# Patient Record
Sex: FEMALE | Race: WHITE | NOT HISPANIC OR LATINO | Employment: OTHER
[De-identification: names, ages, dates, MRNs, and addresses within clinical notes are randomized per-mention and may not be internally consistent; named-entity substitution may affect disease eponyms.]

---

## 2017-07-01 ENCOUNTER — HEALTH MAINTENANCE LETTER (OUTPATIENT)
Age: 47
End: 2017-07-01

## 2018-07-08 ENCOUNTER — HEALTH MAINTENANCE LETTER (OUTPATIENT)
Age: 48
End: 2018-07-08

## 2019-10-04 ENCOUNTER — HEALTH MAINTENANCE LETTER (OUTPATIENT)
Age: 49
End: 2019-10-04

## 2020-11-07 ENCOUNTER — HEALTH MAINTENANCE LETTER (OUTPATIENT)
Age: 50
End: 2020-11-07

## 2021-07-29 ENCOUNTER — TRANSFERRED RECORDS (OUTPATIENT)
Dept: HEALTH INFORMATION MANAGEMENT | Facility: CLINIC | Age: 51
End: 2021-07-29

## 2021-08-02 ENCOUNTER — MEDICAL CORRESPONDENCE (OUTPATIENT)
Dept: HEALTH INFORMATION MANAGEMENT | Facility: CLINIC | Age: 51
End: 2021-08-02

## 2021-08-02 ENCOUNTER — TRANSFERRED RECORDS (OUTPATIENT)
Dept: HEALTH INFORMATION MANAGEMENT | Facility: CLINIC | Age: 51
End: 2021-08-02

## 2021-08-02 ENCOUNTER — TRANSCRIBE ORDERS (OUTPATIENT)
Dept: OTHER | Age: 51
End: 2021-08-02

## 2021-08-02 DIAGNOSIS — M25.552 HIP PAIN, LEFT: Primary | ICD-10-CM

## 2021-08-04 NOTE — TELEPHONE ENCOUNTER
DIAGNOSIS: L hip    APPOINTMENT DATE: 8.26.21   NOTES STATUS DETAILS   OFFICE NOTE from referring provider In process 8.2.21 SHASHI Guevara   OFFICE NOTE from other specialist NA    DISCHARGE SUMMARY from hospital N/A    DISCHARGE REPORT from the ER N/A    OPERATIVE REPORT N/A    MEDICATION LIST Internal    EMG (for Spine) N/A    IMPLANT RECORD/STICKER N/A    LABS     CBC/DIFF Care Everywhere    CULTURES N/A    INJECTIONS DONE IN RADIOLOGY N/A    MRI N/A    CT SCAN N/A    XRAYS (IMAGES & REPORTS) N/A    TUMOR     PATHOLOGY  Slides & report N/A      Action 8.4.21 MJ   Action Taken Sent request to [facility].   08/19/21   10:52 AM   FAXED REQUEST TO [facility] FOR IMAGES  Chante Slois CMA    08/23/21   10:33 AM   [facility] RECORDS SENT TO SCANNING  IMAGES RESOLVED IN PACS  COMPLETE  Chante Solis CMA

## 2021-08-26 ENCOUNTER — OFFICE VISIT (OUTPATIENT)
Dept: ORTHOPEDICS | Facility: CLINIC | Age: 51
End: 2021-08-26
Payer: COMMERCIAL

## 2021-08-26 ENCOUNTER — LAB (OUTPATIENT)
Dept: LAB | Facility: CLINIC | Age: 51
End: 2021-08-26
Payer: COMMERCIAL

## 2021-08-26 ENCOUNTER — ANCILLARY PROCEDURE (OUTPATIENT)
Dept: CT IMAGING | Facility: CLINIC | Age: 51
End: 2021-08-26
Payer: COMMERCIAL

## 2021-08-26 ENCOUNTER — PRE VISIT (OUTPATIENT)
Dept: ORTHOPEDICS | Facility: CLINIC | Age: 51
End: 2021-08-26

## 2021-08-26 VITALS — WEIGHT: 163 LBS | HEIGHT: 68 IN | BODY MASS INDEX: 24.71 KG/M2

## 2021-08-26 DIAGNOSIS — R19.00 PELVIC MASS: ICD-10-CM

## 2021-08-26 DIAGNOSIS — R19.00 PELVIC MASS: Primary | ICD-10-CM

## 2021-08-26 DIAGNOSIS — M25.552 HIP PAIN, LEFT: ICD-10-CM

## 2021-08-26 LAB
ALBUMIN SERPL-MCNC: 4.4 G/DL (ref 3.4–5)
ALP SERPL-CCNC: 48 U/L (ref 40–150)
ALT SERPL W P-5'-P-CCNC: 22 U/L (ref 0–50)
ANION GAP SERPL CALCULATED.3IONS-SCNC: 8 MMOL/L (ref 3–14)
AST SERPL W P-5'-P-CCNC: 14 U/L (ref 0–45)
BASOPHILS # BLD AUTO: 0.1 10E3/UL (ref 0–0.2)
BASOPHILS NFR BLD AUTO: 1 %
BILIRUB SERPL-MCNC: 0.5 MG/DL (ref 0.2–1.3)
BUN SERPL-MCNC: 21 MG/DL (ref 7–30)
CALCIUM SERPL-MCNC: 9.8 MG/DL (ref 8.5–10.1)
CHLORIDE BLD-SCNC: 107 MMOL/L (ref 94–109)
CO2 SERPL-SCNC: 27 MMOL/L (ref 20–32)
CREAT SERPL-MCNC: 0.94 MG/DL (ref 0.52–1.04)
CRP SERPL-MCNC: <2.9 MG/L (ref 0–8)
EOSINOPHIL # BLD AUTO: 0.3 10E3/UL (ref 0–0.7)
EOSINOPHIL NFR BLD AUTO: 5 %
ERYTHROCYTE [DISTWIDTH] IN BLOOD BY AUTOMATED COUNT: 12.3 % (ref 10–15)
ERYTHROCYTE [SEDIMENTATION RATE] IN BLOOD BY WESTERGREN METHOD: 7 MM/HR (ref 0–30)
GFR SERPL CREATININE-BSD FRML MDRD: 70 ML/MIN/1.73M2
GLUCOSE BLD-MCNC: 101 MG/DL (ref 70–99)
HCT VFR BLD AUTO: 42.4 % (ref 35–47)
HGB BLD-MCNC: 14.2 G/DL (ref 11.7–15.7)
IMM GRANULOCYTES # BLD: 0 10E3/UL
IMM GRANULOCYTES NFR BLD: 0 %
LYMPHOCYTES # BLD AUTO: 1.9 10E3/UL (ref 0.8–5.3)
LYMPHOCYTES NFR BLD AUTO: 27 %
MCH RBC QN AUTO: 30.7 PG (ref 26.5–33)
MCHC RBC AUTO-ENTMCNC: 33.5 G/DL (ref 31.5–36.5)
MCV RBC AUTO: 92 FL (ref 78–100)
MONOCYTES # BLD AUTO: 0.7 10E3/UL (ref 0–1.3)
MONOCYTES NFR BLD AUTO: 10 %
NEUTROPHILS # BLD AUTO: 4 10E3/UL (ref 1.6–8.3)
NEUTROPHILS NFR BLD AUTO: 57 %
NRBC # BLD AUTO: 0 10E3/UL
NRBC BLD AUTO-RTO: 0 /100
PLATELET # BLD AUTO: 277 10E3/UL (ref 150–450)
POTASSIUM BLD-SCNC: 4.6 MMOL/L (ref 3.4–5.3)
PROT SERPL-MCNC: 7.6 G/DL (ref 6.8–8.8)
RBC # BLD AUTO: 4.63 10E6/UL (ref 3.8–5.2)
SODIUM SERPL-SCNC: 142 MMOL/L (ref 133–144)
TOTAL PROTEIN SERUM FOR ELP: 7.3 G/DL (ref 6.8–8.8)
WBC # BLD AUTO: 7 10E3/UL (ref 4–11)

## 2021-08-26 PROCEDURE — 86140 C-REACTIVE PROTEIN: CPT | Performed by: PATHOLOGY

## 2021-08-26 PROCEDURE — 74177 CT ABD & PELVIS W/CONTRAST: CPT | Mod: GC | Performed by: RADIOLOGY

## 2021-08-26 PROCEDURE — 85025 COMPLETE CBC W/AUTO DIFF WBC: CPT | Performed by: PATHOLOGY

## 2021-08-26 PROCEDURE — 36415 COLL VENOUS BLD VENIPUNCTURE: CPT | Performed by: PATHOLOGY

## 2021-08-26 PROCEDURE — 71260 CT THORAX DX C+: CPT | Mod: GC | Performed by: RADIOLOGY

## 2021-08-26 PROCEDURE — 84155 ASSAY OF PROTEIN SERUM: CPT | Performed by: STUDENT IN AN ORGANIZED HEALTH CARE EDUCATION/TRAINING PROGRAM

## 2021-08-26 PROCEDURE — 80053 COMPREHEN METABOLIC PANEL: CPT | Performed by: PATHOLOGY

## 2021-08-26 PROCEDURE — 99205 OFFICE O/P NEW HI 60 MIN: CPT | Mod: GC | Performed by: ORTHOPAEDIC SURGERY

## 2021-08-26 PROCEDURE — 85652 RBC SED RATE AUTOMATED: CPT | Performed by: PATHOLOGY

## 2021-08-26 PROCEDURE — 84165 PROTEIN E-PHORESIS SERUM: CPT | Performed by: PATHOLOGY

## 2021-08-26 RX ORDER — IOPAMIDOL 755 MG/ML
100 INJECTION, SOLUTION INTRAVASCULAR ONCE
Status: COMPLETED | OUTPATIENT
Start: 2021-08-26 | End: 2021-08-26

## 2021-08-26 RX ORDER — OMALIZUMAB 150 MG/ML
INJECTION, SOLUTION SUBCUTANEOUS
COMMUNITY
Start: 2021-01-22 | End: 2021-10-11

## 2021-08-26 RX ORDER — FLUTICASONE PROPIONATE 50 MCG
1 SPRAY, SUSPENSION (ML) NASAL
Status: ON HOLD | COMMUNITY
End: 2023-01-10

## 2021-08-26 RX ORDER — IOPAMIDOL 755 MG/ML
100 INJECTION, SOLUTION INTRAVASCULAR ONCE
Status: DISCONTINUED | OUTPATIENT
Start: 2021-08-26 | End: 2021-08-26 | Stop reason: CLARIF

## 2021-08-26 RX ORDER — POLYETHYLENE GLYCOL 3350 17 G/17G
17 POWDER, FOR SOLUTION ORAL
COMMUNITY
End: 2022-03-29

## 2021-08-26 RX ADMIN — IOPAMIDOL 100 ML: 755 INJECTION, SOLUTION INTRAVASCULAR at 13:38

## 2021-08-26 ASSESSMENT — HOOS S4: HOW SEVERE IS YOUR HIP JOINT STIFFNESS AFTER FIRST WAKENING IN THE MORNING?: MODERATE

## 2021-08-26 ASSESSMENT — MIFFLIN-ST. JEOR: SCORE: 1398.37

## 2021-08-26 ASSESSMENT — ACTIVITIES OF DAILY LIVING (ADL): HOOS_ADL: 79.41

## 2021-08-26 NOTE — LETTER
8/26/2021         RE: Kristie Cornejo  [address]  [facility] [address]        Dear Colleague,    Thank you for referring your patient, Kristie Cornejo, to the [facility] [place]. Please see a copy of my visit note below.        [facility], Orthopaedic Oncology Surgery Consultation  by Edu Philip M.D.    Kristie Cornejo MRN# [id number]   Age: 51 year old YOB: 1970     Requesting physician: MD SHASHI Arboleda Sarah C            Assessment and Plan:   Assessment:  50 yo F with Crohn's disease presenting for evaluation of large, expansile, permeative mass with indistinct borders involving the left superior and inferior pubic rami, pubic symphysis and with possible extension to the left acetabulum.     Plan:  We discussed with the patient that her imaging findings are concerning for possible malignancy.  We discussed the need for further work-up to rule out benign versus malignant lesion.  This includes CT chest abdomen pelvis as well as following labs: SPEP, CBC, CMP, ESR/CRP.  Given the patient is having minimal pain at this time and no pain currently with ambulation we counseled her that she should refrain from stressful or strenuous activities but may remain WBAT in her bilateral  lower extremities.  After completion of her labs we will plan to follow-up with her on 8/30/21 for review of these results.  If the imaging and laboratory results lack diagnostic specificity would then discuss possible need for biopsy.  Patient is in agreement this plan, all questions answered.    Patient seen, evaluated and discussed with Dr. Philip who is in agreement with the above assessment and plan.    Too Siegel MD  Orthopaedic Surgery PGY4  Pager: [phone]    MD Chichi Herrera Family Professor  Oncology and Adult Reconstructive Surgery  Dept Orthopaedic Surgery, [facility]  [phone] office,  [phone] pager  [email or web address]             History of Present Illness:   51 year old female with history of Crohn's disease who presents for evaluation of pelvic mass with associated left groin pain.  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  Initially this was thought to be related to her physical activity with skating, however she then reports she began altering gait and had progressive pain over the next 2-3 months.  Her last month she reports her groin pain has now been improving, likely related to modifications in her activity.  She reports her pain is primarily exacerbated by active hip flexion or with increased physical activity at which she describes fatigue-like symptoms and associated groin pain.  Due to this she has been unable to skate or go for long walks with her  (greater than 2 miles).  She describes the pain as aching tightness that she localizes to her left groin and perineal region.  She does endorse pain at night in the groin which makes it difficult to fall asleep but does not wake her from sleep. Rates pain as severe as 8/10 at its worst, currently describes it as 3/10.  She has been taking Tylenol PM and naproxen naproxen, both of which have been helpful.  She has not participated in any formal physical therapy.  She denies any prior interventions including no prior injections, biopsies or surgeries to her left hip or groin.    No weight loss, changes in appetite, fevers/chills, changes in bowel or bladder. No history of frequent UTIs. No cancer history but has had prior breast biopsy.    Background history:  DX:  1. Crohn's disease (on Omalizumab, Xolair)  2. Expansile destructive mass involving the left superior and inferior pubic rami, pubic symphysis and acetabulum    TREATMENTS/INTERVENTIONS:  1. None           Physical Exam:     EXAMINATION pertinent findings:   PSYCH: Pleasant, healthy-appearing, alert, oriented x3, cooperative. Normal  mood and affect.  VITAL SIGNS: There were no vitals taken for this visit..  Reviewed nursing intake notes.   There is no height or weight on file to calculate BMI.  RESP: non labored breathing   ABD: benign, soft, non-tender, no acute peritoneal findings  SKIN: grossly normal   LYMPHATIC: grossly normal, no adenopathy, no extremity edema  NEURO: grossly normal , no motor deficits  VASCULAR: satisfactory perfusion of all extremities   MUSCULOSKELETAL:   Gait: Normal    Left hip:  Skin intact without skin changes.  No visual masses or lumps soft tissues.  No lower extremity wounds or lymphedema.  Mild TTP left pelvic pubic rami. Nontender over the greater trochanter.  Hip ROM is not painful passively, but endorses pain with active hip flexion.   Flexion: 120 degrees   IR: 30 degrees   ER: 45 degrees  Hip strength: 4/5 hip flexion strength, 4+/5 adduction strength which exacerbates groin pain, 5/5 knee flex/ knee ext/TA/GSC.  Pain with logroll: no  Stinchfield test: Yes  Trendelenburg sign: No    PatientSILT sp/dp/tibial/saph/sural nerves.  Motor intact distally TA/GSC/EHL/FHL with 5/5 strength.    DP/PT pulses palpable, 2+, toes warm and well perfused.              Data:   All laboratory data reviewed  All imaging studies reviewed by me    XR pelvis 7/22/21 demonstrates large expansile, irregular permeative lytic mass involving the left parasymphyseal region including the superior and inferior pubic rami with indistinct borders. No lytic lesions involving the femoral neck or proximal femur.    MRI pelvis 7/29/21 demonstrates expansile destructive lesion involving the left pubic symphysis and superior pubic ramus with extension to the inferior pubic ramus and possibly anterior acetabulum. No obvious soft tissue component.      DATA for DOCUMENTATION:         Past Medical History:     Patient Active Problem List   Diagnosis     Allergic rhinitis     Anxiety state     Irritable bowel syndrome     Hemorrhoids     Sciatica      Encounter for insertion or removal of intrauterine contraceptive device     CARDIOVASCULAR SCREENING; LDL GOAL LESS THAN 160     Past Medical History:   Diagnosis Date     Allergic rhinitis, cause unspecified      Anxiety state, unspecified 12/03    Started postpartum . On Paxil x 1+years. Off meds- 10/04     Irritable bowel syndrome 2004     PLANTAR WARTS     resolved     SINUS DYSRHYTHMIA 10/02    wnl     Unspecified hemorrhoids without mention of complication 4/04    colonoscopy 4/04 spastic colon, int roids; bx neg       Also see scanned health assessment forms.       Past Surgical History:     Past Surgical History:   Procedure Laterality Date     HC COLONOSCOPY THRU STOMA, DIAGNOSTIC  2004     [facility] NONSPECIFIC PROCEDURE  1987    [place] teeth removed     Z NONSPECIFIC PROCEDURE  1993    (R) knee surgery     Z NONSPECIFIC PROCEDURE  2003    (R) breast bx-negative 6/03; bx neg 10/02     Z NONSPECIFIC PROCEDURE      10/02 cardiac echo normal     [facility] NONSPECIFIC PROCEDURE  2004 4/04 colonosc int roids, spastic colon     Z NONSPECIFIC PROCEDURE  8/05    D & C for missed AB            Social History:     Social History     Socioeconomic History     Marital status:      Spouse name: mary     Number of children: 1     Years of education: Not on file     Highest education level: Not on file   Occupational History     Employer: UNEMPLOYED   Tobacco Use     Smoking status: Never Smoker   Substance and Sexual Activity     Alcohol use: Yes     Comment: none to a couple. occasional     Drug use: No     Sexual activity: Yes     Partners: Male     Birth control/protection: Condom   Other Topics Concern     Parent/sibling w/ CABG, MI or angioplasty before 65F 55M? No     Comment: Grandparent. But not parent or sibling.   Social History Narrative     Not on file     Social Determinants of Health     Financial Resource Strain:      Difficulty of Paying Living Expenses:    Food Insecurity:      Worried About  Running Out of Food in the Last Year:      Ran Out of Food in the Last Year:    Transportation Needs:      Lack of Transportation (Medical):      Lack of Transportation (Non-Medical):    Physical Activity:      Days of Exercise per Week:      Minutes of Exercise per Session:    Stress:      Feeling of Stress :    Social Connections:      Frequency of Communication with Friends and Family:      Frequency of Social Gatherings with Friends and Family:      Attends Anabaptism Services:      Active Member of Clubs or Organizations:      Attends Club or Organization Meetings:      Marital Status:    Intimate Partner Violence:      Fear of Current or Ex-Partner:      Emotionally Abused:      Physically Abused:      Sexually Abused:    Nonsmoker, occasional EtOH.  Figure skating teacher, music therapist, [employer].  Lives in [place] in house with  and son.         Family History:       Family History   Problem Relation Age of Onset     Neurologic Disorder Mother         toxo     C.A.D. Maternal Grandfather         strokes and mi, chronic respiratory from 2 hand smoke     Hypertension Paternal Grandmother         rheumatoid arthritis     Diabetes Maternal Grandmother         breast cancer,stroke ischemic     Cancer - colorectal No family hx of      Prostate Cancer No family hx of      Respiratory Paternal Grandfather         emphasema     Breast Cancer Maternal Grandmother         81yo/and autoimmune skin condition     Lipids Mother         low cholestrol     Lipids Father         high cholestrol   Father with history of pancreatic cancer (diagnosed age 78).  Mother with cervical dysplasia.  Maternal grandmother breast cancer.         Medications:     Current Outpatient Medications   Medication Sig     BUSPIRONE HCL 10 MG PO TABS 1 TABLET 3 TIMES DAILY as needed     GLYCOLAX OR POWD 17 GM DAILY as needed     MIRENA 20 MCG/24HR IU IUD use for up to 5 years, then remove     No current facility-administered  medications for this visit.              Review of Systems:   A comprehensive 10 point review of systems (constitutional, ENT, cardiac, peripheral vascular, lymphatic, respiratory, GI, , Musculoskeletal, skin, Neurological) was performed and found to be negative except as described in this note.     See intake form completed by patient      Attestation signed by Edu Philip MD at 8/26/2021  5:36 PM:    Attending MD (Dr. Edu Philip) Attestation :  This patient was seen and evaluated by me including a history, exam, and interpretation of all imaging and/or lab data.  Either a training physician (resident/fellow), who also saw the patient, or scribe has documented the visit in the attached note.    Edu Philip MD  [facility] Family Professor  Oncology and Adult Reconstructive Surgery  Dept Orthopaedic Surgery, [facility]  [phone] office, [phone] pager  [email or web address]

## 2021-08-26 NOTE — NURSING NOTE
"Chief Complaint   Patient presents with     Consult     Left Hip pain and discomfort with physical activity       51 year old  1970    Ht 1.72 m (5' 7.72\")   Wt 73.9 kg (163 lb)   BMI 24.99 kg/m                  Pain Assessment  Patient Currently in Pain: Yes  0-10 Pain Scale: 3 (bothers patient at night)              [facility] [place], [place] - [address]  [id number] - [place], [place] - [address] AT [facility] [address]        Allergies   Allergen Reactions     Erythromycin      upsets stomach           Current Outpatient Medications   Medication     levonorgestrel (MIRENA) 20 MCG/24HR IUD     omalizumab (XOLAIR) 150 MG/ML SOSY injection     BUSPIRONE HCL 10 MG PO TABS     cetirizine HCl 10 MG CAPS     fluticasone (FLONASE) 50 MCG/ACT nasal spray     GLYCOLAX OR POWD     MIRENA 20 MCG/24HR IU IUD     polyethylene glycol (MIRALAX) 17 GM/Dose powder     No current facility-administered medications for this visit.             Questionnaires:    HOOS Hip Dysfunction & Osteoarthritis Outcome Questionnaire    No flowsheet data found.           KOOS Knee Survey Assessment    No flowsheet data found.           Promis 10 Assessment    No flowsheet data found.           Ortho [place] Knee Questionnaire    No flowsheet data found.                   "

## 2021-08-27 LAB
ALBUMIN SERPL ELPH-MCNC: 4.8 G/DL (ref 3.7–5.1)
ALPHA1 GLOB SERPL ELPH-MCNC: 0.3 G/DL (ref 0.2–0.4)
ALPHA2 GLOB SERPL ELPH-MCNC: 0.8 G/DL (ref 0.5–0.9)
B-GLOBULIN SERPL ELPH-MCNC: 0.7 G/DL (ref 0.6–1)
GAMMA GLOB SERPL ELPH-MCNC: 0.7 G/DL (ref 0.7–1.6)
M PROTEIN SERPL ELPH-MCNC: 0 G/DL
PROT PATTERN SERPL ELPH-IMP: NORMAL

## 2021-08-30 ENCOUNTER — TELEPHONE (OUTPATIENT)
Dept: ORTHOPEDICS | Facility: CLINIC | Age: 51
End: 2021-08-30

## 2021-08-30 ENCOUNTER — VIRTUAL VISIT (OUTPATIENT)
Dept: ORTHOPEDICS | Facility: CLINIC | Age: 51
End: 2021-08-30
Payer: COMMERCIAL

## 2021-08-30 DIAGNOSIS — M25.552 HIP PAIN, LEFT: Primary | ICD-10-CM

## 2021-08-30 PROCEDURE — 99213 OFFICE O/P EST LOW 20 MIN: CPT | Mod: GT | Performed by: ORTHOPAEDIC SURGERY

## 2021-08-30 NOTE — TELEPHONE ENCOUNTER
Follow-up call to [facility].  This RN discussed that surgery biopsy procedure with Dr. Philip may be Monday Sept 7th of Fri Sept 10th.  This RN discussed the preop, COVID test, care giver following surgery, overall plan of care.  This RN will reach back out to [facility] tomorrow and confirm the surgical plan.    All questions answered.    BANDAR GlassN, RN  RN Care Coordinator, Dr. Cedrick VALENZUELA [facility]

## 2021-08-30 NOTE — LETTER
8/30/2021         RE: Kristie Cornejo  [address]  [facility] [address]        Dear Colleague,    Thank you for referring your patient, Kristie Cornejo, to the [facility] [place]. Please see a copy of my visit note below.         [facility], Orthopaedic Oncology Surgery Consultation  by Edu Philip M.D.     Kristie Cornejo MRN# [id number]   Age: 51 year old YOB: 1970      Requesting physician: MD SHASHI Arboleda Sarah C         Background history:  DX:      1. Crohn's disease (on Omalizumab, Xolair)  2. Expansile destructive mass involving the left superior and inferior pubic rami, pubic symphysis and acetabulum     TREATMENTS/INTERVENTIONS:  1. None     Follow-up visit via virtual video call today to review CT scan as well as laboratory studies. None of them show any evidence of primary tumor elsewhere. For this reason I recommend that we then proceed with a biopsy procedure. We will investigate doing this either with interventional radiology or surgically, which ever can be done quickest.    I described the process of doing the biopsy, the possibility of a nondiagnostic sample when done noninvasively.             Assessment and Plan:   Assessment:  52 yo F with Crohn's disease presenting for evaluation of large, expansile, permeative mass with indistinct borders involving the left superior and inferior pubic rami, pubic symphysis and with possible extension to the left acetabulum.     Plan:  We discussed with the patient that her imaging findings are concerning for possible malignancy.  We discussed the need for further work-up to rule out benign versus malignant lesion.  This includes CT chest abdomen pelvis as well as following labs: SPEP, CBC, CMP, ESR/CRP.  Given the patient is having minimal pain at this time and no pain currently with ambulation we counseled her that she should refrain from stressful or  strenuous activities but may remain WBAT in her bilateral  lower extremities.  After completion of her labs we will plan to follow-up with her on 8/30/21 for review of these results.  If the imaging and laboratory results lack diagnostic specificity would then discuss possible need for biopsy.  Patient is in agreement this plan, all questions answered.     Patient seen, evaluated and discussed with Dr. Philip who is in agreement with the above assessment and plan.     Too Siegel MD  Orthopaedic Surgery PGY4  Pager: [phone]     Edu Philip MD  [facility] Professor  Oncology and Adult Reconstructive Surgery  Dept Orthopaedic Surgery, [facility]  [phone] office, [phone] pager  [email or web address]              History of Present Illness:   51 year old female with history of Crohn's disease who presents for evaluation of pelvic mass with associated left groin pain.  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  Initially this was thought to be related to her physical activity with skating, however she then reports she began altering gait and had progressive pain over the next 2-3 months.  Her last month she reports her groin pain has now been improving, likely related to modifications in her activity.  She reports her pain is primarily exacerbated by active hip flexion or with increased physical activity at which she describes fatigue-like symptoms and associated groin pain.  Due to this she has been unable to skate or go for long walks with her  (greater than 2 miles).  She describes the pain as aching tightness that she localizes to her left groin and perineal region.  She does endorse pain at night in the groin which makes it difficult to fall asleep but does not wake her from sleep. Rates pain as severe as 8/10 at its worst, currently describes it as 3/10.  She has been taking Tylenol PM and naproxen naproxen, both of which have been helpful.   She has not participated in any formal physical therapy.  She denies any prior interventions including no prior injections, biopsies or surgeries to her left hip or groin.     No weight loss, changes in appetite, fevers/chills, changes in bowel or bladder. No history of frequent UTIs. No cancer history but has had prior breast biopsy.               Physical Exam:      EXAMINATION pertinent findings:   PSYCH: Pleasant, healthy-appearing, alert, oriented x3, cooperative. Normal mood and affect.  VITAL SIGNS: There were no vitals taken for this visit..  Reviewed nursing intake notes.   There is no height or weight on file to calculate BMI.  RESP: non labored breathing   ABD: benign, soft, non-tender, no acute peritoneal findings  SKIN: grossly normal   LYMPHATIC: grossly normal, no adenopathy, no extremity edema  NEURO: grossly normal , no motor deficits  VASCULAR: satisfactory perfusion of all extremities   MUSCULOSKELETAL:   Gait: Normal     Left hip:  Skin intact without skin changes.  No visual masses or lumps soft tissues.  No lower extremity wounds or lymphedema.  Mild TTP left pelvic pubic rami. Nontender over the greater trochanter.  Hip ROM is not painful passively, but endorses pain with active hip flexion.            Flexion: 120 degrees            IR: 30 degrees            ER: 45 degrees  Hip strength: 4/5 hip flexion strength, 4+/5 adduction strength which exacerbates groin pain, 5/5 knee flex/ knee ext/TA/GSC.  Pain with logroll: no  Stinchfield test: Yes  Trendelenburg sign: No     PatientSILT sp/dp/tibial/saph/sural nerves.  Motor intact distally TA/GSC/EHL/FHL with 5/5 strength.    DP/PT pulses palpable, 2+, toes warm and well perfused.                 Data:   All laboratory data reviewed  All imaging studies reviewed by me     XR pelvis 7/22/21 demonstrates large expansile, irregular permeative lytic mass involving the left parasymphyseal region including the superior and inferior pubic rami with  indistinct borders. No lytic lesions involving the femoral neck or proximal femur.     MRI pelvis 7/29/21 demonstrates expansile destructive lesion involving the left pubic symphysis and superior pubic ramus with extension to the inferior pubic ramus and possibly anterior acetabulum. No obvious soft tissue component.

## 2021-08-30 NOTE — PROGRESS NOTES
[facility], Orthopaedic Oncology Surgery Consultation  by Edu Philip M.D.     Kristie Cornejo MRN# [id number]   Age: 51 year old YOB: 1970      Requesting physician: MD SHASHI Arboleda Sarah C         Background history:  DX:      1. Crohn's disease (on Omalizumab, Xolair)  2. Expansile destructive mass involving the left superior and inferior pubic rami, pubic symphysis and acetabulum     TREATMENTS/INTERVENTIONS:  1. None     Follow-up visit via virtual video call today to review CT scan as well as laboratory studies. None of them show any evidence of primary tumor elsewhere. For this reason I recommend that we then proceed with a biopsy procedure. We will investigate doing this either with interventional radiology or surgically, which ever can be done quickest.    I described the process of doing the biopsy, the possibility of a nondiagnostic sample when done noninvasively.             Assessment and Plan:   Assessment:  52 yo F with Crohn's disease presenting for evaluation of large, expansile, permeative mass with indistinct borders involving the left superior and inferior pubic rami, pubic symphysis and with possible extension to the left acetabulum.     Plan:  We discussed with the patient that her imaging findings are concerning for possible malignancy.  We discussed the need for further work-up to rule out benign versus malignant lesion.  This includes CT chest abdomen pelvis as well as following labs: SPEP, CBC, CMP, ESR/CRP.  Given the patient is having minimal pain at this time and no pain currently with ambulation we counseled her that she should refrain from stressful or strenuous activities but may remain WBAT in her bilateral  lower extremities.  After completion of her labs we will plan to follow-up with her on 8/30/21 for review of these results.  If the imaging and laboratory results lack diagnostic specificity would then discuss possible  "need for biopsy.  Patient is in agreement this plan, all questions answered.     Patient seen, evaluated and discussed with Dr. Philip who is in agreement with the above assessment and plan.     Too Siegel MD  Orthopaedic Surgery PGY4  Pager: [phone]       Attending MD (Dr. Edu Philip) Attestation :  This patient was seen and evaluated by me including a history, exam, and interpretation of all imaging and/or lab data.  Either a training physician (resident/fellow), who also saw the patient, or scribe has documented the visit in the attached note.    MD Chichi Herrera Family Professor  Oncology and Adult Reconstructive Surgery  Dept Orthopaedic Surgery, [facility]  [phone] office, [phone] pager  [email or web address]    Virtual-Visit Details    Kristie Cornejo is a 51 year old female who is being evaluated via a billable video visit.      The patient has been notified of following:     \"This virtual video/telephone visit will be conducted via a call between you and your physician/provider. We have found that certain health care needs can be provided without the need for an in-person physical exam.  This service lets us provide the care you need with a video/telephone conversation.  If a prescription is necessary we can send it directly to your pharmacy.  If lab work is needed we can place an order for that and you can then stop by our lab to have the test done at a later time.    Video/telephone visits are billed at different rates depending on your insurance coverage.  Please reach out to your insurance provider with any questions.    If during the course of the call the physician/provider feels a virtual visit is not appropriate, you will not be charged for this service.\"    Patient has given verbal consent for Virtual visit? YES    Type of service:  Video/telephone Visit    Video/telephone time total duration including visit time, pre and post visit work time: 20 " min    Originating Location (pt. Location): Home    Distant Location (provider location):  [facility] [place]    Mode of Communication:  Video Conference via either [employer] or [employer]                        History of Present Illness:   51 year old female with history of Crohn's disease who presents for evaluation of pelvic mass with associated left groin pain.  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  Initially this was thought to be related to her physical activity with skating, however she then reports she began altering gait and had progressive pain over the next 2-3 months.  Her last month she reports her groin pain has now been improving, likely related to modifications in her activity.  She reports her pain is primarily exacerbated by active hip flexion or with increased physical activity at which she describes fatigue-like symptoms and associated groin pain.  Due to this she has been unable to skate or go for long walks with her  (greater than 2 miles).  She describes the pain as aching tightness that she localizes to her left groin and perineal region.  She does endorse pain at night in the groin which makes it difficult to fall asleep but does not wake her from sleep. Rates pain as severe as 8/10 at its worst, currently describes it as 3/10.  She has been taking Tylenol PM and naproxen naproxen, both of which have been helpful.  She has not participated in any formal physical therapy.  She denies any prior interventions including no prior injections, biopsies or surgeries to her left hip or groin.     No weight loss, changes in appetite, fevers/chills, changes in bowel or bladder. No history of frequent UTIs. No cancer history but has had prior breast biopsy.               Physical Exam:      EXAMINATION pertinent findings:   PSYCH: Pleasant, healthy-appearing, alert, oriented x3, cooperative. Normal mood and affect.  VITAL  SIGNS: There were no vitals taken for this visit..  Reviewed nursing intake notes.   There is no height or weight on file to calculate BMI.  RESP: non labored breathing   ABD: benign, soft, non-tender, no acute peritoneal findings  SKIN: grossly normal   LYMPHATIC: grossly normal, no adenopathy, no extremity edema  NEURO: grossly normal , no motor deficits  VASCULAR: satisfactory perfusion of all extremities   MUSCULOSKELETAL:   Gait: Normal     Left hip:  Skin intact without skin changes.  No visual masses or lumps soft tissues.  No lower extremity wounds or lymphedema.  Mild TTP left pelvic pubic rami. Nontender over the greater trochanter.  Hip ROM is not painful passively, but endorses pain with active hip flexion.            Flexion: 120 degrees            IR: 30 degrees            ER: 45 degrees  Hip strength: 4/5 hip flexion strength, 4+/5 adduction strength which exacerbates groin pain, 5/5 knee flex/ knee ext/TA/GSC.  Pain with logroll: no  Stinchfield test: Yes  Trendelenburg sign: No     PatientSILT sp/dp/tibial/saph/sural nerves.  Motor intact distally TA/GSC/EHL/FHL with 5/5 strength.    DP/PT pulses palpable, 2+, toes warm and well perfused.                 Data:   All laboratory data reviewed  All imaging studies reviewed by me     XR pelvis 7/22/21 demonstrates large expansile, irregular permeative lytic mass involving the left parasymphyseal region including the superior and inferior pubic rami with indistinct borders. No lytic lesions involving the femoral neck or proximal femur.     MRI pelvis 7/29/21 demonstrates expansile destructive lesion involving the left pubic symphysis and superior pubic ramus with extension to the inferior pubic ramus and possibly anterior acetabulum. No obvious soft tissue component.

## 2021-08-30 NOTE — NURSING NOTE
Chief Complaint   Patient presents with     RECHECK     review CT and lab results // discuss treatment plan      Video Visit     video via [employer]        51 year old  1970            Pain Assessment  Patient Currently in Pain: Yes  0-10 Pain Scale: 2  Primary Pain Location: Hip (left hip and groin)               [place], [place] - [address]  [id number] [place], [place] - [address] AT [facility] [address]        Allergies   Allergen Reactions     Erythromycin      upsets stomach           Current Outpatient Medications   Medication     UNABLE TO FIND     UNABLE TO FIND     UNABLE TO FIND     BUSPIRONE HCL 10 MG PO TABS     calcium carbonate 600 mg-vitamin D 400 units (CALTRATE) 600-400 MG-UNIT per tablet     cetirizine HCl 10 MG CAPS     fluticasone (FLONASE) 50 MCG/ACT nasal spray     GLYCOLAX OR POWD     levonorgestrel (MIRENA) 20 MCG/24HR IUD     MIRENA 20 MCG/24HR IU IUD     omalizumab (XOLAIR) 150 MG/ML SOSY injection     polyethylene glycol (MIRALAX) 17 GM/Dose powder     No current facility-administered medications for this visit.     Facility-Administered Medications Ordered in Other Visits   Medication     CT Flush             Promis 10 Assessment    PROMIS 10 8/26/2021   In general, would you say your health is: Very good   In general, would you say your quality of life is: Very good   In general, how would you rate your physical health? Good   In general, how would you rate your mental health, including your mood and your ability to think? Excellent   In general, how would you rate your satisfaction with your social activities and relationships? Excellent   In general, please rate how well you carry out your usual social activities and roles Very good   To what extent are you able to carry out your everyday physical activities such as walking, climbing stairs, carrying groceries, or moving a chair? Moderately   How often have  you been bothered by emotional problems such as feeling anxious, depressed or irritable? Rarely   How would you rate your fatigue on average? Mild   How would you rate your pain on average?   0 = No Pain  to  10 = Worst Imaginable Pain 3   In general, would you say your health is: 4   In general, would you say your quality of life is: 4   In general, how would you rate your physical health? 3   In general, how would you rate your mental health, including your mood and your ability to think? 5   In general, how would you rate your satisfaction with your social activities and relationships? 5   In general, please rate how well you carry out your usual social activities and roles. (This includes activities at home, at work and in your community, and responsibilities as a parent, child, spouse, employee, friend, etc.) 4   To what extent are you able to carry out your everyday physical activities such as walking, climbing stairs, carrying groceries, or moving a chair? 3   In the past 7 days, how often have you been bothered by emotional problems such as feeling anxious, depressed, or irritable? 2   In the past 7 days, how would you rate your fatigue on average? 2   In the past 7 days, how would you rate your pain on average, where 0 means no pain, and 10 means worst imaginable pain? 3   Global Mental Health Score 18   Global Physical Health Score 14   PROMIS TOTAL - SUBSCORES 32   Some recent data might be hidden

## 2021-08-31 ENCOUNTER — TELEPHONE (OUTPATIENT)
Dept: ORTHOPEDICS | Facility: CLINIC | Age: 51
End: 2021-08-31

## 2021-08-31 ENCOUNTER — PREP FOR PROCEDURE (OUTPATIENT)
Dept: ORTHOPEDICS | Facility: CLINIC | Age: 51
End: 2021-08-31

## 2021-08-31 DIAGNOSIS — R19.00 PELVIC MASS: Primary | ICD-10-CM

## 2021-08-31 DIAGNOSIS — Z11.59 ENCOUNTER FOR SCREENING FOR OTHER VIRAL DISEASES: ICD-10-CM

## 2021-08-31 NOTE — PROGRESS NOTES
PRE-OP PLAN    Brief: supine// fracture table/ open bx/ ancef    Surgical Plan:  Biopsy left pubic symphysis/ ramus     Background history:  52 yo F with Crohn's disease presenting for evaluation of large, expansile, permeative mass with indistinct borders involving the left superior and inferior pubic rami, pubic symphysis and with possible extension to the left acetabulum.    DX:      1. Crohn's disease (on Omalizumab, Xolair)  2. Expansile destructive mass involving the left superior and inferior pubic rami, pubic symphysis and acetabulum     TREATMENTS/INTERVENTIONS:  1. None     Patient Position (indicated by x):   x  Supine     Supine with torso rolled up on a bump      Floppy lateral on torso length bean bag      Lateral decubitus, bean bag, full length      Lateral decubitus, Wixson hip positioner      Safety paddle side supports x 2 clamped to side rail      Lithotomy, both legs in yellow padded leg burgess      Lithotomy, single leg in yellow padded leg burgess      Prone on blanket rolls/round gel pad      Prone on David (arched) frame on Mc table      Single thigh in orange arthroscopy clamp      Beach chair semi recumbent      Arm out on radiolucent arm table   x   Split drape with top bar, prep abdomen      Revision ISABEL drape with plastic side bags for leg     Extremity drape      Shoulder pack drape      Mcdermott catheter          x  Fracture Table     Mc x-ray table     Regular OR table              General Equipment Requests (indicated by x):       C-Arm with C-Armor drape      O-Arm with Stealth imaging      Cedrick Biopsy trephine set w/ K-wire & pituitary rongeurs   x  Small pituitary rongeur    x  Cedrick's angled curettes, narrow shaft   x   core needle biopsy      Midas Bruce Medtronic alycia, electric motor      Phenol 5%      [place] BMAC stem cell    x  2 packs cement    x  Zometa 4 mg vials      Depo Medrol steroid      Blunt Pelvic Retractor (.55, Blunt Hohmann with  slight bend)       (1) Portable hand held radiation detector machine for sentinel node biopsy and (2) Lymphazurin      [place] Osteotomes      Specimens and cultures (indicated by x):   x   Tissue cultures, aerobic and anaerobic without gram stain     Frozen section   x  pathology specimens - fresh      pathology specimens - formalin            Draion Carmona DO  Adult Joint Reconstruction Fellow  Dept Orthopaedic Surgery, [facility]

## 2021-08-31 NOTE — TELEPHONE ENCOUNTER
Patient is scheduled for surgery with Dr. Philip    Spoke with: NILAM Daley - scheduled with patient    Date of Surgery: 9/7/2021    Location: [place]    Informed patient they will need an adult  yes    Pre op with Provider n/a    H&P: Scheduled with pcp    Pre-procedure COVID-19 Test: patient will schedule with covid scheduling team    Additional imaging/appointments: n/a    Surgery packet: n/a     Additional comments: n/a

## 2021-08-31 NOTE — TELEPHONE ENCOUNTER
Confirmed surgical date of 9/7 at [place].  Margo will do her preop this evening at [facility], fax number provided to send preop.  This RN discussed COVID test that COVID team will facilitate for her to be completed Fri, Sat, Sun.  Reviewed showering procedure, instructed to purchase soap.   will be caregiver, present day of surgery and following surgery.  Margo has has previous surgeries, knee, hernia, wisdom teeth- tolerated anesthesia.  2wk post-op appt discussed.  Margo has allergies, discussed medications to hold day of surgery.  Margo will obtain a cane, has crutches at home.  Margo denies cardiac or resp hx, no clotting/bleeding disorders      Teaching Flowsheet   Relevant Diagnosis:Biopsy left pubic ramis       Teaching Topic: Preop Teaching for above     Person(s) involved in teaching:   Patient     Motivation Level:  Asks Questions: Yes  Eager to Learn: Yes  Cooperative: Yes  Receptive (willing/able to accept information): Yes  Any cultural factors/Gnosticist beliefs that may influence understanding or compliance? No       Patient demonstrates understanding of the following:  Reason for the appointment, diagnosis and treatment plan: Yes  Knowledge of proper use of medications and conditions for which they are ordered (with special attention to potential side effects or drug interactions): Yes  Which situations necessitate calling provider and whom to contact: Yes       Teaching Concerns Addressed: see above         Proper use and care of medical equip, care aids,   Nutritional needs and diet plan: Yes  Pain management techniques: Yes        Instructional Materials Used/Given: due to close time of surgery, all information reviewed, will mail Preop Packet, may not receive and instructed to purchase Antiseptic Soap  ,     Time spent with patient: 30 minutes.    BANDAR GlassN, RN  RN Care Coordinator, Dr. Cedrick VALENZUELA [facility]

## 2021-09-03 ENCOUNTER — LAB (OUTPATIENT)
Dept: LAB | Facility: CLINIC | Age: 51
End: 2021-09-03
Attending: ORTHOPAEDIC SURGERY
Payer: COMMERCIAL

## 2021-09-03 DIAGNOSIS — Z11.59 ENCOUNTER FOR SCREENING FOR OTHER VIRAL DISEASES: ICD-10-CM

## 2021-09-03 PROCEDURE — U0005 INFEC AGEN DETEC AMPLI PROBE: HCPCS

## 2021-09-03 PROCEDURE — U0003 INFECTIOUS AGENT DETECTION BY NUCLEIC ACID (DNA OR RNA); SEVERE ACUTE RESPIRATORY SYNDROME CORONAVIRUS 2 (SARS-COV-2) (CORONAVIRUS DISEASE [COVID-19]), AMPLIFIED PROBE TECHNIQUE, MAKING USE OF HIGH THROUGHPUT TECHNOLOGIES AS DESCRIBED BY CMS-2020-01-R: HCPCS

## 2021-09-04 LAB — SARS-COV-2 RNA RESP QL NAA+PROBE: NEGATIVE

## 2021-09-06 ENCOUNTER — ANESTHESIA EVENT (OUTPATIENT)
Dept: SURGERY | Facility: CLINIC | Age: 51
End: 2021-09-06
Payer: COMMERCIAL

## 2021-09-06 ASSESSMENT — ENCOUNTER SYMPTOMS: DYSRHYTHMIAS: 1

## 2021-09-06 NOTE — ANESTHESIA PREPROCEDURE EVALUATION
Anesthesia Pre-Procedure Evaluation    Patient: Kristie Cornejo   MRN: [id number] : 1970        Preoperative Diagnosis: Pelvic mass [R19.00]   Procedure : Procedure(s):  Biopsy left pubic ramis     Past Medical History:   Diagnosis Date     Allergic rhinitis, cause unspecified      Anxiety state, unspecified     Started postpartum . On Paxil x 1+years. Off meds- 10/04     Irritable bowel syndrome      PLANTAR WARTS     resolved     SINUS DYSRHYTHMIA 10/02    wnl     Unspecified hemorrhoids without mention of complication     colonoscopy  spastic colon, int roids; bx neg      Past Surgical History:   Procedure Laterality Date     [facility] NONSPECIFIC PROCEDURE      [place] teeth removed     [facility] NONSPECIFIC PROCEDURE      (R) knee surgery     [facility] NONSPECIFIC PROCEDURE      (R) breast bx-negative ; bx neg 10/02     [facility] NONSPECIFIC PROCEDURE      10/02 cardiac echo normal     [facility] NONSPECIFIC PROCEDURE   colonosc int roids, spastic colon     [facility] NONSPECIFIC PROCEDURE      D & C for missed AB     [facility]        Allergies   Allergen Reactions     Erythromycin      upsets stomach      Social History     Tobacco Use     Smoking status: Never Smoker     Smokeless tobacco: Never Used   Substance Use Topics     Alcohol use: Yes     Comment: none to a couple. occasional      Wt Readings from Last 1 Encounters:   21 73.9 kg (163 lb)        Anesthesia Evaluation   Pt has had prior anesthetic. Type: General.    No history of anesthetic complications       ROS/MED HX  ENT/Pulmonary:     (+) ISAÍAS risk factors, allergic rhinitis,     Neurologic:     (+) - Sciatica.     Cardiovascular:     (+) -----dysrhythmias (SINUS DYSRHYTHMIA), Other,     METS/Exercise Tolerance:     Hematologic:  - neg hematologic  ROS     Musculoskeletal: Comment: Pelvic mass      GI/Hepatic:     (+) Inflammatory bowel disease,     Renal/Genitourinary:  - neg Renal  ROS     Endo:  - neg endo ROS     Psychiatric/Substance Use:     (+) psychiatric history anxiety     Infectious Disease:  - neg infectious disease ROS     Malignancy:  - neg malignancy ROS     Other:  - neg other ROS    (+) , H/O Chronic Pain,        Physical Exam    Airway  airway exam normal           Respiratory Devices and Support         Dental  no notable dental history       B=Bridge, C=Chipped, L=Loose, M=Missing    Cardiovascular          Rhythm and rate: irregular and bradycardia     Pulmonary   pulmonary exam normal                OUTSIDE LABS:  CBC:   Lab Results   Component Value Date    WBC 7.0 08/26/2021    WBC 7.3 02/13/2006    HGB 14.2 08/26/2021    HGB 12.2 08/29/2006    HCT 42.4 08/26/2021    HCT 38.0 02/13/2006     08/26/2021     02/13/2006     BMP:   Lab Results   Component Value Date     08/26/2021    POTASSIUM 4.6 08/26/2021    CHLORIDE 107 08/26/2021    CO2 27 08/26/2021    BUN 21 08/26/2021    CR 0.94 08/26/2021     (H) 08/26/2021    GLC 88 06/16/2010     COAGS: No results found for: PTT, INR, FIBR  POC:   Lab Results   Component Value Date    HCG Negative 06/26/2007     HEPATIC:   Lab Results   Component Value Date    ALBUMIN 4.4 08/26/2021    PROTTOTAL 7.6 08/26/2021    ALT 22 08/26/2021    AST 14 08/26/2021    ALKPHOS 48 08/26/2021    BILITOTAL 0.5 08/26/2021     OTHER:   Lab Results   Component Value Date    MEKA 9.8 08/26/2021    TSH 0.89 06/16/2010    CRP <2.9 08/26/2021    SED 7 08/26/2021       Anesthesia Plan    ASA Status:  2      Anesthesia Type: General.     - Airway: LMA   Induction: Intravenous, Propofol.   Maintenance: Balanced.        Consents    Anesthesia Plan(s) and associated risks, benefits, and realistic alternatives discussed. Questions answered and patient/representative(s) expressed understanding.     - Discussed with:  Patient      - Extended Intubation/Ventilatory Support Discussed: No.      - Patient is DNR/DNI Status: No    Use of blood  products discussed: No .     Postoperative Care    Pain management: IV analgesics, Oral pain medications.   PONV prophylaxis: Ondansetron (or other 5HT-3), Dexamethasone or Solumedrol     Comments:    50 yo for Biopsy left pubic ramis (Left Pelvis) under GA/LMA. Anesthesia risks and benefits discussed. Questions answered. Parents understand and agree to proceed with anesthesia plan.                   Warner Meyer MD

## 2021-09-07 ENCOUNTER — ANESTHESIA (OUTPATIENT)
Dept: SURGERY | Facility: CLINIC | Age: 51
End: 2021-09-07
Payer: COMMERCIAL

## 2021-09-07 ENCOUNTER — HOSPITAL ENCOUNTER (OUTPATIENT)
Facility: CLINIC | Age: 51
Discharge: HOME | End: 2021-09-07
Attending: ORTHOPAEDIC SURGERY | Admitting: ORTHOPAEDIC SURGERY
Payer: COMMERCIAL

## 2021-09-07 ENCOUNTER — APPOINTMENT (OUTPATIENT)
Dept: GENERAL RADIOLOGY | Facility: CLINIC | Age: 51
End: 2021-09-07
Attending: ORTHOPAEDIC SURGERY
Payer: COMMERCIAL

## 2021-09-07 VITALS
SYSTOLIC BLOOD PRESSURE: 131 MMHG | OXYGEN SATURATION: 97 % | WEIGHT: 167.11 LBS | RESPIRATION RATE: 16 BRPM | DIASTOLIC BLOOD PRESSURE: 82 MMHG | HEIGHT: 68 IN | BODY MASS INDEX: 25.33 KG/M2 | TEMPERATURE: 98.3 F | HEART RATE: 68 BPM

## 2021-09-07 DIAGNOSIS — R19.00 PELVIC MASS: ICD-10-CM

## 2021-09-07 LAB — GLUCOSE BLDC GLUCOMTR-MCNC: 90 MG/DL (ref 70–99)

## 2021-09-07 PROCEDURE — 250N000013 HC RX MED GY IP 250 OP 250 PS 637: Performed by: ANESTHESIOLOGY

## 2021-09-07 PROCEDURE — 88271 CYTOGENETICS DNA PROBE: CPT | Performed by: ORTHOPAEDIC SURGERY

## 2021-09-07 PROCEDURE — 88291 CYTO/MOLECULAR REPORT: CPT | Performed by: MEDICAL GENETICS

## 2021-09-07 PROCEDURE — 272N000001 HC OR GENERAL SUPPLY STERILE: Performed by: ORTHOPAEDIC SURGERY

## 2021-09-07 PROCEDURE — 88275 CYTOGENETICS 100-300: CPT | Performed by: ORTHOPAEDIC SURGERY

## 2021-09-07 PROCEDURE — 250N000009 HC RX 250: Performed by: ORTHOPAEDIC SURGERY

## 2021-09-07 PROCEDURE — 88184 FLOWCYTOMETRY/ TC 1 MARKER: CPT | Performed by: ORTHOPAEDIC SURGERY

## 2021-09-07 PROCEDURE — 710N000009 HC RECOVERY PHASE 1, LEVEL 1, PER MIN: Performed by: ORTHOPAEDIC SURGERY

## 2021-09-07 PROCEDURE — 250N000011 HC RX IP 250 OP 636: Performed by: PHYSICIAN ASSISTANT

## 2021-09-07 PROCEDURE — 88342 IMHCHEM/IMCYTCHM 1ST ANTB: CPT | Mod: 26 | Performed by: PATHOLOGY

## 2021-09-07 PROCEDURE — 258N000003 HC RX IP 258 OP 636: Performed by: NURSE ANESTHETIST, CERTIFIED REGISTERED

## 2021-09-07 PROCEDURE — 88368 INSITU HYBRIDIZATION MANUAL: CPT | Mod: 26 | Performed by: MEDICAL GENETICS

## 2021-09-07 PROCEDURE — 88331 PATH CONSLTJ SURG 1 BLK 1SPC: CPT | Mod: TC | Performed by: ORTHOPAEDIC SURGERY

## 2021-09-07 PROCEDURE — 87070 CULTURE OTHR SPECIMN AEROBIC: CPT | Performed by: ORTHOPAEDIC SURGERY

## 2021-09-07 PROCEDURE — 88305 TISSUE EXAM BY PATHOLOGIST: CPT | Mod: 26 | Performed by: PATHOLOGY

## 2021-09-07 PROCEDURE — 88341 IMHCHEM/IMCYTCHM EA ADD ANTB: CPT | Mod: 26 | Performed by: PATHOLOGY

## 2021-09-07 PROCEDURE — 999N000141 HC STATISTIC PRE-PROCEDURE NURSING ASSESSMENT: Performed by: ORTHOPAEDIC SURGERY

## 2021-09-07 PROCEDURE — 88311 DECALCIFY TISSUE: CPT | Mod: 26 | Performed by: PATHOLOGY

## 2021-09-07 PROCEDURE — 88189 FLOWCYTOMETRY/READ 16 & >: CPT | Performed by: PATHOLOGY

## 2021-09-07 PROCEDURE — 250N000011 HC RX IP 250 OP 636: Performed by: NURSE ANESTHETIST, CERTIFIED REGISTERED

## 2021-09-07 PROCEDURE — 370N000017 HC ANESTHESIA TECHNICAL FEE, PER MIN: Performed by: ORTHOPAEDIC SURGERY

## 2021-09-07 PROCEDURE — 20245 BONE BIOPSY OPEN DEEP: CPT | Mod: LT | Performed by: ORTHOPAEDIC SURGERY

## 2021-09-07 PROCEDURE — 88185 FLOWCYTOMETRY/TC ADD-ON: CPT | Performed by: ORTHOPAEDIC SURGERY

## 2021-09-07 PROCEDURE — 88311 DECALCIFY TISSUE: CPT | Mod: TC | Performed by: ORTHOPAEDIC SURGERY

## 2021-09-07 PROCEDURE — 88264 CHROMOSOME ANALYSIS 20-25: CPT | Performed by: ORTHOPAEDIC SURGERY

## 2021-09-07 PROCEDURE — 87075 CULTR BACTERIA EXCEPT BLOOD: CPT | Performed by: ORTHOPAEDIC SURGERY

## 2021-09-07 PROCEDURE — 250N000009 HC RX 250: Performed by: NURSE ANESTHETIST, CERTIFIED REGISTERED

## 2021-09-07 PROCEDURE — 88331 PATH CONSLTJ SURG 1 BLK 1SPC: CPT | Mod: 26 | Performed by: PATHOLOGY

## 2021-09-07 PROCEDURE — 710N000012 HC RECOVERY PHASE 2, PER MINUTE: Performed by: ORTHOPAEDIC SURGERY

## 2021-09-07 PROCEDURE — 72170 X-RAY EXAM OF PELVIS: CPT | Mod: 26 | Performed by: RADIOLOGY

## 2021-09-07 PROCEDURE — 250N000025 HC SEVOFLURANE, PER MIN: Performed by: ORTHOPAEDIC SURGERY

## 2021-09-07 PROCEDURE — 999N000065 XR PELVIS 1/2 VW

## 2021-09-07 PROCEDURE — 88369 M/PHMTRC ALYSISHQUANT/SEMIQ: CPT | Mod: 26 | Performed by: MEDICAL GENETICS

## 2021-09-07 PROCEDURE — 360N000076 HC SURGERY LEVEL 3, PER MIN: Performed by: ORTHOPAEDIC SURGERY

## 2021-09-07 PROCEDURE — 93010 ELECTROCARDIOGRAM REPORT: CPT | Mod: 59 | Performed by: INTERNAL MEDICINE

## 2021-09-07 RX ORDER — KETAMINE HYDROCHLORIDE 10 MG/ML
INJECTION INTRAMUSCULAR; INTRAVENOUS PRN
Status: DISCONTINUED | OUTPATIENT
Start: 2021-09-07 | End: 2021-09-07

## 2021-09-07 RX ORDER — CEFAZOLIN SODIUM 2 G/100ML
2 INJECTION, SOLUTION INTRAVENOUS SEE ADMIN INSTRUCTIONS
Status: CANCELLED | OUTPATIENT
Start: 2021-09-07

## 2021-09-07 RX ORDER — HYDROMORPHONE HYDROCHLORIDE 1 MG/ML
0.2 INJECTION, SOLUTION INTRAMUSCULAR; INTRAVENOUS; SUBCUTANEOUS EVERY 5 MIN PRN
Status: DISCONTINUED | OUTPATIENT
Start: 2021-09-07 | End: 2021-09-07 | Stop reason: HOSPADM

## 2021-09-07 RX ORDER — OXYCODONE HYDROCHLORIDE 5 MG/1
5-10 TABLET ORAL EVERY 4 HOURS PRN
Qty: 12 TABLET | Refills: 0 | Status: SHIPPED | OUTPATIENT
Start: 2021-09-07 | End: 2021-10-11

## 2021-09-07 RX ORDER — DEXAMETHASONE SODIUM PHOSPHATE 4 MG/ML
INJECTION, SOLUTION INTRA-ARTICULAR; INTRALESIONAL; INTRAMUSCULAR; INTRAVENOUS; SOFT TISSUE PRN
Status: DISCONTINUED | OUTPATIENT
Start: 2021-09-07 | End: 2021-09-07

## 2021-09-07 RX ORDER — ONDANSETRON 4 MG/1
4 TABLET, ORALLY DISINTEGRATING ORAL EVERY 30 MIN PRN
Status: DISCONTINUED | OUTPATIENT
Start: 2021-09-07 | End: 2021-09-07 | Stop reason: HOSPADM

## 2021-09-07 RX ORDER — NALOXONE HYDROCHLORIDE 0.4 MG/ML
0.4 INJECTION, SOLUTION INTRAMUSCULAR; INTRAVENOUS; SUBCUTANEOUS
Status: DISCONTINUED | OUTPATIENT
Start: 2021-09-07 | End: 2021-09-07 | Stop reason: HOSPADM

## 2021-09-07 RX ORDER — ACETAMINOPHEN 325 MG/1
650 TABLET ORAL EVERY 4 HOURS PRN
Qty: 50 TABLET | Refills: 0 | Status: SHIPPED | OUTPATIENT
Start: 2021-09-07 | End: 2021-09-27

## 2021-09-07 RX ORDER — ACETAMINOPHEN 325 MG/1
975 TABLET ORAL ONCE
Status: CANCELLED | OUTPATIENT
Start: 2021-09-07 | End: 2021-09-07

## 2021-09-07 RX ORDER — OXYCODONE HYDROCHLORIDE 5 MG/1
5 TABLET ORAL EVERY 4 HOURS PRN
Status: DISCONTINUED | OUTPATIENT
Start: 2021-09-07 | End: 2021-09-07 | Stop reason: HOSPADM

## 2021-09-07 RX ORDER — SODIUM CHLORIDE, SODIUM LACTATE, POTASSIUM CHLORIDE, CALCIUM CHLORIDE 600; 310; 30; 20 MG/100ML; MG/100ML; MG/100ML; MG/100ML
INJECTION, SOLUTION INTRAVENOUS CONTINUOUS
Status: DISCONTINUED | OUTPATIENT
Start: 2021-09-07 | End: 2021-09-07 | Stop reason: HOSPADM

## 2021-09-07 RX ORDER — NALOXONE HYDROCHLORIDE 0.4 MG/ML
0.2 INJECTION, SOLUTION INTRAMUSCULAR; INTRAVENOUS; SUBCUTANEOUS
Status: DISCONTINUED | OUTPATIENT
Start: 2021-09-07 | End: 2021-09-07 | Stop reason: HOSPADM

## 2021-09-07 RX ORDER — FENTANYL CITRATE 50 UG/ML
INJECTION, SOLUTION INTRAMUSCULAR; INTRAVENOUS PRN
Status: DISCONTINUED | OUTPATIENT
Start: 2021-09-07 | End: 2021-09-07

## 2021-09-07 RX ORDER — SODIUM CHLORIDE, SODIUM LACTATE, POTASSIUM CHLORIDE, CALCIUM CHLORIDE 600; 310; 30; 20 MG/100ML; MG/100ML; MG/100ML; MG/100ML
INJECTION, SOLUTION INTRAVENOUS CONTINUOUS PRN
Status: DISCONTINUED | OUTPATIENT
Start: 2021-09-07 | End: 2021-09-07

## 2021-09-07 RX ORDER — CEFAZOLIN SODIUM 2 G/100ML
2 INJECTION, SOLUTION INTRAVENOUS
Status: DISCONTINUED | OUTPATIENT
Start: 2021-09-07 | End: 2021-09-07 | Stop reason: HOSPADM

## 2021-09-07 RX ORDER — CEFAZOLIN SODIUM 2 G/100ML
2 INJECTION, SOLUTION INTRAVENOUS
Status: DISCONTINUED | OUTPATIENT
Start: 2021-09-07 | End: 2021-09-07

## 2021-09-07 RX ORDER — CEFAZOLIN SODIUM 2 G/100ML
2 INJECTION, SOLUTION INTRAVENOUS
Status: CANCELLED | OUTPATIENT
Start: 2021-09-07

## 2021-09-07 RX ORDER — LIDOCAINE HYDROCHLORIDE 20 MG/ML
INJECTION, SOLUTION INFILTRATION; PERINEURAL PRN
Status: DISCONTINUED | OUTPATIENT
Start: 2021-09-07 | End: 2021-09-07

## 2021-09-07 RX ORDER — ONDANSETRON 2 MG/ML
INJECTION INTRAMUSCULAR; INTRAVENOUS PRN
Status: DISCONTINUED | OUTPATIENT
Start: 2021-09-07 | End: 2021-09-07

## 2021-09-07 RX ORDER — CEFAZOLIN SODIUM 2 G/100ML
2 INJECTION, SOLUTION INTRAVENOUS SEE ADMIN INSTRUCTIONS
Status: DISCONTINUED | OUTPATIENT
Start: 2021-09-07 | End: 2021-09-07 | Stop reason: HOSPADM

## 2021-09-07 RX ORDER — PROPOFOL 10 MG/ML
INJECTION, EMULSION INTRAVENOUS PRN
Status: DISCONTINUED | OUTPATIENT
Start: 2021-09-07 | End: 2021-09-07

## 2021-09-07 RX ORDER — MEPERIDINE HYDROCHLORIDE 25 MG/ML
12.5 INJECTION INTRAMUSCULAR; INTRAVENOUS; SUBCUTANEOUS
Status: DISCONTINUED | OUTPATIENT
Start: 2021-09-07 | End: 2021-09-07 | Stop reason: HOSPADM

## 2021-09-07 RX ORDER — ONDANSETRON 2 MG/ML
4 INJECTION INTRAMUSCULAR; INTRAVENOUS EVERY 30 MIN PRN
Status: DISCONTINUED | OUTPATIENT
Start: 2021-09-07 | End: 2021-09-07 | Stop reason: HOSPADM

## 2021-09-07 RX ORDER — FENTANYL CITRATE 50 UG/ML
25 INJECTION, SOLUTION INTRAMUSCULAR; INTRAVENOUS EVERY 5 MIN PRN
Status: DISCONTINUED | OUTPATIENT
Start: 2021-09-07 | End: 2021-09-07 | Stop reason: HOSPADM

## 2021-09-07 RX ORDER — CEFAZOLIN SODIUM 2 G/100ML
2 INJECTION, SOLUTION INTRAVENOUS SEE ADMIN INSTRUCTIONS
Status: DISCONTINUED | OUTPATIENT
Start: 2021-09-07 | End: 2021-09-07

## 2021-09-07 RX ORDER — AMOXICILLIN 250 MG
1-2 CAPSULE ORAL 2 TIMES DAILY
Qty: 30 TABLET | Refills: 0 | Status: SHIPPED | OUTPATIENT
Start: 2021-09-07 | End: 2021-10-11

## 2021-09-07 RX ORDER — BUPIVACAINE HYDROCHLORIDE AND EPINEPHRINE 2.5; 5 MG/ML; UG/ML
INJECTION, SOLUTION INFILTRATION; PERINEURAL PRN
Status: DISCONTINUED | OUTPATIENT
Start: 2021-09-07 | End: 2021-09-07 | Stop reason: HOSPADM

## 2021-09-07 RX ORDER — LIDOCAINE 40 MG/G
CREAM TOPICAL
Status: DISCONTINUED | OUTPATIENT
Start: 2021-09-07 | End: 2021-09-07 | Stop reason: HOSPADM

## 2021-09-07 RX ADMIN — PROPOFOL 100 MG: 10 INJECTION, EMULSION INTRAVENOUS at 09:29

## 2021-09-07 RX ADMIN — PROPOFOL 100 MG: 10 INJECTION, EMULSION INTRAVENOUS at 09:31

## 2021-09-07 RX ADMIN — OXYCODONE HYDROCHLORIDE 5 MG: 5 TABLET ORAL at 11:30

## 2021-09-07 RX ADMIN — SODIUM CHLORIDE, POTASSIUM CHLORIDE, SODIUM LACTATE AND CALCIUM CHLORIDE: 600; 310; 30; 20 INJECTION, SOLUTION INTRAVENOUS at 09:22

## 2021-09-07 RX ADMIN — ONDANSETRON 4 MG: 2 INJECTION INTRAMUSCULAR; INTRAVENOUS at 10:26

## 2021-09-07 RX ADMIN — HYDROMORPHONE HYDROCHLORIDE 0.5 MG: 1 INJECTION, SOLUTION INTRAMUSCULAR; INTRAVENOUS; SUBCUTANEOUS at 09:58

## 2021-09-07 RX ADMIN — Medication 1 EACH: at 09:21

## 2021-09-07 RX ADMIN — FENTANYL CITRATE 50 MCG: 50 INJECTION, SOLUTION INTRAMUSCULAR; INTRAVENOUS at 09:31

## 2021-09-07 RX ADMIN — DEXAMETHASONE SODIUM PHOSPHATE 4 MG: 4 INJECTION, SOLUTION INTRAMUSCULAR; INTRAVENOUS at 09:40

## 2021-09-07 RX ADMIN — Medication 20 MG: at 10:00

## 2021-09-07 RX ADMIN — FENTANYL CITRATE 50 MCG: 50 INJECTION, SOLUTION INTRAMUSCULAR; INTRAVENOUS at 09:29

## 2021-09-07 RX ADMIN — MIDAZOLAM 2 MG: 1 INJECTION INTRAMUSCULAR; INTRAVENOUS at 09:22

## 2021-09-07 RX ADMIN — PROPOFOL 20 MCG/KG/MIN: 10 INJECTION, EMULSION INTRAVENOUS at 09:37

## 2021-09-07 RX ADMIN — CEFAZOLIN 2 G: 10 INJECTION, POWDER, FOR SOLUTION INTRAVENOUS at 10:16

## 2021-09-07 RX ADMIN — LIDOCAINE HYDROCHLORIDE 100 MG: 20 INJECTION, SOLUTION INFILTRATION; PERINEURAL at 09:29

## 2021-09-07 ASSESSMENT — MIFFLIN-ST. JEOR: SCORE: 1417.01

## 2021-09-07 NOTE — ANESTHESIA PROCEDURE NOTES
Airway       Patient location during procedure: OR  Staff -        CRNA: Khadijah Danielson APRN CRNA       Performed By: CRNA  Consent for Airway        Urgency: elective  Indications and Patient Condition       Indications for airway management: delfino-procedural       Induction type:intravenous       Mask difficulty assessment: 0 - not attempted    Final Airway Details       Final airway type: supraglottic airway    Supraglottic Airway Details        Type: LMA       Brand: Air-Q       LMA size: 4    Post intubation assessment        Placement verified by: capnometry, equal breath sounds and chest rise        Number of attempts at approach: 1       Secured with: silk tape       Ease of procedure: easy       Dentition: Intact and Unchanged

## 2021-09-07 NOTE — BRIEF OP NOTE
[facility]    Brief Operative Note    Pre-operative diagnosis: Pelvic mass [R19.00]  Post-operative diagnosis Same as pre-operative diagnosis    Procedure: Procedure(s):  Biopsy left pubic ramis  Surgeon: Surgeon(s) and Role:     * Edu Philip MD - Primary     * Too Siegel MD - Resident - Assisting  Anesthesia: General   Estimated blood loss: 10 ml  Drains: None  Specimens:   ID Type Source Tests Collected by Time Destination   1 : Left Pubic Ramus Tissue Groin, Left SURGICAL PATHOLOGY EXAM Edu Philip MD 9/7/2021  9:42 AM    2 : Left suprapubic ramis Tissue Groin, Left SURGICAL PATHOLOGY EXAM Edu Philip MD 9/7/2021 10:44 AM    A : Left Pubic Ramus Tissue Groin, Left ANAEROBIC BACTERIAL CULTURE ROUTINE, AEROBIC BACTERIAL CULTURE ROUTINE Edu Philip MD 9/7/2021  9:44 AM      Findings:   small round blue cells seen on frozen section.  Complications: None.  Implants: * No implants in log *    Plan  Activity: up ad angelic  ROM/Precautions: none  Weight bearing status: WBAT BLE, walker or crutch assist  Diet: ADAT  Pain: Tylenol, Oxycodone prn  DVT ppx: Aspirin x4 weeks  Dressing: keep clean, dry and intact x5 days  Imaging: XR inlet/outlet pelvis in PACU  Follow-up: video visit with Dr. Philip 9/20/21  Disp: same day surgery, discharge home per PACU criteria    Too Siegel MD  Orthopaedic Surgery PGY4  Pager: [phone]

## 2021-09-07 NOTE — DISCHARGE INSTRUCTIONS
Same-Day Surgery   Adult Discharge Orders & Instructions     For 24 hours after surgery:  1. Get plenty of rest.  A responsible adult must stay with you for at least 24 hours after you leave the hospital.   2. Pain medication can slow your reflexes. Do not drive or use heavy equipment.  If you have weakness or tingling, don't drive or use heavy equipment until this feeling goes away.  3. Mixing alcohol and pain medication can cause dizziness and slow your breathing. It can even be fatal. Do not drink alcohol while taking pain medication.  4. Avoid strenuous or risky activities.  Ask for help when climbing stairs.   5. You may feel lightheaded.  If so, sit for a few minutes before standing.  Have someone help you get up.   6. If you have nausea (feel sick to your stomach), drink only clear liquids such as apple juice, ginger ale, broth or 7-Up.  Rest may also help.  Be sure to drink enough fluids.  Move to a regular diet as you feel able. Take pain medications with a small amount of solid food, such as toast or crackers, to avoid nausea.   7. A slight fever is normal. Call the doctor if your fever is over 100 F (37.7 C) (taken under the tongue) or lasts longer than 24 hours.  8. You may have a dry mouth, muscle aches, trouble sleeping or a sore throat.  These symptoms should go away after 24 hours.  9. Do not make important or legal decisions.   Pain Management:      1. Take pain medication (if prescribed) for pain as directed by your physician.        2. WARNING: If the pain medication you have been prescribed contains Tylenol  (acetaminophen), DO NOT take additional doses of Tylenol (acetaminophen).     Call your doctor for any of the followin.  Signs of infection (fever, growing tenderness at the surgery site, severe pain, a large amount of drainage or bleeding, foul-smelling drainage, redness, swelling).    2.  It has been over 8 to 10 hours since surgery and you are still not able to urinate (pee).    3.   Headache for over 24 hours.    4.  Numbness, tingling or weakness the day after surgery (if you had spinal anesthesia).  To contact a doctor, call Dr. Philip's [facility] or:      [phone] and ask for the Resident On Call for: Orthopedics (answered 24 hours a day)      Emergency Department:  [place] [facility]: [phone]  [place] [facility]: [phone]              Caring for Your Incision    You ll need to care for your incision after surgery and certain medical procedures. To close an incision, your doctor used sutures (stitches), steri-strips, staples, or dermabond. Follow the tips on this sheet to help heal and prevent infection of your incision.   Types of Incision Closure:    Surgical Sutures (stitches) are placed by sewing the edges of an incision together with surgical thread. Sutures are either absorbable or non-absorbable. Absorbable sutures break down in the body over time. Non-absorbable sutures need to be removed.     Steri-strips are made of adhesive (sticky) material to help hold the edges of an incision together. Steri-strips usually fall off by themselves in 7 to 10 days.     Surgical Staples are made or steel or titanium. They are often used to close shallow incisions. They are not used on certain body areas, such as the face and hands. This is because these areas have nerves that are close to the surface. Staples are usually removed within a week.     Dermabond (skin glue) is used to close a cut or small incision. The skin glue is less painful than stitches (sutures). In some cases, a lower layer of skin may be sutured before Dermabond is applied. The skin glue closes the cut/incision within a few minutes. It also provides a water-resistant covering. No bandage is required. Dermabond peels off on its own within 5 to 10 days.  Home Care for Your  Incision:    Keep the incision clean and dry. You should bathe only as directed by the doctor. It is okay to wash around the  incision, but don t spray water directly on it.     Check the incision site daily for pain, redness, drainage, swelling, or separation of the incision edges.     If you have a dressing over the incision, change the dressing as directed by the doctor.    Make sure any clothing that touches the incision is loose fitting. This will prevent rubbing. If the incision is on the head, avoid wearing caps or other head coverings. These may rub against the incision.    Avoid rough play, contact sports, or physical activities. This can put you at risk of opening an incision.     As your incision heals, the skin may appear pink or red. It may also feel slightly bumpy or raised. This is called a healing ridge. Over time, the color should fade and the raised skin will become less noticeable.   Call the doctor right away if you have any of the following:    Increased pain, redness, drainage, swelling or bleeding at the incision site    Numbness, coldness or tingling around the incision site    Fever of 101 F degrees or higher  Rev. 4/2014

## 2021-09-07 NOTE — ANESTHESIA POSTPROCEDURE EVALUATION
Patient: Kristie Cornejo    Procedure(s):  Biopsy left pubic ramis    Diagnosis:Pelvic mass [R19.00]  Diagnosis Additional Information: No value filed.    Anesthesia Type:  General    Note:  Disposition: Outpatient   Postop Pain Control: Uneventful            Sign Out: Well controlled pain   PONV: No   Neuro/Psych: Uneventful            Sign Out: Acceptable/Baseline neuro status   Airway/Respiratory: Uneventful            Sign Out: Acceptable/Baseline resp. status   CV/Hemodynamics: Uneventful            Sign Out: Acceptable CV status; No obvious hypovolemia; No obvious fluid overload   Other NRE:    DID A NON-ROUTINE EVENT OCCUR?     Event details/Postop Comments:  Child doing well. Ready for discharge home with .           Last vitals:  Vitals Value Taken Time   /85 09/07/21 1145   Temp 36.8  C (98.2  F) 09/07/21 1055   Pulse 58 09/07/21 1159   Resp 11 09/07/21 1159   SpO2 93 % 09/07/21 1159   Vitals shown include unvalidated device data.    Electronically Signed By: Warner Meyer MD  September 7, 2021  12:01 PM

## 2021-09-08 LAB
ATRIAL RATE - MUSE: 58 BPM
DIASTOLIC BLOOD PRESSURE - MUSE: NORMAL MMHG
INTERPRETATION ECG - MUSE: NORMAL
P AXIS - MUSE: 10 DEGREES
PATH REPORT.COMMENTS IMP SPEC: ABNORMAL
PATH REPORT.COMMENTS IMP SPEC: YES
PATH REPORT.FINAL DX SPEC: ABNORMAL
PATH REPORT.MICROSCOPIC SPEC OTHER STN: ABNORMAL
PATH REPORT.RELEVANT HX SPEC: ABNORMAL
PR INTERVAL - MUSE: 166 MS
QRS DURATION - MUSE: 86 MS
QT - MUSE: 426 MS
QTC - MUSE: 418 MS
R AXIS - MUSE: 2 DEGREES
SYSTOLIC BLOOD PRESSURE - MUSE: NORMAL MMHG
T AXIS - MUSE: 16 DEGREES
VENTRICULAR RATE- MUSE: 58 BPM

## 2021-09-10 ENCOUNTER — TELEPHONE (OUTPATIENT)
Dept: ORTHOPEDICS | Facility: CLINIC | Age: 51
End: 2021-09-10

## 2021-09-10 NOTE — TELEPHONE ENCOUNTER
[facility] Call Center    Phone Message    May a detailed message be left on voicemail: yes     Reason for Call: Other: Pt is waiting for a referral to oncology and results from bone biopsy done on 9/7 with Dr. Philip     Action Taken: Message routed to:  [facility] ortho    Travel Screening: Not Applicable

## 2021-09-10 NOTE — TELEPHONE ENCOUNTER
Follow-up call to Margo to discuss that I have sent a message to Dr. Philip to reach out to her this afternoon. I cannot guarantee what time, however hopefully he will be able to call her before the end of the day.  This is RN discussed that if he does not call her today, I will have him follow up on Monday.    BANDAR GlassN, RN  RN Care Coordinator, Dr. Cedrick VALENZUELA [facility]

## 2021-09-11 ENCOUNTER — HEALTH MAINTENANCE LETTER (OUTPATIENT)
Age: 51
End: 2021-09-11

## 2021-09-12 LAB — BACTERIA TISS BX CULT: NO GROWTH

## 2021-09-13 ENCOUNTER — TELEPHONE (OUTPATIENT)
Dept: ORTHOPEDICS | Facility: CLINIC | Age: 51
End: 2021-09-13
Payer: COMMERCIAL

## 2021-09-13 ENCOUNTER — DOCUMENTATION ONLY (OUTPATIENT)
Dept: OTHER | Facility: CLINIC | Age: 51
End: 2021-09-13

## 2021-09-13 DIAGNOSIS — C90.30 PLASMACYTOMA OF BONE (H): Primary | ICD-10-CM

## 2021-09-13 DIAGNOSIS — C90.00 MYELOMA (H): ICD-10-CM

## 2021-09-13 LAB
PATH REPORT.ADDENDUM SPEC: ABNORMAL
PATH REPORT.COMMENTS IMP SPEC: ABNORMAL
PATH REPORT.COMMENTS IMP SPEC: YES
PATH REPORT.FINAL DX SPEC: ABNORMAL
PATH REPORT.GROSS SPEC: ABNORMAL
PATH REPORT.INTRAOP OBS SPEC DOC: ABNORMAL
PATH REPORT.MICROSCOPIC SPEC OTHER STN: ABNORMAL
PATH REPORT.RELEVANT HX SPEC: ABNORMAL
PHOTO IMAGE: ABNORMAL

## 2021-09-13 PROCEDURE — 99207 PR NO BILLABLE SERVICE THIS VISIT: CPT | Performed by: ORTHOPAEDIC SURGERY

## 2021-09-13 NOTE — TELEPHONE ENCOUNTER
"     [facility], Orthopaedic Oncology Surgery Consultation  by Edu Philip M.D.     Kristie Cornejo MRN# [id number]   Age: 51 year old YOB: 1970      Requesting physician: MD SHASHI Arboleda Sarah C         Background history:  DX:      1. Crohn's disease (on Omalizumab, Xolair)  2. 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 9/7/2021, open biopsy Left pubic ramus ([facility]) [facility]         I spoke with Margo today regarding the results of her recent biopsy 1 week ago which revealed evidence of a plasmacytoma.  Therefore I would recommend that she see a medical oncologist for consultation and initiation of treatment after staging is completed.  Regarding her left pubic ramus, I do not envision any surgical intervention required or indicated.  I discussed with her the nature of the diagnosis of myeloma, his underlying biology and the need for chemotherapy and/or radiation.  I will ask our nurse coordinator Thuy to make proper arrangements for consultation as soon as possible for newly diagnosed malignancy.    Edu Philip MD [facility] Family Professor  Oncology and Adult Reconstructive Surgery  Dept Orthopaedic Surgery, [facility]  [phone] office, [phone] pager  [email or web address]      Virtual-Visit Details    Kristie Cornejo is a 51 year old female who is being evaluated via a billable video visit.      The patient has been notified of following:     \"This virtual video/telephone visit will be conducted via a call between you and your physician/provider. We have found that certain health care needs can be provided without the need for an in-person physical exam.  This service lets us provide the care you need with a video/telephone conversation.  If a prescription is necessary we can send it directly to your pharmacy.  If lab work is " "needed we can place an order for that and you can then stop by our lab to have the test done at a later time.    Video/telephone visits are billed at different rates depending on your insurance coverage.  Please reach out to your insurance provider with any questions.    If during the course of the call the physician/provider feels a virtual visit is not appropriate, you will not be charged for this service.\"    Patient has given verbal consent for Virtual visit? YES    Type of service:  Video/telephone Visit    Video/telephone time total duration including visit time, pre and post visit work time: 10 min    Originating Location (pt. Location): Home    Distant Location (provider location):  [facility] [place]    Mode of Communication:  Video Conference via either [employer] or [employer]          "

## 2021-09-14 ENCOUNTER — PATIENT OUTREACH (OUTPATIENT)
Dept: ONCOLOGY | Facility: CLINIC | Age: 51
End: 2021-09-14

## 2021-09-14 ENCOUNTER — TRANSFERRED RECORDS (OUTPATIENT)
Dept: HEALTH INFORMATION MANAGEMENT | Facility: CLINIC | Age: 51
End: 2021-09-14
Payer: COMMERCIAL

## 2021-09-14 LAB — BACTERIA TISS BX CULT: NORMAL

## 2021-09-14 NOTE — PROGRESS NOTES
New Patient Oncology Nurse Navigator Note     Referral Date: September 13, 2021     Referring provider:   Edu Philip MD [facility]       Provider Comments 09/13/2021  6:32 PM Thuy Sotomayor, RN Provider Comments -   Referral Note    Per Dr. Philip- Diagnosis of Myeloma  results of her recent biopsy 1 week ago which revealed evidence of a plasmacytoma.  Therefore I would recommend that she see a medical oncologist for consultation and initiation of treatment                  Referral updates and Plan:   Chart reviewed. OUTGOING CALL to pt: Introduced my role as nurse navigator with [facility] Hematology/Oncology dept and that we have recd the referral for dx of plasmacytoma from Dr Philip  Pt states she is using crutches to ambulate, careful not to fall as RMD indicated to her she is at risk for bone fracture and that MM is in the realm of possible diagnoses. Able to bear weight on both legs together without pain but unable to get comfortable at times due to discomfort after bx.  Discussed inperson consult with Dr Reinoso tomorrow at 2pm at [facility]  Pt agrees to this plan, understands Dr Reinoso will discuss bx results and make recommendations for next steps in work up and tx. Pt voiced understanding of above instructions and information and denied further questions    Message to Dr Reinoso to advise re: ? adddition of pre-consult lab draw or not    Floridalma Haas, RN, BSN, OCN  Hematology/Oncology Nurse Navigator   [facility]  [phone]

## 2021-09-15 ENCOUNTER — APPOINTMENT (OUTPATIENT)
Dept: LAB | Facility: CLINIC | Age: 51
End: 2021-09-15
Attending: ORTHOPAEDIC SURGERY
Payer: COMMERCIAL

## 2021-09-15 ENCOUNTER — OFFICE VISIT (OUTPATIENT)
Dept: TRANSPLANT | Facility: CLINIC | Age: 51
End: 2021-09-15
Attending: ORTHOPAEDIC SURGERY
Payer: COMMERCIAL

## 2021-09-15 ENCOUNTER — PRE VISIT (OUTPATIENT)
Dept: TRANSPLANT | Facility: CLINIC | Age: 51
End: 2021-09-15

## 2021-09-15 VITALS
OXYGEN SATURATION: 100 % | DIASTOLIC BLOOD PRESSURE: 84 MMHG | HEIGHT: 68 IN | BODY MASS INDEX: 24.93 KG/M2 | WEIGHT: 164.5 LBS | HEART RATE: 56 BPM | TEMPERATURE: 98.4 F | SYSTOLIC BLOOD PRESSURE: 123 MMHG

## 2021-09-15 DIAGNOSIS — C90.30 PLASMACYTOMA OF BONE (H): ICD-10-CM

## 2021-09-15 LAB
ALBUMIN SERPL-MCNC: 4 G/DL (ref 3.4–5)
LDH SERPL L TO P-CCNC: 162 U/L (ref 81–234)
URATE SERPL-MCNC: 2.4 MG/DL (ref 2.6–6)

## 2021-09-15 PROCEDURE — 82232 ASSAY OF BETA-2 PROTEIN: CPT

## 2021-09-15 PROCEDURE — 86803 HEPATITIS C AB TEST: CPT

## 2021-09-15 PROCEDURE — 87522 HEPATITIS C REVRS TRNSCRPJ: CPT

## 2021-09-15 PROCEDURE — 87340 HEPATITIS B SURFACE AG IA: CPT

## 2021-09-15 PROCEDURE — 83615 LACTATE (LD) (LDH) ENZYME: CPT

## 2021-09-15 PROCEDURE — 84550 ASSAY OF BLOOD/URIC ACID: CPT

## 2021-09-15 PROCEDURE — 36415 COLL VENOUS BLD VENIPUNCTURE: CPT

## 2021-09-15 PROCEDURE — 99205 OFFICE O/P NEW HI 60 MIN: CPT | Mod: GC

## 2021-09-15 PROCEDURE — 82040 ASSAY OF SERUM ALBUMIN: CPT

## 2021-09-15 PROCEDURE — G0463 HOSPITAL OUTPT CLINIC VISIT: HCPCS

## 2021-09-15 PROCEDURE — 87517 HEPATITIS B DNA QUANT: CPT

## 2021-09-15 PROCEDURE — 86689 HTLV/HIV CONFIRMJ ANTIBODY: CPT

## 2021-09-15 ASSESSMENT — MIFFLIN-ST. JEOR: SCORE: 1405.18

## 2021-09-15 ASSESSMENT — PAIN SCALES - GENERAL: PAINLEVEL: MILD PAIN (2)

## 2021-09-15 NOTE — NURSING NOTE
"Oncology Rooming Note    September 15, 2021 2:04 PM   Kristie Cornejo is a 51 year old female who presents for:    Chief Complaint   Patient presents with     Oncology Clinic Visit     PT is here for a New Eval for Playmacytoma     Initial Vitals: Blood Pressure 123/84   Pulse 56   Temperature 98.4  F (36.9  C) (Oral)   Height 1.72 m (5' 7.72\")   Weight 74.6 kg (164 lb 8 oz)   Oxygen Saturation 100%   Body Mass Index 25.22 kg/m   Estimated body mass index is 25.22 kg/m  as calculated from the following:    Height as of this encounter: 1.72 m (5' 7.72\").    Weight as of this encounter: 74.6 kg (164 lb 8 oz). Body surface area is 1.89 meters squared.  Mild Pain (2) Comment: Data Unavailable   No LMP recorded. (Menstrual status: IUD).  Allergies reviewed: Yes  Medications reviewed: Yes    Medications: Medication refills not needed today.  Pharmacy name entered into [facility]:    [facility] [place], [place] - [address]  [id number] - [place], [place] - [address] AT [facility] [address]    Clinical concerns: none      Tammi Freeman MA            "

## 2021-09-15 NOTE — NURSING NOTE
Labs drawn in clinic by LPN via venipuncture in right arm with 21G.    Patient tolerated well and had no issues.    Sd Davies LPN

## 2021-09-15 NOTE — TELEPHONE ENCOUNTER
RECORDS STATUS - ALL OTHER DIAGNOSIS      RECORDS RECEIVED FROM: Plasmacytoma of bone ref Dr. Philip   DATE RECEIVED: 9.15.21   NOTES STATUS DETAILS   OFFICE NOTE from referring provider Internal 8.30.21 Dr Edu Philip, [facility] Ortho  8.26.21   OFFICE NOTE from medical oncologist Scanned 8.2.21 [facility]    DISCHARGE SUMMARY from hospital Internal 9.7.21 [facility]    DISCHARGE REPORT from the ER     OPERATIVE REPORT Internal 9.7.21 Dr Edu Philip   MEDICATION LIST Internal    CLINICAL TRIAL TREATMENTS TO DATE     LABS     PATHOLOGY REPORTS Internal 9.7.21 groin Case: [id number]   ANYTHING RELATED TO DIAGNOSIS Internal    GENONOMIC TESTING     TYPE:     IMAGING (NEED IMAGES & REPORT)     CT SCANS Internal 8.26.21 chest/abdomen/pelvis   MRI PACS 7.29.21 hip, [facility]   XRAY PACS 9.7.21 pelvis  7.22.21 pelvis, [facility]   ULTRASOUND     PET

## 2021-09-15 NOTE — PROGRESS NOTES
Hematology Clinic Note    Kristie Cornejo MRN# [id number]   Age: 51 year old YOB: 1970       Reason for consult: plasmacytoma of bone            History of Present Illness:   Kristie Cornjeo  Is a 51 year old female with past medical history significant for Irritable bowel disease , allergies is here with newly diagnosed plasmacytoma.     Early in April 2021 , she noted to have left sided groin pain. Pain was constant and was affecting her daily activities , hence she was referred to an orthopedic physician. Imaging was done showed  large, expansile, permeative mass with indistinct borders involving the left superior and inferior pubic rami, pubic symphysis and with possible extension to the left acetabulum.    She underwent a Biopsy of the lesion on 9/7/2021. CT chest abdomen pelvis was done it showed no primary malignancy is seen to explain the destructive osteolytic mass in the left pubic bone which was better characterizedon prior plain film and MRI. No evidence of additional metastases. There was a renal cyst of unknown significant.     The Pathology results showed plasmacytoma.  Flow cytometry showed kappa monotypic plasma cells, polytypic B cells . She had a Chromosome analysis and FISH is still pending.     INTERIM HISTORY:  She is doing well, continues to have left sided groin pain, with no radiation. No numbness and tingling. She uses a cane to walk. Moving , sitting up and standing has been difficult. She is able to do daily activities using the cane. The pain doesn't keep her awake at night. She does have oxycodone prescription that she does not take. She uses as needed tylenol.     She denies any other symptoms, no fever or chills . No recent infections. Fairly healthy. She had h/o hernia repairs in the past and earlier this year was admitted with a seroma , but cultures were negative.   Denies any weight loss, no appetite change , no bleeding or bruising. No constipation or  diarrhea. No other joint pains.    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 She lives with her  Jesse, who is a . She has 2 kids of age 20yr and 15 yr old.     She was fairly active prior to this , including walking and swimming. She does not smoke cigarettes, drinks alcohol occasionally 4 drinks per month     Family history , no h/o myeloma or blood cancers in the family. Father diagnosed with Pancreatic cancer at 78 yrs, grandmother had breast cancer, mother had endometrial cancer.          Review of Systems:     10-point review of systems was otherwise negative except as noted in HPI.          Past Medical History:   Past medical history was reviewed.   Past Medical History:   Diagnosis Date     Allergic rhinitis, cause unspecified      Anxiety state, unspecified 12/03    Started postpartum . On Paxil x 1+years. Off meds- 10/04     Irritable bowel syndrome 2004     PLANTAR WARTS     resolved     SINUS DYSRHYTHMIA 10/02    wnl     Unspecified hemorrhoids without mention of complication 4/04    colonoscopy 4/04 spastic colon, int roids; bx neg             Past Surgical History:   Past surgical history was reviewed.   Past Surgical History:   Procedure Laterality Date     BIOPSY BONE PELVIS Left 9/7/2021    Procedure: Biopsy left pubic ramis;  Surgeon: Edu Philip MD;  Location: [facility] OR     [facility] NONSPECIFIC PROCEDURE  1987    [place] teeth removed     [facility] NONSPECIFIC PROCEDURE  1993    (R) knee surgery     [facility] NONSPECIFIC PROCEDURE  2003    (R) breast bx-negative 6/03; bx neg 10/02     [facility] NONSPECIFIC PROCEDURE      10/02 cardiac echo normal     [facility] NONSPECIFIC PROCEDURE  2004 4/04 colonosc int roids, spastic colon     [facility] NONSPECIFIC PROCEDURE  8/05    D & C for missed AB     [facility] COLONOSCOPY THRU STOMA, DIAGNOSTIC  2004             Social History:   Social history was reviewed.   Social History      Tobacco Use     Smoking status: Never Smoker     Smokeless tobacco: Never Used   Substance Use Topics     Alcohol use: Yes     Comment: none to a couple. occasional             Family History:   Family history was reviewed.  Family History   Problem Relation Age of Onset     Neurologic Disorder Mother         toxo     Lipids Mother         low cholestrol     Lipids Father         high cholestrol     C.A.D. Maternal Grandfather         strokes and mi, chronic respiratory from 2 hand smoke     Hypertension Paternal Grandmother         rheumatoid arthritis     Diabetes Maternal Grandmother         breast cancer,stroke ischemic     Breast Cancer Maternal Grandmother         81yo/and autoimmune skin condition     Respiratory Paternal Grandfather         emphasema     Cancer - colorectal No family hx of      Prostate Cancer No family hx of              Allergies:     Allergies   Allergen Reactions     Erythromycin      upsets stomach             Medications:   Medications Reviewed.   Current Outpatient Medications   Medication Sig     acetaminophen (TYLENOL) 325 MG tablet Take 2 tablets (650 mg) by mouth every 4 hours as needed for mild pain     aspirin (ASA) 81 MG EC tablet Take 2 tablets (162 mg) by mouth daily     BUSPIRONE HCL 10 MG PO TABS 1 TABLET 3 TIMES DAILY as needed     calcium carbonate 600 mg-vitamin D 400 units (CALTRATE) 600-400 MG-UNIT per tablet      cetirizine HCl 10 MG CAPS Take 20 mg by mouth     fluticasone (FLONASE) 50 MCG/ACT nasal spray 1 spray     GLYCOLAX OR POWD 17 GM DAILY as needed     levonorgestrel (MIRENA) 20 MCG/24HR IUD 1 each by Intrauterine route     MIRENA 20 MCG/24HR IU IUD use for up to 5 years, then remove     omalizumab (XOLAIR) 150 MG/ML SOSY injection      oxyCODONE (ROXICODONE) 5 MG tablet Take 1-2 tablets (5-10 mg) by mouth every 4 hours as needed for moderate to severe pain     polyethylene glycol (MIRALAX) 17 GM/Dose powder Take 17 g by mouth     senna-docusate  "(SENOKOT-S/PERICOLACE) 8.6-50 MG tablet Take 1-2 tablets by mouth 2 times daily     UNABLE TO FIND MEDICATION NAME: Probiotic per pt     UNABLE TO FIND MEDICATION NAME: Multi-vitamin per pt     UNABLE TO FIND MEDICATION NAME: Glucosamine Chondroit per pt     No current facility-administered medications for this visit.     Facility-Administered Medications Ordered in Other Visits   Medication     CT Flush             Physical Exam:   Vitals were reviewed.  Blood pressure 123/84, pulse 56, temperature 98.4  F (36.9  C), temperature source Oral, height 1.72 m (5' 7.72\"), weight 74.6 kg (164 lb 8 oz), SpO2 100 %.    Constitutional: awake, in a chair, appears comfortable, in NAD  HEENT: MMM, EOM intact, sclerae clear and anicteric  CV: RRR, no murmurs appreciated, JVD  Resp: Clear to auscultation bilaterally, breathing comfortably on RA, no wheezes, rhonchi  Abd: Soft, tenderness + in the lower abdomen incision site.   MSK:  Warm, FROM, no joint swelling  Skin: no rash or lesions on limited exam  Neuro: CN2-12 grossly intact, moves all four extremities  Psych: Mood and affect WNL    LABS :  Hemoglobin was 14.2   Creatinine 0.94  Calcium 9.8   Lab Results   Component Value Date    WBC 7.0 08/26/2021    HGB 14.2 08/26/2021    HCT 42.4 08/26/2021     08/26/2021     08/26/2021    POTASSIUM 4.6 08/26/2021    CHLORIDE 107 08/26/2021    CO2 27 08/26/2021    GLC 90 09/07/2021    BUN 21 08/26/2021    CR 0.94 08/26/2021    AST 14 08/26/2021    ALT 22 08/26/2021     Path:  Plasmacytoma  Sections show sheets of plasma cells.  On B1, IHC is ordered.  Stains for , kappa, and lambda show sheets of kappa monotypic plasma cells.      IMAGING  CT Chest/Abdomen/Pelvis w Contrast    Result Date: 8/26/2021  IMPRESSION: 1. No new primary malignancy is seen to explain the destructive osteolytic mass in the left pubic bone which was better characterized on prior plain film and MRI. No evidence of additional metastases. 2. Mild " dilation of some loops of ileum in the right upper quadrant of uncertain clinical significance, potentially transient. 3. Uterine fibroid. 4. Nonobstructing left renal stone.       XR Pelvis 1/2 Views  Result Date: 9/7/2021  Impression: Redemonstrated lucency of the left pubic body better delineated on prior CT and MRI with new cortical irregularity consistent with recent biopsy.           Assessment and Recommendations   Kristie Cornejo is a 51 year old female who was diagnosed with Plasmacytoma after she presented with left sided groin pain.   There are no CRAB complications at this time.     # Plasmacytoma VS Rule out Multiple Myeloma   Patient had left sided groin pain , imaging showed left pubic mass which was osteolytic destructive in nature. Underwent biopsy and it showed plasmacytoma. Flow cytometry showed Kappa-monotypic plasma cell.    She has a solitary extramedullary plasmacytoma with the limited evaluation, she needs more wok up for differentiate between plasmacytoma and multiple myeloma . We discussed about the further testing needed including:   Bone marrow biopsy to determine the treatment and prognosis.   - PET scan   - Blood work including Uric acid, LDH, beta -2 microglobulin , albumin .  - Will get infectious work up including Hep B , Hep C, and HIV status .  - 24 hour urine for UPEP  - Bone marrow biopsy for further evalaution.  - Radiation Oncology referral in the setting of painful left pubic bone lytic lesion , possible plasmacytoma but awaiting further work up to rule out myeloma. The rad onc is indicated to stabilize the bone and prevent fracture of weight bearing bones.    - for increased pain, we can consider steroids , patient deferred at this time .   - She will start Zomata but before we recommended dental evaluation and start vit D.       Patient was seen and plan discussed with attending physician, Dr.Bachanova Tsering Meyer  Hematology, Oncology & Transplantation  fellow  Pager: [phone]  09/15/2021    Today, I  saw and examined the patient independently in clinic and discussed the recommendations. I reviewed the case with the fellow and agree with the note as above.   WE had a 60 min discussion about need for further work-up and start of rad onc to aleviate pain and address bone stability.   I will see the patient back in 2 weeks.     Patient agreed to bone marrow biopsy and voluntarily signed the consent to collect a sample to [facility].      Payton Reinoso MD  Professor of Medicine

## 2021-09-16 LAB
B2 MICROGLOB TUMOR MARKER SER-MCNC: 1.8 MG/L
HBV DNA SERPL NAA+PROBE-ACNC: NOT DETECTED IU/ML
HCV RNA SERPL NAA+PROBE-ACNC: NOT DETECTED IU/ML

## 2021-09-17 ENCOUNTER — PATIENT OUTREACH (OUTPATIENT)
Dept: ONCOLOGY | Facility: CLINIC | Age: 51
End: 2021-09-17

## 2021-09-17 LAB
HBV SURFACE AG SERPL QL IA: NONREACTIVE
HCV AB SERPL QL IA: NONREACTIVE

## 2021-09-17 PROCEDURE — 81050 URINALYSIS VOLUME MEASURE: CPT | Performed by: STUDENT IN AN ORGANIZED HEALTH CARE EDUCATION/TRAINING PROGRAM

## 2021-09-17 PROCEDURE — 84166 PROTEIN E-PHORESIS/URINE/CSF: CPT | Mod: 26 | Performed by: STUDENT IN AN ORGANIZED HEALTH CARE EDUCATION/TRAINING PROGRAM

## 2021-09-17 PROCEDURE — 84166 PROTEIN E-PHORESIS/URINE/CSF: CPT | Mod: TC | Performed by: STUDENT IN AN ORGANIZED HEALTH CARE EDUCATION/TRAINING PROGRAM

## 2021-09-17 NOTE — PROGRESS NOTES
RN Care Coordination Note:     RNCC called patient flowing office visit/consult with Dr. Reinoso. Introduced self as RN Care Coordinator. Went over contact information for [facility]. Discussed roles of RNCC, MD, SULEIMAN, nurse triage line, and clinic coordinators. Discussed how to get a hold of different team members via [employer] and at [phone].     Further POC:  Pt will have PET and BMB in the next week. Will follow up with Dr. Reinoso afterwards.  RNCC educated on BMB process, rationale, risks, etc. Pt is aware that  is encouraged if she has any sedation. Pt's  will  her that day.     -Per MD, pt will also have Zometa monthly x3 months then every 3 months. Will also get her scheduled with rad/onc for XRT.    Patient verbalizes understanding and has no questions or concerns at this time. Has contact information for [facility] if he/she has any further needs.    If you have questions about your COVID test scheduling you can contact the central office that is doing this scheduling. Their number is [phone].    Griselda Valdez, RN, MSN, OCN   RN Care Coordinator   [facility] [address] [address]   [phone]

## 2021-09-18 LAB — HIV1 AB SERPL QL IB: NEGATIVE

## 2021-09-20 DIAGNOSIS — C90.30 PLASMACYTOMA OF BONE (H): Primary | ICD-10-CM

## 2021-09-20 LAB
ALBUMIN MFR UR ELPH: 0.6 %
ALPHA1 GLOB MFR UR ELPH: 0.3 %
ALPHA2 GLOB MFR UR ELPH: 3.2 %
B-GLOBULIN MFR UR ELPH: 6.1 %
GAMMA GLOB MFR UR ELPH: 89.8 %
M PROTEIN MFR UR ELPH: 77.5 %
PROT PATTERN UR ELPH-IMP: ABNORMAL

## 2021-09-20 RX ORDER — ZOLEDRONIC ACID 0.04 MG/ML
4 INJECTION, SOLUTION INTRAVENOUS ONCE
Status: CANCELLED
Start: 2021-09-23 | End: 2021-09-23

## 2021-09-20 RX ORDER — ALBUTEROL SULFATE 90 UG/1
1-2 AEROSOL, METERED RESPIRATORY (INHALATION)
Status: CANCELLED
Start: 2021-09-23

## 2021-09-20 RX ORDER — EPINEPHRINE 1 MG/ML
0.3 INJECTION, SOLUTION INTRAMUSCULAR; SUBCUTANEOUS EVERY 5 MIN PRN
Status: CANCELLED | OUTPATIENT
Start: 2021-09-23

## 2021-09-20 RX ORDER — HEPARIN SODIUM (PORCINE) LOCK FLUSH IV SOLN 100 UNIT/ML 100 UNIT/ML
5 SOLUTION INTRAVENOUS
Status: CANCELLED | OUTPATIENT
Start: 2021-09-23

## 2021-09-20 RX ORDER — DIPHENHYDRAMINE HYDROCHLORIDE 50 MG/ML
50 INJECTION INTRAMUSCULAR; INTRAVENOUS
Status: CANCELLED
Start: 2021-09-23

## 2021-09-20 RX ORDER — MEPERIDINE HYDROCHLORIDE 25 MG/ML
25 INJECTION INTRAMUSCULAR; INTRAVENOUS; SUBCUTANEOUS EVERY 30 MIN PRN
Status: CANCELLED | OUTPATIENT
Start: 2021-09-23

## 2021-09-20 RX ORDER — NALOXONE HYDROCHLORIDE 0.4 MG/ML
0.2 INJECTION, SOLUTION INTRAMUSCULAR; INTRAVENOUS; SUBCUTANEOUS
Status: CANCELLED | OUTPATIENT
Start: 2021-09-23

## 2021-09-20 RX ORDER — ALBUTEROL SULFATE 0.83 MG/ML
2.5 SOLUTION RESPIRATORY (INHALATION)
Status: CANCELLED | OUTPATIENT
Start: 2021-09-23

## 2021-09-20 RX ORDER — HEPARIN SODIUM,PORCINE 10 UNIT/ML
5 VIAL (ML) INTRAVENOUS
Status: CANCELLED | OUTPATIENT
Start: 2021-09-23

## 2021-09-20 RX ORDER — METHYLPREDNISOLONE SODIUM SUCCINATE 125 MG/2ML
125 INJECTION, POWDER, LYOPHILIZED, FOR SOLUTION INTRAMUSCULAR; INTRAVENOUS
Status: CANCELLED
Start: 2021-09-23

## 2021-09-21 DIAGNOSIS — C90.30 PLASMACYTOMA OF BONE (H): Primary | ICD-10-CM

## 2021-09-22 ENCOUNTER — OFFICE VISIT (OUTPATIENT)
Dept: TRANSPLANT | Facility: CLINIC | Age: 51
End: 2021-09-22
Payer: COMMERCIAL

## 2021-09-22 VITALS
HEART RATE: 56 BPM | TEMPERATURE: 98.1 F | DIASTOLIC BLOOD PRESSURE: 87 MMHG | BODY MASS INDEX: 25.31 KG/M2 | SYSTOLIC BLOOD PRESSURE: 134 MMHG | RESPIRATION RATE: 16 BRPM | WEIGHT: 165.1 LBS | OXYGEN SATURATION: 99 %

## 2021-09-22 DIAGNOSIS — C90.30 PLASMACYTOMA OF BONE (H): Primary | ICD-10-CM

## 2021-09-22 LAB
ALBUMIN SERPL-MCNC: 4.1 G/DL (ref 3.4–5)
ALP SERPL-CCNC: 50 U/L (ref 40–150)
ALT SERPL W P-5'-P-CCNC: 17 U/L (ref 0–50)
ANION GAP SERPL CALCULATED.3IONS-SCNC: 8 MMOL/L (ref 3–14)
AST SERPL W P-5'-P-CCNC: 11 U/L (ref 0–45)
BASOPHILS # BLD AUTO: 0.1 10E3/UL (ref 0–0.2)
BASOPHILS NFR BLD AUTO: 1 %
BILIRUB SERPL-MCNC: 0.5 MG/DL (ref 0.2–1.3)
BUN SERPL-MCNC: 19 MG/DL (ref 7–30)
CALCIUM SERPL-MCNC: 9.4 MG/DL (ref 8.5–10.1)
CHLORIDE BLD-SCNC: 109 MMOL/L (ref 94–109)
CO2 SERPL-SCNC: 24 MMOL/L (ref 20–32)
CREAT SERPL-MCNC: 0.94 MG/DL (ref 0.52–1.04)
EOSINOPHIL # BLD AUTO: 0.2 10E3/UL (ref 0–0.7)
EOSINOPHIL NFR BLD AUTO: 4 %
ERYTHROCYTE [DISTWIDTH] IN BLOOD BY AUTOMATED COUNT: 12.5 % (ref 10–15)
GFR SERPL CREATININE-BSD FRML MDRD: 70 ML/MIN/1.73M2
GLUCOSE BLD-MCNC: 89 MG/DL (ref 70–99)
HCT VFR BLD AUTO: 41.5 % (ref 35–47)
HGB BLD-MCNC: 13.6 G/DL (ref 11.7–15.7)
IMM GRANULOCYTES # BLD: 0 10E3/UL
IMM GRANULOCYTES NFR BLD: 0 %
LYMPHOCYTES # BLD AUTO: 2.1 10E3/UL (ref 0.8–5.3)
LYMPHOCYTES NFR BLD AUTO: 33 %
MCH RBC QN AUTO: 30 PG (ref 26.5–33)
MCHC RBC AUTO-ENTMCNC: 32.8 G/DL (ref 31.5–36.5)
MCV RBC AUTO: 92 FL (ref 78–100)
MONOCYTES # BLD AUTO: 0.4 10E3/UL (ref 0–1.3)
MONOCYTES NFR BLD AUTO: 7 %
NEUTROPHILS # BLD AUTO: 3.5 10E3/UL (ref 1.6–8.3)
NEUTROPHILS NFR BLD AUTO: 55 %
NRBC # BLD AUTO: 0 10E3/UL
NRBC BLD AUTO-RTO: 0 /100
PLATELET # BLD AUTO: 315 10E3/UL (ref 150–450)
POTASSIUM BLD-SCNC: 3.9 MMOL/L (ref 3.4–5.3)
PROT SERPL-MCNC: 7.5 G/DL (ref 6.8–8.8)
RBC # BLD AUTO: 4.53 10E6/UL (ref 3.8–5.2)
SODIUM SERPL-SCNC: 141 MMOL/L (ref 133–144)
WBC # BLD AUTO: 6.4 10E3/UL (ref 4–11)

## 2021-09-22 PROCEDURE — 85025 COMPLETE CBC W/AUTO DIFF WBC: CPT

## 2021-09-22 PROCEDURE — 88342 IMHCHEM/IMCYTCHM 1ST ANTB: CPT | Mod: 26 | Performed by: PATHOLOGY

## 2021-09-22 PROCEDURE — 80053 COMPREHEN METABOLIC PANEL: CPT

## 2021-09-22 PROCEDURE — 88311 DECALCIFY TISSUE: CPT | Mod: TC

## 2021-09-22 PROCEDURE — 88188 FLOWCYTOMETRY/READ 9-15: CPT | Performed by: PATHOLOGY

## 2021-09-22 PROCEDURE — 36415 COLL VENOUS BLD VENIPUNCTURE: CPT

## 2021-09-22 PROCEDURE — 88342 IMHCHEM/IMCYTCHM 1ST ANTB: CPT | Mod: TC

## 2021-09-22 PROCEDURE — 88313 SPECIAL STAINS GROUP 2: CPT | Mod: 26 | Performed by: PATHOLOGY

## 2021-09-22 PROCEDURE — 85060 BLOOD SMEAR INTERPRETATION: CPT | Performed by: PATHOLOGY

## 2021-09-22 PROCEDURE — 88185 FLOWCYTOMETRY/TC ADD-ON: CPT

## 2021-09-22 PROCEDURE — 85097 BONE MARROW INTERPRETATION: CPT | Performed by: PATHOLOGY

## 2021-09-22 PROCEDURE — 88184 FLOWCYTOMETRY/ TC 1 MARKER: CPT

## 2021-09-22 PROCEDURE — 88341 IMHCHEM/IMCYTCHM EA ADD ANTB: CPT | Mod: 26 | Performed by: PATHOLOGY

## 2021-09-22 PROCEDURE — 38222 DX BONE MARROW BX & ASPIR: CPT

## 2021-09-22 PROCEDURE — 88237 TISSUE CULTURE BONE MARROW: CPT

## 2021-09-22 PROCEDURE — 88271 CYTOGENETICS DNA PROBE: CPT

## 2021-09-22 PROCEDURE — 88275 CYTOGENETICS 100-300: CPT

## 2021-09-22 PROCEDURE — 250N000011 HC RX IP 250 OP 636: Performed by: PHYSICIAN ASSISTANT

## 2021-09-22 PROCEDURE — 88311 DECALCIFY TISSUE: CPT | Mod: 26 | Performed by: PATHOLOGY

## 2021-09-22 RX ADMIN — MIDAZOLAM HYDROCHLORIDE 2 MG: 1 INJECTION, SOLUTION INTRAMUSCULAR; INTRAVENOUS at 10:19

## 2021-09-22 ASSESSMENT — PAIN SCALES - GENERAL: PAINLEVEL: MODERATE PAIN (4)

## 2021-09-22 NOTE — NURSING NOTE
"Oncology Rooming Note    September 22, 2021 10:41 AM   Kristie Cornejo is a 51 year old female who presents for:    Chief Complaint   Patient presents with     RECHECK     MM here for bmbx     Initial Vitals: /84   Pulse 64   Temp 98.1  F (36.7  C) (Oral)   Resp 20   Wt 74.9 kg (165 lb 1.6 oz)   SpO2 100%   BMI 25.31 kg/m   Estimated body mass index is 25.31 kg/m  as calculated from the following:    Height as of 9/15/21: 1.72 m (5' 7.72\").    Weight as of this encounter: 74.9 kg (165 lb 1.6 oz). Body surface area is 1.89 meters squared.  Moderate Pain (4) Comment: Data Unavailable   No LMP recorded. (Menstrual status: IUD).  Allergies reviewed: Yes  Medications reviewed: Yes    Medications: Medication refills not needed today.  Pharmacy name entered into [facility]:    [facility] [place], [place] - [address]  [id number] - [place], [place] - [address] AT [facility] [address]    Clinical concerns: None      Osei Simmons RN              "

## 2021-09-22 NOTE — NURSING NOTE
BMT Teaching Flowsheet   Teaching Topic: post biopsy instructions    Person(s) involved in teaching: Patient  Motivation Level  Asks Questions: Yes  Eager to Learn: Yes  Cooperative: Yes  Receptive (willing/able to accept information): Yes    Patient demonstrates understanding of the following:   - Reason for the appointment, diagnosis and treatment plan: Yes  - Knowledge of proper use of medications and conditions for which they are ordered (with special attention to potential side effects or drug interactions): Yes  - Which situations necessitate calling provider and whom to contact: Yes    Teaching concerns addressed: reviewed activity restrictions if received premeds, potential for bleeding and actions to take if develops any of the issues below    Proper use and care of (medical equipment, care aids, etc.) Yes  Pain management techniques: Yes  Patient instructed on hand hygiene: Yes  How and/when to access community resources: Yes    Infection Control:  Patient demonstrates understanding of the following:   Surgical procedure site care taught NA  Signs and symptoms of infection taught Yes  Wound care taught Yes    Teaching concerns addressed: Bone marrow biopsy and infection prevention.      Instructional Materials Used/Given: Pt instructed to keep bmbx site clean and dry for 24hrs. Pt educated to monitor site for signs of infection such as redness, rash, oozing, puss, bleeding, pain, and elevated temp. Pt instructed to go to ER if any signs of infection should occur. Pt educated to not operate machinery due to receiving versed. Pt and  verbalize understanding.     Post procedure: Pt vss, ambulating, site is c,d,i. PIV d/c'd. Pt d/c'd with  as .      Time spent with patient: 0 minutes.

## 2021-09-22 NOTE — LETTER
9/22/2021         RE: Kristie Cornejo  [address]  [facility] [address]        Dear Colleague,    Thank you for referring your patient, Kristie Cornejo, to the [facility] [place]. Please see a copy of my visit note below.    BMT ONC Adult Bone Marrow Biopsy Procedure Note  September 22, 2021  There were no vitals taken for this visit.     Learning needs assessment complete within 12 months? 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 Informed consent was obtained. Following the administration of Midazolam 2mg as pre-medication, patient was placed in the prone position and prepped and draped in a sterile manner. Approximately 20 cc of 1% Lidocaine was used over the left posterior iliac spine. Following this a 3 mm incision was made. Trephine bone marrow core(s) was (were) obtained from the [facility]. Bone marrow aspirates were obtained from the [facility]. Aspirates were sent for morphology, immunophenotyping and cytogenetics and research. A total of approximately 35 ml of marrow was aspirated. Following this procedure a sterile dressing was applied to the bone marrow biopsy site(s). The patient was placed in the supine position to maintain pressure on the biopsy site. Post-procedure wound care instructions were given.     Complications: NO    Pre-procedural pain: 0 out of 10 on the numeric pain rating scale.     Procedural pain: 5 out of 10 on the numeric pain rating scale.     Post-procedural pain assessment: 0 out of 10 on the numeric pain rating scale.     Interventions: YES additional lidocaine    Length of procedure:21 minutes to 45 minutes    Procedure performed by: Laura Levin PA-C        Again, thank you for allowing me to participate  in the care of your patient.        Sincerely,        [facility]

## 2021-09-22 NOTE — PROGRESS NOTES
BMT ONC Adult Bone Marrow Biopsy Procedure Note  September 22, 2021  There were no vitals taken for this visit.     Learning needs assessment complete within 12 months? 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 Informed consent was obtained. Following the administration of Midazolam 2mg as pre-medication, patient was placed in the prone position and prepped and draped in a sterile manner. Approximately 20 cc of 1% Lidocaine was used over the left posterior iliac spine. Following this a 3 mm incision was made. Trephine bone marrow core(s) was (were) obtained from the [facility]. Bone marrow aspirates were obtained from the [facility]. Aspirates were sent for morphology, immunophenotyping and cytogenetics and research. A total of approximately 35 ml of marrow was aspirated. Following this procedure a sterile dressing was applied to the bone marrow biopsy site(s). The patient was placed in the supine position to maintain pressure on the biopsy site. Post-procedure wound care instructions were given.     Complications: NO    Pre-procedural pain: 0 out of 10 on the numeric pain rating scale.     Procedural pain: 5 out of 10 on the numeric pain rating scale.     Post-procedural pain assessment: 0 out of 10 on the numeric pain rating scale.     Interventions: YES additional lidocaine    Length of procedure:21 minutes to 45 minutes    Procedure performed by: Laura Levin PA-C

## 2021-09-23 LAB
PATH REPORT.COMMENTS IMP SPEC: NORMAL
PATH REPORT.FINAL DX SPEC: NORMAL
PATH REPORT.MICROSCOPIC SPEC OTHER STN: NORMAL
PATH REPORT.RELEVANT HX SPEC: NORMAL

## 2021-09-24 ENCOUNTER — ANCILLARY PROCEDURE (OUTPATIENT)
Dept: PET IMAGING | Facility: CLINIC | Age: 51
End: 2021-09-24
Payer: COMMERCIAL

## 2021-09-24 DIAGNOSIS — C90.30 PLASMACYTOMA OF BONE (H): ICD-10-CM

## 2021-09-24 LAB
GLUCOSE SERPL-MCNC: 81 MG/DL (ref 70–99)
PATH REPORT.ADDENDUM SPEC: ABNORMAL
PATH REPORT.COMMENTS IMP SPEC: ABNORMAL
PATH REPORT.COMMENTS IMP SPEC: ABNORMAL
PATH REPORT.COMMENTS IMP SPEC: YES
PATH REPORT.FINAL DX SPEC: ABNORMAL
PATH REPORT.GROSS SPEC: ABNORMAL
PATH REPORT.MICROSCOPIC SPEC OTHER STN: ABNORMAL
PATH REPORT.MICROSCOPIC SPEC OTHER STN: ABNORMAL
PATH REPORT.RELEVANT HX SPEC: ABNORMAL

## 2021-09-24 RX ORDER — EPINEPHRINE 1 MG/ML
0.3 INJECTION, SOLUTION INTRAMUSCULAR; SUBCUTANEOUS EVERY 5 MIN PRN
Status: CANCELLED | OUTPATIENT
Start: 2021-10-24

## 2021-09-24 RX ORDER — METHYLPREDNISOLONE SODIUM SUCCINATE 125 MG/2ML
125 INJECTION, POWDER, LYOPHILIZED, FOR SOLUTION INTRAMUSCULAR; INTRAVENOUS
Status: CANCELLED
Start: 2021-10-24

## 2021-09-24 RX ORDER — ALBUTEROL SULFATE 0.83 MG/ML
2.5 SOLUTION RESPIRATORY (INHALATION)
Status: CANCELLED | OUTPATIENT
Start: 2021-10-24

## 2021-09-24 RX ORDER — ZOLEDRONIC ACID 0.04 MG/ML
4 INJECTION, SOLUTION INTRAVENOUS ONCE
Status: CANCELLED
Start: 2021-10-24 | End: 2021-10-24

## 2021-09-24 RX ORDER — HEPARIN SODIUM,PORCINE 10 UNIT/ML
5 VIAL (ML) INTRAVENOUS
Status: CANCELLED | OUTPATIENT
Start: 2021-10-24

## 2021-09-24 RX ORDER — ALBUTEROL SULFATE 90 UG/1
1-2 AEROSOL, METERED RESPIRATORY (INHALATION)
Status: CANCELLED
Start: 2021-10-24

## 2021-09-24 RX ORDER — DIPHENHYDRAMINE HYDROCHLORIDE 50 MG/ML
50 INJECTION INTRAMUSCULAR; INTRAVENOUS
Status: CANCELLED
Start: 2021-10-24

## 2021-09-24 RX ORDER — NALOXONE HYDROCHLORIDE 0.4 MG/ML
0.2 INJECTION, SOLUTION INTRAMUSCULAR; INTRAVENOUS; SUBCUTANEOUS
Status: CANCELLED | OUTPATIENT
Start: 2021-10-24

## 2021-09-24 RX ORDER — HEPARIN SODIUM (PORCINE) LOCK FLUSH IV SOLN 100 UNIT/ML 100 UNIT/ML
5 SOLUTION INTRAVENOUS
Status: CANCELLED | OUTPATIENT
Start: 2021-10-24

## 2021-09-24 RX ORDER — MEPERIDINE HYDROCHLORIDE 25 MG/ML
25 INJECTION INTRAMUSCULAR; INTRAVENOUS; SUBCUTANEOUS EVERY 30 MIN PRN
Status: CANCELLED | OUTPATIENT
Start: 2021-10-24

## 2021-09-27 ENCOUNTER — INFUSION THERAPY VISIT (OUTPATIENT)
Dept: ONCOLOGY | Facility: CLINIC | Age: 51
End: 2021-09-27
Payer: COMMERCIAL

## 2021-09-27 ENCOUNTER — APPOINTMENT (OUTPATIENT)
Dept: LAB | Facility: CLINIC | Age: 51
End: 2021-09-27
Payer: COMMERCIAL

## 2021-09-27 VITALS
OXYGEN SATURATION: 98 % | TEMPERATURE: 98 F | DIASTOLIC BLOOD PRESSURE: 65 MMHG | BODY MASS INDEX: 25.59 KG/M2 | RESPIRATION RATE: 16 BRPM | SYSTOLIC BLOOD PRESSURE: 119 MMHG | WEIGHT: 166.9 LBS | HEART RATE: 69 BPM

## 2021-09-27 DIAGNOSIS — C90.30 PLASMACYTOMA OF BONE (H): Primary | ICD-10-CM

## 2021-09-27 LAB
BASOPHILS # BLD AUTO: 0.1 10E3/UL (ref 0–0.2)
BASOPHILS NFR BLD AUTO: 1 %
CALCIUM SERPL-MCNC: 9.1 MG/DL (ref 8.5–10.1)
CREAT SERPL-MCNC: 0.85 MG/DL (ref 0.52–1.04)
EOSINOPHIL # BLD AUTO: 0.2 10E3/UL (ref 0–0.7)
EOSINOPHIL NFR BLD AUTO: 4 %
ERYTHROCYTE [DISTWIDTH] IN BLOOD BY AUTOMATED COUNT: 12.7 % (ref 10–15)
GFR SERPL CREATININE-BSD FRML MDRD: 80 ML/MIN/1.73M2
HCT VFR BLD AUTO: 39.1 % (ref 35–47)
HGB BLD-MCNC: 13 G/DL (ref 11.7–15.7)
IMM GRANULOCYTES # BLD: 0 10E3/UL
IMM GRANULOCYTES NFR BLD: 0 %
LYMPHOCYTES # BLD AUTO: 1.9 10E3/UL (ref 0.8–5.3)
LYMPHOCYTES NFR BLD AUTO: 32 %
MCH RBC QN AUTO: 30.4 PG (ref 26.5–33)
MCHC RBC AUTO-ENTMCNC: 33.2 G/DL (ref 31.5–36.5)
MCV RBC AUTO: 92 FL (ref 78–100)
MONOCYTES # BLD AUTO: 0.6 10E3/UL (ref 0–1.3)
MONOCYTES NFR BLD AUTO: 9 %
NEUTROPHILS # BLD AUTO: 3.2 10E3/UL (ref 1.6–8.3)
NEUTROPHILS NFR BLD AUTO: 54 %
NRBC # BLD AUTO: 0 10E3/UL
NRBC BLD AUTO-RTO: 0 /100
PLATELET # BLD AUTO: 320 10E3/UL (ref 150–450)
RBC # BLD AUTO: 4.27 10E6/UL (ref 3.8–5.2)
WBC # BLD AUTO: 5.9 10E3/UL (ref 4–11)

## 2021-09-27 PROCEDURE — 85025 COMPLETE CBC W/AUTO DIFF WBC: CPT

## 2021-09-27 PROCEDURE — 250N000011 HC RX IP 250 OP 636

## 2021-09-27 PROCEDURE — 82310 ASSAY OF CALCIUM: CPT

## 2021-09-27 PROCEDURE — 96365 THER/PROPH/DIAG IV INF INIT: CPT

## 2021-09-27 PROCEDURE — 258N000003 HC RX IP 258 OP 636

## 2021-09-27 PROCEDURE — 36415 COLL VENOUS BLD VENIPUNCTURE: CPT

## 2021-09-27 PROCEDURE — 86335 IMMUNFIX E-PHORSIS/URINE/CSF: CPT | Mod: TC

## 2021-09-27 PROCEDURE — 36592 COLLECT BLOOD FROM PICC: CPT

## 2021-09-27 PROCEDURE — 82565 ASSAY OF CREATININE: CPT

## 2021-09-27 RX ORDER — ZOLEDRONIC ACID 0.04 MG/ML
4 INJECTION, SOLUTION INTRAVENOUS ONCE
Status: COMPLETED | OUTPATIENT
Start: 2021-09-27 | End: 2021-09-27

## 2021-09-27 RX ADMIN — ZOLEDRONIC ACID 4 MG: 0.04 INJECTION, SOLUTION INTRAVENOUS at 11:33

## 2021-09-27 RX ADMIN — SODIUM CHLORIDE 250 ML: 9 INJECTION, SOLUTION INTRAVENOUS at 11:27

## 2021-09-27 ASSESSMENT — PAIN SCALES - GENERAL: PAINLEVEL: NO PAIN (0)

## 2021-09-27 NOTE — NURSING NOTE
Chief Complaint   Patient presents with     Blood Draw     Labs drawn from PIV placed in lab by RN. VS taken.     Labs drawn from PIV placed by RN. Line flushed with saline. Vitals taken. Pt checked in for appointment(s).  Hunter Pérez RN

## 2021-09-27 NOTE — PATIENT INSTRUCTIONS
[facility] Triage and after hours / weekends / holidays:  [phone]    Please call the triage or after hours line if you experience a temperature greater than or equal to 100.5, shaking chills, have uncontrolled nausea, vomiting and/or diarrhea, dizziness, shortness of breath, chest pain, bleeding, unexplained bruising, or if you have any other new/concerning symptoms, questions or concerns.      If you are having any concerning symptoms or wish to speak to a provider before your next infusion visit, please call your care coordinator or triage to notify them so we can adequately serve you.     If you need a refill on a narcotic prescription or other medication, please call before your infusion appointment.             The CDC has recommended that individuals who are moderately to severely immunocompromised may benefit from an additional dose to make sure they have enough protection against COVID-19. We encourage you to receive a booster vaccine when you can. [facility] is offering booster vaccines. Updated information on vaccines at [facility] can be found at: [email or web address] . Most local pharmacies, [facility] and [employer] for example are also offering booster vaccines.       Currently, CDC is recommending that moderately to severely immunocompromised people receive an additional dose. This includes people who have:   -Been receiving active cancer treatment for tumors or cancers of the blood   -Received an organ transplant and are taking medicine to suppress the immune system   -Received a stem cell transplant within the last 2 years or are taking medicine to suppress the immune system   -Moderate or severe primary immunodeficiency (such as DiGeorge syndrome, Wiskott-[place] syndrome)   -Advanced or untreated HIV infection   -Active treatment with high-dose corticosteroids or other drugs that may suppress your immune response     The most current information can be found  on the CDC website: https://www.cdc.gov/coronavirus/2019-ncov/vaccines/recommendations/immuno.html     The [facility] is leading a research study that aims to better understand the immune system s response to COVID-19 vaccines. The primary focus is to understand if immunocompromised people have different responses to the vaccine. If you are interested in learning more about the [employer] COVID-19 Vaccine Response Study, please visit: [email or web address]          September 2021 Sunday Monday Tuesday Wednesday Thursday Friday Saturday                  1     2     3    PRE-PROCEDURE COVID PCR   9:00 AM   (15 min.)   [facility] COVID LAB   [facility] 4       5     6     7    Admission   6:00 AM   Edu Philip MD   [facility] PACU   (Discharge: 9/7/2021)    BIOPSY, BONE, PELVIS   8:30 AM   Edu Philip MD   [facility] OR    XR PELVIS 1/2 VIEWS  12:20 PM   (20 min.)   URXR3   [facility] 8     9     10     11       12     13     14     15    LAB PERIPHERAL   1:15 PM   (15 min.)    [facility] DRAW   [facility]    NEW   1:45 PM   (60 min.)   Payton Reinoso MD   [facility] [place]    LAB   3:45 PM   (15 min.)    LAB   [facility] 16     17     18       19     20     21     22    [facility] BMT BONE MARROW BIOPSY   9:30 AM   (90 min.)    BMT SULEIMAN #1   [facility] Marrow Trans[facility] 23     24    PET ONCOLOGY WHOLE BODY   8:30 AM   (60 min.)   [place] [id number]   [facility] 25       26     27    LAB PERIPHERAL  10:00 AM   (15 min.)    [facility] DRAW   [facility]    ONC INFUSION 0.5 HR (30 MIN)  10:30 AM   (30 min.)    ONC INFUSION NURSE   [facility] 28 29 30 October 2021 Sunday Monday Tuesday Wednesday Thursday  Friday Saturday                            1     2       3     4     5     6     7    RETURN  10:45 AM   (30 min.)   Payton Reinoso MD   [facility] Marrow Transplant [facility] [place] 8     9       10     11     12     13     14     15     16       17     18     19     20     21     22     23       24     25    ONC INFUSION 0.5 HR (30 MIN)   8:00 AM   (30 min.)    ONC INFUSION NURSE   [facility] 26     27     28     29     30       31                                                   Lab Results:  Recent Results (from the past 12 hour(s))   Creatinine    Collection Time: 09/27/21 10:23 AM   Result Value Ref Range    Creatinine 0.85 0.52 - 1.04 mg/dL    GFR Estimate 80 >60 mL/min/1.73m2   Calcium    Collection Time: 09/27/21 10:23 AM   Result Value Ref Range    Calcium 9.1 8.5 - 10.1 mg/dL   CBC with platelets and differential    Collection Time: 09/27/21 10:23 AM   Result Value Ref Range    WBC Count 5.9 4.0 - 11.0 10e3/uL    RBC Count 4.27 3.80 - 5.20 10e6/uL    Hemoglobin 13.0 11.7 - 15.7 g/dL    Hematocrit 39.1 35.0 - 47.0 %    MCV 92 78 - 100 fL    MCH 30.4 26.5 - 33.0 pg    MCHC 33.2 31.5 - 36.5 g/dL    RDW 12.7 10.0 - 15.0 %    Platelet Count 320 150 - 450 10e3/uL    % Neutrophils 54 %    % Lymphocytes 32 %    % Monocytes 9 %    % Eosinophils 4 %    % Basophils 1 %    % Immature Granulocytes 0 %    NRBCs per 100 WBC 0 <1 /100    Absolute Neutrophils 3.2 1.6 - 8.3 10e3/uL    Absolute Lymphocytes 1.9 0.8 - 5.3 10e3/uL    Absolute Monocytes 0.6 0.0 - 1.3 10e3/uL    Absolute Eosinophils 0.2 0.0 - 0.7 10e3/uL    Absolute Basophils 0.1 0.0 - 0.2 10e3/uL    Absolute Immature Granulocytes 0.0 <=0.0 10e3/uL    Absolute NRBCs 0.0 10e3/uL

## 2021-09-27 NOTE — PROGRESS NOTES
Infusion Nursing Note:  Kristie Cornejo presents today for zometa.    Patient seen by provider today: No   present during visit today: Not Applicable.    Note: Patient arrives feeling well. She is receiving zometa for the first time today. She had a dental cleaning in June or July with no issues. She denies any current dental issues. Medication and possible side effects reviewed with patient and printed hand out given. Pt was taking calcium and vitamin D but had put it on hold along with her multi vitamin and aspirin. Pt wondering if she can restart all of her medications.       TORB: Dr. Reinoso/Nan Thomson RN  -ok for patient to restart her vitamin d and calcium, multivitamin, and Asprin.    Intravenous Access:  Peripheral IV placed.    Treatment Conditions:  Lab Results   Component Value Date    HGB 13.0 09/27/2021    WBC 5.9 09/27/2021    ANEUTAUTO 3.2 09/27/2021     09/27/2021      Lab Results   Component Value Date     09/22/2021    POTASSIUM 3.9 09/22/2021    CR 0.85 09/27/2021    MEKA 9.1 09/27/2021    BILITOTAL 0.5 09/22/2021    ALBUMIN 4.1 09/22/2021    ALT 17 09/22/2021    AST 11 09/22/2021     Results reviewed, labs MET treatment parameters, ok to proceed with treatment.      Post Infusion Assessment:  Patient tolerated infusion without incident.  Blood return noted pre and post infusion.  Site patent and intact, free from redness, edema or discomfort.  No evidence of extravasations.  Access discontinued per protocol.       Discharge Plan:   Patient declined prescription refills.  Discharge instructions reviewed with: Patient.  Patient and/or family verbalized understanding of discharge instructions and all questions answered.  Copy of AVS reviewed with patient and/or family.  Patient will return 10/25/21 for next appointment.  Patient discharged in stable condition accompanied by: self.  Departure Mode: Ambulatory.      Nan Thomson RN

## 2021-09-28 LAB — PROT ELPH PNL UR ELPH: NORMAL

## 2021-10-01 LAB — INTERPRETATION: NORMAL

## 2021-10-07 ENCOUNTER — PATIENT OUTREACH (OUTPATIENT)
Dept: ONCOLOGY | Facility: CLINIC | Age: 51
End: 2021-10-07

## 2021-10-07 ENCOUNTER — OFFICE VISIT (OUTPATIENT)
Dept: TRANSPLANT | Facility: CLINIC | Age: 51
End: 2021-10-07
Payer: COMMERCIAL

## 2021-10-07 VITALS
HEART RATE: 51 BPM | DIASTOLIC BLOOD PRESSURE: 76 MMHG | WEIGHT: 168 LBS | BODY MASS INDEX: 25.76 KG/M2 | SYSTOLIC BLOOD PRESSURE: 167 MMHG | OXYGEN SATURATION: 98 % | TEMPERATURE: 98.8 F

## 2021-10-07 DIAGNOSIS — C90.30 PLASMACYTOMA OF BONE (H): Primary | ICD-10-CM

## 2021-10-07 PROCEDURE — 99215 OFFICE O/P EST HI 40 MIN: CPT | Mod: GC

## 2021-10-07 PROCEDURE — G0463 HOSPITAL OUTPT CLINIC VISIT: HCPCS

## 2021-10-07 PROCEDURE — 36415 COLL VENOUS BLD VENIPUNCTURE: CPT

## 2021-10-07 PROCEDURE — 83883 ASSAY NEPHELOMETRY NOT SPEC: CPT

## 2021-10-07 RX ORDER — MULTIPLE VITAMINS W/ MINERALS TAB 9MG-400MCG
1 TAB ORAL DAILY
COMMUNITY
End: 2021-10-11

## 2021-10-07 RX ORDER — LACTOBACILLUS RHAMNOSUS GG 10B CELL
1 CAPSULE ORAL DAILY
Status: ON HOLD | COMMUNITY
End: 2023-01-10

## 2021-10-07 RX ORDER — SODIUM PHOSPHATE,MONO-DIBASIC 19G-7G/118
2 ENEMA (ML) RECTAL DAILY
COMMUNITY
End: 2023-01-23

## 2021-10-07 ASSESSMENT — PAIN SCALES - GENERAL: PAINLEVEL: NO PAIN (0)

## 2021-10-07 NOTE — PROGRESS NOTES
Oncology Clinic Note    Kristie Cornejo MRN# [id number]   Age: 51 year old YOB: 1970       Reason for visit: 51 year old female with past medical history significant for Irritable bowel disease, allergies is here for follow up for large plasmacytoma of the L pelvis. We completed the work-up studies and she is to review the results and plan the next step.     INTERIM HISTORY:  She really felt that the Zometa infusion helped a lot. She's not using her crutches anymore and feels like she can get around better. She felt ill the day after the Zometa but otherwise is doing well. She's not needing oxycodone still. She is somewhat fatigued, but has had no nausea, vomiting, diarrhea or easy bruising. No fevers, chills or night sweats. She denies muscle cramping or muscle weakness. She has not other aches and pains other than her L hip.                    Oncology History     Early in April 2021 , she noted to have left sided groin pain, constant and was affecting her daily activities  MRI showed  large, expansile, permeative mass with indistinct borders involving the left superior and inferior pubic rami, pubic symphysis and with possible extension to the left acetabulum.     We completed the following work-up:   - Biopsy 9/7/2021: plasmacytoma.  Flow cytometry showed kappa monotypic plasma cells, polytypic B cells. FISH results from plasmacytoma showed a gain of 11q, IGH-CCND1 fusion but no losses of 1q or TP53 and no gain of 1q.  - Bone marrow biopsy 09/22/21: Marrow cellularity 50% with 25% interstitial infiltration w/  Kappa-monotypic, moderately atypical plasma cells. Flow with 0.7% kappa-monotypica plasma cells. FISH and cytogenetics pending.   - PET CT 09/24/21 w/ hypermetabolic pathologic fracture of the L pubic ramus w/ aggressive appearing osteolysis but no additional suspicious lytic or blastic osseous lesions.     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 She lives with her  Jesse, who is a . She has 2 kids of age 20yr and 15 yr old.     She was fairly active prior to this , including walking and swimming. She does not smoke cigarettes, drinks alcohol occasionally 4 drinks per month     Family history , no h/o myeloma or blood cancers in the family. Father diagnosed with Pancreatic cancer at 78 yrs, grandmother had breast cancer, mother had endometrial cancer.          Review of Systems:     10-point review of systems was otherwise negative except as noted in HPI.          Past Medical History:   Past medical history was reviewed.   Past Medical History:   Diagnosis Date     Allergic rhinitis, cause unspecified      Anxiety state, unspecified 12/03    Started postpartum . On Paxil x 1+years. Off meds- 10/04     Irritable bowel syndrome 2004     PLANTAR WARTS     resolved     SINUS DYSRHYTHMIA 10/02    wnl     Unspecified hemorrhoids without mention of complication 4/04    colonoscopy 4/04 spastic colon, int roids; bx neg             Past Surgical History:   Past surgical history was reviewed.   Past Surgical History:   Procedure Laterality Date     BIOPSY BONE PELVIS Left 9/7/2021    Procedure: Biopsy left pubic ramis;  Surgeon: Edu Philip MD;  Location: [facility] OR     [facility] NONSPECIFIC PROCEDURE  1987    Tyro teeth removed     ZZC NONSPECIFIC PROCEDURE  1993    (R) knee surgery     ZZC NONSPECIFIC PROCEDURE  2003    (R) breast bx-negative 6/03; bx neg 10/02     ZZC NONSPECIFIC PROCEDURE      10/02 cardiac echo normal     ZZ NONSPECIFIC PROCEDURE  2004 4/04 colonosc int roids, spastic colon     ZZC NONSPECIFIC PROCEDURE  8/05    D & C for missed AB     ZZHC COLONOSCOPY THRU STOMA, DIAGNOSTIC  2004             Social History:   Social history was reviewed.   Social History     Tobacco Use     Smoking  status: Never Smoker     Smokeless tobacco: Never Used   Substance Use Topics     Alcohol use: Yes     Comment: none to a couple. occasional             Family History:   Family history was reviewed.  Family History   Problem Relation Age of Onset     Neurologic Disorder Mother         toxo     Lipids Mother         low cholestrol     Lipids Father         high cholestrol     C.A.D. Maternal Grandfather         strokes and mi, chronic respiratory from 2 hand smoke     Hypertension Paternal Grandmother         rheumatoid arthritis     Diabetes Maternal Grandmother         breast cancer,stroke ischemic     Breast Cancer Maternal Grandmother         81yo/and autoimmune skin condition     Respiratory Paternal Grandfather         emphasema     Cancer - colorectal No family hx of      Prostate Cancer No family hx of              Allergies:     Allergies   Allergen Reactions     Erythromycin      upsets stomach             Medications:   Medications Reviewed.   Current Outpatient Medications   Medication Sig     BUSPIRONE HCL 10 MG PO TABS 1 TABLET 3 TIMES DAILY as needed     Calcium Carbonate (CALCIUM 500 PO)      calcium carbonate 600 mg-vitamin D 400 units (CALTRATE) 600-400 MG-UNIT per tablet      cetirizine HCl 10 MG CAPS Take 20 mg by mouth     fluticasone (FLONASE) 50 MCG/ACT nasal spray 1 spray     glucosamine-chondroitin 500-400 MG CAPS per capsule      lactobacillus rhamnosus, GG, (CULTURELL) capsule Take 1 capsule by mouth 2 times daily     MIRENA 20 MCG/24HR IU IUD use for up to 5 years, then remove     multivitamin w/minerals (MULTI-VITAMIN) tablet Take 1 tablet by mouth daily     omalizumab (XOLAIR) 150 MG/ML SOSY injection      polyethylene glycol (MIRALAX) 17 GM/Dose powder Take 17 g by mouth     aspirin (ASA) 81 MG EC tablet Take 2 tablets (162 mg) by mouth daily     GLYCOLAX OR POWD 17 GM DAILY as needed     levonorgestrel (MIRENA) 20 MCG/24HR IUD 1 each by Intrauterine route     oxyCODONE (ROXICODONE) 5  MG tablet Take 1-2 tablets (5-10 mg) by mouth every 4 hours as needed for moderate to severe pain     senna-docusate (SENOKOT-S/PERICOLACE) 8.6-50 MG tablet Take 1-2 tablets by mouth 2 times daily (Patient not taking: Reported on 9/15/2021)     UNABLE TO FIND MEDICATION NAME: Probiotic per pt     UNABLE TO FIND MEDICATION NAME: Multi-vitamin per pt     UNABLE TO FIND MEDICATION NAME: Glucosamine Chondroit per pt     No current facility-administered medications for this visit.     Facility-Administered Medications Ordered in Other Visits   Medication     CT Flush             Physical Exam:   Vitals were reviewed.  Blood pressure (!) 167/76, pulse 51, temperature 98.8  F (37.1  C), temperature source Oral, weight 76.2 kg (168 lb), SpO2 98 %.    Constitutional: awake, in a chair, appears comfortable, in NAD  HEENT: MMM, EOM intact, sclerae clear and anicteric  CV: RRR, no murmurs appreciated, JVD  Resp: Clear to auscultation bilaterally, breathing comfortably on RA, no wheezes, rhonchi  Abd: Soft, non-tender.   MSK:  Able to transfer and walk without assistance of cane.   Skin: No rash or lesions on limited exam  Neuro: CN2-12 grossly intact, moves all four extremities  Psych: Mood and affect WNL    LABS :  Hemoglobin was 14.2   Creatinine 0.94  Calcium 9.8   Lab Results   Component Value Date    WBC 5.9 09/27/2021    HGB 13.0 09/27/2021    HCT 39.1 09/27/2021     09/27/2021     09/22/2021    POTASSIUM 3.9 09/22/2021    CHLORIDE 109 09/22/2021    CO2 24 09/22/2021    GLC 81 09/24/2021    BUN 19 09/22/2021    CR 0.85 09/27/2021    AST 11 09/22/2021    ALT 17 09/22/2021     BMBX 9/22/2021  Bone marrow, posterior iliac crest, left decalcified trephine biopsy and touch imprint; left direct aspirate smear, and concentrated aspirate smear; and peripheral smear:   Plasma cell myeloma with the following morphologic and immunophenotypic features:  - Marrow cellularity 50% (normocellular for age), with 25%  interstitial infiltration with kappa-monotypic, moderately atypical plasma cells interpreted by immunohistochemical staining of core biopsy sections  - Residual but relatively diminished, orderly and complete trilineage hematopoietic maturation in bone marrow.  - Peripheral blood showing nonspecific red cell morphologic changes including slight increase in rouleaux formation but with otherwise normal automated blood counts and leukocyte differential.        IMAGING  PET CT 09/27/21:   IMPRESSION:   1. Hypermetabolic pathologic fracture of the left pubic ramus with  aggressive appearing osteolysis, compatible with known plasmacytoma.  1a. No additional suspicious lytic or blastic osseous lesions.    1b. Stranding along the medial left inguinal region likely related to  the prior biopsy.  2. Prominent left axillary lymph nodes with mild FDG uptake, likely  related to the recent Covid-19 vaccine booster.            Assessment and Recommendations   Kristie Cornejo is a 51 year old female who was diagnosed with Plasmacytoma after she presented with left sided groin pain now found to have multiple myeloma after further workup with bone marrow biopsy.     # Multiple myeloma with associated large plasmacytoma of the L pelvis. Kappa light chain disease.     BMBx with 25% plasma cell infiltration; standard risk with gain of 11q, IGH-CCND1 fusion but no losses of 1q or TP53 and no gain of 1q.   SPEP w/out a monoclonal peak  UPEP positive. 70% paraprotein. Large monoclonal free immunoglobulin light chain of kappa chain type.     No anemia, normal creat, Ca, no other skeletal lesions. Alb normal , B2M normal.  Stage I Light chain disease with R pelvic bone pathologic fracture she is classified as having symptomatic multiple myeloma.     We discussed our recommendation for her to continue with radiation. Our recommendation now is to start chemotherapy with RVD after XRT is completed.    We reviewed the chemotherapy with  dexamethasone, velcade and revlimid (Category 1 per NCCN guidelines), expectations and side effects. We also reviewed her disease is treatable but incurable.    She is agreeable to therapy. We further discussed that our future recommendations, pending good response to the above therapy may include autologous stem cell transplant.      Her new treatment plan is as follows:  - Continue monthly Zometa   - Consultation with radiation oncology as planned 10/11/21  - Cytogenetics and FISH of bone marrow biopsy pending   - Free light chains pending   - Will pursue initiation of chemotherapy with dexamethasone, velcade and revlimid after radiation of her plasmacytoma     Patient was seen and plan discussed with attending physician, Dr.Bachanova Kelly Chavez MD/MPH  Internal Medicine/Dermatology  PGY-5    Today, I  saw and examined the patient independently in clinic and discussed the recommendations. I reviewed the case with the fellow and agree with the note as above.   New diagnosis of kappa light chain myeloma with standard risk t (11;14) and pathologic fracture. Treatment is required and RVD is recommended.     Payton Reinoso MD  Professor of Medicine

## 2021-10-07 NOTE — PROGRESS NOTES
"RN Care Coordination Note:    RNCC met face to face with patient after clinic visit with Dr. Reinoso.  Plan for initiation of tx Velcade/Rev/Dex  Provided RNCC contact information, [facility] phone number (which has options to talk with a Nurse available 24/7 - triage and RNCC via this option during business hours).   Reviewed appropriate use of MyChart, not to be used for symptom reporting.   Reviewed [facility] care team members including midlevel providers in oncology dept and      Chemo Teach  re: Velcade/Dex/Rev treatment plan   -See Pt Education documentation - Chemo Effects (Adult)  -Reviewed treatment schedule including cycle length, lab monitoring and prn medications.  -Verbal and written instruction provided using:   [facility] Velcade/Rev/Dex Drug Information : reviewed Possible Side Effects, When to Contact Your Doctor of Health Care Provider and Self Care Tips sections.   *Oral chemo team will contact patient to secure Rev and do chemo teach on Rev.     Patient has support from , parents, daughter, and many good friends. 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      POC:   Pt has a visit with rad/onc on Monday 10/11. Will plan on scheduling chemo/SULEIMAN visit for after completion of XRT. Pt is agreeable to coming to [facility] but is hoping to have infusions at [facility] in the future.     Pt voiced understanding of above instructions and information and denied further questions     Reviewed sections of [facility] Guidebook, including \"Good to Know Numbers\" and \"When to Contact Your Provider.\"     RNCC will call patient next week to discuss scheduling chemo once plan for XRT is solidified. Patient voiced understanding and appreciation of above information and denies any further questions, and he/she understands that I will follow and provide coordination as needed.    Griselda Valdez, RN, MSN, OCN   RN Care Coordinator   [facility]   [address] [place] [address]   [phone]

## 2021-10-07 NOTE — NURSING NOTE
Labs drawn in clinic by LPN via venipuncture in right arm with 23G.    Patient tolerated well and had no issues.    Sd Davies LPN

## 2021-10-07 NOTE — LETTER
10/7/2021         RE: Kristie Cornejo  [address]  [facility] [address]        Dear Colleague,    Thank you for referring your patient, Kristie Cornejo, to the [facility] [place]. Please see a copy of my visit note below.    Oncology Clinic Note    Kristie Cornejo MRN# [id number]   Age: 51 year old YOB: 1970       Reason for visit: 51 year old female with past medical history significant for Irritable bowel disease, allergies is here for follow up for large plasmacytoma of the L pelvis. We completed the work-up studies and she is to review the results and plan the next step.     INTERIM HISTORY:  She really felt that the Zometa infusion helped a lot. She's not using her crutches anymore and feels like she can get around better. She felt ill the day after the Zometa but otherwise is doing well. She's not needing oxycodone still. She is somewhat fatigued, but has had no nausea, vomiting, diarrhea or easy bruising. No fevers, chills or night sweats. She denies muscle cramping or muscle weakness. She has not other aches and pains other than her L hip.                    Oncology History     Early in April 2021 , she noted to have left sided groin pain, constant and was affecting her daily activities  MRI showed  large, expansile, permeative mass with indistinct borders involving the left superior and inferior pubic rami, pubic symphysis and with possible extension to the left acetabulum.     We completed the following work-up:   - Biopsy 9/7/2021: plasmacytoma.  Flow cytometry showed kappa monotypic plasma cells, polytypic B cells. FISH results from plasmacytoma showed a gain of 11q, IGH-CCND1 fusion but no losses of 1q or TP53 and no gain of 1q.  - Bone marrow biopsy 09/22/21: Marrow cellularity 50% with 25% interstitial infiltration w/  Kappa-monotypic, moderately atypical plasma cells. Flow with 0.7% kappa-monotypica plasma cells.  FISH and cytogenetics pending.   - PET CT 09/24/21 w/ hypermetabolic pathologic fracture of the L pubic ramus w/ aggressive appearing osteolysis but no additional suspicious lytic or blastic osseous lesions.     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 She lives with her  Jesse, who is a . She has 2 kids of age 20yr and 15 yr old.     She was fairly active prior to this , including walking and swimming. She does not smoke cigarettes, drinks alcohol occasionally 4 drinks per month     Family history , no h/o myeloma or blood cancers in the family. Father diagnosed with Pancreatic cancer at 78 yrs, grandmother had breast cancer, mother had endometrial cancer.          Review of Systems:     10-point review of systems was otherwise negative except as noted in HPI.          Past Medical History:   Past medical history was reviewed.   Past Medical History:   Diagnosis Date     Allergic rhinitis, cause unspecified      Anxiety state, unspecified 12/03    Started postpartum . On Paxil x 1+years. Off meds- 10/04     Irritable bowel syndrome 2004     PLANTAR WARTS     resolved     SINUS DYSRHYTHMIA 10/02    wnl     Unspecified hemorrhoids without mention of complication 4/04    colonoscopy 4/04 spastic colon, int roids; bx neg             Past Surgical History:   Past surgical history was reviewed.   Past Surgical History:   Procedure Laterality Date     BIOPSY BONE PELVIS Left 9/7/2021    Procedure: Biopsy left pubic ramis;  Surgeon: Edu Philip MD;  Location:  OR     [facility] NONSPECIFIC PROCEDURE  1987    [place] teeth removed     [facility] NONSPECIFIC PROCEDURE  1993    (R) knee surgery     [facility] NONSPECIFIC PROCEDURE  2003    (R) breast bx-negative 6/03; bx neg 10/02     [facility] NONSPECIFIC PROCEDURE      10/02 cardiac echo normal      [facility] NONSPECIFIC PROCEDURE  2004 4/04 colonosc int roids, spastic colon     [facility] NONSPECIFIC PROCEDURE  8/05    D & C for missed AB     [facility] COLONOSCOPY THRU STOMA, DIAGNOSTIC  2004             Social History:   Social history was reviewed.   Social History     Tobacco Use     Smoking status: Never Smoker     Smokeless tobacco: Never Used   Substance Use Topics     Alcohol use: Yes     Comment: none to a couple. occasional             Family History:   Family history was reviewed.  Family History   Problem Relation Age of Onset     Neurologic Disorder Mother         toxo     Lipids Mother         low cholestrol     Lipids Father         high cholestrol     C.A.D. Maternal Grandfather         strokes and mi, chronic respiratory from 2 hand smoke     Hypertension Paternal Grandmother         rheumatoid arthritis     Diabetes Maternal Grandmother         breast cancer,stroke ischemic     Breast Cancer Maternal Grandmother         79yo/and autoimmune skin condition     Respiratory Paternal Grandfather         emphasema     Cancer - colorectal No family hx of      Prostate Cancer No family hx of              Allergies:     Allergies   Allergen Reactions     Erythromycin      upsets stomach             Medications:   Medications Reviewed.   Current Outpatient Medications   Medication Sig     BUSPIRONE HCL 10 MG PO TABS 1 TABLET 3 TIMES DAILY as needed     Calcium Carbonate (CALCIUM 500 PO)      calcium carbonate 600 mg-vitamin D 400 units (CALTRATE) 600-400 MG-UNIT per tablet      cetirizine HCl 10 MG CAPS Take 20 mg by mouth     fluticasone (FLONASE) 50 MCG/ACT nasal spray 1 spray     glucosamine-chondroitin 500-400 MG CAPS per capsule      lactobacillus rhamnosus, GG, (CULTURELL) capsule Take 1 capsule by mouth 2 times daily     MIRENA 20 MCG/24HR IU IUD use for up to 5 years, then remove     multivitamin w/minerals (MULTI-VITAMIN) tablet Take 1 tablet by mouth daily     omalizumab (XOLAIR) 150 MG/ML SOSY injection       polyethylene glycol (MIRALAX) 17 GM/Dose powder Take 17 g by mouth     aspirin (ASA) 81 MG EC tablet Take 2 tablets (162 mg) by mouth daily     GLYCOLAX OR POWD 17 GM DAILY as needed     levonorgestrel (MIRENA) 20 MCG/24HR IUD 1 each by Intrauterine route     oxyCODONE (ROXICODONE) 5 MG tablet Take 1-2 tablets (5-10 mg) by mouth every 4 hours as needed for moderate to severe pain     senna-docusate (SENOKOT-S/PERICOLACE) 8.6-50 MG tablet Take 1-2 tablets by mouth 2 times daily (Patient not taking: Reported on 9/15/2021)     UNABLE TO FIND MEDICATION NAME: Probiotic per pt     UNABLE TO FIND MEDICATION NAME: Multi-vitamin per pt     UNABLE TO FIND MEDICATION NAME: Glucosamine Chondroit per pt     No current facility-administered medications for this visit.     Facility-Administered Medications Ordered in Other Visits   Medication     CT Flush             Physical Exam:   Vitals were reviewed.  Blood pressure (!) 167/76, pulse 51, temperature 98.8  F (37.1  C), temperature source Oral, weight 76.2 kg (168 lb), SpO2 98 %.    Constitutional: awake, in a chair, appears comfortable, in NAD  HEENT: MMM, EOM intact, sclerae clear and anicteric  CV: RRR, no murmurs appreciated, JVD  Resp: Clear to auscultation bilaterally, breathing comfortably on RA, no wheezes, rhonchi  Abd: Soft, non-tender.   MSK:  Able to transfer and walk without assistance of cane.   Skin: No rash or lesions on limited exam  Neuro: CN2-12 grossly intact, moves all four extremities  Psych: Mood and affect WNL    LABS :  Hemoglobin was 14.2   Creatinine 0.94  Calcium 9.8   Lab Results   Component Value Date    WBC 5.9 09/27/2021    HGB 13.0 09/27/2021    HCT 39.1 09/27/2021     09/27/2021     09/22/2021    POTASSIUM 3.9 09/22/2021    CHLORIDE 109 09/22/2021    CO2 24 09/22/2021    GLC 81 09/24/2021    BUN 19 09/22/2021    CR 0.85 09/27/2021    AST 11 09/22/2021    ALT 17 09/22/2021     BMBX 9/22/2021  Bone marrow, posterior iliac  crest, left decalcified trephine biopsy and touch imprint; left direct aspirate smear, and concentrated aspirate smear; and peripheral smear:   Plasma cell myeloma with the following morphologic and immunophenotypic features:  - Marrow cellularity 50% (normocellular for age), with 25% interstitial infiltration with kappa-monotypic, moderately atypical plasma cells interpreted by immunohistochemical staining of core biopsy sections  - Residual but relatively diminished, orderly and complete trilineage hematopoietic maturation in bone marrow.  - Peripheral blood showing nonspecific red cell morphologic changes including slight increase in rouleaux formation but with otherwise normal automated blood counts and leukocyte differential.        IMAGING  PET CT 09/27/21:   IMPRESSION:   1. Hypermetabolic pathologic fracture of the left pubic ramus with  aggressive appearing osteolysis, compatible with known plasmacytoma.  1a. No additional suspicious lytic or blastic osseous lesions.    1b. Stranding along the medial left inguinal region likely related to  the prior biopsy.  2. Prominent left axillary lymph nodes with mild FDG uptake, likely  related to the recent Covid-19 vaccine booster.            Assessment and Recommendations   Kristie Cornejo is a 51 year old female who was diagnosed with Plasmacytoma after she presented with left sided groin pain now found to have multiple myeloma after further workup with bone marrow biopsy.     # Multiple myeloma with associated large plasmacytoma of the L pelvis. Kappa light chain disease.     BMBx with 25% plasma cell infiltration; standard risk with gain of 11q, IGH-CCND1 fusion but no losses of 1q or TP53 and no gain of 1q.   SPEP w/out a monoclonal peak  UPEP positive. 70% paraprotein. Large monoclonal free immunoglobulin light chain of kappa chain type.     No anemia, normal creat, Ca, no other skeletal lesions. Alb normal , B2M normal.  Stage I Light chain disease with  R pelvic bone pathologic fracture she is classified as having symptomatic multiple myeloma.     We discussed our recommendation for her to continue with radiation. Our recommendation now is to start chemotherapy with RVD after XRT is completed.    We reviewed the chemotherapy with dexamethasone, velcade and revlimid (Category 1 per NCCN guidelines), expectations and side effects. We also reviewed her disease is treatable but incurable.    She is agreeable to therapy. We further discussed that our future recommendations, pending good response to the above therapy may include autologous stem cell transplant.      Her new treatment plan is as follows:  - Continue monthly Zometa   - Consultation with radiation oncology as planned 10/11/21  - Cytogenetics and FISH of bone marrow biopsy pending   - Free light chains pending   - Will pursue initiation of chemotherapy with dexamethasone, velcade and revlimid after radiation of her plasmacytoma     Patient was seen and plan discussed with attending physician, Dr.Bachanova Kelly Chavez MD/MPH  Internal Medicine/Dermatology  PGY-5    Today, I  saw and examined the patient independently in clinic and discussed the recommendations. I reviewed the case with the fellow and agree with the note as above.   New diagnosis of kappa light chain myeloma with standard risk t (11;14) and pathologic fracture. Treatment is required and RVD is recommended.     Payton Reinoso MD  Professor of Medicine                Again, thank you for allowing me to participate in the care of your patient.        Sincerely,        Payton Reinoso MD

## 2021-10-07 NOTE — NURSING NOTE
"Oncology Rooming Note    October 7, 2021 11:03 AM   Kristie Cornejo is a 51 year old female who presents for:    Chief Complaint   Patient presents with     RECHECK     PLASMACYTOMA OF BONE      Initial Vitals: There were no vitals taken for this visit. Estimated body mass index is 25.59 kg/m  as calculated from the following:    Height as of 9/15/21: 1.72 m (5' 7.72\").    Weight as of 9/27/21: 75.7 kg (166 lb 14.4 oz). There is no height or weight on file to calculate BSA.  Data Unavailable Comment: Data Unavailable   No LMP recorded. (Menstrual status: IUD).  Allergies reviewed: Yes  Medications reviewed: Yes    Medications: Medication refills not needed today.  Pharmacy name entered into [facility]:    [facility] [place], [place] - [address]  [id number] - [place], [place] - [address] AT [facility] [address]    Clinical concerns: NONE       Griselda Salomon CMA              "

## 2021-10-08 ENCOUNTER — TRANSFERRED RECORDS (OUTPATIENT)
Dept: HEALTH INFORMATION MANAGEMENT | Facility: CLINIC | Age: 51
End: 2021-10-08
Payer: COMMERCIAL

## 2021-10-08 LAB
KAPPA LC FREE SER-MCNC: 91.59 MG/DL (ref 0.33–1.94)
KAPPA LC FREE/LAMBDA FREE SER NEPH: 45.12 {RATIO} (ref 0.26–1.65)
LAMBDA LC FREE SERPL-MCNC: 2.03 MG/DL (ref 0.57–2.63)

## 2021-10-11 ENCOUNTER — OFFICE VISIT (OUTPATIENT)
Dept: RADIATION ONCOLOGY | Facility: CLINIC | Age: 51
End: 2021-10-11
Attending: RADIOLOGY
Payer: COMMERCIAL

## 2021-10-11 VITALS
BODY MASS INDEX: 25.74 KG/M2 | WEIGHT: 167.9 LBS | OXYGEN SATURATION: 100 % | RESPIRATION RATE: 16 BRPM | SYSTOLIC BLOOD PRESSURE: 132 MMHG | HEART RATE: 70 BPM | DIASTOLIC BLOOD PRESSURE: 79 MMHG

## 2021-10-11 DIAGNOSIS — C90.30 PLASMACYTOMA OF BONE (H): Primary | ICD-10-CM

## 2021-10-11 ASSESSMENT — ENCOUNTER SYMPTOMS
HEADACHES: 1
PALPITATIONS: 1
CONSTIPATION: 1
NERVOUS/ANXIOUS: 1

## 2021-10-11 ASSESSMENT — PAIN SCALES - GENERAL: PAINLEVEL: NO PAIN (1)

## 2021-10-11 NOTE — PROGRESS NOTES
Department of Radiation Oncology     [place] [address]  [address] [place]  [address]  Office:  [phone]  Fax:  [phone]   Radiation Oncology Clinic  [address] [place] [address]  Phone:  [phone]  Fax:  [phone]     RE: Kristie Cornejo : 1970   MRN: [id number] JOEY: Oct 11, 2021       RADIATION ONCOLOGY CONSULT NOTE       CHIEF COMPLAINT: Multiple myeloma involving left pubic ramus     HISTORY OF PRESENT ILLNESS:   Ms. Cornejo is a 52yo lady with PMH possible Chron's disease with one flare requiring steroid suppository approximately 5 years ago. She presents to radiation oncology clinic today for discussion of treatment of plasmacytoma associated with multiple myeloma.    She first developed left groin pain after muscle injury in 2021. She believes she then reinjured the muscle several months later. In July she developed a different type of pelvic pain, located in the perineum, worse with weight bearing and with toileting, alleviated by laying down and by analgesics. Due to the new groin pain, she saw her NP Ca Coppola who ordered an Xray. XR pelvis (21) showed a large expansile, irregular lytic mass involving the left parasymphyseal region including the superior and inferior pubic rami with indistinct borders.     At that point, she was referred to oncologic orthopedics Dr. Philip who recommended MRI pelvis (21) which again showed expansile destructive lesion involving the left pubic symphysis and superior pubic ramus with possible involvement of acetabulum.    She saw Dr. Philip (2021) in oncologic orthopedics who recommended CT chest, abdomen and pelvis (2021) which showed no metastases. He also ordered CBC, CMP, CRP, ESR and SPEP which were within normal limits (2021). He ordered biopsy (21) which was significant for plasmacytoma. Flow cytometry (2021) was significant for kappa-monotypic plasma cells and  polytypic B-cells, consistent with a clonal plasma cell neoplasm. FISH was significant for gain of 11q and IGH-CCND1 fusion without loss of 1p, TP53 or gain of 1q. Dr. Philip referred to medical oncologist Dr. Reinoso for further evaluation.    Ms. Cornejo saw Dr. Reinoso (9-) who ordered bone marrow biopsy (9-) which showed plasma cell myeloma with 50% marrow cellularity, 25% interstitial infiltration with kappa-monotypic and moderately atypical plasma cells. Immunofixation (9-27-21) showed large monoclonal free immunoglobulin light chain of kappa chain type, trace monoclonal free immunoglobulin light chain of lambda chain type with kappa/lambda ratio 45 (normal range 0.26-1.65). PET scan was significant for hypermetabolic pathologic fracture of the left pubic ramus with aggressive appearing osteolysis without additional lesions. Additionally, HIV was negative, HCV/HBV negative and UPEP was sent. Ms. Cornejo was referred to radiation oncology for discussion of treatment of the bone lesion.     Dr. Reinoso prescribed zometa, which provided some relief from the pelvic pain. He also made plans to start velcade/rev/dex after radiotherapy.    Today Ms. Cornejo reports dull, aching perineal pain, worse with prolonged sitting, which is alleviated by movement or change in position. She also takes tylenol as needed for pain. She denies fatigue, malaise, weight loss, night sweats and change in appetite.    PAST MEDICAL HISTORY:   Past Medical History:   Diagnosis Date     Allergic rhinitis, cause unspecified      Anxiety state, unspecified 12/03    Started postpartum . On Paxil x 1+years. Off meds- 10/04     Irritable bowel syndrome 2004     PLANTAR WARTS     resolved     SINUS DYSRHYTHMIA 10/02    wnl     Unspecified hemorrhoids without mention of complication 4/04    colonoscopy 4/04 spastic colon, int roids; bx neg       PAST SURGICAL HISTORY:  Past Surgical History:   Procedure Laterality Date     BIOPSY  BONE PELVIS Left 9/7/2021    Procedure: Biopsy left pubic ramis;  Surgeon: Edu Philip MD;  Location: [facility] OR     [facility] NONSPECIFIC PROCEDURE  1987    [place] teeth removed     [facility] NONSPECIFIC PROCEDURE  1993    (R) knee surgery     [facility] NONSPECIFIC PROCEDURE  2003    (R) breast bx-negative 6/03; bx neg 10/02     [facility] NONSPECIFIC PROCEDURE      10/02 cardiac echo normal     [facility] NONSPECIFIC PROCEDURE  2004 4/04 colonosc int roids, spastic colon     [facility] NONSPECIFIC PROCEDURE  8/05    D & C for missed AB     [facility] COLONOSCOPY THRU STOMA, DIAGNOSTIC  2004     HISTORY OF RADIATION: none    MEDICATIONS:  Current Outpatient Medications   Medication Sig Dispense Refill     aspirin (ASA) 81 MG EC tablet Take 2 tablets (162 mg) by mouth daily 56 tablet 0     BUSPIRONE HCL 10 MG PO TABS 1 TABLET 3 TIMES DAILY as needed 30 Tab 3     Calcium Carbonate (CALCIUM 500 PO)        calcium carbonate 600 mg-vitamin D 400 units (CALTRATE) 600-400 MG-UNIT per tablet        cetirizine HCl 10 MG CAPS Take 20 mg by mouth       fluticasone (FLONASE) 50 MCG/ACT nasal spray 1 spray       glucosamine-chondroitin 500-400 MG CAPS per capsule        GLYCOLAX OR POWD 17 GM DAILY as needed  0     lactobacillus rhamnosus, GG, (CULTURELL) capsule Take 1 capsule by mouth 2 times daily       levonorgestrel (MIRENA) 20 MCG/24HR IUD 1 each by Intrauterine route       MIRENA 20 MCG/24HR IU IUD use for up to 5 years, then remove       multivitamin w/minerals (MULTI-VITAMIN) tablet Take 1 tablet by mouth daily       omalizumab (XOLAIR) 150 MG/ML SOSY injection        oxyCODONE (ROXICODONE) 5 MG tablet Take 1-2 tablets (5-10 mg) by mouth every 4 hours as needed for moderate to severe pain 12 tablet 0     polyethylene glycol (MIRALAX) 17 GM/Dose powder Take 17 g by mouth       senna-docusate (SENOKOT-S/PERICOLACE) 8.6-50 MG tablet Take 1-2 tablets by mouth 2 times daily (Patient not taking: Reported on 9/15/2021) 30 tablet 0     UNABLE TO FIND MEDICATION NAME:  Probiotic per pt       UNABLE TO FIND MEDICATION NAME: Multi-vitamin per pt       UNABLE TO FIND MEDICATION NAME: Glucosamine Chondroit per pt         ALLERGIES:  Allergies   Allergen Reactions     Erythromycin      upsets stomach       FAMILY HISTORY:  Dad pancreatic cancer  Mom cervical dysplasia  Mat gma breast cancer    SOCIAL HISTORY:  Social History     Tobacco Use     Smoking status: Never Smoker     Smokeless tobacco: Never Used   Substance Use Topics     Alcohol use: Yes     Comment: none to a couple. occasional       ECOG PERFORMANCE STATUS: 0    IMPLANTED CARDIAC DEVICE: none    REVIEW OF SYSTEMS:    General:  No fever/chills, no weight loss, no fatigue, no anorexia  HEENT:  No sore throat, no earache, no rhinitis, no vision changes  Cardiac:  No chest pain, no edema  Pulmonary:  No shortness of breath, no cough, no hemoptysis  Gastrointestinal:  No nausea, no vomiting, no diarrhea, no constipation, no melena, no dysphagia, no odynophagia  Genitourinary:  No hematuria, no dysuria, no vaginal bleeding, + perineal pain  Skin:  No rash, no itch, no jaundice  Hematologic:  No palpable lymph nodes, no bruising or bleeding tendencies  Neurologic:  No headache, no dizziness, no numbness or tingling, no weakness  Musculoskeletal:  + arthralgias, no myalgias  Psychiatric:  No anxiety, no depression    PHYSICAL EXAMINATION:    /79 (BP Location: Left arm, Patient Position: Sitting, Cuff Size: Adult Regular)   Pulse 70   Resp 16   Wt 76.2 kg (167 lb 14.4 oz)   SpO2 100%   BMI 25.74 kg/m    General:  Well-developed, well-appearing, NAD  HEENT:  NCAT, anicteric sclera, moist oral mucosa  Cardiac:  Strong peripheral pulses, no JVD, no peripheral edema  Pulmonary:  Breathing comfortably on room air, no stridor, no audible wheeze  Abdomen:  Nontender, nondistended, no palpable hepatosplenomegaly  Genitourinary:  No CVA tenderness, no plascencia catheter, DAMIAN/speculum exam deferred  Skin:  Pink, warm, no rash  Lymph  Nodes:  No palpable lymphadenopathy  Neurologic:  CN II - XII grossly intact, strength 5/5 in upper and lower extremities, sensation intact, alert and oriented x 3  MSK:  normal muscular bulk and tone, no tender bones or joints  Psychiatric:  normal affect, normal attention      DATA REVIEWED:  IMAGING:  MRI pelvis (7-)        IMPRESSION:  Multiple myeloma    RECOMMENDATIONS:  We recommend radiotherapy to the left pubic ramus followed by chemotherapy. Given the involvement of her bone marrow, we will plan to treat with approximately 3-4 weeks of radiation (lower dose than definitive therapy for solitary plasmacytoma). We also discussed using a lower dose to reduce toxicity to the bone and surrounding structures, while still imparting the benefits of helping alleviate her pain, reducing risk of pathologic fracture and helping gain local control of the disease.    We discussed acute and late toxicities of radiotherapy. We counseled her of the risk of radiation exacerbating inflammatory bowel disease. Ms. Cornejo understands the risks and would like to proceed. She signed consents today. We will set up CT simulation for tomorrow.    Thank you for allowing us to participate in this patient's care.  Please feel free to call with any questions or concerns.    The patient was seen by and discussed with attending physician Dr. Mckenzie who agrees with the above history, physical exam, assessment and plan.       Leatha Cooper MD PGY-2  Radiation Oncology, [facility]  [phone] clinic    I saw the patient with the resident. I agree with the resident note and plan of care. We discussed an approach that includes radiation therapy as well as systemic therapy given her localized lesion and bone marrow involvement. We will plan to initiate radiation to target her lesion in the pubic ramus and subsequently follow RT with chemotherapy given her multiple myeloma diagnosis. We discussed the risks and  benefits of radiation and she has agreed to proceed with this plan. We have scheduled her for simulation tomorrow to initiate treatment soon thereafter. She and her  had an opportunity to ask questions answered to their apparent satisfaction and they have agreed to proceed with this plan.        Nan Mckenzie MD

## 2021-10-11 NOTE — LETTER
10/11/2021         RE: Kristie Cornejo  [address]  [facility] [address]        Dear Colleague,    Thank you for referring your patient, Kristie Cornejo, to the [facility] RADIATION ONCOLOGY. Please see a copy of my visit note below.    Department of Radiation Oncology     [place] [address]  [address] [place]  [address]  Office:  [phone]  Fax:  [phone]   Radiation Oncology Clinic  [address] [place] [address]  Phone:  [phone]  Fax:  [phone]     RE: Kristie Cornejo : 1970   MRN: [id number] JOEY: Oct 11, 2021       RADIATION ONCOLOGY CONSULT NOTE       CHIEF COMPLAINT: Multiple myeloma involving left pubic ramus     HISTORY OF PRESENT ILLNESS:   Ms. Cornejo is a 50yo lady with PMH possible Chron's disease with one flare requiring steroid suppository approximately 5 years ago. She presents to radiation oncology clinic today for discussion of treatment of plasmacytoma associated with multiple myeloma.    She first developed left groin pain after muscle injury in 2021. She believes she then reinjured the muscle several months later. In July she developed a different type of pelvic pain, located in the perineum, worse with weight bearing and with toileting, alleviated by laying down and by analgesics. Due to the new groin pain, she saw her NP Ca Coppola who ordered an Xray. XR pelvis (21) showed a large expansile, irregular lytic mass involving the left parasymphyseal region including the superior and inferior pubic rami with indistinct borders.     At that point, she was referred to oncologic orthopedics Dr. Philip who recommended MRI pelvis (21) which again showed expansile destructive lesion involving the left pubic symphysis and superior pubic ramus with possible involvement of acetabulum.    She saw Dr. Philip (2021) in oncologic orthopedics who recommended CT chest, abdomen and pelvis (2021)  which showed no metastases. He also ordered CBC, CMP, CRP, ESR and SPEP which were within normal limits (8-). He ordered biopsy (9-7-21) which was significant for plasmacytoma. Flow cytometry (9-7-2021) was significant for kappa-monotypic plasma cells and polytypic B-cells, consistent with a clonal plasma cell neoplasm. FISH was significant for gain of 11q and IGH-CCND1 fusion without loss of 1p, TP53 or gain of 1q. Dr. Philip referred to medical oncologist Dr. Reinoso for further evaluation.    Ms. Cornejo saw Dr. Reinoso (9-) who ordered bone marrow biopsy (9-) which showed plasma cell myeloma with 50% marrow cellularity, 25% interstitial infiltration with kappa-monotypic and moderately atypical plasma cells. Immunofixation (9-27-21) showed large monoclonal free immunoglobulin light chain of kappa chain type, trace monoclonal free immunoglobulin light chain of lambda chain type with kappa/lambda ratio 45 (normal range 0.26-1.65). PET scan was significant for hypermetabolic pathologic fracture of the left pubic ramus with aggressive appearing osteolysis without additional lesions. Additionally, HIV was negative, HCV/HBV negative and UPEP was sent. Ms. Cornejo was referred to radiation oncology for discussion of treatment of the bone lesion.     Dr. Reinoso prescribed zometa, which provided some relief from the pelvic pain. He also made plans to start velcade/rev/dex after radiotherapy.    Today Ms. Cornejo reports dull, aching perineal pain, worse with prolonged sitting, which is alleviated by movement or change in position. She also takes tylenol as needed for pain. She denies fatigue, malaise, weight loss, night sweats and change in appetite.    PAST MEDICAL HISTORY:   Past Medical History:   Diagnosis Date     Allergic rhinitis, cause unspecified      Anxiety state, unspecified 12/03    Started postpartum . On Paxil x 1+years. Off meds- 10/04     Irritable bowel syndrome 2004     PLANTAR  WARTS     resolved     SINUS DYSRHYTHMIA 10/02    wnl     Unspecified hemorrhoids without mention of complication 4/04    colonoscopy 4/04 spastic colon, int roids; bx neg       PAST SURGICAL HISTORY:  Past Surgical History:   Procedure Laterality Date     BIOPSY BONE PELVIS Left 9/7/2021    Procedure: Biopsy left pubic ramis;  Surgeon: Edu Philip MD;  Location: [facility] OR     [facility] NONSPECIFIC PROCEDURE  1987    [place] teeth removed     [facility] NONSPECIFIC PROCEDURE  1993    (R) knee surgery     [facility] NONSPECIFIC PROCEDURE  2003    (R) breast bx-negative 6/03; bx neg 10/02     [facility] NONSPECIFIC PROCEDURE      10/02 cardiac echo normal     [facility] NONSPECIFIC PROCEDURE  2004 4/04 colonosc int roids, spastic colon     [facility] NONSPECIFIC PROCEDURE  8/05    D & C for missed AB     [facility] COLONOSCOPY THRU STOMA, DIAGNOSTIC  2004     HISTORY OF RADIATION: none    MEDICATIONS:  Current Outpatient Medications   Medication Sig Dispense Refill     aspirin (ASA) 81 MG EC tablet Take 2 tablets (162 mg) by mouth daily 56 tablet 0     BUSPIRONE HCL 10 MG PO TABS 1 TABLET 3 TIMES DAILY as needed 30 Tab 3     Calcium Carbonate (CALCIUM 500 PO)        calcium carbonate 600 mg-vitamin D 400 units (CALTRATE) 600-400 MG-UNIT per tablet        cetirizine HCl 10 MG CAPS Take 20 mg by mouth       fluticasone (FLONASE) 50 MCG/ACT nasal spray 1 spray       glucosamine-chondroitin 500-400 MG CAPS per capsule        GLYCOLAX OR POWD 17 GM DAILY as needed  0     lactobacillus rhamnosus, GG, (CULTURELL) capsule Take 1 capsule by mouth 2 times daily       levonorgestrel (MIRENA) 20 MCG/24HR IUD 1 each by Intrauterine route       MIRENA 20 MCG/24HR IU IUD use for up to 5 years, then remove       multivitamin w/minerals (MULTI-VITAMIN) tablet Take 1 tablet by mouth daily       omalizumab (XOLAIR) 150 MG/ML SOSY injection        oxyCODONE (ROXICODONE) 5 MG tablet Take 1-2 tablets (5-10 mg) by mouth every 4 hours as needed for moderate to severe pain 12 tablet 0      polyethylene glycol (MIRALAX) 17 GM/Dose powder Take 17 g by mouth       senna-docusate (SENOKOT-S/PERICOLACE) 8.6-50 MG tablet Take 1-2 tablets by mouth 2 times daily (Patient not taking: Reported on 9/15/2021) 30 tablet 0     UNABLE TO FIND MEDICATION NAME: Probiotic per pt       UNABLE TO FIND MEDICATION NAME: Multi-vitamin per pt       UNABLE TO FIND MEDICATION NAME: Glucosamine Chondroit per pt         ALLERGIES:  Allergies   Allergen Reactions     Erythromycin      upsets stomach       FAMILY HISTORY:  Dad pancreatic cancer  Mom cervical dysplasia  Mat gma breast cancer    SOCIAL HISTORY:  Social History     Tobacco Use     Smoking status: Never Smoker     Smokeless tobacco: Never Used   Substance Use Topics     Alcohol use: Yes     Comment: none to a couple. occasional       ECOG PERFORMANCE STATUS: 0    IMPLANTED CARDIAC DEVICE: none    REVIEW OF SYSTEMS:    General:  No fever/chills, no weight loss, no fatigue, no anorexia  HEENT:  No sore throat, no earache, no rhinitis, no vision changes  Cardiac:  No chest pain, no edema  Pulmonary:  No shortness of breath, no cough, no hemoptysis  Gastrointestinal:  No nausea, no vomiting, no diarrhea, no constipation, no melena, no dysphagia, no odynophagia  Genitourinary:  No hematuria, no dysuria, no vaginal bleeding, + perineal pain  Skin:  No rash, no itch, no jaundice  Hematologic:  No palpable lymph nodes, no bruising or bleeding tendencies  Neurologic:  No headache, no dizziness, no numbness or tingling, no weakness  Musculoskeletal:  + arthralgias, no myalgias  Psychiatric:  No anxiety, no depression    PHYSICAL EXAMINATION:    /79 (BP Location: Left arm, Patient Position: Sitting, Cuff Size: Adult Regular)   Pulse 70   Resp 16   Wt 76.2 kg (167 lb 14.4 oz)   SpO2 100%   BMI 25.74 kg/m    General:  Well-developed, well-appearing, NAD  HEENT:  NCAT, anicteric sclera, moist oral mucosa  Cardiac:  Strong peripheral pulses, no JVD, no peripheral  edema  Pulmonary:  Breathing comfortably on room air, no stridor, no audible wheeze  Abdomen:  Nontender, nondistended, no palpable hepatosplenomegaly  Genitourinary:  No CVA tenderness, no plascencia catheter, DAMIAN/speculum exam deferred  Skin:  Pink, warm, no rash  Lymph Nodes:  No palpable lymphadenopathy  Neurologic:  CN II - XII grossly intact, strength 5/5 in upper and lower extremities, sensation intact, alert and oriented x 3  MSK:  normal muscular bulk and tone, no tender bones or joints  Psychiatric:  normal affect, normal attention      DATA REVIEWED:  IMAGING:  MRI pelvis (7-)        IMPRESSION:  Multiple myeloma    RECOMMENDATIONS:  We recommend radiotherapy to the left pubic ramus followed by chemotherapy. Given the involvement of her bone marrow, we will plan to treat with approximately 3-4 weeks of radiation (lower dose than definitive therapy for solitary plasmacytoma). We also discussed using a lower dose to reduce toxicity to the bone and surrounding structures, while still imparting the benefits of helping alleviate her pain, reducing risk of pathologic fracture and helping gain local control of the disease.    We discussed acute and late toxicities of radiotherapy. We counseled her of the risk of radiation exacerbating inflammatory bowel disease. Ms. Cornejo understands the risks and would like to proceed. She signed consents today. We will set up CT simulation for tomorrow.    Thank you for allowing us to participate in this patient's care.  Please feel free to call with any questions or concerns.    The patient was seen by and discussed with attending physician Dr. Mckenzie who agrees with the above history, physical exam, assessment and plan.       Leatha Cooper MD PGY-2  Radiation Oncology, [facility]  [phone] clinic    I saw the patient with the resident. I agree with the resident note and plan of care. We discussed an approach that includes radiation therapy  as well as systemic therapy given her localized lesion and bone marrow involvement. We will plan to initiate radiation to target her lesion in the pubic ramus and subsequently follow RT with chemotherapy given her multiple myeloma diagnosis. We discussed the risks and benefits of radiation and she has agreed to proceed with this plan. We have scheduled her for simulation tomorrow to initiate treatment soon thereafter. She and her  had an opportunity to ask questions answered to their apparent satisfaction and they have agreed to proceed with this plan.        Nan Mckenzie MD             INITIAL PATIENT ASSESSMENT    Diagnosis: plasmacytoma    Prior radiation therapy: None    Prior chemotherapy: None    Prior hormonal therapy:No    Pain Eval:  Denies- aching in hip    Psychosocial  Living arrangements: with   Fall Risk: ambulates with assistive device, cane    referral needs: Not needed    Advanced Directive: Yes - Location: on file  Implantable Cardiac Device? No    Onset of menarche: 17  LMP: No LMP recorded. (Menstrual status: IUD).  Onset of menopause: NA  Abnormal vaginal bleeding/discharge: No  Are you pregnant? No  Reproductive note: 2 children    Nurse face-to-face time: Level 3:  10 min face to face time      Review of Systems   Cardiovascular: Positive for palpitations.        Reports EF of 15%   Gastrointestinal: Positive for constipation.   Musculoskeletal:        Right hip pain/ pelvic floor pressure on right side   Neurological: Positive for headaches (resolves with APAP).   Psychiatric/Behavioral: The patient is nervous/anxious.    All other systems reviewed and are negative.    Allergy shots 10/11 AM      Again, thank you for allowing me to participate in the care of your patient.        Sincerely,        Nan Mckenzie MD

## 2021-10-11 NOTE — PROGRESS NOTES
INITIAL PATIENT ASSESSMENT    Diagnosis: plasmacytoma    Prior radiation therapy: None    Prior chemotherapy: None    Prior hormonal therapy:No    Pain Eval:  Denies- aching in hip    Psychosocial  Living arrangements: with   Fall Risk: ambulates with assistive device, cane    referral needs: Not needed    Advanced Directive: Yes - Location: on file  Implantable Cardiac Device? No    Onset of menarche: 17  LMP: No LMP recorded. (Menstrual status: IUD).  Onset of menopause: NA  Abnormal vaginal bleeding/discharge: No  Are you pregnant? No  Reproductive note: 2 children    Nurse face-to-face time: Level 3:  10 min face to face time      Review of Systems   Cardiovascular: Positive for palpitations.        Reports EF of 15%   Gastrointestinal: Positive for constipation.   Musculoskeletal:        Right hip pain/ pelvic floor pressure on right side   Neurological: Positive for headaches (resolves with APAP).   Psychiatric/Behavioral: The patient is nervous/anxious.    All other systems reviewed and are negative.    Allergy shots 10/11 AM

## 2021-10-11 NOTE — OP NOTE
[facility] Orthopaedic Surgery  Operative Note            Procedure date 9/7/2021   Pre-operative diagnosis: Pelvic mass [R19.00]   Post-operative diagnosis Same as pre-op   Procedure: Procedure(s):  Biopsy left pubic ramis   Surgeon: Edu Philip MD   Assistants(s): DO CHRISTIANA Burger MD   Anesthesia: General    Estimated blood loss: Minimal   Total IV fluids: (See anesthesia record)   Blood transfusion: (See anesthesia record)   Total urine output: (See anesthesia record)   Drains: None   Specimens: ID Type Source Tests Collected by Time Destination   1 : Left Pubic Ramus Tissue Groin, Left SURGICAL PATHOLOGY EXAM Edu Philip MD 9/7/2021  9:42 AM    2 : Left suprapubic ramis Tissue Groin, Left SURGICAL PATHOLOGY EXAM Edu Philip MD 9/7/2021 10:44 AM    A : Left Pubic Ramus Tissue Groin, Left ANAEROBIC BACTERIAL CULTURE ROUTINE, AEROBIC BACTERIAL CULTURE ROUTINE Edu Philip MD 9/7/2021  9:44 AM       Findings: See path report   Implants: None       Procedure details:  Patient was placed on operating table supine position.  After standard prep and drape and induction of general anesthesia, a transverse incision over the left pubic ramus was then made taken down through the subcutaneous tissues.  The bone was palpated and dissection directly on the superior aspect of the bone was undertaken.  A curette was then passed within the lesion and multiple samples of the superior pubic ramus were obtained with a angled curette as well as a Blakesley rongeur.  The samples were sent for both culture examination and histopathologic examination.  Having status myself that a representative in general sample was obtained, the bony defect was packed with a Gelfoam sponge to control hemostasis.  The remainder the wounds are closed in layers with observable sutures.  Sterile dressing applied.    Edu Philip MD  [facility] Professor  Oncology  and Adult Reconstructive Surgery  Dept Orthopaedic Surgery, [facility]  [phone] office, [phone] pager  [email or web address]

## 2021-10-12 ENCOUNTER — OFFICE VISIT (OUTPATIENT)
Dept: RADIATION ONCOLOGY | Facility: CLINIC | Age: 51
End: 2021-10-12
Attending: RADIOLOGY
Payer: COMMERCIAL

## 2021-10-12 DIAGNOSIS — C90.30 PLASMACYTOMA OF BONE (H): Primary | ICD-10-CM

## 2021-10-12 LAB — CULTURE HARVEST COMPLETE DATE: NORMAL

## 2021-10-12 PROCEDURE — 77334 RADIATION TREATMENT AID(S): CPT | Performed by: RADIOLOGY

## 2021-10-12 PROCEDURE — 77290 THER RAD SIMULAJ FIELD CPLX: CPT | Performed by: RADIOLOGY

## 2021-10-12 PROCEDURE — 77290 THER RAD SIMULAJ FIELD CPLX: CPT | Mod: 26 | Performed by: RADIOLOGY

## 2021-10-12 PROCEDURE — 77334 RADIATION TREATMENT AID(S): CPT | Mod: 26 | Performed by: RADIOLOGY

## 2021-10-12 NOTE — LETTER
10/12/2021         RE: Kristie Cornejo  [address]  [facility] [address]        Dear Colleague,    Thank you for referring your patient, Kristie Cornejo, to the LTAC, [facility] RADIATION ONCOLOGY. Please see a copy of my visit note below.    Radiation Therapy Patient Education    Person involved with teaching: Patient    Patient educational needs for self management of treatment-related side effects assessment completed.  EPIC Patient Ed tab contains Patient Learning Assessment    Education Materials Given  Radiation Therapy and You    Educational Topics Discussed  Side effects expected, Pain management, Nutrition and weight loss and When to call MD/RN    Response To Teaching  Verbalizes understanding    GYN Only  Vaginal Dilator-given and educated: N/A    Referrals sent: None    Chemotherapy?  No              Again, thank you for allowing me to participate in the care of your patient.        Sincerely,        Nan Mckenzie MD

## 2021-10-12 NOTE — PROGRESS NOTES
Radiation Therapy Patient Education    Person involved with teaching: Patient    Patient educational needs for self management of treatment-related side effects assessment completed.  [facility] Patient Ed tab contains Patient Learning Assessment    Education Materials Given  Radiation Therapy and You    Educational Topics Discussed  Side effects expected, Pain management, Nutrition and weight loss and When to call MD/RN    Response To Teaching  Verbalizes understanding    GYN Only  Vaginal Dilator-given and educated: N/A    Referrals sent: None    Chemotherapy?  No

## 2021-10-14 LAB
ADDITIONAL COMMENTS: NORMAL
INTERPRETATION: NORMAL
ISCN: NORMAL
METHODS: NORMAL

## 2021-10-18 ENCOUNTER — PATIENT OUTREACH (OUTPATIENT)
Dept: ONCOLOGY | Facility: CLINIC | Age: 51
End: 2021-10-18

## 2021-10-18 NOTE — PROGRESS NOTES
INBOUND CALL: VM received from patient. Pt would like to know when she is starting chemo. She also is inquiring about a letter written by MD to medically excuse her from ski season. She bought a ski pass last year for this season and is trying to get her money back. Callback number left for RNCC    OUTBOUND CALL: RNCC called patient.  Discussed with patient that Dr. Reinoso would like her to start chemo on 11/15, after XRT. She would like this scheduled at [facility].   Pt states that she needs letter by 10/22. She brought it to PCP on Friday when she got it and she said they are going to take care of it.RNCC notified her of Dr. Reinoso's clinic hours and to allow for 5 business days to process paperwork. PT verbalizes understanding. She will have PCP fill it out and let us know if she needs anything else.   Pt also wondering about handicap parking pass due to pain with walking. RNCC directed patient to website to complete form. Pt should fill out top portion and send to MD. She is going to ask Dr. Mckenzie this week and if she cannot complete then Dr. Reinoso's team can complete. Patient verbalizes understanding of POC and has no other questions or concerns at this time.     Griselda Valdez, RN, MSN, OCN   RN Care Coordinator   [facility] [address] [address]   [phone]       Griselda Valdez, RN, MSN, OCN   RN Care Coordinator   [facility] [address] [address]   [phone]         Pt will start chemo on 11/15

## 2021-10-19 ENCOUNTER — APPOINTMENT (OUTPATIENT)
Dept: RADIATION ONCOLOGY | Facility: CLINIC | Age: 51
End: 2021-10-19
Attending: RADIOLOGY
Payer: COMMERCIAL

## 2021-10-19 PROCEDURE — 77280 THER RAD SIMULAJ FIELD SMPL: CPT | Performed by: RADIOLOGY

## 2021-10-19 PROCEDURE — 77280 THER RAD SIMULAJ FIELD SMPL: CPT | Mod: 26 | Performed by: RADIOLOGY

## 2021-10-19 PROCEDURE — 77412 RADIATION TX DELIVERY LVL 3: CPT | Performed by: RADIOLOGY

## 2021-10-20 ENCOUNTER — APPOINTMENT (OUTPATIENT)
Dept: RADIATION ONCOLOGY | Facility: CLINIC | Age: 51
End: 2021-10-20
Attending: RADIOLOGY
Payer: COMMERCIAL

## 2021-10-20 PROCEDURE — 77387 GUIDANCE FOR RADJ TX DLVR: CPT | Performed by: RADIOLOGY

## 2021-10-20 PROCEDURE — 77412 RADIATION TX DELIVERY LVL 3: CPT | Performed by: RADIOLOGY

## 2021-10-20 PROCEDURE — 77014 PR CT GUIDE FOR PLACEMENT RADIATION THERAPY FIELDS: CPT | Mod: 26 | Performed by: RADIOLOGY

## 2021-10-21 ENCOUNTER — APPOINTMENT (OUTPATIENT)
Dept: RADIATION ONCOLOGY | Facility: CLINIC | Age: 51
End: 2021-10-21
Attending: RADIOLOGY
Payer: COMMERCIAL

## 2021-10-21 PROCEDURE — 77412 RADIATION TX DELIVERY LVL 3: CPT | Performed by: RADIOLOGY

## 2021-10-21 PROCEDURE — 77014 PR CT GUIDE FOR PLACEMENT RADIATION THERAPY FIELDS: CPT | Mod: 26 | Performed by: RADIOLOGY

## 2021-10-21 PROCEDURE — 77387 GUIDANCE FOR RADJ TX DLVR: CPT | Performed by: RADIOLOGY

## 2021-10-22 ENCOUNTER — APPOINTMENT (OUTPATIENT)
Dept: RADIATION ONCOLOGY | Facility: CLINIC | Age: 51
End: 2021-10-22
Attending: RADIOLOGY
Payer: COMMERCIAL

## 2021-10-22 PROCEDURE — 77387 GUIDANCE FOR RADJ TX DLVR: CPT | Performed by: RADIOLOGY

## 2021-10-22 PROCEDURE — 77014 PR CT GUIDE FOR PLACEMENT RADIATION THERAPY FIELDS: CPT | Mod: 26 | Performed by: RADIOLOGY

## 2021-10-22 PROCEDURE — 77412 RADIATION TX DELIVERY LVL 3: CPT | Performed by: RADIOLOGY

## 2021-10-22 RX ORDER — HEPARIN SODIUM (PORCINE) LOCK FLUSH IV SOLN 100 UNIT/ML 100 UNIT/ML
5 SOLUTION INTRAVENOUS
Status: CANCELLED | OUTPATIENT
Start: 2021-10-27

## 2021-10-22 RX ORDER — METHYLPREDNISOLONE SODIUM SUCCINATE 125 MG/2ML
125 INJECTION, POWDER, LYOPHILIZED, FOR SOLUTION INTRAMUSCULAR; INTRAVENOUS
Status: CANCELLED
Start: 2021-10-27

## 2021-10-22 RX ORDER — ALBUTEROL SULFATE 0.83 MG/ML
2.5 SOLUTION RESPIRATORY (INHALATION)
Status: CANCELLED | OUTPATIENT
Start: 2021-10-27

## 2021-10-22 RX ORDER — EPINEPHRINE 1 MG/ML
0.3 INJECTION, SOLUTION INTRAMUSCULAR; SUBCUTANEOUS EVERY 5 MIN PRN
Status: CANCELLED | OUTPATIENT
Start: 2021-10-27

## 2021-10-22 RX ORDER — ALBUTEROL SULFATE 90 UG/1
1-2 AEROSOL, METERED RESPIRATORY (INHALATION)
Status: CANCELLED
Start: 2021-10-27

## 2021-10-22 RX ORDER — NALOXONE HYDROCHLORIDE 0.4 MG/ML
0.2 INJECTION, SOLUTION INTRAMUSCULAR; INTRAVENOUS; SUBCUTANEOUS
Status: CANCELLED | OUTPATIENT
Start: 2021-10-27

## 2021-10-22 RX ORDER — ZOLEDRONIC ACID 0.04 MG/ML
4 INJECTION, SOLUTION INTRAVENOUS ONCE
Status: CANCELLED
Start: 2021-10-27 | End: 2021-10-27

## 2021-10-22 RX ORDER — MEPERIDINE HYDROCHLORIDE 25 MG/ML
25 INJECTION INTRAMUSCULAR; INTRAVENOUS; SUBCUTANEOUS EVERY 30 MIN PRN
Status: CANCELLED | OUTPATIENT
Start: 2021-10-27

## 2021-10-22 RX ORDER — HEPARIN SODIUM,PORCINE 10 UNIT/ML
5 VIAL (ML) INTRAVENOUS
Status: CANCELLED | OUTPATIENT
Start: 2021-10-27

## 2021-10-22 RX ORDER — DIPHENHYDRAMINE HYDROCHLORIDE 50 MG/ML
50 INJECTION INTRAMUSCULAR; INTRAVENOUS
Status: CANCELLED
Start: 2021-10-27

## 2021-10-25 ENCOUNTER — INFUSION THERAPY VISIT (OUTPATIENT)
Dept: ONCOLOGY | Facility: CLINIC | Age: 51
End: 2021-10-25
Payer: COMMERCIAL

## 2021-10-25 ENCOUNTER — APPOINTMENT (OUTPATIENT)
Dept: LAB | Facility: CLINIC | Age: 51
End: 2021-10-25
Payer: COMMERCIAL

## 2021-10-25 ENCOUNTER — APPOINTMENT (OUTPATIENT)
Dept: RADIATION ONCOLOGY | Facility: CLINIC | Age: 51
End: 2021-10-25
Attending: RADIOLOGY
Payer: COMMERCIAL

## 2021-10-25 VITALS
DIASTOLIC BLOOD PRESSURE: 76 MMHG | RESPIRATION RATE: 16 BRPM | HEART RATE: 80 BPM | SYSTOLIC BLOOD PRESSURE: 154 MMHG | OXYGEN SATURATION: 97 % | WEIGHT: 173.7 LBS | TEMPERATURE: 98.2 F | BODY MASS INDEX: 26.63 KG/M2

## 2021-10-25 DIAGNOSIS — C90.30 PLASMACYTOMA OF BONE (H): Primary | ICD-10-CM

## 2021-10-25 LAB
CALCIUM SERPL-MCNC: 9.4 MG/DL (ref 8.5–10.1)
CREAT SERPL-MCNC: 0.78 MG/DL (ref 0.52–1.04)
CULTURE HARVEST COMPLETE DATE: NORMAL
CULTURE HARVEST COMPLETE DATE: NORMAL
GFR SERPL CREATININE-BSD FRML MDRD: 88 ML/MIN/1.73M2

## 2021-10-25 PROCEDURE — 250N000011 HC RX IP 250 OP 636

## 2021-10-25 PROCEDURE — 77336 RADIATION PHYSICS CONSULT: CPT | Performed by: RADIOLOGY

## 2021-10-25 PROCEDURE — 258N000003 HC RX IP 258 OP 636

## 2021-10-25 PROCEDURE — 82565 ASSAY OF CREATININE: CPT

## 2021-10-25 PROCEDURE — 77412 RADIATION TX DELIVERY LVL 3: CPT | Performed by: RADIOLOGY

## 2021-10-25 PROCEDURE — 82310 ASSAY OF CALCIUM: CPT

## 2021-10-25 PROCEDURE — 36415 COLL VENOUS BLD VENIPUNCTURE: CPT

## 2021-10-25 PROCEDURE — 77387 GUIDANCE FOR RADJ TX DLVR: CPT | Performed by: RADIOLOGY

## 2021-10-25 PROCEDURE — 77014 PR CT GUIDE FOR PLACEMENT RADIATION THERAPY FIELDS: CPT | Mod: 26 | Performed by: RADIOLOGY

## 2021-10-25 PROCEDURE — 96365 THER/PROPH/DIAG IV INF INIT: CPT

## 2021-10-25 RX ORDER — ZOLEDRONIC ACID 0.04 MG/ML
4 INJECTION, SOLUTION INTRAVENOUS ONCE
Status: COMPLETED | OUTPATIENT
Start: 2021-10-25 | End: 2021-10-25

## 2021-10-25 RX ADMIN — SODIUM CHLORIDE 250 ML: 9 INJECTION, SOLUTION INTRAVENOUS at 08:58

## 2021-10-25 RX ADMIN — ZOLEDRONIC ACID 4 MG: 0.04 INJECTION, SOLUTION INTRAVENOUS at 08:58

## 2021-10-25 ASSESSMENT — PAIN SCALES - GENERAL: PAINLEVEL: NO PAIN (0)

## 2021-10-25 NOTE — PATIENT INSTRUCTIONS
[facility] Triage and after hours / weekends / holidays:  [phone]    Please call the triage or after hours line if you experience a temperature greater than or equal to 100.5, shaking chills, have uncontrolled nausea, vomiting and/or diarrhea, dizziness, shortness of breath, chest pain, bleeding, unexplained bruising, or if you have any other new/concerning symptoms, questions or concerns.      If you are having any concerning symptoms or wish to speak to a provider before your next infusion visit, please call your care coordinator or triage to notify them so we can adequately serve you.     If you need a refill on a narcotic prescription or other medication, please call before your infusion appointment.           October 2021 Sunday Monday Tuesday Wednesday Thursday Friday Saturday                            1     2       3     4     5     6     7    RETURN  10:45 AM   (30 min.)   Payton Reinoso MD   [facility] Blood and Marrow Transplant [facility] [place] 8     9       10     11    NEW  12:30 PM   (90 min.)   Nan Mckenzie MD   [facility] Radiation Oncology 12    CT SIM   1:00 PM   (60 min.)   Nan Mckenzie MD   [facility] Radiation Oncology 13     14     15     16       17     18    [place] PLANNING BILLING ONLY   7:00 AM   (30 min.)   Nan Mckenzie MD   [facility] Radiation Oncology 19    TREATMENT   8:30 AM   (30 min.)   [facility] RAD ONC ELEKTA   [facility] Radiation Oncology 20    TREATMENT   2:15 PM   (30 min.)   P RAD ONC ELEKTA   [facility] Radiation Oncology 21    TREATMENT   1:30 PM   (30 min.)   [facility] RAD ONC ELEKTA   [facility] Radiation Oncology 22    TREATMENT   9:30 AM   (30 min.)   [facility] RAD ONC ELEKTA   [facility] Radiation Oncology 23       24     25    LAB PERIPHERAL   7:45 AM   (15 min.)   [facility] LAB DRAW   [facility]    ONC INFUSION 0.5 HR (30  MIN)   8:00 AM   (30 min.)   [facility] ONC INFUSION NURSE   [facility]    TREATMENT   9:30 AM   (30 min.)   P RAD ONC ELEKTA   [facility] Radiation Oncology 26    TREATMENT   9:00 AM   (30 min.)   P RAD ONC ELEKTA   [facility] Radiation Oncology    OTV   9:30 AM   (15 min.)   Nan Mckenzie MD   [facility] Radiation Oncology 27    TREATMENT   9:00 AM   (30 min.)   P RAD ONC ELEKTA   [facility] Radiation Oncology 28    TREATMENT   9:00 AM   (30 min.)   P RAD ONC ELEKTA   [facility] Radiation Oncology 29    TREATMENT   9:00 AM   (30 min.)   P RAD ONC ELEKTA   [facility] Radiation Oncology 30       31 November 2021 Sunday Monday Tuesday Wednesday Thursday Friday Saturday        1    TREATMENT   9:00 AM   (30 min.)   P RAD ONC ELEKTA   [facility] Radiation Oncology    OTV   9:30 AM   (15 min.)   Nan Mckenzie MD   [facility] Radiation Oncology 2    TREATMENT   9:00 AM   (30 min.)   P RAD ONC ELEKTA   [facility] Radiation Oncology 3    TREATMENT   9:00 AM   (30 min.)   P RAD ONC ELEKTA   [facility] Radiation Oncology 4    TREATMENT   9:00 AM   (30 min.)   P RAD ONC ELEKTA   [facility] Radiation Oncology 5    TREATMENT   9:00 AM   (30 min.)   P RAD ONC ELEKTA   [facility] Radiation Oncology 6       7     8    TREATMENT   9:00 AM   (30 min.)   P RAD ONC ELEKTA   [facility] Radiation Oncology    OTV   9:30 AM   (15 min.)   Nan Mckenzie MD   [facility] Radiation Oncology 9    TREATMENT   9:00 AM   (30 min.)   P RAD ONC ELEKTA   [facility] Radiation Oncology 10    TREATMENT   9:00 AM   (30 min.)   P RAD ONC ELEKTA   [facility] Radiation Oncology 11    TREATMENT   9:00 AM   (30 min.)   P RAD ONC ELEKTA   [facility] Radiation Oncology 12     13       14     15     16     17     18     19     20       21     22    ONC INFUSION 0.5 HR (30 MIN)   8:00 AM   (30 min.)    ONC INFUSION NURSE   [facility] 23     24     25     26     27       28     29     30                                        Recent Results (from the past 24 hour(s))   Creatinine    Collection Time: 10/25/21  8:26 AM   Result Value Ref Range    Creatinine 0.78 0.52 - 1.04 mg/dL    GFR Estimate 88 >60 mL/min/1.73m2   Calcium    Collection Time: 10/25/21  8:26 AM   Result Value Ref Range    Calcium 9.4 8.5 - 10.1 mg/dL

## 2021-10-25 NOTE — NURSING NOTE
Chief Complaint   Patient presents with     Blood Draw     Labs drawn via PIV by RN in lab.  VS taken       Labs drawn from PIV placed by RN. Line flushed with saline. Vitals taken. Pt checked in for appointment(s).    Ca Nguyen RN

## 2021-10-25 NOTE — PROGRESS NOTES
Infusion Nursing Note:  Kristiehoda Cornejo presents today for zometa.    Patient seen by provider today: No   present during visit today: Not Applicable.    Note: Patient denies any dental issues, confirms that she is taking calcium with vitamin d supplement.        Intravenous Access:  Peripheral IV placed in lab    Treatment Conditions:  Calcium 9.1  Creatinine 0.78  Results reviewed, labs met treatment parameters    Post Infusion Assessment:  Patient tolerated infusion without incident.  Blood return noted pre and post infusion.  Site patent and intact, free from redness, edema or discomfort.  No evidence of extravasations.  Access discontinued per protocol.       Discharge Plan:   Patient declined prescription refills.  Discharge instructions reviewed with: Patient.  Patient and/or family verbalized understanding of discharge instructions and all questions answered.  AVS to patient via QitioT.  Patient will return 11/22 for next infusion appointment.   Patient discharged in stable condition accompanied by: self.  Departure Mode: Ambulatory.  Face to Face time: 0.      Carissa Solis RN

## 2021-10-26 ENCOUNTER — OFFICE VISIT (OUTPATIENT)
Dept: RADIATION ONCOLOGY | Facility: CLINIC | Age: 51
End: 2021-10-26
Attending: RADIOLOGY
Payer: COMMERCIAL

## 2021-10-26 VITALS
RESPIRATION RATE: 16 BRPM | SYSTOLIC BLOOD PRESSURE: 103 MMHG | BODY MASS INDEX: 26.39 KG/M2 | WEIGHT: 172.1 LBS | HEART RATE: 77 BPM | OXYGEN SATURATION: 99 % | DIASTOLIC BLOOD PRESSURE: 63 MMHG

## 2021-10-26 DIAGNOSIS — C90.30 PLASMACYTOMA OF BONE (H): Primary | ICD-10-CM

## 2021-10-26 PROCEDURE — 77014 PR CT GUIDE FOR PLACEMENT RADIATION THERAPY FIELDS: CPT | Mod: 26 | Performed by: RADIOLOGY

## 2021-10-26 PROCEDURE — 77387 GUIDANCE FOR RADJ TX DLVR: CPT | Performed by: RADIOLOGY

## 2021-10-26 PROCEDURE — 77412 RADIATION TX DELIVERY LVL 3: CPT | Performed by: RADIOLOGY

## 2021-10-26 ASSESSMENT — PAIN SCALES - GENERAL: PAINLEVEL: NO PAIN (0)

## 2021-10-26 NOTE — LETTER
10/26/2021      RE: Kristie Cornejo  [address]  [facility] [address]       [facility] PHYSICIANS  SPECIALIZING IN BREAKTHROUGHS  Radiation Oncology    On Treatment Visit Note      Kristie Cornejo      Date: 2021   MRN: [id number]   : 1970  Diagnosis: Multiple myeloma involving left pubic ramus     Treatment Summary to Date  Treatment Site: left pelvis Current Dose: 1200/3600 cGy Fractions:       Chemotherapy  Chemo concurrent with radx?: No    Subjective: Ms. Cornejo notes some increased urinary frequency with starting treatment. She also notes discomfort in her pubic area consistent with the location of her treatment field.     Nursing ROS:   Nutrition Alteration  Diet Type: Patient's Preference  Skin  Skin Reaction: 1 - Faint erythema or dry desquamation  Skin Note: using cerva, recommending proshield         Cardiovascular  Respiratory effort: 1 - Normal - without distress  Gastrointestinal  Nausea: 0 - None  Diarrhea: 0 - None     Psychosocial  Mood - Anxiety: 0 - Normal  Mood - Depression: 0 - Normal       Objective:   /63 (BP Location: Right arm, Patient Position: Sitting, Cuff Size: Adult Regular)   Pulse 77   Resp 16   Wt 78.1 kg (172 lb 1.6 oz)   SpO2 99%   BMI 26.39 kg/m    Gen: Appears well, NAD  Skin: No erythema    Assessment:    Tolerating radiation therapy well.  All questions and concerns addressed.    Plan:   1. Continue current therapy    Mosaiq chart and setup information reviewed. Imaging reviewed.     Nan Mckenzie MD  Pager: [phone]

## 2021-10-26 NOTE — LETTER
10/26/2021         RE: Kristie Cornejo  [address]  [facility] [address]        Dear Colleague,    Thank you for referring your patient, Kristie Cornejo, to the [facility] RADIATION ONCOLOGY. Please see a copy of my visit note below.    [facility] PHYSICIANS  SPECIALIZING IN BREAKTHROUGHS  Radiation Oncology    On Treatment Visit Note      Kristie Cornejo      Date: 2021   MRN: [id number]   : 1970  Diagnosis: Multiple myeloma involving left pubic ramus     Treatment Summary to Date  Treatment Site: left pelvis Current Dose: 1200/3600 cGy Fractions:       Chemotherapy  Chemo concurrent with radx?: No    Subjective: Ms. Cornejo notes some increased urinary frequency with starting treatment. She also notes discomfort in her pubic area consistent with the location of her treatment field.     Nursing ROS:   Nutrition Alteration  Diet Type: Patient's Preference  Skin  Skin Reaction: 1 - Faint erythema or dry desquamation  Skin Note: using cerva, recommending proshield         Cardiovascular  Respiratory effort: 1 - Normal - without distress  Gastrointestinal  Nausea: 0 - None  Diarrhea: 0 - None     Psychosocial  Mood - Anxiety: 0 - Normal  Mood - Depression: 0 - Normal       Objective:   /63 (BP Location: Right arm, Patient Position: Sitting, Cuff Size: Adult Regular)   Pulse 77   Resp 16   Wt 78.1 kg (172 lb 1.6 oz)   SpO2 99%   BMI 26.39 kg/m    Gen: Appears well, NAD  Skin: No erythema    Assessment:    Tolerating radiation therapy well.  All questions and concerns addressed.    Plan:   1. Continue current therapy    Mosaiq chart and setup information reviewed. Imaging reviewed.     Nan Mckenzie MD  Pager: [phone]            Again, thank you for allowing me to participate in the care of your patient.        Sincerely,        Nan Mckenzie MD

## 2021-10-27 ENCOUNTER — APPOINTMENT (OUTPATIENT)
Dept: RADIATION ONCOLOGY | Facility: CLINIC | Age: 51
End: 2021-10-27
Attending: RADIOLOGY
Payer: COMMERCIAL

## 2021-10-27 PROCEDURE — 77412 RADIATION TX DELIVERY LVL 3: CPT | Performed by: RADIOLOGY

## 2021-10-28 ENCOUNTER — APPOINTMENT (OUTPATIENT)
Dept: RADIATION ONCOLOGY | Facility: CLINIC | Age: 51
End: 2021-10-28
Attending: RADIOLOGY
Payer: COMMERCIAL

## 2021-10-28 LAB — CULTURE HARVEST COMPLETE DATE: NORMAL

## 2021-10-28 PROCEDURE — 77412 RADIATION TX DELIVERY LVL 3: CPT | Performed by: RADIOLOGY

## 2021-10-29 ENCOUNTER — APPOINTMENT (OUTPATIENT)
Dept: RADIATION ONCOLOGY | Facility: CLINIC | Age: 51
End: 2021-10-29
Attending: RADIOLOGY
Payer: COMMERCIAL

## 2021-10-29 ENCOUNTER — TELEPHONE (OUTPATIENT)
Dept: ONCOLOGY | Facility: CLINIC | Age: 51
End: 2021-10-29
Payer: COMMERCIAL

## 2021-10-29 DIAGNOSIS — C90.00 LIGHT CHAIN MYELOMA (H): Primary | ICD-10-CM

## 2021-10-29 PROCEDURE — 77412 RADIATION TX DELIVERY LVL 3: CPT | Performed by: RADIOLOGY

## 2021-10-29 RX ORDER — EPINEPHRINE 1 MG/ML
0.3 INJECTION, SOLUTION INTRAMUSCULAR; SUBCUTANEOUS EVERY 5 MIN PRN
Status: CANCELLED | OUTPATIENT
Start: 2021-11-25

## 2021-10-29 RX ORDER — ALBUTEROL SULFATE 90 UG/1
1-2 AEROSOL, METERED RESPIRATORY (INHALATION)
Status: CANCELLED
Start: 2021-11-15

## 2021-10-29 RX ORDER — ALBUTEROL SULFATE 0.83 MG/ML
2.5 SOLUTION RESPIRATORY (INHALATION)
Status: CANCELLED | OUTPATIENT
Start: 2021-11-25

## 2021-10-29 RX ORDER — METHYLPREDNISOLONE SODIUM SUCCINATE 125 MG/2ML
125 INJECTION, POWDER, LYOPHILIZED, FOR SOLUTION INTRAMUSCULAR; INTRAVENOUS
Status: CANCELLED
Start: 2021-11-15

## 2021-10-29 RX ORDER — NALOXONE HYDROCHLORIDE 0.4 MG/ML
0.2 INJECTION, SOLUTION INTRAMUSCULAR; INTRAVENOUS; SUBCUTANEOUS
Status: CANCELLED | OUTPATIENT
Start: 2021-11-18

## 2021-10-29 RX ORDER — LORAZEPAM 2 MG/ML
0.5 INJECTION INTRAMUSCULAR EVERY 4 HOURS PRN
Status: CANCELLED | OUTPATIENT
Start: 2021-11-22

## 2021-10-29 RX ORDER — METHYLPREDNISOLONE SODIUM SUCCINATE 125 MG/2ML
125 INJECTION, POWDER, LYOPHILIZED, FOR SOLUTION INTRAMUSCULAR; INTRAVENOUS
Status: CANCELLED
Start: 2021-11-25

## 2021-10-29 RX ORDER — NALOXONE HYDROCHLORIDE 0.4 MG/ML
0.2 INJECTION, SOLUTION INTRAMUSCULAR; INTRAVENOUS; SUBCUTANEOUS
Status: CANCELLED | OUTPATIENT
Start: 2021-11-25

## 2021-10-29 RX ORDER — MEPERIDINE HYDROCHLORIDE 25 MG/ML
25 INJECTION INTRAMUSCULAR; INTRAVENOUS; SUBCUTANEOUS EVERY 30 MIN PRN
Status: CANCELLED | OUTPATIENT
Start: 2021-11-22

## 2021-10-29 RX ORDER — ALBUTEROL SULFATE 90 UG/1
1-2 AEROSOL, METERED RESPIRATORY (INHALATION)
Status: CANCELLED
Start: 2021-11-25

## 2021-10-29 RX ORDER — EPINEPHRINE 1 MG/ML
0.3 INJECTION, SOLUTION INTRAMUSCULAR; SUBCUTANEOUS EVERY 5 MIN PRN
Status: CANCELLED | OUTPATIENT
Start: 2021-11-18

## 2021-10-29 RX ORDER — MEPERIDINE HYDROCHLORIDE 25 MG/ML
25 INJECTION INTRAMUSCULAR; INTRAVENOUS; SUBCUTANEOUS EVERY 30 MIN PRN
Status: CANCELLED | OUTPATIENT
Start: 2021-11-25

## 2021-10-29 RX ORDER — ALBUTEROL SULFATE 0.83 MG/ML
2.5 SOLUTION RESPIRATORY (INHALATION)
Status: CANCELLED | OUTPATIENT
Start: 2021-11-15

## 2021-10-29 RX ORDER — DIPHENHYDRAMINE HYDROCHLORIDE 50 MG/ML
50 INJECTION INTRAMUSCULAR; INTRAVENOUS
Status: CANCELLED
Start: 2021-11-22

## 2021-10-29 RX ORDER — ALBUTEROL SULFATE 90 UG/1
1-2 AEROSOL, METERED RESPIRATORY (INHALATION)
Status: CANCELLED
Start: 2021-11-18

## 2021-10-29 RX ORDER — METHYLPREDNISOLONE SODIUM SUCCINATE 125 MG/2ML
125 INJECTION, POWDER, LYOPHILIZED, FOR SOLUTION INTRAMUSCULAR; INTRAVENOUS
Status: CANCELLED
Start: 2021-11-22

## 2021-10-29 RX ORDER — DIPHENHYDRAMINE HYDROCHLORIDE 50 MG/ML
50 INJECTION INTRAMUSCULAR; INTRAVENOUS
Status: CANCELLED
Start: 2021-11-25

## 2021-10-29 RX ORDER — ALBUTEROL SULFATE 0.83 MG/ML
2.5 SOLUTION RESPIRATORY (INHALATION)
Status: CANCELLED | OUTPATIENT
Start: 2021-11-18

## 2021-10-29 RX ORDER — LORAZEPAM 2 MG/ML
0.5 INJECTION INTRAMUSCULAR EVERY 4 HOURS PRN
Status: CANCELLED | OUTPATIENT
Start: 2021-11-25

## 2021-10-29 RX ORDER — ALBUTEROL SULFATE 90 UG/1
1-2 AEROSOL, METERED RESPIRATORY (INHALATION)
Status: CANCELLED
Start: 2021-11-22

## 2021-10-29 RX ORDER — NALOXONE HYDROCHLORIDE 0.4 MG/ML
0.2 INJECTION, SOLUTION INTRAMUSCULAR; INTRAVENOUS; SUBCUTANEOUS
Status: CANCELLED | OUTPATIENT
Start: 2021-11-15

## 2021-10-29 RX ORDER — EPINEPHRINE 1 MG/ML
0.3 INJECTION, SOLUTION INTRAMUSCULAR; SUBCUTANEOUS EVERY 5 MIN PRN
Status: CANCELLED | OUTPATIENT
Start: 2021-11-22

## 2021-10-29 RX ORDER — LORAZEPAM 2 MG/ML
0.5 INJECTION INTRAMUSCULAR EVERY 4 HOURS PRN
Status: CANCELLED | OUTPATIENT
Start: 2021-11-18

## 2021-10-29 RX ORDER — DIPHENHYDRAMINE HYDROCHLORIDE 50 MG/ML
50 INJECTION INTRAMUSCULAR; INTRAVENOUS
Status: CANCELLED
Start: 2021-11-15

## 2021-10-29 RX ORDER — DIPHENHYDRAMINE HYDROCHLORIDE 50 MG/ML
50 INJECTION INTRAMUSCULAR; INTRAVENOUS
Status: CANCELLED
Start: 2021-11-18

## 2021-10-29 RX ORDER — MEPERIDINE HYDROCHLORIDE 25 MG/ML
25 INJECTION INTRAMUSCULAR; INTRAVENOUS; SUBCUTANEOUS EVERY 30 MIN PRN
Status: CANCELLED | OUTPATIENT
Start: 2021-11-15

## 2021-10-29 RX ORDER — EPINEPHRINE 1 MG/ML
0.3 INJECTION, SOLUTION INTRAMUSCULAR; SUBCUTANEOUS EVERY 5 MIN PRN
Status: CANCELLED | OUTPATIENT
Start: 2021-11-15

## 2021-10-29 RX ORDER — LORAZEPAM 2 MG/ML
0.5 INJECTION INTRAMUSCULAR EVERY 4 HOURS PRN
Status: CANCELLED | OUTPATIENT
Start: 2021-11-15

## 2021-10-29 RX ORDER — NALOXONE HYDROCHLORIDE 0.4 MG/ML
0.2 INJECTION, SOLUTION INTRAMUSCULAR; INTRAVENOUS; SUBCUTANEOUS
Status: CANCELLED | OUTPATIENT
Start: 2021-11-22

## 2021-10-29 RX ORDER — MEPERIDINE HYDROCHLORIDE 25 MG/ML
25 INJECTION INTRAMUSCULAR; INTRAVENOUS; SUBCUTANEOUS EVERY 30 MIN PRN
Status: CANCELLED | OUTPATIENT
Start: 2021-11-18

## 2021-10-29 RX ORDER — METHYLPREDNISOLONE SODIUM SUCCINATE 125 MG/2ML
125 INJECTION, POWDER, LYOPHILIZED, FOR SOLUTION INTRAMUSCULAR; INTRAVENOUS
Status: CANCELLED
Start: 2021-11-18

## 2021-10-29 RX ORDER — ALBUTEROL SULFATE 0.83 MG/ML
2.5 SOLUTION RESPIRATORY (INHALATION)
Status: CANCELLED | OUTPATIENT
Start: 2021-11-22

## 2021-10-29 NOTE — TELEPHONE ENCOUNTER
PA Initiation    Medication: Revlimid PA Initiated  Insurance Company: Medicare Blue - Phone [phone] Fax [phone]  Pharmacy Filling the Rx: [facility] - [place], [place] - [facility] [address] [facility]  Filling Pharmacy Phone:    Filling Pharmacy Fax:    Start Date: 10/29/2021    Test claim=$0, did not need authorization, submitting prior auth just to be safe    Key: [id number]

## 2021-11-01 ENCOUNTER — OFFICE VISIT (OUTPATIENT)
Dept: RADIATION ONCOLOGY | Facility: CLINIC | Age: 51
End: 2021-11-01
Attending: RADIOLOGY
Payer: COMMERCIAL

## 2021-11-01 ENCOUNTER — TELEPHONE (OUTPATIENT)
Dept: ORTHOPEDICS | Facility: CLINIC | Age: 51
End: 2021-11-01

## 2021-11-01 ENCOUNTER — PATIENT OUTREACH (OUTPATIENT)
Dept: ONCOLOGY | Facility: CLINIC | Age: 51
End: 2021-11-01

## 2021-11-01 VITALS
DIASTOLIC BLOOD PRESSURE: 72 MMHG | OXYGEN SATURATION: 96 % | WEIGHT: 172 LBS | RESPIRATION RATE: 16 BRPM | BODY MASS INDEX: 26.37 KG/M2 | SYSTOLIC BLOOD PRESSURE: 133 MMHG | HEART RATE: 69 BPM

## 2021-11-01 DIAGNOSIS — C90.30 PLASMACYTOMA OF BONE (H): Primary | ICD-10-CM

## 2021-11-01 PROCEDURE — 77336 RADIATION PHYSICS CONSULT: CPT | Performed by: RADIOLOGY

## 2021-11-01 PROCEDURE — 77427 RADIATION TX MANAGEMENT X5: CPT | Performed by: RADIOLOGY

## 2021-11-01 PROCEDURE — 77417 THER RADIOLOGY PORT IMAGE(S): CPT | Performed by: RADIOLOGY

## 2021-11-01 PROCEDURE — 77412 RADIATION TX DELIVERY LVL 3: CPT | Performed by: RADIOLOGY

## 2021-11-01 ASSESSMENT — PAIN SCALES - GENERAL: PAINLEVEL: NO PAIN (0)

## 2021-11-01 NOTE — LETTER
Date:November 9, 2021      Provider requested that no letter be sent. Do not send.       [facility]

## 2021-11-01 NOTE — TELEPHONE ENCOUNTER
"Attempted to return call to Margo x2, voicemail full. Margo had called, inquiring about an appeal letter for \"flow cytometry\" that was denied.    This RN will send my chart message.    BANDAR GlassN, RN  RN Care Coordinator, Dr. Cedrick VALENZUELA [facility]      "

## 2021-11-01 NOTE — PROGRESS NOTES
INBOUND CALL: Inbound call from patient. Has a bill from 9/7 for flow cytometry that was not covered by insurance. Done on 9/7 by Dr. Philip. RNCC informed patient that she would need to reach out to PAUL Philip's office for advice on bill. Patient verbalizes understanding of POC and has no other questions or concerns at this time.     ADDENDUM:  INBOUND CALL: Pt called RNCC and LVM. Pt is very upset, as she called Dr. Philip's office. MD states he did not order flow cytometry. Pt is requesting callback from RNCC    OUTBOUND CALL: RNCC called patient. Reviewed order date with patient. Insurance denial is for 9/7 flow cytometry. Epic shows ordering provider is Dr. Philip. Pt was not a patient at [facility] at this time. RNCC ave patient [place] billing office to help with process. Pt is appreciative of care and has no other questions at this time.     Griselda Valdez, RN, MSN, OCN   RN Care Coordinator   [facility] [address] [id number] [phone]

## 2021-11-01 NOTE — LETTER
2021         RE: Kristie Cornejo  [address]  [facility] [address]        Dear Colleague,    Thank you for referring your patient, Kristie Cornejo, to the [facility] RADIATION ONCOLOGY. Please see a copy of my visit note below.    [facility] PHYSICIANS  SPECIALIZING IN BREAKTHROUGHS  Radiation Oncology    On Treatment Visit Note      Kristie Cornejo      Date: 2021   MRN: [id number]   : 1970  Diagnosis: Multiple myeloma involving left pubic ramus       Reason for Visit:  On Radiation Treatment Visit     Treatment Summary to Date  Treatment Site: left pelvis Current Dose: 2000/3600 cGy Fractions: 10/18      Chemotherapy  Chemo concurrent with radx?: No    ED Visit/Hosiptal Admission: No     Unplanned treatment Breaks: No    Subjective: Ms. Cornejo is doing well overall. She has mild skin irritation in her perineal region and slight urinary urgency. She is for the most part free of hip pain and can ambulate short distance without using a walking cane. She is limited in the range of motion of her hip flexion.      Nursing ROS:   Nutrition Alteration  Diet Type: Patient's Preference  Skin  Skin Reaction: 1 - Faint erythema or dry desquamation  Skin Note: proshield, recommend cleansing bottle for perineum     Cardiovascular  Respiratory effort: 1 - Normal - without distress  Gastrointestinal  Nausea: 0 - None  Diarrhea: 0 - None     Psychosocial  Mood - Anxiety: 1 - Mild mood alteration  Mood - Depression: 0 - Normal     Objective:   /72 (BP Location: Right arm, Patient Position: Sitting, Cuff Size: Adult Regular)   Pulse 69   Resp 16   Wt 78 kg (172 lb)   SpO2 96%   BMI 26.37 kg/m    Gen: Appears well, in no acute distress    Labs:  CBC RESULTS: Recent Labs   Lab Test 21  1023   WBC 5.9   RBC 4.27   HGB 13.0   HCT 39.1   MCV 92   MCH 30.4   MCHC 33.2   RDW 12.7        ELECTROLYTES:  Recent Labs   Lab Test 10/25/21  0826  09/27/21  1023 09/24/21  0830 09/22/21  0946   NA  --   --   --  141   POTASSIUM  --   --   --  3.9   CHLORIDE  --   --   --  109   MEKA 9.4   < >  --  9.4   CO2  --   --   --  24   BUN  --   --   --  19   CR 0.78   < >  --  0.94   GLC  --   --  81 89    < > = values in this interval not displayed.       Assessment:    Tolerating radiation therapy well.  All questions and concerns addressed.    Toxicities:  Urinary urgency: Grade 1: Present  Dermatitis: Grade 1: Faint erythema or dry desquamation    Plan:   1. Continue current therapy.    2. Gave patient topical Proshield for skin care.      Mosaiq chart and setup information reviewed  MVCT/IGRT images checked    Medication Review  Med list reviewed with patient?: Yes  Medication changes: none  Med list printed and given: Offered and declined    Educational Topic Discussed  Additional Instructions: continue xrt  Education Instructions: skin care      The patient was seen and discussed with staff, Dr. Mckenzie.    Eliel Ramirez MD  Resident, PGY-4  Department of Radiation Oncology  [facility]    Please do not send letter to referring physician.      I saw the patient with the resident.  I agree with the resident note and plan of care.      Nan Mckenzie MD      Again, thank you for allowing me to participate in the care of your patient.        Sincerely,        Nan Mckenzie MD

## 2021-11-01 NOTE — PROGRESS NOTES
[facility] PHYSICIANS  SPECIALIZING IN BREAKTHROUGHS  Radiation Oncology    On Treatment Visit Note      Kristie Cornejo      Date: 2021   MRN: [id number]   : 1970  Diagnosis: Multiple myeloma involving left pubic ramus       Reason for Visit:  On Radiation Treatment Visit     Treatment Summary to Date  Treatment Site: left pelvis Current Dose: 2000/3600 cGy Fractions: 10/18      Chemotherapy  Chemo concurrent with radx?: No    ED Visit/Hosiptal Admission: No     Unplanned treatment Breaks: No    Subjective: Ms. Cornejo is doing well overall. She has mild skin irritation in her perineal region and slight urinary urgency. She is for the most part free of hip pain and can ambulate short distance without using a walking cane. She is limited in the range of motion of her hip flexion.      Nursing ROS:   Nutrition Alteration  Diet Type: Patient's Preference  Skin  Skin Reaction: 1 - Faint erythema or dry desquamation  Skin Note: proshield, recommend cleansing bottle for perineum     Cardiovascular  Respiratory effort: 1 - Normal - without distress  Gastrointestinal  Nausea: 0 - None  Diarrhea: 0 - None     Psychosocial  Mood - Anxiety: 1 - Mild mood alteration  Mood - Depression: 0 - Normal     Objective:   /72 (BP Location: Right arm, Patient Position: Sitting, Cuff Size: Adult Regular)   Pulse 69   Resp 16   Wt 78 kg (172 lb)   SpO2 96%   BMI 26.37 kg/m    Gen: Appears well, in no acute distress    Labs:  CBC RESULTS: Recent Labs   Lab Test 21  1023   WBC 5.9   RBC 4.27   HGB 13.0   HCT 39.1   MCV 92   MCH 30.4   MCHC 33.2   RDW 12.7        ELECTROLYTES:  Recent Labs   Lab Test 10/25/21  0826 21  1023 21  0830 21  0946   NA  --   --   --  141   POTASSIUM  --   --   --  3.9   CHLORIDE  --   --   --  109   MEKA 9.4   < >  --  9.4   CO2  --   --   --  24   BUN  --   --   --  19   CR 0.78   < >  --  0.94   GLC  --   --  81 89    < > = values in this  interval not displayed.       Assessment:    Tolerating radiation therapy well.  All questions and concerns addressed.    Toxicities:  Urinary urgency: Grade 1: Present  Dermatitis: Grade 1: Faint erythema or dry desquamation    Plan:   1. Continue current therapy.    2. Gave patient topical Proshield for skin care.      Mosaiq chart and setup information reviewed  MVCT/IGRT images checked    Medication Review  Med list reviewed with patient?: Yes  Medication changes: none  Med list printed and given: Offered and declined    Educational Topic Discussed  Additional Instructions: continue xrt  Education Instructions: skin care      The patient was seen and discussed with staff, Dr. Mckenzie.    Eliel Ramirez MD  Resident, PGY-4  Department of Radiation Oncology  [facility]    Please do not send letter to referring physician.      I saw the patient with the resident.  I agree with the resident note and plan of care.      Nan Mckenzie MD

## 2021-11-02 ENCOUNTER — APPOINTMENT (OUTPATIENT)
Dept: RADIATION ONCOLOGY | Facility: CLINIC | Age: 51
End: 2021-11-02
Attending: RADIOLOGY
Payer: COMMERCIAL

## 2021-11-02 PROCEDURE — 77412 RADIATION TX DELIVERY LVL 3: CPT | Performed by: RADIOLOGY

## 2021-11-02 NOTE — PROGRESS NOTES
[facility] PHYSICIANS  SPECIALIZING IN BREAKTHROUGHS  Radiation Oncology    On Treatment Visit Note      Kristie Cornejo      Date: 2021   MRN: [id number]   : 1970  Diagnosis: Multiple myeloma involving left pubic ramus     Treatment Summary to Date  Treatment Site: left pelvis Current Dose: 1200/3600 cGy Fractions:       Chemotherapy  Chemo concurrent with radx?: No    Subjective: Ms. Cornejo notes some increased urinary frequency with starting treatment. She also notes discomfort in her pubic area consistent with the location of her treatment field.     Nursing ROS:   Nutrition Alteration  Diet Type: Patient's Preference  Skin  Skin Reaction: 1 - Faint erythema or dry desquamation  Skin Note: using cerva, recommending proshield         Cardiovascular  Respiratory effort: 1 - Normal - without distress  Gastrointestinal  Nausea: 0 - None  Diarrhea: 0 - None     Psychosocial  Mood - Anxiety: 0 - Normal  Mood - Depression: 0 - Normal       Objective:   /63 (BP Location: Right arm, Patient Position: Sitting, Cuff Size: Adult Regular)   Pulse 77   Resp 16   Wt 78.1 kg (172 lb 1.6 oz)   SpO2 99%   BMI 26.39 kg/m    Gen: Appears well, NAD  Skin: No erythema    Assessment:    Tolerating radiation therapy well.  All questions and concerns addressed.    Plan:   1. Continue current therapy    Mosaiq chart and setup information reviewed. Imaging reviewed.     Nan Mckenzie MD  Pager: [phone]

## 2021-11-03 ENCOUNTER — APPOINTMENT (OUTPATIENT)
Dept: RADIATION ONCOLOGY | Facility: CLINIC | Age: 51
End: 2021-11-03
Attending: RADIOLOGY
Payer: COMMERCIAL

## 2021-11-03 PROCEDURE — 77412 RADIATION TX DELIVERY LVL 3: CPT | Performed by: RADIOLOGY

## 2021-11-04 ENCOUNTER — APPOINTMENT (OUTPATIENT)
Dept: RADIATION ONCOLOGY | Facility: CLINIC | Age: 51
End: 2021-11-04
Attending: RADIOLOGY
Payer: COMMERCIAL

## 2021-11-04 LAB
INTERPRETATION: NORMAL
INTERPRETATION: NORMAL
ISCN: NORMAL
METHODS: NORMAL

## 2021-11-04 PROCEDURE — 88291 CYTO/MOLECULAR REPORT: CPT | Performed by: MEDICAL GENETICS

## 2021-11-04 PROCEDURE — 88368 INSITU HYBRIDIZATION MANUAL: CPT | Mod: 26 | Performed by: MEDICAL GENETICS

## 2021-11-04 PROCEDURE — 77412 RADIATION TX DELIVERY LVL 3: CPT | Performed by: RADIOLOGY

## 2021-11-05 ENCOUNTER — APPOINTMENT (OUTPATIENT)
Dept: RADIATION ONCOLOGY | Facility: CLINIC | Age: 51
End: 2021-11-05
Attending: RADIOLOGY
Payer: COMMERCIAL

## 2021-11-05 PROCEDURE — 77412 RADIATION TX DELIVERY LVL 3: CPT | Performed by: RADIOLOGY

## 2021-11-08 ENCOUNTER — TELEPHONE (OUTPATIENT)
Dept: ONCOLOGY | Facility: CLINIC | Age: 51
End: 2021-11-08
Payer: COMMERCIAL

## 2021-11-08 ENCOUNTER — OFFICE VISIT (OUTPATIENT)
Dept: RADIATION ONCOLOGY | Facility: CLINIC | Age: 51
End: 2021-11-08
Attending: RADIOLOGY
Payer: COMMERCIAL

## 2021-11-08 ENCOUNTER — LAB (OUTPATIENT)
Dept: LAB | Facility: CLINIC | Age: 51
End: 2021-11-08
Payer: COMMERCIAL

## 2021-11-08 VITALS
BODY MASS INDEX: 26.19 KG/M2 | WEIGHT: 170.8 LBS | DIASTOLIC BLOOD PRESSURE: 96 MMHG | SYSTOLIC BLOOD PRESSURE: 143 MMHG | OXYGEN SATURATION: 96 % | HEART RATE: 66 BPM | RESPIRATION RATE: 16 BRPM

## 2021-11-08 VITALS
TEMPERATURE: 98.2 F | DIASTOLIC BLOOD PRESSURE: 81 MMHG | BODY MASS INDEX: 25.96 KG/M2 | WEIGHT: 169.3 LBS | SYSTOLIC BLOOD PRESSURE: 125 MMHG | OXYGEN SATURATION: 97 % | HEART RATE: 71 BPM | RESPIRATION RATE: 16 BRPM

## 2021-11-08 DIAGNOSIS — C90.30 PLASMACYTOMA OF BONE (H): Primary | ICD-10-CM

## 2021-11-08 DIAGNOSIS — C90.00 LIGHT CHAIN MYELOMA (H): Primary | ICD-10-CM

## 2021-11-08 LAB
HOLD SPECIMEN: NORMAL
HOLD SPECIMEN: NORMAL

## 2021-11-08 PROCEDURE — 77417 THER RADIOLOGY PORT IMAGE(S): CPT | Performed by: RADIOLOGY

## 2021-11-08 PROCEDURE — 77412 RADIATION TX DELIVERY LVL 3: CPT | Performed by: RADIOLOGY

## 2021-11-08 PROCEDURE — 77336 RADIATION PHYSICS CONSULT: CPT | Performed by: RADIOLOGY

## 2021-11-08 PROCEDURE — 77427 RADIATION TX MANAGEMENT X5: CPT | Mod: GC | Performed by: RADIOLOGY

## 2021-11-08 RX ORDER — DEXAMETHASONE 4 MG/1
40 TABLET ORAL
Qty: 30 TABLET | Refills: 0 | Status: SHIPPED | OUTPATIENT
Start: 2021-11-09 | End: 2021-12-23

## 2021-11-08 RX ORDER — PROCHLORPERAZINE MALEATE 10 MG
10 TABLET ORAL EVERY 6 HOURS PRN
Qty: 30 TABLET | Refills: 3 | Status: SHIPPED | OUTPATIENT
Start: 2021-11-08 | End: 2021-12-27

## 2021-11-08 RX ORDER — LENALIDOMIDE 25 MG/1
25 CAPSULE ORAL DAILY
Qty: 14 CAPSULE | Refills: 0 | Status: SHIPPED | OUTPATIENT
Start: 2021-11-09 | End: 2021-12-23

## 2021-11-08 RX ORDER — ACYCLOVIR 400 MG/1
400 TABLET ORAL 2 TIMES DAILY
Qty: 60 TABLET | Refills: 5 | Status: SHIPPED | OUTPATIENT
Start: 2021-11-08 | End: 2022-01-31

## 2021-11-08 RX ORDER — LORAZEPAM 0.5 MG/1
0.5 TABLET ORAL EVERY 4 HOURS PRN
Qty: 30 TABLET | Refills: 3 | Status: SHIPPED | OUTPATIENT
Start: 2021-11-08 | End: 2021-12-23

## 2021-11-08 ASSESSMENT — PAIN SCALES - GENERAL
PAINLEVEL: MILD PAIN (3)
PAINLEVEL: MODERATE PAIN (4)

## 2021-11-08 NOTE — TELEPHONE ENCOUNTER
Prior Authorization Approval    Authorization Effective Date: 11/8/2021  Authorization Expiration Date: 11/8/2022  Medication: Revlimid - APPROVED  Approved Dose/Quantity:  Reference #:     Insurance Company: [employer] - Phone [phone] Fax [phone]  Expected CoPay:       CoPay Card Available:      Foundation Assistance Needed:    Which Pharmacy is filling the prescription (Not needed for infusion/clinic administered):    Pharmacy Notified:    Patient Notified:      Approved for 1 year    Renate Crowley CPhT  [facility]  Oncology Pharmacy Liaison  [email or web address]  Phone: [phone]  Fax: [phone]

## 2021-11-08 NOTE — TELEPHONE ENCOUNTER
Oral Chemotherapy Monitoring Program   Medication: Revlimid  Rx: 25mg PO daily on days 1 through 14 of 21 day cycle   Auth #: [id number]  Risk Category: Adult Female ORP  Routine survey questions reviewed.   Rx to be Escribed to [facility]    Renate Crowley  [facility]  Oncology Pharmacy Liaison   [email or web address]  [phone] (phone)  [phone] (fax)

## 2021-11-08 NOTE — PROGRESS NOTES
[facility] PHYSICIANS  SPECIALIZING IN BREAKTHROUGHS  Radiation Oncology    On Treatment Visit Note      Kristie Cornejo      Date: 2021   MRN: [id number]   : 1970  Diagnosis: Multiple myeloma involving left pubic ramus       Reason for Visit:  On Radiation Treatment Visit     Treatment Summary to Date  Treatment Site: left pelvis Current Dose: 3000/3600 cGy Fractions: 15/18      Chemotherapy  Chemo concurrent with radx?: No    ED Visit/Hosiptal Admission: No     Unplanned treatment Breaks: No    Subjective: Ms. Cornejo is doing well. She reports skin irritation, pelvic pressure, urinary urgency and mild vaginal spotting. She has fatigue. Her bowel habit is at baseline.      Nursing ROS:   Nutrition Alteration  Diet Type: Patient's Preference  Skin  Skin Reaction: 1 - Faint erythema or dry desquamation  Skin Note: proshield, recommend cleansing bottle for delfino-area        Cardiovascular  Respiratory effort: 1 - Normal - without distress  Gastrointestinal  Nausea: 0 - None  Diarrhea: 0 - None     Psychosocial  Mood - Anxiety: 1 - Mild mood alteration  Mood - Depression: 0 - Normal     Objective:   BP (!) 143/96 (BP Location: Right arm, Patient Position: Sitting, Cuff Size: Adult Regular)   Pulse 66   Resp 16   Wt 77.5 kg (170 lb 12.8 oz)   SpO2 96%   BMI 26.19 kg/m    Gen: Appears well, in no acute distress  Skin: Mild diffuse erythema over treatment field    Labs:  CBC RESULTS: Recent Labs   Lab Test 21  1023   WBC 5.9   RBC 4.27   HGB 13.0   HCT 39.1   MCV 92   MCH 30.4   MCHC 33.2   RDW 12.7        ELECTROLYTES:  Recent Labs   Lab Test 10/25/21  0826 21  1023 21  0830 21  0946   NA  --   --   --  141   POTASSIUM  --   --   --  3.9   CHLORIDE  --   --   --  109   MEKA 9.4   < >  --  9.4   CO2  --   --   --  24   BUN  --   --   --  19   CR 0.78   < >  --  0.94   GLC  --   --  81 89    < > = values in this interval not displayed.       Assessment:     Tolerating radiation therapy well.  All questions and concerns addressed.    Toxicities:  Fatigue: Grade 1: Fatigue relieved by rest  Urinary urgency: Grade 1: Present  Dermatitis: Grade 1: Faint erythema or dry desquamation    Plan:   1. Continue current therapy. Will finish radiation on 11/11/21  2. Follow up in 1 month with NP for symptom check up.  3. Continue follow up with Hematology for systemic treatment.       Mosaiq chart and setup information reviewed  MVCT/IGRT images checked    Medication Review  Med list reviewed with patient?: Yes  Medication changes: none  Med list printed and given: Offered and declined    Educational Topic Discussed  Additional Instructions: continue xrt; follow up 1 month with NP  Education Instructions: skin care      The patient was seen and discussed with staff, Dr. Mckenzie.    Eliel Ramirez MD  Resident, PGY-4  Department of Radiation Oncology  [facility]    Please do not send letter to referring physician.      I saw the patient with the resident.  I agree with the resident note and plan of care.      Nan Mckenzie MD

## 2021-11-08 NOTE — LETTER
2021         RE: Kristie Cornejo  [address]  [facility] [address]        Dear Colleague,    Thank you for referring your patient, Kristie Cornejo, to the [facility] RADIATION ONCOLOGY. Please see a copy of my visit note below.    [facility] PHYSICIANS  SPECIALIZING IN BREAKTHROUGHS  Radiation Oncology    On Treatment Visit Note      Kristie Cornejo      Date: 2021   MRN: [id number]   : 1970  Diagnosis: Multiple myeloma involving left pubic ramus       Reason for Visit:  On Radiation Treatment Visit     Treatment Summary to Date  Treatment Site: left pelvis Current Dose: 3000/3600 cGy Fractions: 15/18      Chemotherapy  Chemo concurrent with radx?: No    ED Visit/Hosiptal Admission: No     Unplanned treatment Breaks: No    Subjective: Ms. Cornejo is doing well. She reports skin irritation, pelvic pressure, urinary urgency and mild vaginal spotting. She has fatigue. Her bowel habit is at baseline.      Nursing ROS:   Nutrition Alteration  Diet Type: Patient's Preference  Skin  Skin Reaction: 1 - Faint erythema or dry desquamation  Skin Note: proshield, recommend cleansing bottle for delfino-area        Cardiovascular  Respiratory effort: 1 - Normal - without distress  Gastrointestinal  Nausea: 0 - None  Diarrhea: 0 - None     Psychosocial  Mood - Anxiety: 1 - Mild mood alteration  Mood - Depression: 0 - Normal     Objective:   BP (!) 143/96 (BP Location: Right arm, Patient Position: Sitting, Cuff Size: Adult Regular)   Pulse 66   Resp 16   Wt 77.5 kg (170 lb 12.8 oz)   SpO2 96%   BMI 26.19 kg/m    Gen: Appears well, in no acute distress  Skin: Mild diffuse erythema over treatment field    Labs:  CBC RESULTS: Recent Labs   Lab Test 21  1023   WBC 5.9   RBC 4.27   HGB 13.0   HCT 39.1   MCV 92   MCH 30.4   MCHC 33.2   RDW 12.7        ELECTROLYTES:  Recent Labs   Lab Test 10/25/21  0826 21  1023 21  0830 21  0946   NA  --    --   --  141   POTASSIUM  --   --   --  3.9   CHLORIDE  --   --   --  109   MEKA 9.4   < >  --  9.4   CO2  --   --   --  24   BUN  --   --   --  19   CR 0.78   < >  --  0.94   GLC  --   --  81 89    < > = values in this interval not displayed.       Assessment:    Tolerating radiation therapy well.  All questions and concerns addressed.    Toxicities:  Fatigue: Grade 1: Fatigue relieved by rest  Urinary urgency: Grade 1: Present  Dermatitis: Grade 1: Faint erythema or dry desquamation    Plan:   1. Continue current therapy. Will finish radiation on 11/11/21  2. Follow up in 1 month with NP for symptom check up.  3. Continue follow up with Hematology for systemic treatment.       Mosaiq chart and setup information reviewed  MVCT/IGRT images checked    Medication Review  Med list reviewed with patient?: Yes  Medication changes: none  Med list printed and given: Offered and declined    Educational Topic Discussed  Additional Instructions: continue xrt; follow up 1 month with NP  Education Instructions: skin care      The patient was seen and discussed with staff, Dr. Mckenzie.    Eliel Ramirez MD  Resident, PGY-4  Department of Radiation Oncology  [facility]    Please do not send letter to referring physician.      I saw the patient with the resident.  I agree with the resident note and plan of care.      Nan Mckenzie MD        Again, thank you for allowing me to participate in the care of your patient.        Sincerely,        Nan Mckenzie MD

## 2021-11-08 NOTE — NURSING NOTE
Chief Complaint   Patient presents with     Labs Only     Vitals, blood drawn via VPT by LPN     No lab orders were in place, cinda extra tubes and paged provider.     TUAN Gaona LPN

## 2021-11-08 NOTE — LETTER
Date:November 16, 2021      Provider requested that no letter be sent. Do not send.       [facility]

## 2021-11-09 ENCOUNTER — LAB (OUTPATIENT)
Dept: LAB | Facility: CLINIC | Age: 51
End: 2021-11-09
Payer: COMMERCIAL

## 2021-11-09 ENCOUNTER — APPOINTMENT (OUTPATIENT)
Dept: RADIATION ONCOLOGY | Facility: CLINIC | Age: 51
End: 2021-11-09
Attending: RADIOLOGY
Payer: COMMERCIAL

## 2021-11-09 ENCOUNTER — PATIENT OUTREACH (OUTPATIENT)
Dept: ONCOLOGY | Facility: CLINIC | Age: 51
End: 2021-11-09
Payer: COMMERCIAL

## 2021-11-09 DIAGNOSIS — C90.30 PLASMACYTOMA OF BONE (H): Primary | ICD-10-CM

## 2021-11-09 DIAGNOSIS — C90.30 PLASMACYTOMA OF BONE (H): ICD-10-CM

## 2021-11-09 LAB — HCG UR QL: NEGATIVE

## 2021-11-09 PROCEDURE — 81025 URINE PREGNANCY TEST: CPT

## 2021-11-09 PROCEDURE — 77412 RADIATION TX DELIVERY LVL 3: CPT | Performed by: RADIOLOGY

## 2021-11-09 NOTE — PROGRESS NOTES
OUTBOUND CALL: RNCC called patient to discuss upcoming chemo.   Apologized for confusion with scheduling this week, as patient was inappropriatly scheduled to start chemo on 11/9. Plan has always been for pt to start chemo on 11/15  RNCC and patient reviewed upcoming chemo. Plan for velcade to start on 11/15 with Rev Day 1-14. Velcase will be days 1, 4, 8, and 11 of a 21 day cycle. Dex 40mg every 7 days on days 1, 8, and 15. Pt is aware to take dex with food in am. Will talk with oral chemo team about getting this sent with Rev.   Pt is appreciative of call. RNCC will call her to let her know where dex is coming from.     OUTBOUND CALL: RNCC spoke with oral chemo team. They are able to send dex and Rev through [facility] to patient. This should be delivered prior to Day 1. They will also contact patient/REMS regarding menopausal status and if patient needs pregnancy testing throughout her getting Rev.     OUTBOUND CALL: RNCC LVM for patient. Let her know that dex will go through [facility] and come to her with Rev prior to D1. Gave her oral chemo pharm's number. Encouraged her to follow up with oral chemo team with questions on Rev.     Griselda Valdez, RN, MSN, OCN   RN Care Coordinator   [facility] [address] [address] [phone]

## 2021-11-09 NOTE — PROGRESS NOTES
Oral Chemotherapy Monitoring Program.    Patient will be starting on Velcade/prednisone treatment on Monday 11/15/21.    Per discussion with patient, she is NOT postmenopausal.  (She has had some mild/weird menstrual periods in the last 24 months.)  She does have an IUD in place.  She understands that she needs pregnancy tests and use a second contraceptive method for the Revlimid treatment.    She will have the first pregnancy test done today.      She plans on having second pregnancy test done early AM on Friday 11/19 and then the Revlimid can be filled that day, so she ought to be able to start the Revlimid on Saturday 11/20/21.      Gisele Araiza, PharmD, BCPS, BCOP  Oncology Clinical Pharmacy Specialist  [facility]/ [facility]  [phone]

## 2021-11-10 ENCOUNTER — APPOINTMENT (OUTPATIENT)
Dept: RADIATION ONCOLOGY | Facility: CLINIC | Age: 51
End: 2021-11-10
Attending: RADIOLOGY
Payer: COMMERCIAL

## 2021-11-10 PROCEDURE — 77412 RADIATION TX DELIVERY LVL 3: CPT | Performed by: RADIOLOGY

## 2021-11-11 ENCOUNTER — APPOINTMENT (OUTPATIENT)
Dept: RADIATION ONCOLOGY | Facility: CLINIC | Age: 51
End: 2021-11-11
Attending: RADIOLOGY
Payer: COMMERCIAL

## 2021-11-11 ENCOUNTER — PATIENT OUTREACH (OUTPATIENT)
Dept: ONCOLOGY | Facility: CLINIC | Age: 51
End: 2021-11-11
Payer: COMMERCIAL

## 2021-11-11 ENCOUNTER — TELEPHONE (OUTPATIENT)
Dept: ONCOLOGY | Facility: CLINIC | Age: 51
End: 2021-11-11
Payer: COMMERCIAL

## 2021-11-11 PROCEDURE — 77336 RADIATION PHYSICS CONSULT: CPT | Performed by: RADIOLOGY

## 2021-11-11 PROCEDURE — 77412 RADIATION TX DELIVERY LVL 3: CPT | Performed by: RADIOLOGY

## 2021-11-11 NOTE — PROGRESS NOTES
OUTBOUND CALL: RNCC called patient and LVM. Calling to go over POC for Monday and make sure everything is clear for her. Callback number below left for RNCC.     INBOUND CALL:   PT called back RNCC. [facility] called pt and said they will deliver the dex tomorrow. She knows to take it with breakfast on Monday.   She asked if she is ok to get second shingles shot. RNCC advises to get prior to starting chemo.   PT is aware that Rev will start after 11/19 pregnancy test. Pt is appreciative for call and has no other questions at this time     Griselda Valdez, RN, MSN, OCN   RN Care Coordinator   [facility] [address] [address] [phone]

## 2021-11-11 NOTE — PROGRESS NOTES
"[facility]  Date of Visit: Nov 15, 2021   Oncologist: Dr. Payton Reinoso    Reason for visit: myeloma follow-up           Oncology History   51 year old female with past medical history significant for Irritable bowel disease, allergies, large plasmacytoma of the L pelvis, and newly diagnosed multiple myeloma.     Early in April 2021 , she noted to have left sided groin pain, constant and was affecting her daily activities  MRI showed large, expansile, permeative mass with indistinct borders involving the left superior and inferior pubic rami, pubic symphysis and with possible extension to the left acetabulum.     We completed the following work-up:   - Biopsy 9/7/2021: plasmacytoma.  Flow cytometry showed kappa monotypic plasma cells, polytypic B cells. FISH results from plasmacytoma showed a gain of 11q, IGH-CCND1 fusion but no losses of 1q or TP53 and no gain of 1q.  - Bone marrow biopsy 09/22/21: Marrow cellularity 50% with 25% interstitial infiltration w/  Kappa-monotypic, moderately atypical plasma cells. Flow with 0.7% kappa-monotypica plasma cells. FISH and cytogenetics pending.   - PET CT 09/24/21 w/ hypermetabolic pathologic fracture of the L pubic ramus w/ aggressive appearing osteolysis but no additional suspicious lytic or blastic osseous lesions.     Treatment to date:   - Zometa q4 weeks started 9/27/21  - Radiation to left pubic ramus x18 fractions, completed 11/11.         INTERIM HISTORY:   Margo presents for oncology follow-up visit today to start chemotherapy. Accompanied by her .     She reports she is \"sun burned\" in pubic ramus from radiation. Otherwise, feeling pretty good.   She has been a little frustrated with the communication she is getting from our team regarding treatment start dates. Apparently, there has been some confusion about her treatment start date. Also with the Revlimid, she needs to have 2 negative pregnancy tests within 10 days " apart prior to starting per REMS criteria. This has all been sorted out now.  She is eating/drinking OK. Moving bowels regularly.   Denies fevers, chills, CP, SOB, nausea/vomiting, neuropathy.          Physical Exam:     BP (!) 143/75 (BP Location: Right arm, Patient Position: Sitting, Cuff Size: Adult Regular)   Pulse 82   Temp 98  F (36.7  C) (Oral)   Resp 16   Wt 77.4 kg (170 lb 9.6 oz)   SpO2 98%   BMI 26.16 kg/m     General: well appearing, no acute distress  HEENT: normocephalic, atraumatic, PERRLA, sclerae nonicteric, moist mucous membranes, oropharynx is clear   Lymph: no palpable cervical, supraclavicular or axillary lymphadenopathy   CV: slightly irregular (pt has hx PVCs)  Lungs: clear to auscultation bilaterally, no wheezes/rales/rhonchi  Abd: soft, positive bowel sounds, non-distended, non-tender  MSK: no peripheral edema  Neuro: alert and oriented x3, CN grossly intact   Psych: appropriate mood and affect  Skin: no visible rashes or lesions on exposed skin          Laboratory Data:    Reviewed labs today.   Ref. Range 11/15/2021 14:40   Sodium Latest Ref Range: 133 - 144 mmol/L 139   Potassium Latest Ref Range: 3.4 - 5.3 mmol/L 3.7   Chloride Latest Ref Range: 94 - 109 mmol/L 111 (H)   Carbon Dioxide Latest Ref Range: 20 - 32 mmol/L 18 (L)   Urea Nitrogen Latest Ref Range: 7 - 30 mg/dL 18   Creatinine Latest Ref Range: 0.52 - 1.04 mg/dL 0.73   GFR Estimate Latest Ref Range: >60 mL/min/1.73m2 >90   Calcium Latest Ref Range: 8.5 - 10.1 mg/dL 9.2   Anion Gap Latest Ref Range: 3 - 14 mmol/L 10   Albumin Latest Ref Range: 3.4 - 5.0 g/dL 4.2   Protein Total Latest Ref Range: 6.8 - 8.8 g/dL 7.5   Bilirubin Total Latest Ref Range: 0.2 - 1.3 mg/dL 0.3   Alkaline Phosphatase Latest Ref Range: 40 - 150 U/L 59   ALT Latest Ref Range: 0 - 50 U/L 27   AST Latest Ref Range: 0 - 45 U/L 15   Glucose Latest Ref Range: 70 - 99 mg/dL 256 (H)   WBC Latest Ref Range: 4.0 - 11.0 10e3/uL 5.9   Hemoglobin Latest Ref  "Range: 11.7 - 15.7 g/dL 14.4   Hematocrit Latest Ref Range: 35.0 - 47.0 % 43.0   Platelet Count Latest Ref Range: 150 - 450 10e3/uL 225   RBC Count Latest Ref Range: 3.80 - 5.20 10e6/uL 4.69   MCV Latest Ref Range: 78 - 100 fL 92   MCH Latest Ref Range: 26.5 - 33.0 pg 30.7   MCHC Latest Ref Range: 31.5 - 36.5 g/dL 33.5   RDW Latest Ref Range: 10.0 - 15.0 % 12.3   % Neutrophils Latest Units: % 97   % Lymphocytes Latest Units: % 3   % Monocytes Latest Units: % 0   % Eosinophils Latest Units: % 0   % Basophils Latest Units: % 0   Absolute Basophils Latest Ref Range: 0.0 - 0.2 10e3/uL 0.0   Absolute Neutrophil Latest Ref Range: 1.6 - 8.3 10e3/uL 5.7   Absolute Lymphocytes Latest Ref Range: 0.8 - 5.3 10e3/uL 0.2 (L)   Absolute Monocytes Latest Ref Range: 0.0 - 1.3 10e3/uL 0.0   Absolute Eosinophils Latest Ref Range: 0.0 - 0.7 10e3/uL 0.0            Assessment and Recommendations   Kristie Cornejo is a 51 year old female who was diagnosed with Plasmacytoma after she presented with left sided groin pain now found to have multiple myeloma after further workup with bone marrow biopsy.     # Multiple myeloma with associated large plasmacytoma of the L pelvis. Kappa light chain disease.     BMBx with 25% plasma cell infiltration; standard risk with gain of 11q, IGH-CCND1 fusion but no losses of 1q or TP53 and no gain of 1q.   SPEP w/out a monoclonal peak  UPEP positive. 70% paraprotein. Large monoclonal free immunoglobulin light chain of kappa chain type.     No anemia, normal creat, Ca, no other skeletal lesions. Alb normal, B2M normal.  Stage I Light chain disease with R pelvic bone pathologic fracture she is classified as having symptomatic multiple myeloma.     She underwent RT to left pubic ramus x18 fractions, completed on 11/11. She reports \"sun-burn\" sensation at the site of RT. She will call Rad Onc about this and follow-up with them.     She comes in today to start chemotherapy with RVD. She will start C1D1 " Velcade today. She won't be able to start the Revlimid until she gets her second baseline/pre-Revlimid pregnancy test which is scheduled on 11/19 per REMS program requirements. She understands she cannot start Revlimid until after this test.     Addendum: Per oral chemo pharmacy, patient will also need weekly pregnancy tests x4 weeks after the start of Revlimid and then monthly pregnancy tests thereafter.     Pending good response, therapy may include autologous stem cell transplant.     She will continue monthly Zometa (last received on 10/25). Following dose due 11/22. She will continue Calcium/Vitamin D supplements.     Continue to follow-up with local cardiologist for hx frequent PVCs. Pt has ECHO scheduled 12/1 and will follow-up with cards on 12/7.    Patient had questions about whether she can continue to get her monthly allergy shots and when she can get her 2nd shingles vaccine (first dose was on 9/14). I will discuss with Dr. Reinoso.     RTC for SULEIMAN follow-up visit in 3-4 weeks, sooner if needed.     60 minutes spent on the date of the encounter doing chart review, review of test results, interpretation of tests, patient visit, documentation and discussion with other provider(s)     Estefany Pepe PA-C  [facility]  [address] [place] [address] [phone]

## 2021-11-12 ENCOUNTER — ONCOLOGY VISIT (OUTPATIENT)
Dept: RADIATION ONCOLOGY | Facility: CLINIC | Age: 51
End: 2021-11-12
Payer: COMMERCIAL

## 2021-11-15 ENCOUNTER — ONCOLOGY VISIT (OUTPATIENT)
Dept: ONCOLOGY | Facility: CLINIC | Age: 51
End: 2021-11-15
Attending: PHYSICIAN ASSISTANT
Payer: COMMERCIAL

## 2021-11-15 ENCOUNTER — TELEPHONE (OUTPATIENT)
Dept: ONCOLOGY | Facility: CLINIC | Age: 51
End: 2021-11-15

## 2021-11-15 ENCOUNTER — INFUSION THERAPY VISIT (OUTPATIENT)
Dept: ONCOLOGY | Facility: CLINIC | Age: 51
End: 2021-11-15
Payer: COMMERCIAL

## 2021-11-15 ENCOUNTER — APPOINTMENT (OUTPATIENT)
Dept: LAB | Facility: CLINIC | Age: 51
End: 2021-11-15
Attending: PHYSICIAN ASSISTANT
Payer: COMMERCIAL

## 2021-11-15 VITALS
OXYGEN SATURATION: 98 % | SYSTOLIC BLOOD PRESSURE: 143 MMHG | HEART RATE: 82 BPM | RESPIRATION RATE: 16 BRPM | DIASTOLIC BLOOD PRESSURE: 75 MMHG | TEMPERATURE: 98 F | WEIGHT: 170.6 LBS | BODY MASS INDEX: 26.16 KG/M2

## 2021-11-15 DIAGNOSIS — C90.00 LIGHT CHAIN MYELOMA (H): Primary | ICD-10-CM

## 2021-11-15 DIAGNOSIS — C90.30 PLASMACYTOMA OF BONE (H): ICD-10-CM

## 2021-11-15 LAB
ALBUMIN SERPL-MCNC: 4.2 G/DL (ref 3.4–5)
ALP SERPL-CCNC: 59 U/L (ref 40–150)
ALT SERPL W P-5'-P-CCNC: 27 U/L (ref 0–50)
ANION GAP SERPL CALCULATED.3IONS-SCNC: 10 MMOL/L (ref 3–14)
AST SERPL W P-5'-P-CCNC: 15 U/L (ref 0–45)
BASOPHILS # BLD MANUAL: 0 10E3/UL (ref 0–0.2)
BASOPHILS NFR BLD MANUAL: 0 %
BILIRUB SERPL-MCNC: 0.3 MG/DL (ref 0.2–1.3)
BUN SERPL-MCNC: 18 MG/DL (ref 7–30)
BURR CELLS BLD QL SMEAR: ABNORMAL
CALCIUM SERPL-MCNC: 9.2 MG/DL (ref 8.5–10.1)
CHLORIDE BLD-SCNC: 111 MMOL/L (ref 94–109)
CO2 SERPL-SCNC: 18 MMOL/L (ref 20–32)
CREAT SERPL-MCNC: 0.73 MG/DL (ref 0.52–1.04)
EOSINOPHIL # BLD MANUAL: 0 10E3/UL (ref 0–0.7)
EOSINOPHIL NFR BLD MANUAL: 0 %
ERYTHROCYTE [DISTWIDTH] IN BLOOD BY AUTOMATED COUNT: 12.3 % (ref 10–15)
GFR SERPL CREATININE-BSD FRML MDRD: >90 ML/MIN/1.73M2
GLUCOSE BLD-MCNC: 256 MG/DL (ref 70–99)
HCT VFR BLD AUTO: 43 % (ref 35–47)
HGB BLD-MCNC: 14.4 G/DL (ref 11.7–15.7)
LYMPHOCYTES # BLD MANUAL: 0.2 10E3/UL (ref 0.8–5.3)
LYMPHOCYTES NFR BLD MANUAL: 3 %
MCH RBC QN AUTO: 30.7 PG (ref 26.5–33)
MCHC RBC AUTO-ENTMCNC: 33.5 G/DL (ref 31.5–36.5)
MCV RBC AUTO: 92 FL (ref 78–100)
MONOCYTES # BLD MANUAL: 0 10E3/UL (ref 0–1.3)
MONOCYTES NFR BLD MANUAL: 0 %
NEUTROPHILS # BLD MANUAL: 5.7 10E3/UL (ref 1.6–8.3)
NEUTROPHILS NFR BLD MANUAL: 97 %
PLAT MORPH BLD: ABNORMAL
PLATELET # BLD AUTO: 225 10E3/UL (ref 150–450)
POTASSIUM BLD-SCNC: 3.7 MMOL/L (ref 3.4–5.3)
PROT SERPL-MCNC: 7.5 G/DL (ref 6.8–8.8)
RBC # BLD AUTO: 4.69 10E6/UL (ref 3.8–5.2)
RBC MORPH BLD: ABNORMAL
SODIUM SERPL-SCNC: 139 MMOL/L (ref 133–144)
TOTAL PROTEIN SERUM FOR ELP: 7.3 G/DL (ref 6.8–8.8)
WBC # BLD AUTO: 5.9 10E3/UL (ref 4–11)

## 2021-11-15 PROCEDURE — 250N000011 HC RX IP 250 OP 636: Mod: JW

## 2021-11-15 PROCEDURE — 84155 ASSAY OF PROTEIN SERUM: CPT | Mod: 91

## 2021-11-15 PROCEDURE — 96401 CHEMO ANTI-NEOPL SQ/IM: CPT

## 2021-11-15 PROCEDURE — 99205 OFFICE O/P NEW HI 60 MIN: CPT | Performed by: PHYSICIAN ASSISTANT

## 2021-11-15 PROCEDURE — 82040 ASSAY OF SERUM ALBUMIN: CPT

## 2021-11-15 PROCEDURE — 85027 COMPLETE CBC AUTOMATED: CPT

## 2021-11-15 PROCEDURE — G0463 HOSPITAL OUTPT CLINIC VISIT: HCPCS

## 2021-11-15 PROCEDURE — 84165 PROTEIN E-PHORESIS SERUM: CPT | Mod: TC | Performed by: PATHOLOGY

## 2021-11-15 PROCEDURE — 36415 COLL VENOUS BLD VENIPUNCTURE: CPT

## 2021-11-15 RX ORDER — LORAZEPAM 0.5 MG/1
0.5 TABLET ORAL EVERY 4 HOURS PRN
Qty: 30 TABLET | Refills: 3 | Status: SHIPPED | OUTPATIENT
Start: 2021-11-15 | End: 2021-12-27

## 2021-11-15 RX ADMIN — BORTEZOMIB 2.5 MG: 3.5 INJECTION, POWDER, LYOPHILIZED, FOR SOLUTION INTRAVENOUS; SUBCUTANEOUS at 16:39

## 2021-11-15 ASSESSMENT — PAIN SCALES - GENERAL: PAINLEVEL: MODERATE PAIN (4)

## 2021-11-15 NOTE — NURSING NOTE
"Oncology Rooming Note    November 15, 2021 2:57 PM   Kristie Cornejo is a 51 year old female who presents for:    Chief Complaint   Patient presents with     Blood Draw     Labs drawn via  by rn in lab. VS taken.     Oncology Clinic Visit     plasmacytoma of bone      Initial Vitals: BP (!) 143/75 (BP Location: Right arm, Patient Position: Sitting, Cuff Size: Adult Regular)   Pulse 82   Temp 98  F (36.7  C) (Oral)   Resp 16   Wt 77.4 kg (170 lb 9.6 oz)   SpO2 98%   BMI 26.16 kg/m   Estimated body mass index is 26.16 kg/m  as calculated from the following:    Height as of 9/15/21: 1.72 m (5' 7.72\").    Weight as of this encounter: 77.4 kg (170 lb 9.6 oz). Body surface area is 1.92 meters squared.  Moderate Pain (4) Comment: Data Unavailable   No LMP recorded. (Menstrual status: IUD).  Allergies reviewed: Yes  Medications reviewed: Yes    Medications: Medication refills not needed today.  Pharmacy name entered into [facility]:    [facility] - [place], [place] - [address]  [id number] - [place], [place] - [address] AT [facility] [address]  [facility] - [place], [place] - [address]    Clinical concerns: none       Griselda Salomon CMA              "

## 2021-11-15 NOTE — NURSING NOTE
Chief Complaint   Patient presents with     Blood Draw     Labs drawn via  by rn in lab. VS taken.     Labs collected from venipuncture by RN. Vitals taken. Checked in for appointment(s).    Omega Nair RN

## 2021-11-15 NOTE — PROGRESS NOTES
Infusion Nursing Note:  Kristie Cornejo presents today for Cycle 1 Day 1 Velcade.    Patient seen by provider today: Yes: CICI Pepe PA-C   present during visit today: Not Applicable.    Note:     Pt has decadron and is taking as directed. She does NOT have revlimid yet- she may not have this until Saturday - provider and pharmacy aware of this.     Patient new to oncology infusion room and is receiving Velcade for the first time. Pt oriented to infusion room and call light. Chemotherapy teaching done previously by RNCC.  Reinforced chemotherapy teaching/side effects and schedule during infusion visit.     Copy of AVS reviewed with patient. Pt instructed to call care coordinator, triage (or MD on call if after hours/weekends) with chills/temp >=100.4, questions/concerns. Pt stated understanding of plan.     zometa due 11/26    Intravenous Access:  No Intravenous access/labs at this visit.    Treatment Conditions:  Lab Results   Component Value Date    HGB 14.4 11/15/2021    WBC 5.9 11/15/2021    ANEU 5.7 11/15/2021    ANEUTAUTO 3.2 09/27/2021     11/15/2021      Lab Results   Component Value Date     11/15/2021    POTASSIUM 3.7 11/15/2021    CR 0.73 11/15/2021    MEKA 9.2 11/15/2021    BILITOTAL 0.3 11/15/2021    ALBUMIN 4.2 11/15/2021    ALT 27 11/15/2021    AST 15 11/15/2021     Results reviewed, labs MET treatment parameters, ok to proceed with treatment.      Post Infusion Assessment:  Patient tolerated injection without incident to Left abdomen.       Discharge Plan:   Discharge instructions reviewed with: Patient.  Copy of AVS reviewed with patient and/or family.  Patient will return 11/18 for next appointment.  Departure Mode: Ambulatory.      Carmina Spann RN

## 2021-11-15 NOTE — PATIENT INSTRUCTIONS
[facility] Triage and after hours / weekends / holidays:  [phone]    Please call the triage or after hours line if you experience a temperature greater than or equal to 100.5, shaking chills, have uncontrolled nausea, vomiting and/or diarrhea, dizziness, shortness of breath, chest pain, bleeding, unexplained bruising, or if you have any other new/concerning symptoms, questions or concerns.      If you are having any concerning symptoms or wish to speak to a provider before your next infusion visit, please call your care coordinator or triage to notify them so we can adequately serve you.     If you need a refill on a narcotic prescription or other medication, please call before your infusion appointment.

## 2021-11-15 NOTE — ORAL ONC MGMT
Oral Chemotherapy Monitoring Program     Received call from Estefany SUAREZ in follow up of planned Revlimid therapy. Estefany said that Margo is planning to go in for the second baseline/pre-Revlimid pregnancy test on 11/19/2021. Estefany expressed understanding that the REMS program requires these tests to be done before the start of Revlimid in any female who can become pregnant. She thanked me for the call.    Charan RyderD  [facility]  [phone]  November 15, 2021

## 2021-11-15 NOTE — LETTER
"    11/15/2021         RE: Kristie Cornejo  [address]  [facility] [place] [address]      [facility]  Date of Visit: Nov 15, 2021   Oncologist: Dr. Payton Reinoso    Reason for visit: myeloma follow-up           Oncology History   51 year old female with past medical history significant for Irritable bowel disease, allergies, large plasmacytoma of the L pelvis, and newly diagnosed multiple myeloma.     Early in April 2021 , she noted to have left sided groin pain, constant and was affecting her daily activities  MRI showed large, expansile, permeative mass with indistinct borders involving the left superior and inferior pubic rami, pubic symphysis and with possible extension to the left acetabulum.     We completed the following work-up:   - Biopsy 9/7/2021: plasmacytoma.  Flow cytometry showed kappa monotypic plasma cells, polytypic B cells. FISH results from plasmacytoma showed a gain of 11q, IGH-CCND1 fusion but no losses of 1q or TP53 and no gain of 1q.  - Bone marrow biopsy 09/22/21: Marrow cellularity 50% with 25% interstitial infiltration w/  Kappa-monotypic, moderately atypical plasma cells. Flow with 0.7% kappa-monotypica plasma cells. FISH and cytogenetics pending.   - PET CT 09/24/21 w/ hypermetabolic pathologic fracture of the L pubic ramus w/ aggressive appearing osteolysis but no additional suspicious lytic or blastic osseous lesions.     Treatment to date:   - Zometa q4 weeks started 9/27/21  - Radiation to left pubic ramus x18 fractions, completed 11/11.         INTERIM HISTORY:   Margo presents for oncology follow-up visit today to start chemotherapy. Accompanied by her .     She reports she is \"sun burned\" in pubic ramus from radiation. Otherwise, feeling pretty good.   She has been a little frustrated with the communication she is getting from our team regarding treatment start dates. Apparently, there has been some confusion about her treatment " start date. Also with the Revlimid, she needs to have 2 negative pregnancy tests within 10 days apart prior to starting per REMS criteria. This has all been sorted out now.  She is eating/drinking OK. Moving bowels regularly.   Denies fevers, chills, CP, SOB, nausea/vomiting, neuropathy.          Physical Exam:     BP (!) 143/75 (BP Location: Right arm, Patient Position: Sitting, Cuff Size: Adult Regular)   Pulse 82   Temp 98  F (36.7  C) (Oral)   Resp 16   Wt 77.4 kg (170 lb 9.6 oz)   SpO2 98%   BMI 26.16 kg/m     General: well appearing, no acute distress  HEENT: normocephalic, atraumatic, PERRLA, sclerae nonicteric, moist mucous membranes, oropharynx is clear   Lymph: no palpable cervical, supraclavicular or axillary lymphadenopathy   CV: slightly irregular (pt has hx PVCs)  Lungs: clear to auscultation bilaterally, no wheezes/rales/rhonchi  Abd: soft, positive bowel sounds, non-distended, non-tender  MSK: no peripheral edema  Neuro: alert and oriented x3, CN grossly intact   Psych: appropriate mood and affect  Skin: no visible rashes or lesions on exposed skin          Laboratory Data:    Reviewed labs today.   Ref. Range 11/15/2021 14:40   Sodium Latest Ref Range: 133 - 144 mmol/L 139   Potassium Latest Ref Range: 3.4 - 5.3 mmol/L 3.7   Chloride Latest Ref Range: 94 - 109 mmol/L 111 (H)   Carbon Dioxide Latest Ref Range: 20 - 32 mmol/L 18 (L)   Urea Nitrogen Latest Ref Range: 7 - 30 mg/dL 18   Creatinine Latest Ref Range: 0.52 - 1.04 mg/dL 0.73   GFR Estimate Latest Ref Range: >60 mL/min/1.73m2 >90   Calcium Latest Ref Range: 8.5 - 10.1 mg/dL 9.2   Anion Gap Latest Ref Range: 3 - 14 mmol/L 10   Albumin Latest Ref Range: 3.4 - 5.0 g/dL 4.2   Protein Total Latest Ref Range: 6.8 - 8.8 g/dL 7.5   Bilirubin Total Latest Ref Range: 0.2 - 1.3 mg/dL 0.3   Alkaline Phosphatase Latest Ref Range: 40 - 150 U/L 59   ALT Latest Ref Range: 0 - 50 U/L 27   AST Latest Ref Range: 0 - 45 U/L 15   Glucose Latest Ref Range:  "70 - 99 mg/dL 256 (H)   WBC Latest Ref Range: 4.0 - 11.0 10e3/uL 5.9   Hemoglobin Latest Ref Range: 11.7 - 15.7 g/dL 14.4   Hematocrit Latest Ref Range: 35.0 - 47.0 % 43.0   Platelet Count Latest Ref Range: 150 - 450 10e3/uL 225   RBC Count Latest Ref Range: 3.80 - 5.20 10e6/uL 4.69   MCV Latest Ref Range: 78 - 100 fL 92   MCH Latest Ref Range: 26.5 - 33.0 pg 30.7   MCHC Latest Ref Range: 31.5 - 36.5 g/dL 33.5   RDW Latest Ref Range: 10.0 - 15.0 % 12.3   % Neutrophils Latest Units: % 97   % Lymphocytes Latest Units: % 3   % Monocytes Latest Units: % 0   % Eosinophils Latest Units: % 0   % Basophils Latest Units: % 0   Absolute Basophils Latest Ref Range: 0.0 - 0.2 10e3/uL 0.0   Absolute Neutrophil Latest Ref Range: 1.6 - 8.3 10e3/uL 5.7   Absolute Lymphocytes Latest Ref Range: 0.8 - 5.3 10e3/uL 0.2 (L)   Absolute Monocytes Latest Ref Range: 0.0 - 1.3 10e3/uL 0.0   Absolute Eosinophils Latest Ref Range: 0.0 - 0.7 10e3/uL 0.0            Assessment and Recommendations   Kristie Cornejo is a 51 year old female who was diagnosed with Plasmacytoma after she presented with left sided groin pain now found to have multiple myeloma after further workup with bone marrow biopsy.     # Multiple myeloma with associated large plasmacytoma of the L pelvis. Kappa light chain disease.     BMBx with 25% plasma cell infiltration; standard risk with gain of 11q, IGH-CCND1 fusion but no losses of 1q or TP53 and no gain of 1q.   SPEP w/out a monoclonal peak  UPEP positive. 70% paraprotein. Large monoclonal free immunoglobulin light chain of kappa chain type.     No anemia, normal creat, Ca, no other skeletal lesions. Alb normal, B2M normal.  Stage I Light chain disease with R pelvic bone pathologic fracture she is classified as having symptomatic multiple myeloma.     She underwent RT to left pubic ramus x18 fractions, completed on 11/11. She reports \"sun-burn\" sensation at the site of RT. She will call Rad Onc about this and " follow-up with them.     She comes in today to start chemotherapy with RVD. She will start C1D1 Velcade today. She won't be able to start the Revlimid until she gets her second baseline/pre-Revlimid pregnancy test which is scheduled on 11/19 per REMS program requirements. She understands she cannot start Revlimid until after this test.     Addendum: Per oral chemo pharmacy, patient will also need weekly pregnancy tests x4 weeks after the start of Revlimid and then monthly pregnancy tests thereafter.     Pending good response, therapy may include autologous stem cell transplant.     She will continue monthly Zometa (last received on 10/25). Following dose due 11/22. She will continue Calcium/Vitamin D supplements.     Continue to follow-up with local cardiologist for hx frequent PVCs. Pt has ECHO scheduled 12/1 and will follow-up with cards on 12/7.    Patient had questions about whether she can continue to get her monthly allergy shots and when she can get her 2nd shingles vaccine (first dose was on 9/14). I will discuss with Dr. Reinoso.     RTC for SULEIMAN follow-up visit in 3-4 weeks, sooner if needed.     60 minutes spent on the date of the encounter doing chart review, review of test results, interpretation of tests, patient visit, documentation and discussion with other provider(s)     Estefany Pepe PA-C  [facility]  [address] [place] [address] [phone]

## 2021-11-16 LAB
ALBUMIN SERPL ELPH-MCNC: 4.9 G/DL (ref 3.7–5.1)
ALPHA1 GLOB SERPL ELPH-MCNC: 0.3 G/DL (ref 0.2–0.4)
ALPHA2 GLOB SERPL ELPH-MCNC: 0.7 G/DL (ref 0.5–0.9)
B-GLOBULIN SERPL ELPH-MCNC: 0.7 G/DL (ref 0.6–1)
GAMMA GLOB SERPL ELPH-MCNC: 0.7 G/DL (ref 0.7–1.6)
M PROTEIN SERPL ELPH-MCNC: 0 G/DL
PROT PATTERN SERPL ELPH-IMP: NORMAL

## 2021-11-16 PROCEDURE — 84165 PROTEIN E-PHORESIS SERUM: CPT | Mod: 26 | Performed by: STUDENT IN AN ORGANIZED HEALTH CARE EDUCATION/TRAINING PROGRAM

## 2021-11-16 NOTE — PROCEDURES
Radiotherapy Treatment Summary          Date of Report: 2021     PATIENT: SANTO CORNEJO  MEDICAL RECORD NO: [id number]  : 1970     DIAGNOSIS: C90.0 Multiple myeloma  INTENT OF RADIOTHERAPY: Cure  PATHOLOGY: Multiple myeloma   STAGE: N/A   CONCURRENT SYSTEMIC THERAPY: No                   Details of the treatments summarized below are found in records kept in the Department of Radiation Oncology at [facility].     Treatment Summary:  Radiation Oncology - Course: 1 Protocol:   Treatment Site Dose     Modality From                  To           Days Fx.  1 Left Pelvis        3,600 cGy     6X/18X 10/19/2021 2021  23 18          Dose per Fraction: 200 cGy       Total Dose: 3600 cGy             COMMENTS:                         Ms. Cornejo is a 51-year-old female who initially presented with pain in her left groin which led to discovery of   a large destructive lesion involving the left pubic symphysis and superior pubic ramus with possible involvement of   the acetabulum. Biopsy was obtained and found plasmacytoma. This led to pathologic fracture of the left pubic ramus.   Further evaluation detected disease involvement in her bone marrow, which confirmed her diagnosis of multiple   myeloma. PET/CT did not find any additional disease focus. She saw Dr. Reinoso, who prescribed zometa and   provided good relief of her pain. She was recommended for radiation targeting the gross disease before starting systemic therapy.     Overall, she tolerated radiation well, and developed mild acute radiation induced effects as expected.     ED visits/hospitalizations: No     Missed treatments: No     Acute Toxicity Profile by CTC v5.0:   Fatigue: Grade 1: Fatigue relieved by rest  Urinary urgency: Grade 1: Present  Dermatitis: Grade 1: Faint erythema or dry desquamation                            FOLLOW UP PLAN:   1. Follow up in 1 month with NP for symptom checkup.  2. Start systemic therapy under the  care of Dr. Reinoso.                           Resident Physician: Eliel Ramirez M.D.   Staff Physician: Nan Mckenzie M.D.  Physicist: Faiza Riley, PhD     CC:   Payton Reinoso MD                                     Radiation Oncology:  [facility] [address] [place] [address]

## 2021-11-17 ENCOUNTER — PATIENT OUTREACH (OUTPATIENT)
Dept: ONCOLOGY | Facility: CLINIC | Age: 51
End: 2021-11-17
Payer: COMMERCIAL

## 2021-11-17 NOTE — ORAL ONC MGMT
"Oral Chemotherapy Monitoring Program    Lab Monitoring Plan  Standard Plan is \"CBC Q2 weeks for first 3 months, then monthly, CMP monthly\"  Patient will be getting weekly labs in infusion with Velcade.  Pregnancy test weekly for first month, then every 2 weeks as indicated by the REMS program - as the patient doesn't meet [employer]'s criteria for menopause.  Subjective/Objective:  Kristie Cornejo is a 51 year old female contacted by phone for an initial visit for oral chemotherapy education.  Margo's call was forwarded to the Oral Chemotherapy phone line.    ORAL CHEMOTHERAPY 11/11/2021 11/15/2021 11/17/2021   Assessment Type Incoming phone call;Other Incoming phone call;Lab Monitoring;Other Incoming phone call   Providers Dr. Kemar Reinoso   [facility]   Drug Name Revlimid (lenalidomide) Revlimid (lenalidomide) Revlimid (lenalidomide)   Dose - - 25 mg   Current Schedule - - Daily   Cycle Details - - 2 weeks on, 1 week off   Planned next cycle start date - - 11/19/2021       Last PHQ-2 Score on record:   PHQ-2 ( 1999 [employer]) 8/26/2021 8/26/2021   Q1: Little interest or pleasure in doing things 0 0   Q2: Feeling down, depressed or hopeless 0 0   PHQ-2 Score 0 0   Q1: Little interest or pleasure in doing things Not at all -   Q2: Feeling down, depressed or hopeless Not at all -   PHQ-2 Score 0 -       Vitals:  BP:   BP Readings from Last 1 Encounters:   11/15/21 (!) 143/75     Wt Readings from Last 1 Encounters:   11/15/21 77.4 kg (170 lb 9.6 oz)     Estimated body surface area is 1.92 meters squared as calculated from the following:    Height as of 9/15/21: 1.72 m (5' 7.72\").    Weight as of 11/15/21: 77.4 kg (170 lb 9.6 oz).    Labs:  _  Result Component Current Result Ref Range   Sodium 139 (11/15/2021) 133 - 144 mmol/L     _  Result Component Current Result Ref Range   Potassium 3.7 (11/15/2021) 3.4 - 5.3 mmol/L     _  Result Component Current Result Ref " Range   Calcium 9.2 (11/15/2021) 8.5 - 10.1 mg/dL     No results found for Mag within last 30 days.     No results found for Phos within last 30 days.     _  Result Component Current Result Ref Range   Albumin 4.2 (11/15/2021) 3.4 - 5.0 g/dL     _  Result Component Current Result Ref Range   Urea Nitrogen 18 (11/15/2021) 7 - 30 mg/dL     _  Result Component Current Result Ref Range   Creatinine 0.73 (11/15/2021) 0.52 - 1.04 mg/dL     _  Result Component Current Result Ref Range   AST 15 (11/15/2021) 0 - 45 U/L     _  Result Component Current Result Ref Range   ALT 27 (11/15/2021) 0 - 50 U/L     _  Result Component Current Result Ref Range   Bilirubin Total 0.3 (11/15/2021) 0.2 - 1.3 mg/dL     _  Result Component Current Result Ref Range   WBC Count 5.9 (11/15/2021) 4.0 - 11.0 10e3/uL     _  Result Component Current Result Ref Range   Hemoglobin 14.4 (11/15/2021) 11.7 - 15.7 g/dL     _  Result Component Current Result Ref Range   Platelet Count 225 (11/15/2021) 150 - 450 10e3/uL     _  Result Component Current Result Ref Range   Absolute Neutrophils 5.7 (11/15/2021) 1.6 - 8.3 10e3/uL       Assessment:  Patient is appropriate to start therapy pending 2nd negative pregrancy test scheduled for Friday 11/19/21.    Plan:  Basic chemotherapy teaching was reviewed with the patient including indication, start date of therapy, dose, administration, adverse effects, missed doses, food and drug interactions, monitoring, side effect management, office contact information, and safe handling. Written materials were provided and all questions answered.    Margo had called with a couple of questions about the logistics of the necessary pregnancy testing and schedule of the Revlimid. I took the opportunity to review the new teach information formally with Margo, she has received our packet and talked with RNROSA Villalobos and Pharmacist Erich lincoln as well.   Margo knew the dosing was for 1 tablet (25mg) daily for 14 days - she was  "wondering when she is supposed to start the next cycle. I shared that normally it is a 14 days on 7 days off to complete the 21 day cycle, but based on her anticipated start date of 11/19 that wouldn't give her much time before the next cycle of Velcade begins. She stated she is confident she can keep track of the 14 days on and 7 days off of Revlimid, as it is less complicated than the Velcade frequency - which she appreciates the help making sure she has the appropriate infusion appointments scheduled for that.    Margo wanted to know how often she will need to complete a pregnancy test and we discussed the information available on the Revlimid website that she will need to complete a pregnancy test \"weekly for 4 weeks. Then every 4 weeks if your menstrual cycle is regular, or every 2 weeks if your menstrual cycle is irregular.\" We discussed that she probably fell into the irregular category currently so she will arrange for those tests going forward. She noticed that if she continued on Fridays that would land on New Years Ever and we talked about if it were a day or two off depending on the holidays and her schedule that would be ok.    Margo confirmed she had the Oral Chemotherapy Monitoring Program phone number (as well as [facility]'s phone number). I explained that someone would be reaching out to see how things are going with the Revlimid - most likely early the week following Thanksgiving.    Follow-Up:  Margo is going to schedule her future pregnancy tests at the [facility] - I checked and the order for these has been placed.     Day 4 Velcade is tomorrow, followed by Day 8 on 11/22/21     Initial assessment from the Oral Chemotherapy team in about 2 weeks.    Serina Quesada, PharmD, [facility]  Oral Chemotherapy Monitoring Program  [facility]  [phone]          "

## 2021-11-17 NOTE — PROGRESS NOTES
RN received inbound call form patient. She has a couple questions about her overall care. Discussed with Estefany yesterday if she can continue her allergy shots and if/when she should get her shingles shot. She was unable to get it last weekend as advised. RNCC can discuss with Dr. Reinoso and let the patient know.   Has second pregnancy test on 11/19 in am. Wondering if she can start Rev on 11/19 HS after test has resulted instead of 11/20 in the am. RNCC to transfer patient to oral chemo team after call to disucss further, as well as discuss future pregnacy testing while on Rev.   RNCC will see patient tomorrow in infusion. Pt is requesting letter from MD to refund her for her previously purchased ski pass.   Pt is also asking about zometa. She was supposed to have this on 11/22 but it was cancelled. Has appt for velcade that day. RNCC to send to scheduling to have them add this on for that day. Patient verbalizes understanding of POC and has no other questions or concerns at this time.     Griselda Valdez, RN, MSN, OCN   RN Care Coordinator   [facility] [address] [address] [phone]

## 2021-11-18 ENCOUNTER — TELEPHONE (OUTPATIENT)
Dept: RADIATION ONCOLOGY | Facility: CLINIC | Age: 51
End: 2021-11-18
Payer: COMMERCIAL

## 2021-11-18 ENCOUNTER — INFUSION THERAPY VISIT (OUTPATIENT)
Dept: ONCOLOGY | Facility: CLINIC | Age: 51
End: 2021-11-18
Payer: COMMERCIAL

## 2021-11-18 ENCOUNTER — PATIENT OUTREACH (OUTPATIENT)
Dept: ONCOLOGY | Facility: CLINIC | Age: 51
End: 2021-11-18
Payer: COMMERCIAL

## 2021-11-18 VITALS
SYSTOLIC BLOOD PRESSURE: 133 MMHG | TEMPERATURE: 98.1 F | DIASTOLIC BLOOD PRESSURE: 82 MMHG | HEART RATE: 72 BPM | OXYGEN SATURATION: 99 % | RESPIRATION RATE: 16 BRPM

## 2021-11-18 DIAGNOSIS — C90.00 LIGHT CHAIN MYELOMA (H): Primary | ICD-10-CM

## 2021-11-18 PROCEDURE — 250N000011 HC RX IP 250 OP 636

## 2021-11-18 PROCEDURE — 96401 CHEMO ANTI-NEOPL SQ/IM: CPT

## 2021-11-18 RX ADMIN — BORTEZOMIB 2.5 MG: 3.5 INJECTION, POWDER, LYOPHILIZED, FOR SOLUTION INTRAVENOUS; SUBCUTANEOUS at 09:34

## 2021-11-18 NOTE — TELEPHONE ENCOUNTER
Margo called regarding some post radiation skin changes. Review changes and made OTC recommendations, educated on S&S to watch and contact team for. Verbalized understanding and had no further questions at this time.

## 2021-11-18 NOTE — PROGRESS NOTES
"Infusion Nursing Note:  Kristie Cornejo presents today for Cycle 1 Day 4 Velcade.    Patient seen by provider today: No   present during visit today: Not Applicable.    Note: Patient states she had a little nausea about 8 hours after her first Velcade injection.  Patient denies any vomiting.  Encouraged patient to take her PRN anti-emetics.  Patient also reports some pain, discomfort on her inner left underwear line.  She states it is from radiation.  She reports skin discoloration at the site also.  She states she is waiting for a call back from radiation therapy regarding this.  She denies pain when nothing is touching the area, but states if something rubs on the area it feels like \"when you scratch a sunburn.\"  She reports this pain as a 6/10.  Patient states she is using aquaphor and another cream, but is waiting to hear back if she should be doing anything else.    Patient reports no other concerns at this time.      Intravenous Access:  No Intravenous access/labs at this visit.    Treatment Conditions:  No labs needed per Estefany Pepe .    Post Infusion Assessment:  Patient tolerated injection without incident.  Velcade administered subcutaneously into the LEFT side of patient's abdomen.     Discharge Plan:   Patient declined prescription refills.  Discharge instructions reviewed with: Patient.  Patient and/or family verbalized understanding of discharge instructions and all questions answered.  AVS to patient via [employer].  Patient will return 11/22/21 for next appointment.   Patient discharged in stable condition accompanied by: self.  Departure Mode: Ambulatory.  Face to Face time: 0.      OSMIN MIRANDA RN                    "

## 2021-11-19 ENCOUNTER — LAB (OUTPATIENT)
Dept: LAB | Facility: CLINIC | Age: 51
End: 2021-11-19
Payer: COMMERCIAL

## 2021-11-19 DIAGNOSIS — C90.30 PLASMACYTOMA OF BONE (H): ICD-10-CM

## 2021-11-19 LAB — HCG UR QL: NEGATIVE

## 2021-11-19 PROCEDURE — 81025 URINE PREGNANCY TEST: CPT

## 2021-11-19 RX ORDER — METHYLPREDNISOLONE SODIUM SUCCINATE 125 MG/2ML
125 INJECTION, POWDER, LYOPHILIZED, FOR SOLUTION INTRAMUSCULAR; INTRAVENOUS
Status: CANCELLED
Start: 2021-11-26

## 2021-11-19 RX ORDER — DIPHENHYDRAMINE HYDROCHLORIDE 50 MG/ML
50 INJECTION INTRAMUSCULAR; INTRAVENOUS
Status: CANCELLED
Start: 2021-11-26

## 2021-11-19 RX ORDER — HEPARIN SODIUM (PORCINE) LOCK FLUSH IV SOLN 100 UNIT/ML 100 UNIT/ML
5 SOLUTION INTRAVENOUS
Status: CANCELLED | OUTPATIENT
Start: 2021-11-26

## 2021-11-19 RX ORDER — NALOXONE HYDROCHLORIDE 0.4 MG/ML
0.2 INJECTION, SOLUTION INTRAMUSCULAR; INTRAVENOUS; SUBCUTANEOUS
Status: CANCELLED | OUTPATIENT
Start: 2021-11-26

## 2021-11-19 RX ORDER — ZOLEDRONIC ACID 0.04 MG/ML
4 INJECTION, SOLUTION INTRAVENOUS ONCE
Status: CANCELLED
Start: 2021-11-26 | End: 2021-11-26

## 2021-11-19 RX ORDER — EPINEPHRINE 1 MG/ML
0.3 INJECTION, SOLUTION INTRAMUSCULAR; SUBCUTANEOUS EVERY 5 MIN PRN
Status: CANCELLED | OUTPATIENT
Start: 2021-11-26

## 2021-11-19 RX ORDER — ALBUTEROL SULFATE 0.83 MG/ML
2.5 SOLUTION RESPIRATORY (INHALATION)
Status: CANCELLED | OUTPATIENT
Start: 2021-11-26

## 2021-11-19 RX ORDER — MEPERIDINE HYDROCHLORIDE 25 MG/ML
25 INJECTION INTRAMUSCULAR; INTRAVENOUS; SUBCUTANEOUS EVERY 30 MIN PRN
Status: CANCELLED | OUTPATIENT
Start: 2021-11-26

## 2021-11-19 RX ORDER — ALBUTEROL SULFATE 90 UG/1
1-2 AEROSOL, METERED RESPIRATORY (INHALATION)
Status: CANCELLED
Start: 2021-11-26

## 2021-11-19 RX ORDER — HEPARIN SODIUM,PORCINE 10 UNIT/ML
5 VIAL (ML) INTRAVENOUS
Status: CANCELLED | OUTPATIENT
Start: 2021-11-26

## 2021-11-20 DIAGNOSIS — C90.00 LIGHT CHAIN MYELOMA (H): Primary | ICD-10-CM

## 2021-11-20 RX ORDER — ALBUTEROL SULFATE 90 UG/1
1-2 AEROSOL, METERED RESPIRATORY (INHALATION)
Status: CANCELLED
Start: 2021-12-16

## 2021-11-20 RX ORDER — NALOXONE HYDROCHLORIDE 0.4 MG/ML
0.2 INJECTION, SOLUTION INTRAMUSCULAR; INTRAVENOUS; SUBCUTANEOUS
Status: CANCELLED | OUTPATIENT
Start: 2021-12-06

## 2021-11-20 RX ORDER — MEPERIDINE HYDROCHLORIDE 25 MG/ML
25 INJECTION INTRAMUSCULAR; INTRAVENOUS; SUBCUTANEOUS EVERY 30 MIN PRN
Status: CANCELLED | OUTPATIENT
Start: 2021-12-16

## 2021-11-20 RX ORDER — ALBUTEROL SULFATE 0.83 MG/ML
2.5 SOLUTION RESPIRATORY (INHALATION)
Status: CANCELLED | OUTPATIENT
Start: 2021-12-06

## 2021-11-20 RX ORDER — LORAZEPAM 2 MG/ML
0.5 INJECTION INTRAMUSCULAR EVERY 4 HOURS PRN
Status: CANCELLED | OUTPATIENT
Start: 2021-12-06

## 2021-11-20 RX ORDER — DIPHENHYDRAMINE HYDROCHLORIDE 50 MG/ML
50 INJECTION INTRAMUSCULAR; INTRAVENOUS
Status: CANCELLED
Start: 2021-12-09

## 2021-11-20 RX ORDER — DIPHENHYDRAMINE HYDROCHLORIDE 50 MG/ML
50 INJECTION INTRAMUSCULAR; INTRAVENOUS
Status: CANCELLED
Start: 2021-12-06

## 2021-11-20 RX ORDER — METHYLPREDNISOLONE SODIUM SUCCINATE 125 MG/2ML
125 INJECTION, POWDER, LYOPHILIZED, FOR SOLUTION INTRAMUSCULAR; INTRAVENOUS
Status: CANCELLED
Start: 2021-12-13

## 2021-11-20 RX ORDER — ALBUTEROL SULFATE 0.83 MG/ML
2.5 SOLUTION RESPIRATORY (INHALATION)
Status: CANCELLED | OUTPATIENT
Start: 2021-12-16

## 2021-11-20 RX ORDER — LORAZEPAM 2 MG/ML
0.5 INJECTION INTRAMUSCULAR EVERY 4 HOURS PRN
Status: CANCELLED | OUTPATIENT
Start: 2021-12-16

## 2021-11-20 RX ORDER — ALBUTEROL SULFATE 90 UG/1
1-2 AEROSOL, METERED RESPIRATORY (INHALATION)
Status: CANCELLED
Start: 2021-12-06

## 2021-11-20 RX ORDER — LORAZEPAM 2 MG/ML
0.5 INJECTION INTRAMUSCULAR EVERY 4 HOURS PRN
Status: CANCELLED | OUTPATIENT
Start: 2021-12-09

## 2021-11-20 RX ORDER — ALBUTEROL SULFATE 0.83 MG/ML
2.5 SOLUTION RESPIRATORY (INHALATION)
Status: CANCELLED | OUTPATIENT
Start: 2021-12-09

## 2021-11-20 RX ORDER — NALOXONE HYDROCHLORIDE 0.4 MG/ML
0.2 INJECTION, SOLUTION INTRAMUSCULAR; INTRAVENOUS; SUBCUTANEOUS
Status: CANCELLED | OUTPATIENT
Start: 2021-12-13

## 2021-11-20 RX ORDER — ALBUTEROL SULFATE 0.83 MG/ML
2.5 SOLUTION RESPIRATORY (INHALATION)
Status: CANCELLED | OUTPATIENT
Start: 2021-12-13

## 2021-11-20 RX ORDER — NALOXONE HYDROCHLORIDE 0.4 MG/ML
0.2 INJECTION, SOLUTION INTRAMUSCULAR; INTRAVENOUS; SUBCUTANEOUS
Status: CANCELLED | OUTPATIENT
Start: 2021-12-16

## 2021-11-20 RX ORDER — EPINEPHRINE 1 MG/ML
0.3 INJECTION, SOLUTION INTRAMUSCULAR; SUBCUTANEOUS EVERY 5 MIN PRN
Status: CANCELLED | OUTPATIENT
Start: 2021-12-06

## 2021-11-20 RX ORDER — ALBUTEROL SULFATE 90 UG/1
1-2 AEROSOL, METERED RESPIRATORY (INHALATION)
Status: CANCELLED
Start: 2021-12-09

## 2021-11-20 RX ORDER — METHYLPREDNISOLONE SODIUM SUCCINATE 125 MG/2ML
125 INJECTION, POWDER, LYOPHILIZED, FOR SOLUTION INTRAMUSCULAR; INTRAVENOUS
Status: CANCELLED
Start: 2021-12-16

## 2021-11-20 RX ORDER — EPINEPHRINE 1 MG/ML
0.3 INJECTION, SOLUTION INTRAMUSCULAR; SUBCUTANEOUS EVERY 5 MIN PRN
Status: CANCELLED | OUTPATIENT
Start: 2021-12-16

## 2021-11-20 RX ORDER — MEPERIDINE HYDROCHLORIDE 25 MG/ML
25 INJECTION INTRAMUSCULAR; INTRAVENOUS; SUBCUTANEOUS EVERY 30 MIN PRN
Status: CANCELLED | OUTPATIENT
Start: 2021-12-13

## 2021-11-20 RX ORDER — ALBUTEROL SULFATE 90 UG/1
1-2 AEROSOL, METERED RESPIRATORY (INHALATION)
Status: CANCELLED
Start: 2021-12-13

## 2021-11-20 RX ORDER — EPINEPHRINE 1 MG/ML
0.3 INJECTION, SOLUTION INTRAMUSCULAR; SUBCUTANEOUS EVERY 5 MIN PRN
Status: CANCELLED | OUTPATIENT
Start: 2021-12-09

## 2021-11-20 RX ORDER — NALOXONE HYDROCHLORIDE 0.4 MG/ML
0.2 INJECTION, SOLUTION INTRAMUSCULAR; INTRAVENOUS; SUBCUTANEOUS
Status: CANCELLED | OUTPATIENT
Start: 2021-12-09

## 2021-11-20 RX ORDER — MEPERIDINE HYDROCHLORIDE 25 MG/ML
25 INJECTION INTRAMUSCULAR; INTRAVENOUS; SUBCUTANEOUS EVERY 30 MIN PRN
Status: CANCELLED | OUTPATIENT
Start: 2021-12-06

## 2021-11-20 RX ORDER — MEPERIDINE HYDROCHLORIDE 25 MG/ML
25 INJECTION INTRAMUSCULAR; INTRAVENOUS; SUBCUTANEOUS EVERY 30 MIN PRN
Status: CANCELLED | OUTPATIENT
Start: 2021-12-09

## 2021-11-20 RX ORDER — EPINEPHRINE 1 MG/ML
0.3 INJECTION, SOLUTION INTRAMUSCULAR; SUBCUTANEOUS EVERY 5 MIN PRN
Status: CANCELLED | OUTPATIENT
Start: 2021-12-13

## 2021-11-20 RX ORDER — METHYLPREDNISOLONE SODIUM SUCCINATE 125 MG/2ML
125 INJECTION, POWDER, LYOPHILIZED, FOR SOLUTION INTRAMUSCULAR; INTRAVENOUS
Status: CANCELLED
Start: 2021-12-09

## 2021-11-20 RX ORDER — DIPHENHYDRAMINE HYDROCHLORIDE 50 MG/ML
50 INJECTION INTRAMUSCULAR; INTRAVENOUS
Status: CANCELLED
Start: 2021-12-16

## 2021-11-20 RX ORDER — METHYLPREDNISOLONE SODIUM SUCCINATE 125 MG/2ML
125 INJECTION, POWDER, LYOPHILIZED, FOR SOLUTION INTRAMUSCULAR; INTRAVENOUS
Status: CANCELLED
Start: 2021-12-06

## 2021-11-20 RX ORDER — LORAZEPAM 2 MG/ML
0.5 INJECTION INTRAMUSCULAR EVERY 4 HOURS PRN
Status: CANCELLED | OUTPATIENT
Start: 2021-12-13

## 2021-11-20 RX ORDER — DIPHENHYDRAMINE HYDROCHLORIDE 50 MG/ML
50 INJECTION INTRAMUSCULAR; INTRAVENOUS
Status: CANCELLED
Start: 2021-12-13

## 2021-11-22 ENCOUNTER — INFUSION THERAPY VISIT (OUTPATIENT)
Dept: ONCOLOGY | Facility: CLINIC | Age: 51
End: 2021-11-22
Payer: COMMERCIAL

## 2021-11-22 ENCOUNTER — APPOINTMENT (OUTPATIENT)
Dept: LAB | Facility: CLINIC | Age: 51
End: 2021-11-22
Payer: COMMERCIAL

## 2021-11-22 VITALS
HEART RATE: 60 BPM | WEIGHT: 169.6 LBS | DIASTOLIC BLOOD PRESSURE: 74 MMHG | RESPIRATION RATE: 16 BRPM | BODY MASS INDEX: 26 KG/M2 | SYSTOLIC BLOOD PRESSURE: 126 MMHG | OXYGEN SATURATION: 99 % | TEMPERATURE: 98.3 F

## 2021-11-22 DIAGNOSIS — C90.30 PLASMACYTOMA OF BONE (H): ICD-10-CM

## 2021-11-22 DIAGNOSIS — C90.00 LIGHT CHAIN MYELOMA (H): Primary | ICD-10-CM

## 2021-11-22 LAB
ALBUMIN SERPL-MCNC: 4.1 G/DL (ref 3.4–5)
ALP SERPL-CCNC: 58 U/L (ref 40–150)
ALT SERPL W P-5'-P-CCNC: 24 U/L (ref 0–50)
ANION GAP SERPL CALCULATED.3IONS-SCNC: 5 MMOL/L (ref 3–14)
AST SERPL W P-5'-P-CCNC: 12 U/L (ref 0–45)
BASOPHILS # BLD AUTO: 0 10E3/UL (ref 0–0.2)
BASOPHILS NFR BLD AUTO: 0 %
BILIRUB SERPL-MCNC: 0.4 MG/DL (ref 0.2–1.3)
BUN SERPL-MCNC: 17 MG/DL (ref 7–30)
CALCIUM SERPL-MCNC: 9.5 MG/DL (ref 8.5–10.1)
CALCIUM SERPL-MCNC: 9.5 MG/DL (ref 8.5–10.1)
CHLORIDE BLD-SCNC: 106 MMOL/L (ref 94–109)
CO2 SERPL-SCNC: 27 MMOL/L (ref 20–32)
CREAT SERPL-MCNC: 0.91 MG/DL (ref 0.52–1.04)
CREAT SERPL-MCNC: 0.91 MG/DL (ref 0.52–1.04)
EOSINOPHIL # BLD AUTO: 0.1 10E3/UL (ref 0–0.7)
EOSINOPHIL NFR BLD AUTO: 1 %
ERYTHROCYTE [DISTWIDTH] IN BLOOD BY AUTOMATED COUNT: 12.5 % (ref 10–15)
GFR SERPL CREATININE-BSD FRML MDRD: 73 ML/MIN/1.73M2
GFR SERPL CREATININE-BSD FRML MDRD: 73 ML/MIN/1.73M2
GLUCOSE BLD-MCNC: 111 MG/DL (ref 70–99)
HCT VFR BLD AUTO: 42 % (ref 35–47)
HGB BLD-MCNC: 13.9 G/DL (ref 11.7–15.7)
IMM GRANULOCYTES # BLD: 0 10E3/UL
IMM GRANULOCYTES NFR BLD: 1 %
LYMPHOCYTES # BLD AUTO: 0.4 10E3/UL (ref 0.8–5.3)
LYMPHOCYTES NFR BLD AUTO: 8 %
MCH RBC QN AUTO: 31 PG (ref 26.5–33)
MCHC RBC AUTO-ENTMCNC: 33.1 G/DL (ref 31.5–36.5)
MCV RBC AUTO: 94 FL (ref 78–100)
MONOCYTES # BLD AUTO: 0.2 10E3/UL (ref 0–1.3)
MONOCYTES NFR BLD AUTO: 3 %
NEUTROPHILS # BLD AUTO: 4.9 10E3/UL (ref 1.6–8.3)
NEUTROPHILS NFR BLD AUTO: 87 %
NRBC # BLD AUTO: 0 10E3/UL
NRBC BLD AUTO-RTO: 0 /100
PLATELET # BLD AUTO: 191 10E3/UL (ref 150–450)
POTASSIUM BLD-SCNC: 3.7 MMOL/L (ref 3.4–5.3)
PROT SERPL-MCNC: 7.5 G/DL (ref 6.8–8.8)
RBC # BLD AUTO: 4.49 10E6/UL (ref 3.8–5.2)
SODIUM SERPL-SCNC: 138 MMOL/L (ref 133–144)
WBC # BLD AUTO: 5.6 10E3/UL (ref 4–11)

## 2021-11-22 PROCEDURE — 96365 THER/PROPH/DIAG IV INF INIT: CPT

## 2021-11-22 PROCEDURE — 82040 ASSAY OF SERUM ALBUMIN: CPT

## 2021-11-22 PROCEDURE — 82565 ASSAY OF CREATININE: CPT

## 2021-11-22 PROCEDURE — 85004 AUTOMATED DIFF WBC COUNT: CPT

## 2021-11-22 PROCEDURE — 258N000003 HC RX IP 258 OP 636

## 2021-11-22 PROCEDURE — 96401 CHEMO ANTI-NEOPL SQ/IM: CPT

## 2021-11-22 PROCEDURE — 36415 COLL VENOUS BLD VENIPUNCTURE: CPT

## 2021-11-22 PROCEDURE — 99207 PR NO BILLABLE SERVICE THIS VISIT: CPT

## 2021-11-22 PROCEDURE — 250N000011 HC RX IP 250 OP 636

## 2021-11-22 RX ORDER — ZOLEDRONIC ACID 0.04 MG/ML
4 INJECTION, SOLUTION INTRAVENOUS ONCE
Status: COMPLETED | OUTPATIENT
Start: 2021-11-22 | End: 2021-11-22

## 2021-11-22 RX ADMIN — ZOLEDRONIC ACID 4 MG: 0.04 INJECTION, SOLUTION INTRAVENOUS at 11:06

## 2021-11-22 RX ADMIN — BORTEZOMIB 2.5 MG: 3.5 INJECTION, POWDER, LYOPHILIZED, FOR SOLUTION INTRAVENOUS; SUBCUTANEOUS at 11:29

## 2021-11-22 RX ADMIN — SODIUM CHLORIDE 250 ML: 9 INJECTION, SOLUTION INTRAVENOUS at 11:05

## 2021-11-22 ASSESSMENT — PAIN SCALES - GENERAL: PAINLEVEL: NO PAIN (0)

## 2021-11-22 NOTE — PROGRESS NOTES
Chief Complaint   Patient presents with     Blood Draw     Vitals, blood drawn and PIV placed by LPN. Pt checked into appt.     TUAN Gaona LPN

## 2021-11-22 NOTE — PATIENT INSTRUCTIONS
Contact Numbers  [facility]: [phone]    After Hours:  [phone]  Triage: [phone]    Please call the [facility] Triage line if you experience a temperature greater than or equal to 100.5, shaking chills, have uncontrolled nausea, vomiting and/or diarrhea, dizziness, shortness of breath, chest pain, bleeding, unexplained bruising, or if you have any other new/concerning symptoms, questions or concerns.     If it is after hours, weekends, or holidays, please call the main hospital  at  [phone] and ask to speak to the Oncology doctor on call.     If you are having any concerning symptoms or wish to speak to a provider before your next infusion visit, please call your care coordinator or triage to notify them so we can adequately serve you.     If you need a refill on a narcotic prescription or other medication, please call triage before your infusion appointment.         November 2021 Sunday Monday Tuesday Wednesday Thursday Friday Saturday        1    TREATMENT   9:00 AM   (30 min.)   [facility] RAD ONC ELEKTA   [facility]    OTV   9:30 AM   (15 min.)   Nan Mckenzie MD   [facility] 2    TREATMENT   9:00 AM   (30 min.)   [facility] RAD ONC ELEKTA   [facility] 3    TREATMENT   9:00 AM   (30 min.)   P RAD ONC ELEKTA   [facility] 4    TREATMENT   9:00 AM   (30 min.)   [facility] ONC ELEKTA   [facility] 5    TREATMENT   9:00 AM   (30 min.)   P RAD ONC ELEKTA   [facility] 6       7     8    LAB PERIPHERAL   8:15 AM   (15 min.)    [facility] DRAW   [facility]    TREATMENT   9:00 AM   (30 min.)   P RAD ONC ELEKTA   [facility]    OTV   9:30 AM   (15 min.)   Nan Mckenzie MD   [facility] 9    TREATMENT   9:00  AM   (30 min.)   [facility] RAD ONC ELEKTA   [facility] Radiation On[facility]    LAB   4:45 PM   (15 min.)   [facility]   [facility] 10    TREATMENT   9:00 AM   (30 min.)   [facility] RAD ONC ELEKTA   [facility] On[facility] 11    TREATMENT   9:00 AM   (30 min.)   [facility] RAD ONC ELEKTA   [facility] On[facility] 12     13       14     15    LAB PERIPHERAL   2:00 PM   (15 min.)   [facility]   [facility]    RETURN   2:15 PM   (45 min.)   Estefany Pepe PA-C   [facility]    ONC INFUSION 1 HR (60 MIN)   3:30 PM   (60 min.)    ONC INFUSION NURSE   [facility] 16     17     18    ONC INFUSION 1 HR (60 MIN)   9:00 AM   (60 min.)   [facility] ONC INFUSION NURSE   [facility] 19    LAB   8:00 AM   (15 min.)   [facility]   [facility] 20       21     22    LAB PERIPHERAL   9:30 AM   (15 min.)   [facility]   [facility]    ONC INFUSION 1 HR (60 MIN)  10:00 AM   (60 min.)   [facility] ONC INFUSION NURSE   [facility] 23     24     25     26    LAB   7:30 AM   (15 min.)   [facility]   [facility]    LAB PERIPHERAL   8:45 AM   (15 min.)   [facility]   [facility]    ONC INFUSION 1 HR (60 MIN)   9:30 AM   (60 min.)   [facility] ONC INFUSION NURSE   [facility] 27       28     29     30 December 2021 Sunday Monday Tuesday Wednesday Thursday Friday Saturday                  1     2     3    LAB   8:00 AM   (15 min.)   [facility]   [facility] 4       5     6    LAB PERIPHERAL   7:15 AM   (15 min.)   [facility]   [facility]    RETURN   7:45 AM   (45 min.)   Jackie Ferris  J, APRN CNP   [facility]    ONC INFUSION 1 HR (60 MIN)   9:00 AM   (60 min.)   [facility] ONC INFUSION NURSE   [facility] 7     8     9    ONC INFUSION 1 HR (60 MIN)   9:00 AM   (60 min.)   [facility] ONC INFUSION NURSE   [facility] 10    LAB   8:15 AM   (15 min.)   [facility]   [facility] 11       12     13    LAB PERIPHERAL   8:00 AM   (15 min.)    [facility] DRAW   [facility]    ONC INFUSION 1 HR (60 MIN)   8:30 AM   (60 min.)   [facility] ONC INFUSION NURSE   [facility] 14     15     16    ONC INFUSION 1 HR (60 MIN)   9:00 AM   (60 min.)   [facility] ONC INFUSION NURSE   [facility] 17    LAB   8:00 AM   (15 min.)   [facility]   [facility] 18       19     20    ONC INFUSION 0.5 HR (30 MIN)   8:00 AM   (30 min.)    ONC INFUSION NURSE   [facility] 21     22     23     24     25       26     27     28     29     30     31                          Lab Results:  Recent Results (from the past 12 hour(s))   Creatinine    Collection Time: 11/22/21  9:54 AM   Result Value Ref Range    Creatinine 0.91 0.52 - 1.04 mg/dL    GFR Estimate 73 >60 mL/min/1.73m2   Calcium    Collection Time: 11/22/21  9:54 AM   Result Value Ref Range    Calcium 9.5 8.5 - 10.1 mg/dL   Comprehensive metabolic panel    Collection Time: 11/22/21  9:54 AM   Result Value Ref Range    Sodium 138 133 - 144 mmol/L    Potassium 3.7 3.4 - 5.3 mmol/L    Chloride 106 94 - 109 mmol/L    Carbon Dioxide (CO2) 27 20 - 32 mmol/L    Anion Gap 5 3 - 14 mmol/L    Urea Nitrogen 17 7 - 30 mg/dL    Creatinine 0.91 0.52 - 1.04 mg/dL    Calcium 9.5 8.5 - 10.1 mg/dL    Glucose 111 (H) 70 - 99 mg/dL    Alkaline Phosphatase 58 40 - 150 U/L    AST 12 0 - 45 U/L    ALT 24 0 - 50 U/L    Protein Total 7.5 6.8 - 8.8 g/dL    Albumin 4.1 3.4  - 5.0 g/dL    Bilirubin Total 0.4 0.2 - 1.3 mg/dL    GFR Estimate 73 >60 mL/min/1.73m2   CBC with platelets and differential    Collection Time: 11/22/21  9:54 AM   Result Value Ref Range    WBC Count 5.6 4.0 - 11.0 10e3/uL    RBC Count 4.49 3.80 - 5.20 10e6/uL    Hemoglobin 13.9 11.7 - 15.7 g/dL    Hematocrit 42.0 35.0 - 47.0 %    MCV 94 78 - 100 fL    MCH 31.0 26.5 - 33.0 pg    MCHC 33.1 31.5 - 36.5 g/dL    RDW 12.5 10.0 - 15.0 %    Platelet Count 191 150 - 450 10e3/uL    % Neutrophils 87 %    % Lymphocytes 8 %    % Monocytes 3 %    % Eosinophils 1 %    % Basophils 0 %    % Immature Granulocytes 1 %    NRBCs per 100 WBC 0 <1 /100    Absolute Neutrophils 4.9 1.6 - 8.3 10e3/uL    Absolute Lymphocytes 0.4 (L) 0.8 - 5.3 10e3/uL    Absolute Monocytes 0.2 0.0 - 1.3 10e3/uL    Absolute Eosinophils 0.1 0.0 - 0.7 10e3/uL    Absolute Basophils 0.0 0.0 - 0.2 10e3/uL    Absolute Immature Granulocytes 0.0 <=0.0 10e3/uL    Absolute NRBCs 0.0 10e3/uL

## 2021-11-22 NOTE — PROGRESS NOTES
Infusion Nursing Note:  Kristie Cornejo presents today for C1D8 Velcade subcutaneous/Zometa.    Patient seen by provider today: No   present during visit today: Not Applicable.    Note: Patient reports feeling well. Denies fever/chills. Denies nausea/vomiting nor chest and abdominal discomfort. No new concerns made. Otherwise well.    Patient would like to do her U/A on Friday.       Intravenous Access:  Peripheral IV placed.    Treatment Conditions:  Lab Results   Component Value Date    HGB 13.9 11/22/2021    WBC 5.6 11/22/2021    ANEU 5.7 11/15/2021    ANEUTAUTO 4.9 11/22/2021     11/22/2021      Lab Results   Component Value Date     11/22/2021    POTASSIUM 3.7 11/22/2021    CR 0.91 11/22/2021    CR 0.91 11/22/2021    MEKA 9.5 11/22/2021    MEKA 9.5 11/22/2021    BILITOTAL 0.4 11/22/2021    ALBUMIN 4.1 11/22/2021    ALT 24 11/22/2021    AST 12 11/22/2021     Results reviewed, labs MET treatment parameters, ok to proceed with treatment.      Post Infusion Assessment:  Patient tolerated infusion without incident.  Patient tolerated one Velcade injection on left lower abdomen without incident.  Blood return noted pre and post infusion.  Site patent and intact, free from redness, edema or discomfort.  No evidence of extravasations.  Access discontinued per protocol.       Discharge Plan:   Patient declined prescription refills. Has enough supply of Dexamethasone  Discharge instructions reviewed with: Patient.  Patient and/or family verbalized understanding of discharge instructions and all questions answered.  AVS to patient via [employer].  Patient will return 11/26 for next appointment.   Patient discharged in stable condition accompanied by: self.  Departure Mode: Ambulatory.      LAURA DUNAWAY RN

## 2021-11-26 ENCOUNTER — INFUSION THERAPY VISIT (OUTPATIENT)
Dept: ONCOLOGY | Facility: CLINIC | Age: 51
End: 2021-11-26
Payer: COMMERCIAL

## 2021-11-26 ENCOUNTER — LAB (OUTPATIENT)
Dept: LAB | Facility: CLINIC | Age: 51
End: 2021-11-26
Payer: COMMERCIAL

## 2021-11-26 VITALS
HEART RATE: 77 BPM | SYSTOLIC BLOOD PRESSURE: 134 MMHG | OXYGEN SATURATION: 97 % | DIASTOLIC BLOOD PRESSURE: 84 MMHG | RESPIRATION RATE: 16 BRPM | TEMPERATURE: 97 F

## 2021-11-26 DIAGNOSIS — C90.00 LIGHT CHAIN MYELOMA (H): Primary | ICD-10-CM

## 2021-11-26 DIAGNOSIS — C90.30 PLASMACYTOMA OF BONE (H): ICD-10-CM

## 2021-11-26 LAB — HCG UR QL: NEGATIVE

## 2021-11-26 PROCEDURE — 250N000011 HC RX IP 250 OP 636: Mod: JW

## 2021-11-26 PROCEDURE — 81025 URINE PREGNANCY TEST: CPT

## 2021-11-26 PROCEDURE — 96401 CHEMO ANTI-NEOPL SQ/IM: CPT

## 2021-11-26 RX ADMIN — BORTEZOMIB 2.5 MG: 3.5 INJECTION, POWDER, LYOPHILIZED, FOR SOLUTION INTRAVENOUS; SUBCUTANEOUS at 10:07

## 2021-11-26 ASSESSMENT — PAIN SCALES - GENERAL: PAINLEVEL: NO PAIN (0)

## 2021-11-26 NOTE — PROGRESS NOTES
Infusion Nursing Note:  Kristie Cornejo presents today for Day 11 Cycle 1 Velcade.    Patient seen by provider : No        present during visit today: Not Applicable.    Note: Margo arrives to infusion today doing well. She has had some intermittent nausea but resolves with Compazine. She otherwise is doing well and offers no concerns.     Treatment Conditions:  Not Applicable.    Post Infusion Assessment:  Patient tolerated injection without incident.  Velcade given subcutaneously in right side abdomen.     Discharge Plan:   Patient declined prescription refills.  AVS to patient via [employer].  Patient will return 12/06 for next appointment.   Patient discharged in stable condition accompanied by: self.  Departure Mode: Ambulatory.    Emma Andrade RN

## 2021-11-29 ENCOUNTER — PATIENT OUTREACH (OUTPATIENT)
Dept: ONCOLOGY | Facility: CLINIC | Age: 51
End: 2021-11-29
Payer: COMMERCIAL

## 2021-11-29 ENCOUNTER — TELEPHONE (OUTPATIENT)
Dept: ONCOLOGY | Facility: CLINIC | Age: 51
End: 2021-11-29
Payer: COMMERCIAL

## 2021-11-29 DIAGNOSIS — C90.00 LIGHT CHAIN MYELOMA (H): Primary | ICD-10-CM

## 2021-11-29 RX ORDER — DEXAMETHASONE 4 MG/1
40 TABLET ORAL
Qty: 30 TABLET | Refills: 0 | Status: SHIPPED | OUTPATIENT
Start: 2021-12-06 | End: 2021-11-29

## 2021-11-29 RX ORDER — LENALIDOMIDE 25 MG/1
25 CAPSULE ORAL DAILY
Qty: 14 CAPSULE | Refills: 0 | Status: SHIPPED | OUTPATIENT
Start: 2021-12-06 | End: 2021-12-23

## 2021-11-29 RX ORDER — DEXAMETHASONE 4 MG/1
40 TABLET ORAL
Qty: 30 TABLET | Refills: 0 | Status: SHIPPED | OUTPATIENT
Start: 2021-12-06 | End: 2022-01-21

## 2021-11-29 NOTE — TELEPHONE ENCOUNTER
Oral Chemotherapy Monitoring Program   Medication: Revlimid  Rx: 25mg PO daily on days 1 through 14 of 21 day cycle   Auth #: [id number]   Risk Category: Adult Female ORP  Routine survey questions reviewed.   Rx to be Escribed to Alona Crowley  [facility]  Oncology Pharmacy Liaison   [email or web address]  [phone] (phone)  [phone] (fax)

## 2021-11-29 NOTE — PROGRESS NOTES
INBOUND CALL: VM received from patient. Wondering if she is supposed to be getting chemo this week. Callback requested.    OUTBOUND CALL: RNCC called patient. RNCC confirmed that she does not have chemo this week. Will start day 1 cycle 2 on 12/6. Pt is agreeable to get dex while in infusion and get script then to bring home. She has questions on her Rev. RNCC will transfer patient to oral chemo team to discuss further. Plan for C2 to get rev on Days 1-14. She will clarify with team about remainder of C1. Patient verbalizes understanding of POC and has no other questions or concerns at this time. RNCC transferred patient to oral chemo team.     Griselda Valdez, RN, MSN, OCN   RN Care Coordinator   [facility] [address] [address] [phone]

## 2021-11-29 NOTE — ORAL ONC MGMT
Oral Chemotherapy Monitoring Program    Subjective/Objective:  Kristie Cornejo is a 51 year old female contacted by phone for a follow-up visit for oral chemotherapy. She confirmed the correct dose of revlimid and started therapy on 11/19. She denies any missed doses or medication changes. She is having mild nausea which is relieved by compazine and she is having mild fatigue which is typically relieved by rest. Denies any other issues since starting on revlimid. Patient is wondering if she can align her revlimid with her velcade/dex.     ORAL CHEMOTHERAPY 11/11/2021 11/15/2021 11/17/2021 11/29/2021 11/29/2021   Assessment Type Incoming phone call;Other Incoming phone call;Lab Monitoring;Other Incoming phone call;[place] Incoming phone call Initial Follow up   Diagnosis Code - - - Multiple Myeloma Multiple Myeloma   Providers Dr. Kemar Reinoso   [facility]   Drug Name Revlimid (lenalidomide) Revlimid (lenalidomide) Revlimid (lenalidomide) Revlimid (lenalidomide) Revlimid (lenalidomide)   Dose - - 25 mg 25 mg 25 mg   Current Schedule - - Daily Daily Daily   Cycle Details - - 2 weeks on, 1 week off 2 weeks on, 1 week off 2 weeks on, 1 week off   Start Date of Last Cycle - - - 11/19/2021 11/19/2021   Planned next cycle start date - - 11/19/2021 - 12/6/2021   Doses missed in last 2 weeks - - - - 0   Adherence Assessment - - - - Adherent   Adverse Effects - - - - Nausea;Fatigue   Nausea - - - - Grade 1   Pharmacist Intervention(nausea) - - - - No   Fatigue - - - - Grade 1   Pharmacist Intervention(fatigue) - - - - Yes   Intervention(s) - - - - Patient education   Any new drug interactions? - - - - No   Is the dose as ordered appropriate for the patient? - - - - Yes   Has the patient missed any days of school, work, or other routine activity? - - - - No   Since the last time we talked, have you been hospitalized or  "used the emergency room? - - - - No       Last PHQ-2 Score on record:   PHQ-2 ( 1999 [employer]) 8/26/2021 8/26/2021   Q1: Little interest or pleasure in doing things 0 0   Q2: Feeling down, depressed or hopeless 0 0   PHQ-2 Score 0 0   PHQ-2 Total Score (12-17 Years)- Positive if 3 or more points; Administer PHQ-A if positive 0 0   Q1: Little interest or pleasure in doing things Not at all -   Q2: Feeling down, depressed or hopeless Not at all -   PHQ-2 Score 0 -       Vitals:  BP:   BP Readings from Last 1 Encounters:   11/26/21 134/84     Wt Readings from Last 1 Encounters:   11/22/21 76.9 kg (169 lb 9.6 oz)     Estimated body surface area is 1.92 meters squared as calculated from the following:    Height as of 9/15/21: 1.72 m (5' 7.72\").    Weight as of 11/22/21: 76.9 kg (169 lb 9.6 oz).    Labs:  _  Result Component Current Result Ref Range   Sodium 138 (11/22/2021) 133 - 144 mmol/L     _  Result Component Current Result Ref Range   Potassium 3.7 (11/22/2021) 3.4 - 5.3 mmol/L     _  Result Component Current Result Ref Range   Calcium 9.5 (11/22/2021) 8.5 - 10.1 mg/dL    9.5 (11/22/2021) 8.5 - 10.1 mg/dL     No results found for Mag within last 30 days.     No results found for Phos within last 30 days.     _  Result Component Current Result Ref Range   Albumin 4.1 (11/22/2021) 3.4 - 5.0 g/dL     _  Result Component Current Result Ref Range   Urea Nitrogen 17 (11/22/2021) 7 - 30 mg/dL     _  Result Component Current Result Ref Range   Creatinine 0.91 (11/22/2021) 0.52 - 1.04 mg/dL    0.91 (11/22/2021) 0.52 - 1.04 mg/dL     _  Result Component Current Result Ref Range   AST 12 (11/22/2021) 0 - 45 U/L     _  Result Component Current Result Ref Range   ALT 24 (11/22/2021) 0 - 50 U/L     _  Result Component Current Result Ref Range   Bilirubin Total 0.4 (11/22/2021) 0.2 - 1.3 mg/dL     _  Result Component Current Result Ref Range   WBC Count 5.6 (11/22/2021) 4.0 - 11.0 10e3/uL     _  Result Component Current Result Ref " Range   Hemoglobin 13.9 (11/22/2021) 11.7 - 15.7 g/dL     _  Result Component Current Result Ref Range   Platelet Count 191 (11/22/2021) 150 - 450 10e3/uL     _  Result Component Current Result Ref Range   Absolute Neutrophils 5.7 (11/15/2021) 1.6 - 8.3 10e3/uL         Assessment/Plan:  Tolerating therapy relatively well. Nausea controlled by compazine. We discussed fatigue management strategies. Fatigue isn't currently interfering with daily activities.     Follow-Up:  12/3: labs    Refill Due:  Released new refill today to [facility]. Will let them know to only fill 10 capsules as she'll be stopping revlimid today to align with other meds in plan. She'll have 4 remaining capsules and will restart on 12/6.     Ever Rogers, PharmD, MS  Hematology/Oncology Clinical Pharmacist  [facility]  [phone] (oral chemotherapy)  [phone] (infusion)

## 2021-11-29 NOTE — ORAL ONC MGMT
Oral Chemotherapy Monitoring Program     Margo called into the clinic today with questions regarding her oral chemotherapy. Margo started velcade on 11/15 and then revlimid on 11/19 due to need for pregnancy tests. The patient would like to have the regimen align on day 1 moving forward. Next cycle starts on 12/6. Sent message to Dr. Reinoso to determine how she would like to proceed, would you like to hold revlimid now and have a full week off (11/29-12/5) or would you prefer her to finish her revlimid cycle on 12/3 and then restart on 12/6? Will update when response received.     Ever Rogers, PharmD, MS  Hematology/Oncology Clinical Pharmacist  [facility]  [facility]

## 2021-12-03 ENCOUNTER — LAB (OUTPATIENT)
Dept: LAB | Facility: CLINIC | Age: 51
End: 2021-12-03
Payer: COMMERCIAL

## 2021-12-03 DIAGNOSIS — C90.00 LIGHT CHAIN MYELOMA (H): Primary | ICD-10-CM

## 2021-12-03 DIAGNOSIS — C90.30 PLASMACYTOMA OF BONE (H): ICD-10-CM

## 2021-12-03 LAB — HCG UR QL: NEGATIVE

## 2021-12-03 PROCEDURE — 81025 URINE PREGNANCY TEST: CPT

## 2021-12-03 NOTE — PROGRESS NOTES
"[facility]  Date of Visit: Dec 6, 2021   Oncologist: Dr. Payton Reinoso    Reason for visit: myeloma follow-up           Oncology History   51 year old female with past medical history significant for Irritable bowel disease, allergies, large plasmacytoma of the L pelvis, and newly diagnosed multiple myeloma.     Early in April 2021 , she noted to have left sided groin pain, constant and was affecting her daily activities  MRI showed large, expansile, permeative mass with indistinct borders involving the left superior and inferior pubic rami, pubic symphysis and with possible extension to the left acetabulum.     We completed the following work-up:   - Biopsy 9/7/2021: plasmacytoma.  Flow cytometry showed kappa monotypic plasma cells, polytypic B cells. FISH results from plasmacytoma showed a gain of 11q, IGH-CCND1 fusion but no losses of 1q or TP53 and no gain of 1q.  - Bone marrow biopsy 09/22/21: Marrow cellularity 50% with 25% interstitial infiltration w/  Kappa-monotypic, moderately atypical plasma cells. Flow with 0.7% kappa-monotypica plasma cells. FISH and cytogenetics pending.   - PET CT 09/24/21 w/ hypermetabolic pathologic fracture of the L pubic ramus w/ aggressive appearing osteolysis but no additional suspicious lytic or blastic osseous lesions.     Treatment to date:   - Zometa q4 weeks started 9/27/21  - Radiation to left pubic ramus x18 fractions, completed 11/11/21.   - RVD C1D1 11/15/21        INTERIM HISTORY:   Margo presents for oncology follow-up visit today.    She states she has felt the worst on her week off.  Nausea has been a little worse and she has had a low-grade headache. She also notes a \"stuffy nose\" and a cough. Her son has had a significant head cold; the family was all  tested for COVID and they were negative last Wednesday.     She takes lorazepam at night for nausea/sleep about twice weekly. She is taking Compazine prior to eating, up to " "two times a day, but she doesn't always need a second dose.  She took this more consistently on her week off due to increased nausea. Denies vomiting.  She is drinking at least 8 glasses of fluid daily.    She reports her bowels as being \"normal for me.\" She has days of constipation and then large BMs with some diarrhea, and then the process starts over again.    She states her hip is feeling much better.  She was able to go on a 1 mile walk with her  without any walking aids. She is interested in having her hip re-evaluated to see if she can get cleared to do more activity (gentle cross country skiing, longer walks, etc.).    She states the skin at her radiation site has finished peeling and is currently looking pink and healthy.    Energy has been manageable.  She is napping once during the day.    Denies fevers, chills, CP, SOB, vomiting, neuropathy.            Physical Exam:     /80 (BP Location: Left arm, Patient Position: Sitting, Cuff Size: Adult Regular)   Pulse 70   Temp 98.1  F (36.7  C) (Oral)   Resp 16   Wt 78.5 kg (173 lb)   SpO2 97%   BMI 26.52 kg/m     Wt Readings from Last 4 Encounters:   12/06/21 78.5 kg (173 lb)   11/22/21 76.9 kg (169 lb 9.6 oz)   11/15/21 77.4 kg (170 lb 9.6 oz)   11/08/21 77.5 kg (170 lb 12.8 oz)     General: well appearing, no acute distress  HEENT: normocephalic, atraumatic, PERRLA, sclerae nonicteric, moist mucous membranes, oropharynx is clear   Lymph: no palpable cervical, supraclavicular or axillary lymphadenopathy   CV: slightly irregular (pt has hx PVCs)  Lungs: clear to auscultation bilaterally, no wheezes/rales/rhonchi  Abd: soft, positive bowel sounds, non-distended, non-tender  MSK: no peripheral edema  Neuro: alert and oriented x3, CN grossly intact   Psych: appropriate mood and affect  Skin: no visible rashes or lesions on exposed skin          Laboratory Data:      I personally reviewed the following labs:    Most Recent 3 CBC's:  Recent Labs "   Lab Test 12/06/21  0740 11/22/21  0954 11/15/21  1440   WBC 3.8* 5.6 5.9   HGB 13.7 13.9 14.4   MCV 93 94 92    191 225     Most Recent 3 BMP's:  Recent Labs   Lab Test 12/06/21  0740 11/22/21  0954 11/15/21  1440    138 139   POTASSIUM 3.7 3.7 3.7   CHLORIDE 110* 106 111*   CO2 26 27 18*   BUN 19 17 18   CR 0.93 0.91  0.91 0.73   ANIONGAP 6 5 10   MEKA 9.5 9.5  9.5 9.2   GLC 85 111* 256*     Most Recent 2 LFT's:  Recent Labs   Lab Test 12/06/21  0740 11/22/21  0954   AST 9 12   ALT 21 24   ALKPHOS 60 58   BILITOTAL 0.4 0.4              Assessment and Recommendations   Kristie Cornejo is a 51 year old female who was diagnosed with Plasmacytoma after she presented with left sided groin pain now found to have multiple myeloma after further workup with bone marrow biopsy.     # Multiple myeloma with associated large plasmacytoma of the L pelvis. Kappa light chain disease.     BMBx with 25% plasma cell infiltration; standard risk with gain of 11q, IGH-CCND1 fusion but no losses of 1q or TP53 and no gain of 1q.   SPEP w/out a monoclonal peak  UPEP positive. 70% paraprotein. Large monoclonal free immunoglobulin light chain of kappa chain type.     No anemia, normal creat, Ca, no other skeletal lesions. Alb normal, B2M normal.  Stage I Light chain disease with left pelvic bone pathologic fracture she is classified as having symptomatic multiple myeloma.     She started RVD C1D1 11/15/21 (had a slight delay in the start of her Revlimid on 11/19/21 due to the timing of her pregnancy tests).  She tolerated cycle 1 well with some mild nausea and a headache on her week off.    Pending good response, therapy may include autologous stem cell transplant. Message sent to Dr. Reinoso to clarify how many cycles of RVD to plan before considering stem cell collection.    -Okay to proceed with C2 today. Revlimid has been adjusted so that it starts today with her first Velcade injection.  - Return to clinic to see  me with labs prior to C3.  - She has one more weekly pregnancy test (scheduled 12/17) and then will transition to monthly pregnancy tests while on Revlimid which can be done on the first day of each cycle  - Planning for 6 cycles followed by consideration of autoHSCT. BMT consult has been requested, will likely schedule in the next 2-3 weeks.    #Bone Health  She will continue monthly Zometa (last received on 11/22). Next dose due 12/20. She will continue Calcium/Vitamin D supplements.     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 Currently having no pain and able to walk 1 mile without any assistive devices.  She is interested in pursuing other activities like gentle cross-country skiing.  Message sent to Dr. Philip to clarify the duration of activity restriction.    # PVCs  Continue to follow-up with local cardiologist for hx frequent PVCs. She had an ECHO 12/1 and will follow-up with cards tomorrow (12/7).    # Nausea  Mild, managed with Compazine up to twice daily and lorazepam occasionally at night.     # Headache  Discussed that this could be secondary to treatment, dehydration, or a mild viral URI. Encouraged good oral intake and also approved use of Tylenol.      60 minutes spent on the date of the encounter doing chart review, review of test results, patient visit and documentation     DORON Hall CNP  [facility]  [address], [place] [address] [phone]

## 2021-12-06 ENCOUNTER — APPOINTMENT (OUTPATIENT)
Dept: LAB | Facility: CLINIC | Age: 51
End: 2021-12-06
Payer: COMMERCIAL

## 2021-12-06 ENCOUNTER — INFUSION THERAPY VISIT (OUTPATIENT)
Dept: ONCOLOGY | Facility: CLINIC | Age: 51
End: 2021-12-06
Attending: REGISTERED NURSE
Payer: COMMERCIAL

## 2021-12-06 VITALS
TEMPERATURE: 98.1 F | RESPIRATION RATE: 16 BRPM | DIASTOLIC BLOOD PRESSURE: 80 MMHG | SYSTOLIC BLOOD PRESSURE: 124 MMHG | HEART RATE: 70 BPM | BODY MASS INDEX: 26.52 KG/M2 | WEIGHT: 173 LBS | OXYGEN SATURATION: 97 %

## 2021-12-06 DIAGNOSIS — C90.00 LIGHT CHAIN MYELOMA (H): Primary | ICD-10-CM

## 2021-12-06 DIAGNOSIS — S32.592D CLOSED FRACTURE OF RAMUS OF LEFT PUBIS WITH ROUTINE HEALING, SUBSEQUENT ENCOUNTER: ICD-10-CM

## 2021-12-06 DIAGNOSIS — C90.00 LIGHT CHAIN MYELOMA (H): ICD-10-CM

## 2021-12-06 DIAGNOSIS — R11.0 NAUSEA: ICD-10-CM

## 2021-12-06 DIAGNOSIS — C90.30 PLASMACYTOMA OF BONE (H): Primary | ICD-10-CM

## 2021-12-06 DIAGNOSIS — R51.9 NONINTRACTABLE HEADACHE, UNSPECIFIED CHRONICITY PATTERN, UNSPECIFIED HEADACHE TYPE: ICD-10-CM

## 2021-12-06 LAB
ALBUMIN SERPL-MCNC: 3.6 G/DL (ref 3.4–5)
ALP SERPL-CCNC: 60 U/L (ref 40–150)
ALT SERPL W P-5'-P-CCNC: 21 U/L (ref 0–50)
ANION GAP SERPL CALCULATED.3IONS-SCNC: 6 MMOL/L (ref 3–14)
AST SERPL W P-5'-P-CCNC: 9 U/L (ref 0–45)
BASOPHILS # BLD AUTO: 0 10E3/UL (ref 0–0.2)
BASOPHILS NFR BLD AUTO: 1 %
BILIRUB SERPL-MCNC: 0.4 MG/DL (ref 0.2–1.3)
BUN SERPL-MCNC: 19 MG/DL (ref 7–30)
CALCIUM SERPL-MCNC: 9.5 MG/DL (ref 8.5–10.1)
CHLORIDE BLD-SCNC: 110 MMOL/L (ref 94–109)
CO2 SERPL-SCNC: 26 MMOL/L (ref 20–32)
CREAT SERPL-MCNC: 0.93 MG/DL (ref 0.52–1.04)
EOSINOPHIL # BLD AUTO: 0.3 10E3/UL (ref 0–0.7)
EOSINOPHIL NFR BLD AUTO: 7 %
ERYTHROCYTE [DISTWIDTH] IN BLOOD BY AUTOMATED COUNT: 12.3 % (ref 10–15)
GFR SERPL CREATININE-BSD FRML MDRD: 71 ML/MIN/1.73M2
GLUCOSE BLD-MCNC: 85 MG/DL (ref 70–99)
HCG UR QL: NEGATIVE
HCT VFR BLD AUTO: 42.1 % (ref 35–47)
HGB BLD-MCNC: 13.7 G/DL (ref 11.7–15.7)
IMM GRANULOCYTES # BLD: 0 10E3/UL
IMM GRANULOCYTES NFR BLD: 0 %
KAPPA LC FREE SER-MCNC: 116.35 MG/DL (ref 0.33–1.94)
KAPPA LC FREE/LAMBDA FREE SER NEPH: 141.89 {RATIO} (ref 0.26–1.65)
LAMBDA LC FREE SERPL-MCNC: 0.82 MG/DL (ref 0.57–2.63)
LYMPHOCYTES # BLD AUTO: 0.6 10E3/UL (ref 0.8–5.3)
LYMPHOCYTES NFR BLD AUTO: 16 %
MCH RBC QN AUTO: 30.4 PG (ref 26.5–33)
MCHC RBC AUTO-ENTMCNC: 32.5 G/DL (ref 31.5–36.5)
MCV RBC AUTO: 93 FL (ref 78–100)
MONOCYTES # BLD AUTO: 0.7 10E3/UL (ref 0–1.3)
MONOCYTES NFR BLD AUTO: 18 %
NEUTROPHILS # BLD AUTO: 2.2 10E3/UL (ref 1.6–8.3)
NEUTROPHILS NFR BLD AUTO: 58 %
NRBC # BLD AUTO: 0 10E3/UL
NRBC BLD AUTO-RTO: 0 /100
PLATELET # BLD AUTO: 252 10E3/UL (ref 150–450)
POTASSIUM BLD-SCNC: 3.7 MMOL/L (ref 3.4–5.3)
PROT SERPL-MCNC: 6.8 G/DL (ref 6.8–8.8)
RBC # BLD AUTO: 4.51 10E6/UL (ref 3.8–5.2)
SODIUM SERPL-SCNC: 142 MMOL/L (ref 133–144)
WBC # BLD AUTO: 3.8 10E3/UL (ref 4–11)

## 2021-12-06 PROCEDURE — 82040 ASSAY OF SERUM ALBUMIN: CPT

## 2021-12-06 PROCEDURE — G0463 HOSPITAL OUTPT CLINIC VISIT: HCPCS

## 2021-12-06 PROCEDURE — 36415 COLL VENOUS BLD VENIPUNCTURE: CPT | Performed by: REGISTERED NURSE

## 2021-12-06 PROCEDURE — 250N000011 HC RX IP 250 OP 636

## 2021-12-06 PROCEDURE — 85025 COMPLETE CBC W/AUTO DIFF WBC: CPT

## 2021-12-06 PROCEDURE — 96401 CHEMO ANTI-NEOPL SQ/IM: CPT

## 2021-12-06 PROCEDURE — 81025 URINE PREGNANCY TEST: CPT | Performed by: REGISTERED NURSE

## 2021-12-06 PROCEDURE — 99215 OFFICE O/P EST HI 40 MIN: CPT | Performed by: REGISTERED NURSE

## 2021-12-06 PROCEDURE — 83883 ASSAY NEPHELOMETRY NOT SPEC: CPT | Performed by: REGISTERED NURSE

## 2021-12-06 RX ADMIN — BORTEZOMIB 2.5 MG: 3.5 INJECTION, POWDER, LYOPHILIZED, FOR SOLUTION INTRAVENOUS; SUBCUTANEOUS at 09:26

## 2021-12-06 ASSESSMENT — PAIN SCALES - GENERAL: PAINLEVEL: MILD PAIN (2)

## 2021-12-06 NOTE — LETTER
12/6/2021         RE: Kristie Cornejo  [address]  [facility] [address]        Dear Colleague,    Thank you for referring your patient, Kristie Cornejo, to the [facility]. Please see a copy of my visit note below.    [facility]  Date of Visit: Dec 6, 2021   Oncologist: Dr. Payton Reinoso    Reason for visit: myeloma follow-up           Oncology History   51 year old female with past medical history significant for Irritable bowel disease, allergies, large plasmacytoma of the L pelvis, and newly diagnosed multiple myeloma.     Early in April 2021 , she noted to have left sided groin pain, constant and was affecting her daily activities  MRI showed large, expansile, permeative mass with indistinct borders involving the left superior and inferior pubic rami, pubic symphysis and with possible extension to the left acetabulum.     We completed the following work-up:   - Biopsy 9/7/2021: plasmacytoma.  Flow cytometry showed kappa monotypic plasma cells, polytypic B cells. FISH results from plasmacytoma showed a gain of 11q, IGH-CCND1 fusion but no losses of 1q or TP53 and no gain of 1q.  - Bone marrow biopsy 09/22/21: Marrow cellularity 50% with 25% interstitial infiltration w/  Kappa-monotypic, moderately atypical plasma cells. Flow with 0.7% kappa-monotypica plasma cells. FISH and cytogenetics pending.   - PET CT 09/24/21 w/ hypermetabolic pathologic fracture of the L pubic ramus w/ aggressive appearing osteolysis but no additional suspicious lytic or blastic osseous lesions.     Treatment to date:   - Zometa q4 weeks started 9/27/21  - Radiation to left pubic ramus x18 fractions, completed 11/11/21.   - RVD C1D1 11/15/21        INTERIM HISTORY:   Margo presents for oncology follow-up visit today.    She states she has felt the worst on her week off.  Nausea has been a little worse and she has had a low-grade headache. She also notes  "a \"stuffy nose\" and a cough. Her son has had a significant head cold; the family was all  tested for COVID and they were negative last Wednesday.     She takes lorazepam at night for nausea/sleep about twice weekly. She is taking Compazine prior to eating, up to two times a day, but she doesn't always need a second dose.  She took this more consistently on her week off due to increased nausea. Denies vomiting.  She is drinking at least 8 glasses of fluid daily.    She reports her bowels as being \"normal for me.\" She has days of constipation and then large BMs with some diarrhea, and then the process starts over again.    She states her hip is feeling much better.  She was able to go on a 1 mile walk with her  without any walking aids. She is interested in having her hip re-evaluated to see if she can get cleared to do more activity (gentle cross country skiing, longer walks, etc.).    She states the skin at her radiation site has finished peeling and is currently looking pink and healthy.    Energy has been manageable.  She is napping once during the day.    Denies fevers, chills, CP, SOB, vomiting, neuropathy.            Physical Exam:     /80 (BP Location: Left arm, Patient Position: Sitting, Cuff Size: Adult Regular)   Pulse 70   Temp 98.1  F (36.7  C) (Oral)   Resp 16   Wt 78.5 kg (173 lb)   SpO2 97%   BMI 26.52 kg/m     Wt Readings from Last 4 Encounters:   12/06/21 78.5 kg (173 lb)   11/22/21 76.9 kg (169 lb 9.6 oz)   11/15/21 77.4 kg (170 lb 9.6 oz)   11/08/21 77.5 kg (170 lb 12.8 oz)     General: well appearing, no acute distress  HEENT: normocephalic, atraumatic, PERRLA, sclerae nonicteric, moist mucous membranes, oropharynx is clear   Lymph: no palpable cervical, supraclavicular or axillary lymphadenopathy   CV: slightly irregular (pt has hx PVCs)  Lungs: clear to auscultation bilaterally, no wheezes/rales/rhonchi  Abd: soft, positive bowel sounds, non-distended, non-tender  MSK: no " peripheral edema  Neuro: alert and oriented x3, CN grossly intact   Psych: appropriate mood and affect  Skin: no visible rashes or lesions on exposed skin          Laboratory Data:      I personally reviewed the following labs:    Most Recent 3 CBC's:  Recent Labs   Lab Test 12/06/21  0740 11/22/21  0954 11/15/21  1440   WBC 3.8* 5.6 5.9   HGB 13.7 13.9 14.4   MCV 93 94 92    191 225     Most Recent 3 BMP's:  Recent Labs   Lab Test 12/06/21  0740 11/22/21  0954 11/15/21  1440    138 139   POTASSIUM 3.7 3.7 3.7   CHLORIDE 110* 106 111*   CO2 26 27 18*   BUN 19 17 18   CR 0.93 0.91  0.91 0.73   ANIONGAP 6 5 10   MEKA 9.5 9.5  9.5 9.2   GLC 85 111* 256*     Most Recent 2 LFT's:  Recent Labs   Lab Test 12/06/21  0740 11/22/21  0954   AST 9 12   ALT 21 24   ALKPHOS 60 58   BILITOTAL 0.4 0.4              Assessment and Recommendations   Kristie Cornejo is a 51 year old female who was diagnosed with Plasmacytoma after she presented with left sided groin pain now found to have multiple myeloma after further workup with bone marrow biopsy.     # Multiple myeloma with associated large plasmacytoma of the L pelvis. Kappa light chain disease.     BMBx with 25% plasma cell infiltration; standard risk with gain of 11q, IGH-CCND1 fusion but no losses of 1q or TP53 and no gain of 1q.   SPEP w/out a monoclonal peak  UPEP positive. 70% paraprotein. Large monoclonal free immunoglobulin light chain of kappa chain type.     No anemia, normal creat, Ca, no other skeletal lesions. Alb normal, B2M normal.  Stage I Light chain disease with left pelvic bone pathologic fracture she is classified as having symptomatic multiple myeloma.     She started RVD C1D1 11/15/21 (had a slight delay in the start of her Revlimid on 11/19/21 due to the timing of her pregnancy tests).  She tolerated cycle 1 well with some mild nausea and a headache on her week off.    Pending good response, therapy may include autologous stem cell  transplant. Message sent to Dr. Reinoso to clarify how many cycles of RVD to plan before considering stem cell collection.    -Okay to proceed with C2 today. Revlimid has been adjusted so that it starts today with her first Velcade injection.  - Return to clinic to see me with labs prior to C3.  - She has one more weekly pregnancy test (scheduled 12/17) and then will transition to monthly pregnancy tests while on Revlimid which can be done on the first day of each cycle  - Planning for 6 cycles followed by consideration of autoHSCT. BMT consult has been requested, will likely schedule in the next 2-3 weeks.    #Bone Health  She will continue monthly Zometa (last received on 11/22). Next dose due 12/20. She will continue Calcium/Vitamin D supplements.     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 Currently having no pain and able to walk 1 mile without any assistive devices.  She is interested in pursuing other activities like gentle cross-country skiing.  Message sent to Dr. Philip to clarify the duration of activity restriction.    # PVCs  Continue to follow-up with local cardiologist for hx frequent PVCs. She had an ECHO 12/1 and will follow-up with cards tomorrow (12/7).    # Nausea  Mild, managed with Compazine up to twice daily and lorazepam occasionally at night.     # Headache  Discussed that this could be secondary to treatment, dehydration, or a mild viral URI. Encouraged good oral intake and also approved use of Tylenol.      60 minutes spent on the date of the encounter doing chart review, review of test results, patient visit and documentation     DORON Hall CNP  [facility]  [address] [place] [address] [phone]

## 2021-12-06 NOTE — NURSING NOTE
"Oncology Rooming Note    December 6, 2021 7:47 AM   Kristie Cornejo is a 51 year old female who presents for:    Chief Complaint   Patient presents with     Oncology Clinic Visit     plasmacytoma of bone     Blood Draw     Labs drawn from  in lab by RN. VS taken.      Initial Vitals: /80 (BP Location: Left arm, Patient Position: Sitting, Cuff Size: Adult Regular)   Pulse 70   Temp 98.1  F (36.7  C) (Oral)   Resp 16   Wt 78.5 kg (173 lb)   SpO2 97%   BMI 26.52 kg/m   Estimated body mass index is 26.52 kg/m  as calculated from the following:    Height as of 9/15/21: 1.72 m (5' 7.72\").    Weight as of this encounter: 78.5 kg (173 lb). Body surface area is 1.94 meters squared.  Mild Pain (2) Comment: Data Unavailable   No LMP recorded. (Menstrual status: IUD).  Allergies reviewed: Yes  Medications reviewed: Yes    Medications: MEDICATION REFILLS NEEDED TODAY. Provider was notified. Compazine needs refill    Pharmacy name entered into [facility]:    [facility] - [place], [place] - [address]  [id number] - [place], [place] - [address] [facility] [address]  [facility] - [place], [place] - [address]  [facility] [place], [place] - [address] [place] [address]    Clinical concerns: none       Yudelka Roque CMA            "

## 2021-12-06 NOTE — NURSING NOTE
Chief Complaint   Patient presents with     Oncology Clinic Visit     plasmacytoma of bone     Blood Draw     Labs drawn from  in lab by RN. VS taken.      Labs collected from venipuncture by RN. Vitals taken. Checked in for appointment(s).  Hunter Pérez RN

## 2021-12-06 NOTE — PATIENT INSTRUCTIONS
[facility] Main Line: [phone]    Call triage nurse with chills and/or temperature greater than or equal to 100.4, uncontrolled nausea/vomiting, diarrhea, constipation, dizziness, shortness of breath, chest pain, bleeding, unexplained bruising, or any new/concerning symptoms, questions/concerns.   If you are having any concerning symptoms or wish to speak to a provider before your next infusion visit, please call your care coordinator or triage to notify them so we can adequately serve you.   Nurse Triage line:  [phone]    If after hours, weekends, or holidays, call main hospital  and ask for Oncology doctor on call @ [phone]      December 2021 Sunday Monday Tuesday Wednesday Thursday Friday Saturday                  1     2     3    LAB   8:00 AM   (15 min.)   [facility] LAB   [facility] 4       5     6    LAB PERIPHERAL   7:15 AM   (15 min.)   [facility] LAB DRAW   [facility]    RETURN   7:45 AM   (45 min.)   Jackie Ferris, DORON CNP   [facility]    ONC INFUSION 1 HR (60 MIN)   9:00 AM   (60 min.)    ONC INFUSION NURSE   [facility] 7     8     9    ONC INFUSION 1 HR (60 MIN)   9:00 AM   (60 min.)    ONC INFUSION NURSE   [facility] 10    LAB   8:15 AM   (15 min.)   [facility] LAB   [facility] 11       12     13    LAB PERIPHERAL   8:00 AM   (15 min.)    [facility] DRAW   [facility]    ONC INFUSION 1 HR (60 MIN)   8:30 AM   (60 min.)   [facility] INFUSION NURSE   [facility] 14     15     16    ONC INFUSION 1 HR (60 MIN)   9:00 AM   (60 min.)    ONC INFUSION NURSE   [facility] 17    LAB   8:00 AM   (15 min.)   [facility] LAB   [facility] 18       19     20    ONC INFUSION 0.5 HR (30  MIN)   8:00 AM   (30 min.)    ONC INFUSION NURSE   [facility] 21 22     23     24     25       26     27     28     29     30     31                      January 2022 Sunday Monday Tuesday Wednesday Thursday Friday Saturday                                 1       2     3     4     5     6     7     8       9     10     11     12     13     14     15       16     17     18     19     20     21     22       23     24     25     26     27     28     29       30     31                                              Lab Results:  Recent Results (from the past 12 hour(s))   Comprehensive metabolic panel    Collection Time: 12/06/21  7:40 AM   Result Value Ref Range    Sodium 142 133 - 144 mmol/L    Potassium 3.7 3.4 - 5.3 mmol/L    Chloride 110 (H) 94 - 109 mmol/L    Carbon Dioxide (CO2) 26 20 - 32 mmol/L    Anion Gap 6 3 - 14 mmol/L    Urea Nitrogen 19 7 - 30 mg/dL    Creatinine 0.93 0.52 - 1.04 mg/dL    Calcium 9.5 8.5 - 10.1 mg/dL    Glucose 85 70 - 99 mg/dL    Alkaline Phosphatase 60 40 - 150 U/L    AST 9 0 - 45 U/L    ALT 21 0 - 50 U/L    Protein Total 6.8 6.8 - 8.8 g/dL    Albumin 3.6 3.4 - 5.0 g/dL    Bilirubin Total 0.4 0.2 - 1.3 mg/dL    GFR Estimate 71 >60 mL/min/1.73m2   HCG Qual, Urine ([id number])    Collection Time: 12/06/21  7:40 AM   Result Value Ref Range    hCG Urine Qualitative Negative Negative   CBC with platelets and differential    Collection Time: 12/06/21  7:40 AM   Result Value Ref Range    WBC Count 3.8 (L) 4.0 - 11.0 10e3/uL    RBC Count 4.51 3.80 - 5.20 10e6/uL    Hemoglobin 13.7 11.7 - 15.7 g/dL    Hematocrit 42.1 35.0 - 47.0 %    MCV 93 78 - 100 fL    MCH 30.4 26.5 - 33.0 pg    MCHC 32.5 31.5 - 36.5 g/dL    RDW 12.3 10.0 - 15.0 %    Platelet Count 252 150 - 450 10e3/uL    % Neutrophils 58 %    % Lymphocytes 16 %    % Monocytes 18 %    % Eosinophils 7 %    % Basophils 1 %    % Immature Granulocytes 0 %    NRBCs per 100 WBC 0 <1 /100    Absolute Neutrophils  2.2 1.6 - 8.3 10e3/uL    Absolute Lymphocytes 0.6 (L) 0.8 - 5.3 10e3/uL    Absolute Monocytes 0.7 0.0 - 1.3 10e3/uL    Absolute Eosinophils 0.3 0.0 - 0.7 10e3/uL    Absolute Basophils 0.0 0.0 - 0.2 10e3/uL    Absolute Immature Granulocytes 0.0 <=0.4 10e3/uL    Absolute NRBCs 0.0 10e3/uL

## 2021-12-06 NOTE — PROGRESS NOTES
Infusion Nursing Note:  Kristie Cornejo presents today for C2D1 Velcade.    Patient seen by provider today: Yes: Jackie Ferris NP    present during visit today: Not Applicable.    Note: Patient reported to clinic today with no new complaint or concerns after seeing provider.  Patient declined any interventions for pain during infusion visit.    TORB: 0847 12/6/21 Jackie Ferris NP/Raza Mccarty RN  -Ok to proceed with treatment today.    Intravenous Access:  Labs drawn without difficulty  By lab staff.    Treatment Conditions:  Lab Results   Component Value Date    HGB 13.7 12/06/2021    WBC 3.8 (L) 12/06/2021    ANEU 5.7 11/15/2021    ANEUTAUTO 2.2 12/06/2021     12/06/2021      Lab Results   Component Value Date     12/06/2021    POTASSIUM 3.7 12/06/2021    CR 0.93 12/06/2021    MEKA 9.5 12/06/2021    BILITOTAL 0.4 12/06/2021    ALBUMIN 3.6 12/06/2021    ALT 21 12/06/2021    AST 9 12/06/2021     Results reviewed, labs MET treatment parameters, ok to proceed with treatment.    Post Infusion Assessment:  Patient tolerated injection without incident. One injection of Velcade given in left side abdomen subQ  Site patent and intact, free from redness, edema or discomfort.  No evidence of extravasations.     Discharge Plan:   Prescription refills given for Acyclovir, Compazine and Dexamethasone.  Discharge instructions reviewed with: Patient.  Patient and/or family verbalized understanding of discharge instructions and all questions answered.  AVS to patient via [employer].  Patient will return 12/9/21 for next appointment.   Patient discharged in stable condition accompanied by: self.  Departure Mode: Ambulatory.  Face to Face time: 5 minutes.    Raza Mccarty RN

## 2021-12-07 ENCOUNTER — TELEPHONE (OUTPATIENT)
Dept: TRANSPLANT | Facility: CLINIC | Age: 51
End: 2021-12-07
Payer: COMMERCIAL

## 2021-12-07 ENCOUNTER — MYC MEDICAL ADVICE (OUTPATIENT)
Dept: ONCOLOGY | Facility: CLINIC | Age: 51
End: 2021-12-07
Payer: COMMERCIAL

## 2021-12-07 DIAGNOSIS — C90.00 LIGHT CHAIN MYELOMA (H): Primary | ICD-10-CM

## 2021-12-08 NOTE — PROGRESS NOTES
LEI. 12/8/2021. 1352. Jackie Ferris CNP. Jorge Alberto Pearson RN. Draw cbc and CMP prior to Velcade tomorrow 12/9/2021. PA will add lab orders.     IB sent to have scheduling to schedule lab appt.

## 2021-12-09 ENCOUNTER — INFUSION THERAPY VISIT (OUTPATIENT)
Dept: ONCOLOGY | Facility: CLINIC | Age: 51
End: 2021-12-09
Payer: COMMERCIAL

## 2021-12-09 ENCOUNTER — APPOINTMENT (OUTPATIENT)
Dept: LAB | Facility: CLINIC | Age: 51
End: 2021-12-09
Payer: COMMERCIAL

## 2021-12-09 VITALS
WEIGHT: 175.9 LBS | RESPIRATION RATE: 16 BRPM | DIASTOLIC BLOOD PRESSURE: 73 MMHG | SYSTOLIC BLOOD PRESSURE: 123 MMHG | TEMPERATURE: 98.6 F | BODY MASS INDEX: 26.97 KG/M2 | OXYGEN SATURATION: 100 %

## 2021-12-09 DIAGNOSIS — C90.00 LIGHT CHAIN MYELOMA (H): Primary | ICD-10-CM

## 2021-12-09 LAB
ALBUMIN SERPL-MCNC: 3.6 G/DL (ref 3.4–5)
ALP SERPL-CCNC: 55 U/L (ref 40–150)
ALT SERPL W P-5'-P-CCNC: 21 U/L (ref 0–50)
ANION GAP SERPL CALCULATED.3IONS-SCNC: <1 MMOL/L (ref 3–14)
AST SERPL W P-5'-P-CCNC: 11 U/L (ref 0–45)
BASOPHILS # BLD MANUAL: 0.1 10E3/UL (ref 0–0.2)
BASOPHILS NFR BLD MANUAL: 2 %
BILIRUB SERPL-MCNC: 0.3 MG/DL (ref 0.2–1.3)
BUN SERPL-MCNC: 18 MG/DL (ref 7–30)
CALCIUM SERPL-MCNC: 8.6 MG/DL (ref 8.5–10.1)
CHLORIDE BLD-SCNC: 110 MMOL/L (ref 94–109)
CO2 SERPL-SCNC: 26 MMOL/L (ref 20–32)
CREAT SERPL-MCNC: 0.81 MG/DL (ref 0.52–1.04)
EOSINOPHIL # BLD MANUAL: 0.5 10E3/UL (ref 0–0.7)
EOSINOPHIL NFR BLD MANUAL: 11 %
ERYTHROCYTE [DISTWIDTH] IN BLOOD BY AUTOMATED COUNT: 12.4 % (ref 10–15)
GFR SERPL CREATININE-BSD FRML MDRD: 84 ML/MIN/1.73M2
GLUCOSE BLD-MCNC: 90 MG/DL (ref 70–99)
HCT VFR BLD AUTO: 38.7 % (ref 35–47)
HGB BLD-MCNC: 12.8 G/DL (ref 11.7–15.7)
LYMPHOCYTES # BLD MANUAL: 0.9 10E3/UL (ref 0.8–5.3)
LYMPHOCYTES NFR BLD MANUAL: 23 %
MCH RBC QN AUTO: 30.8 PG (ref 26.5–33)
MCHC RBC AUTO-ENTMCNC: 33.1 G/DL (ref 31.5–36.5)
MCV RBC AUTO: 93 FL (ref 78–100)
MONOCYTES # BLD MANUAL: 0.8 10E3/UL (ref 0–1.3)
MONOCYTES NFR BLD MANUAL: 19 %
NEUTROPHILS # BLD MANUAL: 1.8 10E3/UL (ref 1.6–8.3)
NEUTROPHILS NFR BLD MANUAL: 45 %
PLAT MORPH BLD: NORMAL
PLATELET # BLD AUTO: 272 10E3/UL (ref 150–450)
POTASSIUM BLD-SCNC: 3.6 MMOL/L (ref 3.4–5.3)
PROT SERPL-MCNC: 6.7 G/DL (ref 6.8–8.8)
RBC # BLD AUTO: 4.16 10E6/UL (ref 3.8–5.2)
RBC MORPH BLD: NORMAL
SODIUM SERPL-SCNC: 136 MMOL/L (ref 133–144)
WBC # BLD AUTO: 4.1 10E3/UL (ref 4–11)

## 2021-12-09 PROCEDURE — 96401 CHEMO ANTI-NEOPL SQ/IM: CPT

## 2021-12-09 PROCEDURE — 85027 COMPLETE CBC AUTOMATED: CPT

## 2021-12-09 PROCEDURE — 36415 COLL VENOUS BLD VENIPUNCTURE: CPT | Performed by: REGISTERED NURSE

## 2021-12-09 PROCEDURE — 250N000011 HC RX IP 250 OP 636

## 2021-12-09 PROCEDURE — 82040 ASSAY OF SERUM ALBUMIN: CPT | Performed by: REGISTERED NURSE

## 2021-12-09 RX ADMIN — BORTEZOMIB 2.5 MG: 3.5 INJECTION, POWDER, LYOPHILIZED, FOR SOLUTION INTRAVENOUS; SUBCUTANEOUS at 09:48

## 2021-12-09 ASSESSMENT — PAIN SCALES - GENERAL: PAINLEVEL: NO PAIN (0)

## 2021-12-09 NOTE — PROGRESS NOTES
Infusion Nursing Note:  Kristie Cornejo presents today for cycle 2, day 4 velcade.    Patient seen by provider today: No   present during visit today: Not Applicable.    Note: Patient states she is feeling good today, her energy level is up.  Headache is gone. Drinking more fluids. Denies any fevers or chills.  Has some mild intermittent nausea relieved with compazine. Has also had some heartburn recently and started taking OTC prilosec which is helping.  Confirms she is taking dexamethasone and revlimid.        Intravenous Access:  No Intravenous access at this visit.    Treatment Conditions:  Lab Results   Component Value Date    HGB 12.8 12/09/2021    WBC 4.1 12/09/2021    ANEU 1.8 12/09/2021    ANEUTAUTO 2.2 12/06/2021     12/09/2021      Lab Results   Component Value Date     12/09/2021    POTASSIUM 3.6 12/09/2021    CR 0.81 12/09/2021    MEKA 8.6 12/09/2021    BILITOTAL 0.3 12/09/2021    ALBUMIN 3.6 12/09/2021    ALT 21 12/09/2021    AST 11 12/09/2021     Results reviewed, NO treatment parameters, ok to proceed with treatment.      Post Infusion Assessment:  Patient tolerated injection without incident. Velcade given subcutaneously into RIGHT abdomen      Discharge Plan:   Patient declined prescription refills.  Discharge instructions reviewed with: Patient.  Patient and/or family verbalized understanding of discharge instructions and all questions answered.  AVS to patient via [employer].  Patient will return 12/13 for next appointment.   Patient discharged in stable condition accompanied by: self.  Departure Mode: Ambulatory.  Face to Face time: 0.      Carissa Solis RN

## 2021-12-09 NOTE — NURSING NOTE
Chief Complaint   Patient presents with     Blood Draw     Labs drawn via vpt by RN in lab. VS taken      Labs collected from venipuncture by RN. Vitals taken. Checked in for next appointment.      Kristie Sharp RN

## 2021-12-09 NOTE — PATIENT INSTRUCTIONS
[facility] Triage and after hours / weekends / holidays:  [phone]    Please call the triage or after hours line if you experience a temperature greater than or equal to 100.5, shaking chills, have uncontrolled nausea, vomiting and/or diarrhea, dizziness, shortness of breath, chest pain, bleeding, unexplained bruising, or if you have any other new/concerning symptoms, questions or concerns.      If you are having any concerning symptoms or wish to speak to a provider before your next infusion visit, please call your care coordinator or triage to notify them so we can adequately serve you.     If you need a refill on a narcotic prescription or other medication, please call before your infusion appointment.             December 2021 Sunday Monday Tuesday Wednesday Thursday Friday Saturday                  1     2     3    LAB   8:00 AM   (15 min.)   [facility] LAB   [facility] 4       5     6    LAB PERIPHERAL   7:15 AM   (15 min.)   [facility] LAB DRAW   [facility]    RETURN   7:45 AM   (45 min.)   Jackie Ferris APRN CNP   [facility]    ONC INFUSION 1 HR (60 MIN)   9:00 AM   (60 min.)    ONC INFUSION NURSE   [facility] 7     8     9    LAB PERIPHERAL   8:30 AM   (15 min.)   [facility] D[facility]   [facility]    ONC INFUSION 1 HR (60 MIN)   9:00 AM   (60 min.)   [facility] ON[facility] INFUSION NURSE   [facility] 10    LAB   8:15 AM   (15 min.)   [facility] LAB   [facility] 11       12     13    LAB PERIPHERAL   8:00 AM   (15 min.)   [facility] DRAW   [facility]    ONC INFUSION 1 HR (60 MIN)   8:30 AM   (60 min.)    ONC INFUSION NURSE   [facility] 14     15     16    ONC INFUSION 1 HR (60 MIN)   9:00 AM   (60 min.)    ONC INFUSION NURSE   [facility] 17    LAB   8:00 AM   (15 min.)   [facility] LAB   [facility] 18       19     20    ONC INFUSION 0.5 HR (30 MIN)   8:00 AM   (30 min.)    ONC INFUSION NURSE   [facility] 21     22     23     24     25       26     27    LAB PERIPHERAL  11:45 AM   (15 min.)    [facility] DRAW   [facility]    RETURN  12:15 PM   (45 min.)   Jackie Ferris, DORON CNP   [facility]    ONC INFUSION 1 HR (60 MIN)   1:00 PM   (60 min.)   [facility] ON[facility] INFUSION NURSE   [facility] 28     29     30    ONC INFUSION 1 HR (60 MIN)   3:00 PM   (60 min.)    ONC INFUSION NURSE   [facility] 31 January 2022 Sunday Monday Tuesday Wednesday Thursday Friday Saturday                                 1       2     3    LAB PERIPHERAL   2:30 PM   (15 min.)   [facility] D[facility]   [facility]    ONC INFUSION 1 HR (60 MIN)   3:00 PM   (60 min.)    ONC INFUSION NURSE   [facility] 4     5     6    ONC INFUSION 1 HR (60 MIN)   3:30 PM   (60 min.)    ONC INFUSION NURSE   [facility] 7     8       9     10     11     12     13     14     15       16     17     18     19     20     21     22       23     24     25     26     27     28     29       30     31                                             Recent Results (from the past 24 hour(s))   Comprehensive metabolic panel    Collection Time: 12/09/21  9:10 AM   Result Value Ref Range    Sodium 136 133 - 144 mmol/L    Potassium 3.6 3.4 - 5.3 mmol/L    Chloride 110 (H) 94 - 109 mmol/L    Carbon Dioxide (CO2) 26 20 - 32 mmol/L    Anion Gap <1 (L) 3 - 14 mmol/L    Urea Nitrogen 18 7 - 30 mg/dL    Creatinine 0.81 0.52 - 1.04 mg/dL    Calcium 8.6 8.5 - 10.1 mg/dL    Glucose 90 70 - 99 mg/dL    Alkaline Phosphatase 55  40 - 150 U/L    AST 11 0 - 45 U/L    ALT 21 0 - 50 U/L    Protein Total 6.7 (L) 6.8 - 8.8 g/dL    Albumin 3.6 3.4 - 5.0 g/dL    Bilirubin Total 0.3 0.2 - 1.3 mg/dL    GFR Estimate 84 >60 mL/min/1.73m2   CBC with platelets and differential    Collection Time: 12/09/21  9:10 AM   Result Value Ref Range    WBC Count 4.1 4.0 - 11.0 10e3/uL    RBC Count 4.16 3.80 - 5.20 10e6/uL    Hemoglobin 12.8 11.7 - 15.7 g/dL    Hematocrit 38.7 35.0 - 47.0 %    MCV 93 78 - 100 fL    MCH 30.8 26.5 - 33.0 pg    MCHC 33.1 31.5 - 36.5 g/dL    RDW 12.4 10.0 - 15.0 %    Platelet Count 272 150 - 450 10e3/uL   Manual Differential    Collection Time: 12/09/21  9:10 AM   Result Value Ref Range    % Neutrophils 45 %    % Lymphocytes 23 %    % Monocytes 19 %    % Eosinophils 11 %    % Basophils 2 %    Absolute Neutrophils 1.8 1.6 - 8.3 10e3/uL    Absolute Lymphocytes 0.9 0.8 - 5.3 10e3/uL    Absolute Monocytes 0.8 0.0 - 1.3 10e3/uL    Absolute Eosinophils 0.5 0.0 - 0.7 10e3/uL    Absolute Basophils 0.1 0.0 - 0.2 10e3/uL    RBC Morphology Confirmed RBC Indices     Platelet Assessment  Automated Count Confirmed. Platelet morphology is normal.     Automated Count Confirmed. Platelet morphology is normal.

## 2021-12-10 ENCOUNTER — LAB (OUTPATIENT)
Dept: LAB | Facility: CLINIC | Age: 51
End: 2021-12-10
Payer: COMMERCIAL

## 2021-12-10 DIAGNOSIS — C90.30 PLASMACYTOMA OF BONE (H): ICD-10-CM

## 2021-12-10 LAB — HCG UR QL: NEGATIVE

## 2021-12-10 PROCEDURE — 81025 URINE PREGNANCY TEST: CPT

## 2021-12-12 DIAGNOSIS — C90.00 LIGHT CHAIN MYELOMA (H): Primary | ICD-10-CM

## 2021-12-12 RX ORDER — ALBUTEROL SULFATE 90 UG/1
1-2 AEROSOL, METERED RESPIRATORY (INHALATION)
Status: CANCELLED
Start: 2021-12-27

## 2021-12-12 RX ORDER — MEPERIDINE HYDROCHLORIDE 25 MG/ML
25 INJECTION INTRAMUSCULAR; INTRAVENOUS; SUBCUTANEOUS EVERY 30 MIN PRN
Status: CANCELLED | OUTPATIENT
Start: 2021-12-27

## 2021-12-12 RX ORDER — ALBUTEROL SULFATE 0.83 MG/ML
2.5 SOLUTION RESPIRATORY (INHALATION)
Status: CANCELLED | OUTPATIENT
Start: 2022-01-06

## 2021-12-12 RX ORDER — EPINEPHRINE 1 MG/ML
0.3 INJECTION, SOLUTION INTRAMUSCULAR; SUBCUTANEOUS EVERY 5 MIN PRN
Status: CANCELLED | OUTPATIENT
Start: 2021-12-27

## 2021-12-12 RX ORDER — EPINEPHRINE 1 MG/ML
0.3 INJECTION, SOLUTION INTRAMUSCULAR; SUBCUTANEOUS EVERY 5 MIN PRN
Status: CANCELLED | OUTPATIENT
Start: 2022-01-06

## 2021-12-12 RX ORDER — ALBUTEROL SULFATE 0.83 MG/ML
2.5 SOLUTION RESPIRATORY (INHALATION)
Status: CANCELLED | OUTPATIENT
Start: 2021-12-27

## 2021-12-12 RX ORDER — LORAZEPAM 2 MG/ML
0.5 INJECTION INTRAMUSCULAR EVERY 4 HOURS PRN
Status: CANCELLED | OUTPATIENT
Start: 2022-01-03

## 2021-12-12 RX ORDER — NALOXONE HYDROCHLORIDE 0.4 MG/ML
0.2 INJECTION, SOLUTION INTRAMUSCULAR; INTRAVENOUS; SUBCUTANEOUS
Status: CANCELLED | OUTPATIENT
Start: 2022-01-03

## 2021-12-12 RX ORDER — DIPHENHYDRAMINE HYDROCHLORIDE 50 MG/ML
50 INJECTION INTRAMUSCULAR; INTRAVENOUS
Status: CANCELLED
Start: 2021-12-27

## 2021-12-12 RX ORDER — METHYLPREDNISOLONE SODIUM SUCCINATE 125 MG/2ML
125 INJECTION, POWDER, LYOPHILIZED, FOR SOLUTION INTRAMUSCULAR; INTRAVENOUS
Status: CANCELLED
Start: 2022-01-03

## 2021-12-12 RX ORDER — MEPERIDINE HYDROCHLORIDE 25 MG/ML
25 INJECTION INTRAMUSCULAR; INTRAVENOUS; SUBCUTANEOUS EVERY 30 MIN PRN
Status: CANCELLED | OUTPATIENT
Start: 2021-12-30

## 2021-12-12 RX ORDER — NALOXONE HYDROCHLORIDE 0.4 MG/ML
0.2 INJECTION, SOLUTION INTRAMUSCULAR; INTRAVENOUS; SUBCUTANEOUS
Status: CANCELLED | OUTPATIENT
Start: 2022-01-06

## 2021-12-12 RX ORDER — LORAZEPAM 2 MG/ML
0.5 INJECTION INTRAMUSCULAR EVERY 4 HOURS PRN
Status: CANCELLED | OUTPATIENT
Start: 2021-12-30

## 2021-12-12 RX ORDER — EPINEPHRINE 1 MG/ML
0.3 INJECTION, SOLUTION INTRAMUSCULAR; SUBCUTANEOUS EVERY 5 MIN PRN
Status: CANCELLED | OUTPATIENT
Start: 2022-01-03

## 2021-12-12 RX ORDER — ALBUTEROL SULFATE 0.83 MG/ML
2.5 SOLUTION RESPIRATORY (INHALATION)
Status: CANCELLED | OUTPATIENT
Start: 2022-01-03

## 2021-12-12 RX ORDER — ALBUTEROL SULFATE 90 UG/1
1-2 AEROSOL, METERED RESPIRATORY (INHALATION)
Status: CANCELLED
Start: 2022-01-03

## 2021-12-12 RX ORDER — METHYLPREDNISOLONE SODIUM SUCCINATE 125 MG/2ML
125 INJECTION, POWDER, LYOPHILIZED, FOR SOLUTION INTRAMUSCULAR; INTRAVENOUS
Status: CANCELLED
Start: 2021-12-30

## 2021-12-12 RX ORDER — METHYLPREDNISOLONE SODIUM SUCCINATE 125 MG/2ML
125 INJECTION, POWDER, LYOPHILIZED, FOR SOLUTION INTRAMUSCULAR; INTRAVENOUS
Status: CANCELLED
Start: 2022-01-06

## 2021-12-12 RX ORDER — MEPERIDINE HYDROCHLORIDE 25 MG/ML
25 INJECTION INTRAMUSCULAR; INTRAVENOUS; SUBCUTANEOUS EVERY 30 MIN PRN
Status: CANCELLED | OUTPATIENT
Start: 2022-01-06

## 2021-12-12 RX ORDER — NALOXONE HYDROCHLORIDE 0.4 MG/ML
0.2 INJECTION, SOLUTION INTRAMUSCULAR; INTRAVENOUS; SUBCUTANEOUS
Status: CANCELLED | OUTPATIENT
Start: 2021-12-27

## 2021-12-12 RX ORDER — NALOXONE HYDROCHLORIDE 0.4 MG/ML
0.2 INJECTION, SOLUTION INTRAMUSCULAR; INTRAVENOUS; SUBCUTANEOUS
Status: CANCELLED | OUTPATIENT
Start: 2021-12-30

## 2021-12-12 RX ORDER — EPINEPHRINE 1 MG/ML
0.3 INJECTION, SOLUTION INTRAMUSCULAR; SUBCUTANEOUS EVERY 5 MIN PRN
Status: CANCELLED | OUTPATIENT
Start: 2021-12-30

## 2021-12-12 RX ORDER — METHYLPREDNISOLONE SODIUM SUCCINATE 125 MG/2ML
125 INJECTION, POWDER, LYOPHILIZED, FOR SOLUTION INTRAMUSCULAR; INTRAVENOUS
Status: CANCELLED
Start: 2021-12-27

## 2021-12-12 RX ORDER — ALBUTEROL SULFATE 0.83 MG/ML
2.5 SOLUTION RESPIRATORY (INHALATION)
Status: CANCELLED | OUTPATIENT
Start: 2021-12-30

## 2021-12-12 RX ORDER — DIPHENHYDRAMINE HYDROCHLORIDE 50 MG/ML
50 INJECTION INTRAMUSCULAR; INTRAVENOUS
Status: CANCELLED
Start: 2021-12-30

## 2021-12-12 RX ORDER — ALBUTEROL SULFATE 90 UG/1
1-2 AEROSOL, METERED RESPIRATORY (INHALATION)
Status: CANCELLED
Start: 2021-12-30

## 2021-12-12 RX ORDER — ALBUTEROL SULFATE 90 UG/1
1-2 AEROSOL, METERED RESPIRATORY (INHALATION)
Status: CANCELLED
Start: 2022-01-06

## 2021-12-12 RX ORDER — LORAZEPAM 2 MG/ML
0.5 INJECTION INTRAMUSCULAR EVERY 4 HOURS PRN
Status: CANCELLED | OUTPATIENT
Start: 2022-01-06

## 2021-12-12 RX ORDER — DIPHENHYDRAMINE HYDROCHLORIDE 50 MG/ML
50 INJECTION INTRAMUSCULAR; INTRAVENOUS
Status: CANCELLED
Start: 2022-01-03

## 2021-12-12 RX ORDER — DIPHENHYDRAMINE HYDROCHLORIDE 50 MG/ML
50 INJECTION INTRAMUSCULAR; INTRAVENOUS
Status: CANCELLED
Start: 2022-01-06

## 2021-12-12 RX ORDER — MEPERIDINE HYDROCHLORIDE 25 MG/ML
25 INJECTION INTRAMUSCULAR; INTRAVENOUS; SUBCUTANEOUS EVERY 30 MIN PRN
Status: CANCELLED | OUTPATIENT
Start: 2022-01-03

## 2021-12-12 RX ORDER — LORAZEPAM 2 MG/ML
0.5 INJECTION INTRAMUSCULAR EVERY 4 HOURS PRN
Status: CANCELLED | OUTPATIENT
Start: 2021-12-27

## 2021-12-13 ENCOUNTER — INFUSION THERAPY VISIT (OUTPATIENT)
Dept: ONCOLOGY | Facility: CLINIC | Age: 51
End: 2021-12-13
Payer: COMMERCIAL

## 2021-12-13 ENCOUNTER — APPOINTMENT (OUTPATIENT)
Dept: LAB | Facility: CLINIC | Age: 51
End: 2021-12-13
Payer: COMMERCIAL

## 2021-12-13 VITALS
HEART RATE: 74 BPM | WEIGHT: 176.3 LBS | DIASTOLIC BLOOD PRESSURE: 86 MMHG | SYSTOLIC BLOOD PRESSURE: 143 MMHG | RESPIRATION RATE: 16 BRPM | OXYGEN SATURATION: 99 % | TEMPERATURE: 98.5 F | BODY MASS INDEX: 27.03 KG/M2

## 2021-12-13 DIAGNOSIS — C90.00 LIGHT CHAIN MYELOMA (H): Primary | ICD-10-CM

## 2021-12-13 LAB
BASOPHILS # BLD AUTO: 0 10E3/UL (ref 0–0.2)
BASOPHILS NFR BLD AUTO: 1 %
EOSINOPHIL # BLD AUTO: 0.4 10E3/UL (ref 0–0.7)
EOSINOPHIL NFR BLD AUTO: 8 %
ERYTHROCYTE [DISTWIDTH] IN BLOOD BY AUTOMATED COUNT: 12.6 % (ref 10–15)
HCT VFR BLD AUTO: 41.1 % (ref 35–47)
HGB BLD-MCNC: 13.4 G/DL (ref 11.7–15.7)
IMM GRANULOCYTES # BLD: 0.1 10E3/UL
IMM GRANULOCYTES NFR BLD: 1 %
LYMPHOCYTES # BLD AUTO: 0.6 10E3/UL (ref 0.8–5.3)
LYMPHOCYTES NFR BLD AUTO: 14 %
MCH RBC QN AUTO: 30.8 PG (ref 26.5–33)
MCHC RBC AUTO-ENTMCNC: 32.6 G/DL (ref 31.5–36.5)
MCV RBC AUTO: 95 FL (ref 78–100)
MONOCYTES # BLD AUTO: 0.4 10E3/UL (ref 0–1.3)
MONOCYTES NFR BLD AUTO: 9 %
NEUTROPHILS # BLD AUTO: 3 10E3/UL (ref 1.6–8.3)
NEUTROPHILS NFR BLD AUTO: 67 %
NRBC # BLD AUTO: 0 10E3/UL
NRBC BLD AUTO-RTO: 0 /100
PLATELET # BLD AUTO: 194 10E3/UL (ref 150–450)
RBC # BLD AUTO: 4.35 10E6/UL (ref 3.8–5.2)
WBC # BLD AUTO: 4.4 10E3/UL (ref 4–11)

## 2021-12-13 PROCEDURE — 250N000011 HC RX IP 250 OP 636: Mod: JW

## 2021-12-13 PROCEDURE — 96401 CHEMO ANTI-NEOPL SQ/IM: CPT

## 2021-12-13 PROCEDURE — 36415 COLL VENOUS BLD VENIPUNCTURE: CPT

## 2021-12-13 PROCEDURE — 85004 AUTOMATED DIFF WBC COUNT: CPT

## 2021-12-13 RX ORDER — OMEPRAZOLE 20 MG/1
20 TABLET, DELAYED RELEASE ORAL DAILY
COMMUNITY
End: 2022-02-07

## 2021-12-13 RX ADMIN — BORTEZOMIB 2.5 MG: 3.5 INJECTION, POWDER, LYOPHILIZED, FOR SOLUTION INTRAVENOUS; SUBCUTANEOUS at 08:58

## 2021-12-13 ASSESSMENT — PAIN SCALES - GENERAL: PAINLEVEL: MILD PAIN (3)

## 2021-12-13 NOTE — NURSING NOTE
Chief Complaint   Patient presents with     Blood Draw     Vitals, blood drawn via VPT by LPN. Pt checked into appt.      TUAN Gaona LPN

## 2021-12-13 NOTE — PROGRESS NOTES
"Infusion Nursing Note:  Kristie Cornejo presents today for cycle 2, day 8 velcade.    Patient seen by provider today: No   present during visit today: Not Applicable.    Note: Patient denies any fevers or chills at home. States her nausea is at baseline. Her energy level is up and down.  Has been feeling more tired the past couple of days, reports not sleeping well.  Is taking ativan before bed and falls asleep easily, but wakes up every couple of hours overnight.  Patient is going to try taking melatonin. Patient expresses frustration about ongoing weight gain.    Patient has pain 2-3/10 in her left groin/hip today. States she tried shoveling over the weekend and \"over did it.\" Did not require any intervention for pain today. Will continue to monitor and call if worsening.        Intravenous Access:  No Intravenous access at this visit.    Treatment Conditions:  Lab Results   Component Value Date    HGB 13.4 12/13/2021    WBC 4.4 12/13/2021    ANEU 1.8 12/09/2021    ANEUTAUTO 3.0 12/13/2021     12/13/2021      Results reviewed, labs MET treatment parameters, ok to proceed with treatment.  Patient confirms that she is taking revlimid and dexamethasone as prescribed      Post Infusion Assessment:  Patient tolerated injection without incident. Velcade given subcutaneously into LEFT abdomen      Discharge Plan:   Patient declined prescription refills.  Discharge instructions reviewed with: Patient.  Patient and/or family verbalized understanding of discharge instructions and all questions answered.  AVS to patient via [employer].  Patient will return 12/16 for next appointment.   Patient discharged in stable condition accompanied by: self.  Departure Mode: Ambulatory.  Face to Face time: 0.      Carissa Solis RN                      "

## 2021-12-16 ENCOUNTER — INFUSION THERAPY VISIT (OUTPATIENT)
Dept: ONCOLOGY | Facility: CLINIC | Age: 51
End: 2021-12-16
Payer: COMMERCIAL

## 2021-12-16 VITALS
HEART RATE: 70 BPM | WEIGHT: 177 LBS | OXYGEN SATURATION: 98 % | RESPIRATION RATE: 16 BRPM | BODY MASS INDEX: 27.14 KG/M2 | TEMPERATURE: 98.2 F | SYSTOLIC BLOOD PRESSURE: 130 MMHG | DIASTOLIC BLOOD PRESSURE: 84 MMHG

## 2021-12-16 DIAGNOSIS — C90.00 LIGHT CHAIN MYELOMA (H): Primary | ICD-10-CM

## 2021-12-16 PROCEDURE — 250N000011 HC RX IP 250 OP 636: Mod: JW

## 2021-12-16 PROCEDURE — 96401 CHEMO ANTI-NEOPL SQ/IM: CPT

## 2021-12-16 RX ADMIN — BORTEZOMIB 2.5 MG: 3.5 INJECTION, POWDER, LYOPHILIZED, FOR SOLUTION INTRAVENOUS; SUBCUTANEOUS at 09:40

## 2021-12-16 ASSESSMENT — PAIN SCALES - GENERAL: PAINLEVEL: MILD PAIN (3)

## 2021-12-16 NOTE — PROGRESS NOTES
Infusion Nursing Note:  Kristie Cornejo presents today for Cycle 2 Day 11 Velcade.    Patient seen by provider today: No   present during visit today: Not Applicable.    Note: Still has pain in left groin/hip, but she thinks it's getting a little better.  But she's not going on walks w/her  because of it.  She had shoveled last weekend and thinks maybe she over did it.    She also reports intermittent hot feeling in bilat toes.  She's discussed this w/Jackie Ferris NP via My Chart.  I will sent a message w/update to Jackie today.    Is taking her Revlimid and Dexamethasone.      Treatment Conditions:  Not Applicable.  No labs today.      Post Infusion Assessment:  Patient tolerated subcutaneous Velcade injection without incident to RIGHT abdomen.       Discharge Plan:   Patient declined prescription refills.  AVS to patient via [employer].  Patient will return 12/17/21 labs, 12/23/21 [facility] ([facility]), 12/27/21 labs/NP/Velcade/Zometa for next appointment.   Patient discharged in stable condition accompanied by: self.  Departure Mode: Ambulatory.  Face to Face time: 0.      Karen Spicer RN

## 2021-12-16 NOTE — PATIENT INSTRUCTIONS
[facility] Main Line: [phone]    Call triage nurse with chills and/or temperature greater than or equal to 100.4, uncontrolled nausea/vomiting, diarrhea, constipation, dizziness, shortness of breath, chest pain, bleeding, unexplained bruising, or any new/concerning symptoms, questions/concerns.   If you are having any concerning symptoms or wish to speak to a provider before your next infusion visit, please call your care coordinator or triage to notify them so we can adequately serve you.   Triage Nurse Line: [phone]    If after hours, weekends, or holidays [phone]

## 2021-12-17 ENCOUNTER — LAB (OUTPATIENT)
Dept: LAB | Facility: CLINIC | Age: 51
End: 2021-12-17
Payer: COMMERCIAL

## 2021-12-17 ENCOUNTER — TELEPHONE (OUTPATIENT)
Dept: ONCOLOGY | Facility: CLINIC | Age: 51
End: 2021-12-17

## 2021-12-17 DIAGNOSIS — C90.30 PLASMACYTOMA OF BONE (H): ICD-10-CM

## 2021-12-17 DIAGNOSIS — C90.00 LIGHT CHAIN MYELOMA (H): Primary | ICD-10-CM

## 2021-12-17 LAB — HCG UR QL: NEGATIVE

## 2021-12-17 PROCEDURE — 81025 URINE PREGNANCY TEST: CPT

## 2021-12-17 RX ORDER — LENALIDOMIDE 25 MG/1
25 CAPSULE ORAL DAILY
Qty: 14 CAPSULE | Refills: 0 | Status: CANCELLED | OUTPATIENT
Start: 2021-12-27 | End: 2022-01-10

## 2021-12-17 RX ORDER — LENALIDOMIDE 25 MG/1
25 CAPSULE ORAL DAILY
Qty: 14 CAPSULE | Refills: 0 | Status: SHIPPED | OUTPATIENT
Start: 2022-01-03 | End: 2022-04-11

## 2021-12-17 NOTE — ORAL ONC MGMT
Oral Chemotherapy Monitoring Program    Subjective/Objective:  Kristie Cornejo is a 51 year old female contacted by phone for a follow-up visit for oral chemotherapy.      ORAL CHEMOTHERAPY 11/11/2021 11/15/2021 11/17/2021 11/29/2021 11/29/2021 12/13/2021 12/17/2021   Assessment Type Incoming phone call;Other Incoming phone call;Lab Monitoring;Other Incoming phone call;[place] Incoming phone call Initial Follow up Chart Review;Lab Monitoring;Other Lab Monitoring;Monthly Follow up;Refill   Diagnosis Code - - - Multiple Myeloma Multiple Myeloma Multiple Myeloma Multiple Myeloma   Providers Dr. Kemar Reinoso   [facility]   Drug Name Revlimid (lenalidomide) Revlimid (lenalidomide) Revlimid (lenalidomide) Revlimid (lenalidomide) Revlimid (lenalidomide) Revlimid (lenalidomide) Revlimid (lenalidomide)   Dose - - 25 mg 25 mg 25 mg - 25 mg   Current Schedule - - Daily Daily Daily - Daily   Cycle Details - - 2 weeks on, 1 week off 2 weeks on, 1 week off 2 weeks on, 1 week off - 2 weeks on, 1 week off   Start Date of Last Cycle - - - 11/19/2021 11/19/2021 - 12/6/2021   Planned next cycle start date - - 11/19/2021 - 12/6/2021 - 12/27/2021   Doses missed in last 2 weeks - - - - 0 - 0   Adherence Assessment - - - - Adherent - Adherent   Adverse Effects - - - - Nausea;Fatigue - Nausea;Fatigue;Other (See Note for Details)   Nausea - - - - Grade 1 - Grade 1   Pharmacist Intervention(nausea) - - - - No - No   Fatigue - - - - Grade 1 - Grade 1   Pharmacist Intervention(fatigue) - - - - Yes - No   Intervention(s) - - - - Patient education - -   Pharmacist intervention(other) - - - - - - Yes   Intervention(s) - - - - - - Patient education   Any new drug interactions? - - - - No - No   Is the dose as ordered appropriate for the patient? - - - - Yes - Yes   Has the patient missed any days  "of school, work, or other routine activity? - - - - No - No   Since the last time we talked, have you been hospitalized or used the emergency room? - - - - No - No       Last PHQ-2 Score on record:   PHQ-2 ( 1999 Pf[employer]) 8/26/2021 8/26/2021   Q1: Little interest or pleasure in doing things 0 0   Q2: Feeling down, depressed or hopeless 0 0   PHQ-2 Score 0 0   PHQ-2 Total Score (12-17 Years)- Positive if 3 or more points; Administer PHQ-A if positive 0 0   Q1: Little interest or pleasure in doing things Not at all -   Q2: Feeling down, depressed or hopeless Not at all -   PHQ-2 Score 0 -       Vitals:  BP:   BP Readings from Last 1 Encounters:   12/16/21 130/84     Wt Readings from Last 1 Encounters:   12/16/21 80.3 kg (177 lb)     Estimated body surface area is 1.96 meters squared as calculated from the following:    Height as of 9/15/21: 1.72 m (5' 7.72\").    Weight as of 12/16/21: 80.3 kg (177 lb).    Labs:  _  Result Component Current Result Ref Range   Sodium 136 (12/9/2021) 133 - 144 mmol/L     _  Result Component Current Result Ref Range   Potassium 3.6 (12/9/2021) 3.4 - 5.3 mmol/L     _  Result Component Current Result Ref Range   Calcium 8.6 (12/9/2021) 8.5 - 10.1 mg/dL     No results found for Mag within last 30 days.     No results found for Phos within last 30 days.     _  Result Component Current Result Ref Range   Albumin 3.6 (12/9/2021) 3.4 - 5.0 g/dL     _  Result Component Current Result Ref Range   Urea Nitrogen 18 (12/9/2021) 7 - 30 mg/dL     _  Result Component Current Result Ref Range   Creatinine 0.81 (12/9/2021) 0.52 - 1.04 mg/dL     _  Result Component Current Result Ref Range   AST 11 (12/9/2021) 0 - 45 U/L     _  Result Component Current Result Ref Range   ALT 21 (12/9/2021) 0 - 50 U/L     _  Result Component Current Result Ref Range   Bilirubin Total 0.3 (12/9/2021) 0.2 - 1.3 mg/dL     _  Result Component Current Result Ref Range   WBC Count 4.4 (12/13/2021) 4.0 - 11.0 10e3/uL     _  Result " Component Current Result Ref Range   Hemoglobin 13.4 (12/13/2021) 11.7 - 15.7 g/dL     _  Result Component Current Result Ref Range   Platelet Count 194 (12/13/2021) 150 - 450 10e3/uL     _  Result Component Current Result Ref Range   Absolute Neutrophils 1.8 (12/9/2021) 1.6 - 8.3 10e3/uL         Assessment/Plan:  Called Margo to follow-up on how things have been going during the current cycle. Margo reports starting her recent cycle of Revlimid on 12/6/21. She reports still having some Nausea that is controlled with prochlorperazine, and some fatigue. She sometimes has trouble sleeping at night, but attributes to sometimes taking too long of naps in the day, she is trying to use the lorazepam sparingly when this occurs. Her biggest concern is of her hands and feet sometimes getting very hot, today her hands feel HOT. She doesn't report any tingly sensations, swelling or cracked or dry skin on her hands and feet, it just feels like she's been working out or something and the sensation is limited to her hands and sometimes her feet - current episode with her hands has lasted about 24 hours. She had sent a [employer] message the first time it happened, and has noticed it a couple of times since then. We discussed trying to document when these symptoms are occurring and for how long and bringing it to her next appointment with Jackie Ferris on the 27th. Margo reports this is bothersome but not necessarily interfering with her daily activities. Today's pregnancy test was negative.     Follow-Up:  Next cycle, labs and provider appointment 12/27/21    Refill Due:  12/27/21 (released today)    Serina Quesada, PharmD, [facility]  Oral Chemotherapy Monitoring Program  [facility]  [phone]

## 2021-12-20 ENCOUNTER — TELEPHONE (OUTPATIENT)
Dept: ONCOLOGY | Facility: CLINIC | Age: 51
End: 2021-12-20
Payer: COMMERCIAL

## 2021-12-20 NOTE — TELEPHONE ENCOUNTER
Oral Chemotherapy Monitoring Program   Medication: Revlimid   Rx: 25mg PO daily on days 1 through 14 of 21 day cycle   Auth #: [id number]   Risk Category: Adult Female of reproductive potential  Routine survey questions reviewed.   Rx to be Escribed to Alona Crowley  [facility]  Oncology Pharmacy Liaison   [email or web address]  [phone] (phone)  [phone] (fax)

## 2021-12-21 ENCOUNTER — TELEPHONE (OUTPATIENT)
Dept: TRANSPLANT | Facility: CLINIC | Age: 51
End: 2021-12-21
Payer: COMMERCIAL

## 2021-12-21 ENCOUNTER — CARE COORDINATION (OUTPATIENT)
Dept: TRANSPLANT | Facility: CLINIC | Age: 51
End: 2021-12-21
Payer: COMMERCIAL

## 2021-12-21 NOTE — PROGRESS NOTES
[facility] BMT and Cell Therapy Program  RN Coordinator Pre-Visit Documentation      Kristie Cornejo is a 51 year old female who has been referred to the [facility] BMT and Cell Therapy Program for hematopoietic cell transplant or immune effector cell therapy.      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 NA      Previous formal donor search? NA    PRA needed? NA    CMV IGG and ABO needed? NA    Potential family donors to type? NA    Potential BMT CTN 1702 candidate? NA    Need URD consents? NA      All relevant clinical notes, labs, imaging, and pathology are available in [facility], Care Everywhere, or scanned into Media.    The appropriate disease evaluation form has been emailed to the physician and their continuity clinic fellow to complete.      Patient Care Team       Relationship Specialty Notifications Start End    Rena Sheffield MD PCP - General Family Medicine  10/13/21     Phone: [phone] Fax: [phone]         [facility] [address] [facility] [address]    Edu Philip MD Assigned Musculoskeletal Provider   9/5/21     Phone: [phone] Fax: [phone]         [facility] [place] [facility] [address] [facility] [address]    Griselda Valdez, RN Specialty Care Coordinator Hematology & Oncology Admissions 9/16/21     Phone: [phone]         Payton Reinoso MD MD Hematology All results, Admissions 9/16/21     Phone: [phone] Fax: [phone]         [address] [facility] [address] [facility] [address]    Nan Mckenzie MD Assigned Cancer Care Provider   10/24/21     Phone: [phone] Fax: [phone]         [address] [facility] [address]            Nara Dominguez, RN

## 2021-12-21 NOTE — TELEPHONE ENCOUNTER
"Situation: [facility] Telephone Triage Note  Margo (pt) reporting the following symptoms:    hot  hands and feet have been getting progressively  hotter  and have been that way for 3 days    Background:   Started on 12/6 with brief occurrence.   Last dose of the of 14day REVLIMID was 12/19    Assessment:     Described as: Sensitivity to touch.   Denies burning pain, sharp stabbing or electric type of pain, muscle weakness, loss of dexterity.  Duration: Constant  Location: both hands and feet  Rates: Denies pain associated with symptoms, \"Just uncomfortable associated with feeling hot\".   \"feels like has wool socks on after walking around in [place] and extremely hot feeling feet\"  Improved in last 24hrs.   Elevation of legs helps reduce redness/puffiness, and ice packs have also helped with sensation.     Still able to wiggle toes, move around, did lose balance last couple of days on occasion.     Continues to have bowel issues as \"has a screwed up bowel system\", may have a little more constipation than normal. Has been taking Miralax for last 15yrs. Hx has had slow GI system.     Continues to have intermittent nausea but takes compazine which helps with nausea.  Pt reports, \"abdomen feeling bloated, as if in third trimester of pregnancy\", passes gas in middle of night which provides relief then worsens as the day goes on.     Has intermittent cough just in morning and occasionally in evening and feels voice is a little hoarse. Wonders if from not taken allergy shots or something else? Is able to manage symptoms at home and not bothersome.     Denies fevers/chills, cough, sore throat, SOB, incontinence, urinary retention, headaches.     Pt reports since sent MyChart and last dose of REVLIMID on 12/19, pt symptoms improved this morning 12/21 and is going to try to do some mild swimming today.     Recommendations:   This writer educated on home care including self-inspect skin for cuts, abrasions, burns " daily. Inspect bottom of feet with mirror, keep rooms well lighted, use handrails on stairs, check water temperature before bathing, use caution with driving, wear socks & shoes to protect feet, nonskid surfaces, walking or other mild exercise, potholders/oven mitts while cooking, wear gloves when washing dishes or gardening, use of assistive devices  Encouraged to continue with clear liquids/hydration.       Follow-Up Plan:  0858 Per Quyen Gardiner PA-C, sounds like an SE of Velcade, not Revlimid. Pt is seeing Jackie prior to next velcade, they can discuss next steps at that time (dose reduce..etc)     Instructed pt to call triage back with worsening of numbness, tingling, pain or loss of function, interfering with ADL's.  Educated patient to seek care immediately for worsening symptoms, including: fever, chest pain, shortness of breath, sudden loss of function/sensation, uncontrolled pain, unilateral parasthesia, bedridden, injuries resulting in uncontrolled bleeding.    MyChart reply sent with advisement.

## 2021-12-21 NOTE — TELEPHONE ENCOUNTER
Called pt. to confirm new appointment. The pt. Stated that she is not hospitalized or plans to be hospitalized during the time of the appointment.     Gave our office number in case the pt. has any further questions or concerns.

## 2021-12-23 ENCOUNTER — OFFICE VISIT (OUTPATIENT)
Dept: TRANSPLANT | Facility: CLINIC | Age: 51
End: 2021-12-23
Attending: INTERNAL MEDICINE
Payer: COMMERCIAL

## 2021-12-23 VITALS
HEART RATE: 71 BPM | TEMPERATURE: 98.2 F | SYSTOLIC BLOOD PRESSURE: 129 MMHG | OXYGEN SATURATION: 98 % | WEIGHT: 177.8 LBS | DIASTOLIC BLOOD PRESSURE: 79 MMHG | BODY MASS INDEX: 27.26 KG/M2

## 2021-12-23 DIAGNOSIS — C90.30 PLASMACYTOMA OF BONE (H): ICD-10-CM

## 2021-12-23 DIAGNOSIS — C90.00 LIGHT CHAIN MYELOMA (H): Primary | ICD-10-CM

## 2021-12-23 PROCEDURE — G0463 HOSPITAL OUTPT CLINIC VISIT: HCPCS

## 2021-12-23 PROCEDURE — 99215 OFFICE O/P EST HI 40 MIN: CPT

## 2021-12-23 ASSESSMENT — PAIN SCALES - GENERAL: PAINLEVEL: NO PAIN (0)

## 2021-12-23 NOTE — LETTER
12/23/2021         RE: Kristie Cornejo  [address]  [facility] [address]        Dear Colleague,    Thank you for referring your patient, Kristie Cornejo, to the [facility] BLOOD AND MARROW TRANSPLANT PROGRAM [place]. Please see a copy of my visit note below.    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  She was well but developed left-sided groin pain in April 2021.  Physical therapy failed to resolve it and eventually , in July 2021 she had imaging that demonstrated a large expansile mass involving the left superior and inferior pubic rami the pubic symphysis with probably extension into the acetabulum.  Fracture was recognized at that site. A biopsy was done September 7 demonstrating a plasmacytoma with kappa monotypic plasma cells.   FISH showing gain11 q. and IGH-CCN D1 fusion without high risk cytogenetic features.      Additional work-up in  September demonstrated multiple myeloma with a bone marrow having 50% cellularity, 25% of which was kappa monotypic atypical plasma cells. Immunofixation in late September demonstrated large free plasma kappa light chains in urine; and a PET scan showed a hypermetabolic pathologic fracture of the left pubic ramus.  Consultation with radiation oncology in early October led to a plan to start localized radiation which included 3600 cGy in multiple fractions from October 19 to November 11.  There was a substantial skin reaction but no systemic  complications.    Systemic therapy was initiated with RVD (Revlimid Velcade dexamethasone) beginning November 15.  He has tolerated the initiation of the treatment well though she has burning hands and feet that come and go.  They are not associated with discoordination and perhaps are a bit better in her week off lenalidomide but that is uncertain since she is just started.  She has completed two cycles of treatment and is due to start cycle three next week.    Her past history is relatively negative.  She has had irritable bowel disease with alternating constipation and diarrhea for a long time but not limiting.  She has had some sinus arrhythmia recognized for a long time but no other substantial illnesses.    She was taking no notable medications before her diagnosis except allergy medicines and sometimes antidepressants.    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   She is  and has two children ages 15 and 20 who are healthy.  There is no family history of myeloma guerline other blood diseases.  Her father is about 80 being treated for pancreatic cancer following  surgical removal.  One maternal grandmother had breast cancer and perhaps her mother had endometrial cancer .  Her mother also has diabetes, had toxoplasma retinitis in the past and lichen sclerosis of her skin.    She does not smoke cigarettes and rarely drinks alcohol.   She has no specific drug allergies but erythromycin and amoxicillin have led to some stomach upset.  Her chart says she got itching from fluconazole but was uncertain that it was directly from that medication and it certainly may not be an allergy.    Her recent review of systems identifies the burning hands and feet since starting the chemotherapy, healing of the left groin skin irritation from the radiation and considerably less bone aching in her left pelvis.  There is no other sites of bone discomfort.  She is active and walking more but  has not of course, returned to [facility].    Her exam shows her weight roughly stable in the last month but she has gained some weight during treatment.  Her vital signs were acceptable on arrival.  She was alert and conversant.    She has no focal bone tenderness at any site and no inflammatory skin rash, bruises or petechiae.  She has no lower extremity edema.    Her lungs were clear without rales or wheezing.    Her heart tones were regular possibly with an S4 gallop and occasional extrasystoles but there was no murmur and she had no carotid or abdominal bruits.    Her abdomen was soft and nontender without palpable masses or hepatosplenomegaly.    She had no oral lesions or mucosal erythema.    A limited neurologic exam showed intact cranial nerves, brisk upper and lower deep tendon reflexes and she could walk on heels toes, Romberg was negative, finger-nose and rapid altering movement testing was negative as well.    Though she is completed her radiation and one cycle of RVD, she has no evidence of biochemical response to date, though is too early to  since it was drawn after only the first cycle of therapy.  Her kappa light chains were 91 before therapy and 116 on December 6th-after one cycle.  She has no serum monoclonal protein, but a large kappa monoclonal in her urine.  I do not see a 24-hour urine to formally quantitate the urinary protein excretion.    We had an extended (60-minute) visit including review of her records together to discuss further options of therapy.  I told her that while she presented with a large pelvic pubic plasmacytoma she clearly had myeloma with infiltration of her marrow and urinary excretion of kappa light chains.  It is fortunate that she has had no compromise in renal function or hypercalcemia and does not have diffuse bone disease on imaging.    I told her that initial therapy choice with RVD was satisfactory and she has had several (2-3) infusions of Zometa to  augment her bone mineralization as well.  However if after 3-4 cycles of chemotherapy she is not at a minimal disease state or at least showing a very good response, then alternate measures should be considered, perhaps adding daratumumab to her regimen.    The goal is to get her to a minimal disease state and then follow with autologous hematopoietic cell transplantation.  We reviewed the rationale of an autograft allowing high intensity chemotherapy with melphalan to be administered while preserving and protecting her hematologic function by the previously collected and frozen autologous stem cells.    We reviewed the toxicity of the treatment including GI upset, mouth sores, several weeks of pancytopenia needing transfusions and risk of infection, but I emphasized that with the good graft, 98% of people survive the treatment and hopefully of significant tumor depletion aiming for a complete or near complete remission post transplant.    Following autografting, all patients would be best treated with extended long-term maintenance therapy, in her circumstance probably oral lenalidomide as a single agent which should be continued indefinitely.  Several large trials have demonstrated for people with standard risk myeloma such as she (with no high risk cytogenetic features) if they are in a minimal disease state prior to autografting can have a median progression free survival  as long as 9 to 10 years post autotransplant.  Post transplant progression can potentially be managed with numerous other second line options available should her disease either plateau or progress.    We would collect her autologous stem cells after hopefully no more than five or six cycles of RVD using G-CSF cell mobilization, possibly with Plerixafor.  I described the apheresis process to her.  If residual disease had been present, alternatively, chemotherapy mobilization of stem cells might be appropriate.    Overall we discussed the good  likelihood of achieving a minimal disease state in controlling her disease long-term.  I emphasized that a small fraction people can develop secondary cancers after autotransplantation and that has been noted, though still infrequent after lenalidomide maintenance therapy.    She appears to be an excellent candidate for the proposed treatment if she has good response to her ongoing management at this point.  She knows she will have a phone call consultation with a bone marrow transplant program nurse coordinator and  early next week.    We did discuss that her residence in [place] is at the outer limit of where she could live to be able to safely go through autotransplantation staying at home.  The 30 to 45-minute distance is not necessarily suitable for the 2 to 3 weeks when she will need daily visits.  She was open to consideration of a hotel or [facility] stay during that time.    Her questions were answered in full and she knows she can call if additional questions arise.  She can also address them with her ongoing hematologist/ oncologist managing her myeloma care.    Charan Ramires MD    Professor of medicine

## 2021-12-23 NOTE — NURSING NOTE
"Oncology Rooming Note    December 23, 2021 3:21 PM   Kristie Cornejo is a 51 year old female who presents for:    Chief Complaint   Patient presents with     Oncology Clinic Visit     light chain myeloma     Initial Vitals: /79 (BP Location: Right arm, Patient Position: Sitting, Cuff Size: Adult Regular)   Pulse 71   Temp 98.2  F (36.8  C) (Oral)   Wt 80.6 kg (177 lb 12.8 oz)   SpO2 98%   BMI 27.26 kg/m   Estimated body mass index is 27.26 kg/m  as calculated from the following:    Height as of 9/15/21: 1.72 m (5' 7.72\").    Weight as of this encounter: 80.6 kg (177 lb 12.8 oz). Body surface area is 1.96 meters squared.  No Pain (0) Comment: Data Unavailable   No LMP recorded. (Menstrual status: IUD).  Allergies reviewed: Yes  Medications reviewed: Yes    Medications: Medication refills not needed today.  Pharmacy name entered into [facility]:    [facility] - [place], [place] - [address]  [id number] - [place], [place] - [address]  [facility] - [place], [place] - [address]  [place], [place] - [address] [place] [address]    Clinical concerns: New patient. Patient reports having hot feet and ands bilaterally over the last week. Patient needs refills of Ativan and Compazine.       Martine Ramos LPN December 23, 2021 3:22 PM                "

## 2021-12-23 NOTE — PROGRESS NOTES
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  She was well but developed left-sided groin pain in April 2021.  Physical therapy failed to resolve it and eventually , in July 2021 she had imaging that demonstrated a large expansile mass involving the left superior and inferior pubic rami the pubic symphysis with probably extension into the acetabulum.  Fracture was recognized at that site. A biopsy was done September 7 demonstrating a plasmacytoma with kappa monotypic plasma cells.   FISH showing gain11 q. and IGH-CCN D1 fusion without high risk cytogenetic features.      Additional work-up in  September demonstrated multiple myeloma with a bone marrow having 50% cellularity, 25% of which was kappa monotypic atypical plasma cells. Immunofixation in late September demonstrated large free plasma kappa light chains in urine; and a PET scan showed a hypermetabolic pathologic fracture of the left pubic ramus.  Consultation with radiation oncology in early October led to a plan to start localized radiation which included 3600 cGy in multiple fractions from October 19 to November 11.  There was a substantial skin reaction but no systemic complications.    Systemic therapy was initiated with RVD (Revlimid Velcade dexamethasone) beginning November 15.  He has tolerated the initiation of the treatment well though she has burning hands and feet that come and go.  They are not associated with discoordination and perhaps are a bit better  in her week off lenalidomide but that is uncertain since she is just started.  She has completed two cycles of treatment and is due to start cycle three next week.    Her past history is relatively negative.  She has had irritable bowel disease with alternating constipation and diarrhea for a long time but not limiting.  She has had some sinus arrhythmia recognized for a long time but no other substantial illnesses.    She was taking no notable medications before her diagnosis except allergy medicines and sometimes antidepressants.    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   She is  and has two children ages 15 and 20 who are healthy.  There is no family history of myeloma guerline other blood diseases.  Her father is about 80 being treated for pancreatic cancer following  surgical removal.  One maternal grandmother had breast cancer and perhaps her mother had endometrial cancer .  Her mother also has diabetes, had toxoplasma retinitis in the past and lichen sclerosis of her skin.    She does not smoke cigarettes and rarely drinks alcohol.   She has no specific drug allergies but erythromycin and amoxicillin have led to some stomach upset.  Her chart says she got itching from fluconazole but was uncertain that it was directly from that medication and it certainly may not be an allergy.    Her recent review of systems identifies the burning hands and feet since starting the chemotherapy, healing of the left groin skin irritation from the radiation and considerably less bone aching in her left pelvis.  There is no other sites of bone discomfort.  She is active and walking more but has not of course, returned to [facility].    Her exam shows her weight roughly stable in the last month but she has gained some weight during treatment.  Her vital signs were acceptable on arrival.  She was alert and conversant.    She has no focal bone tenderness at any site and no  inflammatory skin rash, bruises or petechiae.  She has no lower extremity edema.    Her lungs were clear without rales or wheezing.    Her heart tones were regular possibly with an S4 gallop and occasional extrasystoles but there was no murmur and she had no carotid or abdominal bruits.    Her abdomen was soft and nontender without palpable masses or hepatosplenomegaly.    She had no oral lesions or mucosal erythema.    A limited neurologic exam showed intact cranial nerves, brisk upper and lower deep tendon reflexes and she could walk on heels toes, Romberg was negative, finger-nose and rapid altering movement testing was negative as well.    Though she is completed her radiation and one cycle of RVD, she has no evidence of biochemical response to date, though is too early to  since it was drawn after only the first cycle of therapy.  Her kappa light chains were 91 before therapy and 116 on December 6th-after one cycle.  She has no serum monoclonal protein, but a large kappa monoclonal in her urine.  I do not see a 24-hour urine to formally quantitate the urinary protein excretion.    We had an extended (60-minute) visit including review of her records together to discuss further options of therapy.  I told her that while she presented with a large pelvic pubic plasmacytoma she clearly had myeloma with infiltration of her marrow and urinary excretion of kappa light chains.  It is fortunate that she has had no compromise in renal function or hypercalcemia and does not have diffuse bone disease on imaging.    I told her that initial therapy choice with RVD was satisfactory and she has had several (2-3) infusions of Zometa to augment her bone mineralization as well.  However if after 3-4 cycles of chemotherapy she is not at a minimal disease state or at least showing a very good response, then alternate measures should be considered, perhaps adding daratumumab to her regimen.    The goal is to get her to a  minimal disease state and then follow with autologous hematopoietic cell transplantation.  We reviewed the rationale of an autograft allowing high intensity chemotherapy with melphalan to be administered while preserving and protecting her hematologic function by the previously collected and frozen autologous stem cells.    We reviewed the toxicity of the treatment including GI upset, mouth sores, several weeks of pancytopenia needing transfusions and risk of infection, but I emphasized that with the good graft, 98% of people survive the treatment and hopefully of significant tumor depletion aiming for a complete or near complete remission post transplant.    Following autografting, all patients would be best treated with extended long-term maintenance therapy, in her circumstance probably oral lenalidomide as a single agent which should be continued indefinitely.  Several large trials have demonstrated for people with standard risk myeloma such as she (with no high risk cytogenetic features) if they are in a minimal disease state prior to autografting can have a median progression free survival  as long as 9 to 10 years post autotransplant.  Post transplant progression can potentially be managed with numerous other second line options available should her disease either plateau or progress.    We would collect her autologous stem cells after hopefully no more than five or six cycles of RVD using G-CSF cell mobilization, possibly with Plerixafor.  I described the apheresis process to her.  If residual disease had been present, alternatively, chemotherapy mobilization of stem cells might be appropriate.    Overall we discussed the good likelihood of achieving a minimal disease state in controlling her disease long-term.  I emphasized that a small fraction people can develop secondary cancers after autotransplantation and that has been noted, though still infrequent after lenalidomide maintenance therapy.    She  appears to be an excellent candidate for the proposed treatment if she has good response to her ongoing management at this point.  She knows she will have a phone call consultation with a bone marrow transplant program nurse coordinator and  early next week.    We did discuss that her residence in [place] is at the outer limit of where she could live to be able to safely go through autotransplantation staying at home.  The 30 to 45-minute distance is not necessarily suitable for the 2 to 3 weeks when she will need daily visits.  She was open to consideration of a hotel or [place] [facility] stay during that time.    Her questions were answered in full and she knows she can call if additional questions arise.  She can also address them with her ongoing hematologist/ oncologist managing her myeloma care.    Charan Ramires MD    Professor of medicine

## 2021-12-26 NOTE — PROGRESS NOTES
"[facility]  Date of Visit: Dec 27, 2021   Oncologist: Dr. Payton Reinoso    Reason for visit: myeloma follow-up           Oncology History   51 year old female with past medical history significant for Irritable bowel disease, allergies, large plasmacytoma of the L pelvis, and newly diagnosed multiple myeloma.     Early in April 2021 , she noted to have left sided groin pain, constant and was affecting her daily activities  MRI showed large, expansile, permeative mass with indistinct borders involving the left superior and inferior pubic rami, pubic symphysis and with possible extension to the left acetabulum.     We completed the following work-up:   - Biopsy 9/7/2021: plasmacytoma.  Flow cytometry showed kappa monotypic plasma cells, polytypic B cells. FISH results from plasmacytoma showed a gain of 11q, IGH-CCND1 fusion but no losses of 1q or TP53 and no gain of 1q.  - Bone marrow biopsy 09/22/21: Marrow cellularity 50% with 25% interstitial infiltration w/  Kappa-monotypic, moderately atypical plasma cells. Flow with 0.7% kappa-monotypica plasma cells. FISH and cytogenetics pending.   - PET CT 09/24/21 w/ hypermetabolic pathologic fracture of the L pubic ramus w/ aggressive appearing osteolysis but no additional suspicious lytic or blastic osseous lesions.     Treatment to date:   - Zometa q4 weeks started 9/27/21  - Radiation to left pubic ramus x18 fractions, completed 11/11/21.   - RVD C1D1 11/15/21  - RVD C2D1 12/6/21        INTERIM HISTORY:   Margo presents for oncology follow-up visit today prior to C3 RVD    She notes a sensation of her hands and feet feeling \"hot.\" The skin is also warm to the touch. When she first noted the sensation, she also had some swelling in her feet and a red \"blotchy\" pattern to the skin.  She has not noted any more redness or swelling, but the hot sensation continues.  Denies pain, tingling, loss of sensation, or weakness.    She continues " to have intermittent nausea, managed with Compazine.  Seems somewhat associated with whether or not she is having consistent bowel movements.    Reports minimal to no pain in her left hip.  She would still like clarification from Dr. Philip regarding whether she has any specific activity restrictions.    She continues to wake every couple hours overnight. She takes lorazepam intermittently as well as melatonin, which are both somewhat helpful. She reports no daytime fatigue over the past week, although she did have some fatigue the week prior.    Denies fevers, chills, CP, SOB, vomiting.           Physical Exam:     /80   Pulse 63   Temp 98.2  F (36.8  C)   Resp 18   Wt 79.4 kg (175 lb)   SpO2 99%   BMI 26.83 kg/m     Wt Readings from Last 4 Encounters:   12/27/21 79.4 kg (175 lb)   12/23/21 80.6 kg (177 lb 12.8 oz)   12/16/21 80.3 kg (177 lb)   12/13/21 80 kg (176 lb 4.8 oz)     General: well appearing, no acute distress  HEENT: normocephalic, atraumatic, PERRLA, sclerae nonicteric, moist mucous membranes, oropharynx is clear, one white mucocele on the inside of her right upper lip  Lymph: no palpable cervical, supraclavicular or axillary lymphadenopathy   CV: slightly irregular (pt has hx PVCs)  Lungs: clear to auscultation bilaterally, no wheezes/rales/rhonchi  Abd: soft, positive bowel sounds, non-distended, non-tender  MSK: no peripheral edema  Neuro: alert and oriented x3, CN grossly intact   Psych: appropriate mood and affect  Skin: bilateral hands and feet are warm to the touch; no visible rashes or lesions on exposed skin          Laboratory Data:      I personally reviewed the following labs:    Most Recent 3 CBC's:  Recent Labs   Lab Test 12/27/21  1153 12/13/21  0828 12/09/21  0910   WBC 5.4 4.4 4.1   HGB 13.5 13.4 12.8   MCV 94 95 93    194 272     Most Recent 3 BMP's:  Recent Labs   Lab Test 12/27/21  1153 12/09/21  0910 12/06/21  0740    136 142   POTASSIUM 3.8 3.6 3.7    CHLORIDE 111* 110* 110*   CO2 24 26 26   BUN 19 18 19   CR 0.81 0.81 0.93   ANIONGAP 8 <1* 6   MEKA 9.2 8.6 9.5   GLC 97 90 85     Most Recent 2 LFT's:  Recent Labs   Lab Test 12/27/21  1153 12/09/21  0910   AST 16 11   ALT 24 21   ALKPHOS 59 55   BILITOTAL 0.4 0.3              Assessment and Recommendations   Kristie Cornejo is a 51 year old female who was diagnosed with Plasmacytoma after she presented with left sided groin pain now found to have multiple myeloma after further workup with bone marrow biopsy.     # Multiple myeloma with associated large plasmacytoma of the L pelvis. Kappa light chain disease.     BMBx with 25% plasma cell infiltration; standard risk with gain of 11q, IGH-CCND1 fusion but no losses of 1q or TP53 and no gain of 1q.   SPEP w/out a monoclonal peak  UPEP positive. 70% paraprotein. Large monoclonal free immunoglobulin light chain of kappa chain type.     No anemia, normal creat, Ca, no other skeletal lesions. Alb normal, B2M normal.  Stage I Light chain disease with left pelvic bone pathologic fracture she is classified as having symptomatic multiple myeloma.     She started RVD C1D1 11/15/21 (had a slight delay in the start of her Revlimid on 11/19/21 due to the timing of her pregnancy tests).  She tolerated cycle 1 well with some mild nausea and a headache on her week off.  RVD C2D1 12/6/21, tolerated well overall, but has had more consistent sensation of her hands and feet being hot. No tingling, pain, loss of sensation, or changes to her strength.    Pending good response, therapy may include autologous stem cell transplant.     -Okay to proceed with C3 today.  - Return to clinic to see me with labs prior to C4.  - Continue monthly pregnancy tests while on Revlimid which can be done on the first day of each cycle  - Will obtain 24 hour urine protein and repeat UPEP today  - Planning for 6 cycles RVD followed by consideration of autoHSCT. BMT consult was completed 12/23/21  - If  concerns for peripheral neuropathy increase, could consider switching from Velcade to Daratumumab.     #Bone Health  She will continue monthly Zometa (last received on 11/22). Next dose due today. She will continue Calcium/Vitamin D supplements.     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 Currently having no pain and able to walk 1 mile without any assistive devices.  She is interested in pursuing other activities like gentle cross-country skiing.  Second message sent to Dr. Philip to clarify the duration of activity restriction.    # PVCs  Continue to follow-up with local cardiologist for hx frequent PVCs. She had an ECHO 12/1 and had follow-up with cardiology 12/7 (notes in CareEverywhere).  Plan to follow-up with them in 8 months (around August 2022).    # Nausea  Mild, managed with Compazine up to twice daily and lorazepam occasionally at night.       60 minutes spent on the date of the encounter doing chart review, review of test results, patient visit and documentation     DORON Hall CNP  [facility]  [address] [place] [id number] [phone]

## 2021-12-27 ENCOUNTER — VIRTUAL VISIT (OUTPATIENT)
Dept: TRANSPLANT | Facility: CLINIC | Age: 51
End: 2021-12-27
Attending: INTERNAL MEDICINE
Payer: COMMERCIAL

## 2021-12-27 ENCOUNTER — APPOINTMENT (OUTPATIENT)
Dept: LAB | Facility: CLINIC | Age: 51
End: 2021-12-27
Payer: COMMERCIAL

## 2021-12-27 ENCOUNTER — LAB (OUTPATIENT)
Dept: LAB | Facility: CLINIC | Age: 51
End: 2021-12-27

## 2021-12-27 ENCOUNTER — ONCOLOGY VISIT (OUTPATIENT)
Dept: ONCOLOGY | Facility: CLINIC | Age: 51
End: 2021-12-27
Attending: REGISTERED NURSE
Payer: COMMERCIAL

## 2021-12-27 VITALS
DIASTOLIC BLOOD PRESSURE: 80 MMHG | WEIGHT: 175 LBS | BODY MASS INDEX: 26.83 KG/M2 | RESPIRATION RATE: 18 BRPM | OXYGEN SATURATION: 99 % | SYSTOLIC BLOOD PRESSURE: 137 MMHG | HEART RATE: 63 BPM | TEMPERATURE: 98.2 F

## 2021-12-27 DIAGNOSIS — S32.592D CLOSED FRACTURE OF RAMUS OF LEFT PUBIS WITH ROUTINE HEALING, SUBSEQUENT ENCOUNTER: ICD-10-CM

## 2021-12-27 DIAGNOSIS — C90.00 LIGHT CHAIN MYELOMA (H): ICD-10-CM

## 2021-12-27 DIAGNOSIS — C90.30 PLASMACYTOMA OF BONE (H): Primary | ICD-10-CM

## 2021-12-27 DIAGNOSIS — C90.30 PLASMACYTOMA OF BONE (H): ICD-10-CM

## 2021-12-27 DIAGNOSIS — C90.00 LIGHT CHAIN MYELOMA (H): Primary | ICD-10-CM

## 2021-12-27 DIAGNOSIS — R11.0 NAUSEA: ICD-10-CM

## 2021-12-27 DIAGNOSIS — Z71.9 VISIT FOR COUNSELING: Primary | ICD-10-CM

## 2021-12-27 LAB
ALBUMIN SERPL-MCNC: 3.8 G/DL (ref 3.4–5)
ALP SERPL-CCNC: 59 U/L (ref 40–150)
ALT SERPL W P-5'-P-CCNC: 24 U/L (ref 0–50)
ANION GAP SERPL CALCULATED.3IONS-SCNC: 8 MMOL/L (ref 3–14)
AST SERPL W P-5'-P-CCNC: 16 U/L (ref 0–45)
BASOPHILS # BLD AUTO: 0.1 10E3/UL (ref 0–0.2)
BASOPHILS NFR BLD AUTO: 1 %
BILIRUB SERPL-MCNC: 0.4 MG/DL (ref 0.2–1.3)
BUN SERPL-MCNC: 19 MG/DL (ref 7–30)
CALCIUM SERPL-MCNC: 9.2 MG/DL (ref 8.5–10.1)
CHLORIDE BLD-SCNC: 111 MMOL/L (ref 94–109)
CO2 SERPL-SCNC: 24 MMOL/L (ref 20–32)
CREAT SERPL-MCNC: 0.81 MG/DL (ref 0.52–1.04)
EOSINOPHIL # BLD AUTO: 0.4 10E3/UL (ref 0–0.7)
EOSINOPHIL NFR BLD AUTO: 7 %
ERYTHROCYTE [DISTWIDTH] IN BLOOD BY AUTOMATED COUNT: 13.2 % (ref 10–15)
GFR SERPL CREATININE-BSD FRML MDRD: 87 ML/MIN/1.73M2
GLUCOSE BLD-MCNC: 97 MG/DL (ref 70–99)
HCG UR QL: NEGATIVE
HCT VFR BLD AUTO: 41.1 % (ref 35–47)
HGB BLD-MCNC: 13.5 G/DL (ref 11.7–15.7)
IMM GRANULOCYTES # BLD: 0 10E3/UL
IMM GRANULOCYTES NFR BLD: 0 %
LYMPHOCYTES # BLD AUTO: 0.8 10E3/UL (ref 0.8–5.3)
LYMPHOCYTES NFR BLD AUTO: 15 %
MCH RBC QN AUTO: 31 PG (ref 26.5–33)
MCHC RBC AUTO-ENTMCNC: 32.8 G/DL (ref 31.5–36.5)
MCV RBC AUTO: 94 FL (ref 78–100)
MONOCYTES # BLD AUTO: 1 10E3/UL (ref 0–1.3)
MONOCYTES NFR BLD AUTO: 19 %
NEUTROPHILS # BLD AUTO: 3.1 10E3/UL (ref 1.6–8.3)
NEUTROPHILS NFR BLD AUTO: 58 %
NRBC # BLD AUTO: 0 10E3/UL
NRBC BLD AUTO-RTO: 0 /100
PLATELET # BLD AUTO: 276 10E3/UL (ref 150–450)
POTASSIUM BLD-SCNC: 3.8 MMOL/L (ref 3.4–5.3)
PROT SERPL-MCNC: 6.9 G/DL (ref 6.8–8.8)
RBC # BLD AUTO: 4.36 10E6/UL (ref 3.8–5.2)
SODIUM SERPL-SCNC: 143 MMOL/L (ref 133–144)
WBC # BLD AUTO: 5.4 10E3/UL (ref 4–11)

## 2021-12-27 PROCEDURE — 96401 CHEMO ANTI-NEOPL SQ/IM: CPT

## 2021-12-27 PROCEDURE — 36415 COLL VENOUS BLD VENIPUNCTURE: CPT

## 2021-12-27 PROCEDURE — 85048 AUTOMATED LEUKOCYTE COUNT: CPT

## 2021-12-27 PROCEDURE — 96365 THER/PROPH/DIAG IV INF INIT: CPT

## 2021-12-27 PROCEDURE — 81025 URINE PREGNANCY TEST: CPT | Performed by: REGISTERED NURSE

## 2021-12-27 PROCEDURE — 258N000003 HC RX IP 258 OP 636

## 2021-12-27 PROCEDURE — G0463 HOSPITAL OUTPT CLINIC VISIT: HCPCS | Mod: 25

## 2021-12-27 PROCEDURE — 80053 COMPREHEN METABOLIC PANEL: CPT

## 2021-12-27 PROCEDURE — 250N000011 HC RX IP 250 OP 636: Mod: JW

## 2021-12-27 PROCEDURE — 99215 OFFICE O/P EST HI 40 MIN: CPT | Performed by: REGISTERED NURSE

## 2021-12-27 RX ORDER — ALBUTEROL SULFATE 0.83 MG/ML
2.5 SOLUTION RESPIRATORY (INHALATION)
Status: CANCELLED | OUTPATIENT
Start: 2022-01-25

## 2021-12-27 RX ORDER — HEPARIN SODIUM (PORCINE) LOCK FLUSH IV SOLN 100 UNIT/ML 100 UNIT/ML
5 SOLUTION INTRAVENOUS
Status: CANCELLED | OUTPATIENT
Start: 2022-01-25

## 2021-12-27 RX ORDER — ZOLEDRONIC ACID 0.04 MG/ML
4 INJECTION, SOLUTION INTRAVENOUS ONCE
Status: COMPLETED | OUTPATIENT
Start: 2021-12-27 | End: 2021-12-27

## 2021-12-27 RX ORDER — ZOLEDRONIC ACID 0.04 MG/ML
4 INJECTION, SOLUTION INTRAVENOUS ONCE
Status: CANCELLED
Start: 2022-01-25 | End: 2022-01-25

## 2021-12-27 RX ORDER — METHYLPREDNISOLONE SODIUM SUCCINATE 125 MG/2ML
125 INJECTION, POWDER, LYOPHILIZED, FOR SOLUTION INTRAMUSCULAR; INTRAVENOUS
Status: CANCELLED
Start: 2022-01-25

## 2021-12-27 RX ORDER — NALOXONE HYDROCHLORIDE 0.4 MG/ML
0.2 INJECTION, SOLUTION INTRAMUSCULAR; INTRAVENOUS; SUBCUTANEOUS
Status: CANCELLED | OUTPATIENT
Start: 2022-01-25

## 2021-12-27 RX ORDER — ALBUTEROL SULFATE 90 UG/1
1-2 AEROSOL, METERED RESPIRATORY (INHALATION)
Status: CANCELLED
Start: 2022-01-25

## 2021-12-27 RX ORDER — LORAZEPAM 0.5 MG/1
0.5 TABLET ORAL EVERY 4 HOURS PRN
Qty: 30 TABLET | Refills: 3 | Status: SHIPPED | OUTPATIENT
Start: 2021-12-27 | End: 2022-03-29

## 2021-12-27 RX ORDER — DEXAMETHASONE 4 MG/1
40 TABLET ORAL
Qty: 30 TABLET | Refills: 0 | Status: SHIPPED | OUTPATIENT
Start: 2022-01-03 | End: 2022-05-24

## 2021-12-27 RX ORDER — MEPERIDINE HYDROCHLORIDE 25 MG/ML
25 INJECTION INTRAMUSCULAR; INTRAVENOUS; SUBCUTANEOUS EVERY 30 MIN PRN
Status: CANCELLED | OUTPATIENT
Start: 2022-01-25

## 2021-12-27 RX ORDER — DIPHENHYDRAMINE HYDROCHLORIDE 50 MG/ML
50 INJECTION INTRAMUSCULAR; INTRAVENOUS
Status: CANCELLED
Start: 2022-01-25

## 2021-12-27 RX ORDER — HEPARIN SODIUM,PORCINE 10 UNIT/ML
5 VIAL (ML) INTRAVENOUS
Status: CANCELLED | OUTPATIENT
Start: 2022-01-25

## 2021-12-27 RX ORDER — EPINEPHRINE 1 MG/ML
0.3 INJECTION, SOLUTION INTRAMUSCULAR; SUBCUTANEOUS EVERY 5 MIN PRN
Status: CANCELLED | OUTPATIENT
Start: 2022-01-25

## 2021-12-27 RX ORDER — DEXAMETHASONE 4 MG/1
40 TABLET ORAL
Qty: 30 TABLET | Refills: 0 | Status: CANCELLED | OUTPATIENT
Start: 2021-12-27 | End: 2022-01-11

## 2021-12-27 RX ORDER — PROCHLORPERAZINE MALEATE 10 MG
10 TABLET ORAL EVERY 6 HOURS PRN
Qty: 30 TABLET | Refills: 3 | Status: SHIPPED | OUTPATIENT
Start: 2021-12-27 | End: 2022-01-21

## 2021-12-27 RX ADMIN — SODIUM CHLORIDE 250 ML: 9 INJECTION, SOLUTION INTRAVENOUS at 13:48

## 2021-12-27 RX ADMIN — BORTEZOMIB 2.5 MG: 3.5 INJECTION, POWDER, LYOPHILIZED, FOR SOLUTION INTRAVENOUS; SUBCUTANEOUS at 13:57

## 2021-12-27 RX ADMIN — ZOLEDRONIC ACID 4 MG: 0.04 INJECTION, SOLUTION INTRAVENOUS at 13:52

## 2021-12-27 ASSESSMENT — PAIN SCALES - GENERAL: PAINLEVEL: NO PAIN (0)

## 2021-12-27 NOTE — PROGRESS NOTES
"BMT Clinical Social Work New Transplant Evaluation    Information for this evaluation on December 27, 2021  was collected via Pt report, consultation with medical team, and medical chart review.     Present:  Patient: Kristie \"Margo\" Clemente   Spouse: Erwin Cornejo  Clinical  (CSW): SARAH Blanc, [employer]    Medical Team:   Nurse Coordinator: Nara Dominguez RN  BMT Physician: Kevin Ramires MD    Diagnosis: Multiple Myeloma  (MM)  Diagnosis Date: 10/2021      Presenting Information:   Pt is a 51 year old female diagnosed with MM who presents to [facility] for consultation regarding an autologous stem cell transplant. Pt was accompanied by her  Erwin for our telephone visit today/.      Contact Information:  Pt Phone: [phone]  Pt Email: [email or web address]  Pt's  Jesse Phone: [phone]    Special Needs:  No needs identified at this time.   Margo lives in [place], [place] which is 29 min away. She is aware that she does not need to relocate, but did express a desire to relocate to be close to the clinic post BMT. Discussed options and she notes she will likely stay in a hotel during this time.     Family Constellation:   Spouse: Erwin Cornejo  Children: 1 daughter (20) student at [employer] ; 1 son (15) [employer]  Parents: Ernesto and Janet - live in [place], father is going through treatment for CA as well.  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 Erwin works for [employer] and is a  Software professional    Finances/Insurance:  No financial or insurance concerns identified at this time. Margo is supported by her limited income and [place]. They address no current financial concerns at this time.    CSW provided information regarding the insurance authorization " process and the role of the BMT financial case-mangers. CSW provided contact info for the BMT financial case-managers and referred pt to them for future insurance questions.     Caregiver:  CSW discussed with Pt the caregiver role and expectation at length. Pt is agreeable to having a full time caregiver for a minimum of 30 days until cleared by the BMT physician. Pt's identified caregivers are her  and sister. Caregiver education and information provided. No caregiver concerns identified.     Healthcare Directive:  Yes. Copy is currently scanned into the EMR.     Resources Provided:  BMT Information Book   BMT Resources Packet:   Healthcare Directive:   Tour of Unit NO   Transplant unit description and information provided.     Identified Concerns:   No concerns identified at this time.     Summary:   Pt presents to [facility] for consultation regarding an autologous stem cell transplant. Pt/family asked good questions regarding psychosocial factors related to BMT; all of which were answered. Pt presented as friendly and open. Pt's affect was engaged. Family's affect was engaged and supported.       Plan:   CSW provided contact information and encouraged Pt to contact CSW with questions, concerns, resources and for support. CSW will continue to follow Pt to provide supportive counseling and assistance with resources as needed.      SARAH Blanc, [facility]  Phone [phone]  Pager [phone]

## 2021-12-27 NOTE — NURSING NOTE
"Oncology Rooming Note    December 27, 2021 12:22 PM   Kristie Cornejo is a 51 year old female who presents for:    Chief Complaint   Patient presents with     Labs Only     venipuncture, vitals checked     Oncology Clinic Visit     PLASMACYTOMA OF BONE     Initial Vitals: /80   Pulse 63   Temp 98.2  F (36.8  C)   Resp 18   Wt 79.4 kg (175 lb)   SpO2 99%   BMI 26.83 kg/m   Estimated body mass index is 26.83 kg/m  as calculated from the following:    Height as of 9/15/21: 1.72 m (5' 7.72\").    Weight as of this encounter: 79.4 kg (175 lb). Body surface area is 1.95 meters squared.  No Pain (0) Comment: Data Unavailable   No LMP recorded. (Menstrual status: IUD).  Allergies reviewed: Yes  Medications reviewed: Yes    Medications: MEDICATION REFILLS NEEDED TODAY. Provider was notified.  Pharmacy name entered into [facility]:    [facility] [place], [place] - [address]  [id number] - [place], [place] - [address] AT [facility] [address]  [facility] - [place], [place] - [address]  [facility] - [place], [place] - [address] [place] [address]    Clinical concerns: Hot hands and feet. Would also like to discuss urine test mentioned by Dr. Ramires. Needs refill of Dex (wants this ASAP so she can take it and not be up all night), lorazepam, and compazine.   Jackie Ferris was notified.      Narcisa Maier, [facility]            "

## 2021-12-27 NOTE — NURSING NOTE
Chief Complaint   Patient presents with     Labs Only     venipuncture, vitals checked     PIV placed  Ruby Michele RN on 12/27/2021 at 11:57 AM

## 2021-12-27 NOTE — LETTER
12/27/2021         RE: Kristie Cornejo  [address]  [facility] [address]        Dear Colleague,    Thank you for referring your patient, Kristie Cornejo, to the [facility] BLOOD AND MARROW TRANSPLANT PROGRAM [place]. Please see a copy of my visit note below.    Spoke with Margo following new transplant visit with Dr. Ramires. Reviewed plan of care per NT conversation for Stem Cell Transplant. Explained role of the Nurse Coordinator throughout the BMT process as well as general time line and expectations for transplant. Discussed necessity of caregiver and program's proximity requirements. All questions were answered.     Plan: Autologous Transplant, pending Response to treatment. Currenlty in cycle 2 of RVD. No response to C1. Will get total of 4 cycles and hopefully able to come approx late FEB    Contact information provided for :  yes    HLA typing drawn: no    PRA typing drawn:  no    CMV-IgG and ABO-Rh drawn or in record:  no    Contact information provided for :  no    Financial Release for URD search obtained:  no    [id number] Consent Signed: no    EOC Reason updated: yes      BMT Nurse Coordinator

## 2021-12-27 NOTE — LETTER
Date:January 4, 2022      Provider requested that no letter be sent. Do not send.       [facility]

## 2021-12-27 NOTE — PROGRESS NOTES
Infusion Nursing Note:  Kristie Cornejo presents today for Cycle 3 Day 1 Velcade and Zometa.    Patient seen by provider today: Yes: Jackie Ferris NP.   present during visit today: Not Applicable.    Note: Pt confirmed she is taking Dex and Revlimid as prescribed.    Intravenous Access:  Peripheral IV placed.    Treatment Conditions:  Lab Results   Component Value Date    HGB 13.5 12/27/2021    WBC 5.4 12/27/2021    ANEU 1.8 12/09/2021    ANEUTAUTO 3.1 12/27/2021     12/27/2021      Lab Results   Component Value Date     12/27/2021    POTASSIUM 3.8 12/27/2021    CR 0.81 12/27/2021    MEKA 9.2 12/27/2021    BILITOTAL 0.4 12/27/2021    ALBUMIN 3.8 12/27/2021    ALT 24 12/27/2021    AST 16 12/27/2021     Results reviewed, labs MET treatment parameters, ok to proceed with treatment.    Post Infusion Assessment:  Patient tolerated infusion without incident.  Patient tolerated injection without incident to the left abdomen.  Blood return noted pre and post infusion.  Site patent and intact, free from redness, edema or discomfort.  No evidence of extravasations.  Access discontinued per protocol.     Discharge Plan:   Patient declined prescription refills.  AVS to patient via [employer].  Patient will return 12/30 for next appointment.   Patient discharged in stable condition accompanied by: self.  Departure Mode: Ambulatory.  Face to Face time: 0.      Danni Ge RN

## 2021-12-27 NOTE — LETTER
"    12/27/2021         RE: Kristie Cornejo  [address]  [facility] [address]        Dear Colleague,    Thank you for referring your patient, Kristie Cornejo, to the [facility] BLOOD AND MARROW TRANSPLANT PROGRAM [place]. Please see a copy of my visit note below.    BMT Clinical Social Work New Transplant Evaluation    Information for this evaluation on December 27, 2021  was collected via Pt report, consultation with medical team, and medical chart review.     Present:  Patient: Kristie \"Margo\" Clemente   Spouse: Erwin Clemente  Clinical  (CSW): SARAH Blanc, [facility]    Medical Team:   Nurse Coordinator: Nara Dominguez RN  BMT Physician: Kevin Ramires MD    Diagnosis: Multiple Myeloma  (MM)  Diagnosis Date: 10/2021      Presenting Information:   Pt is a 51 year old female diagnosed with MM who presents to [facility] for consultation regarding an autologous stem cell transplant. Pt was accompanied by her  Erwin for our telephone visit today/.      Contact Information:  Pt Phone: [phone]  Pt Email: [email or web address]  Pt's  Jesse Phone: [phone]    Special Needs:  No needs identified at this time.   Margo lives in [place], [place] which is 29 min away. She is aware that she does not need to relocate, but did express a desire to relocate to be close to the clinic post BMT. Discussed options and she notes she will likely stay in a hotel during this time.     Family Constellation:   Spouse: Erwin Cornejo  Children: 1 daughter (20) student at [employer] ; 1 son (15) [facility]  Parents: Ernesto and Janet - live in [place], father is going through treatment for CA as well.  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 Erwin works for [employer] and is a  Software professional    Finances/Insurance:  No financial or insurance concerns identified at this time. Margo is supported by her limited income and [place]. They address no current financial concerns at this time.    CSW provided information regarding the insurance authorization process and the role of the BMT financial case-mangers. CSW provided contact info for the BMT financial case-managers and referred pt to them for future insurance questions.     Caregiver:  CSW discussed with Pt the caregiver role and expectation at length. Pt is agreeable to having a full time caregiver for a minimum of 30 days until cleared by the BMT physician. Pt's identified caregivers are her  and sister. Caregiver education and information provided. No caregiver concerns identified.     Healthcare Directive:  Yes. Copy is currently scanned into the EMR.     Resources Provided:  BMT Information Book   BMT Resources Packet:   Healthcare Directive:   Tour of Unit NO   Transplant unit description and information provided.     Identified Concerns:   No concerns identified at this time.     Summary:   Pt presents to [facility] for consultation regarding an autologous stem cell transplant. Pt/family asked good questions regarding psychosocial factors related to BMT; all of which were answered. Pt presented as friendly and open. Pt's affect was engaged. Family's affect was engaged and supported.       Plan:   CSW provided contact information and encouraged Pt to contact CSW with questions, concerns, resources and for support. CSW will continue to follow Pt to provide supportive counseling and assistance with resources as needed.      SARAH Blanc, MARCOS  [facility]  Phone [phone]  Pager [phone]            Again, thank you for allowing me to participate in the care of your patient.        Sincerely,        MARCOS Jacobo

## 2021-12-27 NOTE — LETTER
"    12/27/2021         RE: Kristie Cornejo  [address]  [facility] [address]        Dear Colleague,    Thank you for referring your patient, Kristie Cornejo, to the [facility]. Please see a copy of my visit note below.    [facility]  Date of Visit: Dec 27, 2021   Oncologist: Dr. Payton Reinoso    Reason for visit: myeloma follow-up           Oncology History   51 year old female with past medical history significant for Irritable bowel disease, allergies, large plasmacytoma of the L pelvis, and newly diagnosed multiple myeloma.     Early in April 2021 , she noted to have left sided groin pain, constant and was affecting her daily activities  MRI showed large, expansile, permeative mass with indistinct borders involving the left superior and inferior pubic rami, pubic symphysis and with possible extension to the left acetabulum.     We completed the following work-up:   - Biopsy 9/7/2021: plasmacytoma.  Flow cytometry showed kappa monotypic plasma cells, polytypic B cells. FISH results from plasmacytoma showed a gain of 11q, IGH-CCND1 fusion but no losses of 1q or TP53 and no gain of 1q.  - Bone marrow biopsy 09/22/21: Marrow cellularity 50% with 25% interstitial infiltration w/  Kappa-monotypic, moderately atypical plasma cells. Flow with 0.7% kappa-monotypica plasma cells. FISH and cytogenetics pending.   - PET CT 09/24/21 w/ hypermetabolic pathologic fracture of the L pubic ramus w/ aggressive appearing osteolysis but no additional suspicious lytic or blastic osseous lesions.     Treatment to date:   - Zometa q4 weeks started 9/27/21  - Radiation to left pubic ramus x18 fractions, completed 11/11/21.   - RVD C1D1 11/15/21  - RVD C2D1 12/6/21        INTERIM HISTORY:   Margo presents for oncology follow-up visit today prior to C3 RVD    She notes a sensation of her hands and feet feeling \"hot.\" The skin is also warm to the touch. When " "she first noted the sensation, she also had some swelling in her feet and a red \"blotchy\" pattern to the skin.  She has not noted any more redness or swelling, but the hot sensation continues.  Denies pain, tingling, loss of sensation, or weakness.    She continues to have intermittent nausea, managed with Compazine.  Seems somewhat associated with whether or not she is having consistent bowel movements.    Reports minimal to no pain in her left hip.  She would still like clarification from Dr. Philip regarding whether she has any specific activity restrictions.    She continues to wake every couple hours overnight. She takes lorazepam intermittently as well as melatonin, which are both somewhat helpful. She reports no daytime fatigue over the past week, although she did have some fatigue the week prior.    Denies fevers, chills, CP, SOB, vomiting.           Physical Exam:     /80   Pulse 63   Temp 98.2  F (36.8  C)   Resp 18   Wt 79.4 kg (175 lb)   SpO2 99%   BMI 26.83 kg/m     Wt Readings from Last 4 Encounters:   12/27/21 79.4 kg (175 lb)   12/23/21 80.6 kg (177 lb 12.8 oz)   12/16/21 80.3 kg (177 lb)   12/13/21 80 kg (176 lb 4.8 oz)     General: well appearing, no acute distress  HEENT: normocephalic, atraumatic, PERRLA, sclerae nonicteric, moist mucous membranes, oropharynx is clear, one white mucocele on the inside of her right upper lip  Lymph: no palpable cervical, supraclavicular or axillary lymphadenopathy   CV: slightly irregular (pt has hx PVCs)  Lungs: clear to auscultation bilaterally, no wheezes/rales/rhonchi  Abd: soft, positive bowel sounds, non-distended, non-tender  MSK: no peripheral edema  Neuro: alert and oriented x3, CN grossly intact   Psych: appropriate mood and affect  Skin: bilateral hands and feet are warm to the touch; no visible rashes or lesions on exposed skin          Laboratory Data:      I personally reviewed the following labs:    Most Recent 3 CBC's:  Recent Labs   Lab " Test 12/27/21  1153 12/13/21  0828 12/09/21  0910   WBC 5.4 4.4 4.1   HGB 13.5 13.4 12.8   MCV 94 95 93    194 272     Most Recent 3 BMP's:  Recent Labs   Lab Test 12/27/21  1153 12/09/21  0910 12/06/21  0740    136 142   POTASSIUM 3.8 3.6 3.7   CHLORIDE 111* 110* 110*   CO2 24 26 26   BUN 19 18 19   CR 0.81 0.81 0.93   ANIONGAP 8 <1* 6   MEKA 9.2 8.6 9.5   GLC 97 90 85     Most Recent 2 LFT's:  Recent Labs   Lab Test 12/27/21  1153 12/09/21  0910   AST 16 11   ALT 24 21   ALKPHOS 59 55   BILITOTAL 0.4 0.3              Assessment and Recommendations   Kristie Cornejo is a 51 year old female who was diagnosed with Plasmacytoma after she presented with left sided groin pain now found to have multiple myeloma after further workup with bone marrow biopsy.     # Multiple myeloma with associated large plasmacytoma of the L pelvis. Kappa light chain disease.     BMBx with 25% plasma cell infiltration; standard risk with gain of 11q, IGH-CCND1 fusion but no losses of 1q or TP53 and no gain of 1q.   SPEP w/out a monoclonal peak  UPEP positive. 70% paraprotein. Large monoclonal free immunoglobulin light chain of kappa chain type.     No anemia, normal creat, Ca, no other skeletal lesions. Alb normal, B2M normal.  Stage I Light chain disease with left pelvic bone pathologic fracture she is classified as having symptomatic multiple myeloma.     She started RVD C1D1 11/15/21 (had a slight delay in the start of her Revlimid on 11/19/21 due to the timing of her pregnancy tests).  She tolerated cycle 1 well with some mild nausea and a headache on her week off.  RVD C2D1 12/6/21, tolerated well overall, but has had more consistent sensation of her hands and feet being hot. No tingling, pain, loss of sensation, or changes to her strength.    Pending good response, therapy may include autologous stem cell transplant.     -Okay to proceed with C3 today.  - Return to clinic to see me with labs prior to C4.  - Continue  monthly pregnancy tests while on Revlimid which can be done on the first day of each cycle  - Will obtain 24 hour urine protein and repeat UPEP today  - Planning for 6 cycles RVD followed by consideration of autoHSCT. BMT consult was completed 12/23/21  - If concerns for peripheral neuropathy increase, could consider switching from Velcade to Daratumumab.     #Bone Health  She will continue monthly Zometa (last received on 11/22). Next dose due today. She will continue Calcium/Vitamin D supplements.     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 Currently having no pain and able to walk 1 mile without any assistive devices.  She is interested in pursuing other activities like gentle cross-country skiing.  Second message sent to Dr. Philip to clarify the duration of activity restriction.    # PVCs  Continue to follow-up with local cardiologist for hx frequent PVCs. She had an ECHO 12/1 and had follow-up with cardiology 12/7 (notes in CareEverywhere).  Plan to follow-up with them in 8 months (around August 2022).    # Nausea  Mild, managed with Compazine up to twice daily and lorazepam occasionally at night.       60 minutes spent on the date of the encounter doing chart review, review of test results, patient visit and documentation     DORON Hall CNP  [facility]  [address] [place] [id number]  [phone]

## 2021-12-29 DIAGNOSIS — R19.00 PELVIC MASS: Primary | ICD-10-CM

## 2021-12-29 NOTE — PROGRESS NOTES
Spoke with Margo following new transplant visit with Dr. Ramires. Reviewed plan of care per NT conversation for Stem Cell Transplant. Explained role of the Nurse Coordinator throughout the BMT process as well as general time line and expectations for transplant. Discussed necessity of caregiver and program's proximity requirements. All questions were answered.     Plan: Autologous Transplant, pending Response to treatment. Currenlty in cycle 2 of RVD. No response to C1. Will get total of 4 cycles and hopefully able to come approx late FEB    Contact information provided for :  yes    HLA typing drawn: no    PRA typing drawn:  no    CMV-IgG and ABO-Rh drawn or in record:  no    Contact information provided for :  no    Financial Release for URD search obtained:  no    [id number] Consent Signed: no    EOC Reason updated: yes

## 2021-12-30 ENCOUNTER — INFUSION THERAPY VISIT (OUTPATIENT)
Dept: ONCOLOGY | Facility: CLINIC | Age: 51
End: 2021-12-30
Attending: REGISTERED NURSE
Payer: COMMERCIAL

## 2021-12-30 VITALS
RESPIRATION RATE: 16 BRPM | DIASTOLIC BLOOD PRESSURE: 77 MMHG | SYSTOLIC BLOOD PRESSURE: 141 MMHG | TEMPERATURE: 98.3 F | HEART RATE: 74 BPM | OXYGEN SATURATION: 97 %

## 2021-12-30 DIAGNOSIS — C90.00 LIGHT CHAIN MYELOMA (H): Primary | ICD-10-CM

## 2021-12-30 LAB
COLLECT DURATION TIME UR: 24 H
CREAT 24H UR-MRATE: 1.45 G/SPEC (ref 0.8–1.8)
CREAT UR-MCNC: 82 MG/DL
PROT 24H UR-MRATE: 1.31 G/SPEC (ref 0.04–0.23)
PROT UR-MCNC: 0.74 G/L
PROT/CREAT 24H UR: 0.9 G/G CR (ref 0–0.2)
SPECIMEN VOL UR: 1765 ML

## 2021-12-30 PROCEDURE — 84166 PROTEIN E-PHORESIS/URINE/CSF: CPT | Performed by: PATHOLOGY

## 2021-12-30 PROCEDURE — 96401 CHEMO ANTI-NEOPL SQ/IM: CPT

## 2021-12-30 PROCEDURE — 81050 URINALYSIS VOLUME MEASURE: CPT | Performed by: PATHOLOGY

## 2021-12-30 PROCEDURE — 84156 ASSAY OF PROTEIN URINE: CPT | Performed by: PATHOLOGY

## 2021-12-30 PROCEDURE — 250N000011 HC RX IP 250 OP 636: Mod: JW

## 2021-12-30 RX ADMIN — BORTEZOMIB 2.5 MG: 3.5 INJECTION, POWDER, LYOPHILIZED, FOR SOLUTION INTRAVENOUS; SUBCUTANEOUS at 15:34

## 2021-12-30 NOTE — PATIENT INSTRUCTIONS
Contact Numbers  [facility]: [phone] (for symptom and scheduling needs)    Please call the [facility] Triage line if you experience a temperature greater than or equal to 100.4, shaking chills, have uncontrolled nausea, vomiting and/or diarrhea, dizziness, shortness of breath, chest pain, bleeding, unexplained bruising, or if you have any other new/concerning symptoms, questions or concerns.     If you are having any concerning symptoms or wish to speak to a provider before your next infusion visit, please call your care coordinator or triage to notify them so we can adequately serve you.     If you need a refill on a narcotic prescription or other medication, please call triage before your infusion appointment.          December 2021 Sunday Monday Tuesday Wednesday Thursday Friday Saturday                  1     2     3    LAB   8:00 AM   (15 min.)   [facility] LAB   [facility] Laboratory 4       5     6    LAB PERIPHERAL   7:15 AM   (15 min.)   [facility] LAB DRAW   [facility]    RETURN   7:45 AM   (45 min.)   Jackie Ferris APRN CNP   [facility]    ONC INFUSION 1 HR (60 MIN)   9:00 AM   (60 min.)   [facility] ON[facility] INFUSION NURSE   [facility] 7     8     9    LAB PERIPHERAL   8:30 AM   (15 min.)   [facility]   [facility]    ONC INFUSION 1 HR (60 MIN)   9:00 AM   (60 min.)   [facility] ONC INFUSION NURSE   [facility] 10    LAB   8:15 AM   (15 min.)   [facility] LAB   [facility] Laboratory 11       12     13    LAB PERIPHERAL   8:00 AM   (15 min.)   [facility] DRAW   [facility]    ONC INFUSION 1 HR (60 MIN)   8:30 AM   (60 min.)   [facility] ONC INFUSION NURSE   [facility] 14     15     16    ONC INFUSION 1 HR (60 MIN)   9:00 AM   (60 min.)   [facility] ONC INFUSION NURSE   [facility] 17    LAB   8:00 AM   (15 min.)   [facility] LAB   [facility] Laboratory 18       19     20     21     22     23    BMT NEW   1:30 PM   (90 min.)   [facility] BMT DOM   [facility] Blood and Marrow Transplant Program [place] 24     25       26     27    LAB PERIPHERAL  11:45 AM   (15 min.)   [facility] DRAW   [facility]    RETURN  12:15 PM   (45 min.)   Jackie Ferris, DORON CNP   [facility]    ONC INFUSION 1 HR (60 MIN)   1:00 PM   (60 min.)    ONC INFUSION NURSE   [facility]    NURSE COORDINATOR TELE VISIT   1:45 PM   (30 min.)   Coordinator,  Bmt Nurse   [facility] Blood and Marrow Transplant Program [place]    SOCIAL WORK TELEPHONE VISIT   2:15 PM   (90 min.)   Rosangela Rosales LICSW   [facility] Blood and Marrow Transplant [facility]    LAB   2:30 PM   (15 min.)   [facility] LAB   [facility] Lab [place] 28     29     30    ONC INFUSION 1 HR (60 MIN)   3:00 PM   (60 min.)    ONC INFUSION NURSE   [facility] 31 January 2022 Sunday Monday Tuesday Wednesday Thursday Friday Saturday                                 1       2     3    LAB PERIPHERAL   2:30 PM   (15 min.)   [facility] LAB DRAW   [facility]    ONC INFUSION 1 HR (60 MIN)   3:00 PM   (60 min.)    ONC INFUSION NURSE   [facility] 4     5     6    [facility] RETURN   2:30 PM   (15 min.)   Edu Philip MD   [facility] [place]    XR PELVIS 1/2 VIEWS   2:35 PM   (20 min.)   [id number]   [facility] Orthopedic Xray [place]    ONC INFUSION 1 HR (60 MIN)   3:30 PM   (60 min.)    ONC INFUSION NURSE   [facility] 7     8       9     10     11     12     13     14     15       16     17     18      19     20     21     22       23     24     25     26     27     28     29       30     31                                              Lab Results:  No results found for this or any previous visit (from the past 12 hour(s)).    Dear Patients and Caregivers,    Each day we work diligently to eliminate any barriers to your care including working with your insurance coverage to preauthorize your facility administered medication treatment. Our goal is to be transparent with any anticipated concerns with coverage for your treatment. At the beginning of every year this goal is particularly challenging because of changes to insurance coverage.    How can you help?    Provide any new insurance card to the infusion nurse at your next appointment or enter the information into your [employer] account prior to January 1st 2022.     It is vital that if a  reaches out that you return their phone call in a timely manner. Due to regulations, the team is unable to leave detailed voicemails. When you return the finance teams call they will be able to inform you of the details so a decision about your appointment can be made.    Also please understand:    We go through this process each year and each year offers new challenges.    Insurance companies will not allow the reauthorization process to begin until 2022, not allowing us to work in advance on this process.    Even if you are not changing insurance plans, insurance companies often update their policies requiring all treatments to be reauthorized.    As institutions across the country work to reauthorize treatments, insurance companies sometimes have longer than usual wait times in their call centers and leads to delayed response to prior authorization requests.     Thank you for your patience as we work this process. We do strive to keep you updated throughout the process if there are any delays or if the process is taking longer than anticipated. Lastly please  feel free to speak with one of our infusion finance specialists at your next appointment or via phone ([phone]) if you have any questions. Thank you for choosing [facility] for your care.

## 2021-12-30 NOTE — PROGRESS NOTES
"Infusion Nursing Note:  Kristie Cornejo presents today for Cycle 3 Day 4 Velcade.    Patient seen by provider today: No   present during visit today: Not Applicable.    Note: Patient presents to infusion today doing well. Denies any fevers, chills, shortness of breath, chest pains or cough. Hands and feet continue \"to feel hot\", but she thinks it has gotten slightly better since she was here last. Also reports intermittent nausea today, but is taking antiemetics as needed which helps.     Patient confirms she is taking Dex and Revlimid as prescribed.    Intravenous Access:  No Intravenous access/labs at this visit.    Treatment Conditions:  Not Applicable.    Post Infusion Assessment:  Patient tolerated Velcade injection to Right Side of Abdomen without incident.  Site patent and intact, free from redness, edema or discomfort.  No evidence of extravasations.     Discharge Plan:   Patient declined prescription refills.  Discharge instructions reviewed with: Patient.  Patient and/or family verbalized understanding of discharge instructions and all questions answered.  AVS to patient via [employer].  Patient will return 1/3/2022 for next appointment.   Patient discharged in stable condition accompanied by: self.  Departure Mode: Ambulatory.      Karlene Maldonado RN                    "

## 2021-12-31 LAB
ALBUMIN MFR UR ELPH: 4.3 %
ALPHA1 GLOB MFR UR ELPH: 2.3 %
ALPHA2 GLOB MFR UR ELPH: 4.1 %
B-GLOBULIN MFR UR ELPH: 3.9 %
GAMMA GLOB MFR UR ELPH: 85.4 %
M PROTEIN MFR UR ELPH: 75 %
PROT PATTERN UR ELPH-IMP: ABNORMAL

## 2021-12-31 PROCEDURE — 84166 PROTEIN E-PHORESIS/URINE/CSF: CPT | Mod: 26 | Performed by: PATHOLOGY

## 2022-01-01 ENCOUNTER — HEALTH MAINTENANCE LETTER (OUTPATIENT)
Age: 52
End: 2022-01-01

## 2022-01-03 ENCOUNTER — APPOINTMENT (OUTPATIENT)
Dept: LAB | Facility: CLINIC | Age: 52
End: 2022-01-03
Payer: COMMERCIAL

## 2022-01-03 ENCOUNTER — INFUSION THERAPY VISIT (OUTPATIENT)
Dept: ONCOLOGY | Facility: CLINIC | Age: 52
End: 2022-01-03
Attending: REGISTERED NURSE
Payer: COMMERCIAL

## 2022-01-03 VITALS
OXYGEN SATURATION: 98 % | RESPIRATION RATE: 16 BRPM | HEART RATE: 75 BPM | BODY MASS INDEX: 27.11 KG/M2 | WEIGHT: 176.8 LBS | SYSTOLIC BLOOD PRESSURE: 128 MMHG | TEMPERATURE: 97.5 F | DIASTOLIC BLOOD PRESSURE: 77 MMHG

## 2022-01-03 DIAGNOSIS — C90.00 LIGHT CHAIN MYELOMA (H): Primary | ICD-10-CM

## 2022-01-03 LAB
BASOPHILS # BLD AUTO: 0 10E3/UL (ref 0–0.2)
BASOPHILS NFR BLD AUTO: 0 %
EOSINOPHIL # BLD AUTO: 0 10E3/UL (ref 0–0.7)
EOSINOPHIL NFR BLD AUTO: 0 %
ERYTHROCYTE [DISTWIDTH] IN BLOOD BY AUTOMATED COUNT: 13.3 % (ref 10–15)
HCT VFR BLD AUTO: 39.4 % (ref 35–47)
HGB BLD-MCNC: 13.4 G/DL (ref 11.7–15.7)
IMM GRANULOCYTES # BLD: 0.1 10E3/UL
IMM GRANULOCYTES NFR BLD: 2 %
LYMPHOCYTES # BLD AUTO: 0.5 10E3/UL (ref 0.8–5.3)
LYMPHOCYTES NFR BLD AUTO: 7 %
MCH RBC QN AUTO: 31.5 PG (ref 26.5–33)
MCHC RBC AUTO-ENTMCNC: 34 G/DL (ref 31.5–36.5)
MCV RBC AUTO: 93 FL (ref 78–100)
MONOCYTES # BLD AUTO: 0.1 10E3/UL (ref 0–1.3)
MONOCYTES NFR BLD AUTO: 1 %
NEUTROPHILS # BLD AUTO: 5.9 10E3/UL (ref 1.6–8.3)
NEUTROPHILS NFR BLD AUTO: 90 %
NRBC # BLD AUTO: 0 10E3/UL
NRBC BLD AUTO-RTO: 0 /100
PLATELET # BLD AUTO: 218 10E3/UL (ref 150–450)
RBC # BLD AUTO: 4.25 10E6/UL (ref 3.8–5.2)
WBC # BLD AUTO: 6.6 10E3/UL (ref 4–11)

## 2022-01-03 PROCEDURE — 250N000011 HC RX IP 250 OP 636: Mod: JW

## 2022-01-03 PROCEDURE — 96401 CHEMO ANTI-NEOPL SQ/IM: CPT

## 2022-01-03 PROCEDURE — 36415 COLL VENOUS BLD VENIPUNCTURE: CPT

## 2022-01-03 PROCEDURE — 85025 COMPLETE CBC W/AUTO DIFF WBC: CPT

## 2022-01-03 RX ADMIN — BORTEZOMIB 2.5 MG: 3.5 INJECTION, POWDER, LYOPHILIZED, FOR SOLUTION INTRAVENOUS; SUBCUTANEOUS at 15:28

## 2022-01-03 ASSESSMENT — PAIN SCALES - GENERAL: PAINLEVEL: NO PAIN (0)

## 2022-01-03 NOTE — PROGRESS NOTES
Infusion Nursing Note:  Kristie Cornejo presents today for Cycle 3 Day 8 Velcade.      Note: James comes to infusion today with no questions or concerns.  She has a mild headache and denies any need for intervention at this appointment.  She has been afebrile and denies signs and symptoms of infection including: cough, SOB, sore throat, diarrhea, vomiting, rash, or pain with urination.  She wishes to proceed with today's planned treatment    James confirms she is taking her revlimid and decadron as ordered.    Velcade injected to L lower abdomen without incident.      Treatment Conditions:    Results for JAMES CORNEJO (MRN [id number]) as of 1/3/2022 15:22   Ref. Range 1/3/2022 14:40   WBC Latest Ref Range: 4.0 - 11.0 10e3/uL 6.6   Hemoglobin Latest Ref Range: 11.7 - 15.7 g/dL 13.4   Hematocrit Latest Ref Range: 35.0 - 47.0 % 39.4   Platelet Count Latest Ref Range: 150 - 450 10e3/uL 218   RBC Count Latest Ref Range: 3.80 - 5.20 10e6/uL 4.25   MCV Latest Ref Range: 78 - 100 fL 93   MCH Latest Ref Range: 26.5 - 33.0 pg 31.5   MCHC Latest Ref Range: 31.5 - 36.5 g/dL 34.0   RDW Latest Ref Range: 10.0 - 15.0 % 13.3   % Neutrophils Latest Units: % 90   % Lymphocytes Latest Units: % 7   % Monocytes Latest Units: % 1   % Eosinophils Latest Units: % 0   % Basophils Latest Units: % 0   Absolute Basophils Latest Ref Range: 0.0 - 0.2 10e3/uL 0.0   Absolute Eosinophils Latest Ref Range: 0.0 - 0.7 10e3/uL 0.0   Absolute Immature Granulocytes Latest Ref Range: <=0.4 10e3/uL 0.1   Absolute Lymphocytes Latest Ref Range: 0.8 - 5.3 10e3/uL 0.5 (L)   Absolute Monocytes Latest Ref Range: 0.0 - 1.3 10e3/uL 0.1   % Immature Granulocytes Latest Units: % 2   Absolute Neutrophils Latest Ref Range: 1.6 - 8.3 10e3/uL 5.9   Absolute NRBCs Latest Units: 10e3/uL 0.0   NRBCs per 100 WBC Latest Ref Range: <1 /100 0           Discharge Plan:   Patient declined prescription refills.  .  AVS to patient via [employer].  Patient will return 1/6/22 for  cycle 3 day 11.    Diana Robles RN

## 2022-01-03 NOTE — PATIENT INSTRUCTIONS
[facility] Triage and after hours / weekends / holidays:  [phone]    Please call the triage or after hours line if you experience a temperature greater than or equal to 100.5, shaking chills, have uncontrolled nausea, vomiting and/or diarrhea, dizziness, shortness of breath, chest pain, bleeding, unexplained bruising, or if you have any other new/concerning symptoms, questions or concerns.      If you are having any concerning symptoms or wish to speak to a provider before your next infusion visit, please call your care coordinator or triage to notify them so we can adequately serve you.     If you need a refill on a narcotic prescription or other medication, please call before your infusion appointment.

## 2022-01-03 NOTE — NURSING NOTE
Chief Complaint   Patient presents with     Blood Draw     Labs drawn via  by RN in lab.  VS taken       Labs collected from venipuncture by RN. Vitals taken. Checked in for appointment(s).    Ca Nguyen RN

## 2022-01-06 ENCOUNTER — OFFICE VISIT (OUTPATIENT)
Dept: ORTHOPEDICS | Facility: CLINIC | Age: 52
End: 2022-01-06
Payer: COMMERCIAL

## 2022-01-06 ENCOUNTER — ANCILLARY PROCEDURE (OUTPATIENT)
Dept: GENERAL RADIOLOGY | Facility: CLINIC | Age: 52
End: 2022-01-06
Attending: ORTHOPAEDIC SURGERY
Payer: COMMERCIAL

## 2022-01-06 ENCOUNTER — INFUSION THERAPY VISIT (OUTPATIENT)
Dept: ONCOLOGY | Facility: CLINIC | Age: 52
End: 2022-01-06
Attending: REGISTERED NURSE
Payer: COMMERCIAL

## 2022-01-06 VITALS
OXYGEN SATURATION: 98 % | TEMPERATURE: 97.9 F | WEIGHT: 177.2 LBS | RESPIRATION RATE: 16 BRPM | DIASTOLIC BLOOD PRESSURE: 87 MMHG | SYSTOLIC BLOOD PRESSURE: 131 MMHG | HEART RATE: 58 BPM | BODY MASS INDEX: 27.49 KG/M2

## 2022-01-06 VITALS — BODY MASS INDEX: 27.15 KG/M2 | HEIGHT: 67 IN | WEIGHT: 173 LBS

## 2022-01-06 DIAGNOSIS — C90.30 PLASMACYTOMA OF BONE (H): Primary | ICD-10-CM

## 2022-01-06 DIAGNOSIS — C90.00 LIGHT CHAIN MYELOMA (H): Primary | ICD-10-CM

## 2022-01-06 DIAGNOSIS — R19.00 PELVIC MASS: ICD-10-CM

## 2022-01-06 PROCEDURE — 72170 X-RAY EXAM OF PELVIS: CPT | Performed by: RADIOLOGY

## 2022-01-06 PROCEDURE — 250N000011 HC RX IP 250 OP 636

## 2022-01-06 PROCEDURE — 99213 OFFICE O/P EST LOW 20 MIN: CPT | Performed by: ORTHOPAEDIC SURGERY

## 2022-01-06 PROCEDURE — 96401 CHEMO ANTI-NEOPL SQ/IM: CPT

## 2022-01-06 RX ORDER — ALBUTEROL SULFATE 0.83 MG/ML
2.5 SOLUTION RESPIRATORY (INHALATION)
Status: CANCELLED | OUTPATIENT
Start: 2022-01-27

## 2022-01-06 RX ORDER — LORAZEPAM 2 MG/ML
0.5 INJECTION INTRAMUSCULAR EVERY 4 HOURS PRN
Status: CANCELLED | OUTPATIENT
Start: 2022-01-24

## 2022-01-06 RX ORDER — MEPERIDINE HYDROCHLORIDE 25 MG/ML
25 INJECTION INTRAMUSCULAR; INTRAVENOUS; SUBCUTANEOUS EVERY 30 MIN PRN
Status: CANCELLED | OUTPATIENT
Start: 2022-01-24

## 2022-01-06 RX ORDER — ALBUTEROL SULFATE 90 UG/1
1-2 AEROSOL, METERED RESPIRATORY (INHALATION)
Status: CANCELLED
Start: 2022-01-17

## 2022-01-06 RX ORDER — NALOXONE HYDROCHLORIDE 0.4 MG/ML
0.2 INJECTION, SOLUTION INTRAMUSCULAR; INTRAVENOUS; SUBCUTANEOUS
Status: CANCELLED | OUTPATIENT
Start: 2022-01-27

## 2022-01-06 RX ORDER — EPINEPHRINE 1 MG/ML
0.3 INJECTION, SOLUTION INTRAMUSCULAR; SUBCUTANEOUS EVERY 5 MIN PRN
Status: CANCELLED | OUTPATIENT
Start: 2022-01-27

## 2022-01-06 RX ORDER — ALBUTEROL SULFATE 0.83 MG/ML
2.5 SOLUTION RESPIRATORY (INHALATION)
Status: CANCELLED | OUTPATIENT
Start: 2022-01-17

## 2022-01-06 RX ORDER — MEPERIDINE HYDROCHLORIDE 25 MG/ML
25 INJECTION INTRAMUSCULAR; INTRAVENOUS; SUBCUTANEOUS EVERY 30 MIN PRN
Status: CANCELLED | OUTPATIENT
Start: 2022-01-17

## 2022-01-06 RX ORDER — MEPERIDINE HYDROCHLORIDE 25 MG/ML
25 INJECTION INTRAMUSCULAR; INTRAVENOUS; SUBCUTANEOUS EVERY 30 MIN PRN
Status: CANCELLED | OUTPATIENT
Start: 2022-01-27

## 2022-01-06 RX ORDER — DIPHENHYDRAMINE HYDROCHLORIDE 50 MG/ML
50 INJECTION INTRAMUSCULAR; INTRAVENOUS
Status: CANCELLED
Start: 2022-01-27

## 2022-01-06 RX ORDER — MEPERIDINE HYDROCHLORIDE 25 MG/ML
25 INJECTION INTRAMUSCULAR; INTRAVENOUS; SUBCUTANEOUS EVERY 30 MIN PRN
Status: CANCELLED | OUTPATIENT
Start: 2022-01-20

## 2022-01-06 RX ORDER — ALBUTEROL SULFATE 90 UG/1
1-2 AEROSOL, METERED RESPIRATORY (INHALATION)
Status: CANCELLED
Start: 2022-01-20

## 2022-01-06 RX ORDER — METHYLPREDNISOLONE SODIUM SUCCINATE 125 MG/2ML
125 INJECTION, POWDER, LYOPHILIZED, FOR SOLUTION INTRAMUSCULAR; INTRAVENOUS
Status: CANCELLED
Start: 2022-01-20

## 2022-01-06 RX ORDER — NALOXONE HYDROCHLORIDE 0.4 MG/ML
0.2 INJECTION, SOLUTION INTRAMUSCULAR; INTRAVENOUS; SUBCUTANEOUS
Status: CANCELLED | OUTPATIENT
Start: 2022-01-17

## 2022-01-06 RX ORDER — EPINEPHRINE 1 MG/ML
0.3 INJECTION, SOLUTION INTRAMUSCULAR; SUBCUTANEOUS EVERY 5 MIN PRN
Status: CANCELLED | OUTPATIENT
Start: 2022-01-17

## 2022-01-06 RX ORDER — DIPHENHYDRAMINE HYDROCHLORIDE 50 MG/ML
50 INJECTION INTRAMUSCULAR; INTRAVENOUS
Status: CANCELLED
Start: 2022-01-20

## 2022-01-06 RX ORDER — ALBUTEROL SULFATE 0.83 MG/ML
2.5 SOLUTION RESPIRATORY (INHALATION)
Status: CANCELLED | OUTPATIENT
Start: 2022-01-20

## 2022-01-06 RX ORDER — DIPHENHYDRAMINE HYDROCHLORIDE 50 MG/ML
50 INJECTION INTRAMUSCULAR; INTRAVENOUS
Status: CANCELLED
Start: 2022-01-17

## 2022-01-06 RX ORDER — NALOXONE HYDROCHLORIDE 0.4 MG/ML
0.2 INJECTION, SOLUTION INTRAMUSCULAR; INTRAVENOUS; SUBCUTANEOUS
Status: CANCELLED | OUTPATIENT
Start: 2022-01-24

## 2022-01-06 RX ORDER — METHYLPREDNISOLONE SODIUM SUCCINATE 125 MG/2ML
125 INJECTION, POWDER, LYOPHILIZED, FOR SOLUTION INTRAMUSCULAR; INTRAVENOUS
Status: CANCELLED
Start: 2022-01-27

## 2022-01-06 RX ORDER — LORAZEPAM 2 MG/ML
0.5 INJECTION INTRAMUSCULAR EVERY 4 HOURS PRN
Status: CANCELLED | OUTPATIENT
Start: 2022-01-17

## 2022-01-06 RX ORDER — METHYLPREDNISOLONE SODIUM SUCCINATE 125 MG/2ML
125 INJECTION, POWDER, LYOPHILIZED, FOR SOLUTION INTRAMUSCULAR; INTRAVENOUS
Status: CANCELLED
Start: 2022-01-24

## 2022-01-06 RX ORDER — EPINEPHRINE 1 MG/ML
0.3 INJECTION, SOLUTION INTRAMUSCULAR; SUBCUTANEOUS EVERY 5 MIN PRN
Status: CANCELLED | OUTPATIENT
Start: 2022-01-24

## 2022-01-06 RX ORDER — LORAZEPAM 2 MG/ML
0.5 INJECTION INTRAMUSCULAR EVERY 4 HOURS PRN
Status: CANCELLED | OUTPATIENT
Start: 2022-01-27

## 2022-01-06 RX ORDER — NALOXONE HYDROCHLORIDE 0.4 MG/ML
0.2 INJECTION, SOLUTION INTRAMUSCULAR; INTRAVENOUS; SUBCUTANEOUS
Status: CANCELLED | OUTPATIENT
Start: 2022-01-20

## 2022-01-06 RX ORDER — ALBUTEROL SULFATE 90 UG/1
1-2 AEROSOL, METERED RESPIRATORY (INHALATION)
Status: CANCELLED
Start: 2022-01-24

## 2022-01-06 RX ORDER — EPINEPHRINE 1 MG/ML
0.3 INJECTION, SOLUTION INTRAMUSCULAR; SUBCUTANEOUS EVERY 5 MIN PRN
Status: CANCELLED | OUTPATIENT
Start: 2022-01-20

## 2022-01-06 RX ORDER — METHYLPREDNISOLONE SODIUM SUCCINATE 125 MG/2ML
125 INJECTION, POWDER, LYOPHILIZED, FOR SOLUTION INTRAMUSCULAR; INTRAVENOUS
Status: CANCELLED
Start: 2022-01-17

## 2022-01-06 RX ORDER — LORAZEPAM 2 MG/ML
0.5 INJECTION INTRAMUSCULAR EVERY 4 HOURS PRN
Status: CANCELLED | OUTPATIENT
Start: 2022-01-20

## 2022-01-06 RX ORDER — DIPHENHYDRAMINE HYDROCHLORIDE 50 MG/ML
50 INJECTION INTRAMUSCULAR; INTRAVENOUS
Status: CANCELLED
Start: 2022-01-24

## 2022-01-06 RX ORDER — ALBUTEROL SULFATE 90 UG/1
1-2 AEROSOL, METERED RESPIRATORY (INHALATION)
Status: CANCELLED
Start: 2022-01-27

## 2022-01-06 RX ORDER — ALBUTEROL SULFATE 0.83 MG/ML
2.5 SOLUTION RESPIRATORY (INHALATION)
Status: CANCELLED | OUTPATIENT
Start: 2022-01-24

## 2022-01-06 RX ADMIN — BORTEZOMIB 2.5 MG: 3.5 INJECTION, POWDER, LYOPHILIZED, FOR SOLUTION INTRAVENOUS; SUBCUTANEOUS at 15:43

## 2022-01-06 ASSESSMENT — PAIN SCALES - GENERAL: PAINLEVEL: NO PAIN (0)

## 2022-01-06 ASSESSMENT — MIFFLIN-ST. JEOR: SCORE: 1437.47

## 2022-01-06 NOTE — PATIENT INSTRUCTIONS
Contact Numbers  [facility]: [phone] (for symptom and scheduling needs)    Please call the [facility] Triage line if you experience a temperature greater than or equal to 100.4, shaking chills, have uncontrolled nausea, vomiting and/or diarrhea, dizziness, shortness of breath, chest pain, bleeding, unexplained bruising, or if you have any other new/concerning symptoms, questions or concerns.     If you are having any concerning symptoms or wish to speak to a provider before your next infusion visit, please call your care coordinator or triage to notify them so we can adequately serve you.     If you need a refill on a narcotic prescription or other medication, please call triage before your infusion appointment.

## 2022-01-06 NOTE — PROGRESS NOTES
[facility] Physicians, Orthopaedic Oncology Surgery Consultation  by Edu Philip M.D.     Kristie Cornejo MRN# [id number]   Age: 51 year old YOB: 1970      Requesting physician: MD SHASHI Arboleda Sarah C         Background history:  DX:      1. Crohn's disease (on Omalizumab, Xolair)  2. 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 9/7/2021, open biopsy Left pubic ramus ([place]) [facility]  2. zolendronate  3. 11/2021, Radiation L pubic ramus  4. RVD treatments    Margo Cornejo returns for follow-up after undergoing treatment for her plasmacytoma involving left superior inferior pubic rami.  She has been under the care of  and Dr. Jasmin Reina.  She reports that she is not having any major discomfort although has not been fully active.  She has no limp.  On examination, she walks normally.  No antalgic gait.  Full range of motion of both hips.  No pain with pelvic distraction or compression.  Some discomfort to direct palpation over the pubic ramus.  Otherwise pain-free.  Radiographs demonstrate some osteoblastic response within the pubic ramus no further progression of osteolysis.    No further progression and in fact some ossification response within the left superior pubic ramus and symphysis pubis.    Impression:  No radiographic evidence of progression of osteolysis of pubic ramus.  Risk of pathologic fracture is low    Plan:  She may resume all regular activities as tolerated.  I asked her only refrain from high-impact activities.  She may skate or cross-country ski without restriction.  If she continues to respond to therapy, I suspect her bone will continue to get stronger and ossify over time.    Follow-up as needed     Edu Philip MD  [facility] Family Professor  Oncology and Adult Reconstructive Surgery  Dept Orthopaedic Surgery, [facility]  Physicians  [phone] office, [phone] pager  [email or web address]    Total combined visit time and work time before and after clinic visit = 20 min

## 2022-01-06 NOTE — LETTER
1/6/2022         RE: Kristie Cornejo  [address]  [place] [address]        Dear Colleague,    Thank you for referring your patient, Kristie Cornejo, to the [facility] [place]. Please see a copy of my visit note below.         [facility] Physicians, Orthopaedic Oncology Surgery Consultation  by Edu Philip M.D.     Kristie Cornejo MRN# [id number]   Age: 51 year old YOB: 1970      Requesting physician: MD SHASHI Arboleda Sarah C         Background history:  DX:      1. Crohn's disease (on Omalizumab, Xolair)  2. 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 9/7/2021, open biopsy Left pubic ramus ([place]) [facility]  2. zolendronate  3. 11/2021, Radiation L pubic ramus  4. RVD treatments    Margo Cornejo returns for follow-up after undergoing treatment for her plasmacytoma involving left superior inferior pubic rami.  She has been under the care of  and Dr. Jasmin Reina.  She reports that she is not having any major discomfort although has not been fully active.  She has no limp.  On examination, she walks normally.  No antalgic gait.  Full range of motion of both hips.  No pain with pelvic distraction or compression.  Some discomfort to direct palpation over the pubic ramus.  Otherwise pain-free.  Radiographs demonstrate some osteoblastic response within the pubic ramus no further progression of osteolysis.    No further progression and in fact some ossification response within the left superior pubic ramus and symphysis pubis.    Impression:  No radiographic evidence of progression of osteolysis of pubic ramus.  Risk of pathologic fracture is low    Plan:  She may resume all regular activities as tolerated.  I asked her only refrain from high-impact activities.  She may skate or cross-country ski without  restriction.  If she continues to respond to therapy, I suspect her bone will continue to get stronger and ossify over time.    Follow-up as needed     MD Chichi Herrera Family Professor  Oncology and Adult Reconstructive Surgery  Dept Orthopaedic Surgery, [facility] Physicians  [phone] office, [phone] pager  [email or web address]    Total combined visit time and work time before and after clinic visit = 20 min

## 2022-01-06 NOTE — PROGRESS NOTES
Infusion Nursing Note:  Kristiehomer Cornejo presents today for Cycle 3 Day 11 Velcade.    Patient seen by provider today: No   present during visit today: Not Applicable.    Note: Patient feels well today.  Patient states that yesterday afternoon and this morning she had increased fatigue.  Her energy level is back to baseline at this time.  Patient's hands continue to feel hot, which is not new or changed in severity.    She denies signs and symptoms of infection including:fever, cough, shortness of breath, sore throat, diarrhea, vomiting, rash, or pain with urination.     Patient confirms she is taking dexamethasone and Revlimid as prescribed.  Treatment Conditions:  No labs ordered for today.      Post Infusion Assessment:  Patient tolerated injection without incident into right abdominal tissue       Discharge Plan:   Prescription refills given for Acyclovir. Medication to be picked up on first floor.  Discharge instructions reviewed with: Patient.  Patient and/or family verbalized understanding of discharge instructions and all questions answered.  AVS to patient via [employer].  Patient will return 1/17/22 for next appointment.   Patient discharged in stable condition accompanied by: self.  Departure Mode: Ambulatory.      Jayleen Rodriguez RN

## 2022-01-07 ENCOUNTER — LAB (OUTPATIENT)
Dept: LAB | Facility: CLINIC | Age: 52
End: 2022-01-07
Payer: COMMERCIAL

## 2022-01-07 ENCOUNTER — TELEPHONE (OUTPATIENT)
Dept: ONCOLOGY | Facility: CLINIC | Age: 52
End: 2022-01-07

## 2022-01-07 DIAGNOSIS — C90.00 LIGHT CHAIN MYELOMA (H): Primary | ICD-10-CM

## 2022-01-07 DIAGNOSIS — C90.30 PLASMACYTOMA OF BONE (H): ICD-10-CM

## 2022-01-07 LAB — HCG UR QL: NEGATIVE

## 2022-01-07 PROCEDURE — 81025 URINE PREGNANCY TEST: CPT

## 2022-01-07 RX ORDER — LENALIDOMIDE 25 MG/1
25 CAPSULE ORAL DAILY
Qty: 14 CAPSULE | Refills: 0 | Status: SHIPPED | OUTPATIENT
Start: 2022-01-17 | End: 2022-01-21

## 2022-01-07 NOTE — TELEPHONE ENCOUNTER
Oral Chemotherapy Monitoring Program   Medication: Revlimid  Rx: 25mg PO daily on days 1 through 14 of 28 day cycle   Auth #: [id number]   Risk Category: Adult Female of reproductive potential  Routine survey questions reviewed.   Rx to be Escribed to Alona Crowley  [facility]  Oncology Pharmacy Liaison   [email or web address]  [phone] (phone)  [phone] (fax)

## 2022-01-10 DIAGNOSIS — C90.00 LIGHT CHAIN MYELOMA (H): Primary | ICD-10-CM

## 2022-01-10 RX ORDER — NALOXONE HYDROCHLORIDE 0.4 MG/ML
0.2 INJECTION, SOLUTION INTRAMUSCULAR; INTRAVENOUS; SUBCUTANEOUS
Status: CANCELLED | OUTPATIENT
Start: 2022-02-17

## 2022-01-10 RX ORDER — ALBUTEROL SULFATE 90 UG/1
1-2 AEROSOL, METERED RESPIRATORY (INHALATION)
Status: CANCELLED
Start: 2022-02-10

## 2022-01-10 RX ORDER — ALBUTEROL SULFATE 90 UG/1
1-2 AEROSOL, METERED RESPIRATORY (INHALATION)
Status: CANCELLED
Start: 2022-02-17

## 2022-01-10 RX ORDER — METHYLPREDNISOLONE SODIUM SUCCINATE 125 MG/2ML
125 INJECTION, POWDER, LYOPHILIZED, FOR SOLUTION INTRAMUSCULAR; INTRAVENOUS
Status: CANCELLED
Start: 2022-02-07

## 2022-01-10 RX ORDER — LORAZEPAM 2 MG/ML
0.5 INJECTION INTRAMUSCULAR EVERY 4 HOURS PRN
Status: CANCELLED | OUTPATIENT
Start: 2022-02-10

## 2022-01-10 RX ORDER — EPINEPHRINE 1 MG/ML
0.3 INJECTION, SOLUTION INTRAMUSCULAR; SUBCUTANEOUS EVERY 5 MIN PRN
Status: CANCELLED | OUTPATIENT
Start: 2022-02-17

## 2022-01-10 RX ORDER — DIPHENHYDRAMINE HYDROCHLORIDE 50 MG/ML
50 INJECTION INTRAMUSCULAR; INTRAVENOUS
Status: CANCELLED
Start: 2022-02-10

## 2022-01-10 RX ORDER — EPINEPHRINE 1 MG/ML
0.3 INJECTION, SOLUTION INTRAMUSCULAR; SUBCUTANEOUS EVERY 5 MIN PRN
Status: CANCELLED | OUTPATIENT
Start: 2022-02-10

## 2022-01-10 RX ORDER — NALOXONE HYDROCHLORIDE 0.4 MG/ML
0.2 INJECTION, SOLUTION INTRAMUSCULAR; INTRAVENOUS; SUBCUTANEOUS
Status: CANCELLED | OUTPATIENT
Start: 2022-02-07

## 2022-01-10 RX ORDER — LORAZEPAM 2 MG/ML
0.5 INJECTION INTRAMUSCULAR EVERY 4 HOURS PRN
Status: CANCELLED | OUTPATIENT
Start: 2022-02-17

## 2022-01-10 RX ORDER — MEPERIDINE HYDROCHLORIDE 25 MG/ML
25 INJECTION INTRAMUSCULAR; INTRAVENOUS; SUBCUTANEOUS EVERY 30 MIN PRN
Status: CANCELLED | OUTPATIENT
Start: 2022-02-10

## 2022-01-10 RX ORDER — METHYLPREDNISOLONE SODIUM SUCCINATE 125 MG/2ML
125 INJECTION, POWDER, LYOPHILIZED, FOR SOLUTION INTRAMUSCULAR; INTRAVENOUS
Status: CANCELLED
Start: 2022-02-10

## 2022-01-10 RX ORDER — ALBUTEROL SULFATE 90 UG/1
1-2 AEROSOL, METERED RESPIRATORY (INHALATION)
Status: CANCELLED
Start: 2022-02-07

## 2022-01-10 RX ORDER — MEPERIDINE HYDROCHLORIDE 25 MG/ML
25 INJECTION INTRAMUSCULAR; INTRAVENOUS; SUBCUTANEOUS EVERY 30 MIN PRN
Status: CANCELLED | OUTPATIENT
Start: 2022-02-17

## 2022-01-10 RX ORDER — LORAZEPAM 2 MG/ML
0.5 INJECTION INTRAMUSCULAR EVERY 4 HOURS PRN
Status: CANCELLED | OUTPATIENT
Start: 2022-02-07

## 2022-01-10 RX ORDER — ALBUTEROL SULFATE 0.83 MG/ML
2.5 SOLUTION RESPIRATORY (INHALATION)
Status: CANCELLED | OUTPATIENT
Start: 2022-02-14

## 2022-01-10 RX ORDER — ALBUTEROL SULFATE 0.83 MG/ML
2.5 SOLUTION RESPIRATORY (INHALATION)
Status: CANCELLED | OUTPATIENT
Start: 2022-02-07

## 2022-01-10 RX ORDER — DIPHENHYDRAMINE HYDROCHLORIDE 50 MG/ML
50 INJECTION INTRAMUSCULAR; INTRAVENOUS
Status: CANCELLED
Start: 2022-02-14

## 2022-01-10 RX ORDER — LORAZEPAM 2 MG/ML
0.5 INJECTION INTRAMUSCULAR EVERY 4 HOURS PRN
Status: CANCELLED | OUTPATIENT
Start: 2022-02-14

## 2022-01-10 RX ORDER — ALBUTEROL SULFATE 0.83 MG/ML
2.5 SOLUTION RESPIRATORY (INHALATION)
Status: CANCELLED | OUTPATIENT
Start: 2022-02-17

## 2022-01-10 RX ORDER — DIPHENHYDRAMINE HYDROCHLORIDE 50 MG/ML
50 INJECTION INTRAMUSCULAR; INTRAVENOUS
Status: CANCELLED
Start: 2022-02-07

## 2022-01-10 RX ORDER — NALOXONE HYDROCHLORIDE 0.4 MG/ML
0.2 INJECTION, SOLUTION INTRAMUSCULAR; INTRAVENOUS; SUBCUTANEOUS
Status: CANCELLED | OUTPATIENT
Start: 2022-02-14

## 2022-01-10 RX ORDER — EPINEPHRINE 1 MG/ML
0.3 INJECTION, SOLUTION INTRAMUSCULAR; SUBCUTANEOUS EVERY 5 MIN PRN
Status: CANCELLED | OUTPATIENT
Start: 2022-02-07

## 2022-01-10 RX ORDER — EPINEPHRINE 1 MG/ML
0.3 INJECTION, SOLUTION INTRAMUSCULAR; SUBCUTANEOUS EVERY 5 MIN PRN
Status: CANCELLED | OUTPATIENT
Start: 2022-02-14

## 2022-01-10 RX ORDER — ALBUTEROL SULFATE 90 UG/1
1-2 AEROSOL, METERED RESPIRATORY (INHALATION)
Status: CANCELLED
Start: 2022-02-14

## 2022-01-10 RX ORDER — MEPERIDINE HYDROCHLORIDE 25 MG/ML
25 INJECTION INTRAMUSCULAR; INTRAVENOUS; SUBCUTANEOUS EVERY 30 MIN PRN
Status: CANCELLED | OUTPATIENT
Start: 2022-02-07

## 2022-01-10 RX ORDER — ALBUTEROL SULFATE 0.83 MG/ML
2.5 SOLUTION RESPIRATORY (INHALATION)
Status: CANCELLED | OUTPATIENT
Start: 2022-02-10

## 2022-01-10 RX ORDER — METHYLPREDNISOLONE SODIUM SUCCINATE 125 MG/2ML
125 INJECTION, POWDER, LYOPHILIZED, FOR SOLUTION INTRAMUSCULAR; INTRAVENOUS
Status: CANCELLED
Start: 2022-02-14

## 2022-01-10 RX ORDER — MEPERIDINE HYDROCHLORIDE 25 MG/ML
25 INJECTION INTRAMUSCULAR; INTRAVENOUS; SUBCUTANEOUS EVERY 30 MIN PRN
Status: CANCELLED | OUTPATIENT
Start: 2022-02-14

## 2022-01-10 RX ORDER — DIPHENHYDRAMINE HYDROCHLORIDE 50 MG/ML
50 INJECTION INTRAMUSCULAR; INTRAVENOUS
Status: CANCELLED
Start: 2022-02-17

## 2022-01-10 RX ORDER — NALOXONE HYDROCHLORIDE 0.4 MG/ML
0.2 INJECTION, SOLUTION INTRAMUSCULAR; INTRAVENOUS; SUBCUTANEOUS
Status: CANCELLED | OUTPATIENT
Start: 2022-02-10

## 2022-01-10 RX ORDER — METHYLPREDNISOLONE SODIUM SUCCINATE 125 MG/2ML
125 INJECTION, POWDER, LYOPHILIZED, FOR SOLUTION INTRAMUSCULAR; INTRAVENOUS
Status: CANCELLED
Start: 2022-02-17

## 2022-01-14 NOTE — PROGRESS NOTES
"[facility]  Date of Visit: Jan 17, 2022   Oncologist: Dr. Payton Reinoso    Reason for visit: myeloma follow-up           Oncology History   51 year old female with past medical history significant for Irritable bowel disease, allergies, large plasmacytoma of the L pelvis, and newly diagnosed multiple myeloma.     Early in April 2021 , she noted to have left sided groin pain, constant and was affecting her daily activities  MRI showed large, expansile, permeative mass with indistinct borders involving the left superior and inferior pubic rami, pubic symphysis and with possible extension to the left acetabulum.     We completed the following work-up:   - Biopsy 9/7/2021: plasmacytoma.  Flow cytometry showed kappa monotypic plasma cells, polytypic B cells. FISH results from plasmacytoma showed a gain of 11q, IGH-CCND1 fusion but no losses of 1q or TP53 and no gain of 1q.  - Bone marrow biopsy 09/22/21: Marrow cellularity 50% with 25% interstitial infiltration w/  Kappa-monotypic, moderately atypical plasma cells. Flow with 0.7% kappa-monotypica plasma cells. FISH and cytogenetics pending.   - PET CT 09/24/21 w/ hypermetabolic pathologic fracture of the L pubic ramus w/ aggressive appearing osteolysis but no additional suspicious lytic or blastic osseous lesions.     Treatment to date:   - Zometa q4 weeks started 9/27/21  - Radiation to left pubic ramus x18 fractions, completed 11/11/21.   - RVD C1D1 11/15/21  - RVD C2D1 12/6/21  - RVD C3D1  12/27/21        INTERIM HISTORY:   Margo presents for oncology follow-up visit today prior to C4 RVD    Things have been stable overall.  She continues to have a \"hot\" sensation in her hands and feet that improves on the week she is off therapy. She has not noted any new redness or swelling, pain, tingling, loss of sensation, or weakness.    She notes two incidents over the past month that caused her concern.  About 10 days ago, she was " "swimming and experienced a sensation of her heart beating very fast.  She stopped and tried measuring her heart rate; she lost track of her heart rate, but ended up counting skipped beats and noted she skipped about 6 times in 30 seconds.    The second incident occurred last week when she \"charged up the stairs,\" and when she got to the top she felt faint, sat down, and felt like she sounded like she was slurring her words.  She was on the phone with a friend at the time who did not note any slurred speech over the phone.    Denies fevers, chills, CP, SOB, vomiting.           Physical Exam:     BP (!) 142/78   Pulse 64   Temp 98  F (36.7  C) (Oral)   Resp 16   Wt 79.4 kg (175 lb)   SpO2 99%   BMI 27.15 kg/m     Wt Readings from Last 4 Encounters:   01/17/22 79.4 kg (175 lb)   01/06/22 80.4 kg (177 lb 3.2 oz)   01/06/22 78.5 kg (173 lb)   01/03/22 80.2 kg (176 lb 12.8 oz)     General: well appearing, no acute distress  HEENT: normocephalic, atraumatic, PERRLA, sclerae nonicteric, moist mucous membranes, oropharynx is clear  Lymph: no palpable cervical, supraclavicular or axillary lymphadenopathy   CV: S1 S2, RRR, no murmur, click or rub  Lungs: clear to auscultation bilaterally, no wheezes/rales/rhonchi  Abd: soft, positive bowel sounds, non-distended, non-tender  MSK: no peripheral edema  Neuro: alert and oriented x3, CN grossly intact   Psych: appropriate mood and affect  Skin:no visible rashes or lesions on exposed skin          Laboratory Data:      I personally reviewed the following labs:    Most Recent 3 CBC's:  Recent Labs   Lab Test 01/17/22  1504 01/03/22  1440 12/27/21  1153   WBC 5.2 6.6 5.4   HGB 13.4 13.4 13.5   MCV 94 93 94    218 276     Most Recent 3 BMP's:  Recent Labs   Lab Test 01/17/22  1504 12/27/21  1153 12/09/21  0910    143 136   POTASSIUM 3.4 3.8 3.6   CHLORIDE 108 111* 110*   CO2 25 24 26   BUN 20 19 18   CR 0.89 0.81 0.81   ANIONGAP 11 8 <1*   MEKA 9.2 9.2 8.6   * " "97 90     Most Recent 2 LFT's:  Recent Labs   Lab Test 01/17/22  1504 12/27/21  1153   AST 14 16   ALT 22 24   ALKPHOS 56 59   BILITOTAL 0.4 0.4              Assessment and Recommendations   Kristie Cornejo is a 51 year old female who was diagnosed with Plasmacytoma after she presented with left sided groin pain now found to have multiple myeloma after further workup with bone marrow biopsy.     # Multiple myeloma with associated large plasmacytoma of the L pelvis. Kappa light chain disease.     BMBx with 25% plasma cell infiltration; standard risk with gain of 11q, IGH-CCND1 fusion but no losses of 1q or TP53 and no gain of 1q.   SPEP w/out a monoclonal peak  UPEP positive. 70% paraprotein. Large monoclonal free immunoglobulin light chain of kappa chain type.     No anemia, normal creat, Ca, no other skeletal lesions. Alb normal, B2M normal.  Stage I Light chain disease with left pelvic bone pathologic fracture she is classified as having symptomatic multiple myeloma.     RVD C1D1 11/15/21 (had a slight delay in the start of her Revlimid on 11/19/21 due to the timing of her pregnancy tests).  She tolerated cycle 1 well with some mild nausea and a headache on her week off.  RVD C2D1 12/6/21, tolerated well overall, but has had more consistent sensation of her hands and feet being hot. No tingling, pain, loss of sensation, or changes to her strength.  RVD C3D1 12/27/21, tolerated well overall with ongoing sensation of \"hot\" hands and feet    Pending good response, therapy may include autologous stem cell transplant.     -Okay to proceed with C4 today.  - Return to clinic to see me with labs prior to C5.  - Continue monthly pregnancy tests while on Revlimid   - Planning for 6 cycles RVD followed by consideration of autoHSCT. BMT consult was completed 12/23/21  - If concerns for peripheral neuropathy increase, could consider switching from Velcade to Daratumumab.     #Bone Health  She will continue monthly Zometa " (last received on 12/27). Next dose due 1/24/22. She will continue Calcium/Vitamin D supplements.     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 Currently having no pain and able to walk 1 mile without any assistive devices.  She is interested in pursuing other activities like gentle cross-country skiing.  Had follow-up with Dr. Philip on 1/6/22 and was cleared to resume regular activity as tolerated. She should still refrain from high-impact activities.    # PVCs  She had an ECHO 12/1 and had follow-up with cardiology 12/7 (notes in CareEverywhere).  Based on today's conversation, it sounds like she is becoming more symptomatic and may be flipping in and out of an aberrant rhythm. She had a holter monitor performed in Sept 2021 (Care Everywhere) which showed a 15% PVC burden. She states she was told they wouldn't manage her rhythm with medication unless she reached 20%; she also notes she was diagnosed with multiple myeloma the day prior, so the heart rhythm was less of a priority at that moment. We discussed today that with her being more symptomatic, and with plans for potential transplant as part of her treatment, I recommend she touch base with cardiology again.  Due to some staff changes at her current cardiologist, she prefers to pursue a new evaluation at the [facility] to keep all of her care here.  - EKG today, although heart sounds regular on exam  - Cardiology consult     # Nausea  Mild, managed with Compazine up to twice daily and lorazepam occasionally at night.     #COVID prophylaxis  She enquired today about a 4th COVID vaccine.  I am not aware of offering 4th vaccines, but we did discuss Evusheld prophylaxis. She would like to talk to her  about this, but may be interested in receiving it on Thursday if she decides to proceed.     46  minutes spent on the date of the encounter doing chart review, review of test results, patient visit and documentation     DORON Hall [facility]  [address] [place] [address] [phone]

## 2022-01-17 ENCOUNTER — ONCOLOGY VISIT (OUTPATIENT)
Dept: ONCOLOGY | Facility: CLINIC | Age: 52
End: 2022-01-17
Attending: REGISTERED NURSE
Payer: COMMERCIAL

## 2022-01-17 ENCOUNTER — APPOINTMENT (OUTPATIENT)
Dept: LAB | Facility: CLINIC | Age: 52
End: 2022-01-17
Attending: REGISTERED NURSE
Payer: COMMERCIAL

## 2022-01-17 VITALS
SYSTOLIC BLOOD PRESSURE: 142 MMHG | OXYGEN SATURATION: 99 % | RESPIRATION RATE: 16 BRPM | WEIGHT: 175 LBS | TEMPERATURE: 98 F | BODY MASS INDEX: 27.15 KG/M2 | DIASTOLIC BLOOD PRESSURE: 78 MMHG | HEART RATE: 64 BPM

## 2022-01-17 DIAGNOSIS — C90.00 LIGHT CHAIN MYELOMA (H): Primary | ICD-10-CM

## 2022-01-17 DIAGNOSIS — R11.0 NAUSEA: ICD-10-CM

## 2022-01-17 DIAGNOSIS — R00.2 PALPITATIONS: ICD-10-CM

## 2022-01-17 DIAGNOSIS — I49.3 PVC'S (PREMATURE VENTRICULAR CONTRACTIONS): ICD-10-CM

## 2022-01-17 LAB
ALBUMIN SERPL-MCNC: 4 G/DL (ref 3.4–5)
ALP SERPL-CCNC: 56 U/L (ref 40–150)
ALT SERPL W P-5'-P-CCNC: 22 U/L (ref 0–50)
ANION GAP SERPL CALCULATED.3IONS-SCNC: 11 MMOL/L (ref 3–14)
AST SERPL W P-5'-P-CCNC: 14 U/L (ref 0–45)
BASOPHILS # BLD MANUAL: 0.1 10E3/UL (ref 0–0.2)
BASOPHILS NFR BLD MANUAL: 2 %
BILIRUB SERPL-MCNC: 0.4 MG/DL (ref 0.2–1.3)
BUN SERPL-MCNC: 20 MG/DL (ref 7–30)
CALCIUM SERPL-MCNC: 9.2 MG/DL (ref 8.5–10.1)
CHLORIDE BLD-SCNC: 108 MMOL/L (ref 94–109)
CO2 SERPL-SCNC: 25 MMOL/L (ref 20–32)
CREAT SERPL-MCNC: 0.89 MG/DL (ref 0.52–1.04)
DACRYOCYTES BLD QL SMEAR: SLIGHT
ELLIPTOCYTES BLD QL SMEAR: SLIGHT
EOSINOPHIL # BLD MANUAL: 0.4 10E3/UL (ref 0–0.7)
EOSINOPHIL NFR BLD MANUAL: 7 %
ERYTHROCYTE [DISTWIDTH] IN BLOOD BY AUTOMATED COUNT: 14 % (ref 10–15)
GFR SERPL CREATININE-BSD FRML MDRD: 78 ML/MIN/1.73M2
GLUCOSE BLD-MCNC: 118 MG/DL (ref 70–99)
HCT VFR BLD AUTO: 39.7 % (ref 35–47)
HGB BLD-MCNC: 13.4 G/DL (ref 11.7–15.7)
LYMPHOCYTES # BLD MANUAL: 0.6 10E3/UL (ref 0.8–5.3)
LYMPHOCYTES NFR BLD MANUAL: 12 %
MCH RBC QN AUTO: 31.6 PG (ref 26.5–33)
MCHC RBC AUTO-ENTMCNC: 33.8 G/DL (ref 31.5–36.5)
MCV RBC AUTO: 94 FL (ref 78–100)
MONOCYTES # BLD MANUAL: 0.8 10E3/UL (ref 0–1.3)
MONOCYTES NFR BLD MANUAL: 16 %
MYELOCYTES # BLD MANUAL: 0.1 10E3/UL
MYELOCYTES NFR BLD MANUAL: 1 %
NEUTROPHILS # BLD MANUAL: 3.2 10E3/UL (ref 1.6–8.3)
NEUTROPHILS NFR BLD MANUAL: 62 %
PLAT MORPH BLD: ABNORMAL
PLATELET # BLD AUTO: 293 10E3/UL (ref 150–450)
POTASSIUM BLD-SCNC: 3.4 MMOL/L (ref 3.4–5.3)
PROT SERPL-MCNC: 6.7 G/DL (ref 6.8–8.8)
RBC # BLD AUTO: 4.24 10E6/UL (ref 3.8–5.2)
RBC MORPH BLD: ABNORMAL
SODIUM SERPL-SCNC: 144 MMOL/L (ref 133–144)
WBC # BLD AUTO: 5.2 10E3/UL (ref 4–11)

## 2022-01-17 PROCEDURE — 93010 ELECTROCARDIOGRAM REPORT: CPT | Performed by: INTERNAL MEDICINE

## 2022-01-17 PROCEDURE — 250N000011 HC RX IP 250 OP 636

## 2022-01-17 PROCEDURE — 96401 CHEMO ANTI-NEOPL SQ/IM: CPT

## 2022-01-17 PROCEDURE — 99215 OFFICE O/P EST HI 40 MIN: CPT | Performed by: REGISTERED NURSE

## 2022-01-17 PROCEDURE — 36415 COLL VENOUS BLD VENIPUNCTURE: CPT

## 2022-01-17 PROCEDURE — 82374 ASSAY BLOOD CARBON DIOXIDE: CPT

## 2022-01-17 PROCEDURE — 85027 COMPLETE CBC AUTOMATED: CPT

## 2022-01-17 RX ORDER — DEXAMETHASONE 4 MG/1
40 TABLET ORAL
Qty: 30 TABLET | Refills: 0 | Status: SHIPPED | OUTPATIENT
Start: 2022-01-17 | End: 2022-01-21

## 2022-01-17 RX ADMIN — BORTEZOMIB 2.5 MG: 3.5 INJECTION, POWDER, LYOPHILIZED, FOR SOLUTION INTRAVENOUS; SUBCUTANEOUS at 16:33

## 2022-01-17 ASSESSMENT — PAIN SCALES - GENERAL: PAINLEVEL: NO PAIN (0)

## 2022-01-17 NOTE — PATIENT INSTRUCTIONS
[facility] Triage and after hours / weekends / holidays:  [phone]    Please call the triage or after hours line if you experience a temperature greater than or equal to 100.5, shaking chills, have uncontrolled nausea, vomiting and/or diarrhea, dizziness, shortness of breath, chest pain, bleeding, unexplained bruising, or if you have any other new/concerning symptoms, questions or concerns.      If you are having any concerning symptoms or wish to speak to a provider before your next infusion visit, please call your care coordinator or triage to notify them so we can adequately serve you.     If you need a refill on a narcotic prescription or other medication, please call before your infusion appointment.                 January 2022 Sunday Monday Tuesday Wednesday Thursday Friday Saturday                                 1       2     3    LAB PERIPHERAL   2:30 PM   (15 min.)   [facility]   [facility]    ONC INFUSION 1 HR (60 MIN)   3:00 PM   (60 min.)   [facility] ONC INFUSION NURSE   [facility] 4     5     6    [facility] RETURN   2:15 PM   (15 min.)   Edu Philip MD   [facility] Orthopedic [facility]    XR PELVIS 1/2 VIEWS   2:25 PM   (20 min.)   [id number]   [facility] Orthopedic Xray [place]    ONC INFUSION 1 HR (60 MIN)   3:30 PM   (60 min.)   [facility] ONC INFUSION NURSE   [facility] 7    LAB  10:30 AM   (15 min.)   [facility] LAB   [facility] 8       9     10     11     12     13     14     15       16     17    LAB PERIPHERAL   2:45 PM   (15 min.)   [facility] DRAW   [facility]    RETURN   3:15 PM   (45 min.)   Jackie Ferris APRN CNP   [facility]    ONC INFUSION 1 HR (60 MIN)   3:30 PM   (60 min.)    ONC INFUSION NURSE   [facility] 18     19     20    LAB PERIPHERAL    2:30 PM   (15 min.)   [facility] DRAW   [facility]    ONC INFUSION 1 HR (60 MIN)   3:00 PM   (60 min.)   [facility] ONC INFUSION NURSE   [facility] 21     22       23     24    LAB PERIPHERAL   9:00 AM   (15 min.)   [facility] DRAW   [facility]    ONC INFUSION 1 HR (60 MIN)   9:30 AM   (60 min.)   [facility] ONC INFUSION NURSE   [facility] 25     26     27    LAB PERIPHERAL   2:30 PM   (15 min.)   [facility] DRAW   [facility]    ONC INFUSION 1 HR (60 MIN)   3:00 PM   (60 min.)   [facility] ONC INFUSION NURSE   [facility] 28     29       30     31 February 2022 Sunday Monday Tuesday Wednesday Thursday Friday Saturday             1     2    LAB PERIPHERAL   2:30 PM   (15 min.)   [facility] DRAW   [facility]    RETURN   2:45 PM   (30 min.)   Payton Reinoso MD   [facility] Blood and Marrow Transplant Program [place] 3     4     5       6     7    LAB PERIPHERAL   2:00 PM   (15 min.)   [facility] DRAW   [facility]    ONC INFUSION 1 HR (60 MIN)   2:30 PM   (60 min.)   [facility] ONC INFUSION NURSE   [facility] 8     9     10     11     12       13     14     15     16     17     18     19       20     21     22     23     24     25     26       27     28                                              Lab Results:  Recent Results (from the past 12 hour(s))   Comprehensive metabolic panel    Collection Time: 01/17/22  3:04 PM   Result Value Ref Range    Sodium 144 133 - 144 mmol/L    Potassium 3.4 3.4 - 5.3 mmol/L    Chloride 108 94 - 109 mmol/L    Carbon Dioxide (CO2) 25 20 - 32 mmol/L    Anion Gap 11 3 - 14 mmol/L    Urea Nitrogen 20 7 - 30 mg/dL    Creatinine 0.89 0.52 - 1.04 mg/dL    Calcium 9.2 8.5 - 10.1 mg/dL     Glucose 118 (H) 70 - 99 mg/dL    Alkaline Phosphatase 56 40 - 150 U/L    AST 14 0 - 45 U/L    ALT 22 0 - 50 U/L    Protein Total 6.7 (L) 6.8 - 8.8 g/dL    Albumin 4.0 3.4 - 5.0 g/dL    Bilirubin Total 0.4 0.2 - 1.3 mg/dL    GFR Estimate 78 >60 mL/min/1.73m2   CBC with platelets and differential    Collection Time: 01/17/22  3:04 PM   Result Value Ref Range    WBC Count 5.2 4.0 - 11.0 10e3/uL    RBC Count 4.24 3.80 - 5.20 10e6/uL    Hemoglobin 13.4 11.7 - 15.7 g/dL    Hematocrit 39.7 35.0 - 47.0 %    MCV 94 78 - 100 fL    MCH 31.6 26.5 - 33.0 pg    MCHC 33.8 31.5 - 36.5 g/dL    RDW 14.0 10.0 - 15.0 %    Platelet Count 293 150 - 450 10e3/uL

## 2022-01-17 NOTE — PROGRESS NOTES
Infusion Nursing Note:  Kristie Cornejo presents today for cycle 4 day 1 Velcade.    Patient seen by provider today: Yes: Jackie Ferris NP   present during visit today: Not Applicable.    Note: Ok to proceed with velcade today. Per Jackie Ferris NP- get EKG today, ok to discharge patient home and send to scanning.      Treatment Conditions:  Lab Results   Component Value Date    HGB 13.4 01/17/2022    WBC 5.2 01/17/2022    ANEU 1.8 12/09/2021    ANEUTAUTO 5.9 01/03/2022     01/17/2022      Lab Results   Component Value Date     01/17/2022    POTASSIUM 3.4 01/17/2022    CR 0.89 01/17/2022    MEKA 9.2 01/17/2022    BILITOTAL 0.4 01/17/2022    ALBUMIN 4.0 01/17/2022    ALT 22 01/17/2022    AST 14 01/17/2022     Results reviewed, labs MET treatment parameters, ok to proceed with treatment.      Post Infusion Assessment:  Patient tolerated one injection lower left abdomen without incident.    Discharge Plan:   Patient given refill for dex  Discharge instructions reviewed with: Patient.  Patient and/or family verbalized understanding of discharge instructions and all questions answered.  Copy of AVS reviewed with patient and/or family.  Patient will return 1/20/22 for next appointment.  Patient discharged in stable condition accompanied by: self.  Departure Mode: Ambulatory.      Nan Thomson RN

## 2022-01-17 NOTE — NURSING NOTE
Chief Complaint   Patient presents with     Blood Draw     Labs drawn via  by RN. VS taken.     Labs drawn with vpt by rn.  Pt tolerated well.  VS taken.  Pt checked in for next appt.    Quincy Gibbons RN

## 2022-01-17 NOTE — LETTER
"    1/17/2022         RE: Kristie Cornejo  [address]  [facility] [address]        Dear Colleague,    Thank you for referring your patient, Kristie Cornejo, to the [facility]. Please see a copy of my visit note below.    [facility]  Date of Visit: Jan 17, 2022   Oncologist: Dr. Payton Reinoso    Reason for visit: myeloma follow-up           Oncology History   51 year old female with past medical history significant for Irritable bowel disease, allergies, large plasmacytoma of the L pelvis, and newly diagnosed multiple myeloma.     Early in April 2021 , she noted to have left sided groin pain, constant and was affecting her daily activities  MRI showed large, expansile, permeative mass with indistinct borders involving the left superior and inferior pubic rami, pubic symphysis and with possible extension to the left acetabulum.     We completed the following work-up:   - Biopsy 9/7/2021: plasmacytoma.  Flow cytometry showed kappa monotypic plasma cells, polytypic B cells. FISH results from plasmacytoma showed a gain of 11q, IGH-CCND1 fusion but no losses of 1q or TP53 and no gain of 1q.  - Bone marrow biopsy 09/22/21: Marrow cellularity 50% with 25% interstitial infiltration w/  Kappa-monotypic, moderately atypical plasma cells. Flow with 0.7% kappa-monotypica plasma cells. FISH and cytogenetics pending.   - PET CT 09/24/21 w/ hypermetabolic pathologic fracture of the L pubic ramus w/ aggressive appearing osteolysis but no additional suspicious lytic or blastic osseous lesions.     Treatment to date:   - Zometa q4 weeks started 9/27/21  - Radiation to left pubic ramus x18 fractions, completed 11/11/21.   - RVD C1D1 11/15/21  - RVD C2D1 12/6/21  - RVD C3D1  12/27/21        INTERIM HISTORY:   Margo presents for oncology follow-up visit today prior to C4 RVD    Things have been stable overall.  She continues to have a \"hot\" sensation in " "her hands and feet that improves on the week she is off therapy. She has not noted any new redness or swelling, pain, tingling, loss of sensation, or weakness.    She notes two incidents over the past month that caused her concern.  About 10 days ago, she was swimming and experienced a sensation of her heart beating very fast.  She stopped and tried measuring her heart rate; she lost track of her heart rate, but ended up counting skipped beats and noted she skipped about 6 times in 30 seconds.    The second incident occurred last week when she \"charged up the stairs,\" and when she got to the top she felt faint, sat down, and felt like she sounded like she was slurring her words.  She was on the phone with a friend at the time who did not note any slurred speech over the phone.    Denies fevers, chills, CP, SOB, vomiting.           Physical Exam:     BP (!) 142/78   Pulse 64   Temp 98  F (36.7  C) (Oral)   Resp 16   Wt 79.4 kg (175 lb)   SpO2 99%   BMI 27.15 kg/m     Wt Readings from Last 4 Encounters:   01/17/22 79.4 kg (175 lb)   01/06/22 80.4 kg (177 lb 3.2 oz)   01/06/22 78.5 kg (173 lb)   01/03/22 80.2 kg (176 lb 12.8 oz)     General: well appearing, no acute distress  HEENT: normocephalic, atraumatic, PERRLA, sclerae nonicteric, moist mucous membranes, oropharynx is clear  Lymph: no palpable cervical, supraclavicular or axillary lymphadenopathy   CV: S1 S2, RRR, no murmur, click or rub  Lungs: clear to auscultation bilaterally, no wheezes/rales/rhonchi  Abd: soft, positive bowel sounds, non-distended, non-tender  MSK: no peripheral edema  Neuro: alert and oriented x3, CN grossly intact   Psych: appropriate mood and affect  Skin:no visible rashes or lesions on exposed skin          Laboratory Data:      I personally reviewed the following labs:    Most Recent 3 CBC's:  Recent Labs   Lab Test 01/17/22  1504 01/03/22  1440 12/27/21  1153   WBC 5.2 6.6 5.4   HGB 13.4 13.4 13.5   MCV 94 93 94    218 276 " "    Most Recent 3 BMP's:  Recent Labs   Lab Test 01/17/22  1504 12/27/21  1153 12/09/21  0910    143 136   POTASSIUM 3.4 3.8 3.6   CHLORIDE 108 111* 110*   CO2 25 24 26   BUN 20 19 18   CR 0.89 0.81 0.81   ANIONGAP 11 8 <1*   MEKA 9.2 9.2 8.6   * 97 90     Most Recent 2 LFT's:  Recent Labs   Lab Test 01/17/22  1504 12/27/21  1153   AST 14 16   ALT 22 24   ALKPHOS 56 59   BILITOTAL 0.4 0.4              Assessment and Recommendations   Kristie Cornejo is a 51 year old female who was diagnosed with Plasmacytoma after she presented with left sided groin pain now found to have multiple myeloma after further workup with bone marrow biopsy.     # Multiple myeloma with associated large plasmacytoma of the L pelvis. Kappa light chain disease.     BMBx with 25% plasma cell infiltration; standard risk with gain of 11q, IGH-CCND1 fusion but no losses of 1q or TP53 and no gain of 1q.   SPEP w/out a monoclonal peak  UPEP positive. 70% paraprotein. Large monoclonal free immunoglobulin light chain of kappa chain type.     No anemia, normal creat, Ca, no other skeletal lesions. Alb normal, B2M normal.  Stage I Light chain disease with left pelvic bone pathologic fracture she is classified as having symptomatic multiple myeloma.     RVD C1D1 11/15/21 (had a slight delay in the start of her Revlimid on 11/19/21 due to the timing of her pregnancy tests).  She tolerated cycle 1 well with some mild nausea and a headache on her week off.  RVD C2D1 12/6/21, tolerated well overall, but has had more consistent sensation of her hands and feet being hot. No tingling, pain, loss of sensation, or changes to her strength.  RVD C3D1 12/27/21, tolerated well overall with ongoing sensation of \"hot\" hands and feet    Pending good response, therapy may include autologous stem cell transplant.     -Okay to proceed with C4 today.  - Return to clinic to see me with labs prior to C5.  - Continue monthly pregnancy tests while on Revlimid "   - Planning for 6 cycles RVD followed by consideration of autoHSCT. BMT consult was completed 12/23/21  - If concerns for peripheral neuropathy increase, could consider switching from Velcade to Daratumumab.     #Bone Health  She will continue monthly Zometa (last received on 12/27). Next dose due 1/24/22. She will continue Calcium/Vitamin D supplements.     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 Currently having no pain and able to walk 1 mile without any assistive devices.  She is interested in pursuing other activities like gentle cross-country skiing.  Had follow-up with Dr. Philip on 1/6/22 and was cleared to resume regular activity as tolerated. She should still refrain from high-impact activities.    # PVCs  She had an ECHO 12/1 and had follow-up with cardiology 12/7 (notes in [facility]  Based on today's conversation, it sounds like she is becoming more symptomatic and may be flipping in and out of an aberrant rhythm. She had a holter monitor performed in Sept 2021 ([facility]) which showed a 15% PVC burden. She states she was told they wouldn't manage her rhythm with medication unless she reached 20%; she also notes she was diagnosed with multiple myeloma the day prior, so the heart rhythm was less of a priority at that moment. We discussed today that with her being more symptomatic, and with plans for potential transplant as part of her treatment, I recommend she touch base with cardiology again.  Due to some staff changes at her current cardiologist, she prefers to pursue a new evaluation at the [facility] to keep all of her care here.  - EKG today, although heart sounds regular on exam  - Cardiology consult     # Nausea  Mild, managed with Compazine up to twice daily and lorazepam occasionally at night.     #COVID prophylaxis  She  enquired today about a 4th COVID vaccine.  I am not aware of offering 4th vaccines, but we did discuss Evusheld prophylaxis. She would like to talk to her  about this, but may be interested in receiving it on Thursday if she decides to proceed.     46 minutes spent on the date of the encounter doing chart review, review of test results, patient visit and documentation           Again, thank you for allowing me to participate in the care of your patient.      Sincerely,    DORON Hall CNP

## 2022-01-18 LAB
ATRIAL RATE - MUSE: 69 BPM
DIASTOLIC BLOOD PRESSURE - MUSE: NORMAL MMHG
INTERPRETATION ECG - MUSE: NORMAL
P AXIS - MUSE: 15 DEGREES
PR INTERVAL - MUSE: 160 MS
QRS DURATION - MUSE: 80 MS
QT - MUSE: 428 MS
QTC - MUSE: 458 MS
R AXIS - MUSE: 9 DEGREES
SYSTOLIC BLOOD PRESSURE - MUSE: NORMAL MMHG
T AXIS - MUSE: 4 DEGREES
VENTRICULAR RATE- MUSE: 69 BPM

## 2022-01-19 DIAGNOSIS — C90.00 LIGHT CHAIN MYELOMA (H): Primary | ICD-10-CM

## 2022-01-20 ENCOUNTER — OFFICE VISIT (OUTPATIENT)
Dept: TRANSPLANT | Facility: CLINIC | Age: 52
End: 2022-01-20
Payer: COMMERCIAL

## 2022-01-20 ENCOUNTER — APPOINTMENT (OUTPATIENT)
Dept: LAB | Facility: CLINIC | Age: 52
End: 2022-01-20
Attending: REGISTERED NURSE
Payer: COMMERCIAL

## 2022-01-20 ENCOUNTER — INFUSION THERAPY VISIT (OUTPATIENT)
Dept: ONCOLOGY | Facility: CLINIC | Age: 52
End: 2022-01-20
Attending: REGISTERED NURSE
Payer: COMMERCIAL

## 2022-01-20 VITALS
BODY MASS INDEX: 27.13 KG/M2 | OXYGEN SATURATION: 98 % | DIASTOLIC BLOOD PRESSURE: 77 MMHG | WEIGHT: 174.9 LBS | RESPIRATION RATE: 17 BRPM | SYSTOLIC BLOOD PRESSURE: 133 MMHG | HEART RATE: 67 BPM | TEMPERATURE: 98 F

## 2022-01-20 DIAGNOSIS — C90.00 LIGHT CHAIN MYELOMA (H): Primary | ICD-10-CM

## 2022-01-20 DIAGNOSIS — C90.30 PLASMACYTOMA OF BONE (H): ICD-10-CM

## 2022-01-20 LAB
ALBUMIN SERPL-MCNC: 3.8 G/DL (ref 3.4–5)
ALP SERPL-CCNC: 58 U/L (ref 40–150)
ALT SERPL W P-5'-P-CCNC: 22 U/L (ref 0–50)
ANION GAP SERPL CALCULATED.3IONS-SCNC: 14 MMOL/L (ref 3–14)
AST SERPL W P-5'-P-CCNC: 10 U/L (ref 0–45)
BASOPHILS # BLD AUTO: 0.1 10E3/UL (ref 0–0.2)
BASOPHILS NFR BLD AUTO: 1 %
BILIRUB SERPL-MCNC: 0.4 MG/DL (ref 0.2–1.3)
BUN SERPL-MCNC: 25 MG/DL (ref 7–30)
BURR CELLS BLD QL SMEAR: SLIGHT
CALCIUM SERPL-MCNC: 9 MG/DL (ref 8.5–10.1)
CHLORIDE BLD-SCNC: 107 MMOL/L (ref 94–109)
CO2 SERPL-SCNC: 22 MMOL/L (ref 20–32)
CREAT SERPL-MCNC: 0.85 MG/DL (ref 0.52–1.04)
EOSINOPHIL # BLD AUTO: 0.4 10E3/UL (ref 0–0.7)
EOSINOPHIL NFR BLD AUTO: 7 %
ERYTHROCYTE [DISTWIDTH] IN BLOOD BY AUTOMATED COUNT: 14.2 % (ref 10–15)
GFR SERPL CREATININE-BSD FRML MDRD: 82 ML/MIN/1.73M2
GLUCOSE BLD-MCNC: 163 MG/DL (ref 70–99)
HCG UR QL: NEGATIVE
HCT VFR BLD AUTO: 39.2 % (ref 35–47)
HGB BLD-MCNC: 13.2 G/DL (ref 11.7–15.7)
IMM GRANULOCYTES # BLD: 0 10E3/UL
IMM GRANULOCYTES NFR BLD: 1 %
LYMPHOCYTES # BLD AUTO: 0.9 10E3/UL (ref 0.8–5.3)
LYMPHOCYTES NFR BLD AUTO: 17 %
MCH RBC QN AUTO: 31.1 PG (ref 26.5–33)
MCHC RBC AUTO-ENTMCNC: 33.7 G/DL (ref 31.5–36.5)
MCV RBC AUTO: 93 FL (ref 78–100)
MONOCYTES # BLD AUTO: 1.5 10E3/UL (ref 0–1.3)
MONOCYTES NFR BLD AUTO: 28 %
NEUTROPHILS # BLD AUTO: 2.4 10E3/UL (ref 1.6–8.3)
NEUTROPHILS NFR BLD AUTO: 46 %
NRBC # BLD AUTO: 0 10E3/UL
NRBC BLD AUTO-RTO: 0 /100
PLAT MORPH BLD: ABNORMAL
PLATELET # BLD AUTO: 283 10E3/UL (ref 150–450)
POTASSIUM BLD-SCNC: 3.5 MMOL/L (ref 3.4–5.3)
PROT SERPL-MCNC: 6.4 G/DL (ref 6.8–8.8)
RBC # BLD AUTO: 4.24 10E6/UL (ref 3.8–5.2)
RBC MORPH BLD: ABNORMAL
SODIUM SERPL-SCNC: 143 MMOL/L (ref 133–144)
WBC # BLD AUTO: 5.3 10E3/UL (ref 4–11)

## 2022-01-20 PROCEDURE — 83521 IG LIGHT CHAINS FREE EACH: CPT | Mod: 59

## 2022-01-20 PROCEDURE — 36415 COLL VENOUS BLD VENIPUNCTURE: CPT

## 2022-01-20 PROCEDURE — 80053 COMPREHEN METABOLIC PANEL: CPT

## 2022-01-20 PROCEDURE — 99215 OFFICE O/P EST HI 40 MIN: CPT

## 2022-01-20 PROCEDURE — 81025 URINE PREGNANCY TEST: CPT

## 2022-01-20 PROCEDURE — 96401 CHEMO ANTI-NEOPL SQ/IM: CPT

## 2022-01-20 PROCEDURE — 82040 ASSAY OF SERUM ALBUMIN: CPT

## 2022-01-20 PROCEDURE — 250N000011 HC RX IP 250 OP 636

## 2022-01-20 PROCEDURE — 96372 THER/PROPH/DIAG INJ SC/IM: CPT

## 2022-01-20 PROCEDURE — 85025 COMPLETE CBC W/AUTO DIFF WBC: CPT

## 2022-01-20 PROCEDURE — M0220 HC INJECTION TIXAGEVIMAB & CILGAVIMAB (EVUSHELD): HCPCS

## 2022-01-20 RX ORDER — MEPERIDINE HYDROCHLORIDE 25 MG/ML
25 INJECTION INTRAMUSCULAR; INTRAVENOUS; SUBCUTANEOUS EVERY 30 MIN PRN
Status: DISCONTINUED | OUTPATIENT
Start: 2022-01-20 | End: 2022-01-21

## 2022-01-20 RX ORDER — LORAZEPAM 2 MG/ML
0.5 INJECTION INTRAMUSCULAR EVERY 4 HOURS PRN
Status: DISCONTINUED | OUTPATIENT
Start: 2022-01-20 | End: 2022-01-21

## 2022-01-20 RX ORDER — NALOXONE HYDROCHLORIDE 0.4 MG/ML
0.2 INJECTION, SOLUTION INTRAMUSCULAR; INTRAVENOUS; SUBCUTANEOUS
Status: DISCONTINUED | OUTPATIENT
Start: 2022-01-20 | End: 2022-01-21

## 2022-01-20 RX ORDER — NALOXONE HYDROCHLORIDE 0.4 MG/ML
0.2 INJECTION, SOLUTION INTRAMUSCULAR; INTRAVENOUS; SUBCUTANEOUS
Status: CANCELLED | OUTPATIENT
Start: 2022-03-26

## 2022-01-20 RX ORDER — ALBUTEROL SULFATE 0.83 MG/ML
2.5 SOLUTION RESPIRATORY (INHALATION)
Status: CANCELLED | OUTPATIENT
Start: 2022-03-26

## 2022-01-20 RX ORDER — ALBUTEROL SULFATE 0.83 MG/ML
2.5 SOLUTION RESPIRATORY (INHALATION)
Status: DISCONTINUED | OUTPATIENT
Start: 2022-01-20 | End: 2022-01-21

## 2022-01-20 RX ORDER — METHYLPREDNISOLONE SODIUM SUCCINATE 125 MG/2ML
125 INJECTION, POWDER, LYOPHILIZED, FOR SOLUTION INTRAMUSCULAR; INTRAVENOUS
Status: DISCONTINUED | OUTPATIENT
Start: 2022-01-20 | End: 2022-01-21

## 2022-01-20 RX ORDER — DIPHENHYDRAMINE HYDROCHLORIDE 50 MG/ML
50 INJECTION INTRAMUSCULAR; INTRAVENOUS
Status: DISCONTINUED | OUTPATIENT
Start: 2022-01-20 | End: 2022-01-21

## 2022-01-20 RX ORDER — EPINEPHRINE 1 MG/ML
0.3 INJECTION, SOLUTION INTRAMUSCULAR; SUBCUTANEOUS EVERY 5 MIN PRN
Status: DISCONTINUED | OUTPATIENT
Start: 2022-01-20 | End: 2022-01-21

## 2022-01-20 RX ORDER — DIPHENHYDRAMINE HYDROCHLORIDE 50 MG/ML
50 INJECTION INTRAMUSCULAR; INTRAVENOUS
Status: CANCELLED
Start: 2022-03-26

## 2022-01-20 RX ORDER — ALBUTEROL SULFATE 90 UG/1
1-2 AEROSOL, METERED RESPIRATORY (INHALATION)
Status: DISCONTINUED | OUTPATIENT
Start: 2022-01-20 | End: 2022-01-21

## 2022-01-20 RX ORDER — HEPARIN SODIUM,PORCINE 10 UNIT/ML
5 VIAL (ML) INTRAVENOUS
Status: CANCELLED | OUTPATIENT
Start: 2022-03-26

## 2022-01-20 RX ORDER — METHYLPREDNISOLONE SODIUM SUCCINATE 125 MG/2ML
125 INJECTION, POWDER, LYOPHILIZED, FOR SOLUTION INTRAMUSCULAR; INTRAVENOUS
Status: CANCELLED
Start: 2022-03-26

## 2022-01-20 RX ORDER — HEPARIN SODIUM (PORCINE) LOCK FLUSH IV SOLN 100 UNIT/ML 100 UNIT/ML
5 SOLUTION INTRAVENOUS
Status: CANCELLED | OUTPATIENT
Start: 2022-03-26

## 2022-01-20 RX ORDER — EPINEPHRINE 1 MG/ML
0.3 INJECTION, SOLUTION INTRAMUSCULAR; SUBCUTANEOUS EVERY 5 MIN PRN
Status: CANCELLED | OUTPATIENT
Start: 2022-03-26

## 2022-01-20 RX ORDER — ALBUTEROL SULFATE 90 UG/1
1-2 AEROSOL, METERED RESPIRATORY (INHALATION)
Status: CANCELLED
Start: 2022-03-26

## 2022-01-20 RX ORDER — MEPERIDINE HYDROCHLORIDE 25 MG/ML
25 INJECTION INTRAMUSCULAR; INTRAVENOUS; SUBCUTANEOUS EVERY 30 MIN PRN
Status: CANCELLED | OUTPATIENT
Start: 2022-03-26

## 2022-01-20 RX ADMIN — Medication: at 12:15

## 2022-01-20 RX ADMIN — BORTEZOMIB 2.5 MG: 3.5 INJECTION, POWDER, LYOPHILIZED, FOR SOLUTION INTRAVENOUS; SUBCUTANEOUS at 13:06

## 2022-01-20 ASSESSMENT — PAIN SCALES - GENERAL: PAINLEVEL: NO PAIN (0)

## 2022-01-20 NOTE — NURSING NOTE
"Oncology Rooming Note    January 20, 2022 11:22 AM   Kristie Cornejo is a 51 year old female who presents for:    Chief Complaint   Patient presents with     Blood Draw     vpt blood draw, vitals taken, checked into next appointment     Oncology Clinic Visit     Provider visit r/t Myeloma     Initial Vitals: /77   Pulse 67   Temp 98  F (36.7  C) (Oral)   Resp 17   Wt 79.3 kg (174 lb 14.4 oz)   SpO2 98%   BMI 27.13 kg/m   Estimated body mass index is 27.13 kg/m  as calculated from the following:    Height as of 1/6/22: 1.71 m (5' 7.32\").    Weight as of this encounter: 79.3 kg (174 lb 14.4 oz). Body surface area is 1.94 meters squared.  No Pain (0) Comment: Data Unavailable   No LMP recorded. (Menstrual status: IUD).  Allergies reviewed: Yes  Medications reviewed: Yes    Medications: Medication refills not needed today.  Pharmacy name entered into [facility]:    [facility] - [place], [place] - [address]  [id number] - [place], [place] - [address]  [facility] - [place], [place] - [address]  [place], [place] - [address] [place] [address]    Clinical concerns: VSS. Questions about removing Mirena, and would like Evusheld. Dr. Payton Reinoso was notified.      Serina Jones RN              "

## 2022-01-20 NOTE — NURSING NOTE
Chief Complaint   Patient presents with     Blood Draw     vpt blood draw, vitals taken, checked into next appointment     Venipuncture labs drawn from right arm.    Jayleen Keys MA

## 2022-01-20 NOTE — PROGRESS NOTES
EVUSHELD Administration Note:  Kristie Cornejo presents today for EVUSHELD.  Patient seen by provider today: Yes: Dr. Payton Reinoso   present during visit today: Not Applicable.    Note: Evusheld injections given intramuscularly, in bilateral VG muscle. See MAR for details.    Confirmed patient received the Emergency Use Authorization Fact Sheet for Patients, Parents and Caregivers prior to receiving medication. Confirmed patient had conversation with provider about medication and no further questions at this time.     Post Injection Assessment:  Patient tolerated injection without incident.   Patient was observed in the room for a minimum of 10 minutes after injection per standard, then remained in the buidling for a total 60 minute observation period.      Discharge Plan:   Patient discharged in stable condition accompanied by: .    Serina Jones RN

## 2022-01-20 NOTE — PROGRESS NOTES
Infusion Nursing Note:  Kristie Cornejo presents today for Day 4 Cycle 4 Velcade.    Patient seen by provider : Yes: Dr. Reinoso  Date of visit: 01/20/22      present during visit today: Not Applicable.    Note: Margo arrives to infusion today doing well. She states that Dr. Reinoso will be adding Daratumumab to her plan starting next week. Writer provided an overview of what to expect with her first infusion appointment of Darzalex faspro. Patient appreciative. Writer touched base with ELEANOR Davalos and asked her to follow up with patient regarding the final plan of care.     ITreatment Conditions:  Lab Results   Component Value Date    HGB 13.2 01/20/2022    WBC 5.3 01/20/2022    ANEU 3.2 01/17/2022    ANEUTAUTO 2.4 01/20/2022     01/20/2022      Lab Results   Component Value Date     01/20/2022    POTASSIUM 3.5 01/20/2022    CR 0.85 01/20/2022    MEKA 9.0 01/20/2022    BILITOTAL 0.4 01/20/2022    ALBUMIN 3.8 01/20/2022    ALT 22 01/20/2022    AST 10 01/20/2022       Post Infusion Assessment:  Patient tolerated injection without incident.  Velcade given subcutaneously in right side abdomen.      Discharge Plan:   Patient declined prescription refills.  AVS to patient via [employer].  Patient will return 01/24 for next appointment.   Patient discharged in stable condition accompanied by: self.  Departure Mode: Ambulatory.    Emma Andrade RN

## 2022-01-20 NOTE — LETTER
1/20/2022         RE: Kristie Cornejo  [address]  [facility] [address]        Dear Colleague,    Thank you for referring your patient, Kristie Cornejo, to the [facility] [place]. Please see a copy of my visit note below.    EVUSHELD Administration Note:  Kristie Cornejo presents today for EVUSHELD.  Patient seen by provider today: Yes: Dr. Payton Reinoso   present during visit today: Not Applicable.    Note: Evusheld injections given intramuscularly, in bilateral VG muscle. See MAR for details.    Confirmed patient received the Emergency Use Authorization Fact Sheet for Patients, Parents and Caregivers prior to receiving medication. Confirmed patient had conversation with provider about medication and no further questions at this time.     Post Injection Assessment:  Patient tolerated injection without incident.   Patient was observed in the room for a minimum of 10 minutes after injection per standard, then remained in the buidling for a total 60 minute observation period.      Discharge Plan:   Patient discharged in stable condition accompanied by: .    Serina Jones RN        [facility]  Date of Visit: Jan 20, 2022   Oncologist: Dr. Payton Reinoso    Reason for visit: myeloma follow-up    INTERIM HISTORY:  Margo is currently on RVD for MM. SH is tolerating is very well, has no significant issues.  No neuropathy , did not have neutropenia or infections.   She is seeing cardiology for new onset palpitations and findigs of frequent PAC on kg.    Otherwise, today, denies new problems,   Her Urinary M-spike has not reduced significantly suggesting slugish respons to RVD. Free light chains are still above 100mg/dl after 3 cycles.          Oncology History   51 year old female with past medical history significant for Irritable bowel disease, allergies, large plasmacytoma of the L  pelvis, and newly diagnosed multiple myeloma.     Early in April 2021 ,MRI showed large, expansile, permeative mass with indistinct borders involving the left superior and inferior pubic rami, pubic symphysis and with possible extension to the left acetabulum.     We completed the following work-up:   - Biopsy 9/7/2021: plasmacytoma.  Flow cytometry showed kappa monotypic plasma cells, polytypic B cells. FISH results from plasmacytoma showed a gain of 11q, IGH-CCND1 fusion but no losses of 1q or TP53 and no gain of 1q.  - Bone marrow biopsy 09/22/21: Marrow cellularity 50% with 25% interstitial infiltration w/  Kappa-monotypic, moderately atypical plasma cells. Flow with 0.7% kappa-monotypica plasma cells. FISH and cytogenetics pending.   - PET CT 09/24/21 w/ hypermetabolic pathologic fracture of the L pubic ramus w/ aggressive appearing osteolysis but no additional suspicious lytic or blastic osseous lesions.     Treatment to date:   - Zometa q4 weeks started 9/27/21  - Radiation to left pubic ramus x18 fractions, completed 11/11/21.   - RVD C1D1 11/15/21  - RVD C2D1 12/6/21  - RVD C3D1  12/27/21           Physical Exam:     /77   Pulse 67   Temp 98  F (36.7  C) (Oral)   Resp 17   Wt 79.3 kg (174 lb 14.4 oz)   SpO2 98%   BMI 27.13 kg/m     Wt Readings from Last 4 Encounters:   01/20/22 79.3 kg (174 lb 14.4 oz)   01/17/22 79.4 kg (175 lb)   01/06/22 80.4 kg (177 lb 3.2 oz)   01/06/22 78.5 kg (173 lb)     General: well appearing, no acute distress  HEENT: normocephalic, atraumatic, PERRLA, sclerae nonicteric, moist mucous membranes, oropharynx is clear  Lymph: no palpable cervical, supraclavicular or axillary lymphadenopathy   CV: S1 S2, RRR, no murmur, click or rub  Lungs: clear to auscultation bilaterally, no wheezes/rales/rhonchi  Abd: soft, positive bowel sounds, non-distended, non-tender  MSK: no peripheral edema  Neuro: alert and oriented x3, CN grossly intact   Psych: appropriate mood and  affect  Skin:no visible rashes or lesions on exposed skin          Laboratory Data:      I personally reviewed the following labs:    Most Recent 3 CBC's:  Recent Labs   Lab Test 01/20/22  1055 01/17/22  1504 01/03/22  1440   WBC 5.3 5.2 6.6   HGB 13.2 13.4 13.4   MCV 93 94 93    293 218     Most Recent 3 BMP's:  Recent Labs   Lab Test 01/20/22  1055 01/17/22  1504 12/27/21  1153    144 143   POTASSIUM 3.5 3.4 3.8   CHLORIDE 107 108 111*   CO2 22 25 24   BUN 25 20 19   CR 0.85 0.89 0.81   ANIONGAP 14 11 8   MEKA 9.0 9.2 9.2   * 118* 97     Most Recent 2 LFT's:  Recent Labs   Lab Test 01/20/22  1055 01/17/22  1504   AST 10 14   ALT 22 22   ALKPHOS 58 56   BILITOTAL 0.4 0.4     Kappa light chain 116mg/dl (peak 141mg/dl)         Assessment and Recommendations   Kristie Cornejo is a 51 year old female who was diagnosed with Plasmacytoma after she presented with left sided groin pain now found to have multiple myeloma after further workup with bone marrow biopsy.     # Multiple myeloma with associated large plasmacytoma of the L pelvis. Kappa light chain disease.     BMBx with 25% plasma cell infiltration; standard risk with gain of 11q, IGH-CCND1 fusion but no losses of 1q or TP53 and no gain of 1q.   SPEP w/out a monoclonal peak  UPEP positive. 70% paraprotein. Large monoclonal free immunoglobulin light chain of kappa chain type.     No anemia, normal creat, Ca, no other skeletal lesions. Alb normal, B2M normal.  Stage I Light chain disease with left pelvic bone pathologic fracture she is classified as having symptomatic multiple myeloma.     RVD C1D1 11/15/21 (had a slight delay in the start of her Revlimid on 11/19/21 due to the timing of her pregnancy tests).  She tolerated cycle 1 well with some mild nausea and a headache on her week off.  RVD C2D1 12/6/21, tolerated well overall, but has had more consistent sensation of her hands and feet being hot. No tingling, pain, loss of sensation, or  "changes to her strength.  RVD C3D1 12/27/21, tolerated well overall with ongoing sensation of \"hot\" hands and feet  RVD C4D1 today     Reponse is only partial - still elevated FLC above 100mg/dl ad 70% paraprotein in urine.   We had discussed this today and I recommended to add Daratumumab and continu 4-drug induction regimen.     Margo is agreeable. We will start Elizabeth subcutaneous at day 8 and complete C4 with weekly Elizabeth and Rev day 1-14 and Velcade days 1,4,11 and use RVD+Elizabeth full schedule from C5.    - Planning for 6 cycles Elizabeth+RVD followed by consideration of autoHSCT. BMT consult was completed 12/23/21  -check free light chain before every cycle and urinary UPEP q 2 cycle    #Bone Health  She will continue monthly Zometa (last received on 12/27). Next dose due 1/24/22 and monthly. She will continue Calcium/Vitamin D supplements.     # Left pubic ramus plasmacytoma with expansile destruction of left superior and inferior rami, pubic symphysis, and acetabulum  S/p RT to left pubic ramus x18 fractions, completed on 11/11/21. follow-up with Dr. Philip on 1/6/22 and was cleared to resume regular activity as tolerated. She should still refrain from high-impact activities.    # PVCs  She had an ECHO 12/1 and had follow-up with cardiology 12/7 (notes in [facility]).  A holter monitor performed in Sept 2021 ([facility]) which showed a 15% PVC burden.   She states she was told they wouldn't manage her rhythm with medication unless she reached 20%;   - Cardiology consult at [facility] scheduled.     # Nausea  Mild, managed with Compazine up to twice daily and lorazepam occasionally at night.     #COVID prophylaxis - s/p 3 vaccines.  EVUSHELD today   Talked to patient and evaluated by me. We discussed the risks and benefits of receiving EVUSHELD, as well as alternative treatments. The Emergency Use Administration (EUA) was provided to the patient for review and an opportunity for questions was provided. Patient wishes " to proceed with ABIGAIL.  46 minutes spent on the date of the encounter doing chart review, review of test results, patient visit and documentation       Payton Reinoso MD  Professor of Medicine  [phone]    [facility] [address] [address] [phone]

## 2022-01-21 ENCOUNTER — PATIENT OUTREACH (OUTPATIENT)
Dept: ONCOLOGY | Facility: CLINIC | Age: 52
End: 2022-01-21
Payer: COMMERCIAL

## 2022-01-21 LAB
KAPPA LC FREE SER-MCNC: 68.97 MG/DL (ref 0.33–1.94)
KAPPA LC FREE/LAMBDA FREE SER NEPH: 109.48 {RATIO} (ref 0.26–1.65)
LAMBDA LC FREE SERPL-MCNC: 0.63 MG/DL (ref 0.57–2.63)

## 2022-01-21 RX ORDER — MEPERIDINE HYDROCHLORIDE 25 MG/ML
25 INJECTION INTRAMUSCULAR; INTRAVENOUS; SUBCUTANEOUS EVERY 30 MIN PRN
Status: CANCELLED | OUTPATIENT
Start: 2022-02-07

## 2022-01-21 RX ORDER — MEPERIDINE HYDROCHLORIDE 25 MG/ML
25 INJECTION INTRAMUSCULAR; INTRAVENOUS; SUBCUTANEOUS EVERY 30 MIN PRN
Status: CANCELLED | OUTPATIENT
Start: 2022-01-24

## 2022-01-21 RX ORDER — HEPARIN SODIUM,PORCINE 10 UNIT/ML
5 VIAL (ML) INTRAVENOUS
Status: CANCELLED | OUTPATIENT
Start: 2022-01-24

## 2022-01-21 RX ORDER — ZOLEDRONIC ACID 0.04 MG/ML
4 INJECTION, SOLUTION INTRAVENOUS ONCE
Status: CANCELLED
Start: 2022-01-21 | End: 2022-01-21

## 2022-01-21 RX ORDER — ALBUTEROL SULFATE 0.83 MG/ML
2.5 SOLUTION RESPIRATORY (INHALATION)
Status: CANCELLED | OUTPATIENT
Start: 2022-02-07

## 2022-01-21 RX ORDER — DIPHENHYDRAMINE HYDROCHLORIDE 50 MG/ML
50 INJECTION INTRAMUSCULAR; INTRAVENOUS
Status: CANCELLED
Start: 2022-02-07

## 2022-01-21 RX ORDER — ACETAMINOPHEN 325 MG/1
650 TABLET ORAL ONCE
Status: CANCELLED | OUTPATIENT
Start: 2022-01-31

## 2022-01-21 RX ORDER — DEXAMETHASONE 4 MG/1
20 TABLET ORAL ONCE
Status: CANCELLED | OUTPATIENT
Start: 2022-01-31

## 2022-01-21 RX ORDER — LORAZEPAM 2 MG/ML
0.5 INJECTION INTRAMUSCULAR EVERY 4 HOURS PRN
Status: CANCELLED | OUTPATIENT
Start: 2022-01-24

## 2022-01-21 RX ORDER — NALOXONE HYDROCHLORIDE 0.4 MG/ML
0.2 INJECTION, SOLUTION INTRAMUSCULAR; INTRAVENOUS; SUBCUTANEOUS
Status: CANCELLED | OUTPATIENT
Start: 2022-02-07

## 2022-01-21 RX ORDER — NALOXONE HYDROCHLORIDE 0.4 MG/ML
0.2 INJECTION, SOLUTION INTRAMUSCULAR; INTRAVENOUS; SUBCUTANEOUS
Status: CANCELLED | OUTPATIENT
Start: 2022-01-31

## 2022-01-21 RX ORDER — METHYLPREDNISOLONE SODIUM SUCCINATE 125 MG/2ML
125 INJECTION, POWDER, LYOPHILIZED, FOR SOLUTION INTRAMUSCULAR; INTRAVENOUS
Status: CANCELLED
Start: 2022-02-07

## 2022-01-21 RX ORDER — DEXAMETHASONE 4 MG/1
20 TABLET ORAL ONCE
Status: CANCELLED | OUTPATIENT
Start: 2022-02-07

## 2022-01-21 RX ORDER — ALBUTEROL SULFATE 90 UG/1
1-2 AEROSOL, METERED RESPIRATORY (INHALATION)
Status: CANCELLED
Start: 2022-02-07

## 2022-01-21 RX ORDER — LORAZEPAM 2 MG/ML
0.5 INJECTION INTRAMUSCULAR EVERY 4 HOURS PRN
Status: CANCELLED | OUTPATIENT
Start: 2022-02-07

## 2022-01-21 RX ORDER — DEXAMETHASONE 4 MG/1
20 TABLET ORAL ONCE
Status: CANCELLED | OUTPATIENT
Start: 2022-01-24

## 2022-01-21 RX ORDER — ALBUTEROL SULFATE 90 UG/1
1-2 AEROSOL, METERED RESPIRATORY (INHALATION)
Status: CANCELLED
Start: 2022-01-24

## 2022-01-21 RX ORDER — ALBUTEROL SULFATE 90 UG/1
1-2 AEROSOL, METERED RESPIRATORY (INHALATION)
Status: CANCELLED
Start: 2022-01-31

## 2022-01-21 RX ORDER — ACETAMINOPHEN 325 MG/1
650 TABLET ORAL ONCE
Status: CANCELLED | OUTPATIENT
Start: 2022-01-24

## 2022-01-21 RX ORDER — DIPHENHYDRAMINE HCL 25 MG
50 CAPSULE ORAL ONCE
Status: CANCELLED | OUTPATIENT
Start: 2022-02-07

## 2022-01-21 RX ORDER — METHYLPREDNISOLONE SODIUM SUCCINATE 125 MG/2ML
125 INJECTION, POWDER, LYOPHILIZED, FOR SOLUTION INTRAMUSCULAR; INTRAVENOUS
Status: CANCELLED
Start: 2022-01-31

## 2022-01-21 RX ORDER — MEPERIDINE HYDROCHLORIDE 25 MG/ML
25 INJECTION INTRAMUSCULAR; INTRAVENOUS; SUBCUTANEOUS EVERY 30 MIN PRN
Status: CANCELLED | OUTPATIENT
Start: 2022-01-31

## 2022-01-21 RX ORDER — MONTELUKAST SODIUM 10 MG/1
10 TABLET ORAL ONCE
Status: CANCELLED | OUTPATIENT
Start: 2022-01-31

## 2022-01-21 RX ORDER — EPINEPHRINE 1 MG/ML
0.3 INJECTION, SOLUTION INTRAMUSCULAR; SUBCUTANEOUS EVERY 5 MIN PRN
Status: CANCELLED | OUTPATIENT
Start: 2022-01-24

## 2022-01-21 RX ORDER — EPINEPHRINE 1 MG/ML
0.3 INJECTION, SOLUTION INTRAMUSCULAR; SUBCUTANEOUS EVERY 5 MIN PRN
Status: CANCELLED | OUTPATIENT
Start: 2022-02-07

## 2022-01-21 RX ORDER — ALBUTEROL SULFATE 0.83 MG/ML
2.5 SOLUTION RESPIRATORY (INHALATION)
Status: CANCELLED | OUTPATIENT
Start: 2022-01-31

## 2022-01-21 RX ORDER — HEPARIN SODIUM (PORCINE) LOCK FLUSH IV SOLN 100 UNIT/ML 100 UNIT/ML
5 SOLUTION INTRAVENOUS
Status: CANCELLED | OUTPATIENT
Start: 2022-02-07

## 2022-01-21 RX ORDER — DIPHENHYDRAMINE HYDROCHLORIDE 50 MG/ML
50 INJECTION INTRAMUSCULAR; INTRAVENOUS
Status: CANCELLED
Start: 2022-01-24

## 2022-01-21 RX ORDER — ALBUTEROL SULFATE 0.83 MG/ML
2.5 SOLUTION RESPIRATORY (INHALATION)
Status: CANCELLED | OUTPATIENT
Start: 2022-01-24

## 2022-01-21 RX ORDER — ZOLEDRONIC ACID 0.04 MG/ML
4 INJECTION, SOLUTION INTRAVENOUS ONCE
Status: CANCELLED
Start: 2022-02-01 | End: 2022-02-01

## 2022-01-21 RX ORDER — DIPHENHYDRAMINE HCL 25 MG
50 CAPSULE ORAL ONCE
Status: CANCELLED | OUTPATIENT
Start: 2022-01-31

## 2022-01-21 RX ORDER — DIPHENHYDRAMINE HCL 25 MG
50 CAPSULE ORAL ONCE
Status: CANCELLED | OUTPATIENT
Start: 2022-01-24

## 2022-01-21 RX ORDER — HEPARIN SODIUM (PORCINE) LOCK FLUSH IV SOLN 100 UNIT/ML 100 UNIT/ML
5 SOLUTION INTRAVENOUS
Status: CANCELLED | OUTPATIENT
Start: 2022-01-31

## 2022-01-21 RX ORDER — EPINEPHRINE 1 MG/ML
0.3 INJECTION, SOLUTION INTRAMUSCULAR; SUBCUTANEOUS EVERY 5 MIN PRN
Status: CANCELLED | OUTPATIENT
Start: 2022-01-31

## 2022-01-21 RX ORDER — HEPARIN SODIUM (PORCINE) LOCK FLUSH IV SOLN 100 UNIT/ML 100 UNIT/ML
5 SOLUTION INTRAVENOUS
Status: CANCELLED | OUTPATIENT
Start: 2022-01-24

## 2022-01-21 RX ORDER — NALOXONE HYDROCHLORIDE 0.4 MG/ML
0.2 INJECTION, SOLUTION INTRAMUSCULAR; INTRAVENOUS; SUBCUTANEOUS
Status: CANCELLED | OUTPATIENT
Start: 2022-01-24

## 2022-01-21 RX ORDER — HEPARIN SODIUM,PORCINE 10 UNIT/ML
5 VIAL (ML) INTRAVENOUS
Status: CANCELLED | OUTPATIENT
Start: 2022-02-07

## 2022-01-21 RX ORDER — HEPARIN SODIUM,PORCINE 10 UNIT/ML
5 VIAL (ML) INTRAVENOUS
Status: CANCELLED | OUTPATIENT
Start: 2022-01-31

## 2022-01-21 RX ORDER — MONTELUKAST SODIUM 10 MG/1
10 TABLET ORAL ONCE
Status: CANCELLED | OUTPATIENT
Start: 2022-01-24

## 2022-01-21 RX ORDER — METHYLPREDNISOLONE SODIUM SUCCINATE 125 MG/2ML
125 INJECTION, POWDER, LYOPHILIZED, FOR SOLUTION INTRAMUSCULAR; INTRAVENOUS
Status: CANCELLED
Start: 2022-01-24

## 2022-01-21 RX ORDER — LORAZEPAM 2 MG/ML
0.5 INJECTION INTRAMUSCULAR EVERY 4 HOURS PRN
Status: CANCELLED | OUTPATIENT
Start: 2022-01-31

## 2022-01-21 RX ORDER — DIPHENHYDRAMINE HYDROCHLORIDE 50 MG/ML
50 INJECTION INTRAMUSCULAR; INTRAVENOUS
Status: CANCELLED
Start: 2022-01-31

## 2022-01-21 RX ORDER — ACETAMINOPHEN 325 MG/1
650 TABLET ORAL ONCE
Status: CANCELLED | OUTPATIENT
Start: 2022-02-07

## 2022-01-21 NOTE — PROGRESS NOTES
[facility]  Date of Visit: Jan 20, 2022   Oncologist: Dr. Payton Reinoso    Reason for visit: myeloma follow-up    INTERIM HISTORY:  Margo is currently on RVD for MM. SH is tolerating is very well, has no significant issues.  No neuropathy , did not have neutropenia or infections.   She is seeing cardiology for new onset palpitations and findigs of frequent PAC on kg.    Otherwise, today, denies new problems,   Her Urinary M-spike has not reduced significantly suggesting slugish respons to RVD. Free light chains are still above 100mg/dl after 3 cycles.          Oncology History   51 year old female with past medical history significant for Irritable bowel disease, allergies, large plasmacytoma of the L pelvis, and newly diagnosed multiple myeloma.     Early in April 2021 ,MRI showed large, expansile, permeative mass with indistinct borders involving the left superior and inferior pubic rami, pubic symphysis and with possible extension to the left acetabulum.     We completed the following work-up:   - Biopsy 9/7/2021: plasmacytoma.  Flow cytometry showed kappa monotypic plasma cells, polytypic B cells. FISH results from plasmacytoma showed a gain of 11q, IGH-CCND1 fusion but no losses of 1q or TP53 and no gain of 1q.  - Bone marrow biopsy 09/22/21: Marrow cellularity 50% with 25% interstitial infiltration w/  Kappa-monotypic, moderately atypical plasma cells. Flow with 0.7% kappa-monotypica plasma cells. FISH and cytogenetics pending.   - PET CT 09/24/21 w/ hypermetabolic pathologic fracture of the L pubic ramus w/ aggressive appearing osteolysis but no additional suspicious lytic or blastic osseous lesions.     Treatment to date:   - Zometa q4 weeks started 9/27/21  - Radiation to left pubic ramus x18 fractions, completed 11/11/21.   - RVD C1D1 11/15/21  - RVD C2D1 12/6/21  - RVD C3D1  12/27/21           Physical Exam:     /77   Pulse 67   Temp 98  F (36.7  C) (Oral)    Resp 17   Wt 79.3 kg (174 lb 14.4 oz)   SpO2 98%   BMI 27.13 kg/m     Wt Readings from Last 4 Encounters:   01/20/22 79.3 kg (174 lb 14.4 oz)   01/17/22 79.4 kg (175 lb)   01/06/22 80.4 kg (177 lb 3.2 oz)   01/06/22 78.5 kg (173 lb)     General: well appearing, no acute distress  HEENT: normocephalic, atraumatic, PERRLA, sclerae nonicteric, moist mucous membranes, oropharynx is clear  Lymph: no palpable cervical, supraclavicular or axillary lymphadenopathy   CV: S1 S2, RRR, no murmur, click or rub  Lungs: clear to auscultation bilaterally, no wheezes/rales/rhonchi  Abd: soft, positive bowel sounds, non-distended, non-tender  MSK: no peripheral edema  Neuro: alert and oriented x3, CN grossly intact   Psych: appropriate mood and affect  Skin:no visible rashes or lesions on exposed skin          Laboratory Data:      I personally reviewed the following labs:    Most Recent 3 CBC's:  Recent Labs   Lab Test 01/20/22  1055 01/17/22  1504 01/03/22  1440   WBC 5.3 5.2 6.6   HGB 13.2 13.4 13.4   MCV 93 94 93    293 218     Most Recent 3 BMP's:  Recent Labs   Lab Test 01/20/22  1055 01/17/22  1504 12/27/21  1153    144 143   POTASSIUM 3.5 3.4 3.8   CHLORIDE 107 108 111*   CO2 22 25 24   BUN 25 20 19   CR 0.85 0.89 0.81   ANIONGAP 14 11 8   MEKA 9.0 9.2 9.2   * 118* 97     Most Recent 2 LFT's:  Recent Labs   Lab Test 01/20/22  1055 01/17/22  1504   AST 10 14   ALT 22 22   ALKPHOS 58 56   BILITOTAL 0.4 0.4     Kappa light chain 116mg/dl (peak 141mg/dl)         Assessment and Recommendations   Kristie Cornejo is a 51 year old female who was diagnosed with Plasmacytoma after she presented with left sided groin pain now found to have multiple myeloma after further workup with bone marrow biopsy.     # Multiple myeloma with associated large plasmacytoma of the L pelvis. Kappa light chain disease.     BMBx with 25% plasma cell infiltration; standard risk with gain of 11q, IGH-CCND1 fusion but no losses  "of 1q or TP53 and no gain of 1q.   SPEP w/out a monoclonal peak  UPEP positive. 70% paraprotein. Large monoclonal free immunoglobulin light chain of kappa chain type.     No anemia, normal creat, Ca, no other skeletal lesions. Alb normal, B2M normal.  Stage I Light chain disease with left pelvic bone pathologic fracture she is classified as having symptomatic multiple myeloma.     RVD C1D1 11/15/21 (had a slight delay in the start of her Revlimid on 11/19/21 due to the timing of her pregnancy tests).  She tolerated cycle 1 well with some mild nausea and a headache on her week off.  RVD C2D1 12/6/21, tolerated well overall, but has had more consistent sensation of her hands and feet being hot. No tingling, pain, loss of sensation, or changes to her strength.  RVD C3D1 12/27/21, tolerated well overall with ongoing sensation of \"hot\" hands and feet  RVD C4D1 today     Reponse is only partial - still elevated FLC above 100mg/dl ad 70% paraprotein in urine.   We had discussed this today and I recommended to add Daratumumab and continu 4-drug induction regimen.     [employer] is agreeable. We will start Elizabeth subcutaneous at day 8 and complete C4 with weekly Elizabeth and Rev day 1-14 and Velcade days 1,4,11 and use RVD+Elizabeth full schedule from C5.    - Planning for 6 cycles Elizabeth+RVD followed by consideration of autoHSCT. BMT consult was completed 12/23/21  -check free light chain before every cycle and urinary UPEP q 2 cycle    #Bone Health  She will continue monthly Zometa (last received on 12/27). Next dose due 1/24/22 and monthly. She will continue Calcium/Vitamin D supplements.     # Left pubic ramus plasmacytoma with expansile destruction of left superior and inferior rami, pubic symphysis, and acetabulum  S/p RT to left pubic ramus x18 fractions, completed on 11/11/21. follow-up with Dr. Philip on 1/6/22 and was cleared to resume regular activity as tolerated. She should still refrain from high-impact activities.    # PVCs  She " had an ECHO 12/1 and had follow-up with cardiology 12/7 (notes in CareEverywhere).  A holter monitor performed in Sept 2021 (Care Everywhere) which showed a 15% PVC burden.   She states she was told they wouldn't manage her rhythm with medication unless she reached 20%;   - Cardiology consult at [facility] scheduled.     # Nausea  Mild, managed with Compazine up to twice daily and lorazepam occasionally at night.     #COVID prophylaxis - s/p 3 vaccines.  EVUSHELD today   Talked to patient and evaluated by me. We discussed the risks and benefits of receiving EVUSHELD, as well as alternative treatments. The Emergency Use Administration (EUA) was provided to the patient for review and an opportunity for questions was provided. Patient wishes to proceed with EVUSHELD.  46 minutes spent on the date of the encounter doing chart review, review of test results, patient visit and documentation     Payton Reinoso MD  Professor of Medicine  [phone]    [facility] [address] [id number] [phone]

## 2022-01-21 NOTE — PROGRESS NOTES
RN Care Coordination Note     OUTGOING CALL: spoke with Margo via telephone call    Chemo Teach  re: Daratumumab treatment plan   -See Pt Education documentation - Chemo Effects (Adult)  -Reviewed treatment schedule including cycle length, lab monitoring and prn medications.  -Verbal and written instruction provided using:   [facility] Drug Information : reviewed Possible Side Effects, When to Contact Your Doctor of Health Care Provider and Self Care Tips sections.   We discussed what to expect on day of infusion / [facility]  Pt voiced understanding of above instructions and information and denied further questions    Answered questions regarding schedule:    Pt will start Day 8 of new 28 day cycle to include elizabeth.  Day 8 (1/24)- Elizabeth (first infusion)  Day 15 (1/31)- Elizabeth and velcade  Day 22- (2/7)- elizabeth and velcade  C2 of new cycle will begin 2/14.  In regards to Rev--- Pt will continue Rev days 1/14 of this cycle. Will have 14 days off. Starting 2/14- Rev 21 days on, 7 days off. This will be the frequency going forward. OCT aware of change.   In regards to Dex- take 5 pills (20mg_ PO every 7 days on day after elizabeth (days 9, 16, and 23). Reviewed dates with patient.    Pt demonstrated understanding with teachback.      Patient voiced understanding and appreciation of above information and denies any further questions, and he/she understands that I will follow and provide coordination as needed.    Griselda Valdez, RN, MSN, OCN   RN Care Coordinator   [facility] [address] [address]   [phone]

## 2022-01-22 ENCOUNTER — DOCUMENTATION ONLY (OUTPATIENT)
Dept: ONCOLOGY | Facility: CLINIC | Age: 52
End: 2022-01-22
Payer: COMMERCIAL

## 2022-01-24 ENCOUNTER — APPOINTMENT (OUTPATIENT)
Dept: LAB | Facility: CLINIC | Age: 52
End: 2022-01-24
Attending: REGISTERED NURSE
Payer: COMMERCIAL

## 2022-01-24 ENCOUNTER — INFUSION THERAPY VISIT (OUTPATIENT)
Dept: ONCOLOGY | Facility: CLINIC | Age: 52
End: 2022-01-24
Attending: REGISTERED NURSE
Payer: COMMERCIAL

## 2022-01-24 VITALS
HEART RATE: 69 BPM | OXYGEN SATURATION: 99 % | SYSTOLIC BLOOD PRESSURE: 127 MMHG | BODY MASS INDEX: 27.13 KG/M2 | WEIGHT: 174.9 LBS | RESPIRATION RATE: 14 BRPM | DIASTOLIC BLOOD PRESSURE: 82 MMHG

## 2022-01-24 DIAGNOSIS — C90.30 PLASMACYTOMA OF BONE (H): ICD-10-CM

## 2022-01-24 DIAGNOSIS — C90.00 LIGHT CHAIN MYELOMA (H): Primary | ICD-10-CM

## 2022-01-24 LAB
ABO/RH(D): NORMAL
ALBUMIN SERPL-MCNC: 3.6 G/DL (ref 3.4–5)
ALP SERPL-CCNC: 61 U/L (ref 40–150)
ALT SERPL W P-5'-P-CCNC: 23 U/L (ref 0–50)
ANION GAP SERPL CALCULATED.3IONS-SCNC: 11 MMOL/L (ref 3–14)
ANTIBODY SCREEN: NEGATIVE
AST SERPL W P-5'-P-CCNC: 12 U/L (ref 0–45)
BASOPHILS # BLD AUTO: 0 10E3/UL (ref 0–0.2)
BASOPHILS NFR BLD AUTO: 1 %
BILIRUB SERPL-MCNC: 0.3 MG/DL (ref 0.2–1.3)
BUN SERPL-MCNC: 19 MG/DL (ref 7–30)
CALCIUM SERPL-MCNC: 8.8 MG/DL (ref 8.5–10.1)
CHLORIDE BLD-SCNC: 108 MMOL/L (ref 94–109)
CO2 SERPL-SCNC: 24 MMOL/L (ref 20–32)
CREAT SERPL-MCNC: 0.83 MG/DL (ref 0.52–1.04)
EOSINOPHIL # BLD AUTO: 0.4 10E3/UL (ref 0–0.7)
EOSINOPHIL NFR BLD AUTO: 10 %
ERYTHROCYTE [DISTWIDTH] IN BLOOD BY AUTOMATED COUNT: 14.1 % (ref 10–15)
GFR SERPL CREATININE-BSD FRML MDRD: 85 ML/MIN/1.73M2
GLUCOSE BLD-MCNC: 115 MG/DL (ref 70–99)
HCT VFR BLD AUTO: 40.2 % (ref 35–47)
HGB BLD-MCNC: 13.3 G/DL (ref 11.7–15.7)
IMM GRANULOCYTES # BLD: 0.1 10E3/UL
IMM GRANULOCYTES NFR BLD: 2 %
LYMPHOCYTES # BLD AUTO: 0.7 10E3/UL (ref 0.8–5.3)
LYMPHOCYTES NFR BLD AUTO: 15 %
MCH RBC QN AUTO: 31.1 PG (ref 26.5–33)
MCHC RBC AUTO-ENTMCNC: 33.1 G/DL (ref 31.5–36.5)
MCV RBC AUTO: 94 FL (ref 78–100)
MONOCYTES # BLD AUTO: 0.7 10E3/UL (ref 0–1.3)
MONOCYTES NFR BLD AUTO: 17 %
NEUTROPHILS # BLD AUTO: 2.4 10E3/UL (ref 1.6–8.3)
NEUTROPHILS NFR BLD AUTO: 55 %
NRBC # BLD AUTO: 0 10E3/UL
NRBC BLD AUTO-RTO: 0 /100
PLATELET # BLD AUTO: 212 10E3/UL (ref 150–450)
POTASSIUM BLD-SCNC: 3.8 MMOL/L (ref 3.4–5.3)
PROT SERPL-MCNC: 6.3 G/DL (ref 6.8–8.8)
RBC # BLD AUTO: 4.27 10E6/UL (ref 3.8–5.2)
SODIUM SERPL-SCNC: 143 MMOL/L (ref 133–144)
SPECIMEN EXPIRATION DATE: NORMAL
WBC # BLD AUTO: 4.3 10E3/UL (ref 4–11)

## 2022-01-24 PROCEDURE — 0001U RBC DNA HEA 35 AG 11 BLD GRP: CPT

## 2022-01-24 PROCEDURE — 85025 COMPLETE CBC W/AUTO DIFF WBC: CPT

## 2022-01-24 PROCEDURE — 80053 COMPREHEN METABOLIC PANEL: CPT

## 2022-01-24 PROCEDURE — 82040 ASSAY OF SERUM ALBUMIN: CPT

## 2022-01-24 PROCEDURE — 96401 CHEMO ANTI-NEOPL SQ/IM: CPT

## 2022-01-24 PROCEDURE — 250N000011 HC RX IP 250 OP 636

## 2022-01-24 PROCEDURE — 86901 BLOOD TYPING SEROLOGIC RH(D): CPT | Mod: XU

## 2022-01-24 PROCEDURE — 36415 COLL VENOUS BLD VENIPUNCTURE: CPT

## 2022-01-24 PROCEDURE — 96365 THER/PROPH/DIAG IV INF INIT: CPT

## 2022-01-24 PROCEDURE — 250N000013 HC RX MED GY IP 250 OP 250 PS 637

## 2022-01-24 PROCEDURE — 258N000003 HC RX IP 258 OP 636

## 2022-01-24 RX ORDER — DEXAMETHASONE 4 MG/1
20 TABLET ORAL ONCE
Status: COMPLETED | OUTPATIENT
Start: 2022-01-24 | End: 2022-01-24

## 2022-01-24 RX ORDER — HEPARIN SODIUM (PORCINE) LOCK FLUSH IV SOLN 100 UNIT/ML 100 UNIT/ML
5 SOLUTION INTRAVENOUS
Status: CANCELLED | OUTPATIENT
Start: 2022-02-01

## 2022-01-24 RX ORDER — MEPERIDINE HYDROCHLORIDE 25 MG/ML
25 INJECTION INTRAMUSCULAR; INTRAVENOUS; SUBCUTANEOUS EVERY 30 MIN PRN
Status: CANCELLED | OUTPATIENT
Start: 2022-02-01

## 2022-01-24 RX ORDER — ZOLEDRONIC ACID 0.04 MG/ML
4 INJECTION, SOLUTION INTRAVENOUS ONCE
Status: CANCELLED
Start: 2022-02-01 | End: 2022-02-01

## 2022-01-24 RX ORDER — NALOXONE HYDROCHLORIDE 0.4 MG/ML
0.2 INJECTION, SOLUTION INTRAMUSCULAR; INTRAVENOUS; SUBCUTANEOUS
Status: CANCELLED | OUTPATIENT
Start: 2022-02-01

## 2022-01-24 RX ORDER — HEPARIN SODIUM (PORCINE) LOCK FLUSH IV SOLN 100 UNIT/ML 100 UNIT/ML
5 SOLUTION INTRAVENOUS
Status: DISCONTINUED | OUTPATIENT
Start: 2022-01-24 | End: 2022-01-24 | Stop reason: HOSPADM

## 2022-01-24 RX ORDER — DIPHENHYDRAMINE HCL 25 MG
50 CAPSULE ORAL ONCE
Status: COMPLETED | OUTPATIENT
Start: 2022-01-24 | End: 2022-01-24

## 2022-01-24 RX ORDER — ACETAMINOPHEN 325 MG/1
650 TABLET ORAL ONCE
Status: COMPLETED | OUTPATIENT
Start: 2022-01-24 | End: 2022-01-24

## 2022-01-24 RX ORDER — HEPARIN SODIUM,PORCINE 10 UNIT/ML
5 VIAL (ML) INTRAVENOUS
Status: DISCONTINUED | OUTPATIENT
Start: 2022-01-24 | End: 2022-01-24 | Stop reason: HOSPADM

## 2022-01-24 RX ORDER — DIPHENHYDRAMINE HYDROCHLORIDE 50 MG/ML
50 INJECTION INTRAMUSCULAR; INTRAVENOUS
Status: CANCELLED
Start: 2022-02-01

## 2022-01-24 RX ORDER — ALBUTEROL SULFATE 90 UG/1
1-2 AEROSOL, METERED RESPIRATORY (INHALATION)
Status: CANCELLED
Start: 2022-02-01

## 2022-01-24 RX ORDER — ALBUTEROL SULFATE 0.83 MG/ML
2.5 SOLUTION RESPIRATORY (INHALATION)
Status: CANCELLED | OUTPATIENT
Start: 2022-02-01

## 2022-01-24 RX ORDER — METHYLPREDNISOLONE SODIUM SUCCINATE 125 MG/2ML
125 INJECTION, POWDER, LYOPHILIZED, FOR SOLUTION INTRAMUSCULAR; INTRAVENOUS
Status: CANCELLED
Start: 2022-02-01

## 2022-01-24 RX ORDER — HEPARIN SODIUM,PORCINE 10 UNIT/ML
5 VIAL (ML) INTRAVENOUS
Status: CANCELLED | OUTPATIENT
Start: 2022-02-01

## 2022-01-24 RX ORDER — ALBUTEROL SULFATE 0.83 MG/ML
2.5 SOLUTION RESPIRATORY (INHALATION)
Status: DISCONTINUED | OUTPATIENT
Start: 2022-01-24 | End: 2022-01-24 | Stop reason: HOSPADM

## 2022-01-24 RX ORDER — EPINEPHRINE 1 MG/ML
0.3 INJECTION, SOLUTION INTRAMUSCULAR; SUBCUTANEOUS EVERY 5 MIN PRN
Status: CANCELLED | OUTPATIENT
Start: 2022-02-01

## 2022-01-24 RX ORDER — ZOLEDRONIC ACID 0.04 MG/ML
4 INJECTION, SOLUTION INTRAVENOUS ONCE
Status: COMPLETED | OUTPATIENT
Start: 2022-01-24 | End: 2022-01-24

## 2022-01-24 RX ORDER — MONTELUKAST SODIUM 10 MG/1
10 TABLET ORAL ONCE
Status: COMPLETED | OUTPATIENT
Start: 2022-01-24 | End: 2022-01-24

## 2022-01-24 RX ADMIN — ACETAMINOPHEN 650 MG: 325 TABLET ORAL at 09:57

## 2022-01-24 RX ADMIN — DARATUMUMAB AND HYALURONIDASE-FIHJ (HUMAN RECOMBINANT) 1800 MG: 1800; 30000 INJECTION SUBCUTANEOUS at 10:55

## 2022-01-24 RX ADMIN — DEXAMETHASONE 20 MG: 4 TABLET ORAL at 09:57

## 2022-01-24 RX ADMIN — ZOLEDRONIC ACID 4 MG: 4 SOLUTION INTRAVENOUS at 11:31

## 2022-01-24 RX ADMIN — SODIUM CHLORIDE 250 ML: 9 INJECTION, SOLUTION INTRAVENOUS at 11:11

## 2022-01-24 RX ADMIN — DIPHENHYDRAMINE HYDROCHLORIDE 50 MG: 25 CAPSULE ORAL at 09:57

## 2022-01-24 RX ADMIN — MONTELUKAST 10 MG: 10 TABLET, FILM COATED ORAL at 09:57

## 2022-01-24 ASSESSMENT — PAIN SCALES - GENERAL: PAINLEVEL: NO PAIN (0)

## 2022-01-24 NOTE — NURSING NOTE
Chief Complaint   Patient presents with     Blood Draw     Labs drawn via PIV By RN in lab. VS taken.      Labs drawn via peripheral IV. Vital signs taken. Checked into next appointment.   Nora Avina RN

## 2022-01-24 NOTE — PROGRESS NOTES
Infusion Nursing Note:  Kristie Cornejo presents today for C1D8 Darzalex Faspro-Zometa.    Patient seen by provider 1/20: Yes: Dr Reinoso   present during visit today: Not Applicable.    Note: Pt saw provider 1/20, no changes.  First dose of Darzalex today.      Intravenous Access:  Peripheral IV placed.    Treatment Conditions:  Lab Results   Component Value Date    HGB 13.3 01/24/2022    WBC 4.3 01/24/2022    ANEU 3.2 01/17/2022    ANEUTAUTO 2.4 01/24/2022     01/24/2022      Lab Results   Component Value Date     01/24/2022    POTASSIUM 3.8 01/24/2022    CR 0.83 01/24/2022    MEKA 8.8 01/24/2022    BILITOTAL 0.3 01/24/2022    ALBUMIN 3.6 01/24/2022    ALT 23 01/24/2022    AST 12 01/24/2022     Results reviewed, labs MET treatment parameters, ok to proceed with treatment.      Post Infusion Assessment:  Pt tolerated infusion without incident.  Patient tolerated Darzalex injection into R side of abdomen without incident.  Patient observed for 3.5 hours post Darzalex per protocol.  No evidence of extravasations.  Access discontinued per protocol.       Discharge Plan:   Patient declined prescription refills.  Discharge instructions reviewed with: Patient.  Patient and/or family verbalized understanding of discharge instructions and all questions answered.  AVS to patient via ChaCha[employer].  Patient will return 2/1 for next appointment.   Patient discharged in stable condition accompanied by: self.  Departure Mode: Ambulatory.    Sailaja Ravi RN

## 2022-01-24 NOTE — TELEPHONE ENCOUNTER
RECORDS RECEIVED FROM:   DATE RECEIVED:   NOTES STATUS DETAILS   OFFICE NOTE from referring provider    Internal 1-17-22 [place] 1-17-22 onc   OFFICE NOTE from other cardiologist    Care Everywhere [place] 12-7-21 [place]   DISCHARGE SUMMARY from hospital    N/A    DISCHARGE REPORT from the ER   N/A    OPERATIVE REPORT    N/A    MEDICATION LIST   Internal    LABS     BMP   Care Everywhere 2-9-21   CBC   Internal 1-24-22   CMP   Internal 1-24-22   Lipids   N/A    TSH   Care Everywhere 5-19-21   DIAGNOSTIC PROCEDURES     EKG   Internal 1-17-22   Monitor Reports   In process 9-17-21   IMAGING (DISC & REPORT)      Echo   In process 12-1-21   Stress Tests   N/A    Cath   N/A    MRI/MRA   N/A    CT/CTA   Internal 8-26-21 CT Chest     Action 1/24/22 CJ   Action Taken Requested Echos 12-1-21, 10-8-21 from     Requested from [place]:    EKG Strips    24 hour holter   10-10-21    9-17-21     Sent EKGs and holter to scanning, resolved echos in PACS 1/25

## 2022-01-27 ENCOUNTER — PATIENT OUTREACH (OUTPATIENT)
Dept: ONCOLOGY | Facility: CLINIC | Age: 52
End: 2022-01-27
Payer: COMMERCIAL

## 2022-01-27 NOTE — PROGRESS NOTES
INBOUND CALL: VM received from patient. She has labs on 2/1 with infusions/SULEIMAN visit, as well as visit with Dr. Reinoso on 2/3 with labs. Wondering if she needs both labs appts. Callback requested.     OUTBOUND CALL: RNCC called patient back. She states michelle went well. No reaction to infusion. She had 1 episode of diarrhea this am. She has not had any more since. RNCC reeducated to increase PO hydration and try imodium if diarrhea becomes persistent.   Reviewed appts. RNCC agrees she does not need both appts. Will cancel one of them and discuss with Dr. Reinoso.     Griselda Valdez, RN, MSN, OCN   RN Care Coordinator   [facility] [address] [address] [phone]

## 2022-01-30 DIAGNOSIS — C90.00 LIGHT CHAIN MYELOMA (H): Primary | ICD-10-CM

## 2022-01-30 RX ORDER — ALBUTEROL SULFATE 0.83 MG/ML
2.5 SOLUTION RESPIRATORY (INHALATION)
Status: CANCELLED | OUTPATIENT
Start: 2022-03-07

## 2022-01-30 RX ORDER — METHYLPREDNISOLONE SODIUM SUCCINATE 125 MG/2ML
125 INJECTION, POWDER, LYOPHILIZED, FOR SOLUTION INTRAMUSCULAR; INTRAVENOUS
Status: CANCELLED
Start: 2022-02-07

## 2022-01-30 RX ORDER — HEPARIN SODIUM (PORCINE) LOCK FLUSH IV SOLN 100 UNIT/ML 100 UNIT/ML
5 SOLUTION INTRAVENOUS
Status: CANCELLED | OUTPATIENT
Start: 2022-02-07

## 2022-01-30 RX ORDER — LORAZEPAM 2 MG/ML
0.5 INJECTION INTRAMUSCULAR EVERY 4 HOURS PRN
Status: CANCELLED | OUTPATIENT
Start: 2022-02-07

## 2022-01-30 RX ORDER — MEPERIDINE HYDROCHLORIDE 25 MG/ML
25 INJECTION INTRAMUSCULAR; INTRAVENOUS; SUBCUTANEOUS EVERY 30 MIN PRN
Status: CANCELLED | OUTPATIENT
Start: 2022-02-14

## 2022-01-30 RX ORDER — ACETAMINOPHEN 325 MG/1
650 TABLET ORAL ONCE
Status: CANCELLED | OUTPATIENT
Start: 2022-03-07

## 2022-01-30 RX ORDER — EPINEPHRINE 1 MG/ML
0.3 INJECTION, SOLUTION INTRAMUSCULAR; SUBCUTANEOUS EVERY 5 MIN PRN
Status: CANCELLED | OUTPATIENT
Start: 2022-02-14

## 2022-01-30 RX ORDER — DEXAMETHASONE 4 MG/1
20 TABLET ORAL ONCE
Status: CANCELLED | OUTPATIENT
Start: 2022-02-21

## 2022-01-30 RX ORDER — MEPERIDINE HYDROCHLORIDE 25 MG/ML
25 INJECTION INTRAMUSCULAR; INTRAVENOUS; SUBCUTANEOUS EVERY 30 MIN PRN
Status: CANCELLED | OUTPATIENT
Start: 2022-03-07

## 2022-01-30 RX ORDER — HEPARIN SODIUM (PORCINE) LOCK FLUSH IV SOLN 100 UNIT/ML 100 UNIT/ML
5 SOLUTION INTRAVENOUS
Status: CANCELLED | OUTPATIENT
Start: 2022-02-22

## 2022-01-30 RX ORDER — MEPERIDINE HYDROCHLORIDE 25 MG/ML
25 INJECTION INTRAMUSCULAR; INTRAVENOUS; SUBCUTANEOUS EVERY 30 MIN PRN
Status: CANCELLED | OUTPATIENT
Start: 2022-02-07

## 2022-01-30 RX ORDER — DIPHENHYDRAMINE HCL 25 MG
50 CAPSULE ORAL ONCE
Status: CANCELLED | OUTPATIENT
Start: 2022-02-21

## 2022-01-30 RX ORDER — ALBUTEROL SULFATE 90 UG/1
1-2 AEROSOL, METERED RESPIRATORY (INHALATION)
Status: CANCELLED
Start: 2022-02-22

## 2022-01-30 RX ORDER — ALBUTEROL SULFATE 90 UG/1
1-2 AEROSOL, METERED RESPIRATORY (INHALATION)
Status: CANCELLED
Start: 2022-02-07

## 2022-01-30 RX ORDER — DIPHENHYDRAMINE HYDROCHLORIDE 50 MG/ML
50 INJECTION INTRAMUSCULAR; INTRAVENOUS
Status: CANCELLED
Start: 2022-02-14

## 2022-01-30 RX ORDER — ALBUTEROL SULFATE 0.83 MG/ML
2.5 SOLUTION RESPIRATORY (INHALATION)
Status: CANCELLED | OUTPATIENT
Start: 2022-02-22

## 2022-01-30 RX ORDER — EPINEPHRINE 1 MG/ML
0.3 INJECTION, SOLUTION INTRAMUSCULAR; SUBCUTANEOUS EVERY 5 MIN PRN
Status: CANCELLED | OUTPATIENT
Start: 2022-03-07

## 2022-01-30 RX ORDER — DEXAMETHASONE 4 MG/1
20 TABLET ORAL ONCE
Status: CANCELLED | OUTPATIENT
Start: 2022-02-07

## 2022-01-30 RX ORDER — METHYLPREDNISOLONE SODIUM SUCCINATE 125 MG/2ML
125 INJECTION, POWDER, LYOPHILIZED, FOR SOLUTION INTRAMUSCULAR; INTRAVENOUS
Status: CANCELLED
Start: 2022-03-07

## 2022-01-30 RX ORDER — DIPHENHYDRAMINE HYDROCHLORIDE 50 MG/ML
50 INJECTION INTRAMUSCULAR; INTRAVENOUS
Status: CANCELLED
Start: 2022-03-07

## 2022-01-30 RX ORDER — DEXAMETHASONE 4 MG/1
20 TABLET ORAL ONCE
Status: CANCELLED | OUTPATIENT
Start: 2022-02-22

## 2022-01-30 RX ORDER — EPINEPHRINE 1 MG/ML
0.3 INJECTION, SOLUTION INTRAMUSCULAR; SUBCUTANEOUS EVERY 5 MIN PRN
Status: CANCELLED | OUTPATIENT
Start: 2022-02-07

## 2022-01-30 RX ORDER — NALOXONE HYDROCHLORIDE 0.4 MG/ML
0.2 INJECTION, SOLUTION INTRAMUSCULAR; INTRAVENOUS; SUBCUTANEOUS
Status: CANCELLED | OUTPATIENT
Start: 2022-03-07

## 2022-01-30 RX ORDER — DIPHENHYDRAMINE HYDROCHLORIDE 50 MG/ML
50 INJECTION INTRAMUSCULAR; INTRAVENOUS
Status: CANCELLED
Start: 2022-02-22

## 2022-01-30 RX ORDER — HEPARIN SODIUM (PORCINE) LOCK FLUSH IV SOLN 100 UNIT/ML 100 UNIT/ML
5 SOLUTION INTRAVENOUS
Status: CANCELLED | OUTPATIENT
Start: 2022-02-14

## 2022-01-30 RX ORDER — ACETAMINOPHEN 325 MG/1
650 TABLET ORAL ONCE
Status: CANCELLED | OUTPATIENT
Start: 2022-02-07

## 2022-01-30 RX ORDER — NALOXONE HYDROCHLORIDE 0.4 MG/ML
0.2 INJECTION, SOLUTION INTRAMUSCULAR; INTRAVENOUS; SUBCUTANEOUS
Status: CANCELLED | OUTPATIENT
Start: 2022-02-07

## 2022-01-30 RX ORDER — NALOXONE HYDROCHLORIDE 0.4 MG/ML
0.2 INJECTION, SOLUTION INTRAMUSCULAR; INTRAVENOUS; SUBCUTANEOUS
Status: CANCELLED | OUTPATIENT
Start: 2022-02-14

## 2022-01-30 RX ORDER — ALBUTEROL SULFATE 0.83 MG/ML
2.5 SOLUTION RESPIRATORY (INHALATION)
Status: CANCELLED | OUTPATIENT
Start: 2022-02-07

## 2022-01-30 RX ORDER — HEPARIN SODIUM (PORCINE) LOCK FLUSH IV SOLN 100 UNIT/ML 100 UNIT/ML
5 SOLUTION INTRAVENOUS
Status: CANCELLED | OUTPATIENT
Start: 2022-03-07

## 2022-01-30 RX ORDER — HEPARIN SODIUM,PORCINE 10 UNIT/ML
5 VIAL (ML) INTRAVENOUS
Status: CANCELLED | OUTPATIENT
Start: 2022-02-22

## 2022-01-30 RX ORDER — DIPHENHYDRAMINE HCL 25 MG
50 CAPSULE ORAL ONCE
Status: CANCELLED | OUTPATIENT
Start: 2022-02-07

## 2022-01-30 RX ORDER — LORAZEPAM 2 MG/ML
0.5 INJECTION INTRAMUSCULAR EVERY 4 HOURS PRN
Status: CANCELLED | OUTPATIENT
Start: 2022-03-07

## 2022-01-30 RX ORDER — LORAZEPAM 2 MG/ML
0.5 INJECTION INTRAMUSCULAR EVERY 4 HOURS PRN
Status: CANCELLED | OUTPATIENT
Start: 2022-02-22

## 2022-01-30 RX ORDER — EPINEPHRINE 1 MG/ML
0.3 INJECTION, SOLUTION INTRAMUSCULAR; SUBCUTANEOUS EVERY 5 MIN PRN
Status: CANCELLED | OUTPATIENT
Start: 2022-02-22

## 2022-01-30 RX ORDER — ACETAMINOPHEN 325 MG/1
650 TABLET ORAL ONCE
Status: CANCELLED | OUTPATIENT
Start: 2022-02-14

## 2022-01-30 RX ORDER — HEPARIN SODIUM,PORCINE 10 UNIT/ML
5 VIAL (ML) INTRAVENOUS
Status: CANCELLED | OUTPATIENT
Start: 2022-02-14

## 2022-01-30 RX ORDER — ACETAMINOPHEN 325 MG/1
650 TABLET ORAL ONCE
Status: CANCELLED | OUTPATIENT
Start: 2022-02-21

## 2022-01-30 RX ORDER — METHYLPREDNISOLONE SODIUM SUCCINATE 125 MG/2ML
125 INJECTION, POWDER, LYOPHILIZED, FOR SOLUTION INTRAMUSCULAR; INTRAVENOUS
Status: CANCELLED
Start: 2022-02-22

## 2022-01-30 RX ORDER — ALBUTEROL SULFATE 90 UG/1
1-2 AEROSOL, METERED RESPIRATORY (INHALATION)
Status: CANCELLED
Start: 2022-03-07

## 2022-01-30 RX ORDER — ALBUTEROL SULFATE 90 UG/1
1-2 AEROSOL, METERED RESPIRATORY (INHALATION)
Status: CANCELLED
Start: 2022-02-14

## 2022-01-30 RX ORDER — DIPHENHYDRAMINE HCL 25 MG
50 CAPSULE ORAL ONCE
Status: CANCELLED | OUTPATIENT
Start: 2022-02-14

## 2022-01-30 RX ORDER — HEPARIN SODIUM,PORCINE 10 UNIT/ML
5 VIAL (ML) INTRAVENOUS
Status: CANCELLED | OUTPATIENT
Start: 2022-03-07

## 2022-01-30 RX ORDER — DIPHENHYDRAMINE HCL 25 MG
50 CAPSULE ORAL ONCE
Status: CANCELLED | OUTPATIENT
Start: 2022-03-07

## 2022-01-30 RX ORDER — ALBUTEROL SULFATE 0.83 MG/ML
2.5 SOLUTION RESPIRATORY (INHALATION)
Status: CANCELLED | OUTPATIENT
Start: 2022-02-14

## 2022-01-30 RX ORDER — LORAZEPAM 2 MG/ML
0.5 INJECTION INTRAMUSCULAR EVERY 4 HOURS PRN
Status: CANCELLED | OUTPATIENT
Start: 2022-02-14

## 2022-01-30 RX ORDER — DIPHENHYDRAMINE HYDROCHLORIDE 50 MG/ML
50 INJECTION INTRAMUSCULAR; INTRAVENOUS
Status: CANCELLED
Start: 2022-02-07

## 2022-01-30 RX ORDER — MEPERIDINE HYDROCHLORIDE 25 MG/ML
25 INJECTION INTRAMUSCULAR; INTRAVENOUS; SUBCUTANEOUS EVERY 30 MIN PRN
Status: CANCELLED | OUTPATIENT
Start: 2022-02-22

## 2022-01-30 RX ORDER — NALOXONE HYDROCHLORIDE 0.4 MG/ML
0.2 INJECTION, SOLUTION INTRAMUSCULAR; INTRAVENOUS; SUBCUTANEOUS
Status: CANCELLED | OUTPATIENT
Start: 2022-02-22

## 2022-01-30 RX ORDER — METHYLPREDNISOLONE SODIUM SUCCINATE 125 MG/2ML
125 INJECTION, POWDER, LYOPHILIZED, FOR SOLUTION INTRAMUSCULAR; INTRAVENOUS
Status: CANCELLED
Start: 2022-02-14

## 2022-01-30 RX ORDER — DEXAMETHASONE 4 MG/1
20 TABLET ORAL ONCE
Status: CANCELLED | OUTPATIENT
Start: 2022-03-07

## 2022-01-30 RX ORDER — HEPARIN SODIUM,PORCINE 10 UNIT/ML
5 VIAL (ML) INTRAVENOUS
Status: CANCELLED | OUTPATIENT
Start: 2022-02-07

## 2022-01-31 ENCOUNTER — OFFICE VISIT (OUTPATIENT)
Dept: CARDIOLOGY | Facility: CLINIC | Age: 52
End: 2022-01-31
Attending: REGISTERED NURSE
Payer: COMMERCIAL

## 2022-01-31 ENCOUNTER — PRE VISIT (OUTPATIENT)
Dept: CARDIOLOGY | Facility: CLINIC | Age: 52
End: 2022-01-31
Payer: COMMERCIAL

## 2022-01-31 VITALS
HEIGHT: 68 IN | OXYGEN SATURATION: 98 % | HEART RATE: 69 BPM | DIASTOLIC BLOOD PRESSURE: 79 MMHG | SYSTOLIC BLOOD PRESSURE: 128 MMHG | BODY MASS INDEX: 25.84 KG/M2 | WEIGHT: 170.5 LBS

## 2022-01-31 DIAGNOSIS — I49.3 PVC'S (PREMATURE VENTRICULAR CONTRACTIONS): Primary | ICD-10-CM

## 2022-01-31 DIAGNOSIS — C90.00 LIGHT CHAIN MYELOMA (H): ICD-10-CM

## 2022-01-31 DIAGNOSIS — R00.2 PALPITATIONS: ICD-10-CM

## 2022-01-31 PROCEDURE — 93005 ELECTROCARDIOGRAM TRACING: CPT

## 2022-01-31 PROCEDURE — 99215 OFFICE O/P EST HI 40 MIN: CPT | Performed by: INTERNAL MEDICINE

## 2022-01-31 PROCEDURE — G0463 HOSPITAL OUTPT CLINIC VISIT: HCPCS | Mod: 25

## 2022-01-31 RX ORDER — SODIUM CHLORIDE 9 MG/ML
INJECTION, SOLUTION INTRAVENOUS CONTINUOUS
Status: CANCELLED | OUTPATIENT
Start: 2022-01-31

## 2022-01-31 RX ORDER — PROCHLORPERAZINE MALEATE 10 MG
10 TABLET ORAL EVERY 6 HOURS PRN
Status: ON HOLD | COMMUNITY
End: 2023-01-10

## 2022-01-31 RX ORDER — LIDOCAINE 40 MG/G
CREAM TOPICAL
Status: CANCELLED | OUTPATIENT
Start: 2022-01-31

## 2022-01-31 ASSESSMENT — ENCOUNTER SYMPTOMS
STIFFNESS: 0
HOT FLASHES: 0
ABDOMINAL PAIN: 1
SLEEP DISTURBANCES DUE TO BREATHING: 0
BOWEL INCONTINENCE: 0
BLOATING: 1
POLYPHAGIA: 0
NAIL CHANGES: 0
ALTERED TEMPERATURE REGULATION: 1
FEVER: 0
SORE THROAT: 1
POOR WOUND HEALING: 0
CHILLS: 1
HOARSE VOICE: 1
LEG PAIN: 0
ORTHOPNEA: 0
DIARRHEA: 1
FATIGUE: 1
NECK MASS: 0
CONSTIPATION: 1
SMELL DISTURBANCE: 0
HALLUCINATIONS: 0
HYPOTENSION: 0
DECREASED APPETITE: 0
BLOOD IN STOOL: 0
HYPERTENSION: 0
SINUS PAIN: 0
DECREASED LIBIDO: 1
EXERCISE INTOLERANCE: 1
JOINT SWELLING: 0
MYALGIAS: 1
NAUSEA: 1
RECTAL PAIN: 0
TROUBLE SWALLOWING: 0
MUSCLE CRAMPS: 1
TASTE DISTURBANCE: 1
VOMITING: 0
HEARTBURN: 1
PALPITATIONS: 1
JAUNDICE: 0
SYNCOPE: 0
MUSCLE WEAKNESS: 1
POLYDIPSIA: 0
WEIGHT GAIN: 0
ARTHRALGIAS: 0
BACK PAIN: 1
INCREASED ENERGY: 1
WEIGHT LOSS: 0
NIGHT SWEATS: 1
SKIN CHANGES: 0
NECK PAIN: 0
LIGHT-HEADEDNESS: 1
SINUS CONGESTION: 1

## 2022-01-31 ASSESSMENT — PAIN SCALES - GENERAL: PAINLEVEL: NO PAIN (0)

## 2022-01-31 ASSESSMENT — MIFFLIN-ST. JEOR: SCORE: 1436.13

## 2022-01-31 NOTE — PROGRESS NOTES
[facility]  Date of Visit: Feb 1, 2022   Oncologist: Dr. Payton Reinoso    Reason for visit: myeloma follow-up           Oncology History   51 year old female with past medical history significant for Irritable bowel disease, allergies, large plasmacytoma of the L pelvis, and newly diagnosed multiple myeloma.     Early in April 2021 , she noted to have left sided groin pain, constant and was affecting her daily activities  MRI showed large, expansile, permeative mass with indistinct borders involving the left superior and inferior pubic rami, pubic symphysis and with possible extension to the left acetabulum.     We completed the following work-up:   - Biopsy 9/7/2021: plasmacytoma.  Flow cytometry showed kappa monotypic plasma cells, polytypic B cells. FISH results from plasmacytoma showed a gain of 11q, IGH-CCND1 fusion but no losses of 1q or TP53 and no gain of 1q.  - Bone marrow biopsy 09/22/21: Marrow cellularity 50% with 25% interstitial infiltration w/  Kappa-monotypic, moderately atypical plasma cells. Flow with 0.7% kappa-monotypica plasma cells. FISH and cytogenetics pending.   - PET CT 09/24/21 w/ hypermetabolic pathologic fracture of the L pubic ramus w/ aggressive appearing osteolysis but no additional suspicious lytic or blastic osseous lesions.     Treatment to date:   - Zometa q4 weeks started 9/27/21  - Radiation to left pubic ramus x18 fractions, completed 11/11/21.   - RVD C1D1 11/15/21  - RVD C2D1 12/6/21  - RVD C3D1  12/27/21  - RVD C4 D1 1/17/22, elizabeth added on D8 due to inadequate response of urinary M-spike and FLCs remaining over 100 mg/dL after 3 cycles of RVD  - Elizabeth-RVD C1D8 1/24/22        INTERIM HISTORY:   Margo presents for oncology follow-up visit today after having elizabeth added to her regimen last week.    She has had 4 episodes of diarrhea over the past week, two last night.  These were accompanied by abdominal cramps and significant nausea, but  "never did have an emesis. She has a long-standing history of GI irregularity, but this is significantly different than the issues she has had in the past.  She has remained on MiraLAX once daily despite the diarrhea.    She had a bloody nose this morning and notes that she has also started to note some bleeding while she brushes her teeth.    The \"hot\" sensation in her feet had resolved some, but seems to have returned over the past week.    Energy has been poor.  Takes a nap after any physical activity (walked 1 mile yesterday, then napped for 2 hours, will go swimming and then nap for 30 min).     She has not had any further episodes of her heart pounding.     Denies fevers, chills, CP, SOB, vomiting.           Physical Exam:     /77   Pulse 64   Resp 16   Ht 1.72 m (5' 7.72\")   Wt 77.4 kg (170 lb 9.6 oz)   SpO2 98%   BMI 26.16 kg/m     Wt Readings from Last 4 Encounters:   02/01/22 77.4 kg (170 lb 9.6 oz)   01/31/22 77.3 kg (170 lb 8 oz)   01/24/22 79.3 kg (174 lb 14.4 oz)   01/20/22 79.3 kg (174 lb 14.4 oz)     General: well appearing, no acute distress  HEENT: normocephalic, atraumatic, PERRLA, sclerae nonicteric, moist mucous membranes, oropharynx is clear  Lymph: no palpable cervical, supraclavicular or axillary lymphadenopathy   CV: S1 S2, RRR, no murmur, click or rub  Lungs: clear to auscultation bilaterally, no wheezes/rales/rhonchi  Abd: soft, positive bowel sounds, non-distended, non-tender  MSK: no peripheral edema  Neuro: alert and oriented x3, CN grossly intact   Psych: appropriate mood and affect  Skin:no visible rashes or lesions on exposed skin          Laboratory Data:      I personally reviewed the following labs:    Most Recent 3 CBC's:  Recent Labs   Lab Test 02/01/22  1254 01/24/22  0931 01/20/22  1055   WBC 4.5 4.3 5.3   HGB 13.2 13.3 13.2   MCV 93 94 93    212 283     Most Recent 3 BMP's:  Recent Labs   Lab Test 02/01/22  1254 01/24/22  0931 01/20/22  1055    143 143 " "  POTASSIUM 3.6 3.8 3.5   CHLORIDE 108 108 107   CO2 25 24 22   BUN 17 19 25   CR 0.86 0.83 0.85   ANIONGAP 9 11 14   MEKA 8.8 8.8 9.0   * 115* 163*     Most Recent 2 LFT's:  Recent Labs   Lab Test 02/01/22  1254 01/24/22  0931   AST 14 12   ALT 25 23   ALKPHOS 69 61   BILITOTAL 0.5 0.3              Assessment and Recommendations   Kristie Cornejo is a 51 year old female who was diagnosed with Plasmacytoma after she presented with left sided groin pain now found to have multiple myeloma after further workup with bone marrow biopsy.     # Multiple myeloma with associated large plasmacytoma of the L pelvis. Kappa light chain disease.     BMBx with 25% plasma cell infiltration; standard risk with gain of 11q, IGH-CCND1 fusion but no losses of 1q or TP53 and no gain of 1q.   SPEP w/out a monoclonal peak  UPEP positive. 70% paraprotein. Large monoclonal free immunoglobulin light chain of kappa chain type.     No anemia, normal creat, Ca, no other skeletal lesions. Alb normal, B2M normal.  Stage I Light chain disease with left pelvic bone pathologic fracture she is classified as having symptomatic multiple myeloma.     RVD C1D1 11/15/21 (had a slight delay in the start of her Revlimid on 11/19/21 due to the timing of her pregnancy tests).  She tolerated cycle 1 well with some mild nausea and a headache on her week off.  RVD C2D1 12/6/21, tolerated well overall, but has had more consistent sensation of her hands and feet being hot. No tingling, pain, loss of sensation, or changes to her strength.  RVD C3D1 12/27/21, tolerated well overall with ongoing sensation of \"hot\" hands and feet  RVD C4 D1 1/17/22, michelle added on D8 due to inadequate response of urinary M-spike and FLCs remaining over 100 mg/dL after 3 cycles of RVD    Due to insurance, we will be switching to 21 day cycles.  She will receive Rev Days 1-14, Velcade days 1,4,8,11, and michelle days 1,8,15.  C5D1 is scheduled to start 2/7/22.    Pending good " response, therapy may include autologous stem cell transplant.     - Return to clinic to see me with labs prior to C5.  - Continue monthly pregnancy tests while on Revlimid   - Planning for 6 cycles Elizabeth-RVD followed by consideration of autoHSCT. BMT consult was completed 12/23/21  -check free light chain before every cycle and urinary UPEP q 2 cycle    #Bone Health  She will continue monthly Zometa (last received on 12/27). Next dose due 1/24/22. She will continue Calcium/Vitamin D supplements.     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 Currently having no pain and able to walk 1 mile without any assistive devices.  She is interested in pursuing other activities like gentle cross-country skiing.  Had follow-up with Dr. Philip on 1/6/22 and was cleared to resume regular activity as tolerated. She should still refrain from high-impact activities.    # PVCs  She had an ECHO 12/1 and had follow-up with cardiology 12/7 (notes in CareEverywhere).  Based on today's conversation, it sounds like she is becoming more symptomatic and may be flipping in and out of an aberrant rhythm. She had a holter monitor performed in Sept 2021 (Care Everywhere) which showed a 15% PVC burden. She states she was told they wouldn't manage her rhythm with medication unless she reached 20%; she also notes she was diagnosed with multiple myeloma the day prior, so the heart rhythm was less of a priority at that moment. We discussed today that with her being more symptomatic, and with plans for potential transplant as part of her treatment, I recommend she touch base with cardiology again.  Due to some staff changes at her current cardiologist, she prefers to pursue a new evaluation at the [facility] to keep all of her care here.  - Cardiology consult completed yesterday.  They recommend an  ablation.  I recommended timing the ablation for a week when she is off her Revlimid in an effort to have her platelets and neutrophils as high as possible.  She will try to set this up a week off Revlimid in the next month or two.    # Nausea  Mild, managed with Compazine up to twice daily and lorazepam occasionally at night.     #COVID prophylaxis  - She received EVUSHELD 1/20/22.  - Received 3 COVID vaccines to date    #Diarrhea  She has had four episodes of diarrhea in the past week with associated abdominal cramping and nausea.  She has a long-standing history of GI irregularity, but states this is significantly different.  She has continued to take MiraLAX daily.  - Discussed cutting back MiraLAX to every other day or 1/2 a capful daily to see if that helps decrease her diarrhea.  Discussed that management of her bowels may change a bit throughout treatment as her chemotherapy will also have an effect on the consistency of her stools.    37 minutes spent on the date of the encounter doing chart review, review of test results, patient visit and documentation     DORON [facility] [address] [address]  [phone]

## 2022-01-31 NOTE — PATIENT INSTRUCTIONS
You were seen in the Electrophysiology Clinic today by: Dr Thomas    Plan:     You are scheduled for a Premature Ventricular Contraction (PVC) ablation, at [facility] with Dr. Thomas The hospital is located at [address] [place] [address] on [place] the [place].  If you need to cancel this procedure, please call [phone]. Please note the following schedule below:    You will need to undergo a COVID-19 PCR swab test within 4 days of procedure. You will receive a phone call with more information. If you do not hear from the COVID scheduling team, please call: [phone] to schedule.    Visitor Policy: One visitor while you are in your pre-op room. Once you are taken to procedure, the visitor must leave the facility.      PVC Ablation.    Date:   Time: _________________Arrive to the [facility] Waiting Room at the [facility]    1. Your history and physical will be completed by our nurse practitioner when you arrive.  2. Please do not eat anything for 8 hours prior to your procedure. You may have sips of water up until 2 hours prior to your arrival.  3. If you are diabetic contact your diabetes team if you have questions  4. The morning of your procedure, you may take your scheduled medications with a sip of water.    5. You will discharge the same day and need a .      Post-Procedure Instructions      Care of groin site:    Remove the Band-Aid after 24 hours. If there is minor oozing, apply another Band-aid and remove it after 12 hours.     Do NOT take a bath, use a hot tub, pool, or submerse in water for at least 3 days. You may shower.     It is normal to have a small bruise or lump at the site.    Do not scrub the site.    Do not use lotion or powder near the puncture site for 3 days.    If you start bleeding from the site in your groin: Lie down flat and press firmly on the site. Call your physician immediately, or, come to the emergency room.  Call 911 right  away if you have bleeding that is heavy or does not stop.    Call your doctor/provider if:     You have a large or growing hard lump around the site.     The site is red, swollen, hot or tender.     Blood or fluid is draining from the site.     You have chills or a fever greater than 101 F (38 C).     Your leg or arm turns bluish, feels numb or cool.     You have hives, a rash or unusual itching.    Activity Restrictions    For the first 2 days: Do not stoop or squat. When you cough, sneeze or move your bowels, hold your hand over the puncture site and press gently.    Do not lift more than 10 pounds or exertional activity for 10 days.  - No driving for 24 hours after (with or without general anesthesia).       Date: _______ Follow up with Dr. Thomas with ziopatch done 1 month prior (this will be mailed to your home).    Please do not hesitate to utilize MyChart or call us if you have any questions or concerns.        Your Care Team:  EP Cardiology   Telephone Number     Nurse Line  Nika Laboy RN  [phone]     For scheduling appts or procedures:    Harini Couch   [phone]   For the Device Clinic (Pacemakers, ICDs, Loop Recorders)    During business hours: [phone]  After business hours:   [phone]- select option 4 and ask for job code [id number].     On-call cardiologist for after hours or on weekends: [phone], option #4, and ask to speak to the on-call cardiologist.     Cardiovascular Clinic:   [address] [place] [address]. [place] [address]      As always, Thank you for trusting us with your health care needs!

## 2022-01-31 NOTE — LETTER
1/31/2022      RE: Kristie Cornejo  [address]  [facility] [address]       Dear Colleague,    Thank you for the opportunity to participate in the care of your patient, Kristie Cornejo, at the [facility] [place] [facility]. Please see a copy of my visit note below.    HPI:   Kristie Cornejo is a 51 year old female with a past medical history significant for Irritable bowel disease, allergies, large plasmacytoma of the L pelvis, newly diagnosed multiple myeloma, and PVCs.  She was referred for a cardiology evaluation.    She started having PVCs in 2001 and they are getting worse.  She stated that physical Ex might be a trigger of her PVCs.  Her PVCs are syymptomatic but she is having more PVCs than she feels.  She denied any chest pain or SOB, but complained of postural dizziness.    PAST MEDICAL HISTORY:  Past Medical History:   Diagnosis Date     Allergic rhinitis, cause unspecified      Anxiety state, unspecified 12/01/2003    Started postpartum . On Paxil x 1+years. Off meds- 10/04     Irritable bowel syndrome 01/01/2004     Multiple myeloma not having achieved remission (H)      PLANTAR WARTS     resolved     SINUS DYSRHYTHMIA 10/01/2002    wnl     Unspecified hemorrhoids without mention of complication 04/01/2004    colonoscopy 4/04 spastic colon, int roids; bx neg       CURRENT MEDICATIONS:  Current Outpatient Medications   Medication Sig Dispense Refill     Bortezomib (VELCADE IJ)        BUSPIRONE HCL 10 MG PO TABS 1 TABLET 3 TIMES DAILY as needed 30 Tab 3     calcium carbonate 600 mg-vitamin D 400 units (CALTRATE) 600-400 MG-UNIT per tablet        cetirizine HCl 10 MG CAPS Take 20 mg by mouth       daratumumab 16 mg/kg Inject 16 mg/kg into the vein once       dexamethasone (DECADRON) 4 MG tablet Take 10 tablets (40 mg) by mouth every 7 days for 3 doses Days 1, 8, and 15. 30 tablet 0     fluticasone (FLONASE) 50 MCG/ACT  nasal spray 1 spray       glucosamine-chondroitin 500-400 MG CAPS per capsule        lactobacillus rhamnosus, GG, (CULTURELL) capsule Take 1 capsule by mouth 2 times daily       LENalidomide (REVLIMID) 25 MG CAPS capsule Take 1 capsule (25 mg) by mouth daily for 14 days Days 1 through 14. 14 capsule 0     LORazepam (ATIVAN) 0.5 MG tablet Take 1 tablet (0.5 mg) by mouth every 4 hours as needed for anxiety, nausea or sleep 30 tablet 3     melatonin 5 MG tablet Take 5 mg by mouth nightly as needed for sleep       MIRENA 20 MCG/24HR IU IUD use for up to 5 years, then remove       Multiple Vitamin (MULTI-VITAMIN DAILY PO) Take 1 tablet by mouth       omeprazole (PRILOSEC OTC) 20 MG EC tablet Take 20 mg by mouth daily       polyethylene glycol (MIRALAX) 17 GM/Dose powder Take 17 g by mouth       prochlorperazine (COMPAZINE) 10 MG tablet Take 10 mg by mouth every 6 hours as needed for nausea or vomiting       Zoledronic Acid (ZOMETA IV)          PAST SURGICAL HISTORY:  Past Surgical History:   Procedure Laterality Date     BIOPSY BONE PELVIS Left 9/7/2021    Procedure: Biopsy left pubic ramis;  Surgeon: Edu Philip MD;  Location:  OR     [facility] NONSPECIFIC PROCEDURE  1987    [place] teeth removed     [facility] NONSPECIFIC PROCEDURE  1993    (R) knee surgery     [facility] NONSPECIFIC PROCEDURE  2003    (R) breast bx-negative 6/03; bx neg 10/02     [facility] NONSPECIFIC PROCEDURE      10/02 cardiac echo normal     [facility] NONSPECIFIC PROCEDURE  2004 4/04 colonosc int roids, spastic colon     [facility] NONSPECIFIC PROCEDURE  8/05    D & C for missed AB     [facility] COLONOSCOPY THRU STOMA, DIAGNOSTIC  2004       ALLERGIES:     Allergies   Allergen Reactions     Amoxicillin      Other reaction(s): upset stomach     Erythromycin      upsets stomach     Fluconazole Rash     Itching,   Unsure if really allergy  Itching,   Unsure if really allergy         FAMILY HISTORY:  + Premature coronary artery disease  - Atrial fibrillation  - Sudden cardiac death  "    SOCIAL HISTORY:  Social History     Tobacco Use     Smoking status: Never Smoker     Smokeless tobacco: Never Used   Vaping Use     Vaping Use: Never used   Substance Use Topics     Alcohol use: Yes     Comment: none to a couple. occasional     Drug use: No       ROS:   Answers for HPI/ROS submitted by the patient on 1/31/2022 were reviewed.  Constitutional: No fever, chills, or sweats. Weight stable.   ENT: No visual disturbance, ear ache, epistaxis, sore throat.   Cardiovascular: As per HPI.   Respiratory: No cough, hemoptysis.    GI: No nausea, vomiting, hematemesis, melena, or hematochezia.   : No hematuria.   Integument: Negative.   Psychiatric: Negative.   Hematologic:  Easy bruising, no easy bleeding.  Neuro: Negative.   Endocrinology: No significant heat or cold intolerance   Musculoskeletal: No myalgia.    Exam:  /79 (BP Location: Right arm, Patient Position: Chair, Cuff Size: Adult Regular)   Pulse 69   Ht 1.726 m (5' 7.95\")   Wt 77.3 kg (170 lb 8 oz)   SpO2 98%   BMI 25.96 kg/m    GENERAL APPEARANCE: healthy, alert and no distress  HEENT: no icterus, no xanthelasmas, normal pupil size and reaction, normal palate, mucosa moist, no central cyanosis  NECK: no adenopathy, no asymmetry, masses, or scars, thyroid normal to palpation and no bruits, JVP not elevated  RESPIRATORY: lungs clear to auscultation - no rales, rhonchi or wheezes, no use of accessory muscles, no retractions, respirations are unlabored, normal respiratory rate  CARDIOVASCULAR: regular rhythm, normal S1 with physiologic split S2, no S3 or S4 and no murmur, click or rub, precordium quiet with normal PMI.  ABDOMEN: soft, non tender, without hepatosplenomegaly, no masses palpable, bowel sounds normal, aorta not enlarged by palpation, no abdominal bruits  EXTREMITIES: peripheral pulses normal, no edema, no bruits  NEURO: alert and oriented to person/place/time, normal speech, gait and affect  VASC: Radial, femoral, dorsalis " pedis and posterior tibialis pulses are normal in volumes and symmetric bilaterally. No bruits are heard.  SKIN: no ecchymoses, no rashes    Labs:  CBC RESULTS:   Lab Results   Component Value Date    WBC 4.3 01/24/2022    WBC 7.3 02/13/2006    RBC 4.27 01/24/2022    RBC 4.25 02/13/2006    HGB 13.3 01/24/2022    HGB 12.2 08/29/2006    HCT 40.2 01/24/2022    HCT 38.0 02/13/2006    MCV 94 01/24/2022    MCV 89 02/13/2006    MCH 31.1 01/24/2022    MCH 30.6 02/13/2006    MCHC 33.1 01/24/2022    MCHC 34.2 02/13/2006    RDW 14.1 01/24/2022    RDW 12.3 02/13/2006     01/24/2022     02/13/2006       BMP RESULTS:  Lab Results   Component Value Date     01/24/2022    POTASSIUM 3.8 01/24/2022    CHLORIDE 108 01/24/2022    CO2 24 01/24/2022    ANIONGAP 11 01/24/2022     (H) 01/24/2022    GLC 81 09/24/2021    BUN 19 01/24/2022    CR 0.83 01/24/2022    GFRESTIMATED 85 01/24/2022    MEKA 8.8 01/24/2022        INR RESULTS:  No results found for: INR    Procedures:  Holter ECG in 9/2021: Reviewed.      ECG on 10/8/2021: Reviewed.      ECG on 1/17/2022: Reviewed.      ECG on 1/31/2022: Reviewed.      Assessment and Plan:  # Irritable bowel disease  # Allergies  # Large plasmacytoma of the L pelvis  # Multiple myeloma, newly diagnosed.  # Frequent PVCs Symptomatic. [place] = 15% per Holter ECG in 9/2021. Likely originating from the LCC and posteromedial papillary muscle.   We discussed treatment options including medication and catheter ablation.  The nature, risks, benefits, alternatives and anticipated results of the procedure were explained to the patient. These risks include but are not limited to local vascular damage, bleeding, pulmonary vein stenosis, AV block requiring a pacemaker implantation, tamponade and stroke. After careful consideration the patient wished to proceed with the procedure. The patient was verbally consented. We will schedule the patient to have this done at her earliest  convenience.    I spent a total of 50 min today to review the records, see the patient, and complete the documents.        Please do not hesitate to contact me if you have any questions/concerns.     Sincerely,     Alem Thomas MD    CC  Patient Care Team:  Rena Sheffield MD as PCP - General (Family Medicine)  Edu Philip MD as Assigned Musculoskeletal Provider  Griselda Valdez, RN as Specialty Care Coordinator (Hematology & Oncology)  Payton Reinoso MD as MD (Hematology)  Nan Mckenzie MD as Assigned Cancer Care Provider  GODWIN LEAHY

## 2022-01-31 NOTE — NURSING NOTE
Chief Complaint   Patient presents with     New Patient     NEW- palpitations/pvcs     Vitals were taken and medications reconciled.    Hosea Adames, EMT  3:02 PM

## 2022-01-31 NOTE — PROGRESS NOTES
HPI:   Kristie Cornejo is a 51 year old female with a past medical history significant for Irritable bowel disease, allergies, large plasmacytoma of the L pelvis, newly diagnosed multiple myeloma, and PVCs.  She was referred for a cardiology evaluation.    She started having PVCs in 2001 and they are getting worse.  She stated that physical Ex might be a trigger of her PVCs.  Her PVCs are syymptomatic but she is having more PVCs than she feels.  She denied any chest pain or SOB, but complained of postural dizziness.    PAST MEDICAL HISTORY:  Past Medical History:   Diagnosis Date     Allergic rhinitis, cause unspecified      Anxiety state, unspecified 12/01/2003    Started postpartum . On Paxil x 1+years. Off meds- 10/04     Irritable bowel syndrome 01/01/2004     Multiple myeloma not having achieved remission (H)      PLANTAR WARTS     resolved     SINUS DYSRHYTHMIA 10/01/2002    wnl     Unspecified hemorrhoids without mention of complication 04/01/2004    colonoscopy 4/04 spastic colon, int roids; bx neg       CURRENT MEDICATIONS:  Current Outpatient Medications   Medication Sig Dispense Refill     Bortezomib (VELCADE IJ)        BUSPIRONE HCL 10 MG PO TABS 1 TABLET 3 TIMES DAILY as needed 30 Tab 3     calcium carbonate 600 mg-vitamin D 400 units (CALTRATE) 600-400 MG-UNIT per tablet        cetirizine HCl 10 MG CAPS Take 20 mg by mouth       daratumumab 16 mg/kg Inject 16 mg/kg into the vein once       dexamethasone (DECADRON) 4 MG tablet Take 10 tablets (40 mg) by mouth every 7 days for 3 doses Days 1, 8, and 15. 30 tablet 0     fluticasone (FLONASE) 50 MCG/ACT nasal spray 1 spray       glucosamine-chondroitin 500-400 MG CAPS per capsule        lactobacillus rhamnosus, GG, (CULTURELL) capsule Take 1 capsule by mouth 2 times daily       LENalidomide (REVLIMID) 25 MG CAPS capsule Take 1 capsule (25 mg) by mouth daily for 14 days Days 1 through 14. 14 capsule 0     LORazepam (ATIVAN) 0.5 MG tablet Take 1 tablet  (0.5 mg) by mouth every 4 hours as needed for anxiety, nausea or sleep 30 tablet 3     melatonin 5 MG tablet Take 5 mg by mouth nightly as needed for sleep       MIRENA 20 MCG/24HR IU IUD use for up to 5 years, then remove       Multiple Vitamin (MULTI-VITAMIN DAILY PO) Take 1 tablet by mouth       omeprazole (PRILOSEC OTC) 20 MG EC tablet Take 20 mg by mouth daily       polyethylene glycol (MIRALAX) 17 GM/Dose powder Take 17 g by mouth       prochlorperazine (COMPAZINE) 10 MG tablet Take 10 mg by mouth every 6 hours as needed for nausea or vomiting       Zoledronic Acid (ZOMETA IV)          PAST SURGICAL HISTORY:  Past Surgical History:   Procedure Laterality Date     BIOPSY BONE PELVIS Left 9/7/2021    Procedure: Biopsy left pubic ramis;  Surgeon: Edu Philip MD;  Location: [facility] OR     [facility] NONSPECIFIC PROCEDURE  1987    [place] teeth removed     [facility] NONSPECIFIC PROCEDURE  1993    (R) knee surgery     [facility] NONSPECIFIC PROCEDURE  2003    (R) breast bx-negative 6/03; bx neg 10/02     [facility] NONSPECIFIC PROCEDURE      10/02 cardiac echo normal     [facility] NONSPECIFIC PROCEDURE  2004 4/04 colonosc int roids, spastic colon     [facility] NONSPECIFIC PROCEDURE  8/05    D & C for missed AB     [facility] COLONOSCOPY THRU STOMA, DIAGNOSTIC  2004       ALLERGIES:     Allergies   Allergen Reactions     Amoxicillin      Other reaction(s): upset stomach     Erythromycin      upsets stomach     Fluconazole Rash     Itching,   Unsure if really allergy  Itching,   Unsure if really allergy         FAMILY HISTORY:  + Premature coronary artery disease  - Atrial fibrillation  - Sudden cardiac death     SOCIAL HISTORY:  Social History     Tobacco Use     Smoking status: Never Smoker     Smokeless tobacco: Never Used   Vaping Use     Vaping Use: Never used   Substance Use Topics     Alcohol use: Yes     Comment: none to a couple. occasional     Drug use: No       ROS:   Answers for HPI/ROS submitted by the patient on 1/31/2022 were  "reviewed.  Constitutional: No fever, chills, or sweats. Weight stable.   ENT: No visual disturbance, ear ache, epistaxis, sore throat.   Cardiovascular: As per HPI.   Respiratory: No cough, hemoptysis.    GI: No nausea, vomiting, hematemesis, melena, or hematochezia.   : No hematuria.   Integument: Negative.   Psychiatric: Negative.   Hematologic:  Easy bruising, no easy bleeding.  Neuro: Negative.   Endocrinology: No significant heat or cold intolerance   Musculoskeletal: No myalgia.    Exam:  /79 (BP Location: Right arm, Patient Position: Chair, Cuff Size: Adult Regular)   Pulse 69   Ht 1.726 m (5' 7.95\")   Wt 77.3 kg (170 lb 8 oz)   SpO2 98%   BMI 25.96 kg/m    GENERAL APPEARANCE: healthy, alert and no distress  HEENT: no icterus, no xanthelasmas, normal pupil size and reaction, normal palate, mucosa moist, no central cyanosis  NECK: no adenopathy, no asymmetry, masses, or scars, thyroid normal to palpation and no bruits, JVP not elevated  RESPIRATORY: lungs clear to auscultation - no rales, rhonchi or wheezes, no use of accessory muscles, no retractions, respirations are unlabored, normal respiratory rate  CARDIOVASCULAR: regular rhythm, normal S1 with physiologic split S2, no S3 or S4 and no murmur, click or rub, precordium quiet with normal PMI.  ABDOMEN: soft, non tender, without hepatosplenomegaly, no masses palpable, bowel sounds normal, aorta not enlarged by palpation, no abdominal bruits  EXTREMITIES: peripheral pulses normal, no edema, no bruits  NEURO: alert and oriented to person/place/time, normal speech, gait and affect  VASC: Radial, femoral, dorsalis pedis and posterior tibialis pulses are normal in volumes and symmetric bilaterally. No bruits are heard.  SKIN: no ecchymoses, no rashes    Labs:  CBC RESULTS:   Lab Results   Component Value Date    WBC 4.3 01/24/2022    WBC 7.3 02/13/2006    RBC 4.27 01/24/2022    RBC 4.25 02/13/2006    HGB 13.3 01/24/2022    HGB 12.2 08/29/2006    HCT " 40.2 01/24/2022    HCT 38.0 02/13/2006    MCV 94 01/24/2022    MCV 89 02/13/2006    MCH 31.1 01/24/2022    MCH 30.6 02/13/2006    MCHC 33.1 01/24/2022    MCHC 34.2 02/13/2006    RDW 14.1 01/24/2022    RDW 12.3 02/13/2006     01/24/2022     02/13/2006       BMP RESULTS:  Lab Results   Component Value Date     01/24/2022    POTASSIUM 3.8 01/24/2022    CHLORIDE 108 01/24/2022    CO2 24 01/24/2022    ANIONGAP 11 01/24/2022     (H) 01/24/2022    GLC 81 09/24/2021    BUN 19 01/24/2022    CR 0.83 01/24/2022    GFRESTIMATED 85 01/24/2022    MEKA 8.8 01/24/2022        INR RESULTS:  No results found for: INR    Procedures:  Holter ECG in 9/2021: Reviewed.      ECG on 10/8/2021: Reviewed.      ECG on 1/17/2022: Reviewed.      ECG on 1/31/2022: Reviewed.      Assessment and Plan:  # Irritable bowel disease  # Allergies  # Large plasmacytoma of the L pelvis  # Multiple myeloma, newly diagnosed.  # Frequent PVCs Symptomatic. [place] = 15% per Holter ECG in 9/2021. Likely originating from the LCC and posteromedial papillary muscle.   We discussed treatment options including medication and catheter ablation.  The nature, risks, benefits, alternatives and anticipated results of the procedure were explained to the patient. These risks include but are not limited to local vascular damage, bleeding, pulmonary vein stenosis, AV block requiring a pacemaker implantation, tamponade and stroke. After careful consideration the patient wished to proceed with the procedure. The patient was verbally consented. We will schedule the patient to have this done at her earliest convenience.    I spent a total of 50 min today to review the records, see the patient, and complete the documents.    Addendum on 2/24/2022:  The patient took stress TTE, which was WNL and ischemia was ruled out.  She tried Metoprolol, but she could not tolerate it due to worsening dizziness and orthostatic hypotension.  Therefore, she would be a  candidate for catheter ablation of PVCs.  Alem Thomas MD    CC  Patient Care Team:  Rena Sheffield MD as PCP - General (Family Medicine)  Edu Philip MD as Assigned Musculoskeletal Provider  Griselda Valdez RN as Specialty Care Coordinator (Hematology & Oncology)  Payton Reinoso MD as MD (Hematology)  Nan Mckenzie MD as Assigned Cancer Care Provider  Alem Thomas MD (Cardiovascular Disease)  GODWIN LEAHY    Answers for HPI/ROS submitted by the patient on 1/31/2022  General Symptoms: Yes  Skin Symptoms: Yes  HENT Symptoms: Yes  EYE SYMPTOMS: No  HEART SYMPTOMS: Yes  LUNG SYMPTOMS: No  INTESTINAL SYMPTOMS: Yes  URINARY SYMPTOMS: No  GYNECOLOGIC SYMPTOMS: Yes  BREAST SYMPTOMS: No  SKELETAL SYMPTOMS: Yes  BLOOD SYMPTOMS: No  NERVOUS SYSTEM SYMPTOMS: No  MENTAL HEALTH SYMPTOMS: No  Ear discharge: No  Hearing loss: No  Tinnitus: Yes  Nosebleeds: Yes  Congestion: Yes  Sinus pain: No  Trouble swallowing: No   Voice hoarseness: Yes  Mouth sores: No  Sore throat: Yes  Tooth pain: No  Gum tenderness: No  Bleeding gums: Yes  Change in taste: Yes  Change in sense of smell: No  Dry mouth: Yes  Hearing aid used: No  Neck lump: No  Fever: No  Loss of appetite: No  Weight loss: No  Weight gain: No  Fatigue: Yes  Night sweats: Yes  Chills: Yes  Increased stress: No  Excessive hunger: No  Excessive thirst: No  Feeling hot or cold when others believe the temperature is normal: Yes  Loss of height: No  Post-operative complications: No  Surgical site pain: No  Hallucinations: No  Change in or Loss of Energy: Yes  Hyperactivity: No  Confusion: No  Changes in hair: No  Changes in moles/birth marks: No  Itching: Yes  Rashes: No  Changes in nails: No  Acne: No  Hair in places you don't want it: No  Change in facial hair: Yes  Warts: No  Non-healing sores: No  Scarring: No  Flaking of skin: Yes  Color changes of hands/feet in cold : No  Sun sensitivity: No  Skin thickening: No  Chest pain or pressure: Yes  Fast  or irregular heartbeat: Yes  Pain in legs with walking: No  Trouble breathing while lying down: No  Fingers or toes appear blue: No  High blood pressure: No  Low blood pressure: No  Fainting: No  Murmurs: No  Pacemaker: No  Varicose veins: No  Edema or swelling: No  Wake up at night with shortness of breath: No  Light-headedness: Yes  Exercise intolerance: Yes  Heart burn or indigestion: Yes  Nausea: Yes  Vomiting: No  Abdominal pain: Yes  Bloating: Yes  Constipation: Yes  Diarrhea: Yes  Blood in stool: No  Black stools: No  Rectal or Anal pain: No  Fecal incontinence: No  Yellowing of skin or eyes: No  Vomit with blood: No  Change in stools: No  Bleeding or spotting between periods: No  Heavy or painful periods: No  Irregular periods: No  Vaginal discharge: No  Hot flashes: No  Vaginal dryness: Yes  Genital ulcers: No  Reduced libido: Yes  Painful intercourse: No  Difficulty with sexual arousal: Yes  Post-menopausal bleeding: No  Back pain: Yes  Muscle aches: Yes  Neck pain: No  Swollen joints: No  Joint pain: No  Bone pain: No  Muscle cramps: Yes  Muscle weakness: Yes  Joint stiffness: No  Bone fracture: Yes

## 2022-02-01 ENCOUNTER — TELEPHONE (OUTPATIENT)
Dept: ONCOLOGY | Facility: CLINIC | Age: 52
End: 2022-02-01

## 2022-02-01 ENCOUNTER — ONCOLOGY VISIT (OUTPATIENT)
Dept: ONCOLOGY | Facility: CLINIC | Age: 52
End: 2022-02-01
Payer: COMMERCIAL

## 2022-02-01 ENCOUNTER — INFUSION THERAPY VISIT (OUTPATIENT)
Dept: ONCOLOGY | Facility: CLINIC | Age: 52
End: 2022-02-01
Payer: COMMERCIAL

## 2022-02-01 ENCOUNTER — PATIENT OUTREACH (OUTPATIENT)
Dept: ONCOLOGY | Facility: CLINIC | Age: 52
End: 2022-02-01

## 2022-02-01 ENCOUNTER — LAB (OUTPATIENT)
Dept: LAB | Facility: CLINIC | Age: 52
End: 2022-02-01
Payer: COMMERCIAL

## 2022-02-01 VITALS
WEIGHT: 170.6 LBS | HEIGHT: 68 IN | OXYGEN SATURATION: 98 % | RESPIRATION RATE: 16 BRPM | BODY MASS INDEX: 25.85 KG/M2 | DIASTOLIC BLOOD PRESSURE: 77 MMHG | HEART RATE: 64 BPM | SYSTOLIC BLOOD PRESSURE: 115 MMHG

## 2022-02-01 DIAGNOSIS — C90.00 LIGHT CHAIN MYELOMA (H): Primary | ICD-10-CM

## 2022-02-01 DIAGNOSIS — R11.0 NAUSEA: ICD-10-CM

## 2022-02-01 DIAGNOSIS — R00.2 PALPITATIONS: ICD-10-CM

## 2022-02-01 DIAGNOSIS — R19.7 DIARRHEA, UNSPECIFIED TYPE: ICD-10-CM

## 2022-02-01 DIAGNOSIS — C90.00 LIGHT CHAIN MYELOMA (H): ICD-10-CM

## 2022-02-01 LAB
ALBUMIN SERPL-MCNC: 3.9 G/DL (ref 3.4–5)
ALP SERPL-CCNC: 69 U/L (ref 40–150)
ALT SERPL W P-5'-P-CCNC: 25 U/L (ref 0–50)
ANION GAP SERPL CALCULATED.3IONS-SCNC: 9 MMOL/L (ref 3–14)
AST SERPL W P-5'-P-CCNC: 14 U/L (ref 0–45)
BASOPHILS # BLD AUTO: 0.1 10E3/UL (ref 0–0.2)
BASOPHILS NFR BLD AUTO: 1 %
BILIRUB SERPL-MCNC: 0.5 MG/DL (ref 0.2–1.3)
BUN SERPL-MCNC: 17 MG/DL (ref 7–30)
CALCIUM SERPL-MCNC: 8.8 MG/DL (ref 8.5–10.1)
CHLORIDE BLD-SCNC: 108 MMOL/L (ref 94–109)
CO2 SERPL-SCNC: 25 MMOL/L (ref 20–32)
CREAT SERPL-MCNC: 0.86 MG/DL (ref 0.52–1.04)
EOSINOPHIL # BLD AUTO: 0.4 10E3/UL (ref 0–0.7)
EOSINOPHIL NFR BLD AUTO: 8 %
ERYTHROCYTE [DISTWIDTH] IN BLOOD BY AUTOMATED COUNT: 13.9 % (ref 10–15)
GFR SERPL CREATININE-BSD FRML MDRD: 81 ML/MIN/1.73M2
GLUCOSE BLD-MCNC: 142 MG/DL (ref 70–99)
HCT VFR BLD AUTO: 39.4 % (ref 35–47)
HGB BLD-MCNC: 13.2 G/DL (ref 11.7–15.7)
HOLD SPECIMEN: NORMAL
IMM GRANULOCYTES # BLD: 0 10E3/UL
IMM GRANULOCYTES NFR BLD: 0 %
LYMPHOCYTES # BLD AUTO: 0.5 10E3/UL (ref 0.8–5.3)
LYMPHOCYTES NFR BLD AUTO: 11 %
MCH RBC QN AUTO: 31.1 PG (ref 26.5–33)
MCHC RBC AUTO-ENTMCNC: 33.5 G/DL (ref 31.5–36.5)
MCV RBC AUTO: 93 FL (ref 78–100)
MONOCYTES # BLD AUTO: 0.8 10E3/UL (ref 0–1.3)
MONOCYTES NFR BLD AUTO: 17 %
NEUTROPHILS # BLD AUTO: 2.8 10E3/UL (ref 1.6–8.3)
NEUTROPHILS NFR BLD AUTO: 63 %
NRBC # BLD AUTO: 0 10E3/UL
NRBC BLD AUTO-RTO: 0 /100
PLATELET # BLD AUTO: 174 10E3/UL (ref 150–450)
POTASSIUM BLD-SCNC: 3.6 MMOL/L (ref 3.4–5.3)
PROT SERPL-MCNC: 6.7 G/DL (ref 6.8–8.8)
RBC # BLD AUTO: 4.24 10E6/UL (ref 3.8–5.2)
SODIUM SERPL-SCNC: 142 MMOL/L (ref 133–144)
TOTAL PROTEIN SERUM FOR ELP: 6.5 G/DL (ref 6.8–8.8)
WBC # BLD AUTO: 4.5 10E3/UL (ref 4–11)

## 2022-02-01 PROCEDURE — 80053 COMPREHEN METABOLIC PANEL: CPT | Performed by: REGISTERED NURSE

## 2022-02-01 PROCEDURE — G0463 HOSPITAL OUTPT CLINIC VISIT: HCPCS | Mod: 25

## 2022-02-01 PROCEDURE — 99214 OFFICE O/P EST MOD 30 MIN: CPT | Performed by: REGISTERED NURSE

## 2022-02-01 PROCEDURE — 250N000013 HC RX MED GY IP 250 OP 250 PS 637

## 2022-02-01 PROCEDURE — 36415 COLL VENOUS BLD VENIPUNCTURE: CPT | Performed by: REGISTERED NURSE

## 2022-02-01 PROCEDURE — 96401 CHEMO ANTI-NEOPL SQ/IM: CPT

## 2022-02-01 PROCEDURE — 250N000011 HC RX IP 250 OP 636

## 2022-02-01 PROCEDURE — 82040 ASSAY OF SERUM ALBUMIN: CPT | Performed by: REGISTERED NURSE

## 2022-02-01 PROCEDURE — 36415 COLL VENOUS BLD VENIPUNCTURE: CPT

## 2022-02-01 PROCEDURE — 86334 IMMUNOFIX E-PHORESIS SERUM: CPT

## 2022-02-01 PROCEDURE — 84165 PROTEIN E-PHORESIS SERUM: CPT | Mod: TC

## 2022-02-01 PROCEDURE — 85025 COMPLETE CBC W/AUTO DIFF WBC: CPT

## 2022-02-01 PROCEDURE — 83521 IG LIGHT CHAINS FREE EACH: CPT | Mod: 59

## 2022-02-01 PROCEDURE — 82784 ASSAY IGA/IGD/IGG/IGM EACH: CPT

## 2022-02-01 PROCEDURE — 84155 ASSAY OF PROTEIN SERUM: CPT | Mod: 91

## 2022-02-01 RX ORDER — EPINEPHRINE 1 MG/ML
0.3 INJECTION, SOLUTION INTRAMUSCULAR; SUBCUTANEOUS EVERY 5 MIN PRN
Status: CANCELLED | OUTPATIENT
Start: 2022-03-10

## 2022-02-01 RX ORDER — LORAZEPAM 2 MG/ML
0.5 INJECTION INTRAMUSCULAR EVERY 4 HOURS PRN
Status: CANCELLED | OUTPATIENT
Start: 2022-02-28

## 2022-02-01 RX ORDER — NALOXONE HYDROCHLORIDE 0.4 MG/ML
0.2 INJECTION, SOLUTION INTRAMUSCULAR; INTRAVENOUS; SUBCUTANEOUS
Status: CANCELLED | OUTPATIENT
Start: 2022-02-10

## 2022-02-01 RX ORDER — MEPERIDINE HYDROCHLORIDE 25 MG/ML
25 INJECTION INTRAMUSCULAR; INTRAVENOUS; SUBCUTANEOUS EVERY 30 MIN PRN
Status: CANCELLED | OUTPATIENT
Start: 2022-02-17

## 2022-02-01 RX ORDER — DEXAMETHASONE 4 MG/1
20 TABLET ORAL ONCE
Status: COMPLETED | OUTPATIENT
Start: 2022-02-01 | End: 2022-02-01

## 2022-02-01 RX ORDER — ALBUTEROL SULFATE 90 UG/1
1-2 AEROSOL, METERED RESPIRATORY (INHALATION)
Status: CANCELLED
Start: 2022-03-03

## 2022-02-01 RX ORDER — ALBUTEROL SULFATE 90 UG/1
1-2 AEROSOL, METERED RESPIRATORY (INHALATION)
Status: CANCELLED
Start: 2022-02-28

## 2022-02-01 RX ORDER — ALBUTEROL SULFATE 90 UG/1
1-2 AEROSOL, METERED RESPIRATORY (INHALATION)
Status: CANCELLED
Start: 2022-02-17

## 2022-02-01 RX ORDER — DIPHENHYDRAMINE HYDROCHLORIDE 50 MG/ML
50 INJECTION INTRAMUSCULAR; INTRAVENOUS
Status: CANCELLED
Start: 2022-03-03

## 2022-02-01 RX ORDER — ALBUTEROL SULFATE 0.83 MG/ML
2.5 SOLUTION RESPIRATORY (INHALATION)
Status: CANCELLED | OUTPATIENT
Start: 2022-03-03

## 2022-02-01 RX ORDER — DIPHENHYDRAMINE HYDROCHLORIDE 50 MG/ML
50 INJECTION INTRAMUSCULAR; INTRAVENOUS
Status: CANCELLED
Start: 2022-03-14

## 2022-02-01 RX ORDER — HEPARIN SODIUM,PORCINE 10 UNIT/ML
5 VIAL (ML) INTRAVENOUS
Status: CANCELLED | OUTPATIENT
Start: 2022-02-10

## 2022-02-01 RX ORDER — HEPARIN SODIUM (PORCINE) LOCK FLUSH IV SOLN 100 UNIT/ML 100 UNIT/ML
5 SOLUTION INTRAVENOUS
Status: CANCELLED | OUTPATIENT
Start: 2022-02-10

## 2022-02-01 RX ORDER — DIPHENHYDRAMINE HCL 25 MG
50 CAPSULE ORAL ONCE
Status: CANCELLED | OUTPATIENT
Start: 2022-03-07

## 2022-02-01 RX ORDER — NALOXONE HYDROCHLORIDE 0.4 MG/ML
0.2 INJECTION, SOLUTION INTRAMUSCULAR; INTRAVENOUS; SUBCUTANEOUS
Status: CANCELLED | OUTPATIENT
Start: 2022-03-03

## 2022-02-01 RX ORDER — DIPHENHYDRAMINE HYDROCHLORIDE 50 MG/ML
50 INJECTION INTRAMUSCULAR; INTRAVENOUS
Status: CANCELLED
Start: 2022-03-10

## 2022-02-01 RX ORDER — NALOXONE HYDROCHLORIDE 0.4 MG/ML
0.2 INJECTION, SOLUTION INTRAMUSCULAR; INTRAVENOUS; SUBCUTANEOUS
Status: CANCELLED | OUTPATIENT
Start: 2022-02-28

## 2022-02-01 RX ORDER — EPINEPHRINE 1 MG/ML
0.3 INJECTION, SOLUTION INTRAMUSCULAR; SUBCUTANEOUS EVERY 5 MIN PRN
Status: CANCELLED | OUTPATIENT
Start: 2022-03-07

## 2022-02-01 RX ORDER — EPINEPHRINE 1 MG/ML
0.3 INJECTION, SOLUTION INTRAMUSCULAR; SUBCUTANEOUS EVERY 5 MIN PRN
Status: CANCELLED | OUTPATIENT
Start: 2022-02-28

## 2022-02-01 RX ORDER — HEPARIN SODIUM (PORCINE) LOCK FLUSH IV SOLN 100 UNIT/ML 100 UNIT/ML
5 SOLUTION INTRAVENOUS
Status: CANCELLED | OUTPATIENT
Start: 2022-03-03

## 2022-02-01 RX ORDER — HEPARIN SODIUM,PORCINE 10 UNIT/ML
5 VIAL (ML) INTRAVENOUS
Status: CANCELLED | OUTPATIENT
Start: 2022-03-03

## 2022-02-01 RX ORDER — DIPHENHYDRAMINE HYDROCHLORIDE 50 MG/ML
50 INJECTION INTRAMUSCULAR; INTRAVENOUS
Status: CANCELLED
Start: 2022-02-17

## 2022-02-01 RX ORDER — METHYLPREDNISOLONE SODIUM SUCCINATE 125 MG/2ML
125 INJECTION, POWDER, LYOPHILIZED, FOR SOLUTION INTRAMUSCULAR; INTRAVENOUS
Status: CANCELLED
Start: 2022-03-03

## 2022-02-01 RX ORDER — ACETAMINOPHEN 325 MG/1
650 TABLET ORAL ONCE
Status: CANCELLED | OUTPATIENT
Start: 2022-03-14

## 2022-02-01 RX ORDER — MEPERIDINE HYDROCHLORIDE 25 MG/ML
25 INJECTION INTRAMUSCULAR; INTRAVENOUS; SUBCUTANEOUS EVERY 30 MIN PRN
Status: CANCELLED | OUTPATIENT
Start: 2022-03-10

## 2022-02-01 RX ORDER — ALBUTEROL SULFATE 90 UG/1
1-2 AEROSOL, METERED RESPIRATORY (INHALATION)
Status: CANCELLED
Start: 2022-02-10

## 2022-02-01 RX ORDER — DIPHENHYDRAMINE HYDROCHLORIDE 50 MG/ML
50 INJECTION INTRAMUSCULAR; INTRAVENOUS
Status: CANCELLED
Start: 2022-02-10

## 2022-02-01 RX ORDER — MEPERIDINE HYDROCHLORIDE 25 MG/ML
25 INJECTION INTRAMUSCULAR; INTRAVENOUS; SUBCUTANEOUS EVERY 30 MIN PRN
Status: CANCELLED | OUTPATIENT
Start: 2022-03-07

## 2022-02-01 RX ORDER — METHYLPREDNISOLONE SODIUM SUCCINATE 125 MG/2ML
125 INJECTION, POWDER, LYOPHILIZED, FOR SOLUTION INTRAMUSCULAR; INTRAVENOUS
Status: CANCELLED
Start: 2022-03-10

## 2022-02-01 RX ORDER — ALBUTEROL SULFATE 0.83 MG/ML
2.5 SOLUTION RESPIRATORY (INHALATION)
Status: CANCELLED | OUTPATIENT
Start: 2022-02-10

## 2022-02-01 RX ORDER — ACETAMINOPHEN 325 MG/1
650 TABLET ORAL ONCE
Status: COMPLETED | OUTPATIENT
Start: 2022-02-01 | End: 2022-02-01

## 2022-02-01 RX ORDER — ALBUTEROL SULFATE 0.83 MG/ML
2.5 SOLUTION RESPIRATORY (INHALATION)
Status: CANCELLED | OUTPATIENT
Start: 2022-03-07

## 2022-02-01 RX ORDER — ACETAMINOPHEN 325 MG/1
650 TABLET ORAL ONCE
Status: CANCELLED | OUTPATIENT
Start: 2022-03-07

## 2022-02-01 RX ORDER — MEPERIDINE HYDROCHLORIDE 25 MG/ML
25 INJECTION INTRAMUSCULAR; INTRAVENOUS; SUBCUTANEOUS EVERY 30 MIN PRN
Status: CANCELLED | OUTPATIENT
Start: 2022-02-10

## 2022-02-01 RX ORDER — NALOXONE HYDROCHLORIDE 0.4 MG/ML
0.2 INJECTION, SOLUTION INTRAMUSCULAR; INTRAVENOUS; SUBCUTANEOUS
Status: CANCELLED | OUTPATIENT
Start: 2022-03-10

## 2022-02-01 RX ORDER — HEPARIN SODIUM,PORCINE 10 UNIT/ML
5 VIAL (ML) INTRAVENOUS
Status: CANCELLED | OUTPATIENT
Start: 2022-03-14

## 2022-02-01 RX ORDER — ALBUTEROL SULFATE 0.83 MG/ML
2.5 SOLUTION RESPIRATORY (INHALATION)
Status: CANCELLED | OUTPATIENT
Start: 2022-03-14

## 2022-02-01 RX ORDER — ALBUTEROL SULFATE 0.83 MG/ML
2.5 SOLUTION RESPIRATORY (INHALATION)
Status: CANCELLED | OUTPATIENT
Start: 2022-03-10

## 2022-02-01 RX ORDER — DIPHENHYDRAMINE HCL 25 MG
50 CAPSULE ORAL ONCE
Status: CANCELLED | OUTPATIENT
Start: 2022-02-28

## 2022-02-01 RX ORDER — DIPHENHYDRAMINE HCL 25 MG
50 CAPSULE ORAL ONCE
Status: CANCELLED | OUTPATIENT
Start: 2022-03-14

## 2022-02-01 RX ORDER — EPINEPHRINE 1 MG/ML
0.3 INJECTION, SOLUTION INTRAMUSCULAR; SUBCUTANEOUS EVERY 5 MIN PRN
Status: CANCELLED | OUTPATIENT
Start: 2022-03-03

## 2022-02-01 RX ORDER — DIPHENHYDRAMINE HYDROCHLORIDE 50 MG/ML
50 INJECTION INTRAMUSCULAR; INTRAVENOUS
Status: CANCELLED
Start: 2022-02-28

## 2022-02-01 RX ORDER — MEPERIDINE HYDROCHLORIDE 25 MG/ML
25 INJECTION INTRAMUSCULAR; INTRAVENOUS; SUBCUTANEOUS EVERY 30 MIN PRN
Status: CANCELLED | OUTPATIENT
Start: 2022-03-03

## 2022-02-01 RX ORDER — METHYLPREDNISOLONE SODIUM SUCCINATE 125 MG/2ML
125 INJECTION, POWDER, LYOPHILIZED, FOR SOLUTION INTRAMUSCULAR; INTRAVENOUS
Status: CANCELLED
Start: 2022-02-17

## 2022-02-01 RX ORDER — ACETAMINOPHEN 325 MG/1
650 TABLET ORAL ONCE
Status: CANCELLED | OUTPATIENT
Start: 2022-02-28

## 2022-02-01 RX ORDER — DIPHENHYDRAMINE HYDROCHLORIDE 50 MG/ML
50 INJECTION INTRAMUSCULAR; INTRAVENOUS
Status: CANCELLED
Start: 2022-03-07

## 2022-02-01 RX ORDER — DIPHENHYDRAMINE HCL 25 MG
50 CAPSULE ORAL ONCE
Status: COMPLETED | OUTPATIENT
Start: 2022-02-01 | End: 2022-02-01

## 2022-02-01 RX ORDER — METHYLPREDNISOLONE SODIUM SUCCINATE 125 MG/2ML
125 INJECTION, POWDER, LYOPHILIZED, FOR SOLUTION INTRAMUSCULAR; INTRAVENOUS
Status: CANCELLED
Start: 2022-03-07

## 2022-02-01 RX ORDER — NALOXONE HYDROCHLORIDE 0.4 MG/ML
0.2 INJECTION, SOLUTION INTRAMUSCULAR; INTRAVENOUS; SUBCUTANEOUS
Status: CANCELLED | OUTPATIENT
Start: 2022-02-17

## 2022-02-01 RX ORDER — METHYLPREDNISOLONE SODIUM SUCCINATE 125 MG/2ML
125 INJECTION, POWDER, LYOPHILIZED, FOR SOLUTION INTRAMUSCULAR; INTRAVENOUS
Status: CANCELLED
Start: 2022-03-14

## 2022-02-01 RX ORDER — ALBUTEROL SULFATE 90 UG/1
1-2 AEROSOL, METERED RESPIRATORY (INHALATION)
Status: CANCELLED
Start: 2022-03-07

## 2022-02-01 RX ORDER — HEPARIN SODIUM (PORCINE) LOCK FLUSH IV SOLN 100 UNIT/ML 100 UNIT/ML
5 SOLUTION INTRAVENOUS
Status: CANCELLED | OUTPATIENT
Start: 2022-02-17

## 2022-02-01 RX ORDER — MONTELUKAST SODIUM 10 MG/1
10 TABLET ORAL ONCE
Status: COMPLETED | OUTPATIENT
Start: 2022-02-01 | End: 2022-02-01

## 2022-02-01 RX ORDER — ALBUTEROL SULFATE 90 UG/1
1-2 AEROSOL, METERED RESPIRATORY (INHALATION)
Status: CANCELLED
Start: 2022-03-10

## 2022-02-01 RX ORDER — LORAZEPAM 2 MG/ML
0.5 INJECTION INTRAMUSCULAR EVERY 4 HOURS PRN
Status: CANCELLED | OUTPATIENT
Start: 2022-03-07

## 2022-02-01 RX ORDER — HEPARIN SODIUM,PORCINE 10 UNIT/ML
5 VIAL (ML) INTRAVENOUS
Status: CANCELLED | OUTPATIENT
Start: 2022-03-10

## 2022-02-01 RX ORDER — ALBUTEROL SULFATE 0.83 MG/ML
2.5 SOLUTION RESPIRATORY (INHALATION)
Status: CANCELLED | OUTPATIENT
Start: 2022-02-17

## 2022-02-01 RX ORDER — EPINEPHRINE 1 MG/ML
0.3 INJECTION, SOLUTION INTRAMUSCULAR; SUBCUTANEOUS EVERY 5 MIN PRN
Status: CANCELLED | OUTPATIENT
Start: 2022-02-10

## 2022-02-01 RX ORDER — HEPARIN SODIUM (PORCINE) LOCK FLUSH IV SOLN 100 UNIT/ML 100 UNIT/ML
5 SOLUTION INTRAVENOUS
Status: CANCELLED | OUTPATIENT
Start: 2022-02-28

## 2022-02-01 RX ORDER — HEPARIN SODIUM,PORCINE 10 UNIT/ML
5 VIAL (ML) INTRAVENOUS
Status: CANCELLED | OUTPATIENT
Start: 2022-02-17

## 2022-02-01 RX ORDER — HEPARIN SODIUM,PORCINE 10 UNIT/ML
5 VIAL (ML) INTRAVENOUS
Status: CANCELLED | OUTPATIENT
Start: 2022-02-28

## 2022-02-01 RX ORDER — DEXAMETHASONE 4 MG/1
20 TABLET ORAL ONCE
Status: CANCELLED | OUTPATIENT
Start: 2022-02-28

## 2022-02-01 RX ORDER — DEXAMETHASONE 4 MG/1
20 TABLET ORAL ONCE
Status: CANCELLED | OUTPATIENT
Start: 2022-03-14

## 2022-02-01 RX ORDER — MEPERIDINE HYDROCHLORIDE 25 MG/ML
25 INJECTION INTRAMUSCULAR; INTRAVENOUS; SUBCUTANEOUS EVERY 30 MIN PRN
Status: CANCELLED | OUTPATIENT
Start: 2022-03-14

## 2022-02-01 RX ORDER — ALBUTEROL SULFATE 90 UG/1
1-2 AEROSOL, METERED RESPIRATORY (INHALATION)
Status: CANCELLED
Start: 2022-03-14

## 2022-02-01 RX ORDER — HEPARIN SODIUM (PORCINE) LOCK FLUSH IV SOLN 100 UNIT/ML 100 UNIT/ML
5 SOLUTION INTRAVENOUS
Status: CANCELLED | OUTPATIENT
Start: 2022-03-14

## 2022-02-01 RX ORDER — METHYLPREDNISOLONE SODIUM SUCCINATE 125 MG/2ML
125 INJECTION, POWDER, LYOPHILIZED, FOR SOLUTION INTRAMUSCULAR; INTRAVENOUS
Status: CANCELLED
Start: 2022-02-28

## 2022-02-01 RX ORDER — HEPARIN SODIUM,PORCINE 10 UNIT/ML
5 VIAL (ML) INTRAVENOUS
Status: CANCELLED | OUTPATIENT
Start: 2022-03-07

## 2022-02-01 RX ORDER — ALBUTEROL SULFATE 0.83 MG/ML
2.5 SOLUTION RESPIRATORY (INHALATION)
Status: CANCELLED | OUTPATIENT
Start: 2022-02-28

## 2022-02-01 RX ORDER — LORAZEPAM 2 MG/ML
0.5 INJECTION INTRAMUSCULAR EVERY 4 HOURS PRN
Status: CANCELLED | OUTPATIENT
Start: 2022-03-14

## 2022-02-01 RX ORDER — DEXAMETHASONE 4 MG/1
20 TABLET ORAL ONCE
Status: CANCELLED | OUTPATIENT
Start: 2022-03-07

## 2022-02-01 RX ORDER — EPINEPHRINE 1 MG/ML
0.3 INJECTION, SOLUTION INTRAMUSCULAR; SUBCUTANEOUS EVERY 5 MIN PRN
Status: CANCELLED | OUTPATIENT
Start: 2022-03-14

## 2022-02-01 RX ORDER — NALOXONE HYDROCHLORIDE 0.4 MG/ML
0.2 INJECTION, SOLUTION INTRAMUSCULAR; INTRAVENOUS; SUBCUTANEOUS
Status: CANCELLED | OUTPATIENT
Start: 2022-03-07

## 2022-02-01 RX ORDER — NALOXONE HYDROCHLORIDE 0.4 MG/ML
0.2 INJECTION, SOLUTION INTRAMUSCULAR; INTRAVENOUS; SUBCUTANEOUS
Status: CANCELLED | OUTPATIENT
Start: 2022-03-14

## 2022-02-01 RX ORDER — HEPARIN SODIUM (PORCINE) LOCK FLUSH IV SOLN 100 UNIT/ML 100 UNIT/ML
5 SOLUTION INTRAVENOUS
Status: CANCELLED | OUTPATIENT
Start: 2022-03-07

## 2022-02-01 RX ORDER — MEPERIDINE HYDROCHLORIDE 25 MG/ML
25 INJECTION INTRAMUSCULAR; INTRAVENOUS; SUBCUTANEOUS EVERY 30 MIN PRN
Status: CANCELLED | OUTPATIENT
Start: 2022-02-28

## 2022-02-01 RX ORDER — HEPARIN SODIUM (PORCINE) LOCK FLUSH IV SOLN 100 UNIT/ML 100 UNIT/ML
5 SOLUTION INTRAVENOUS
Status: CANCELLED | OUTPATIENT
Start: 2022-03-10

## 2022-02-01 RX ORDER — EPINEPHRINE 1 MG/ML
0.3 INJECTION, SOLUTION INTRAMUSCULAR; SUBCUTANEOUS EVERY 5 MIN PRN
Status: CANCELLED | OUTPATIENT
Start: 2022-02-17

## 2022-02-01 RX ORDER — METHYLPREDNISOLONE SODIUM SUCCINATE 125 MG/2ML
125 INJECTION, POWDER, LYOPHILIZED, FOR SOLUTION INTRAMUSCULAR; INTRAVENOUS
Status: CANCELLED
Start: 2022-02-10

## 2022-02-01 RX ADMIN — DARATUMUMAB AND HYALURONIDASE-FIHJ (HUMAN RECOMBINANT) 1800 MG: 1800; 30000 INJECTION SUBCUTANEOUS at 15:23

## 2022-02-01 RX ADMIN — ACETAMINOPHEN 650 MG: 325 TABLET ORAL at 14:18

## 2022-02-01 RX ADMIN — BORTEZOMIB 2.9 MG: 3.5 INJECTION, POWDER, LYOPHILIZED, FOR SOLUTION INTRAVENOUS; SUBCUTANEOUS at 16:11

## 2022-02-01 RX ADMIN — MONTELUKAST 10 MG: 10 TABLET, FILM COATED ORAL at 14:18

## 2022-02-01 RX ADMIN — DEXAMETHASONE 20 MG: 4 TABLET ORAL at 14:18

## 2022-02-01 RX ADMIN — DIPHENHYDRAMINE HYDROCHLORIDE 50 MG: 25 CAPSULE ORAL at 14:18

## 2022-02-01 ASSESSMENT — MIFFLIN-ST. JEOR: SCORE: 1432.85

## 2022-02-01 ASSESSMENT — PAIN SCALES - GENERAL: PAINLEVEL: NO PAIN (0)

## 2022-02-01 NOTE — NURSING NOTE
"Oncology Rooming Note    February 1, 2022 1:26 PM   Kristie Cornejo is a 51 year old female who presents for:    Chief Complaint   Patient presents with     Blood Draw     Labs drawn via VPT by RN in lab.     Oncology Clinic Visit     [facility] RETURN - LIGHT CHAIN MYELOMA     Initial Vitals: /77   Pulse 64   Resp 16   Ht 1.72 m (5' 7.72\")   Wt 77.4 kg (170 lb 9.6 oz)   SpO2 98%   BMI 26.16 kg/m   Estimated body mass index is 26.16 kg/m  as calculated from the following:    Height as of this encounter: 1.72 m (5' 7.72\").    Weight as of this encounter: 77.4 kg (170 lb 9.6 oz). Body surface area is 1.92 meters squared.  No Pain (0) Comment: Data Unavailable   No LMP recorded. (Menstrual status: IUD).  Allergies reviewed: Yes  Medications reviewed: Yes    Medications: Medication refills not needed today.  Pharmacy name entered into [facility]:    [facility] - [place], [place] - [address]  [id number] - [place], [place] - [address] AT [facility] [address]  [facility] - [place], [place] - [address]  [place], [place] - [address] [place] [address]    Clinical concerns: No new concerns. [place] was notified.      Sd Davies LPN            "

## 2022-02-01 NOTE — LETTER
2/1/2022         RE: Kristie Cornejo  [address]  [facility] [address]        Dear Colleague,    Thank you for referring your patient, Kristie Cornejo, to the [facility] CANCER [facility]. Please see a copy of my visit note below.    [facility]  Date of Visit: Feb 1, 2022   Oncologist: Dr. Payton Reinoso    Reason for visit: myeloma follow-up           Oncology History   51 year old female with past medical history significant for Irritable bowel disease, allergies, large plasmacytoma of the L pelvis, and newly diagnosed multiple myeloma.     Early in April 2021 , she noted to have left sided groin pain, constant and was affecting her daily activities  MRI showed large, expansile, permeative mass with indistinct borders involving the left superior and inferior pubic rami, pubic symphysis and with possible extension to the left acetabulum.     We completed the following work-up:   - Biopsy 9/7/2021: plasmacytoma.  Flow cytometry showed kappa monotypic plasma cells, polytypic B cells. FISH results from plasmacytoma showed a gain of 11q, IGH-CCND1 fusion but no losses of 1q or TP53 and no gain of 1q.  - Bone marrow biopsy 09/22/21: Marrow cellularity 50% with 25% interstitial infiltration w/  Kappa-monotypic, moderately atypical plasma cells. Flow with 0.7% kappa-monotypica plasma cells. FISH and cytogenetics pending.   - PET CT 09/24/21 w/ hypermetabolic pathologic fracture of the L pubic ramus w/ aggressive appearing osteolysis but no additional suspicious lytic or blastic osseous lesions.     Treatment to date:   - Zometa q4 weeks started 9/27/21  - Radiation to left pubic ramus x18 fractions, completed 11/11/21.   - RVD C1D1 11/15/21  - RVD C2D1 12/6/21  - RVD C3D1  12/27/21  - RVD C4 D1 1/17/22, elizabeth added on D8 due to inadequate response of urinary M-spike and FLCs remaining over 100 mg/dL after 3 cycles of RVD  - Elizabeth-RVD C1D8 1/24/22         "INTERIM HISTORY:   Margo presents for oncology follow-up visit today after having michelle added to her regimen last week.    She has had 4 episodes of diarrhea over the past week, two last night.  These were accompanied by abdominal cramps and significant nausea, but never did have an emesis. She has a long-standing history of GI irregularity, but this is significantly different than the issues she has had in the past.  She has remained on MiraLAX once daily despite the diarrhea.    She had a bloody nose this morning and notes that she has also started to note some bleeding while she brushes her teeth.    The \"hot\" sensation in her feet had resolved some, but seems to have returned over the past week.    Energy has been poor.  Takes a nap after any physical activity (walked 1 mile yesterday, then napped for 2 hours, will go swimming and then nap for 30 min).     She has not had any further episodes of her heart pounding.     Denies fevers, chills, CP, SOB, vomiting.           Physical Exam:     /77   Pulse 64   Resp 16   Ht 1.72 m (5' 7.72\")   Wt 77.4 kg (170 lb 9.6 oz)   SpO2 98%   BMI 26.16 kg/m     Wt Readings from Last 4 Encounters:   02/01/22 77.4 kg (170 lb 9.6 oz)   01/31/22 77.3 kg (170 lb 8 oz)   01/24/22 79.3 kg (174 lb 14.4 oz)   01/20/22 79.3 kg (174 lb 14.4 oz)     General: well appearing, no acute distress  HEENT: normocephalic, atraumatic, PERRLA, sclerae nonicteric, moist mucous membranes, oropharynx is clear  Lymph: no palpable cervical, supraclavicular or axillary lymphadenopathy   CV: S1 S2, RRR, no murmur, click or rub  Lungs: clear to auscultation bilaterally, no wheezes/rales/rhonchi  Abd: soft, positive bowel sounds, non-distended, non-tender  MSK: no peripheral edema  Neuro: alert and oriented x3, CN grossly intact   Psych: appropriate mood and affect  Skin:no visible rashes or lesions on exposed skin          Laboratory Data:      I personally reviewed the following labs:    Most " "Recent 3 CBC's:  Recent Labs   Lab Test 02/01/22  1254 01/24/22  0931 01/20/22  1055   WBC 4.5 4.3 5.3   HGB 13.2 13.3 13.2   MCV 93 94 93    212 283     Most Recent 3 BMP's:  Recent Labs   Lab Test 02/01/22  1254 01/24/22  0931 01/20/22  1055    143 143   POTASSIUM 3.6 3.8 3.5   CHLORIDE 108 108 107   CO2 25 24 22   BUN 17 19 25   CR 0.86 0.83 0.85   ANIONGAP 9 11 14   MEKA 8.8 8.8 9.0   * 115* 163*     Most Recent 2 LFT's:  Recent Labs   Lab Test 02/01/22  1254 01/24/22  0931   AST 14 12   ALT 25 23   ALKPHOS 69 61   BILITOTAL 0.5 0.3              Assessment and Recommendations   Kristie Cornejo is a 51 year old female who was diagnosed with Plasmacytoma after she presented with left sided groin pain now found to have multiple myeloma after further workup with bone marrow biopsy.     # Multiple myeloma with associated large plasmacytoma of the L pelvis. Kappa light chain disease.     BMBx with 25% plasma cell infiltration; standard risk with gain of 11q, IGH-CCND1 fusion but no losses of 1q or TP53 and no gain of 1q.   SPEP w/out a monoclonal peak  UPEP positive. 70% paraprotein. Large monoclonal free immunoglobulin light chain of kappa chain type.     No anemia, normal creat, Ca, no other skeletal lesions. Alb normal, B2M normal.  Stage I Light chain disease with left pelvic bone pathologic fracture she is classified as having symptomatic multiple myeloma.     RVD C1D1 11/15/21 (had a slight delay in the start of her Revlimid on 11/19/21 due to the timing of her pregnancy tests).  She tolerated cycle 1 well with some mild nausea and a headache on her week off.  RVD C2D1 12/6/21, tolerated well overall, but has had more consistent sensation of her hands and feet being hot. No tingling, pain, loss of sensation, or changes to her strength.  RVD C3D1 12/27/21, tolerated well overall with ongoing sensation of \"hot\" hands and feet  RVD C4 D1 1/17/22, michelle added on D8 due to inadequate response " of urinary M-spike and FLCs remaining over 100 mg/dL after 3 cycles of RVD    Due to insurance, we will be switching to 21 day cycles.  She will receive Rev Days 1-14, Velcade days 1,4,8,11, and elizabeth days 1,8,15.  C5D1 is scheduled to start 2/7/22.    Pending good response, therapy may include autologous stem cell transplant.     - Return to clinic to see me with labs prior to C5.  - Continue monthly pregnancy tests while on Revlimid   - Planning for 6 cycles Elizabeth-RVD followed by consideration of autoHSCT. BMT consult was completed 12/23/21  -check free light chain before every cycle and urinary UPEP q 2 cycle    #Bone Health  She will continue monthly Zometa (last received on 12/27). Next dose due 1/24/22. She will continue Calcium/Vitamin D supplements.     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 Currently having no pain and able to walk 1 mile without any assistive devices.  She is interested in pursuing other activities like gentle cross-country skiing.  Had follow-up with Dr. Philip on 1/6/22 and was cleared to resume regular activity as tolerated. She should still refrain from high-impact activities.    # PVCs  She had an ECHO 12/1 and had follow-up with cardiology 12/7 (notes in [facility]).  Based on today's conversation, it sounds like she is becoming more symptomatic and may be flipping in and out of an aberrant rhythm. She had a holter monitor performed in Sept 2021 ([facility]) which showed a 15% PVC burden. She states she was told they wouldn't manage her rhythm with medication unless she reached 20%; she also notes she was diagnosed with multiple myeloma the day prior, so the heart rhythm was less of a priority at that moment. We discussed today that with her being more symptomatic, and with plans for potential transplant as part of  her treatment, I recommend she touch base with cardiology again.  Due to some staff changes at her current cardiologist, she prefers to pursue a new evaluation at the [facility] to keep all of her care here.  - Cardiology consult completed yesterday.  They recommend an ablation.  I recommended timing the ablation for a week when she is off her Revlimid in an effort to have her platelets and neutrophils as high as possible.  She will try to set this up a week off Revlimid in the next month or two.    # Nausea  Mild, managed with Compazine up to twice daily and lorazepam occasionally at night.     #COVID prophylaxis  - She received EVUSHELD 1/20/22.  - Received 3 COVID vaccines to date    #Diarrhea  She has had four episodes of diarrhea in the past week with associated abdominal cramping and nausea.  She has a long-standing history of GI irregularity, but states this is significantly different.  She has continued to take MiraLAX daily.  - Discussed cutting back MiraLAX to every other day or 1/2 a capful daily to see if that helps decrease her diarrhea.  Discussed that management of her bowels may change a bit throughout treatment as her chemotherapy will also have an effect on the consistency of her stools.    37 minutes spent on the date of the encounter doing chart review, review of test results, patient visit and documentation     DORON Hall [facility]  [address] [place], [place] [address] [phone]\

## 2022-02-01 NOTE — PROGRESS NOTES
CHeotherapy has been updated to 21 days cycle  Next cycle will start 2/14    Elizabeth is given weekly 1,8,15  Bortezomib 2x week on days 1,4,8,11  Revlamid on 21 days schedule    Payton Reinoso MD

## 2022-02-01 NOTE — PROGRESS NOTES
RN Care Coordination Note:     Met with Patient following visi with Jackie. Discussed change to treatment plan, as insruance requested peer to peer with Dr. Reinoso yesterday. Per insurance they would like to adjust her treatment plan to the following:    -Velcade/elizabeth/Dex/Rev on a 21 days cycle  -Velcade days 1, 4, 8, 11  -Elizabeth days 1, 8, 15  -Rev days 1-14 (off for 7 days after)  -Dex prior to elizabeth as premed, as well as 20mg on days 2, 9, 16 (aka day after elizabeth)    RNCC sat with patient in infusion and discussed with her. Written schedule provided for patient. Teach back demonstrated for understanding of POC. Patient verbalizes understanding of POC and has no other questions or concerns at this time.     Griselda Valdez, RN, MSN, OCN   RN Care Coordinator   [facility] [address] [id number] [phone]

## 2022-02-01 NOTE — PROGRESS NOTES
Infusion Nursing Note:  Kristie Cornejo presents today for Cycle 1 Day 15 bortezomib, darzalex faspro.    Patient seen by provider today: Yes: Jackie Ferris, ASHISH   present during visit today: Not Applicable.    Note: James presents today feeling well. Denies pain or nausea/vomiting. Offers no new concerns since visit with Jackie Ferris prior to infusion.      Intravenous Access:  No Intravenous access at this visit.    Treatment Conditions:    Results for JAMES CORNEJO (MRN [id number]) as of 2/1/2022 15:41   Ref. Range 2/1/2022 12:54   Sodium Latest Ref Range: 133 - 144 mmol/L 142   Potassium Latest Ref Range: 3.4 - 5.3 mmol/L 3.6   Chloride Latest Ref Range: 94 - 109 mmol/L 108   Carbon Dioxide Latest Ref Range: 20 - 32 mmol/L 25   Urea Nitrogen Latest Ref Range: 7 - 30 mg/dL 17   Creatinine Latest Ref Range: 0.52 - 1.04 mg/dL 0.86   GFR Estimate Latest Ref Range: >60 mL/min/1.73m2 81   Calcium Latest Ref Range: 8.5 - 10.1 mg/dL 8.8   Anion Gap Latest Ref Range: 3 - 14 mmol/L 9   Albumin Latest Ref Range: 3.4 - 5.0 g/dL 3.9   Protein Total Latest Ref Range: 6.8 - 8.8 g/dL 6.7 (L)   Bilirubin Total Latest Ref Range: 0.2 - 1.3 mg/dL 0.5   Alkaline Phosphatase Latest Ref Range: 40 - 150 U/L 69   ALT Latest Ref Range: 0 - 50 U/L 25   AST Latest Ref Range: 0 - 45 U/L 14   Glucose Latest Ref Range: 70 - 99 mg/dL 142 (H)   WBC Latest Ref Range: 4.0 - 11.0 10e3/uL 4.5   Hemoglobin Latest Ref Range: 11.7 - 15.7 g/dL 13.2   Hematocrit Latest Ref Range: 35.0 - 47.0 % 39.4   Platelet Count Latest Ref Range: 150 - 450 10e3/uL 174   RBC Count Latest Ref Range: 3.80 - 5.20 10e6/uL 4.24   MCV Latest Ref Range: 78 - 100 fL 93   MCH Latest Ref Range: 26.5 - 33.0 pg 31.1   MCHC Latest Ref Range: 31.5 - 36.5 g/dL 33.5   RDW Latest Ref Range: 10.0 - 15.0 % 13.9   % Neutrophils Latest Units: % 63   % Lymphocytes Latest Units: % 11   % Monocytes Latest Units: % 17   % Eosinophils Latest Units: % 8   % Basophils Latest  Units: % 1   Absolute Basophils Latest Ref Range: 0.0 - 0.2 10e3/uL 0.1   Absolute Eosinophils Latest Ref Range: 0.0 - 0.7 10e3/uL 0.4   Absolute Immature Granulocytes Latest Ref Range: <=0.4 10e3/uL 0.0   Absolute Lymphocytes Latest Ref Range: 0.8 - 5.3 10e3/uL 0.5 (L)   Absolute Monocytes Latest Ref Range: 0.0 - 1.3 10e3/uL 0.8   % Immature Granulocytes Latest Units: % 0   Absolute Neutrophils Latest Ref Range: 1.6 - 8.3 10e3/uL 2.8   Absolute NRBCs Latest Units: 10e3/uL 0.0   NRBCs per 100 WBC Latest Ref Range: <1 /100 0     Results reviewed, labs MET treatment parameters, ok to proceed with treatment.      Post Infusion Assessment:  Patient tolerated bortezomib injection to RLQ abdomen without incident.  Patient tolerated darzalex faspro injection to L abdomen without incident.  Site patent and intact, free from redness, edema or discomfort.  No evidence of extravasations.       Discharge Plan:   Patient declined prescription refills.  Discharge instructions reviewed with: Patient.  Patient and/or family verbalized understanding of discharge instructions and all questions answered.  AVS to patient via [employer].  Patient will return 02/07 for next infusion appointment.   Patient discharged in stable condition accompanied by: self.  Departure Mode: Ambulatory.      Dominique Wyatt RN

## 2022-02-01 NOTE — NURSING NOTE
Chief Complaint   Patient presents with     Blood Draw     Labs drawn via VPT by RN in lab.     Labs collected from venipuncture by RN. Checked in for appointment(s).    Marely PATTON RN PHN BSN  BMT/Oncology Lab

## 2022-02-01 NOTE — PATIENT INSTRUCTIONS
[facility] Triage Nurse Line: [phone]    Call triage nurse with chills and/or temperature greater than or equal to 100.4, uncontrolled nausea/vomiting, diarrhea, constipation, dizziness, shortness of breath, chest pain, bleeding, unexplained bruising, or any new/concerning symptoms, questions/concerns.     If you are having any concerning symptoms or wish to speak to a provider before your next infusion visit, please call your care coordinator or triage to notify them so we can adequately serve you.       Lab Results:  Recent Results (from the past 12 hour(s))   CBC with platelets and differential    Collection Time: 02/01/22 12:54 PM   Result Value Ref Range    WBC Count 4.5 4.0 - 11.0 10e3/uL    RBC Count 4.24 3.80 - 5.20 10e6/uL    Hemoglobin 13.2 11.7 - 15.7 g/dL    Hematocrit 39.4 35.0 - 47.0 %    MCV 93 78 - 100 fL    MCH 31.1 26.5 - 33.0 pg    MCHC 33.5 31.5 - 36.5 g/dL    RDW 13.9 10.0 - 15.0 %    Platelet Count 174 150 - 450 10e3/uL    % Neutrophils 63 %    % Lymphocytes 11 %    % Monocytes 17 %    % Eosinophils 8 %    % Basophils 1 %    % Immature Granulocytes 0 %    NRBCs per 100 WBC 0 <1 /100    Absolute Neutrophils 2.8 1.6 - 8.3 10e3/uL    Absolute Lymphocytes 0.5 (L) 0.8 - 5.3 10e3/uL    Absolute Monocytes 0.8 0.0 - 1.3 10e3/uL    Absolute Eosinophils 0.4 0.0 - 0.7 10e3/uL    Absolute Basophils 0.1 0.0 - 0.2 10e3/uL    Absolute Immature Granulocytes 0.0 <=0.4 10e3/uL    Absolute NRBCs 0.0 10e3/uL   Extra Serum Separator Tube (SST)    Collection Time: 02/01/22 12:54 PM   Result Value Ref Range    Hold Specimen JIC    Extra Serum Separator Tube (SST)    Collection Time: 02/01/22 12:54 PM   Result Value Ref Range    Hold Specimen JIC    Extra Green Top (Lithium Heparin) Tube    Collection Time: 02/01/22 12:54 PM   Result Value Ref Range    Hold Specimen JIC    Comprehensive metabolic panel    Collection Time: 02/01/22 12:54 PM   Result Value Ref Range    Sodium 142 133 - 144 mmol/L     Potassium 3.6 3.4 - 5.3 mmol/L    Chloride 108 94 - 109 mmol/L    Carbon Dioxide (CO2) 25 20 - 32 mmol/L    Anion Gap 9 3 - 14 mmol/L    Urea Nitrogen 17 7 - 30 mg/dL    Creatinine 0.86 0.52 - 1.04 mg/dL    Calcium 8.8 8.5 - 10.1 mg/dL    Glucose 142 (H) 70 - 99 mg/dL    Alkaline Phosphatase 69 40 - 150 U/L    AST 14 0 - 45 U/L    ALT 25 0 - 50 U/L    Protein Total 6.7 (L) 6.8 - 8.8 g/dL    Albumin 3.9 3.4 - 5.0 g/dL    Bilirubin Total 0.5 0.2 - 1.3 mg/dL    GFR Estimate 81 >60 mL/min/1.73m2

## 2022-02-01 NOTE — TELEPHONE ENCOUNTER
Oral Chemotherapy Monitoring Program     Placed call to Kristie Cornejo in follow up of required pregnancy tests for Revlimid oral chemotherapy.     Detailed to Margo that we require a negative pregnancy test prior to release of her next cycle prescription. She will get her labs completed on day 14 of each cycle to allow a week for the dispensing pharmacy, [facility], to get the order out to her before her cycle start.     Plans to complete labs on 2/7 prior to next cycle start on 2/14.       Marleni Cardona, PharmD, BCACP  Oral Chemotherapy Monitoring Program  [facility]  [phone]  February 1, 2022      Addendum:   Date of infusion start has been changed to 2/7/22. Notified Margo of this change and detailed that we need to see her labs prior to prescription release. She will try and get labs in the morning tomorrow (2/2) at [facility].

## 2022-02-02 ENCOUNTER — PATIENT OUTREACH (OUTPATIENT)
Dept: ONCOLOGY | Facility: CLINIC | Age: 52
End: 2022-02-02

## 2022-02-02 ENCOUNTER — LAB (OUTPATIENT)
Dept: LAB | Facility: CLINIC | Age: 52
End: 2022-02-02
Payer: COMMERCIAL

## 2022-02-02 ENCOUNTER — TELEPHONE (OUTPATIENT)
Dept: ONCOLOGY | Facility: CLINIC | Age: 52
End: 2022-02-02

## 2022-02-02 ENCOUNTER — MYC MEDICAL ADVICE (OUTPATIENT)
Dept: TRANSPLANT | Facility: CLINIC | Age: 52
End: 2022-02-02

## 2022-02-02 DIAGNOSIS — C90.30 PLASMACYTOMA OF BONE (H): ICD-10-CM

## 2022-02-02 DIAGNOSIS — C90.00 LIGHT CHAIN MYELOMA (H): Primary | ICD-10-CM

## 2022-02-02 LAB
ALBUMIN SERPL ELPH-MCNC: 4.4 G/DL (ref 3.7–5.1)
ALPHA1 GLOB SERPL ELPH-MCNC: 0.3 G/DL (ref 0.2–0.4)
ALPHA2 GLOB SERPL ELPH-MCNC: 0.8 G/DL (ref 0.5–0.9)
ATRIAL RATE - MUSE: 70 BPM
B-GLOBULIN SERPL ELPH-MCNC: 0.7 G/DL (ref 0.6–1)
DIASTOLIC BLOOD PRESSURE - MUSE: NORMAL MMHG
GAMMA GLOB SERPL ELPH-MCNC: 0.4 G/DL (ref 0.7–1.6)
HCG UR QL: NEGATIVE
IGA SERPL-MCNC: 10 MG/DL (ref 84–499)
IGG SERPL-MCNC: 328 MG/DL (ref 610–1616)
IGM SERPL-MCNC: 17 MG/DL (ref 35–242)
INTERPRETATION ECG - MUSE: NORMAL
KAPPA LC FREE SER-MCNC: 75.51 MG/DL (ref 0.33–1.94)
KAPPA LC FREE/LAMBDA FREE SER NEPH: 184.17 {RATIO} (ref 0.26–1.65)
LAMBDA LC FREE SERPL-MCNC: 0.41 MG/DL (ref 0.57–2.63)
M PROTEIN SERPL ELPH-MCNC: 0.1 G/DL
P AXIS - MUSE: -9 DEGREES
PR INTERVAL - MUSE: 150 MS
PROT PATTERN SERPL ELPH-IMP: ABNORMAL
PROT PATTERN SERPL IFE-IMP: NORMAL
QRS DURATION - MUSE: 84 MS
QT - MUSE: 408 MS
QTC - MUSE: 440 MS
R AXIS - MUSE: 23 DEGREES
SYSTOLIC BLOOD PRESSURE - MUSE: NORMAL MMHG
T AXIS - MUSE: 30 DEGREES
VENTRICULAR RATE- MUSE: 70 BPM

## 2022-02-02 PROCEDURE — 86334 IMMUNOFIX E-PHORESIS SERUM: CPT | Mod: 26 | Performed by: PATHOLOGY

## 2022-02-02 PROCEDURE — 81025 URINE PREGNANCY TEST: CPT

## 2022-02-02 PROCEDURE — 84165 PROTEIN E-PHORESIS SERUM: CPT | Mod: 26 | Performed by: PATHOLOGY

## 2022-02-02 RX ORDER — LENALIDOMIDE 25 MG/1
25 CAPSULE ORAL DAILY
Qty: 14 CAPSULE | Refills: 0 | Status: SHIPPED | OUTPATIENT
Start: 2022-02-07 | End: 2022-04-11

## 2022-02-02 NOTE — TELEPHONE ENCOUNTER
Oral Chemotherapy Monitoring Program   Medication: Revlimid  Rx: 25mg PO daily on days 1 through 14 of 21 day cycle   Auth #: [id number]   Risk Category: Adult Female ORP  Routine survey questions reviewed.   Rx to be Escribed to Alona Crowley  [facility]  Oncology Pharmacy Liaison   [email or web address]  [phone] (phone)  [phone] (fax)

## 2022-02-02 NOTE — PROGRESS NOTES
IB call from patient. Calling to discuss when is appropriate time per Dr. Reinoso to get cardiac ablation. For frequent PVCs. Dates available are 2/8, 3/1, 3/3, 3/17.     Per Dr. Reinoso- ok to get earliest available. No need to hold chemo. Earlier is better for less accumulative chemo.     OUTBOUND CALL: RNCC called patient and let her know that Dr. Reinoso is ok with ablation at soonest available. Patient verbalizes understanding of POC and has no other questions or concerns at this time.     Griselda Valdez, RN, MSN, OCN   RN Care Coordinator   [facility] [address] [address] [phone]

## 2022-02-03 ENCOUNTER — TELEPHONE (OUTPATIENT)
Dept: ONCOLOGY | Facility: CLINIC | Age: 52
End: 2022-02-03
Payer: COMMERCIAL

## 2022-02-03 DIAGNOSIS — Z11.59 ENCOUNTER FOR SCREENING FOR OTHER VIRAL DISEASES: Primary | ICD-10-CM

## 2022-02-03 NOTE — ORAL ONC MGMT
Oral Chemotherapy Monitoring Program     Margo called the oral chemotherapy phone number today as she had been in touch with [facility] about scheduling her next refill, however they stated they needed more information from Dr. Reinoso, and provided their general number ([phone]). Upon chart review, there has been extensive information back and forth about having to switch form a 28-day cycle to 21-day cycle, per insurance requirement. However, despite all of this information done upfront [facility] wanted confirmation that the prescription they have was for a 21-day cycle. I called the number listed above and provided this information to the [facility] pharmacist, Fina. They informed me that everything was good and would be filling this ASAP for the patient.    Osvaldo Peoples, PharmD, BCPS, BCOP  Hematology/Oncology Clinical Pharmacist  [facility]  [phone]

## 2022-02-04 ENCOUNTER — LAB (OUTPATIENT)
Dept: LAB | Facility: CLINIC | Age: 52
End: 2022-02-04
Payer: COMMERCIAL

## 2022-02-04 DIAGNOSIS — Z11.59 ENCOUNTER FOR SCREENING FOR OTHER VIRAL DISEASES: ICD-10-CM

## 2022-02-04 PROCEDURE — U0005 INFEC AGEN DETEC AMPLI PROBE: HCPCS

## 2022-02-04 PROCEDURE — U0003 INFECTIOUS AGENT DETECTION BY NUCLEIC ACID (DNA OR RNA); SEVERE ACUTE RESPIRATORY SYNDROME CORONAVIRUS 2 (SARS-COV-2) (CORONAVIRUS DISEASE [COVID-19]), AMPLIFIED PROBE TECHNIQUE, MAKING USE OF HIGH THROUGHPUT TECHNOLOGIES AS DESCRIBED BY CMS-2020-01-R: HCPCS

## 2022-02-05 LAB — SARS-COV-2 RNA RESP QL NAA+PROBE: NEGATIVE

## 2022-02-07 ENCOUNTER — INFUSION THERAPY VISIT (OUTPATIENT)
Dept: ONCOLOGY | Facility: CLINIC | Age: 52
End: 2022-02-07
Attending: REGISTERED NURSE
Payer: COMMERCIAL

## 2022-02-07 ENCOUNTER — TELEPHONE (OUTPATIENT)
Dept: CARDIOLOGY | Facility: CLINIC | Age: 52
End: 2022-02-07

## 2022-02-07 ENCOUNTER — LAB (OUTPATIENT)
Dept: LAB | Facility: CLINIC | Age: 52
End: 2022-02-07

## 2022-02-07 ENCOUNTER — APPOINTMENT (OUTPATIENT)
Dept: LAB | Facility: CLINIC | Age: 52
End: 2022-02-07
Attending: REGISTERED NURSE
Payer: COMMERCIAL

## 2022-02-07 VITALS
OXYGEN SATURATION: 97 % | WEIGHT: 168.7 LBS | BODY MASS INDEX: 25.87 KG/M2 | RESPIRATION RATE: 16 BRPM | HEART RATE: 73 BPM | TEMPERATURE: 98.5 F

## 2022-02-07 DIAGNOSIS — C90.00 LIGHT CHAIN MYELOMA (H): Primary | ICD-10-CM

## 2022-02-07 DIAGNOSIS — K21.9 GASTROESOPHAGEAL REFLUX DISEASE WITHOUT ESOPHAGITIS: ICD-10-CM

## 2022-02-07 DIAGNOSIS — R19.7 DIARRHEA, UNSPECIFIED TYPE: ICD-10-CM

## 2022-02-07 DIAGNOSIS — C90.30 PLASMACYTOMA OF BONE (H): Primary | ICD-10-CM

## 2022-02-07 DIAGNOSIS — I49.3 PVC'S (PREMATURE VENTRICULAR CONTRACTIONS): ICD-10-CM

## 2022-02-07 LAB
ALBUMIN SERPL-MCNC: 3.7 G/DL (ref 3.4–5)
ALP SERPL-CCNC: 59 U/L (ref 40–150)
ALT SERPL W P-5'-P-CCNC: 24 U/L (ref 0–50)
ANION GAP SERPL CALCULATED.3IONS-SCNC: 10 MMOL/L (ref 3–14)
AST SERPL W P-5'-P-CCNC: 14 U/L (ref 0–45)
B-HCG SERPL-ACNC: 2 IU/L (ref 0–5)
BASOPHILS # BLD AUTO: 0 10E3/UL (ref 0–0.2)
BASOPHILS NFR BLD AUTO: 0 %
BILIRUB SERPL-MCNC: 0.3 MG/DL (ref 0.2–1.3)
BUN SERPL-MCNC: 25 MG/DL (ref 7–30)
CALCIUM SERPL-MCNC: 9 MG/DL (ref 8.5–10.1)
CHLORIDE BLD-SCNC: 111 MMOL/L (ref 94–109)
CO2 SERPL-SCNC: 22 MMOL/L (ref 20–32)
CREAT SERPL-MCNC: 0.84 MG/DL (ref 0.52–1.04)
EOSINOPHIL # BLD AUTO: 0.2 10E3/UL (ref 0–0.7)
EOSINOPHIL NFR BLD AUTO: 6 %
ERYTHROCYTE [DISTWIDTH] IN BLOOD BY AUTOMATED COUNT: 13.8 % (ref 10–15)
GFR SERPL CREATININE-BSD FRML MDRD: 84 ML/MIN/1.73M2
GLUCOSE BLD-MCNC: 117 MG/DL (ref 70–99)
HCG UR QL: NEGATIVE
HCT VFR BLD AUTO: 39 % (ref 35–47)
HGB BLD-MCNC: 13.2 G/DL (ref 11.7–15.7)
IMM GRANULOCYTES # BLD: 0 10E3/UL
IMM GRANULOCYTES NFR BLD: 0 %
LYMPHOCYTES # BLD AUTO: 0.7 10E3/UL (ref 0.8–5.3)
LYMPHOCYTES NFR BLD AUTO: 16 %
MCH RBC QN AUTO: 31.1 PG (ref 26.5–33)
MCHC RBC AUTO-ENTMCNC: 33.8 G/DL (ref 31.5–36.5)
MCV RBC AUTO: 92 FL (ref 78–100)
MONOCYTES # BLD AUTO: 0.8 10E3/UL (ref 0–1.3)
MONOCYTES NFR BLD AUTO: 18 %
NEUTROPHILS # BLD AUTO: 2.5 10E3/UL (ref 1.6–8.3)
NEUTROPHILS NFR BLD AUTO: 60 %
NRBC # BLD AUTO: 0 10E3/UL
NRBC BLD AUTO-RTO: 0 /100
PLATELET # BLD AUTO: 197 10E3/UL (ref 150–450)
POTASSIUM BLD-SCNC: 3.3 MMOL/L (ref 3.4–5.3)
PROT SERPL-MCNC: 6.2 G/DL (ref 6.8–8.8)
RBC # BLD AUTO: 4.24 10E6/UL (ref 3.8–5.2)
SCANNED LAB RESULT: NORMAL
SODIUM SERPL-SCNC: 143 MMOL/L (ref 133–144)
WBC # BLD AUTO: 4.2 10E3/UL (ref 4–11)

## 2022-02-07 PROCEDURE — 80053 COMPREHEN METABOLIC PANEL: CPT

## 2022-02-07 PROCEDURE — 81025 URINE PREGNANCY TEST: CPT | Performed by: REGISTERED NURSE

## 2022-02-07 PROCEDURE — 96401 CHEMO ANTI-NEOPL SQ/IM: CPT

## 2022-02-07 PROCEDURE — G0463 HOSPITAL OUTPT CLINIC VISIT: HCPCS

## 2022-02-07 PROCEDURE — 250N000011 HC RX IP 250 OP 636

## 2022-02-07 PROCEDURE — 36415 COLL VENOUS BLD VENIPUNCTURE: CPT

## 2022-02-07 PROCEDURE — 250N000013 HC RX MED GY IP 250 OP 250 PS 637

## 2022-02-07 PROCEDURE — 99214 OFFICE O/P EST MOD 30 MIN: CPT | Performed by: REGISTERED NURSE

## 2022-02-07 PROCEDURE — 85025 COMPLETE CBC W/AUTO DIFF WBC: CPT

## 2022-02-07 PROCEDURE — 84702 CHORIONIC GONADOTROPIN TEST: CPT

## 2022-02-07 RX ORDER — DIPHENHYDRAMINE HCL 25 MG
50 CAPSULE ORAL ONCE
Status: COMPLETED | OUTPATIENT
Start: 2022-02-07 | End: 2022-02-07

## 2022-02-07 RX ORDER — ACYCLOVIR 400 MG/1
400 TABLET ORAL 2 TIMES DAILY
Qty: 180 TABLET | Refills: 3 | Status: SHIPPED | OUTPATIENT
Start: 2022-02-07 | End: 2022-07-13

## 2022-02-07 RX ORDER — DEXAMETHASONE 4 MG/1
20 TABLET ORAL ONCE
Status: COMPLETED | OUTPATIENT
Start: 2022-02-07 | End: 2022-02-07

## 2022-02-07 RX ORDER — OMEPRAZOLE 20 MG/1
20 TABLET, DELAYED RELEASE ORAL 2 TIMES DAILY
Qty: 60 TABLET | Refills: 3 | COMMUNITY
Start: 2022-02-07 | End: 2022-12-21

## 2022-02-07 RX ORDER — ACETAMINOPHEN 325 MG/1
650 TABLET ORAL ONCE
Status: COMPLETED | OUTPATIENT
Start: 2022-02-07 | End: 2022-02-07

## 2022-02-07 RX ORDER — DEXAMETHASONE 4 MG/1
20 TABLET ORAL
Qty: 15 TABLET | Refills: 0 | Status: SHIPPED | OUTPATIENT
Start: 2022-02-07 | End: 2022-04-11

## 2022-02-07 RX ORDER — ACYCLOVIR 400 MG/1
400 TABLET ORAL
COMMUNITY
Start: 2022-01-06 | End: 2022-02-07

## 2022-02-07 RX ADMIN — DARATUMUMAB AND HYALURONIDASE-FIHJ (HUMAN RECOMBINANT) 1800 MG: 1800; 30000 INJECTION SUBCUTANEOUS at 15:36

## 2022-02-07 RX ADMIN — BORTEZOMIB 2.5 MG: 3.5 INJECTION, POWDER, LYOPHILIZED, FOR SOLUTION INTRAVENOUS; SUBCUTANEOUS at 15:35

## 2022-02-07 RX ADMIN — ACETAMINOPHEN 650 MG: 325 TABLET ORAL at 15:11

## 2022-02-07 RX ADMIN — DIPHENHYDRAMINE HYDROCHLORIDE 50 MG: 25 CAPSULE ORAL at 15:11

## 2022-02-07 RX ADMIN — DEXAMETHASONE 20 MG: 4 TABLET ORAL at 15:11

## 2022-02-07 ASSESSMENT — PAIN SCALES - GENERAL: PAINLEVEL: NO PAIN (0)

## 2022-02-07 NOTE — TELEPHONE ENCOUNTER
ELIJAHLUCIA called back and  attempted to reach RNCC for final outcome but was unable to reach Olamide.     The surgery has been denied by the Medical Director due to lack of use of beta blockers /anti-rhythmia meds and Ischemia was not ruled out.     Insurance company to call patient to tell her that she's cancelled.      will cancel case off the schedule.     Olamide Barroso, RNCC to call the patient and discuss new plan.     Harini Couch  Periop Electrophysiology   [phone]

## 2022-02-07 NOTE — PROGRESS NOTES
[facility]  Date of Visit: Feb 7, 2022   Oncologist: Dr. Payton Reinoso    Reason for visit: myeloma follow-up           Oncology History   51 year old female with past medical history significant for Irritable bowel disease, allergies, large plasmacytoma of the L pelvis, and newly diagnosed multiple myeloma.     Early in April 2021 , she noted to have left sided groin pain, constant and was affecting her daily activities  MRI showed large, expansile, permeative mass with indistinct borders involving the left superior and inferior pubic rami, pubic symphysis and with possible extension to the left acetabulum.     We completed the following work-up:   - Biopsy 9/7/2021: plasmacytoma.  Flow cytometry showed kappa monotypic plasma cells, polytypic B cells. FISH results from plasmacytoma showed a gain of 11q, IGH-CCND1 fusion but no losses of 1q or TP53 and no gain of 1q.  - Bone marrow biopsy 09/22/21: Marrow cellularity 50% with 25% interstitial infiltration w/  Kappa-monotypic, moderately atypical plasma cells. Flow with 0.7% kappa-monotypica plasma cells. FISH and cytogenetics pending.   - PET CT 09/24/21 w/ hypermetabolic pathologic fracture of the L pubic ramus w/ aggressive appearing osteolysis but no additional suspicious lytic or blastic osseous lesions.     Treatment to date:   - Zometa q4 weeks started 9/27/21  - Radiation to left pubic ramus x18 fractions, completed 11/11/21.   - RVD C1D1 11/15/21  - RVD C2D1 12/6/21  - RVD C3D1  12/27/21  - RVD C4 D1 1/17/22, elizabeth added on D8 due to inadequate response of urinary M-spike and FLCs remaining over 100 mg/dL after 3 cycles of RVD  - Elizabeth-RVD C1D8 1/24/22        INTERIM HISTORY:   Margo presents for oncology follow-up visit today.    Her diarrhea improved over the course of the week, but returned over the weekend with 6-7 episodes on Saturday and another episode yesterday even after taking Imodium.  These are accompanied by  "abdominal cramps and a \"rumbly tummy.\" She has some nausea and also has some reflux overnight. She has been using pillows to elevate her head.    She started having erythema on her right eye lid about 2 days ago.  She has also noted some additional \"crustiness\" in both eyes over the past few days.  She has an antibiotic ointment (not sure what kind) that she has been using and warm compresses three times daily.    No further nose bleeds since last week.    Hands and feet have been dry. Still notes some hot sensations but feels like the feeling is either improving or she is getting used to it.  Her heels have felt \"prickly\" when pushed against the bed.    The \"hot\" sensation in her feet had resolved some, but seems to have returned over the past week.    Denies fevers, chills, CP, SOB, vomiting.           Physical Exam:     Pulse 73   Temp 98.5  F (36.9  C) (Oral)   Resp 16   Wt 76.5 kg (168 lb 11.2 oz)   SpO2 97%   BMI 25.87 kg/m     Wt Readings from Last 4 Encounters:   02/07/22 76.5 kg (168 lb 11.2 oz)   02/01/22 77.4 kg (170 lb 9.6 oz)   01/31/22 77.3 kg (170 lb 8 oz)   01/24/22 79.3 kg (174 lb 14.4 oz)     General: well appearing, no acute distress  HEENT: normocephalic, atraumatic, PERRLA, sclerae nonicteric, moist mucous membranes, oropharynx is clear  Lymph: no palpable cervical, supraclavicular or axillary lymphadenopathy   CV: S1 S2, RRR, no murmur, click or rub  Lungs: clear to auscultation bilaterally, no wheezes/rales/rhonchi  Abd: soft, positive bowel sounds, non-distended, non-tender  MSK: no peripheral edema  Neuro: alert and oriented x3, CN grossly intact   Psych: appropriate mood and affect  Skin:no visible rashes or lesions on exposed skin          Laboratory Data:      I personally reviewed the following labs:    Most Recent 3 CBC's:  Recent Labs   Lab Test 02/07/22  1319 02/01/22  1254 01/24/22  0931   WBC 4.2 4.5 4.3   HGB 13.2 13.2 13.3   MCV 92 93 94    174 212     Most Recent 3 " "BMP's:  Recent Labs   Lab Test 02/07/22  1319 02/01/22  1254 01/24/22  0931    142 143   POTASSIUM 3.3* 3.6 3.8   CHLORIDE 111* 108 108   CO2 22 25 24   BUN 25 17 19   CR 0.84 0.86 0.83   ANIONGAP 10 9 11   MEKA 9.0 8.8 8.8   * 142* 115*     Most Recent 2 LFT's:  Recent Labs   Lab Test 02/07/22  1319 02/01/22  1254   AST 14 14   ALT 24 25   ALKPHOS 59 69   BILITOTAL 0.3 0.5              Assessment and Recommendations   Kristie Cornejo is a 51 year old female who was diagnosed with Plasmacytoma after she presented with left sided groin pain now found to have multiple myeloma after further workup with bone marrow biopsy.     # Multiple myeloma with associated large plasmacytoma of the L pelvis. Kappa light chain disease.     BMBx with 25% plasma cell infiltration; standard risk with gain of 11q, IGH-CCND1 fusion but no losses of 1q or TP53 and no gain of 1q.   SPEP w/out a monoclonal peak  UPEP positive. 70% paraprotein. Large monoclonal free immunoglobulin light chain of kappa chain type.     No anemia, normal creat, Ca, no other skeletal lesions. Alb normal, B2M normal.  Stage I Light chain disease with left pelvic bone pathologic fracture she is classified as having symptomatic multiple myeloma.     RVD C1D1 11/15/21 (had a slight delay in the start of her Revlimid on 11/19/21 due to the timing of her pregnancy tests).  She tolerated cycle 1 well with some mild nausea and a headache on her week off.  RVD C2D1 12/6/21, tolerated well overall, but has had more consistent sensation of her hands and feet being hot. No tingling, pain, loss of sensation, or changes to her strength.  RVD C3D1 12/27/21, tolerated well overall with ongoing sensation of \"hot\" hands and feet  RVD C4 D1 1/17/22, elizabeth added on D8 due to inadequate response of urinary M-spike and FLCs remaining over 100 mg/dL after 3 cycles of RVD  Elizabeth-RVD C5 2/7/22 Changing to 21 day cycles due to insurance.  Will receive Rev Days 1-14, " Velcade days 1,4,8,11, and elizabeth days 1,8,15.      Pending good response, therapy may include autologous stem cell transplant.     - Return to clinic to see me with labs prior to C6.  - Continue monthly pregnancy tests while on Revlimid   - Planning for 6 cycles Elizabeth-RVD followed by consideration of autoHSCT. BMT consult was completed 12/23/21  -check free light chain before every cycle and urinary UPEP q 2 cycle. Discussed today that the rise on her K/L ratio on her labs last week is secondary to a slight drop in her lambda level as well as a slight increase in her kappa levels.  These labs were checked after one dose of elizabeth; we discussed this morning that we want to do at least one full cycle with elizabeth added before we draw any conclusions or make any changes based on her labs.    #Bone Health  She will continue monthly Zometa (last received on 1/24/22). Next dose due 2/21/22. She will continue Calcium/Vitamin D supplements.     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 Currently having no pain and able to walk 1 mile without any assistive devices.  She is interested in pursuing other activities like gentle cross-country skiing.  Had follow-up with Dr. Philip on 1/6/22 and was cleared to resume regular activity as tolerated. She should still refrain from high-impact activities.    # PVCs  She had an ECHO 12/1 and had follow-up with cardiology 12/7 (notes in CareEverywhere).  She had follow-up with cardiology here at the  and they recommend an ablation. This was scheduled for tomorrow, but she found out this morning it was cancelled because her insurance requires a stress test prior. She is waiting to hear back from cardiology regarding next steps.    # Nausea  Mild, managed with Compazine up to twice daily and lorazepam occasionally at night.     #COVID  prophylaxis  - She received EVUSHELD 1/20/22.  - Received 3 COVID vaccines to date    #Diarrhea  She has continued to have diarrhea with associated abdominal cramping and nausea.    - Will check stool sample for c.diff  - If negative, okay to manage with Imodium, 2 tabs with the first loose stool and 1 tab with each subsequent stool, up to a total of 8 tabs daily.    # Reflux  She is reporting episodes of reflux with bile, especially when she lays down at night.  She has started to elevate her head on pillows and is also trying to avoid eating within 1-2 hours of going to bed.    - Recommend increasing omeprazole to 20 mg twice daily.    36 minutes spent on the date of the encounter doing chart review, review of test results, patient visit and documentation     DORON Hall [facility]  [address] [place] [id number]  [phone]

## 2022-02-07 NOTE — LETTER
2/7/2022     RE: Kristie Cornejo  [address]  [facility] [address]    Dear Colleague,    Thank you for referring your patient, Kristie Cornejo, to the [facility]. Please see a copy of my visit note below.    [facility]  Date of Visit: Feb 7, 2022   Oncologist: Dr. Payton Reinoso    Reason for visit: myeloma follow-up           Oncology History   51 year old female with past medical history significant for Irritable bowel disease, allergies, large plasmacytoma of the L pelvis, and newly diagnosed multiple myeloma.     Early in April 2021 , she noted to have left sided groin pain, constant and was affecting her daily activities  MRI showed large, expansile, permeative mass with indistinct borders involving the left superior and inferior pubic rami, pubic symphysis and with possible extension to the left acetabulum.     We completed the following work-up:   - Biopsy 9/7/2021: plasmacytoma.  Flow cytometry showed kappa monotypic plasma cells, polytypic B cells. FISH results from plasmacytoma showed a gain of 11q, IGH-CCND1 fusion but no losses of 1q or TP53 and no gain of 1q.  - Bone marrow biopsy 09/22/21: Marrow cellularity 50% with 25% interstitial infiltration w/  Kappa-monotypic, moderately atypical plasma cells. Flow with 0.7% kappa-monotypica plasma cells. FISH and cytogenetics pending.   - PET CT 09/24/21 w/ hypermetabolic pathologic fracture of the L pubic ramus w/ aggressive appearing osteolysis but no additional suspicious lytic or blastic osseous lesions.     Treatment to date:   - Zometa q4 weeks started 9/27/21  - Radiation to left pubic ramus x18 fractions, completed 11/11/21.   - RVD C1D1 11/15/21  - RVD C2D1 12/6/21  - RVD C3D1  12/27/21  - RVD C4 D1 1/17/22, elizabeth added on D8 due to inadequate response of urinary M-spike and FLCs remaining over 100 mg/dL after 3 cycles of RVD  - Elizabeth-RVD C1D8 1/24/22        INTERIM  "HISTORY:   Margo presents for oncology follow-up visit today.    Her diarrhea improved over the course of the week, but returned over the weekend with 6-7 episodes on Saturday and another episode yesterday even after taking Imodium.  These are accompanied by abdominal cramps and a \"rumbly tummy.\" She has some nausea and also has some reflux overnight. She has been using pillows to elevate her head.    She started having erythema on her right eye lid about 2 days ago.  She has also noted some additional \"crustiness\" in both eyes over the past few days.  She has an antibiotic ointment (not sure what kind) that she has been using and warm compresses three times daily.    No further nose bleeds since last week.    Hands and feet have been dry. Still notes some hot sensations but feels like the feeling is either improving or she is getting used to it.  Her heels have felt \"prickly\" when pushed against the bed.    The \"hot\" sensation in her feet had resolved some, but seems to have returned over the past week.    Denies fevers, chills, CP, SOB, vomiting.           Physical Exam:     Pulse 73   Temp 98.5  F (36.9  C) (Oral)   Resp 16   Wt 76.5 kg (168 lb 11.2 oz)   SpO2 97%   BMI 25.87 kg/m     Wt Readings from Last 4 Encounters:   02/07/22 76.5 kg (168 lb 11.2 oz)   02/01/22 77.4 kg (170 lb 9.6 oz)   01/31/22 77.3 kg (170 lb 8 oz)   01/24/22 79.3 kg (174 lb 14.4 oz)     General: well appearing, no acute distress  HEENT: normocephalic, atraumatic, PERRLA, sclerae nonicteric, moist mucous membranes, oropharynx is clear  Lymph: no palpable cervical, supraclavicular or axillary lymphadenopathy   CV: S1 S2, RRR, no murmur, click or rub  Lungs: clear to auscultation bilaterally, no wheezes/rales/rhonchi  Abd: soft, positive bowel sounds, non-distended, non-tender  MSK: no peripheral edema  Neuro: alert and oriented x3, CN grossly intact   Psych: appropriate mood and affect  Skin:no visible rashes or lesions on exposed " skin          Laboratory Data:      I personally reviewed the following labs:    Most Recent 3 CBC's:  Recent Labs   Lab Test 02/07/22  1319 02/01/22  1254 01/24/22  0931   WBC 4.2 4.5 4.3   HGB 13.2 13.2 13.3   MCV 92 93 94    174 212     Most Recent 3 BMP's:  Recent Labs   Lab Test 02/07/22  1319 02/01/22  1254 01/24/22  0931    142 143   POTASSIUM 3.3* 3.6 3.8   CHLORIDE 111* 108 108   CO2 22 25 24   BUN 25 17 19   CR 0.84 0.86 0.83   ANIONGAP 10 9 11   MEKA 9.0 8.8 8.8   * 142* 115*     Most Recent 2 LFT's:  Recent Labs   Lab Test 02/07/22  1319 02/01/22  1254   AST 14 14   ALT 24 25   ALKPHOS 59 69   BILITOTAL 0.3 0.5              Assessment and Recommendations   Kristie Cornejo is a 51 year old female who was diagnosed with Plasmacytoma after she presented with left sided groin pain now found to have multiple myeloma after further workup with bone marrow biopsy.     # Multiple myeloma with associated large plasmacytoma of the L pelvis. Kappa light chain disease.     BMBx with 25% plasma cell infiltration; standard risk with gain of 11q, IGH-CCND1 fusion but no losses of 1q or TP53 and no gain of 1q.   SPEP w/out a monoclonal peak  UPEP positive. 70% paraprotein. Large monoclonal free immunoglobulin light chain of kappa chain type.     No anemia, normal creat, Ca, no other skeletal lesions. Alb normal, B2M normal.  Stage I Light chain disease with left pelvic bone pathologic fracture she is classified as having symptomatic multiple myeloma.     RVD C1D1 11/15/21 (had a slight delay in the start of her Revlimid on 11/19/21 due to the timing of her pregnancy tests).  She tolerated cycle 1 well with some mild nausea and a headache on her week off.  RVD C2D1 12/6/21, tolerated well overall, but has had more consistent sensation of her hands and feet being hot. No tingling, pain, loss of sensation, or changes to her strength.  RVD C3D1 12/27/21, tolerated well overall with ongoing sensation  "of \"hot\" hands and feet  RVD C4 D1 1/17/22, elizabeth added on D8 due to inadequate response of urinary M-spike and FLCs remaining over 100 mg/dL after 3 cycles of RVD  Elizabeth-RVD C5 2/7/22 Changing to 21 day cycles due to insurance.  Will receive Rev Days 1-14, Velcade days 1,4,8,11, and elizabeth days 1,8,15.      Pending good response, therapy may include autologous stem cell transplant.     - Return to clinic to see me with labs prior to C6.  - Continue monthly pregnancy tests while on Revlimid   - Planning for 6 cycles Elizabeth-RVD followed by consideration of autoHSCT. BMT consult was completed 12/23/21  -check free light chain before every cycle and urinary UPEP q 2 cycle. Discussed today that the rise on her K/L ratio on her labs last week is secondary to a slight drop in her lambda level as well as a slight increase in her kappa levels.  These labs were checked after one dose of elizabeth; we discussed this morning that we want to do at least one full cycle with elizabeth added before we draw any conclusions or make any changes based on her labs.    #Bone Health  She will continue monthly Zometa (last received on 1/24/22). Next dose due 2/21/22. She will continue Calcium/Vitamin D supplements.     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 Currently having no pain and able to walk 1 mile without any assistive devices.  She is interested in pursuing other activities like gentle cross-country skiing.  Had follow-up with Dr. Philip on 1/6/22 and was cleared to resume regular activity as tolerated. She should still refrain from high-impact activities.    # PVCs  She had an ECHO 12/1 and had follow-up with cardiology 12/7 (notes in CareEverywhere).  She had follow-up with cardiology here at the  and they recommend an ablation. This was scheduled for tomorrow, but she found out " this morning it was cancelled because her insurance requires a stress test prior. She is waiting to hear back from cardiology regarding next steps.    # Nausea  Mild, managed with Compazine up to twice daily and lorazepam occasionally at night.     #COVID prophylaxis  - She received EVUSHELD 1/20/22.  - Received 3 COVID vaccines to date    #Diarrhea  She has continued to have diarrhea with associated abdominal cramping and nausea.    - Will check stool sample for c.diff  - If negative, okay to manage with Imodium, 2 tabs with the first loose stool and 1 tab with each subsequent stool, up to a total of 8 tabs daily.    # Reflux  She is reporting episodes of reflux with bile, especially when she lays down at night.  She has started to elevate her head on pillows and is also trying to avoid eating within 1-2 hours of going to bed.    - Recommend increasing omeprazole to 20 mg twice daily.    36 minutes spent on the date of the encounter doing chart review, review of test results, patient visit and documentation     DORON Hall [facility]  [address] [place], [place] [id number] [phone]

## 2022-02-07 NOTE — TELEPHONE ENCOUNTER
Insurance company calling about a case for tomorrow stating it has NOT been approved yet.     The  is Larissa and she is looking for more documentation prior to authorizing this case.  The documents she's looking for is not something this writer can find in the chart and due to the urgent nature will forward it on to the RN[facility] and the provider to respond to [employer] right away so that the case is not cancelled.     Call back is [phone]  Extension [phone].     Harini Couch  Periop Electrophysiology   [phone]

## 2022-02-07 NOTE — TELEPHONE ENCOUNTER
Called [facility], spoke with Michelle in regards to PVC ablation. I advised her that patient has not tried or failed any medication therapies for PVC's. Michelle states she will call back once they have a determination.    Olamide Barroso RN on 2/7/2022 at 9:04 AM

## 2022-02-07 NOTE — PROGRESS NOTES
Infusion Nursing Note:  Kristie Cornejo presents today for C2D1 Velcade-Daratumumab.    Patient seen by provider today: Yes: Jackie Ferris NP   present during visit today: Not Applicable.    Note: Pt saw provider prior to infusion, ok for treatment.      Intravenous Access:  No Intravenous access at this visit.    Treatment Conditions:  Lab Results   Component Value Date    HGB 13.2 02/07/2022    WBC 4.2 02/07/2022    ANEU 3.2 01/17/2022    ANEUTAUTO 2.5 02/07/2022     02/07/2022      Lab Results   Component Value Date     02/07/2022    POTASSIUM 3.3 (L) 02/07/2022    CR 0.84 02/07/2022    MEKA 9.0 02/07/2022    BILITOTAL 0.3 02/07/2022    ALBUMIN 3.7 02/07/2022    ALT 24 02/07/2022    AST 14 02/07/2022     Results reviewed, labs MET treatment parameters, ok to proceed with treatment.      Post Infusion Assessment:  Patient tolerated Velcade injection into LUQ of abdomen without incident.   Patient tolerated Darzalex injection into RUQ of abdomen without incident.       Discharge Plan:   Prescription refills given for DEX and Acyclovir.  Discharge instructions reviewed with: Patient.  Patient and/or family verbalized understanding of discharge instructions and all questions answered.  AVS to patient via [employer].  Patient will return 2/10 for next appointment.   Patient discharged in stable condition accompanied by: self.  Departure Mode: Ambulatory.    Sailaja Ravi RN

## 2022-02-07 NOTE — NURSING NOTE
"Oncology Rooming Note    February 7, 2022 1:37 PM   Kristie Cornejo is a 51 year old female who presents for:    Chief Complaint   Patient presents with     Blood Draw     Labs drawn via  by RN in lab.  VS taken     Oncology Clinic Visit     plasmacytoma      Initial Vitals: Pulse 73   Temp 98.5  F (36.9  C) (Oral)   Resp 16   Wt 76.5 kg (168 lb 11.2 oz)   SpO2 97%   BMI 25.87 kg/m   Estimated body mass index is 25.87 kg/m  as calculated from the following:    Height as of 2/1/22: 1.72 m (5' 7.72\").    Weight as of this encounter: 76.5 kg (168 lb 11.2 oz). Body surface area is 1.91 meters squared.  No Pain (0) Comment: Data Unavailable   No LMP recorded. (Menstrual status: IUD).  Allergies reviewed: Yes  Medications reviewed: Yes    Medications: MEDICATION REFILLS NEEDED TODAY. Provider was notified.   Acyclovir, Dexamethasone       Pharmacy name entered into [facility]:    [facility] - [place], [place] - [address]  [id number] - [place], [place] - [address] [facility] [address]  [facility] - [place], [place] - [address]  [facility] - [place], [place] - [address] [place] [address]    Clinical concerns: acyclovir discontinued in med list, pt is still taking was not told to stop.     Hank Scales            "

## 2022-02-07 NOTE — TELEPHONE ENCOUNTER
Health Call Center    Phone Message    May a detailed message be left on voicemail: no     Reason for Call: Other: Margo called to speak with Olamide regarding the denial of her surgery scheduled for 2/8/2022. Margo stated that the insurance is requiring a stress test prior to approval of surgery as well as trying other forms of medication (Margo is not interested in taking that route). Please reach out to Margo to discuss next steps, she can be reached at [phone].      Action Taken: Message routed to:  [facility] ([facility] Cardiology    Travel Screening: Not Applicable

## 2022-02-07 NOTE — TELEPHONE ENCOUNTER
Called [facility], Pt will need a stress test to r/o ischemia, as well as documentation of trialed/failed medications or documentation of why she cannot take the medications. Will defer to Dr Thomas for his recommendations.  Olamide Barroso RN on 2/7/2022 at 11:51 AM

## 2022-02-08 LAB — C DIFF TOX B STL QL: NEGATIVE

## 2022-02-08 PROCEDURE — 87493 C DIFF AMPLIFIED PROBE: CPT | Performed by: REGISTERED NURSE

## 2022-02-08 NOTE — TELEPHONE ENCOUNTER
Attempted to call patient, no answer. Left voicemail to call back to discuss recommendations or reply via HYGIEIA.     Alem Thomas MD  You; Olamide Barroso RN 16 hours ago (5:32 PM)     TY    Please schedule her to take stress echo.   Please start her on Toprol 25 mg daily after the stress test.   Please ask her how it works 2 weeks after that.   Thank you.   Alem Thomas

## 2022-02-09 ENCOUNTER — HOSPITAL ENCOUNTER (OUTPATIENT)
Dept: CARDIOLOGY | Facility: CLINIC | Age: 52
Discharge: HOME | End: 2022-02-09
Attending: INTERNAL MEDICINE | Admitting: INTERNAL MEDICINE
Payer: COMMERCIAL

## 2022-02-09 DIAGNOSIS — I49.3 PVC'S (PREMATURE VENTRICULAR CONTRACTIONS): ICD-10-CM

## 2022-02-09 PROCEDURE — C8928 TTE W OR W/O FOL W/CON,STRES: HCPCS

## 2022-02-09 PROCEDURE — 93325 DOPPLER ECHO COLOR FLOW MAPG: CPT | Mod: 26 | Performed by: INTERNAL MEDICINE

## 2022-02-09 PROCEDURE — 93321 DOPPLER ECHO F-UP/LMTD STD: CPT | Mod: TC

## 2022-02-09 PROCEDURE — 93016 CV STRESS TEST SUPVJ ONLY: CPT | Performed by: INTERNAL MEDICINE

## 2022-02-09 PROCEDURE — 255N000002 HC RX 255 OP 636: Performed by: INTERNAL MEDICINE

## 2022-02-09 PROCEDURE — 93350 STRESS TTE ONLY: CPT | Mod: 26 | Performed by: INTERNAL MEDICINE

## 2022-02-09 PROCEDURE — 93321 DOPPLER ECHO F-UP/LMTD STD: CPT | Mod: 26 | Performed by: INTERNAL MEDICINE

## 2022-02-09 PROCEDURE — 93018 CV STRESS TEST I&R ONLY: CPT | Performed by: INTERNAL MEDICINE

## 2022-02-09 RX ADMIN — PERFLUTREN 6 ML: 6.52 INJECTION, SUSPENSION INTRAVENOUS at 08:21

## 2022-02-10 ENCOUNTER — INFUSION THERAPY VISIT (OUTPATIENT)
Dept: TRANSPLANT | Facility: CLINIC | Age: 52
End: 2022-02-10
Payer: COMMERCIAL

## 2022-02-10 DIAGNOSIS — C90.00 LIGHT CHAIN MYELOMA (H): Primary | ICD-10-CM

## 2022-02-10 PROCEDURE — 250N000011 HC RX IP 250 OP 636

## 2022-02-10 PROCEDURE — 96401 CHEMO ANTI-NEOPL SQ/IM: CPT

## 2022-02-10 RX ADMIN — BORTEZOMIB 2.5 MG: 3.5 INJECTION, POWDER, LYOPHILIZED, FOR SOLUTION INTRAVENOUS; SUBCUTANEOUS at 15:52

## 2022-02-10 NOTE — LETTER
2/10/2022         RE: Kristie Cornejo  [address]  [facility] [address]        Dear Colleague,    Thank you for referring your patient, Kristie Cornejo, to the [facility]. Please see a copy of my visit note below.    Infusion Nursing Note:  Kristie Cornejo presents today for scheduled injection.    Patient seen by provider today: No   present during visit today: Not Applicable.    Note: Patient received velcade injection per treatment plan.      Intravenous Access:  No Intravenous access/labs at this visit.    Post Infusion Assessment:  Patient tolerated injection without incident.       Discharge Plan:   Patient and/or family verbalized understanding of discharge instructions and all questions answered.      Misti Winters, NILAM                          Again, thank you for allowing me to participate in the care of your patient.        Sincerely,        [facility]

## 2022-02-14 ENCOUNTER — APPOINTMENT (OUTPATIENT)
Dept: LAB | Facility: CLINIC | Age: 52
End: 2022-02-14
Payer: COMMERCIAL

## 2022-02-14 ENCOUNTER — INFUSION THERAPY VISIT (OUTPATIENT)
Dept: ONCOLOGY | Facility: CLINIC | Age: 52
End: 2022-02-14
Attending: REGISTERED NURSE
Payer: COMMERCIAL

## 2022-02-14 VITALS
DIASTOLIC BLOOD PRESSURE: 76 MMHG | OXYGEN SATURATION: 99 % | SYSTOLIC BLOOD PRESSURE: 117 MMHG | TEMPERATURE: 97.5 F | WEIGHT: 168.2 LBS | RESPIRATION RATE: 16 BRPM | BODY MASS INDEX: 25.79 KG/M2 | HEART RATE: 68 BPM

## 2022-02-14 DIAGNOSIS — C90.00 LIGHT CHAIN MYELOMA (H): Primary | ICD-10-CM

## 2022-02-14 LAB
BASOPHILS # BLD AUTO: 0 10E3/UL (ref 0–0.2)
BASOPHILS NFR BLD AUTO: 1 %
EOSINOPHIL # BLD AUTO: 0.3 10E3/UL (ref 0–0.7)
EOSINOPHIL NFR BLD AUTO: 7 %
ERYTHROCYTE [DISTWIDTH] IN BLOOD BY AUTOMATED COUNT: 14 % (ref 10–15)
HCG UR QL: NEGATIVE
HCT VFR BLD AUTO: 38.2 % (ref 35–47)
HGB BLD-MCNC: 13 G/DL (ref 11.7–15.7)
IMM GRANULOCYTES # BLD: 0 10E3/UL
IMM GRANULOCYTES NFR BLD: 1 %
LYMPHOCYTES # BLD AUTO: 0.6 10E3/UL (ref 0.8–5.3)
LYMPHOCYTES NFR BLD AUTO: 13 %
MCH RBC QN AUTO: 31.2 PG (ref 26.5–33)
MCHC RBC AUTO-ENTMCNC: 34 G/DL (ref 31.5–36.5)
MCV RBC AUTO: 92 FL (ref 78–100)
MONOCYTES # BLD AUTO: 0.7 10E3/UL (ref 0–1.3)
MONOCYTES NFR BLD AUTO: 16 %
NEUTROPHILS # BLD AUTO: 2.8 10E3/UL (ref 1.6–8.3)
NEUTROPHILS NFR BLD AUTO: 62 %
NRBC # BLD AUTO: 0 10E3/UL
NRBC BLD AUTO-RTO: 0 /100
PLATELET # BLD AUTO: 168 10E3/UL (ref 150–450)
RBC # BLD AUTO: 4.17 10E6/UL (ref 3.8–5.2)
WBC # BLD AUTO: 4.4 10E3/UL (ref 4–11)

## 2022-02-14 PROCEDURE — 85025 COMPLETE CBC W/AUTO DIFF WBC: CPT

## 2022-02-14 PROCEDURE — 36415 COLL VENOUS BLD VENIPUNCTURE: CPT

## 2022-02-14 PROCEDURE — 96401 CHEMO ANTI-NEOPL SQ/IM: CPT

## 2022-02-14 PROCEDURE — 81025 URINE PREGNANCY TEST: CPT | Performed by: REGISTERED NURSE

## 2022-02-14 PROCEDURE — 250N000011 HC RX IP 250 OP 636

## 2022-02-14 RX ORDER — DIPHENHYDRAMINE HCL 25 MG
50 CAPSULE ORAL
Status: CANCELLED | OUTPATIENT
Start: 2022-02-14

## 2022-02-14 RX ORDER — DEXAMETHASONE 4 MG/1
20 TABLET ORAL ONCE
Status: COMPLETED | OUTPATIENT
Start: 2022-02-14 | End: 2022-02-14

## 2022-02-14 RX ORDER — ACETAMINOPHEN 325 MG/1
650 TABLET ORAL
Status: CANCELLED | OUTPATIENT
Start: 2022-02-14

## 2022-02-14 RX ADMIN — BORTEZOMIB 2.5 MG: 3.5 INJECTION, POWDER, LYOPHILIZED, FOR SOLUTION INTRAVENOUS; SUBCUTANEOUS at 15:35

## 2022-02-14 RX ADMIN — DEXAMETHASONE 20 MG: 4 TABLET ORAL at 15:08

## 2022-02-14 RX ADMIN — DARATUMUMAB AND HYALURONIDASE-FIHJ (HUMAN RECOMBINANT) 1800 MG: 1800; 30000 INJECTION SUBCUTANEOUS at 15:36

## 2022-02-14 NOTE — PROGRESS NOTES
Infusion Nursing Note:  Kristie Cornejo presents today for Cycle 2 Day 8 Velcade, Darzalex.    Patient seen by provider today: No   present during visit today: Not Applicable.    Note: Patient reports some intermittent diarrhea and constipation. She has been managing ok with immodium at home. Her appetite varies, but feeling like she is eating and drinking ok. Omitting her tylenol/benadryl starting this cycle.      Intravenous Access:  No Intravenous access/labs at this visit.    Treatment Conditions:  Lab Results   Component Value Date    HGB 13.0 02/14/2022    WBC 4.4 02/14/2022    ANEU 3.2 01/17/2022    ANEUTAUTO 2.8 02/14/2022     02/14/2022      Results reviewed, labs MET treatment parameters, ok to proceed with treatment.      Post Infusion Assessment:  Patient tolerated velcade injection to RIGHT abdomen without incident.   Patient tolerated Darzalex injection to LEFT abdomen without incident.    Discharge Plan:   Patient declined prescription refills. Confirms taking her decadron and revlimid as prescribed.   AVS to patient via [employer].  Patient will return 2/17 for next appointment.   Patient discharged in stable condition accompanied by: self.  Departure Mode: Ambulatory.  Face to Face time: 0.      Letitia Gardiner RN

## 2022-02-17 ENCOUNTER — INFUSION THERAPY VISIT (OUTPATIENT)
Dept: ONCOLOGY | Facility: CLINIC | Age: 52
End: 2022-02-17
Payer: COMMERCIAL

## 2022-02-17 VITALS
BODY MASS INDEX: 25.65 KG/M2 | TEMPERATURE: 97.5 F | HEART RATE: 60 BPM | DIASTOLIC BLOOD PRESSURE: 73 MMHG | RESPIRATION RATE: 16 BRPM | OXYGEN SATURATION: 100 % | WEIGHT: 167.3 LBS | SYSTOLIC BLOOD PRESSURE: 114 MMHG

## 2022-02-17 DIAGNOSIS — C90.30 PLASMACYTOMA OF BONE (H): ICD-10-CM

## 2022-02-17 DIAGNOSIS — C90.00 LIGHT CHAIN MYELOMA (H): Primary | ICD-10-CM

## 2022-02-17 DIAGNOSIS — E86.0 DEHYDRATION: Primary | ICD-10-CM

## 2022-02-17 DIAGNOSIS — I49.3 PVC'S (PREMATURE VENTRICULAR CONTRACTIONS): Primary | ICD-10-CM

## 2022-02-17 PROCEDURE — 96401 CHEMO ANTI-NEOPL SQ/IM: CPT

## 2022-02-17 PROCEDURE — 96360 HYDRATION IV INFUSION INIT: CPT

## 2022-02-17 PROCEDURE — 258N000003 HC RX IP 258 OP 636: Performed by: REGISTERED NURSE

## 2022-02-17 PROCEDURE — 93010 ELECTROCARDIOGRAM REPORT: CPT | Performed by: INTERNAL MEDICINE

## 2022-02-17 PROCEDURE — 250N000011 HC RX IP 250 OP 636: Mod: JW

## 2022-02-17 RX ORDER — EPINEPHRINE 1 MG/ML
0.3 INJECTION, SOLUTION INTRAMUSCULAR; SUBCUTANEOUS EVERY 5 MIN PRN
Status: CANCELLED | OUTPATIENT
Start: 2022-02-17

## 2022-02-17 RX ORDER — NALOXONE HYDROCHLORIDE 0.4 MG/ML
0.2 INJECTION, SOLUTION INTRAMUSCULAR; INTRAVENOUS; SUBCUTANEOUS
Status: CANCELLED | OUTPATIENT
Start: 2022-02-17

## 2022-02-17 RX ORDER — ALBUTEROL SULFATE 90 UG/1
1-2 AEROSOL, METERED RESPIRATORY (INHALATION)
Status: CANCELLED
Start: 2022-02-17

## 2022-02-17 RX ORDER — HEPARIN SODIUM,PORCINE 10 UNIT/ML
5 VIAL (ML) INTRAVENOUS
Status: CANCELLED | OUTPATIENT
Start: 2022-02-17

## 2022-02-17 RX ORDER — ALBUTEROL SULFATE 0.83 MG/ML
2.5 SOLUTION RESPIRATORY (INHALATION)
Status: CANCELLED | OUTPATIENT
Start: 2022-02-17

## 2022-02-17 RX ORDER — LOPERAMIDE HCL 2 MG
2 CAPSULE ORAL 4 TIMES DAILY PRN
COMMUNITY
End: 2022-12-21

## 2022-02-17 RX ORDER — MEPERIDINE HYDROCHLORIDE 25 MG/ML
25 INJECTION INTRAMUSCULAR; INTRAVENOUS; SUBCUTANEOUS EVERY 30 MIN PRN
Status: CANCELLED | OUTPATIENT
Start: 2022-02-17

## 2022-02-17 RX ORDER — DIPHENHYDRAMINE HYDROCHLORIDE 50 MG/ML
50 INJECTION INTRAMUSCULAR; INTRAVENOUS
Status: CANCELLED
Start: 2022-02-17

## 2022-02-17 RX ORDER — HEPARIN SODIUM (PORCINE) LOCK FLUSH IV SOLN 100 UNIT/ML 100 UNIT/ML
5 SOLUTION INTRAVENOUS
Status: CANCELLED | OUTPATIENT
Start: 2022-02-17

## 2022-02-17 RX ORDER — METHYLPREDNISOLONE SODIUM SUCCINATE 125 MG/2ML
125 INJECTION, POWDER, LYOPHILIZED, FOR SOLUTION INTRAMUSCULAR; INTRAVENOUS
Status: CANCELLED
Start: 2022-02-17

## 2022-02-17 RX ADMIN — SODIUM CHLORIDE 1000 ML: 9 INJECTION, SOLUTION INTRAVENOUS at 16:27

## 2022-02-17 RX ADMIN — BORTEZOMIB 2.5 MG: 3.5 INJECTION, POWDER, LYOPHILIZED, FOR SOLUTION INTRAVENOUS; SUBCUTANEOUS at 15:59

## 2022-02-17 ASSESSMENT — PAIN SCALES - GENERAL: PAINLEVEL: NO PAIN (0)

## 2022-02-17 NOTE — PROGRESS NOTES
"Infusion Nursing Note:  Kristie Cornejo presents today for Cycle 2 Day 11 Velcade.    Patient seen by provider today: No   present during visit today: Not Applicable.    Note:   Margo reports feeling so so and is \"shaky\". She said Jackie knows of the following but I don't see see some of this in her last note.  Bad taste in mouth, worsening positional dizziness. Palpitations w/stairs - she needs to stop and rest as she feels she will pass out, this is maybe a bit worse over last week. She's been drinking 32-64 ounces fluid / daily. I told her to increase to all days 64 ounces. She drove here today.  She said she feels well enough for velcade today.    Discussed with Jackie Ferris.  OK for velcade.  EKG     Jackie did come and review EKG and spoke briefly to Margo.  LEI Spicer RN/Jackie Ferris  1 liter IV NS    Intravenous Access:  Peripheral IV placed.    Treatment Conditions:  Not Applicable.      Post Infusion Assessment:  Patient tolerated infusion without incident.  Patient tolerated subcutaneous Velcade injection without incident to Left abdomen.  Site patent and intact, free from redness, edema or discomfort.  No evidence of extravasations.  Access discontinued per protocol.       Discharge Plan:   Patient declined prescription refills.  AVS to patient via [employer].  Patient will return 2/21/22 labs/infusion for next appointment.   Patient discharged in stable condition accompanied by: self.  Departure Mode: Ambulatory.  Face to Face time:  15 minutes      Karen Spicer RN                    "

## 2022-02-17 NOTE — PATIENT INSTRUCTIONS
[facility] Main Line: [phone]    Call triage nurse with chills and/or temperature greater than or equal to 100.4, uncontrolled nausea/vomiting, diarrhea, constipation, dizziness, shortness of breath, chest pain, bleeding, unexplained bruising, or any new/concerning symptoms, questions/concerns.   If you are having any concerning symptoms or wish to speak to a provider before your next infusion visit, please call your care coordinator or triage to notify them so we can adequately serve you.   Triage Nurse Line: [phone]    If after hours, weekends, or holidays [phone]

## 2022-02-18 RX ORDER — ALBUTEROL SULFATE 0.83 MG/ML
2.5 SOLUTION RESPIRATORY (INHALATION)
Status: CANCELLED | OUTPATIENT
Start: 2022-03-01

## 2022-02-18 RX ORDER — METHYLPREDNISOLONE SODIUM SUCCINATE 125 MG/2ML
125 INJECTION, POWDER, LYOPHILIZED, FOR SOLUTION INTRAMUSCULAR; INTRAVENOUS
Status: CANCELLED
Start: 2022-03-01

## 2022-02-18 RX ORDER — EPINEPHRINE 1 MG/ML
0.3 INJECTION, SOLUTION INTRAMUSCULAR; SUBCUTANEOUS EVERY 5 MIN PRN
Status: CANCELLED | OUTPATIENT
Start: 2022-03-01

## 2022-02-18 RX ORDER — ZOLEDRONIC ACID 0.04 MG/ML
4 INJECTION, SOLUTION INTRAVENOUS ONCE
Status: CANCELLED
Start: 2022-03-01 | End: 2022-03-01

## 2022-02-18 RX ORDER — MEPERIDINE HYDROCHLORIDE 25 MG/ML
25 INJECTION INTRAMUSCULAR; INTRAVENOUS; SUBCUTANEOUS EVERY 30 MIN PRN
Status: CANCELLED | OUTPATIENT
Start: 2022-03-01

## 2022-02-18 RX ORDER — HEPARIN SODIUM,PORCINE 10 UNIT/ML
5 VIAL (ML) INTRAVENOUS
Status: CANCELLED | OUTPATIENT
Start: 2022-03-01

## 2022-02-18 RX ORDER — NALOXONE HYDROCHLORIDE 0.4 MG/ML
0.2 INJECTION, SOLUTION INTRAMUSCULAR; INTRAVENOUS; SUBCUTANEOUS
Status: CANCELLED | OUTPATIENT
Start: 2022-03-01

## 2022-02-18 RX ORDER — ALBUTEROL SULFATE 90 UG/1
1-2 AEROSOL, METERED RESPIRATORY (INHALATION)
Status: CANCELLED
Start: 2022-03-01

## 2022-02-18 RX ORDER — DIPHENHYDRAMINE HYDROCHLORIDE 50 MG/ML
50 INJECTION INTRAMUSCULAR; INTRAVENOUS
Status: CANCELLED
Start: 2022-03-01

## 2022-02-18 RX ORDER — HEPARIN SODIUM (PORCINE) LOCK FLUSH IV SOLN 100 UNIT/ML 100 UNIT/ML
5 SOLUTION INTRAVENOUS
Status: CANCELLED | OUTPATIENT
Start: 2022-03-01

## 2022-02-21 ENCOUNTER — INFUSION THERAPY VISIT (OUTPATIENT)
Dept: ONCOLOGY | Facility: CLINIC | Age: 52
End: 2022-02-21
Payer: COMMERCIAL

## 2022-02-21 ENCOUNTER — APPOINTMENT (OUTPATIENT)
Dept: LAB | Facility: CLINIC | Age: 52
End: 2022-02-21
Payer: COMMERCIAL

## 2022-02-21 VITALS
HEART RATE: 74 BPM | WEIGHT: 168.2 LBS | DIASTOLIC BLOOD PRESSURE: 75 MMHG | RESPIRATION RATE: 18 BRPM | OXYGEN SATURATION: 97 % | SYSTOLIC BLOOD PRESSURE: 131 MMHG | TEMPERATURE: 98.1 F | BODY MASS INDEX: 25.79 KG/M2

## 2022-02-21 DIAGNOSIS — C90.30 PLASMACYTOMA OF BONE (H): ICD-10-CM

## 2022-02-21 DIAGNOSIS — C90.00 LIGHT CHAIN MYELOMA (H): Primary | ICD-10-CM

## 2022-02-21 LAB
ALBUMIN SERPL-MCNC: 3.6 G/DL (ref 3.4–5)
ATRIAL RATE - MUSE: 71 BPM
BASOPHILS # BLD AUTO: 0 10E3/UL (ref 0–0.2)
BASOPHILS NFR BLD AUTO: 0 %
CALCIUM SERPL-MCNC: 8.6 MG/DL (ref 8.5–10.1)
CREAT SERPL-MCNC: 0.88 MG/DL (ref 0.52–1.04)
DIASTOLIC BLOOD PRESSURE - MUSE: NORMAL MMHG
EOSINOPHIL # BLD AUTO: 0.2 10E3/UL (ref 0–0.7)
EOSINOPHIL NFR BLD AUTO: 4 %
ERYTHROCYTE [DISTWIDTH] IN BLOOD BY AUTOMATED COUNT: 14.4 % (ref 10–15)
GFR SERPL CREATININE-BSD FRML MDRD: 79 ML/MIN/1.73M2
HCT VFR BLD AUTO: 36.4 % (ref 35–47)
HGB BLD-MCNC: 12.5 G/DL (ref 11.7–15.7)
IMM GRANULOCYTES # BLD: 0 10E3/UL
IMM GRANULOCYTES NFR BLD: 1 %
INTERPRETATION ECG - MUSE: NORMAL
LYMPHOCYTES # BLD AUTO: 0.5 10E3/UL (ref 0.8–5.3)
LYMPHOCYTES NFR BLD AUTO: 10 %
MCH RBC QN AUTO: 31.1 PG (ref 26.5–33)
MCHC RBC AUTO-ENTMCNC: 34.3 G/DL (ref 31.5–36.5)
MCV RBC AUTO: 91 FL (ref 78–100)
MONOCYTES # BLD AUTO: 0.9 10E3/UL (ref 0–1.3)
MONOCYTES NFR BLD AUTO: 18 %
NEUTROPHILS # BLD AUTO: 3.3 10E3/UL (ref 1.6–8.3)
NEUTROPHILS NFR BLD AUTO: 67 %
NRBC # BLD AUTO: 0 10E3/UL
NRBC BLD AUTO-RTO: 0 /100
P AXIS - MUSE: 13 DEGREES
PLATELET # BLD AUTO: 136 10E3/UL (ref 150–450)
PR INTERVAL - MUSE: 158 MS
QRS DURATION - MUSE: 84 MS
QT - MUSE: 392 MS
QTC - MUSE: 425 MS
R AXIS - MUSE: 8 DEGREES
RBC # BLD AUTO: 4.02 10E6/UL (ref 3.8–5.2)
SYSTOLIC BLOOD PRESSURE - MUSE: NORMAL MMHG
T AXIS - MUSE: -69 DEGREES
VENTRICULAR RATE- MUSE: 71 BPM
WBC # BLD AUTO: 4.9 10E3/UL (ref 4–11)

## 2022-02-21 PROCEDURE — 36415 COLL VENOUS BLD VENIPUNCTURE: CPT

## 2022-02-21 PROCEDURE — 82310 ASSAY OF CALCIUM: CPT

## 2022-02-21 PROCEDURE — 258N000003 HC RX IP 258 OP 636

## 2022-02-21 PROCEDURE — 96360 HYDRATION IV INFUSION INIT: CPT

## 2022-02-21 PROCEDURE — 82565 ASSAY OF CREATININE: CPT

## 2022-02-21 PROCEDURE — 85025 COMPLETE CBC W/AUTO DIFF WBC: CPT

## 2022-02-21 PROCEDURE — 82040 ASSAY OF SERUM ALBUMIN: CPT

## 2022-02-21 RX ORDER — DIPHENHYDRAMINE HCL 25 MG
50 CAPSULE ORAL
Status: CANCELLED | OUTPATIENT
Start: 2022-02-28

## 2022-02-21 RX ORDER — DIPHENHYDRAMINE HCL 25 MG
50 CAPSULE ORAL
Status: CANCELLED | OUTPATIENT
Start: 2022-03-14

## 2022-02-21 RX ORDER — ACETAMINOPHEN 325 MG/1
650 TABLET ORAL
Status: CANCELLED | OUTPATIENT
Start: 2022-03-07

## 2022-02-21 RX ORDER — ACETAMINOPHEN 325 MG/1
650 TABLET ORAL
Status: CANCELLED | OUTPATIENT
Start: 2022-02-22

## 2022-02-21 RX ORDER — DIPHENHYDRAMINE HCL 25 MG
50 CAPSULE ORAL
Status: CANCELLED | OUTPATIENT
Start: 2022-02-22

## 2022-02-21 RX ORDER — ACETAMINOPHEN 325 MG/1
650 TABLET ORAL
Status: CANCELLED | OUTPATIENT
Start: 2022-03-14

## 2022-02-21 RX ORDER — DIPHENHYDRAMINE HCL 25 MG
50 CAPSULE ORAL
Status: CANCELLED | OUTPATIENT
Start: 2022-03-07

## 2022-02-21 RX ORDER — ACETAMINOPHEN 325 MG/1
650 TABLET ORAL
Status: CANCELLED | OUTPATIENT
Start: 2022-02-28

## 2022-02-21 RX ADMIN — SODIUM CHLORIDE 1000 ML: 9 INJECTION, SOLUTION INTRAVENOUS at 16:02

## 2022-02-21 ASSESSMENT — PAIN SCALES - GENERAL: PAINLEVEL: NO PAIN (0)

## 2022-02-21 NOTE — PROGRESS NOTES
Patient c/o worsening dizziness and shakiness accompanied with exhaustion and shortness of breath. Denies nausea/vomiting nor chest and abdominal discomfort. Patient states that symptoms started when she started her PO Metoprolol.

## 2022-02-21 NOTE — NURSING NOTE
Chief Complaint   Patient presents with     Blood Draw     Labs drawn via piv placed by RN in lab. VS Taken.      Labs drawn from PIV placed by RN. Line flushed with saline. Vitals taken. Pt checked in for appointment(s).    Carissa Hernandez RN

## 2022-02-21 NOTE — PATIENT INSTRUCTIONS
[facility] Main Line: [phone]    Call triage nurse with chills and/or temperature greater than or equal to 100.4, uncontrolled nausea/vomiting, diarrhea, constipation, dizziness, shortness of breath, chest pain, bleeding, unexplained bruising, or any new/concerning symptoms, questions/concerns.   If you are having any concerning symptoms or wish to speak to a provider before your next infusion visit, please call your care coordinator or triage to notify them so we can adequately serve you.   Nurse Triage line:  [phone]    If after hours, weekends, or holidays, call main hospital  and ask for Oncology doctor on call @ [phone]      February 2022 Sunday Monday Tuesday Wednesday Thursday Friday Saturday             1    LAB PERIPHERAL  12:45 PM   (15 min.)   [facility] DRAW   [facility]    RETURN   1:00 PM   (45 min.)   Jackie Ferris APRN CNP   [facility]    ONC INFUSION 1 HR (60 MIN)   2:30 PM   (60 min.)    ONC INFUSION NURSE   [facility] 2    LAB   1:00 PM   (15 min.)   [facility] LAB   [facility] 3     4    PRE-PROCEDURE COVID PCR   9:30 AM   (15 min.)   [facility] COVID LAB   [facility] 5       6     7    LAB PERIPHERAL   1:00 PM   (15 min.)   [facility]   [facility]    RETURN   1:15 PM   (45 min.)   Jackie Ferris APRN CNP   [facility]    ONC INFUSION 1 HR (60 MIN)   2:30 PM   (60 min.)    ONC INFUSION NURSE   [facility]    LAB   4:15 PM   (15 min.)    LAB   [facility] [place] 8     9    ECHO STRESS TEST   7:45 AM   (60 min.)   [id number]   [facility] 10    BMT INFUSION 1 HR (60 MIN)   3:00 PM   (60 min.)    BMT INFUSION NURSE   [facility]  [facility] [place] 11     12       13     14    LAB PERIPHERAL   2:00 PM   (15 min.)   [facility]   [facility]    ONC INFUSION 1 HR (60 MIN)   2:30 PM   (60 min.)   [facility] ONC INFUSION NURSE   [facility] 15     16     17    ONC INFUSION 1 HR (60 MIN)   3:00 PM   (60 min.)   [facility] ONC INFUSION NURSE   [facility] 18     19       20     21    LAB PERIPHERAL   2:30 PM   (15 min.)   [facility]   [facility]    ONC INFUSION 1 HR (60 MIN)   3:00 PM   (60 min.)   [facility] ONC INFUSION NURSE   [facility] 22    ONC INFUSION 1 HR (60 MIN)  10:30 AM   (60 min.)   [facility] ONC INFUSION NURSE   [facility] 23     24     25     26       27     28    LAB PERIPHERAL   2:00 PM   (15 min.)   [facility]   [facility]    RETURN   2:15 PM   (45 min.)   Jackie Ferris, APRN CNP   [facility]    ONC INFUSION 1 HR (60 MIN)   3:30 PM   (60 min.)   UC ONC INFUSION NURSE   [facility]                                      March 2022 Sunday Monday Tuesday Wednesday Thursday Friday Saturday             1     2     3     4    LAB PERIPHERAL   1:30 PM   (15 min.)    [facility]   [facility]    ONC INFUSION 1 HR (60 MIN)   2:00 PM   (60 min.)   UC ONC INFUSION NURSE   [facility] 5       6     7     8    LAB PERIPHERAL   8:30 AM   (15 min.)   [facility]   [facility]    ONC INFUSION 1 HR (60 MIN)   9:00 AM   (60 min.)   UC ONC INFUSION NURSE   [facility] 9     10     11    LAB PERIPHERAL   2:30 PM   (15 min.)   [facility]   [facility]    ONC INFUSION 1 HR (60 MIN)   3:00 PM   (60 min.)   UC  ONC INFUSION NURSE   [facility] 12       13     14    LAB PERIPHERAL   1:30 PM   (15 min.)   [facility] DRAW   [facility]    ONC INFUSION 1 HR (60 MIN)   2:00 PM   (60 min.)    ONC INFUSION NURSE   [facility] 15     16     17     18     19       20     21     22     23     24     25     26       27     28     29     30     31                               Lab Results:  Recent Results (from the past 12 hour(s))   Creatinine    Collection Time: 02/21/22  2:34 PM   Result Value Ref Range    Creatinine 0.88 0.52 - 1.04 mg/dL    GFR Estimate 79 >60 mL/min/1.73m2   Calcium    Collection Time: 02/21/22  2:34 PM   Result Value Ref Range    Calcium 8.6 8.5 - 10.1 mg/dL   Albumin level    Collection Time: 02/21/22  2:34 PM   Result Value Ref Range    Albumin 3.6 3.4 - 5.0 g/dL   CBC with platelets and differential    Collection Time: 02/21/22  2:34 PM   Result Value Ref Range    WBC Count 4.9 4.0 - 11.0 10e3/uL    RBC Count 4.02 3.80 - 5.20 10e6/uL    Hemoglobin 12.5 11.7 - 15.7 g/dL    Hematocrit 36.4 35.0 - 47.0 %    MCV 91 78 - 100 fL    MCH 31.1 26.5 - 33.0 pg    MCHC 34.3 31.5 - 36.5 g/dL    RDW 14.4 10.0 - 15.0 %    Platelet Count 136 (L) 150 - 450 10e3/uL    % Neutrophils 67 %    % Lymphocytes 10 %    % Monocytes 18 %    % Eosinophils 4 %    % Basophils 0 %    % Immature Granulocytes 1 %    NRBCs per 100 WBC 0 <1 /100    Absolute Neutrophils 3.3 1.6 - 8.3 10e3/uL    Absolute Lymphocytes 0.5 (L) 0.8 - 5.3 10e3/uL    Absolute Monocytes 0.9 0.0 - 1.3 10e3/uL    Absolute Eosinophils 0.2 0.0 - 0.7 10e3/uL    Absolute Basophils 0.0 0.0 - 0.2 10e3/uL    Absolute Immature Granulocytes 0.0 <=0.4 10e3/uL    Absolute NRBCs 0.0 10e3/uL

## 2022-02-21 NOTE — PROGRESS NOTES
Infusion Nursing Note:  Kristie Cornejo presents today for C2D15 Darzalex Faspro/Zometa (held)/IVF 1 Liter NS (given).    Patient seen by provider today: No   present during visit today: Not Applicable.    Note: See note from Carmina Coronado RN.    TORB: 1555 2/21/22 Dr Reinoso/Raza Mccarty RN  -hold treatment today, reschedule for Tuesday or Wednesday this week.  -Give 1 Liter NS and take BP after.     Intravenous Access:  Labs drawn without difficulty.  Peripheral IV placed.  By lab staff.    Treatment Conditions:  Lab Results   Component Value Date    HGB 12.5 02/21/2022    WBC 4.9 02/21/2022    ANEU 3.2 01/17/2022    ANEUTAUTO 3.3 02/21/2022     (L) 02/21/2022      Lab Results   Component Value Date     02/07/2022    POTASSIUM 3.3 (L) 02/07/2022    CR 0.88 02/21/2022    MEKA 8.6 02/21/2022    BILITOTAL 0.3 02/07/2022    ALBUMIN 3.6 02/21/2022    ALT 24 02/07/2022    AST 14 02/07/2022         Post Infusion Assessment:  Patient tolerated infusion without incident.  Blood return noted pre and post infusion.  Site patent and intact, free from redness, edema or discomfort.  No evidence of extravasations.  Access discontinued per protocol.       Discharge Plan:   Prescription refills given for compazine.  Discharge instructions reviewed with: Patient.  Patient and/or family verbalized understanding of discharge instructions and all questions answered.  AVS to patient via [employer].  Patient will return 2/22/2022 for next appointment.   Patient discharged in stable condition accompanied by: self.  Departure Mode: Ambulatory.  Face to Face time: 20+ minutes.      Raza Mccarty RN

## 2022-02-22 ENCOUNTER — INFUSION THERAPY VISIT (OUTPATIENT)
Dept: ONCOLOGY | Facility: CLINIC | Age: 52
End: 2022-02-22
Attending: REGISTERED NURSE
Payer: COMMERCIAL

## 2022-02-22 VITALS
OXYGEN SATURATION: 97 % | DIASTOLIC BLOOD PRESSURE: 77 MMHG | SYSTOLIC BLOOD PRESSURE: 129 MMHG | RESPIRATION RATE: 18 BRPM | HEART RATE: 71 BPM

## 2022-02-22 DIAGNOSIS — C90.00 LIGHT CHAIN MYELOMA (H): Primary | ICD-10-CM

## 2022-02-22 DIAGNOSIS — C90.30 PLASMACYTOMA OF BONE (H): ICD-10-CM

## 2022-02-22 PROCEDURE — 96374 THER/PROPH/DIAG INJ IV PUSH: CPT

## 2022-02-22 PROCEDURE — 258N000003 HC RX IP 258 OP 636

## 2022-02-22 PROCEDURE — 250N000011 HC RX IP 250 OP 636

## 2022-02-22 PROCEDURE — 96401 CHEMO ANTI-NEOPL SQ/IM: CPT

## 2022-02-22 RX ORDER — DIPHENHYDRAMINE HYDROCHLORIDE 50 MG/ML
50 INJECTION INTRAMUSCULAR; INTRAVENOUS
Status: CANCELLED
Start: 2022-03-01

## 2022-02-22 RX ORDER — MEPERIDINE HYDROCHLORIDE 25 MG/ML
25 INJECTION INTRAMUSCULAR; INTRAVENOUS; SUBCUTANEOUS EVERY 30 MIN PRN
Status: CANCELLED | OUTPATIENT
Start: 2022-03-01

## 2022-02-22 RX ORDER — ALBUTEROL SULFATE 0.83 MG/ML
2.5 SOLUTION RESPIRATORY (INHALATION)
Status: CANCELLED | OUTPATIENT
Start: 2022-03-01

## 2022-02-22 RX ORDER — HEPARIN SODIUM (PORCINE) LOCK FLUSH IV SOLN 100 UNIT/ML 100 UNIT/ML
5 SOLUTION INTRAVENOUS
Status: CANCELLED | OUTPATIENT
Start: 2022-03-01

## 2022-02-22 RX ORDER — ZOLEDRONIC ACID 0.04 MG/ML
4 INJECTION, SOLUTION INTRAVENOUS ONCE
Status: CANCELLED
Start: 2022-03-01 | End: 2022-03-01

## 2022-02-22 RX ORDER — HEPARIN SODIUM,PORCINE 10 UNIT/ML
5 VIAL (ML) INTRAVENOUS
Status: CANCELLED | OUTPATIENT
Start: 2022-03-01

## 2022-02-22 RX ORDER — METHYLPREDNISOLONE SODIUM SUCCINATE 125 MG/2ML
125 INJECTION, POWDER, LYOPHILIZED, FOR SOLUTION INTRAMUSCULAR; INTRAVENOUS
Status: CANCELLED
Start: 2022-03-01

## 2022-02-22 RX ORDER — DEXAMETHASONE 4 MG/1
20 TABLET ORAL ONCE
Status: COMPLETED | OUTPATIENT
Start: 2022-02-22 | End: 2022-02-22

## 2022-02-22 RX ORDER — EPINEPHRINE 1 MG/ML
0.3 INJECTION, SOLUTION INTRAMUSCULAR; SUBCUTANEOUS EVERY 5 MIN PRN
Status: CANCELLED | OUTPATIENT
Start: 2022-03-01

## 2022-02-22 RX ORDER — NALOXONE HYDROCHLORIDE 0.4 MG/ML
0.2 INJECTION, SOLUTION INTRAMUSCULAR; INTRAVENOUS; SUBCUTANEOUS
Status: CANCELLED | OUTPATIENT
Start: 2022-03-01

## 2022-02-22 RX ORDER — LENALIDOMIDE 25 MG/1
25 CAPSULE ORAL DAILY
Qty: 14 CAPSULE | Refills: 0 | Status: SHIPPED | OUTPATIENT
Start: 2022-02-28 | End: 2022-02-24

## 2022-02-22 RX ORDER — ZOLEDRONIC ACID 0.04 MG/ML
4 INJECTION, SOLUTION INTRAVENOUS ONCE
Status: COMPLETED | OUTPATIENT
Start: 2022-02-22 | End: 2022-02-22

## 2022-02-22 RX ORDER — ALBUTEROL SULFATE 90 UG/1
1-2 AEROSOL, METERED RESPIRATORY (INHALATION)
Status: CANCELLED
Start: 2022-03-01

## 2022-02-22 RX ADMIN — SODIUM CHLORIDE 1000 ML: 9 INJECTION, SOLUTION INTRAVENOUS at 11:31

## 2022-02-22 RX ADMIN — DEXAMETHASONE 20 MG: 4 TABLET ORAL at 11:24

## 2022-02-22 RX ADMIN — DARATUMUMAB AND HYALURONIDASE-FIHJ (HUMAN RECOMBINANT) 1800 MG: 1800; 30000 INJECTION SUBCUTANEOUS at 12:05

## 2022-02-22 RX ADMIN — ZOLEDRONIC ACID 4 MG: 4 SOLUTION INTRAVENOUS at 11:50

## 2022-02-22 NOTE — PROGRESS NOTES
Infusion Nursing Note:  Kristie Cornejo presents today for C2D15 Darzalex Faspro/Zometa.    Patient seen by provider today: No   present during visit today: Not Applicable.    Note: Patient reports feeling better last night after fluids, but no changes this morning. Still have dizziness on position changes accompanied with shakiness and exhaustion. Still have feeling of about to faint when standing but no actual episode of fainting. No loss of consciousness. No falls. No new concerns made. Otherwise well.      Instruction given to patient as per provider. Patient agreed with plan. Verbalized understanding.     Intravenous Access:  Peripheral IV placed.    Treatment Conditions:  Lab Results   Component Value Date    HGB 12.5 02/21/2022    WBC 4.9 02/21/2022    ANEU 3.2 01/17/2022    ANEUTAUTO 3.3 02/21/2022     (L) 02/21/2022      Lab Results   Component Value Date     02/07/2022    POTASSIUM 3.3 (L) 02/07/2022    CR 0.88 02/21/2022    MEKA 8.6 02/21/2022    BILITOTAL 0.3 02/07/2022    ALBUMIN 3.6 02/21/2022    ALT 24 02/07/2022    AST 14 02/07/2022     Results reviewed, labs MET treatment parameters, ok to proceed with treatment.    TORB: 2/22/22/1110H/Dr. Reinoso/Shameka Coronado RN/ VS and symptoms noted. Give IV N/ ml to run over one hour. To proceed with Darzalex Faspro and Zometa today. Keep holding Metoprolol. Schedule patient for fluids for next 3 days. No need for labs. Keep appointment to see Jackie on Monday.       Post Infusion Assessment:  Patient tolerated infusion without incident.  Patient tolerated one Darzalex Faspro injection on right lower abdomen without incident.  Blood return noted pre and post infusion.  Site patent and intact, free from redness, edema or discomfort.  No evidence of extravasations.  Access discontinued per protocol.       Discharge Plan:   Patient declined prescription refills.  Discharge instructions reviewed with: Patient.  Patient and/or  family verbalized understanding of discharge instructions and all questions answered.  AVS to patient via [employer].  Patient will return 2/28/22 for next appointment.   Patient discharged in stable condition accompanied by: self.  Departure Mode: Ambulatory.      LAURA DUNAWAY RN

## 2022-02-23 ENCOUNTER — TELEPHONE (OUTPATIENT)
Dept: ONCOLOGY | Facility: CLINIC | Age: 52
End: 2022-02-23
Payer: COMMERCIAL

## 2022-02-23 ENCOUNTER — INFUSION THERAPY VISIT (OUTPATIENT)
Dept: ONCOLOGY | Facility: CLINIC | Age: 52
End: 2022-02-23
Payer: COMMERCIAL

## 2022-02-23 VITALS
RESPIRATION RATE: 18 BRPM | SYSTOLIC BLOOD PRESSURE: 87 MMHG | TEMPERATURE: 97.8 F | DIASTOLIC BLOOD PRESSURE: 55 MMHG | OXYGEN SATURATION: 98 % | HEART RATE: 64 BPM

## 2022-02-23 DIAGNOSIS — E86.0 DEHYDRATION: Primary | ICD-10-CM

## 2022-02-23 PROCEDURE — 96360 HYDRATION IV INFUSION INIT: CPT

## 2022-02-23 PROCEDURE — 258N000003 HC RX IP 258 OP 636: Performed by: REGISTERED NURSE

## 2022-02-23 RX ORDER — EPINEPHRINE 1 MG/ML
0.3 INJECTION, SOLUTION INTRAMUSCULAR; SUBCUTANEOUS EVERY 5 MIN PRN
Status: CANCELLED | OUTPATIENT
Start: 2022-02-26

## 2022-02-23 RX ORDER — MEPERIDINE HYDROCHLORIDE 25 MG/ML
25 INJECTION INTRAMUSCULAR; INTRAVENOUS; SUBCUTANEOUS EVERY 30 MIN PRN
Status: CANCELLED | OUTPATIENT
Start: 2022-02-26

## 2022-02-23 RX ORDER — ALBUTEROL SULFATE 90 UG/1
1-2 AEROSOL, METERED RESPIRATORY (INHALATION)
Status: CANCELLED
Start: 2022-02-26

## 2022-02-23 RX ORDER — DIPHENHYDRAMINE HYDROCHLORIDE 50 MG/ML
50 INJECTION INTRAMUSCULAR; INTRAVENOUS
Status: CANCELLED
Start: 2022-02-26

## 2022-02-23 RX ORDER — METHYLPREDNISOLONE SODIUM SUCCINATE 125 MG/2ML
125 INJECTION, POWDER, LYOPHILIZED, FOR SOLUTION INTRAMUSCULAR; INTRAVENOUS
Status: CANCELLED
Start: 2022-02-26

## 2022-02-23 RX ORDER — NALOXONE HYDROCHLORIDE 0.4 MG/ML
0.2 INJECTION, SOLUTION INTRAMUSCULAR; INTRAVENOUS; SUBCUTANEOUS
Status: CANCELLED | OUTPATIENT
Start: 2022-02-26

## 2022-02-23 RX ORDER — ALBUTEROL SULFATE 0.83 MG/ML
2.5 SOLUTION RESPIRATORY (INHALATION)
Status: CANCELLED | OUTPATIENT
Start: 2022-02-26

## 2022-02-23 RX ORDER — HEPARIN SODIUM,PORCINE 10 UNIT/ML
5 VIAL (ML) INTRAVENOUS
Status: CANCELLED | OUTPATIENT
Start: 2022-02-26

## 2022-02-23 RX ORDER — HEPARIN SODIUM (PORCINE) LOCK FLUSH IV SOLN 100 UNIT/ML 100 UNIT/ML
5 SOLUTION INTRAVENOUS
Status: CANCELLED | OUTPATIENT
Start: 2022-02-26

## 2022-02-23 RX ADMIN — SODIUM CHLORIDE 1000 ML: 9 INJECTION, SOLUTION INTRAVENOUS at 16:05

## 2022-02-23 ASSESSMENT — PAIN SCALES - GENERAL: PAINLEVEL: NO PAIN (0)

## 2022-02-23 NOTE — PROGRESS NOTES
Infusion Nursing Note:  Kristielara Cornejo presents today for IV fluids.        Note: Lara comes in today saying she feels much better than she has the last few days.  She still endorses some dizziness when she first stands up but it is significantly improved.  She no longer c/o tunnel vision when standing. Blood pressure was 115/71 and dropped to 87/55 standing which is much improved       Intravenous Access:  Peripheral IV placed.  PIV removed intact post infusion        Discharge Plan:   Pt will return tomorrow for IV fluids.      Diana Robles RN

## 2022-02-23 NOTE — TELEPHONE ENCOUNTER
Oral Chemotherapy Monitoring Program    Medication: Revlimid  Rx:  25 mg PO daily days 1-14 for a 21 day cycle  Auth #: [id number]  Risk Category: Adult female WITH reproductive capacity.    Routine survey questions reviewed.    *can fill with us before restricted. Pt saving for an emergency.    Anette Mcdermott  Oncology Pharmacy Liaison II  [email or web address]  Phone: [phone]  Fax: [phone]

## 2022-02-24 ENCOUNTER — INFUSION THERAPY VISIT (OUTPATIENT)
Dept: INFUSION THERAPY | Facility: CLINIC | Age: 52
End: 2022-02-24
Payer: COMMERCIAL

## 2022-02-24 VITALS — DIASTOLIC BLOOD PRESSURE: 80 MMHG | SYSTOLIC BLOOD PRESSURE: 138 MMHG | RESPIRATION RATE: 18 BRPM | HEART RATE: 77 BPM

## 2022-02-24 DIAGNOSIS — C90.00 LIGHT CHAIN MYELOMA (H): ICD-10-CM

## 2022-02-24 DIAGNOSIS — E86.0 DEHYDRATION: Primary | ICD-10-CM

## 2022-02-24 LAB — HCG UR QL: NEGATIVE

## 2022-02-24 PROCEDURE — 96361 HYDRATE IV INFUSION ADD-ON: CPT

## 2022-02-24 PROCEDURE — 81025 URINE PREGNANCY TEST: CPT

## 2022-02-24 PROCEDURE — 96360 HYDRATION IV INFUSION INIT: CPT

## 2022-02-24 PROCEDURE — 258N000003 HC RX IP 258 OP 636: Performed by: REGISTERED NURSE

## 2022-02-24 RX ORDER — HEPARIN SODIUM,PORCINE 10 UNIT/ML
5 VIAL (ML) INTRAVENOUS
Status: CANCELLED | OUTPATIENT
Start: 2022-02-26

## 2022-02-24 RX ORDER — HEPARIN SODIUM (PORCINE) LOCK FLUSH IV SOLN 100 UNIT/ML 100 UNIT/ML
5 SOLUTION INTRAVENOUS
Status: CANCELLED | OUTPATIENT
Start: 2022-02-26

## 2022-02-24 RX ORDER — LENALIDOMIDE 25 MG/1
25 CAPSULE ORAL DAILY
Qty: 14 CAPSULE | Refills: 0 | Status: SHIPPED | OUTPATIENT
Start: 2022-02-28 | End: 2022-04-11

## 2022-02-24 RX ORDER — EPINEPHRINE 1 MG/ML
0.3 INJECTION, SOLUTION INTRAMUSCULAR; SUBCUTANEOUS EVERY 5 MIN PRN
Status: CANCELLED | OUTPATIENT
Start: 2022-02-26

## 2022-02-24 RX ORDER — ALBUTEROL SULFATE 0.83 MG/ML
2.5 SOLUTION RESPIRATORY (INHALATION)
Status: CANCELLED | OUTPATIENT
Start: 2022-02-26

## 2022-02-24 RX ORDER — DIPHENHYDRAMINE HYDROCHLORIDE 50 MG/ML
50 INJECTION INTRAMUSCULAR; INTRAVENOUS
Status: CANCELLED
Start: 2022-02-26

## 2022-02-24 RX ORDER — ALBUTEROL SULFATE 90 UG/1
1-2 AEROSOL, METERED RESPIRATORY (INHALATION)
Status: CANCELLED
Start: 2022-02-26

## 2022-02-24 RX ORDER — NALOXONE HYDROCHLORIDE 0.4 MG/ML
0.2 INJECTION, SOLUTION INTRAMUSCULAR; INTRAVENOUS; SUBCUTANEOUS
Status: CANCELLED | OUTPATIENT
Start: 2022-02-26

## 2022-02-24 RX ORDER — MEPERIDINE HYDROCHLORIDE 25 MG/ML
25 INJECTION INTRAMUSCULAR; INTRAVENOUS; SUBCUTANEOUS EVERY 30 MIN PRN
Status: CANCELLED | OUTPATIENT
Start: 2022-02-26

## 2022-02-24 RX ORDER — METHYLPREDNISOLONE SODIUM SUCCINATE 125 MG/2ML
125 INJECTION, POWDER, LYOPHILIZED, FOR SOLUTION INTRAMUSCULAR; INTRAVENOUS
Status: CANCELLED
Start: 2022-02-26

## 2022-02-24 RX ADMIN — SODIUM CHLORIDE 1000 ML: 9 INJECTION, SOLUTION INTRAVENOUS at 12:17

## 2022-02-24 NOTE — LETTER
2/24/2022         RE: Kristie Cornejo  [address]  [place] [address]        Dear Colleague,    Thank you for referring your patient, Kristie Cornejo, to the [facility] T[facility]. Please see a copy of my visit note below.    Nursing Note  Kristie Cornejo [facility] for:   Chief Complaint   Patient presents with     Infusion     Fluids     During today's [facility] appointment, orders from DORON Alberts CNP  were completed.  Frequency: as needed    Progress note:  Patient identification verified by name and date of birth.  Assessment completed.  Vitals recorded in Doc Flowsheets.  Patient was provided with education regarding medication/procedure and possible side effects.  Patient verbalized understanding.     present during visit today: Not Applicable.    Treatment Conditions: Non-applicable.    Premedications: were not ordered.    Drug Waste Record: No    Infusion length and rate:  750 ml/hr.    Labs: Urine sent    Vascular access: peripheral IV placed today.      Post Infusion Assessment:  Patient tolerated infusion without incident.     Discharge Plan:   Follow up plan of care with: primary care provider,  Discharge instructions were reviewed with patient.  Patient/representative verbalized understanding of discharge instructions and all questions answered.  Patient discharged from [facility] in stable condition.    Fanny Kevin RN    Administrations This Visit     0.9% sodium chloride BOLUS     Admin Date  02/24/2022 Action  New Bag Dose  1,000 mL Rate  1,000 mL/hr Route  Intravenous Administered By  Fanny Kevin RN                /80 (Patient Position: Standing)   Pulse 77   Resp 18           Again, thank you for allowing me to participate in the care of your patient.      Sincerely,    [facility]

## 2022-02-24 NOTE — PROGRESS NOTES
Nursing Note  Kristie Cornejo presents today to [facility] for:   Chief Complaint   Patient presents with     Infusion     Fluids     During [facility]'s [facility] appointment, orders from DORON Alberts CNP  were completed.  Frequency: as needed    Progress note:  Patient identification verified by name and date of birth.  Assessment completed.  Vitals recorded in Doc Flowsheets.  Patient was provided with education regarding medication/procedure and possible side effects.  Patient verbalized understanding.     present during visit today: Not Applicable.    Treatment Conditions: Non-applicable.    Premedications: were not ordered.    Drug Waste Record: No    Infusion length and rate:  750 ml/hr.    Labs: Urine sent    Vascular access: peripheral IV placed today.      Post Infusion Assessment:  Patient tolerated infusion without incident.     Discharge Plan:   Follow up plan of care with: primary care provider,  Discharge instructions were reviewed with patient.  Patient/representative verbalized understanding of discharge instructions and all questions answered.  Patient discharged [facility] in stable condition.    Fanny Kevin RN    Administrations This Visit     0.9% sodium chloride BOLUS     Admin Date  02/24/2022 Action  New Bag Dose  1,000 mL Rate  1,000 mL/hr Route  Intravenous Administered By  Fanny Kevin RN                /80 (Patient Position: Standing)   Pulse 77   Resp 18

## 2022-02-25 ENCOUNTER — PATIENT OUTREACH (OUTPATIENT)
Dept: ONCOLOGY | Facility: CLINIC | Age: 52
End: 2022-02-25
Payer: COMMERCIAL

## 2022-02-25 ENCOUNTER — TELEPHONE (OUTPATIENT)
Dept: CARDIOLOGY | Facility: CLINIC | Age: 52
End: 2022-02-25
Payer: COMMERCIAL

## 2022-02-25 NOTE — PROGRESS NOTES
[facility]  Date of Visit: Feb 28, 2022   Oncologist: Dr. Payton Reinoso    Reason for visit: myeloma follow-up           Oncology History   51 year old female with past medical history significant for Irritable bowel disease, allergies, large plasmacytoma of the L pelvis, and newly diagnosed multiple myeloma.     Early in April 2021 , she noted to have left sided groin pain, constant and was affecting her daily activities  MRI showed large, expansile, permeative mass with indistinct borders involving the left superior and inferior pubic rami, pubic symphysis and with possible extension to the left acetabulum.     We completed the following work-up:   - Biopsy 9/7/2021: plasmacytoma.  Flow cytometry showed kappa monotypic plasma cells, polytypic B cells. FISH results from plasmacytoma showed a gain of 11q, IGH-CCND1 fusion but no losses of 1q or TP53 and no gain of 1q.  - Bone marrow biopsy 09/22/21: Marrow cellularity 50% with 25% interstitial infiltration w/  Kappa-monotypic, moderately atypical plasma cells. Flow with 0.7% kappa-monotypica plasma cells. FISH and cytogenetics pending.   - PET CT 09/24/21 w/ hypermetabolic pathologic fracture of the L pubic ramus w/ aggressive appearing osteolysis but no additional suspicious lytic or blastic osseous lesions.     Treatment to date:   - Zometa q4 weeks started 9/27/21  - Radiation to left pubic ramus x18 fractions, completed 11/11/21.   - RVD C1D1 11/15/21  - RVD C2D1 12/6/21  - RVD C3D1  12/27/21  - RVD C4 D1 1/17/22, elizabeth added on D8 due to inadequate response of urinary M-spike and FLCs remaining over 100 mg/dL after 3 cycles of RVD  - Elizabeth-RVD C1D8 1/24/22  - Elizabeth-RVD C2D1 2/7/22          INTERIM HISTORY:   Margo presents for oncology follow-up visit today.    Continues to have some shortness of breath and lightheadedness with exertion, but the symptoms she was having while on Toprol XL (pre-syncopal episodes, orthostatic  hypotension extreme weakness and shakiness) have resolved.     She seems to develop diarrhea on Thursdays following her michelle injections, but it self-resolves by Friday.    She has styes on upper and lower lids on both eyes. She has been using warm rice packs and an antibiotic ointment.  She has some tenderness and itchiness when she closes her eyes.    She has started to have a tingling sensation on the side of her 5th fingers on both hands and in the backs of her heels. Denies pain, denies changes in strength or motor function.    She is having her Mirena replaced tomorrow. Cardiac ablation is scheduled 3/23, insurance approval will be pursued now that the procedure is scheduled (was previously denied due to not trying medical intervention; hoping she will be approved now that she has failed trial of Toprol XL).    Denies fevers, chills, CP, SOB, vomiting.           Physical Exam:     /78   Pulse 76   Temp 98  F (36.7  C) (Oral)   Resp 16   Wt 76.6 kg (168 lb 12.8 oz)   SpO2 97%   BMI 25.88 kg/m     Wt Readings from Last 4 Encounters:   02/28/22 76.6 kg (168 lb 12.8 oz)   02/21/22 76.3 kg (168 lb 3.2 oz)   02/17/22 75.9 kg (167 lb 4.8 oz)   02/14/22 76.3 kg (168 lb 3.2 oz)     General: well appearing, no acute distress  HEENT: normocephalic, atraumatic, PERRLA, sclerae nonicteric, she has styes present on the upper and lower lids of both eyes, no drainage; dry mucous membranes, oropharynx is clear  Lymph: no palpable cervical, supraclavicular or axillary lymphadenopathy   CV: S1 S2, RRR, no murmur, click or rub  Lungs: clear to auscultation bilaterally, no wheezes/rales/rhonchi  Abd: soft, positive bowel sounds, non-distended, non-tender  MSK: no peripheral edema  Neuro: alert and oriented x3, CN grossly intact; bilateral  strength 5/5  Psych: appropriate mood and affect  Skin:no visible rashes or lesions on exposed skin          Laboratory Data:      I personally reviewed the following  "labs:    Most Recent 3 CBC's:  Recent Labs   Lab Test 02/28/22  1423 02/21/22  1434 02/14/22  1410   WBC 4.4 4.9 4.4   HGB 12.9 12.5 13.0   MCV 92 91 92    136* 168     Most Recent 3 BMP's:  Recent Labs   Lab Test 02/28/22  1423 02/21/22  1434 02/07/22  1319 02/01/22  1254     --  143 142   POTASSIUM 3.8  --  3.3* 3.6   CHLORIDE 108  --  111* 108   CO2 25  --  22 25   BUN 20  --  25 17   CR 0.88 0.88 0.84 0.86   ANIONGAP 10  --  10 9   MEKA 8.8 8.6 9.0 8.8   *  --  117* 142*     Most Recent 2 LFT's:  Recent Labs   Lab Test 02/28/22  1423 02/07/22  1319   AST 11 14   ALT 31 24   ALKPHOS 64 59   BILITOTAL 0.6 0.3              Assessment and Recommendations   Kristie Cornejo is a 51 year old female who was diagnosed with Plasmacytoma after she presented with left sided groin pain now found to have multiple myeloma after further workup with bone marrow biopsy.     # Multiple myeloma with associated large plasmacytoma of the L pelvis. Kappa light chain disease.     BMBx with 25% plasma cell infiltration; standard risk with gain of 11q, IGH-CCND1 fusion but no losses of 1q or TP53 and no gain of 1q.   SPEP w/out a monoclonal peak  UPEP positive. 70% paraprotein. Large monoclonal free immunoglobulin light chain of kappa chain type.     No anemia, normal creat, Ca, no other skeletal lesions. Alb normal, B2M normal.  Stage I Light chain disease with left pelvic bone pathologic fracture she is classified as having symptomatic multiple myeloma.     RVD C1D1 11/15/21 (had a slight delay in the start of her Revlimid on 11/19/21 due to the timing of her pregnancy tests).  She tolerated cycle 1 well with some mild nausea and a headache on her week off.  RVD C2D1 12/6/21, tolerated well overall, but has had more consistent sensation of her hands and feet being hot. No tingling, pain, loss of sensation, or changes to her strength.  RVD C3D1 12/27/21, tolerated well overall with ongoing sensation of \"hot\" " hands and feet  RVD C4 D1 1/17/22, elizabeth added on D8 due to inadequate response of urinary M-spike and FLCs remaining over 100 mg/dL after 3 cycles of RVD  Elizabeth-RVD C5 2/7/22 Changing to 21 day cycles due to insurance.  Will receive Rev Days 1-14, Velcade days 1,4,8,11, and elizabeth days 1,8,15.    Elizabeth-RVD C6 today (2/28/22)    Pending good response, therapy may include autologous stem cell transplant.     - Return to clinic to see Dr. Reinoso on 3/23 to determine next steps based on her response after this cycle.  Will draw labs on Monday 3/21 so that results area available at the time of the appointment.  - Continue monthly pregnancy tests while on Revlimid   - Planning for 6 cycles Elizabeth-RVD followed by consideration of autoHSCT. BMT consult was completed 12/23/21    #Bone Health  She will continue monthly Zometa (last received on 2/2222). Next dose due 3/22/22. She will continue Calcium/Vitamin D supplements.     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 Currently having no pain and able to walk 1 mile without any assistive devices.  She is interested in pursuing other activities like gentle cross-country skiing.  Had follow-up with Dr. Philip on 1/6/22 and was cleared to resume regular activity as tolerated. She should still refrain from high-impact activities.    # PVCs  She had an ECHO 12/1 and had follow-up with cardiology 12/7 (notes in CareEverywhere).  She had follow-up with cardiology here at the  and they recommend an ablation. This was denied by her insurance because she needed a stress test and evidence of trying medical intervention.  Stress test was completed and she was started on Toprol XL; however, Toprol XL was discontinued due to significant side effects of orthostatic hypotension, pre-syncopal episodes, shakiness, and weakness.  She now  has the ablation scheduled 3/23/22, and cardiology will pursue insurance approval again.    # Nausea  Mild, managed with Compazine up to twice daily and lorazepam occasionally at night.     #COVID prophylaxis  - She received EVUSHELD 1/20/22.  - Received 3 COVID vaccines to date    #Diarrhea  She has continued to have intermittent diarrhea with associated abdominal cramping and nausea.    -C.diff PCR negative 2/8/22  - Okay to manage with Imodium, 2 tabs with the first loose stool and 1 tab with each subsequent stool, up to a total of 8 tabs daily.    # Reflux  She reported episodes of reflux with bile, especially when she lays down at night.  She has started to elevate her head on pillows and is also trying to avoid eating within 1-2 hours of going to bed.  Omeprazole was increased to 20 mg twice daily with symptom improvement.    47 minutes spent on the date of the encounter doing chart review, review of test results, patient visit and documentation     DORON Hall [facility]  [address] [place] [address]  [phone]

## 2022-02-25 NOTE — PROGRESS NOTES
"Received message from clinic coordinator that pt stated she did not need IV fluids today so was removed from infusion wait list.  Called to speak with pt but had to leave message.  Stated heard that she declined IV fluids today so hoping that she is feeling better (without dizziness) and able to drink more fluids.  Reviewed her return appts on Monday afternoon, 2/28.  Encouraged her to call back if any questions or concerns.  Spoke with pt when she called back.  She stated she is \"doing better\" today.  When asked, she stated that she has no dizziness today and is drinking more fluids.  Also reported yesterday, after IV fluids her orthostatic BP were same sitting and standing.  Discussed drinking variety of fluids and using water flavorings to help.  Pt reports taste buds are \"off\" but she has found she can drink Cherry Timothy-aide even though other flavors don't taste good.  She stated she thinks she will be ok over the weekend.  She also noted they have a reverse osmosis unit being installed today and  could not change his plans so late in AM.  She appreciated call.  "

## 2022-02-25 NOTE — TELEPHONE ENCOUNTER
Larissa from [facility] calling regarding resubmitting information on this patient to see if her ablation will be approved.     She is looking for information from testing done per patient I believe the stress test and tried and failed beta blocker therapy, updated office notes    [phone]    Fax: [phone]

## 2022-02-28 ENCOUNTER — APPOINTMENT (OUTPATIENT)
Dept: LAB | Facility: CLINIC | Age: 52
End: 2022-02-28
Payer: COMMERCIAL

## 2022-02-28 ENCOUNTER — ONCOLOGY VISIT (OUTPATIENT)
Dept: ONCOLOGY | Facility: CLINIC | Age: 52
End: 2022-02-28
Attending: REGISTERED NURSE
Payer: COMMERCIAL

## 2022-02-28 ENCOUNTER — INFUSION THERAPY VISIT (OUTPATIENT)
Dept: ONCOLOGY | Facility: CLINIC | Age: 52
End: 2022-02-28
Payer: COMMERCIAL

## 2022-02-28 VITALS
WEIGHT: 168.8 LBS | RESPIRATION RATE: 16 BRPM | SYSTOLIC BLOOD PRESSURE: 113 MMHG | TEMPERATURE: 98 F | HEART RATE: 76 BPM | BODY MASS INDEX: 25.88 KG/M2 | DIASTOLIC BLOOD PRESSURE: 78 MMHG | OXYGEN SATURATION: 97 %

## 2022-02-28 DIAGNOSIS — R19.7 DIARRHEA, UNSPECIFIED TYPE: ICD-10-CM

## 2022-02-28 DIAGNOSIS — I49.3 PVC'S (PREMATURE VENTRICULAR CONTRACTIONS): ICD-10-CM

## 2022-02-28 DIAGNOSIS — C90.30 PLASMACYTOMA OF BONE (H): Primary | ICD-10-CM

## 2022-02-28 DIAGNOSIS — Z11.59 ENCOUNTER FOR SCREENING FOR OTHER VIRAL DISEASES: Primary | ICD-10-CM

## 2022-02-28 DIAGNOSIS — C90.00 LIGHT CHAIN MYELOMA (H): Primary | ICD-10-CM

## 2022-02-28 DIAGNOSIS — K21.9 GASTROESOPHAGEAL REFLUX DISEASE WITHOUT ESOPHAGITIS: ICD-10-CM

## 2022-02-28 LAB
ALBUMIN SERPL-MCNC: 3.8 G/DL (ref 3.4–5)
ALP SERPL-CCNC: 64 U/L (ref 40–150)
ALT SERPL W P-5'-P-CCNC: 31 U/L (ref 0–50)
ANION GAP SERPL CALCULATED.3IONS-SCNC: 10 MMOL/L (ref 3–14)
AST SERPL W P-5'-P-CCNC: 11 U/L (ref 0–45)
B-HCG SERPL-ACNC: <1 IU/L (ref 0–5)
BASOPHILS # BLD AUTO: 0 10E3/UL (ref 0–0.2)
BASOPHILS NFR BLD AUTO: 0 %
BILIRUB SERPL-MCNC: 0.6 MG/DL (ref 0.2–1.3)
BUN SERPL-MCNC: 20 MG/DL (ref 7–30)
BURR CELLS BLD QL SMEAR: SLIGHT
CALCIUM SERPL-MCNC: 8.8 MG/DL (ref 8.5–10.1)
CHLORIDE BLD-SCNC: 108 MMOL/L (ref 94–109)
CO2 SERPL-SCNC: 25 MMOL/L (ref 20–32)
CREAT SERPL-MCNC: 0.88 MG/DL (ref 0.52–1.04)
ELLIPTOCYTES BLD QL SMEAR: ABNORMAL
EOSINOPHIL # BLD AUTO: 0.2 10E3/UL (ref 0–0.7)
EOSINOPHIL NFR BLD AUTO: 4 %
ERYTHROCYTE [DISTWIDTH] IN BLOOD BY AUTOMATED COUNT: 14.6 % (ref 10–15)
GFR SERPL CREATININE-BSD FRML MDRD: 79 ML/MIN/1.73M2
GLUCOSE BLD-MCNC: 106 MG/DL (ref 70–99)
HCT VFR BLD AUTO: 37.4 % (ref 35–47)
HGB BLD-MCNC: 12.9 G/DL (ref 11.7–15.7)
IMM GRANULOCYTES # BLD: 0 10E3/UL
IMM GRANULOCYTES NFR BLD: 0 %
LYMPHOCYTES # BLD AUTO: 0.8 10E3/UL (ref 0.8–5.3)
LYMPHOCYTES NFR BLD AUTO: 18 %
MCH RBC QN AUTO: 31.8 PG (ref 26.5–33)
MCHC RBC AUTO-ENTMCNC: 34.5 G/DL (ref 31.5–36.5)
MCV RBC AUTO: 92 FL (ref 78–100)
MONOCYTES # BLD AUTO: 1 10E3/UL (ref 0–1.3)
MONOCYTES NFR BLD AUTO: 22 %
NEUTROPHILS # BLD AUTO: 2.5 10E3/UL (ref 1.6–8.3)
NEUTROPHILS NFR BLD AUTO: 56 %
NRBC # BLD AUTO: 0 10E3/UL
NRBC BLD AUTO-RTO: 0 /100
PLAT MORPH BLD: ABNORMAL
PLATELET # BLD AUTO: 243 10E3/UL (ref 150–450)
POTASSIUM BLD-SCNC: 3.8 MMOL/L (ref 3.4–5.3)
PROT SERPL-MCNC: 6.6 G/DL (ref 6.8–8.8)
RBC # BLD AUTO: 4.06 10E6/UL (ref 3.8–5.2)
RBC MORPH BLD: ABNORMAL
SODIUM SERPL-SCNC: 143 MMOL/L (ref 133–144)
WBC # BLD AUTO: 4.4 10E3/UL (ref 4–11)

## 2022-02-28 PROCEDURE — 250N000011 HC RX IP 250 OP 636

## 2022-02-28 PROCEDURE — 82040 ASSAY OF SERUM ALBUMIN: CPT

## 2022-02-28 PROCEDURE — 96401 CHEMO ANTI-NEOPL SQ/IM: CPT

## 2022-02-28 PROCEDURE — 99214 OFFICE O/P EST MOD 30 MIN: CPT | Performed by: REGISTERED NURSE

## 2022-02-28 PROCEDURE — 36415 COLL VENOUS BLD VENIPUNCTURE: CPT

## 2022-02-28 PROCEDURE — 85025 COMPLETE CBC W/AUTO DIFF WBC: CPT

## 2022-02-28 PROCEDURE — G0463 HOSPITAL OUTPT CLINIC VISIT: HCPCS

## 2022-02-28 PROCEDURE — 80053 COMPREHEN METABOLIC PANEL: CPT

## 2022-02-28 PROCEDURE — 83521 IG LIGHT CHAINS FREE EACH: CPT | Mod: 59 | Performed by: REGISTERED NURSE

## 2022-02-28 PROCEDURE — 84702 CHORIONIC GONADOTROPIN TEST: CPT

## 2022-02-28 RX ORDER — DEXAMETHASONE 4 MG/1
20 TABLET ORAL ONCE
Status: COMPLETED | OUTPATIENT
Start: 2022-02-28 | End: 2022-02-28

## 2022-02-28 RX ORDER — DEXAMETHASONE 4 MG/1
20 TABLET ORAL
Qty: 15 TABLET | Refills: 0 | Status: SHIPPED | OUTPATIENT
Start: 2022-02-28 | End: 2022-04-11

## 2022-02-28 RX ADMIN — DEXAMETHASONE 20 MG: 4 TABLET ORAL at 15:30

## 2022-02-28 RX ADMIN — BORTEZOMIB 2.5 MG: 3.5 INJECTION, POWDER, LYOPHILIZED, FOR SOLUTION INTRAVENOUS; SUBCUTANEOUS at 16:01

## 2022-02-28 RX ADMIN — DARATUMUMAB AND HYALURONIDASE-FIHJ (HUMAN RECOMBINANT) 1800 MG: 1800; 30000 INJECTION SUBCUTANEOUS at 16:04

## 2022-02-28 ASSESSMENT — PAIN SCALES - GENERAL: PAINLEVEL: NO PAIN (0)

## 2022-02-28 NOTE — NURSING NOTE
"Oncology Rooming Note    February 28, 2022 2:37 PM   Kristie Cornejo is a 51 year old female who presents for:    Chief Complaint   Patient presents with     Blood Draw     Labs drawn via  by RN. VS taken.     Oncology Clinic Visit     placmacytoma     Initial Vitals: /78   Pulse 76   Temp 98  F (36.7  C) (Oral)   Resp 16   Wt 76.6 kg (168 lb 12.8 oz)   SpO2 97%   BMI 25.88 kg/m   Estimated body mass index is 25.88 kg/m  as calculated from the following:    Height as of 2/1/22: 1.72 m (5' 7.72\").    Weight as of this encounter: 76.6 kg (168 lb 12.8 oz). Body surface area is 1.91 meters squared.  No Pain (0) Comment: Data Unavailable   No LMP recorded. (Menstrual status: IUD).  Allergies reviewed: Yes  Medications reviewed: Yes    Medications: Medication refills not needed today.  Pharmacy name entered into Yammer:    [facility] - [place], [place] - [address]  [id number] - [place], [place] - [address] AT [facility] [address]  [facility] - [place], [place] - [address]  [facility] - [place], [place] - [address] [place] [address]    Clinical concerns: sty's in both eyes not going away. Will discuss rest with provider      Hank Josue            "

## 2022-02-28 NOTE — PROGRESS NOTES
Infusion Nursing Note:  Kristie Cornejo presents today for Cycle 3 Day 1 Velcade and Darzalex Faspro.    Patient seen by provider today: Yes: Jackie Ferris NP   present during visit today: Not Applicable.    Note: Patient presents to infusion today doing well. Denies any new questions or concerns following visit with Jackie Ferris NP.    Intravenous Access:  No Intravenous access at this visit.    Treatment Conditions:  Lab Results   Component Value Date    HGB 12.9 02/28/2022    WBC 4.4 02/28/2022    ANEU 3.2 01/17/2022    ANEUTAUTO 3.3 02/21/2022     02/28/2022      Lab Results   Component Value Date     02/28/2022    POTASSIUM 3.8 02/28/2022    CR 0.88 02/28/2022    MEKA 8.8 02/28/2022    BILITOTAL 0.6 02/28/2022    ALBUMIN 3.8 02/28/2022    ALT 31 02/28/2022    AST 11 02/28/2022     Results reviewed, labs MET treatment parameters, ok to proceed with treatment.    Post Infusion Assessment:  Patient tolerated Velcade injection to Right Lower Abdomen without incident.  Patient tolerated Darzalex Faspro injection to Left Lower Abdomen without incident.  Site patent and intact, free from redness, edema or discomfort.  No evidence of extravasations.     Discharge Plan:   Prescription refills given for Dexamethasone.  Discharge instructions reviewed with: Patient.  Patient and/or family verbalized understanding of discharge instructions and all questions answered.  AVS to patient via [employer].  Patient will return 3/3 for next appointment.   Patient discharged in stable condition accompanied by: self.  Departure Mode: Ambulatory.      Karlene Maldonado RN

## 2022-02-28 NOTE — LETTER
2/28/2022     RE: Kristie Cornejo  [address]  [facility] [address]    Dear Colleague,    Thank you for referring your patient, Kristie Cornejo, to the [facility]. Please see a copy of my visit note below.    [facility]  Date of Visit: Feb 28, 2022   Oncologist: Dr. Payton Reinoso    Reason for visit: myeloma follow-up           Oncology History   51 year old female with past medical history significant for Irritable bowel disease, allergies, large plasmacytoma of the L pelvis, and newly diagnosed multiple myeloma.     Early in April 2021 , she noted to have left sided groin pain, constant and was affecting her daily activities  MRI showed large, expansile, permeative mass with indistinct borders involving the left superior and inferior pubic rami, pubic symphysis and with possible extension to the left acetabulum.     We completed the following work-up:   - Biopsy 9/7/2021: plasmacytoma.  Flow cytometry showed kappa monotypic plasma cells, polytypic B cells. FISH results from plasmacytoma showed a gain of 11q, IGH-CCND1 fusion but no losses of 1q or TP53 and no gain of 1q.  - Bone marrow biopsy 09/22/21: Marrow cellularity 50% with 25% interstitial infiltration w/  Kappa-monotypic, moderately atypical plasma cells. Flow with 0.7% kappa-monotypica plasma cells. FISH and cytogenetics pending.   - PET CT 09/24/21 w/ hypermetabolic pathologic fracture of the L pubic ramus w/ aggressive appearing osteolysis but no additional suspicious lytic or blastic osseous lesions.     Treatment to date:   - Zometa q4 weeks started 9/27/21  - Radiation to left pubic ramus x18 fractions, completed 11/11/21.   - RVD C1D1 11/15/21  - RVD C2D1 12/6/21  - RVD C3D1  12/27/21  - RVD C4 D1 1/17/22, elizabeth added on D8 due to inadequate response of urinary M-spike and FLCs remaining over 100 mg/dL after 3 cycles of RVD  - Elizabeth-RVD C1D8 1/24/22  - Elizabeth-RVD C2D1  2/7/22          INTERIM HISTORY:   Margo presents for oncology follow-up visit today.    Continues to have some shortness of breath and lightheadedness with exertion, but the symptoms she was having while on Toprol XL (pre-syncopal episodes, orthostatic hypotension extreme weakness and shakiness) have resolved.     She seems to develop diarrhea on Thursdays following her michelle injections, but it self-resolves by Friday.    She has styes on upper and lower lids on both eyes. She has been using warm rice packs and an antibiotic ointment.  She has some tenderness and itchiness when she closes her eyes.    She has started to have a tingling sensation on the side of her 5th fingers on both hands and in the backs of her heels. Denies pain, denies changes in strength or motor function.    She is having her Mirena replaced tomorrow. Cardiac ablation is scheduled 3/23, insurance approval will be pursued now that the procedure is scheduled (was previously denied due to not trying medical intervention; hoping she will be approved now that she has failed trial of Toprol XL).    Denies fevers, chills, CP, SOB, vomiting.           Physical Exam:     /78   Pulse 76   Temp 98  F (36.7  C) (Oral)   Resp 16   Wt 76.6 kg (168 lb 12.8 oz)   SpO2 97%   BMI 25.88 kg/m     Wt Readings from Last 4 Encounters:   02/28/22 76.6 kg (168 lb 12.8 oz)   02/21/22 76.3 kg (168 lb 3.2 oz)   02/17/22 75.9 kg (167 lb 4.8 oz)   02/14/22 76.3 kg (168 lb 3.2 oz)     General: well appearing, no acute distress  HEENT: normocephalic, atraumatic, PERRLA, sclerae nonicteric, she has styes present on the upper and lower lids of both eyes, no drainage; dry mucous membranes, oropharynx is clear  Lymph: no palpable cervical, supraclavicular or axillary lymphadenopathy   CV: S1 S2, RRR, no murmur, click or rub  Lungs: clear to auscultation bilaterally, no wheezes/rales/rhonchi  Abd: soft, positive bowel sounds, non-distended, non-tender  MSK: no  peripheral edema  Neuro: alert and oriented x3, CN grossly intact; bilateral  strength 5/5  Psych: appropriate mood and affect  Skin:no visible rashes or lesions on exposed skin          Laboratory Data:      I personally reviewed the following labs:    Most Recent 3 CBC's:  Recent Labs   Lab Test 02/28/22  1423 02/21/22  1434 02/14/22  1410   WBC 4.4 4.9 4.4   HGB 12.9 12.5 13.0   MCV 92 91 92    136* 168     Most Recent 3 BMP's:  Recent Labs   Lab Test 02/28/22  1423 02/21/22  1434 02/07/22  1319 02/01/22  1254     --  143 142   POTASSIUM 3.8  --  3.3* 3.6   CHLORIDE 108  --  111* 108   CO2 25  --  22 25   BUN 20  --  25 17   CR 0.88 0.88 0.84 0.86   ANIONGAP 10  --  10 9   MEKA 8.8 8.6 9.0 8.8   *  --  117* 142*     Most Recent 2 LFT's:  Recent Labs   Lab Test 02/28/22  1423 02/07/22  1319   AST 11 14   ALT 31 24   ALKPHOS 64 59   BILITOTAL 0.6 0.3              Assessment and Recommendations   Kristie Cornejo is a 51 year old female who was diagnosed with Plasmacytoma after she presented with left sided groin pain now found to have multiple myeloma after further workup with bone marrow biopsy.     # Multiple myeloma with associated large plasmacytoma of the L pelvis. Kappa light chain disease.     BMBx with 25% plasma cell infiltration; standard risk with gain of 11q, IGH-CCND1 fusion but no losses of 1q or TP53 and no gain of 1q.   SPEP w/out a monoclonal peak  UPEP positive. 70% paraprotein. Large monoclonal free immunoglobulin light chain of kappa chain type.     No anemia, normal creat, Ca, no other skeletal lesions. Alb normal, B2M normal.  Stage I Light chain disease with left pelvic bone pathologic fracture she is classified as having symptomatic multiple myeloma.     RVD C1D1 11/15/21 (had a slight delay in the start of her Revlimid on 11/19/21 due to the timing of her pregnancy tests).  She tolerated cycle 1 well with some mild nausea and a headache on her week off.  RVD C2D1  "12/6/21, tolerated well overall, but has had more consistent sensation of her hands and feet being hot. No tingling, pain, loss of sensation, or changes to her strength.  RVD C3D1 12/27/21, tolerated well overall with ongoing sensation of \"hot\" hands and feet  RVD C4 D1 1/17/22, elizabeth added on D8 due to inadequate response of urinary M-spike and FLCs remaining over 100 mg/dL after 3 cycles of RVD  Elizabeth-RVD C5 2/7/22 Changing to 21 day cycles due to insurance.  Will receive Rev Days 1-14, Velcade days 1,4,8,11, and elizabeth days 1,8,15.    Elizabeth-RVD C6 today (2/28/22)    Pending good response, therapy may include autologous stem cell transplant.     - Return to clinic to see Dr. Reinoso on 3/23 to determine next steps based on her response after this cycle.  Will draw labs on Monday 3/21 so that results area available at the time of the appointment.  - Continue monthly pregnancy tests while on Revlimid   - Planning for 6 cycles Elizabeth-RVD followed by consideration of autoHSCT. BMT consult was completed 12/23/21    #Bone Health  She will continue monthly Zometa (last received on 2/2222). Next dose due 3/22/22. She will continue Calcium/Vitamin D supplements.     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 Currently having no pain and able to walk 1 mile without any assistive devices.  She is interested in pursuing other activities like gentle cross-country skiing.  Had follow-up with Dr. Philip on 1/6/22 and was cleared to resume regular activity as tolerated. She should still refrain from high-impact activities.    # PVCs  She had an ECHO 12/1 and had follow-up with cardiology 12/7 (notes in CareEverywhere).  She had follow-up with cardiology here at the  and they recommend an ablation. This was denied by her insurance because she needed a stress test and evidence of " trying medical intervention.  Stress test was completed and she was started on Toprol XL; however, Toprol XL was discontinued due to significant side effects of orthostatic hypotension, pre-syncopal episodes, shakiness, and weakness.  She now has the ablation scheduled 3/23/22, and cardiology will pursue insurance approval again.    # Nausea  Mild, managed with Compazine up to twice daily and lorazepam occasionally at night.     #COVID prophylaxis  - She received EVUSHELD 1/20/22.  - Received 3 COVID vaccines to date    #Diarrhea  She has continued to have intermittent diarrhea with associated abdominal cramping and nausea.    -C.diff PCR negative 2/8/22  - Okay to manage with Imodium, 2 tabs with the first loose stool and 1 tab with each subsequent stool, up to a total of 8 tabs daily.    # Reflux  She reported episodes of reflux with bile, especially when she lays down at night.  She has started to elevate her head on pillows and is also trying to avoid eating within 1-2 hours of going to bed.  Omeprazole was increased to 20 mg twice daily with symptom improvement.    47 minutes spent on the date of the encounter doing chart review, review of test results, patient visit and documentation     DORON Hall [facility]  [address] [place] [address] [phone]

## 2022-02-28 NOTE — PATIENT INSTRUCTIONS
Contact Numbers  [facility]: [phone] (for symptom and scheduling needs)    Please call the [facility] Triage line if you experience a temperature greater than or equal to 100.4, shaking chills, have uncontrolled nausea, vomiting and/or diarrhea, dizziness, shortness of breath, chest pain, bleeding, unexplained bruising, or if you have any other new/concerning symptoms, questions or concerns.     If you are having any concerning symptoms or wish to speak to a provider before your next infusion visit, please call your care coordinator or triage to notify them so we can adequately serve you.     If you need a refill on a narcotic prescription or other medication, please call triage before your infusion appointment.          February 2022 Sunday Monday Tuesday Wednesday Thursday Friday Saturday             1    LAB PERIPHERAL  12:45 PM   (15 min.)   [facility] LAB DRAW   [facility]    RETURN   1:00 PM   (45 min.)   Jackie Ferris APRN CNP   [facility]    ONC INFUSION 1 HR (60 MIN)   2:30 PM   (60 min.)    ONC INFUSION NURSE   [facility] 2    LAB   1:00 PM   (15 min.)   [facility] LAB   [facility] 3     4    PRE-PROCEDURE COVID PCR   9:30 AM   (15 min.)   [facility] COVID LAB   [facility] 5       6     7    LAB PERIPHERAL   1:00 PM   (15 min.)   [facility] LAB DRAW   [facility]    RETURN   1:15 PM   (45 min.)   Jackie Ferris APRN CNP   [facility]    ONC INFUSION 1 HR (60 MIN)   2:30 PM   (60 min.)    ONC INFUSION NURSE   [facility]    LAB   4:15 PM   (15 min.)    LAB   [facility] [place] 8     9    ECHO STRESS TEST   7:45 AM   (60 min.)   [id number]   [facility] 10    BMT INFUSION 1 HR (60 MIN)   3:00  PM   (60 min.)   [facility] BMT INFUSION NURSE   [facility] Trans[facility] [place] 11     12       13     14    LAB PERIPHERAL   2:00 PM   (15 min.)   [facility] D[facility]   [facility]    ONC INFUSION 1 HR (60 MIN)   2:30 PM   (60 min.)   UC ONC INFUSION NURSE   [facility] 15     16     17    ONC INFUSION 1 HR (60 MIN)   3:00 PM   (60 min.)   UC ONC INFUSION NURSE   [facility] 18     19       20     21    LAB PERIPHERAL   2:30 PM   (15 min.)   [facility] D[facility]   [facility]    ONC INFUSION 1 HR (60 MIN)   3:00 PM   (60 min.)    ONC INFUSION NURSE   [facility] 22    ONC INFUSION 1 HR (60 MIN)  10:30 AM   (60 min.)    ONC INFUSION NURSE   [facility] 23    ONC INFUSION 2 HR (120 MIN)   3:30 PM   (120 min.)    ONC INFUSION NURSE   [facility] 24    SPEC INFUSION 2 HR (120 MIN)  12:00 PM   (120 min.)   UC SIPC INFUSION NURSE   [facility] 25     26       27     28    LAB PERIPHERAL   2:00 PM   (15 min.)    [facility] DRAW   [facility]    RETURN   2:15 PM   (45 min.)   Jackie Ferris APRN CNP   [facility]    ONC INFUSION 1 HR (60 MIN)   3:30 PM   (60 min.)    ONC INFUSION NURSE   [facility]                                      March 2022 Sunday Monday Tuesday Wednesday Thursday Friday Saturday             1     2     3     4    LAB PERIPHERAL   1:30 PM   (15 min.)   [facility]   [facility]    ONC INFUSION 1 HR (60 MIN)   2:00 PM   (60 min.)    ONC INFUSION NURSE   [facility] 5       6     7     8    LAB PERIPHERAL   8:30 AM   (15 min.)   [facility]   [facility]    ONC INFUSION 1 HR (60 MIN)   9:00 AM   (60 min.)   UC ONC INFUSION NURSE   [facility] 9     10    LAB PERIPHERAL  10:00 AM   (15 min.)   [facility] D[facility]   [facility]    ONC INFUSION 1 HR (60 MIN)  10:30 AM   (60 min.)   UC ONC INFUSION NURSE   [facility] 11     12       13     14    LAB PERIPHERAL   1:30 PM   (15 min.)   [facility]   [facility]    ONC INFUSION 1 HR (60 MIN)   2:00 PM   (60 min.)   UC ONC INFUSION NURSE   [facility] 15     16     17     18     19       20     21     22     23    LAB  10:30 AM   (15 min.)   [facility] LAB GOLD WAITING   [facility]    PROCEDURE - 1.5 HR  11:00 AM   (90 min.)   U2A ROOM 2   [facility] [address] [place]    EP ABLATION PVC  12:30 PM   Alem Thomas MD   [facility] HEART CARDIAC CATH LAB 24     25     26       27     28     29     30     31                               Lab Results:  Recent Results (from the past 12 hour(s))   Comprehensive metabolic panel    Collection Time: 02/28/22  2:23 PM   Result Value Ref Range    Sodium 143 133 - 144 mmol/L    Potassium 3.8 3.4 - 5.3 mmol/L    Chloride 108 94 - 109 mmol/L    Carbon Dioxide (CO2) 25 20 - 32 mmol/L    Anion Gap 10 3 - 14 mmol/L    Urea Nitrogen 20 7 - 30 mg/dL    Creatinine 0.88 0.52 - 1.04 mg/dL    Calcium 8.8 8.5 - 10.1 mg/dL    Glucose 106 (H) 70 - 99 mg/dL    Alkaline Phosphatase 64 40 - 150 U/L    AST 11 0 - 45 U/L    ALT 31 0 - 50 U/L    Protein Total 6.6 (L) 6.8 - 8.8 g/dL    Albumin 3.8 3.4 - 5.0 g/dL    Bilirubin Total 0.6 0.2 - 1.3 mg/dL    GFR Estimate 79 >60 mL/min/1.73m2   HCG quantitative pregnancy    Collection Time: 02/28/22  2:23 PM   Result Value Ref Range    hCG Quantitative <1 0 - 5 IU/L   CBC with platelets and differential    Collection Time: 02/28/22  2:23 PM   Result Value Ref Range    WBC  Count 4.4 4.0 - 11.0 10e3/uL    RBC Count 4.06 3.80 - 5.20 10e6/uL    Hemoglobin 12.9 11.7 - 15.7 g/dL    Hematocrit 37.4 35.0 - 47.0 %    MCV 92 78 - 100 fL    MCH 31.8 26.5 - 33.0 pg    MCHC 34.5 31.5 - 36.5 g/dL    RDW 14.6 10.0 - 15.0 %    Platelet Count 243 150 - 450 10e3/uL    % Neutrophils 56 %    % Lymphocytes 18 %    % Monocytes 22 %    % Eosinophils 4 %    % Basophils 0 %    % Immature Granulocytes 0 %    NRBCs per 100 WBC 0 <1 /100    Absolute Neutrophils 2.5 1.6 - 8.3 10e3/uL    Absolute Lymphocytes 0.8 0.8 - 5.3 10e3/uL    Absolute Monocytes 1.0 0.0 - 1.3 10e3/uL    Absolute Eosinophils 0.2 0.0 - 0.7 10e3/uL    Absolute Basophils 0.0 0.0 - 0.2 10e3/uL    Absolute Immature Granulocytes 0.0 <=0.4 10e3/uL    Absolute NRBCs 0.0 10e3/uL   RBC and Platelet Morphology    Collection Time: 02/28/22  2:23 PM   Result Value Ref Range    Platelet Assessment  Automated Count Confirmed. Platelet morphology is normal.     Automated Count Confirmed. Platelet morphology is normal.    [place] Cells Slight (A) None Seen    Elliptocytes Moderate (A) None Seen    RBC Morphology Confirmed RBC Indices

## 2022-03-01 LAB
KAPPA LC FREE SER-MCNC: 50.31 MG/DL (ref 0.33–1.94)
KAPPA LC FREE/LAMBDA FREE SER NEPH: 173.48 {RATIO} (ref 0.26–1.65)
LAMBDA LC FREE SERPL-MCNC: 0.29 MG/DL (ref 0.57–2.63)

## 2022-03-03 ENCOUNTER — INFUSION THERAPY VISIT (OUTPATIENT)
Dept: ONCOLOGY | Facility: CLINIC | Age: 52
End: 2022-03-03
Payer: COMMERCIAL

## 2022-03-03 VITALS
DIASTOLIC BLOOD PRESSURE: 81 MMHG | RESPIRATION RATE: 16 BRPM | HEART RATE: 85 BPM | OXYGEN SATURATION: 96 % | SYSTOLIC BLOOD PRESSURE: 127 MMHG | TEMPERATURE: 98.6 F

## 2022-03-03 DIAGNOSIS — C90.00 LIGHT CHAIN MYELOMA (H): Primary | ICD-10-CM

## 2022-03-03 PROCEDURE — 96401 CHEMO ANTI-NEOPL SQ/IM: CPT

## 2022-03-03 PROCEDURE — 250N000011 HC RX IP 250 OP 636: Mod: JW

## 2022-03-03 RX ADMIN — BORTEZOMIB 2.5 MG: 3.5 INJECTION, POWDER, LYOPHILIZED, FOR SOLUTION INTRAVENOUS; SUBCUTANEOUS at 13:47

## 2022-03-03 NOTE — PROGRESS NOTES
Infusion Nursing Note:  Kristie Cornejo presents today for C3D4 Velcade injection.    Patient seen by provider today: No   present during visit today: Not Applicable.    Note: Pt denied any new changes or concerns since she received her last treatment on Monday. She reports diarrhea and nausea today that is her baseline after receiving her daratumumab injection.       Intravenous Access:  No Intravenous access/labs at this visit.    Treatment Conditions:  Not Applicable.      Post Infusion Assessment:  Patient tolerated velcade injection to LLQ without incident.  No evidence of extravasations.       Discharge Plan:   Patient declined prescription refills.  Discharge instructions reviewed with: Patient.  Patient and/or family verbalized understanding of discharge instructions and all questions answered.  AVS to patient via Saber SevenT.  Patient will return 3/8 for next appointment.   Patient discharged in stable condition accompanied by: self.  Departure Mode: Ambulatory.  Face to Face time: 0.      Kristie Sharp RN

## 2022-03-03 NOTE — PATIENT INSTRUCTIONS
[facility] Triage and after hours / weekends / holidays:  [phone]    Please call the triage or after hours line if you experience a temperature greater than or equal to 100.4, shaking chills, have uncontrolled nausea, vomiting and/or diarrhea, dizziness, shortness of breath, chest pain, bleeding, unexplained bruising, or if you have any other new/concerning symptoms, questions or concerns.      If you are having any concerning symptoms or wish to speak to a provider before your next infusion visit, please call your care coordinator or triage to notify them so we can adequately serve you.     If you need a refill on a narcotic prescription or other medication, please call before your infusion appointment.

## 2022-03-08 ENCOUNTER — APPOINTMENT (OUTPATIENT)
Dept: LAB | Facility: CLINIC | Age: 52
End: 2022-03-08
Payer: COMMERCIAL

## 2022-03-08 ENCOUNTER — INFUSION THERAPY VISIT (OUTPATIENT)
Dept: ONCOLOGY | Facility: CLINIC | Age: 52
End: 2022-03-08
Attending: ADVANCED PRACTICE MIDWIFE
Payer: COMMERCIAL

## 2022-03-08 VITALS
TEMPERATURE: 97.7 F | BODY MASS INDEX: 25.1 KG/M2 | WEIGHT: 163.7 LBS | DIASTOLIC BLOOD PRESSURE: 67 MMHG | SYSTOLIC BLOOD PRESSURE: 135 MMHG | RESPIRATION RATE: 16 BRPM | HEART RATE: 78 BPM | OXYGEN SATURATION: 99 %

## 2022-03-08 DIAGNOSIS — E86.0 DEHYDRATION: ICD-10-CM

## 2022-03-08 DIAGNOSIS — C90.00 LIGHT CHAIN MYELOMA (H): Primary | ICD-10-CM

## 2022-03-08 LAB
BASOPHILS # BLD AUTO: 0 10E3/UL (ref 0–0.2)
BASOPHILS NFR BLD AUTO: 0 %
EOSINOPHIL # BLD AUTO: 0.2 10E3/UL (ref 0–0.7)
EOSINOPHIL NFR BLD AUTO: 5 %
ERYTHROCYTE [DISTWIDTH] IN BLOOD BY AUTOMATED COUNT: 14.6 % (ref 10–15)
HCT VFR BLD AUTO: 36.6 % (ref 35–47)
HGB BLD-MCNC: 12.3 G/DL (ref 11.7–15.7)
HOLD SPECIMEN: NORMAL
IMM GRANULOCYTES # BLD: 0 10E3/UL
IMM GRANULOCYTES NFR BLD: 1 %
LYMPHOCYTES # BLD AUTO: 0.3 10E3/UL (ref 0.8–5.3)
LYMPHOCYTES NFR BLD AUTO: 5 %
MCH RBC QN AUTO: 31.4 PG (ref 26.5–33)
MCHC RBC AUTO-ENTMCNC: 33.6 G/DL (ref 31.5–36.5)
MCV RBC AUTO: 93 FL (ref 78–100)
MONOCYTES # BLD AUTO: 0.4 10E3/UL (ref 0–1.3)
MONOCYTES NFR BLD AUTO: 8 %
NEUTROPHILS # BLD AUTO: 4.3 10E3/UL (ref 1.6–8.3)
NEUTROPHILS NFR BLD AUTO: 81 %
NRBC # BLD AUTO: 0 10E3/UL
NRBC BLD AUTO-RTO: 0 /100
PLATELET # BLD AUTO: 158 10E3/UL (ref 150–450)
RBC # BLD AUTO: 3.92 10E6/UL (ref 3.8–5.2)
WBC # BLD AUTO: 5.3 10E3/UL (ref 4–11)

## 2022-03-08 PROCEDURE — 250N000011 HC RX IP 250 OP 636

## 2022-03-08 PROCEDURE — 85025 COMPLETE CBC W/AUTO DIFF WBC: CPT

## 2022-03-08 PROCEDURE — 250N000011 HC RX IP 250 OP 636: Performed by: REGISTERED NURSE

## 2022-03-08 PROCEDURE — M0220 HC INJECTION TIXAGEVIMAB & CILGAVIMAB (EVUSHELD): HCPCS

## 2022-03-08 PROCEDURE — 96401 CHEMO ANTI-NEOPL SQ/IM: CPT

## 2022-03-08 PROCEDURE — 258N000003 HC RX IP 258 OP 636: Performed by: REGISTERED NURSE

## 2022-03-08 PROCEDURE — 96372 THER/PROPH/DIAG INJ SC/IM: CPT | Performed by: REGISTERED NURSE

## 2022-03-08 PROCEDURE — 96360 HYDRATION IV INFUSION INIT: CPT

## 2022-03-08 PROCEDURE — 36415 COLL VENOUS BLD VENIPUNCTURE: CPT

## 2022-03-08 RX ORDER — ALBUTEROL SULFATE 90 UG/1
1-2 AEROSOL, METERED RESPIRATORY (INHALATION)
Status: CANCELLED
Start: 2022-03-08

## 2022-03-08 RX ORDER — HEPARIN SODIUM (PORCINE) LOCK FLUSH IV SOLN 100 UNIT/ML 100 UNIT/ML
5 SOLUTION INTRAVENOUS
Status: CANCELLED | OUTPATIENT
Start: 2022-03-08

## 2022-03-08 RX ORDER — DIPHENHYDRAMINE HYDROCHLORIDE 50 MG/ML
50 INJECTION INTRAMUSCULAR; INTRAVENOUS
Status: CANCELLED
Start: 2022-03-08

## 2022-03-08 RX ORDER — EPINEPHRINE 1 MG/ML
0.3 INJECTION, SOLUTION INTRAMUSCULAR; SUBCUTANEOUS EVERY 5 MIN PRN
Status: CANCELLED | OUTPATIENT
Start: 2022-03-08

## 2022-03-08 RX ORDER — HEPARIN SODIUM,PORCINE 10 UNIT/ML
5 VIAL (ML) INTRAVENOUS
Status: CANCELLED | OUTPATIENT
Start: 2022-03-08

## 2022-03-08 RX ORDER — ALBUTEROL SULFATE 0.83 MG/ML
2.5 SOLUTION RESPIRATORY (INHALATION)
Status: CANCELLED | OUTPATIENT
Start: 2022-03-08

## 2022-03-08 RX ORDER — METHYLPREDNISOLONE SODIUM SUCCINATE 125 MG/2ML
125 INJECTION, POWDER, LYOPHILIZED, FOR SOLUTION INTRAMUSCULAR; INTRAVENOUS
Status: CANCELLED
Start: 2022-03-08

## 2022-03-08 RX ORDER — DEXAMETHASONE 4 MG/1
20 TABLET ORAL ONCE
Status: COMPLETED | OUTPATIENT
Start: 2022-03-08 | End: 2022-03-08

## 2022-03-08 RX ORDER — NALOXONE HYDROCHLORIDE 0.4 MG/ML
0.2 INJECTION, SOLUTION INTRAMUSCULAR; INTRAVENOUS; SUBCUTANEOUS
Status: CANCELLED | OUTPATIENT
Start: 2022-03-08

## 2022-03-08 RX ORDER — MEPERIDINE HYDROCHLORIDE 25 MG/ML
25 INJECTION INTRAMUSCULAR; INTRAVENOUS; SUBCUTANEOUS EVERY 30 MIN PRN
Status: CANCELLED | OUTPATIENT
Start: 2022-03-08

## 2022-03-08 RX ADMIN — DEXAMETHASONE 20 MG: 4 TABLET ORAL at 09:37

## 2022-03-08 RX ADMIN — DARATUMUMAB AND HYALURONIDASE-FIHJ (HUMAN RECOMBINANT) 1800 MG: 1800; 30000 INJECTION SUBCUTANEOUS at 10:29

## 2022-03-08 RX ADMIN — SODIUM CHLORIDE 1000 ML: 9 INJECTION, SOLUTION INTRAVENOUS at 09:49

## 2022-03-08 RX ADMIN — BORTEZOMIB 2.5 MG: 3.5 INJECTION, POWDER, LYOPHILIZED, FOR SOLUTION INTRAVENOUS; SUBCUTANEOUS at 10:29

## 2022-03-08 RX ADMIN — Medication: at 10:21

## 2022-03-08 ASSESSMENT — PAIN SCALES - GENERAL: PAINLEVEL: MILD PAIN (3)

## 2022-03-08 NOTE — NURSING NOTE
Chief Complaint   Patient presents with     Blood Draw     Labs drawn from  in lab by RN. VS taken.      Labs collected from venipuncture by RN. JIC green tube sent to 1st floor lab. Vitals taken. Checked in for appointment(s).  Hunter Pérez RN

## 2022-03-08 NOTE — PROGRESS NOTES
Infusion Nursing Note:  Kristie Cornejo presents today for cycle 3 day 8 darzalex faspro, velcade, IV fluids, evusheld re-dose.    Patient seen by provider today: No   present during visit today: Not Applicable.    Note:   Patient reports feeling dizzy when up. She has not consumed as many fluids the last couple days due to everything tasting bad.     Patient complaining of mid abdominal pain, typically after eating that resolves within 5-10 minutes. She reports it is a cramping/gas-like pain. This is not new today, she has had it for about 2 weeks.    She denies changes in SOB, energy level, fevers, chills, chest pain, nausea, and changes in bowel movements.    TORB: Jackie Ferris CNP/Azucena Tafoya RN at 0929 on 3/8/22: ok to give IV fluids per therapy plan.    EVUSHELD Administration Note:  Consent:   Informed Consent confirmed in chart, obtained on this date: 1/20/22    Post Injection Assessment:   Patient tolerated injection into bilateral ventrogluteal without incident.      Patient was observed in the room for 60 minute observation period.       Intravenous Access:  Peripheral IV placed.    Treatment Conditions:  Lab Results   Component Value Date    HGB 12.3 03/08/2022    WBC 5.3 03/08/2022    ANEU 3.2 01/17/2022    ANEUTAUTO 4.3 03/08/2022     03/08/2022      Results reviewed, labs MET treatment parameters, ok to proceed with treatment.      Post Infusion Assessment:  Patient tolerated infusion without incident.  Patient tolerated darzalex faspro injection into right lower abdomen without incident.  Patient tolerated velcade injection into left upper abdomen without incident.  Blood return noted pre and post infusion.  Site patent and intact, free from redness, edema or discomfort.  No evidence of extravasations.  Access discontinued per protocol.       Discharge Plan:   Prescription refills given for acyclovir.  Discharge instructions reviewed with: Patient.  Patient and/or family  verbalized understanding of discharge instructions and all questions answered.  AVS to patient via [employer] NetworxT.  Patient will return 3/11 for next appointment.   Patient discharged in stable condition accompanied by: self.  Departure Mode: Ambulatory.      Azucean Tafoya RN

## 2022-03-08 NOTE — PATIENT INSTRUCTIONS
[facility] Triage and after hours / weekends / holidays:  [phone]    Please call the triage or after hours line if you experience a temperature greater than or equal to 100.5, shaking chills, have uncontrolled nausea, vomiting and/or diarrhea, dizziness, shortness of breath, chest pain, bleeding, unexplained bruising, or if you have any other new/concerning symptoms, questions or concerns.      If you are having any concerning symptoms or wish to speak to a provider before your next infusion visit, please call your care coordinator or triage to notify them so we can adequately serve you.     If you need a refill on a narcotic prescription or other medication, please call before your infusion appointment.       March 2022 Sunday Monday Tuesday Wednesday Thursday Friday Saturday             1     2     3    ONC INFUSION 1 HR (60 MIN)   1:00 PM   (60 min.)    ONC INFUSION NURSE   [facility] 4     5       6     7     8    LAB PERIPHERAL   8:30 AM   (15 min.)   [facility] LAB DRAW   [facility]    ONC INFUSION 1 HR (60 MIN)   9:00 AM   (60 min.)    ONC INFUSION NURSE   [facility] 9     10     11    ONC INFUSION 1 HR (60 MIN)   7:00 AM   (60 min.)    ONC INFUSION NURSE   [facility] 12       13     14    LAB PERIPHERAL   1:30 PM   (15 min.)   [facility] D[facility]   [facility]    ONC INFUSION 1 HR (60 MIN)   2:00 PM   (60 min.)    ONC INFUSION NURSE   [facility] 15     16     17     18     19       20     21    LAB PERIPHERAL   9:00 AM   (15 min.)   [facility] D[facility]   [facility] 22     23    LAB PERIPHERAL   9:30 AM   (15 min.)   [facility] D[facility]   [facility]    RETURN   9:45 AM   (30 min.)   Payton Reinoso MD   [facility] Blood and Marrow Transplant [facility]  [place]    PROCEDURE - 1.5 HR  11:00 AM   (90 min.)   U2A ROOM 2   [facility] [address] [place]    EP ABLATION PVC  12:30 PM   Alem Thomas MD    HEART CARDIAC CATH LAB 24     25 26       27     28     29     30     31 April 2022 Sunday Monday Tuesday Wednesday Thursday Friday Saturday                            1     2       3     4     5     6     7     8     9       10     11     12     13     14     15     16       17     18     19     20     21     22     23       24     25     26     27     28  Happy Birthday!     29     30                    Recent Results (from the past 24 hour(s))   CBC with platelets and differential    Collection Time: 03/08/22  8:46 AM   Result Value Ref Range    WBC Count 5.3 4.0 - 11.0 10e3/uL    RBC Count 3.92 3.80 - 5.20 10e6/uL    Hemoglobin 12.3 11.7 - 15.7 g/dL    Hematocrit 36.6 35.0 - 47.0 %    MCV 93 78 - 100 fL    MCH 31.4 26.5 - 33.0 pg    MCHC 33.6 31.5 - 36.5 g/dL    RDW 14.6 10.0 - 15.0 %    Platelet Count 158 150 - 450 10e3/uL    % Neutrophils 81 %    % Lymphocytes 5 %    % Monocytes 8 %    % Eosinophils 5 %    % Basophils 0 %    % Immature Granulocytes 1 %    NRBCs per 100 WBC 0 <1 /100    Absolute Neutrophils 4.3 1.6 - 8.3 10e3/uL    Absolute Lymphocytes 0.3 (L) 0.8 - 5.3 10e3/uL    Absolute Monocytes 0.4 0.0 - 1.3 10e3/uL    Absolute Eosinophils 0.2 0.0 - 0.7 10e3/uL    Absolute Basophils 0.0 0.0 - 0.2 10e3/uL    Absolute Immature Granulocytes 0.0 <=0.4 10e3/uL    Absolute NRBCs 0.0 10e3/uL   Extra Green Top (Lithium Heparin) Tube    Collection Time: 03/08/22  8:52 AM   Result Value Ref Range    Hold Specimen [facility]

## 2022-03-10 DIAGNOSIS — C90.00 LIGHT CHAIN MYELOMA (H): Primary | ICD-10-CM

## 2022-03-10 RX ORDER — DIPHENHYDRAMINE HYDROCHLORIDE 50 MG/ML
50 INJECTION INTRAMUSCULAR; INTRAVENOUS
Status: CANCELLED
Start: 2022-03-31

## 2022-03-10 RX ORDER — MEPERIDINE HYDROCHLORIDE 25 MG/ML
25 INJECTION INTRAMUSCULAR; INTRAVENOUS; SUBCUTANEOUS EVERY 30 MIN PRN
Status: CANCELLED | OUTPATIENT
Start: 2022-04-11

## 2022-03-10 RX ORDER — EPINEPHRINE 1 MG/ML
0.3 INJECTION, SOLUTION INTRAMUSCULAR; SUBCUTANEOUS EVERY 5 MIN PRN
Status: CANCELLED | OUTPATIENT
Start: 2022-04-07

## 2022-03-10 RX ORDER — METHYLPREDNISOLONE SODIUM SUCCINATE 125 MG/2ML
125 INJECTION, POWDER, LYOPHILIZED, FOR SOLUTION INTRAMUSCULAR; INTRAVENOUS
Status: CANCELLED
Start: 2022-04-11

## 2022-03-10 RX ORDER — NALOXONE HYDROCHLORIDE 0.4 MG/ML
0.2 INJECTION, SOLUTION INTRAMUSCULAR; INTRAVENOUS; SUBCUTANEOUS
Status: CANCELLED | OUTPATIENT
Start: 2022-04-11

## 2022-03-10 RX ORDER — DIPHENHYDRAMINE HCL 25 MG
50 CAPSULE ORAL
Status: CANCELLED | OUTPATIENT
Start: 2022-03-28

## 2022-03-10 RX ORDER — NALOXONE HYDROCHLORIDE 0.4 MG/ML
0.2 INJECTION, SOLUTION INTRAMUSCULAR; INTRAVENOUS; SUBCUTANEOUS
Status: CANCELLED | OUTPATIENT
Start: 2022-03-31

## 2022-03-10 RX ORDER — NALOXONE HYDROCHLORIDE 0.4 MG/ML
0.2 INJECTION, SOLUTION INTRAMUSCULAR; INTRAVENOUS; SUBCUTANEOUS
Status: CANCELLED | OUTPATIENT
Start: 2022-03-28

## 2022-03-10 RX ORDER — HEPARIN SODIUM (PORCINE) LOCK FLUSH IV SOLN 100 UNIT/ML 100 UNIT/ML
5 SOLUTION INTRAVENOUS
Status: CANCELLED | OUTPATIENT
Start: 2022-03-31

## 2022-03-10 RX ORDER — NALOXONE HYDROCHLORIDE 0.4 MG/ML
0.2 INJECTION, SOLUTION INTRAMUSCULAR; INTRAVENOUS; SUBCUTANEOUS
Status: CANCELLED | OUTPATIENT
Start: 2022-04-07

## 2022-03-10 RX ORDER — MEPERIDINE HYDROCHLORIDE 25 MG/ML
25 INJECTION INTRAMUSCULAR; INTRAVENOUS; SUBCUTANEOUS EVERY 30 MIN PRN
Status: CANCELLED | OUTPATIENT
Start: 2022-04-07

## 2022-03-10 RX ORDER — ALBUTEROL SULFATE 90 UG/1
1-2 AEROSOL, METERED RESPIRATORY (INHALATION)
Status: CANCELLED
Start: 2022-04-07

## 2022-03-10 RX ORDER — EPINEPHRINE 1 MG/ML
0.3 INJECTION, SOLUTION INTRAMUSCULAR; SUBCUTANEOUS EVERY 5 MIN PRN
Status: CANCELLED | OUTPATIENT
Start: 2022-04-11

## 2022-03-10 RX ORDER — DIPHENHYDRAMINE HCL 25 MG
50 CAPSULE ORAL
Status: CANCELLED | OUTPATIENT
Start: 2022-04-11

## 2022-03-10 RX ORDER — ALBUTEROL SULFATE 0.83 MG/ML
2.5 SOLUTION RESPIRATORY (INHALATION)
Status: CANCELLED | OUTPATIENT
Start: 2022-04-07

## 2022-03-10 RX ORDER — ALBUTEROL SULFATE 0.83 MG/ML
2.5 SOLUTION RESPIRATORY (INHALATION)
Status: CANCELLED | OUTPATIENT
Start: 2022-03-31

## 2022-03-10 RX ORDER — DEXAMETHASONE 4 MG/1
20 TABLET ORAL ONCE
Status: CANCELLED | OUTPATIENT
Start: 2022-04-04

## 2022-03-10 RX ORDER — HEPARIN SODIUM,PORCINE 10 UNIT/ML
5 VIAL (ML) INTRAVENOUS
Status: CANCELLED | OUTPATIENT
Start: 2022-03-31

## 2022-03-10 RX ORDER — METHYLPREDNISOLONE SODIUM SUCCINATE 125 MG/2ML
125 INJECTION, POWDER, LYOPHILIZED, FOR SOLUTION INTRAMUSCULAR; INTRAVENOUS
Status: CANCELLED
Start: 2022-04-07

## 2022-03-10 RX ORDER — ALBUTEROL SULFATE 0.83 MG/ML
2.5 SOLUTION RESPIRATORY (INHALATION)
Status: CANCELLED | OUTPATIENT
Start: 2022-03-28

## 2022-03-10 RX ORDER — EPINEPHRINE 1 MG/ML
0.3 INJECTION, SOLUTION INTRAMUSCULAR; SUBCUTANEOUS EVERY 5 MIN PRN
Status: CANCELLED | OUTPATIENT
Start: 2022-03-28

## 2022-03-10 RX ORDER — DIPHENHYDRAMINE HYDROCHLORIDE 50 MG/ML
50 INJECTION INTRAMUSCULAR; INTRAVENOUS
Status: CANCELLED
Start: 2022-04-07

## 2022-03-10 RX ORDER — MEPERIDINE HYDROCHLORIDE 25 MG/ML
25 INJECTION INTRAMUSCULAR; INTRAVENOUS; SUBCUTANEOUS EVERY 30 MIN PRN
Status: CANCELLED | OUTPATIENT
Start: 2022-03-28

## 2022-03-10 RX ORDER — METHYLPREDNISOLONE SODIUM SUCCINATE 125 MG/2ML
125 INJECTION, POWDER, LYOPHILIZED, FOR SOLUTION INTRAMUSCULAR; INTRAVENOUS
Status: CANCELLED
Start: 2022-03-31

## 2022-03-10 RX ORDER — ALBUTEROL SULFATE 90 UG/1
1-2 AEROSOL, METERED RESPIRATORY (INHALATION)
Status: CANCELLED
Start: 2022-03-28

## 2022-03-10 RX ORDER — MEPERIDINE HYDROCHLORIDE 25 MG/ML
25 INJECTION INTRAMUSCULAR; INTRAVENOUS; SUBCUTANEOUS EVERY 30 MIN PRN
Status: CANCELLED | OUTPATIENT
Start: 2022-03-31

## 2022-03-10 RX ORDER — HEPARIN SODIUM,PORCINE 10 UNIT/ML
5 VIAL (ML) INTRAVENOUS
Status: CANCELLED | OUTPATIENT
Start: 2022-03-28

## 2022-03-10 RX ORDER — EPINEPHRINE 1 MG/ML
0.3 INJECTION, SOLUTION INTRAMUSCULAR; SUBCUTANEOUS EVERY 5 MIN PRN
Status: CANCELLED | OUTPATIENT
Start: 2022-03-31

## 2022-03-10 RX ORDER — HEPARIN SODIUM (PORCINE) LOCK FLUSH IV SOLN 100 UNIT/ML 100 UNIT/ML
5 SOLUTION INTRAVENOUS
Status: CANCELLED | OUTPATIENT
Start: 2022-03-28

## 2022-03-10 RX ORDER — LORAZEPAM 2 MG/ML
0.5 INJECTION INTRAMUSCULAR EVERY 4 HOURS PRN
Status: CANCELLED | OUTPATIENT
Start: 2022-03-28

## 2022-03-10 RX ORDER — HEPARIN SODIUM,PORCINE 10 UNIT/ML
5 VIAL (ML) INTRAVENOUS
Status: CANCELLED | OUTPATIENT
Start: 2022-04-07

## 2022-03-10 RX ORDER — DIPHENHYDRAMINE HYDROCHLORIDE 50 MG/ML
50 INJECTION INTRAMUSCULAR; INTRAVENOUS
Status: CANCELLED
Start: 2022-03-28

## 2022-03-10 RX ORDER — LORAZEPAM 2 MG/ML
0.5 INJECTION INTRAMUSCULAR EVERY 4 HOURS PRN
Status: CANCELLED | OUTPATIENT
Start: 2022-04-11

## 2022-03-10 RX ORDER — HEPARIN SODIUM (PORCINE) LOCK FLUSH IV SOLN 100 UNIT/ML 100 UNIT/ML
5 SOLUTION INTRAVENOUS
Status: CANCELLED | OUTPATIENT
Start: 2022-04-07

## 2022-03-10 RX ORDER — ACETAMINOPHEN 325 MG/1
650 TABLET ORAL
Status: CANCELLED | OUTPATIENT
Start: 2022-03-28

## 2022-03-10 RX ORDER — ALBUTEROL SULFATE 0.83 MG/ML
2.5 SOLUTION RESPIRATORY (INHALATION)
Status: CANCELLED | OUTPATIENT
Start: 2022-04-11

## 2022-03-10 RX ORDER — HEPARIN SODIUM (PORCINE) LOCK FLUSH IV SOLN 100 UNIT/ML 100 UNIT/ML
5 SOLUTION INTRAVENOUS
Status: CANCELLED | OUTPATIENT
Start: 2022-04-11

## 2022-03-10 RX ORDER — DIPHENHYDRAMINE HYDROCHLORIDE 50 MG/ML
50 INJECTION INTRAMUSCULAR; INTRAVENOUS
Status: CANCELLED
Start: 2022-04-11

## 2022-03-10 RX ORDER — DEXAMETHASONE 4 MG/1
20 TABLET ORAL ONCE
Status: CANCELLED | OUTPATIENT
Start: 2022-03-28

## 2022-03-10 RX ORDER — ACETAMINOPHEN 325 MG/1
650 TABLET ORAL
Status: CANCELLED | OUTPATIENT
Start: 2022-04-11

## 2022-03-10 RX ORDER — METHYLPREDNISOLONE SODIUM SUCCINATE 125 MG/2ML
125 INJECTION, POWDER, LYOPHILIZED, FOR SOLUTION INTRAMUSCULAR; INTRAVENOUS
Status: CANCELLED
Start: 2022-03-28

## 2022-03-10 RX ORDER — HEPARIN SODIUM,PORCINE 10 UNIT/ML
5 VIAL (ML) INTRAVENOUS
Status: CANCELLED | OUTPATIENT
Start: 2022-04-11

## 2022-03-10 RX ORDER — ALBUTEROL SULFATE 90 UG/1
1-2 AEROSOL, METERED RESPIRATORY (INHALATION)
Status: CANCELLED
Start: 2022-03-31

## 2022-03-10 RX ORDER — ALBUTEROL SULFATE 90 UG/1
1-2 AEROSOL, METERED RESPIRATORY (INHALATION)
Status: CANCELLED
Start: 2022-04-11

## 2022-03-10 RX ORDER — DEXAMETHASONE 4 MG/1
20 TABLET ORAL ONCE
Status: CANCELLED | OUTPATIENT
Start: 2022-04-11

## 2022-03-11 ENCOUNTER — INFUSION THERAPY VISIT (OUTPATIENT)
Dept: ONCOLOGY | Facility: CLINIC | Age: 52
End: 2022-03-11
Payer: COMMERCIAL

## 2022-03-11 VITALS
OXYGEN SATURATION: 99 % | HEART RATE: 80 BPM | SYSTOLIC BLOOD PRESSURE: 117 MMHG | RESPIRATION RATE: 16 BRPM | TEMPERATURE: 97.5 F | DIASTOLIC BLOOD PRESSURE: 69 MMHG

## 2022-03-11 DIAGNOSIS — E86.0 DEHYDRATION: ICD-10-CM

## 2022-03-11 DIAGNOSIS — C90.00 LIGHT CHAIN MYELOMA (H): Primary | ICD-10-CM

## 2022-03-11 PROCEDURE — 96360 HYDRATION IV INFUSION INIT: CPT

## 2022-03-11 PROCEDURE — 96401 CHEMO ANTI-NEOPL SQ/IM: CPT

## 2022-03-11 PROCEDURE — 258N000003 HC RX IP 258 OP 636: Performed by: REGISTERED NURSE

## 2022-03-11 PROCEDURE — 250N000011 HC RX IP 250 OP 636: Mod: JW

## 2022-03-11 RX ORDER — DIPHENHYDRAMINE HYDROCHLORIDE 50 MG/ML
50 INJECTION INTRAMUSCULAR; INTRAVENOUS
Status: CANCELLED
Start: 2022-03-11

## 2022-03-11 RX ORDER — ALBUTEROL SULFATE 90 UG/1
1-2 AEROSOL, METERED RESPIRATORY (INHALATION)
Status: CANCELLED
Start: 2022-03-11

## 2022-03-11 RX ORDER — METHYLPREDNISOLONE SODIUM SUCCINATE 125 MG/2ML
125 INJECTION, POWDER, LYOPHILIZED, FOR SOLUTION INTRAMUSCULAR; INTRAVENOUS
Status: CANCELLED
Start: 2022-03-11

## 2022-03-11 RX ORDER — NALOXONE HYDROCHLORIDE 0.4 MG/ML
0.2 INJECTION, SOLUTION INTRAMUSCULAR; INTRAVENOUS; SUBCUTANEOUS
Status: CANCELLED | OUTPATIENT
Start: 2022-03-11

## 2022-03-11 RX ORDER — HEPARIN SODIUM (PORCINE) LOCK FLUSH IV SOLN 100 UNIT/ML 100 UNIT/ML
5 SOLUTION INTRAVENOUS
Status: CANCELLED | OUTPATIENT
Start: 2022-03-11

## 2022-03-11 RX ORDER — MEPERIDINE HYDROCHLORIDE 25 MG/ML
25 INJECTION INTRAMUSCULAR; INTRAVENOUS; SUBCUTANEOUS EVERY 30 MIN PRN
Status: CANCELLED | OUTPATIENT
Start: 2022-03-11

## 2022-03-11 RX ORDER — ALBUTEROL SULFATE 0.83 MG/ML
2.5 SOLUTION RESPIRATORY (INHALATION)
Status: CANCELLED | OUTPATIENT
Start: 2022-03-11

## 2022-03-11 RX ORDER — HEPARIN SODIUM,PORCINE 10 UNIT/ML
5 VIAL (ML) INTRAVENOUS
Status: CANCELLED | OUTPATIENT
Start: 2022-03-11

## 2022-03-11 RX ORDER — EPINEPHRINE 1 MG/ML
0.3 INJECTION, SOLUTION INTRAMUSCULAR; SUBCUTANEOUS EVERY 5 MIN PRN
Status: CANCELLED | OUTPATIENT
Start: 2022-03-11

## 2022-03-11 RX ADMIN — BORTEZOMIB 2.5 MG: 3.5 INJECTION, POWDER, LYOPHILIZED, FOR SOLUTION INTRAVENOUS; SUBCUTANEOUS at 08:06

## 2022-03-11 RX ADMIN — SODIUM CHLORIDE 1000 ML: 9 INJECTION, SOLUTION INTRAVENOUS at 07:21

## 2022-03-11 NOTE — PATIENT INSTRUCTIONS
[facility] Triage and after hours / weekends / holidays:  [phone]    Please call the triage or after hours line if you experience a temperature greater than or equal to 100.4, shaking chills, have uncontrolled nausea, vomiting and/or diarrhea, dizziness, shortness of breath, chest pain, bleeding, unexplained bruising, or if you have any other new/concerning symptoms, questions or concerns.      If you are having any concerning symptoms or wish to speak to a provider before your next infusion visit, please call your care coordinator or triage to notify them so we can adequately serve you.     If you need a refill on a narcotic prescription or other medication, please call before your infusion appointment.                 March 2022 Sunday Monday Tuesday Wednesday Thursday Friday Saturday             1     2     3    ONC INFUSION 1 HR (60 MIN)   1:00 PM   (60 min.)    ONC INFUSION NURSE   [facility] 4     5       6     7     8    LAB PERIPHERAL   8:30 AM   (15 min.)   [facility] LAB DRAW   [facility]    ONC INFUSION 1 HR (60 MIN)   9:00 AM   (60 min.)    ONC INFUSION NURSE   [facility] 9     10     11    ONC INFUSION 1 HR (60 MIN)   7:00 AM   (60 min.)    ONC INFUSION NURSE   [facility] 12       13     14    LAB PERIPHERAL   1:30 PM   (15 min.)   [facility]   [facility]    ONC INFUSION 1 HR (60 MIN)   2:00 PM   (60 min.)    ONC INFUSION NURSE   [facility] 15     16     17     18     19       20     21    LAB PERIPHERAL   9:00 AM   (15 min.)   [facility] D[facility]   [facility] 22     23    LAB PERIPHERAL   9:30 AM   (15 min.)   [facility] D[facility]   [facility]    RETURN   9:45 AM   (30 min.)   Payton Reinoso MD   [facility] Blood and Marrow Transplant  Program [place]    PROCEDURE - 1.5 HR  11:00 AM   (90 min.)   U2A ROOM 2   [facility] [address] [place]    EP ABLATION PVC  12:30 PM   Alem Thomas MD    HEART CARDIAC CATH LAB 24     25     26       27     28     29     30     31                           April 2022 Sunday Monday Tuesday Wednesday Thursday Friday Saturday                            1     2       3     4     5     6     7     8     9       10     11     12     13     14     15     16       17     18     19     20     21     22     23       24     25     26     27     28  Happy Birthday!     29     30                  No results found for this or any previous visit (from the past 24 hour(s)).

## 2022-03-11 NOTE — PROGRESS NOTES
"Infusion Nursing Note:  Kristie Cornejo presents today for Cycle 3 Day 11 Velcade and IVFs.    Patient seen by provider today: No    Note: Patient presents to the infusion center today c/o dizziness, nausea and poor appetite. States she has not vomited but things just don't taste good. Denies falling or any syncopal episodes. Requesting her IVFs to \"get her through the wknd\" as she is going out of town. Denies any pain, fevers, chills or chest pain. Baseline SOB with exertion.     Writer confirmed patient has and is taking her Dexamethasone and Revlimid as RX'd.     Intravenous Access:  Peripheral IV placed.    Treatment Conditions:  Not Applicable.    Post Infusion Assessment:  Patient tolerated infusion without incident.  Patient tolerated Velcade injection in Right lower ABD without incident.  Blood return noted pre and post infusion.  Site patent and intact, free from redness, edema or discomfort.  No evidence of extravasations.  Access discontinued per protocol.     Discharge Plan:   Patient declined prescription refills.  Discharge instructions reviewed with: Patient.  Patient and/or family verbalized understanding of discharge instructions and all questions answered.  AVS to patient via YoopayT.  Patient will return 3/14 for next appointment.   Patient discharged in stable condition accompanied by: self.  Departure Mode: Ambulatory.      Cheryl Harmon RN                    "

## 2022-03-14 ENCOUNTER — INFUSION THERAPY VISIT (OUTPATIENT)
Dept: ONCOLOGY | Facility: CLINIC | Age: 52
End: 2022-03-14
Payer: COMMERCIAL

## 2022-03-14 ENCOUNTER — APPOINTMENT (OUTPATIENT)
Dept: LAB | Facility: CLINIC | Age: 52
End: 2022-03-14
Payer: COMMERCIAL

## 2022-03-14 VITALS
TEMPERATURE: 98.8 F | OXYGEN SATURATION: 97 % | WEIGHT: 161 LBS | RESPIRATION RATE: 20 BRPM | BODY MASS INDEX: 24.69 KG/M2 | HEART RATE: 77 BPM | DIASTOLIC BLOOD PRESSURE: 62 MMHG | SYSTOLIC BLOOD PRESSURE: 102 MMHG

## 2022-03-14 DIAGNOSIS — C90.00 LIGHT CHAIN MYELOMA (H): Primary | ICD-10-CM

## 2022-03-14 DIAGNOSIS — E86.0 DEHYDRATION: ICD-10-CM

## 2022-03-14 LAB
ALBUMIN SERPL-MCNC: 3.3 G/DL (ref 3.4–5)
ALP SERPL-CCNC: 48 U/L (ref 40–150)
ALT SERPL W P-5'-P-CCNC: 20 U/L (ref 0–50)
ANION GAP SERPL CALCULATED.3IONS-SCNC: 9 MMOL/L (ref 3–14)
AST SERPL W P-5'-P-CCNC: 5 U/L (ref 0–45)
BASOPHILS # BLD AUTO: 0 10E3/UL (ref 0–0.2)
BASOPHILS NFR BLD AUTO: 0 %
BILIRUB SERPL-MCNC: 0.5 MG/DL (ref 0.2–1.3)
BUN SERPL-MCNC: 16 MG/DL (ref 7–30)
CALCIUM SERPL-MCNC: 8.8 MG/DL (ref 8.5–10.1)
CHLORIDE BLD-SCNC: 111 MMOL/L (ref 94–109)
CO2 SERPL-SCNC: 22 MMOL/L (ref 20–32)
CREAT SERPL-MCNC: 0.9 MG/DL (ref 0.52–1.04)
EOSINOPHIL # BLD AUTO: 0.2 10E3/UL (ref 0–0.7)
EOSINOPHIL NFR BLD AUTO: 7 %
ERYTHROCYTE [DISTWIDTH] IN BLOOD BY AUTOMATED COUNT: 14.7 % (ref 10–15)
GFR SERPL CREATININE-BSD FRML MDRD: 77 ML/MIN/1.73M2
GLUCOSE BLD-MCNC: 156 MG/DL (ref 70–99)
HCT VFR BLD AUTO: 36.4 % (ref 35–47)
HGB BLD-MCNC: 12.3 G/DL (ref 11.7–15.7)
IMM GRANULOCYTES # BLD: 0 10E3/UL
IMM GRANULOCYTES NFR BLD: 1 %
LYMPHOCYTES # BLD AUTO: 0.3 10E3/UL (ref 0.8–5.3)
LYMPHOCYTES NFR BLD AUTO: 12 %
MCH RBC QN AUTO: 31.5 PG (ref 26.5–33)
MCHC RBC AUTO-ENTMCNC: 33.8 G/DL (ref 31.5–36.5)
MCV RBC AUTO: 93 FL (ref 78–100)
MONOCYTES # BLD AUTO: 0.7 10E3/UL (ref 0–1.3)
MONOCYTES NFR BLD AUTO: 25 %
NEUTROPHILS # BLD AUTO: 1.5 10E3/UL (ref 1.6–8.3)
NEUTROPHILS NFR BLD AUTO: 55 %
NRBC # BLD AUTO: 0 10E3/UL
NRBC BLD AUTO-RTO: 0 /100
PLAT MORPH BLD: NORMAL
PLATELET # BLD AUTO: 139 10E3/UL (ref 150–450)
POTASSIUM BLD-SCNC: 3 MMOL/L (ref 3.4–5.3)
PROT SERPL-MCNC: 6.1 G/DL (ref 6.8–8.8)
RBC # BLD AUTO: 3.9 10E6/UL (ref 3.8–5.2)
RBC MORPH BLD: NORMAL
SODIUM SERPL-SCNC: 142 MMOL/L (ref 133–144)
WBC # BLD AUTO: 2.7 10E3/UL (ref 4–11)

## 2022-03-14 PROCEDURE — 36415 COLL VENOUS BLD VENIPUNCTURE: CPT

## 2022-03-14 PROCEDURE — 85014 HEMATOCRIT: CPT

## 2022-03-14 PROCEDURE — 258N000003 HC RX IP 258 OP 636: Performed by: REGISTERED NURSE

## 2022-03-14 PROCEDURE — 250N000013 HC RX MED GY IP 250 OP 250 PS 637: Performed by: REGISTERED NURSE

## 2022-03-14 PROCEDURE — 96401 CHEMO ANTI-NEOPL SQ/IM: CPT

## 2022-03-14 PROCEDURE — 80053 COMPREHEN METABOLIC PANEL: CPT | Performed by: REGISTERED NURSE

## 2022-03-14 PROCEDURE — 36415 COLL VENOUS BLD VENIPUNCTURE: CPT | Performed by: REGISTERED NURSE

## 2022-03-14 PROCEDURE — 250N000011 HC RX IP 250 OP 636

## 2022-03-14 PROCEDURE — 96360 HYDRATION IV INFUSION INIT: CPT

## 2022-03-14 PROCEDURE — 82040 ASSAY OF SERUM ALBUMIN: CPT | Performed by: REGISTERED NURSE

## 2022-03-14 RX ORDER — EPINEPHRINE 1 MG/ML
0.3 INJECTION, SOLUTION INTRAMUSCULAR; SUBCUTANEOUS EVERY 5 MIN PRN
Status: CANCELLED | OUTPATIENT
Start: 2022-04-19

## 2022-03-14 RX ORDER — HEPARIN SODIUM,PORCINE 10 UNIT/ML
5 VIAL (ML) INTRAVENOUS
Status: CANCELLED | OUTPATIENT
Start: 2022-05-03

## 2022-03-14 RX ORDER — METHYLPREDNISOLONE SODIUM SUCCINATE 125 MG/2ML
125 INJECTION, POWDER, LYOPHILIZED, FOR SOLUTION INTRAMUSCULAR; INTRAVENOUS
Status: CANCELLED
Start: 2022-04-21

## 2022-03-14 RX ORDER — ALBUTEROL SULFATE 0.83 MG/ML
2.5 SOLUTION RESPIRATORY (INHALATION)
Status: CANCELLED | OUTPATIENT
Start: 2022-03-14

## 2022-03-14 RX ORDER — NALOXONE HYDROCHLORIDE 0.4 MG/ML
0.2 INJECTION, SOLUTION INTRAMUSCULAR; INTRAVENOUS; SUBCUTANEOUS
Status: CANCELLED | OUTPATIENT
Start: 2022-05-03

## 2022-03-14 RX ORDER — DIPHENHYDRAMINE HYDROCHLORIDE 50 MG/ML
50 INJECTION INTRAMUSCULAR; INTRAVENOUS
Status: CANCELLED
Start: 2022-04-21

## 2022-03-14 RX ORDER — ACETAMINOPHEN 325 MG/1
650 TABLET ORAL
Status: CANCELLED | OUTPATIENT
Start: 2022-04-19

## 2022-03-14 RX ORDER — POTASSIUM CHLORIDE 1500 MG/1
40 TABLET, EXTENDED RELEASE ORAL ONCE
Status: COMPLETED | OUTPATIENT
Start: 2022-03-14 | End: 2022-03-14

## 2022-03-14 RX ORDER — DIPHENHYDRAMINE HYDROCHLORIDE 50 MG/ML
50 INJECTION INTRAMUSCULAR; INTRAVENOUS
Status: CANCELLED
Start: 2022-04-28

## 2022-03-14 RX ORDER — HEPARIN SODIUM,PORCINE 10 UNIT/ML
5 VIAL (ML) INTRAVENOUS
Status: CANCELLED | OUTPATIENT
Start: 2022-04-19

## 2022-03-14 RX ORDER — ALBUTEROL SULFATE 90 UG/1
1-2 AEROSOL, METERED RESPIRATORY (INHALATION)
Status: CANCELLED
Start: 2022-03-14

## 2022-03-14 RX ORDER — ALBUTEROL SULFATE 0.83 MG/ML
2.5 SOLUTION RESPIRATORY (INHALATION)
Status: CANCELLED | OUTPATIENT
Start: 2022-04-21

## 2022-03-14 RX ORDER — METHYLPREDNISOLONE SODIUM SUCCINATE 125 MG/2ML
125 INJECTION, POWDER, LYOPHILIZED, FOR SOLUTION INTRAMUSCULAR; INTRAVENOUS
Status: CANCELLED
Start: 2022-03-14

## 2022-03-14 RX ORDER — DEXAMETHASONE 4 MG/1
20 TABLET ORAL ONCE
Status: CANCELLED | OUTPATIENT
Start: 2022-04-19

## 2022-03-14 RX ORDER — DIPHENHYDRAMINE HYDROCHLORIDE 50 MG/ML
50 INJECTION INTRAMUSCULAR; INTRAVENOUS
Status: CANCELLED
Start: 2022-05-03

## 2022-03-14 RX ORDER — ACETAMINOPHEN 325 MG/1
650 TABLET ORAL
Status: CANCELLED | OUTPATIENT
Start: 2022-05-03

## 2022-03-14 RX ORDER — HEPARIN SODIUM (PORCINE) LOCK FLUSH IV SOLN 100 UNIT/ML 100 UNIT/ML
5 SOLUTION INTRAVENOUS
Status: CANCELLED | OUTPATIENT
Start: 2022-03-14

## 2022-03-14 RX ORDER — ALBUTEROL SULFATE 90 UG/1
1-2 AEROSOL, METERED RESPIRATORY (INHALATION)
Status: CANCELLED
Start: 2022-04-28

## 2022-03-14 RX ORDER — LORAZEPAM 2 MG/ML
0.5 INJECTION INTRAMUSCULAR EVERY 4 HOURS PRN
Status: CANCELLED | OUTPATIENT
Start: 2022-05-03

## 2022-03-14 RX ORDER — HEPARIN SODIUM (PORCINE) LOCK FLUSH IV SOLN 100 UNIT/ML 100 UNIT/ML
5 SOLUTION INTRAVENOUS
Status: CANCELLED | OUTPATIENT
Start: 2022-04-21

## 2022-03-14 RX ORDER — EPINEPHRINE 1 MG/ML
0.3 INJECTION, SOLUTION INTRAMUSCULAR; SUBCUTANEOUS EVERY 5 MIN PRN
Status: CANCELLED | OUTPATIENT
Start: 2022-04-28

## 2022-03-14 RX ORDER — DEXAMETHASONE 4 MG/1
20 TABLET ORAL ONCE
Status: COMPLETED | OUTPATIENT
Start: 2022-03-14 | End: 2022-03-14

## 2022-03-14 RX ORDER — METHYLPREDNISOLONE SODIUM SUCCINATE 125 MG/2ML
125 INJECTION, POWDER, LYOPHILIZED, FOR SOLUTION INTRAMUSCULAR; INTRAVENOUS
Status: CANCELLED
Start: 2022-05-03

## 2022-03-14 RX ORDER — NALOXONE HYDROCHLORIDE 0.4 MG/ML
0.2 INJECTION, SOLUTION INTRAMUSCULAR; INTRAVENOUS; SUBCUTANEOUS
Status: CANCELLED | OUTPATIENT
Start: 2022-04-21

## 2022-03-14 RX ORDER — MEPERIDINE HYDROCHLORIDE 25 MG/ML
25 INJECTION INTRAMUSCULAR; INTRAVENOUS; SUBCUTANEOUS EVERY 30 MIN PRN
Status: CANCELLED | OUTPATIENT
Start: 2022-04-28

## 2022-03-14 RX ORDER — ALBUTEROL SULFATE 90 UG/1
1-2 AEROSOL, METERED RESPIRATORY (INHALATION)
Status: CANCELLED
Start: 2022-05-03

## 2022-03-14 RX ORDER — HEPARIN SODIUM,PORCINE 10 UNIT/ML
5 VIAL (ML) INTRAVENOUS
Status: CANCELLED | OUTPATIENT
Start: 2022-04-28

## 2022-03-14 RX ORDER — HEPARIN SODIUM (PORCINE) LOCK FLUSH IV SOLN 100 UNIT/ML 100 UNIT/ML
5 SOLUTION INTRAVENOUS
Status: CANCELLED | OUTPATIENT
Start: 2022-05-03

## 2022-03-14 RX ORDER — DEXAMETHASONE 4 MG/1
20 TABLET ORAL ONCE
Status: CANCELLED | OUTPATIENT
Start: 2022-05-03

## 2022-03-14 RX ORDER — ALBUTEROL SULFATE 0.83 MG/ML
2.5 SOLUTION RESPIRATORY (INHALATION)
Status: CANCELLED | OUTPATIENT
Start: 2022-04-19

## 2022-03-14 RX ORDER — METHYLPREDNISOLONE SODIUM SUCCINATE 125 MG/2ML
125 INJECTION, POWDER, LYOPHILIZED, FOR SOLUTION INTRAMUSCULAR; INTRAVENOUS
Status: CANCELLED
Start: 2022-04-19

## 2022-03-14 RX ORDER — HEPARIN SODIUM,PORCINE 10 UNIT/ML
5 VIAL (ML) INTRAVENOUS
Status: CANCELLED | OUTPATIENT
Start: 2022-03-14

## 2022-03-14 RX ORDER — HEPARIN SODIUM (PORCINE) LOCK FLUSH IV SOLN 100 UNIT/ML 100 UNIT/ML
5 SOLUTION INTRAVENOUS
Status: CANCELLED | OUTPATIENT
Start: 2022-04-19

## 2022-03-14 RX ORDER — NALOXONE HYDROCHLORIDE 0.4 MG/ML
0.2 INJECTION, SOLUTION INTRAMUSCULAR; INTRAVENOUS; SUBCUTANEOUS
Status: CANCELLED | OUTPATIENT
Start: 2022-03-14

## 2022-03-14 RX ORDER — MEPERIDINE HYDROCHLORIDE 25 MG/ML
25 INJECTION INTRAMUSCULAR; INTRAVENOUS; SUBCUTANEOUS EVERY 30 MIN PRN
Status: CANCELLED | OUTPATIENT
Start: 2022-04-21

## 2022-03-14 RX ORDER — EPINEPHRINE 1 MG/ML
0.3 INJECTION, SOLUTION INTRAMUSCULAR; SUBCUTANEOUS EVERY 5 MIN PRN
Status: CANCELLED | OUTPATIENT
Start: 2022-03-14

## 2022-03-14 RX ORDER — ALBUTEROL SULFATE 90 UG/1
1-2 AEROSOL, METERED RESPIRATORY (INHALATION)
Status: CANCELLED
Start: 2022-04-21

## 2022-03-14 RX ORDER — DIPHENHYDRAMINE HYDROCHLORIDE 50 MG/ML
50 INJECTION INTRAMUSCULAR; INTRAVENOUS
Status: CANCELLED
Start: 2022-04-19

## 2022-03-14 RX ORDER — LORAZEPAM 2 MG/ML
0.5 INJECTION INTRAMUSCULAR EVERY 4 HOURS PRN
Status: CANCELLED | OUTPATIENT
Start: 2022-04-19

## 2022-03-14 RX ORDER — DIPHENHYDRAMINE HCL 25 MG
50 CAPSULE ORAL
Status: CANCELLED | OUTPATIENT
Start: 2022-05-03

## 2022-03-14 RX ORDER — MEPERIDINE HYDROCHLORIDE 25 MG/ML
25 INJECTION INTRAMUSCULAR; INTRAVENOUS; SUBCUTANEOUS EVERY 30 MIN PRN
Status: CANCELLED | OUTPATIENT
Start: 2022-04-19

## 2022-03-14 RX ORDER — METHYLPREDNISOLONE SODIUM SUCCINATE 125 MG/2ML
125 INJECTION, POWDER, LYOPHILIZED, FOR SOLUTION INTRAMUSCULAR; INTRAVENOUS
Status: CANCELLED
Start: 2022-04-28

## 2022-03-14 RX ORDER — EPINEPHRINE 1 MG/ML
0.3 INJECTION, SOLUTION INTRAMUSCULAR; SUBCUTANEOUS EVERY 5 MIN PRN
Status: CANCELLED | OUTPATIENT
Start: 2022-05-03

## 2022-03-14 RX ORDER — HEPARIN SODIUM (PORCINE) LOCK FLUSH IV SOLN 100 UNIT/ML 100 UNIT/ML
5 SOLUTION INTRAVENOUS
Status: CANCELLED | OUTPATIENT
Start: 2022-04-28

## 2022-03-14 RX ORDER — DIPHENHYDRAMINE HCL 25 MG
50 CAPSULE ORAL
Status: CANCELLED | OUTPATIENT
Start: 2022-04-19

## 2022-03-14 RX ORDER — HEPARIN SODIUM,PORCINE 10 UNIT/ML
5 VIAL (ML) INTRAVENOUS
Status: CANCELLED | OUTPATIENT
Start: 2022-04-21

## 2022-03-14 RX ORDER — NALOXONE HYDROCHLORIDE 0.4 MG/ML
0.2 INJECTION, SOLUTION INTRAMUSCULAR; INTRAVENOUS; SUBCUTANEOUS
Status: CANCELLED | OUTPATIENT
Start: 2022-04-19

## 2022-03-14 RX ORDER — MEPERIDINE HYDROCHLORIDE 25 MG/ML
25 INJECTION INTRAMUSCULAR; INTRAVENOUS; SUBCUTANEOUS EVERY 30 MIN PRN
Status: CANCELLED | OUTPATIENT
Start: 2022-03-14

## 2022-03-14 RX ORDER — MEPERIDINE HYDROCHLORIDE 25 MG/ML
25 INJECTION INTRAMUSCULAR; INTRAVENOUS; SUBCUTANEOUS EVERY 30 MIN PRN
Status: CANCELLED | OUTPATIENT
Start: 2022-05-03

## 2022-03-14 RX ORDER — DEXAMETHASONE 4 MG/1
20 TABLET ORAL ONCE
Status: CANCELLED | OUTPATIENT
Start: 2022-04-25

## 2022-03-14 RX ORDER — NALOXONE HYDROCHLORIDE 0.4 MG/ML
0.2 INJECTION, SOLUTION INTRAMUSCULAR; INTRAVENOUS; SUBCUTANEOUS
Status: CANCELLED | OUTPATIENT
Start: 2022-04-28

## 2022-03-14 RX ORDER — ALBUTEROL SULFATE 90 UG/1
1-2 AEROSOL, METERED RESPIRATORY (INHALATION)
Status: CANCELLED
Start: 2022-04-19

## 2022-03-14 RX ORDER — EPINEPHRINE 1 MG/ML
0.3 INJECTION, SOLUTION INTRAMUSCULAR; SUBCUTANEOUS EVERY 5 MIN PRN
Status: CANCELLED | OUTPATIENT
Start: 2022-04-21

## 2022-03-14 RX ORDER — ALBUTEROL SULFATE 0.83 MG/ML
2.5 SOLUTION RESPIRATORY (INHALATION)
Status: CANCELLED | OUTPATIENT
Start: 2022-04-28

## 2022-03-14 RX ORDER — DIPHENHYDRAMINE HYDROCHLORIDE 50 MG/ML
50 INJECTION INTRAMUSCULAR; INTRAVENOUS
Status: CANCELLED
Start: 2022-03-14

## 2022-03-14 RX ORDER — ALBUTEROL SULFATE 0.83 MG/ML
2.5 SOLUTION RESPIRATORY (INHALATION)
Status: CANCELLED | OUTPATIENT
Start: 2022-05-03

## 2022-03-14 RX ADMIN — SODIUM CHLORIDE 1000 ML: 9 INJECTION, SOLUTION INTRAVENOUS at 14:26

## 2022-03-14 RX ADMIN — DARATUMUMAB AND HYALURONIDASE-FIHJ (HUMAN RECOMBINANT) 1800 MG: 1800; 30000 INJECTION SUBCUTANEOUS at 15:33

## 2022-03-14 RX ADMIN — POTASSIUM CHLORIDE 40 MEQ: 1500 TABLET, EXTENDED RELEASE ORAL at 15:39

## 2022-03-14 RX ADMIN — DEXAMETHASONE 20 MG: 4 TABLET ORAL at 14:53

## 2022-03-14 ASSESSMENT — PAIN SCALES - GENERAL: PAINLEVEL: NO PAIN (1)

## 2022-03-14 NOTE — NURSING NOTE
Chief Complaint   Patient presents with     Blood Draw     Labs drawn via piv by rn in lab. VS taken.     Labs drawn from PIV placed by RN. Line flushed with saline. Vitals taken. Pt checked in for appointment(s).    Omega Nair RN

## 2022-03-14 NOTE — PROGRESS NOTES
Infusion Nursing Note:  Kristie Cornejo presents today for Cycle 3 Day 15 Darzalex Faspro.    Patient seen by provider today: No   present during visit today: Not Applicable.    Note: Patient presents to infusion today doing okay. Reports that she had diarrhea over the weekend, about 6-7 episodes per day. Took imodium as ordered, has only had one episode of diarrhea today. Feels she is not eating and drinking as much as she should be. Knows to push fluids at home. Slight dizziness upon standing. No fevers, chills, chest pains or cough. Ongoing SOB with exertion. Mild abdominal pain rating 1/10, this is ongoing and not new per patient. Wondering if she can receive IV fluids today. No CMP drawn today. Jackie Ferris NP notified.    TORB @ 3467 Jackie Ferris NP/ Karlene Maldonado, RN:  - OK to give IV fluids  - Draw CMP  - Visit with Dr. Reinoso next week to look at labs before starting next cycle    1L of IV fluids given per therapy plan. Potassium low at 3.0 today. Jackie Ferris NP notified.    TORB @ 4806 Jackie Ferris NP/ Karlene Maldonado, RN:  - Replace potassium per protocol    40mEq of oral potassium given per electrolyte replacement protocol. Patient aware to call triage if she continues to have diarrhea or troubles eating and drinking.     Intravenous Access:  Peripheral IV placed.    Treatment Conditions:  Lab Results   Component Value Date    HGB 12.3 03/14/2022    WBC 2.7 (L) 03/14/2022    ANEU 3.2 01/17/2022    ANEUTAUTO 1.5 (L) 03/14/2022     (L) 03/14/2022      Lab Results   Component Value Date     03/14/2022    POTASSIUM 3.0 (L) 03/14/2022    CR 0.90 03/14/2022    MEKA 8.8 03/14/2022    BILITOTAL 0.5 03/14/2022    ALBUMIN 3.3 (L) 03/14/2022    ALT 20 03/14/2022    AST 5 03/14/2022     Results reviewed, labs MET treatment parameters, ok to proceed with treatment.    Post Infusion Assessment:  Patient tolerated infusion without incident.  Patient tolerated Darzalex Faspro injection to  Left Abdomen without incident.  Blood return noted pre and post infusion.  Site patent and intact, free from redness, edema or discomfort.  No evidence of extravasations.  Access discontinued per protocol.     Discharge Plan:   Patient declined prescription refills. Patient confirms she has supply of dexamethasone at home.   Discharge instructions reviewed with: Patient.  Patient and/or family verbalized understanding of discharge instructions and all questions answered.  AVS to patient via gridCommHART.  Patient will return 3/23 for next appointment with Dr. Reinoso.   Patient discharged in stable condition accompanied by: self.  Departure Mode: Ambulatory.      Karlene Maldonado RN

## 2022-03-14 NOTE — PATIENT INSTRUCTIONS
Contact Numbers  [facility]: [phone] (for symptom and scheduling needs)    Please call the [facility] Triage line if you experience a temperature greater than or equal to 100.4, shaking chills, have uncontrolled nausea, vomiting and/or diarrhea, dizziness, shortness of breath, chest pain, bleeding, unexplained bruising, or if you have any other new/concerning symptoms, questions or concerns.     If you are having any concerning symptoms or wish to speak to a provider before your next infusion visit, please call your care coordinator or triage to notify them so we can adequately serve you.     If you need a refill on a narcotic prescription or other medication, please call triage before your infusion appointment.          March 2022 Sunday Monday Tuesday Wednesday Thursday Friday Saturday             1     2     3    ONC INFUSION 1 HR (60 MIN)   1:00 PM   (60 min.)    ONC INFUSION NURSE   [facility] 4     5       6     7     8    LAB PERIPHERAL   8:30 AM   (15 min.)   [facility] LAB DRAW   [facility]    ONC INFUSION 1 HR (60 MIN)   9:00 AM   (60 min.)    ONC INFUSION NURSE   [facility] 9     10     11    ONC INFUSION 1 HR (60 MIN)   7:00 AM   (60 min.)    ONC INFUSION NURSE   [facility] 12       13     14    LAB PERIPHERAL   1:30 PM   (15 min.)   [facility] LAB DRAW   [facility]    ONC INFUSION 1 HR (60 MIN)   2:00 PM   (60 min.)    ONC INFUSION NURSE   [facility] 15     16     17     18     19       20     21    LAB PERIPHERAL   9:00 AM   (15 min.)   [facility] LAB DRAW   [facility] 22     23    LAB PERIPHERAL   9:30 AM   (15 min.)   [facility] LAB DRAW   [facility]    RETURN   9:45 AM   (30 min.)   Payton Reinoso MD   [facility] Blood and Marrow Transplant  Program [place]    PROCEDURE - 1.5 HR  11:00 AM   (90 min.)   U2A ROOM 2   [facility] [address] [place]    EP ABLATION PVC  12:30 PM   Alem Thomas MD    HEART CARDIAC CATH LAB 24 25 26 27 28 29     30     31 April 2022 Sunday Monday Tuesday Wednesday Thursday Friday Saturday                            1     2       3     4     5     6     7     8     9       10     11     12     13     14     15     16       17     18     19     20     21     22     23       24     25     26     27     28  Happy Birthday!     29 30                     Lab Results:  Recent Results (from the past 12 hour(s))   CBC with platelets and differential    Collection Time: 03/14/22  1:54 PM   Result Value Ref Range    WBC Count 2.7 (L) 4.0 - 11.0 10e3/uL    RBC Count 3.90 3.80 - 5.20 10e6/uL    Hemoglobin 12.3 11.7 - 15.7 g/dL    Hematocrit 36.4 35.0 - 47.0 %    MCV 93 78 - 100 fL    MCH 31.5 26.5 - 33.0 pg    MCHC 33.8 31.5 - 36.5 g/dL    RDW 14.7 10.0 - 15.0 %    Platelet Count 139 (L) 150 - 450 10e3/uL    % Neutrophils 55 %    % Lymphocytes 12 %    % Monocytes 25 %    % Eosinophils 7 %    % Basophils 0 %    % Immature Granulocytes 1 %    NRBCs per 100 WBC 0 <1 /100    Absolute Neutrophils 1.5 (L) 1.6 - 8.3 10e3/uL    Absolute Lymphocytes 0.3 (L) 0.8 - 5.3 10e3/uL    Absolute Monocytes 0.7 0.0 - 1.3 10e3/uL    Absolute Eosinophils 0.2 0.0 - 0.7 10e3/uL    Absolute Basophils 0.0 0.0 - 0.2 10e3/uL    Absolute Immature Granulocytes 0.0 <=0.4 10e3/uL    Absolute NRBCs 0.0 10e3/uL   RBC and Platelet Morphology    Collection Time: 03/14/22  1:54 PM   Result Value Ref Range    Platelet Assessment  Automated Count Confirmed. Platelet morphology is normal.     Automated Count Confirmed. Platelet morphology is normal.    RBC Morphology Confirmed RBC Indices    Comprehensive metabolic panel    Collection Time: 03/14/22  2:27 PM   Result Value Ref Range    Sodium 142 133 -  144 mmol/L    Potassium 3.0 (L) 3.4 - 5.3 mmol/L    Chloride 111 (H) 94 - 109 mmol/L    Carbon Dioxide (CO2) 22 20 - 32 mmol/L    Anion Gap 9 3 - 14 mmol/L    Urea Nitrogen 16 7 - 30 mg/dL    Creatinine 0.90 0.52 - 1.04 mg/dL    Calcium 8.8 8.5 - 10.1 mg/dL    Glucose 156 (H) 70 - 99 mg/dL    Alkaline Phosphatase 48 40 - 150 U/L    AST 5 0 - 45 U/L    ALT 20 0 - 50 U/L    Protein Total 6.1 (L) 6.8 - 8.8 g/dL    Albumin 3.3 (L) 3.4 - 5.0 g/dL    Bilirubin Total 0.5 0.2 - 1.3 mg/dL    GFR Estimate 77 >60 mL/min/1.73m2

## 2022-03-15 ENCOUNTER — PATIENT OUTREACH (OUTPATIENT)
Dept: ONCOLOGY | Facility: CLINIC | Age: 52
End: 2022-03-15
Payer: COMMERCIAL

## 2022-03-15 DIAGNOSIS — C90.00 LIGHT CHAIN MYELOMA (H): Primary | ICD-10-CM

## 2022-03-15 RX ORDER — LENALIDOMIDE 25 MG/1
25 CAPSULE ORAL DAILY
Qty: 14 CAPSULE | Refills: 0 | Status: SHIPPED | OUTPATIENT
Start: 2022-03-21 | End: 2022-04-11

## 2022-03-18 ENCOUNTER — PATIENT OUTREACH (OUTPATIENT)
Dept: ONCOLOGY | Facility: CLINIC | Age: 52
End: 2022-03-18
Payer: COMMERCIAL

## 2022-03-18 NOTE — PROGRESS NOTES
[facility]: Cancer Care Plan of Care Education Note                                    Discussion with Patient:                                                    INBOUND CALL: VM received from patient. Pt received a call vivi Mccormick about scheduling her for chemo next week. PT was under the impression that she had next week off and would not have chemo until the following week. Callback requested    OUTBOUND CALL: RNCC called patient. Confirmed she does not have chemo next week. Will plan to resume after labs on 3/21, visit with Dr. Reinoso on 3/23, and ablation on 3/23. Pt was on the waitlist for next week by mistake. RNCC will message scheduling regarding this.         Intervention/Education provided during outreach:                                                       RNCC will be in contact with patient next week after visit with Dr. Reinoso about restarting treatment. Plan for labs, visit with Dr. Reinoso, and Ablation next week. Patient verbalizes understanding of POC and has no other questions or concerns at this time.     Griselda Valdez, RN, MSN, OCN   RN Care Coordinator   [facility]   [address] [place] [id number] [phone]

## 2022-03-19 ENCOUNTER — TELEPHONE (OUTPATIENT)
Dept: CARDIOLOGY | Facility: CLINIC | Age: 52
End: 2022-03-19

## 2022-03-19 NOTE — TELEPHONE ENCOUNTER
He pt cancelled the 5/9/22 appointment with William. The cancellation note states that the pt will call Harini to reschedule.  I deferred the Zio patch order out a week.  When the pt is reschedule I will schedule the appointment for Zio patch mail out.  Rufino Marquez on 3/19/2022 at 2:30 PM

## 2022-03-21 ENCOUNTER — TELEPHONE (OUTPATIENT)
Dept: ONCOLOGY | Facility: CLINIC | Age: 52
End: 2022-03-21

## 2022-03-21 ENCOUNTER — LAB (OUTPATIENT)
Dept: LAB | Facility: CLINIC | Age: 52
End: 2022-03-21
Payer: COMMERCIAL

## 2022-03-21 DIAGNOSIS — Z11.59 ENCOUNTER FOR SCREENING FOR OTHER VIRAL DISEASES: ICD-10-CM

## 2022-03-21 DIAGNOSIS — C90.30 PLASMACYTOMA OF BONE (H): ICD-10-CM

## 2022-03-21 LAB
ALBUMIN SERPL-MCNC: 3.7 G/DL (ref 3.4–5)
ALP SERPL-CCNC: 52 U/L (ref 40–150)
ALT SERPL W P-5'-P-CCNC: 19 U/L (ref 0–50)
ANION GAP SERPL CALCULATED.3IONS-SCNC: 8 MMOL/L (ref 3–14)
AST SERPL W P-5'-P-CCNC: 10 U/L (ref 0–45)
BASOPHILS # BLD AUTO: 0 10E3/UL (ref 0–0.2)
BASOPHILS NFR BLD AUTO: 1 %
BILIRUB SERPL-MCNC: 0.5 MG/DL (ref 0.2–1.3)
BUN SERPL-MCNC: 28 MG/DL (ref 7–30)
CALCIUM SERPL-MCNC: 9.1 MG/DL (ref 8.5–10.1)
CHLORIDE BLD-SCNC: 110 MMOL/L (ref 94–109)
CO2 SERPL-SCNC: 25 MMOL/L (ref 20–32)
CREAT SERPL-MCNC: 0.84 MG/DL (ref 0.52–1.04)
EOSINOPHIL # BLD AUTO: 0.1 10E3/UL (ref 0–0.7)
EOSINOPHIL NFR BLD AUTO: 3 %
ERYTHROCYTE [DISTWIDTH] IN BLOOD BY AUTOMATED COUNT: 14.7 % (ref 10–15)
GFR SERPL CREATININE-BSD FRML MDRD: 84 ML/MIN/1.73M2
GLUCOSE BLD-MCNC: 122 MG/DL (ref 70–99)
HCG UR QL: NEGATIVE
HCT VFR BLD AUTO: 38 % (ref 35–47)
HGB BLD-MCNC: 12.6 G/DL (ref 11.7–15.7)
IGA SERPL-MCNC: 6 MG/DL (ref 84–499)
IGG SERPL-MCNC: 306 MG/DL (ref 610–1616)
IGM SERPL-MCNC: <10 MG/DL (ref 35–242)
IMM GRANULOCYTES # BLD: 0 10E3/UL
IMM GRANULOCYTES NFR BLD: 1 %
KAPPA LC FREE SER-MCNC: 45.89 MG/DL (ref 0.33–1.94)
KAPPA LC FREE/LAMBDA FREE SER NEPH: 183.56 {RATIO} (ref 0.26–1.65)
LAMBDA LC FREE SERPL-MCNC: 0.25 MG/DL (ref 0.57–2.63)
LYMPHOCYTES # BLD AUTO: 0.6 10E3/UL (ref 0.8–5.3)
LYMPHOCYTES NFR BLD AUTO: 16 %
MCH RBC QN AUTO: 31.7 PG (ref 26.5–33)
MCHC RBC AUTO-ENTMCNC: 33.2 G/DL (ref 31.5–36.5)
MCV RBC AUTO: 96 FL (ref 78–100)
MONOCYTES # BLD AUTO: 0.8 10E3/UL (ref 0–1.3)
MONOCYTES NFR BLD AUTO: 20 %
NEUTROPHILS # BLD AUTO: 2.3 10E3/UL (ref 1.6–8.3)
NEUTROPHILS NFR BLD AUTO: 59 %
NRBC # BLD AUTO: 0 10E3/UL
NRBC BLD AUTO-RTO: 0 /100
PLATELET # BLD AUTO: 250 10E3/UL (ref 150–450)
POTASSIUM BLD-SCNC: 3.8 MMOL/L (ref 3.4–5.3)
PROT SERPL-MCNC: 6.5 G/DL (ref 6.8–8.8)
RBC # BLD AUTO: 3.98 10E6/UL (ref 3.8–5.2)
SARS-COV-2 RNA RESP QL NAA+PROBE: NEGATIVE
SODIUM SERPL-SCNC: 143 MMOL/L (ref 133–144)
TOTAL PROTEIN SERUM FOR ELP: 6.1 G/DL (ref 6.8–8.8)
WBC # BLD AUTO: 3.9 10E3/UL (ref 4–11)

## 2022-03-21 PROCEDURE — 36415 COLL VENOUS BLD VENIPUNCTURE: CPT

## 2022-03-21 PROCEDURE — 81025 URINE PREGNANCY TEST: CPT

## 2022-03-21 PROCEDURE — 86334 IMMUNOFIX E-PHORESIS SERUM: CPT | Performed by: PATHOLOGY

## 2022-03-21 PROCEDURE — 83521 IG LIGHT CHAINS FREE EACH: CPT

## 2022-03-21 PROCEDURE — 84155 ASSAY OF PROTEIN SERUM: CPT

## 2022-03-21 PROCEDURE — 82784 ASSAY IGA/IGD/IGG/IGM EACH: CPT

## 2022-03-21 PROCEDURE — U0005 INFEC AGEN DETEC AMPLI PROBE: HCPCS

## 2022-03-21 PROCEDURE — 84165 PROTEIN E-PHORESIS SERUM: CPT | Mod: TC | Performed by: PATHOLOGY

## 2022-03-21 PROCEDURE — 80053 COMPREHEN METABOLIC PANEL: CPT

## 2022-03-21 PROCEDURE — 85004 AUTOMATED DIFF WBC COUNT: CPT

## 2022-03-21 NOTE — TELEPHONE ENCOUNTER
Oral Chemotherapy Monitoring Program    Medication: Revlimid  Rx:  25 mg PO daily for 14 days Days 1 through 14 for a 21 day cycle  Auth #: [id number]  Risk Category:Adult female WITH reproductive capacity.    Routine survey questions reviewed.    Anette Mcdermott  Oncology Pharmacy Liaison II  [email or web address]  Phone: [phone]  Fax: [phone]

## 2022-03-22 ENCOUNTER — LAB (OUTPATIENT)
Dept: LAB | Facility: CLINIC | Age: 52
End: 2022-03-22
Payer: COMMERCIAL

## 2022-03-22 ENCOUNTER — TELEPHONE (OUTPATIENT)
Dept: CARDIOLOGY | Facility: CLINIC | Age: 52
End: 2022-03-22

## 2022-03-22 DIAGNOSIS — C90.30 PLASMACYTOMA OF BONE (H): ICD-10-CM

## 2022-03-22 LAB
ALBUMIN SERPL ELPH-MCNC: 4.1 G/DL (ref 3.7–5.1)
ALPHA1 GLOB SERPL ELPH-MCNC: 0.3 G/DL (ref 0.2–0.4)
ALPHA2 GLOB SERPL ELPH-MCNC: 0.8 G/DL (ref 0.5–0.9)
B-GLOBULIN SERPL ELPH-MCNC: 0.6 G/DL (ref 0.6–1)
COLLECT DURATION TIME UR: 24 H
CREAT 24H UR-MRATE: 1.08 G/SPEC (ref 0.8–1.8)
CREAT UR-MCNC: 161 MG/DL
GAMMA GLOB SERPL ELPH-MCNC: 0.3 G/DL (ref 0.7–1.6)
M PROTEIN SERPL ELPH-MCNC: 0.1 G/DL
PROT 24H UR-MRATE: 0.65 G/SPEC (ref 0.04–0.23)
PROT PATTERN SERPL ELPH-IMP: ABNORMAL
PROT PATTERN SERPL IFE-IMP: NORMAL
PROT UR-MCNC: 0.98 G/L
PROT/CREAT 24H UR: 0.61 G/G CR (ref 0–0.2)
SPECIMEN VOL UR: 668 ML

## 2022-03-22 PROCEDURE — 86334 IMMUNOFIX E-PHORESIS SERUM: CPT | Mod: 26 | Performed by: PATHOLOGY

## 2022-03-22 PROCEDURE — 84166 PROTEIN E-PHORESIS/URINE/CSF: CPT | Performed by: PATHOLOGY

## 2022-03-22 PROCEDURE — 84165 PROTEIN E-PHORESIS SERUM: CPT | Mod: 26 | Performed by: PATHOLOGY

## 2022-03-22 PROCEDURE — 81050 URINALYSIS VOLUME MEASURE: CPT | Performed by: PATHOLOGY

## 2022-03-22 PROCEDURE — 84166 PROTEIN E-PHORESIS/URINE/CSF: CPT | Mod: 26 | Performed by: PATHOLOGY

## 2022-03-22 PROCEDURE — 84156 ASSAY OF PROTEIN URINE: CPT | Performed by: PATHOLOGY

## 2022-03-23 ENCOUNTER — APPOINTMENT (OUTPATIENT)
Dept: MEDSURG UNIT | Facility: CLINIC | Age: 52
End: 2022-03-23
Payer: COMMERCIAL

## 2022-03-23 ENCOUNTER — HOSPITAL ENCOUNTER (OUTPATIENT)
Facility: CLINIC | Age: 52
Discharge: HOME | End: 2022-03-23
Attending: INTERNAL MEDICINE | Admitting: INTERNAL MEDICINE
Payer: COMMERCIAL

## 2022-03-23 ENCOUNTER — OFFICE VISIT (OUTPATIENT)
Dept: TRANSPLANT | Facility: CLINIC | Age: 52
End: 2022-03-23
Payer: COMMERCIAL

## 2022-03-23 ENCOUNTER — APPOINTMENT (OUTPATIENT)
Dept: LAB | Facility: CLINIC | Age: 52
End: 2022-03-23
Payer: COMMERCIAL

## 2022-03-23 VITALS
RESPIRATION RATE: 18 BRPM | SYSTOLIC BLOOD PRESSURE: 131 MMHG | HEART RATE: 70 BPM | HEIGHT: 68 IN | DIASTOLIC BLOOD PRESSURE: 83 MMHG | OXYGEN SATURATION: 98 % | TEMPERATURE: 98.9 F | BODY MASS INDEX: 24.66 KG/M2

## 2022-03-23 VITALS
DIASTOLIC BLOOD PRESSURE: 75 MMHG | WEIGHT: 159.8 LBS | RESPIRATION RATE: 16 BRPM | BODY MASS INDEX: 24.5 KG/M2 | OXYGEN SATURATION: 97 % | TEMPERATURE: 98.1 F | SYSTOLIC BLOOD PRESSURE: 115 MMHG | HEART RATE: 66 BPM

## 2022-03-23 DIAGNOSIS — H00.14 CHALAZION LEFT UPPER EYELID: Primary | ICD-10-CM

## 2022-03-23 DIAGNOSIS — C90.30 PLASMACYTOMA OF BONE (H): ICD-10-CM

## 2022-03-23 DIAGNOSIS — I49.3 PVC'S (PREMATURE VENTRICULAR CONTRACTIONS): ICD-10-CM

## 2022-03-23 LAB
ACT BLD: 148 SECONDS (ref 74–150)
ACT BLD: 220 SECONDS (ref 74–150)
ACT BLD: 267 SECONDS (ref 74–150)
ACT BLD: 297 SECONDS (ref 74–150)
ACT BLD: 301 SECONDS (ref 74–150)
ACT BLD: 322 SECONDS (ref 74–150)
ACT BLD: 322 SECONDS (ref 74–150)
ALBUMIN MFR UR ELPH: 4.6 %
ALPHA1 GLOB MFR UR ELPH: 1.5 %
ALPHA2 GLOB MFR UR ELPH: 2.1 %
B-GLOBULIN MFR UR ELPH: 2.4 %
GAMMA GLOB MFR UR ELPH: 89.4 %
HCG UR QL: NEGATIVE
M PROTEIN MFR UR ELPH: 84.5 %
PROT PATTERN UR ELPH-IMP: ABNORMAL

## 2022-03-23 PROCEDURE — 999N000134 HC STATISTIC PP CARE STAGE 3

## 2022-03-23 PROCEDURE — 93005 ELECTROCARDIOGRAM TRACING: CPT

## 2022-03-23 PROCEDURE — 272N000001 HC OR GENERAL SUPPLY STERILE: Performed by: INTERNAL MEDICINE

## 2022-03-23 PROCEDURE — 999N000142 HC STATISTIC PROCEDURE PREP ONLY

## 2022-03-23 PROCEDURE — 93010 ELECTROCARDIOGRAM REPORT: CPT | Mod: 76 | Performed by: INTERNAL MEDICINE

## 2022-03-23 PROCEDURE — 250N000011 HC RX IP 250 OP 636: Performed by: INTERNAL MEDICINE

## 2022-03-23 PROCEDURE — C1732 CATH, EP, DIAG/ABL, 3D/VECT: HCPCS | Performed by: INTERNAL MEDICINE

## 2022-03-23 PROCEDURE — 99214 OFFICE O/P EST MOD 30 MIN: CPT | Mod: 25 | Performed by: INTERNAL MEDICINE

## 2022-03-23 PROCEDURE — C1733 CATH, EP, OTHR THAN COOL-TIP: HCPCS | Performed by: INTERNAL MEDICINE

## 2022-03-23 PROCEDURE — C1894 INTRO/SHEATH, NON-LASER: HCPCS | Performed by: INTERNAL MEDICINE

## 2022-03-23 PROCEDURE — G0463 HOSPITAL OUTPT CLINIC VISIT: HCPCS

## 2022-03-23 PROCEDURE — 93654 COMPRE EP EVAL TX VT: CPT | Performed by: INTERNAL MEDICINE

## 2022-03-23 PROCEDURE — 81025 URINE PREGNANCY TEST: CPT

## 2022-03-23 PROCEDURE — 99214 OFFICE O/P EST MOD 30 MIN: CPT | Mod: 24

## 2022-03-23 PROCEDURE — 258N000003 HC RX IP 258 OP 636: Performed by: INTERNAL MEDICINE

## 2022-03-23 PROCEDURE — 99153 MOD SED SAME PHYS/QHP EA: CPT | Performed by: INTERNAL MEDICINE

## 2022-03-23 PROCEDURE — 85347 COAGULATION TIME ACTIVATED: CPT

## 2022-03-23 PROCEDURE — 99152 MOD SED SAME PHYS/QHP 5/>YRS: CPT | Performed by: INTERNAL MEDICINE

## 2022-03-23 PROCEDURE — 999N000054 HC STATISTIC EKG NON-CHARGEABLE

## 2022-03-23 PROCEDURE — C1730 CATH, EP, 19 OR FEW ELECT: HCPCS | Performed by: INTERNAL MEDICINE

## 2022-03-23 PROCEDURE — 250N000009 HC RX 250: Performed by: INTERNAL MEDICINE

## 2022-03-23 RX ORDER — SODIUM CHLORIDE 9 MG/ML
INJECTION, SOLUTION INTRAVENOUS CONTINUOUS
Status: DISCONTINUED | OUTPATIENT
Start: 2022-03-23 | End: 2022-03-23 | Stop reason: HOSPADM

## 2022-03-23 RX ORDER — FENTANYL CITRATE 50 UG/ML
INJECTION, SOLUTION INTRAMUSCULAR; INTRAVENOUS
Status: DISCONTINUED | OUTPATIENT
Start: 2022-03-23 | End: 2022-03-23 | Stop reason: HOSPADM

## 2022-03-23 RX ORDER — PROTAMINE SULFATE 10 MG/ML
INJECTION, SOLUTION INTRAVENOUS
Status: DISCONTINUED | OUTPATIENT
Start: 2022-03-23 | End: 2022-03-23 | Stop reason: HOSPADM

## 2022-03-23 RX ORDER — LIDOCAINE 40 MG/G
CREAM TOPICAL
Status: DISCONTINUED | OUTPATIENT
Start: 2022-03-23 | End: 2022-03-23 | Stop reason: HOSPADM

## 2022-03-23 RX ORDER — ACETAMINOPHEN 325 MG/1
650 TABLET ORAL EVERY 4 HOURS PRN
Status: DISCONTINUED | OUTPATIENT
Start: 2022-03-23 | End: 2022-03-24 | Stop reason: HOSPADM

## 2022-03-23 RX ORDER — HEPARIN SODIUM 1000 [USP'U]/ML
INJECTION, SOLUTION INTRAVENOUS; SUBCUTANEOUS
Status: DISCONTINUED | OUTPATIENT
Start: 2022-03-23 | End: 2022-03-23 | Stop reason: HOSPADM

## 2022-03-23 RX ADMIN — SODIUM CHLORIDE: 9 INJECTION, SOLUTION INTRAVENOUS at 11:32

## 2022-03-23 ASSESSMENT — PAIN SCALES - GENERAL: PAINLEVEL: NO PAIN (0)

## 2022-03-23 NOTE — NURSING NOTE
Chief Complaint   Patient presents with     Oncology Clinic Visit     Plasmacytoma of bone      Blood Draw     Vitals and urine collected. Pt checked into appt.      TUAN Gaona LPN

## 2022-03-23 NOTE — DISCHARGE INSTRUCTIONS
Post-Ablation Discharge Instructions    Care of groin site:         Remove the Band-Aid after 24 hours. If there is minor oozing, apply another Band-aid and remove it after 12 hours.          Do NOT take a bath, use a hot tub, pool, or submerse in water for at least 3 days You may shower.          It is normal to have a small bruise or lump at the site.         Do not scrub the site.         Do not use lotion or powder near the puncture site for 3 days.         For the first 2 days: Do not stoop or squat. When you cough, sneeze or move your bowels, hold your hand over the puncture site and press gently.         Do not lift more than 10 pounds or exertional activity for 10 days.      If you start bleeding from the site in your groin:  Lie down flat and press firmly on the site.  Call your physician immediately, or, come to the emergency room.    Call 911 right away if you have bleeding that is heavy or does not stop.     Call your doctor/provider if:         You have a large or growing hard lump around the site.         The site is red, swollen, hot or tender.         Blood or fluid is draining from the site.         You have chills or a fever greater than 101 F (38 C).         Your leg or arm turns bluish, feels numb or cool.         You have hives, a rash or unusual itching.     Cardiovascular Clinic:   [address] [place] [address]. [place] [address]  Your Care Team:  EP Cardiology   Telephone Number     Gisele Jerome Cutler NP, Dr. *** [phone]   Nika Lugo RN  [phone]     For scheduling appts or procedures:    Harini Couch   [phone]   For the Device Clinic (Pacemakers and ICD's)   RN's :   Eloise uMrry During business hours: [phone]     After business hours:   [phone]- select option 4 and ask for job code [id number].       As always, Thank you for trusting us with your health care needs

## 2022-03-23 NOTE — PROGRESS NOTES
Patient roomed in bay 33. Arrived via stretcher accompanied by CCL RN off monitor. VSS. Rhythm upon arrival is SR on the room monitor. Denies pain and SOB. Reviewed activity and restrictions with patient and to notify RN of any symptom change. CCL access: right groin site C/D/I, no hematoma. Will continue to monitor status.

## 2022-03-23 NOTE — H&P
Cardiac Electrophysiology H&P      PVC ablation     HPI:    50 y/o lady here for a PVC ablation.    Hx significant for:  PVCs - symptomatic, large burden  IBS  Plasmacytoma of left pelvis  Multiple myeloma  IBS    Was seen in clinic on 1/31/22. No significant changes. Informed consent obtained and procedure discussed in detail.    Past Medical History:      Past Medical History:   Diagnosis Date    Allergic rhinitis, cause unspecified     Anxiety state, unspecified 12/01/2003    Started postpartum . On Paxil x 1+years. Off meds- 10/04    Irritable bowel syndrome 01/01/2004    Multiple myeloma not having achieved remission (H)     PLANTAR WARTS     resolved    SINUS DYSRHYTHMIA 10/01/2002    wnl    Unspecified hemorrhoids without mention of complication 04/01/2004    colonoscopy 4/04 spastic colon, int roids; bx neg       Past Surgical  History:      Past Surgical History:   Procedure Laterality Date    BIOPSY BONE PELVIS Left 9/7/2021    Procedure: Biopsy left pubic ramis;  Surgeon: Edu Philip MD;  Location: [facility] OR    ZZC NONSPECIFIC PROCEDURE  1987    [place] teeth removed    ZZC NONSPECIFIC PROCEDURE  1993    (R) knee surgery    ZZC NONSPECIFIC PROCEDURE  2003    (R) breast bx-negative 6/03; bx neg 10/02    ZZC NONSPECIFIC PROCEDURE      10/02 cardiac echo normal    ZZC NONSPECIFIC PROCEDURE  2004 4/04 colonosc int roids, spastic colon    ZZC NONSPECIFIC PROCEDURE  8/05    D & C for missed AB    ZZHC COLONOSCOPY THRU STOMA, DIAGNOSTIC  2004       Family History:      Family History   Problem Relation Age of Onset    Neurologic Disorder Mother         toxo    Lipids Mother         low cholestrol    Lipids Father         high cholestrol    C.A.D. Maternal Grandfather         strokes and mi, chronic respiratory from 2 hand smoke    Hypertension Paternal Grandmother         rheumatoid arthritis    Diabetes Maternal Grandmother         breast cancer,stroke ischemic    Breast Cancer Maternal Grandmother        "  79yo/and autoimmune skin condition    Respiratory Paternal Grandfather         emphasema    Cancer - colorectal No family hx of     Prostate Cancer No family hx of        Social History:      Social History     Socioeconomic History    Marital status:      Spouse name: mary    Number of children: 1    Years of education: Not on file    Highest education level: Not on file   Occupational History     Employer: UNEMPLOYED   Tobacco Use    Smoking status: Never Smoker    Smokeless tobacco: Never Used   Vaping Use    Vaping Use: Never used   Substance and Sexual Activity    Alcohol use: Yes     Comment: none to a couple. occasional    Drug use: No    Sexual activity: Yes     Partners: Male     Birth control/protection: Condom, I.U.D.   Other Topics Concern    Parent/sibling w/ CABG, MI or angioplasty before 65F 55M? No     Comment: Grandparent. But not parent or sibling.   Social History Narrative    Not on file     Social Determinants of Health     Financial Resource Strain: Not on file   Food Insecurity: Not on file   Transportation Needs: Not on file   Physical Activity: Not on file   Stress: Not on file   Social Connections: Not on file   Intimate Partner Violence: Not At Risk    Fear of Current or Ex-Partner: No    Emotionally Abused: No    Physically Abused: No    Sexually Abused: No   Housing Stability: Not on file       ROS:    A complete review of systems is negative except as noted above in the HPI.    Physical Exam:   Vitals: /81 (BP Location: Right arm)   Pulse 76   Temp 97.6  F (36.4  C) (Oral)   Resp 18   Ht 1.715 m (5' 7.5\")   SpO2 100%   BMI 24.66 kg/m    Gen: NAD  Neck: No JVD  Chest: CTAB  Cardiovascular: RRR, no M/R/G   Abd: soft, NT/ND  Neuro: grossly intact  Extremities: no LE edema         Relevant labs, imaging, medications and procedures were reviewed.    PVC 1: LB, inferior axis, R in III > R in II, R in V5 - likely LCC  PVC 2: RB, superior axis, small RS in V5, aVL + - " posteromedial pap    MANDA recently was normal    Labs reviewed    Holter revealed 15% PVC burden    Assessment and Recommendations:   52 y/o lady here for PVC ablation. Details above. Informed consent obtained. Will go ahead and perform procedure.    I discussed the patient with Dr. Thomas.    Chris Stoddard MD   Cardiac electrophysiology fellow    Addendum:  I saw and evaluated the patient and agree with the fellow s finding and plans as written.  Alem Thomas MD

## 2022-03-23 NOTE — PROGRESS NOTES
[facility]  Date of Visit: Mar 23, 2022   Oncologist: Dr. Payton Reinoso    Reason for visit: myeloma follow-up           Oncology History   51 year old female with past medical history significant for Irritable bowel disease, allergies, large plasmacytoma of the L pelvis, and newly diagnosed multiple myeloma.     Early in April 2021 , she noted to have left sided groin pain, constant and was affecting her daily activities  MRI showed large, expansile, permeative mass with indistinct borders involving the left superior and inferior pubic rami, pubic symphysis and with possible extension to the left acetabulum.     We completed the following work-up:   - Biopsy 9/7/2021: plasmacytoma.  Flow cytometry showed kappa monotypic plasma cells, polytypic B cells. FISH results from plasmacytoma showed a gain of 11q, IGH-CCND1 fusion but no losses of 1q or TP53 and no gain of 1q.  - Bone marrow biopsy 09/22/21: Marrow cellularity 50% with 25% interstitial infiltration w/  Kappa-monotypic, moderately atypical plasma cells. Flow with 0.7% kappa-monotypica plasma cells. FISH and cytogenetics pending.   - PET CT 09/24/21 w/ hypermetabolic pathologic fracture of the L pubic ramus w/ aggressive appearing osteolysis but no additional suspicious lytic or blastic osseous lesions.     Treatment to date:   - Zometa q4 weeks started 9/27/21  - Radiation to left pubic ramus x18 fractions, completed 11/11/21.   - RVD C1D1 11/15/21  - RVD C2D1 12/6/21  - RVD C3D1  12/27/21  - RVD C4 D1 1/17/22, elizabeth added on D8 due to inadequate response of urinary M-spike and FLCs remaining over 100 mg/dL after 4 cycles of RVD    - Elizabeth-RVD C1D8 1/24/22  - Elizabeth-RVD C2D1 2/7/22  S/p 3 cycles of Elizabeth+RVD now -   Disease response: MO           INTERIM HISTORY:   Margo presents for oncology follow-up visit today.    Margo complains of having ongoing diarrhea and abdominal cramping for a few days during the chemotherapy.   She takes Imodium which helps.  Her appetite is poor and she has distorted taste.    She has styes on upper and lower lids on both eyes. She has been using warm rice packs and an antibiotic ointment.  She has some tenderness and itchiness when she closes her eyes.  Her neuropathy has not been bothering her, she denies tingling or burning.  No recent infections.  No other side effects she is tolerating the treatment to the point she is willing to continue.      Denies pain, denies changes in strength or motor function.    She is seen in the cardia cardiology today to proceed with ablation of the SVT.  She can proceed without issues her labs are normal today.  Denies fevers, chills, CP, SOB, vomiting.           Physical Exam:     /75   Pulse 66   Temp 98.1  F (36.7  C) (Oral)   Resp 16   Wt 72.5 kg (159 lb 12.8 oz)   LMP  (LMP Unknown)   SpO2 97%   Breastfeeding No   BMI 24.50 kg/m     Wt Readings from Last 4 Encounters:   03/23/22 72.5 kg (159 lb 12.8 oz)   03/14/22 73 kg (161 lb)   03/08/22 74.3 kg (163 lb 11.2 oz)   02/28/22 76.6 kg (168 lb 12.8 oz)     General: well appearing, no acute distress  HEENT: normocephalic, atraumatic, PERRLA, sclerae nonicteric, she has styes present on the upper and lower lids of both eyes, no drainage; dry mucous membranes, oropharynx is clear  Lymph: no palpable cervical, supraclavicular or axillary lymphadenopathy   CV: S1 S2, RRR, no murmur, click or rub  Lungs: clear to auscultation bilaterally, no wheezes/rales/rhonchi  Abd: soft, positive bowel sounds, non-distended, non-tender  MSK: no peripheral edema  Neuro: alert and oriented x3, CN grossly intact; bilateral  strength 5/5  Psych: appropriate mood and affect  Skin:no visible rashes or lesions on exposed skin          Laboratory Data:      Lab Results   Component Value Date    WBC 3.9 (L) 03/21/2022    ANEU 3.2 01/17/2022    HGB 12.6 03/21/2022    HCT 38.0 03/21/2022     03/21/2022     03/21/2022  "   POTASSIUM 3.8 03/21/2022    CHLORIDE 110 (H) 03/21/2022    CO2 25 03/21/2022     (H) 03/21/2022    BUN 28 03/21/2022    CR 0.84 03/21/2022    AST 10 03/21/2022    ALT 19 03/21/2022     Kappa FLC 45  Ratio 183  Lamda 0.25  UPEP pending              Assessment and Recommendations   Kristie Cornejo is a 51 year old female who was diagnosed with Plasmacytoma after she presented with left sided groin pain now found to have multiple myeloma after further workup with bone marrow biopsy.     # Multiple myeloma with associated large plasmacytoma of the L pelvis. Kappa light chain disease.     BMBx with 25% plasma cell infiltration; standard risk with gain of 11q, IGH-CCND1 fusion but no losses of 1q or TP53 and no gain of 1q.   SPEP w/out a monoclonal peak  UPEP positive. 70% paraprotein. Large monoclonal free immunoglobulin light chain of kappa chain type.     No anemia, normal creat, Ca, no other skeletal lesions. Alb normal, B2M normal.  Stage I Light chain disease with left pelvic bone pathologic fracture she is classified as having symptomatic multiple myeloma.     RVD C1D1 11/15/21 (had a slight delay in the start of her Revlimid on 11/19/21 due to the timing of her pregnancy tests).  She tolerated cycle 1 well with some mild nausea and a headache on her week off.  RVD C2D1 12/6/21, tolerated well overall, but has had more consistent sensation of her hands and feet being hot. No tingling, pain, loss of sensation, or changes to her strength.  RVD C3D1 12/27/21, tolerated well overall with ongoing sensation of \"hot\" hands and feet  RVD C4 D1 1/17/22, elizabeth added on D8 due to inadequate response of urinary M-spike and FLCs remaining over 100 mg/dL after 3 cycles of RVD    Elizabeth-RVD C5 2/7/22 Changing to 21 day cycles due to insurance.  Will receive Rev Days 1-14, Velcade days 1,4,8,11, and elizabeth days 1,8,15.    Elizabeth-RVD C3     Patient completed 3 cycles of Elizabeth plus RVD and she is in a partial response.  Her " free kappa light chains are at least 50% reduced but the response is sluggish.  She tolerated treatment well and we will continue the same regimen without modifications.  Plan to give at least 2-3 more cycles followed by the restaging including 24-hour urine protein and possible bone marrow biopsy.  We had a long discussion with the [facility] about the timing of autologous transplant and potential need to switch to ablative therapy if her response is not at least VG [place].        #Bone Health  She will continue monthly Zometa (last received on 2/2222). Next dose due 3/22/22. She will continue Calcium/Vitamin D supplements.     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 Currently having no pain and able to walk 1 mile without any assistive devices.  She is interested in pursuing other activities like gentle cross-country skiing.  Had follow-up with Dr. Philip on 1/6/22 and was cleared to resume regular activity as tolerated. She should still refrain from high-impact activities.    # PVCs  Today she has ablation procedure  OK to proceed with onc perpsective  Next chemo cycle start on 3/28    She had an ECHO 12/1 and had follow-up with cardiology 12/7 (notes in CareEverywhere).  She had follow-up with cardiology here at the  and they recommend an ablation. Stress test was completed and she was started on Toprol XL; however, Toprol XL was discontinued due to significant side effects of orthostatic hypotension, pre-syncopal episodes, shakiness, and weakness.    She now has the ablation scheduled 3/23/22.    # Nausea  Mild, managed with Compazine up to twice daily and lorazepam occasionally at night.     #COVID prophylaxis  - She received EVUSHELD 1/20/22.  - Received 3 COVID vaccines to date    #Diarrhea  She has continued to have intermittent diarrhea with associated  abdominal cramping and nausea.    -C.diff PCR negative 2/8/22  - Okay to manage with Imodium, 2 tabs with the first loose stool and 1 tab with each subsequent stool, up to a total of 8 tabs daily.    # Reflux  She reported episodes of reflux with bile, especially when she lays down at night.  She has started to elevate her head on pillows and is also trying to avoid eating within 1-2 hours of going to bed.  Omeprazole was increased to 20 mg twice daily with symptom improvement.    Plan is to proceed with cycle 5 6 and 7 see SULEIMAN before each cycle  , Zometa on 3/28  Follow-up with me in May or earlier if the patient shows signs of resistance.    Payton Reinoso MD  Professor of Medicine  [phone]

## 2022-03-23 NOTE — NURSING NOTE
"Oncology Rooming Note    March 23, 2022 9:48 AM   Kristie Cornejo is a 51 year old female who presents for:    Chief Complaint   Patient presents with     Oncology Clinic Visit     Plasmacytoma of bone      Blood Draw     Vitals and urine collected. Pt checked into appt.      Initial Vitals: /75   Pulse 66   Temp 98.1  F (36.7  C) (Oral)   Resp 16   Wt 72.5 kg (159 lb 12.8 oz)   LMP  (LMP Unknown)   SpO2 97%   Breastfeeding No   BMI 24.50 kg/m   Estimated body mass index is 24.5 kg/m  as calculated from the following:    Height as of 2/1/22: 1.72 m (5' 7.72\").    Weight as of this encounter: 72.5 kg (159 lb 12.8 oz). Body surface area is 1.86 meters squared.  No Pain (0) Comment: Data Unavailable   No LMP recorded (lmp unknown). (Menstrual status: IUD).  Allergies reviewed: Yes  Medications reviewed: Yes    Medications: MEDICATION REFILLS NEEDED TODAY. Provider was notified.     PT NEEDS A REFILL ON PROCHLORPERAZINE.     Pharmacy name entered into [employer]:    [facility] - [place], [place] - [address]  [id number] - [place] - [address] [facility] [address]  [facility] - [place], [place] - [address]  [facility] [place], [place] - [address] [place] [address]    Clinical concerns:     Pt wondering about options for the future and treatment plans.     Armando Dupree            "

## 2022-03-23 NOTE — Clinical Note
883 Unable to reach (2nd attempt)    Attempt made to contact the patient by telephone. Left message with information to return call. Episode will be closed. No further follow up will be required at this time.

## 2022-03-23 NOTE — LETTER
3/23/2022         RE: Kristie Cornejo  [address]  [facility] [address]        Dear Colleague,    Thank you for referring your patient, Kristie Cornejo, to the [facility] BLOOD AND MARROW TRANSPLANT PROGRAM [facility]. Please see a copy of my visit note below.    [facility]  Date of Visit: Mar 23, 2022   Oncologist: Dr. Payton Reinoso    Reason for visit: myeloma follow-up           Oncology History   51 year old female with past medical history significant for Irritable bowel disease, allergies, large plasmacytoma of the L pelvis, and newly diagnosed multiple myeloma.     Early in April 2021 , she noted to have left sided groin pain, constant and was affecting her daily activities  MRI showed large, expansile, permeative mass with indistinct borders involving the left superior and inferior pubic rami, pubic symphysis and with possible extension to the left acetabulum.     We completed the following work-up:   - Biopsy 9/7/2021: plasmacytoma.  Flow cytometry showed kappa monotypic plasma cells, polytypic B cells. FISH results from plasmacytoma showed a gain of 11q, IGH-CCND1 fusion but no losses of 1q or TP53 and no gain of 1q.  - Bone marrow biopsy 09/22/21: Marrow cellularity 50% with 25% interstitial infiltration w/  Kappa-monotypic, moderately atypical plasma cells. Flow with 0.7% kappa-monotypica plasma cells. FISH and cytogenetics pending.   - PET CT 09/24/21 w/ hypermetabolic pathologic fracture of the L pubic ramus w/ aggressive appearing osteolysis but no additional suspicious lytic or blastic osseous lesions.     Treatment to date:   - Zometa q4 weeks started 9/27/21  - Radiation to left pubic ramus x18 fractions, completed 11/11/21.   - RVD C1D1 11/15/21  - RVD C2D1 12/6/21  - RVD C3D1  12/27/21  - RVD C4 D1 1/17/22, michelle added on D8 due to inadequate response of urinary M-spike and FLCs remaining over 100 mg/dL after 4 cycles of RVD    -  Elizabeth-RVD C1D8 1/24/22  - Elizabeth-RVD C2D1 2/7/22  S/p 3 cycles of Elizabeth+RVD now -   Disease response: CA           INTERIM HISTORY:   Margo presents for oncology follow-up visit today.    Margo complains of having ongoing diarrhea and abdominal cramping for a few days during the chemotherapy.  She takes Imodium which helps.  Her appetite is poor and she has distorted taste.    She has styes on upper and lower lids on both eyes. She has been using warm rice packs and an antibiotic ointment.  She has some tenderness and itchiness when she closes her eyes.  Her neuropathy has not been bothering her, she denies tingling or burning.  No recent infections.  No other side effects she is tolerating the treatment to the point she is willing to continue.      Denies pain, denies changes in strength or motor function.    She is seen in the [facility] today to proceed with ablation of the SVT.  She can proceed without issues her labs are normal today.  Denies fevers, chills, CP, SOB, vomiting.           Physical Exam:     /75   Pulse 66   Temp 98.1  F (36.7  C) (Oral)   Resp 16   Wt 72.5 kg (159 lb 12.8 oz)   LMP  (LMP Unknown)   SpO2 97%   Breastfeeding No   BMI 24.50 kg/m     Wt Readings from Last 4 Encounters:   03/23/22 72.5 kg (159 lb 12.8 oz)   03/14/22 73 kg (161 lb)   03/08/22 74.3 kg (163 lb 11.2 oz)   02/28/22 76.6 kg (168 lb 12.8 oz)     General: well appearing, no acute distress  HEENT: normocephalic, atraumatic, PERRLA, sclerae nonicteric, she has styes present on the upper and lower lids of both eyes, no drainage; dry mucous membranes, oropharynx is clear  Lymph: no palpable cervical, supraclavicular or axillary lymphadenopathy   CV: S1 S2, RRR, no murmur, click or rub  Lungs: clear to auscultation bilaterally, no wheezes/rales/rhonchi  Abd: soft, positive bowel sounds, non-distended, non-tender  MSK: no peripheral edema  Neuro: alert and oriented x3, CN grossly intact; bilateral  strength  "5/5  Psych: appropriate mood and affect  Skin:no visible rashes or lesions on exposed skin          Laboratory Data:      Lab Results   Component Value Date    WBC 3.9 (L) 03/21/2022    ANEU 3.2 01/17/2022    HGB 12.6 03/21/2022    HCT 38.0 03/21/2022     03/21/2022     03/21/2022    POTASSIUM 3.8 03/21/2022    CHLORIDE 110 (H) 03/21/2022    CO2 25 03/21/2022     (H) 03/21/2022    BUN 28 03/21/2022    CR 0.84 03/21/2022    AST 10 03/21/2022    ALT 19 03/21/2022     Kappa FLC 45  Ratio 183  Lamda 0.25  UPEP pending              Assessment and Recommendations   Kristie Cornejo is a 51 year old female who was diagnosed with Plasmacytoma after she presented with left sided groin pain now found to have multiple myeloma after further workup with bone marrow biopsy.     # Multiple myeloma with associated large plasmacytoma of the L pelvis. Kappa light chain disease.     BMBx with 25% plasma cell infiltration; standard risk with gain of 11q, IGH-CCND1 fusion but no losses of 1q or TP53 and no gain of 1q.   SPEP w/out a monoclonal peak  UPEP positive. 70% paraprotein. Large monoclonal free immunoglobulin light chain of kappa chain type.     No anemia, normal creat, Ca, no other skeletal lesions. Alb normal, B2M normal.  Stage I Light chain disease with left pelvic bone pathologic fracture she is classified as having symptomatic multiple myeloma.     RVD C1D1 11/15/21 (had a slight delay in the start of her Revlimid on 11/19/21 due to the timing of her pregnancy tests).  She tolerated cycle 1 well with some mild nausea and a headache on her week off.  RVD C2D1 12/6/21, tolerated well overall, but has had more consistent sensation of her hands and feet being hot. No tingling, pain, loss of sensation, or changes to her strength.  RVD C3D1 12/27/21, tolerated well overall with ongoing sensation of \"hot\" hands and feet  RVD C4 D1 1/17/22, michelle added on D8 due to inadequate response of urinary M-spike and " FLCs remaining over 100 mg/dL after 3 cycles of RVD    Elizabeth-RVD C5 2/7/22 Changing to 21 day cycles due to insurance.  Will receive Rev Days 1-14, Velcade days 1,4,8,11, and elizabeth days 1,8,15.    Elizabeth-RVD C3     Patient completed 3 cycles of Elizabeth plus RVD and she is in a partial response.  Her free kappa light chains are at least 50% reduced but the response is sluggish.  She tolerated treatment well and we will continue the same regimen without modifications.  Plan to give at least 2-3 more cycles followed by the restaging including 24-hour urine protein and possible bone marrow biopsy.  We had a long discussion with the [facility] about the timing of autologous transplant and potential need to switch to ablative therapy if her response is not at least VG [place].        #Bone Health  She will continue monthly Zometa (last received on 2/2222). Next dose due 3/22/22. She will continue Calcium/Vitamin D supplements.     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 Currently having no pain and able to walk 1 mile without any assistive devices.  She is interested in pursuing other activities like gentle cross-country skiing.  Had follow-up with Dr. Philip on 1/6/22 and was cleared to resume regular activity as tolerated. She should still refrain from high-impact activities.    # PVCs  Today she has ablation procedure  OK to proceed with onc perpsective  Next chemo cycle start on 3/28    She had an ECHO 12/1 and had follow-up with cardiology 12/7 (notes in CareEverywhere).  She had follow-up with cardiology here at the  and they recommend an ablation. Stress test was completed and she was started on Toprol XL; however, Toprol XL was discontinued due to significant side effects of orthostatic hypotension, pre-syncopal episodes, shakiness, and weakness.    She now has the  ablation scheduled 3/23/22.    # Nausea  Mild, managed with Compazine up to twice daily and lorazepam occasionally at night.     #COVID prophylaxis  - She received EVUSHELD 1/20/22.  - Received 3 COVID vaccines to date    #Diarrhea  She has continued to have intermittent diarrhea with associated abdominal cramping and nausea.    -C.diff PCR negative 2/8/22  - Okay to manage with Imodium, 2 tabs with the first loose stool and 1 tab with each subsequent stool, up to a total of 8 tabs daily.    # Reflux  She reported episodes of reflux with bile, especially when she lays down at night.  She has started to elevate her head on pillows and is also trying to avoid eating within 1-2 hours of going to bed.  Omeprazole was increased to 20 mg twice daily with symptom improvement.    Plan is to proceed with cycle 5 6 and 7 see SULEIMAN before each cycle  , Zometa on 3/28  Follow-up with me in May or earlier if the patient shows signs of resistance.        Again, thank you for allowing me to participate in the care of your patient.      Sincerely,    Payton Reinoso MD

## 2022-03-23 NOTE — PROGRESS NOTES
Pt arrives to 2a, with spouse, for PVC ablation. H&P is up to date. Consent is signed. Pt had labs done 2 days ago, repeat labs not needed per Dr Thomas. Pt prepped and ready.

## 2022-03-24 LAB
ATRIAL RATE - MUSE: 67 BPM
ATRIAL RATE - MUSE: 71 BPM
DIASTOLIC BLOOD PRESSURE - MUSE: NORMAL MMHG
DIASTOLIC BLOOD PRESSURE - MUSE: NORMAL MMHG
INTERPRETATION ECG - MUSE: NORMAL
INTERPRETATION ECG - MUSE: NORMAL
P AXIS - MUSE: 15 DEGREES
P AXIS - MUSE: 6 DEGREES
PR INTERVAL - MUSE: 168 MS
PR INTERVAL - MUSE: 178 MS
QRS DURATION - MUSE: 82 MS
QRS DURATION - MUSE: 84 MS
QT - MUSE: 424 MS
QT - MUSE: 438 MS
QTC - MUSE: 460 MS
QTC - MUSE: 462 MS
R AXIS - MUSE: -2 DEGREES
R AXIS - MUSE: 31 DEGREES
SYSTOLIC BLOOD PRESSURE - MUSE: NORMAL MMHG
SYSTOLIC BLOOD PRESSURE - MUSE: NORMAL MMHG
T AXIS - MUSE: 19 DEGREES
T AXIS - MUSE: 41 DEGREES
VENTRICULAR RATE- MUSE: 67 BPM
VENTRICULAR RATE- MUSE: 71 BPM

## 2022-03-24 NOTE — PROGRESS NOTES
Right groin site C/D/I, no hematoma. VSS and patient denies pain. Patient denied need to void and requested to wait to void after bedrest is done. Discharge instructions reviewed with patient and . Patient transferred to  to complete six hour bedrest.

## 2022-03-24 NOTE — PROGRESS NOTES
Patient arrived on floor from cath lab and was kept on bed rest until 2245. Noted no bleeding from sheath site or hematoma after ambulating in room. discharge paper work already signed and  is at the from door and he will be driving him home.

## 2022-03-25 ENCOUNTER — PATIENT OUTREACH (OUTPATIENT)
Dept: ONCOLOGY | Facility: CLINIC | Age: 52
End: 2022-03-25
Payer: COMMERCIAL

## 2022-03-25 NOTE — PROGRESS NOTES
[facility]: Cancer Care Short Note                                    Discussion with Patient:                                                      RNCC called patient to discuss treatment plan starting next week      Assessment:                                                      RNCC informed patient that at C4 there are small changes to patient's chemo schedule.   Previous treatment plan is as followed:  -Velcade/elizabeth/Dex/Rev on a 21 days cycle  -Velcade days 1, 4, 8, 11  -Elizabeth days 1, 8, 15  -Rev days 1-14 (off for 7 days after)  -Dex prior to elizabeth as premed, as well as 20mg on days 2, 9, 16 (day after elizabeth)    Going forward she will no longer have elizabeth on Day 8, and thus will not have dex 20mg PO at home on day 9. Patient verbalizes understanding of POC and has no other questions or concerns at this time.     RNCC also informed patient that letter for ski pass has been complete. RNCC will have Dr. Reinoso sign in clinic on Wednesday. Margo will send RNCC fax number for  at [employer].          Intervention/Education provided during outreach:                                                       Patient to follow up as scheduled at next apt    Future Appointments   Date Time Provider Department Center   3/29/2022  2:45 PM [facility] LAB DRAW [facility]   3/29/2022  3:15 PM Jackie Ferris APRN CNP [facility]   3/29/2022  3:30 PM UC ONC INFUSION NURSE [facility]   4/1/2022  2:30 PM UC ONC INFUSION NURSE [facility]   4/4/2022  9:30 AM UC ONC INFUSION NURSE [facility]   4/7/2022  9:00 AM UC ONC INFUSION NURSE [facility]   4/11/2022  9:30 AM UC ONC INFUSION NURSE [facility]   4/19/2022  7:30 AM [facility] LAB DRAW [facility]   4/19/2022  8:00 AM Jackie Ferris APRN CNP [facility]   4/19/2022  9:30 AM UC ONC INFUSION NURSE [facility]   4/21/2022  9:30 AM UC ONC INFUSION NURSE [facility]   4/25/2022  9:00 AM UC ONC INFUSION NURSE [facility]   4/28/2022 11:00 AM UC ONC  INFUSION NURSE [facility]   5/2/2022  9:00 AM Jackie Ferris APRN CNP [facility]   5/2/2022 10:30 AM  ONC INFUSION NURSE [facility]   5/23/2022  7:15 AM  CVC MONITOR TECH [facility]   6/27/2022  9:30 AM Alem Thomas MD [facility]     Griselda Valdez, RN, MSN, OCN   RN Care Coordinator   [facility] Cancer [address] [address]   [phone]

## 2022-03-28 ENCOUNTER — PATIENT OUTREACH (OUTPATIENT)
Dept: ONCOLOGY | Facility: CLINIC | Age: 52
End: 2022-03-28
Payer: COMMERCIAL

## 2022-03-28 NOTE — PROGRESS NOTES
[facility]: Cancer Care Short Note                                    Discussion with Patient:                                                      RNCC received inbound call from patient. Calling to discuss letter for ski reimbursement, as well as ophthalmology referral.      Assessment:                                                      Pt is requesting the following:  - Plan to email signed letter to patient on Wednesday after Dr. Reinoso signs it for ski reimbursement.   -Pt was unable to get in to [facility] eye [facility] until the end of May. She has been seen in the past at [facility]. They also do not have availability until end of May, however, they said if urgent referral is faxed to them they can get patient in sooner. RNCC to fax over referral to [facility] this am. Pt will let RNCC know when appt has been made.     Intervention/Education provided during outreach:                                                       RNCC faxed ophthalmology referral to [facility] at [phone]. Fax number is [phone]. Confirmation received that fax was successfully sent.     RNCC will follow up with patient after letter has been emailed to her on 3/30.     Patient verbalizes understanding of POC and has no other questions or concerns at this time.     Griselda Valdez, RN, MSN, OCN   RN Care Coordinator   [facility]   [address] [place] [address] [phone]

## 2022-03-28 NOTE — PROGRESS NOTES
[facility]  Date of Visit: Mar 29, 2022   Oncologist: Dr. Payton Reinoso    Reason for visit: myeloma follow-up           Oncology History   51 year old female with past medical history significant for Irritable bowel disease, allergies, large plasmacytoma of the L pelvis, and newly diagnosed multiple myeloma.     Early in April 2021 , she noted to have left sided groin pain, constant and was affecting her daily activities  MRI showed large, expansile, permeative mass with indistinct borders involving the left superior and inferior pubic rami, pubic symphysis and with possible extension to the left acetabulum.     We completed the following work-up:   - Biopsy 9/7/2021: plasmacytoma.  Flow cytometry showed kappa monotypic plasma cells, polytypic B cells. FISH results from plasmacytoma showed a gain of 11q, IGH-CCND1 fusion but no losses of 1q or TP53 and no gain of 1q.  - Bone marrow biopsy 09/22/21: Marrow cellularity 50% with 25% interstitial infiltration w/  Kappa-monotypic, moderately atypical plasma cells. Flow with 0.7% kappa-monotypica plasma cells. FISH and cytogenetics pending.   - PET CT 09/24/21 w/ hypermetabolic pathologic fracture of the L pubic ramus w/ aggressive appearing osteolysis but no additional suspicious lytic or blastic osseous lesions.     Treatment to date:   - Zometa q4 weeks started 9/27/21  - Radiation to left pubic ramus x18 fractions, completed 11/11/21.   - RVD C1D1 11/15/21  - RVD C2D1 12/6/21  - RVD C3D1  12/27/21  - RVD C4 D1 1/17/22, elizabeth added on D8 due to inadequate response of urinary M-spike and FLCs remaining over 100 mg/dL after 4 cycles of RVD    - Elizabeth-RVD C1D8 1/24/22  - Elizabeth-RVD C2D1 2/7/22  - Elizabeth-RVD C3D1 2/28/22    Disease response: [place]           INTERIM HISTORY:   Margo presents for oncology follow-up visit today.    Margo states she has felt somewhat better the past two weeks with a little extra time off of therapy due to her  ablation and need to follow-up with Dr. Reinoso.  She had diarrhea again overnight last night, seems to have resolved now after atking Imodium this morning.  She thinks her taste distortion has improved this past week.     She has styes on upper and lower lids of her left eye. She has been using warm rice packs and an antibiotic ointment.  She has some tenderness and itchiness when she closes her eyes. States she has an ophthalmology appointment tomorrow.    Her neuropathy has not been bothering her, she denies tingling or burning.  No recent infections.  No other side effects she is tolerating treatment well.  Denies pain, denies changes in strength or motor function.    She underwent cardiac ablation on 3/23/22.  She has not noticed any improvement in the sense of nausea and dizziness after exerting herself (like climbing the stairs).   Denies fevers, chills, CP, SOB, vomiting.           Physical Exam:     /77 (BP Location: Right arm, Patient Position: Sitting, Cuff Size: Adult Regular)   Pulse 77   Temp 98.2  F (36.8  C) (Oral)   Resp 18   Wt 72.5 kg (159 lb 12.8 oz)   LMP  (LMP Unknown)   SpO2 99%   BMI 24.66 kg/m     Wt Readings from Last 4 Encounters:   03/29/22 72.5 kg (159 lb 12.8 oz)   03/23/22 72.5 kg (159 lb 12.8 oz)   03/14/22 73 kg (161 lb)   03/08/22 74.3 kg (163 lb 11.2 oz)     General: well appearing, no acute distress  HEENT: normocephalic, atraumatic, PERRLA, sclerae nonicteric, she has styes present on the upper and lower lids of her left eye, no drainage; dry mucous membranes, oropharynx is clear  Lymph: no palpable cervical, supraclavicular or axillary lymphadenopathy   CV: S1 S2, RRR, no murmur, click or rub  Lungs: clear to auscultation bilaterally, no wheezes/rales/rhonchi  Abd: soft, positive bowel sounds, non-distended, non-tender  MSK: no peripheral edema  Neuro: alert and oriented x3, CN grossly intact; bilateral  strength 5/5  Psych: appropriate mood and  affect  Skin:no visible rashes or lesions on exposed skin          Laboratory Data:      I personally reviewed the following labs:    Most Recent 3 CBC's:Recent Labs   Lab Test 03/29/22  1505 03/21/22  0923 03/14/22  1354   WBC 6.2 3.9* 2.7*   HGB 11.6* 12.6 12.3   MCV 94 96 93    250 139*     Most Recent 3 BMP's:  Recent Labs   Lab Test 03/29/22  1505 03/21/22  0923 03/14/22  1427    143 142   POTASSIUM 3.4 3.8 3.0*   CHLORIDE 108 110* 111*   CO2 22 25 22   BUN 20 28 16   CR 0.75 0.84 0.90   ANIONGAP 11 8 9   MEKA 9.3 9.1 8.8   * 122* 156*     Most Recent 2 LFT's:  Recent Labs   Lab Test 03/29/22  1505 03/21/22  0923   AST 16 10   ALT 19 19   ALKPHOS 54 52   BILITOTAL 0.4 0.5              Assessment and Recommendations   Kristie Cornejo is a 51 year old female who was diagnosed with Plasmacytoma after she presented with left sided groin pain now found to have multiple myeloma after further workup with bone marrow biopsy.     # Multiple myeloma with associated large plasmacytoma of the L pelvis. Kappa light chain disease.     BMBx with 25% plasma cell infiltration; standard risk with gain of 11q, IGH-CCND1 fusion but no losses of 1q or TP53 and no gain of 1q.   SPEP w/out a monoclonal peak  UPEP positive. 70% paraprotein. Large monoclonal free immunoglobulin light chain of kappa chain type.     No anemia, normal creat, Ca, no other skeletal lesions. Alb normal, B2M normal.  Stage I Light chain disease with left pelvic bone pathologic fracture she is classified as having symptomatic multiple myeloma.     RVD C1D1 11/15/21 (had a slight delay in the start of her Revlimid on 11/19/21 due to the timing of her pregnancy tests).  She tolerated cycle 1 well with some mild nausea and a headache on her week off.  RVD C2D1 12/6/21, tolerated well overall, but has had more consistent sensation of her hands and feet being hot. No tingling, pain, loss of sensation, or changes to her strength.  RVD C3D1  "12/27/21, tolerated well overall with ongoing sensation of \"hot\" hands and feet  RVD C4 D1 1/17/22, elizabeth added on D8 due to inadequate response of urinary M-spike and FLCs remaining over 100 mg/dL after 3 cycles of RVD    Elizabeth-RVD C5 2/7/22 Changing to 21 day cycles due to insurance.  Will receive Rev Days 1-14, Velcade days 1,4,8,11, and elizabeth days 1,8,15.    Elizabeth-RVD C3     She has completed 3 cycles of Elizabeth plus RVD and she is in a partial response.  Her free kappa light chains are at least 50% reduced but the response is sluggish.  She tolerated treatment well and we will continue the same regimen without modifications.  Plan to give at least 2-3 more cycles (C4-6 in the treatment plan) followed by restaging including 24-hour urine protein and possible bone marrow biopsy.  Dr. Reinoso previously had a long discussion with [facility] about the timing of autologous transplant and potential need to switch to ablative therapy if her response is not at least VG [place].    - Proceed with C4 Elizabeth-RVD today    #Bone Health  She will continue monthly Zometa (last received on 2/2222). Next dose due today (3/29/22). She will continue Calcium/Vitamin D supplements.     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 Currently having no pain and able to walk 1 mile without any assistive devices.  She is interested in pursuing other activities like gentle cross-country skiing.  Had follow-up with Dr. Philip on 1/6/22 and was cleared to resume regular activity as tolerated. She should still refrain from high-impact activities.    # PVCs  She had an ECHO 12/1 and had follow-up with cardiology 12/7 (notes in CareEverywhere).  She had follow-up with cardiology here at the  and they recommend an ablation. Stress test was completed and she was started on Toprol XL; however, Toprol XL was " discontinued due to significant side effects of orthostatic hypotension, pre-syncopal episodes, shakiness, and weakness.    She had an ablation 3/23/22 but has not experienced relief from the feeling of being nauseated and short of breath after climbing the stairs.    # Nausea  Mild, managed with Compazine up to twice daily and lorazepam occasionally at night.     #COVID prophylaxis  - She received EVUSHELD 1/20/22 and 3/8/22  - Received 3 COVID vaccines to date    #Diarrhea  She has continued to have intermittent diarrhea with associated abdominal cramping and nausea.    -C.diff PCR negative 2/8/22  - Okay to manage with Imodium, 2 tabs with the first loose stool and 1 tab with each subsequent stool, up to a total of 8 tabs daily.    # Reflux  She reported episodes of reflux with bile, especially when she lays down at night.  She has started to elevate her head on pillows and is also trying to avoid eating within 1-2 hours of going to bed.  Omeprazole was increased to 20 mg twice daily with symptom improvement.    Plan :  -Proceed with cycle 4, 5, and 6, with SULEIMAN visits prior to each cycle.  -Zometa today  -Follow-up with Dr. Reinoso in May or earlier if the patient shows signs of resistance.    55 minutes spent on the date of the encounter doing chart review, review of test results, patient visit and documentation     DORON Hall [facility]  [address] [place] [id number]  [phone]

## 2022-03-29 ENCOUNTER — APPOINTMENT (OUTPATIENT)
Dept: LAB | Facility: CLINIC | Age: 52
End: 2022-03-29
Payer: COMMERCIAL

## 2022-03-29 ENCOUNTER — ONCOLOGY VISIT (OUTPATIENT)
Dept: ONCOLOGY | Facility: CLINIC | Age: 52
End: 2022-03-29
Attending: REGISTERED NURSE
Payer: COMMERCIAL

## 2022-03-29 ENCOUNTER — INFUSION THERAPY VISIT (OUTPATIENT)
Dept: ONCOLOGY | Facility: CLINIC | Age: 52
End: 2022-03-29
Payer: COMMERCIAL

## 2022-03-29 VITALS
HEART RATE: 77 BPM | RESPIRATION RATE: 18 BRPM | WEIGHT: 159.8 LBS | BODY MASS INDEX: 24.66 KG/M2 | OXYGEN SATURATION: 99 % | TEMPERATURE: 98.2 F | DIASTOLIC BLOOD PRESSURE: 77 MMHG | SYSTOLIC BLOOD PRESSURE: 132 MMHG

## 2022-03-29 DIAGNOSIS — I49.3 PVC'S (PREMATURE VENTRICULAR CONTRACTIONS): ICD-10-CM

## 2022-03-29 DIAGNOSIS — C90.00 LIGHT CHAIN MYELOMA (H): Primary | ICD-10-CM

## 2022-03-29 DIAGNOSIS — C90.30 PLASMACYTOMA OF BONE (H): ICD-10-CM

## 2022-03-29 DIAGNOSIS — R19.7 DIARRHEA, UNSPECIFIED TYPE: ICD-10-CM

## 2022-03-29 LAB
ALBUMIN SERPL-MCNC: 3.6 G/DL (ref 3.4–5)
ALP SERPL-CCNC: 54 U/L (ref 40–150)
ALT SERPL W P-5'-P-CCNC: 19 U/L (ref 0–50)
ANION GAP SERPL CALCULATED.3IONS-SCNC: 11 MMOL/L (ref 3–14)
AST SERPL W P-5'-P-CCNC: 16 U/L (ref 0–45)
BASOPHILS # BLD AUTO: 0.1 10E3/UL (ref 0–0.2)
BASOPHILS NFR BLD AUTO: 1 %
BILIRUB SERPL-MCNC: 0.4 MG/DL (ref 0.2–1.3)
BUN SERPL-MCNC: 20 MG/DL (ref 7–30)
CALCIUM SERPL-MCNC: 9.3 MG/DL (ref 8.5–10.1)
CHLORIDE BLD-SCNC: 108 MMOL/L (ref 94–109)
CO2 SERPL-SCNC: 22 MMOL/L (ref 20–32)
CREAT SERPL-MCNC: 0.75 MG/DL (ref 0.52–1.04)
EOSINOPHIL # BLD AUTO: 0.2 10E3/UL (ref 0–0.7)
EOSINOPHIL NFR BLD AUTO: 3 %
ERYTHROCYTE [DISTWIDTH] IN BLOOD BY AUTOMATED COUNT: 14.1 % (ref 10–15)
GFR SERPL CREATININE-BSD FRML MDRD: >90 ML/MIN/1.73M2
GLUCOSE BLD-MCNC: 106 MG/DL (ref 70–99)
HCT VFR BLD AUTO: 34.3 % (ref 35–47)
HGB BLD-MCNC: 11.6 G/DL (ref 11.7–15.7)
IMM GRANULOCYTES # BLD: 0 10E3/UL
IMM GRANULOCYTES NFR BLD: 0 %
LYMPHOCYTES # BLD AUTO: 0.7 10E3/UL (ref 0.8–5.3)
LYMPHOCYTES NFR BLD AUTO: 11 %
MCH RBC QN AUTO: 31.9 PG (ref 26.5–33)
MCHC RBC AUTO-ENTMCNC: 33.8 G/DL (ref 31.5–36.5)
MCV RBC AUTO: 94 FL (ref 78–100)
MONOCYTES # BLD AUTO: 0.5 10E3/UL (ref 0–1.3)
MONOCYTES NFR BLD AUTO: 9 %
NEUTROPHILS # BLD AUTO: 4.7 10E3/UL (ref 1.6–8.3)
NEUTROPHILS NFR BLD AUTO: 76 %
NRBC # BLD AUTO: 0 10E3/UL
NRBC BLD AUTO-RTO: 0 /100
PLATELET # BLD AUTO: 278 10E3/UL (ref 150–450)
POTASSIUM BLD-SCNC: 3.4 MMOL/L (ref 3.4–5.3)
PROT SERPL-MCNC: 7 G/DL (ref 6.8–8.8)
RBC # BLD AUTO: 3.64 10E6/UL (ref 3.8–5.2)
SODIUM SERPL-SCNC: 141 MMOL/L (ref 133–144)
WBC # BLD AUTO: 6.2 10E3/UL (ref 4–11)

## 2022-03-29 PROCEDURE — 80053 COMPREHEN METABOLIC PANEL: CPT

## 2022-03-29 PROCEDURE — 96401 CHEMO ANTI-NEOPL SQ/IM: CPT

## 2022-03-29 PROCEDURE — 96365 THER/PROPH/DIAG IV INF INIT: CPT

## 2022-03-29 PROCEDURE — 85025 COMPLETE CBC W/AUTO DIFF WBC: CPT

## 2022-03-29 PROCEDURE — 36415 COLL VENOUS BLD VENIPUNCTURE: CPT

## 2022-03-29 PROCEDURE — 250N000013 HC RX MED GY IP 250 OP 250 PS 637

## 2022-03-29 PROCEDURE — G0463 HOSPITAL OUTPT CLINIC VISIT: HCPCS

## 2022-03-29 PROCEDURE — 99215 OFFICE O/P EST HI 40 MIN: CPT | Performed by: REGISTERED NURSE

## 2022-03-29 PROCEDURE — 250N000011 HC RX IP 250 OP 636

## 2022-03-29 PROCEDURE — 36415 COLL VENOUS BLD VENIPUNCTURE: CPT | Performed by: REGISTERED NURSE

## 2022-03-29 PROCEDURE — 258N000003 HC RX IP 258 OP 636

## 2022-03-29 PROCEDURE — 83521 IG LIGHT CHAINS FREE EACH: CPT | Performed by: REGISTERED NURSE

## 2022-03-29 PROCEDURE — 250N000011 HC RX IP 250 OP 636: Performed by: REGISTERED NURSE

## 2022-03-29 PROCEDURE — 96374 THER/PROPH/DIAG INJ IV PUSH: CPT

## 2022-03-29 RX ORDER — METHYLPREDNISOLONE SODIUM SUCCINATE 125 MG/2ML
125 INJECTION, POWDER, LYOPHILIZED, FOR SOLUTION INTRAMUSCULAR; INTRAVENOUS
Status: CANCELLED
Start: 2022-04-25

## 2022-03-29 RX ORDER — ALBUTEROL SULFATE 0.83 MG/ML
2.5 SOLUTION RESPIRATORY (INHALATION)
Status: CANCELLED | OUTPATIENT
Start: 2022-04-25

## 2022-03-29 RX ORDER — DEXAMETHASONE 4 MG/1
20 TABLET ORAL ONCE
Status: COMPLETED | OUTPATIENT
Start: 2022-03-29 | End: 2022-03-29

## 2022-03-29 RX ORDER — DIPHENHYDRAMINE HYDROCHLORIDE 50 MG/ML
50 INJECTION INTRAMUSCULAR; INTRAVENOUS
Status: CANCELLED
Start: 2022-04-25

## 2022-03-29 RX ORDER — MEPERIDINE HYDROCHLORIDE 25 MG/ML
25 INJECTION INTRAMUSCULAR; INTRAVENOUS; SUBCUTANEOUS EVERY 30 MIN PRN
Status: CANCELLED | OUTPATIENT
Start: 2022-04-25

## 2022-03-29 RX ORDER — NALOXONE HYDROCHLORIDE 0.4 MG/ML
0.2 INJECTION, SOLUTION INTRAMUSCULAR; INTRAVENOUS; SUBCUTANEOUS
Status: CANCELLED | OUTPATIENT
Start: 2022-04-25

## 2022-03-29 RX ORDER — LORAZEPAM 0.5 MG/1
0.5 TABLET ORAL EVERY 4 HOURS PRN
Qty: 30 TABLET | Refills: 3 | Status: SHIPPED | OUTPATIENT
Start: 2022-03-29 | End: 2022-07-13

## 2022-03-29 RX ORDER — EPINEPHRINE 1 MG/ML
0.3 INJECTION, SOLUTION INTRAMUSCULAR; SUBCUTANEOUS EVERY 5 MIN PRN
Status: CANCELLED | OUTPATIENT
Start: 2022-04-25

## 2022-03-29 RX ORDER — ZOLEDRONIC ACID 0.04 MG/ML
4 INJECTION, SOLUTION INTRAVENOUS ONCE
Status: COMPLETED | OUTPATIENT
Start: 2022-03-29 | End: 2022-03-29

## 2022-03-29 RX ORDER — ACETAMINOPHEN 325 MG/1
650 TABLET ORAL
Status: COMPLETED | OUTPATIENT
Start: 2022-03-29 | End: 2022-03-29

## 2022-03-29 RX ORDER — HEPARIN SODIUM,PORCINE 10 UNIT/ML
5 VIAL (ML) INTRAVENOUS
Status: CANCELLED | OUTPATIENT
Start: 2022-04-25

## 2022-03-29 RX ORDER — ALBUTEROL SULFATE 90 UG/1
1-2 AEROSOL, METERED RESPIRATORY (INHALATION)
Status: CANCELLED
Start: 2022-04-25

## 2022-03-29 RX ORDER — ZOLEDRONIC ACID 0.04 MG/ML
4 INJECTION, SOLUTION INTRAVENOUS ONCE
Status: CANCELLED
Start: 2022-04-25 | End: 2022-04-25

## 2022-03-29 RX ORDER — DEXAMETHASONE 4 MG/1
TABLET ORAL
Qty: 30 TABLET | Refills: 0 | Status: SHIPPED | OUTPATIENT
Start: 2022-03-29 | End: 2022-04-11

## 2022-03-29 RX ORDER — DIPHENHYDRAMINE HCL 25 MG
50 CAPSULE ORAL
Status: COMPLETED | OUTPATIENT
Start: 2022-03-29 | End: 2022-03-29

## 2022-03-29 RX ORDER — HEPARIN SODIUM (PORCINE) LOCK FLUSH IV SOLN 100 UNIT/ML 100 UNIT/ML
5 SOLUTION INTRAVENOUS
Status: CANCELLED | OUTPATIENT
Start: 2022-04-25

## 2022-03-29 RX ORDER — ZOLEDRONIC ACID 0.04 MG/ML
4 INJECTION, SOLUTION INTRAVENOUS ONCE
Status: CANCELLED
Start: 2022-03-29 | End: 2022-03-29

## 2022-03-29 RX ADMIN — ZOLEDRONIC ACID 4 MG: 0.04 INJECTION, SOLUTION INTRAVENOUS at 16:25

## 2022-03-29 RX ADMIN — DARATUMUMAB AND HYALURONIDASE-FIHJ (HUMAN RECOMBINANT) 1800 MG: 1800; 30000 INJECTION SUBCUTANEOUS at 16:28

## 2022-03-29 RX ADMIN — SODIUM CHLORIDE 250 ML: 9 INJECTION, SOLUTION INTRAVENOUS at 16:22

## 2022-03-29 RX ADMIN — DEXAMETHASONE 20 MG: 4 TABLET ORAL at 15:56

## 2022-03-29 RX ADMIN — BORTEZOMIB 2.5 MG: 3.5 INJECTION, POWDER, LYOPHILIZED, FOR SOLUTION INTRAVENOUS; SUBCUTANEOUS at 16:28

## 2022-03-29 RX ADMIN — DIPHENHYDRAMINE HYDROCHLORIDE 50 MG: 25 CAPSULE ORAL at 15:56

## 2022-03-29 RX ADMIN — ACETAMINOPHEN 650 MG: 325 TABLET ORAL at 15:56

## 2022-03-29 ASSESSMENT — PAIN SCALES - GENERAL: PAINLEVEL: NO PAIN (0)

## 2022-03-29 NOTE — LETTER
3/29/2022     RE: Kristie Cornejo  [address]  [facility] [address]    Dear Colleague,    Thank you for referring your patient, Kristie Cornejo, to the [facility]. Please see a copy of my visit note below.    [facility]  Date of Visit: Mar 29, 2022   Oncologist: Dr. Payton Reinoso    Reason for visit: myeloma follow-up           Oncology History   51 year old female with past medical history significant for Irritable bowel disease, allergies, large plasmacytoma of the L pelvis, and newly diagnosed multiple myeloma.     Early in April 2021 , she noted to have left sided groin pain, constant and was affecting her daily activities  MRI showed large, expansile, permeative mass with indistinct borders involving the left superior and inferior pubic rami, pubic symphysis and with possible extension to the left acetabulum.     We completed the following work-up:   - Biopsy 9/7/2021: plasmacytoma.  Flow cytometry showed kappa monotypic plasma cells, polytypic B cells. FISH results from plasmacytoma showed a gain of 11q, IGH-CCND1 fusion but no losses of 1q or TP53 and no gain of 1q.  - Bone marrow biopsy 09/22/21: Marrow cellularity 50% with 25% interstitial infiltration w/  Kappa-monotypic, moderately atypical plasma cells. Flow with 0.7% kappa-monotypica plasma cells. FISH and cytogenetics pending.   - PET CT 09/24/21 w/ hypermetabolic pathologic fracture of the L pubic ramus w/ aggressive appearing osteolysis but no additional suspicious lytic or blastic osseous lesions.     Treatment to date:   - Zometa q4 weeks started 9/27/21  - Radiation to left pubic ramus x18 fractions, completed 11/11/21.   - RVD C1D1 11/15/21  - RVD C2D1 12/6/21  - RVD C3D1  12/27/21  - RVD C4 D1 1/17/22, elizabeth added on D8 due to inadequate response of urinary M-spike and FLCs remaining over 100 mg/dL after 4 cycles of RVD    - Elizabeth-RVD C1D8 1/24/22  - Elizabeth-RVD C2D1  2/7/22  - Elizabeth-RVD C3D1 2/28/22    Disease response: NC           INTERIM HISTORY:   Margo presents for oncology follow-up visit today.    Margo states she has felt somewhat better the past two weeks with a little extra time off of therapy due to her ablation and need to follow-up with Dr. Reinoso.  She had diarrhea again overnight last night, seems to have resolved now after atking Imodium this morning.  She thinks her taste distortion has improved this past week.     She has styes on upper and lower lids of her left eye. She has been using warm rice packs and an antibiotic ointment.  She has some tenderness and itchiness when she closes her eyes. States she has an ophthalmology appointment tomorrow.    Her neuropathy has not been bothering her, she denies tingling or burning.  No recent infections.  No other side effects she is tolerating treatment well.  Denies pain, denies changes in strength or motor function.    She underwent cardiac ablation on 3/23/22.  She has not noticed any improvement in the sense of nausea and dizziness after exerting herself (like climbing the stairs).   Denies fevers, chills, CP, SOB, vomiting.           Physical Exam:     /77 (BP Location: Right arm, Patient Position: Sitting, Cuff Size: Adult Regular)   Pulse 77   Temp 98.2  F (36.8  C) (Oral)   Resp 18   Wt 72.5 kg (159 lb 12.8 oz)   LMP  (LMP Unknown)   SpO2 99%   BMI 24.66 kg/m     Wt Readings from Last 4 Encounters:   03/29/22 72.5 kg (159 lb 12.8 oz)   03/23/22 72.5 kg (159 lb 12.8 oz)   03/14/22 73 kg (161 lb)   03/08/22 74.3 kg (163 lb 11.2 oz)     General: well appearing, no acute distress  HEENT: normocephalic, atraumatic, PERRLA, sclerae nonicteric, she has styes present on the upper and lower lids of her left eye, no drainage; dry mucous membranes, oropharynx is clear  Lymph: no palpable cervical, supraclavicular or axillary lymphadenopathy   CV: S1 S2, RRR, no murmur, click or rub  Lungs: clear to  auscultation bilaterally, no wheezes/rales/rhonchi  Abd: soft, positive bowel sounds, non-distended, non-tender  MSK: no peripheral edema  Neuro: alert and oriented x3, CN grossly intact; bilateral  strength 5/5  Psych: appropriate mood and affect  Skin:no visible rashes or lesions on exposed skin          Laboratory Data:      I personally reviewed the following labs:    Most Recent 3 CBC's:Recent Labs   Lab Test 03/29/22  1505 03/21/22  0923 03/14/22  1354   WBC 6.2 3.9* 2.7*   HGB 11.6* 12.6 12.3   MCV 94 96 93    250 139*     Most Recent 3 BMP's:  Recent Labs   Lab Test 03/29/22  1505 03/21/22  0923 03/14/22  1427    143 142   POTASSIUM 3.4 3.8 3.0*   CHLORIDE 108 110* 111*   CO2 22 25 22   BUN 20 28 16   CR 0.75 0.84 0.90   ANIONGAP 11 8 9   MEKA 9.3 9.1 8.8   * 122* 156*     Most Recent 2 LFT's:  Recent Labs   Lab Test 03/29/22  1505 03/21/22  0923   AST 16 10   ALT 19 19   ALKPHOS 54 52   BILITOTAL 0.4 0.5              Assessment and Recommendations   Kristie Cornejo is a 51 year old female who was diagnosed with Plasmacytoma after she presented with left sided groin pain now found to have multiple myeloma after further workup with bone marrow biopsy.     # Multiple myeloma with associated large plasmacytoma of the L pelvis. Kappa light chain disease.     BMBx with 25% plasma cell infiltration; standard risk with gain of 11q, IGH-CCND1 fusion but no losses of 1q or TP53 and no gain of 1q.   SPEP w/out a monoclonal peak  UPEP positive. 70% paraprotein. Large monoclonal free immunoglobulin light chain of kappa chain type.     No anemia, normal creat, Ca, no other skeletal lesions. Alb normal, B2M normal.  Stage I Light chain disease with left pelvic bone pathologic fracture she is classified as having symptomatic multiple myeloma.     RVD C1D1 11/15/21 (had a slight delay in the start of her Revlimid on 11/19/21 due to the timing of her pregnancy tests).  She tolerated cycle 1 well  "with some mild nausea and a headache on her week off.  RVD C2D1 12/6/21, tolerated well overall, but has had more consistent sensation of her hands and feet being hot. No tingling, pain, loss of sensation, or changes to her strength.  RVD C3D1 12/27/21, tolerated well overall with ongoing sensation of \"hot\" hands and feet  RVD C4 D1 1/17/22, elizabeth added on D8 due to inadequate response of urinary M-spike and FLCs remaining over 100 mg/dL after 3 cycles of RVD    Elizabeth-RVD C5 2/7/22 Changing to 21 day cycles due to insurance.  Will receive Rev Days 1-14, Velcade days 1,4,8,11, and elizabeth days 1,8,15.    Elizabeth-RVD C3     She has completed 3 cycles of Elizabeth plus RVD and she is in a partial response.  Her free kappa light chains are at least 50% reduced but the response is sluggish.  She tolerated treatment well and we will continue the same regimen without modifications.  Plan to give at least 2-3 more cycles (C4-6 in the treatment plan) followed by restaging including 24-hour urine protein and possible bone marrow biopsy.  Dr. Reinoso previously had a long discussion with [facility] about the timing of autologous transplant and potential need to switch to ablative therapy if her response is not at least VG PA.    - Proceed with C4 Elizabeth-RVD today    #Bone Health  She will continue monthly Zometa (last received on 2/2222). Next dose due today (3/29/22). She will continue Calcium/Vitamin D supplements.     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 Currently having no pain and able to walk 1 mile without any assistive devices.  She is interested in pursuing other activities like gentle cross-country skiing.  Had follow-up with Dr. Philip on 1/6/22 and was cleared to resume regular activity as tolerated. She should still refrain from high-impact activities.    # PVCs  She had " an ECHO 12/1 and had follow-up with cardiology 12/7 (notes in CareEverywhere).  She had follow-up with cardiology here at the  and they recommend an ablation. Stress test was completed and she was started on Toprol XL; however, Toprol XL was discontinued due to significant side effects of orthostatic hypotension, pre-syncopal episodes, shakiness, and weakness.    She had an ablation 3/23/22 but has not experienced relief from the feeling of being nauseated and short of breath after climbing the stairs.    # Nausea  Mild, managed with Compazine up to twice daily and lorazepam occasionally at night.     #COVID prophylaxis  - She received EVUSHELD 1/20/22 and 3/8/22  - Received 3 COVID vaccines to date    #Diarrhea  She has continued to have intermittent diarrhea with associated abdominal cramping and nausea.    -C.diff PCR negative 2/8/22  - Okay to manage with Imodium, 2 tabs with the first loose stool and 1 tab with each subsequent stool, up to a total of 8 tabs daily.    # Reflux  She reported episodes of reflux with bile, especially when she lays down at night.  She has started to elevate her head on pillows and is also trying to avoid eating within 1-2 hours of going to bed.  Omeprazole was increased to 20 mg twice daily with symptom improvement.    Plan :  -Proceed with cycle 4, 5, and 6, with SULEIMAN visits prior to each cycle.  -Zometa today  -Follow-up with Dr. Reinoso in May or earlier if the patient shows signs of resistance.    55 minutes spent on the date of the encounter doing chart review, review of test results, patient visit and documentation     DORON Hall [facility]  [address] [place] [id number]  [phone]

## 2022-03-29 NOTE — PROGRESS NOTES
Infusion Nursing Note:  Kristie Cornejo presents today for C4D1 Velcade/Darzalex Faspro/Zometa.    Patient seen by provider today: Yes: Jackie Ferris Np   present during visit today: Not Applicable.    Note:     TORB: Jackie Ferris NP/Raza Mccarty RN  -ok to proceed with treatment  -Give Zometa today    Intravenous Access:  Labs drawn without difficulty.  Peripheral IV placed.  By lab staff.    Treatment Conditions:  Lab Results   Component Value Date    HGB 11.6 (L) 03/29/2022    WBC 6.2 03/29/2022    ANEU 3.2 01/17/2022    ANEUTAUTO 4.7 03/29/2022     03/29/2022      Lab Results   Component Value Date     03/29/2022    POTASSIUM 3.4 03/29/2022    CR 0.75 03/29/2022    MEKA 9.3 03/29/2022    BILITOTAL 0.4 03/29/2022    ALBUMIN 3.6 03/29/2022    ALT 19 03/29/2022    AST 16 03/29/2022     Results reviewed, labs MET treatment parameters, ok to proceed with treatment.    Post Infusion Assessment:  Patient tolerated infusion without incident.  Patient tolerated injection without incident. One injection of Darzalex Faspro given subQ in right side abdomen.  One injection of Velcade given SubQ in left side abdomen.  Blood return noted pre and post infusion.  Site patent and intact, free from redness, edema or discomfort.  No evidence of extravasations.  Access discontinued per protocol.     Discharge Plan:   Prescription refills given for Dexamethasone/Ativan.  Discharge instructions reviewed with: Patient.  Patient and/or family verbalized understanding of discharge instructions and all questions answered.  AVS to patient via ViratechT.  Patient will return 4/1/22 for next appointment.   Patient discharged in stable condition accompanied by: self.  Departure Mode: Ambulatory.  Face to Face time: 10 minutes.    Raza Mccarty RN

## 2022-03-29 NOTE — NURSING NOTE
Chief Complaint   Patient presents with     Oncology Clinic Visit     Blood Draw     Labs drawn via piv placed by RN in lab. VS taken.      Labs drawn from PIV placed by ultrasound RN. Line flushed with saline. Vitals taken. Pt checked in for appointment(s).    Carissa Hernandez RN

## 2022-03-29 NOTE — PATIENT INSTRUCTIONS
[facility] Main Line: [phone]    Call triage nurse with chills and/or temperature greater than or equal to 100.4, uncontrolled nausea/vomiting, diarrhea, constipation, dizziness, shortness of breath, chest pain, bleeding, unexplained bruising, or any new/concerning symptoms, questions/concerns.   If you are having any concerning symptoms or wish to speak to a provider before your next infusion visit, please call your care coordinator or triage to notify them so we can adequately serve you.   Nurse Triage line:  [phone]    If after hours, weekends, or holidays, call main hospital  and ask for Oncology doctor on call @ [phone]      March 2022 Sunday Monday Tuesday Wednesday Thursday Friday Saturday             1     2     3    ONC INFUSION 1 HR (60 MIN)   1:00 PM   (60 min.)    ONC INFUSION NURSE   [facility] 4     5       6     7     8    LAB PERIPHERAL   8:30 AM   (15 min.)   [facility] LAB DRAW   [facility]    ONC INFUSION 1 HR (60 MIN)   9:00 AM   (60 min.)    ONC INFUSION NURSE   [facility] 9     10     11    ONC INFUSION 1 HR (60 MIN)   7:00 AM   (60 min.)    ONC INFUSION NURSE   [facility] 12       13     14    LAB PERIPHERAL   1:30 PM   (15 min.)   [facility] LAB DRAW   [facility]    ONC INFUSION 1 HR (60 MIN)   2:00 PM   (60 min.)    ONC INFUSION NURSE   [facility] 15     16     17     18     19       20     21    LAB PERIPHERAL   9:00 AM   (15 min.)   [facility] LAB DRAW   [facility] 22    LAB  11:15 AM   (15 min.)    LAB   [facility] [place] 23    LAB PERIPHERAL   9:30 AM   (15 min.)   [facility] LAB DRAW   [facility]    RETURN   9:45 AM   (30 min.)   Payton Reinoso MD   [facility] Blood and Marrow  Transplant Program [place]    Admission  10:35 AM   Alem Thomas MD   [facility] [address] [place]   (Discharge: 3/23/2022)    PROCEDURE - 1.5 HR  11:00 AM   (90 min.)   U2A ROOM 2   [facility] [address] [place]    Ablation Premature Ventricular Contractions  12:30 PM   Alem Thomas MD    HEART CARDIAC CATH LAB 24     25     26       27     28     29    LAB CENTRAL   2:45 PM   (15 min.)   [facility] LAB DRAW   [facility]    RETURN   3:00 PM   (45 min.)   Jackie Ferris APRN CNP   [facility]    ONC INFUSION 1 HR (60 MIN)   3:30 PM   (60 min.)   [facility] ONC INFUSION NURSE   [facility] 30 31 April 2022 Sunday Monday Tuesday Wednesday Thursday Friday Saturday                            1    ONC INFUSION 1 HR (60 MIN)   2:30 PM   (60 min.)   [facility] ONC INFUSION NURSE   [facility] 2       3     4    ONC INFUSION 1 HR (60 MIN)   9:30 AM   (60 min.)   [facility] ONC INFUSION NURSE   [facility] 5     6     7    ONC INFUSION 1 HR (60 MIN)   9:00 AM   (60 min.)   [facility] ONC INFUSION NURSE   [facility] 8     9       10     11    ONC INFUSION 1 HR (60 MIN)   9:30 AM   (60 min.)   [facility] ONC INFUSION NURSE   [facility] 12     13     14     15     16       17     18     19    LAB PERIPHERAL   7:30 AM   (15 min.)   [facility] LAB DRAW   [facility]    RETURN   7:45 AM   (45 min.)   Estefany Pepe PA-C   [facility]    ONC INFUSION 1 HR (60 MIN)   9:30 AM   (60 min.)   [facility] ONC INFUSION NURSE   [facility] 20     21    ONC INFUSION 1 HR (60 MIN)   9:30 AM   (60 min.)   [facility] ONC INFUSION NURSE   [facility] 22     23       24     25    ONC INFUSION 1 HR (60 MIN)   9:00 AM   (60  min.)    ONC INFUSION NURSE   [facility] 26     27     28  Happy Birthday!    ONC INFUSION 1 HR (60 MIN)  11:00 AM   (60 min.)    ONC INFUSION NURSE   [facility] 29     30                     Lab Results:  Recent Results (from the past 12 hour(s))   Comprehensive metabolic panel    Collection Time: 03/29/22  3:05 PM   Result Value Ref Range    Sodium 141 133 - 144 mmol/L    Potassium 3.4 3.4 - 5.3 mmol/L    Chloride 108 94 - 109 mmol/L    Carbon Dioxide (CO2) 22 20 - 32 mmol/L    Anion Gap 11 3 - 14 mmol/L    Urea Nitrogen 20 7 - 30 mg/dL    Creatinine 0.75 0.52 - 1.04 mg/dL    Calcium 9.3 8.5 - 10.1 mg/dL    Glucose 106 (H) 70 - 99 mg/dL    Alkaline Phosphatase 54 40 - 150 U/L    AST 16 0 - 45 U/L    ALT 19 0 - 50 U/L    Protein Total 7.0 6.8 - 8.8 g/dL    Albumin 3.6 3.4 - 5.0 g/dL    Bilirubin Total 0.4 0.2 - 1.3 mg/dL    GFR Estimate >90 >60 mL/min/1.73m2   CBC with platelets and differential    Collection Time: 03/29/22  3:05 PM   Result Value Ref Range    WBC Count 6.2 4.0 - 11.0 10e3/uL    RBC Count 3.64 (L) 3.80 - 5.20 10e6/uL    Hemoglobin 11.6 (L) 11.7 - 15.7 g/dL    Hematocrit 34.3 (L) 35.0 - 47.0 %    MCV 94 78 - 100 fL    MCH 31.9 26.5 - 33.0 pg    MCHC 33.8 31.5 - 36.5 g/dL    RDW 14.1 10.0 - 15.0 %    Platelet Count 278 150 - 450 10e3/uL    % Neutrophils 76 %    % Lymphocytes 11 %    % Monocytes 9 %    % Eosinophils 3 %    % Basophils 1 %    % Immature Granulocytes 0 %    NRBCs per 100 WBC 0 <1 /100    Absolute Neutrophils 4.7 1.6 - 8.3 10e3/uL    Absolute Lymphocytes 0.7 (L) 0.8 - 5.3 10e3/uL    Absolute Monocytes 0.5 0.0 - 1.3 10e3/uL    Absolute Eosinophils 0.2 0.0 - 0.7 10e3/uL    Absolute Basophils 0.1 0.0 - 0.2 10e3/uL    Absolute Immature Granulocytes 0.0 <=0.4 10e3/uL    Absolute NRBCs 0.0 10e3/uL

## 2022-03-30 ENCOUNTER — TRANSFERRED RECORDS (OUTPATIENT)
Dept: HEALTH INFORMATION MANAGEMENT | Facility: CLINIC | Age: 52
End: 2022-03-30
Payer: COMMERCIAL

## 2022-03-30 LAB
KAPPA LC FREE SER-MCNC: 56.32 MG/DL (ref 0.33–1.94)
KAPPA LC FREE/LAMBDA FREE SER NEPH: 208.59 {RATIO} (ref 0.26–1.65)
LAMBDA LC FREE SERPL-MCNC: 0.27 MG/DL (ref 0.57–2.63)

## 2022-04-01 ENCOUNTER — INFUSION THERAPY VISIT (OUTPATIENT)
Dept: ONCOLOGY | Facility: CLINIC | Age: 52
End: 2022-04-01
Payer: COMMERCIAL

## 2022-04-01 DIAGNOSIS — C90.30 PLASMACYTOMA OF BONE (H): Primary | ICD-10-CM

## 2022-04-01 DIAGNOSIS — C90.00 LIGHT CHAIN MYELOMA (H): ICD-10-CM

## 2022-04-01 PROCEDURE — 96401 CHEMO ANTI-NEOPL SQ/IM: CPT

## 2022-04-01 PROCEDURE — 250N000011 HC RX IP 250 OP 636

## 2022-04-01 RX ORDER — METHYLPREDNISOLONE SODIUM SUCCINATE 125 MG/2ML
125 INJECTION, POWDER, LYOPHILIZED, FOR SOLUTION INTRAMUSCULAR; INTRAVENOUS
Status: CANCELLED
Start: 2022-04-04

## 2022-04-01 RX ORDER — EPINEPHRINE 1 MG/ML
0.3 INJECTION, SOLUTION INTRAMUSCULAR; SUBCUTANEOUS EVERY 5 MIN PRN
Status: CANCELLED | OUTPATIENT
Start: 2022-04-04

## 2022-04-01 RX ORDER — NALOXONE HYDROCHLORIDE 0.4 MG/ML
0.2 INJECTION, SOLUTION INTRAMUSCULAR; INTRAVENOUS; SUBCUTANEOUS
Status: CANCELLED | OUTPATIENT
Start: 2022-04-04

## 2022-04-01 RX ORDER — LORAZEPAM 2 MG/ML
0.5 INJECTION INTRAMUSCULAR EVERY 4 HOURS PRN
Status: CANCELLED | OUTPATIENT
Start: 2022-04-04

## 2022-04-01 RX ORDER — ALBUTEROL SULFATE 0.83 MG/ML
2.5 SOLUTION RESPIRATORY (INHALATION)
Status: CANCELLED | OUTPATIENT
Start: 2022-04-04

## 2022-04-01 RX ORDER — MEPERIDINE HYDROCHLORIDE 25 MG/ML
25 INJECTION INTRAMUSCULAR; INTRAVENOUS; SUBCUTANEOUS EVERY 30 MIN PRN
Status: CANCELLED | OUTPATIENT
Start: 2022-04-04

## 2022-04-01 RX ORDER — HEPARIN SODIUM (PORCINE) LOCK FLUSH IV SOLN 100 UNIT/ML 100 UNIT/ML
5 SOLUTION INTRAVENOUS
Status: CANCELLED | OUTPATIENT
Start: 2022-04-04

## 2022-04-01 RX ORDER — DIPHENHYDRAMINE HYDROCHLORIDE 50 MG/ML
50 INJECTION INTRAMUSCULAR; INTRAVENOUS
Status: CANCELLED
Start: 2022-04-04

## 2022-04-01 RX ORDER — HEPARIN SODIUM,PORCINE 10 UNIT/ML
5 VIAL (ML) INTRAVENOUS
Status: CANCELLED | OUTPATIENT
Start: 2022-04-04

## 2022-04-01 RX ORDER — ALBUTEROL SULFATE 90 UG/1
1-2 AEROSOL, METERED RESPIRATORY (INHALATION)
Status: CANCELLED
Start: 2022-04-04

## 2022-04-01 RX ADMIN — BORTEZOMIB 2.5 MG: 3.5 INJECTION, POWDER, LYOPHILIZED, FOR SOLUTION INTRAVENOUS; SUBCUTANEOUS at 15:42

## 2022-04-01 NOTE — PROGRESS NOTES
Infusion Nursing Note:  Kristie Cornejo presents today for Day 4 Cycle 4 Velcade.    Patient seen by provider : No       present during visit today: Not Applicable.    Note: Margo arrives to infusion today doing well. She feels fatigued today due to having a busy day, otherwise she is feeling good overall. No concerns today.     Treatment Conditions:  Not Applicable.    Post Infusion Assessment:  Patient tolerated injection without incident.  Velcade given subcutaneously in RIGHT side abdomen.     Discharge Plan:   Patient declined prescription refills.  AVS to patient via TDX.  Patient will return 4/4 for next appointment.   Patient discharged in stable condition accompanied by: self.  Departure Mode: Ambulatory.    Emma Andrade RN

## 2022-04-02 DIAGNOSIS — C90.00 LIGHT CHAIN MYELOMA (H): Primary | ICD-10-CM

## 2022-04-02 RX ORDER — DIPHENHYDRAMINE HYDROCHLORIDE 50 MG/ML
50 INJECTION INTRAMUSCULAR; INTRAVENOUS
Status: CANCELLED
Start: 2022-05-10

## 2022-04-02 RX ORDER — NALOXONE HYDROCHLORIDE 0.4 MG/ML
0.2 INJECTION, SOLUTION INTRAMUSCULAR; INTRAVENOUS; SUBCUTANEOUS
Status: CANCELLED | OUTPATIENT
Start: 2022-05-10

## 2022-04-02 RX ORDER — ALBUTEROL SULFATE 0.83 MG/ML
2.5 SOLUTION RESPIRATORY (INHALATION)
Status: CANCELLED | OUTPATIENT
Start: 2022-05-10

## 2022-04-02 RX ORDER — ALBUTEROL SULFATE 90 UG/1
1-2 AEROSOL, METERED RESPIRATORY (INHALATION)
Status: CANCELLED
Start: 2022-05-10

## 2022-04-02 RX ORDER — DIPHENHYDRAMINE HYDROCHLORIDE 50 MG/ML
50 INJECTION INTRAMUSCULAR; INTRAVENOUS
Status: CANCELLED
Start: 2022-04-26

## 2022-04-02 RX ORDER — MEPERIDINE HYDROCHLORIDE 25 MG/ML
25 INJECTION INTRAMUSCULAR; INTRAVENOUS; SUBCUTANEOUS EVERY 30 MIN PRN
Status: CANCELLED | OUTPATIENT
Start: 2022-05-10

## 2022-04-02 RX ORDER — ALBUTEROL SULFATE 0.83 MG/ML
2.5 SOLUTION RESPIRATORY (INHALATION)
Status: CANCELLED | OUTPATIENT
Start: 2022-04-26

## 2022-04-02 RX ORDER — EPINEPHRINE 1 MG/ML
0.3 INJECTION, SOLUTION INTRAMUSCULAR; SUBCUTANEOUS EVERY 5 MIN PRN
Status: CANCELLED | OUTPATIENT
Start: 2022-04-26

## 2022-04-02 RX ORDER — METHYLPREDNISOLONE SODIUM SUCCINATE 125 MG/2ML
125 INJECTION, POWDER, LYOPHILIZED, FOR SOLUTION INTRAMUSCULAR; INTRAVENOUS
Status: CANCELLED
Start: 2022-04-26

## 2022-04-02 RX ORDER — NALOXONE HYDROCHLORIDE 0.4 MG/ML
0.2 INJECTION, SOLUTION INTRAMUSCULAR; INTRAVENOUS; SUBCUTANEOUS
Status: CANCELLED | OUTPATIENT
Start: 2022-04-26

## 2022-04-02 RX ORDER — EPINEPHRINE 1 MG/ML
0.3 INJECTION, SOLUTION INTRAMUSCULAR; SUBCUTANEOUS EVERY 5 MIN PRN
Status: CANCELLED | OUTPATIENT
Start: 2022-05-10

## 2022-04-02 RX ORDER — MEPERIDINE HYDROCHLORIDE 25 MG/ML
25 INJECTION INTRAMUSCULAR; INTRAVENOUS; SUBCUTANEOUS EVERY 30 MIN PRN
Status: CANCELLED | OUTPATIENT
Start: 2022-04-26

## 2022-04-02 RX ORDER — ALBUTEROL SULFATE 90 UG/1
1-2 AEROSOL, METERED RESPIRATORY (INHALATION)
Status: CANCELLED
Start: 2022-04-26

## 2022-04-02 RX ORDER — METHYLPREDNISOLONE SODIUM SUCCINATE 125 MG/2ML
125 INJECTION, POWDER, LYOPHILIZED, FOR SOLUTION INTRAMUSCULAR; INTRAVENOUS
Status: CANCELLED
Start: 2022-05-10

## 2022-04-04 ENCOUNTER — INFUSION THERAPY VISIT (OUTPATIENT)
Dept: ONCOLOGY | Facility: CLINIC | Age: 52
End: 2022-04-04
Payer: COMMERCIAL

## 2022-04-04 ENCOUNTER — LAB (OUTPATIENT)
Dept: LAB | Facility: CLINIC | Age: 52
End: 2022-04-04
Attending: REGISTERED NURSE
Payer: COMMERCIAL

## 2022-04-04 ENCOUNTER — ONCOLOGY VISIT (OUTPATIENT)
Dept: ONCOLOGY | Facility: CLINIC | Age: 52
End: 2022-04-04
Payer: COMMERCIAL

## 2022-04-04 ENCOUNTER — TELEPHONE (OUTPATIENT)
Dept: OPHTHALMOLOGY | Facility: CLINIC | Age: 52
End: 2022-04-04

## 2022-04-04 ENCOUNTER — NURSE TRIAGE (OUTPATIENT)
Dept: CARDIOLOGY | Facility: CLINIC | Age: 52
End: 2022-04-04

## 2022-04-04 VITALS
DIASTOLIC BLOOD PRESSURE: 87 MMHG | WEIGHT: 158.8 LBS | HEART RATE: 85 BPM | OXYGEN SATURATION: 99 % | SYSTOLIC BLOOD PRESSURE: 122 MMHG | RESPIRATION RATE: 16 BRPM | BODY MASS INDEX: 24.5 KG/M2 | TEMPERATURE: 98.2 F

## 2022-04-04 DIAGNOSIS — R19.7 DIARRHEA, UNSPECIFIED TYPE: Primary | ICD-10-CM

## 2022-04-04 DIAGNOSIS — R00.2 PALPITATIONS: ICD-10-CM

## 2022-04-04 DIAGNOSIS — I49.3 PVC'S (PREMATURE VENTRICULAR CONTRACTIONS): ICD-10-CM

## 2022-04-04 DIAGNOSIS — C90.00 LIGHT CHAIN MYELOMA (H): Primary | ICD-10-CM

## 2022-04-04 DIAGNOSIS — E86.0 DEHYDRATION: ICD-10-CM

## 2022-04-04 DIAGNOSIS — R19.7 DIARRHEA, UNSPECIFIED TYPE: ICD-10-CM

## 2022-04-04 DIAGNOSIS — R07.9 CHEST PAIN, UNSPECIFIED TYPE: ICD-10-CM

## 2022-04-04 DIAGNOSIS — H53.9 VISION CHANGES: ICD-10-CM

## 2022-04-04 LAB
ALBUMIN SERPL-MCNC: 3.6 G/DL (ref 3.4–5)
ALP SERPL-CCNC: 57 U/L (ref 40–150)
ALT SERPL W P-5'-P-CCNC: 18 U/L (ref 0–50)
ANION GAP SERPL CALCULATED.3IONS-SCNC: 9 MMOL/L (ref 3–14)
AST SERPL W P-5'-P-CCNC: 10 U/L (ref 0–45)
BASOPHILS # BLD AUTO: 0 10E3/UL (ref 0–0.2)
BASOPHILS NFR BLD AUTO: 1 %
BILIRUB SERPL-MCNC: 0.4 MG/DL (ref 0.2–1.3)
BUN SERPL-MCNC: 24 MG/DL (ref 7–30)
CALCIUM SERPL-MCNC: 9.1 MG/DL (ref 8.5–10.1)
CHLORIDE BLD-SCNC: 110 MMOL/L (ref 94–109)
CO2 SERPL-SCNC: 24 MMOL/L (ref 20–32)
CREAT SERPL-MCNC: 0.66 MG/DL (ref 0.52–1.04)
EOSINOPHIL # BLD AUTO: 0.2 10E3/UL (ref 0–0.7)
EOSINOPHIL NFR BLD AUTO: 4 %
ERYTHROCYTE [DISTWIDTH] IN BLOOD BY AUTOMATED COUNT: 14.2 % (ref 10–15)
GFR SERPL CREATININE-BSD FRML MDRD: >90 ML/MIN/1.73M2
GLUCOSE BLD-MCNC: 87 MG/DL (ref 70–99)
HCT VFR BLD AUTO: 35.2 % (ref 35–47)
HGB BLD-MCNC: 11.9 G/DL (ref 11.7–15.7)
IMM GRANULOCYTES # BLD: 0 10E3/UL
IMM GRANULOCYTES NFR BLD: 1 %
KAPPA LC FREE SER-MCNC: 47.41 MG/DL (ref 0.33–1.94)
KAPPA LC FREE/LAMBDA FREE SER NEPH: 197.54 {RATIO} (ref 0.26–1.65)
LAMBDA LC FREE SERPL-MCNC: 0.24 MG/DL (ref 0.57–2.63)
LYMPHOCYTES # BLD AUTO: 0.5 10E3/UL (ref 0.8–5.3)
LYMPHOCYTES NFR BLD AUTO: 13 %
MCH RBC QN AUTO: 31.5 PG (ref 26.5–33)
MCHC RBC AUTO-ENTMCNC: 33.8 G/DL (ref 31.5–36.5)
MCV RBC AUTO: 93 FL (ref 78–100)
MONOCYTES # BLD AUTO: 0.5 10E3/UL (ref 0–1.3)
MONOCYTES NFR BLD AUTO: 13 %
NEUTROPHILS # BLD AUTO: 2.6 10E3/UL (ref 1.6–8.3)
NEUTROPHILS NFR BLD AUTO: 68 %
NRBC # BLD AUTO: 0 10E3/UL
NRBC BLD AUTO-RTO: 0 /100
PLATELET # BLD AUTO: 254 10E3/UL (ref 150–450)
POTASSIUM BLD-SCNC: 3.6 MMOL/L (ref 3.4–5.3)
PROT SERPL-MCNC: 6.8 G/DL (ref 6.8–8.8)
RBC # BLD AUTO: 3.78 10E6/UL (ref 3.8–5.2)
SODIUM SERPL-SCNC: 143 MMOL/L (ref 133–144)
TROPONIN I SERPL HS-MCNC: 20 NG/L
WBC # BLD AUTO: 3.8 10E3/UL (ref 4–11)

## 2022-04-04 PROCEDURE — 96401 CHEMO ANTI-NEOPL SQ/IM: CPT

## 2022-04-04 PROCEDURE — 36415 COLL VENOUS BLD VENIPUNCTURE: CPT | Performed by: PATHOLOGY

## 2022-04-04 PROCEDURE — 84484 ASSAY OF TROPONIN QUANT: CPT | Performed by: REGISTERED NURSE

## 2022-04-04 PROCEDURE — G0463 HOSPITAL OUTPT CLINIC VISIT: HCPCS | Mod: 25

## 2022-04-04 PROCEDURE — 258N000003 HC RX IP 258 OP 636: Performed by: REGISTERED NURSE

## 2022-04-04 PROCEDURE — 96361 HYDRATE IV INFUSION ADD-ON: CPT

## 2022-04-04 PROCEDURE — 36415 COLL VENOUS BLD VENIPUNCTURE: CPT | Performed by: REGISTERED NURSE

## 2022-04-04 PROCEDURE — 250N000011 HC RX IP 250 OP 636

## 2022-04-04 PROCEDURE — 83521 IG LIGHT CHAINS FREE EACH: CPT | Mod: 59

## 2022-04-04 PROCEDURE — 85025 COMPLETE CBC W/AUTO DIFF WBC: CPT | Performed by: REGISTERED NURSE

## 2022-04-04 PROCEDURE — 93010 ELECTROCARDIOGRAM REPORT: CPT | Mod: 59 | Performed by: INTERNAL MEDICINE

## 2022-04-04 PROCEDURE — 99215 OFFICE O/P EST HI 40 MIN: CPT | Performed by: REGISTERED NURSE

## 2022-04-04 PROCEDURE — 93005 ELECTROCARDIOGRAM TRACING: CPT

## 2022-04-04 PROCEDURE — 80053 COMPREHEN METABOLIC PANEL: CPT

## 2022-04-04 PROCEDURE — 96360 HYDRATION IV INFUSION INIT: CPT

## 2022-04-04 RX ORDER — METHYLPREDNISOLONE SODIUM SUCCINATE 125 MG/2ML
125 INJECTION, POWDER, LYOPHILIZED, FOR SOLUTION INTRAMUSCULAR; INTRAVENOUS
Status: CANCELLED
Start: 2022-04-04

## 2022-04-04 RX ORDER — EPINEPHRINE 1 MG/ML
0.3 INJECTION, SOLUTION INTRAMUSCULAR; SUBCUTANEOUS EVERY 5 MIN PRN
Status: CANCELLED | OUTPATIENT
Start: 2022-04-04

## 2022-04-04 RX ORDER — ALBUTEROL SULFATE 90 UG/1
1-2 AEROSOL, METERED RESPIRATORY (INHALATION)
Status: CANCELLED
Start: 2022-04-04

## 2022-04-04 RX ORDER — HEPARIN SODIUM,PORCINE 10 UNIT/ML
5 VIAL (ML) INTRAVENOUS
Status: CANCELLED | OUTPATIENT
Start: 2022-04-04

## 2022-04-04 RX ORDER — MEPERIDINE HYDROCHLORIDE 25 MG/ML
25 INJECTION INTRAMUSCULAR; INTRAVENOUS; SUBCUTANEOUS EVERY 30 MIN PRN
Status: CANCELLED | OUTPATIENT
Start: 2022-04-04

## 2022-04-04 RX ORDER — NALOXONE HYDROCHLORIDE 0.4 MG/ML
0.2 INJECTION, SOLUTION INTRAMUSCULAR; INTRAVENOUS; SUBCUTANEOUS
Status: CANCELLED | OUTPATIENT
Start: 2022-04-04

## 2022-04-04 RX ORDER — ALBUTEROL SULFATE 0.83 MG/ML
2.5 SOLUTION RESPIRATORY (INHALATION)
Status: CANCELLED | OUTPATIENT
Start: 2022-04-04

## 2022-04-04 RX ORDER — HEPARIN SODIUM (PORCINE) LOCK FLUSH IV SOLN 100 UNIT/ML 100 UNIT/ML
5 SOLUTION INTRAVENOUS
Status: CANCELLED | OUTPATIENT
Start: 2022-04-04

## 2022-04-04 RX ORDER — DIPHENHYDRAMINE HYDROCHLORIDE 50 MG/ML
50 INJECTION INTRAMUSCULAR; INTRAVENOUS
Status: CANCELLED
Start: 2022-04-04

## 2022-04-04 RX ORDER — DEXAMETHASONE 4 MG/1
20 TABLET ORAL ONCE
Status: COMPLETED | OUTPATIENT
Start: 2022-04-04 | End: 2022-04-04

## 2022-04-04 RX ADMIN — DARATUMUMAB AND HYALURONIDASE-FIHJ (HUMAN RECOMBINANT) 1800 MG: 1800; 30000 INJECTION SUBCUTANEOUS at 11:52

## 2022-04-04 RX ADMIN — DEXAMETHASONE 20 MG: 4 TABLET ORAL at 11:20

## 2022-04-04 RX ADMIN — BORTEZOMIB 2.5 MG: 3.5 INJECTION, POWDER, LYOPHILIZED, FOR SOLUTION INTRAVENOUS; SUBCUTANEOUS at 12:23

## 2022-04-04 RX ADMIN — SODIUM CHLORIDE 1000 ML: 9 INJECTION, SOLUTION INTRAVENOUS at 09:40

## 2022-04-04 NOTE — PATIENT INSTRUCTIONS
[facility] Triage and after hours / weekends / holidays:  [phone]    Please call the triage or after hours line if you experience a temperature greater than or equal to 100.4, shaking chills, have uncontrolled nausea, vomiting and/or diarrhea, dizziness, shortness of breath, chest pain, bleeding, unexplained bruising, or if you have any other new/concerning symptoms, questions or concerns.      If you are having any concerning symptoms or wish to speak to a provider before your next infusion visit, please call your care coordinator or triage to notify them so we can adequately serve you.     If you need a refill on a narcotic prescription or other medication, please call before your infusion appointment.                 April 2022 Sunday Monday Tuesday Wednesday Thursday Friday Saturday                            1    ONC INFUSION 1 HR (60 MIN)   2:30 PM   (60 min.)    ONC INFUSION NURSE   [facility] 2       3     4    LAB   8:45 AM   (15 min.)    LAB   [facility] [place]    ONC INFUSION 1 HR (60 MIN)   9:30 AM   (60 min.)    ONC INFUSION NURSE   [facility] 5     6     7    ONC INFUSION 1 HR (60 MIN)   9:00 AM   (60 min.)    ONC INFUSION NURSE   [facility] 8     9       10     11    ONC INFUSION 1 HR (60 MIN)   9:30 AM   (60 min.)    ONC INFUSION NURSE   [facility] 12     13     14     15     16       17     18     19    LAB PERIPHERAL   7:30 AM   (15 min.)   [facility] LAB DRAW   [facility]    RETURN   7:45 AM   (45 min.)   Estefany Pepe PA-C   [facility]    ONC INFUSION 1 HR (60 MIN)   9:30 AM   (60 min.)    ONC INFUSION NURSE   [facility] 20     21     22    ONC INFUSION 1 HR (60 MIN)   9:30 AM   (60 min.)    ONC INFUSION NURSE   [facility]  23       24     25    ONC INFUSION 1 HR (60 MIN)   9:00 AM   (60 min.)   [facility] ONC INFUSION NURSE   [facility] 26     27     28  Happy Birthday!    ONC INFUSION 1 HR (60 MIN)  11:00 AM   (60 min.)    ONC INFUSION NURSE   [facility] 29     30                 May 2022      [place] Monday Tuesday Wednesday Thursday Friday Saturday   1     2    LAB PERIPHERAL   8:15 AM   (15 min.)   [facility] LAB DRAW   [facility]    RETURN   8:45 AM   (45 min.)   Jackie Ferris APRN CNP   [facility]    ONC INFUSION 1 HR (60 MIN)  10:30 AM   (60 min.)    ONC INFUSION NURSE   [facility] 3     4     5     6     7       8     9    ONC INFUSION 1 HR (60 MIN)   8:30 AM   (60 min.)    ONC INFUSION NURSE   [facility] 10     11     12     13     14       15     16     17     18     19     20     21       22     23    ZIOPATCH SEND OUT   7:00 AM   (15 min.)    [facility] MONITOR [facility] 24     25     26     27     28       29     30     31                                         Lab Results:  Recent Results (from the past 12 hour(s))   CBC with platelets and differential    Collection Time: 04/04/22  9:36 AM   Result Value Ref Range    WBC Count 3.8 (L) 4.0 - 11.0 10e3/uL    RBC Count 3.78 (L) 3.80 - 5.20 10e6/uL    Hemoglobin 11.9 11.7 - 15.7 g/dL    Hematocrit 35.2 35.0 - 47.0 %    MCV 93 78 - 100 fL    MCH 31.5 26.5 - 33.0 pg    MCHC 33.8 31.5 - 36.5 g/dL    RDW 14.2 10.0 - 15.0 %    Platelet Count 254 150 - 450 10e3/uL    % Neutrophils 68 %    % Lymphocytes 13 %    % Monocytes 13 %    % Eosinophils 4 %    % Basophils 1 %    % Immature Granulocytes 1 %    NRBCs per 100 WBC 0 <1 /100    Absolute Neutrophils 2.6 1.6 - 8.3 10e3/uL    Absolute Lymphocytes 0.5 (L) 0.8 - 5.3 10e3/uL    Absolute Monocytes 0.5 0.0 - 1.3 10e3/uL    Absolute  Eosinophils 0.2 0.0 - 0.7 10e3/uL    Absolute Basophils 0.0 0.0 - 0.2 10e3/uL    Absolute Immature Granulocytes 0.0 <=0.4 10e3/uL    Absolute NRBCs 0.0 10e3/uL   Comprehensive metabolic panel    Collection Time: 04/04/22  9:36 AM   Result Value Ref Range    Sodium 143 133 - 144 mmol/L    Potassium 3.6 3.4 - 5.3 mmol/L    Chloride 110 (H) 94 - 109 mmol/L    Carbon Dioxide (CO2) 24 20 - 32 mmol/L    Anion Gap 9 3 - 14 mmol/L    Urea Nitrogen 24 7 - 30 mg/dL    Creatinine 0.66 0.52 - 1.04 mg/dL    Calcium 9.1 8.5 - 10.1 mg/dL    Glucose 87 70 - 99 mg/dL    Alkaline Phosphatase 57 40 - 150 U/L    AST 10 0 - 45 U/L    ALT 18 0 - 50 U/L    Protein Total 6.8 6.8 - 8.8 g/dL    Albumin 3.6 3.4 - 5.0 g/dL    Bilirubin Total 0.4 0.2 - 1.3 mg/dL    GFR Estimate >90 >60 mL/min/1.73m2

## 2022-04-04 NOTE — TELEPHONE ENCOUNTER
"[facility] Call Center    Phone Message    May a detailed message be left on voicemail: no     Reason for Call: Appointment Intake    Referring Provider Name: GODWIN LEAHY  Diagnosis and/or Symptoms: Vision changes    visual changes \"squiggly lines in field of vision\", significant increase in frequency, previously told she has ocular migraines        Sending to clinic for review    Action Taken: Other: Eye    Travel Screening: Not Applicable                                                                        "

## 2022-04-04 NOTE — TELEPHONE ENCOUNTER
Margo  called because she was seen by Oncology today and her Oncology NP told her she should call Cardiology. Margo said she had an ablation 3/23 and since then she has had what she calls heart pain that sometimes radiated up to her throat/collar bone. She said it happens when she goes up stairs, when she turns over from her back to her side in bed, and when she sat in the recliner today for chemo and pulled her legs up to her chest because she was cold. She said she feels her heart beat very hard,she feels every beat, it's painful. It last 10-15 beats, maybe a little longer sometimes. She Said if it's more painful, she does get SOB with the pain. She does still have some nausea and dizziness but she said she hasn't tipped over or hit the wall lately so it's better. I told her I would send a message to Dr. Alem Thomas's nurse and ask them to call her back. I also told her since it is about 4:00 PM that they may not call her back until tomorrow. I told her if the pain gets worse or if she is worried about it she needs to go to the ER to be evaluated. She said she doesn't feel like it's necessary to go to the ER since it's been this way for a while.   Reason for Disposition    Dizziness or lightheadedness    Cancer treatment in the past two months (or has cancer now)    Chest pain(s) lasting a few seconds persists > 3 days    Additional Information    Negative: Severe difficulty breathing (e.g., struggling for each breath, speaks in single words)    Negative: Passed out (i.e., fainted, collapsed and was not responding)    Negative: Difficult to awaken or acting confused (e.g., disoriented, slurred speech)    Negative: Shock suspected (e.g., cold/pale/clammy skin, too weak to stand, low BP, rapid pulse)    Negative: Chest pain lasting longer than 5 minutes and ANY of the following:* Over 45 years old* Over 30 years old and at least one cardiac risk factor (e.g., diabetes, high blood pressure, high cholesterol, smoker,  or strong family history of heart disease)* History of heart disease (i.e., angina, heart attack, heart failure, bypass surgery, takes nitroglycerin)* Pain is crushing, pressure-like, or heavy    Negative: Heart beating < 50 beats per minute OR > 140 beats per minute    Negative: Visible sweat on face or sweat dripping down face    Negative: Sounds like a life-threatening emergency to the triager    Negative: Followed an injury to chest    Negative: SEVERE chest pain    Negative: Pain also in shoulder(s) or arm(s) or jaw    Negative: Difficulty breathing    Negative: Cocaine use within last 3 days    Negative: Major surgery in the past month    Negative: Hip or leg fracture (broken bone) in past month (or had cast on leg or ankle in past month)    Negative: Illness requiring prolonged bedrest in past month (e.g., immobilization, long hospital stay)    Negative: Long-distance travel in past month (e.g., car, bus, train, plane; with trip lasting 6 or more hours)    Negative: History of prior 'blood clot' in leg or lungs (i.e., deep vein thrombosis, pulmonary embolism)    Negative: History of inherited increased risk of blood clots (e.g., Factor 5 Leiden, Anti-thrombin 3, Protein C or Protein S deficiency, Prothrombin mutation)    Negative: Heart beating irregularly or very rapidly    Negative: Chest pain lasting longer than 5 minutes and occurred in last 3 days (72 hours) (Exception: feels exactly the same as previously diagnosed heartburn and has accompanying sour taste in mouth)    Negative: Chest pain or 'angina' comes and goes and is happening more often (increasing in frequency) or getting worse (increasing in severity) (Exception: chest pains that last only a few seconds)    Negative: Coughing up blood    Negative: Patient sounds very sick or weak to the triager    Negative: Patient says chest pain feels exactly the same as previously diagnosed 'heartburn'  and  describes burning in chest and accompanying sour  "taste in mouth    Negative: Fever > 100.4 F (38.0 C)    Negative: Rash in same area as pain (may be described as 'small blisters')    Negative: All other patients with chest pain (Exception: fleeting chest pain lasting a few seconds)    Negative: Patient wants to be seen    Negative: Chest pain(s) lasting a few seconds from coughing    Negative: Chest pain(s) lasting a few seconds    Answer Assessment - Initial Assessment Questions  1. LOCATION: \"Where does it hurt?\"        Right in my heart   2. RADIATION: \"Does the pain go anywhere else?\" (e.g., into neck, jaw, arms, back)     Sometimes it radiates to y throat/collar bone  3. ONSET: \"When did the chest pain begin?\" (Minutes, hours or days)      Since the ablation  4. PATTERN \"Does the pain come and go, or has it been constant since it started?\"  \"Does it get worse with exertion?\"      Comes and go, worse with exertion and with position changes- like turning from back to side in bed  5. DURATION: \"How long does it last\" (e.g., seconds, minutes, hours)     Seconds  6. SEVERITY: \"How bad is the pain?\"  (e.g., Scale 1-10; mild, moderate, or severe)     - MILD (1-3): doesn't interfere with normal activities      - MODERATE (4-7): interferes with normal activities or awakens from sleep     - SEVERE (8-10): excruciating pain, unable to do any normal activities        Mild to moderate  7. CARDIAC RISK FACTORS: \"Do you have any history of heart problems or risk factors for heart disease?\" (e.g., angina, prior heart attack; diabetes, high blood pressure, high cholesterol, smoker, or strong family history of heart disease)     Yes  8. PULMONARY RISK FACTORS: \"Do you have any history of lung disease?\"  (e.g., blood clots in lung, asthma, emphysema, birth control pills)    No  9. CAUSE: \"What do you think is causing the chest pain?\"   thinks it may be healing from the ablation  10. OTHER SYMPTOMS: \"Do you have any other symptoms?\" (e.g., dizziness, nausea, vomiting, sweating, " fever, difficulty breathing, cough)       Says her nausea and dizziness are slightly better. If the pain is more severe then she does get SOB while she has the pain.,    Protocols used: CHEST PAIN-A-OH

## 2022-04-04 NOTE — PROGRESS NOTES
[facility]  Date of Visit: Apr 4, 2022   Oncologist: Dr. Payton Reinoso    Reason for visit: myeloma follow-up           Oncology History   51 year old female with past medical history significant for Irritable bowel disease, allergies, large plasmacytoma of the L pelvis, and newly diagnosed multiple myeloma.     Early in April 2021 , she noted to have left sided groin pain, constant and was affecting her daily activities  MRI showed large, expansile, permeative mass with indistinct borders involving the left superior and inferior pubic rami, pubic symphysis and with possible extension to the left acetabulum.     We completed the following work-up:   - Biopsy 9/7/2021: plasmacytoma.  Flow cytometry showed kappa monotypic plasma cells, polytypic B cells. FISH results from plasmacytoma showed a gain of 11q, IGH-CCND1 fusion but no losses of 1q or TP53 and no gain of 1q.  - Bone marrow biopsy 09/22/21: Marrow cellularity 50% with 25% interstitial infiltration w/  Kappa-monotypic, moderately atypical plasma cells. Flow with 0.7% kappa-monotypica plasma cells. FISH and cytogenetics pending.   - PET CT 09/24/21 w/ hypermetabolic pathologic fracture of the L pubic ramus w/ aggressive appearing osteolysis but no additional suspicious lytic or blastic osseous lesions.     Treatment to date:   - Zometa q4 weeks started 9/27/21  - Radiation to left pubic ramus x18 fractions, completed 11/11/21.   - RVD C1D1 11/15/21  - RVD C2D1 12/6/21  - RVD C3D1  12/27/21  - RVD C4 D1 1/17/22, elizabeth added on D8 due to inadequate response of urinary M-spike and FLCs remaining over 100 mg/dL after 4 cycles of RVD    - Elizabeth-RVD C1D8 1/24/22  - Elizabeth-RVD C2D1 2/7/22  - Elizabeth-RVD C3D1 2/28/22    Disease response: ME           INTERIM HISTORY:   Margo was see in infusion as an add-on visit after the infusion RN contacted me with some new concerns:  1) Margo reports increased diarrhea over the weekend, with 10-12  "liquid bowel movements on Saturday.  She took a total of three Imodium and had improvement to 3-4 episodes on Sunday, but she continues to have abdominal cramping, especially after meals, and nausea.  2) She has been having episodes of palpitations that \"radiate into my neck\" every day since her ablation on 3/23/22. She continues to feel nauseated and lightheaded with exertion, like walking up the stairs. Denies chest pain other than the feeling of the palpitations. Denies shortness of breath.  3) She has episodes of \"squiggly lines in her field of vision.\"  She has had these intermittently for a long time (since 2002) and was told they were ocular migraines.  She has episodes maybe once or twice a year, but now states she has been having episodes daily since her ablation.           Physical Exam:   /87   HR 85   T 98.2   RR 16  SpO2 99%    Wt Readings from Last 4 Encounters:   04/04/22 72 kg (158 lb 12.8 oz)   03/29/22 72.5 kg (159 lb 12.8 oz)   03/23/22 72.5 kg (159 lb 12.8 oz)   03/14/22 73 kg (161 lb)     General: well appearing, no acute distress  HEENT: normocephalic, atraumatic, PERRLA, sclerae nonicteric, she has styes present on the upper and lower lids of her left eye, no drainage; dry mucous membranes, oropharynx is clear  Lymph: no palpable cervical, supraclavicular or axillary lymphadenopathy   CV: S1 S2, RRR, no murmur, click or rub  Lungs: clear to auscultation bilaterally, no wheezes/rales/rhonchi  Abd: soft, positive bowel sounds, non-distended, non-tender  MSK: no peripheral edema  Neuro: alert and oriented x3, CN grossly intact; bilateral  strength 5/5  Psych: appropriate mood and affect  Skin:no visible rashes or lesions on exposed skin          Laboratory Data:      I personally reviewed the following labs:    Most Recent 3 CBC's:  Recent Labs   Lab Test 04/04/22  0936 03/29/22  1505 03/21/22  0923   WBC 3.8* 6.2 3.9*   HGB 11.9 11.6* 12.6   MCV 93 94 96    278 250     Most " Recent 3 BMP's:  Recent Labs   Lab Test 04/04/22  0936 03/29/22  1505 03/21/22  0923    141 143   POTASSIUM 3.6 3.4 3.8   CHLORIDE 110* 108 110*   CO2 24 22 25   BUN 24 20 28   CR 0.66 0.75 0.84   ANIONGAP 9 11 8   MEKA 9.1 9.3 9.1   GLC 87 106* 122*     Most Recent 2 LFT's:  Recent Labs   Lab Test 04/04/22  0936 03/29/22  1505   AST 10 16   ALT 18 19   ALKPHOS 57 54   BILITOTAL 0.4 0.4     Results for JAMES CORNEJO (MRN [id number]) as of 4/4/2022 16:22   Ref. Range 4/4/2022 09:36   Troponin I High Sensitivity Latest Ref Range: <54 ng/L 20     EKG:  Sinus rhythm   Nonspecific ST and T wave abnormality   Abnormal ECG   When compared with ECG of 23-MAR-2022 16:53,   Nonspecific T wave abnormality, worse in Inferior leads   Nonspecific T wave abnormality now evident in Anterior leads            Assessment and Recommendations   Kristie Cornejo is a 51 year old female who was diagnosed with Plasmacytoma after she presented with left sided groin pain now found to have multiple myeloma after further workup with bone marrow biopsy.     # Multiple myeloma with associated large plasmacytoma of the L pelvis. Kappa light chain disease.     BMBx with 25% plasma cell infiltration; standard risk with gain of 11q, IGH-CCND1 fusion but no losses of 1q or TP53 and no gain of 1q.   SPEP w/out a monoclonal peak  UPEP positive. 70% paraprotein. Large monoclonal free immunoglobulin light chain of kappa chain type.     No anemia, normal creat, Ca, no other skeletal lesions. Alb normal, B2M normal.  Stage I Light chain disease with left pelvic bone pathologic fracture she is classified as having symptomatic multiple myeloma.     RVD C1D1 11/15/21 (had a slight delay in the start of her Revlimid on 11/19/21 due to the timing of her pregnancy tests).  She tolerated cycle 1 well with some mild nausea and a headache on her week off.  RVD C2D1 12/6/21, tolerated well overall, but has had more consistent sensation of her hands and  "feet being hot. No tingling, pain, loss of sensation, or changes to her strength.  RVD C3D1 12/27/21, tolerated well overall with ongoing sensation of \"hot\" hands and feet  RVD C4 D1 1/17/22, elizabeth added on D8 due to inadequate response of urinary M-spike and FLCs remaining over 100 mg/dL after 3 cycles of RVD    Elizabeth-RVD C5 2/7/22 Changing to 21 day cycles due to insurance.  Will receive Rev Days 1-14, Velcade days 1,4,8,11, and elizabeth days 1,8,15.    Elizabeth-RVD C6 2/28/22  Elizabeth-RVD C7 3/28/22    She has completed 3 cycles of Elizabeth plus RVD and she is in a partial response.  Her free kappa light chains are at least 50% reduced but the response is sluggish.  She tolerated treatment well and we will continue the same regimen without modifications.  Plan to give at least 2-3 more cycles (C4-6 in the treatment plan) followed by restaging including 24-hour urine protein and possible bone marrow biopsy.  Dr. Reinoso previously had a long discussion with [facility] about the timing of autologous transplant and potential need to switch to ablative therapy if her response is not at least VG UT.    - Will switch back to 28 day cycles with next cycle so that we can appropriately decrease elizabeth to every other week    #Bone Health  She will continue monthly Zometa (last received on 2/2222). Next dose due today (3/29/22). She will continue Calcium/Vitamin D supplements.     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 Currently having no pain and able to walk 1 mile without any assistive devices.  She is interested in pursuing other activities like gentle cross-country skiing.  Had follow-up with Dr. Philip on 1/6/22 and was cleared to resume regular activity as tolerated. She should still refrain from high-impact activities.    # PVCs  She had an ECHO 12/1 and had follow-up with " "cardiology 12/7 (notes in CareEverywhere).  She had follow-up with cardiology here at the  and they recommend an ablation. Stress test was completed and she was started on Toprol XL; however, Toprol XL was discontinued due to significant side effects of orthostatic hypotension, pre-syncopal episodes, shakiness, and weakness.    She had an ablation 3/23/22 but has not experienced relief from the feeling of being nauseated and short of breath after climbing the stairs.  She also reports episodes of palpitations that \"radiate into her neck.\" Denies chest pain or shortness of breath.   - Check EKG today  - Check Troponin today  - I instructed her to call the triage line she received from cardiology to discuss her symptoms and any post-ablation concerns  - Discussed with Dr. Reinoso, will pursue cardiac MRI to rule out amyloid (discussed below)      #Diarrhea  She was having intermittent diarrhea with associated abdominal cramping and nausea, but developed much more significant diarrhea over the weekend with 10-12 liquid episodes on Saturday.  She had some improvement after a total of 3 Imodium, but still continued to have 3-4 episodes on Sunday. She also reports crampy abdominal pain, especially after eating.    -C.diff PCR negative 2/8/22, will recheck today.  - Check enteric stool panel  - Will request colonoscopy to pursue biopsy for amyloid (discussed below)  - Check CMV PCR    #Ocular Migraines  She has a history of ocular migraines that present as \"squiggly lines in her field of vision.\" These have occurred about annually since 2002, but have now occurred daily since her ablation on 3/23/22.   - Refer to ophthalmology for further evaluation  - Will check CMV to rule out CMV viremia as an etiology.    #Concern for amyloid  She has continued to have significant GI symptoms despite negative infectious testing and diarrhea not being a major side effect of michelle or Velcade. Discussed with Dr. Reinoso.  In light of " the constellation of symptoms, as well as her sluggish response to therapy, she would like to do further testing to rule out amyolid. Of note, congo red stain was negative on her bone marrow biopsy from 9/22/21  - Will request cardiac MRI to explore possibility of cardiac amyloid  - Will request colonoscopy to biopsy for GI amyloid    # Nausea  Mild, managed with Compazine up to twice daily and lorazepam occasionally at night.     #COVID prophylaxis  - She received EVUSHELD 1/20/22 and 3/8/22  - Received 3 COVID vaccines to date    # Reflux  She reported episodes of reflux with bile, especially when she lays down at night.  She has started to elevate her head on pillows and is also trying to avoid eating within 1-2 hours of going to bed.  Omeprazole was increased to 20 mg twice daily with symptom improvement.    Plan :  - EKG, Troponin, CMV, C.diff, and enteric stool panel today  - Will request cardiac MRI, colonoscopy, and ophthalmology consult      ADDENDUM: Troponin is negative. EKG shows nonspecific T wave abnormality.  I asked her to follow-up with the ablation team for further assessment.     60 minutes spent on the date of the encounter doing chart review, review of test results, patient visit and documentation     DORON Hall CNP  [facility]  [address] [place] [id number] [phone]

## 2022-04-04 NOTE — LETTER
4/4/2022    RE: Kristie Cornejo  [address]  [facility] [address]    Dear Colleague,    Thank you for referring your patient, Kristie Cornejo, to the [facility]. Please see a copy of my visit note below.    [facility]  Date of Visit: Apr 4, 2022   Oncologist: Dr. Payton Reinoso    Reason for visit: myeloma follow-up           Oncology History   51 year old female with past medical history significant for Irritable bowel disease, allergies, large plasmacytoma of the L pelvis, and newly diagnosed multiple myeloma.     Early in April 2021 , she noted to have left sided groin pain, constant and was affecting her daily activities  MRI showed large, expansile, permeative mass with indistinct borders involving the left superior and inferior pubic rami, pubic symphysis and with possible extension to the left acetabulum.     We completed the following work-up:   - Biopsy 9/7/2021: plasmacytoma.  Flow cytometry showed kappa monotypic plasma cells, polytypic B cells. FISH results from plasmacytoma showed a gain of 11q, IGH-CCND1 fusion but no losses of 1q or TP53 and no gain of 1q.  - Bone marrow biopsy 09/22/21: Marrow cellularity 50% with 25% interstitial infiltration w/  Kappa-monotypic, moderately atypical plasma cells. Flow with 0.7% kappa-monotypica plasma cells. FISH and cytogenetics pending.   - PET CT 09/24/21 w/ hypermetabolic pathologic fracture of the L pubic ramus w/ aggressive appearing osteolysis but no additional suspicious lytic or blastic osseous lesions.     Treatment to date:   - Zometa q4 weeks started 9/27/21  - Radiation to left pubic ramus x18 fractions, completed 11/11/21.   - RVD C1D1 11/15/21  - RVD C2D1 12/6/21  - RVD C3D1  12/27/21  - RVD C4 D1 1/17/22, elizabeth added on D8 due to inadequate response of urinary M-spike and FLCs remaining over 100 mg/dL after 4 cycles of RVD    - Elizabeth-RVD C1D8 1/24/22  - Elizabeth-RVD C2D1  "2/7/22  - Elizabeth-RVD C3D1 2/28/22    Disease response: MN           INTERIM HISTORY:   Margo was see in infusion as an add-on visit after the infusion RN contacted me with some new concerns:  1) Margo reports increased diarrhea over the weekend, with 10-12 liquid bowel movements on Saturday.  She took a total of three Imodium and had improvement to 3-4 episodes on Sunday, but she continues to have abdominal cramping, especially after meals, and nausea.  2) She has been having episodes of palpitations that \"radiate into my neck\" every day since her ablation on 3/23/22. She continues to feel nauseated and lightheaded with exertion, like walking up the stairs. Denies chest pain other than the feeling of the palpitations. Denies shortness of breath.  3) She has episodes of \"squiggly lines in her field of vision.\"  She has had these intermittently for a long time (since 2002) and was told they were ocular migraines.  She has episodes maybe once or twice a year, but now states she has been having episodes daily since her ablation.           Physical Exam:   /87   HR 85   T 98.2   RR 16  SpO2 99%    Wt Readings from Last 4 Encounters:   04/04/22 72 kg (158 lb 12.8 oz)   03/29/22 72.5 kg (159 lb 12.8 oz)   03/23/22 72.5 kg (159 lb 12.8 oz)   03/14/22 73 kg (161 lb)     General: well appearing, no acute distress  HEENT: normocephalic, atraumatic, PERRLA, sclerae nonicteric, she has styes present on the upper and lower lids of her left eye, no drainage; dry mucous membranes, oropharynx is clear  Lymph: no palpable cervical, supraclavicular or axillary lymphadenopathy   CV: S1 S2, RRR, no murmur, click or rub  Lungs: clear to auscultation bilaterally, no wheezes/rales/rhonchi  Abd: soft, positive bowel sounds, non-distended, non-tender  MSK: no peripheral edema  Neuro: alert and oriented x3, CN grossly intact; bilateral  strength 5/5  Psych: appropriate mood and affect  Skin:no visible rashes or lesions on exposed skin   "        Laboratory Data:      I personally reviewed the following labs:    Most Recent 3 CBC's:  Recent Labs   Lab Test 04/04/22  0936 03/29/22  1505 03/21/22  0923   WBC 3.8* 6.2 3.9*   HGB 11.9 11.6* 12.6   MCV 93 94 96    278 250     Most Recent 3 BMP's:  Recent Labs   Lab Test 04/04/22  0936 03/29/22  1505 03/21/22  0923    141 143   POTASSIUM 3.6 3.4 3.8   CHLORIDE 110* 108 110*   CO2 24 22 25   BUN 24 20 28   CR 0.66 0.75 0.84   ANIONGAP 9 11 8   MEKA 9.1 9.3 9.1   GLC 87 106* 122*     Most Recent 2 LFT's:  Recent Labs   Lab Test 04/04/22  0936 03/29/22  1505   AST 10 16   ALT 18 19   ALKPHOS 57 54   BILITOTAL 0.4 0.4     Results for JAMES CORNEJO (MRN [id number]) as of 4/4/2022 16:22   Ref. Range 4/4/2022 09:36   Troponin I High Sensitivity Latest Ref Range: <54 ng/L 20     EKG:  Sinus rhythm   Nonspecific ST and T wave abnormality   Abnormal ECG   When compared with ECG of 23-MAR-2022 16:53,   Nonspecific T wave abnormality, worse in Inferior leads   Nonspecific T wave abnormality now evident in Anterior leads            Assessment and Recommendations   Kristie Cornejo is a 51 year old female who was diagnosed with Plasmacytoma after she presented with left sided groin pain now found to have multiple myeloma after further workup with bone marrow biopsy.     # Multiple myeloma with associated large plasmacytoma of the L pelvis. Kappa light chain disease.     BMBx with 25% plasma cell infiltration; standard risk with gain of 11q, IGH-CCND1 fusion but no losses of 1q or TP53 and no gain of 1q.   SPEP w/out a monoclonal peak  UPEP positive. 70% paraprotein. Large monoclonal free immunoglobulin light chain of kappa chain type.     No anemia, normal creat, Ca, no other skeletal lesions. Alb normal, B2M normal.  Stage I Light chain disease with left pelvic bone pathologic fracture she is classified as having symptomatic multiple myeloma.     RVD C1D1 11/15/21 (had a slight delay in the start of her  "Revlimid on 11/19/21 due to the timing of her pregnancy tests).  She tolerated cycle 1 well with some mild nausea and a headache on her week off.  RVD C2D1 12/6/21, tolerated well overall, but has had more consistent sensation of her hands and feet being hot. No tingling, pain, loss of sensation, or changes to her strength.  RVD C3D1 12/27/21, tolerated well overall with ongoing sensation of \"hot\" hands and feet  RVD C4 D1 1/17/22, elizabeth added on D8 due to inadequate response of urinary M-spike and FLCs remaining over 100 mg/dL after 3 cycles of RVD    Elizabeth-RVD C5 2/7/22 Changing to 21 day cycles due to insurance.  Will receive Rev Days 1-14, Velcade days 1,4,8,11, and elizabeth days 1,8,15.    Elizabeth-RVD C6 2/28/22  Elizabeth-RVD C7 3/28/22    She has completed 3 cycles of Elizabeth plus RVD and she is in a partial response.  Her free kappa light chains are at least 50% reduced but the response is sluggish.  She tolerated treatment well and we will continue the same regimen without modifications.  Plan to give at least 2-3 more cycles (C4-6 in the treatment plan) followed by restaging including 24-hour urine protein and possible bone marrow biopsy.  Dr. Reinoso previously had a long discussion with [facility] about the timing of autologous transplant and potential need to switch to ablative therapy if her response is not at least VG NE.    - Will switch back to 28 day cycles with next cycle so that we can appropriately decrease elizabeth to every other week    #Bone Health  She will continue monthly Zometa (last received on 2/2222). Next dose due today (3/29/22). She will continue Calcium/Vitamin D supplements.     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 Currently having no pain and able to walk 1 mile without any assistive devices.  She is interested in pursuing other activities " "like gentle cross-country skiing.  Had follow-up with Dr. Philip on 1/6/22 and was cleared to resume regular activity as tolerated. She should still refrain from high-impact activities.    # PVCs  She had an ECHO 12/1 and had follow-up with cardiology 12/7 (notes in CareEverywhere).  She had follow-up with cardiology here at the  and they recommend an ablation. Stress test was completed and she was started on Toprol XL; however, Toprol XL was discontinued due to significant side effects of orthostatic hypotension, pre-syncopal episodes, shakiness, and weakness.    She had an ablation 3/23/22 but has not experienced relief from the feeling of being nauseated and short of breath after climbing the stairs.  She also reports episodes of palpitations that \"radiate into her neck.\" Denies chest pain or shortness of breath.   - Check EKG today  - Check Troponin today  - I instructed her to call the triage line she received from cardiology to discuss her symptoms and any post-ablation concerns  - Discussed with Dr. Reinoso, will pursue cardiac MRI to rule out amyloid (discussed below)      #Diarrhea  She was having intermittent diarrhea with associated abdominal cramping and nausea, but developed much more significant diarrhea over the weekend with 10-12 liquid episodes on Saturday.  She had some improvement after a total of 3 Imodium, but still continued to have 3-4 episodes on Sunday. She also reports crampy abdominal pain, especially after eating.    -C.diff PCR negative 2/8/22, will recheck today.  - Check enteric stool panel  - Will request colonoscopy to pursue biopsy for amyloid (discussed below)  - Check CMV PCR    #Ocular Migraines  She has a history of ocular migraines that present as \"squiggly lines in her field of vision.\" These have occurred about annually since 2002, but have now occurred daily since her ablation on 3/23/22.   - Refer to ophthalmology for further evaluation  - Will check CMV to rule out CMV " viremia as an etiology.    #Concern for amyloid  She has continued to have significant GI symptoms despite negative infectious testing and diarrhea not being a major side effect of michelle or Velcade. Discussed with Dr. Reinoso.  In light of the constellation of symptoms, as well as her sluggish response to therapy, she would like to do further testing to rule out amyolid. Of note, congo red stain was negative on her bone marrow biopsy from 9/22/21  - Will request cardiac MRI to explore possibility of cardiac amyloid  - Will request colonoscopy to biopsy for GI amyloid    # Nausea  Mild, managed with Compazine up to twice daily and lorazepam occasionally at night.     #COVID prophylaxis  - She received EVUSHELD 1/20/22 and 3/8/22  - Received 3 COVID vaccines to date    # Reflux  She reported episodes of reflux with bile, especially when she lays down at night.  She has started to elevate her head on pillows and is also trying to avoid eating within 1-2 hours of going to bed.  Omeprazole was increased to 20 mg twice daily with symptom improvement.    Plan :  - EKG, Troponin, CMV, C.diff, and enteric stool panel today  - Will request cardiac MRI, colonoscopy, and ophthalmology consult    ADDENDUM: Troponin is negative. EKG shows nonspecific T wave abnormality.  I asked her to follow-up with the ablation team for further assessment.     60 minutes spent on the date of the encounter doing chart review, review of test results, patient visit and documentation     DORON Hall [facility]  [address] [place] [id number]  [phone]

## 2022-04-04 NOTE — PROGRESS NOTES
"Infusion Nursing Note:  Kristie Cornejo presents today for IVFs. Day 8 Cycle 4 Velcade, Subcutaneous Daratumumab.     Patient seen by provider today: No   present during visit today: Not Applicable.    Note: Patient presents to infusion with the following symptoms:  1. Over the weekend, patient developed worsening diarrhea, abdominal cramping, and nausea. Patient states she has a baseline of abdominal cramping post meals, intermittent nausea, and diarrhea, however, on Saturday she had 10-15 loose stools and Sunday she had 3 loose stools after taking many doses of Imodium. Patient states her stomach continues to cramp after meals and that over the weekend, it felt like her stomach was in a big knot and that her stomach \"gurgled\" frequently. Pt states she was curled up in a ball most of Saturday. Pt states she did not vomit and that stools are always very malodorous. Patient also states she had frequent hot flashes with GI symptoms but no fever. Today, pt states she feels much better. Pt states she's had only 1 diarrhea episode today so far.  Patient is requesting IVFs today d/t frequent diarrhea over the weekend. Pt states she wasn't able to eat much over the wkd but tried to stay hydrated and drank boost.   2. Heart palpitations and chest discomfort post Ablation. Patient states when position changes from standing to laying down, \"it feels like my heart is really working and I can feel it really beat in my chest that causes pain\".  Patient states pain does radiate into throat where palpitations are also felt. Patient states no dizziness or lightheadedness.   3. Ocular Migraines: Patient states previous to EP Ablation, she would have \"swiggly light\" symptoms maybe once a year. Pt states she told a Lasik physician of her symptoms whom states they were related to Ocular Migraines. Ever since Ablation, pt has been having daily \"swiggly light symptoms\" one time per day for 30 minutes that does cause vision " to appear blurry. Once the light symptoms resolve, pt states sometimes has a light headache that wouldn't require interventions.    Other then the above symptoms, patient denies other concerns or symptoms needing to be addressed today. Pt states she is taking PO Revlimid as prescribed and states understanding to take PO Dex tomorrow as prescribed.    Jackie Ferris CNP made aware of the assessment above.  TORB. 0945. 4/4/2022. Jackie Ferris CNP. Jorge Alberto Pearson RN. Obtain EKG. Cdiff/stool culture ordered. Have patient message Cardiologist of symptoms post ablation. IB will be sent to Cardiologist to notify as well via CNP. Hold off on Day 8 for right now. Ok to give IVFs. Ok to run CMP d/t diarrhea.     TORB. 1113. 4/4/42022. Jackie Ferris CNP. Jorge Alberto Pearson RN. Dr. Reinoso made aware of symptoms above. Proceed with Day 8 Velcade and Daratumumab subcutaneous. CNP be to infusion to see patient prior to leaving.    VORB. 4/4/2022. 1230. Jackie Ferris CNP. Jorge Alberto Pearson RN. Draw CMV PCR. Pt ok to go home with labs in process.    Pt unable to leave stool sample in infusion. Supplies and printed orders given to patient. Patient verbalizes understanding to obtain sample as soon as possible.    Per pharmacist opal Mitchell to give Daratumumab prior to Velcade since Velcade hasn't been made yet/     Intravenous Access:  Peripheral IV placed.    Treatment Conditions:  Lab Results   Component Value Date    HGB 11.9 04/04/2022    WBC 3.8 (L) 04/04/2022    ANEU 3.2 01/17/2022    ANEUTAUTO 2.6 04/04/2022     04/04/2022      Lab Results   Component Value Date     04/04/2022    POTASSIUM 3.6 04/04/2022    CR 0.66 04/04/2022    MEKA 9.1 04/04/2022    BILITOTAL 0.4 04/04/2022    ALBUMIN 3.6 04/04/2022    ALT 18 04/04/2022    AST 10 04/04/2022     Results reviewed, labs MET treatment parameters, ok to proceed with treatment.      Post Infusion Assessment:  Patient tolerated infusion without incident.  Patient tolerated 1  subcutaneous Velcade injection via left abdomen and 1 subcutaneous Daratumumab injection via right abdomen over 3 minutes via 25 gauge butterfly needle without incident.  Blood return noted pre and post infusion.  Site patent and intact, free from redness, edema or discomfort.  No evidence of extravasations.  Access discontinued per protocol.       Discharge Plan:   Patient declined prescription refills.  Discharge instructions reviewed with: Patient.  Patient and/or family verbalized understanding of discharge instructions and all questions answered.  AVS to patient via AnapsisHART.  Patient will return 4/7 for next appointment.   Patient discharged in stable condition accompanied by: self.  Departure Mode: Ambulatory.  Face to Face time: 15 minutes.      Jorge Alberto Pearson RN

## 2022-04-05 ENCOUNTER — PATIENT OUTREACH (OUTPATIENT)
Dept: CARDIOLOGY | Facility: CLINIC | Age: 52
End: 2022-04-05
Payer: COMMERCIAL

## 2022-04-05 ENCOUNTER — LAB (OUTPATIENT)
Dept: LAB | Facility: CLINIC | Age: 52
End: 2022-04-05
Payer: COMMERCIAL

## 2022-04-05 ENCOUNTER — TELEPHONE (OUTPATIENT)
Dept: GASTROENTEROLOGY | Facility: CLINIC | Age: 52
End: 2022-04-05
Payer: COMMERCIAL

## 2022-04-05 DIAGNOSIS — I49.3 PVC'S (PREMATURE VENTRICULAR CONTRACTIONS): Primary | ICD-10-CM

## 2022-04-05 DIAGNOSIS — Z11.59 ENCOUNTER FOR SCREENING FOR OTHER VIRAL DISEASES: Primary | ICD-10-CM

## 2022-04-05 DIAGNOSIS — R19.7 DIARRHEA, UNSPECIFIED TYPE: ICD-10-CM

## 2022-04-05 LAB — CMV DNA SPEC NAA+PROBE-ACNC: NOT DETECTED IU/ML

## 2022-04-05 PROCEDURE — 87506 IADNA-DNA/RNA PROBE TQ 6-11: CPT

## 2022-04-05 PROCEDURE — 87493 C DIFF AMPLIFIED PROBE: CPT | Mod: 59

## 2022-04-05 NOTE — TELEPHONE ENCOUNTER
Called and LVM for Margo Aragon has a referral for Vision Changes -     Will call try to call again later today     Laura Valdovinos Communication Facilitator on 4/5/2022 at 8:40 AM

## 2022-04-05 NOTE — TELEPHONE ENCOUNTER
Attempted to call patient. Left voicemail with below recommendations, suggested taking ibuprofen 400-600mg TID for 1-2 weeks. Gave number to call to schedule echocardiogram.       Posture change induced chest pain, suggesting a musculoskeletal pain. But, chest pain seems to be ablation-related, and might be caused by pericarditis.   Please advise her to take Ibuprofen and see how it works.   I also would like her to take TTE.   Thank you.   Alem Thomas

## 2022-04-05 NOTE — TELEPHONE ENCOUNTER
Screening Questions  BlueKIND OF PREP RedLOCATION [review exclusion criteria] GreenSEDATION TYPE  1. Have you had a positive covid test in the last 90 days? N     2. Are you active on mychart? Y    3. What insurance is in the chart? [employer]     3.   Ordering/Referring Provider: Jackie Ferris APRN CNP in [facility]    4. BMI 24.5 [BMI OVER 40-EXTENDED PREP]  If greater than 40 review exclusion criteria [PAC APPT IF @ [facility]]        5.  Respiratory Screening :  [If yes to any of the following HOSPITAL setting only]     Do you use daily home oxygen? N    Do you have mod to severe Obstructive Sleep Apnea? N  [OKAY @ [facility] SH PH RI]   Do you have Pulmonary Hypertension? N     Do you have UNCONTROLLED asthma? N        6.   Have you had a heart or lung transplant? N      7.   Are you currently on dialysis? N [ If yes, G-PREP & HOSPITAL setting only]     8.   Do you have chronic kidney disease? N [ If yes, G-PREP ]    9.   Have you had a stroke or Transient ischemic attack (TIA - aka  mini stroke ) within 6 months?  N (If yes, please review exclusion criteria)    10.   In the past 6 months, have you had any heart related issues including cardiomyopathy or heart attack? N BUT PVC IN HEART HAD ABOLATION          If yes, did it require cardiac stenting or other implantable device? N      11.   Do you have any implantable devices in your body (pacemaker, defib, LVAD)? N (If yes, please review exclusion criteria)    12.   Do you take nitroglycerin? N           If yes, how often? N  (if yes, HOSPITAL setting ONLY)    13.   Are you currently taking any blood thinners? N           [IF YES, INFORM PATIENT TO FOLLOW UP W/ ORDERING PROVIDER FOR BRIDGING INSTRUCTIONS]     14.   Do you have a diagnosis of diabetes? N  [ If yes, G-PREP ]    15.   [FEMALES] Are you currently pregnant?     If yes, how many weeks?     16.   Are you taking any prescription pain medications on a routine schedule?  N  [ If yes, EXTENDED  PREP.] [If yes, MAC]    17.   Do you have any chemical dependencies such as alcohol, street drugs, or methadone?  N [If yes, MAC]    18.   Do you have any history of post-traumatic stress syndrome, severe anxiety or history of psychosis? N, mild anxiety, non medicated [If yes, MAC]    19.   Do you have a significant intellectual disability?  N [If yes, MAC]    20.   Do you transfer independently?  Y    21.  On a regular basis do you go 3-5 days between bowel movements? N *SEE NOTES [ If yes, EXTENDED PREP.]    22.   Preferred LOCAL Pharmacy for Pre Prescription       [address] [facility] [address]  OR  [place] [address] [place] [address] Where pt is twice weekly for chemo so prefers this pharmacy.       Scheduling Details      Caller : Margo  (Please ask for phone number if not scheduled by patient)    Type of Procedure Scheduled: colonoscopy   Which Colonoscopy Prep was Sent?: miralax  KHORUTS CF PATIENTS & GROEN'S PATIENTS NEEDS EXTENDED PREP  Surgeon: Valentina  Date of Procedure: 4/13  Location: [facility]      Sedation Type: CS  Conscious Sedation- Needs  for 6 hours after the procedure  MAC/General-Needs  for 24 hours after procedure    Pre-op Required at [facility] [place], [facility] and OR for MAC sedation: n  (advise patient they will need a pre-op prior to procedure -)      Informed patient they will need an adult  y  Cannot take any type of public or medical transportation alone    [facility]-Pro[facility] or Externally: [facility] 4/9    Confirmed Nurse will call to complete assessment Y    Additional comments: Previous colonoscopies were extended prep but since on chemo, pt has diarrhea most days and feels that the extended prep would be too strong. Will want to discuss prep options w/ preassessment nurse. Sent Miralax prep instructions, noting to pt this may change.

## 2022-04-06 ENCOUNTER — MYC MEDICAL ADVICE (OUTPATIENT)
Dept: ONCOLOGY | Facility: CLINIC | Age: 52
End: 2022-04-06
Payer: COMMERCIAL

## 2022-04-06 LAB
ATRIAL RATE - MUSE: 66 BPM
C COLI+JEJUNI+LARI FUSA STL QL NAA+PROBE: NOT DETECTED
C DIFF TOX B STL QL: NEGATIVE
DIASTOLIC BLOOD PRESSURE - MUSE: NORMAL MMHG
EC STX1 GENE STL QL NAA+PROBE: NOT DETECTED
EC STX2 GENE STL QL NAA+PROBE: NOT DETECTED
INTERPRETATION ECG - MUSE: NORMAL
NOROV GI+II ORF1-ORF2 JNC STL QL NAA+PR: NOT DETECTED
P AXIS - MUSE: -8 DEGREES
PR INTERVAL - MUSE: 166 MS
QRS DURATION - MUSE: 66 MS
QT - MUSE: 448 MS
QTC - MUSE: 469 MS
R AXIS - MUSE: 18 DEGREES
RVA NSP5 STL QL NAA+PROBE: NOT DETECTED
SALMONELLA SP RPOD STL QL NAA+PROBE: NOT DETECTED
SHIGELLA SP+EIEC IPAH STL QL NAA+PROBE: NOT DETECTED
SYSTOLIC BLOOD PRESSURE - MUSE: NORMAL MMHG
T AXIS - MUSE: 23 DEGREES
V CHOL+PARA RFBL+TRKH+TNAA STL QL NAA+PR: NOT DETECTED
VENTRICULAR RATE- MUSE: 66 BPM
Y ENTERO RECN STL QL NAA+PROBE: NOT DETECTED

## 2022-04-06 NOTE — TELEPHONE ENCOUNTER
Just an update for Jackie Ferris and Griselda Valdez.     Denies any need for assistance with ongoing symptoms.

## 2022-04-07 ENCOUNTER — TELEPHONE (OUTPATIENT)
Dept: GASTROENTEROLOGY | Facility: CLINIC | Age: 52
End: 2022-04-07

## 2022-04-07 ENCOUNTER — INFUSION THERAPY VISIT (OUTPATIENT)
Dept: ONCOLOGY | Facility: CLINIC | Age: 52
End: 2022-04-07
Payer: COMMERCIAL

## 2022-04-07 VITALS
TEMPERATURE: 98.2 F | OXYGEN SATURATION: 98 % | SYSTOLIC BLOOD PRESSURE: 130 MMHG | RESPIRATION RATE: 16 BRPM | HEART RATE: 72 BPM | DIASTOLIC BLOOD PRESSURE: 79 MMHG

## 2022-04-07 DIAGNOSIS — C90.00 LIGHT CHAIN MYELOMA (H): Primary | ICD-10-CM

## 2022-04-07 PROCEDURE — 96401 CHEMO ANTI-NEOPL SQ/IM: CPT

## 2022-04-07 PROCEDURE — 250N000011 HC RX IP 250 OP 636: Mod: JW

## 2022-04-07 RX ADMIN — BORTEZOMIB 2.5 MG: 3.5 INJECTION, POWDER, LYOPHILIZED, FOR SOLUTION INTRAVENOUS; SUBCUTANEOUS at 09:48

## 2022-04-07 ASSESSMENT — PAIN SCALES - GENERAL: PAINLEVEL: NO PAIN (0)

## 2022-04-07 NOTE — PROGRESS NOTES
Infusion Nursing Note:  Kristie Cornejo presents today for Cycle 4 Day 11 Bortezomib (VELCADE).    Patient seen by provider today: No   present during visit today: Not Applicable.    Note: Patient states she is feeling much better than earlier in the week.  She continues to have diarrhea, but states she is managing.  She states she was referred to have a colonoscopy soon to evaluate her diarrhea.  She offers no new concerns at this time.      Intravenous Access:  No Intravenous access/labs at this visit.    Treatment Conditions:  Results reviewed from 4/4, labs MET treatment parameters, ok to proceed with treatment.    Post Infusion Assessment:  Patient tolerated injection without incident.  Velcade administered subcutaneously into the RIGHT side of patient's abdomen.     Discharge Plan:   Patient declined prescription refills.  Discharge instructions reviewed with: Patient.  Patient and/or family verbalized understanding of discharge instructions and all questions answered.  AVS to patient via HashCubeT.  Patient will return 4/11/22 for next appointment.   Patient discharged in stable condition accompanied by: self.  Departure Mode: Ambulatory.  Face to Face time: 0.      OSMIN MIRANDA RN                     Pfizer dose 1, 2, and 3

## 2022-04-07 NOTE — TELEPHONE ENCOUNTER
Patient scheduled for Colonoscopy on 4/13/22.     Covid test scheduled: 4/9/22    Arrival time: 1:30pm    Facility location: [facility]    Sedation type: CS    Indication for procedure: Rule out Amyloid, vision changes     Anticoagulations? None     Bowel prep recommendation: Miralax    Prep instructions sent via WHOOP    Pre visit planning completed.    Genet Houser RN

## 2022-04-07 NOTE — TELEPHONE ENCOUNTER
FUTURE VISIT INFORMATION      FUTURE VISIT INFORMATION:    Date: 5/31/22    Time: 9:00am    [place] [facility] [place]FORMATION:    Referring provider:  Jackie Ferris APRN CNP    Referring providers clinic:  [facility]    Reason for visit/diagnosis Chalazion left upper eye lid.    RECORDS REQUESTED FROM:       Clinic name Comments Records Status Imaging Status   [facility] OV/referral 4/4/22 EPIC

## 2022-04-08 NOTE — TELEPHONE ENCOUNTER
Attempted to contact patient for pre assessment questions. No answer.     Left message to return call to [phone] #2    Rosangela Patiño RN

## 2022-04-09 ENCOUNTER — LAB (OUTPATIENT)
Dept: LAB | Facility: CLINIC | Age: 52
End: 2022-04-09

## 2022-04-09 DIAGNOSIS — Z11.59 ENCOUNTER FOR SCREENING FOR OTHER VIRAL DISEASES: ICD-10-CM

## 2022-04-09 LAB — SARS-COV-2 RNA RESP QL NAA+PROBE: NEGATIVE

## 2022-04-09 PROCEDURE — U0003 INFECTIOUS AGENT DETECTION BY NUCLEIC ACID (DNA OR RNA); SEVERE ACUTE RESPIRATORY SYNDROME CORONAVIRUS 2 (SARS-COV-2) (CORONAVIRUS DISEASE [COVID-19]), AMPLIFIED PROBE TECHNIQUE, MAKING USE OF HIGH THROUGHPUT TECHNOLOGIES AS DESCRIBED BY CMS-2020-01-R: HCPCS | Performed by: INTERNAL MEDICINE

## 2022-04-11 ENCOUNTER — INFUSION THERAPY VISIT (OUTPATIENT)
Dept: ONCOLOGY | Facility: CLINIC | Age: 52
End: 2022-04-11
Payer: COMMERCIAL

## 2022-04-11 ENCOUNTER — APPOINTMENT (OUTPATIENT)
Dept: LAB | Facility: CLINIC | Age: 52
End: 2022-04-11
Payer: COMMERCIAL

## 2022-04-11 ENCOUNTER — TELEPHONE (OUTPATIENT)
Dept: ONCOLOGY | Facility: CLINIC | Age: 52
End: 2022-04-11

## 2022-04-11 VITALS
RESPIRATION RATE: 16 BRPM | WEIGHT: 156 LBS | SYSTOLIC BLOOD PRESSURE: 112 MMHG | DIASTOLIC BLOOD PRESSURE: 80 MMHG | HEART RATE: 79 BPM | OXYGEN SATURATION: 98 % | BODY MASS INDEX: 24.07 KG/M2 | TEMPERATURE: 98 F

## 2022-04-11 DIAGNOSIS — C90.00 LIGHT CHAIN MYELOMA (H): Primary | ICD-10-CM

## 2022-04-11 DIAGNOSIS — E86.0 DEHYDRATION: ICD-10-CM

## 2022-04-11 DIAGNOSIS — C90.30 PLASMACYTOMA OF BONE (H): ICD-10-CM

## 2022-04-11 LAB
BASOPHILS # BLD AUTO: 0 10E3/UL (ref 0–0.2)
BASOPHILS NFR BLD AUTO: 0 %
EOSINOPHIL # BLD AUTO: 0.2 10E3/UL (ref 0–0.7)
EOSINOPHIL NFR BLD AUTO: 5 %
ERYTHROCYTE [DISTWIDTH] IN BLOOD BY AUTOMATED COUNT: 14.5 % (ref 10–15)
HCG UR QL: NEGATIVE
HCT VFR BLD AUTO: 35.9 % (ref 35–47)
HGB BLD-MCNC: 12.3 G/DL (ref 11.7–15.7)
IMM GRANULOCYTES # BLD: 0 10E3/UL
IMM GRANULOCYTES NFR BLD: 1 %
LYMPHOCYTES # BLD AUTO: 0.3 10E3/UL (ref 0.8–5.3)
LYMPHOCYTES NFR BLD AUTO: 8 %
MCH RBC QN AUTO: 32.4 PG (ref 26.5–33)
MCHC RBC AUTO-ENTMCNC: 34.3 G/DL (ref 31.5–36.5)
MCV RBC AUTO: 95 FL (ref 78–100)
MONOCYTES # BLD AUTO: 1 10E3/UL (ref 0–1.3)
MONOCYTES NFR BLD AUTO: 24 %
NEUTROPHILS # BLD AUTO: 2.5 10E3/UL (ref 1.6–8.3)
NEUTROPHILS NFR BLD AUTO: 62 %
NRBC # BLD AUTO: 0 10E3/UL
NRBC BLD AUTO-RTO: 0 /100
PLAT MORPH BLD: NORMAL
PLATELET # BLD AUTO: 159 10E3/UL (ref 150–450)
RBC # BLD AUTO: 3.8 10E6/UL (ref 3.8–5.2)
RBC MORPH BLD: NORMAL
WBC # BLD AUTO: 4.1 10E3/UL (ref 4–11)

## 2022-04-11 PROCEDURE — 85025 COMPLETE CBC W/AUTO DIFF WBC: CPT

## 2022-04-11 PROCEDURE — 258N000003 HC RX IP 258 OP 636: Performed by: REGISTERED NURSE

## 2022-04-11 PROCEDURE — 250N000011 HC RX IP 250 OP 636

## 2022-04-11 PROCEDURE — 96360 HYDRATION IV INFUSION INIT: CPT

## 2022-04-11 PROCEDURE — 96401 CHEMO ANTI-NEOPL SQ/IM: CPT

## 2022-04-11 PROCEDURE — 36415 COLL VENOUS BLD VENIPUNCTURE: CPT

## 2022-04-11 PROCEDURE — 81025 URINE PREGNANCY TEST: CPT

## 2022-04-11 RX ORDER — EPINEPHRINE 1 MG/ML
0.3 INJECTION, SOLUTION INTRAMUSCULAR; SUBCUTANEOUS EVERY 5 MIN PRN
Status: CANCELLED | OUTPATIENT
Start: 2022-04-11

## 2022-04-11 RX ORDER — DIPHENHYDRAMINE HYDROCHLORIDE 50 MG/ML
50 INJECTION INTRAMUSCULAR; INTRAVENOUS
Status: CANCELLED
Start: 2022-04-11

## 2022-04-11 RX ORDER — DEXAMETHASONE 4 MG/1
20 TABLET ORAL ONCE
Status: COMPLETED | OUTPATIENT
Start: 2022-04-11 | End: 2022-04-11

## 2022-04-11 RX ORDER — HEPARIN SODIUM (PORCINE) LOCK FLUSH IV SOLN 100 UNIT/ML 100 UNIT/ML
5 SOLUTION INTRAVENOUS
Status: CANCELLED | OUTPATIENT
Start: 2022-04-11

## 2022-04-11 RX ORDER — NALOXONE HYDROCHLORIDE 0.4 MG/ML
0.2 INJECTION, SOLUTION INTRAMUSCULAR; INTRAVENOUS; SUBCUTANEOUS
Status: CANCELLED | OUTPATIENT
Start: 2022-04-11

## 2022-04-11 RX ORDER — METHYLPREDNISOLONE SODIUM SUCCINATE 125 MG/2ML
125 INJECTION, POWDER, LYOPHILIZED, FOR SOLUTION INTRAMUSCULAR; INTRAVENOUS
Status: CANCELLED
Start: 2022-04-11

## 2022-04-11 RX ORDER — ALBUTEROL SULFATE 0.83 MG/ML
2.5 SOLUTION RESPIRATORY (INHALATION)
Status: CANCELLED | OUTPATIENT
Start: 2022-04-11

## 2022-04-11 RX ORDER — HEPARIN SODIUM,PORCINE 10 UNIT/ML
5 VIAL (ML) INTRAVENOUS
Status: CANCELLED | OUTPATIENT
Start: 2022-04-11

## 2022-04-11 RX ORDER — MEPERIDINE HYDROCHLORIDE 25 MG/ML
25 INJECTION INTRAMUSCULAR; INTRAVENOUS; SUBCUTANEOUS EVERY 30 MIN PRN
Status: CANCELLED | OUTPATIENT
Start: 2022-04-11

## 2022-04-11 RX ORDER — ALBUTEROL SULFATE 90 UG/1
1-2 AEROSOL, METERED RESPIRATORY (INHALATION)
Status: CANCELLED
Start: 2022-04-11

## 2022-04-11 RX ORDER — LENALIDOMIDE 25 MG/1
25 CAPSULE ORAL DAILY
Qty: 14 CAPSULE | Refills: 0 | Status: SHIPPED | OUTPATIENT
Start: 2022-04-19 | End: 2022-05-24

## 2022-04-11 RX ORDER — DEXAMETHASONE 4 MG/1
20 TABLET ORAL ONCE
Status: DISCONTINUED | OUTPATIENT
Start: 2022-04-11 | End: 2022-04-11

## 2022-04-11 RX ADMIN — SODIUM CHLORIDE 1000 ML: 9 INJECTION, SOLUTION INTRAVENOUS at 10:10

## 2022-04-11 RX ADMIN — DEXAMETHASONE 20 MG: 4 TABLET ORAL at 10:58

## 2022-04-11 RX ADMIN — DARATUMUMAB AND HYALURONIDASE-FIHJ (HUMAN RECOMBINANT) 1800 MG: 1800; 30000 INJECTION SUBCUTANEOUS at 11:26

## 2022-04-11 ASSESSMENT — PAIN SCALES - GENERAL: PAINLEVEL: NO PAIN (0)

## 2022-04-11 NOTE — TELEPHONE ENCOUNTER
Oral Chemotherapy Monitoring Program    Medication: Revlimid  Rx:  25 mg PO daily for 14 days. Then off for 14 days of each 28 day cycle  Auth #: [id number]  Risk Category: Adult female WITH reproductive capacity.    Routine survey questions reviewed.      Anette Mcdermott  Oncology Pharmacy Liaison II  [email or web address]  Phone: [phone]  Fax: [phone]

## 2022-04-11 NOTE — TELEPHONE ENCOUNTER
Returning pt's call.    Pre assessment questions completed for upcoming colonoscopy procedure scheduled on 4.13.2022    COVID test 4.9.2022-negative    Reviewed procedural arrival time 1345 and facility location [facility].    Designated  policy reviewed. Instructed to have someone stay 6 hours post procedure.     Anticoagulation/blood thinners? no    Electronic implanted devices? no    Reviewed Miralax/Magnesium citrate/Dulcolax prep instructions with patient.     Patient verbalized understanding and had no questions or concerns at this time.    Andreia Golden RN

## 2022-04-11 NOTE — PROGRESS NOTES
Infusion Nursing Note:  Kristielara Cornejo presents today for Cycle 4 Day 15 darzalex faspro; IV fluids.    Patient seen by provider today: No   present during visit today: Not Applicable.    Note: Lara presents today feeling okay today. Denies pain or nausea/vomiting. Denies fevers/chills. Denies SOB, cough, chest pain. Reports intermittent dizziness with position changes, but this has been an issue for a few weeks, slowly improving. Continues to have diarrhea, but this is also improving and Lara is using PRN imodium. Denies urinary issues. Denies neuropathy. Offers no new concerns at this appointment. Lara reports that she is scheduled for a colonoscopy on Wednesday and is a little concerned about doing the prep while she is already having diarrhea. Recommended using electrolyte beverages to replenish stores. She spoke with the procedure nurse this morning and got some recommendations for the prep, and also requested IV fluids at today's visit (orders in therapy plan).      Intravenous Access:  Peripheral IV placed.    Treatment Conditions:     Latest Reference Range & Units 04/11/22 09:29   WBC 4.0 - 11.0 10e3/uL 4.1   Hemoglobin 11.7 - 15.7 g/dL 12.3   Hematocrit 35.0 - 47.0 % 35.9   Platelet Count 150 - 450 10e3/uL 159   RBC Count 3.80 - 5.20 10e6/uL 3.80   MCV 78 - 100 fL 95   MCH 26.5 - 33.0 pg 32.4   MCHC 31.5 - 36.5 g/dL 34.3   RDW 10.0 - 15.0 % 14.5   % Neutrophils % 62   % Lymphocytes % 8   % Monocytes % 24   % Eosinophils % 5   % Basophils % 0   Absolute Basophils 0.0 - 0.2 10e3/uL 0.0   Absolute Eosinophils 0.0 - 0.7 10e3/uL 0.2   Absolute Immature Granulocytes <=0.4 10e3/uL 0.0   Absolute Lymphocytes 0.8 - 5.3 10e3/uL 0.3 (L)   Absolute Monocytes 0.0 - 1.3 10e3/uL 1.0   % Immature Granulocytes % 1   Absolute Neutrophils 1.6 - 8.3 10e3/uL 2.5   Absolute NRBCs 10e3/uL 0.0   NRBCs per 100 WBC <1 /100 0   RBC Morphology  Confirmed RBC Indices   Platelet Morphology Automated Count Confirmed.  Platelet morphology is normal.  Automated Count Confirmed. Platelet morphology is normal.     Results reviewed, labs MET treatment parameters, ok to proceed with treatment.      Post Infusion Assessment:  Patient tolerated infusion without incident.  Patient tolerated darzalex faspro injection to L abdomen without incident.  Blood return noted pre and post infusion.  Site patent and intact, free from redness, edema or discomfort.  No evidence of extravasations.  Access discontinued per protocol.       Discharge Plan:   Prescription refills given for acyclovir.  Discharge instructions reviewed with: Patient.  Patient and/or family verbalized understanding of discharge instructions and all questions answered.  Copy of AVS reviewed with patient and/or family.  Patient will return 04/19 for next infusion appointment.  Patient discharged in stable condition accompanied by: self.  Departure Mode: Ambulatory.      Dominique Wyatt RN

## 2022-04-11 NOTE — NURSING NOTE
Chief Complaint   Patient presents with     Blood Draw     Labs drawn via PIV start by RN. VS taken.     Labs drawn with PIV start by rn.  Pt tolerated well.  VS taken and pt checked in for next appt.    Quincy Gibbons RN

## 2022-04-11 NOTE — PATIENT INSTRUCTIONS
[facility] Triage Nurse Line: [phone]    Call triage nurse with chills and/or temperature greater than or equal to 100.4, uncontrolled nausea/vomiting, diarrhea, constipation, dizziness, shortness of breath, chest pain, bleeding, unexplained bruising, or any new/concerning symptoms, questions/concerns.     If you are having any concerning symptoms or wish to speak to a provider before your next infusion visit, please call your care coordinator or triage to notify them so we can adequately serve you.          Lab Results:  Recent Results (from the past 12 hour(s))   HCG Qual, Urine ([id number])    Collection Time: 04/11/22  9:29 AM   Result Value Ref Range    hCG Urine Qualitative Negative Negative   CBC with platelets and differential    Collection Time: 04/11/22  9:29 AM   Result Value Ref Range    WBC Count 4.1 4.0 - 11.0 10e3/uL    RBC Count 3.80 3.80 - 5.20 10e6/uL    Hemoglobin 12.3 11.7 - 15.7 g/dL    Hematocrit 35.9 35.0 - 47.0 %    MCV 95 78 - 100 fL    MCH 32.4 26.5 - 33.0 pg    MCHC 34.3 31.5 - 36.5 g/dL    RDW 14.5 10.0 - 15.0 %    Platelet Count 159 150 - 450 10e3/uL    % Neutrophils 62 %    % Lymphocytes 8 %    % Monocytes 24 %    % Eosinophils 5 %    % Basophils 0 %    % Immature Granulocytes 1 %    NRBCs per 100 WBC 0 <1 /100    Absolute Neutrophils 2.5 1.6 - 8.3 10e3/uL    Absolute Lymphocytes 0.3 (L) 0.8 - 5.3 10e3/uL    Absolute Monocytes 1.0 0.0 - 1.3 10e3/uL    Absolute Eosinophils 0.2 0.0 - 0.7 10e3/uL    Absolute Basophils 0.0 0.0 - 0.2 10e3/uL    Absolute Immature Granulocytes 0.0 <=0.4 10e3/uL    Absolute NRBCs 0.0 10e3/uL   RBC and Platelet Morphology    Collection Time: 04/11/22  9:29 AM   Result Value Ref Range    Platelet Assessment  Automated Count Confirmed. Platelet morphology is normal.     Automated Count Confirmed. Platelet morphology is normal.    RBC Morphology Confirmed RBC Indices

## 2022-04-13 ENCOUNTER — HOSPITAL ENCOUNTER (OUTPATIENT)
Facility: CLINIC | Age: 52
Discharge: HOME | End: 2022-04-13
Attending: INTERNAL MEDICINE | Admitting: INTERNAL MEDICINE
Payer: COMMERCIAL

## 2022-04-13 VITALS
HEIGHT: 62 IN | SYSTOLIC BLOOD PRESSURE: 113 MMHG | OXYGEN SATURATION: 98 % | HEART RATE: 61 BPM | BODY MASS INDEX: 28.16 KG/M2 | TEMPERATURE: 98.2 F | WEIGHT: 153 LBS | DIASTOLIC BLOOD PRESSURE: 75 MMHG | RESPIRATION RATE: 15 BRPM

## 2022-04-13 LAB — COLONOSCOPY: NORMAL

## 2022-04-13 PROCEDURE — G0500 MOD SEDAT ENDO SERVICE >5YRS: HCPCS | Performed by: INTERNAL MEDICINE

## 2022-04-13 PROCEDURE — 45380 COLONOSCOPY AND BIOPSY: CPT | Performed by: INTERNAL MEDICINE

## 2022-04-13 PROCEDURE — 99153 MOD SED SAME PHYS/QHP EA: CPT | Performed by: INTERNAL MEDICINE

## 2022-04-13 PROCEDURE — 250N000011 HC RX IP 250 OP 636: Performed by: INTERNAL MEDICINE

## 2022-04-13 PROCEDURE — 88313 SPECIAL STAINS GROUP 2: CPT | Mod: TC | Performed by: INTERNAL MEDICINE

## 2022-04-13 RX ORDER — ONDANSETRON 4 MG/1
4 TABLET, ORALLY DISINTEGRATING ORAL EVERY 6 HOURS PRN
Status: DISCONTINUED | OUTPATIENT
Start: 2022-04-13 | End: 2022-04-13 | Stop reason: HOSPADM

## 2022-04-13 RX ORDER — PROCHLORPERAZINE MALEATE 10 MG
10 TABLET ORAL EVERY 6 HOURS PRN
Status: DISCONTINUED | OUTPATIENT
Start: 2022-04-13 | End: 2022-04-13 | Stop reason: HOSPADM

## 2022-04-13 RX ORDER — FLUMAZENIL 0.1 MG/ML
0.2 INJECTION, SOLUTION INTRAVENOUS
Status: DISCONTINUED | OUTPATIENT
Start: 2022-04-13 | End: 2022-04-13 | Stop reason: HOSPADM

## 2022-04-13 RX ORDER — ONDANSETRON 2 MG/ML
4 INJECTION INTRAMUSCULAR; INTRAVENOUS EVERY 6 HOURS PRN
Status: DISCONTINUED | OUTPATIENT
Start: 2022-04-13 | End: 2022-04-13 | Stop reason: HOSPADM

## 2022-04-13 RX ORDER — NALOXONE HYDROCHLORIDE 0.4 MG/ML
0.2 INJECTION, SOLUTION INTRAMUSCULAR; INTRAVENOUS; SUBCUTANEOUS
Status: DISCONTINUED | OUTPATIENT
Start: 2022-04-13 | End: 2022-04-13 | Stop reason: HOSPADM

## 2022-04-13 RX ORDER — ONDANSETRON 2 MG/ML
4 INJECTION INTRAMUSCULAR; INTRAVENOUS
Status: DISCONTINUED | OUTPATIENT
Start: 2022-04-13 | End: 2022-04-13 | Stop reason: HOSPADM

## 2022-04-13 RX ORDER — LIDOCAINE 40 MG/G
CREAM TOPICAL
Status: DISCONTINUED | OUTPATIENT
Start: 2022-04-13 | End: 2022-04-13 | Stop reason: HOSPADM

## 2022-04-13 RX ORDER — NALOXONE HYDROCHLORIDE 0.4 MG/ML
0.4 INJECTION, SOLUTION INTRAMUSCULAR; INTRAVENOUS; SUBCUTANEOUS
Status: DISCONTINUED | OUTPATIENT
Start: 2022-04-13 | End: 2022-04-13 | Stop reason: HOSPADM

## 2022-04-13 RX ORDER — FENTANYL CITRATE 50 UG/ML
INJECTION, SOLUTION INTRAMUSCULAR; INTRAVENOUS PRN
Status: COMPLETED | OUTPATIENT
Start: 2022-04-13 | End: 2022-04-13

## 2022-04-13 RX ADMIN — FENTANYL CITRATE 50 MCG: 50 INJECTION, SOLUTION INTRAMUSCULAR; INTRAVENOUS at 13:07

## 2022-04-13 RX ADMIN — MIDAZOLAM 1 MG: 1 INJECTION INTRAMUSCULAR; INTRAVENOUS at 13:00

## 2022-04-13 RX ADMIN — FENTANYL CITRATE 100 MCG: 50 INJECTION, SOLUTION INTRAMUSCULAR; INTRAVENOUS at 13:00

## 2022-04-13 RX ADMIN — MIDAZOLAM 1 MG: 1 INJECTION INTRAMUSCULAR; INTRAVENOUS at 13:03

## 2022-04-13 NOTE — H&P
Gastroenterology Pre-op History and Physical    Kristie Cornejo MRN# [id number]   Age: 51 year old YOB: 1970      Date of Surgery: 04/13/22  [facility]      Date of Exam 4/13/2022 Facility (Same day)       Primary care provider: Rena Sheffield         Chief Complaint and/or Reason for Procedure:   Diarrhea. History of myeloma and plasmacytoma. Concern for possible amyloid.     Recent ablation for SVT without recurrent. Mild chest discomfort attributed to pericarditis. Improving. No anticoagulation. COVID negative.         Past Medical and Surgical History:     Past Medical History:   Diagnosis Date     Allergic rhinitis, cause unspecified      Anxiety state, unspecified 12/01/2003    Started postpartum . On Paxil x 1+years. Off meds- 10/04     Irritable bowel syndrome 01/01/2004     Multiple myeloma not having achieved remission (H)      PLANTAR WARTS     resolved     SINUS DYSRHYTHMIA 10/01/2002    [facility]     Unspecified hemorrhoids without mention of complication 04/01/2004    colonoscopy 4/04 spastic colon, int roids; bx neg     Past Surgical History:   Procedure Laterality Date     BIOPSY BONE PELVIS Left 9/7/2021    Procedure: Biopsy left pubic ramis;  Surgeon: Edu Philip MD;  Location: [facility] OR     EP ABLATION PVC N/A 3/23/2022    Procedure: Ablation Premature Ventricular Contractions;  Surgeon: Alem Thomas MD;  Location:  [facility] CATH LAB     [facility] NONSPECIFIC PROCEDURE  1987    [place] teeth removed     [facility] NONSPECIFIC PROCEDURE  1993    (R) knee surgery     [facility] NONSPECIFIC PROCEDURE  2003    (R) breast bx-negative 6/03; bx neg 10/02     [facility] NONSPECIFIC PROCEDURE      10/02 cardiac echo normal     [facility] NONSPECIFIC PROCEDURE  2004 4/04 colonosc int roids, spastic colon     [facility] NONSPECIFIC PROCEDURE  8/05    D & C for missed AB     [facility] COLONOSCOPY THRU STOMA, DIAGNOSTIC  2004            Medications (include herbals and vitamins):         Medications Prior to Admission   Medication Sig Dispense Refill Last Dose     acyclovir (ZOVIRAX) 400 MG tablet Take 1 tablet (400 mg) by mouth 2 times daily 180 tablet 3 4/13/2022 at Unknown time     calcium carbonate 600 mg-vitamin D 400 units (CALTRATE) 600-400 MG-UNIT per tablet    Past Week at Unknown time     cetirizine HCl 10 MG CAPS Take 20 mg by mouth   Past Week at Unknown time     dexamethasone (DECADRON) 4 MG tablet Take 10 tablets (40 mg) by mouth every 7 days for 3 doses Days 1, 8, and 15. 30 tablet 0 Past Month at Unknown time     fluticasone (FLONASE) 50 MCG/ACT nasal spray 1 spray   Past Week at Unknown time     glucosamine-chondroitin 500-400 MG CAPS per capsule    Past Week at Unknown time     lactobacillus rhamnosus, GG, (CULTURELL) capsule Take 1 capsule by mouth 2 times daily   Past Week at Unknown time     loperamide (IMODIUM) 2 MG capsule Take 2 mg by mouth 4 times daily as needed for diarrhea   Past Week at Unknown time     LORazepam (ATIVAN) 0.5 MG tablet Take 1 tablet (0.5 mg) by mouth every 4 hours as needed for anxiety, nausea or sleep 30 tablet 3 Past Week at Unknown time     Multiple Vitamin (MULTI-VITAMIN DAILY PO) Take 1 tablet by mouth   Past Week at Unknown time     omeprazole (PRILOSEC OTC) 20 MG EC tablet Take 1 tablet (20 mg) by mouth 2 times daily 60 tablet 3 Past Week at Unknown time     prochlorperazine (COMPAZINE) 10 MG tablet Take 10 mg by mouth every 6 hours as needed for nausea or vomiting   4/12/2022 at Unknown time     Zoledronic Acid (ZOMETA IV)    More than a month at Unknown time     BUSPIRONE HCL 10 MG PO TABS 1 TABLET 3 TIMES DAILY as needed (Patient not taking: No sig reported) 30 Tab 3      daratumumab-hyaluronidase-fihj (DARZALEX FASPRO) 1800-85908 MG-UT/15ML SUBCUTANEOUS injection Inject 15 mLs Subcutaneous   4/11/2022     [START ON 4/19/2022] LENalidomide (REVLIMID) 25 MG CAPS capsule Take 1 capsule (25 mg) by mouth in the morning for 14 days. Then off for  "14 days of each 28 day cycle. 14 capsule 0 4/10/2022     melatonin 5 MG tablet Take 5 mg by mouth nightly as needed for sleep (Patient not taking: Reported on 4/11/2022)        MIRENA 20 MCG/24HR IU IUD use for up to 5 years, then remove                Allergies:      Allergies   Allergen Reactions     Amoxicillin      Other reaction(s): upset stomach     Erythromycin      upsets stomach     Fluconazole Rash     Itching,   Unsure if really allergy  Itching,   Unsure if really allergy                 Physical Exam:   All vitals have been reviewed  Patient Vitals for the past 8 hrs:   BP Temp Temp src Pulse Resp Height Weight   04/13/22 1213 (!) 131/90 98.2  F (36.8  C) Oral 67 16 1.575 m (5' 2\") 69.4 kg (153 lb)     No intake/output data recorded.  Airway assessment:   Patient is able to open mouth wide  Patient is able to stick out tongue  Mallampatti classification: Class I (visualization of the soft palate, fauces, uvula, anterior and posterior pillars)}      Lungs:   No increased work of breathing, good air exchange, clear to auscultation bilaterally, no crackles or wheezing     Cardiovascular:   regular rate and rhythm, normal S1 and S2, no S3 or S4, and no murmur noted         Anesthetic risk and/or ASA classification:   ASA 3    Berto Montgomery MD        "

## 2022-04-13 NOTE — OR NURSING
Colonoscopy with biopsies performed under moderate sedation, patient tolerated well. Transferred to  and report given to recovery RN.

## 2022-04-14 LAB
PATH REPORT.ADDENDUM SPEC: NORMAL
PATH REPORT.COMMENTS IMP SPEC: NORMAL
PATH REPORT.FINAL DX SPEC: NORMAL
PATH REPORT.GROSS SPEC: NORMAL
PATH REPORT.MICROSCOPIC SPEC OTHER STN: NORMAL
PATH REPORT.RELEVANT HX SPEC: NORMAL
PHOTO IMAGE: NORMAL

## 2022-04-14 PROCEDURE — 88313 SPECIAL STAINS GROUP 2: CPT | Mod: 26 | Performed by: PATHOLOGY

## 2022-04-14 PROCEDURE — 88305 TISSUE EXAM BY PATHOLOGIST: CPT | Mod: 26 | Performed by: PATHOLOGY

## 2022-04-14 PROCEDURE — 88342 IMHCHEM/IMCYTCHM 1ST ANTB: CPT | Mod: 26 | Performed by: PATHOLOGY

## 2022-04-18 NOTE — PROGRESS NOTES
[facility]  Date of Visit: Apr 19, 2022   Oncologist: Dr. Payton Reinoso    Reason for visit: myeloma follow-up           Oncology History   51 year old female with past medical history significant for Irritable bowel disease, allergies, large plasmacytoma of the L pelvis, and newly diagnosed multiple myeloma.     Early in April 2021 , she noted to have left sided groin pain, constant and was affecting her daily activities  MRI showed large, expansile, permeative mass with indistinct borders involving the left superior and inferior pubic rami, pubic symphysis and with possible extension to the left acetabulum.     We completed the following work-up:   - Biopsy 9/7/2021: plasmacytoma.  Flow cytometry showed kappa monotypic plasma cells, polytypic B cells. FISH results from plasmacytoma showed a gain of 11q, IGH-CCND1 fusion but no losses of 1q or TP53 and no gain of 1q.  - Bone marrow biopsy 09/22/21: Marrow cellularity 50% with 25% interstitial infiltration w/  Kappa-monotypic, moderately atypical plasma cells. Flow with 0.7% kappa-monotypica plasma cells. FISH and cytogenetics pending.   - PET CT 09/24/21 w/ hypermetabolic pathologic fracture of the L pubic ramus w/ aggressive appearing osteolysis but no additional suspicious lytic or blastic osseous lesions.     Treatment to date:   - Zometa q4 weeks started 9/27/21  - Radiation to left pubic ramus x18 fractions, completed 11/11/21.   - RVD C1D1 11/15/21  - RVD C2D1 12/6/21  - RVD C3D1  12/27/21  - RVD C4 D1 1/17/22, elizabeth added on D8 due to inadequate response of urinary M-spike and FLCs remaining over 100 mg/dL after 4 cycles of RVD    - Elizabeth-RVD C1D8 1/24/22  - Elizabeth-RVD C2D1 2/7/22  - Elizabeth-RVD C3D1 2/28/22  - Elizabeth-RVD C4D1 3/29/22    Disease response: WA           INTERIM HISTORY:   Margo comes in for oncology follow-up visit today and consideration of Cycle 5 Day 1 chemotherapy.     She was feeling a little lightheaded  when she went in for her lab appointment, not lightheaded now. Though she feels she could benefit from some IVF today.   Her appetite is not that great, but she is trying to eat as much as she can. Food does not taste good to her.   She started doing chocolate boosts.   She has been having stomach aches, relieved with bowel movements.   She is having diarrhea about 4x daily which is about her baseline. She takes imodium only when she goes out. No blood in stools.   She feels up for chemotherapy today.   No fevers or chills, or concerns infection.   Palpitations resolved. No CP or SOB. She has dyspnea on exertion with going up stairs which is stable. She has been going swimming 2-3 days a week and walks a mile on other days. She takes breaks as needed.  She has not needed to take ibuprofen which was recommended by her cardiologist for pain.   She is getting ECHO on 4/28 and MR cardiac 5/19.   No bleeding or rash.   She has nausea occasionally and takes compazine PRN. No vomiting.   She has had some changes in sensation to her fingers and toes. She can still feel things, but they just feel different. For example, her hair feels coarse to her and she feels there is sand in her socks. Otherwise no neuropathy.          Physical Exam:     Vital Signs 4/19/2022   Systolic 113   Diastolic 69   Pulse 78   Temperature 98.2   Respirations 16   Weight (LB) 152 lb 12.8 oz   Height    BMI (Calculated)    Pain Score 3   O2 99     General: well appearing, no acute distress, pleasant female  HEENT: normocephalic, atraumatic, PERRLA, sclerae nonicteric, she has 2 styes present on the upper lids of her left eye, no drainage  Lymph: no palpable cervical, supraclavicular or axillary lymphadenopathy   CV: S1 S2, RRR, no murmur, click or rub  Lungs: clear to auscultation bilaterally, no wheezes/rales/rhonchi  Abd: soft, positive bowel sounds, non-distended, non-tender  MSK: no peripheral edema  Neuro: alert and oriented x3, CN grossly  intact  Psych: appropriate mood and affect  Skin:no visible rashes or lesions on exposed skin          Laboratory Data:    Reviewed labs today.    Latest Reference Range & Units 04/19/22 08:29   Sodium 133 - 144 mmol/L 141   Potassium 3.4 - 5.3 mmol/L 4.1   Chloride 94 - 109 mmol/L 110 (H)   Carbon Dioxide 20 - 32 mmol/L 22   Urea Nitrogen 7 - 30 mg/dL 18   Creatinine 0.52 - 1.04 mg/dL 0.66   GFR Estimate >60 mL/min/1.73m2 >90 [1]   Calcium 8.5 - 10.1 mg/dL 9.3   Anion Gap 3 - 14 mmol/L 9   Albumin 3.4 - 5.0 g/dL 3.6   Protein Total 6.8 - 8.8 g/dL 6.4 (L)   Bilirubin Total 0.2 - 1.3 mg/dL 0.6   Alkaline Phosphatase 40 - 150 U/L 41   ALT 0 - 50 U/L 22   AST 0 - 45 U/L 17   HCG Quantitative Serum 0 - 5 IU/L <1 [2]   Glucose 70 - 99 mg/dL 107 (H)   WBC 4.0 - 11.0 10e3/uL 3.8 (L)   Hemoglobin 11.7 - 15.7 g/dL 12.5   Hematocrit 35.0 - 47.0 % 37.9   Platelet Count 150 - 450 10e3/uL 233   RBC Count 3.80 - 5.20 10e6/uL 3.91   MCV 78 - 100 fL 97   MCH 26.5 - 33.0 pg 32.0   MCHC 31.5 - 36.5 g/dL 33.0   RDW 10.0 - 15.0 % 14.4   % Neutrophils % 61   % Lymphocytes % 15   % Monocytes % 20   % Eosinophils % 3   % Basophils % 1   Absolute Basophils 0.0 - 0.2 10e3/uL 0.0   Absolute Eosinophils 0.0 - 0.7 10e3/uL 0.1   Absolute Immature Granulocytes <=0.4 10e3/uL 0.0   Absolute Lymphocytes 0.8 - 5.3 10e3/uL 0.6 (L)   Absolute Monocytes 0.0 - 1.3 10e3/uL 0.8   % Immature Granulocytes % 0   Absolute Neutrophils 1.6 - 8.3 10e3/uL 2.3   Absolute NRBCs 10e3/uL 0.0   NRBCs per 100 WBC <1 /100 0          Assessment and Recommendations   Kristie Cornejo is a 51 year old female who was diagnosed with Plasmacytoma after she presented with left sided groin pain now found to have multiple myeloma after further workup with bone marrow biopsy.     # Multiple myeloma with associated large plasmacytoma of the L pelvis. Kappa light chain disease.     BMBx with 25% plasma cell infiltration; standard risk with gain of 11q, IGH-CCND1 fusion but no  "losses of 1q or TP53 and no gain of 1q.   SPEP w/out a monoclonal peak  UPEP positive. 70% paraprotein. Large monoclonal free immunoglobulin light chain of kappa chain type.     No anemia, normal creat, Ca, no other skeletal lesions. Alb normal, B2M normal.  Stage I Light chain disease with left pelvic bone pathologic fracture she is classified as having symptomatic multiple myeloma.     RVD C1D1 11/15/21 (had a slight delay in the start of her Revlimid on 11/19/21 due to the timing of her pregnancy tests).  She tolerated cycle 1 well with some mild nausea and a headache on her week off.  RVD C2D1 12/6/21, tolerated well overall, but has had more consistent sensation of her hands and feet being hot. No tingling, pain, loss of sensation, or changes to her strength.  RVD C3D1 12/27/21, tolerated well overall with ongoing sensation of \"hot\" hands and feet  RVD C4 D1 1/17/22, elizabeth added on D8 due to inadequate response of urinary M-spike and FLCs remaining over 100 mg/dL after 3 cycles of RVD    Elizabeth-RVD C5 2/7/22 Changing to 21 day cycles due to insurance.  Will receive Rev Days 1-14, Velcade days 1,4,8,11, and elizabeth days 1,8,15.    Elizabeth-RVD C6 2/28/22  Elizabeth-RVD C7 3/28/22    She has completed 3 cycles of Elizabeth plus RVD and she is in a partial response.  Her free kappa light chains are at least 50% reduced but the response is sluggish.  She tolerated treatment well and we will continue the same regimen without modifications.  Plan to give at least 2-3 more cycles (C4-6 in the treatment plan) followed by restaging including 24-hour urine protein and possible bone marrow biopsy.  Dr. Reinoso previously had a long discussion with [facility] about the timing of autologous transplant and potential need to switch to ablative therapy if her response is not at least VG NC.    - We switched back to 28 day cycles starting with Cycle 5 Day 1 today to accommodate the elizabeth changing to every other week.  She should be getting Elizabeth on Days " "1, 15 and she should continue to get Velcade every week - Days 1, 8, 15, and 22.     - Will proceed with Cycle 5 Day 1 Elizabeth/Velcade today 4/19/22. Will give some IVF today per patient request as she felt a little lightheaded earlier this morning. Also encouraged PO hydration at home. Next provider appt with Jackie Ferris CNP scheduled on 5/2/22.     #Bone Health  She will continue monthly Zometa (last received on 3/29/22). Next dose due on 4/26/22, but she will get it when she comes in for next provider visit on 5/2. She will continue Calcium/Vitamin D supplements.     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 Currently having no pain and able to walk 1 mile without any assistive devices.  She is interested in pursuing other activities like gentle cross-country skiing.  Had follow-up with Dr. Philip on 1/6/22 and was cleared to resume regular activity as tolerated. She should still refrain from high-impact activities.    # PVCs  She had an ECHO 12/1 and had follow-up with cardiology 12/7 (notes in [facility]).  She had follow-up with cardiology here at the  and they recommend an ablation. Stress test was completed and she was started on Toprol XL; however, Toprol XL was discontinued due to significant side effects of orthostatic hypotension, pre-syncopal episodes, shakiness, and weakness.   - She had an ablation 3/23/22 but has not experienced relief from the feeling of being nauseated and short of breath after climbing the stairs.  She also reports episodes of palpitations that \"radiate into her neck.\" Denies chest pain or shortness of breath.   - EKG done 4/4 showed sinus rhythm and nonspecific ST and T wave abnormality. Troponin negative.  - Cardiac MRI to rule out amyloid pending. This is scheduled on 5/19/22.   - She followed up with cardiology and " "they felt that the cause may be musculoskeletal or pericarditis. They suggested taking ibuprofen for pain. Her pain has subsided and she actually has not needed to take ibuprofen. They also recommended getting an ECHO which is currently scheduled on 4/28.     #Diarrhea  She was having intermittent diarrhea with associated abdominal cramping and nausea, but developed much more significant diarrhea with 10-12 liquid episodes and was seen as an add-on on 4/4/22 for this.   - C.diff PCR negative 2/8/22, repeat on 4/5 negative  - Enteric stool panel 4/5/22 negative  - Underwent colonoscopy on 4/13/22 to pursue biopsy for amyloid (discussed below). [place] red stain is negative for amyloid deposits. Pathology showed colonic mucosa with focal active colitis, negative for signs of chronicity and lymphocytic colitis. Per patient report, GI reviewed results with her and did not feel she needed treatment for these findings at this time. They advised her to monitor her symptoms and to call them if they do not improve. I will also reach out to Dr. Berto Montgomery of GI to discuss these results.   - CMV PCR (4/4/22) not detected  - Diarrhea improved to now 3-4 per day. Patient is only taking imodium PRN when she goes out. Still has some abdominal cramping that is manageable now and relieved with BM. She will monitor and call us or GI if it gets worse.     Addendum: Discussed colonoscopy results with Dr. Montgomery who previously already discussed with Dr. Reinoso. They agreed to follow conservatively for now given that patient reports improvement in her symptoms. If diarrhea becomes bothersome again, then they will arrange for expeditious evaluation in the IBD clinic.     #Ocular Migraines  She has a history of ocular migraines that present as \"squiggly lines in her field of vision.\" These have occurred about annually since 2002, but have now occurred daily since her ablation on 3/23/22.   - Referred to ophthalmology for further " evaluation. Appt is scheduled on 5/31/22.   - Checked CMV to rule out CMV viremia as an etiology. CMV PCR not detected.   - Off/on, improved since last visit.     #Concern for amyloid  She has continued to have significant GI symptoms despite negative infectious testing and diarrhea not being a major side effect of michelle or Velcade. Previously discussed with Dr. Reinoso.  In light of the constellation of symptoms, as well as her sluggish response to therapy, she would like to do further testing to rule out amyolid. Of note, congo red stain was negative on her bone marrow biopsy from 9/22/21  - Requested cardiac MRI to explore possibility of cardiac amyloid. This is scheduled on 5/19/22.   - Colonoscopy to biopsy for GI amyloid done 4/13. Congo red stain negative for amyloid deposits.     #Nausea  Mild, managed with Compazine up to twice daily and lorazepam occasionally at night.     #COVID prophylaxis  - She received EVUSHELD 1/20/22 and 3/8/22  - Received 3 COVID vaccines to date    #Reflux  She reported episodes of reflux with bile, especially when she lays down at night.  She has started to elevate her head on pillows and is also trying to avoid eating within 1-2 hours of going to bed.  Omeprazole was increased to 20 mg twice daily with symptom improvement.    I reminded patient to call us if any questions/issues arise in between visits.     50 minutes spent on the date of the encounter doing chart review, review of test results, interpretation of tests, patient visit, documentation and discussion with other provider(s)     Estefany Pepe PA-C  [facility] [address] [id number] [phone]

## 2022-04-19 ENCOUNTER — INFUSION THERAPY VISIT (OUTPATIENT)
Dept: ONCOLOGY | Facility: CLINIC | Age: 52
End: 2022-04-19
Payer: COMMERCIAL

## 2022-04-19 ENCOUNTER — ONCOLOGY VISIT (OUTPATIENT)
Dept: ONCOLOGY | Facility: CLINIC | Age: 52
End: 2022-04-19
Attending: PHYSICIAN ASSISTANT
Payer: COMMERCIAL

## 2022-04-19 ENCOUNTER — APPOINTMENT (OUTPATIENT)
Dept: LAB | Facility: CLINIC | Age: 52
End: 2022-04-19
Payer: COMMERCIAL

## 2022-04-19 VITALS
WEIGHT: 152.8 LBS | TEMPERATURE: 98.2 F | OXYGEN SATURATION: 99 % | RESPIRATION RATE: 16 BRPM | BODY MASS INDEX: 27.95 KG/M2 | DIASTOLIC BLOOD PRESSURE: 69 MMHG | HEART RATE: 78 BPM | SYSTOLIC BLOOD PRESSURE: 113 MMHG

## 2022-04-19 DIAGNOSIS — E86.0 DEHYDRATION: ICD-10-CM

## 2022-04-19 DIAGNOSIS — C90.00 LIGHT CHAIN MYELOMA (H): Primary | ICD-10-CM

## 2022-04-19 LAB
ALBUMIN SERPL-MCNC: 3.6 G/DL (ref 3.4–5)
ALP SERPL-CCNC: 41 U/L (ref 40–150)
ALT SERPL W P-5'-P-CCNC: 22 U/L (ref 0–50)
ANION GAP SERPL CALCULATED.3IONS-SCNC: 9 MMOL/L (ref 3–14)
AST SERPL W P-5'-P-CCNC: 17 U/L (ref 0–45)
B-HCG SERPL-ACNC: <1 IU/L (ref 0–5)
BASOPHILS # BLD AUTO: 0 10E3/UL (ref 0–0.2)
BASOPHILS NFR BLD AUTO: 1 %
BILIRUB SERPL-MCNC: 0.6 MG/DL (ref 0.2–1.3)
BUN SERPL-MCNC: 18 MG/DL (ref 7–30)
CALCIUM SERPL-MCNC: 9.3 MG/DL (ref 8.5–10.1)
CHLORIDE BLD-SCNC: 110 MMOL/L (ref 94–109)
CO2 SERPL-SCNC: 22 MMOL/L (ref 20–32)
CREAT SERPL-MCNC: 0.66 MG/DL (ref 0.52–1.04)
EOSINOPHIL # BLD AUTO: 0.1 10E3/UL (ref 0–0.7)
EOSINOPHIL NFR BLD AUTO: 3 %
ERYTHROCYTE [DISTWIDTH] IN BLOOD BY AUTOMATED COUNT: 14.4 % (ref 10–15)
GFR SERPL CREATININE-BSD FRML MDRD: >90 ML/MIN/1.73M2
GLUCOSE BLD-MCNC: 107 MG/DL (ref 70–99)
HCT VFR BLD AUTO: 37.9 % (ref 35–47)
HGB BLD-MCNC: 12.5 G/DL (ref 11.7–15.7)
IMM GRANULOCYTES # BLD: 0 10E3/UL
IMM GRANULOCYTES NFR BLD: 0 %
LYMPHOCYTES # BLD AUTO: 0.6 10E3/UL (ref 0.8–5.3)
LYMPHOCYTES NFR BLD AUTO: 15 %
MCH RBC QN AUTO: 32 PG (ref 26.5–33)
MCHC RBC AUTO-ENTMCNC: 33 G/DL (ref 31.5–36.5)
MCV RBC AUTO: 97 FL (ref 78–100)
MONOCYTES # BLD AUTO: 0.8 10E3/UL (ref 0–1.3)
MONOCYTES NFR BLD AUTO: 20 %
NEUTROPHILS # BLD AUTO: 2.3 10E3/UL (ref 1.6–8.3)
NEUTROPHILS NFR BLD AUTO: 61 %
NRBC # BLD AUTO: 0 10E3/UL
NRBC BLD AUTO-RTO: 0 /100
PLATELET # BLD AUTO: 233 10E3/UL (ref 150–450)
POTASSIUM BLD-SCNC: 4.1 MMOL/L (ref 3.4–5.3)
PROT SERPL-MCNC: 6.4 G/DL (ref 6.8–8.8)
RBC # BLD AUTO: 3.91 10E6/UL (ref 3.8–5.2)
SODIUM SERPL-SCNC: 141 MMOL/L (ref 133–144)
WBC # BLD AUTO: 3.8 10E3/UL (ref 4–11)

## 2022-04-19 PROCEDURE — 83521 IG LIGHT CHAINS FREE EACH: CPT | Performed by: PHYSICIAN ASSISTANT

## 2022-04-19 PROCEDURE — 36415 COLL VENOUS BLD VENIPUNCTURE: CPT

## 2022-04-19 PROCEDURE — 250N000011 HC RX IP 250 OP 636

## 2022-04-19 PROCEDURE — 84702 CHORIONIC GONADOTROPIN TEST: CPT

## 2022-04-19 PROCEDURE — 96360 HYDRATION IV INFUSION INIT: CPT

## 2022-04-19 PROCEDURE — 99215 OFFICE O/P EST HI 40 MIN: CPT | Performed by: PHYSICIAN ASSISTANT

## 2022-04-19 PROCEDURE — 258N000003 HC RX IP 258 OP 636: Performed by: REGISTERED NURSE

## 2022-04-19 PROCEDURE — 96401 CHEMO ANTI-NEOPL SQ/IM: CPT

## 2022-04-19 PROCEDURE — 84155 ASSAY OF PROTEIN SERUM: CPT

## 2022-04-19 PROCEDURE — G0463 HOSPITAL OUTPT CLINIC VISIT: HCPCS

## 2022-04-19 PROCEDURE — 999N000248 HC STATISTIC IV INSERT WITH US BY RN

## 2022-04-19 PROCEDURE — 85025 COMPLETE CBC W/AUTO DIFF WBC: CPT

## 2022-04-19 RX ORDER — MEPERIDINE HYDROCHLORIDE 25 MG/ML
25 INJECTION INTRAMUSCULAR; INTRAVENOUS; SUBCUTANEOUS EVERY 30 MIN PRN
Status: CANCELLED | OUTPATIENT
Start: 2022-04-19

## 2022-04-19 RX ORDER — HEPARIN SODIUM,PORCINE 10 UNIT/ML
5 VIAL (ML) INTRAVENOUS
Status: CANCELLED | OUTPATIENT
Start: 2022-04-19

## 2022-04-19 RX ORDER — ALBUTEROL SULFATE 90 UG/1
1-2 AEROSOL, METERED RESPIRATORY (INHALATION)
Status: CANCELLED
Start: 2022-04-19

## 2022-04-19 RX ORDER — METHYLPREDNISOLONE SODIUM SUCCINATE 125 MG/2ML
125 INJECTION, POWDER, LYOPHILIZED, FOR SOLUTION INTRAMUSCULAR; INTRAVENOUS
Status: CANCELLED
Start: 2022-04-19

## 2022-04-19 RX ORDER — EPINEPHRINE 1 MG/ML
0.3 INJECTION, SOLUTION INTRAMUSCULAR; SUBCUTANEOUS EVERY 5 MIN PRN
Status: CANCELLED | OUTPATIENT
Start: 2022-04-19

## 2022-04-19 RX ORDER — HEPARIN SODIUM (PORCINE) LOCK FLUSH IV SOLN 100 UNIT/ML 100 UNIT/ML
5 SOLUTION INTRAVENOUS
Status: CANCELLED | OUTPATIENT
Start: 2022-04-19

## 2022-04-19 RX ORDER — ALBUTEROL SULFATE 0.83 MG/ML
2.5 SOLUTION RESPIRATORY (INHALATION)
Status: CANCELLED | OUTPATIENT
Start: 2022-04-19

## 2022-04-19 RX ORDER — DEXAMETHASONE 4 MG/1
20 TABLET ORAL ONCE
Status: COMPLETED | OUTPATIENT
Start: 2022-04-19 | End: 2022-04-19

## 2022-04-19 RX ORDER — DIPHENHYDRAMINE HYDROCHLORIDE 50 MG/ML
50 INJECTION INTRAMUSCULAR; INTRAVENOUS
Status: CANCELLED
Start: 2022-04-19

## 2022-04-19 RX ORDER — NALOXONE HYDROCHLORIDE 0.4 MG/ML
0.2 INJECTION, SOLUTION INTRAMUSCULAR; INTRAVENOUS; SUBCUTANEOUS
Status: CANCELLED | OUTPATIENT
Start: 2022-04-19

## 2022-04-19 RX ADMIN — SODIUM CHLORIDE 1000 ML: 9 INJECTION, SOLUTION INTRAVENOUS at 09:58

## 2022-04-19 RX ADMIN — BORTEZOMIB 2.9 MG: 3.5 INJECTION, POWDER, LYOPHILIZED, FOR SOLUTION INTRAVENOUS; SUBCUTANEOUS at 11:16

## 2022-04-19 RX ADMIN — DEXAMETHASONE 20 MG: 4 TABLET ORAL at 10:08

## 2022-04-19 RX ADMIN — DARATUMUMAB AND HYALURONIDASE-FIHJ (HUMAN RECOMBINANT) 1800 MG: 1800; 30000 INJECTION SUBCUTANEOUS at 11:15

## 2022-04-19 ASSESSMENT — PAIN SCALES - GENERAL: PAINLEVEL: MILD PAIN (3)

## 2022-04-19 NOTE — LETTER
4/19/2022         RE: Kristie Cornejo  [address]  [facility] [address]        Dear Colleague,    Thank you for referring your patient, Kristie Cornejo, to the [facility]. Please see a copy of my visit note below.    [facility]  Date of Visit: Apr 19, 2022   Oncologist: Dr. Payton Reinoso    Reason for visit: myeloma follow-up           Oncology History   51 year old female with past medical history significant for Irritable bowel disease, allergies, large plasmacytoma of the L pelvis, and newly diagnosed multiple myeloma.     Early in April 2021 , she noted to have left sided groin pain, constant and was affecting her daily activities  MRI showed large, expansile, permeative mass with indistinct borders involving the left superior and inferior pubic rami, pubic symphysis and with possible extension to the left acetabulum.     We completed the following work-up:   - Biopsy 9/7/2021: plasmacytoma.  Flow cytometry showed kappa monotypic plasma cells, polytypic B cells. FISH results from plasmacytoma showed a gain of 11q, IGH-CCND1 fusion but no losses of 1q or TP53 and no gain of 1q.  - Bone marrow biopsy 09/22/21: Marrow cellularity 50% with 25% interstitial infiltration w/  Kappa-monotypic, moderately atypical plasma cells. Flow with 0.7% kappa-monotypica plasma cells. FISH and cytogenetics pending.   - PET CT 09/24/21 w/ hypermetabolic pathologic fracture of the L pubic ramus w/ aggressive appearing osteolysis but no additional suspicious lytic or blastic osseous lesions.     Treatment to date:   - Zometa q4 weeks started 9/27/21  - Radiation to left pubic ramus x18 fractions, completed 11/11/21.   - RVD C1D1 11/15/21  - RVD C2D1 12/6/21  - RVD C3D1  12/27/21  - RVD C4 D1 1/17/22, elizabeth added on D8 due to inadequate response of urinary M-spike and FLCs remaining over 100 mg/dL after 4 cycles of RVD    - Elizabeth-RVD C1D8 1/24/22  -  Elizabeth-RVD C2D1 2/7/22  - Elizabeth-RVD C3D1 2/28/22  - Elizabeth-RVD C4D1 3/29/22    Disease response: OH           INTERIM HISTORY:   Margo comes in for oncology follow-up visit today and consideration of Cycle 5 Day 1 chemotherapy.     She was feeling a little lightheaded when she went in for her lab appointment, not lightheaded now. Though she feels she could benefit from some IVF today.   Her appetite is not that great, but she is trying to eat as much as she can. Food does not taste good to her.   She started doing chocolate boosts.   She has been having stomach aches, relieved with bowel movements.   She is having diarrhea about 4x daily which is about her baseline. She takes imodium only when she goes out. No blood in stools.   She feels up for chemotherapy today.   No fevers or chills, or concerns infection.   Palpitations resolved. No CP or SOB. She has dyspnea on exertion with going up stairs which is stable. She has been going swimming 2-3 days a week and walks a mile on other days. She takes breaks as needed.  She has not needed to take ibuprofen which was recommended by her cardiologist for pain.   She is getting ECHO on 4/28 and MR cardiac 5/19.   No bleeding or rash.   She has nausea occasionally and takes compazine PRN. No vomiting.   She has had some changes in sensation to her fingers and toes. She can still feel things, but they just feel different. For example, her hair feels coarse to her and she feels there is sand in her socks. Otherwise no neuropathy.          Physical Exam:     Vital Signs 4/19/2022   Systolic 113   Diastolic 69   Pulse 78   Temperature 98.2   Respirations 16   Weight (LB) 152 lb 12.8 oz   Height    BMI (Calculated)    Pain Score 3   O2 99     General: well appearing, no acute distress, pleasant female  HEENT: normocephalic, atraumatic, PERRLA, sclerae nonicteric, she has 2 styes present on the upper lids of her left eye, no drainage  Lymph: no palpable cervical, supraclavicular or  axillary lymphadenopathy   CV: S1 S2, RRR, no murmur, click or rub  Lungs: clear to auscultation bilaterally, no wheezes/rales/rhonchi  Abd: soft, positive bowel sounds, non-distended, non-tender  MSK: no peripheral edema  Neuro: alert and oriented x3, CN grossly intact  Psych: appropriate mood and affect  Skin:no visible rashes or lesions on exposed skin          Laboratory Data:    Reviewed labs today.    Latest Reference Range & Units 04/19/22 08:29   Sodium 133 - 144 mmol/L 141   Potassium 3.4 - 5.3 mmol/L 4.1   Chloride 94 - 109 mmol/L 110 (H)   Carbon Dioxide 20 - 32 mmol/L 22   Urea Nitrogen 7 - 30 mg/dL 18   Creatinine 0.52 - 1.04 mg/dL 0.66   GFR Estimate >60 mL/min/1.73m2 >90 [1]   Calcium 8.5 - 10.1 mg/dL 9.3   Anion Gap 3 - 14 mmol/L 9   Albumin 3.4 - 5.0 g/dL 3.6   Protein Total 6.8 - 8.8 g/dL 6.4 (L)   Bilirubin Total 0.2 - 1.3 mg/dL 0.6   Alkaline Phosphatase 40 - 150 U/L 41   ALT 0 - 50 U/L 22   AST 0 - 45 U/L 17   HCG Quantitative Serum 0 - 5 IU/L <1 [2]   Glucose 70 - 99 mg/dL 107 (H)   WBC 4.0 - 11.0 10e3/uL 3.8 (L)   Hemoglobin 11.7 - 15.7 g/dL 12.5   Hematocrit 35.0 - 47.0 % 37.9   Platelet Count 150 - 450 10e3/uL 233   RBC Count 3.80 - 5.20 10e6/uL 3.91   MCV 78 - 100 fL 97   MCH 26.5 - 33.0 pg 32.0   MCHC 31.5 - 36.5 g/dL 33.0   RDW 10.0 - 15.0 % 14.4   % Neutrophils % 61   % Lymphocytes % 15   % Monocytes % 20   % Eosinophils % 3   % Basophils % 1   Absolute Basophils 0.0 - 0.2 10e3/uL 0.0   Absolute Eosinophils 0.0 - 0.7 10e3/uL 0.1   Absolute Immature Granulocytes <=0.4 10e3/uL 0.0   Absolute Lymphocytes 0.8 - 5.3 10e3/uL 0.6 (L)   Absolute Monocytes 0.0 - 1.3 10e3/uL 0.8   % Immature Granulocytes % 0   Absolute Neutrophils 1.6 - 8.3 10e3/uL 2.3   Absolute NRBCs 10e3/uL 0.0   NRBCs per 100 WBC <1 /100 0          Assessment and Recommendations   Kristielara Cornejo is a 51 year old female who was diagnosed with Plasmacytoma after she presented with left sided groin pain now found to have  "multiple myeloma after further workup with bone marrow biopsy.     # Multiple myeloma with associated large plasmacytoma of the L pelvis. Kappa light chain disease.     BMBx with 25% plasma cell infiltration; standard risk with gain of 11q, IGH-CCND1 fusion but no losses of 1q or TP53 and no gain of 1q.   SPEP w/out a monoclonal peak  UPEP positive. 70% paraprotein. Large monoclonal free immunoglobulin light chain of kappa chain type.     No anemia, normal creat, Ca, no other skeletal lesions. Alb normal, B2M normal.  Stage I Light chain disease with left pelvic bone pathologic fracture she is classified as having symptomatic multiple myeloma.     RVD C1D1 11/15/21 (had a slight delay in the start of her Revlimid on 11/19/21 due to the timing of her pregnancy tests).  She tolerated cycle 1 well with some mild nausea and a headache on her week off.  RVD C2D1 12/6/21, tolerated well overall, but has had more consistent sensation of her hands and feet being hot. No tingling, pain, loss of sensation, or changes to her strength.  RVD C3D1 12/27/21, tolerated well overall with ongoing sensation of \"hot\" hands and feet  RVD C4 D1 1/17/22, elizabeth added on D8 due to inadequate response of urinary M-spike and FLCs remaining over 100 mg/dL after 3 cycles of RVD    Elizabeth-RVD C5 2/7/22 Changing to 21 day cycles due to insurance.  Will receive Rev Days 1-14, Velcade days 1,4,8,11, and elizabeth days 1,8,15.    Elizabeth-RVD C6 2/28/22  Elizabeth-RVD C7 3/28/22    She has completed 3 cycles of Elizabeth plus RVD and she is in a partial response.  Her free kappa light chains are at least 50% reduced but the response is sluggish.  She tolerated treatment well and we will continue the same regimen without modifications.  Plan to give at least 2-3 more cycles (C4-6 in the treatment plan) followed by restaging including 24-hour urine protein and possible bone marrow biopsy.  Dr. Reinoso previously had a long discussion with [facility] about the timing of " "autologous transplant and potential need to switch to ablative therapy if her response is not at least VG WI.    - We switched back to 28 day cycles starting with Cycle 5 Day 1 today to accommodate the elizabeth changing to every other week.  She should be getting Elizabeth on Days 1, 15 and she should continue to get Velcade every week - Days 1, 8, 15, and 22.     - Will proceed with Cycle 5 Day 1 Elizabeth/Velcade today 4/19/22. Will give some IVF today per patient request as she felt a little lightheaded earlier this morning. Also encouraged PO hydration at home. Next provider appt with Jackie Ferris CNP scheduled on 5/2/22.     #Bone Health  She will continue monthly Zometa (last received on 3/29/22). Next dose due on 4/26/22, but she will get it when she comes in for next provider visit on 5/2. She will continue Calcium/Vitamin D supplements.     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 Currently having no pain and able to walk 1 mile without any assistive devices.  She is interested in pursuing other activities like gentle cross-country skiing.  Had follow-up with Dr. Philip on 1/6/22 and was cleared to resume regular activity as tolerated. She should still refrain from high-impact activities.    # PVCs  She had an ECHO 12/1 and had follow-up with cardiology 12/7 (notes in [facility]where).  She had follow-up with cardiology here at the  and they recommend an ablation. Stress test was completed and she was started on Toprol XL; however, Toprol XL was discontinued due to significant side effects of orthostatic hypotension, pre-syncopal episodes, shakiness, and weakness.   - She had an ablation 3/23/22 but has not experienced relief from the feeling of being nauseated and short of breath after climbing the stairs.  She also reports episodes of palpitations that \"radiate " "into her neck.\" Denies chest pain or shortness of breath.   - EKG done 4/4 showed sinus rhythm and nonspecific ST and T wave abnormality. Troponin negative.  - Cardiac MRI to rule out amyloid pending. This is scheduled on 5/19/22.   - She followed up with cardiology and they felt that the cause may be musculoskeletal or pericarditis. They suggested taking ibuprofen for pain. Her pain has subsided and she actually has not needed to take ibuprofen. They also recommended getting an ECHO which is currently scheduled on 4/28.     #Diarrhea  She was having intermittent diarrhea with associated abdominal cramping and nausea, but developed much more significant diarrhea with 10-12 liquid episodes and was seen as an add-on on 4/4/22 for this.   - C.diff PCR negative 2/8/22, repeat on 4/5 negative  - Enteric stool panel 4/5/22 negative  - Underwent colonoscopy on 4/13/22 to pursue biopsy for amyloid (discussed below). Congo red stain is negative for amyloid deposits. Pathology showed colonic mucosa with focal active colitis, negative for signs of chronicity and lymphocytic colitis. Per patient report, GI reviewed results with her and did not feel she needed treatment for these findings at this time. They advised her to monitor her symptoms and to call them if they do not improve. I will also reach out to Dr. Berto Montgomery of GI to discuss these results.   - CMV PCR (4/4/22) not detected  - Diarrhea improved to now 3-4 per day. Patient is only taking imodium PRN when she goes out. Still has some abdominal cramping that is manageable now and relieved with BM. She will monitor and call us or GI if it gets worse.     Addendum: Discussed colonoscopy results with Dr. Montgomery who previously already discussed with Dr. Reinoso. They agreed to follow conservatively for now given that patient reports improvement in her symptoms. If diarrhea becomes bothersome again, then they will arrange for expeditious evaluation in the IBD clinic. " "    #Ocular Migraines  She has a history of ocular migraines that present as \"squiggly lines in her field of vision.\" These have occurred about annually since 2002, but have now occurred daily since her ablation on 3/23/22.   - Referred to ophthalmology for further evaluation. Appt is scheduled on 5/31/22.   - Checked CMV to rule out CMV viremia as an etiology. CMV PCR not detected.   - Off/on, improved since last visit.     #Concern for amyloid  She has continued to have significant GI symptoms despite negative infectious testing and diarrhea not being a major side effect of michelle or Velcade. Previously discussed with Dr. Reinoso.  In light of the constellation of symptoms, as well as her sluggish response to therapy, she would like to do further testing to rule out amyolid. Of note, congo red stain was negative on her bone marrow biopsy from 9/22/21  - Requested cardiac MRI to explore possibility of cardiac amyloid. This is scheduled on 5/19/22.   - Colonoscopy to biopsy for GI amyloid done 4/13. Congo red stain negative for amyloid deposits.     #Nausea  Mild, managed with Compazine up to twice daily and lorazepam occasionally at night.     #COVID prophylaxis  - She received EVUSHELD 1/20/22 and 3/8/22  - Received 3 COVID vaccines to date    #Reflux  She reported episodes of reflux with bile, especially when she lays down at night.  She has started to elevate her head on pillows and is also trying to avoid eating within 1-2 hours of going to bed.  Omeprazole was increased to 20 mg twice daily with symptom improvement.    I reminded patient to call us if any questions/issues arise in between visits.     50 minutes spent on the date of the encounter doing chart review, review of test results, interpretation of tests, patient visit, documentation and discussion with other provider(s)       Again, thank you for allowing me to participate in the care of your patient.      Sincerely,    Estefany Pepe PA-C    "

## 2022-04-19 NOTE — PROGRESS NOTES
Infusion Nursing Note:  Kristie Cornejo presents today for  C5D1 Velcade, Darzalex Faspro, IVF.    Patient seen by provider today: Yes: dolores SUAREZ   present during visit today: Not Applicable.    Note:   TORB: Sivan SUAREZ/ DIDI Doran RN on 4/19/22 @0919:  - Give 1 L NS today for dehydration  - New schedule today starting today of Darzalex days 1 and 15 and Velcade days 1,8,15,22.    Patient reports to having some nausea this morning prior to her lab appointment.  Reports to feeling better now but does request IVF due to difficulty staying hydrated. Patient tolerated infusion well without issue.    Intravenous Access:  Peripheral IV placed by lab    Treatment Conditions:  Lab Results   Component Value Date    HGB 12.5 04/19/2022    WBC 3.8 (L) 04/19/2022    ANEU 3.2 01/17/2022    ANEUTAUTO 2.3 04/19/2022     04/19/2022      Lab Results   Component Value Date     04/19/2022    POTASSIUM 4.1 04/19/2022    CR 0.66 04/19/2022    MEKA 9.3 04/19/2022    BILITOTAL 0.6 04/19/2022    ALBUMIN 3.6 04/19/2022    ALT 22 04/19/2022    AST 17 04/19/2022     Results reviewed, labs MET treatment parameters, ok to proceed with treatment.      Post Infusion Assessment:  Patient tolerated infusion without incident.  Patient tolerated one velcade injection to right lower abdomen and one Darzalex Faspro injection to right lower abdomen without incident.  Blood return noted pre and post infusion.  Site patent and intact, free from redness, edema or discomfort.  No evidence of extravasations.  Access discontinued per protocol.       Discharge Plan:   Patient declined prescription refills.  Discharge instructions reviewed with: Patient.  Patient and/or family verbalized understanding of discharge instructions and all questions answered.  AVS to patient via [employer].  Patient will return 4/25/22 for labs/Velcade for next appointment.   Patient discharged in stable condition accompanied by: self.  Departure  Mode: Ambulatory.      Yusra Doran RN

## 2022-04-19 NOTE — NURSING NOTE
"Oncology Rooming Note    April 19, 2022 8:46 AM   Kristie Cornejo is a 51 year old female who presents for:    Chief Complaint   Patient presents with     Blood Draw     Labs drawn via piv by RN in lab.  VS taken     Oncology Clinic Visit     Placmacytoma of bone     Initial Vitals: /69   Pulse 78   Temp 98.2  F (36.8  C) (Oral)   Resp 16   Wt 69.3 kg (152 lb 12.8 oz)   LMP  (LMP Unknown)   SpO2 99%   BMI 27.95 kg/m   Estimated body mass index is 27.95 kg/m  as calculated from the following:    Height as of 4/13/22: 1.575 m (5' 2\").    Weight as of this encounter: 69.3 kg (152 lb 12.8 oz). Body surface area is 1.74 meters squared.  Mild Pain (3) Comment: Data Unavailable   No LMP recorded (lmp unknown). (Menstrual status: IUD).  Allergies reviewed: Yes  Medications reviewed: Yes    Medications: MEDICATION REFILLS NEEDED TODAY. Provider was notified.       Compazine    Pharmacy name entered into [facility]:    [facility] - [place], [place] - [address]  [id number] - [place], [place] - [address] [facility] [address]  [facility] - [place], [place] - [address]  [facility] - [place], [place] - [address] [place] [address]    Clinical concerns: lab results, diarrhea      Hank Scales            "

## 2022-04-20 LAB
KAPPA LC FREE SER-MCNC: 45.12 MG/DL (ref 0.33–1.94)
KAPPA LC FREE/LAMBDA FREE SER NEPH: 196.17 {RATIO} (ref 0.26–1.65)
LAMBDA LC FREE SERPL-MCNC: 0.23 MG/DL (ref 0.57–2.63)

## 2022-04-25 ENCOUNTER — APPOINTMENT (OUTPATIENT)
Dept: LAB | Facility: CLINIC | Age: 52
End: 2022-04-25
Payer: COMMERCIAL

## 2022-04-25 ENCOUNTER — DOCUMENTATION ONLY (OUTPATIENT)
Dept: ONCOLOGY | Facility: CLINIC | Age: 52
End: 2022-04-25

## 2022-04-25 ENCOUNTER — INFUSION THERAPY VISIT (OUTPATIENT)
Dept: ONCOLOGY | Facility: CLINIC | Age: 52
End: 2022-04-25
Payer: COMMERCIAL

## 2022-04-25 VITALS
TEMPERATURE: 97.7 F | OXYGEN SATURATION: 99 % | BODY MASS INDEX: 27.95 KG/M2 | HEART RATE: 60 BPM | RESPIRATION RATE: 16 BRPM | SYSTOLIC BLOOD PRESSURE: 116 MMHG | DIASTOLIC BLOOD PRESSURE: 67 MMHG | WEIGHT: 152.8 LBS

## 2022-04-25 DIAGNOSIS — C90.00 LIGHT CHAIN MYELOMA (H): Primary | ICD-10-CM

## 2022-04-25 LAB
ALBUMIN SERPL-MCNC: 3.6 G/DL (ref 3.4–5)
ALP SERPL-CCNC: 50 U/L (ref 40–150)
ALT SERPL W P-5'-P-CCNC: 23 U/L (ref 0–50)
ANION GAP SERPL CALCULATED.3IONS-SCNC: 11 MMOL/L (ref 3–14)
AST SERPL W P-5'-P-CCNC: 13 U/L (ref 0–45)
BASOPHILS # BLD AUTO: 0 10E3/UL (ref 0–0.2)
BASOPHILS NFR BLD AUTO: 1 %
BILIRUB SERPL-MCNC: 0.5 MG/DL (ref 0.2–1.3)
BUN SERPL-MCNC: 22 MG/DL (ref 7–30)
CALCIUM SERPL-MCNC: 9.4 MG/DL (ref 8.5–10.1)
CHLORIDE BLD-SCNC: 106 MMOL/L (ref 94–109)
CO2 SERPL-SCNC: 24 MMOL/L (ref 20–32)
CREAT SERPL-MCNC: 0.73 MG/DL (ref 0.52–1.04)
EOSINOPHIL # BLD AUTO: 0.2 10E3/UL (ref 0–0.7)
EOSINOPHIL NFR BLD AUTO: 5 %
ERYTHROCYTE [DISTWIDTH] IN BLOOD BY AUTOMATED COUNT: 14.2 % (ref 10–15)
GFR SERPL CREATININE-BSD FRML MDRD: >90 ML/MIN/1.73M2
GLUCOSE BLD-MCNC: 102 MG/DL (ref 70–99)
HCT VFR BLD AUTO: 37.4 % (ref 35–47)
HGB BLD-MCNC: 12.7 G/DL (ref 11.7–15.7)
IMM GRANULOCYTES # BLD: 0 10E3/UL
IMM GRANULOCYTES NFR BLD: 1 %
LYMPHOCYTES # BLD AUTO: 0.3 10E3/UL (ref 0.8–5.3)
LYMPHOCYTES NFR BLD AUTO: 10 %
MCH RBC QN AUTO: 31.6 PG (ref 26.5–33)
MCHC RBC AUTO-ENTMCNC: 34 G/DL (ref 31.5–36.5)
MCV RBC AUTO: 93 FL (ref 78–100)
MONOCYTES # BLD AUTO: 0.2 10E3/UL (ref 0–1.3)
MONOCYTES NFR BLD AUTO: 8 %
NEUTROPHILS # BLD AUTO: 2.4 10E3/UL (ref 1.6–8.3)
NEUTROPHILS NFR BLD AUTO: 75 %
NRBC # BLD AUTO: 0 10E3/UL
NRBC BLD AUTO-RTO: 0 /100
PLATELET # BLD AUTO: 172 10E3/UL (ref 150–450)
POTASSIUM BLD-SCNC: 3 MMOL/L (ref 3.4–5.3)
PROT SERPL-MCNC: 6.3 G/DL (ref 6.8–8.8)
RBC # BLD AUTO: 4.02 10E6/UL (ref 3.8–5.2)
SODIUM SERPL-SCNC: 141 MMOL/L (ref 133–144)
WBC # BLD AUTO: 3.2 10E3/UL (ref 4–11)

## 2022-04-25 PROCEDURE — 250N000011 HC RX IP 250 OP 636

## 2022-04-25 PROCEDURE — 85004 AUTOMATED DIFF WBC COUNT: CPT

## 2022-04-25 PROCEDURE — 96401 CHEMO ANTI-NEOPL SQ/IM: CPT

## 2022-04-25 PROCEDURE — 250N000013 HC RX MED GY IP 250 OP 250 PS 637

## 2022-04-25 PROCEDURE — 36415 COLL VENOUS BLD VENIPUNCTURE: CPT

## 2022-04-25 PROCEDURE — 80053 COMPREHEN METABOLIC PANEL: CPT

## 2022-04-25 RX ORDER — POTASSIUM CHLORIDE 1500 MG/1
40 TABLET, EXTENDED RELEASE ORAL ONCE
Status: COMPLETED | OUTPATIENT
Start: 2022-04-25 | End: 2022-04-25

## 2022-04-25 RX ADMIN — BORTEZOMIB 2.9 MG: 3.5 INJECTION, POWDER, LYOPHILIZED, FOR SOLUTION INTRAVENOUS; SUBCUTANEOUS at 10:13

## 2022-04-25 RX ADMIN — POTASSIUM CHLORIDE 40 MEQ: 20 TABLET, EXTENDED RELEASE ORAL at 10:20

## 2022-04-25 ASSESSMENT — PAIN SCALES - GENERAL: PAINLEVEL: NO PAIN (0)

## 2022-04-25 NOTE — PROGRESS NOTES
Oral Chemotherapy Monitoring Program  Lab Follow Up    Reviewed lab results from 4/25/22.    ORAL CHEMOTHERAPY 2/1/2022 2/2/2022 2/3/2022 2/22/2022 3/15/2022 4/11/2022 4/25/2022   Assessment Type Other Chart Review;Lab Monitoring;Refill Incoming phone call Refill Refill Refill Lab Monitoring   Diagnosis Code Multiple Myeloma Multiple Myeloma Multiple Myeloma Multiple Myeloma Multiple Myeloma Multiple Myeloma Multiple Myeloma   Providers Dr. Kemar Reinoso   [facility]   Drug Name Revlimid (lenalidomide) Revlimid (lenalidomide) Revlimid (lenalidomide) Revlimid (lenalidomide) Revlimid (lenalidomide) Revlimid (lenalidomide) Revlimid (lenalidomide)   Dose 25 mg 25 mg 25 mg 25 mg - - 25 mg   Current Schedule Daily Daily Daily Daily - - Daily   Cycle Details 2 weeks on, 1 week off 2 weeks on, 1 week off 2 weeks on, 1 week off 2 weeks on, 1 week off - - 2 weeks on, 2 weeks off   Start Date of Last Cycle - - - - 2/28/2022 - -   Planned next cycle start date 2/14/2022 2/7/2022 - 2/28/2022 3/21/2022 - -   Doses missed in last 2 weeks - - - - - - -   Adherence Assessment - - - - - - -   Adverse Effects - - - - - - Other (See Note for Details)   Nausea - - - - - - -   Pharmacist Intervention(nausea) - - - - - - -   Fatigue - - - - - - -   Pharmacist Intervention(fatigue) - - - - - - -   Intervention(s) - - - - - - -   Other (See Note for Details) - - - - - - Hypokalemia, leukopenia   Pharmacist intervention(other) - - - - - - No   Intervention(s) - - - - - - -   Any new drug interactions? - - - - - - -   Is the dose as ordered appropriate for the patient? - - - - - - Yes   Has the patient missed any days of school, work, or other routine activity? - - - - - - -   Since the last time we talked, have you been hospitalized or used the emergency room? - - - - - - -       Labs:  _  Result  Component Current Result Ref Range   Sodium 141 (4/25/2022) 133 - 144 mmol/L     _  Result Component Current Result Ref Range   Potassium 3.0 (L) (4/25/2022) 3.4 - 5.3 mmol/L     _  Result Component Current Result Ref Range   Calcium 9.4 (4/25/2022) 8.5 - 10.1 mg/dL     No results found for Mag within last 30 days.     No results found for Phos within last 30 days.     _  Result Component Current Result Ref Range   Albumin 3.6 (4/25/2022) 3.4 - 5.0 g/dL     _  Result Component Current Result Ref Range   Urea Nitrogen 22 (4/25/2022) 7 - 30 mg/dL     _  Result Component Current Result Ref Range   Creatinine 0.73 (4/25/2022) 0.52 - 1.04 mg/dL     _  Result Component Current Result Ref Range   AST 13 (4/25/2022) 0 - 45 U/L     _  Result Component Current Result Ref Range   ALT 23 (4/25/2022) 0 - 50 U/L     _  Result Component Current Result Ref Range   Bilirubin Total 0.5 (4/25/2022) 0.2 - 1.3 mg/dL     _  Result Component Current Result Ref Range   WBC Count 3.2 (L) (4/25/2022) 4.0 - 11.0 10e3/uL     _  Result Component Current Result Ref Range   Hemoglobin 12.7 (4/25/2022) 11.7 - 15.7 g/dL     _  Result Component Current Result Ref Range   Platelet Count 172 (4/25/2022) 150 - 450 10e3/uL     No results found for ANC within last 30 days.     _  Result Component Current Result Ref Range   Absolute Neutrophils 2.4 (4/25/2022) 1.6 - 8.3 10e3/uL        Assessment & Plan:  Results are concerning for mild leukopenia and hypokalemia; however there are no concerning abnormalities that would require a dose adjustment.  Continue current regimen.    Follow-Up:  Zometa on 4/28, and labs on 5/2    Lamar Gomez PharmD  Oral Chemotherapy Monitoring Program  [facility]  [phone]

## 2022-04-25 NOTE — PROGRESS NOTES
Infusion Nursing Note:  Kristie Cornejo presents today for Cycle 5 Day 8 Velcade.    Patient seen by provider today: No   present during visit today: Not Applicable.    Note: Patient reports frequent diarrhea on Saturday, was able to keep up with eating and drinking, no diarrhea to report today, does not feel the need for IV fluids. Using imodium off and on. Denies fever, chills, or shortness of breath. Pt confirms she is taking her revlimid and decadron as prescribed.     4/25/2022 1015 TORB: Ok to replace potassium per protocol. Have patient follow up with GI clinic if diarrhea were to resume again. Estefany SUAREZ/Letitia Gardiner RN      Intravenous Access:  No Intravenous access/labs at this visit.    Treatment Conditions:  Lab Results   Component Value Date    HGB 12.7 04/25/2022    WBC 3.2 (L) 04/25/2022    ANEU 3.2 01/17/2022    ANEUTAUTO 2.4 04/25/2022     04/25/2022      Lab Results   Component Value Date     04/25/2022    POTASSIUM 3.0 (L) 04/25/2022    CR 0.73 04/25/2022    MEKA 9.4 04/25/2022    BILITOTAL 0.5 04/25/2022    ALBUMIN 3.6 04/25/2022    ALT 23 04/25/2022    AST 13 04/25/2022     Results reviewed, labs MET treatment parameters, ok to proceed with treatment.      Post Infusion Assessment:  Patient tolerated injection without incident to LEFT abdomen in a single syringe. Potassium replaced po per protocol.       Discharge Plan:   Prescription refills request for Decadron sent to Pharmacy, patient will  next week.  AVS to patient via [employer].  Patient will return 4/28 for zometa and on 5/2 for next velcade appointment.   Patient discharged in stable condition accompanied by: self.  Departure Mode: Ambulatory.  Face to Face time: 0.      Letitia Gardiner RN

## 2022-04-28 ENCOUNTER — TELEPHONE (OUTPATIENT)
Dept: ONCOLOGY | Facility: CLINIC | Age: 52
End: 2022-04-28

## 2022-04-28 ENCOUNTER — ANCILLARY PROCEDURE (OUTPATIENT)
Dept: CARDIOLOGY | Facility: CLINIC | Age: 52
End: 2022-04-28
Attending: INTERNAL MEDICINE
Payer: COMMERCIAL

## 2022-04-28 ENCOUNTER — INFUSION THERAPY VISIT (OUTPATIENT)
Dept: ONCOLOGY | Facility: CLINIC | Age: 52
End: 2022-04-28
Payer: COMMERCIAL

## 2022-04-28 VITALS
SYSTOLIC BLOOD PRESSURE: 122 MMHG | TEMPERATURE: 98.1 F | OXYGEN SATURATION: 98 % | HEART RATE: 73 BPM | DIASTOLIC BLOOD PRESSURE: 87 MMHG | RESPIRATION RATE: 16 BRPM

## 2022-04-28 DIAGNOSIS — C90.30 PLASMACYTOMA OF BONE (H): Primary | ICD-10-CM

## 2022-04-28 DIAGNOSIS — I49.3 PVC'S (PREMATURE VENTRICULAR CONTRACTIONS): ICD-10-CM

## 2022-04-28 LAB
3D LVEF ECHO: NORMAL
BI-PLANE LVEF ECHO: NORMAL
LVEF ECHO: NORMAL

## 2022-04-28 PROCEDURE — 250N000011 HC RX IP 250 OP 636: Performed by: REGISTERED NURSE

## 2022-04-28 PROCEDURE — 999N000128 HC STATISTIC PERIPHERAL IV START W/O US GUIDANCE

## 2022-04-28 PROCEDURE — 93306 TTE W/DOPPLER COMPLETE: CPT | Performed by: INTERNAL MEDICINE

## 2022-04-28 PROCEDURE — 96365 THER/PROPH/DIAG IV INF INIT: CPT

## 2022-04-28 PROCEDURE — 258N000003 HC RX IP 258 OP 636

## 2022-04-28 RX ORDER — MEPERIDINE HYDROCHLORIDE 25 MG/ML
25 INJECTION INTRAMUSCULAR; INTRAVENOUS; SUBCUTANEOUS EVERY 30 MIN PRN
Status: CANCELLED | OUTPATIENT
Start: 2022-05-24

## 2022-04-28 RX ORDER — HEPARIN SODIUM (PORCINE) LOCK FLUSH IV SOLN 100 UNIT/ML 100 UNIT/ML
5 SOLUTION INTRAVENOUS
Status: CANCELLED | OUTPATIENT
Start: 2022-05-24

## 2022-04-28 RX ORDER — ALBUTEROL SULFATE 90 UG/1
1-2 AEROSOL, METERED RESPIRATORY (INHALATION)
Status: CANCELLED
Start: 2022-05-24

## 2022-04-28 RX ORDER — DIPHENHYDRAMINE HYDROCHLORIDE 50 MG/ML
50 INJECTION INTRAMUSCULAR; INTRAVENOUS
Status: CANCELLED
Start: 2022-05-24

## 2022-04-28 RX ORDER — METHYLPREDNISOLONE SODIUM SUCCINATE 125 MG/2ML
125 INJECTION, POWDER, LYOPHILIZED, FOR SOLUTION INTRAMUSCULAR; INTRAVENOUS
Status: CANCELLED
Start: 2022-05-24

## 2022-04-28 RX ORDER — HEPARIN SODIUM,PORCINE 10 UNIT/ML
5 VIAL (ML) INTRAVENOUS
Status: CANCELLED | OUTPATIENT
Start: 2022-05-24

## 2022-04-28 RX ORDER — ZOLEDRONIC ACID 0.04 MG/ML
4 INJECTION, SOLUTION INTRAVENOUS ONCE
Status: CANCELLED
Start: 2022-05-24 | End: 2022-05-24

## 2022-04-28 RX ORDER — NALOXONE HYDROCHLORIDE 0.4 MG/ML
0.2 INJECTION, SOLUTION INTRAMUSCULAR; INTRAVENOUS; SUBCUTANEOUS
Status: CANCELLED | OUTPATIENT
Start: 2022-05-24

## 2022-04-28 RX ORDER — ALBUTEROL SULFATE 0.83 MG/ML
2.5 SOLUTION RESPIRATORY (INHALATION)
Status: CANCELLED | OUTPATIENT
Start: 2022-05-24

## 2022-04-28 RX ORDER — EPINEPHRINE 1 MG/ML
0.3 INJECTION, SOLUTION INTRAMUSCULAR; SUBCUTANEOUS EVERY 5 MIN PRN
Status: CANCELLED | OUTPATIENT
Start: 2022-05-24

## 2022-04-28 RX ORDER — ZOLEDRONIC ACID 0.04 MG/ML
4 INJECTION, SOLUTION INTRAVENOUS ONCE
Status: COMPLETED | OUTPATIENT
Start: 2022-04-28 | End: 2022-04-28

## 2022-04-28 RX ADMIN — SODIUM CHLORIDE 250 ML: 9 INJECTION, SOLUTION INTRAVENOUS at 11:00

## 2022-04-28 RX ADMIN — ZOLEDRONIC ACID 4 MG: 0.04 INJECTION, SOLUTION INTRAVENOUS at 11:01

## 2022-04-28 ASSESSMENT — PAIN SCALES - GENERAL: PAINLEVEL: NO PAIN (0)

## 2022-04-28 NOTE — PROGRESS NOTES
[facility]  Date of Visit: May 2, 2022   Oncologist: Dr. Payton Reinoso    Reason for visit: myeloma follow-up           Oncology History   52 year old female with past medical history significant for Irritable bowel disease, allergies, large plasmacytoma of the L pelvis, and newly diagnosed multiple myeloma.     Early in April 2021 , she noted to have left sided groin pain, constant and was affecting her daily activities  MRI showed large, expansile, permeative mass with indistinct borders involving the left superior and inferior pubic rami, pubic symphysis and with possible extension to the left acetabulum.     We completed the following work-up:   - Biopsy 9/7/2021: plasmacytoma.  Flow cytometry showed kappa monotypic plasma cells, polytypic B cells. FISH results from plasmacytoma showed a gain of 11q, IGH-CCND1 fusion but no losses of 1q or TP53 and no gain of 1q.  - Bone marrow biopsy 09/22/21: Marrow cellularity 50% with 25% interstitial infiltration w/  Kappa-monotypic, moderately atypical plasma cells. Flow with 0.7% kappa-monotypica plasma cells. FISH and cytogenetics pending.   - PET CT 09/24/21 w/ hypermetabolic pathologic fracture of the L pubic ramus w/ aggressive appearing osteolysis but no additional suspicious lytic or blastic osseous lesions.     Treatment to date:   - Zometa q4 weeks started 9/27/21  - Radiation to left pubic ramus x18 fractions, completed 11/11/21.   - RVD C1D1 11/15/21  - RVD C2D1 12/6/21  - RVD C3D1  12/27/21  - RVD C4 D1 1/17/22, elizabeth added on D8 due to inadequate response of urinary M-spike and FLCs remaining over 100 mg/dL after 4 cycles of RVD    - Elizabeth-RVD C1D8 1/24/22  - Elizabeth-RVD C2D1 2/7/22  - Elizabeth-RVD C3D1 2/28/22  - Elizabeth-RVD C4D1 3/29/22  - Elizabeth-RVD C5D1 4/19/22    Disease response: AR           INTERIM HISTORY:   Margo comes in for oncology follow-up visit today     She continues to have persistent symptoms as mentioned in  previous visits, including dizziness (especially with activity, like climbing the stairs), a sense of weakness/fatigue (primarly in her legs), and mild neuropathic sensation changes without pain in her feet.  She is currently crawling up her stairs most days to avoid getting dizzy.  She is having diarrhea about 4x daily which is about her baseline. She takes imodium only when she goes out. No blood in stools.   No fevers or chills, or concerns infection.   She has been going swimming 2-3 days a week and walks a mile on other days. She takes breaks as needed.  She is getting MR cardiac 5/19.   Still gets occasional ocular migraines, but the frequency has decreased (no longer occurring daily).    Denies fevers, chills, headaches, dizziness, vision or hearing changes, new lumps or bumps, chest pain, cough, abdominal pain, nausea, vomiting, changes to bowel or bladder, swelling of extremities, bleeding issues, or rash.           Physical Exam:     /74   Pulse 65   Temp 98.2  F (36.8  C)   Resp 18   Wt 67.6 kg (149 lb)   LMP  (LMP Unknown)   SpO2 98%   BMI 27.25 kg/m      Wt Readings from Last 4 Encounters:   05/02/22 67.6 kg (149 lb)   04/25/22 69.3 kg (152 lb 12.8 oz)   04/19/22 69.3 kg (152 lb 12.8 oz)   04/13/22 69.4 kg (153 lb)       General: well appearing, no acute distress, pleasant female  HEENT: normocephalic, atraumatic, PERRLA, sclerae nonicteric, she has 2 styes present on the upper lids of her left eye, no drainage  Lymph: no palpable cervical, supraclavicular or axillary lymphadenopathy   CV: S1 S2, RRR, no murmur, click or rub  Lungs: clear to auscultation bilaterally, no wheezes/rales/rhonchi  Abd: soft, positive bowel sounds, non-distended, non-tender  MSK: no peripheral edema  Neuro: alert and oriented x3, CN grossly intact  Psych: appropriate mood and affect  Skin:no visible rashes or lesions on exposed skin          Laboratory Data:      I personally reviewed the following labs:    Most  Recent 3 CBC's:  Recent Labs   Lab Test 22  0826 22  0836 22  0829   WBC 5.4 3.2* 3.8*   HGB 12.4 12.7 12.5   MCV 92 93 97    172 233     Most Recent 3 BMP's:  Recent Labs   Lab Test 22  0836 22  0829 22  0936    141 143   POTASSIUM 3.0* 4.1 3.6   CHLORIDE 106 110* 110*   CO2 24 22 24   BUN 22 18 24   CR 0.73 0.66 0.66   ANIONGAP 11 9 9   MEKA 9.4 9.3 9.1   * 107* 87     Most Recent 2 LFT's:  Recent Labs   Lab Test 22  0836 22  0829   AST 13 17   ALT 23 22   ALKPHOS 50 41   BILITOTAL 0.5 0.6     Imaging:  Name: SANTO LUIS  MRN: [id number]  : 1970  Study Date: 2022 09:52 AM  Age: 52 yrs  Gender: Female  Patient Location: [facility]  Reason For Study: PVC's (premature ventricular contractions)  Ordering Physician: ANNY NETTLES  Referring Physician: ANNY NETTLES  Performed By: Whit Muro RDCS     BSA: 1.7 m2  Height: 62 in  Weight: 152 lb  BP: 116/67 mmHg  ______________________________________________________________________________  Procedure  Echocardiogram with two-dimensional, color and spectral Doppler performed.  ______________________________________________________________________________  Interpretation Summary  Frequent PVCs.  Left ventricular function is decreased. The ejection fraction is 40-45%  (mildly reduced).  Global right ventricular function is normal.  No significant valvular abnormalities present.  IVC diameter <2.1 cm collapsing >50% with sniff suggests a normal RA pressure  of 3 mmHg.  Trivial pericardial effusion is present.  Compared to recent study, LV fxn appears reduced.  ______________________________________________________________________________  Left Ventricle  Left ventricular wall thickness is normal. Left ventricular size is normal. 3D  LVEF volumetric analysis is 42%. Biplane LVEF is 41%. Left ventricular  function is decreased. The ejection fraction is 40-45% (mildly reduced). Grade  I  or early diastolic dysfunction. Global peak LV longitudinal strain is  averaged at -14.7%. This suggests abnormal strain (normal <-18%). No regional  wall motion abnormalities are seen. Mild diffuse hypokinesis is present.     Right Ventricle  The right ventricle is normal size. Global right ventricular function is  normal.     Atria  The right atria appears normal. Mild left atrial enlargement is present.     Mitral Valve  The mitral valve is normal. Trace mitral insufficiency is present.     Aortic Valve  Aortic valve is normal in structure and function.     Tricuspid Valve  The tricuspid valve is normal. The peak velocity of the tricuspid regurgitant  jet is not obtainable.     Pulmonic Valve  On Doppler interrogation, there is no significant stenosis or regurgitation.     Vessels  The aorta root is normal. IVC diameter <2.1 cm collapsing >50% with sniff  suggests a normal RA pressure of 3 mmHg.     Pericardium  Trivial pericardial effusion is present.     Miscellaneous  No significant valvular abnormalities present.  ______________________________________________________________________________  MMode/2D Measurements & Calculations     IVSd: 0.85 cm  LVIDd: 5.1 cm  LVIDs: 3.6 cm  LVPWd: 1.1 cm  FS: 29.6 %  LV mass(C)d: 182.9 grams  LV mass(C)dI: 107.5 grams/m2  Ao root diam: 2.8 cm  asc Aorta Diam: 2.7 cm  LVOT diam: 2.0 cm  LVOT area: 3.0 cm2  LA Volume (BP): 56.9 ml  LA Volume Index (BP): 33.5 ml/m2  RWT: 0.44     Doppler Measurements & Calculations  MV E max antonia: 56.3 cm/sec  MV A max antonia: 107.1 cm/sec  MV E/A: 0.53  MV dec slope: 210.4 cm/sec2  MV dec time: 0.27 sec  E/E' av.9  Lateral E/e': 9.8  Medial E/e': 14.0     QLAB 3DQ Advanced  Stroke Vol: 35.0 ml  10_EDV(3DQA): 84.5 ml  10_ESV(3DQA): 49.4 ml  10_EF(3DQA): 41.5 %  10_Tmsv 3-6: 224.0 msec  10_Tmsv 3-5: 204.0 msec  10_Tmsv 3-6 (%): 28.4 %     10_Tmsv 3-5 (%): 25.8 %         Assessment and Recommendations   Kristie Cornejo is a 52 year old  "female who was diagnosed with Plasmacytoma after she presented with left sided groin pain now found to have multiple myeloma after further workup with bone marrow biopsy.     # Multiple myeloma with associated large plasmacytoma of the L pelvis. Kappa light chain disease.     BMBx with 25% plasma cell infiltration; standard risk with gain of 11q, IGH-CCND1 fusion but no losses of 1q or TP53 and no gain of 1q.   SPEP w/out a monoclonal peak  UPEP positive. 70% paraprotein. Large monoclonal free immunoglobulin light chain of kappa chain type.     No anemia, normal creat, Ca, no other skeletal lesions. Alb normal, B2M normal.  Stage I Light chain disease with left pelvic bone pathologic fracture she is classified as having symptomatic multiple myeloma.     RVD C1D1 11/15/21 (had a slight delay in the start of her Revlimid on 11/19/21 due to the timing of her pregnancy tests).  She tolerated cycle 1 well with some mild nausea and a headache on her week off.  RVD C2D1 12/6/21, tolerated well overall, but has had more consistent sensation of her hands and feet being hot. No tingling, pain, loss of sensation, or changes to her strength.  RVD C3D1 12/27/21, tolerated well overall with ongoing sensation of \"hot\" hands and feet  RVD C4 D1 1/17/22, elizabeth added on D8 due to inadequate response of urinary M-spike and FLCs remaining over 100 mg/dL after 3 cycles of RVD    Elizabeth-RVD C5 2/7/22 Changing to 21 day cycles due to insurance.  Will receive Rev Days 1-14, Velcade days 1,4,8,11, and elizabeth days 1,8,15.    Elizabeth-RVD C6 2/28/22  Elizabeth-RVD C7 3/28/22  Elizabeth-RVD C8 4/19/22    She completed 3 cycles of Elizabeth plus RVD and acheived a partial response.  Her free kappa light chains are at least 50% reduced but the response is sluggish.  She tolerated treatment well and we have continued the same regimen without modifications.  Plan to give at least up to one more cycle (C6 in the current treatment plan) followed by restaging including " 24-hour urine protein and possible bone marrow biopsy.  Dr. Reinoso previously had a long discussion with Margo about the timing of autologous transplant and potential need to switch to ablative therapy if her response is not at least VG [place].  I will discuss the plan with Dr. Reinoso to see if we restage now, or complete one more cycle on the current plan.    - We switched back to 28 day cycles starting with Cycle 5 Day 1 to accommodate the elizabeth changing to every other week.  She should be getting Elizabeth on Days 1, 15 and she should continue to get Velcade every week - Days 1, 8, 15, and 22.  Ok to proceed with C5D15 today.    ADDENDUM 5/5/22: Discussed with Dr. Reinoso.  Will finish this cycle and then reassess.  Follow-up scheduled with Dr. Reinoso on 5/19/22. Anticipate switching to a chemotherapy-based regimen.  She also has a second opinion scheduled at [place] on 5/13/22.    #Bone Health  She will continue monthly Zometa (last received on 4/28/22). She will continue Calcium/Vitamin D supplements.     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 Currently having no pain and able to walk 1 mile without any assistive devices.  She is interested in pursuing other activities like gentle cross-country skiing.  Had follow-up with Dr. Philip on 1/6/22 and was cleared to resume regular activity as tolerated. She should still refrain from high-impact activities.    # PVCs  She had an ECHO 12/1 and had follow-up with cardiology 12/7 (notes in [facility]).  She had follow-up with cardiology here at the  and they recommend an ablation. Stress test was completed and she was started on Toprol XL; however, Toprol XL was discontinued due to significant side effects of orthostatic hypotension, pre-syncopal episodes, shakiness, and weakness.   - She had an ablation  "3/23/22 but has not experienced relief from the feeling of being nauseated and short of breath after climbing the stairs.  She also reports episodes of palpitations that \"radiate into her neck.\" Denies chest pain or shortness of breath.   - EKG done 4/4 showed sinus rhythm and nonspecific ST and T wave abnormality. Troponin negative.  - Cardiac MRI to rule out amyloid pending. This is scheduled on 5/19/22.   - She followed up with cardiology and they felt that the cause may be musculoskeletal or pericarditis. They suggested taking ibuprofen for pain. Her pain has subsided and she actually has not needed to take ibuprofen.   - They also recommended getting an ECHO which was completed 4/28 and shows mildly reduced LVEF (40-45%).     ADDENDUM 5/5/22: Discussed with Dr. Thomas.  He would like to wait to get Margo's post-ablation ziopatch results before making any changes. According to Dr. 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  If LVEF does not improve, she has non-ischemic cardiomyopathy and will need to follow-up with general cardiology.    #Diarrhea  She was having intermittent diarrhea with associated abdominal cramping and nausea, but developed much more significant diarrhea with 10-12 liquid episodes and was seen as an add-on on 4/4/22 for this.   - C.diff PCR negative 2/8/22, repeat on 4/5 negative  - Enteric stool panel 4/5/22 negative  - Underwent colonoscopy on 4/13/22 to pursue biopsy for amyloid (discussed below). Congo red stain is negative for amyloid deposits. Pathology showed colonic mucosa with focal active colitis, negative for signs of chronicity and lymphocytic colitis. Per patient report, GI reviewed results with her and did not feel she needed treatment for these findings at this time. They advised her to monitor her symptoms and to call them if they do not improve.Dr. Montgomery agreed with following conservatively for now since she reports some " "improvement in her symptoms.  If diarrhea becomes bothersome again, then they will arrange for expeditious evaluation in the IBD clinic.   - CMV PCR (4/4/22) not detected  - Diarrhea improved to now 3-4 per day. Patient is only taking imodium PRN when she goes out. Still has some abdominal cramping that is manageable now and relieved with BM. She will monitor and call us or GI if it gets worse.       #Ocular Migraines  She has a history of ocular migraines that present as \"squiggly lines in her field of vision.\" These have occurred about annually since 2002, but were occurring daily following her ablation on 3/23/22. Currently decreasing in frequency.  - Referred to ophthalmology for further evaluation. Appt is scheduled on 5/31/22.   - Checked CMV to rule out CMV viremia as an etiology. CMV PCR not detected.   - Off/on, improved since last visit.     #Concern for amyloid  She has continued to have significant GI symptoms despite negative infectious testing and diarrhea not being a major side effect of michelle or Velcade. Previously discussed with Dr. Reinoso.  In light of the constellation of symptoms, as well as her sluggish response to therapy, she would like to do further testing to rule out amyolid. Of note, congo red stain was negative on her bone marrow biopsy from 9/22/21  - Requested cardiac MRI to explore possibility of cardiac amyloid. This is scheduled on 5/19/22.   - Colonoscopy to biopsy for GI amyloid done 4/13. Congo red stain negative for amyloid deposits.     #Nausea  Mild, managed with Compazine up to twice daily and lorazepam occasionally at night.     #COVID prophylaxis  - She received EVUSHELD 1/20/22 and 3/8/22  - Received 3 COVID vaccines to date    #Reflux  She reported episodes of reflux with bile, especially when she lays down at night.  She has started to elevate her head on pillows and is also trying to avoid eating within 1-2 hours of going to bed.  Omeprazole was increased to 20 mg " twice daily with symptom improvement.    I reminded patient to call us if any questions/issues arise in between visits.     60 minutes spent on the date of the encounter doing chart review, review of test results, interpretation of tests, patient visit, documentation and discussion with other provider(s)     DORON Hall [facility]  [address] [place] [address] [id number] [phone]

## 2022-04-28 NOTE — PROGRESS NOTES
CLINICAL NUTRITION SERVICES- ONCOLOGY DISTRESS SCREENING     Identified Concern and Score From Distress Screening: This patient has scored >= 6 on Nutrition question 1 and/or 2 on the Oncology Distress Screening questionaire. Nutritionist Referral recommended. See Onc Distress Screening Flowsheet     Date of Distress Screenin/19     Findings:   - Margo reports that her appetite has been decreased due to taste changes and diarrhea. Usually she is able to eat 3 small meals a day and chocolate boost. Yesterday, she ate 1/2 cup of soup, crackers, and a boost.   - Reports that foods have a bad after taste.     Intervention: Discussed current PO intake, taste changes, and diarrhea. Encouraged pt to continue drinking boost and small frequent meals. Discussed trying a range of foods, mouth rinses, cold/room temp foods, eating mints after meals, and different flavors to add to foods.      Education Provided: as above     Follow-up Required: ANTONETTE Easley RD, LD  [phone]

## 2022-05-02 ENCOUNTER — APPOINTMENT (OUTPATIENT)
Dept: LAB | Facility: CLINIC | Age: 52
End: 2022-05-02
Payer: COMMERCIAL

## 2022-05-02 ENCOUNTER — ONCOLOGY VISIT (OUTPATIENT)
Dept: ONCOLOGY | Facility: CLINIC | Age: 52
End: 2022-05-02
Attending: REGISTERED NURSE
Payer: COMMERCIAL

## 2022-05-02 ENCOUNTER — INFUSION THERAPY VISIT (OUTPATIENT)
Dept: ONCOLOGY | Facility: CLINIC | Age: 52
End: 2022-05-02
Payer: COMMERCIAL

## 2022-05-02 VITALS
OXYGEN SATURATION: 98 % | WEIGHT: 149 LBS | BODY MASS INDEX: 27.25 KG/M2 | HEART RATE: 65 BPM | TEMPERATURE: 98.2 F | DIASTOLIC BLOOD PRESSURE: 74 MMHG | RESPIRATION RATE: 18 BRPM | SYSTOLIC BLOOD PRESSURE: 119 MMHG

## 2022-05-02 DIAGNOSIS — C90.00 LIGHT CHAIN MYELOMA (H): Primary | ICD-10-CM

## 2022-05-02 DIAGNOSIS — R94.30 EJECTION FRACTION < 50%: ICD-10-CM

## 2022-05-02 DIAGNOSIS — R19.7 DIARRHEA, UNSPECIFIED TYPE: ICD-10-CM

## 2022-05-02 DIAGNOSIS — I49.3 PVC'S (PREMATURE VENTRICULAR CONTRACTIONS): ICD-10-CM

## 2022-05-02 LAB
BASOPHILS # BLD AUTO: 0 10E3/UL (ref 0–0.2)
BASOPHILS NFR BLD AUTO: 1 %
EOSINOPHIL # BLD AUTO: 0.3 10E3/UL (ref 0–0.7)
EOSINOPHIL NFR BLD AUTO: 5 %
ERYTHROCYTE [DISTWIDTH] IN BLOOD BY AUTOMATED COUNT: 14.2 % (ref 10–15)
HCT VFR BLD AUTO: 35.5 % (ref 35–47)
HGB BLD-MCNC: 12.4 G/DL (ref 11.7–15.7)
IMM GRANULOCYTES # BLD: 0 10E3/UL
IMM GRANULOCYTES NFR BLD: 0 %
LYMPHOCYTES # BLD AUTO: 0.4 10E3/UL (ref 0.8–5.3)
LYMPHOCYTES NFR BLD AUTO: 7 %
MCH RBC QN AUTO: 32.3 PG (ref 26.5–33)
MCHC RBC AUTO-ENTMCNC: 34.9 G/DL (ref 31.5–36.5)
MCV RBC AUTO: 92 FL (ref 78–100)
MONOCYTES # BLD AUTO: 0.9 10E3/UL (ref 0–1.3)
MONOCYTES NFR BLD AUTO: 16 %
NEUTROPHILS # BLD AUTO: 3.9 10E3/UL (ref 1.6–8.3)
NEUTROPHILS NFR BLD AUTO: 71 %
NRBC # BLD AUTO: 0 10E3/UL
NRBC BLD AUTO-RTO: 0 /100
PLATELET # BLD AUTO: 200 10E3/UL (ref 150–450)
RBC # BLD AUTO: 3.84 10E6/UL (ref 3.8–5.2)
WBC # BLD AUTO: 5.4 10E3/UL (ref 4–11)

## 2022-05-02 PROCEDURE — 99215 OFFICE O/P EST HI 40 MIN: CPT | Performed by: REGISTERED NURSE

## 2022-05-02 PROCEDURE — 36415 COLL VENOUS BLD VENIPUNCTURE: CPT

## 2022-05-02 PROCEDURE — 96401 CHEMO ANTI-NEOPL SQ/IM: CPT

## 2022-05-02 PROCEDURE — 85025 COMPLETE CBC W/AUTO DIFF WBC: CPT

## 2022-05-02 PROCEDURE — 250N000011 HC RX IP 250 OP 636

## 2022-05-02 PROCEDURE — G0463 HOSPITAL OUTPT CLINIC VISIT: HCPCS

## 2022-05-02 RX ORDER — ALBUTEROL SULFATE 0.83 MG/ML
2.5 SOLUTION RESPIRATORY (INHALATION)
Status: CANCELLED | OUTPATIENT
Start: 2022-05-17

## 2022-05-02 RX ORDER — DEXAMETHASONE 4 MG/1
20 TABLET ORAL ONCE
Status: COMPLETED | OUTPATIENT
Start: 2022-05-02 | End: 2022-05-02

## 2022-05-02 RX ORDER — DIPHENHYDRAMINE HYDROCHLORIDE 50 MG/ML
50 INJECTION INTRAMUSCULAR; INTRAVENOUS
Status: CANCELLED
Start: 2022-06-14

## 2022-05-02 RX ORDER — NALOXONE HYDROCHLORIDE 0.4 MG/ML
0.2 INJECTION, SOLUTION INTRAMUSCULAR; INTRAVENOUS; SUBCUTANEOUS
Status: CANCELLED | OUTPATIENT
Start: 2022-06-06

## 2022-05-02 RX ORDER — METHYLPREDNISOLONE SODIUM SUCCINATE 125 MG/2ML
125 INJECTION, POWDER, LYOPHILIZED, FOR SOLUTION INTRAMUSCULAR; INTRAVENOUS
Status: CANCELLED
Start: 2022-05-17

## 2022-05-02 RX ORDER — NALOXONE HYDROCHLORIDE 0.4 MG/ML
0.2 INJECTION, SOLUTION INTRAMUSCULAR; INTRAVENOUS; SUBCUTANEOUS
Status: CANCELLED | OUTPATIENT
Start: 2022-05-25

## 2022-05-02 RX ORDER — LORAZEPAM 2 MG/ML
0.5 INJECTION INTRAMUSCULAR EVERY 4 HOURS PRN
Status: CANCELLED | OUTPATIENT
Start: 2022-06-06

## 2022-05-02 RX ORDER — HEPARIN SODIUM (PORCINE) LOCK FLUSH IV SOLN 100 UNIT/ML 100 UNIT/ML
5 SOLUTION INTRAVENOUS
Status: CANCELLED | OUTPATIENT
Start: 2022-05-17

## 2022-05-02 RX ORDER — METHYLPREDNISOLONE SODIUM SUCCINATE 125 MG/2ML
125 INJECTION, POWDER, LYOPHILIZED, FOR SOLUTION INTRAMUSCULAR; INTRAVENOUS
Status: CANCELLED
Start: 2022-06-14

## 2022-05-02 RX ORDER — HEPARIN SODIUM,PORCINE 10 UNIT/ML
5 VIAL (ML) INTRAVENOUS
Status: CANCELLED | OUTPATIENT
Start: 2022-06-06

## 2022-05-02 RX ORDER — ALBUTEROL SULFATE 90 UG/1
1-2 AEROSOL, METERED RESPIRATORY (INHALATION)
Status: CANCELLED
Start: 2022-05-17

## 2022-05-02 RX ORDER — ALBUTEROL SULFATE 0.83 MG/ML
2.5 SOLUTION RESPIRATORY (INHALATION)
Status: CANCELLED | OUTPATIENT
Start: 2022-06-06

## 2022-05-02 RX ORDER — DIPHENHYDRAMINE HYDROCHLORIDE 50 MG/ML
50 INJECTION INTRAMUSCULAR; INTRAVENOUS
Status: CANCELLED
Start: 2022-05-25

## 2022-05-02 RX ORDER — DIPHENHYDRAMINE HYDROCHLORIDE 50 MG/ML
50 INJECTION INTRAMUSCULAR; INTRAVENOUS
Status: CANCELLED
Start: 2022-06-06

## 2022-05-02 RX ORDER — DIPHENHYDRAMINE HCL 25 MG
50 CAPSULE ORAL
Status: CANCELLED | OUTPATIENT
Start: 2022-05-17

## 2022-05-02 RX ORDER — LORAZEPAM 2 MG/ML
0.5 INJECTION INTRAMUSCULAR EVERY 4 HOURS PRN
Status: CANCELLED | OUTPATIENT
Start: 2022-05-17

## 2022-05-02 RX ORDER — ALBUTEROL SULFATE 0.83 MG/ML
2.5 SOLUTION RESPIRATORY (INHALATION)
Status: CANCELLED | OUTPATIENT
Start: 2022-05-25

## 2022-05-02 RX ORDER — MEPERIDINE HYDROCHLORIDE 25 MG/ML
25 INJECTION INTRAMUSCULAR; INTRAVENOUS; SUBCUTANEOUS EVERY 30 MIN PRN
Status: CANCELLED | OUTPATIENT
Start: 2022-05-17

## 2022-05-02 RX ORDER — EPINEPHRINE 1 MG/ML
0.3 INJECTION, SOLUTION INTRAMUSCULAR; SUBCUTANEOUS EVERY 5 MIN PRN
Status: CANCELLED | OUTPATIENT
Start: 2022-05-17

## 2022-05-02 RX ORDER — DIPHENHYDRAMINE HCL 25 MG
50 CAPSULE ORAL
Status: CANCELLED | OUTPATIENT
Start: 2022-06-06

## 2022-05-02 RX ORDER — NALOXONE HYDROCHLORIDE 0.4 MG/ML
0.2 INJECTION, SOLUTION INTRAMUSCULAR; INTRAVENOUS; SUBCUTANEOUS
Status: CANCELLED | OUTPATIENT
Start: 2022-06-14

## 2022-05-02 RX ORDER — HEPARIN SODIUM (PORCINE) LOCK FLUSH IV SOLN 100 UNIT/ML 100 UNIT/ML
5 SOLUTION INTRAVENOUS
Status: CANCELLED | OUTPATIENT
Start: 2022-06-06

## 2022-05-02 RX ORDER — MEPERIDINE HYDROCHLORIDE 25 MG/ML
25 INJECTION INTRAMUSCULAR; INTRAVENOUS; SUBCUTANEOUS EVERY 30 MIN PRN
Status: CANCELLED | OUTPATIENT
Start: 2022-06-06

## 2022-05-02 RX ORDER — DEXAMETHASONE 4 MG/1
20 TABLET ORAL ONCE
Status: CANCELLED | OUTPATIENT
Start: 2022-06-06

## 2022-05-02 RX ORDER — NALOXONE HYDROCHLORIDE 0.4 MG/ML
0.2 INJECTION, SOLUTION INTRAMUSCULAR; INTRAVENOUS; SUBCUTANEOUS
Status: CANCELLED | OUTPATIENT
Start: 2022-05-17

## 2022-05-02 RX ORDER — ALBUTEROL SULFATE 90 UG/1
1-2 AEROSOL, METERED RESPIRATORY (INHALATION)
Status: CANCELLED
Start: 2022-06-14

## 2022-05-02 RX ORDER — EPINEPHRINE 1 MG/ML
0.3 INJECTION, SOLUTION INTRAMUSCULAR; SUBCUTANEOUS EVERY 5 MIN PRN
Status: CANCELLED | OUTPATIENT
Start: 2022-06-06

## 2022-05-02 RX ORDER — DEXAMETHASONE 4 MG/1
20 TABLET ORAL ONCE
Status: CANCELLED | OUTPATIENT
Start: 2022-05-17

## 2022-05-02 RX ORDER — EPINEPHRINE 1 MG/ML
0.3 INJECTION, SOLUTION INTRAMUSCULAR; SUBCUTANEOUS EVERY 5 MIN PRN
Status: CANCELLED | OUTPATIENT
Start: 2022-06-14

## 2022-05-02 RX ORDER — ACETAMINOPHEN 325 MG/1
650 TABLET ORAL
Status: CANCELLED | OUTPATIENT
Start: 2022-05-17

## 2022-05-02 RX ORDER — DIPHENHYDRAMINE HYDROCHLORIDE 50 MG/ML
50 INJECTION INTRAMUSCULAR; INTRAVENOUS
Status: CANCELLED
Start: 2022-05-17

## 2022-05-02 RX ORDER — METHYLPREDNISOLONE SODIUM SUCCINATE 125 MG/2ML
125 INJECTION, POWDER, LYOPHILIZED, FOR SOLUTION INTRAMUSCULAR; INTRAVENOUS
Status: CANCELLED
Start: 2022-05-25

## 2022-05-02 RX ORDER — EPINEPHRINE 1 MG/ML
0.3 INJECTION, SOLUTION INTRAMUSCULAR; SUBCUTANEOUS EVERY 5 MIN PRN
Status: CANCELLED | OUTPATIENT
Start: 2022-05-25

## 2022-05-02 RX ORDER — MEPERIDINE HYDROCHLORIDE 25 MG/ML
25 INJECTION INTRAMUSCULAR; INTRAVENOUS; SUBCUTANEOUS EVERY 30 MIN PRN
Status: CANCELLED | OUTPATIENT
Start: 2022-06-14

## 2022-05-02 RX ORDER — MEPERIDINE HYDROCHLORIDE 25 MG/ML
25 INJECTION INTRAMUSCULAR; INTRAVENOUS; SUBCUTANEOUS EVERY 30 MIN PRN
Status: CANCELLED | OUTPATIENT
Start: 2022-05-25

## 2022-05-02 RX ORDER — ALBUTEROL SULFATE 90 UG/1
1-2 AEROSOL, METERED RESPIRATORY (INHALATION)
Status: CANCELLED
Start: 2022-06-06

## 2022-05-02 RX ORDER — ACETAMINOPHEN 325 MG/1
650 TABLET ORAL
Status: CANCELLED | OUTPATIENT
Start: 2022-06-06

## 2022-05-02 RX ORDER — METHYLPREDNISOLONE SODIUM SUCCINATE 125 MG/2ML
125 INJECTION, POWDER, LYOPHILIZED, FOR SOLUTION INTRAMUSCULAR; INTRAVENOUS
Status: CANCELLED
Start: 2022-06-06

## 2022-05-02 RX ORDER — HEPARIN SODIUM,PORCINE 10 UNIT/ML
5 VIAL (ML) INTRAVENOUS
Status: CANCELLED | OUTPATIENT
Start: 2022-05-17

## 2022-05-02 RX ORDER — ALBUTEROL SULFATE 90 UG/1
1-2 AEROSOL, METERED RESPIRATORY (INHALATION)
Status: CANCELLED
Start: 2022-05-25

## 2022-05-02 RX ORDER — ALBUTEROL SULFATE 0.83 MG/ML
2.5 SOLUTION RESPIRATORY (INHALATION)
Status: CANCELLED | OUTPATIENT
Start: 2022-06-14

## 2022-05-02 RX ADMIN — DEXAMETHASONE 20 MG: 4 TABLET ORAL at 09:57

## 2022-05-02 RX ADMIN — BORTEZOMIB 2.9 MG: 3.5 INJECTION, POWDER, LYOPHILIZED, FOR SOLUTION INTRAVENOUS; SUBCUTANEOUS at 10:23

## 2022-05-02 RX ADMIN — DARATUMUMAB AND HYALURONIDASE-FIHJ (HUMAN RECOMBINANT) 1800 MG: 1800; 30000 INJECTION SUBCUTANEOUS at 10:23

## 2022-05-02 ASSESSMENT — PAIN SCALES - GENERAL: PAINLEVEL: NO PAIN (0)

## 2022-05-02 NOTE — NURSING NOTE
"Oncology Rooming Note    May 2, 2022 8:38 AM   Kristie Cornejo is a 52 year old female who presents for:    Chief Complaint   Patient presents with     Labs Only     Venipuncture,. Vitals checked     Oncology Clinic Visit     Light chain myeloma; plasmacytoma of bone     Initial Vitals: /74   Pulse 65   Temp 98.2  F (36.8  C)   Resp 18   Wt 67.6 kg (149 lb)   LMP  (LMP Unknown)   SpO2 98%   BMI 27.25 kg/m   Estimated body mass index is 27.25 kg/m  as calculated from the following:    Height as of 4/13/22: 1.575 m (5' 2\").    Weight as of this encounter: 67.6 kg (149 lb). Body surface area is 1.72 meters squared.  No Pain (0) Comment: Data Unavailable   No LMP recorded (lmp unknown). (Menstrual status: IUD).  Allergies reviewed: Yes  Medications reviewed: Yes    Medications: MEDICATION REFILLS NEEDED TODAY. Provider was notified.     Dexamethasone - if needed for next week per provider    acyclovir      Pharmacy name entered into [facility]:    [facility] - [place], [place] - [address]  [id number] - [place], [place] - [address]  [facility] - [place], [place] - [address]  [facility] - [place], [place] - [address] [place] [address] PHARMACY [facility] - [place], [place] - [phone]    Clinical concerns: numbers are not going down, wondering about a different plan of action      Hank Scales            "

## 2022-05-02 NOTE — PROGRESS NOTES
Infusion Nursing Note:  Kristie Cornejo presents today for cycle 5 day 15 velcade, darzalex faspro.    Patient seen by provider today: Yes: Jackie Ferris CNP   present during visit today: Not Applicable.    Note:   Patient has no questions or concerns after her appointment with Jackie Ferris.    Intravenous Access:  No Intravenous access at this visit.    Treatment Conditions:  Lab Results   Component Value Date    HGB 12.4 05/02/2022    WBC 5.4 05/02/2022    ANEU 3.2 01/17/2022    ANEUTAUTO 3.9 05/02/2022     05/02/2022      Results reviewed, labs MET treatment parameters, ok to proceed with treatment.      Post Infusion Assessment:  Patient tolerated velcade injection into right lower abdomen without incident.   Patient tolerated darzalex faspro injection into left lower abdomen without incident.       Discharge Plan:   Prescription refills given for acyclovir.  Discharge instructions reviewed with: Patient.  Patient and/or family verbalized understanding of discharge instructions and all questions answered.  AVS to patient via [facility].  Patient will return 5/9 for next appointment. Lab appointment to be added prior to infusion appointment on 5/9 per Jackie Ferris, scheduling working on, patient aware.  Patient discharged in stable condition accompanied by: self.  Departure Mode: Ambulatory.      Azucena Tafoya RN

## 2022-05-02 NOTE — PATIENT INSTRUCTIONS
[facility] Triage and after hours / weekends / holidays:  [phone]    Please call the triage or after hours line if you experience a temperature greater than or equal to 100.5, shaking chills, have uncontrolled nausea, vomiting and/or diarrhea, dizziness, shortness of breath, chest pain, bleeding, unexplained bruising, or if you have any other new/concerning symptoms, questions or concerns.      If you are having any concerning symptoms or wish to speak to a provider before your next infusion visit, please call your care coordinator or triage to notify them so we can adequately serve you.     If you need a refill on a narcotic prescription or other medication, please call before your infusion appointment.

## 2022-05-02 NOTE — NURSING NOTE
Chief Complaint   Patient presents with     Labs Only     Venipuncture,. Vitals checked       Ruby Michele RN on 5/2/2022 at 8:33 AM

## 2022-05-02 NOTE — LETTER
5/2/2022         RE: Kristie Cornejo  [address]  [facility] [address]        Dear Colleague,    Thank you for referring your patient, Kristie Cornejo, to the [facility]. Please see a copy of my visit note below.    [facility]  Date of Visit: May 2, 2022   Oncologist: Dr. Payton Reinoso    Reason for visit: myeloma follow-up           Oncology History   52 year old female with past medical history significant for Irritable bowel disease, allergies, large plasmacytoma of the L pelvis, and newly diagnosed multiple myeloma.     Early in April 2021 , she noted to have left sided groin pain, constant and was affecting her daily activities  MRI showed large, expansile, permeative mass with indistinct borders involving the left superior and inferior pubic rami, pubic symphysis and with possible extension to the left acetabulum.     We completed the following work-up:   - Biopsy 9/7/2021: plasmacytoma.  Flow cytometry showed kappa monotypic plasma cells, polytypic B cells. FISH results from plasmacytoma showed a gain of 11q, IGH-CCND1 fusion but no losses of 1q or TP53 and no gain of 1q.  - Bone marrow biopsy 09/22/21: Marrow cellularity 50% with 25% interstitial infiltration w/  Kappa-monotypic, moderately atypical plasma cells. Flow with 0.7% kappa-monotypica plasma cells. FISH and cytogenetics pending.   - PET CT 09/24/21 w/ hypermetabolic pathologic fracture of the L pubic ramus w/ aggressive appearing osteolysis but no additional suspicious lytic or blastic osseous lesions.     Treatment to date:   - Zometa q4 weeks started 9/27/21  - Radiation to left pubic ramus x18 fractions, completed 11/11/21.   - RVD C1D1 11/15/21  - RVD C2D1 12/6/21  - RVD C3D1  12/27/21  - RVD C4 D1 1/17/22, elizabeth added on D8 due to inadequate response of urinary M-spike and FLCs remaining over 100 mg/dL after 4 cycles of RVD    - Elizabeth-RVD C1D8 1/24/22  -  Elizabeth-RVD C2D1 2/7/22  - Elizabeth-RVD C3D1 2/28/22  - Elizabeth-RVD C4D1 3/29/22  - Elizabeth-RVD C5D1 4/19/22    Disease response: CT           INTERIM HISTORY:   Margo comes in for oncology follow-up visit today     She continues to have persistent symptoms as mentioned in previous visits, including dizziness (especially with activity, like climbing the stairs), a sense of weakness/fatigue (primarly in her legs), and mild neuropathic sensation changes without pain in her feet.  She is currently crawling up her stairs most days to avoid getting dizzy.  She is having diarrhea about 4x daily which is about her baseline. She takes imodium only when she goes out. No blood in stools.   No fevers or chills, or concerns infection.   She has been going swimming 2-3 days a week and walks a mile on other days. She takes breaks as needed.  She is getting MR cardiac 5/19.   Still gets occasional ocular migraines, but the frequency has decreased (no longer occurring daily).    Denies fevers, chills, headaches, dizziness, vision or hearing changes, new lumps or bumps, chest pain, cough, abdominal pain, nausea, vomiting, changes to bowel or bladder, swelling of extremities, bleeding issues, or rash.           Physical Exam:     /74   Pulse 65   Temp 98.2  F (36.8  C)   Resp 18   Wt 67.6 kg (149 lb)   LMP  (LMP Unknown)   SpO2 98%   BMI 27.25 kg/m      Wt Readings from Last 4 Encounters:   05/02/22 67.6 kg (149 lb)   04/25/22 69.3 kg (152 lb 12.8 oz)   04/19/22 69.3 kg (152 lb 12.8 oz)   04/13/22 69.4 kg (153 lb)       General: well appearing, no acute distress, pleasant female  HEENT: normocephalic, atraumatic, PERRLA, sclerae nonicteric, she has 2 styes present on the upper lids of her left eye, no drainage  Lymph: no palpable cervical, supraclavicular or axillary lymphadenopathy   CV: S1 S2, RRR, no murmur, click or rub  Lungs: clear to auscultation bilaterally, no wheezes/rales/rhonchi  Abd: soft, positive bowel sounds,  non-distended, non-tender  MSK: no peripheral edema  Neuro: alert and oriented x3, CN grossly intact  Psych: appropriate mood and affect  Skin:no visible rashes or lesions on exposed skin          Laboratory Data:      I personally reviewed the following labs:    Most Recent 3 CBC's:  Recent Labs   Lab Test 22  0826 22  0836 22  0829   WBC 5.4 3.2* 3.8*   HGB 12.4 12.7 12.5   MCV 92 93 97    172 233     Most Recent 3 BMP's:  Recent Labs   Lab Test 22  0836 22  0829 22  0936    141 143   POTASSIUM 3.0* 4.1 3.6   CHLORIDE 106 110* 110*   CO2 24 22 24   BUN 22 18 24   CR 0.73 0.66 0.66   ANIONGAP 11 9 9   MEKA 9.4 9.3 9.1   * 107* 87     Most Recent 2 LFT's:  Recent Labs   Lab Test 22  0836 22  0829   AST 13 17   ALT 23 22   ALKPHOS 50 41   BILITOTAL 0.5 0.6     Imaging:  Name: SANTO LUIS  MRN: [id number]  : 1970  Study Date: 2022 09:52 AM  Age: 52 yrs  Gender: Female  Patient Location: [facility]  Reason For Study: PVC's (premature ventricular contractions)  Ordering Physician: ANNY NETTLES  Referring Physician: ANNY NETTLES  Performed By: Whit Muro RDCS     BSA: 1.7 m2  Height: 62 in  Weight: 152 lb  BP: 116/67 mmHg  ______________________________________________________________________________  Procedure  Echocardiogram with two-dimensional, color and spectral Doppler performed.  ______________________________________________________________________________  Interpretation Summary  Frequent PVCs.  Left ventricular function is decreased. The ejection fraction is 40-45%  (mildly reduced).  Global right ventricular function is normal.  No significant valvular abnormalities present.  IVC diameter <2.1 cm collapsing >50% with sniff suggests a normal RA pressure  of 3 mmHg.  Trivial pericardial effusion is present.  Compared to recent study, LV fxn appears  reduced.  ______________________________________________________________________________  Left Ventricle  Left ventricular wall thickness is normal. Left ventricular size is normal. 3D  LVEF volumetric analysis is 42%. Biplane LVEF is 41%. Left ventricular  function is decreased. The ejection fraction is 40-45% (mildly reduced). Grade  I or early diastolic dysfunction. Global peak LV longitudinal strain is  averaged at -14.7%. This suggests abnormal strain (normal <-18%). No regional  wall motion abnormalities are seen. Mild diffuse hypokinesis is present.     Right Ventricle  The right ventricle is normal size. Global right ventricular function is  normal.     Atria  The right atria appears normal. Mild left atrial enlargement is present.     Mitral Valve  The mitral valve is normal. Trace mitral insufficiency is present.     Aortic Valve  Aortic valve is normal in structure and function.     Tricuspid Valve  The tricuspid valve is normal. The peak velocity of the tricuspid regurgitant  jet is not obtainable.     Pulmonic Valve  On Doppler interrogation, there is no significant stenosis or regurgitation.     Vessels  The aorta root is normal. IVC diameter <2.1 cm collapsing >50% with sniff  suggests a normal RA pressure of 3 mmHg.     Pericardium  Trivial pericardial effusion is present.     Miscellaneous  No significant valvular abnormalities present.  ______________________________________________________________________________  MMode/2D Measurements & Calculations     IVSd: 0.85 cm  LVIDd: 5.1 cm  LVIDs: 3.6 cm  LVPWd: 1.1 cm  FS: 29.6 %  LV mass(C)d: 182.9 grams  LV mass(C)dI: 107.5 grams/m2  Ao root diam: 2.8 cm  asc Aorta Diam: 2.7 cm  LVOT diam: 2.0 cm  LVOT area: 3.0 cm2  LA Volume (BP): 56.9 ml  LA Volume Index (BP): 33.5 ml/m2  RWT: 0.44     Doppler Measurements & Calculations  MV E max antonia: 56.3 cm/sec  MV A max antonia: 107.1 cm/sec  MV E/A: 0.53  MV dec slope: 210.4 cm/sec2  MV dec time: 0.27 sec  E/E'  "av.9  Lateral E/e': 9.8  Medial E/e': 14.0     QLAB 3DQ Advanced  Stroke Vol: 35.0 ml  10_EDV(3DQA): 84.5 ml  10_ESV(3DQA): 49.4 ml  10_EF(3DQA): 41.5 %  10_Tmsv 3-6: 224.0 msec  10_Tmsv 3-5: 204.0 msec  10_Tmsv 3-6 (%): 28.4 %     10_Tmsv 3-5 (%): 25.8 %         Assessment and Recommendations   Kristie Cornejo is a 52 year old female who was diagnosed with Plasmacytoma after she presented with left sided groin pain now found to have multiple myeloma after further workup with bone marrow biopsy.     # Multiple myeloma with associated large plasmacytoma of the L pelvis. Kappa light chain disease.     BMBx with 25% plasma cell infiltration; standard risk with gain of 11q, IGH-CCND1 fusion but no losses of 1q or TP53 and no gain of 1q.   SPEP w/out a monoclonal peak  UPEP positive. 70% paraprotein. Large monoclonal free immunoglobulin light chain of kappa chain type.     No anemia, normal creat, Ca, no other skeletal lesions. Alb normal, B2M normal.  Stage I Light chain disease with left pelvic bone pathologic fracture she is classified as having symptomatic multiple myeloma.     RVD C1D1 11/15/21 (had a slight delay in the start of her Revlimid on 21 due to the timing of her pregnancy tests).  She tolerated cycle 1 well with some mild nausea and a headache on her week off.  RVD C2D1 21, tolerated well overall, but has had more consistent sensation of her hands and feet being hot. No tingling, pain, loss of sensation, or changes to her strength.  RVD C3D1 21, tolerated well overall with ongoing sensation of \"hot\" hands and feet  RVD C4 D1 22, elizabeth added on D8 due to inadequate response of urinary M-spike and FLCs remaining over 100 mg/dL after 3 cycles of RVD    Elizabeth-RVD C5 22 Changing to 21 day cycles due to insurance.  Will receive Rev Days 1-14, Velcade days 1,4,8,11, and elziabeth days 1,8,15.    Elizabeth-RVD C6 22  Elizabeth-RVD C7 3/28/22  Elizabeth-RVD C8 22    She completed 3 cycles " of Elizabeth plus RVD and acheived a partial response.  Her free kappa light chains are at least 50% reduced but the response is sluggish.  She tolerated treatment well and we have continued the same regimen without modifications.  Plan to give at least up to one more cycle (C6 in the current treatment plan) followed by restaging including 24-hour urine protein and possible bone marrow biopsy.  Dr. Reinoso previously had a long discussion with Margo about the timing of autologous transplant and potential need to switch to ablative therapy if her response is not at least VG HI.  I will discuss the plan with Dr. Reinoso to see if we restage now, or complete one more cycle on the current plan.    - We switched back to 28 day cycles starting with Cycle 5 Day 1 to accommodate the elizabeth changing to every other week.  She should be getting Elizabeth on Days 1, 15 and she should continue to get Velcade every week - Days 1, 8, 15, and 22.  Ok to proceed with C5D15 today.    ADDENDUM 5/5/22: Discussed with Dr. Reinoso.  Will finish this cycle and then reassess.  Follow-up scheduled with Dr. Reinoso on 5/19/22. Anticipate switching to a chemotherapy-based regimen.  She also has a second opinion scheduled at [place] on 5/13/22.    #Bone Health  She will continue monthly Zometa (last received on 4/28/22). She will continue Calcium/Vitamin D supplements.     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 Currently having no pain and able to walk 1 mile without any assistive devices.  She is interested in pursuing other activities like gentle cross-country skiing.  Had follow-up with Dr. Philip on 1/6/22 and was cleared to resume regular activity as tolerated. She should still refrain from high-impact activities.    # PVCs  She had an ECHO 12/1 and had follow-up with cardiology 12/7  "(notes in CareEverywhere).  She had follow-up with cardiology here at the  and they recommend an ablation. Stress test was completed and she was started on Toprol XL; however, Toprol XL was discontinued due to significant side effects of orthostatic hypotension, pre-syncopal episodes, shakiness, and weakness.   - She had an ablation 3/23/22 but has not experienced relief from the feeling of being nauseated and short of breath after climbing the stairs.  She also reports episodes of palpitations that \"radiate into her neck.\" Denies chest pain or shortness of breath.   - EKG done 4/4 showed sinus rhythm and nonspecific ST and T wave abnormality. Troponin negative.  - Cardiac MRI to rule out amyloid pending. This is scheduled on 5/19/22.   - She followed up with cardiology and they felt that the cause may be musculoskeletal or pericarditis. They suggested taking ibuprofen for pain. Her pain has subsided and she actually has not needed to take ibuprofen.   - They also recommended getting an ECHO which was completed 4/28 and shows mildly reduced LVEF (40-45%).     ADDENDUM 5/5/22: Discussed with Dr. Thomas.  He would like to wait to get Margo's post-ablation ziopatch results before making any changes. According to Dr. 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  If LVEF does not improve, she has non-ischemic cardiomyopathy and will need to follow-up with general cardiology.    #Diarrhea  She was having intermittent diarrhea with associated abdominal cramping and nausea, but developed much more significant diarrhea with 10-12 liquid episodes and was seen as an add-on on 4/4/22 for this.   - C.diff PCR negative 2/8/22, repeat on 4/5 negative  - Enteric stool panel 4/5/22 negative  - Underwent colonoscopy on 4/13/22 to pursue biopsy for amyloid (discussed below). Congo red stain is negative for amyloid deposits. Pathology showed colonic mucosa with focal active colitis, " "negative for signs of chronicity and lymphocytic colitis. Per patient report, GI reviewed results with her and did not feel she needed treatment for these findings at this time. They advised her to monitor her symptoms and to call them if they do not improve.Dr. Montgomery agreed with following conservatively for now since she reports some improvement in her symptoms.  If diarrhea becomes bothersome again, then they will arrange for expeditious evaluation in the IBD clinic.   - CMV PCR (4/4/22) not detected  - Diarrhea improved to now 3-4 per day. Patient is only taking imodium PRN when she goes out. Still has some abdominal cramping that is manageable now and relieved with BM. She will monitor and call us or GI if it gets worse.       #Ocular Migraines  She has a history of ocular migraines that present as \"squiggly lines in her field of vision.\" These have occurred about annually since 2002, but were occurring daily following her ablation on 3/23/22. Currently decreasing in frequency.  - Referred to ophthalmology for further evaluation. Appt is scheduled on 5/31/22.   - Checked CMV to rule out CMV viremia as an etiology. CMV PCR not detected.   - Off/on, improved since last visit.     #Concern for amyloid  She has continued to have significant GI symptoms despite negative infectious testing and diarrhea not being a major side effect of michelle or Velcade. Previously discussed with Dr. Reinoso.  In light of the constellation of symptoms, as well as her sluggish response to therapy, she would like to do further testing to rule out amyolid. Of note, congo red stain was negative on her bone marrow biopsy from 9/22/21  - Requested cardiac MRI to explore possibility of cardiac amyloid. This is scheduled on 5/19/22.   - Colonoscopy to biopsy for GI amyloid done 4/13. Congo red stain negative for amyloid deposits.     #Nausea  Mild, managed with Compazine up to twice daily and lorazepam occasionally at night.     #COVID " prophylaxis  - She received EVUSHELD 1/20/22 and 3/8/22  - Received 3 COVID vaccines to date    #Reflux  She reported episodes of reflux with bile, especially when she lays down at night.  She has started to elevate her head on pillows and is also trying to avoid eating within 1-2 hours of going to bed.  Omeprazole was increased to 20 mg twice daily with symptom improvement.    I reminded patient to call us if any questions/issues arise in between visits.     60 minutes spent on the date of the encounter doing chart review, review of test results, interpretation of tests, patient visit, documentation and discussion with other provider(s)     DORON Hall [facility]  [address] [place] [address] [phone]

## 2022-05-09 ENCOUNTER — DOCUMENTATION ONLY (OUTPATIENT)
Dept: ONCOLOGY | Facility: CLINIC | Age: 52
End: 2022-05-09

## 2022-05-09 ENCOUNTER — APPOINTMENT (OUTPATIENT)
Dept: LAB | Facility: CLINIC | Age: 52
End: 2022-05-09
Payer: COMMERCIAL

## 2022-05-09 ENCOUNTER — TELEPHONE (OUTPATIENT)
Dept: ONCOLOGY | Facility: CLINIC | Age: 52
End: 2022-05-09

## 2022-05-09 ENCOUNTER — INFUSION THERAPY VISIT (OUTPATIENT)
Dept: ONCOLOGY | Facility: CLINIC | Age: 52
End: 2022-05-09
Payer: COMMERCIAL

## 2022-05-09 VITALS
BODY MASS INDEX: 27.51 KG/M2 | OXYGEN SATURATION: 98 % | TEMPERATURE: 98.3 F | SYSTOLIC BLOOD PRESSURE: 123 MMHG | RESPIRATION RATE: 16 BRPM | HEART RATE: 78 BPM | DIASTOLIC BLOOD PRESSURE: 70 MMHG | WEIGHT: 150.4 LBS

## 2022-05-09 DIAGNOSIS — C90.00 LIGHT CHAIN MYELOMA (H): Primary | ICD-10-CM

## 2022-05-09 DIAGNOSIS — C90.30 PLASMACYTOMA OF BONE (H): ICD-10-CM

## 2022-05-09 DIAGNOSIS — E86.0 DEHYDRATION: ICD-10-CM

## 2022-05-09 LAB
ACANTHOCYTES BLD QL SMEAR: SLIGHT
ALBUMIN SERPL-MCNC: 3.7 G/DL (ref 3.4–5)
ALP SERPL-CCNC: 52 U/L (ref 40–150)
ALT SERPL W P-5'-P-CCNC: 25 U/L (ref 0–50)
ANION GAP SERPL CALCULATED.3IONS-SCNC: 9 MMOL/L (ref 3–14)
AST SERPL W P-5'-P-CCNC: 14 U/L (ref 0–45)
BASOPHILS # BLD AUTO: 0 10E3/UL (ref 0–0.2)
BASOPHILS NFR BLD AUTO: 0 %
BILIRUB SERPL-MCNC: 0.7 MG/DL (ref 0.2–1.3)
BUN SERPL-MCNC: 23 MG/DL (ref 7–30)
BURR CELLS BLD QL SMEAR: SLIGHT
CALCIUM SERPL-MCNC: 8.9 MG/DL (ref 8.5–10.1)
CHLORIDE BLD-SCNC: 112 MMOL/L (ref 94–109)
CO2 SERPL-SCNC: 23 MMOL/L (ref 20–32)
CREAT SERPL-MCNC: 0.72 MG/DL (ref 0.52–1.04)
EOSINOPHIL # BLD AUTO: 0.3 10E3/UL (ref 0–0.7)
EOSINOPHIL NFR BLD AUTO: 5 %
ERYTHROCYTE [DISTWIDTH] IN BLOOD BY AUTOMATED COUNT: 14.5 % (ref 10–15)
GFR SERPL CREATININE-BSD FRML MDRD: >90 ML/MIN/1.73M2
GLUCOSE BLD-MCNC: 91 MG/DL (ref 70–99)
HCG UR QL: NEGATIVE
HCT VFR BLD AUTO: 36.3 % (ref 35–47)
HGB BLD-MCNC: 12.4 G/DL (ref 11.7–15.7)
IMM GRANULOCYTES # BLD: 0 10E3/UL
IMM GRANULOCYTES NFR BLD: 0 %
LYMPHOCYTES # BLD AUTO: 0.6 10E3/UL (ref 0.8–5.3)
LYMPHOCYTES NFR BLD AUTO: 11 %
MCH RBC QN AUTO: 32 PG (ref 26.5–33)
MCHC RBC AUTO-ENTMCNC: 34.2 G/DL (ref 31.5–36.5)
MCV RBC AUTO: 94 FL (ref 78–100)
MONOCYTES # BLD AUTO: 1.2 10E3/UL (ref 0–1.3)
MONOCYTES NFR BLD AUTO: 21 %
NEUTROPHILS # BLD AUTO: 3.5 10E3/UL (ref 1.6–8.3)
NEUTROPHILS NFR BLD AUTO: 63 %
NRBC # BLD AUTO: 0 10E3/UL
NRBC BLD AUTO-RTO: 0 /100
PLAT MORPH BLD: ABNORMAL
PLATELET # BLD AUTO: 178 10E3/UL (ref 150–450)
POTASSIUM BLD-SCNC: 3 MMOL/L (ref 3.4–5.3)
PROT SERPL-MCNC: 6.6 G/DL (ref 6.8–8.8)
RBC # BLD AUTO: 3.88 10E6/UL (ref 3.8–5.2)
RBC MORPH BLD: ABNORMAL
SODIUM SERPL-SCNC: 144 MMOL/L (ref 133–144)
WBC # BLD AUTO: 5.5 10E3/UL (ref 4–11)

## 2022-05-09 PROCEDURE — 250N000013 HC RX MED GY IP 250 OP 250 PS 637

## 2022-05-09 PROCEDURE — 258N000003 HC RX IP 258 OP 636: Performed by: REGISTERED NURSE

## 2022-05-09 PROCEDURE — 250N000011 HC RX IP 250 OP 636

## 2022-05-09 PROCEDURE — 96401 CHEMO ANTI-NEOPL SQ/IM: CPT

## 2022-05-09 PROCEDURE — 96360 HYDRATION IV INFUSION INIT: CPT

## 2022-05-09 PROCEDURE — 82040 ASSAY OF SERUM ALBUMIN: CPT

## 2022-05-09 PROCEDURE — 81025 URINE PREGNANCY TEST: CPT

## 2022-05-09 PROCEDURE — 80053 COMPREHEN METABOLIC PANEL: CPT

## 2022-05-09 PROCEDURE — 36415 COLL VENOUS BLD VENIPUNCTURE: CPT

## 2022-05-09 PROCEDURE — 85025 COMPLETE CBC W/AUTO DIFF WBC: CPT

## 2022-05-09 RX ORDER — MEPERIDINE HYDROCHLORIDE 25 MG/ML
25 INJECTION INTRAMUSCULAR; INTRAVENOUS; SUBCUTANEOUS EVERY 30 MIN PRN
Status: CANCELLED | OUTPATIENT
Start: 2022-05-09

## 2022-05-09 RX ORDER — DIMENHYDRINATE 50 MG
1 TABLET ORAL DAILY
COMMUNITY

## 2022-05-09 RX ORDER — ALBUTEROL SULFATE 90 UG/1
1-2 AEROSOL, METERED RESPIRATORY (INHALATION)
Status: CANCELLED
Start: 2022-05-09

## 2022-05-09 RX ORDER — ALBUTEROL SULFATE 0.83 MG/ML
2.5 SOLUTION RESPIRATORY (INHALATION)
Status: CANCELLED | OUTPATIENT
Start: 2022-05-09

## 2022-05-09 RX ORDER — NALOXONE HYDROCHLORIDE 0.4 MG/ML
0.2 INJECTION, SOLUTION INTRAMUSCULAR; INTRAVENOUS; SUBCUTANEOUS
Status: CANCELLED | OUTPATIENT
Start: 2022-05-09

## 2022-05-09 RX ORDER — METHYLPREDNISOLONE SODIUM SUCCINATE 125 MG/2ML
125 INJECTION, POWDER, LYOPHILIZED, FOR SOLUTION INTRAMUSCULAR; INTRAVENOUS
Status: CANCELLED
Start: 2022-05-09

## 2022-05-09 RX ORDER — HEPARIN SODIUM (PORCINE) LOCK FLUSH IV SOLN 100 UNIT/ML 100 UNIT/ML
5 SOLUTION INTRAVENOUS
Status: CANCELLED | OUTPATIENT
Start: 2022-05-09

## 2022-05-09 RX ORDER — DIPHENHYDRAMINE HYDROCHLORIDE 50 MG/ML
50 INJECTION INTRAMUSCULAR; INTRAVENOUS
Status: CANCELLED
Start: 2022-05-09

## 2022-05-09 RX ORDER — EPINEPHRINE 1 MG/ML
0.3 INJECTION, SOLUTION INTRAMUSCULAR; SUBCUTANEOUS EVERY 5 MIN PRN
Status: CANCELLED | OUTPATIENT
Start: 2022-05-09

## 2022-05-09 RX ORDER — LENALIDOMIDE 25 MG/1
25 CAPSULE ORAL DAILY
Qty: 14 CAPSULE | Refills: 0 | Status: SHIPPED | OUTPATIENT
Start: 2022-05-16 | End: 2022-05-24

## 2022-05-09 RX ORDER — DEXAMETHASONE 4 MG/1
TABLET ORAL
Qty: 30 TABLET | Refills: 0 | Status: SHIPPED | OUTPATIENT
Start: 2022-05-16 | End: 2022-06-13

## 2022-05-09 RX ORDER — HEPARIN SODIUM,PORCINE 10 UNIT/ML
5 VIAL (ML) INTRAVENOUS
Status: CANCELLED | OUTPATIENT
Start: 2022-05-09

## 2022-05-09 RX ORDER — POTASSIUM CHLORIDE 1500 MG/1
40 TABLET, EXTENDED RELEASE ORAL ONCE
Status: COMPLETED | OUTPATIENT
Start: 2022-05-09 | End: 2022-05-09

## 2022-05-09 RX ADMIN — BORTEZOMIB 2.9 MG: 3.5 INJECTION, POWDER, LYOPHILIZED, FOR SOLUTION INTRAVENOUS; SUBCUTANEOUS at 10:14

## 2022-05-09 RX ADMIN — SODIUM CHLORIDE 1000 ML: 9 INJECTION, SOLUTION INTRAVENOUS at 10:09

## 2022-05-09 RX ADMIN — POTASSIUM CHLORIDE 40 MEQ: 20 TABLET, EXTENDED RELEASE ORAL at 10:09

## 2022-05-09 ASSESSMENT — PAIN SCALES - GENERAL: PAINLEVEL: NO PAIN (0)

## 2022-05-09 NOTE — PATIENT INSTRUCTIONS
[facility] Triage and after hours / weekends / holidays:  [phone]    Please call the triage or after hours line if you experience a temperature greater than or equal to 100.4, shaking chills, have uncontrolled nausea, vomiting and/or diarrhea, dizziness, shortness of breath, chest pain, bleeding, unexplained bruising, or if you have any other new/concerning symptoms, questions or concerns.      If you are having any concerning symptoms or wish to speak to a provider before your next infusion visit, please call your care coordinator or triage to notify them so we can adequately serve you.     If you need a refill on a narcotic prescription or other medication, please call before your infusion appointment.                May 2022      Luis Monday Tuesday Wednesday Thursday Friday Saturday   1     2    LAB PERIPHERAL   8:15 AM   (15 min.)   [facility] LAB DRAW   [facility]    RETURN   8:45 AM   (45 min.)   Jackie Ferris APRN CNP   [facility]    ONC INFUSION 1 HR (60 MIN)  10:30 AM   (60 min.)    ONC INFUSION NURSE   [facility] 3     4     5     6     7       8     9    LAB PERIPHERAL   8:00 AM   (15 min.)   [facility] LAB DRAW   [facility]    ONC INFUSION 1 HR (60 MIN)   8:30 AM   (60 min.)    ONC INFUSION NURSE   [facility] 10     11     12     13     14       15     16    ONC INFUSION 1 HR (60 MIN)  11:00 AM   (60 min.)    ONC INFUSION NURSE   [facility] 17     18     19    LAB PERIPHERAL   9:45 AM   (15 min.)   [facility] LAB DRAW   [facility]    RETURN  10:15 AM   (30 min.)   Payton Reinoso MD   [facility] Marrow [facility] [place]    MR MYOCARDIUM W  12:30 PM   (120 min.)   GRICEL MR 2   [facility] 20     21       22     23    NAEEM SEND  OUT   7:00 AM   (15 min.)    CVC MONITOR TECH   [facility] 24    ONC INFUSION 1 HR (60 MIN)   3:30 PM   (60 min.)   [facility] ONC INFUSION NURSE   [facility] 25     26     27     28       29     30     31    NEW PLASTICS EYE   8:45 AM   (15 min.)   Shabana Bauman MD   [facility]    ONC INFUSION 1 HR (60 MIN)   3:00 PM   (60 min.)   UC ONC INFUSION NURSE   [facility]                                   June 2022 Sunday Monday Tuesday Wednesday Thursday Friday Saturday                  1     2     3     4       5     6    ONC INFUSION 1 HR (60 MIN)  12:00 PM   (60 min.)   UC ONC INFUSION NURSE   [facility] 7     8     9     10     11       12     13    ONC INFUSION 1 HR (60 MIN)   1:00 PM   (60 min.)   UC ONC INFUSION NURSE   [facility] 14     15     16     17     18       19     20     21     22     23     24     25       26     27    RETURN EP   9:15 AM   (30 min.)   Alem Thomas MD   [facility] 28     29     30                                 Lab Results:  Recent Results (from the past 12 hour(s))   Comprehensive metabolic panel    Collection Time: 05/09/22  8:47 AM   Result Value Ref Range    Sodium 144 133 - 144 mmol/L    Potassium 3.0 (L) 3.4 - 5.3 mmol/L    Chloride 112 (H) 94 - 109 mmol/L    Carbon Dioxide (CO2) 23 20 - 32 mmol/L    Anion Gap 9 3 - 14 mmol/L    Urea Nitrogen 23 7 - 30 mg/dL    Creatinine 0.72 0.52 - 1.04 mg/dL    Calcium 8.9 8.5 - 10.1 mg/dL    Glucose 91 70 - 99 mg/dL    Alkaline Phosphatase 52 40 - 150 U/L    AST 14 0 - 45 U/L    ALT 25 0 - 50 U/L    Protein Total 6.6 (L) 6.8 - 8.8 g/dL    Albumin 3.7 3.4 - 5.0 g/dL    Bilirubin Total 0.7 0.2 - 1.3 mg/dL    GFR Estimate >90 >60 mL/min/1.73m2   CBC with platelets and differential    Collection Time: 05/09/22  8:47 AM   Result Value Ref Range     WBC Count 5.5 4.0 - 11.0 10e3/uL    RBC Count 3.88 3.80 - 5.20 10e6/uL    Hemoglobin 12.4 11.7 - 15.7 g/dL    Hematocrit 36.3 35.0 - 47.0 %    MCV 94 78 - 100 fL    MCH 32.0 26.5 - 33.0 pg    MCHC 34.2 31.5 - 36.5 g/dL    RDW 14.5 10.0 - 15.0 %    Platelet Count 178 150 - 450 10e3/uL    % Neutrophils 63 %    % Lymphocytes 11 %    % Monocytes 21 %    % Eosinophils 5 %    % Basophils 0 %    % Immature Granulocytes 0 %    NRBCs per 100 WBC 0 <1 /100    Absolute Neutrophils 3.5 1.6 - 8.3 10e3/uL    Absolute Lymphocytes 0.6 (L) 0.8 - 5.3 10e3/uL    Absolute Monocytes 1.2 0.0 - 1.3 10e3/uL    Absolute Eosinophils 0.3 0.0 - 0.7 10e3/uL    Absolute Basophils 0.0 0.0 - 0.2 10e3/uL    Absolute Immature Granulocytes 0.0 <=0.4 10e3/uL    Absolute NRBCs 0.0 10e3/uL   HCG Qual, Urine ([id number])    Collection Time: 05/09/22  8:47 AM   Result Value Ref Range    hCG Urine Qualitative Negative Negative   RBC and Platelet Morphology    Collection Time: 05/09/22  8:47 AM   Result Value Ref Range    Platelet Assessment  Automated Count Confirmed. Platelet morphology is normal.     Automated Count Confirmed. Platelet morphology is normal.    Acanthocytes Slight (A) None Seen    Purchase Cells Slight (A) None Seen    RBC Morphology Confirmed RBC Indices

## 2022-05-09 NOTE — TELEPHONE ENCOUNTER
Oral Chemotherapy Monitoring Program    Medication: Revlimid  Rx:  25 mg PO daily for 14 days Then off for 14 days of each 28 day cycle.  Auth #: [id number]  Risk Category: Adult female WITH reproductive capacity.    Routine survey questions reviewed.    Thank you,    Anette Mcdermott  Oncology Pharmacy Liaison II  [email or web address]  Phone: [phone]  Fax: [phone]

## 2022-05-09 NOTE — PROGRESS NOTES
Infusion Nursing Note:  Kristie Cornejo presents today for Cycle 5 Day 22 Velcade + IVF.    Patient seen by provider today: No   present during visit today: Not Applicable.    Note: Pt presents to infusion feeling well. She has chronic diarrhea at her baseline, managed by Imodium and her diet. She is SOB on exertion but this resolves with rest. She is following up with an ophthalmologist this month for continued blurred vision. She is trying her best to maintain small, frequent meals but feels dehydrated today.     K+ low at 3.0 today  TORB Dr. Reinoso/Roxanne Muro, RN on 5/9/22 at 0945:  - ok to replace K+ per protocol  - ok to give IVF from therapy plan today    40mEq K+ replaced orally per protocol. IVF given over 1.5 hours.    Intravenous Access:  Peripheral IV placed.    Treatment Conditions:  Lab Results   Component Value Date    HGB 12.4 05/09/2022    WBC 5.5 05/09/2022    ANEU 3.2 01/17/2022    ANEUTAUTO 3.5 05/09/2022     05/09/2022      Lab Results   Component Value Date     05/09/2022    POTASSIUM 3.0 (L) 05/09/2022    CR 0.72 05/09/2022    MEKA 8.9 05/09/2022    BILITOTAL 0.7 05/09/2022    ALBUMIN 3.7 05/09/2022    ALT 25 05/09/2022    AST 14 05/09/2022     Results reviewed, labs MET treatment parameters, ok to proceed with treatment.      Post Infusion Assessment:  Patient tolerated infusion without incident.  Patient tolerated Velcade injection to LEFT lower abdomen without incident.  Blood return noted pre and post infusion.  Site patent and intact, free from redness, edema or discomfort.  Access discontinued per protocol.       Discharge Plan:   Patient declined prescription refills.  Discharge instructions reviewed with: Patient.  Patient and/or family verbalized understanding of discharge instructions and all questions answered.  AVS to patient via [employer].  Patient will return 5/16 for next appointment.   Patient discharged in stable condition accompanied by:  self.  Departure Mode: Ambulatory.      Roxanne Muro RN

## 2022-05-09 NOTE — PROGRESS NOTES
Oral Chemotherapy Monitoring Program  Lab Follow Up    Reviewed lab results from 5/9/22 in infusion.    ORAL CHEMOTHERAPY 2/2/2022 2/3/2022 2/22/2022 3/15/2022 4/11/2022 4/25/2022 5/9/2022   Assessment Type Chart Review;Lab Monitoring;Refill Incoming phone call Refill Refill Refill Lab Monitoring Lab Monitoring;Refill   Diagnosis Code Multiple Myeloma Multiple Myeloma Multiple Myeloma Multiple Myeloma Multiple Myeloma Multiple Myeloma Multiple Myeloma   Providers Dr. Kemar Reinoso   [facility]   Drug Name Revlimid (lenalidomide) Revlimid (lenalidomide) Revlimid (lenalidomide) Revlimid (lenalidomide) Revlimid (lenalidomide) Revlimid (lenalidomide) Revlimid (lenalidomide)   Dose 25 mg 25 mg 25 mg - - 25 mg 25 mg   Current Schedule Daily Daily Daily - - Daily Daily   Cycle Details 2 weeks on, 1 week off 2 weeks on, 1 week off 2 weeks on, 1 week off - - 2 weeks on, 2 weeks off 2 weeks on, 2 weeks off   Start Date of Last Cycle - - - 2/28/2022 - - -   Planned next cycle start date 2/7/2022 - 2/28/2022 3/21/2022 - - -   Doses missed in last 2 weeks - - - - - - -   Adherence Assessment - - - - - - -   Adverse Effects - - - - - Other (See Note for Details) -   Nausea - - - - - - -   Pharmacist Intervention(nausea) - - - - - - -   Fatigue - - - - - - -   Pharmacist Intervention(fatigue) - - - - - - -   Intervention(s) - - - - - - -   Other (See Note for Details) - - - - - Hypokalemia, leukopenia -   Pharmacist intervention(other) - - - - - No -   Intervention(s) - - - - - - -   Any new drug interactions? - - - - - - -   Is the dose as ordered appropriate for the patient? - - - - - Yes -   Has the patient missed any days of school, work, or other routine activity? - - - - - - -   Since the last time we talked, have you been hospitalized or used the emergency room? - - - - - - -        Labs:  _  Result Component Current Result Ref Range   Sodium 144 (5/9/2022) 133 - 144 mmol/L     _  Result Component Current Result Ref Range   Potassium 3.0 (L) (5/9/2022) 3.4 - 5.3 mmol/L     _  Result Component Current Result Ref Range   Calcium 8.9 (5/9/2022) 8.5 - 10.1 mg/dL     No results found for Mag within last 30 days.     No results found for Phos within last 30 days.     _  Result Component Current Result Ref Range   Albumin 3.7 (5/9/2022) 3.4 - 5.0 g/dL     _  Result Component Current Result Ref Range   Urea Nitrogen 23 (5/9/2022) 7 - 30 mg/dL     _  Result Component Current Result Ref Range   Creatinine 0.72 (5/9/2022) 0.52 - 1.04 mg/dL     _  Result Component Current Result Ref Range   AST 14 (5/9/2022) 0 - 45 U/L     _  Result Component Current Result Ref Range   ALT 25 (5/9/2022) 0 - 50 U/L     _  Result Component Current Result Ref Range   Bilirubin Total 0.7 (5/9/2022) 0.2 - 1.3 mg/dL     _  Result Component Current Result Ref Range   WBC Count 5.5 (5/9/2022) 4.0 - 11.0 10e3/uL     _  Result Component Current Result Ref Range   Hemoglobin 12.4 (5/9/2022) 11.7 - 15.7 g/dL     _  Result Component Current Result Ref Range   Platelet Count 178 (5/9/2022) 150 - 450 10e3/uL     No results found for ANC within last 30 days.     _  Result Component Current Result Ref Range   Absolute Neutrophils 3.5 (5/9/2022) 1.6 - 8.3 10e3/uL        Assessment & Plan:  No concerning abnormalities - Low K which was replaced per protocol in infusion.    Next revlimid cycle released to [facility] pharmacy    Serina Quesada, PharmD, BCPS  Oral Chemotherapy Monitoring Program  [facility]  [phone]

## 2022-05-10 ENCOUNTER — PATIENT OUTREACH (OUTPATIENT)
Dept: ONCOLOGY | Facility: CLINIC | Age: 52
End: 2022-05-10
Payer: COMMERCIAL

## 2022-05-10 DIAGNOSIS — C90.00 LIGHT CHAIN MYELOMA (H): Primary | ICD-10-CM

## 2022-05-10 DIAGNOSIS — E87.6 HYPOKALEMIA: ICD-10-CM

## 2022-05-10 RX ORDER — POTASSIUM CHLORIDE 750 MG/1
20 TABLET, EXTENDED RELEASE ORAL 2 TIMES DAILY
Qty: 90 TABLET | Refills: 2 | Status: SHIPPED | OUTPATIENT
Start: 2022-05-10 | End: 2022-07-19

## 2022-05-10 NOTE — PROGRESS NOTES
[facility]: Cancer Care Short Note                                    Discussion with Patient:                                                      RNCC called patient to discuss recent labs and POC for upcoming appts.   Per Dr. Reinoso. She would like to cancel 5/16 infusion. Pt is being seen by MD on 5/19 and potential new POC will be discussed then.   Pt's K was low yesterday. She received 40meq K in infusion. Dr. Lewis would like her to start 20meq BID. Will recheck labs early next week to assess for hypokalemia.   Script will be sent to [facility] in [place] per patient request       Intervention/Education provided during outreach:                                                       Patient verbalizes understanding of POC and has no other questions or concerns at this time.     Griselda Valdez, RN, MSN, OCN   RN Care Coordinator   [facility]   [address] [place] [id number] [phone]

## 2022-05-16 ENCOUNTER — LAB (OUTPATIENT)
Dept: LAB | Facility: CLINIC | Age: 52
End: 2022-05-16
Payer: COMMERCIAL

## 2022-05-16 DIAGNOSIS — C90.00 LIGHT CHAIN MYELOMA (H): ICD-10-CM

## 2022-05-16 DIAGNOSIS — E87.6 HYPOKALEMIA: ICD-10-CM

## 2022-05-16 LAB
ALBUMIN SERPL-MCNC: 3.9 G/DL (ref 3.5–5)
ALP SERPL-CCNC: 47 U/L (ref 45–120)
ALT SERPL W P-5'-P-CCNC: 13 U/L (ref 0–45)
ANION GAP SERPL CALCULATED.3IONS-SCNC: 8 MMOL/L (ref 5–18)
AST SERPL W P-5'-P-CCNC: 12 U/L (ref 0–40)
BASOPHILS # BLD AUTO: 0 10E3/UL (ref 0–0.2)
BASOPHILS NFR BLD AUTO: 1 %
BILIRUB SERPL-MCNC: 0.6 MG/DL (ref 0–1)
BUN SERPL-MCNC: 21 MG/DL (ref 8–22)
CALCIUM SERPL-MCNC: 9.2 MG/DL (ref 8.5–10.5)
CHLORIDE BLD-SCNC: 110 MMOL/L (ref 98–107)
CO2 SERPL-SCNC: 23 MMOL/L (ref 22–31)
CREAT SERPL-MCNC: 0.78 MG/DL (ref 0.6–1.1)
EOSINOPHIL # BLD AUTO: 0.3 10E3/UL (ref 0–0.7)
EOSINOPHIL NFR BLD AUTO: 5 %
ERYTHROCYTE [DISTWIDTH] IN BLOOD BY AUTOMATED COUNT: 14.6 % (ref 10–15)
GFR SERPL CREATININE-BSD FRML MDRD: >90 ML/MIN/1.73M2
GLUCOSE BLD-MCNC: 113 MG/DL (ref 70–125)
HCT VFR BLD AUTO: 35.9 % (ref 35–47)
HGB BLD-MCNC: 12.1 G/DL (ref 11.7–15.7)
IMM GRANULOCYTES # BLD: 0 10E3/UL
IMM GRANULOCYTES NFR BLD: 0 %
LYMPHOCYTES # BLD AUTO: 0.6 10E3/UL (ref 0.8–5.3)
LYMPHOCYTES NFR BLD AUTO: 12 %
MCH RBC QN AUTO: 33.2 PG (ref 26.5–33)
MCHC RBC AUTO-ENTMCNC: 33.7 G/DL (ref 31.5–36.5)
MCV RBC AUTO: 98 FL (ref 78–100)
MONOCYTES # BLD AUTO: 0.6 10E3/UL (ref 0–1.3)
MONOCYTES NFR BLD AUTO: 12 %
NEUTROPHILS # BLD AUTO: 3.2 10E3/UL (ref 1.6–8.3)
NEUTROPHILS NFR BLD AUTO: 70 %
PLATELET # BLD AUTO: 277 10E3/UL (ref 150–450)
POTASSIUM BLD-SCNC: 4.2 MMOL/L (ref 3.5–5)
PROT SERPL-MCNC: 6 G/DL (ref 6–8)
RBC # BLD AUTO: 3.65 10E6/UL (ref 3.8–5.2)
SODIUM SERPL-SCNC: 141 MMOL/L (ref 136–145)
WBC # BLD AUTO: 4.6 10E3/UL (ref 4–11)

## 2022-05-16 PROCEDURE — 36415 COLL VENOUS BLD VENIPUNCTURE: CPT

## 2022-05-16 PROCEDURE — 85025 COMPLETE CBC W/AUTO DIFF WBC: CPT

## 2022-05-16 PROCEDURE — 80053 COMPREHEN METABOLIC PANEL: CPT

## 2022-05-18 DIAGNOSIS — C90.00 LIGHT CHAIN MYELOMA (H): Primary | ICD-10-CM

## 2022-05-19 ENCOUNTER — HOSPITAL ENCOUNTER (OUTPATIENT)
Dept: MRI IMAGING | Facility: HOSPITAL | Age: 52
Discharge: HOME | End: 2022-05-19
Attending: REGISTERED NURSE
Payer: COMMERCIAL

## 2022-05-19 ENCOUNTER — OFFICE VISIT (OUTPATIENT)
Dept: TRANSPLANT | Facility: CLINIC | Age: 52
End: 2022-05-19
Payer: COMMERCIAL

## 2022-05-19 ENCOUNTER — APPOINTMENT (OUTPATIENT)
Dept: LAB | Facility: CLINIC | Age: 52
End: 2022-05-19
Payer: COMMERCIAL

## 2022-05-19 VITALS
HEART RATE: 59 BPM | SYSTOLIC BLOOD PRESSURE: 130 MMHG | WEIGHT: 150 LBS | DIASTOLIC BLOOD PRESSURE: 80 MMHG | RESPIRATION RATE: 18 BRPM | TEMPERATURE: 97.9 F | OXYGEN SATURATION: 98 % | BODY MASS INDEX: 27.44 KG/M2

## 2022-05-19 DIAGNOSIS — R07.9 CHEST PAIN, UNSPECIFIED TYPE: ICD-10-CM

## 2022-05-19 DIAGNOSIS — C90.00 LIGHT CHAIN MYELOMA (H): ICD-10-CM

## 2022-05-19 LAB
ALBUMIN SERPL-MCNC: 3.8 G/DL (ref 3.4–5)
ALP SERPL-CCNC: 49 U/L (ref 40–150)
ALT SERPL W P-5'-P-CCNC: 19 U/L (ref 0–50)
ANION GAP SERPL CALCULATED.3IONS-SCNC: 8 MMOL/L (ref 3–14)
AST SERPL W P-5'-P-CCNC: 9 U/L (ref 0–45)
BASOPHILS # BLD AUTO: 0.1 10E3/UL (ref 0–0.2)
BASOPHILS NFR BLD AUTO: 2 %
BILIRUB SERPL-MCNC: 0.4 MG/DL (ref 0.2–1.3)
BUN SERPL-MCNC: 24 MG/DL (ref 7–30)
CALCIUM SERPL-MCNC: 9.2 MG/DL (ref 8.5–10.1)
CHLORIDE BLD-SCNC: 113 MMOL/L (ref 94–109)
CO2 SERPL-SCNC: 20 MMOL/L (ref 20–32)
CREAT SERPL-MCNC: 0.78 MG/DL (ref 0.52–1.04)
EOSINOPHIL # BLD AUTO: 0.2 10E3/UL (ref 0–0.7)
EOSINOPHIL NFR BLD AUTO: 6 %
ERYTHROCYTE [DISTWIDTH] IN BLOOD BY AUTOMATED COUNT: 14.3 % (ref 10–15)
GFR SERPL CREATININE-BSD FRML MDRD: >90 ML/MIN/1.73M2
GLUCOSE BLD-MCNC: 92 MG/DL (ref 70–99)
HCT VFR BLD AUTO: 39.3 % (ref 35–47)
HGB BLD-MCNC: 12.7 G/DL (ref 11.7–15.7)
IMM GRANULOCYTES # BLD: 0 10E3/UL
IMM GRANULOCYTES NFR BLD: 0 %
LYMPHOCYTES # BLD AUTO: 0.6 10E3/UL (ref 0.8–5.3)
LYMPHOCYTES NFR BLD AUTO: 16 %
MCH RBC QN AUTO: 32.2 PG (ref 26.5–33)
MCHC RBC AUTO-ENTMCNC: 32.3 G/DL (ref 31.5–36.5)
MCV RBC AUTO: 100 FL (ref 78–100)
MONOCYTES # BLD AUTO: 0.5 10E3/UL (ref 0–1.3)
MONOCYTES NFR BLD AUTO: 13 %
NEUTROPHILS # BLD AUTO: 2.4 10E3/UL (ref 1.6–8.3)
NEUTROPHILS NFR BLD AUTO: 63 %
NRBC # BLD AUTO: 0 10E3/UL
NRBC BLD AUTO-RTO: 0 /100
PLATELET # BLD AUTO: 299 10E3/UL (ref 150–450)
POTASSIUM BLD-SCNC: 4.1 MMOL/L (ref 3.4–5.3)
PROT SERPL-MCNC: 6.8 G/DL (ref 6.8–8.8)
RBC # BLD AUTO: 3.95 10E6/UL (ref 3.8–5.2)
SODIUM SERPL-SCNC: 141 MMOL/L (ref 133–144)
TOTAL PROTEIN SERUM FOR ELP: 6.4 G/DL (ref 6.8–8.8)
WBC # BLD AUTO: 3.9 10E3/UL (ref 4–11)

## 2022-05-19 PROCEDURE — G0463 HOSPITAL OUTPT CLINIC VISIT: HCPCS

## 2022-05-19 PROCEDURE — 80053 COMPREHEN METABOLIC PANEL: CPT

## 2022-05-19 PROCEDURE — 99214 OFFICE O/P EST MOD 30 MIN: CPT

## 2022-05-19 PROCEDURE — 36415 COLL VENOUS BLD VENIPUNCTURE: CPT

## 2022-05-19 PROCEDURE — 82784 ASSAY IGA/IGD/IGG/IGM EACH: CPT

## 2022-05-19 PROCEDURE — 84165 PROTEIN E-PHORESIS SERUM: CPT | Mod: TC | Performed by: PATHOLOGY

## 2022-05-19 PROCEDURE — 85025 COMPLETE CBC W/AUTO DIFF WBC: CPT

## 2022-05-19 PROCEDURE — 84155 ASSAY OF PROTEIN SERUM: CPT

## 2022-05-19 PROCEDURE — 75561 CARDIAC MRI FOR MORPH W/DYE: CPT | Mod: 26 | Performed by: INTERNAL MEDICINE

## 2022-05-19 PROCEDURE — 75561 CARDIAC MRI FOR MORPH W/DYE: CPT

## 2022-05-19 PROCEDURE — 255N000002 HC RX 255 OP 636: Performed by: REGISTERED NURSE

## 2022-05-19 PROCEDURE — 86334 IMMUNOFIX E-PHORESIS SERUM: CPT | Performed by: PATHOLOGY

## 2022-05-19 PROCEDURE — A9585 GADOBUTROL INJECTION: HCPCS | Performed by: REGISTERED NURSE

## 2022-05-19 PROCEDURE — 83521 IG LIGHT CHAINS FREE EACH: CPT | Mod: 59

## 2022-05-19 PROCEDURE — 999N000122 MR MYOCARDIUM  OVERREAD

## 2022-05-19 RX ORDER — GADOBUTROL 604.72 MG/ML
12 INJECTION INTRAVENOUS ONCE
Status: COMPLETED | OUTPATIENT
Start: 2022-05-19 | End: 2022-05-19

## 2022-05-19 RX ORDER — LYSINE HCL 500 MG
TABLET ORAL
COMMUNITY
End: 2022-12-21

## 2022-05-19 RX ADMIN — GADOBUTROL 10 ML: 604.72 INJECTION INTRAVENOUS at 13:58

## 2022-05-19 ASSESSMENT — PAIN SCALES - GENERAL: PAINLEVEL: NO PAIN (0)

## 2022-05-19 NOTE — NURSING NOTE
"Oncology Rooming Note    May 19, 2022 10:34 AM   Kristie Cornejo is a 52 year old female who presents for:    Chief Complaint   Patient presents with     Labs Only     Venipuncture, vitals checked     Oncology Clinic Visit     Light chain myeloma (H)     Initial Vitals: /80   Pulse 59   Temp 97.9  F (36.6  C)   Resp 18   Wt 68 kg (150 lb)   LMP  (LMP Unknown)   SpO2 98%   BMI 27.44 kg/m   Estimated body mass index is 27.44 kg/m  as calculated from the following:    Height as of 4/13/22: 1.575 m (5' 2\").    Weight as of this encounter: 68 kg (150 lb). Body surface area is 1.72 meters squared.  No Pain (0) Comment: Data Unavailable   No LMP recorded (lmp unknown). (Menstrual status: IUD).  Allergies reviewed: Yes  Medications reviewed: Yes    Medications: Medication refills not needed today.  Pharmacy name entered into [facility]:    [facility] - [place], [place] - [address]  [id number] - [place], [place] - [address] [facility] [address]  [facility] - [place], [place] - [address]  [facility] - [place], [place] - [address] [place] [address] PHARMACY [facility] - [place], [place] - [phone] [facility] [phone]    Clinical concerns: Patient had her second opinion at [place] and would like to discuss plan of care going forward. Patient would like to address issues with her heart and ongoing diarrhea as well. Spouse present for today's visit.        Martine Ramos LPN May 19, 2022 10:35 AM                "

## 2022-05-19 NOTE — NURSING NOTE
Chief Complaint   Patient presents with     Labs Only     Venipuncture, vitals checked     Ruby Michele RN on 5/19/2022 at 10:10 AM

## 2022-05-19 NOTE — PROGRESS NOTES
[facility]  Date of Visit: May 19, 2022   Oncologist: Dr. Payton Reinoso    Reason for visit: myeloma follow-up           Oncology History   52 year old female with past medical history significant for Irritable bowel disease, allergies, large plasmacytoma of the L pelvis, and newly diagnosed multiple myeloma.     Early in April 2021 , she noted to have left sided groin pain, constant and was affecting her daily activities  MRI showed large, expansile, permeative mass with indistinct borders involving the left superior and inferior pubic rami, pubic symphysis and with possible extension to the left acetabulum.     We completed the following work-up:   - Biopsy 9/7/2021: plasmacytoma.  Flow cytometry showed kappa monotypic plasma cells, polytypic B cells. FISH results from plasmacytoma showed a gain of 11q, IGH-CCND1 fusion but no losses of 1q or TP53 and no gain of 1q.  - Bone marrow biopsy 09/22/21: Marrow cellularity 50% with 25% interstitial infiltration w/  Kappa-monotypic, moderately atypical plasma cells. Flow with 0.7% kappa-monotypica plasma cells. FISH and cytogenetics pending.   - PET CT 09/24/21 w/ hypermetabolic pathologic fracture of the L pubic ramus w/ aggressive appearing osteolysis but no additional suspicious lytic or blastic osseous lesions.     Treatment to date:   - Zometa q4 weeks started 9/27/21  - Radiation to left pubic ramus x18 fractions, completed 11/11/21.   - RVD C1D1 11/15/21  - RVD C2D1 12/6/21  - RVD C3D1  12/27/21  - RVD C4 D1 1/17/22, elizabeth added on D8 due to inadequate response of urinary M-spike and FLCs remaining over 100 mg/dL after 4 cycles of RVD    - Elizabeth-RVD C1D8 1/24/22  - Elizabeth-RVD C2D1 2/7/22  S/p 3 cycles of Elizabeth+RVD now -   Disease response: PA           INTERIM HISTORY:   Margo presents for oncology follow-up visit today.    Margo is doing ok, no new issues, she is bothered by ongoing diarrhea, no new bony pains or aches, energy  level is ok.   She had a second opinion at [facility].   Her neuropathy has not been bothering her, she denies tingling or burning.  No recent infections.  No other side effects she is tolerating the treatment to the point she is willing to continue.      Denies pain, denies changes in strength or motor function.    She is  Had ablation of the SVT in March but continues to have palpitation, chest tightness and exertional dyspnea.    Denies fevers, chills, CP, SOB, vomiting.           Physical Exam:     LMP  (LMP Unknown)    Wt Readings from Last 4 Encounters:   05/09/22 68.2 kg (150 lb 6.4 oz)   05/02/22 67.6 kg (149 lb)   04/25/22 69.3 kg (152 lb 12.8 oz)   04/19/22 69.3 kg (152 lb 12.8 oz)     General: well appearing, no acute distress  HEENT: normocephalic, atraumatic, PERRLA, sclerae nonicteric, she has styes present on the upper and lower lids of both eyes, no drainage; dry mucous membranes, oropharynx is clear  Lymph: no palpable cervical, supraclavicular or axillary lymphadenopathy   CV: S1 S2, RRR, no murmur, click or rub  Lungs: clear to auscultation bilaterally, no wheezes/rales/rhonchi  Abd: soft, positive bowel sounds, non-distended, non-tender  MSK: no peripheral edema  Neuro: alert and oriented x3, CN grossly intact; bilateral  strength 5/5  Psych: appropriate mood and affect  Skin:no visible rashes or lesions on exposed skin          Laboratory Data:      Lab Results   Component Value Date    WBC 4.6 05/16/2022    ANEU 3.2 01/17/2022    HGB 12.1 05/16/2022    HCT 35.9 05/16/2022     05/16/2022     05/16/2022    POTASSIUM 4.2 05/16/2022    CHLORIDE 110 (H) 05/16/2022    CO2 23 05/16/2022     05/16/2022    BUN 21 05/16/2022    CR 0.78 05/16/2022    AST 12 05/16/2022    ALT 13 05/16/2022     Kappa FLC 45  Ratio 183  Lamda 0.25  UPEP pending              Assessment and Recommendations   Kristie Cornejo is a 52 year old female who was diagnosed with Plasmacytoma after she  "presented with left sided groin pain now found to have multiple myeloma after further workup with bone marrow biopsy.     # Multiple myeloma with associated large plasmacytoma of the L pelvis. Kappa light chain disease.     BMBx with 25% plasma cell infiltration; standard risk with gain of 11q, IGH-CCND1 fusion but no losses of 1q or TP53 and no gain of 1q.   SPEP w/out a monoclonal peak  UPEP positive. 70% paraprotein. Large monoclonal free immunoglobulin light chain of kappa chain type.     No anemia, normal creat, Ca, no other skeletal lesions. Alb normal, B2M normal.  Stage I Light chain disease with left pelvic bone pathologic fracture she is classified as having symptomatic multiple myeloma.     RVD C1D1 11/15/21 (had a slight delay in the start of her Revlimid on 11/19/21 due to the timing of her pregnancy tests).  She tolerated cycle 1 well with some mild nausea and a headache on her week off.  RVD C2D1 12/6/21, tolerated well overall, but has had more consistent sensation of her hands and feet being hot. No tingling, pain, loss of sensation, or changes to her strength.  RVD C3D1 12/27/21, tolerated well overall with ongoing sensation of \"hot\" hands and feet  RVD C4 D1 1/17/22, elizabeth added on D8 due to inadequate response of urinary M-spike and FLCs remaining over 100 mg/dL after 3 cycles of RVD    Elizabeth-RVD C5 2/7/22 Changing to 21 day cycles due to insurance.  Will receive Rev Days 1-14, Velcade days 1,4,8,11, and elizabeth days 1,8,15.    Elizabeth-RVD 4      Her free kappa light chains are at least 50% reduced but the response is sluggish.   Also, she is not tolerating Velcade.   We had a discussion bout the next steps. We plan to coninute monotherapy with Daratumumab now and will hold Revlamid and Velcade. After cardiac evaluation is completed , my recommendation is to change to a new regimen cmobining Carfilzomib, Dex and Cytoxan 300mg/m2 weekly. THe regimen has been ordered.           #Bone Health  She will " continue monthly Zometa (last received on 2/2222). Next dose due 3/22/22. She will continue Calcium/Vitamin D supplements.     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 Currently having no pain and able to walk 1 mile without any assistive devices.  She is interested in pursuing other activities like gentle cross-country skiing.  Had follow-up with Dr. Philip on 1/6/22 and was cleared to resume regular activity as tolerated. She should still refrain from high-impact activities.    # PVCs  S/p  ablation procedure  Cardiac MRI scheduled today  Also ziopatch and f/u with cardiology      Stress test was completed and she was started on Toprol XL; however, Toprol XL was discontinued due to significant side effects of orthostatic hypotension, pre-syncopal episodes, shakiness, and weakness.   S/p ablation  3/23/22 but her symptoms continue.   Carfilzomib can be cardiotoxic therefore careful evaluation of heart is now needed.    # Nausea  Mild, managed with Compazine up to twice daily and lorazepam occasionally at night.     #COVID prophylaxis  - She received EVUSHELD 1/20/22.  - Received 3 COVID vaccines to date    #Diarrhea  Likely due to Velcade - we will stop it now given sluggish response.        Today, I  saw and examined the patient independently in clinic and discussed the recommendations. I reviewed the case with the fellow and agree with the note as above.     Margo has a sluggish response to current therapy.   FCL kappa is now 40mg/dl - down from 45.  She had a second opinion at [facility] with recommendations to consider VT PACE vs Carfilzomib.  We discussed the options of changing the therapy. The carfilzomib has potentials for cardiac toxicity there I prefer to wait for cardiac MRI< Zio pac and final cardiology evaluation to assess her symptoms of  severe GRANT and chest tightness.     In meantime, we will cont current regimen of Elizabeth monotherapy - we will stop Revlamid given effects of stem cells and also Velcade given it is causing severe diarrhea.   The plan to start alternative regimen is discussed. I recommended the combination of Carfilzomib, Cytoxan IV and DXM. This may spare stem cells with the plan to move to autologous transplant soon.    Margo and her  are in agreement.       Payton Reinoso MD  Professor of Medicine

## 2022-05-19 NOTE — LETTER
5/19/2022         RE: Kristie Cornejo  [address]  [facility] [address]        Dear Colleague,    Thank you for referring your patient, Kristie Cornejo, to the [facility] BLOOD AND MARROW TRANSPLANT PROGRAM [place]. Please see a copy of my visit note below.    [facility]  Date of Visit: May 19, 2022   Oncologist: Dr. Payton Reinoso    Reason for visit: myeloma follow-up           Oncology History   52 year old female with past medical history significant for Irritable bowel disease, allergies, large plasmacytoma of the L pelvis, and newly diagnosed multiple myeloma.     Early in April 2021 , she noted to have left sided groin pain, constant and was affecting her daily activities  MRI showed large, expansile, permeative mass with indistinct borders involving the left superior and inferior pubic rami, pubic symphysis and with possible extension to the left acetabulum.     We completed the following work-up:   - Biopsy 9/7/2021: plasmacytoma.  Flow cytometry showed kappa monotypic plasma cells, polytypic B cells. FISH results from plasmacytoma showed a gain of 11q, IGH-CCND1 fusion but no losses of 1q or TP53 and no gain of 1q.  - Bone marrow biopsy 09/22/21: Marrow cellularity 50% with 25% interstitial infiltration w/  Kappa-monotypic, moderately atypical plasma cells. Flow with 0.7% kappa-monotypica plasma cells. FISH and cytogenetics pending.   - PET CT 09/24/21 w/ hypermetabolic pathologic fracture of the L pubic ramus w/ aggressive appearing osteolysis but no additional suspicious lytic or blastic osseous lesions.     Treatment to date:   - Zometa q4 weeks started 9/27/21  - Radiation to left pubic ramus x18 fractions, completed 11/11/21.   - RVD C1D1 11/15/21  - RVD C2D1 12/6/21  - RVD C3D1  12/27/21  - RVD C4 D1 1/17/22, michelle added on D8 due to inadequate response of urinary M-spike and FLCs remaining over 100 mg/dL after 4 cycles of RVD    -  Elizabeth-RVD C1D8 1/24/22  - Elizabeth-RVD C2D1 2/7/22  S/p 3 cycles of Elizabeth+RVD now -   Disease response: SC           INTERIM HISTORY:   Margo presents for oncology follow-up visit today.    Margo is doing ok, no new issues, she is bothered by ongoing diarrhea, no new bony pains or aches, energy level is ok.   She had a second opinion at [facility].   Her neuropathy has not been bothering her, she denies tingling or burning.  No recent infections.  No other side effects she is tolerating the treatment to the point she is willing to continue.      Denies pain, denies changes in strength or motor function.    She is  Had ablation of the SVT in March but continues to have palpitation, chest tightness and exertional dyspnea.    Denies fevers, chills, CP, SOB, vomiting.           Physical Exam:     LMP  (LMP Unknown)    Wt Readings from Last 4 Encounters:   05/09/22 68.2 kg (150 lb 6.4 oz)   05/02/22 67.6 kg (149 lb)   04/25/22 69.3 kg (152 lb 12.8 oz)   04/19/22 69.3 kg (152 lb 12.8 oz)     General: well appearing, no acute distress  HEENT: normocephalic, atraumatic, PERRLA, sclerae nonicteric, she has styes present on the upper and lower lids of both eyes, no drainage; dry mucous membranes, oropharynx is clear  Lymph: no palpable cervical, supraclavicular or axillary lymphadenopathy   CV: S1 S2, RRR, no murmur, click or rub  Lungs: clear to auscultation bilaterally, no wheezes/rales/rhonchi  Abd: soft, positive bowel sounds, non-distended, non-tender  MSK: no peripheral edema  Neuro: alert and oriented x3, CN grossly intact; bilateral  strength 5/5  Psych: appropriate mood and affect  Skin:no visible rashes or lesions on exposed skin          Laboratory Data:      Lab Results   Component Value Date    WBC 4.6 05/16/2022    ANEU 3.2 01/17/2022    HGB 12.1 05/16/2022    HCT 35.9 05/16/2022     05/16/2022     05/16/2022    POTASSIUM 4.2 05/16/2022    CHLORIDE 110 (H) 05/16/2022    CO2 23 05/16/2022      "05/16/2022    BUN 21 05/16/2022    CR 0.78 05/16/2022    AST 12 05/16/2022    ALT 13 05/16/2022     Kappa FLC 45  Ratio 183  Lamda 0.25  UPEP pending              Assessment and Recommendations   Kristie Cornejo is a 52 year old female who was diagnosed with Plasmacytoma after she presented with left sided groin pain now found to have multiple myeloma after further workup with bone marrow biopsy.     # Multiple myeloma with associated large plasmacytoma of the L pelvis. Kappa light chain disease.     BMBx with 25% plasma cell infiltration; standard risk with gain of 11q, IGH-CCND1 fusion but no losses of 1q or TP53 and no gain of 1q.   SPEP w/out a monoclonal peak  UPEP positive. 70% paraprotein. Large monoclonal free immunoglobulin light chain of kappa chain type.     No anemia, normal creat, Ca, no other skeletal lesions. Alb normal, B2M normal.  Stage I Light chain disease with left pelvic bone pathologic fracture she is classified as having symptomatic multiple myeloma.     RVD C1D1 11/15/21 (had a slight delay in the start of her Revlimid on 11/19/21 due to the timing of her pregnancy tests).  She tolerated cycle 1 well with some mild nausea and a headache on her week off.  RVD C2D1 12/6/21, tolerated well overall, but has had more consistent sensation of her hands and feet being hot. No tingling, pain, loss of sensation, or changes to her strength.  RVD C3D1 12/27/21, tolerated well overall with ongoing sensation of \"hot\" hands and feet  RVD C4 D1 1/17/22, elizabeth added on D8 due to inadequate response of urinary M-spike and FLCs remaining over 100 mg/dL after 3 cycles of RVD    Elizabeth-RVD C5 2/7/22 Changing to 21 day cycles due to insurance.  Will receive Rev Days 1-14, Velcade days 1,4,8,11, and elizabeth days 1,8,15.    Elizabeth-RVD 4      Her free kappa light chains are at least 50% reduced but the response is sluggish.   Also, she is not tolerating Velcade.   We had a discussion bout the next steps. We plan to " coninute monotherapy with Daratumumab now and will hold Revlamid and Velcade. After cardiac evaluation is completed , my recommendation is to change to a new regimen cmobining Carfilzomib, Dex and Cytoxan 300mg/m2 weekly. THe regimen has been ordered.           #Bone Health  She will continue monthly Zometa (last received on 2/2222). Next dose due 3/22/22. She will continue Calcium/Vitamin D supplements.     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 Currently having no pain and able to walk 1 mile without any assistive devices.  She is interested in pursuing other activities like gentle cross-country skiing.  Had follow-up with Dr. Philip on 1/6/22 and was cleared to resume regular activity as tolerated. She should still refrain from high-impact activities.    # PVCs  S/p  ablation procedure  Cardiac MRI scheduled today  Also nikos and f/u with cardiology      Stress test was completed and she was started on Toprol XL; however, Toprol XL was discontinued due to significant side effects of orthostatic hypotension, pre-syncopal episodes, shakiness, and weakness.   S/p ablation  3/23/22 but her symptoms continue.   Carfilzomib can be cardiotoxic therefore careful evaluation of heart is now needed.    # Nausea  Mild, managed with Compazine up to twice daily and lorazepam occasionally at night.     #COVID prophylaxis  - She received EVUSHELD 1/20/22.  - Received 3 COVID vaccines to date    #Diarrhea  Likely due to Velcade - we will stop it now given sluggish response.        Today, I  saw and examined the patient independently in clinic and discussed the recommendations. I reviewed the case with the fellow and agree with the note as above.     Margo has a sluggish response to current therapy.   FCL kappa is now 40mg/dl - down from 45.  She had a second opinion  at [facility] with recommendations to consider VT PACE vs Carfilzomib.  We discussed the options of changing the therapy. The carfilzomib has potentials for cardiac toxicity there I prefer to wait for cardiac MRI< Zio pac and final cardiology evaluation to assess her symptoms of severe GRANT and chest tightness.     In meantime, we will cont current regimen of Elizabeth monotherapy - we will stop Revlamid given effects of stem cells and also Velcade given it is causing severe diarrhea.   The plan to start alternative regimen is discussed. I recommended the combination of Carfilzomib, Cytoxan IV and DXM. This may spare stem cells with the plan to move to autologous transplant soon.    Margo and her  are in agreement.           Again, thank you for allowing me to participate in the care of your patient.      Sincerely,    Payton Reinoso MD

## 2022-05-20 LAB
ALBUMIN SERPL ELPH-MCNC: 4.3 G/DL (ref 3.7–5.1)
ALPHA1 GLOB SERPL ELPH-MCNC: 0.3 G/DL (ref 0.2–0.4)
ALPHA2 GLOB SERPL ELPH-MCNC: 0.8 G/DL (ref 0.5–0.9)
B-GLOBULIN SERPL ELPH-MCNC: 0.6 G/DL (ref 0.6–1)
GAMMA GLOB SERPL ELPH-MCNC: 0.3 G/DL (ref 0.7–1.6)
IGA SERPL-MCNC: 5 MG/DL (ref 84–499)
IGG SERPL-MCNC: 294 MG/DL (ref 610–1616)
IGM SERPL-MCNC: <10 MG/DL (ref 35–242)
KAPPA LC FREE SER-MCNC: 40.84 MG/DL (ref 0.33–1.94)
KAPPA LC FREE/LAMBDA FREE SER NEPH: 255.25 {RATIO} (ref 0.26–1.65)
LAMBDA LC FREE SERPL-MCNC: 0.16 MG/DL (ref 0.57–2.63)
M PROTEIN SERPL ELPH-MCNC: 0.1 G/DL
PROT PATTERN SERPL ELPH-IMP: ABNORMAL
PROT PATTERN SERPL IFE-IMP: NORMAL

## 2022-05-20 PROCEDURE — 84165 PROTEIN E-PHORESIS SERUM: CPT | Mod: 26

## 2022-05-20 PROCEDURE — 86334 IMMUNOFIX E-PHORESIS SERUM: CPT | Mod: 26

## 2022-05-21 ENCOUNTER — DOCUMENTATION ONLY (OUTPATIENT)
Dept: ONCOLOGY | Facility: CLINIC | Age: 52
End: 2022-05-21
Payer: COMMERCIAL

## 2022-05-23 ENCOUNTER — ALLIED HEALTH/NURSE VISIT (OUTPATIENT)
Dept: CARDIOLOGY | Facility: CLINIC | Age: 52
End: 2022-05-23
Attending: INTERNAL MEDICINE
Payer: COMMERCIAL

## 2022-05-23 DIAGNOSIS — I49.3 PVC'S (PREMATURE VENTRICULAR CONTRACTIONS): ICD-10-CM

## 2022-05-23 PROCEDURE — 93248 EXT ECG>7D<15D REV&INTERPJ: CPT | Performed by: INTERNAL MEDICINE

## 2022-05-24 ENCOUNTER — DOCUMENTATION ONLY (OUTPATIENT)
Dept: ONCOLOGY | Facility: CLINIC | Age: 52
End: 2022-05-24
Payer: COMMERCIAL

## 2022-05-24 ENCOUNTER — APPOINTMENT (OUTPATIENT)
Dept: LAB | Facility: CLINIC | Age: 52
End: 2022-05-24
Payer: COMMERCIAL

## 2022-05-24 ENCOUNTER — INFUSION THERAPY VISIT (OUTPATIENT)
Dept: ONCOLOGY | Facility: CLINIC | Age: 52
End: 2022-05-24
Payer: COMMERCIAL

## 2022-05-24 VITALS
WEIGHT: 151.4 LBS | SYSTOLIC BLOOD PRESSURE: 132 MMHG | OXYGEN SATURATION: 100 % | BODY MASS INDEX: 27.69 KG/M2 | HEART RATE: 79 BPM | TEMPERATURE: 98.3 F | DIASTOLIC BLOOD PRESSURE: 79 MMHG | RESPIRATION RATE: 18 BRPM

## 2022-05-24 DIAGNOSIS — C90.00 LIGHT CHAIN MYELOMA (H): Primary | ICD-10-CM

## 2022-05-24 LAB
ALBUMIN SERPL-MCNC: 4 G/DL (ref 3.4–5)
ALP SERPL-CCNC: 49 U/L (ref 40–150)
ALT SERPL W P-5'-P-CCNC: 15 U/L (ref 0–50)
ANION GAP SERPL CALCULATED.3IONS-SCNC: 11 MMOL/L (ref 3–14)
AST SERPL W P-5'-P-CCNC: 13 U/L (ref 0–45)
B-HCG SERPL-ACNC: <1 IU/L (ref 0–5)
BASOPHILS # BLD AUTO: 0.1 10E3/UL (ref 0–0.2)
BASOPHILS NFR BLD AUTO: 1 %
BILIRUB SERPL-MCNC: 0.5 MG/DL (ref 0.2–1.3)
BUN SERPL-MCNC: 22 MG/DL (ref 7–30)
CALCIUM SERPL-MCNC: 8.9 MG/DL (ref 8.5–10.1)
CHLORIDE BLD-SCNC: 113 MMOL/L (ref 94–109)
CO2 SERPL-SCNC: 18 MMOL/L (ref 20–32)
CREAT SERPL-MCNC: 0.72 MG/DL (ref 0.52–1.04)
EOSINOPHIL # BLD AUTO: 0.2 10E3/UL (ref 0–0.7)
EOSINOPHIL NFR BLD AUTO: 5 %
ERYTHROCYTE [DISTWIDTH] IN BLOOD BY AUTOMATED COUNT: 13.7 % (ref 10–15)
GFR SERPL CREATININE-BSD FRML MDRD: >90 ML/MIN/1.73M2
GLUCOSE BLD-MCNC: 146 MG/DL (ref 70–99)
HCT VFR BLD AUTO: 39.5 % (ref 35–47)
HGB BLD-MCNC: 13.1 G/DL (ref 11.7–15.7)
IMM GRANULOCYTES # BLD: 0 10E3/UL
IMM GRANULOCYTES NFR BLD: 0 %
LYMPHOCYTES # BLD AUTO: 0.8 10E3/UL (ref 0.8–5.3)
LYMPHOCYTES NFR BLD AUTO: 17 %
MCH RBC QN AUTO: 32.2 PG (ref 26.5–33)
MCHC RBC AUTO-ENTMCNC: 33.2 G/DL (ref 31.5–36.5)
MCV RBC AUTO: 97 FL (ref 78–100)
MONOCYTES # BLD AUTO: 0.5 10E3/UL (ref 0–1.3)
MONOCYTES NFR BLD AUTO: 10 %
NEUTROPHILS # BLD AUTO: 3.2 10E3/UL (ref 1.6–8.3)
NEUTROPHILS NFR BLD AUTO: 67 %
NRBC # BLD AUTO: 0 10E3/UL
NRBC BLD AUTO-RTO: 0 /100
PLATELET # BLD AUTO: 317 10E3/UL (ref 150–450)
POTASSIUM BLD-SCNC: 3.4 MMOL/L (ref 3.4–5.3)
PROT SERPL-MCNC: 6.9 G/DL (ref 6.8–8.8)
RBC # BLD AUTO: 4.07 10E6/UL (ref 3.8–5.2)
SODIUM SERPL-SCNC: 142 MMOL/L (ref 133–144)
WBC # BLD AUTO: 4.8 10E3/UL (ref 4–11)

## 2022-05-24 PROCEDURE — 96401 CHEMO ANTI-NEOPL SQ/IM: CPT

## 2022-05-24 PROCEDURE — 84702 CHORIONIC GONADOTROPIN TEST: CPT

## 2022-05-24 PROCEDURE — 85025 COMPLETE CBC W/AUTO DIFF WBC: CPT

## 2022-05-24 PROCEDURE — 36415 COLL VENOUS BLD VENIPUNCTURE: CPT

## 2022-05-24 PROCEDURE — 83521 IG LIGHT CHAINS FREE EACH: CPT

## 2022-05-24 PROCEDURE — 250N000011 HC RX IP 250 OP 636

## 2022-05-24 PROCEDURE — 80053 COMPREHEN METABOLIC PANEL: CPT

## 2022-05-24 RX ORDER — DEXAMETHASONE 4 MG/1
20 TABLET ORAL ONCE
Status: COMPLETED | OUTPATIENT
Start: 2022-05-24 | End: 2022-05-24

## 2022-05-24 RX ADMIN — DARATUMUMAB AND HYALURONIDASE-FIHJ (HUMAN RECOMBINANT) 1800 MG: 1800; 30000 INJECTION SUBCUTANEOUS at 16:26

## 2022-05-24 RX ADMIN — DEXAMETHASONE 20 MG: 4 TABLET ORAL at 16:06

## 2022-05-24 NOTE — NURSING NOTE
Chief Complaint   Patient presents with     Blood Draw     VPT blood draw from left hand. Vitals taken and appointment arrived     Laura Jenkins RN

## 2022-05-24 NOTE — PROGRESS NOTES
Infusion Nursing Note:  Kristie Cornejo presents today for Day 1 Cycle 6 Daratumumab injection only.    Patient seen by provider today: No   present during visit today: Not Applicable.    Note: Patient presents to infusion feeling well overall. Patient states chronic diarrhea has improved to being 2 episodes per day instead of 10-15/day. Patient states nausea persists, however appetite has improved slightly. Patient is wearing ziopatch as ordered and states dizziness, shortness of breath, and chest palpitations/pain has not gotten worse or better. Patient overall denies acute complaints or concerns not addressed during visit with Dr. Reinoso and Dr. Vickers on 5/19/2022. Specifically, patient denies s/s of infection such as fever, sore throat, cough, body aches, chills, headache, or changes in taste/smell. Patient confirmed the overall plan of Daratumumab injection only and the discontinuation of PO Revlimid and discontinuation subcutaneous velcade-possible transition to new treatment plan post cardiac evaluation.    Paged Jackie Ferris CNP with Dr. Reinoso to clarify the following: frequency of Daraumumab injection and whether pt is to take PO Dex post injection.   TORB. 5/24/2022. 1610. Jackie Ferris CNP. Jorge Alberto Pearson RN. Contact Dr. Vickers for clarification.       TORB. 5/24/2022. 8450. Dr. Vickers. Jorge Alberto Pearson RN. Daratumumab injection to be given every 2 weeks. Patient is to take PO Dex 20mg the day of Daratumumab injection and the day after.    Patient voices understanding of the above information. Pt is also due for her monthly IV Zometa. Per patient request, Iv zometa will be given on 6/6 when she is back for her every 2 week Daratumumab injection.       Intravenous Access:  No Intravenous access at this visit.    Treatment Conditions:  Lab Results   Component Value Date    HGB 13.1 05/24/2022    WBC 4.8 05/24/2022    ANEU 3.2 01/17/2022    ANEUTAUTO 3.2 05/24/2022      05/24/2022      Lab Results   Component Value Date     05/24/2022    POTASSIUM 3.4 05/24/2022    CR 0.72 05/24/2022    MEKA 8.9 05/24/2022    BILITOTAL 0.5 05/24/2022    ALBUMIN 4.0 05/24/2022    ALT 15 05/24/2022    AST 13 05/24/2022         Post Infusion Assessment:  Patient tolerated 1 Daratumumab subcutaneous  Injection via RLQ of abdmen without incident. 25 gauge butterfly needle used for administration. Patient tolerated well.       Discharge Plan:   Patient declined prescription refills. Pt confirms she has enough PO Dex to take tomorrow but will need a refill for Day 16. IB sent to Jackie Ferris CNP  Discharge instructions reviewed with: Patient.  Patient and/or family verbalized understanding of discharge instructions and all questions answered.  AVS to patient via [email or web address].  Patient will return 6/6 for next appointment.   Patient discharged in stable condition accompanied by: self.  Departure Mode: Ambulatory  Face time: 0 minutes.      Jorge Alberto Pearson RN

## 2022-05-24 NOTE — PROGRESS NOTES
[facility] Oral Chemotherapy Monitoring Program    Thank you for the opportunity to be a part in the care of this patient's oral chemotherapy. The oncology pharmacy will no longer be following this patient for oral chemotherapy. If there are any questions or the plan changes, feel free to contact us.    ORAL CHEMOTHERAPY 2/3/2022 2/22/2022 3/15/2022 4/11/2022 4/25/2022 5/9/2022 5/21/2022   Assessment Type Incoming phone call Refill Refill Refill Lab Monitoring Lab Monitoring;Refill Chart Review   Diagnosis Code Multiple Myeloma Multiple Myeloma Multiple Myeloma Multiple Myeloma Multiple Myeloma Multiple Myeloma Multiple Myeloma   Providers Dr. Kemar Reinoso   [facility]   Drug Name Revlimid (lenalidomide) Revlimid (lenalidomide) Revlimid (lenalidomide) Revlimid (lenalidomide) Revlimid (lenalidomide) Revlimid (lenalidomide) -   Dose 25 mg 25 mg - - 25 mg 25 mg -   Current Schedule Daily Daily - - Daily Daily -   Cycle Details 2 weeks on, 1 week off 2 weeks on, 1 week off - - 2 weeks on, 2 weeks off 2 weeks on, 2 weeks off -   Start Date of Last Cycle - - 2/28/2022 - - - -   Planned next cycle start date - 2/28/2022 3/21/2022 - - - -   Doses missed in last 2 weeks - - - - - - -   Adherence Assessment - - - - - - -   Adverse Effects - - - - Other (See Note for Details) - -   Nausea - - - - - - -   Pharmacist Intervention(nausea) - - - - - - -   Fatigue - - - - - - -   Pharmacist Intervention(fatigue) - - - - - - -   Intervention(s) - - - - - - -   Other (See Note for Details) - - - - Hypokalemia, leukopenia - -   Pharmacist intervention(other) - - - - No - -   Intervention(s) - - - - - - -   Any new drug interactions? - - - - - - -   Is the dose as ordered appropriate for the patient? - - - - Yes - -   Has the patient missed any days  of school, work, or other routine activity? - - - - - - -   Since the last time we talked, have you been hospitalized or used the emergency room? - - - - - - -       Laz Barkley  Pharmacy Intern  Oral Chemotherapy Monitoring Program  [facility]   [phone]

## 2022-05-24 NOTE — PATIENT INSTRUCTIONS
[facility] Triage and after hours / weekends / holidays:  [phone]    Please call the triage or after hours line if you experience a temperature greater than or equal to 100.4, shaking chills, have uncontrolled nausea, vomiting and/or diarrhea, dizziness, shortness of breath, chest pain, bleeding, unexplained bruising, or if you have any other new/concerning symptoms, questions or concerns.      If you are having any concerning symptoms or wish to speak to a provider before your next infusion visit, please call your care coordinator or triage to notify them so we can adequately serve you.     If you need a refill on a narcotic prescription or other medication, please call before your infusion appointment.                 May 2022      Luis Monday Tuesday Wednesday Thursday Friday Saturday   1     2    LAB PERIPHERAL   8:15 AM   (15 min.)   [facility] DRAW   [facility]    RETURN   8:45 AM   (45 min.)   Jackie Ferris APRN CNP   [facility]    ONC INFUSION 1 HR (60 MIN)  10:30 AM   (60 min.)    ONC INFUSION NURSE   [facility] 3     4     5     6     7       8     9    LAB PERIPHERAL   8:00 AM   (15 min.)   [facility] LAB DRAW   [facility]    ONC INFUSION 1 HR (60 MIN)   8:30 AM   (60 min.)    ONC INFUSION NURSE   [facility] 10     11     12     13     14       15     16    LAB   7:30 AM   (15 min.)   [facility]   [facility] 17     18     19    LAB PERIPHERAL   9:45 AM   (15 min.)   [facility]   [facility]    RETURN   9:45 AM   (30 min.)   Payton Reinoso MD   [facility] [place]    MR MYOCARDIUM OVERREAD  12:05 PM   (5 min.)   GRICEL MR 1   [facility]    MR MYOCARDIUM W  12:30 PM   (120 min.)   LATISHAN MR 2   [facility] 20     21       22     23    ZIOPATCH SEND OUT   7:00 AM   (15 min.)    [facility] MONITOR TECH   [facility] 24    LAB PERIPHERAL   3:00 PM   (15 min.)    [facility]   [facility]    ONC INFUSION 1 HR (60 MIN)   3:30 PM   (60 min.)    ONC INFUSION NURSE   [facility] 25     26     27     28       29     30     31    NEW PLASTICS EYE   8:45 AM   (15 min.)   Shabana Bauman MD   [facility]    ONC INFUSION 1 HR (60 MIN)   3:00 PM   (60 min.)    ONC INFUSION NURSE   [facility]                                   June 2022 Sunday Monday Tuesday Wednesday Thursday Friday Saturday                  1     2     3     4       5     6    ONC INFUSION 1 HR (60 MIN)  12:00 PM   (60 min.)    ONC INFUSION NURSE   [facility] 7     8     9     10     11       12     13    ONC INFUSION 1 HR (60 MIN)   1:00 PM   (60 min.)    ONC INFUSION NURSE   [facility] 14     15     16     17     18       19     20     21     22     23     24     25       26     27    RETURN EP   9:15 AM   (30 min.)   Alem Thomas MD   [facility] 28     29     30                                 Lab Results:  Recent Results (from the past 12 hour(s))   Comprehensive metabolic panel    Collection Time: 05/24/22  3:27 PM   Result Value Ref Range    Sodium 142 133 - 144 mmol/L    Potassium 3.4 3.4 - 5.3 mmol/L    Chloride 113 (H) 94 - 109 mmol/L    Carbon Dioxide (CO2) 18 (L) 20 - 32 mmol/L    Anion Gap 11 3 - 14 mmol/L    Urea Nitrogen 22 7 - 30 mg/dL    Creatinine 0.72 0.52 - 1.04 mg/dL    Calcium 8.9 8.5 - 10.1 mg/dL    Glucose 146 (H) 70 - 99 mg/dL    Alkaline Phosphatase 49 40 - 150 U/L    AST 13 0 - 45 U/L    ALT 15 0 - 50 U/L    Protein Total 6.9 6.8 - 8.8 g/dL    Albumin 4.0 3.4 - 5.0 g/dL     Bilirubin Total 0.5 0.2 - 1.3 mg/dL    GFR Estimate >90 >60 mL/min/1.73m2   HCG quantitative pregnancy    Collection Time: 05/24/22  3:27 PM   Result Value Ref Range    hCG Quantitative <1 0 - 5 IU/L   CBC with platelets and differential    Collection Time: 05/24/22  3:27 PM   Result Value Ref Range    WBC Count 4.8 4.0 - 11.0 10e3/uL    RBC Count 4.07 3.80 - 5.20 10e6/uL    Hemoglobin 13.1 11.7 - 15.7 g/dL    Hematocrit 39.5 35.0 - 47.0 %    MCV 97 78 - 100 fL    MCH 32.2 26.5 - 33.0 pg    MCHC 33.2 31.5 - 36.5 g/dL    RDW 13.7 10.0 - 15.0 %    Platelet Count 317 150 - 450 10e3/uL    % Neutrophils 67 %    % Lymphocytes 17 %    % Monocytes 10 %    % Eosinophils 5 %    % Basophils 1 %    % Immature Granulocytes 0 %    NRBCs per 100 WBC 0 <1 /100    Absolute Neutrophils 3.2 1.6 - 8.3 10e3/uL    Absolute Lymphocytes 0.8 0.8 - 5.3 10e3/uL    Absolute Monocytes 0.5 0.0 - 1.3 10e3/uL    Absolute Eosinophils 0.2 0.0 - 0.7 10e3/uL    Absolute Basophils 0.1 0.0 - 0.2 10e3/uL    Absolute Immature Granulocytes 0.0 <=0.4 10e3/uL    Absolute NRBCs 0.0 10e3/uL

## 2022-05-25 LAB
KAPPA LC FREE SER-MCNC: 43.6 MG/DL (ref 0.33–1.94)
KAPPA LC FREE/LAMBDA FREE SER NEPH: 256.47 {RATIO} (ref 0.26–1.65)
LAMBDA LC FREE SERPL-MCNC: 0.17 MG/DL (ref 0.57–2.63)

## 2022-05-31 ENCOUNTER — OFFICE VISIT (OUTPATIENT)
Dept: OPHTHALMOLOGY | Facility: CLINIC | Age: 52
End: 2022-05-31
Payer: COMMERCIAL

## 2022-05-31 ENCOUNTER — PRE VISIT (OUTPATIENT)
Dept: OPHTHALMOLOGY | Facility: CLINIC | Age: 52
End: 2022-05-31

## 2022-05-31 VITALS — WEIGHT: 146 LBS | HEIGHT: 67 IN | BODY MASS INDEX: 22.91 KG/M2

## 2022-05-31 DIAGNOSIS — H01.00B BLEPHARITIS OF UPPER AND LOWER EYELIDS OF BOTH EYES, UNSPECIFIED TYPE: ICD-10-CM

## 2022-05-31 DIAGNOSIS — H52.4 PRESBYOPIA OF BOTH EYES: ICD-10-CM

## 2022-05-31 DIAGNOSIS — H01.00A BLEPHARITIS OF UPPER AND LOWER EYELIDS OF BOTH EYES, UNSPECIFIED TYPE: ICD-10-CM

## 2022-05-31 DIAGNOSIS — H25.813 MIXED TYPE AGE-RELATED CATARACT, BOTH EYES: ICD-10-CM

## 2022-05-31 DIAGNOSIS — H04.123 DRY EYES, BILATERAL: ICD-10-CM

## 2022-05-31 DIAGNOSIS — H52.00 HYPERMETROPIA, UNSPECIFIED LATERALITY: Primary | ICD-10-CM

## 2022-05-31 DIAGNOSIS — C90.30 PLASMACYTOMA OF BONE (H): ICD-10-CM

## 2022-05-31 DIAGNOSIS — Z98.890 STATUS POST LASIK SURGERY OF BOTH EYES: ICD-10-CM

## 2022-05-31 PROCEDURE — 92004 COMPRE OPH EXAM NEW PT 1/>: CPT | Performed by: OPHTHALMOLOGY

## 2022-05-31 PROCEDURE — 92015 DETERMINE REFRACTIVE STATE: CPT | Performed by: OPHTHALMOLOGY

## 2022-05-31 ASSESSMENT — REFRACTION_WEARINGRX
OD_AXIS: 128
OD_SPHERE: +0.50
OD_ADD: +2.25
OS_ADD: +2.25
SPECS_TYPE: PAL
OD_CYLINDER: +0.25
OS_CYLINDER: SPHERE
OS_SPHERE: +0.25

## 2022-05-31 ASSESSMENT — VISUAL ACUITY
METHOD_MR: ANCE.
CORRECTION_TYPE: GLASSES
OD_CC: 20/30
OS_CC: J2
OD_CC: J2
OS_PH_CC: 20/25
METHOD: SNELLEN - LINEAR
OS_CC: 20/30
OD_PH_CC: 20/25

## 2022-05-31 ASSESSMENT — EXTERNAL EXAM - LEFT EYE: OS_EXAM: NORMAL

## 2022-05-31 ASSESSMENT — REFRACTION_MANIFEST
OD_SPHERE: +0.50
OD_ADD: +2.50
OD_AXIS: 091
OS_ADD: +2.50
OS_CYLINDER: SPHERE
OD_CYLINDER: +1.00
OS_SPHERE: +1.00

## 2022-05-31 ASSESSMENT — CONF VISUAL FIELD
OD_NORMAL: 1
OS_NORMAL: 1
METHOD: COUNTING FINGERS

## 2022-05-31 ASSESSMENT — TONOMETRY
OS_IOP_MMHG: 14
OD_IOP_MMHG: 14
IOP_METHOD: ICARE

## 2022-05-31 ASSESSMENT — CUP TO DISC RATIO
OS_RATIO: 0.2
OD_RATIO: 0.2

## 2022-05-31 ASSESSMENT — EXTERNAL EXAM - RIGHT EYE: OD_EXAM: NORMAL

## 2022-05-31 NOTE — NURSING NOTE
Chief Complaints and History of Present Illnesses   Patient presents with     COMPREHENSIVE EYE EXAM     Blurry vision each eye. Treating for Multiple Myeloma.      Chief Complaint(s) and History of Present Illness(es)     COMPREHENSIVE EYE EXAM     Laterality: both eyes    Comments: Blurry vision each eye. Treating for Multiple Myeloma.               Comments     Doing Chemo presently since last Nov. 2021. Vision is blurry and fluctuates with each eye since doing some time after Chemotherapy. Vision seem to be getting worse gradually. Some days vision is so poor does not feel safe to drive. Vision is blurry near and seem even worse than distance. No pattern to vision fluctuation each eye.   A little bit of crust in the morning in each eye. Has had in the past during allergy season but noticing a bit more. Not enough to stick each eye shut.   History of Ophthalmic migraines with sparkly light and squiggles in vision each eye more then 20 year ago. Those were worsening for a while about 6 months ago almost daily however not noticing them the past month.   History with LASIK each eye 20 years ago.   Itchy each eye the past few months off and on with allergy season.   Kamilla Lassiter, COT COT 9:29 AM May 31, 2022

## 2022-05-31 NOTE — PROGRESS NOTES
"HPI  Kristie Cornejo is a 52 year old year old here for comprehensive eye exam.  She is currently undergoing chemotherapy for multiple myeloma since November 2021.  She notes that her vision is blurry and fluctuates since starting chemotherapy.  She had several styes which have cleared up and her eyes do get crusty most mornings.  She has been doing warm compresses which seem to help.      Vision overall seems to be getting worse gradually. Some days vision is so poor does not feel safe to drive. 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 Those were worsening for a while about 6 months ago almost daily however not noticing them the past month.      PMH:    Past Medical History:   Diagnosis Date     Allergic rhinitis, cause unspecified      Anxiety state, unspecified 12/01/2003    Started postpartum . On Paxil x 1+years. Off meds- 10/04     Irritable bowel syndrome 01/01/2004     Multiple myeloma not having achieved remission (H)      PLANTAR WARTS     resolved     SINUS DYSRHYTHMIA 10/01/2002    wnl     Unspecified hemorrhoids without mention of complication 04/01/2004    colonoscopy 4/04 spastic colon, int roids; bx neg      POH:  Glasses for hypermetropia after laser in situ keratomileusis (LASIK) in the early 2000s both eyes , dry eye, no surgery, no trauma, ocular migraines  Oc Meds:  artificial tear drops as needed   FH:  Denies any glaucoma, age related macular degeneration, or other known eye diseases       Assessment & Plan       (H52.00) Hypermetropia, unspecified laterality - Both Eyes  (primary encounter diagnosis)  (H52.4) Presbyopia of both eyes - Both Eyes  (Z98.890) Status post LASIK surgery of both eyes - Both Eyes  Comment: hyperopic shift and increase in presbyopia  Plan: manifest refraction done and prescription for glasses given     (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) - " Both Eyes  Comment: normal fundus exam today, active chemotheraphy for multiple myeloma, taking dexamethasone   Plan: management per primary team    (H01.00A,  H01.00B) Blepharitis of upper and lower eyelids of both eyes, unspecified type - Both Eyes  (H04.123) Dry eyes, bilateral - Both Eyes  Comment: new, no cornea signs but likely evaporative dry eye/mixed dry eye causing fluctuating vision    Plan: continue warm compresses and add ocusoft foam lid scrubs  Continue artificial tear drops four times a day as needed and preservative-free artificial tears if more than 6 x per day  May add topical prescription as needed worsening    (H25.813) Mixed type age-related cataract, both eyes - Both Eyes  Comment: trace posterior subcapsular cataract (PSC) may be developing to to steroid use   Plan: discussed with patient natural history of cataracts/steroid induced, and will try to update glasses as needed now and future as needed.  Surgical options later if worse vision and not improved with glasses.  -----------------------------------------------------------------------------------     Patient disposition:    Return in about 2 months (around 7/31/2022) for Follow Up-blepharitis.     Complete documentation of historical and exam elements from today's encounter can be found in the full encounter summary report (not reduplicated in this progress note). I personally obtained the chief complaint(s) and history of present illness.  I have confirmed and edited as necessary the CC, HPI, PMH/PSH, social history, FMH, ROS, and exam/neuro findings as obtained by the technician or others. I have examined this patient myself and I personally viewed the image(s) and studies listed above and the documentation reflects my findings and interpretation.  I formulated and edited as necessary the assessment and plan and discussed the findings and management plan with the patient and family.     Fanny Ponce MD

## 2022-06-03 RX ORDER — HEPARIN SODIUM,PORCINE 10 UNIT/ML
5 VIAL (ML) INTRAVENOUS
Status: CANCELLED | OUTPATIENT
Start: 2022-06-23

## 2022-06-03 RX ORDER — NALOXONE HYDROCHLORIDE 0.4 MG/ML
0.2 INJECTION, SOLUTION INTRAMUSCULAR; INTRAVENOUS; SUBCUTANEOUS
Status: CANCELLED | OUTPATIENT
Start: 2022-06-23

## 2022-06-03 RX ORDER — DIPHENHYDRAMINE HYDROCHLORIDE 50 MG/ML
50 INJECTION INTRAMUSCULAR; INTRAVENOUS
Status: CANCELLED
Start: 2022-06-23

## 2022-06-03 RX ORDER — ZOLEDRONIC ACID 0.04 MG/ML
4 INJECTION, SOLUTION INTRAVENOUS ONCE
Status: CANCELLED
Start: 2022-06-23 | End: 2022-06-23

## 2022-06-03 RX ORDER — EPINEPHRINE 1 MG/ML
0.3 INJECTION, SOLUTION INTRAMUSCULAR; SUBCUTANEOUS EVERY 5 MIN PRN
Status: CANCELLED | OUTPATIENT
Start: 2022-06-23

## 2022-06-03 RX ORDER — MEPERIDINE HYDROCHLORIDE 25 MG/ML
25 INJECTION INTRAMUSCULAR; INTRAVENOUS; SUBCUTANEOUS EVERY 30 MIN PRN
Status: CANCELLED | OUTPATIENT
Start: 2022-06-23

## 2022-06-03 RX ORDER — HEPARIN SODIUM (PORCINE) LOCK FLUSH IV SOLN 100 UNIT/ML 100 UNIT/ML
5 SOLUTION INTRAVENOUS
Status: CANCELLED | OUTPATIENT
Start: 2022-06-23

## 2022-06-03 RX ORDER — ALBUTEROL SULFATE 0.83 MG/ML
2.5 SOLUTION RESPIRATORY (INHALATION)
Status: CANCELLED | OUTPATIENT
Start: 2022-06-23

## 2022-06-03 RX ORDER — ALBUTEROL SULFATE 90 UG/1
1-2 AEROSOL, METERED RESPIRATORY (INHALATION)
Status: CANCELLED
Start: 2022-06-23

## 2022-06-03 RX ORDER — METHYLPREDNISOLONE SODIUM SUCCINATE 125 MG/2ML
125 INJECTION, POWDER, LYOPHILIZED, FOR SOLUTION INTRAMUSCULAR; INTRAVENOUS
Status: CANCELLED
Start: 2022-06-23

## 2022-06-06 ENCOUNTER — APPOINTMENT (OUTPATIENT)
Dept: LAB | Facility: CLINIC | Age: 52
End: 2022-06-06
Payer: COMMERCIAL

## 2022-06-06 ENCOUNTER — INFUSION THERAPY VISIT (OUTPATIENT)
Dept: ONCOLOGY | Facility: CLINIC | Age: 52
End: 2022-06-06
Payer: COMMERCIAL

## 2022-06-06 VITALS
SYSTOLIC BLOOD PRESSURE: 147 MMHG | WEIGHT: 150.7 LBS | HEART RATE: 67 BPM | DIASTOLIC BLOOD PRESSURE: 77 MMHG | OXYGEN SATURATION: 98 % | RESPIRATION RATE: 18 BRPM | TEMPERATURE: 97.7 F | BODY MASS INDEX: 23.6 KG/M2

## 2022-06-06 DIAGNOSIS — C90.30 PLASMACYTOMA OF BONE (H): ICD-10-CM

## 2022-06-06 DIAGNOSIS — C90.00 LIGHT CHAIN MYELOMA (H): Primary | ICD-10-CM

## 2022-06-06 LAB
BASOPHILS # BLD AUTO: 0 10E3/UL (ref 0–0.2)
BASOPHILS NFR BLD AUTO: 0 %
CALCIUM SERPL-MCNC: 9.3 MG/DL (ref 8.5–10.1)
CREAT SERPL-MCNC: 0.67 MG/DL (ref 0.52–1.04)
EOSINOPHIL # BLD AUTO: 0.2 10E3/UL (ref 0–0.7)
EOSINOPHIL NFR BLD AUTO: 3 %
ERYTHROCYTE [DISTWIDTH] IN BLOOD BY AUTOMATED COUNT: 13.2 % (ref 10–15)
GFR SERPL CREATININE-BSD FRML MDRD: >90 ML/MIN/1.73M2
HCT VFR BLD AUTO: 38.3 % (ref 35–47)
HGB BLD-MCNC: 12.6 G/DL (ref 11.7–15.7)
IMM GRANULOCYTES # BLD: 0 10E3/UL
IMM GRANULOCYTES NFR BLD: 0 %
LYMPHOCYTES # BLD AUTO: 0.6 10E3/UL (ref 0.8–5.3)
LYMPHOCYTES NFR BLD AUTO: 9 %
MCH RBC QN AUTO: 32.1 PG (ref 26.5–33)
MCHC RBC AUTO-ENTMCNC: 32.9 G/DL (ref 31.5–36.5)
MCV RBC AUTO: 98 FL (ref 78–100)
MONOCYTES # BLD AUTO: 0.5 10E3/UL (ref 0–1.3)
MONOCYTES NFR BLD AUTO: 7 %
NEUTROPHILS # BLD AUTO: 5.4 10E3/UL (ref 1.6–8.3)
NEUTROPHILS NFR BLD AUTO: 81 %
NRBC # BLD AUTO: 0 10E3/UL
NRBC BLD AUTO-RTO: 0 /100
PLATELET # BLD AUTO: 198 10E3/UL (ref 150–450)
RBC # BLD AUTO: 3.93 10E6/UL (ref 3.8–5.2)
WBC # BLD AUTO: 6.7 10E3/UL (ref 4–11)

## 2022-06-06 PROCEDURE — 250N000011 HC RX IP 250 OP 636: Performed by: REGISTERED NURSE

## 2022-06-06 PROCEDURE — 250N000011 HC RX IP 250 OP 636

## 2022-06-06 PROCEDURE — 82565 ASSAY OF CREATININE: CPT

## 2022-06-06 PROCEDURE — 96375 TX/PRO/DX INJ NEW DRUG ADDON: CPT

## 2022-06-06 PROCEDURE — 36415 COLL VENOUS BLD VENIPUNCTURE: CPT

## 2022-06-06 PROCEDURE — 85014 HEMATOCRIT: CPT

## 2022-06-06 PROCEDURE — 96374 THER/PROPH/DIAG INJ IV PUSH: CPT

## 2022-06-06 PROCEDURE — 258N000003 HC RX IP 258 OP 636

## 2022-06-06 PROCEDURE — 96401 CHEMO ANTI-NEOPL SQ/IM: CPT

## 2022-06-06 PROCEDURE — 82310 ASSAY OF CALCIUM: CPT

## 2022-06-06 RX ORDER — ZOLEDRONIC ACID 0.04 MG/ML
4 INJECTION, SOLUTION INTRAVENOUS ONCE
Status: COMPLETED | OUTPATIENT
Start: 2022-06-06 | End: 2022-06-06

## 2022-06-06 RX ORDER — DEXAMETHASONE 4 MG/1
20 TABLET ORAL ONCE
Status: COMPLETED | OUTPATIENT
Start: 2022-06-06 | End: 2022-06-06

## 2022-06-06 RX ADMIN — DARATUMUMAB AND HYALURONIDASE-FIHJ (HUMAN RECOMBINANT) 1800 MG: 1800; 30000 INJECTION SUBCUTANEOUS at 13:02

## 2022-06-06 RX ADMIN — SODIUM CHLORIDE 250 ML: 9 INJECTION, SOLUTION INTRAVENOUS at 12:40

## 2022-06-06 RX ADMIN — ZOLEDRONIC ACID 4 MG: 0.04 INJECTION, SOLUTION INTRAVENOUS at 12:42

## 2022-06-06 RX ADMIN — DEXAMETHASONE 20 MG: 4 TABLET ORAL at 12:36

## 2022-06-06 ASSESSMENT — PAIN SCALES - GENERAL: PAINLEVEL: NO PAIN (0)

## 2022-06-06 NOTE — PROGRESS NOTES
Infusion Nursing Note:  Kristie Cornejo presents today for C6D15 darzalex Faspro/Zometa.    Patient seen by provider today: No   present during visit today: Not Applicable.    Note: Patient reported to clinic today with no new complaints or concerns. Patient states she is feeling much better since changing medications.    Intravenous Access:  Labs drawn without difficulty.  Peripheral IV placed.  By lab staff.    Treatment Conditions:  Lab Results   Component Value Date    HGB 12.6 06/06/2022    WBC 6.7 06/06/2022    ANEU 3.2 01/17/2022    ANEUTAUTO 5.4 06/06/2022     06/06/2022      Lab Results   Component Value Date     05/24/2022    POTASSIUM 3.4 05/24/2022    CR 0.67 06/06/2022    MEKA 9.3 06/06/2022    BILITOTAL 0.5 05/24/2022    ALBUMIN 4.0 05/24/2022    ALT 15 05/24/2022    AST 13 05/24/2022     Results reviewed, labs MET treatment parameters, ok to proceed with treatment.      Post Infusion Assessment:  Patient tolerated infusion without incident.  Patient tolerated injection without incident. One injection of Darzalex Faspro given subQ in LLQ of abdomen  Blood return noted pre and post infusion.  Site patent and intact, free from redness, edema or discomfort.  No evidence of extravasations.  Access discontinued per protocol.     Discharge Plan:   Prescription refills given for Dexamethasone and .  Discharge instructions reviewed with: Patient.  Patient and/or family verbalized understanding of discharge instructions and all questions answered.  AVS to patient via [email or web address].  Patient will return, IB sent to provider and scheduling to get set up for next appointment.   Patient discharged in stable condition accompanied by: self.  Departure Mode: Ambulatory.  Face to Face time: 5 minutes.    Raza Mccarty RN

## 2022-06-06 NOTE — PATIENT INSTRUCTIONS
[facility] Main Line: [phone]    Call triage nurse with chills and/or temperature greater than or equal to 100.4, uncontrolled nausea/vomiting, diarrhea, constipation, dizziness, shortness of breath, chest pain, bleeding, unexplained bruising, or any new/concerning symptoms, questions/concerns.   If you are having any concerning symptoms or wish to speak to a provider before your next infusion visit, please call your care coordinator or triage to notify them so we can adequately serve you.   Nurse Triage line:  [phone]    If after hours, weekends, or holidays, call main hospital  and ask for Oncology doctor on call @ [phone]     June 2022 Sunday Monday Tuesday Wednesday Thursday Friday Saturday                  1     2     3     4       5     6    LAB PERIPHERAL  11:30 AM   (15 min.)    [facility]   [facility]    ONC INFUSION 1 HR (60 MIN)  12:00 PM   (60 min.)    ONC INFUSION NURSE   [facility] 7     8     9     10     11       12     13     14     15     16     17     18       19     20     21     22     23     24     25       26     27    RETURN EP   9:15 AM   (30 min.)   Alem Thomas MD   [facility] 28     29     30                             July 2022 Sunday Monday Tuesday Wednesday Thursday Friday Saturday                            1     2       3     4     5     6     7     8     9       10     11     12     13     14     15     16       17     18     19     20     21     22     23       24     25     26     27     28     29     30       31                                                     Lab Results:  Recent Results (from the past 12 hour(s))   Creatinine    Collection Time: 06/06/22 11:43 AM   Result Value Ref Range    Creatinine 0.67 0.52 - 1.04 mg/dL    GFR Estimate >90 >60 mL/min/1.73m2   Calcium    Collection Time: 06/06/22 11:43 AM   Result Value Ref  Range    Calcium 9.3 8.5 - 10.1 mg/dL   CBC with platelets and differential    Collection Time: 06/06/22 11:43 AM   Result Value Ref Range    WBC Count 6.7 4.0 - 11.0 10e3/uL    RBC Count 3.93 3.80 - 5.20 10e6/uL    Hemoglobin 12.6 11.7 - 15.7 g/dL    Hematocrit 38.3 35.0 - 47.0 %    MCV 98 78 - 100 fL    MCH 32.1 26.5 - 33.0 pg    MCHC 32.9 31.5 - 36.5 g/dL    RDW 13.2 10.0 - 15.0 %    Platelet Count 198 150 - 450 10e3/uL    % Neutrophils 81 %    % Lymphocytes 9 %    % Monocytes 7 %    % Eosinophils 3 %    % Basophils 0 %    % Immature Granulocytes 0 %    NRBCs per 100 WBC 0 <1 /100    Absolute Neutrophils 5.4 1.6 - 8.3 10e3/uL    Absolute Lymphocytes 0.6 (L) 0.8 - 5.3 10e3/uL    Absolute Monocytes 0.5 0.0 - 1.3 10e3/uL    Absolute Eosinophils 0.2 0.0 - 0.7 10e3/uL    Absolute Basophils 0.0 0.0 - 0.2 10e3/uL    Absolute Immature Granulocytes 0.0 <=0.4 10e3/uL    Absolute NRBCs 0.0 10e3/uL

## 2022-06-06 NOTE — NURSING NOTE
Chief Complaint   Patient presents with     Blood Draw     IV placement with blood draw by lab RN. Vitals taken and appointment arrived     Laura eJnkins RN

## 2022-06-13 ENCOUNTER — PATIENT OUTREACH (OUTPATIENT)
Dept: ONCOLOGY | Facility: CLINIC | Age: 52
End: 2022-06-13

## 2022-06-13 DIAGNOSIS — C90.00 LIGHT CHAIN MYELOMA (H): Primary | ICD-10-CM

## 2022-06-13 RX ORDER — NALOXONE HYDROCHLORIDE 0.4 MG/ML
0.2 INJECTION, SOLUTION INTRAMUSCULAR; INTRAVENOUS; SUBCUTANEOUS
Status: CANCELLED | OUTPATIENT
Start: 2022-07-06

## 2022-06-13 RX ORDER — NALOXONE HYDROCHLORIDE 0.4 MG/ML
0.2 INJECTION, SOLUTION INTRAMUSCULAR; INTRAVENOUS; SUBCUTANEOUS
Status: CANCELLED | OUTPATIENT
Start: 2022-07-07

## 2022-06-13 RX ORDER — MEPERIDINE HYDROCHLORIDE 25 MG/ML
25 INJECTION INTRAMUSCULAR; INTRAVENOUS; SUBCUTANEOUS EVERY 30 MIN PRN
Status: CANCELLED | OUTPATIENT
Start: 2022-06-22

## 2022-06-13 RX ORDER — HEPARIN SODIUM (PORCINE) LOCK FLUSH IV SOLN 100 UNIT/ML 100 UNIT/ML
5 SOLUTION INTRAVENOUS
Status: CANCELLED | OUTPATIENT
Start: 2022-07-06

## 2022-06-13 RX ORDER — HEPARIN SODIUM (PORCINE) LOCK FLUSH IV SOLN 100 UNIT/ML 100 UNIT/ML
5 SOLUTION INTRAVENOUS
Status: CANCELLED | OUTPATIENT
Start: 2022-06-29

## 2022-06-13 RX ORDER — LORAZEPAM 2 MG/ML
0.5 INJECTION INTRAMUSCULAR EVERY 4 HOURS PRN
Status: CANCELLED | OUTPATIENT
Start: 2022-06-30

## 2022-06-13 RX ORDER — HEPARIN SODIUM (PORCINE) LOCK FLUSH IV SOLN 100 UNIT/ML 100 UNIT/ML
5 SOLUTION INTRAVENOUS
Status: CANCELLED | OUTPATIENT
Start: 2022-06-23

## 2022-06-13 RX ORDER — MEPERIDINE HYDROCHLORIDE 25 MG/ML
25 INJECTION INTRAMUSCULAR; INTRAVENOUS; SUBCUTANEOUS EVERY 30 MIN PRN
Status: CANCELLED | OUTPATIENT
Start: 2022-07-07

## 2022-06-13 RX ORDER — ALBUTEROL SULFATE 90 UG/1
1-2 AEROSOL, METERED RESPIRATORY (INHALATION)
Status: CANCELLED
Start: 2022-07-06

## 2022-06-13 RX ORDER — HEPARIN SODIUM,PORCINE 10 UNIT/ML
5 VIAL (ML) INTRAVENOUS
Status: CANCELLED | OUTPATIENT
Start: 2022-06-23

## 2022-06-13 RX ORDER — EPINEPHRINE 1 MG/ML
0.3 INJECTION, SOLUTION INTRAMUSCULAR; SUBCUTANEOUS EVERY 5 MIN PRN
Status: CANCELLED | OUTPATIENT
Start: 2022-06-30

## 2022-06-13 RX ORDER — NALOXONE HYDROCHLORIDE 0.4 MG/ML
0.2 INJECTION, SOLUTION INTRAMUSCULAR; INTRAVENOUS; SUBCUTANEOUS
Status: CANCELLED | OUTPATIENT
Start: 2022-06-23

## 2022-06-13 RX ORDER — NALOXONE HYDROCHLORIDE 0.4 MG/ML
0.2 INJECTION, SOLUTION INTRAMUSCULAR; INTRAVENOUS; SUBCUTANEOUS
Status: CANCELLED | OUTPATIENT
Start: 2022-06-22

## 2022-06-13 RX ORDER — DIPHENHYDRAMINE HYDROCHLORIDE 50 MG/ML
50 INJECTION INTRAMUSCULAR; INTRAVENOUS
Status: CANCELLED
Start: 2022-07-06

## 2022-06-13 RX ORDER — ALBUTEROL SULFATE 0.83 MG/ML
2.5 SOLUTION RESPIRATORY (INHALATION)
Status: CANCELLED | OUTPATIENT
Start: 2022-06-23

## 2022-06-13 RX ORDER — ALBUTEROL SULFATE 90 UG/1
1-2 AEROSOL, METERED RESPIRATORY (INHALATION)
Status: CANCELLED
Start: 2022-07-07

## 2022-06-13 RX ORDER — METHYLPREDNISOLONE SODIUM SUCCINATE 125 MG/2ML
125 INJECTION, POWDER, LYOPHILIZED, FOR SOLUTION INTRAMUSCULAR; INTRAVENOUS
Status: CANCELLED
Start: 2022-06-23

## 2022-06-13 RX ORDER — LORAZEPAM 2 MG/ML
0.5 INJECTION INTRAMUSCULAR EVERY 4 HOURS PRN
Status: CANCELLED | OUTPATIENT
Start: 2022-06-23

## 2022-06-13 RX ORDER — ALBUTEROL SULFATE 90 UG/1
1-2 AEROSOL, METERED RESPIRATORY (INHALATION)
Status: CANCELLED
Start: 2022-06-22

## 2022-06-13 RX ORDER — DIPHENHYDRAMINE HYDROCHLORIDE 50 MG/ML
50 INJECTION INTRAMUSCULAR; INTRAVENOUS
Status: CANCELLED
Start: 2022-06-29

## 2022-06-13 RX ORDER — HEPARIN SODIUM (PORCINE) LOCK FLUSH IV SOLN 100 UNIT/ML 100 UNIT/ML
5 SOLUTION INTRAVENOUS
Status: CANCELLED | OUTPATIENT
Start: 2022-07-07

## 2022-06-13 RX ORDER — METHYLPREDNISOLONE SODIUM SUCCINATE 125 MG/2ML
125 INJECTION, POWDER, LYOPHILIZED, FOR SOLUTION INTRAMUSCULAR; INTRAVENOUS
Status: CANCELLED
Start: 2022-06-22

## 2022-06-13 RX ORDER — ALBUTEROL SULFATE 90 UG/1
1-2 AEROSOL, METERED RESPIRATORY (INHALATION)
Status: CANCELLED
Start: 2022-06-30

## 2022-06-13 RX ORDER — ONDANSETRON 2 MG/ML
8 INJECTION INTRAMUSCULAR; INTRAVENOUS ONCE
Status: CANCELLED | OUTPATIENT
Start: 2022-07-06

## 2022-06-13 RX ORDER — METHYLPREDNISOLONE SODIUM SUCCINATE 125 MG/2ML
125 INJECTION, POWDER, LYOPHILIZED, FOR SOLUTION INTRAMUSCULAR; INTRAVENOUS
Status: CANCELLED
Start: 2022-06-30

## 2022-06-13 RX ORDER — ONDANSETRON 2 MG/ML
8 INJECTION INTRAMUSCULAR; INTRAVENOUS ONCE
Status: CANCELLED | OUTPATIENT
Start: 2022-06-29

## 2022-06-13 RX ORDER — ALBUTEROL SULFATE 0.83 MG/ML
2.5 SOLUTION RESPIRATORY (INHALATION)
Status: CANCELLED | OUTPATIENT
Start: 2022-06-29

## 2022-06-13 RX ORDER — HEPARIN SODIUM (PORCINE) LOCK FLUSH IV SOLN 100 UNIT/ML 100 UNIT/ML
5 SOLUTION INTRAVENOUS
Status: CANCELLED | OUTPATIENT
Start: 2022-06-22

## 2022-06-13 RX ORDER — HEPARIN SODIUM (PORCINE) LOCK FLUSH IV SOLN 100 UNIT/ML 100 UNIT/ML
5 SOLUTION INTRAVENOUS
Status: CANCELLED | OUTPATIENT
Start: 2022-06-30

## 2022-06-13 RX ORDER — ALBUTEROL SULFATE 0.83 MG/ML
2.5 SOLUTION RESPIRATORY (INHALATION)
Status: CANCELLED | OUTPATIENT
Start: 2022-07-06

## 2022-06-13 RX ORDER — LORAZEPAM 2 MG/ML
0.5 INJECTION INTRAMUSCULAR EVERY 4 HOURS PRN
Status: CANCELLED | OUTPATIENT
Start: 2022-07-07

## 2022-06-13 RX ORDER — HEPARIN SODIUM,PORCINE 10 UNIT/ML
5 VIAL (ML) INTRAVENOUS
Status: CANCELLED | OUTPATIENT
Start: 2022-06-29

## 2022-06-13 RX ORDER — ALBUTEROL SULFATE 90 UG/1
1-2 AEROSOL, METERED RESPIRATORY (INHALATION)
Status: CANCELLED
Start: 2022-06-23

## 2022-06-13 RX ORDER — ALBUTEROL SULFATE 0.83 MG/ML
2.5 SOLUTION RESPIRATORY (INHALATION)
Status: CANCELLED | OUTPATIENT
Start: 2022-06-30

## 2022-06-13 RX ORDER — HEPARIN SODIUM,PORCINE 10 UNIT/ML
5 VIAL (ML) INTRAVENOUS
Status: CANCELLED | OUTPATIENT
Start: 2022-06-30

## 2022-06-13 RX ORDER — ALBUTEROL SULFATE 0.83 MG/ML
2.5 SOLUTION RESPIRATORY (INHALATION)
Status: CANCELLED | OUTPATIENT
Start: 2022-07-07

## 2022-06-13 RX ORDER — NALOXONE HYDROCHLORIDE 0.4 MG/ML
0.2 INJECTION, SOLUTION INTRAMUSCULAR; INTRAVENOUS; SUBCUTANEOUS
Status: CANCELLED | OUTPATIENT
Start: 2022-06-30

## 2022-06-13 RX ORDER — HEPARIN SODIUM,PORCINE 10 UNIT/ML
5 VIAL (ML) INTRAVENOUS
Status: CANCELLED | OUTPATIENT
Start: 2022-06-22

## 2022-06-13 RX ORDER — NALOXONE HYDROCHLORIDE 0.4 MG/ML
0.2 INJECTION, SOLUTION INTRAMUSCULAR; INTRAVENOUS; SUBCUTANEOUS
Status: CANCELLED | OUTPATIENT
Start: 2022-06-29

## 2022-06-13 RX ORDER — EPINEPHRINE 1 MG/ML
0.3 INJECTION, SOLUTION INTRAMUSCULAR; SUBCUTANEOUS EVERY 5 MIN PRN
Status: CANCELLED | OUTPATIENT
Start: 2022-06-29

## 2022-06-13 RX ORDER — DIPHENHYDRAMINE HYDROCHLORIDE 50 MG/ML
50 INJECTION INTRAMUSCULAR; INTRAVENOUS
Status: CANCELLED
Start: 2022-06-30

## 2022-06-13 RX ORDER — DIPHENHYDRAMINE HYDROCHLORIDE 50 MG/ML
50 INJECTION INTRAMUSCULAR; INTRAVENOUS
Status: CANCELLED
Start: 2022-06-23

## 2022-06-13 RX ORDER — EPINEPHRINE 1 MG/ML
0.3 INJECTION, SOLUTION INTRAMUSCULAR; SUBCUTANEOUS EVERY 5 MIN PRN
Status: CANCELLED | OUTPATIENT
Start: 2022-06-23

## 2022-06-13 RX ORDER — HEPARIN SODIUM,PORCINE 10 UNIT/ML
5 VIAL (ML) INTRAVENOUS
Status: CANCELLED | OUTPATIENT
Start: 2022-07-06

## 2022-06-13 RX ORDER — LORAZEPAM 2 MG/ML
0.5 INJECTION INTRAMUSCULAR EVERY 4 HOURS PRN
Status: CANCELLED | OUTPATIENT
Start: 2022-07-06

## 2022-06-13 RX ORDER — EPINEPHRINE 1 MG/ML
0.3 INJECTION, SOLUTION INTRAMUSCULAR; SUBCUTANEOUS EVERY 5 MIN PRN
Status: CANCELLED | OUTPATIENT
Start: 2022-06-22

## 2022-06-13 RX ORDER — LORAZEPAM 2 MG/ML
0.5 INJECTION INTRAMUSCULAR EVERY 4 HOURS PRN
Status: CANCELLED | OUTPATIENT
Start: 2022-06-29

## 2022-06-13 RX ORDER — MEPERIDINE HYDROCHLORIDE 25 MG/ML
25 INJECTION INTRAMUSCULAR; INTRAVENOUS; SUBCUTANEOUS EVERY 30 MIN PRN
Status: CANCELLED | OUTPATIENT
Start: 2022-07-06

## 2022-06-13 RX ORDER — EPINEPHRINE 1 MG/ML
0.3 INJECTION, SOLUTION INTRAMUSCULAR; SUBCUTANEOUS EVERY 5 MIN PRN
Status: CANCELLED | OUTPATIENT
Start: 2022-07-07

## 2022-06-13 RX ORDER — ONDANSETRON 2 MG/ML
8 INJECTION INTRAMUSCULAR; INTRAVENOUS ONCE
Status: CANCELLED | OUTPATIENT
Start: 2022-06-22

## 2022-06-13 RX ORDER — MEPERIDINE HYDROCHLORIDE 25 MG/ML
25 INJECTION INTRAMUSCULAR; INTRAVENOUS; SUBCUTANEOUS EVERY 30 MIN PRN
Status: CANCELLED | OUTPATIENT
Start: 2022-06-29

## 2022-06-13 RX ORDER — LORAZEPAM 2 MG/ML
0.5 INJECTION INTRAMUSCULAR EVERY 4 HOURS PRN
Status: CANCELLED | OUTPATIENT
Start: 2022-06-22

## 2022-06-13 RX ORDER — ALBUTEROL SULFATE 0.83 MG/ML
2.5 SOLUTION RESPIRATORY (INHALATION)
Status: CANCELLED | OUTPATIENT
Start: 2022-06-22

## 2022-06-13 RX ORDER — METHYLPREDNISOLONE SODIUM SUCCINATE 125 MG/2ML
125 INJECTION, POWDER, LYOPHILIZED, FOR SOLUTION INTRAMUSCULAR; INTRAVENOUS
Status: CANCELLED
Start: 2022-07-06

## 2022-06-13 RX ORDER — DIPHENHYDRAMINE HYDROCHLORIDE 50 MG/ML
50 INJECTION INTRAMUSCULAR; INTRAVENOUS
Status: CANCELLED
Start: 2022-06-22

## 2022-06-13 RX ORDER — HEPARIN SODIUM,PORCINE 10 UNIT/ML
5 VIAL (ML) INTRAVENOUS
Status: CANCELLED | OUTPATIENT
Start: 2022-07-07

## 2022-06-13 RX ORDER — MEPERIDINE HYDROCHLORIDE 25 MG/ML
25 INJECTION INTRAMUSCULAR; INTRAVENOUS; SUBCUTANEOUS EVERY 30 MIN PRN
Status: CANCELLED | OUTPATIENT
Start: 2022-06-30

## 2022-06-13 RX ORDER — ALBUTEROL SULFATE 90 UG/1
1-2 AEROSOL, METERED RESPIRATORY (INHALATION)
Status: CANCELLED
Start: 2022-06-29

## 2022-06-13 RX ORDER — METHYLPREDNISOLONE SODIUM SUCCINATE 125 MG/2ML
125 INJECTION, POWDER, LYOPHILIZED, FOR SOLUTION INTRAMUSCULAR; INTRAVENOUS
Status: CANCELLED
Start: 2022-07-07

## 2022-06-13 RX ORDER — EPINEPHRINE 1 MG/ML
0.3 INJECTION, SOLUTION INTRAMUSCULAR; SUBCUTANEOUS EVERY 5 MIN PRN
Status: CANCELLED | OUTPATIENT
Start: 2022-07-06

## 2022-06-13 RX ORDER — MEPERIDINE HYDROCHLORIDE 25 MG/ML
25 INJECTION INTRAMUSCULAR; INTRAVENOUS; SUBCUTANEOUS EVERY 30 MIN PRN
Status: CANCELLED | OUTPATIENT
Start: 2022-06-23

## 2022-06-13 RX ORDER — METHYLPREDNISOLONE SODIUM SUCCINATE 125 MG/2ML
125 INJECTION, POWDER, LYOPHILIZED, FOR SOLUTION INTRAMUSCULAR; INTRAVENOUS
Status: CANCELLED
Start: 2022-06-29

## 2022-06-13 RX ORDER — DIPHENHYDRAMINE HYDROCHLORIDE 50 MG/ML
50 INJECTION INTRAMUSCULAR; INTRAVENOUS
Status: CANCELLED
Start: 2022-07-07

## 2022-06-13 NOTE — PROGRESS NOTES
[facility]: Cancer Care Short Note                                    Discussion with Patient:                                                      Margo calls asking about an appointment for Daratumumab.  She will be due week of July 4, 2022.  She will be returning from [place] on 7/5/2022 and will not be back to early afternoon.   Her cardiac MRI was completed on 5/19/2022.  She had the Ziopatch placed around that time and it was removed on 6/2/2022.  She has a follow up visit with cardiology on 6/27/2022    She was informed that this information would be reviewed with Dr. Reinoso to confirm plan for treatment.  Dr. Reinoso did discuss changing treatment after cardiology workup.    Margo was informed I would call her back after Dr. Reinoso has responded    Signature:  Mattie Rodriguez RN

## 2022-06-18 RX ORDER — PROCHLORPERAZINE MALEATE 10 MG
10 TABLET ORAL EVERY 6 HOURS PRN
Qty: 30 TABLET | Refills: 2 | Status: SHIPPED | OUTPATIENT
Start: 2022-06-21 | End: 2022-06-29

## 2022-06-18 RX ORDER — ACYCLOVIR 400 MG/1
400 TABLET ORAL 2 TIMES DAILY
Qty: 60 TABLET | Refills: 2 | Status: SHIPPED | OUTPATIENT
Start: 2022-06-21 | End: 2022-06-29

## 2022-06-18 RX ORDER — ONDANSETRON 8 MG/1
8 TABLET, FILM COATED ORAL EVERY 8 HOURS PRN
Qty: 30 TABLET | Refills: 2 | Status: SHIPPED | OUTPATIENT
Start: 2022-06-21 | End: 2022-06-29

## 2022-06-18 RX ORDER — DEXAMETHASONE 4 MG/1
TABLET ORAL
Qty: 20 TABLET | Refills: 0 | Status: SHIPPED | OUTPATIENT
Start: 2022-06-21 | End: 2022-06-21

## 2022-06-21 ENCOUNTER — PATIENT OUTREACH (OUTPATIENT)
Dept: ONCOLOGY | Facility: CLINIC | Age: 52
End: 2022-06-21

## 2022-06-21 ENCOUNTER — INFUSION THERAPY VISIT (OUTPATIENT)
Dept: ONCOLOGY | Facility: CLINIC | Age: 52
End: 2022-06-21
Payer: COMMERCIAL

## 2022-06-21 ENCOUNTER — ONCOLOGY VISIT (OUTPATIENT)
Dept: ONCOLOGY | Facility: CLINIC | Age: 52
End: 2022-06-21
Payer: COMMERCIAL

## 2022-06-21 ENCOUNTER — LAB (OUTPATIENT)
Dept: LAB | Facility: CLINIC | Age: 52
End: 2022-06-21
Payer: COMMERCIAL

## 2022-06-21 VITALS
RESPIRATION RATE: 18 BRPM | HEART RATE: 83 BPM | OXYGEN SATURATION: 98 % | TEMPERATURE: 98.2 F | BODY MASS INDEX: 22.99 KG/M2 | WEIGHT: 146.8 LBS | DIASTOLIC BLOOD PRESSURE: 78 MMHG | SYSTOLIC BLOOD PRESSURE: 123 MMHG

## 2022-06-21 DIAGNOSIS — C90.00 LIGHT CHAIN MYELOMA (H): Primary | ICD-10-CM

## 2022-06-21 DIAGNOSIS — R19.7 DIARRHEA, UNSPECIFIED TYPE: ICD-10-CM

## 2022-06-21 DIAGNOSIS — R07.9 CHEST PAIN, UNSPECIFIED TYPE: ICD-10-CM

## 2022-06-21 DIAGNOSIS — I49.3 PVC'S (PREMATURE VENTRICULAR CONTRACTIONS): ICD-10-CM

## 2022-06-21 LAB
ALBUMIN SERPL-MCNC: 4 G/DL (ref 3.4–5)
ALP SERPL-CCNC: 51 U/L (ref 40–150)
ALT SERPL W P-5'-P-CCNC: 27 U/L (ref 0–50)
ANION GAP SERPL CALCULATED.3IONS-SCNC: 9 MMOL/L (ref 3–14)
AST SERPL W P-5'-P-CCNC: 14 U/L (ref 0–45)
BASOPHILS # BLD AUTO: 0 10E3/UL (ref 0–0.2)
BASOPHILS NFR BLD AUTO: 1 %
BILIRUB SERPL-MCNC: 0.5 MG/DL (ref 0.2–1.3)
BUN SERPL-MCNC: 26 MG/DL (ref 7–30)
CALCIUM SERPL-MCNC: 9.2 MG/DL (ref 8.5–10.1)
CHLORIDE BLD-SCNC: 115 MMOL/L (ref 94–109)
CO2 SERPL-SCNC: 20 MMOL/L (ref 20–32)
CREAT SERPL-MCNC: 0.67 MG/DL (ref 0.52–1.04)
EOSINOPHIL # BLD AUTO: 0.1 10E3/UL (ref 0–0.7)
EOSINOPHIL NFR BLD AUTO: 2 %
ERYTHROCYTE [DISTWIDTH] IN BLOOD BY AUTOMATED COUNT: 12.9 % (ref 10–15)
GFR SERPL CREATININE-BSD FRML MDRD: >90 ML/MIN/1.73M2
GLUCOSE BLD-MCNC: 94 MG/DL (ref 70–99)
HCT VFR BLD AUTO: 39.4 % (ref 35–47)
HGB BLD-MCNC: 13.3 G/DL (ref 11.7–15.7)
IMM GRANULOCYTES # BLD: 0 10E3/UL
IMM GRANULOCYTES NFR BLD: 0 %
LYMPHOCYTES # BLD AUTO: 0.7 10E3/UL (ref 0.8–5.3)
LYMPHOCYTES NFR BLD AUTO: 17 %
MCH RBC QN AUTO: 31.8 PG (ref 26.5–33)
MCHC RBC AUTO-ENTMCNC: 33.8 G/DL (ref 31.5–36.5)
MCV RBC AUTO: 94 FL (ref 78–100)
MONOCYTES # BLD AUTO: 0.5 10E3/UL (ref 0–1.3)
MONOCYTES NFR BLD AUTO: 10 %
NEUTROPHILS # BLD AUTO: 3.1 10E3/UL (ref 1.6–8.3)
NEUTROPHILS NFR BLD AUTO: 70 %
NRBC # BLD AUTO: 0 10E3/UL
NRBC BLD AUTO-RTO: 0 /100
PLATELET # BLD AUTO: 276 10E3/UL (ref 150–450)
POTASSIUM BLD-SCNC: 3.6 MMOL/L (ref 3.4–5.3)
PROT SERPL-MCNC: 6.6 G/DL (ref 6.8–8.8)
RBC # BLD AUTO: 4.18 10E6/UL (ref 3.8–5.2)
SODIUM SERPL-SCNC: 144 MMOL/L (ref 133–144)
TOTAL PROTEIN SERUM FOR ELP: 6.4 G/DL (ref 6.4–8.3)
WBC # BLD AUTO: 4.4 10E3/UL (ref 4–11)

## 2022-06-21 PROCEDURE — 86334 IMMUNOFIX E-PHORESIS SERUM: CPT | Performed by: PATHOLOGY

## 2022-06-21 PROCEDURE — 84165 PROTEIN E-PHORESIS SERUM: CPT | Mod: TC | Performed by: PATHOLOGY

## 2022-06-21 PROCEDURE — 258N000003 HC RX IP 258 OP 636

## 2022-06-21 PROCEDURE — 85025 COMPLETE CBC W/AUTO DIFF WBC: CPT

## 2022-06-21 PROCEDURE — 96417 CHEMO IV INFUS EACH ADDL SEQ: CPT

## 2022-06-21 PROCEDURE — 36415 COLL VENOUS BLD VENIPUNCTURE: CPT

## 2022-06-21 PROCEDURE — 96413 CHEMO IV INFUSION 1 HR: CPT

## 2022-06-21 PROCEDURE — 84155 ASSAY OF PROTEIN SERUM: CPT | Mod: 91 | Performed by: REGISTERED NURSE

## 2022-06-21 PROCEDURE — 36415 COLL VENOUS BLD VENIPUNCTURE: CPT | Performed by: REGISTERED NURSE

## 2022-06-21 PROCEDURE — 99215 OFFICE O/P EST HI 40 MIN: CPT | Performed by: REGISTERED NURSE

## 2022-06-21 PROCEDURE — 82784 ASSAY IGA/IGD/IGG/IGM EACH: CPT | Performed by: REGISTERED NURSE

## 2022-06-21 PROCEDURE — 80053 COMPREHEN METABOLIC PANEL: CPT

## 2022-06-21 PROCEDURE — G0463 HOSPITAL OUTPT CLINIC VISIT: HCPCS

## 2022-06-21 PROCEDURE — 250N000011 HC RX IP 250 OP 636

## 2022-06-21 PROCEDURE — 83521 IG LIGHT CHAINS FREE EACH: CPT | Mod: 59 | Performed by: REGISTERED NURSE

## 2022-06-21 PROCEDURE — 96375 TX/PRO/DX INJ NEW DRUG ADDON: CPT

## 2022-06-21 RX ORDER — DEXAMETHASONE 4 MG/1
TABLET ORAL
Qty: 20 TABLET | Refills: 0 | Status: SHIPPED | OUTPATIENT
Start: 2022-06-21 | End: 2022-08-01

## 2022-06-21 RX ORDER — ONDANSETRON 2 MG/ML
8 INJECTION INTRAMUSCULAR; INTRAVENOUS ONCE
Status: COMPLETED | OUTPATIENT
Start: 2022-06-21 | End: 2022-06-21

## 2022-06-21 RX ORDER — DEXAMETHASONE SODIUM PHOSPHATE 10 MG/ML
20 INJECTION, SOLUTION INTRAMUSCULAR; INTRAVENOUS ONCE
Status: DISCONTINUED | OUTPATIENT
Start: 2022-06-21 | End: 2022-06-21 | Stop reason: CLARIF

## 2022-06-21 RX ADMIN — CYCLOPHOSPHAMIDE 500 MG: 500 INJECTION, POWDER, FOR SOLUTION INTRAVENOUS; ORAL at 15:33

## 2022-06-21 RX ADMIN — CARFILZOMIB 36 MG: 10 INJECTION, POWDER, LYOPHILIZED, FOR SOLUTION INTRAVENOUS at 16:22

## 2022-06-21 RX ADMIN — ONDANSETRON 8 MG: 2 INJECTION INTRAMUSCULAR; INTRAVENOUS at 15:30

## 2022-06-21 RX ADMIN — DEXAMETHASONE SODIUM PHOSPHATE 20 MG: 10 INJECTION, SOLUTION INTRAMUSCULAR; INTRAVENOUS at 16:08

## 2022-06-21 RX ADMIN — DEXTROSE MONOHYDRATE 500 ML: 50 INJECTION, SOLUTION INTRAVENOUS at 15:14

## 2022-06-21 ASSESSMENT — PAIN SCALES - GENERAL: PAINLEVEL: NO PAIN (0)

## 2022-06-21 NOTE — LETTER
6/21/2022         RE: Kristie Cornejo  [address]  [facility] [address]        Dear Colleague,    Thank you for referring your patient, Kristie Cornejo, to the [facility]. Please see a copy of my visit note below.    [facility]  Date of Visit: Jun 21, 2022   Oncologist: Dr. Payotn Reinoso    Reason for visit: myeloma follow-up           Oncology History   52 year old female with past medical history significant for Irritable bowel disease, allergies, large plasmacytoma of the L pelvis, and newly diagnosed multiple myeloma.     Early in April 2021 , she noted to have left sided groin pain, constant and was affecting her daily activities  MRI showed large, expansile, permeative mass with indistinct borders involving the left superior and inferior pubic rami, pubic symphysis and with possible extension to the left acetabulum.     We completed the following work-up:   - Biopsy 9/7/2021: plasmacytoma.  Flow cytometry showed kappa monotypic plasma cells, polytypic B cells. FISH results from plasmacytoma showed a gain of 11q, IGH-CCND1 fusion but no losses of 1q or TP53 and no gain of 1q.  - Bone marrow biopsy 09/22/21: Marrow cellularity 50% with 25% interstitial infiltration w/  Kappa-monotypic, moderately atypical plasma cells. Flow with 0.7% kappa-monotypica plasma cells. FISH and cytogenetics pending.   - PET CT 09/24/21 w/ hypermetabolic pathologic fracture of the L pubic ramus w/ aggressive appearing osteolysis but no additional suspicious lytic or blastic osseous lesions.     Treatment to date:   - Zometa q4 weeks started 9/27/21  - Radiation to left pubic ramus x18 fractions, completed 11/11/21.   - RVD C1D1 11/15/21  - RVD C2D1 12/6/21  - RVD C3D1  12/27/21  - RVD C4 D1 1/17/22, elizabeth added on D8 due to inadequate response of urinary M-spike and FLCs remaining over 100 mg/dL after 4 cycles of RVD    - Elizabeth-RVD C1D8 1/24/22  -  "Elizabeth-RVD C2D1 2/7/22  - Elizabeth-RVD C3D1 2/28/22  - Elizabeth-RVD C4D1 3/29/22  - Elizabeth-RVD C5D1 4/19/22    Disease response: PA     - 5/19/22: Continued monotherapy with Daratumumab; holding Revlimid and Velcade.    - Transitioning to KCD, starting today (6/21/22)          INTERIM HISTORY:   Margo comes in for oncology follow-up visit today.    She has been feeling significantly better since discontinuing Velcade and Revlimid.  Her loose stools have decreased from 10+ per day to about 2 loose stools daily.  She is no longer having any dizziness or shortness of breath.  She does still have some chest tightness - notices it the most when she walks up the stairs and then lays down (especially if she lays on her side). Appetite is still somewhat poor, mostly due to altered taste.  The neuropathy in her feet seems a little worse, although she notes she is maybe just noticing it more now that her other symptoms have started to resolve.  Overall, she reports feeling much better and somewhat \"feels like herself\" again.    Denies fevers, chills, headaches, dizziness, vision or hearing changes, new lumps or bumps, chest pain, cough, abdominal pain, nausea, vomiting, changes to bowel or bladder, swelling of extremities, bleeding issues, or rash.           Physical Exam:     /78   Pulse 83   Temp 98.2  F (36.8  C) (Oral)   Resp 18   Wt 66.6 kg (146 lb 12.8 oz)   SpO2 98%   BMI 22.99 kg/m      Wt Readings from Last 4 Encounters:   06/21/22 66.6 kg (146 lb 12.8 oz)   06/06/22 68.4 kg (150 lb 11.2 oz)   05/31/22 66.2 kg (146 lb)   05/24/22 68.7 kg (151 lb 6.4 oz)       General: well appearing, no acute distress, pleasant female  HEENT: normocephalic, atraumatic, PERRLA, sclerae nonicteric  Lymph: no palpable cervical, supraclavicular or axillary lymphadenopathy   CV: S1 S2, RRR, no murmur, click or rub  Lungs: clear to auscultation bilaterally, no wheezes/rales/rhonchi  Abd: soft, positive bowel sounds, non-distended, " non-tender  MSK: no peripheral edema  Neuro: alert and oriented x3, CN grossly intact  Psych: appropriate mood and affect  Skin:no visible rashes or lesions on exposed skin          Laboratory Data:      I personally reviewed the following labs:    Most Recent 3 CBC's:  Recent Labs   Lab Test 06/21/22  1307 06/06/22  1143 05/24/22  1527   WBC 4.4 6.7 4.8   HGB 13.3 12.6 13.1   MCV 94 98 97    198 317     Most Recent 3 BMP's:  Recent Labs   Lab Test 06/21/22  1307 06/06/22  1143 05/24/22  1527 05/19/22  1007     --  142 141   POTASSIUM 3.6  --  3.4 4.1   CHLORIDE 115*  --  113* 113*   CO2 20  --  18* 20   BUN 26  --  22 24   CR 0.67 0.67 0.72 0.78   ANIONGAP 9  --  11 8   MEKA 9.2 9.3 8.9 9.2   GLC 94  --  146* 92     Most Recent 2 LFT's:  Recent Labs   Lab Test 06/21/22  1307 05/24/22  1527   AST 14 13   ALT 27 15   ALKPHOS 51 49   BILITOTAL 0.5 0.5     Imaging:  No new imaging to review.         Assessment and Recommendations   Kristie Cornejo is a 52 year old female who was diagnosed with Plasmacytoma after she presented with left sided groin pain now found to have multiple myeloma after further workup with bone marrow biopsy.     # Multiple myeloma with associated large plasmacytoma of the L pelvis. Kappa light chain disease.     BMBx with 25% plasma cell infiltration; standard risk with gain of 11q, IGH-CCND1 fusion but no losses of 1q or TP53 and no gain of 1q.   SPEP w/out a monoclonal peak  UPEP positive. 70% paraprotein. Large monoclonal free immunoglobulin light chain of kappa chain type.     No anemia, normal creat, Ca, no other skeletal lesions. Alb normal, B2M normal.  Stage I Light chain disease with left pelvic bone pathologic fracture she is classified as having symptomatic multiple myeloma.     RVD C1D1 11/15/21 (had a slight delay in the start of her Revlimid on 11/19/21 due to the timing of her pregnancy tests).  She tolerated cycle 1 well with some mild nausea and a headache on  "her week off.  RVD C2D1 12/6/21, tolerated well overall, but has had more consistent sensation of her hands and feet being hot. No tingling, pain, loss of sensation, or changes to her strength.  RVD C3D1 12/27/21, tolerated well overall with ongoing sensation of \"hot\" hands and feet  RVD C4 D1 1/17/22, elizabeth added on D8 due to inadequate response of urinary M-spike and FLCs remaining over 100 mg/dL after 3 cycles of RVD    Elizabeth-RVD C5 2/7/22 Changing to 21 day cycles due to insurance.  Will receive Rev Days 1-14, Velcade days 1,4,8,11, and elizabeth days 1,8,15.    Elizabeth-RVD C6 2/28/22  Elizabeth-RVD C7 3/28/22  Elizabeth-RVD C8 4/19/22    She completed 5 cycles of Elizabeth plus RVD and acheived a partial response.  Her free kappa light chains were at least 50% reduced but the response plateaued, and now the kappa light chains are actually increasing again. She met with Dr. Reinoso and the plan was established to switch to Kyprolis, Cytoxan, and dexamethasone.  Margo continued on daratumumab monotherapy while awaiting final cardiac clearance due to potential cardiotoxicity of Kyprolis.  She has her follow-up with Dr. Thomas next week. Cardiac MRI shows LVEF 50% with no infiltrative cardiomyopathy. Recent holter shows 1 VT run of 4 beats and 8 SVT runs less than 12 seconds. Reviewed these results with Dr. Reinoso and agreed to start KCD today.  We will still appreciate Dr. Thomas's input next week regarding the safety of continuing Kyprolis.  However, we do not want to delay the start of her new regimen any longer, as we are also concerned about the status of her myeloma. Will start KCD today, and await further recommendations from Dr. Thomas regarding cardiac clearance for continuing therapy with Kyprolis versus considering the next line of treatment options.      #Bone Health  She will continue monthly Zometa (last received on 6/6/22). She will continue Calcium/Vitamin D supplements.     # Left pubic ramus plasmacytoma with " "expansile destruction of left superior and inferior rami, pubic symphysis, and acetabulum  She underwent RT to left pubic ramus x18 fractions, completed on 11/11/21. Currently having no pain and able to walk 1 mile without any assistive devices.  She is interested in pursuing other activities like gentle cross-country skiing.  Had follow-up with Dr. Philip on 1/6/22 and was cleared to resume regular activity as tolerated. She should still refrain from high-impact activities.    # PVCs  She had an ECHO 12/1 and had follow-up with cardiology 12/7 (notes in [facility]).  She had follow-up with cardiology here at the  and they recommend an ablation. Stress test was completed and she was started on Toprol XL; however, Toprol XL was discontinued due to significant side effects of orthostatic hypotension, pre-syncopal episodes, shakiness, and weakness.   - She had an ablation 3/23/22 but has not experienced relief from the feeling of being nauseated and short of breath after climbing the stairs.  She also reports episodes of palpitations that \"radiate into her neck.\" Denies chest pain or shortness of breath.   - EKG done 4/4 showed sinus rhythm and nonspecific ST and T wave abnormality. Troponin negative.  - Cardiac MRI ruled out amyloid.  LFEF 50%, no evidence of infiltrative cardiomyopathy.  - She followed up with cardiology and they felt that the cause may be musculoskeletal or pericarditis. They suggested taking ibuprofen for pain. Her pain has subsided and she actually has not needed to take ibuprofen.   - Post ablation Zio patch was performed.  She meets with Dr. Thomas next week to discuss the results.    #Diarrhea  She was having intermittent diarrhea with associated abdominal cramping and nausea, but developed much more significant diarrhea with 10-12 liquid episodes and was seen as an add-on on 4/4/22 for this.   - C.diff PCR negative 2/8/22, repeat on 4/5 negative  - Enteric stool panel 4/5/22 negative  - " "Underwent colonoscopy on 4/13/22 to pursue biopsy for amyloid (discussed below). Congo red stain is negative for amyloid deposits. Pathology showed colonic mucosa with focal active colitis, negative for signs of chronicity and lymphocytic colitis. Per patient report, GI reviewed results with her and did not feel she needed treatment for these findings at this time. They advised her to monitor her symptoms and to call them if they do not improve.Dr. Montgomery agreed with following conservatively for now since she reports some improvement in her symptoms.  If diarrhea becomes bothersome again, then they will arrange for expeditious evaluation in the IBD clinic.   - CMV PCR (4/4/22) not detected  - Diarrhea improved to now twice daily after discontinuing Velcade and Revlimid.     #Ocular Migraines  She has a history of ocular migraines that present as \"squiggly lines in her field of vision.\" These have occurred about annually since 2002, but were occurring daily following her ablation on 3/23/22. Currently decreasing in frequency.  - Referred to ophthalmology for further evaluation - they provided new prescription and recommended artificial tear drops.  - Symptoms have significantly improved.    #Concern for amyloid  She has continued to have significant GI symptoms despite negative infectious testing and diarrhea not being a major side effect of michelle or Velcade. Previously discussed with Dr. Reinoso.  In light of the constellation of symptoms, as well as her sluggish response to therapy, she would like to do further testing to rule out amyolid. Of note, congo red stain was negative on her bone marrow biopsy from 9/22/21  - Cardiac MRI negative for infiltrative cardiomyopathy  - Colonoscopy to biopsy for GI amyloid done 4/13. Congo red stain negative for amyloid deposits.   - Diarrhea has significantly improved after discontinuing Velcade and Revlimid.    #COVID prophylaxis  - She received EVUSHELD 1/20/22 and " 3/8/22  - Received 3 COVID vaccines to date    #Reflux  She reported episodes of reflux with bile, especially when she lays down at night.  She has started to elevate her head on pillows and is also trying to avoid eating within 1-2 hours of going to bed.  Omeprazole was increased to 20 mg twice daily with symptom improvement.    I reminded patient to call us if any questions/issues arise in between visits.     60 minutes spent on the date of the encounter doing chart review, review of test results, interpretation of tests, patient visit, documentation and discussion with other provider(s)         Again, thank you for allowing me to participate in the care of your patient.      Sincerely,    DORON Hall CNP

## 2022-06-21 NOTE — NURSING NOTE
"Oncology Rooming Note    June 21, 2022 1:26 PM   Kristie Cornejo is a 52 year old female who presents for:    Chief Complaint   Patient presents with     Blood Draw     IV placement with blood draw by lab RN. Vitals taken and appointment arrived     Oncology Clinic Visit     Plasmacytoma      Initial Vitals: /78   Pulse 83   Temp 98.2  F (36.8  C) (Oral)   Resp 18   Wt 66.6 kg (146 lb 12.8 oz)   SpO2 98%   BMI 22.99 kg/m   Estimated body mass index is 22.99 kg/m  as calculated from the following:    Height as of 5/31/22: 1.702 m (5' 7\").    Weight as of this encounter: 66.6 kg (146 lb 12.8 oz). Body surface area is 1.77 meters squared.  No Pain (0) Comment: Data Unavailable   No LMP recorded. (Menstrual status: IUD).  Allergies reviewed: Yes  Medications reviewed: Yes    Medications: MEDICATION REFILLS NEEDED TODAY. Provider was notified.       Potassium     Pharmacy name entered into [facility]:    [facility] - [place], [place] - [address]  [id number] - [place], [place] - [address] AT [facility] [address]  [facility] - [place], [place] - [address]  [facility] - [place], [place] - [address] [place] [address] PHARMACY [facility] - [place], [place] - [phone]    Clinical concerns: none       Hank Scales            "

## 2022-06-21 NOTE — PROGRESS NOTES
[facility]: Cancer Care Plan of Care Education Note                                    Discussion with Patient:                                                      Call placed to Margo to review plan of care with Carfilzomib and Cyclophosphamide.  She was provided with [facility] information about the the two medications.    Side effects were reviewed and focus of education was on when to contact a provider.  We discussed being proactive with nausea prevention and self cares fo infection prevention.  48 hour chemotherapy precautions were reviewed.  She also understands to protect her skin from sun and chemicals.         Margo states that her father has pancreatic cancer and had a Whipple surgery along with chemotherapy.  She has a good understanding of side effects and how to manage them.      Assessment:                                                      Assessment completed with:: Patient      Plan of Care Education   Does patient understand diagnosis?: Yes  Tx plan/regimen:: Cyclophosphamide and Carfilzomib  Does patient understand treatment plan/regimen?: Yes  Preparing for treatment:: Reviewed treatment preparation information with patient (vascular access, day of chemo, visitor policy, what to bring, etc.)  Vascular access:: Peripheral IV  Side effect education:: Diarrhea/Constipation;Fatigue;Hair loss;Infection;Lab value monitoring (anemia, neutropenia, thrombocytopenia);Mylosuppression;Nausea/Vomiting;Neuropathy;Skin changes  Supportive services:: Nutrition (Margo is interested in a nutrition referral due to weight loss)  Transportation means:: Regular car  Safety/self care at home reviewed with patient:: Yes  Plan of Care:: SULEIMAN follow-up appointment  When to call provider:: Bleeding;Increased shortness of breath;New/worsening pain;Shaking chills;Temperature >100.4F;Uncontrolled diarrhea/constipation;Uncontrolled nausea/vomiting  Reasons for deferring treatment reviewed with patient::  Yes    Evaluation of Learning  Patient Education Provided: Yes  Readiness:: Acceptance  Method:: Literature;Teach Back  Response:: Verbalizes understanding      Intervention/Education provided during outreach:                                                       I will meet with patient next day at her infusion appointment to say hello and determine if she has anymore questions or concerns.    Signature:  Mattie Rodriguez RN

## 2022-06-21 NOTE — PROGRESS NOTES
Infusion Nursing Note:  Kristie Cornejo presents today for C1D1 Cytoxan-Kyprolis.    Patient seen by provider today: Yes: Jackie Ferris NP   present during visit today: Not Applicable.    Note: Pt saw provider prior to infusion, ok for treatment.    Insurance issue with PO DEX dose for today so administered IV.      Intravenous Access:  Peripheral IV placed.    Treatment Conditions:  Lab Results   Component Value Date    HGB 13.3 06/21/2022    WBC 4.4 06/21/2022    ANEU 3.2 01/17/2022    ANEUTAUTO 3.1 06/21/2022     06/21/2022      Lab Results   Component Value Date     06/21/2022    POTASSIUM 3.6 06/21/2022    CR 0.67 06/21/2022    MEKA 9.2 06/21/2022    BILITOTAL 0.5 06/21/2022    ALBUMIN 4.0 06/21/2022    ALT 27 06/21/2022    AST 14 06/21/2022     Results reviewed, labs MET treatment parameters, ok to proceed with treatment.      Post Infusion Assessment:  Patient tolerated infusion without incident.  Blood return noted pre and post infusion.  Site patent and intact, free from redness, edema or discomfort.  No evidence of extravasations.  Access discontinued per protocol.       Discharge Plan:   Prescription refills given for DEX and Zofran, pt has Compazine and Acyclovir at home.  Discharge instructions reviewed with: Patient.  Patient and/or family verbalized understanding of discharge instructions and all questions answered.  AVS to patient via [facility].  Patient will return 6/22 for next appointment.   Patient discharged in stable condition accompanied by: self.  Departure Mode: Ambulatory.    Sailaja Ravi RN

## 2022-06-21 NOTE — NURSING NOTE
Chief Complaint   Patient presents with     Blood Draw     IV placement with blood draw by lab RN. Vitals taken and appointment arrived     Laura Jenkins RN

## 2022-06-21 NOTE — PROGRESS NOTES
[facility]  Date of Visit: Jun 21, 2022   Oncologist: Dr. Payton Reinoso    Reason for visit: myeloma follow-up           Oncology History   52 year old female with past medical history significant for Irritable bowel disease, allergies, large plasmacytoma of the L pelvis, and newly diagnosed multiple myeloma.     Early in April 2021 , she noted to have left sided groin pain, constant and was affecting her daily activities  MRI showed large, expansile, permeative mass with indistinct borders involving the left superior and inferior pubic rami, pubic symphysis and with possible extension to the left acetabulum.     We completed the following work-up:   - Biopsy 9/7/2021: plasmacytoma.  Flow cytometry showed kappa monotypic plasma cells, polytypic B cells. FISH results from plasmacytoma showed a gain of 11q, IGH-CCND1 fusion but no losses of 1q or TP53 and no gain of 1q.  - Bone marrow biopsy 09/22/21: Marrow cellularity 50% with 25% interstitial infiltration w/  Kappa-monotypic, moderately atypical plasma cells. Flow with 0.7% kappa-monotypica plasma cells. FISH and cytogenetics pending.   - PET CT 09/24/21 w/ hypermetabolic pathologic fracture of the L pubic ramus w/ aggressive appearing osteolysis but no additional suspicious lytic or blastic osseous lesions.     Treatment to date:   - Zometa q4 weeks started 9/27/21  - Radiation to left pubic ramus x18 fractions, completed 11/11/21.   - RVD C1D1 11/15/21  - RVD C2D1 12/6/21  - RVD C3D1  12/27/21  - RVD C4 D1 1/17/22, elizabeth added on D8 due to inadequate response of urinary M-spike and FLCs remaining over 100 mg/dL after 4 cycles of RVD    - Elizabeth-RVD C1D8 1/24/22  - Elizabeth-RVD C2D1 2/7/22  - Elizabeth-RVD C3D1 2/28/22  - Elizabeth-RVD C4D1 3/29/22  - Elizabeth-RVD C5D1 4/19/22    Disease response: PA     - 5/19/22: Continued monotherapy with Daratumumab; holding Revlimid and Velcade.    - Transitioning to KCD, starting today (6/21/22)           "INTERIM HISTORY:   Margo comes in for oncology follow-up visit today.    She has been feeling significantly better since discontinuing Velcade and Revlimid.  Her loose stools have decreased from 10+ per day to about 2 loose stools daily.  She is no longer having any dizziness or shortness of breath.  She does still have some chest tightness - notices it the most when she walks up the stairs and then lays down (especially if she lays on her side). Appetite is still somewhat poor, mostly due to altered taste.  The neuropathy in her feet seems a little worse, although she notes she is maybe just noticing it more now that her other symptoms have started to resolve.  Overall, she reports feeling much better and somewhat \"feels like herself\" again.    Denies fevers, chills, headaches, dizziness, vision or hearing changes, new lumps or bumps, chest pain, cough, abdominal pain, nausea, vomiting, changes to bowel or bladder, swelling of extremities, bleeding issues, or rash.           Physical Exam:     /78   Pulse 83   Temp 98.2  F (36.8  C) (Oral)   Resp 18   Wt 66.6 kg (146 lb 12.8 oz)   SpO2 98%   BMI 22.99 kg/m      Wt Readings from Last 4 Encounters:   06/21/22 66.6 kg (146 lb 12.8 oz)   06/06/22 68.4 kg (150 lb 11.2 oz)   05/31/22 66.2 kg (146 lb)   05/24/22 68.7 kg (151 lb 6.4 oz)       General: well appearing, no acute distress, pleasant female  HEENT: normocephalic, atraumatic, PERRLA, sclerae nonicteric  Lymph: no palpable cervical, supraclavicular or axillary lymphadenopathy   CV: S1 S2, RRR, no murmur, click or rub  Lungs: clear to auscultation bilaterally, no wheezes/rales/rhonchi  Abd: soft, positive bowel sounds, non-distended, non-tender  MSK: no peripheral edema  Neuro: alert and oriented x3, CN grossly intact  Psych: appropriate mood and affect  Skin:no visible rashes or lesions on exposed skin          Laboratory Data:      I personally reviewed the following labs:    Most Recent 3 " CBC's:  Recent Labs   Lab Test 06/21/22  1307 06/06/22  1143 05/24/22  1527   WBC 4.4 6.7 4.8   HGB 13.3 12.6 13.1   MCV 94 98 97    198 317     Most Recent 3 BMP's:  Recent Labs   Lab Test 06/21/22  1307 06/06/22  1143 05/24/22  1527 05/19/22  1007     --  142 141   POTASSIUM 3.6  --  3.4 4.1   CHLORIDE 115*  --  113* 113*   CO2 20  --  18* 20   BUN 26  --  22 24   CR 0.67 0.67 0.72 0.78   ANIONGAP 9  --  11 8   MEKA 9.2 9.3 8.9 9.2   GLC 94  --  146* 92     Most Recent 2 LFT's:  Recent Labs   Lab Test 06/21/22  1307 05/24/22  1527   AST 14 13   ALT 27 15   ALKPHOS 51 49   BILITOTAL 0.5 0.5     Imaging:  No new imaging to review.         Assessment and Recommendations   Kristie Cornejo is a 52 year old female who was diagnosed with Plasmacytoma after she presented with left sided groin pain now found to have multiple myeloma after further workup with bone marrow biopsy.     # Multiple myeloma with associated large plasmacytoma of the L pelvis. Kappa light chain disease.     BMBx with 25% plasma cell infiltration; standard risk with gain of 11q, IGH-CCND1 fusion but no losses of 1q or TP53 and no gain of 1q.   SPEP w/out a monoclonal peak  UPEP positive. 70% paraprotein. Large monoclonal free immunoglobulin light chain of kappa chain type.     No anemia, normal creat, Ca, no other skeletal lesions. Alb normal, B2M normal.  Stage I Light chain disease with left pelvic bone pathologic fracture she is classified as having symptomatic multiple myeloma.     RVD C1D1 11/15/21 (had a slight delay in the start of her Revlimid on 11/19/21 due to the timing of her pregnancy tests).  She tolerated cycle 1 well with some mild nausea and a headache on her week off.  RVD C2D1 12/6/21, tolerated well overall, but has had more consistent sensation of her hands and feet being hot. No tingling, pain, loss of sensation, or changes to her strength.  RVD C3D1 12/27/21, tolerated well overall with ongoing sensation of  "\"hot\" hands and feet  RVD C4 D1 1/17/22, elizabeth added on D8 due to inadequate response of urinary M-spike and FLCs remaining over 100 mg/dL after 3 cycles of RVD    Elizabeth-RVD C5 2/7/22 Changing to 21 day cycles due to insurance.  Will receive Rev Days 1-14, Velcade days 1,4,8,11, and elizabeth days 1,8,15.    Elizabeth-RVD C6 2/28/22  Elizabeth-RVD C7 3/28/22  Elizabeth-RVD C8 4/19/22    She completed 5 cycles of Elizabeth plus RVD and acheived a partial response.  Her free kappa light chains were at least 50% reduced but the response plateaued, and now the kappa light chains are actually increasing again. She met with Dr. Reinoso and the plan was established to switch to Kyprolis, Cytoxan, and dexamethasone.  Margo continued on daratumumab monotherapy while awaiting final cardiac clearance due to potential cardiotoxicity of Kyprolis.  She has her follow-up with Dr. Thomas next week. Cardiac MRI shows LVEF 50% with no infiltrative cardiomyopathy. Recent holter shows 1 VT run of 4 beats and 8 SVT runs less than 12 seconds. Reviewed these results with Dr. Reinoso and agreed to start KCD today.  We will still appreciate Dr. Thomas's input next week regarding the safety of continuing Kyprolis.  However, we do not want to delay the start of her new regimen any longer, as we are also concerned about the status of her myeloma. Will start KCD today, and await further recommendations from Dr. Thomas regarding cardiac clearance for continuing therapy with Kyprolis versus considering the next line of treatment options.      #Bone Health  She will continue monthly Zometa (last received on 6/6/22). She will continue Calcium/Vitamin D supplements.     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 Currently having no pain and able to walk 1 mile without any assistive devices.  She is interested in " "pursuing other activities like gentle cross-country skiing.  Had follow-up with Dr. Philip on 1/6/22 and was cleared to resume regular activity as tolerated. She should still refrain from high-impact activities.    # PVCs  She had an ECHO 12/1 and had follow-up with cardiology 12/7 (notes in [facility]Everywhere).  She had follow-up with cardiology here at the  and they recommend an ablation. Stress test was completed and she was started on Toprol XL; however, Toprol XL was discontinued due to significant side effects of orthostatic hypotension, pre-syncopal episodes, shakiness, and weakness.   - She had an ablation 3/23/22 but has not experienced relief from the feeling of being nauseated and short of breath after climbing the stairs.  She also reports episodes of palpitations that \"radiate into her neck.\" Denies chest pain or shortness of breath.   - EKG done 4/4 showed sinus rhythm and nonspecific ST and T wave abnormality. Troponin negative.  - Cardiac MRI ruled out amyloid.  LFEF 50%, no evidence of infiltrative cardiomyopathy.  - She followed up with cardiology and they felt that the cause may be musculoskeletal or pericarditis. They suggested taking ibuprofen for pain. Her pain has subsided and she actually has not needed to take ibuprofen.   - Post ablation Zio patch was performed.  She meets with Dr. Thomas next week to discuss the results.    #Diarrhea  She was having intermittent diarrhea with associated abdominal cramping and nausea, but developed much more significant diarrhea with 10-12 liquid episodes and was seen as an add-on on 4/4/22 for this.   - C.diff PCR negative 2/8/22, repeat on 4/5 negative  - Enteric stool panel 4/5/22 negative  - Underwent colonoscopy on 4/13/22 to pursue biopsy for amyloid (discussed below). [place] red stain is negative for amyloid deposits. Pathology showed colonic mucosa with focal active colitis, negative for signs of chronicity and lymphocytic colitis. Per patient report, GI " "reviewed results with her and did not feel she needed treatment for these findings at this time. They advised her to monitor her symptoms and to call them if they do not improve.Dr. Montgomery agreed with following conservatively for now since she reports some improvement in her symptoms.  If diarrhea becomes bothersome again, then they will arrange for expeditious evaluation in the IBD clinic.   - CMV PCR (4/4/22) not detected  - Diarrhea improved to now twice daily after discontinuing Velcade and Revlimid.     #Ocular Migraines  She has a history of ocular migraines that present as \"squiggly lines in her field of vision.\" These have occurred about annually since 2002, but were occurring daily following her ablation on 3/23/22. Currently decreasing in frequency.  - Referred to ophthalmology for further evaluation - they provided new prescription and recommended artificial tear drops.  - Symptoms have significantly improved.    #Concern for amyloid  She has continued to have significant GI symptoms despite negative infectious testing and diarrhea not being a major side effect of michelle or Velcade. Previously discussed with Dr. Reinoso.  In light of the constellation of symptoms, as well as her sluggish response to therapy, she would like to do further testing to rule out amyolid. Of note, congo red stain was negative on her bone marrow biopsy from 9/22/21  - Cardiac MRI negative for infiltrative cardiomyopathy  - Colonoscopy to biopsy for GI amyloid done 4/13. Congo red stain negative for amyloid deposits.   - Diarrhea has significantly improved after discontinuing Velcade and Revlimid.    #COVID prophylaxis  - She received EVUSHELD 1/20/22 and 3/8/22  - Received 3 COVID vaccines to date    #Reflux  She reported episodes of reflux with bile, especially when she lays down at night.  She has started to elevate her head on pillows and is also trying to avoid eating within 1-2 hours of going to bed.  Omeprazole was " increased to 20 mg twice daily with symptom improvement.    I reminded patient to call us if any questions/issues arise in between visits.     60 minutes spent on the date of the encounter doing chart review, review of test results, interpretation of tests, patient visit, documentation and discussion with other provider(s)     DORON Hall [facility] [address] [id number] [phone]

## 2022-06-22 ENCOUNTER — INFUSION THERAPY VISIT (OUTPATIENT)
Dept: ONCOLOGY | Facility: CLINIC | Age: 52
End: 2022-06-22
Payer: COMMERCIAL

## 2022-06-22 VITALS
TEMPERATURE: 98.2 F | DIASTOLIC BLOOD PRESSURE: 82 MMHG | OXYGEN SATURATION: 100 % | HEART RATE: 87 BPM | SYSTOLIC BLOOD PRESSURE: 120 MMHG | RESPIRATION RATE: 12 BRPM

## 2022-06-22 DIAGNOSIS — C90.00 LIGHT CHAIN MYELOMA (H): Primary | ICD-10-CM

## 2022-06-22 LAB
ALBUMIN SERPL ELPH-MCNC: 4.5 G/DL (ref 3.7–5.1)
ALPHA1 GLOB SERPL ELPH-MCNC: 0.3 G/DL (ref 0.2–0.4)
ALPHA2 GLOB SERPL ELPH-MCNC: 0.8 G/DL (ref 0.5–0.9)
B-GLOBULIN SERPL ELPH-MCNC: 0.6 G/DL (ref 0.6–1)
GAMMA GLOB SERPL ELPH-MCNC: 0.3 G/DL (ref 0.7–1.6)
IGA SERPL-MCNC: 5 MG/DL (ref 84–499)
IGG SERPL-MCNC: 304 MG/DL (ref 610–1616)
IGM SERPL-MCNC: 10 MG/DL (ref 35–242)
KAPPA LC FREE SER-MCNC: 45.53 MG/DL (ref 0.33–1.94)
KAPPA LC FREE/LAMBDA FREE SER NEPH: 284.56 {RATIO} (ref 0.26–1.65)
LAMBDA LC FREE SERPL-MCNC: 0.16 MG/DL (ref 0.57–2.63)
M PROTEIN SERPL ELPH-MCNC: 0.1 G/DL
PROT PATTERN SERPL ELPH-IMP: ABNORMAL
PROT PATTERN SERPL IFE-IMP: NORMAL

## 2022-06-22 PROCEDURE — 258N000003 HC RX IP 258 OP 636

## 2022-06-22 PROCEDURE — 84165 PROTEIN E-PHORESIS SERUM: CPT | Mod: 26

## 2022-06-22 PROCEDURE — 86334 IMMUNOFIX E-PHORESIS SERUM: CPT | Mod: 26

## 2022-06-22 PROCEDURE — 96367 TX/PROPH/DG ADDL SEQ IV INF: CPT

## 2022-06-22 PROCEDURE — 96413 CHEMO IV INFUSION 1 HR: CPT

## 2022-06-22 PROCEDURE — 250N000011 HC RX IP 250 OP 636

## 2022-06-22 RX ADMIN — DEXTROSE MONOHYDRATE 500 ML: 50 INJECTION, SOLUTION INTRAVENOUS at 13:57

## 2022-06-22 RX ADMIN — CARFILZOMIB 36 MG: 10 INJECTION, POWDER, LYOPHILIZED, FOR SOLUTION INTRAVENOUS at 14:23

## 2022-06-22 RX ADMIN — DEXAMETHASONE SODIUM PHOSPHATE 8 MG: 10 INJECTION, SOLUTION INTRAMUSCULAR; INTRAVENOUS at 13:58

## 2022-06-22 ASSESSMENT — PAIN SCALES - GENERAL: PAINLEVEL: NO PAIN (0)

## 2022-06-22 NOTE — PROGRESS NOTES
Infusion Nursing Note:  Kristie Cornejo presents today for C1D2 Kyprolis.    Patient seen by provider today: No   present during visit today: Not Applicable.    Note: Patient denies the following: fevers, body aches, chills, headaches, vision changes, dizziness, chest pain, shortness of breath, nausea, vomiting, constipation, abdominal pain, rashes, bruising/bleeding, mouth sores, swelling or pain in the legs. Ok for treatment.       Intravenous Access:  Peripheral IV placed.    Treatment Conditions:  Lab Results   Component Value Date    HGB 13.3 06/21/2022    WBC 4.4 06/21/2022    ANEU 3.2 01/17/2022    ANEUTAUTO 3.1 06/21/2022     06/21/2022      Lab Results   Component Value Date     06/21/2022    POTASSIUM 3.6 06/21/2022    CR 0.67 06/21/2022    MEKA 9.2 06/21/2022    BILITOTAL 0.5 06/21/2022    ALBUMIN 4.0 06/21/2022    ALT 27 06/21/2022    AST 14 06/21/2022     Results reviewed, labs MET treatment parameters, ok to proceed with treatment.      Post Infusion Assessment:  Patient tolerated infusion without incident.  Blood return noted pre and post infusion.  Site patent and intact, free from redness, edema or discomfort.  No evidence of extravasations.  Access discontinued per protocol.       Discharge Plan:   Patient declined prescription refills.  Discharge instructions reviewed with: Patient.  Patient and/or family verbalized understanding of discharge instructions and all questions answered.  AVS to patient via [employer].  Patient will return 6/29 for next appointment.   Patient discharged in stable condition accompanied by: self.  Departure Mode: Ambulatory.    Sailaja Ravi RN

## 2022-06-27 ENCOUNTER — OFFICE VISIT (OUTPATIENT)
Dept: CARDIOLOGY | Facility: CLINIC | Age: 52
End: 2022-06-27
Attending: INTERNAL MEDICINE
Payer: COMMERCIAL

## 2022-06-27 ENCOUNTER — PATIENT OUTREACH (OUTPATIENT)
Dept: ONCOLOGY | Facility: CLINIC | Age: 52
End: 2022-06-27

## 2022-06-27 VITALS
DIASTOLIC BLOOD PRESSURE: 87 MMHG | BODY MASS INDEX: 22.22 KG/M2 | HEART RATE: 75 BPM | WEIGHT: 146.6 LBS | SYSTOLIC BLOOD PRESSURE: 127 MMHG | OXYGEN SATURATION: 100 % | HEIGHT: 68 IN

## 2022-06-27 DIAGNOSIS — I49.3 PVC'S (PREMATURE VENTRICULAR CONTRACTIONS): Primary | ICD-10-CM

## 2022-06-27 LAB
ATRIAL RATE - MUSE: 33 BPM
DIASTOLIC BLOOD PRESSURE - MUSE: NORMAL MMHG
INTERPRETATION ECG - MUSE: NORMAL
P AXIS - MUSE: 17 DEGREES
PR INTERVAL - MUSE: 168 MS
QRS DURATION - MUSE: 88 MS
QT - MUSE: 444 MS
QTC - MUSE: 444 MS
R AXIS - MUSE: 6 DEGREES
SYSTOLIC BLOOD PRESSURE - MUSE: NORMAL MMHG
T AXIS - MUSE: 29 DEGREES
VENTRICULAR RATE- MUSE: 60 BPM

## 2022-06-27 PROCEDURE — 93005 ELECTROCARDIOGRAM TRACING: CPT

## 2022-06-27 PROCEDURE — 99215 OFFICE O/P EST HI 40 MIN: CPT | Performed by: INTERNAL MEDICINE

## 2022-06-27 PROCEDURE — G0463 HOSPITAL OUTPT CLINIC VISIT: HCPCS | Mod: 25

## 2022-06-27 RX ORDER — CARFILZOMIB 10 MG/5ML
INJECTION, POWDER, LYOPHILIZED, FOR SOLUTION INTRAVENOUS ONCE
COMMUNITY
End: 2022-07-13 | Stop reason: ALTCHOICE

## 2022-06-27 ASSESSMENT — PAIN SCALES - GENERAL: PAINLEVEL: NO PAIN (0)

## 2022-06-27 NOTE — PROGRESS NOTES
[facility]: Cancer Care                                                                                          Margo calls and reports that she met with cardiologist Dr. Alem Thomas today.  She wanted to share the information from her visit.    She is about 2 months out from her ablation.  She has been feeling well until the past few days.    He told her the cardiac MRI looked good, ziopatch  looked good and her PVCs went from 15% down to 2.7%     She said the EKG today was a whole page of PVCs and he told her it was unexpected.  She reports she has been experiencing chest pain and SOB.  She feels her heart pounding with same level of exercise she has been tolerating for the past couple of months    He told her he thought these results were caused by the chemotherapy treatment.    She had Cycle 1  Days 1 an 2 of Kyprolis and Cytoxan on 6/21/2022 and 6/22/2022 due for next treatment on 6/29/2022 and 6/30/2022 for days 8 and 9    Margo was informed that this information would be shared with Dr. Reinoso so she may review the information with Dr. Thomas and determine a plan for treatment.    Signature:  Mattie Rodriguez RN

## 2022-06-27 NOTE — PATIENT INSTRUCTIONS
You were seen in the Electrophysiology Clinic today by: Dr Thomas    Plan:     Labs/Tests Needed:  14 day ziopatch & Echocardiogram after current round of chemotherapy. Patient will reach out to clinic once she knows when this might be.       Your Care Team:  EP Cardiology   Telephone Number     Nurse Line  Olamide Barroso RN  [phone]     For scheduling appts or procedures:    Harini Couch   [phone]   For the Device Clinic (Pacemakers, ICDs, Loop Recorders)    During business hours: [phone]  After business hours:   [phone]- select option 4 and ask for job code [id number].     On-call cardiologist for after hours or on weekends: [phone], option #4, and ask to speak to the on-call cardiologist.     Cardiovascular Clinic:   [address] [place] [address]. [place] [address]      As always, Thank you for trusting us with your health care needs!

## 2022-06-27 NOTE — NURSING NOTE
Chief Complaint   Patient presents with     Follow Up     3 mo f/u post PVC ablation, zio in chart  no cardiac meds           Vitals were taken, medications reconciled and EKG performed.     Lamont Gardiner, EMT   9:39 AM

## 2022-06-27 NOTE — PROGRESS NOTES
HPI:   Kristie Cornejo is a 52 year old female with a past medical history significant for Irritable bowel disease, allergies, large plasmacytoma of the L pelvis, newly diagnosed multiple myeloma, and PVCs.  She was referred for a cardiology evaluation.    She started having PVCs in 2001 and they are getting worse.  She stated that physical Ex might be a trigger of her PVCs.  Her PVCs are syymptomatic but she is having more PVCs than she feels.  She underwent catheter ablation of PVCs originating from the RVOT and anterolateral papillary muscle on 3/23/2022.  She is now seen for a follow up.  She complained of chest pain even after the ablation but felt better after she discontinued chemo Thx.   She wore a Zio patch while she held the chemo Thx.  She started a new chemo Thx a week ago and chest pain recurred.    PAST MEDICAL HISTORY:  Past Medical History:   Diagnosis Date     Allergic rhinitis, cause unspecified      Anxiety state, unspecified 12/01/2003    Started postpartum . On Paxil x 1+years. Off meds- 10/04     Irritable bowel syndrome 01/01/2004     Multiple myeloma not having achieved remission (H)      PLANTAR WARTS     resolved     SINUS DYSRHYTHMIA 10/01/2002    wnl     Unspecified hemorrhoids without mention of complication 04/01/2004    colonoscopy 4/04 spastic colon, int roids; bx neg       CURRENT MEDICATIONS:  Current Outpatient Medications   Medication Sig Dispense Refill     acyclovir (ZOVIRAX) 400 MG tablet Take 1 tablet (400 mg) by mouth 2 times daily Viral Prophylaxis. 60 tablet 2     acyclovir (ZOVIRAX) 400 MG tablet Take 1 tablet (400 mg) by mouth 2 times daily 180 tablet 3     BUSPIRONE HCL 10 MG PO TABS 1 TABLET 3 TIMES DAILY as needed (Patient not taking: No sig reported) 30 Tab 3     Calcium Carbonate-Vit D-Min (CALCIUM 600+D PLUS MINERALS) 600-400 MG-UNIT TABS        cetirizine HCl 10 MG CAPS Take 20 mg by mouth       daratumumab-hyaluronidase-fihj (DARZALEX FASPRO) 1800-85603  MG-UT/15ML SUBCUTANEOUS injection Inject 15 mLs Subcutaneous Every 2 weeks       dexamethasone (DECADRON) 4 MG tablet Take 5 tablets (20 mg) by mouth with food. Days 1, 8, 15, and 22. Take prior to carfilzomib on day of infusion. 20 tablet 0     Flaxseed (Linseed) (FLAX SEED OIL) 1000 MG capsule Take 1 capsule by mouth daily       fluticasone (FLONASE) 50 MCG/ACT nasal spray 1 spray       glucosamine-chondroitin 500-400 MG CAPS per capsule        lactobacillus rhamnosus, GG, (CULTURELL) capsule Take 1 capsule by mouth 2 times daily       loperamide (IMODIUM) 2 MG capsule Take 2 mg by mouth 4 times daily as needed for diarrhea       LORazepam (ATIVAN) 0.5 MG tablet Take 1 tablet (0.5 mg) by mouth every 4 hours as needed for anxiety, nausea or sleep 30 tablet 3     melatonin 5 MG tablet Take 5 mg by mouth nightly as needed for sleep (Patient not taking: No sig reported)       MIRENA 20 MCG/24HR IU IUD use for up to 5 years, then remove       Multiple Vitamin (MULTI-VITAMIN DAILY PO) Take 1 tablet by mouth       omeprazole (PRILOSEC OTC) 20 MG EC tablet Take 1 tablet (20 mg) by mouth 2 times daily 60 tablet 3     ondansetron (ZOFRAN) 8 MG tablet Take 1 tablet (8 mg) by mouth every 8 hours as needed for nausea (vomiting) 30 tablet 2     potassium chloride ER (KLOR-CON M) 10 MEQ CR tablet Take 2 tablets (20 mEq) by mouth 2 times daily 90 tablet 2     prochlorperazine (COMPAZINE) 10 MG tablet Take 1 tablet (10 mg) by mouth every 6 hours as needed (Nausea/Vomiting) 30 tablet 2     prochlorperazine (COMPAZINE) 10 MG tablet Take 10 mg by mouth every 6 hours as needed for nausea or vomiting       Zoledronic Acid (ZOMETA IV)          PAST SURGICAL HISTORY:  Past Surgical History:   Procedure Laterality Date     BIOPSY BONE PELVIS Left 9/7/2021    Procedure: Biopsy left pubic ramis;  Surgeon: Edu Philip MD;  [facility]: [facility]     COLONOSCOPY N/A 4/13/2022    Procedure: COLONOSCOPY, WITH BIOPSY;  Surgeon: Berto Montgomery  MD Sravan;  Location:  GI     EP ABLATION PVC N/A 3/23/2022    Procedure: Ablation Premature Ventricular Contractions;  Surgeon: Alem Thomas MD;  Location:  HEART CARDIAC CATH LAB     [facility] NONSPECIFIC PROCEDURE  1987    [place] teeth removed     [facility] NONSPECIFIC PROCEDURE  1993    (R) knee surgery     [facility] NONSPECIFIC PROCEDURE  2003    (R) breast bx-negative 6/03; bx neg 10/02     [facility] NONSPECIFIC PROCEDURE      10/02 cardiac echo normal     [facility] NONSPECIFIC PROCEDURE  2004 4/04 colonosc int roids, spastic colon     [facility] NONSPECIFIC PROCEDURE  8/05    D & C for missed AB     [facility] COLONOSCOPY THRU STOMA, DIAGNOSTIC  2004       ALLERGIES:     Allergies   Allergen Reactions     Clarithromycin      Other reaction(s): GI intolerance     Amoxicillin      Other reaction(s): upset stomach  Other reaction(s): Gastrointestinal, GI intolerance, Other (see comments)  Other reaction(s): upset stomach  Other reaction(s): upset stomach       Erythromycin      upsets stomach     Erythromycin      Other reaction(s): GI intolerance  upsets stomach  nausea     Fluconazole Rash     Itching,   Unsure if really allergy  Itching,   Unsure if really allergy    Other reaction(s): Other (see comments)  Itching,   Unsure if really allergy       FAMILY HISTORY:  + Premature coronary artery disease  - Atrial fibrillation  - Sudden cardiac death     SOCIAL HISTORY:  Social History     Tobacco Use     Smoking status: Never Smoker     Smokeless tobacco: Never Used   Vaping Use     Vaping Use: Never used   Substance Use Topics     Alcohol use: Yes     Comment: none to a couple. occasional     Drug use: No       ROS:   Answers for HPI/ROS submitted by the patient on 1/31/2022 were reviewed.  Constitutional: No fever, chills, or sweats. Weight stable.   ENT: No visual disturbance, ear ache, epistaxis, sore throat.   Cardiovascular: As per HPI.   Respiratory: No cough, hemoptysis.    GI: No nausea, vomiting, hematemesis, melena, or hematochezia.    : No hematuria.   Integument: Negative.   Psychiatric: Negative.   Hematologic:  Easy bruising, no easy bleeding.  Neuro: Negative.   Endocrinology: No significant heat or cold intolerance   Musculoskeletal: No myalgia.    Exam:  There were no vitals taken for this visit.  GENERAL APPEARANCE: healthy, alert and no distress  HEENT: no icterus, no xanthelasmas, normal pupil size and reaction, normal palate, mucosa moist, no central cyanosis  NECK: no adenopathy, no asymmetry, masses, or scars, thyroid normal to palpation and no bruits, JVP not elevated  RESPIRATORY: lungs clear to auscultation - no rales, rhonchi or wheezes, no use of accessory muscles, no retractions, respirations are unlabored, normal respiratory rate  CARDIOVASCULAR: regular rhythm, normal S1 with physiologic split S2, no S3 or S4 and no murmur, click or rub, precordium quiet with normal PMI.  ABDOMEN: soft, non tender, without hepatosplenomegaly, no masses palpable, bowel sounds normal, aorta not enlarged by palpation, no abdominal bruits  EXTREMITIES: peripheral pulses normal, no edema, no bruits  NEURO: alert and oriented to person/place/time, normal speech, gait and affect  VASC: Radial, femoral, dorsalis pedis and posterior tibialis pulses are normal in volumes and symmetric bilaterally. No bruits are heard.  SKIN: no ecchymoses, no rashes    Labs:  CBC RESULTS:   Lab Results   Component Value Date    WBC 4.4 06/21/2022    WBC 7.3 02/13/2006    RBC 4.18 06/21/2022    RBC 4.25 02/13/2006    HGB 13.3 06/21/2022    HGB 12.2 08/29/2006    HCT 39.4 06/21/2022    HCT 38.0 02/13/2006    MCV 94 06/21/2022    MCV 89 02/13/2006    MCH 31.8 06/21/2022    MCH 30.6 02/13/2006    MCHC 33.8 06/21/2022    MCHC 34.2 02/13/2006    RDW 12.9 06/21/2022    RDW 12.3 02/13/2006     06/21/2022     02/13/2006       BMP RESULTS:  Lab Results   Component Value Date     06/21/2022    POTASSIUM 3.6 06/21/2022    CHLORIDE 115 (H) 06/21/2022    CO2  20 06/21/2022    ANIONGAP 9 06/21/2022    GLC 94 06/21/2022    GLC 81 09/24/2021    BUN 26 06/21/2022    CR 0.67 06/21/2022    GFRESTIMATED >90 06/21/2022    MEKA 9.2 06/21/2022        INR RESULTS:  No results found for: INR    Procedures:  Holter ECG in 9/2021: Reviewed.      ECG on 10/8/2021: Reviewed.      ECG on 1/17/2022: Reviewed.      ECG on 1/31/2022: Reviewed.      Zio patch in 5-6/2022: Reviewed.            ECG on 6/27/2022: Reviewed.      Assessment and Plan:  # Irritable bowel disease  # Allergies  # Large plasmacytoma of the L pelvis  # Multiple myeloma, newly diagnosed.  # Frequent PVCs Symptomatic. [place] = 15% per Holter ECG in 9/2021. Likely originating from the LCC and posteromedial papillary muscle.   -> s/p catheter ablation of PVCs originating from the RVOT and anterolateral papillary muscle on 3/23/2022.  -> PVC burden = 2.7% per Zio patch in 5-6/2022.  The PVC burden was reduced by the catheter ablation.  But, the patient is now having frequent PVCs again that are different from what were treated by the catheter ablation. It appears that chemo Thx induced new PVCs.  I advised her to continue the chemo Thx and repeat a Zio patch and TTE if she has a chance at the transition from the chemo Thx to stem cell implant.  She will let us know once she finds her future schedule.     I spent a total of 55 min today to review the records, see the patient, and complete the documents.    CC  Patient Care Team:  Rena Sheffield MD as PCP - General (Family Medicine)  Edu Philip MD as Assigned Musculoskeletal Provider  Griselda Valdez, RN as Specialty Care Coordinator (Hematology & Oncology)  Payton Reinoso MD as MD (Hematology)  Alem Thomas MD (Cardiovascular Disease)  Alem Thomas MD as Assigned Heart and Vascular Provider  Shabana Bauman MD as MD (Ophthalmology)  Payton Reinoso MD as Assigned Cancer Care Provider  Fanny Ponce MD as Assigned Surgical  Provider  GODWIN LEAHY

## 2022-06-27 NOTE — LETTER
6/27/2022      RE: Kristie Cornejo  [address]  [facility] [address]       Dear Colleague,    Thank you for the opportunity to participate in the care of your patient, Kristie Cornejo, at the [facility]ART [facility] [place] at [facility]. Please see a copy of my visit note below.    HPI:   Kristie Cornejo is a 52 year old female with a past medical history significant for Irritable bowel disease, allergies, large plasmacytoma of the L pelvis, newly diagnosed multiple myeloma, and PVCs.  She was referred for a cardiology evaluation.    She started having PVCs in 2001 and they are getting worse.  She stated that physical Ex might be a trigger of her PVCs.  Her PVCs are syymptomatic but she is having more PVCs than she feels.  She underwent catheter ablation of PVCs originating from the RVOT and anterolateral papillary muscle on 3/23/2022.  She is now seen for a follow up.  She complained of chest pain even after the ablation but felt better after she discontinued chemo Thx.   She wore a Zio patch while she held the chemo Thx.  She started a new chemo Thx a week ago and chest pain recurred.    PAST MEDICAL HISTORY:  Past Medical History:   Diagnosis Date     Allergic rhinitis, cause unspecified      Anxiety state, unspecified 12/01/2003    Started postpartum . On Paxil x 1+years. Off meds- 10/04     Irritable bowel syndrome 01/01/2004     Multiple myeloma not having achieved remission (H)      PLANTAR WARTS     resolved     SINUS DYSRHYTHMIA 10/01/2002    wnl     Unspecified hemorrhoids without mention of complication 04/01/2004    colonoscopy 4/04 spastic colon, int roids; bx neg       CURRENT MEDICATIONS:  Current Outpatient Medications   Medication Sig Dispense Refill     acyclovir (ZOVIRAX) 400 MG tablet Take 1 tablet (400 mg) by mouth 2 times daily Viral Prophylaxis. 60 tablet 2     acyclovir (ZOVIRAX) 400 MG tablet Take 1 tablet (400 mg) by mouth  2 times daily 180 tablet 3     BUSPIRONE HCL 10 MG PO TABS 1 TABLET 3 TIMES DAILY as needed (Patient not taking: No sig reported) 30 Tab 3     Calcium Carbonate-Vit D-Min (CALCIUM 600+D PLUS MINERALS) 600-400 MG-UNIT TABS        cetirizine HCl 10 MG CAPS Take 20 mg by mouth       daratumumab-hyaluronidase-fihj (DARZALEX FASPRO) 1800-84156 MG-UT/15ML SUBCUTANEOUS injection Inject 15 mLs Subcutaneous Every 2 weeks       dexamethasone (DECADRON) 4 MG tablet Take 5 tablets (20 mg) by mouth with food. Days 1, 8, 15, and 22. Take prior to carfilzomib on day of infusion. 20 tablet 0     Flaxseed (Linseed) (FLAX SEED OIL) 1000 MG capsule Take 1 capsule by mouth daily       fluticasone (FLONASE) 50 MCG/ACT nasal spray 1 spray       glucosamine-chondroitin 500-400 MG CAPS per capsule        lactobacillus rhamnosus, GG, (CULTURELL) capsule Take 1 capsule by mouth 2 times daily       loperamide (IMODIUM) 2 MG capsule Take 2 mg by mouth 4 times daily as needed for diarrhea       LORazepam (ATIVAN) 0.5 MG tablet Take 1 tablet (0.5 mg) by mouth every 4 hours as needed for anxiety, nausea or sleep 30 tablet 3     melatonin 5 MG tablet Take 5 mg by mouth nightly as needed for sleep (Patient not taking: No sig reported)       MIRENA 20 MCG/24HR IU IUD use for up to 5 years, then remove       Multiple Vitamin (MULTI-VITAMIN DAILY PO) Take 1 tablet by mouth       omeprazole (PRILOSEC OTC) 20 MG EC tablet Take 1 tablet (20 mg) by mouth 2 times daily 60 tablet 3     ondansetron (ZOFRAN) 8 MG tablet Take 1 tablet (8 mg) by mouth every 8 hours as needed for nausea (vomiting) 30 tablet 2     potassium chloride ER (KLOR-CON M) 10 MEQ CR tablet Take 2 tablets (20 mEq) by mouth 2 times daily 90 tablet 2     prochlorperazine (COMPAZINE) 10 MG tablet Take 1 tablet (10 mg) by mouth every 6 hours as needed (Nausea/Vomiting) 30 tablet 2     prochlorperazine (COMPAZINE) 10 MG tablet Take 10 mg by mouth every 6 hours as needed for nausea or vomiting        Zoledronic Acid (ZOMETA IV)          PAST SURGICAL HISTORY:  Past Surgical History:   Procedure Laterality Date     BIOPSY BONE PELVIS Left 9/7/2021    Procedure: Biopsy left pubic ramis;  Surgeon: Edu Philip MD;  [facility]     COLONOSCOPY N/A 4/13/2022    Procedure: COLONOSCOPY, WITH BIOPSY;  Surgeon: Berto Montgomery MD;  Location:  GI     EP ABLATION PVC N/A 3/23/2022    Procedure: Ablation Premature Ventricular Contractions;  Surgeon: Alem Thomas MD;  Location:  HEART CARDIAC CATH LAB     [facility] NONSPECIFIC PROCEDURE  1987    [place] teeth removed     [facility] NONSPECIFIC PROCEDURE  1993    (R) knee surgery     [facility] NONSPECIFIC PROCEDURE  2003    (R) breast bx-negative 6/03; bx neg 10/02     [facility] NONSPECIFIC PROCEDURE      10/02 cardiac echo normal     [facility] NONSPECIFIC PROCEDURE  2004 4/04 colonosc int roids, spastic colon     [facility] NONSPECIFIC PROCEDURE  8/05    D & C for missed AB     [facility] COLONOSCOPY THRU STOMA, DIAGNOSTIC  2004       ALLERGIES:     Allergies   Allergen Reactions     Clarithromycin      Other reaction(s): GI intolerance     Amoxicillin      Other reaction(s): upset stomach  Other reaction(s): Gastrointestinal, GI intolerance, Other (see comments)  Other reaction(s): upset stomach  Other reaction(s): upset stomach       Erythromycin      upsets stomach     Erythromycin      Other reaction(s): GI intolerance  upsets stomach  nausea     Fluconazole Rash     Itching,   Unsure if really allergy  Itching,   Unsure if really allergy    Other reaction(s): Other (see comments)  Itching,   Unsure if really allergy       FAMILY HISTORY:  + Premature coronary artery disease  - Atrial fibrillation  - Sudden cardiac death     SOCIAL HISTORY:  Social History     Tobacco Use     Smoking status: Never Smoker     Smokeless tobacco: Never Used   Vaping Use     Vaping Use: Never used   Substance Use Topics     Alcohol use: Yes     Comment: none to a couple. occasional     Drug use: No        ROS:   Answers for HPI/ROS submitted by the patient on 1/31/2022 were reviewed.  Constitutional: No fever, chills, or sweats. Weight stable.   ENT: No visual disturbance, ear ache, epistaxis, sore throat.   Cardiovascular: As per HPI.   Respiratory: No cough, hemoptysis.    GI: No nausea, vomiting, hematemesis, melena, or hematochezia.   : No hematuria.   Integument: Negative.   Psychiatric: Negative.   Hematologic:  Easy bruising, no easy bleeding.  Neuro: Negative.   Endocrinology: No significant heat or cold intolerance   Musculoskeletal: No myalgia.    Exam:  There were no vitals taken for this visit.  GENERAL APPEARANCE: healthy, alert and no distress  HEENT: no icterus, no xanthelasmas, normal pupil size and reaction, normal palate, mucosa moist, no central cyanosis  NECK: no adenopathy, no asymmetry, masses, or scars, thyroid normal to palpation and no bruits, JVP not elevated  RESPIRATORY: lungs clear to auscultation - no rales, rhonchi or wheezes, no use of accessory muscles, no retractions, respirations are unlabored, normal respiratory rate  CARDIOVASCULAR: regular rhythm, normal S1 with physiologic split S2, no S3 or S4 and no murmur, click or rub, precordium quiet with normal PMI.  ABDOMEN: soft, non tender, without hepatosplenomegaly, no masses palpable, bowel sounds normal, aorta not enlarged by palpation, no abdominal bruits  EXTREMITIES: peripheral pulses normal, no edema, no bruits  NEURO: alert and oriented to person/place/time, normal speech, gait and affect  VASC: Radial, femoral, dorsalis pedis and posterior tibialis pulses are normal in volumes and symmetric bilaterally. No bruits are heard.  SKIN: no ecchymoses, no rashes    Labs:  CBC RESULTS:   Lab Results   Component Value Date    WBC 4.4 06/21/2022    WBC 7.3 02/13/2006    RBC 4.18 06/21/2022    RBC 4.25 02/13/2006    HGB 13.3 06/21/2022    HGB 12.2 08/29/2006    HCT 39.4 06/21/2022    HCT 38.0 02/13/2006    MCV 94 06/21/2022     MCV 89 02/13/2006    MCH 31.8 06/21/2022    MCH 30.6 02/13/2006    MCHC 33.8 06/21/2022    MCHC 34.2 02/13/2006    RDW 12.9 06/21/2022    RDW 12.3 02/13/2006     06/21/2022     02/13/2006       BMP RESULTS:  Lab Results   Component Value Date     06/21/2022    POTASSIUM 3.6 06/21/2022    CHLORIDE 115 (H) 06/21/2022    CO2 20 06/21/2022    ANIONGAP 9 06/21/2022    GLC 94 06/21/2022    GLC 81 09/24/2021    BUN 26 06/21/2022    CR 0.67 06/21/2022    GFRESTIMATED >90 06/21/2022    MEKA 9.2 06/21/2022        INR RESULTS:  No results found for: INR    Procedures:  Holter ECG in 9/2021: Reviewed.      ECG on 10/8/2021: Reviewed.      ECG on 1/17/2022: Reviewed.      ECG on 1/31/2022: Reviewed.      Zio patch in 5-6/2022: Reviewed.            ECG on 6/27/2022: Reviewed.      Assessment and Plan:  # Irritable bowel disease  # Allergies  # Large plasmacytoma of the L pelvis  # Multiple myeloma, newly diagnosed.  # Frequent PVCs Symptomatic. [place] = 15% per Holter ECG in 9/2021. Likely originating from the LCC and posteromedial papillary muscle.   -> s/p catheter ablation of PVCs originating from the RVOT and anterolateral papillary muscle on 3/23/2022.  -> PVC burden = 2.7% per Zio patch in 5-6/2022.  The PVC burden was reduced by the catheter ablation.  But, the patient is now having frequent PVCs again that are different from what were treated by the catheter ablation. It appears that chemo Thx induced new PVCs.  I advised her to continue the chemo Thx and repeat a Zio patch and TTE if she has a chance at the transition from the chemo Thx to stem cell implant.  She will let us know once she finds her future schedule.     I spent a total of 55 min today to review the records, see the patient, and complete the documents.    CC  Patient Care Team:  Rena Sheffield MD as PCP - General (Family Medicine)  Edu Philip MD as Assigned Musculoskeletal Provider  Griselda Valdez RN as Specialty Care  Coordinator (Hematology & Oncology)  Payton Reinoso MD as MD (Hematology)  Alem Thomas MD (Cardiovascular Disease)  Shabana Bauman MD as MD (Ophthalmology)  Payton Reinoso MD as Assigned Cancer Care Provider  Fanny Ponce MD as Assigned Surgical Provider  GOWDIN LEAHY

## 2022-06-28 NOTE — PROGRESS NOTES
[facility]: Cancer Care                                                                                          Call placed to Margo to let her know that we reviewed her case at our conference today and that Dr. Reinoso plans to change treatment and will review this with her tomorrow.    She reports she is still symptomatic with feelings of heart racing with minimal activity    We reviewed when she should seek emergent care    Signature:  Mattie Rodriguez RN

## 2022-06-29 DIAGNOSIS — C90.00 LIGHT CHAIN MYELOMA (H): Primary | ICD-10-CM

## 2022-06-29 RX ORDER — EPINEPHRINE 1 MG/ML
0.3 INJECTION, SOLUTION INTRAMUSCULAR; SUBCUTANEOUS EVERY 5 MIN PRN
Status: CANCELLED | OUTPATIENT
Start: 2022-07-21

## 2022-06-29 RX ORDER — EPINEPHRINE 1 MG/ML
0.3 INJECTION, SOLUTION INTRAMUSCULAR; SUBCUTANEOUS EVERY 5 MIN PRN
Status: CANCELLED | OUTPATIENT
Start: 2022-07-07

## 2022-06-29 RX ORDER — ACETAMINOPHEN 325 MG/1
650 TABLET ORAL ONCE
Status: CANCELLED | OUTPATIENT
Start: 2022-07-21

## 2022-06-29 RX ORDER — DIPHENHYDRAMINE HYDROCHLORIDE 50 MG/ML
50 INJECTION INTRAMUSCULAR; INTRAVENOUS
Status: CANCELLED
Start: 2022-07-21

## 2022-06-29 RX ORDER — METHYLPREDNISOLONE SODIUM SUCCINATE 125 MG/2ML
125 INJECTION, POWDER, LYOPHILIZED, FOR SOLUTION INTRAMUSCULAR; INTRAVENOUS
Status: CANCELLED
Start: 2022-07-28

## 2022-06-29 RX ORDER — NALOXONE HYDROCHLORIDE 0.4 MG/ML
0.2 INJECTION, SOLUTION INTRAMUSCULAR; INTRAVENOUS; SUBCUTANEOUS
Status: CANCELLED | OUTPATIENT
Start: 2022-07-21

## 2022-06-29 RX ORDER — HEPARIN SODIUM (PORCINE) LOCK FLUSH IV SOLN 100 UNIT/ML 100 UNIT/ML
5 SOLUTION INTRAVENOUS
Status: CANCELLED | OUTPATIENT
Start: 2022-07-07

## 2022-06-29 RX ORDER — NALOXONE HYDROCHLORIDE 0.4 MG/ML
0.2 INJECTION, SOLUTION INTRAMUSCULAR; INTRAVENOUS; SUBCUTANEOUS
Status: CANCELLED | OUTPATIENT
Start: 2022-07-28

## 2022-06-29 RX ORDER — LORAZEPAM 2 MG/ML
0.5 INJECTION INTRAMUSCULAR EVERY 4 HOURS PRN
Status: CANCELLED | OUTPATIENT
Start: 2022-07-28

## 2022-06-29 RX ORDER — ALBUTEROL SULFATE 90 UG/1
1-2 AEROSOL, METERED RESPIRATORY (INHALATION)
Status: CANCELLED
Start: 2022-07-28

## 2022-06-29 RX ORDER — MEPERIDINE HYDROCHLORIDE 25 MG/ML
25 INJECTION INTRAMUSCULAR; INTRAVENOUS; SUBCUTANEOUS EVERY 30 MIN PRN
Status: CANCELLED | OUTPATIENT
Start: 2022-07-28

## 2022-06-29 RX ORDER — HEPARIN SODIUM,PORCINE 10 UNIT/ML
5 VIAL (ML) INTRAVENOUS
Status: CANCELLED | OUTPATIENT
Start: 2022-07-14

## 2022-06-29 RX ORDER — HEPARIN SODIUM (PORCINE) LOCK FLUSH IV SOLN 100 UNIT/ML 100 UNIT/ML
5 SOLUTION INTRAVENOUS
Status: CANCELLED | OUTPATIENT
Start: 2022-07-28

## 2022-06-29 RX ORDER — ALBUTEROL SULFATE 90 UG/1
1-2 AEROSOL, METERED RESPIRATORY (INHALATION)
Status: CANCELLED
Start: 2022-07-21

## 2022-06-29 RX ORDER — METHYLPREDNISOLONE SODIUM SUCCINATE 125 MG/2ML
125 INJECTION, POWDER, LYOPHILIZED, FOR SOLUTION INTRAMUSCULAR; INTRAVENOUS
Status: CANCELLED
Start: 2022-07-14

## 2022-06-29 RX ORDER — DIPHENHYDRAMINE HYDROCHLORIDE 50 MG/ML
50 INJECTION INTRAMUSCULAR; INTRAVENOUS
Status: CANCELLED
Start: 2022-07-28

## 2022-06-29 RX ORDER — DIPHENHYDRAMINE HYDROCHLORIDE 50 MG/ML
50 INJECTION INTRAMUSCULAR; INTRAVENOUS
Status: CANCELLED
Start: 2022-07-07

## 2022-06-29 RX ORDER — LORAZEPAM 2 MG/ML
0.5 INJECTION INTRAMUSCULAR EVERY 4 HOURS PRN
Status: CANCELLED | OUTPATIENT
Start: 2022-07-14

## 2022-06-29 RX ORDER — HEPARIN SODIUM (PORCINE) LOCK FLUSH IV SOLN 100 UNIT/ML 100 UNIT/ML
5 SOLUTION INTRAVENOUS
Status: CANCELLED | OUTPATIENT
Start: 2022-07-14

## 2022-06-29 RX ORDER — EPINEPHRINE 1 MG/ML
0.3 INJECTION, SOLUTION INTRAMUSCULAR; SUBCUTANEOUS EVERY 5 MIN PRN
Status: CANCELLED | OUTPATIENT
Start: 2022-07-14

## 2022-06-29 RX ORDER — NALOXONE HYDROCHLORIDE 0.4 MG/ML
0.2 INJECTION, SOLUTION INTRAMUSCULAR; INTRAVENOUS; SUBCUTANEOUS
Status: CANCELLED | OUTPATIENT
Start: 2022-07-07

## 2022-06-29 RX ORDER — HEPARIN SODIUM,PORCINE 10 UNIT/ML
5 VIAL (ML) INTRAVENOUS
Status: CANCELLED | OUTPATIENT
Start: 2022-07-28

## 2022-06-29 RX ORDER — HEPARIN SODIUM,PORCINE 10 UNIT/ML
5 VIAL (ML) INTRAVENOUS
Status: CANCELLED | OUTPATIENT
Start: 2022-07-07

## 2022-06-29 RX ORDER — METHYLPREDNISOLONE SODIUM SUCCINATE 125 MG/2ML
125 INJECTION, POWDER, LYOPHILIZED, FOR SOLUTION INTRAMUSCULAR; INTRAVENOUS
Status: CANCELLED
Start: 2022-07-21

## 2022-06-29 RX ORDER — LORAZEPAM 2 MG/ML
0.5 INJECTION INTRAMUSCULAR EVERY 4 HOURS PRN
Status: CANCELLED | OUTPATIENT
Start: 2022-07-07

## 2022-06-29 RX ORDER — ACETAMINOPHEN 325 MG/1
650 TABLET ORAL ONCE
Status: CANCELLED | OUTPATIENT
Start: 2022-07-14

## 2022-06-29 RX ORDER — ALBUTEROL SULFATE 0.83 MG/ML
2.5 SOLUTION RESPIRATORY (INHALATION)
Status: CANCELLED | OUTPATIENT
Start: 2022-07-07

## 2022-06-29 RX ORDER — DIPHENHYDRAMINE HYDROCHLORIDE 50 MG/ML
50 INJECTION INTRAMUSCULAR; INTRAVENOUS
Status: CANCELLED
Start: 2022-07-14

## 2022-06-29 RX ORDER — EPINEPHRINE 1 MG/ML
0.3 INJECTION, SOLUTION INTRAMUSCULAR; SUBCUTANEOUS EVERY 5 MIN PRN
Status: CANCELLED | OUTPATIENT
Start: 2022-07-28

## 2022-06-29 RX ORDER — HEPARIN SODIUM,PORCINE 10 UNIT/ML
5 VIAL (ML) INTRAVENOUS
Status: CANCELLED | OUTPATIENT
Start: 2022-07-21

## 2022-06-29 RX ORDER — ALBUTEROL SULFATE 0.83 MG/ML
2.5 SOLUTION RESPIRATORY (INHALATION)
Status: CANCELLED | OUTPATIENT
Start: 2022-07-14

## 2022-06-29 RX ORDER — MEPERIDINE HYDROCHLORIDE 25 MG/ML
25 INJECTION INTRAMUSCULAR; INTRAVENOUS; SUBCUTANEOUS EVERY 30 MIN PRN
Status: CANCELLED | OUTPATIENT
Start: 2022-07-07

## 2022-06-29 RX ORDER — MEPERIDINE HYDROCHLORIDE 25 MG/ML
25 INJECTION INTRAMUSCULAR; INTRAVENOUS; SUBCUTANEOUS EVERY 30 MIN PRN
Status: CANCELLED | OUTPATIENT
Start: 2022-07-14

## 2022-06-29 RX ORDER — METHYLPREDNISOLONE SODIUM SUCCINATE 125 MG/2ML
125 INJECTION, POWDER, LYOPHILIZED, FOR SOLUTION INTRAMUSCULAR; INTRAVENOUS
Status: CANCELLED
Start: 2022-07-07

## 2022-06-29 RX ORDER — LORAZEPAM 2 MG/ML
0.5 INJECTION INTRAMUSCULAR EVERY 4 HOURS PRN
Status: CANCELLED | OUTPATIENT
Start: 2022-07-21

## 2022-06-29 RX ORDER — ACETAMINOPHEN 325 MG/1
650 TABLET ORAL ONCE
Status: CANCELLED | OUTPATIENT
Start: 2022-07-07

## 2022-06-29 RX ORDER — ALBUTEROL SULFATE 0.83 MG/ML
2.5 SOLUTION RESPIRATORY (INHALATION)
Status: CANCELLED | OUTPATIENT
Start: 2022-07-21

## 2022-06-29 RX ORDER — MEPERIDINE HYDROCHLORIDE 25 MG/ML
25 INJECTION INTRAMUSCULAR; INTRAVENOUS; SUBCUTANEOUS EVERY 30 MIN PRN
Status: CANCELLED | OUTPATIENT
Start: 2022-07-21

## 2022-06-29 RX ORDER — HEPARIN SODIUM (PORCINE) LOCK FLUSH IV SOLN 100 UNIT/ML 100 UNIT/ML
5 SOLUTION INTRAVENOUS
Status: CANCELLED | OUTPATIENT
Start: 2022-07-21

## 2022-06-29 RX ORDER — ACETAMINOPHEN 325 MG/1
650 TABLET ORAL ONCE
Status: CANCELLED | OUTPATIENT
Start: 2022-07-28

## 2022-06-29 RX ORDER — NALOXONE HYDROCHLORIDE 0.4 MG/ML
0.2 INJECTION, SOLUTION INTRAMUSCULAR; INTRAVENOUS; SUBCUTANEOUS
Status: CANCELLED | OUTPATIENT
Start: 2022-07-14

## 2022-06-29 RX ORDER — ALBUTEROL SULFATE 90 UG/1
1-2 AEROSOL, METERED RESPIRATORY (INHALATION)
Status: CANCELLED
Start: 2022-07-14

## 2022-06-29 RX ORDER — ALBUTEROL SULFATE 90 UG/1
1-2 AEROSOL, METERED RESPIRATORY (INHALATION)
Status: CANCELLED
Start: 2022-07-07

## 2022-06-29 RX ORDER — ALBUTEROL SULFATE 0.83 MG/ML
2.5 SOLUTION RESPIRATORY (INHALATION)
Status: CANCELLED | OUTPATIENT
Start: 2022-07-28

## 2022-06-29 NOTE — PROGRESS NOTES
"[facility]: Cancer Care                                                                                          Margo calls back and agrees to plan to hold chemotherapy for a week.  She was informed that I would send the information for the new treatment information via [employer].  She will have a visit with DORON Sullivan CNP prior to starting the Isatuximab/pomalidomide/dexamethasone.      Margo reports that she continues to have her cardiac symptoms  She states that she can feel a strong heartbeat at times and also some chest tightness.  She took a 45 leisurely walk today and said her symptoms are better than they have been in the past.  She was advised again that if she has any changing or concerning symptoms she should seek emergent care.      She reports some slight nausea and has not taken any anti-emetics.  Her appetite is low during breakfast and lunchtime but she is hungry and eats well at dinner.  She said her taste is \"off\" and affecting her desire to eat.  She will continue to work on increasing her food and fluid intake    She also said she has had a stomach ache which is not new and had diarrhea but her stools are more solid now    Dr. Reinoso was made aware of the above symptoms      Signature:  Mattie Rodriguez RN  "

## 2022-06-29 NOTE — PROGRESS NOTES
Margo developed chest pain and increased PVC after 1st dose of carfilzomib. Her cardiologist attributed these side effects for carf and recommended not to continue this medication. Patient is not tolerating it.  Heart MRI is negative for amyloid.    Plan is to change the therapy to Isatuximab 10mg/kg weekly combined with Pomalyst 4mg  Days 1-21 and DXM 20mg weekly.    Payton Reinoso MD    Below is the reference:     Isatuximab plus pomalidomide and low-dose dexamethasone versus pomalidomide and low-dose dexamethasone in patients with relapsed and refractory multiple myeloma (ICARIA-MM): a randomised, multicentre, open-label, phase 3 study  Author links open overlay imanProfMichelAttalMDa*Chip Clark*Shady SimmonscProfJeEliane-MiguelMDdProfMeralBeksacMDeProfIvanSpickaMDfProfXavierLeleuMDgFredrikSchjesvoldMDhiProfPhilippeMoreauMDjProfMeletios LIAMimopoulosMDkJeffradhames Pichardog-YiHuangMDlJiriMinarikMDmProfMicheleCavoMDnProfH MilesPrinceMDoSandrineMacéPharmDpKathryn PCorzoRPhqFrankCampanaMDqSolennLe-GuennecMScp Ranjana  [email or web address] rights and content  Refers to  Daniella Strange  Incorporating isatuximab in the treatment of multiple myeloma  The Lancet, Volume 394, Issue 29483, 7-13 December13 December 2019, Pages 2804-9769  Download PDF  Referred to by  Daniella Strange  Incorporating isatuximab in the treatment of multiple myeloma  The Lancet, Volume 394, Issue 33438, 7-13 December13 December 2019, Pages 6371-2114  Download PDF  Department of Error  The Lancet, Volume 394, Issue 27359, 7-13 December13 December 2019, Pages 3591  Download PDF  Summary  Background  Isatuximab is a monoclonal antibody that binds a specific epitope on the human CD38 receptor and has antitumour activity via multiple mechanisms of action. In a previous phase 1b study, around 65% of patients with relapsed and refractory multiple myeloma achieved an overall response with a combination of isatuximab with pomalidomide and  low-dose dexamethasone. The aim of this study was to determine the progression-free survival benefit of isatuximab plus pomalidomide and dexamethasone compared with pomalidomide and dexamethasone in patients with relapsed and refractory multiple myeloma.    Methods  We did a randomised, multicentre, open-label, phase 3 study at 102 hospitals in 24 countries in [place], [place], and the [place] regions. Eligible participants were adult patients with relapsed and refractory multiple myeloma who had received at least two previous lines of treatment, including lenalidomide and a proteasome inhibitor. Patients were excluded if they were refractory to previous treatment with an anti-CD38 monoclonal antibody. We randomly assigned patients (1:1) to either isatuximab 10 mg/kg plus pomalidomide 4 mg plus dexamethasone 40 mg (20 mg for patients aged ?75 years), or pomalidomide 4 mg plus dexamethasone 40 mg. Randomisation was done using interactive response technology and stratified according to the number of previous lines of treatment (2-3 vs >3) and age (<75 years vs ?75 years). Treatments were assigned based on a permuted blocked randomisation scheme with a block size of four. The isatuximab-pomalidomide-dexamethasone group received isatuximab intravenously on days 1, 8, 15, and 22 in the first 28-day cycle, then on days 1 and 15 in subsequent cycles. Both groups received oral pomalidomide on days 1 to 21 in each cycle, and oral or intravenous dexamethasone on days 1, 8, 15, and 22 of each cycle. Treatment continued until disease progression, unacceptable toxicity, or consent withdrawal. Dose reductions for adverse reactions were permitted for pomalidomide and dexamethasone, but not for isatuximab. The primary endpoint was progression-free survival, determined by an independent response committee and assessed in the intention-to-treat population. Safety was assessed in all participants who received at least one  dose of study drug. This study is registered at ClinicalTrials.gov, number [id number].    Findings  Between Carrillo 10, 2017, and Feb 2, 2018, we randomly assigned 307 patients to treatment: 154 to isatuximab-pomalidomide-dexamethasone, and 153 to pomalidomide-dexamethasone. At a median follow-up of 11 6 months (IQR 10 1-13 9), median progression-free survival was 11 5 months (95% CI 8 9-13 9) in the isatuximab-pomalidomide-dexamethasone group versus 6 5 months (4 5-8 3) in the pomalidomide-dexamethasone group; hazard ratio 0 596, 95% CI 0 44-0 81; p=0 001 by stratified log-rank test. The most frequent treatment-emergent adverse events (any grade; isatuximab-pomalidomide-dexamethasone vs pomalidomide-dexamethasone) were infusion reactions (56 [38%] vs 0), upper respiratory tract infections (43 [28%] vs 26 [17%]), and diarrhoea (39 [26%] vs 29 [20%]). Adverse events with a fatal outcome were reported in 12 patients (8%) in the isatuximab-pomalidomide-dexamethasone group and 14 (9%) in the pomalidomide-dexamethasone group. Deaths due to treatment-related adverse events were reported for one patient (<1%) in the isatuximab-pomalidomide-dexamethasone group (sepsis) and two (1%) in the pomalidomide-dexamethasone group (pneumonia and urinary tract infection).    Interpretation  The addition of isatuximab to pomalidomide-dexamethasone significantly improves progression-free survival in patients with relapsed and refractory multiple myeloma. Isatuximab is an important new treatment option for the management of relapsed and refractory myeloma, particularly for patients who become refractory to lenalidomide and a proteasome inhibitor.

## 2022-06-29 NOTE — PROGRESS NOTES
[facility]: Cancer Care                                                                                          Message left for Margo to let her know that Dr. Reinoso advises that Margo take a week off of treatment due to her cardiac symptoms.  We have cancelled her infusion appointment today and will get her scheduled next week to start Isatuximab    Signature:  Mattie Rodriguez RN

## 2022-07-04 ENCOUNTER — DOCUMENTATION ONLY (OUTPATIENT)
Dept: TRANSPLANT | Facility: CLINIC | Age: 52
End: 2022-07-04

## 2022-07-05 ENCOUNTER — TELEPHONE (OUTPATIENT)
Dept: ONCOLOGY | Facility: CLINIC | Age: 52
End: 2022-07-05

## 2022-07-05 LAB
ABO/RH(D): ABNORMAL
ANTIBODY SCREEN: POSITIVE
SPECIMEN EXPIRATION DATE: ABNORMAL

## 2022-07-05 NOTE — TELEPHONE ENCOUNTER
PA Initiation    Medication: Pomalyst --PA pending  Insurance Company:  [employer])  Pharmacy Filling the Rx:  n/a  Filling Pharmacy Phone: n/a   Filling Pharmacy Fax: n/a   Start Date: 7/5/2022    Did PA over the phone   Case ID# [id number]

## 2022-07-05 NOTE — PROGRESS NOTES
[facility]  Date of Visit: Jul 6, 2022   Oncologist: Dr. Payton Reinoso    Reason for visit: myeloma follow-up           Oncology History   52 year old female with past medical history significant for Irritable bowel disease, allergies, large plasmacytoma of the L pelvis, and newly diagnosed multiple myeloma.     Early in April 2021 , she noted to have left sided groin pain, constant and was affecting her daily activities  MRI showed large, expansile, permeative mass with indistinct borders involving the left superior and inferior pubic rami, pubic symphysis and with possible extension to the left acetabulum.     We completed the following work-up:   - Biopsy 9/7/2021: plasmacytoma.  Flow cytometry showed kappa monotypic plasma cells, polytypic B cells. FISH results from plasmacytoma showed a gain of 11q, IGH-CCND1 fusion but no losses of 1q or TP53 and no gain of 1q.  - Bone marrow biopsy 09/22/21: Marrow cellularity 50% with 25% interstitial infiltration w/  Kappa-monotypic, moderately atypical plasma cells. Flow with 0.7% kappa-monotypica plasma cells. FISH and cytogenetics pending.   - PET CT 09/24/21 w/ hypermetabolic pathologic fracture of the L pubic ramus w/ aggressive appearing osteolysis but no additional suspicious lytic or blastic osseous lesions.     Treatment to date:   - Zometa q4 weeks started 9/27/21  - Radiation to left pubic ramus x18 fractions, completed 11/11/21.   - RVD C1D1 11/15/21  - RVD C2D1 12/6/21  - RVD C3D1  12/27/21  - RVD C4 D1 1/17/22, elizabeth added on D8 due to inadequate response of urinary M-spike and FLCs remaining over 100 mg/dL after 4 cycles of RVD    - Elizabeth-RVD C1D8 1/24/22  - Elizabeth-RVD C2D1 2/7/22  - Elizabeth-RVD C3D1 2/28/22  - Elizabeth-RVD C4D1 3/29/22  - Elizabeth-RVD C5D1 4/19/22    Disease response: FL     - 5/19/22: Continued monotherapy with Daratumumab; held Revlimid and Velcade.    - 6/21/22: Transitioned to KCD, received C1 days 1 and 2- had  significant PVCs at cardiac follow-uyp after 1st week of Kyprolis, held further doses.    - Transitioning to Sarclisa/Pom/Dex starting today (7/6/22)          INTERIM HISTORY:   Margo comes in for oncology follow-up visit today.    She reports a return of her shortness of breath, palpitations, and occasional dizziness ever since her PVCs returned after Kyprolis. Overall her diarrhea has been better since stopping Velcade and Revlimid, although she did have one night this past weekend when she had 10+ loose stools in a matter of a few hours. Continues to feel generally nauseated, but does not take antiemetics very often. Her weight has stabilized.     Denies fevers, chills, headaches, vision or hearing changes, new lumps or bumps, chest pain, cough, abdominal pain, vomiting, swelling of extremities, bleeding issues, or rash.           Physical Exam:     /73 (BP Location: Right arm, Patient Position: Sitting, Cuff Size: Adult Regular)   Pulse 80   Temp 98  F (36.7  C) (Oral)   Resp 18   Wt 67.1 kg (147 lb 14.4 oz)   SpO2 99%   BMI 22.44 kg/m      Wt Readings from Last 4 Encounters:   07/06/22 67.1 kg (147 lb 14.4 oz)   07/06/22 67.1 kg (147 lb 14.4 oz)   06/27/22 66.5 kg (146 lb 9.6 oz)   06/21/22 66.6 kg (146 lb 12.8 oz)       General: well appearing, no acute distress, pleasant female  HEENT: normocephalic, atraumatic, PERRLA, sclerae nonicteric  Lymph: no palpable cervical, supraclavicular or axillary lymphadenopathy   CV: S1 S2, RRR, no murmur, click or rub  Lungs: clear to auscultation bilaterally, no wheezes/rales/rhonchi  Abd: soft, positive bowel sounds, non-distended, non-tender  MSK: no peripheral edema  Neuro: alert and oriented x3, CN grossly intact  Psych: appropriate mood and affect  Skin:no visible rashes or lesions on exposed skin          Laboratory Data:      I personally reviewed the following labs:    Most Recent 3 CBC's:  Recent Labs   Lab Test 07/06/22  0823 06/21/22  1307  "06/06/22  1143   WBC 4.2 4.4 6.7   HGB 13.5 13.3 12.6   MCV 96 94 98    276 198     Most Recent 3 BMP's:  Recent Labs   Lab Test 07/06/22  0823 06/21/22  1307 06/06/22  1143 05/24/22  1527    144  --  142   POTASSIUM 3.8 3.6  --  3.4   CHLORIDE 113* 115*  --  113*   CO2 24 20  --  18*   BUN 22 26  --  22   CR 0.72 0.67 0.67 0.72   ANIONGAP 2* 9  --  11   MEKA 9.3 9.2 9.3 8.9   GLC 94 94  --  146*     Most Recent 2 LFT's:  Recent Labs   Lab Test 07/06/22  0823 06/21/22  1307   AST 15 14   ALT 22 27   ALKPHOS 53 51   BILITOTAL 0.5 0.5     Imaging:  No new imaging to review.         Assessment and Recommendations   Kristie Cornejo is a 52 year old female who was diagnosed with Plasmacytoma after she presented with left sided groin pain now found to have multiple myeloma after further workup with bone marrow biopsy.     # Multiple myeloma with associated large plasmacytoma of the L pelvis. Kappa light chain disease.     BMBx with 25% plasma cell infiltration; standard risk with gain of 11q, IGH-CCND1 fusion but no losses of 1q or TP53 and no gain of 1q.   SPEP w/out a monoclonal peak  UPEP positive. 70% paraprotein. Large monoclonal free immunoglobulin light chain of kappa chain type.     No anemia, normal creat, Ca, no other skeletal lesions. Alb normal, B2M normal.  Stage I Light chain disease with left pelvic bone pathologic fracture she is classified as having symptomatic multiple myeloma.     RVD C1D1 11/15/21 (had a slight delay in the start of her Revlimid on 11/19/21 due to the timing of her pregnancy tests).  She tolerated cycle 1 well with some mild nausea and a headache on her week off.  RVD C2D1 12/6/21, tolerated well overall, but has had more consistent sensation of her hands and feet being hot. No tingling, pain, loss of sensation, or changes to her strength.  RVD C3D1 12/27/21, tolerated well overall with ongoing sensation of \"hot\" hands and feet  RVD C4 D1 1/17/22, michelle added on D8 due " to inadequate response of urinary M-spike and FLCs remaining over 100 mg/dL after 3 cycles of RVD    Elizabeth-RVD C5 2/7/22 Changing to 21 day cycles due to insurance.  Will receive Rev Days 1-14, Velcade days 1,4,8,11, and elizabeth days 1,8,15.    Elizabeth-RVD C6 2/28/22  Elizabeth-RVD C7 3/28/22  Elizabeth-RVD C8 4/19/22    She completed 5 cycles of Elizabeth plus RVD and acheived a partial response.  Her free kappa light chains were at least 50% reduced but the response plateaued, and now the kappa light chains are actually increasing again. She met with Dr. Reinoso and the plan was established to switch to Kyprolis, Cytoxan, and dexamethasone.  Margo continued on daratumumab monotherapy while awaiting final cardiac clearance due to potential cardiotoxicity of Kyprolis.  She received the first week of KCD (2 doses of Kyprolis) prior to her cardiac follow-up and was found to have significant PVCs at that appointment, which Dr. Thomas suspects were induced by the chemo.    Discussed with the wider hematology team on 6/28.  The general consensus was to transition to Sarclisa/Pom/Dex to avoid the cardiotoxicity of Kyprolis.  Will start C1D1 today.  RTC weekly during the 1st cycle for symptom assessment.  Will repeat PET in the coming weeks to establish new baseline.    Update sent to Dr. Thomas in cardiology. Confirmed that as long as her symptoms are tolerable and there are no major changes on EKG, he is comfortable pushing forward with treatment.  Recommends repeating Zio Patch and TTE during the transition from treatment to stem cell transplant. Will repeat EKG today to get a new baseline, especially considering risk for reaction to Sarclisa with first dose today.    #Bone Health  She will continue monthly Zometa (last received on 6/6/22). She will continue Calcium/Vitamin D supplements.     # Left pubic ramus plasmacytoma with expansile destruction of left superior and inferior rami, pubic symphysis, and acetabulum  She underwent RT to  "left pubic ramus x18 fractions, completed on 11/11/21. Currently having no pain and able to walk 1 mile without any assistive devices.  She is interested in pursuing other activities like gentle cross-country skiing.  Had follow-up with Dr. Philip on 1/6/22 and was cleared to resume regular activity as tolerated. She should still refrain from high-impact activities.    # PVCs  She had an ECHO 12/1 and had follow-up with cardiology 12/7 (notes in CareEverywhere).  She had follow-up with cardiology here at the  and they recommend an ablation. Stress test was completed and she was started on Toprol XL; however, Toprol XL was discontinued due to significant side effects of orthostatic hypotension, pre-syncopal episodes, shakiness, and weakness.   - She had an ablation 3/23/22 but has not experienced relief from the feeling of being nauseated and short of breath after climbing the stairs.  She also reports episodes of palpitations that \"radiate into her neck.\" Denies chest pain or shortness of breath.   - EKG done 4/4 showed sinus rhythm and nonspecific ST and T wave abnormality. Troponin negative.  - Cardiac MRI ruled out amyloid.  LFEF 50%, no evidence of infiltrative cardiomyopathy.  - She followed up with cardiology and they felt that the cause may be musculoskeletal or pericarditis. They suggested taking ibuprofen for pain. Her pain has subsided and she actually has not needed to take ibuprofen.   - Post ablation Zio patch was performed showing a significant reduction in PVCs; however, by the time she had her cardiology follow-up, she had restarted chemotherapy and the PVCs had returned.  Dr. Thomas is comfortable proceeding with treatment as long as symptoms are tolerable and there are no major EKG changes.    #Diarrhea  She was having intermittent diarrhea with associated abdominal cramping and nausea, but developed much more significant diarrhea with 10-12 liquid episodes and was seen as an add-on on 4/4/22 for " "this.   - C.diff PCR negative 2/8/22, repeat on 4/5 negative  - Enteric stool panel 4/5/22 negative  - Underwent colonoscopy on 4/13/22 to pursue biopsy for amyloid (discussed below). Congo red stain is negative for amyloid deposits. Pathology showed colonic mucosa with focal active colitis, negative for signs of chronicity and lymphocytic colitis. Per patient report, GI reviewed results with her and did not feel she needed treatment for these findings at this time. They advised her to monitor her symptoms and to call them if they do not improve.Dr. Montgomery agreed with following conservatively for now since she reports some improvement in her symptoms.  If diarrhea becomes bothersome again, then they will arrange for expeditious evaluation in the IBD clinic.   - CMV PCR (4/4/22) not detected  - Diarrhea improved to now twice daily after discontinuing Velcade and Revlimid.   - Had one day of significant diarrhea this past weekend, otherwise has been stable at twice daily.  - Discussed potential for re-developing diarrhea after starting the new regimen; recommend managing with Imodium.    #Ocular Migraines  She has a history of ocular migraines that present as \"squiggly lines in her field of vision.\" These have occurred about annually since 2002, but were occurring daily following her ablation on 3/23/22. Currently decreasing in frequency.  - Referred to ophthalmology for further evaluation - they provided new prescription and recommended artificial tear drops.  - Symptoms have significantly improved.    #Concern for amyloid  She has continued to have significant GI symptoms despite negative infectious testing and diarrhea not being a major side effect of michelle or Velcade. Previously discussed with Dr. Reinoso.  In light of the constellation of symptoms, as well as her sluggish response to therapy, she would like to do further testing to rule out amyolid. Of note, congo red stain was negative on her bone marrow " biopsy from 9/22/21  - Cardiac MRI negative for infiltrative cardiomyopathy  - Colonoscopy to biopsy for GI amyloid done 4/13. Congo red stain negative for amyloid deposits.   - Diarrhea has significantly improved after discontinuing Velcade and Revlimid.    #COVID prophylaxis  - She received EVUSHELD 1/20/22 and 3/8/22  - Received 3 COVID vaccines to date    #Reflux  She reported episodes of reflux with bile, especially when she lays down at night.  She has started to elevate her head on pillows and is also trying to avoid eating within 1-2 hours of going to bed.  Omeprazole was increased to 20 mg twice daily with symptom improvement.    I reminded patient to call us if any questions/issues arise in between visits.     60 minutes spent on the date of the encounter doing chart review, review of test results, interpretation of tests, patient visit, documentation and discussion with other provider(s)     DORON Hall [facility]  [address] [place] [id number] [phone]

## 2022-07-06 ENCOUNTER — INFUSION THERAPY VISIT (OUTPATIENT)
Dept: ONCOLOGY | Facility: CLINIC | Age: 52
End: 2022-07-06
Attending: REGISTERED NURSE
Payer: COMMERCIAL

## 2022-07-06 ENCOUNTER — ONCOLOGY VISIT (OUTPATIENT)
Dept: ONCOLOGY | Facility: CLINIC | Age: 52
End: 2022-07-06
Payer: COMMERCIAL

## 2022-07-06 ENCOUNTER — PATIENT OUTREACH (OUTPATIENT)
Dept: ONCOLOGY | Facility: CLINIC | Age: 52
End: 2022-07-06

## 2022-07-06 ENCOUNTER — APPOINTMENT (OUTPATIENT)
Dept: LAB | Facility: CLINIC | Age: 52
End: 2022-07-06
Attending: REGISTERED NURSE
Payer: COMMERCIAL

## 2022-07-06 VITALS
HEIGHT: 68 IN | BODY MASS INDEX: 22.42 KG/M2 | OXYGEN SATURATION: 97 % | HEART RATE: 70 BPM | SYSTOLIC BLOOD PRESSURE: 133 MMHG | DIASTOLIC BLOOD PRESSURE: 83 MMHG | WEIGHT: 147.9 LBS

## 2022-07-06 VITALS
WEIGHT: 147.9 LBS | RESPIRATION RATE: 18 BRPM | DIASTOLIC BLOOD PRESSURE: 73 MMHG | SYSTOLIC BLOOD PRESSURE: 115 MMHG | TEMPERATURE: 98 F | BODY MASS INDEX: 22.44 KG/M2 | HEART RATE: 80 BPM | OXYGEN SATURATION: 99 %

## 2022-07-06 DIAGNOSIS — C90.00 LIGHT CHAIN MYELOMA (H): Primary | ICD-10-CM

## 2022-07-06 DIAGNOSIS — E53.8 VITAMIN B12 DEFICIENCY (NON ANEMIC): ICD-10-CM

## 2022-07-06 DIAGNOSIS — I49.3 PVC'S (PREMATURE VENTRICULAR CONTRACTIONS): ICD-10-CM

## 2022-07-06 DIAGNOSIS — R19.7 DIARRHEA, UNSPECIFIED TYPE: ICD-10-CM

## 2022-07-06 LAB
ALBUMIN SERPL-MCNC: 3.9 G/DL (ref 3.4–5)
ALP SERPL-CCNC: 53 U/L (ref 40–150)
ALT SERPL W P-5'-P-CCNC: 22 U/L (ref 0–50)
ANION GAP SERPL CALCULATED.3IONS-SCNC: 2 MMOL/L (ref 3–14)
ANTIBODY ID: NORMAL
ANTIBODY SCREEN, TUBE: NORMAL
AST SERPL W P-5'-P-CCNC: 15 U/L (ref 0–45)
BASOPHILS # BLD AUTO: 0 10E3/UL (ref 0–0.2)
BASOPHILS NFR BLD AUTO: 1 %
BILIRUB SERPL-MCNC: 0.5 MG/DL (ref 0.2–1.3)
BLOOD BANK CHART COMMENT: NORMAL
BUN SERPL-MCNC: 22 MG/DL (ref 7–30)
CALCIUM SERPL-MCNC: 9.3 MG/DL (ref 8.5–10.1)
CHLORIDE BLD-SCNC: 113 MMOL/L (ref 94–109)
CO2 SERPL-SCNC: 24 MMOL/L (ref 20–32)
CREAT SERPL-MCNC: 0.72 MG/DL (ref 0.52–1.04)
EOSINOPHIL # BLD AUTO: 0.1 10E3/UL (ref 0–0.7)
EOSINOPHIL NFR BLD AUTO: 3 %
ERYTHROCYTE [DISTWIDTH] IN BLOOD BY AUTOMATED COUNT: 13 % (ref 10–15)
GFR SERPL CREATININE-BSD FRML MDRD: >90 ML/MIN/1.73M2
GLUCOSE BLD-MCNC: 94 MG/DL (ref 70–99)
HCT VFR BLD AUTO: 40.4 % (ref 35–47)
HGB BLD-MCNC: 13.5 G/DL (ref 11.7–15.7)
HOLD SPECIMEN: NORMAL
IMM GRANULOCYTES # BLD: 0 10E3/UL
IMM GRANULOCYTES NFR BLD: 0 %
LYMPHOCYTES # BLD AUTO: 0.6 10E3/UL (ref 0.8–5.3)
LYMPHOCYTES NFR BLD AUTO: 14 %
MCH RBC QN AUTO: 32.2 PG (ref 26.5–33)
MCHC RBC AUTO-ENTMCNC: 33.4 G/DL (ref 31.5–36.5)
MCV RBC AUTO: 96 FL (ref 78–100)
MONOCYTES # BLD AUTO: 0.4 10E3/UL (ref 0–1.3)
MONOCYTES NFR BLD AUTO: 10 %
NEUTROPHILS # BLD AUTO: 3.1 10E3/UL (ref 1.6–8.3)
NEUTROPHILS NFR BLD AUTO: 72 %
NRBC # BLD AUTO: 0 10E3/UL
NRBC BLD AUTO-RTO: 0 /100
PLATELET # BLD AUTO: 269 10E3/UL (ref 150–450)
POTASSIUM BLD-SCNC: 3.8 MMOL/L (ref 3.4–5.3)
PROT SERPL-MCNC: 6.7 G/DL (ref 6.8–8.8)
RBC # BLD AUTO: 4.19 10E6/UL (ref 3.8–5.2)
SODIUM SERPL-SCNC: 139 MMOL/L (ref 133–144)
SPECIMEN EXPIRATION DATE: NORMAL
VIT B12 SERPL-MCNC: 372 PG/ML (ref 193–986)
WBC # BLD AUTO: 4.2 10E3/UL (ref 4–11)

## 2022-07-06 PROCEDURE — 83921 ORGANIC ACID SINGLE QUANT: CPT | Performed by: REGISTERED NURSE

## 2022-07-06 PROCEDURE — 82607 VITAMIN B-12: CPT | Performed by: REGISTERED NURSE

## 2022-07-06 PROCEDURE — 36415 COLL VENOUS BLD VENIPUNCTURE: CPT

## 2022-07-06 PROCEDURE — 82310 ASSAY OF CALCIUM: CPT

## 2022-07-06 PROCEDURE — 86870 RBC ANTIBODY IDENTIFICATION: CPT

## 2022-07-06 PROCEDURE — 258N000003 HC RX IP 258 OP 636

## 2022-07-06 PROCEDURE — 83521 IG LIGHT CHAINS FREE EACH: CPT | Performed by: REGISTERED NURSE

## 2022-07-06 PROCEDURE — 96367 TX/PROPH/DG ADDL SEQ IV INF: CPT

## 2022-07-06 PROCEDURE — 86850 RBC ANTIBODY SCREEN: CPT

## 2022-07-06 PROCEDURE — 96413 CHEMO IV INFUSION 1 HR: CPT

## 2022-07-06 PROCEDURE — 96415 CHEMO IV INFUSION ADDL HR: CPT

## 2022-07-06 PROCEDURE — 96375 TX/PRO/DX INJ NEW DRUG ADDON: CPT

## 2022-07-06 PROCEDURE — 85025 COMPLETE CBC W/AUTO DIFF WBC: CPT

## 2022-07-06 PROCEDURE — 93010 ELECTROCARDIOGRAM REPORT: CPT | Performed by: INTERNAL MEDICINE

## 2022-07-06 PROCEDURE — 250N000011 HC RX IP 250 OP 636

## 2022-07-06 PROCEDURE — 99215 OFFICE O/P EST HI 40 MIN: CPT | Performed by: REGISTERED NURSE

## 2022-07-06 PROCEDURE — 250N000013 HC RX MED GY IP 250 OP 250 PS 637

## 2022-07-06 RX ORDER — ACETAMINOPHEN 325 MG/1
650 TABLET ORAL ONCE
Status: COMPLETED | OUTPATIENT
Start: 2022-07-06 | End: 2022-07-06

## 2022-07-06 RX ADMIN — ACETAMINOPHEN 650 MG: 325 TABLET ORAL at 10:07

## 2022-07-06 RX ADMIN — DIPHENHYDRAMINE HYDROCHLORIDE 50 MG: 50 INJECTION, SOLUTION INTRAMUSCULAR; INTRAVENOUS at 10:29

## 2022-07-06 RX ADMIN — DEXAMETHASONE SODIUM PHOSPHATE: 10 INJECTION, SOLUTION INTRAMUSCULAR; INTRAVENOUS at 10:08

## 2022-07-06 RX ADMIN — SODIUM CHLORIDE 700 MG: 9 INJECTION, SOLUTION INTRAVENOUS at 11:02

## 2022-07-06 RX ADMIN — FAMOTIDINE 20 MG: 10 INJECTION, SOLUTION INTRAVENOUS at 10:07

## 2022-07-06 RX ADMIN — SODIUM CHLORIDE 250 ML: 9 INJECTION, SOLUTION INTRAVENOUS at 10:06

## 2022-07-06 ASSESSMENT — PAIN SCALES - GENERAL: PAINLEVEL: NO PAIN (0)

## 2022-07-06 NOTE — PROGRESS NOTES
[facility]: Cancer Care Plan of Care Education Note                                    Discussion with Patient:                                                      I met with Margo prior to starting her C1 D1 infusion of Isatuximab.  Her treatment plan changed due to her known cardiac issues  .She was provided with [facility]IA oncology information for Isatuximab, pomalidomide and dexamethasone.  We reviewed the treatment plan and when to contact a provider.  With her known cardiac issues I expressed that she should be in contact with any changes or seek emergent care if needed.       Assessment:                                                      Assessment completed with:: Patient    Plan of Care Education   Diagnosis:: Multiple Myeloma  Does patient understand diagnosis?: Yes  Tx plan/regimen:: Isatuximab, Pomalidomide, Dexamethasone  Does patient understand treatment plan/regimen?: Yes  Preparing for treatment:: Reviewed treatment preparation information with patient (vascular access, day of chemo, visitor policy, what to bring, etc.)  Vascular access:: Peripheral IV  Side effect education:: Diarrhea/Constipation;Fatigue;Infection;Lab value monitoring (anemia, neutropenia, thrombocytopenia);Nausea/Vomiting;Skin changes  Transportation means:: Regular car  Safety/self care at home reviewed with patient:: Yes  Informal Support system:: Family  Plan of Care:: SULEIMAN follow-up appointment;Lab appointment;Treatment schedule  When to call provider:: Bleeding;Increased shortness of breath;New/worsening pain;Shaking chills;Temperature >100.4F;Uncontrolled diarrhea/constipation;Uncontrolled nausea/vomiting    Evaluation of Learning  Patient Education Provided: Yes  Readiness:: Acceptance  Method:: Literature;Explanation  Response:: Verbalizes understanding      Intervention/Education provided during outreach:                                                       Patient to follow up as scheduled at next appointment.  She  understands that she will have weekly treatment and the pomalidomide will be day 1-21.  Oral pharmacy team was notified to contact her for coordination.    Signature:  Mattie Rodriguez RN

## 2022-07-06 NOTE — PROGRESS NOTES
Infusion Nursing Note:  Kristie Cornejo presents today for Cycle 1 Day 1 Isatuximab.    Patient seen by provider today: Yes: Jackie Ferris CNP    Note: Patient is receiving Isatuximab and Pomalyst for the first time. New chemo teaching done previously by Eliana Rodriguez, ELEANOR and Jackie Ferris CNP. Writer reinforced chemotherapy teaching/side effects and schedule. Patient presents to the infusion center and had her provider appt while in infuson.    Oral Chemo Pharmacist are working on her Pomalyst and will call her with a estimated time for delivery. Jackie Ferris CNP and ELEANOR Childers aware of the possible delay in start of Pomalyst.    Intravenous Access:  Peripheral IV placed.    Treatment Conditions:   Latest Reference Range & Units 07/06/22 08:23   Sodium 133 - 144 mmol/L 139   Potassium 3.4 - 5.3 mmol/L 3.8   Chloride 94 - 109 mmol/L 113 (H)   Carbon Dioxide 20 - 32 mmol/L 24   Urea Nitrogen 7 - 30 mg/dL 22   Creatinine 0.52 - 1.04 mg/dL 0.72   GFR Estimate >60 mL/min/1.73m2 >90   Calcium 8.5 - 10.1 mg/dL 9.3   Anion Gap 3 - 14 mmol/L 2 (L)   Albumin 3.4 - 5.0 g/dL 3.9   Protein Total 6.8 - 8.8 g/dL 6.7 (L)   Alkaline Phosphatase 40 - 150 U/L 53   ALT 0 - 50 U/L 22   AST 0 - 45 U/L 15   Bilirubin Total 0.2 - 1.3 mg/dL 0.5   Glucose 70 - 99 mg/dL 94   WBC 4.0 - 11.0 10e3/uL 4.2   Hemoglobin 11.7 - 15.7 g/dL 13.5   Hematocrit 35.0 - 47.0 % 40.4   Platelet Count 150 - 450 10e3/uL 269   RBC Count 3.80 - 5.20 10e6/uL 4.19   MCV 78 - 100 fL 96   MCH 26.5 - 33.0 pg 32.2   MCHC 31.5 - 36.5 g/dL 33.4   RDW 10.0 - 15.0 % 13.0   % Neutrophils % 72   % Lymphocytes % 14   % Monocytes % 10   % Eosinophils % 3   % Basophils % 1   Absolute Basophils 0.0 - 0.2 10e3/uL 0.0   Absolute Eosinophils 0.0 - 0.7 10e3/uL 0.1   Absolute Immature Granulocytes <=0.4 10e3/uL 0.0   Absolute Lymphocytes 0.8 - 5.3 10e3/uL 0.6 (L)   Absolute Monocytes 0.0 - 1.3 10e3/uL 0.4   % Immature Granulocytes % 0   Absolute Neutrophils 1.6 - 8.3  10e3/uL 3.1   Absolute NRBCs 10e3/uL 0.0   NRBCs per 100 WBC <1 /100 0     Results reviewed, labs MET treatment parameters, ok to proceed with treatment.    Post Infusion Assessment:  Patient tolerated infusion without incident.  Blood return noted pre and post infusion.  No evidence of extravasations.  Access discontinued per protocol.     Discharge Plan:   Denied any prescription refills. She has adequate supply of Compazine at home.  Discharge instructions reviewed with: Patient.  Patient and/or family verbalized understanding of discharge instructions and all questions answered.  AVS to patient via [employer].  Patient will return 7/13 for next appointment.   Patient discharged in stable condition accompanied by: self.  Departure Mode: Ambulatory.      Cheryl Huynh RN

## 2022-07-06 NOTE — LETTER
7/6/2022         RE: Kristie Cornejo  [address]  [facility] [address]        Dear Colleague,    Thank you for referring your patient, Kristie Cornejo, to the [facility]. Please see a copy of my visit note below.    [facility]  Date of Visit: Jul 6, 2022   Oncologist: Dr. Payton Reinoso    Reason for visit: myeloma follow-up           Oncology History   52 year old female with past medical history significant for Irritable bowel disease, allergies, large plasmacytoma of the L pelvis, and newly diagnosed multiple myeloma.     Early in April 2021 , she noted to have left sided groin pain, constant and was affecting her daily activities  MRI showed large, expansile, permeative mass with indistinct borders involving the left superior and inferior pubic rami, pubic symphysis and with possible extension to the left acetabulum.     We completed the following work-up:   - Biopsy 9/7/2021: plasmacytoma.  Flow cytometry showed kappa monotypic plasma cells, polytypic B cells. FISH results from plasmacytoma showed a gain of 11q, IGH-CCND1 fusion but no losses of 1q or TP53 and no gain of 1q.  - Bone marrow biopsy 09/22/21: Marrow cellularity 50% with 25% interstitial infiltration w/  Kappa-monotypic, moderately atypical plasma cells. Flow with 0.7% kappa-monotypica plasma cells. FISH and cytogenetics pending.   - PET CT 09/24/21 w/ hypermetabolic pathologic fracture of the L pubic ramus w/ aggressive appearing osteolysis but no additional suspicious lytic or blastic osseous lesions.     Treatment to date:   - Zometa q4 weeks started 9/27/21  - Radiation to left pubic ramus x18 fractions, completed 11/11/21.   - RVD C1D1 11/15/21  - RVD C2D1 12/6/21  - RVD C3D1  12/27/21  - RVD C4 D1 1/17/22, elizabeth added on D8 due to inadequate response of urinary M-spike and FLCs remaining over 100 mg/dL after 4 cycles of RVD    - Elizabeth-RVD C1D8 1/24/22  -  Elizabeth-RVD C2D1 2/7/22  - Elizabeth-RVD C3D1 2/28/22  - Elizabeth-RVD C4D1 3/29/22  - Elizabeth-RVD C5D1 4/19/22    Disease response: AL     - 5/19/22: Continued monotherapy with Daratumumab; held Revlimid and Velcade.    - 6/21/22: Transitioned to KCD, received C1 days 1 and 2- had significant PVCs at cardiac follow-uyp after 1st week of Kyprolis, held further doses.    - Transitioning to Sarclisa/Pom/Dex starting today (7/6/22)          INTERIM HISTORY:   Margo comes in for oncology follow-up visit today.    She reports a return of her shortness of breath, palpitations, and occasional dizziness ever since her PVCs returned after Kyprolis. Overall her diarrhea has been better since stopping Velcade and Revlimid, although she did have one night this past weekend when she had 10+ loose stools in a matter of a few hours. Continues to feel generally nauseated, but does not take antiemetics very often. Her weight has stabilized.     Denies fevers, chills, headaches, vision or hearing changes, new lumps or bumps, chest pain, cough, abdominal pain, vomiting, swelling of extremities, bleeding issues, or rash.           Physical Exam:     /73 (BP Location: Right arm, Patient Position: Sitting, Cuff Size: Adult Regular)   Pulse 80   Temp 98  F (36.7  C) (Oral)   Resp 18   Wt 67.1 kg (147 lb 14.4 oz)   SpO2 99%   BMI 22.44 kg/m      Wt Readings from Last 4 Encounters:   07/06/22 67.1 kg (147 lb 14.4 oz)   07/06/22 67.1 kg (147 lb 14.4 oz)   06/27/22 66.5 kg (146 lb 9.6 oz)   06/21/22 66.6 kg (146 lb 12.8 oz)       General: well appearing, no acute distress, pleasant female  HEENT: normocephalic, atraumatic, PERRLA, sclerae nonicteric  Lymph: no palpable cervical, supraclavicular or axillary lymphadenopathy   CV: S1 S2, RRR, no murmur, click or rub  Lungs: clear to auscultation bilaterally, no wheezes/rales/rhonchi  Abd: soft, positive bowel sounds, non-distended, non-tender  MSK: no peripheral edema  Neuro: alert and oriented x3,  CN grossly intact  Psych: appropriate mood and affect  Skin:no visible rashes or lesions on exposed skin          Laboratory Data:      I personally reviewed the following labs:    Most Recent 3 CBC's:  Recent Labs   Lab Test 07/06/22  0823 06/21/22  1307 06/06/22  1143   WBC 4.2 4.4 6.7   HGB 13.5 13.3 12.6   MCV 96 94 98    276 198     Most Recent 3 BMP's:  Recent Labs   Lab Test 07/06/22  0823 06/21/22  1307 06/06/22  1143 05/24/22  1527    144  --  142   POTASSIUM 3.8 3.6  --  3.4   CHLORIDE 113* 115*  --  113*   CO2 24 20  --  18*   BUN 22 26  --  22   CR 0.72 0.67 0.67 0.72   ANIONGAP 2* 9  --  11   MEKA 9.3 9.2 9.3 8.9   GLC 94 94  --  146*     Most Recent 2 LFT's:  Recent Labs   Lab Test 07/06/22  0823 06/21/22  1307   AST 15 14   ALT 22 27   ALKPHOS 53 51   BILITOTAL 0.5 0.5     Imaging:  No new imaging to review.         Assessment and Recommendations   Kristie Cornejo is a 52 year old female who was diagnosed with Plasmacytoma after she presented with left sided groin pain now found to have multiple myeloma after further workup with bone marrow biopsy.     # Multiple myeloma with associated large plasmacytoma of the L pelvis. Kappa light chain disease.     BMBx with 25% plasma cell infiltration; standard risk with gain of 11q, IGH-CCND1 fusion but no losses of 1q or TP53 and no gain of 1q.   SPEP w/out a monoclonal peak  UPEP positive. 70% paraprotein. Large monoclonal free immunoglobulin light chain of kappa chain type.     No anemia, normal creat, Ca, no other skeletal lesions. Alb normal, B2M normal.  Stage I Light chain disease with left pelvic bone pathologic fracture she is classified as having symptomatic multiple myeloma.     RVD C1D1 11/15/21 (had a slight delay in the start of her Revlimid on 11/19/21 due to the timing of her pregnancy tests).  She tolerated cycle 1 well with some mild nausea and a headache on her week off.  RVD C2D1 12/6/21, tolerated well overall, but has had  "more consistent sensation of her hands and feet being hot. No tingling, pain, loss of sensation, or changes to her strength.  RVD C3D1 12/27/21, tolerated well overall with ongoing sensation of \"hot\" hands and feet  RVD C4 D1 1/17/22, elizabeth added on D8 due to inadequate response of urinary M-spike and FLCs remaining over 100 mg/dL after 3 cycles of RVD    Elizabeth-RVD C5 2/7/22 Changing to 21 day cycles due to insurance.  Will receive Rev Days 1-14, Velcade days 1,4,8,11, and elizabeth days 1,8,15.    Elizabeth-RVD C6 2/28/22  Elizabeth-RVD C7 3/28/22  Elizabeth-RVD C8 4/19/22    She completed 5 cycles of Elizabeth plus RVD and acheived a partial response.  Her free kappa light chains were at least 50% reduced but the response plateaued, and now the kappa light chains are actually increasing again. She met with Dr. Reinoso and the plan was established to switch to Kyprolis, Cytoxan, and dexamethasone.  Margo continued on daratumumab monotherapy while awaiting final cardiac clearance due to potential cardiotoxicity of Kyprolis.  She received the first week of KCD (2 doses of Kyprolis) prior to her cardiac follow-up and was found to have significant PVCs at that appointment, which Dr. Thomas suspects were induced by the chemo.    Discussed with the wider hematology team on 6/28.  The general consensus was to transition to Sarclisa/Pom/Dex to avoid the cardiotoxicity of Kyprolis.  Will start C1D1 today.  RTC weekly during the 1st cycle for symptom assessment.  Will repeat PET in the coming weeks to establish new baseline.    Update sent to Dr. Thomas in cardiology. Confirmed that as long as her symptoms are tolerable and there are no major changes on EKG, he is comfortable pushing forward with treatment.  Recommends repeating Zio Patch and TTE during the transition from treatment to stem cell transplant. Will repeat EKG today to get a new baseline, especially considering risk for reaction to Sarclisa with first dose today.    #Bone Health  She " "will continue monthly Zometa (last received on 6/6/22). She will continue Calcium/Vitamin D supplements.     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 Currently having no pain and able to walk 1 mile without any assistive devices.  She is interested in pursuing other activities like gentle cross-country skiing.  Had follow-up with Dr. Philip on 1/6/22 and was cleared to resume regular activity as tolerated. She should still refrain from high-impact activities.    # PVCs  She had an ECHO 12/1 and had follow-up with cardiology 12/7 (notes in CareEverywhere).  She had follow-up with cardiology here at the  and they recommend an ablation. Stress test was completed and she was started on Toprol XL; however, Toprol XL was discontinued due to significant side effects of orthostatic hypotension, pre-syncopal episodes, shakiness, and weakness.   - She had an ablation 3/23/22 but has not experienced relief from the feeling of being nauseated and short of breath after climbing the stairs.  She also reports episodes of palpitations that \"radiate into her neck.\" Denies chest pain or shortness of breath.   - EKG done 4/4 showed sinus rhythm and nonspecific ST and T wave abnormality. Troponin negative.  - Cardiac MRI ruled out amyloid.  LFEF 50%, no evidence of infiltrative cardiomyopathy.  - She followed up with cardiology and they felt that the cause may be musculoskeletal or pericarditis. They suggested taking ibuprofen for pain. Her pain has subsided and she actually has not needed to take ibuprofen.   - Post ablation Zio patch was performed showing a significant reduction in PVCs; however, by the time she had her cardiology follow-up, she had restarted chemotherapy and the PVCs had returned.  Dr. Thomas is comfortable proceeding with treatment as long as symptoms are " "tolerable and there are no major EKG changes.    #Diarrhea  She was having intermittent diarrhea with associated abdominal cramping and nausea, but developed much more significant diarrhea with 10-12 liquid episodes and was seen as an add-on on 4/4/22 for this.   - C.diff PCR negative 2/8/22, repeat on 4/5 negative  - Enteric stool panel 4/5/22 negative  - Underwent colonoscopy on 4/13/22 to pursue biopsy for amyloid (discussed below). Congo red stain is negative for amyloid deposits. Pathology showed colonic mucosa with focal active colitis, negative for signs of chronicity and lymphocytic colitis. Per patient report, GI reviewed results with her and did not feel she needed treatment for these findings at this time. They advised her to monitor her symptoms and to call them if they do not improve.Dr. Montgomery agreed with following conservatively for now since she reports some improvement in her symptoms.  If diarrhea becomes bothersome again, then they will arrange for expeditious evaluation in the IBD clinic.   - CMV PCR (4/4/22) not detected  - Diarrhea improved to now twice daily after discontinuing Velcade and Revlimid.   - Had one day of significant diarrhea this past weekend, otherwise has been stable at twice daily.  - Discussed potential for re-developing diarrhea after starting the new regimen; recommend managing with Imodium.    #Ocular Migraines  She has a history of ocular migraines that present as \"squiggly lines in her field of vision.\" These have occurred about annually since 2002, but were occurring daily following her ablation on 3/23/22. Currently decreasing in frequency.  - Referred to ophthalmology for further evaluation - they provided new prescription and recommended artificial tear drops.  - Symptoms have significantly improved.    #Concern for amyloid  She has continued to have significant GI symptoms despite negative infectious testing and diarrhea not being a major side effect of michelle or " Velcade. Previously discussed with Dr. Reinoso.  In light of the constellation of symptoms, as well as her sluggish response to therapy, she would like to do further testing to rule out amyolid. Of note, congo red stain was negative on her bone marrow biopsy from 9/22/21  - Cardiac MRI negative for infiltrative cardiomyopathy  - Colonoscopy to biopsy for GI amyloid done 4/13. Congo red stain negative for amyloid deposits.   - Diarrhea has significantly improved after discontinuing Velcade and Revlimid.    #COVID prophylaxis  - She received EVUSHELD 1/20/22 and 3/8/22  - Received 3 COVID vaccines to date    #Reflux  She reported episodes of reflux with bile, especially when she lays down at night.  She has started to elevate her head on pillows and is also trying to avoid eating within 1-2 hours of going to bed.  Omeprazole was increased to 20 mg twice daily with symptom improvement.    I reminded patient to call us if any questions/issues arise in between visits.     60 minutes spent on the date of the encounter doing chart review, review of test results, interpretation of tests, patient visit, documentation and discussion with other provider(s)       Again, thank you for allowing me to participate in the care of your patient.      Sincerely,    DORON Hall CNP

## 2022-07-06 NOTE — NURSING NOTE
Chief Complaint   Patient presents with     Blood Draw     Labs drawn via piv placed by EMT in lab. VS taken.      Labs drawn from PIV placed by vascular access EMT. Line flushed with saline. Vitals taken. Pt checked in for appointment(s).    Carissa Hernandez RN

## 2022-07-06 NOTE — TELEPHONE ENCOUNTER
Claim still not going through-still says PA required. Called [employer] [phone] and the PA needs to be re-exported to the plan. This may take up to 48 hours and they cannot expedite per Joselin MOSS. I will keep trying to run the claim.       Anette Mcdermott  Oncology Pharmacy Liaison II  [email or web address]  Phone: [phone]  Fax: [phone]

## 2022-07-06 NOTE — PATIENT INSTRUCTIONS
[facility] Triage and after hours / weekends / holidays:  [phone]    Please call the triage or after hours line if you experience a temperature greater than or equal to 100.4, shaking chills, have uncontrolled nausea, vomiting and/or diarrhea, dizziness, shortness of breath, chest pain, bleeding, unexplained bruising, or if you have any other new/concerning symptoms, questions or concerns.      If you are having any concerning symptoms or wish to speak to a provider before your next infusion visit, please call your care coordinator or triage to notify them so we can adequately serve you.     If you need a refill on a narcotic prescription or other medication, please call before your infusion appointment.                July 2022 Sunday Monday Tuesday Wednesday Thursday Friday Saturday                            1     2       3     4     5     6    LAB PERIPHERAL   7:45 AM   (15 min.)   [facility] DRAW   [facility]    RETURN   8:00 AM   (45 min.)   Jackie Ferris APRN CNP   [facility]    ONC INFUSION 2 HR (120 MIN)   8:30 AM   (120 min.)    ONC INFUSION NURSE   [facility] 7     8     9       10     11     12    VIDEO VISIT RETURN   9:45 AM   (45 min.)   Jackie Ferris APRN CNP   [facility] 13    LAB PERIPHERAL   7:45 AM   (15 min.)   [facility]RAW   [facility]    ONC INFUSION 2 HR (120 MIN)   8:30 AM   (120 min.)    ONC INFUSION NURSE   [facility] 14     15     16       17     18     19     20    LAB PERIPHERAL  11:45 AM   (15 min.)   [facility]RAW   [facility]    RETURN  12:15 PM   (45 min.)   Estefany Pepe PA-C   [facility]    ONC INFUSION 2 HR (120 MIN)   1:00 PM   (120 min.)    ONC INFUSION NURSE   [facility] 21     22      23       24     25     26     27    LAB PERIPHERAL   1:00 PM   (15 min.)    [facility]   [facility]    RETURN   1:15 PM   (45 min.)   Jackie Ferris APRN CNP   [facility]    ONC INFUSION 2 HR (120 MIN)   2:30 PM   (120 min.)   [facility] INFUSION NURSE   [facility] 28     29 30 31 August 2022 Sunday Monday Tuesday Wednesday Thursday Friday Saturday        1    [facility] RETURN GENERAL   8:45 AM   (20 min.)   Fanny Ponce MD   [facility] 2     3    LAB PERIPHERAL   2:00 PM   (15 min.)    [facility]RAW   [facility]    ONC INFUSION 2 HR (120 MIN)   2:30 PM   (120 min.)    ONC INFUSION NURSE   [facility] 4     5     6       7     8     9     10    LAB PERIPHERAL  12:30 PM   (15 min.)    [facility]RAW   [facility]    RETURN  12:45 PM   (30 min.)   Payton Reinoso MD   [facility] 11     12     13       14     15     16     17    LAB PERIPHERAL   2:00 PM   (15 min.)    [facility] DRAW   [facility]    ONC INFUSION 2 HR (120 MIN)   2:30 PM   (120 min.)    ONC INFUSION NURSE   [facility] 18     19     20       21     22     23     24     25     26     27       28     29     30     31                                       Recent Results (from the past 24 hour(s))   Comprehensive metabolic panel    Collection Time: 07/06/22  8:23 AM   Result Value Ref Range    Sodium 139 133 - 144 mmol/L    Potassium 3.8 3.4 - 5.3 mmol/L    Chloride 113 (H) 94 - 109 mmol/L    Carbon Dioxide (CO2) 24 20 - 32 mmol/L    Anion Gap 2 (L) 3 - 14 mmol/L    Urea Nitrogen 22 7 - 30 mg/dL    Creatinine 0.72 0.52 - 1.04 mg/dL    Calcium 9.3 8.5 -  10.1 mg/dL    Glucose 94 70 - 99 mg/dL    Alkaline Phosphatase 53 40 - 150 U/L    AST 15 0 - 45 U/L    ALT 22 0 - 50 U/L    Protein Total 6.7 (L) 6.8 - 8.8 g/dL    Albumin 3.9 3.4 - 5.0 g/dL    Bilirubin Total 0.5 0.2 - 1.3 mg/dL    GFR Estimate >90 >60 mL/min/1.73m2   CBC with platelets and differential    Collection Time: 07/06/22  8:23 AM   Result Value Ref Range    WBC Count 4.2 4.0 - 11.0 10e3/uL    RBC Count 4.19 3.80 - 5.20 10e6/uL    Hemoglobin 13.5 11.7 - 15.7 g/dL    Hematocrit 40.4 35.0 - 47.0 %    MCV 96 78 - 100 fL    MCH 32.2 26.5 - 33.0 pg    MCHC 33.4 31.5 - 36.5 g/dL    RDW 13.0 10.0 - 15.0 %    Platelet Count 269 150 - 450 10e3/uL    % Neutrophils 72 %    % Lymphocytes 14 %    % Monocytes 10 %    % Eosinophils 3 %    % Basophils 1 %    % Immature Granulocytes 0 %    NRBCs per 100 WBC 0 <1 /100    Absolute Neutrophils 3.1 1.6 - 8.3 10e3/uL    Absolute Lymphocytes 0.6 (L) 0.8 - 5.3 10e3/uL    Absolute Monocytes 0.4 0.0 - 1.3 10e3/uL    Absolute Eosinophils 0.1 0.0 - 0.7 10e3/uL    Absolute Basophils 0.0 0.0 - 0.2 10e3/uL    Absolute Immature Granulocytes 0.0 <=0.4 10e3/uL    Absolute NRBCs 0.0 10e3/uL

## 2022-07-06 NOTE — TELEPHONE ENCOUNTER
Prior Authorization Approval    Authorization Effective Date: 6/29/2022  Authorization Expiration Date: 6/29/2025  Medication: Pomalyst --PA Approved  Approved Dose/Quantity: 21 per 28 days  Reference #: Case ID # [id number]   Insurance Company: [employer] - Phone [phone] Fax [phone]  Expected CoPay:       CoPay Card Available:      Foundation Assistance Needed:    Which Pharmacy is filling the prescription (Not needed for infusion/clinic administered):    Pharmacy Notified:    Patient Notified:      Auth # [id number] aletha GOMEZ at [employer] [phone].      Anette Mcdermott  Oncology Pharmacy Liaison II  [email or web address]  Phone: [phone]  Fax: [phone]

## 2022-07-07 ENCOUNTER — TELEPHONE (OUTPATIENT)
Dept: ONCOLOGY | Facility: CLINIC | Age: 52
End: 2022-07-07

## 2022-07-07 ENCOUNTER — PATIENT OUTREACH (OUTPATIENT)
Dept: ONCOLOGY | Facility: CLINIC | Age: 52
End: 2022-07-07

## 2022-07-07 DIAGNOSIS — C90.00 LIGHT CHAIN MYELOMA (H): ICD-10-CM

## 2022-07-07 LAB
ATRIAL RATE - MUSE: 71 BPM
DIASTOLIC BLOOD PRESSURE - MUSE: NORMAL MMHG
INTERPRETATION ECG - MUSE: NORMAL
KAPPA LC FREE SER-MCNC: 49.34 MG/DL (ref 0.33–1.94)
KAPPA LC FREE/LAMBDA FREE SER NEPH: 352.43 {RATIO} (ref 0.26–1.65)
LAMBDA LC FREE SERPL-MCNC: 0.14 MG/DL (ref 0.57–2.63)
P AXIS - MUSE: -5 DEGREES
PR INTERVAL - MUSE: 148 MS
QRS DURATION - MUSE: 76 MS
QT - MUSE: 396 MS
QTC - MUSE: 430 MS
R AXIS - MUSE: 53 DEGREES
SYSTOLIC BLOOD PRESSURE - MUSE: NORMAL MMHG
T AXIS - MUSE: 29 DEGREES
VENTRICULAR RATE- MUSE: 71 BPM

## 2022-07-07 NOTE — PROGRESS NOTES
[facility]: Cancer Care                                                                                          Margo calls stating that she has not heard from our pharmacy team about her Pomalyst prescription.  Pharmacy was contacted and they state that we are waiting for insurance authorization    Call returned to Margo to let her know that our pharmacy liaison is working on the prior authorization.  We hope to have an answer by tomorrow at the latest.     She understands that our pharmacy team will be in contact as soon as they have an answer.    Signature:  Mattie Rodriguez RN

## 2022-07-07 NOTE — TELEPHONE ENCOUNTER
Pomalyst was not approved. The case was canceled out. It was getting mixed up with infusion drug request through [employer] per Nghia Styles ([phone]) found the original case #[id number]. It is still in process. Leesa marked it as urgent with a due date of 7/10.       Anette Mcdermott  Oncology Pharmacy Liaison II  [email or web address]  Phone: [phone]  Fax: [phone]

## 2022-07-07 NOTE — ORAL ONC MGMT
Oral Chemotherapy Monitoring Program    Lab Monitoring Plan  Labs with infusion  Pregnancy tests prior to starting  Subjective/Objective:  Kristie Cornejo is a 52 year old female contacted by phone for an initial visit for oral chemotherapy education.    ORAL CHEMOTHERAPY 4/11/2022 4/25/2022 5/9/2022 5/21/2022 5/24/2022 7/5/2022 7/7/2022   Assessment Type Refill Lab Monitoring Lab Monitoring;Refill Chart Review Chart Review Initial Work up New Teach   Diagnosis Code Multiple Myeloma Multiple Myeloma Multiple Myeloma Multiple Myeloma Multiple Myeloma Multiple Myeloma Multiple Myeloma   Providers Dr. Kemar Reinoso   [facility]   Drug Name Revlimid (lenalidomide) Revlimid (lenalidomide) Revlimid (lenalidomide) - Revlimid (lenalidomide) Pomalyst (pomalidomide) Pomalyst (pomalidomide)   Dose - 25 mg 25 mg - 25 mg 4 mg 4 mg   Current Schedule - Daily Daily - Daily Daily Daily   Cycle Details - 2 weeks on, 2 weeks off 2 weeks on, 2 weeks off - Other 3 weeks on, 1 week off 3 weeks on, 1 week off   Start Date of Last Cycle - - - - (No Data) - -   Planned next cycle start date - - - - - 7/6/2022 -   Doses missed in last 2 weeks - - - - - - -   Adherence Assessment - - - - - - -   Adverse Effects - Other (See Note for Details) - - - - -   Nausea - - - - - - -   Pharmacist Intervention(nausea) - - - - - - -   Fatigue - - - - - - -   Pharmacist Intervention(fatigue) - - - - - - -   Intervention(s) - - - - - - -   Other (See Note for Details) - Hypokalemia, leukopenia - - - - -   Pharmacist intervention(other) - No - - - - -   Intervention(s) - - - - - - -   Any new drug interactions? - - - - - - -   Is the dose as ordered appropriate for the patient? - Yes - - - - -   Has the patient missed any days of school, work, or other routine activity? - - - - - - -   Since the last time we  "talked, have you been hospitalized or used the emergency room? - - - - - - -       Last PHQ-2 Score on record:   PHQ-2 ( 1999 [employer]) 1/6/2022 8/26/2021   Q1: Little interest or pleasure in doing things 0 0   Q2: Feeling down, depressed or hopeless 0 0   PHQ-2 Score 0 0   PHQ-2 Total Score (12-17 Years)- Positive if 3 or more points; Administer PHQ-A if positive 0 0   Q1: Little interest or pleasure in doing things - Not at all   Q2: Feeling down, depressed or hopeless - Not at all   PHQ-2 Score - 0       Vitals:  BP:   BP Readings from Last 1 Encounters:   07/06/22 133/83     Wt Readings from Last 1 Encounters:   07/06/22 67.1 kg (147 lb 14.4 oz)     Estimated body surface area is 1.8 meters squared as calculated from the following:    Height as of 7/6/22: 1.729 m (5' 8.07\").    Weight as of 7/6/22: 67.1 kg (147 lb 14.4 oz).    Labs:  _  Result Component Current Result Ref Range   Sodium 139 (7/6/2022) 133 - 144 mmol/L     _  Result Component Current Result Ref Range   Potassium 3.8 (7/6/2022) 3.4 - 5.3 mmol/L     _  Result Component Current Result Ref Range   Calcium 9.3 (7/6/2022) 8.5 - 10.1 mg/dL     No results found for Mag within last 30 days.     No results found for Phos within last 30 days.     _  Result Component Current Result Ref Range   Albumin 3.9 (7/6/2022) 3.4 - 5.0 g/dL     _  Result Component Current Result Ref Range   Urea Nitrogen 22 (7/6/2022) 7 - 30 mg/dL     _  Result Component Current Result Ref Range   Creatinine 0.72 (7/6/2022) 0.52 - 1.04 mg/dL     _  Result Component Current Result Ref Range   AST 15 (7/6/2022) 0 - 45 U/L     _  Result Component Current Result Ref Range   ALT 22 (7/6/2022) 0 - 50 U/L     _  Result Component Current Result Ref Range   Bilirubin Total 0.5 (7/6/2022) 0.2 - 1.3 mg/dL     _  Result Component Current Result Ref Range   WBC Count 4.2 (7/6/2022) 4.0 - 11.0 10e3/uL     _  Result Component Current Result Ref Range   Hemoglobin 13.5 (7/6/2022) 11.7 - 15.7 g/dL "     _  Result Component Current Result Ref Range   Platelet Count 269 (7/6/2022) 150 - 450 10e3/uL     No results found for ANC within last 30 days.     _  Result Component Current Result Ref Range   Absolute Neutrophils 3.1 (7/6/2022) 1.6 - 8.3 10e3/uL        Assessment:  Patient is appropriate to start therapy. Still working on access. Making sure pregnancy tests are entered prior to starting. Margo agrees to start a aspirin 81 mg daily when she starts pomalidomide.    Plan:  Basic chemotherapy teaching was reviewed with the patient including indication, start date of therapy, dose, administration, adverse effects, missed doses, food and drug interactions, monitoring, side effect management, office contact information, and safe handling. Written materials were provided and all questions answered.    Follow-Up:  1 week initial follow-up after starting     Osvaldo Peoples, PharmD, BCPS, BCOP  Hematology/Oncology Clinical Pharmacist  Oral Chemotherapy Monitoring Program  [facility]  [phone]

## 2022-07-08 ENCOUNTER — LAB (OUTPATIENT)
Dept: LAB | Facility: CLINIC | Age: 52
End: 2022-07-08
Payer: COMMERCIAL

## 2022-07-08 DIAGNOSIS — C90.30 PLASMACYTOMA OF BONE (H): ICD-10-CM

## 2022-07-08 LAB — HCG UR QL: NEGATIVE

## 2022-07-08 PROCEDURE — 81025 URINE PREGNANCY TEST: CPT

## 2022-07-11 ENCOUNTER — TELEPHONE (OUTPATIENT)
Dept: ONCOLOGY | Facility: CLINIC | Age: 52
End: 2022-07-11

## 2022-07-11 ENCOUNTER — PATIENT OUTREACH (OUTPATIENT)
Dept: ONCOLOGY | Facility: CLINIC | Age: 52
End: 2022-07-11

## 2022-07-11 NOTE — PROGRESS NOTES
[facility]: Cancer Care                                                                                          Margo left a message over the weekend stating that she had not received her Pomalyst prescription yet.  She had contacted her insurance carrier and was informed that it was approved.    Messages sent to pharmacy liaison and oral chemotherapy pharmacy team.  They respond that the prescription was confirmed today and they will send it to [facility] for delivery today.    Call returned to Margo and she confirms that she has the prescription.    She asks about her zometa infusion and was informed it was due.  She will have it at her appointment on 7/13/2022    Signature:  Mattie Rodriguez RN

## 2022-07-11 NOTE — TELEPHONE ENCOUNTER
Oral Chemotherapy Monitoring Program    Medication: Pomalyst  Rx:  4 mg PO daily on days 1-21 of repeated 28 day cycles  Auth #:[id number]   Risk Category: Adult female WITH reproductive capacity.    Routine survey questions reviewed.      Anette Mcdermott  Oncology Pharmacy Liaison II  [email or web address]  Phone: [phone]  Fax: [phone]

## 2022-07-11 NOTE — TELEPHONE ENCOUNTER
Prior Authorization Approval    Authorization Effective Date: 7/5/2022  Authorization Expiration Date: 7/8/2023  Medication: Pomalyst PA Approved  Approved Dose/Quantity: 21 per 28 days   Reference #: Case ID # [id number]   Insurance Company: [employer] Phone [phone] Fax [phone]  Expected CoPay:       CoPay Card Available:      Foundation Assistance Needed:    Which Pharmacy is filling the prescription (Not needed for infusion/clinic administered): [facility] - [place] [address]  Pharmacy Notified:    Patient Notified:            Anette Mcdermott  Oncology Pharmacy Liaison II  [email or web address]  Phone: [phone]  Fax: [phone]

## 2022-07-12 ENCOUNTER — HOSPITAL ENCOUNTER (OUTPATIENT)
Dept: PET IMAGING | Facility: CLINIC | Age: 52
Discharge: HOME | End: 2022-07-12
Attending: REGISTERED NURSE | Admitting: REGISTERED NURSE
Payer: COMMERCIAL

## 2022-07-12 DIAGNOSIS — C90.00 LIGHT CHAIN MYELOMA (H): ICD-10-CM

## 2022-07-12 PROCEDURE — 71260 CT THORAX DX C+: CPT | Mod: 26 | Performed by: RADIOLOGY

## 2022-07-12 PROCEDURE — 78816 PET IMAGE W/CT FULL BODY: CPT | Mod: PS

## 2022-07-12 PROCEDURE — 343N000001 HC RX 343: Performed by: REGISTERED NURSE

## 2022-07-12 PROCEDURE — A9552 F18 FDG: HCPCS | Performed by: REGISTERED NURSE

## 2022-07-12 PROCEDURE — 250N000011 HC RX IP 250 OP 636: Performed by: REGISTERED NURSE

## 2022-07-12 PROCEDURE — 74177 CT ABD & PELVIS W/CONTRAST: CPT

## 2022-07-12 PROCEDURE — 78816 PET IMAGE W/CT FULL BODY: CPT | Mod: 26 | Performed by: RADIOLOGY

## 2022-07-12 PROCEDURE — 74177 CT ABD & PELVIS W/CONTRAST: CPT | Mod: 26 | Performed by: RADIOLOGY

## 2022-07-12 RX ORDER — IOPAMIDOL 755 MG/ML
20-135 INJECTION, SOLUTION INTRAVASCULAR ONCE
Status: COMPLETED | OUTPATIENT
Start: 2022-07-12 | End: 2022-07-12

## 2022-07-12 RX ADMIN — FLUDEOXYGLUCOSE F-18 10.08 MCI.: 500 INJECTION, SOLUTION INTRAVENOUS at 07:33

## 2022-07-12 RX ADMIN — IOPAMIDOL 82 ML: 755 INJECTION, SOLUTION INTRAVENOUS at 08:22

## 2022-07-13 ENCOUNTER — APPOINTMENT (OUTPATIENT)
Dept: LAB | Facility: CLINIC | Age: 52
End: 2022-07-13
Attending: INTERNAL MEDICINE
Payer: COMMERCIAL

## 2022-07-13 ENCOUNTER — INFUSION THERAPY VISIT (OUTPATIENT)
Dept: ONCOLOGY | Facility: CLINIC | Age: 52
End: 2022-07-13
Attending: INTERNAL MEDICINE
Payer: COMMERCIAL

## 2022-07-13 ENCOUNTER — ONCOLOGY VISIT (OUTPATIENT)
Dept: ONCOLOGY | Facility: CLINIC | Age: 52
End: 2022-07-13
Attending: REGISTERED NURSE
Payer: COMMERCIAL

## 2022-07-13 VITALS
WEIGHT: 145.6 LBS | BODY MASS INDEX: 22.09 KG/M2 | OXYGEN SATURATION: 99 % | RESPIRATION RATE: 16 BRPM | HEART RATE: 82 BPM | SYSTOLIC BLOOD PRESSURE: 122 MMHG | TEMPERATURE: 98.1 F | DIASTOLIC BLOOD PRESSURE: 83 MMHG

## 2022-07-13 DIAGNOSIS — C90.00 LIGHT CHAIN MYELOMA (H): ICD-10-CM

## 2022-07-13 DIAGNOSIS — C90.00 LIGHT CHAIN MYELOMA (H): Primary | ICD-10-CM

## 2022-07-13 DIAGNOSIS — C90.30 PLASMACYTOMA OF BONE (H): ICD-10-CM

## 2022-07-13 LAB
BASOPHILS # BLD AUTO: 0 10E3/UL (ref 0–0.2)
BASOPHILS NFR BLD AUTO: 1 %
CALCIUM SERPL-MCNC: 8.8 MG/DL (ref 8.5–10.1)
CREAT SERPL-MCNC: 0.66 MG/DL (ref 0.52–1.04)
EOSINOPHIL # BLD AUTO: 0.1 10E3/UL (ref 0–0.7)
EOSINOPHIL NFR BLD AUTO: 3 %
ERYTHROCYTE [DISTWIDTH] IN BLOOD BY AUTOMATED COUNT: 12.9 % (ref 10–15)
GFR SERPL CREATININE-BSD FRML MDRD: >90 ML/MIN/1.73M2
HCT VFR BLD AUTO: 40.7 % (ref 35–47)
HGB BLD-MCNC: 13.6 G/DL (ref 11.7–15.7)
IMM GRANULOCYTES # BLD: 0 10E3/UL
IMM GRANULOCYTES NFR BLD: 1 %
LYMPHOCYTES # BLD AUTO: 0.4 10E3/UL (ref 0.8–5.3)
LYMPHOCYTES NFR BLD AUTO: 9 %
MCH RBC QN AUTO: 31.8 PG (ref 26.5–33)
MCHC RBC AUTO-ENTMCNC: 33.4 G/DL (ref 31.5–36.5)
MCV RBC AUTO: 95 FL (ref 78–100)
METHYLMALONATE SERPL-SCNC: 0.27 UMOL/L (ref 0–0.4)
MONOCYTES # BLD AUTO: 0.5 10E3/UL (ref 0–1.3)
MONOCYTES NFR BLD AUTO: 12 %
NEUTROPHILS # BLD AUTO: 3.4 10E3/UL (ref 1.6–8.3)
NEUTROPHILS NFR BLD AUTO: 74 %
NRBC # BLD AUTO: 0 10E3/UL
NRBC BLD AUTO-RTO: 0 /100
PLATELET # BLD AUTO: 255 10E3/UL (ref 150–450)
RBC # BLD AUTO: 4.28 10E6/UL (ref 3.8–5.2)
TROPONIN I SERPL HS-MCNC: 6 NG/L
WBC # BLD AUTO: 4.6 10E3/UL (ref 4–11)

## 2022-07-13 PROCEDURE — 36415 COLL VENOUS BLD VENIPUNCTURE: CPT

## 2022-07-13 PROCEDURE — 84484 ASSAY OF TROPONIN QUANT: CPT | Performed by: REGISTERED NURSE

## 2022-07-13 PROCEDURE — 93005 ELECTROCARDIOGRAM TRACING: CPT

## 2022-07-13 PROCEDURE — 82310 ASSAY OF CALCIUM: CPT

## 2022-07-13 PROCEDURE — 99215 OFFICE O/P EST HI 40 MIN: CPT | Performed by: REGISTERED NURSE

## 2022-07-13 PROCEDURE — 258N000003 HC RX IP 258 OP 636

## 2022-07-13 PROCEDURE — 250N000011 HC RX IP 250 OP 636

## 2022-07-13 PROCEDURE — 93010 ELECTROCARDIOGRAM REPORT: CPT | Performed by: INTERNAL MEDICINE

## 2022-07-13 PROCEDURE — 36415 COLL VENOUS BLD VENIPUNCTURE: CPT | Performed by: REGISTERED NURSE

## 2022-07-13 PROCEDURE — 82565 ASSAY OF CREATININE: CPT | Performed by: INTERNAL MEDICINE

## 2022-07-13 PROCEDURE — 96367 TX/PROPH/DG ADDL SEQ IV INF: CPT

## 2022-07-13 PROCEDURE — 96415 CHEMO IV INFUSION ADDL HR: CPT

## 2022-07-13 PROCEDURE — 250N000013 HC RX MED GY IP 250 OP 250 PS 637

## 2022-07-13 PROCEDURE — 96375 TX/PRO/DX INJ NEW DRUG ADDON: CPT

## 2022-07-13 PROCEDURE — 258N000003 HC RX IP 258 OP 636: Performed by: REGISTERED NURSE

## 2022-07-13 PROCEDURE — 96413 CHEMO IV INFUSION 1 HR: CPT

## 2022-07-13 PROCEDURE — 250N000011 HC RX IP 250 OP 636: Performed by: REGISTERED NURSE

## 2022-07-13 PROCEDURE — 85025 COMPLETE CBC W/AUTO DIFF WBC: CPT

## 2022-07-13 RX ORDER — NALOXONE HYDROCHLORIDE 0.4 MG/ML
0.2 INJECTION, SOLUTION INTRAMUSCULAR; INTRAVENOUS; SUBCUTANEOUS
Status: CANCELLED | OUTPATIENT
Start: 2022-08-01

## 2022-07-13 RX ORDER — MEPERIDINE HYDROCHLORIDE 25 MG/ML
25 INJECTION INTRAMUSCULAR; INTRAVENOUS; SUBCUTANEOUS EVERY 30 MIN PRN
Status: CANCELLED | OUTPATIENT
Start: 2022-08-01

## 2022-07-13 RX ORDER — EPINEPHRINE 1 MG/ML
0.3 INJECTION, SOLUTION INTRAMUSCULAR; SUBCUTANEOUS EVERY 5 MIN PRN
Status: CANCELLED | OUTPATIENT
Start: 2022-08-01

## 2022-07-13 RX ORDER — LORAZEPAM 0.5 MG/1
0.5 TABLET ORAL EVERY 4 HOURS PRN
Qty: 30 TABLET | Refills: 3 | Status: SHIPPED | OUTPATIENT
Start: 2022-07-13

## 2022-07-13 RX ORDER — ACETAMINOPHEN 325 MG/1
650 TABLET ORAL ONCE
Status: COMPLETED | OUTPATIENT
Start: 2022-07-13 | End: 2022-07-13

## 2022-07-13 RX ORDER — ALBUTEROL SULFATE 90 UG/1
1-2 AEROSOL, METERED RESPIRATORY (INHALATION)
Status: CANCELLED
Start: 2022-08-01

## 2022-07-13 RX ORDER — METHYLPREDNISOLONE SODIUM SUCCINATE 125 MG/2ML
125 INJECTION, POWDER, LYOPHILIZED, FOR SOLUTION INTRAMUSCULAR; INTRAVENOUS
Status: CANCELLED
Start: 2022-08-01

## 2022-07-13 RX ORDER — ZOLEDRONIC ACID 0.04 MG/ML
4 INJECTION, SOLUTION INTRAVENOUS ONCE
Status: COMPLETED | OUTPATIENT
Start: 2022-07-13 | End: 2022-07-13

## 2022-07-13 RX ORDER — DIPHENHYDRAMINE HYDROCHLORIDE 50 MG/ML
50 INJECTION INTRAMUSCULAR; INTRAVENOUS
Status: CANCELLED
Start: 2022-08-01

## 2022-07-13 RX ORDER — ACYCLOVIR 400 MG/1
400 TABLET ORAL 2 TIMES DAILY
Qty: 180 TABLET | Refills: 3 | Status: SHIPPED | OUTPATIENT
Start: 2022-07-13 | End: 2022-09-12

## 2022-07-13 RX ORDER — ALBUTEROL SULFATE 0.83 MG/ML
2.5 SOLUTION RESPIRATORY (INHALATION)
Status: CANCELLED | OUTPATIENT
Start: 2022-08-01

## 2022-07-13 RX ORDER — HEPARIN SODIUM (PORCINE) LOCK FLUSH IV SOLN 100 UNIT/ML 100 UNIT/ML
5 SOLUTION INTRAVENOUS
Status: CANCELLED | OUTPATIENT
Start: 2022-08-01

## 2022-07-13 RX ORDER — ZOLEDRONIC ACID 0.04 MG/ML
4 INJECTION, SOLUTION INTRAVENOUS ONCE
Status: CANCELLED
Start: 2022-08-01 | End: 2022-08-01

## 2022-07-13 RX ORDER — HEPARIN SODIUM,PORCINE 10 UNIT/ML
5 VIAL (ML) INTRAVENOUS
Status: CANCELLED | OUTPATIENT
Start: 2022-08-01

## 2022-07-13 RX ADMIN — SODIUM CHLORIDE 250 ML: 9 INJECTION, SOLUTION INTRAVENOUS at 09:53

## 2022-07-13 RX ADMIN — SODIUM CHLORIDE 700 MG: 9 INJECTION, SOLUTION INTRAVENOUS at 10:54

## 2022-07-13 RX ADMIN — DEXAMETHASONE SODIUM PHOSPHATE: 10 INJECTION, SOLUTION INTRAMUSCULAR; INTRAVENOUS at 09:53

## 2022-07-13 RX ADMIN — ZOLEDRONIC ACID 4 MG: 0.04 INJECTION, SOLUTION INTRAVENOUS at 13:02

## 2022-07-13 RX ADMIN — DIPHENHYDRAMINE HYDROCHLORIDE 50 MG: 50 INJECTION, SOLUTION INTRAMUSCULAR; INTRAVENOUS at 10:18

## 2022-07-13 RX ADMIN — ACETAMINOPHEN 650 MG: 325 TABLET ORAL at 10:00

## 2022-07-13 RX ADMIN — FAMOTIDINE 20 MG: 10 INJECTION INTRAVENOUS at 10:00

## 2022-07-13 ASSESSMENT — PAIN SCALES - GENERAL: PAINLEVEL: NO PAIN (1)

## 2022-07-13 NOTE — PROGRESS NOTES
[facility]  Date of Visit: Jul 13, 2022   Oncologist: Dr. Payton Reinoso    Reason for visit: myeloma follow-up           Oncology History   52 year old female with past medical history significant for Irritable bowel disease, allergies, large plasmacytoma of the L pelvis, and newly diagnosed multiple myeloma.     Early in April 2021 , she noted to have left sided groin pain, constant and was affecting her daily activities  MRI showed large, expansile, permeative mass with indistinct borders involving the left superior and inferior pubic rami, pubic symphysis and with possible extension to the left acetabulum.     We completed the following work-up:   - Biopsy 9/7/2021: plasmacytoma.  Flow cytometry showed kappa monotypic plasma cells, polytypic B cells. FISH results from plasmacytoma showed a gain of 11q, IGH-CCND1 fusion but no losses of 1q or TP53 and no gain of 1q.  - Bone marrow biopsy 09/22/21: Marrow cellularity 50% with 25% interstitial infiltration w/  Kappa-monotypic, moderately atypical plasma cells. Flow with 0.7% kappa-monotypica plasma cells. FISH and cytogenetics pending.   - PET CT 09/24/21 w/ hypermetabolic pathologic fracture of the L pubic ramus w/ aggressive appearing osteolysis but no additional suspicious lytic or blastic osseous lesions.     Treatment to date:   - Zometa q4 weeks started 9/27/21  - Radiation to left pubic ramus x18 fractions, completed 11/11/21.   - RVD C1D1 11/15/21  - RVD C2D1 12/6/21  - RVD C3D1  12/27/21  - RVD C4 D1 1/17/22, elizabeth added on D8 due to inadequate response of urinary M-spike and FLCs remaining over 100 mg/dL after 4 cycles of RVD    - Elizabeth-RVD C1D8 1/24/22  - Elizabeth-RVD C2D1 2/7/22  - Elizabeth-RVD C3D1 2/28/22  - Elizabeth-RVD C4D1 3/29/22  - Elizabeth-RVD C5D1 4/19/22    Disease response: OH     - 5/19/22: Continued monotherapy with Daratumumab; held Revlimid and Velcade.    - 6/21/22: Transitioned to KCD, received C1 days 1 and 2- had  "significant PVCs at cardiac follow-uyp after 1st week of Kyprolis, held further doses.    - Started Sarclisa/Pom/Dex 7/6/22 (Pomalyst start delayed until 7/11 due to PA)          INTERIM HISTORY:   Margo was seen as an acute add-on today due to concerns that she was having an irregular heart rhythm and increased cardiac symptoms upon arrival to infusion.    Margo reports that she has continued to feel symptoms of shortness of breath, chest tightness, and a sense that \"something worse is about to happen\" over the past week.  Symptoms seemed particularly pronounced after walking into infusion from the parking lot. By the time I saw her, she some of the more acute shortness of breath and chest tightness had resolved.  She remained hemodynamically stable throughout the episode.     Denies fevers, chills, headaches, vision or hearing changes, new lumps or bumps, chest pain, cough, abdominal pain, vomiting, swelling of extremities, bleeding issues, or rash.           Physical Exam:     Vital Signs 7/13/2022   Systolic 116   Diastolic 80   Pulse 93   Temperature 98.1   Respirations 16   Weight (LB) 145 lb 9.6 oz   Height    BMI (Calculated)    Pain Score 1   O2 100       Wt Readings from Last 4 Encounters:   07/13/22 66 kg (145 lb 9.6 oz)   07/06/22 67.1 kg (147 lb 14.4 oz)   07/06/22 67.1 kg (147 lb 14.4 oz)   06/27/22 66.5 kg (146 lb 9.6 oz)       General: well appearing, no acute distress, pleasant female  CV: S1 S2, occasional skipped beat, no murmur, click or rub  Lungs: clear to auscultation bilaterally, no wheezes/rales/rhonchi  Neuro: alert and oriented x3, CN grossly intact          Laboratory Data:      I personally reviewed the following labs:    Most Recent 3 CBC's:  Recent Labs   Lab Test 07/13/22  0813 07/06/22  0823 06/21/22  1307   WBC 4.6 4.2 4.4   HGB 13.6 13.5 13.3   MCV 95 96 94    269 276     Most Recent 3 BMP's:  Recent Labs   Lab Test 07/13/22  0900 07/06/22  0823 06/21/22  1307 06/06/22  1143 " "05/24/22  1527   NA  --  139 144  --  142   POTASSIUM  --  3.8 3.6  --  3.4   CHLORIDE  --  113* 115*  --  113*   CO2  --  24 20  --  18*   BUN  --  22 26  --  22   CR 0.66 0.72 0.67   < > 0.72   ANIONGAP  --  2* 9  --  11   MEKA 8.8 9.3 9.2   < > 8.9   GLC  --  94 94  --  146*    < > = values in this interval not displayed.     Most Recent 2 LFT's:  Recent Labs   Lab Test 07/06/22  0823 06/21/22  1307   AST 15 14   ALT 22 27   ALKPHOS 53 51   BILITOTAL 0.5 0.5      Latest Reference Range & Units 07/13/22 09:00   Troponin I High Sensitivity <54 ng/L 6         Imaging:  No new imaging to review.         Assessment and Recommendations   Kristie Cornejo is a 52 year old female who was diagnosed with Plasmacytoma after she presented with left sided groin pain now found to have multiple myeloma after further workup with bone marrow biopsy.     # Multiple myeloma with associated large plasmacytoma of the L pelvis. Kappa light chain disease  # PVCs    Margo presented to infusion today with increased cardiac symtpoms (shortness of breath, chest tightness, sense that \"something worse is about to happen\") after walking into the infusion center from the parking lot.  The infusion RN also had concerns that she was in an irregular heart rhythm. By the time I assessed Margo, her symptoms were starting to resolve, and her heart sounds were largely regularly with an occasional skipped beat.  EKG was requested.  I had her walk down the bradshaw and do some stairs prior to the EKG to try to trigger any cardiac issues that might be impacted by exertion.  Even so, she did not feel particularly symptomatic at the time of the EKG.  EKG without any major changes. Troponin was drawn and was WNL. Discussed with Margo that I think these symptoms are ongoing symptoms from her known PVC abnormalities. She remains hemodynamically stable during her episodes.  We re-discussed Dr. Thomas's previous recommendation which is that as long as there are no " major EKG changes and her symptoms are tolerable, it is reasonable to proceed with chemotherapy. Margo affirmed that the symptoms are tolerable (uncomfortable, but tolerable) as long as nothing concerning is changing. I think it is reasonable to continue with treatment today. I did ask her to take her blood pressure at home when she is feeling particularly symptomatic, and advised her to report to the ED if her SBP is ever < 90 in the context of increased symptoms.  Margo agrees to this plan.    I will keep my follow-up visit with her on Friday to reassess her symptoms after receiving another dose of chemotherapy today.       40 minutes spent on the date of the encounter doing chart review, review of test results, interpretation of tests, patient visit, documentation and discussion with other provider(s)     DORON Hall [facility] [address] [id number] [phone]

## 2022-07-13 NOTE — PROGRESS NOTES
Infusion Nursing Note:  Kristie Cornejo presents today for ***.    Patient seen by provider today: {YES (EXPLAIN)/NO:326376}   present during visit today: Not Applicable.    Note: Pt arrived with SOB and chest tightness.     Intravenous Access:  {UMHIVACCESS:701150}    Treatment Conditions:  {UMHTXCONDITIONS:483882}    Post Infusion Assessment:  {UMHPOSTINFUSION:310445}     Discharge Plan:   {UMHDISCHARGE:891284}      Carissa Ruiz RN

## 2022-07-13 NOTE — LETTER
7/13/2022         RE: Kristie Cornejo  [address]  [facility] [address]        Dear Colleague,    Thank you for referring your patient, Kristie Cornejo, to the [facility]. Please see a copy of my visit note below.    [facility]  Date of Visit: Jul 13, 2022   Oncologist: Dr. Payton Reinoso    Reason for visit: myeloma follow-up           Oncology History   52 year old female with past medical history significant for Irritable bowel disease, allergies, large plasmacytoma of the L pelvis, and newly diagnosed multiple myeloma.     Early in April 2021 , she noted to have left sided groin pain, constant and was affecting her daily activities  MRI showed large, expansile, permeative mass with indistinct borders involving the left superior and inferior pubic rami, pubic symphysis and with possible extension to the left acetabulum.     We completed the following work-up:   - Biopsy 9/7/2021: plasmacytoma.  Flow cytometry showed kappa monotypic plasma cells, polytypic B cells. FISH results from plasmacytoma showed a gain of 11q, IGH-CCND1 fusion but no losses of 1q or TP53 and no gain of 1q.  - Bone marrow biopsy 09/22/21: Marrow cellularity 50% with 25% interstitial infiltration w/  Kappa-monotypic, moderately atypical plasma cells. Flow with 0.7% kappa-monotypica plasma cells. FISH and cytogenetics pending.   - PET CT 09/24/21 w/ hypermetabolic pathologic fracture of the L pubic ramus w/ aggressive appearing osteolysis but no additional suspicious lytic or blastic osseous lesions.     Treatment to date:   - Zometa q4 weeks started 9/27/21  - Radiation to left pubic ramus x18 fractions, completed 11/11/21.   - RVD C1D1 11/15/21  - RVD C2D1 12/6/21  - RVD C3D1  12/27/21  - RVD C4 D1 1/17/22, elizabeth added on D8 due to inadequate response of urinary M-spike and FLCs remaining over 100 mg/dL after 4 cycles of RVD    - Elizabeth-RVD C1D8 1/24/22  -  "Elizabeth-RVD C2D1 2/7/22  - Elizabeth-RVD C3D1 2/28/22  - Elizabeth-RVD C4D1 3/29/22  - Elizabeth-RVD C5D1 4/19/22    Disease response: PA     - 5/19/22: Continued monotherapy with Daratumumab; held Revlimid and Velcade.    - 6/21/22: Transitioned to KCD, received C1 days 1 and 2- had significant PVCs at cardiac follow-uyp after 1st week of Kyprolis, held further doses.    - Started Sarclisa/Pom/Dex 7/6/22 (Pomalyst start delayed until 7/11 due to PA)          INTERIM HISTORY:   Margo was seen as an acute add-on today due to concerns that she was having an irregular heart rhythm and increased cardiac symptoms upon arrival to infusion.    Margo reports that she has continued to feel symptoms of shortness of breath, chest tightness, and a sense that \"something worse is about to happen\" over the past week.  Symptoms seemed particularly pronounced after walking into infusion from the parking lot. By the time I saw her, she some of the more acute shortness of breath and chest tightness had resolved.  She remained hemodynamically stable throughout the episode.     Denies fevers, chills, headaches, vision or hearing changes, new lumps or bumps, chest pain, cough, abdominal pain, vomiting, swelling of extremities, bleeding issues, or rash.           Physical Exam:     Vital Signs 7/13/2022   Systolic 116   Diastolic 80   Pulse 93   Temperature 98.1   Respirations 16   Weight (LB) 145 lb 9.6 oz   Height    BMI (Calculated)    Pain Score 1   O2 100       Wt Readings from Last 4 Encounters:   07/13/22 66 kg (145 lb 9.6 oz)   07/06/22 67.1 kg (147 lb 14.4 oz)   07/06/22 67.1 kg (147 lb 14.4 oz)   06/27/22 66.5 kg (146 lb 9.6 oz)       General: well appearing, no acute distress, pleasant female  CV: S1 S2, occasional skipped beat, no murmur, click or rub  Lungs: clear to auscultation bilaterally, no wheezes/rales/rhonchi  Neuro: alert and oriented x3, CN grossly intact          Laboratory Data:      I personally reviewed the following labs:    Most " "Recent 3 CBC's:  Recent Labs   Lab Test 07/13/22  0813 07/06/22  0823 06/21/22  1307   WBC 4.6 4.2 4.4   HGB 13.6 13.5 13.3   MCV 95 96 94    269 276     Most Recent 3 BMP's:  Recent Labs   Lab Test 07/13/22  0900 07/06/22  0823 06/21/22  1307 06/06/22  1143 05/24/22  1527   NA  --  139 144  --  142   POTASSIUM  --  3.8 3.6  --  3.4   CHLORIDE  --  113* 115*  --  113*   CO2  --  24 20  --  18*   BUN  --  22 26  --  22   CR 0.66 0.72 0.67   < > 0.72   ANIONGAP  --  2* 9  --  11   MEKA 8.8 9.3 9.2   < > 8.9   GLC  --  94 94  --  146*    < > = values in this interval not displayed.     Most Recent 2 LFT's:  Recent Labs   Lab Test 07/06/22  0823 06/21/22  1307   AST 15 14   ALT 22 27   ALKPHOS 53 51   BILITOTAL 0.5 0.5      Latest Reference Range & Units 07/13/22 09:00   Troponin I High Sensitivity <54 ng/L 6         Imaging:  No new imaging to review.         Assessment and Recommendations   Kristie Cornejo is a 52 year old female who was diagnosed with Plasmacytoma after she presented with left sided groin pain now found to have multiple myeloma after further workup with bone marrow biopsy.     # Multiple myeloma with associated large plasmacytoma of the L pelvis. Kappa light chain disease  # PVCs    Margo presented to infusion today with increased cardiac symtpoms (shortness of breath, chest tightness, sense that \"something worse is about to happen\") after walking into the infusion center from the parking lot.  The infusion RN also had concerns that she was in an irregular heart rhythm. By the time I assessed Margo, her symptoms were starting to resolve, and her heart sounds were largely regularly with an occasional skipped beat.  EKG was requested.  I had her walk down the bradshaw and do some stairs prior to the EKG to try to trigger any cardiac issues that might be impacted by exertion.  Even so, she did not feel particularly symptomatic at the time of the EKG.  EKG without any major changes. Troponin was " drawn and was WNL. Discussed with Margo that I think these symptoms are ongoing symptoms from her known PVC abnormalities. She remains hemodynamically stable during her episodes.  We re-discussed Dr. Thomas's previous recommendation which is that as long as there are no major EKG changes and her symptoms are tolerable, it is reasonable to proceed with chemotherapy. Margo affirmed that the symptoms are tolerable (uncomfortable, but tolerable) as long as nothing concerning is changing. I think it is reasonable to continue with treatment today. I did ask her to take her blood pressure at home when she is feeling particularly symptomatic, and advised her to report to the ED if her SBP is ever < 90 in the context of increased symptoms.  Margo agrees to this plan.    I will keep my follow-up visit with her on Friday to reassess her symptoms after receiving another dose of chemotherapy today.       40 minutes spent on the date of the encounter doing chart review, review of test results, interpretation of tests, patient visit, documentation and discussion with other provider(s)         Again, thank you for allowing me to participate in the care of your patient.      Sincerely,    DORON Hall CNP

## 2022-07-13 NOTE — PROGRESS NOTES
"Infusion Nursing Note:  Kristie Cornejo presents today for Cycle 1 Day 8 Isatuximab-irf (Sarclisa).    Patient seen by provider today: No    Note: Pt arrived ambulatory but with chest pressure/tightness and SOB. Symptoms have been ongoing, but pt reports they seem worse today since walking in to the building. States this happens at home and often feels \"like something bad is going to happen\" but can usually sit and rest and symptoms improve/resolve. Otherwise denies any new concerns since starting Isatuximab last week. Notified Jackie Ferris NP about pt's concerns. Jackie came to infusion to assess pt. EKG obtained, which looked WNL for this pt. Troponin drawn and resulted WNL. Plan to proceed with treatment today. Jackie will reassess symptoms at virtual visit this Friday. Pt educated on signs/symptoms to monitor for and when to seek further medical care. Pt agreed on this plan of care and would like to proceed with treatment today.     Intravenous Access:  Peripheral IV placed.    Treatment Conditions:  Lab Results   Component Value Date    HGB 13.6 07/13/2022    WBC 4.6 07/13/2022    ANEU 3.2 01/17/2022    ANEUTAUTO 3.4 07/13/2022     07/13/2022      Results reviewed, labs MET treatment parameters, ok to proceed with treatment.    Post Infusion Assessment:  Patient tolerated infusion without incident.  Blood return noted pre and post infusion.  Site patent and intact, free from redness, edema or discomfort. Access discontinued per protocol.     Discharge Plan:   Prescription refills given for Acyclovir and Ativan.  AVS to patient via [facility].  Patient will return 7/20 for next appointment.   Patient discharged in stable condition accompanied by: self.  Departure Mode: Ambulatory.    Carissa Ruiz RN  "

## 2022-07-13 NOTE — NURSING NOTE
Chief Complaint   Patient presents with     Blood Draw     Labs drawn via PIV placed by RN in lab. VS taken.     Labs drawn from PIV placed by RN. Line flushed with saline. Vitals taken. Pt checked in for appointment(s).    Marely PATTON RN PHN BSN  BMT/Oncology Lab

## 2022-07-14 LAB
ATRIAL RATE - MUSE: 75 BPM
DIASTOLIC BLOOD PRESSURE - MUSE: NORMAL MMHG
INTERPRETATION ECG - MUSE: NORMAL
P AXIS - MUSE: 39 DEGREES
PR INTERVAL - MUSE: 144 MS
QRS DURATION - MUSE: 76 MS
QT - MUSE: 396 MS
QTC - MUSE: 442 MS
R AXIS - MUSE: 66 DEGREES
SYSTOLIC BLOOD PRESSURE - MUSE: NORMAL MMHG
T AXIS - MUSE: 52 DEGREES
VENTRICULAR RATE- MUSE: 75 BPM

## 2022-07-14 NOTE — PROGRESS NOTES
Margo is a 52 year old who is being evaluated via a billable video visit.    Kristie Cornejo stated she is in the state of [place] for the visit today.    How would you like to obtain your AVS? MyChart  If the video visit is dropped, the invitation should be resent by: Text to cell phone: [phone]  Will anyone else be joining your video visit? No        Video-Visit Details    Video Start Time: 11:12 AM    Type of service:  Video Visit    Video End Time:11:22 AM    Originating Location (pt. Location): Home    Distant Location (provider location):  [facility]     Platform used for Video Visit: Sariah Richards, Virtual Visit Facilitator      [facility]  Date of Visit: Jul 15, 2022   Oncologist: Dr. Payton Reinoso    Reason for visit: myeloma follow-up           Oncology History   52 year old female with past medical history significant for Irritable bowel disease, allergies, large plasmacytoma of the L pelvis, and newly diagnosed multiple myeloma.     Early in April 2021 , she noted to have left sided groin pain, constant and was affecting her daily activities  MRI showed large, expansile, permeative mass with indistinct borders involving the left superior and inferior pubic rami, pubic symphysis and with possible extension to the left acetabulum.     We completed the following work-up:   - Biopsy 9/7/2021: plasmacytoma.  Flow cytometry showed kappa monotypic plasma cells, polytypic B cells. FISH results from plasmacytoma showed a gain of 11q, IGH-CCND1 fusion but no losses of 1q or TP53 and no gain of 1q.  - Bone marrow biopsy 09/22/21: Marrow cellularity 50% with 25% interstitial infiltration w/  Kappa-monotypic, moderately atypical plasma cells. Flow with 0.7% kappa-monotypica plasma cells. FISH and cytogenetics pending.   - PET CT 09/24/21 w/ hypermetabolic pathologic fracture of the L pubic ramus w/ aggressive appearing osteolysis but no  "additional suspicious lytic or blastic osseous lesions.     Treatment to date:   - Zometa q4 weeks started 9/27/21  - Radiation to left pubic ramus x18 fractions, completed 11/11/21.   - RVD C1D1 11/15/21  - RVD C2D1 12/6/21  - RVD C3D1  12/27/21  - RVD C4 D1 1/17/22, elizabeth added on D8 due to inadequate response of urinary M-spike and FLCs remaining over 100 mg/dL after 4 cycles of RVD    - Elizabeth-RVD C1D8 1/24/22  - Elizabeth-RVD C2D1 2/7/22  - Elizabeth-RVD C3D1 2/28/22  - Elizabeth-RVD C4D1 3/29/22  - Elizabeth-RVD C5D1 4/19/22    Disease response: [place]     - 5/19/22: Continued monotherapy with Daratumumab; held Revlimid and Velcade.    - 6/21/22: Transitioned to KCD, received C1 days 1 and 2- had significant PVCs at cardiac follow-uyp after 1st week of Kyprolis, held further doses.    - Transitioned to Sarclisa/Pom/Dex 7/6/22          INTERIM HISTORY:   Margo is being seen by video for oncology follow-up visit today. States she has felt \"really good\" the past couple of days.  States her shortness of breath and chest pounding/tightness are better but not completely resolved.  Went for a 1.5 mile walk at a moderate pace this morning and didn't have significant symptoms.  BP was 135/71 when she got back and her monitor indicated that there were some irregular beats.  10-15 min later BP was 121/72 with no irregular beats.  Continues thave intermittent nausea, appetite is starting to come back, no diarrhea the past couple of days.  Overall is feeling well with no new concerns.    Denies fevers, chills, headaches, vision or hearing changes, new lumps or bumps, chest pain, cough, abdominal pain, vomiting, swelling of extremities, bleeding issues, or rash.           Physical Exam:     There were no vitals taken for this visit.    Wt Readings from Last 4 Encounters:   07/13/22 66 kg (145 lb 9.6 oz)   07/06/22 67.1 kg (147 lb 14.4 oz)   07/06/22 67.1 kg (147 lb 14.4 oz)   06/27/22 66.5 kg (146 lb 9.6 oz)       Video physical exam  General: Patient " appears well in no acute distress.   Skin: No visualized rash or lesions on visualized skin  Eyes: EOMI, no erythema, sclera icterus or discharge noted  Resp: Appears to be breathing comfortably without accessory muscle usage, speaking in full sentences, no cough  MSK: Appears to have normal range of motion based on visualized movements  Neurologic: No apparent tremors, facial movements symmetric  Psych: affect bright, alert and oriented    The rest of a comprehensive physical examination is deferred due to PHE (public health emergency) video restrictions              Laboratory Data:      I personally reviewed the following labs:    Most Recent 3 CBC's:  Recent Labs   Lab Test 07/13/22  0813 07/06/22  0823 06/21/22  1307   WBC 4.6 4.2 4.4   HGB 13.6 13.5 13.3   MCV 95 96 94    269 276     Most Recent 3 BMP's:  Recent Labs   Lab Test 07/13/22  0900 07/06/22  0823 06/21/22  1307 06/06/22  1143 05/24/22  1527   NA  --  139 144  --  142   POTASSIUM  --  3.8 3.6  --  3.4   CHLORIDE  --  113* 115*  --  113*   CO2  --  24 20  --  18*   BUN  --  22 26  --  22   CR 0.66 0.72 0.67   < > 0.72   ANIONGAP  --  2* 9  --  11   MEKA 8.8 9.3 9.2   < > 8.9   GLC  --  94 94  --  146*    < > = values in this interval not displayed.     Most Recent 2 LFT's:  Recent Labs   Lab Test 07/06/22  0823 06/21/22  1307   AST 15 14   ALT 22 27   ALKPHOS 53 51   BILITOTAL 0.5 0.5     Imaging:  No new imaging to review.         Assessment and Recommendations   Kristie Cornejo is a 52 year old female who was diagnosed with Plasmacytoma after she presented with left sided groin pain now found to have multiple myeloma after further workup with bone marrow biopsy.     # Multiple myeloma with associated large plasmacytoma of the L pelvis. Kappa light chain disease.     BMBx with 25% plasma cell infiltration; standard risk with gain of 11q, IGH-CCND1 fusion but no losses of 1q or TP53 and no gain of 1q.   SPEP w/out a monoclonal peak  UPEP  "positive. 70% paraprotein. Large monoclonal free immunoglobulin light chain of kappa chain type.     No anemia, normal creat, Ca, no other skeletal lesions. Alb normal, B2M normal.  Stage I Light chain disease with left pelvic bone pathologic fracture she is classified as having symptomatic multiple myeloma.     RVD C1D1 11/15/21 (had a slight delay in the start of her Revlimid on 11/19/21 due to the timing of her pregnancy tests).  She tolerated cycle 1 well with some mild nausea and a headache on her week off.  RVD C2D1 12/6/21, tolerated well overall, but has had more consistent sensation of her hands and feet being hot. No tingling, pain, loss of sensation, or changes to her strength.  RVD C3D1 12/27/21, tolerated well overall with ongoing sensation of \"hot\" hands and feet  RVD C4 D1 1/17/22, elizabeth added on D8 due to inadequate response of urinary M-spike and FLCs remaining over 100 mg/dL after 3 cycles of RVD    She completed 5 cycles of Elizabeth plus RVD and acheived a partial response.  Her free kappa light chains were at least 50% reduced but the response plateaued, and now the kappa light chains are actually increasing again. She met with Dr. Reinoso and the plan was established to switch to Kyprolis, Cytoxan, and dexamethasone.  Margo continued on daratumumab monotherapy while awaiting final cardiac clearance due to potential cardiotoxicity of Kyprolis.  She received the first week of KCD (2 doses of Kyprolis) prior to her cardiac follow-up and was found to have significant PVCs at that appointment, which Dr. Thomas suspects were induced by the chemo.    Discussed with the wider hematology team on 6/28.  The general consensus was to transition to Sarclisa/Pom/Dex to avoid the cardiotoxicity of Kyprolis.  Started C1D1 7/9/22.  RTC weekly during the 1st cycle for symptom assessment.      Per Dr. Thomas in cardiology, as long as her symptoms are tolerable and there are no major changes on EKG, he is comfortable " "pushing forward with treatment.  Recommends repeating Zio Patch and TTE during the transition from treatment to stem cell transplant.     She was feeling more symptomatic at her most recent treatment on 7/13, with worse shortness of breath after her walk in from the parking lot.  EKG 7/13 with no major changes, troponin WNL.  Today, she reports feeling well the past 2 days with no significant changes from her baseline.  No changes needed today.  Continue to follow weekly during C1 on the days of her treatment.      #Bone Health  She will continue monthly Zometa (last received on 6/6/22). She will continue Calcium/Vitamin D supplements.     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 Currently having no pain and able to walk 1 mile without any assistive devices.  She is interested in pursuing other activities like gentle cross-country skiing.  Had follow-up with Dr. Philip on 1/6/22 and was cleared to resume regular activity as tolerated. She should still refrain from high-impact activities.    # PVCs  She had an ECHO 12/1 and had follow-up with cardiology 12/7 (notes in [facility]).  She had follow-up with cardiology here at the  and they recommend an ablation. Stress test was completed and she was started on Toprol XL; however, Toprol XL was discontinued due to significant side effects of orthostatic hypotension, pre-syncopal episodes, shakiness, and weakness.   - She had an ablation 3/23/22 but has not experienced relief from the feeling of being nauseated and short of breath after climbing the stairs.  She also reports episodes of palpitations that \"radiate into her neck.\" Denies chest pain or shortness of breath.   - EKG done 4/4 showed sinus rhythm and nonspecific ST and T wave abnormality. Troponin negative.  - Cardiac MRI ruled out amyloid.  LFEF " 50%, no evidence of infiltrative cardiomyopathy.  - She followed up with cardiology and they felt that the cause may be musculoskeletal or pericarditis. They suggested taking ibuprofen for pain. Her pain has subsided and she actually has not needed to take ibuprofen.   - Post ablation Zio patch was performed showing a significant reduction in PVCs; however, by the time she had her cardiology follow-up, she had restarted chemotherapy and the PVCs had returned.  Dr. Thomas is comfortable proceeding with treatment as long as symptoms are tolerable and there are no major EKG changes.  - Symptoms have been stable/improved over the past couple of days compared to how she felt when she was at infusion on Wednesday.  No changes or interventions needed today.    #Diarrhea  She was having intermittent diarrhea with associated abdominal cramping and nausea, but developed much more significant diarrhea with 10-12 liquid episodes and was seen as an add-on on 4/4/22 for this.   - C.diff PCR negative 2/8/22, repeat on 4/5 negative  - Enteric stool panel 4/5/22 negative  - Underwent colonoscopy on 4/13/22 to pursue biopsy for amyloid (discussed below). [place] red stain is negative for amyloid deposits. Pathology showed colonic mucosa with focal active colitis, negative for signs of chronicity and lymphocytic colitis. Per patient report, GI reviewed results with her and did not feel she needed treatment for these findings at this time. They advised her to monitor her symptoms and to call them if they do not improve.Dr. Montgomery agreed with following conservatively for now since she reports some improvement in her symptoms.  If diarrhea becomes bothersome again, then they will arrange for expeditious evaluation in the IBD clinic.   - CMV PCR (4/4/22) not detected  - Diarrhea is currently improved/resolved.  Continue to monitor.    #COVID prophylaxis  - She received EVUSHELD 1/20/22 and 3/8/22  - Received 3 COVID vaccines to  date    #Reflux  She reported episodes of reflux with bile, especially when she lays down at night.  She has started to elevate her head on pillows and is also trying to avoid eating within 1-2 hours of going to bed.  Omeprazole was increased to 20 mg twice daily with symptom improvement.      32 minutes spent on the date of the encounter doing chart review, review of test results, interpretation of tests, patient visit, documentation and discussion with other provider(s)     DORON Hall [facility] [address] [address] [phone]

## 2022-07-15 ENCOUNTER — VIRTUAL VISIT (OUTPATIENT)
Dept: ONCOLOGY | Facility: CLINIC | Age: 52
End: 2022-07-15
Attending: REGISTERED NURSE
Payer: COMMERCIAL

## 2022-07-15 DIAGNOSIS — I49.3 PVC'S (PREMATURE VENTRICULAR CONTRACTIONS): ICD-10-CM

## 2022-07-15 DIAGNOSIS — C90.00 LIGHT CHAIN MYELOMA (H): Primary | ICD-10-CM

## 2022-07-15 PROCEDURE — 99214 OFFICE O/P EST MOD 30 MIN: CPT | Mod: 95 | Performed by: REGISTERED NURSE

## 2022-07-15 NOTE — NURSING NOTE
Kristie Cornejo verified meds and allergies are correct via patients echeck in.  No changes at this time.    Jayleen Richards, Virtual Facilitator

## 2022-07-15 NOTE — LETTER
7/15/2022         RE: Kristie Cornejo  [address]  [facility] [address]        Dear Colleague,    Thank you for referring your patient, Kristie Cornejo, to the [facility] [place]. Please see a copy of my visit note below.    Margo is a 52 year old who is being evaluated via a billable video visit.    Kristie Cornejo stated she is in the state of [place] for the visit today.    How would you like to obtain your AVS? MyChart  If the video visit is dropped, the invitation should be resent by: Text to cell phone: [phone]  Will anyone else be joining your video visit? No        Video-Visit Details    Video Start Time: 11:12 AM    Type of service:  Video Visit    Video End Time:11:22 AM    Originating Location (pt. Location): Home    Distant Location (provider location):  [facility]     Platform used for Video Visit: Sariah Richards, Virtual Visit Facilitator      [facility]  Date of Visit: Jul 15, 2022   Oncologist: Dr. Payton Reinoso    Reason for visit: myeloma follow-up           Oncology History   52 year old female with past medical history significant for Irritable bowel disease, allergies, large plasmacytoma of the L pelvis, and newly diagnosed multiple myeloma.     Early in April 2021 , she noted to have left sided groin pain, constant and was affecting her daily activities  MRI showed large, expansile, permeative mass with indistinct borders involving the left superior and inferior pubic rami, pubic symphysis and with possible extension to the left acetabulum.     We completed the following work-up:   - Biopsy 9/7/2021: plasmacytoma.  Flow cytometry showed kappa monotypic plasma cells, polytypic B cells. FISH results from plasmacytoma showed a gain of 11q, IGH-CCND1 fusion but no losses of 1q or TP53 and no gain of 1q.  - Bone marrow biopsy 09/22/21: Marrow cellularity 50% with 25% interstitial  "infiltration w/  Kappa-monotypic, moderately atypical plasma cells. Flow with 0.7% kappa-monotypica plasma cells. FISH and cytogenetics pending.   - PET CT 09/24/21 w/ hypermetabolic pathologic fracture of the L pubic ramus w/ aggressive appearing osteolysis but no additional suspicious lytic or blastic osseous lesions.     Treatment to date:   - Zometa q4 weeks started 9/27/21  - Radiation to left pubic ramus x18 fractions, completed 11/11/21.   - RVD C1D1 11/15/21  - RVD C2D1 12/6/21  - RVD C3D1  12/27/21  - RVD C4 D1 1/17/22, elizabeth added on D8 due to inadequate response of urinary M-spike and FLCs remaining over 100 mg/dL after 4 cycles of RVD    - Elizabeth-RVD C1D8 1/24/22  - Elizabeth-RVD C2D1 2/7/22  - Elizabeth-RVD C3D1 2/28/22  - Elizabeth-RVD C4D1 3/29/22  - Elizabeth-RVD C5D1 4/19/22    Disease response: [place]     - 5/19/22: Continued monotherapy with Daratumumab; held Revlimid and Velcade.    - 6/21/22: Transitioned to KCD, received C1 days 1 and 2- had significant PVCs at cardiac follow-uyp after 1st week of Kyprolis, held further doses.    - Transitioned to Sarclisa/Pom/Dex 7/6/22          INTERIM HISTORY:   Margo is being seen by video for oncology follow-up visit today. States she has felt \"really good\" the past couple of days.  States her shortness of breath and chest pounding/tightness are better but not completely resolved.  Went for a 1.5 mile walk at a moderate pace this morning and didn't have significant symptoms.  BP was 135/71 when she got back and her monitor indicated that there were some irregular beats.  10-15 min later BP was 121/72 with no irregular beats.  Continues thave intermittent nausea, appetite is starting to come back, no diarrhea the past couple of days.  Overall is feeling well with no new concerns.    Denies fevers, chills, headaches, vision or hearing changes, new lumps or bumps, chest pain, cough, abdominal pain, vomiting, swelling of extremities, bleeding issues, or rash.           Physical Exam: "     There were no vitals taken for this visit.    Wt Readings from Last 4 Encounters:   07/13/22 66 kg (145 lb 9.6 oz)   07/06/22 67.1 kg (147 lb 14.4 oz)   07/06/22 67.1 kg (147 lb 14.4 oz)   06/27/22 66.5 kg (146 lb 9.6 oz)       Video physical exam  General: Patient appears well in no acute distress.   Skin: No visualized rash or lesions on visualized skin  Eyes: EOMI, no erythema, sclera icterus or discharge noted  Resp: Appears to be breathing comfortably without accessory muscle usage, speaking in full sentences, no cough  MSK: Appears to have normal range of motion based on visualized movements  Neurologic: No apparent tremors, facial movements symmetric  Psych: affect bright, alert and oriented    The rest of a comprehensive physical examination is deferred due to PHE (public health emergency) video restrictions              Laboratory Data:      I personally reviewed the following labs:    Most Recent 3 CBC's:  Recent Labs   Lab Test 07/13/22  0813 07/06/22  0823 06/21/22  1307   WBC 4.6 4.2 4.4   HGB 13.6 13.5 13.3   MCV 95 96 94    269 276     Most Recent 3 BMP's:  Recent Labs   Lab Test 07/13/22  0900 07/06/22  0823 06/21/22  1307 06/06/22  1143 05/24/22  1527   NA  --  139 144  --  142   POTASSIUM  --  3.8 3.6  --  3.4   CHLORIDE  --  113* 115*  --  113*   CO2  --  24 20  --  18*   BUN  --  22 26  --  22   CR 0.66 0.72 0.67   < > 0.72   ANIONGAP  --  2* 9  --  11   MEAK 8.8 9.3 9.2   < > 8.9   GLC  --  94 94  --  146*    < > = values in this interval not displayed.     Most Recent 2 LFT's:  Recent Labs   Lab Test 07/06/22  0823 06/21/22  1307   AST 15 14   ALT 22 27   ALKPHOS 53 51   BILITOTAL 0.5 0.5     Imaging:  No new imaging to review.         Assessment and Recommendations   Kristie Cornejo is a 52 year old female who was diagnosed with Plasmacytoma after she presented with left sided groin pain now found to have multiple myeloma after further workup with bone marrow biopsy.     #  "Multiple myeloma with associated large plasmacytoma of the L pelvis. Kappa light chain disease.     BMBx with 25% plasma cell infiltration; standard risk with gain of 11q, IGH-CCND1 fusion but no losses of 1q or TP53 and no gain of 1q.   SPEP w/out a monoclonal peak  UPEP positive. 70% paraprotein. Large monoclonal free immunoglobulin light chain of kappa chain type.     No anemia, normal creat, Ca, no other skeletal lesions. Alb normal, B2M normal.  Stage I Light chain disease with left pelvic bone pathologic fracture she is classified as having symptomatic multiple myeloma.     RVD C1D1 11/15/21 (had a slight delay in the start of her Revlimid on 11/19/21 due to the timing of her pregnancy tests).  She tolerated cycle 1 well with some mild nausea and a headache on her week off.  RVD C2D1 12/6/21, tolerated well overall, but has had more consistent sensation of her hands and feet being hot. No tingling, pain, loss of sensation, or changes to her strength.  RVD C3D1 12/27/21, tolerated well overall with ongoing sensation of \"hot\" hands and feet  RVD C4 D1 1/17/22, elizabeth added on D8 due to inadequate response of urinary M-spike and FLCs remaining over 100 mg/dL after 3 cycles of RVD    She completed 5 cycles of Elizabeth plus RVD and acheived a partial response.  Her free kappa light chains were at least 50% reduced but the response plateaued, and now the kappa light chains are actually increasing again. She met with Dr. Reinoso and the plan was established to switch to Kyprolis, Cytoxan, and dexamethasone.  Mrago continued on daratumumab monotherapy while awaiting final cardiac clearance due to potential cardiotoxicity of Kyprolis.  She received the first week of KCD (2 doses of Kyprolis) prior to her cardiac follow-up and was found to have significant PVCs at that appointment, which Dr. Thomas suspects were induced by the chemo.    Discussed with the wider hematology team on 6/28.  The general consensus was to " transition to Sarclisa/Pom/Dex to avoid the cardiotoxicity of Kyprolis.  Started C1D1 7/9/22.  RTC weekly during the 1st cycle for symptom assessment.      Per Dr. Thomas in cardiology, as long as her symptoms are tolerable and there are no major changes on EKG, he is comfortable pushing forward with treatment.  Recommends repeating Zio Patch and TTE during the transition from treatment to stem cell transplant.     She was feeling more symptomatic at her most recent treatment on 7/13, with worse shortness of breath after her walk in from the parking lot.  EKG 7/13 with no major changes, troponin WNL.  Today, she reports feeling well the past 2 days with no significant changes from her baseline.  No changes needed today.  Continue to follow weekly during C1 on the days of her treatment.      #Bone Health  She will continue monthly Zometa (last received on 6/6/22). She will continue Calcium/Vitamin D supplements.     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 Currently having no pain and able to walk 1 mile without any assistive devices.  She is interested in pursuing other activities like gentle cross-country skiing.  Had follow-up with Dr. Philip on 1/6/22 and was cleared to resume regular activity as tolerated. She should still refrain from high-impact activities.    # PVCs  She had an ECHO 12/1 and had follow-up with cardiology 12/7 (notes in [facility]).  She had follow-up with cardiology here at the  and they recommend an ablation. Stress test was completed and she was started on Toprol XL; however, Toprol XL was discontinued due to significant side effects of orthostatic hypotension, pre-syncopal episodes, shakiness, and weakness.   - She had an ablation 3/23/22 but has not experienced relief from the feeling of being nauseated and short of breath  "after climbing the stairs.  She also reports episodes of palpitations that \"radiate into her neck.\" Denies chest pain or shortness of breath.   - EKG done 4/4 showed sinus rhythm and nonspecific ST and T wave abnormality. Troponin negative.  - Cardiac MRI ruled out amyloid.  LFEF 50%, no evidence of infiltrative cardiomyopathy.  - She followed up with cardiology and they felt that the cause may be musculoskeletal or pericarditis. They suggested taking ibuprofen for pain. Her pain has subsided and she actually has not needed to take ibuprofen.   - Post ablation Zio patch was performed showing a significant reduction in PVCs; however, by the time she had her cardiology follow-up, she had restarted chemotherapy and the PVCs had returned.  Dr. Thomas is comfortable proceeding with treatment as long as symptoms are tolerable and there are no major EKG changes.  - Symptoms have been stable/improved over the past couple of days compared to how she felt when she was at infusion on Wednesday.  No changes or interventions needed today.    #Diarrhea  She was having intermittent diarrhea with associated abdominal cramping and nausea, but developed much more significant diarrhea with 10-12 liquid episodes and was seen as an add-on on 4/4/22 for this.   - C.diff PCR negative 2/8/22, repeat on 4/5 negative  - Enteric stool panel 4/5/22 negative  - Underwent colonoscopy on 4/13/22 to pursue biopsy for amyloid (discussed below). [place] red stain is negative for amyloid deposits. Pathology showed colonic mucosa with focal active colitis, negative for signs of chronicity and lymphocytic colitis. Per patient report, GI reviewed results with her and did not feel she needed treatment for these findings at this time. They advised her to monitor her symptoms and to call them if they do not improve.Dr. Montgomery agreed with following conservatively for now since she reports some improvement in her symptoms.  If diarrhea becomes bothersome " again, then they will arrange for expeditious evaluation in the IBD clinic.   - CMV PCR (4/4/22) not detected  - Diarrhea is currently improved/resolved.  Continue to monitor.    #COVID prophylaxis  - She received EVUSHELD 1/20/22 and 3/8/22  - Received 3 COVID vaccines to date    #Reflux  She reported episodes of reflux with bile, especially when she lays down at night.  She has started to elevate her head on pillows and is also trying to avoid eating within 1-2 hours of going to bed.  Omeprazole was increased to 20 mg twice daily with symptom improvement.      32 minutes spent on the date of the encounter doing chart review, review of test results, interpretation of tests, patient visit, documentation and discussion with other provider(s)         Again, thank you for allowing me to participate in the care of your patient.      Sincerely,    DORON Hall CNP

## 2022-07-18 ENCOUNTER — TELEPHONE (OUTPATIENT)
Dept: ONCOLOGY | Facility: CLINIC | Age: 52
End: 2022-07-18

## 2022-07-18 DIAGNOSIS — C90.00 LIGHT CHAIN MYELOMA (H): Primary | ICD-10-CM

## 2022-07-18 RX ORDER — MEPERIDINE HYDROCHLORIDE 25 MG/ML
25 INJECTION INTRAMUSCULAR; INTRAVENOUS; SUBCUTANEOUS EVERY 30 MIN PRN
Status: CANCELLED | OUTPATIENT
Start: 2022-08-04

## 2022-07-18 RX ORDER — DIPHENHYDRAMINE HYDROCHLORIDE 50 MG/ML
50 INJECTION INTRAMUSCULAR; INTRAVENOUS
Status: CANCELLED
Start: 2022-08-04

## 2022-07-18 RX ORDER — HEPARIN SODIUM,PORCINE 10 UNIT/ML
5 VIAL (ML) INTRAVENOUS
Status: CANCELLED | OUTPATIENT
Start: 2022-08-18

## 2022-07-18 RX ORDER — ALBUTEROL SULFATE 90 UG/1
1-2 AEROSOL, METERED RESPIRATORY (INHALATION)
Status: CANCELLED
Start: 2022-08-04

## 2022-07-18 RX ORDER — DIPHENHYDRAMINE HYDROCHLORIDE 50 MG/ML
50 INJECTION INTRAMUSCULAR; INTRAVENOUS
Status: CANCELLED
Start: 2022-08-18

## 2022-07-18 RX ORDER — ALBUTEROL SULFATE 0.83 MG/ML
2.5 SOLUTION RESPIRATORY (INHALATION)
Status: CANCELLED | OUTPATIENT
Start: 2022-08-18

## 2022-07-18 RX ORDER — MEPERIDINE HYDROCHLORIDE 25 MG/ML
25 INJECTION INTRAMUSCULAR; INTRAVENOUS; SUBCUTANEOUS EVERY 30 MIN PRN
Status: CANCELLED | OUTPATIENT
Start: 2022-08-18

## 2022-07-18 RX ORDER — METHYLPREDNISOLONE SODIUM SUCCINATE 125 MG/2ML
125 INJECTION, POWDER, LYOPHILIZED, FOR SOLUTION INTRAMUSCULAR; INTRAVENOUS
Status: CANCELLED
Start: 2022-08-18

## 2022-07-18 RX ORDER — LORAZEPAM 2 MG/ML
0.5 INJECTION INTRAMUSCULAR EVERY 4 HOURS PRN
Status: CANCELLED | OUTPATIENT
Start: 2022-08-18

## 2022-07-18 RX ORDER — HEPARIN SODIUM,PORCINE 10 UNIT/ML
5 VIAL (ML) INTRAVENOUS
Status: CANCELLED | OUTPATIENT
Start: 2022-08-04

## 2022-07-18 RX ORDER — ALBUTEROL SULFATE 0.83 MG/ML
2.5 SOLUTION RESPIRATORY (INHALATION)
Status: CANCELLED | OUTPATIENT
Start: 2022-08-04

## 2022-07-18 RX ORDER — NALOXONE HYDROCHLORIDE 0.4 MG/ML
0.2 INJECTION, SOLUTION INTRAMUSCULAR; INTRAVENOUS; SUBCUTANEOUS
Status: CANCELLED | OUTPATIENT
Start: 2022-08-18

## 2022-07-18 RX ORDER — HEPARIN SODIUM (PORCINE) LOCK FLUSH IV SOLN 100 UNIT/ML 100 UNIT/ML
5 SOLUTION INTRAVENOUS
Status: CANCELLED | OUTPATIENT
Start: 2022-08-18

## 2022-07-18 RX ORDER — EPINEPHRINE 1 MG/ML
0.3 INJECTION, SOLUTION INTRAMUSCULAR; SUBCUTANEOUS EVERY 5 MIN PRN
Status: CANCELLED | OUTPATIENT
Start: 2022-08-04

## 2022-07-18 RX ORDER — ALBUTEROL SULFATE 90 UG/1
1-2 AEROSOL, METERED RESPIRATORY (INHALATION)
Status: CANCELLED
Start: 2022-08-18

## 2022-07-18 RX ORDER — ACETAMINOPHEN 325 MG/1
650 TABLET ORAL ONCE
Status: CANCELLED | OUTPATIENT
Start: 2022-08-18

## 2022-07-18 RX ORDER — ACETAMINOPHEN 325 MG/1
650 TABLET ORAL ONCE
Status: CANCELLED | OUTPATIENT
Start: 2022-08-04

## 2022-07-18 RX ORDER — EPINEPHRINE 1 MG/ML
0.3 INJECTION, SOLUTION INTRAMUSCULAR; SUBCUTANEOUS EVERY 5 MIN PRN
Status: CANCELLED | OUTPATIENT
Start: 2022-08-18

## 2022-07-18 RX ORDER — DIPHENHYDRAMINE HCL 25 MG
50 CAPSULE ORAL ONCE
Status: CANCELLED | OUTPATIENT
Start: 2022-08-04

## 2022-07-18 RX ORDER — METHYLPREDNISOLONE SODIUM SUCCINATE 125 MG/2ML
125 INJECTION, POWDER, LYOPHILIZED, FOR SOLUTION INTRAMUSCULAR; INTRAVENOUS
Status: CANCELLED
Start: 2022-08-04

## 2022-07-18 RX ORDER — DIPHENHYDRAMINE HCL 25 MG
50 CAPSULE ORAL ONCE
Status: CANCELLED | OUTPATIENT
Start: 2022-08-18

## 2022-07-18 RX ORDER — NALOXONE HYDROCHLORIDE 0.4 MG/ML
0.2 INJECTION, SOLUTION INTRAMUSCULAR; INTRAVENOUS; SUBCUTANEOUS
Status: CANCELLED | OUTPATIENT
Start: 2022-08-04

## 2022-07-18 RX ORDER — LORAZEPAM 2 MG/ML
0.5 INJECTION INTRAMUSCULAR EVERY 4 HOURS PRN
Status: CANCELLED | OUTPATIENT
Start: 2022-08-04

## 2022-07-18 RX ORDER — HEPARIN SODIUM (PORCINE) LOCK FLUSH IV SOLN 100 UNIT/ML 100 UNIT/ML
5 SOLUTION INTRAVENOUS
Status: CANCELLED | OUTPATIENT
Start: 2022-08-04

## 2022-07-18 NOTE — TELEPHONE ENCOUNTER
Oral Chemotherapy Monitoring Program    Subjective/Objective:  Kristie Cornejo is a 52 year old female contacted by phone for a follow-up visit for oral chemotherapy pomalidomide. Margo confirmed dose of 4 mg daily for 3 weeks followed by 1 week off. She is adherent and has not missed any doses in the last week. She denied any side effects including nausea, vomiting, diarrhea, and constipation. She has not been hospitalized recently and has not started any new medications.     ORAL CHEMOTHERAPY 4/25/2022 5/9/2022 5/21/2022 5/24/2022 7/5/2022 7/7/2022 7/18/2022   Assessment Type Lab Monitoring Lab Monitoring;Refill Chart Review Chart Review Initial Work up New Teach Initial Follow up   Diagnosis Code Multiple Myeloma Multiple Myeloma Multiple Myeloma Multiple Myeloma Multiple Myeloma Multiple Myeloma Multiple Myeloma   Providers Dr. Kemar Reinoso   [facility]   Drug Name Revlimid (lenalidomide) Revlimid (lenalidomide) - Revlimid (lenalidomide) Pomalyst (pomalidomide) Pomalyst (pomalidomide) Pomalyst (pomalidomide)   Dose 25 mg 25 mg - 25 mg 4 mg 4 mg 4 mg   Current Schedule Daily Daily - Daily Daily Daily Daily   Cycle Details 2 weeks on, 2 weeks off 2 weeks on, 2 weeks off - Other 3 weeks on, 1 week off 3 weeks on, 1 week off 3 weeks on, 1 week off   Start Date of Last Cycle - - - (No Data) - - 7/11/2022   Planned next cycle start date - - - - 7/6/2022 - 8/8/2022   Doses missed in last 2 weeks - - - - - - 0   Adherence Assessment - - - - - - Adherent   Adverse Effects Other (See Note for Details) - - - - - No AE identified during assessment   Nausea - - - - - - -   Pharmacist Intervention(nausea) - - - - - - -   Fatigue - - - - - - -   Pharmacist Intervention(fatigue) - - - - - - -   Intervention(s) - - - - - - -   Other (See Note for Details) Hypokalemia, leukopenia - -  "- - - -   Pharmacist intervention(other) No - - - - - -   Intervention(s) - - - - - - -   Any new drug interactions? - - - - - - -   Is the dose as ordered appropriate for the patient? Yes - - - - - -   Has the patient missed any days of school, work, or other routine activity? - - - - - - -   Since the last time we talked, have you been hospitalized or used the emergency room? - - - - - - -       Last PHQ-2 Score on record:   PHQ-2 ( 1999 Pf[employer]) 1/6/2022 8/26/2021   Q1: Little interest or pleasure in doing things 0 0   Q2: Feeling down, depressed or hopeless 0 0   PHQ-2 Score 0 0   PHQ-2 Total Score (12-17 Years)- Positive if 3 or more points; Administer PHQ-A if positive 0 0   Q1: Little interest or pleasure in doing things - Not at all   Q2: Feeling down, depressed or hopeless - Not at all   PHQ-2 Score - 0       Vitals:  BP:   BP Readings from Last 1 Encounters:   07/13/22 122/83     Wt Readings from Last 1 Encounters:   07/13/22 66 kg (145 lb 9.6 oz)     Estimated body surface area is 1.78 meters squared as calculated from the following:    Height as of 7/6/22: 1.729 m (5' 8.07\").    Weight as of 7/13/22: 66 kg (145 lb 9.6 oz).    Labs:  _  Result Component Current Result Ref Range   Sodium 139 (7/6/2022) 133 - 144 mmol/L     _  Result Component Current Result Ref Range   Potassium 3.8 (7/6/2022) 3.4 - 5.3 mmol/L     _  Result Component Current Result Ref Range   Calcium 8.8 (7/13/2022) 8.5 - 10.1 mg/dL     No results found for Mag within last 30 days.     No results found for Phos within last 30 days.     _  Result Component Current Result Ref Range   Albumin 3.9 (7/6/2022) 3.4 - 5.0 g/dL     _  Result Component Current Result Ref Range   Urea Nitrogen 22 (7/6/2022) 7 - 30 mg/dL     _  Result Component Current Result Ref Range   Creatinine 0.66 (7/13/2022) 0.52 - 1.04 mg/dL     _  Result Component Current Result Ref Range   AST 15 (7/6/2022) 0 - 45 U/L     _  Result Component Current Result Ref Range   ALT 22 " (7/6/2022) 0 - 50 U/L     _  Result Component Current Result Ref Range   Bilirubin Total 0.5 (7/6/2022) 0.2 - 1.3 mg/dL     _  Result Component Current Result Ref Range   WBC Count 4.6 (7/13/2022) 4.0 - 11.0 10e3/uL     _  Result Component Current Result Ref Range   Hemoglobin 13.6 (7/13/2022) 11.7 - 15.7 g/dL     _  Result Component Current Result Ref Range   Platelet Count 255 (7/13/2022) 150 - 450 10e3/uL     No results found for ANC within last 30 days.     _  Result Component Current Result Ref Range   Absolute Neutrophils 3.4 (7/13/2022) 1.6 - 8.3 10e3/uL      Assessment/Plan:  Patient is tolerating pomalidomide therapy and should be continued as planned.     Follow-Up:  Provider apt and labs 7/20/22.    Refill Due:  8/1/22    Yandy Wyatt  Pharmacy Intern  Oral Chemotherapy Monitoring Program   [facility]  [phone]

## 2022-07-19 DIAGNOSIS — E87.6 HYPOKALEMIA: ICD-10-CM

## 2022-07-19 RX ORDER — POTASSIUM CHLORIDE 750 MG/1
20 TABLET, EXTENDED RELEASE ORAL 2 TIMES DAILY
Qty: 90 TABLET | Refills: 2 | Status: SHIPPED | OUTPATIENT
Start: 2022-07-19 | End: 2022-07-27

## 2022-07-19 NOTE — PROGRESS NOTES
[facility]  Date of Visit: Jul 20, 2022   Oncologist: Dr. Payton Reinoso    Reason for visit: myeloma follow-up           Oncology History   52 year old female with past medical history significant for Irritable bowel disease, allergies, large plasmacytoma of the L pelvis, and newly diagnosed multiple myeloma.     Early in April 2021 , she noted to have left sided groin pain, constant and was affecting her daily activities  MRI showed large, expansile, permeative mass with indistinct borders involving the left superior and inferior pubic rami, pubic symphysis and with possible extension to the left acetabulum.     We completed the following work-up:   - Biopsy 9/7/2021: plasmacytoma.  Flow cytometry showed kappa monotypic plasma cells, polytypic B cells. FISH results from plasmacytoma showed a gain of 11q, IGH-CCND1 fusion but no losses of 1q or TP53 and no gain of 1q.  - Bone marrow biopsy 09/22/21: Marrow cellularity 50% with 25% interstitial infiltration w/  Kappa-monotypic, moderately atypical plasma cells. Flow with 0.7% kappa-monotypica plasma cells. FISH and cytogenetics pending.   - PET CT 09/24/21 w/ hypermetabolic pathologic fracture of the L pubic ramus w/ aggressive appearing osteolysis but no additional suspicious lytic or blastic osseous lesions.     Treatment to date:   - Zometa q4 weeks started 9/27/21  - Radiation to left pubic ramus x18 fractions, completed 11/11/21.   - RVD C1D1 11/15/21  - RVD C2D1 12/6/21  - RVD C3D1  12/27/21  - RVD C4 D1 1/17/22, elizabeth added on D8 due to inadequate response of urinary M-spike and FLCs remaining over 100 mg/dL after 4 cycles of RVD    - Elizabeth-RVD C1D8 1/24/22  - Elizabeth-RVD C2D1 2/7/22  - Elizabeth-RVD C3D1 2/28/22  - Elizabeth-RVD C4D1 3/29/22  - Elizabeth-RVD C5D1 4/19/22    Disease response: NH     - 5/19/22: Continued monotherapy with Daratumumab; held Revlimid and Velcade.    - 6/21/22: Transitioned to KCD, received C1 days 1 and 2- had  "significant PVCs at cardiac follow-uyp after 1st week of Kyprolis, held further doses.    - Transitioned to Sarclisa/Pom/Dex 7/6/22. Started Pom on 7/11.           INTERIM HISTORY:   Margo is here for oncology follow-up visit today and consideration of C1D15 of Sarclisa.     She is feeling ok today. She started Pom on 7/11. Today is day 10. She has been tolerating the Sarclisa ok. She is having ongoing diarrhea, which has been stable per patient report. Not worse since starting treatment. Her appetite is ok. She has been eating. She has stable dyspnea with exertion (walking, going up stairs). Her breathing at rest is fine. Sometimes has chest tightness. She is following with her cardiologist closely. She remains active with walking and swimming. No major changes since visit last week. She is feeling a little nauseous today.     Denies fevers, chills, headaches, vision or hearing changes, new lumps or bumps, chest pain, cough, abdominal pain, vomiting, swelling of extremities, bleeding issues, or rash.           Physical Exam:     /79 (BP Location: Right arm, Patient Position: Sitting, Cuff Size: Adult Regular)   Pulse 91   Resp 16   Ht 1.729 m (5' 8.07\")   Wt 65.9 kg (145 lb 3.2 oz)   SpO2 99%   BMI 22.03 kg/m     General: well appearing, no acute distress  HEENT: normocephalic, atraumatic, PERRLA, sclerae nonicteric  Lymph: no palpable cervical, supraclavicular or axillary lymphadenopathy   CV: regular rate and rhythm, no murmurs  Lungs: clear to auscultation bilaterally, no wheezes/rales/rhonchi  Abd: soft, positive bowel sounds, non-distended, non-tender  MSK: full range of motion in all four extremities, no peripheral edema  Neuro: alert and oriented x3, CN grossly intact   Psych: appropriate mood and affect  Skin: no rashes or lesions           Laboratory Data:      I personally reviewed the following labs:   Latest Reference Range & Units 07/20/22 11:56   WBC 4.0 - 11.0 10e3/uL 4.5   Hemoglobin 11.7 " - 15.7 g/dL 13.4   Hematocrit 35.0 - 47.0 % 39.7   Platelet Count 150 - 450 10e3/uL 164   RBC Count 3.80 - 5.20 10e6/uL 4.22   MCV 78 - 100 fL 94   MCH 26.5 - 33.0 pg 31.8   MCHC 31.5 - 36.5 g/dL 33.8   RDW 10.0 - 15.0 % 12.7   % Neutrophils % 79   % Lymphocytes % 8   % Monocytes % 9   % Eosinophils % 4   % Basophils % 0   Absolute Basophils 0.0 - 0.2 10e3/uL 0.0   Absolute Eosinophils 0.0 - 0.7 10e3/uL 0.2   Absolute Immature Granulocytes <=0.4 10e3/uL 0.0   Absolute Lymphocytes 0.8 - 5.3 10e3/uL 0.4 (L)   Absolute Monocytes 0.0 - 1.3 10e3/uL 0.4   % Immature Granulocytes % 0   Absolute Neutrophils 1.6 - 8.3 10e3/uL 3.6   Absolute NRBCs 10e3/uL 0.0   NRBCs per 100 WBC <1 /100 0       Imaging:  No new imaging to review.         Assessment and Recommendations   Kristie Cornejo is a 52 year old female who was diagnosed with Plasmacytoma after she presented with left sided groin pain now found to have multiple myeloma after further workup with bone marrow biopsy.     # Multiple myeloma with associated large plasmacytoma of the L pelvis. Kappa light chain disease.     BMBx with 25% plasma cell infiltration; standard risk with gain of 11q, IGH-CCND1 fusion but no losses of 1q or TP53 and no gain of 1q.   SPEP w/out a monoclonal peak  UPEP positive. 70% paraprotein. Large monoclonal free immunoglobulin light chain of kappa chain type.     No anemia, normal creat, Ca, no other skeletal lesions. Alb normal, B2M normal.  Stage I Light chain disease with left pelvic bone pathologic fracture she is classified as having symptomatic multiple myeloma.     RVD C1D1 11/15/21 (had a slight delay in the start of her Revlimid on 11/19/21 due to the timing of her pregnancy tests).  She tolerated cycle 1 well with some mild nausea and a headache on her week off.  RVD C2D1 12/6/21, tolerated well overall, but has had more consistent sensation of her hands and feet being hot. No tingling, pain, loss of sensation, or changes to her  "strength.  RVD C3D1 12/27/21, tolerated well overall with ongoing sensation of \"hot\" hands and feet  RVD C4 D1 1/17/22, elizabeth added on D8 due to inadequate response of urinary M-spike and FLCs remaining over 100 mg/dL after 3 cycles of RVD    She completed 5 cycles of Elizabeth plus RVD and acheived a partial response.  Her free kappa light chains were at least 50% reduced but the response plateaued, and now the kappa light chains are actually increasing again. She met with Dr. Reinoso and the plan was established to switch to Kyprolis, Cytoxan, and dexamethasone.  Margo continued on daratumumab monotherapy while awaiting final cardiac clearance due to potential cardiotoxicity of Kyprolis.  She received the first week of KCD (2 doses of Kyprolis) prior to her cardiac follow-up and was found to have significant PVCs at that appointment, which Dr. Thomas suspects were induced by the chemo.    Discussed with the wider hematology team on 6/28.  The general consensus was to transition to Sarclisa/Pom/Dex to avoid the cardiotoxicity of Kyprolis.  Started C1D1 7/6/22.  RTC weekly during the 1st cycle for symptom assessment.      Per Dr. Thomas in cardiology, as long as her symptoms are tolerable and there are no major changes on EKG, he is comfortable pushing forward with treatment.  Recommends repeating Zio Patch and TTE during the transition from treatment to stem cell transplant.     She was feeling more symptomatic at her most recent treatment on 7/13, with worse shortness of breath after her walk in from the parking lot.  EKG 7/13 with no major changes, troponin WNL.      Today, she is clinically stable.  No major changes since last visit.  Will proceed with C1D15 Sarclisa today.  Continue to follow weekly during C1 on the days of her treatment. She will RTC in 1 week on 7/27 for C1D22 Sarclisa.   She started Pom on 7/11.  She confirms taking ASA.  She gets weekly dex when she comes in for Sarclisa infusion.        #Bone " "Health  She will continue monthly Zometa (last received on 7/13/22). She will continue Calcium/Vitamin D supplements.       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 Currently having no pain and able to walk 1 mile without any assistive devices.  She is interested in pursuing other activities like gentle cross-country skiing.  Had follow-up with Dr. Philip on 1/6/22 and was cleared to resume regular activity as tolerated. She should still refrain from high-impact activities.      # PVCs  She had an ECHO 12/1 and had follow-up with cardiology 12/7 (notes in [facility]).  She had follow-up with cardiology here at the  and they recommend an ablation. Stress test was completed and she was started on Toprol XL; however, Toprol XL was discontinued due to significant side effects of orthostatic hypotension, pre-syncopal episodes, shakiness, and weakness.   - She had an ablation 3/23/22 but has not experienced relief from the feeling of being nauseated and short of breath after climbing the stairs.  She also reports episodes of palpitations that \"radiate into her neck.\" Denies chest pain or shortness of breath.   - EKG done 4/4 showed sinus rhythm and nonspecific ST and T wave abnormality. Troponin negative.  - Cardiac MRI ruled out amyloid.  LFEF 50%, no evidence of infiltrative cardiomyopathy.  - She followed up with cardiology and they felt that the cause may be musculoskeletal or pericarditis. They suggested taking ibuprofen for pain. Her pain has subsided and she actually has not needed to take ibuprofen.   - Post ablation Zio patch was performed showing a significant reduction in PVCs; however, by the time she had her cardiology follow-up, she had restarted chemotherapy and the PVCs had returned.  Dr. Thomas is comfortable proceeding with treatment as long " as symptoms are tolerable and there are no major EKG changes.  - Symptoms have been stable since last visit.  No changes or interventions needed today.      #Diarrhea  She was having intermittent diarrhea with associated abdominal cramping and nausea, but developed much more significant diarrhea with 10-12 liquid episodes and was seen as an add-on on 4/4/22 for this.   - C.diff PCR negative 2/8/22, repeat on 4/5 negative  - Enteric stool panel 4/5/22 negative  - Underwent colonoscopy on 4/13/22 to pursue biopsy for amyloid (discussed below). [place] red stain is negative for amyloid deposits. Pathology showed colonic mucosa with focal active colitis, negative for signs of chronicity and lymphocytic colitis. Per patient report, GI reviewed results with her and did not feel she needed treatment for these findings at this time. They advised her to monitor her symptoms and to call them if they do not improve.Dr. Montgomery agreed with following conservatively for now since she reports some improvement in her symptoms.  If diarrhea becomes bothersome again, then they will arrange for expeditious evaluation in the IBD clinic.   - CMV PCR (4/4/22) not detected  - Diarrhea is currently stable.  Continue to monitor.      #COVID prophylaxis  - She received EVUSHELD 1/20/22 and 3/8/22  - Received 3 COVID vaccines to date      #Reflux  She reported episodes of reflux with bile, especially when she lays down at night.  She has started to elevate her head on pillows and is also trying to avoid eating within 1-2 hours of going to bed.  Stable currently on Omeprazole 20 mg daily.       40 minutes spent on the date of the encounter doing chart review, review of test results, interpretation of tests, patient visit and documentation     Estefany Pepe PA-C  [facility]  [address] [place] [id number] [phone]

## 2022-07-19 NOTE — TELEPHONE ENCOUNTER
Potassium CL refill   Last prescribing provider: Dr Reinoso     Last clinic visit date: 7/15/22 Jackie Ferris     Any missed appointments or no-shows since last clinic visit?: no     Recommendations for requested medication (if none, N/A): N/A    Next clinic visit date: 7/20/22 dolores Pepe     Any other pertinent information (if none, N/A): N/A

## 2022-07-20 ENCOUNTER — INFUSION THERAPY VISIT (OUTPATIENT)
Dept: ONCOLOGY | Facility: CLINIC | Age: 52
End: 2022-07-20
Payer: COMMERCIAL

## 2022-07-20 ENCOUNTER — APPOINTMENT (OUTPATIENT)
Dept: LAB | Facility: CLINIC | Age: 52
End: 2022-07-20
Payer: COMMERCIAL

## 2022-07-20 ENCOUNTER — ONCOLOGY VISIT (OUTPATIENT)
Dept: ONCOLOGY | Facility: CLINIC | Age: 52
End: 2022-07-20
Payer: COMMERCIAL

## 2022-07-20 VITALS
SYSTOLIC BLOOD PRESSURE: 114 MMHG | HEART RATE: 91 BPM | OXYGEN SATURATION: 99 % | WEIGHT: 145.2 LBS | TEMPERATURE: 97.9 F | RESPIRATION RATE: 16 BRPM | DIASTOLIC BLOOD PRESSURE: 79 MMHG | BODY MASS INDEX: 22.01 KG/M2 | HEIGHT: 68 IN

## 2022-07-20 DIAGNOSIS — C90.00 LIGHT CHAIN MYELOMA (H): Primary | ICD-10-CM

## 2022-07-20 DIAGNOSIS — C90.30 PLASMACYTOMA OF BONE (H): ICD-10-CM

## 2022-07-20 LAB
BASOPHILS # BLD AUTO: 0 10E3/UL (ref 0–0.2)
BASOPHILS NFR BLD AUTO: 0 %
EOSINOPHIL # BLD AUTO: 0.2 10E3/UL (ref 0–0.7)
EOSINOPHIL NFR BLD AUTO: 4 %
ERYTHROCYTE [DISTWIDTH] IN BLOOD BY AUTOMATED COUNT: 12.7 % (ref 10–15)
HCG UR QL: NEGATIVE
HCT VFR BLD AUTO: 39.7 % (ref 35–47)
HGB BLD-MCNC: 13.4 G/DL (ref 11.7–15.7)
IMM GRANULOCYTES # BLD: 0 10E3/UL
IMM GRANULOCYTES NFR BLD: 0 %
LYMPHOCYTES # BLD AUTO: 0.4 10E3/UL (ref 0.8–5.3)
LYMPHOCYTES NFR BLD AUTO: 8 %
MCH RBC QN AUTO: 31.8 PG (ref 26.5–33)
MCHC RBC AUTO-ENTMCNC: 33.8 G/DL (ref 31.5–36.5)
MCV RBC AUTO: 94 FL (ref 78–100)
MONOCYTES # BLD AUTO: 0.4 10E3/UL (ref 0–1.3)
MONOCYTES NFR BLD AUTO: 9 %
NEUTROPHILS # BLD AUTO: 3.6 10E3/UL (ref 1.6–8.3)
NEUTROPHILS NFR BLD AUTO: 79 %
NRBC # BLD AUTO: 0 10E3/UL
NRBC BLD AUTO-RTO: 0 /100
PLATELET # BLD AUTO: 164 10E3/UL (ref 150–450)
RBC # BLD AUTO: 4.22 10E6/UL (ref 3.8–5.2)
WBC # BLD AUTO: 4.5 10E3/UL (ref 4–11)

## 2022-07-20 PROCEDURE — 258N000003 HC RX IP 258 OP 636

## 2022-07-20 PROCEDURE — 96375 TX/PRO/DX INJ NEW DRUG ADDON: CPT

## 2022-07-20 PROCEDURE — G0463 HOSPITAL OUTPT CLINIC VISIT: HCPCS

## 2022-07-20 PROCEDURE — 250N000011 HC RX IP 250 OP 636

## 2022-07-20 PROCEDURE — 99215 OFFICE O/P EST HI 40 MIN: CPT | Performed by: PHYSICIAN ASSISTANT

## 2022-07-20 PROCEDURE — 81025 URINE PREGNANCY TEST: CPT | Performed by: PHYSICIAN ASSISTANT

## 2022-07-20 PROCEDURE — 85025 COMPLETE CBC W/AUTO DIFF WBC: CPT

## 2022-07-20 PROCEDURE — 250N000013 HC RX MED GY IP 250 OP 250 PS 637

## 2022-07-20 PROCEDURE — 96413 CHEMO IV INFUSION 1 HR: CPT

## 2022-07-20 PROCEDURE — 96367 TX/PROPH/DG ADDL SEQ IV INF: CPT

## 2022-07-20 PROCEDURE — 36415 COLL VENOUS BLD VENIPUNCTURE: CPT

## 2022-07-20 RX ORDER — ACETAMINOPHEN 325 MG/1
650 TABLET ORAL ONCE
Status: COMPLETED | OUTPATIENT
Start: 2022-07-20 | End: 2022-07-20

## 2022-07-20 RX ADMIN — SODIUM CHLORIDE 250 ML: 9 INJECTION, SOLUTION INTRAVENOUS at 12:59

## 2022-07-20 RX ADMIN — DEXAMETHASONE SODIUM PHOSPHATE: 10 INJECTION, SOLUTION INTRAMUSCULAR; INTRAVENOUS at 13:09

## 2022-07-20 RX ADMIN — ACETAMINOPHEN 650 MG: 325 TABLET ORAL at 12:57

## 2022-07-20 RX ADMIN — DIPHENHYDRAMINE HYDROCHLORIDE 50 MG: 50 INJECTION, SOLUTION INTRAMUSCULAR; INTRAVENOUS at 13:27

## 2022-07-20 RX ADMIN — FAMOTIDINE 20 MG: 10 INJECTION INTRAVENOUS at 13:02

## 2022-07-20 RX ADMIN — SODIUM CHLORIDE 700 MG: 9 INJECTION, SOLUTION INTRAVENOUS at 13:49

## 2022-07-20 ASSESSMENT — PAIN SCALES - GENERAL: PAINLEVEL: MILD PAIN (3)

## 2022-07-20 NOTE — NURSING NOTE
Chief Complaint   Patient presents with     Oncology Clinic Visit     [facility] RETURN - PLASMACYTOMA OF BONE     Blood Draw     Labs drawn with piv start by rn.  VS taken.     Labs drawn with PIV start by rn.  Pt tolerated well.  VS taken and pt checked in for next appt.    Jess Ferrera RN

## 2022-07-20 NOTE — LETTER
7/20/2022         RE: Kristie Cornejo  [address]  [facility] [address]      [facility]  Date of Visit: Jul 20, 2022   Oncologist: Dr. Payton Reinoso    Reason for visit: myeloma follow-up           Oncology History   52 year old female with past medical history significant for Irritable bowel disease, allergies, large plasmacytoma of the L pelvis, and newly diagnosed multiple myeloma.     Early in April 2021 , she noted to have left sided groin pain, constant and was affecting her daily activities  MRI showed large, expansile, permeative mass with indistinct borders involving the left superior and inferior pubic rami, pubic symphysis and with possible extension to the left acetabulum.     We completed the following work-up:   - Biopsy 9/7/2021: plasmacytoma.  Flow cytometry showed kappa monotypic plasma cells, polytypic B cells. FISH results from plasmacytoma showed a gain of 11q, IGH-CCND1 fusion but no losses of 1q or TP53 and no gain of 1q.  - Bone marrow biopsy 09/22/21: Marrow cellularity 50% with 25% interstitial infiltration w/  Kappa-monotypic, moderately atypical plasma cells. Flow with 0.7% kappa-monotypica plasma cells. FISH and cytogenetics pending.   - PET CT 09/24/21 w/ hypermetabolic pathologic fracture of the L pubic ramus w/ aggressive appearing osteolysis but no additional suspicious lytic or blastic osseous lesions.     Treatment to date:   - Zometa q4 weeks started 9/27/21  - Radiation to left pubic ramus x18 fractions, completed 11/11/21.   - RVD C1D1 11/15/21  - RVD C2D1 12/6/21  - RVD C3D1  12/27/21  - RVD C4 D1 1/17/22, elizabeth added on D8 due to inadequate response of urinary M-spike and FLCs remaining over 100 mg/dL after 4 cycles of RVD    - Elizabeth-RVD C1D8 1/24/22  - Elizabeth-RVD C2D1 2/7/22  - Elizabeth-RVD C3D1 2/28/22  - Elizabeth-RVD C4D1 3/29/22  - Elizabeth-RVD C5D1 4/19/22    Disease response: OK     - 5/19/22: Continued monotherapy with Daratumumab;  "held Revlimid and Velcade.    - 6/21/22: Transitioned to KCD, received C1 days 1 and 2- had significant PVCs at cardiac follow-uyp after 1st week of Kyprolis, held further doses.    - Transitioned to Sarclisa/Pom/Dex 7/6/22. Started Pom on 7/11.           INTERIM HISTORY:   Margo is here for oncology follow-up visit today and consideration of C1D15 of Sarclisa.     She is feeling ok today. She started Pom on 7/11. Today is day 10. She has been tolerating the Sarclisa ok. She is having ongoing diarrhea, which has been stable per patient report. Not worse since starting treatment. Her appetite is ok. She has been eating. She has stable dyspnea with exertion (walking, going up stairs). Her breathing at rest is fine. Sometimes has chest tightness. She is following with her cardiologist closely. She remains active with walking and swimming. No major changes since visit last week. She is feeling a little nauseous today.     Denies fevers, chills, headaches, vision or hearing changes, new lumps or bumps, chest pain, cough, abdominal pain, vomiting, swelling of extremities, bleeding issues, or rash.           Physical Exam:     /79 (BP Location: Right arm, Patient Position: Sitting, Cuff Size: Adult Regular)   Pulse 91   Resp 16   Ht 1.729 m (5' 8.07\")   Wt 65.9 kg (145 lb 3.2 oz)   SpO2 99%   BMI 22.03 kg/m     General: well appearing, no acute distress  HEENT: normocephalic, atraumatic, PERRLA, sclerae nonicteric  Lymph: no palpable cervical, supraclavicular or axillary lymphadenopathy   CV: regular rate and rhythm, no murmurs  Lungs: clear to auscultation bilaterally, no wheezes/rales/rhonchi  Abd: soft, positive bowel sounds, non-distended, non-tender  MSK: full range of motion in all four extremities, no peripheral edema  Neuro: alert and oriented x3, CN grossly intact   Psych: appropriate mood and affect  Skin: no rashes or lesions           Laboratory Data:      I personally reviewed the following labs:   " Latest Reference Range & Units 07/20/22 11:56   WBC 4.0 - 11.0 10e3/uL 4.5   Hemoglobin 11.7 - 15.7 g/dL 13.4   Hematocrit 35.0 - 47.0 % 39.7   Platelet Count 150 - 450 10e3/uL 164   RBC Count 3.80 - 5.20 10e6/uL 4.22   MCV 78 - 100 fL 94   MCH 26.5 - 33.0 pg 31.8   MCHC 31.5 - 36.5 g/dL 33.8   RDW 10.0 - 15.0 % 12.7   % Neutrophils % 79   % Lymphocytes % 8   % Monocytes % 9   % Eosinophils % 4   % Basophils % 0   Absolute Basophils 0.0 - 0.2 10e3/uL 0.0   Absolute Eosinophils 0.0 - 0.7 10e3/uL 0.2   Absolute Immature Granulocytes <=0.4 10e3/uL 0.0   Absolute Lymphocytes 0.8 - 5.3 10e3/uL 0.4 (L)   Absolute Monocytes 0.0 - 1.3 10e3/uL 0.4   % Immature Granulocytes % 0   Absolute Neutrophils 1.6 - 8.3 10e3/uL 3.6   Absolute NRBCs 10e3/uL 0.0   NRBCs per 100 WBC <1 /100 0       Imaging:  No new imaging to review.         Assessment and Recommendations   Kristie Cornejo is a 52 year old female who was diagnosed with Plasmacytoma after she presented with left sided groin pain now found to have multiple myeloma after further workup with bone marrow biopsy.     # Multiple myeloma with associated large plasmacytoma of the L pelvis. Kappa light chain disease.     BMBx with 25% plasma cell infiltration; standard risk with gain of 11q, IGH-CCND1 fusion but no losses of 1q or TP53 and no gain of 1q.   SPEP w/out a monoclonal peak  UPEP positive. 70% paraprotein. Large monoclonal free immunoglobulin light chain of kappa chain type.     No anemia, normal creat, Ca, no other skeletal lesions. Alb normal, B2M normal.  Stage I Light chain disease with left pelvic bone pathologic fracture she is classified as having symptomatic multiple myeloma.     RVD C1D1 11/15/21 (had a slight delay in the start of her Revlimid on 11/19/21 due to the timing of her pregnancy tests).  She tolerated cycle 1 well with some mild nausea and a headache on her week off.  RVD C2D1 12/6/21, tolerated well overall, but has had more consistent  "sensation of her hands and feet being hot. No tingling, pain, loss of sensation, or changes to her strength.  RVD C3D1 12/27/21, tolerated well overall with ongoing sensation of \"hot\" hands and feet  RVD C4 D1 1/17/22, elizabeth added on D8 due to inadequate response of urinary M-spike and FLCs remaining over 100 mg/dL after 3 cycles of RVD    She completed 5 cycles of Elizabeth plus RVD and acheived a partial response.  Her free kappa light chains were at least 50% reduced but the response plateaued, and now the kappa light chains are actually increasing again. She met with Dr. Reinoso and the plan was established to switch to Kyprolis, Cytoxan, and dexamethasone.  Margo continued on daratumumab monotherapy while awaiting final cardiac clearance due to potential cardiotoxicity of Kyprolis.  She received the first week of KCD (2 doses of Kyprolis) prior to her cardiac follow-up and was found to have significant PVCs at that appointment, which Dr. Thomas suspects were induced by the chemo.    Discussed with the wider hematology team on 6/28.  The general consensus was to transition to Sarclisa/Pom/Dex to avoid the cardiotoxicity of Kyprolis.  Started C1D1 7/6/22.  RTC weekly during the 1st cycle for symptom assessment.      Per Dr. Thomas in cardiology, as long as her symptoms are tolerable and there are no major changes on EKG, he is comfortable pushing forward with treatment.  Recommends repeating Zio Patch and TTE during the transition from treatment to stem cell transplant.     She was feeling more symptomatic at her most recent treatment on 7/13, with worse shortness of breath after her walk in from the parking lot.  EKG 7/13 with no major changes, troponin WNL.      Today, she is clinically stable.  No major changes since last visit.  Will proceed with C1D15 Sarclisa today.  Continue to follow weekly during C1 on the days of her treatment. She will RTC in 1 week on 7/27 for C1D22 Sarclisa.   She started Pom on 7/11.  " "She confirms taking ASA.  She gets weekly dex when she comes in for Sarclisa infusion.        #Bone Health  She will continue monthly Zometa (last received on 7/13/22). She will continue Calcium/Vitamin D supplements.       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 Currently having no pain and able to walk 1 mile without any assistive devices.  She is interested in pursuing other activities like gentle cross-country skiing.  Had follow-up with Dr. Philip on 1/6/22 and was cleared to resume regular activity as tolerated. She should still refrain from high-impact activities.      # PVCs  She had an ECHO 12/1 and had follow-up with cardiology 12/7 (notes in [facility]where).  She had follow-up with cardiology here at the  and they recommend an ablation. Stress test was completed and she was started on Toprol XL; however, Toprol XL was discontinued due to significant side effects of orthostatic hypotension, pre-syncopal episodes, shakiness, and weakness.   - She had an ablation 3/23/22 but has not experienced relief from the feeling of being nauseated and short of breath after climbing the stairs.  She also reports episodes of palpitations that \"radiate into her neck.\" Denies chest pain or shortness of breath.   - EKG done 4/4 showed sinus rhythm and nonspecific ST and T wave abnormality. Troponin negative.  - Cardiac MRI ruled out amyloid.  LFEF 50%, no evidence of infiltrative cardiomyopathy.  - She followed up with cardiology and they felt that the cause may be musculoskeletal or pericarditis. They suggested taking ibuprofen for pain. Her pain has subsided and she actually has not needed to take ibuprofen.   - Post ablation Zio patch was performed showing a significant reduction in PVCs; however, by the time she had her cardiology follow-up, she had restarted " chemotherapy and the PVCs had returned.  Dr. Thomas is comfortable proceeding with treatment as long as symptoms are tolerable and there are no major EKG changes.  - Symptoms have been stable since last visit.  No changes or interventions needed today.      #Diarrhea  She was having intermittent diarrhea with associated abdominal cramping and nausea, but developed much more significant diarrhea with 10-12 liquid episodes and was seen as an add-on on 4/4/22 for this.   - C.diff PCR negative 2/8/22, repeat on 4/5 negative  - Enteric stool panel 4/5/22 negative  - Underwent colonoscopy on 4/13/22 to pursue biopsy for amyloid (discussed below). Congo red stain is negative for amyloid deposits. Pathology showed colonic mucosa with focal active colitis, negative for signs of chronicity and lymphocytic colitis. Per patient report, GI reviewed results with her and did not feel she needed treatment for these findings at this time. They advised her to monitor her symptoms and to call them if they do not improve.Dr. Montgomery agreed with following conservatively for now since she reports some improvement in her symptoms.  If diarrhea becomes bothersome again, then they will arrange for expeditious evaluation in the IBD clinic.   - CMV PCR (4/4/22) not detected  - Diarrhea is currently stable.  Continue to monitor.      #COVID prophylaxis  - She received EVUSHELD 1/20/22 and 3/8/22  - Received 3 COVID vaccines to date      #Reflux  She reported episodes of reflux with bile, especially when she lays down at night.  She has started to elevate her head on pillows and is also trying to avoid eating within 1-2 hours of going to bed.  Stable currently on Omeprazole 20 mg daily.       40 minutes spent on the date of the encounter doing chart review, review of test results, interpretation of tests, patient visit and documentation         Estefany Pepe PA-C

## 2022-07-20 NOTE — NURSING NOTE
"Oncology Rooming Note    July 20, 2022 12:10 PM   Kristie Cornejo is a 52 year old female who presents for:    Chief Complaint   Patient presents with     Oncology Clinic Visit     [facility] RETURN - PLASMACYTOMA OF BONE     Blood Draw     Labs drawn with piv start by rn.  VS taken.     Initial Vitals: /79 (BP Location: Right arm, Patient Position: Sitting, Cuff Size: Adult Regular)   Pulse 91   Temp 97.9  F (36.6  C)   Resp 16   Ht 1.729 m (5' 8.07\")   Wt 65.9 kg (145 lb 3.2 oz)   SpO2 99%   BMI 22.03 kg/m   Estimated body mass index is 22.03 kg/m  as calculated from the following:    Height as of this encounter: 1.729 m (5' 8.07\").    Weight as of this encounter: 65.9 kg (145 lb 3.2 oz). Body surface area is 1.78 meters squared.  Mild Pain (3) Comment: Data Unavailable   No LMP recorded. (Menstrual status: IUD).  Allergies reviewed: Yes  Medications reviewed: Yes    Medications: Medication refills not needed today.  Pharmacy name entered into [email or web address]:    [facility] - [place], [place] - [address]  [id number] - [place], [place] - [address] [facility] [address]  [facility] - [place], [place] - [address]  [facility] - [place], [place] - [address] [place] [address] PHARMACY [facility] - [place], [place] - [address] [facility]    Clinical concerns: No new concerns. Afshin was notified.      Sd Davies LPN            "

## 2022-07-20 NOTE — PROGRESS NOTES
Infusion Nursing Note:  Kristie Cornejo presents today for cycle 1 day 15 isatuximab-irfc (sarclisa).    Patient seen by provider today: Yes: Estefany Pepe PA-C   present during visit today: Not Applicable.    Note:   Patient has no questions or concerns after her appointment with Estefany Pepe.    Intravenous Access:  Peripheral IV placed.    Treatment Conditions:  Lab Results   Component Value Date    HGB 13.4 07/20/2022    WBC 4.5 07/20/2022    ANEU 3.2 01/17/2022    ANEUTAUTO 3.6 07/20/2022     07/20/2022      Results reviewed, labs MET treatment parameters, ok to proceed with treatment.    Post Infusion Assessment:  Patient tolerated infusion without incident.  Blood return noted pre and post infusion.  Site patent and intact, free from redness, edema or discomfort.  No evidence of extravasations.  Access discontinued per protocol.     Discharge Plan:   Patient declined prescription refills.  Discharge instructions reviewed with: Patient.  Patient and/or family verbalized understanding of discharge instructions and all questions answered.  AVS to patient via [facility].  Patient will return 7/27 for next appointment.   Patient discharged in stable condition accompanied by: self.  Departure Mode: Ambulatory.      Azucena Tafoya RN

## 2022-07-20 NOTE — PATIENT INSTRUCTIONS
[facility] Triage and after hours / weekends / holidays:  [phone]    Please call the triage or after hours line if you experience a temperature greater than or equal to 100.5, shaking chills, have uncontrolled nausea, vomiting and/or diarrhea, dizziness, shortness of breath, chest pain, bleeding, unexplained bruising, or if you have any other new/concerning symptoms, questions or concerns.      If you are having any concerning symptoms or wish to speak to a provider before your next infusion visit, please call your care coordinator or triage to notify them so we can adequately serve you.     If you need a refill on a narcotic prescription or other medication, please call before your infusion appointment.        July 2022 Sunday Monday Tuesday Wednesday Thursday Friday Saturday                            1     2       3     4     5     6    LAB PERIPHERAL   7:45 AM   (15 min.)   [facility] DRAW   [facility]    RETURN   8:00 AM   (45 min.)   Jackie Ferris APRN CNP   [facility]    ONC INFUSION 2 HR (120 MIN)   8:30 AM   (120 min.)    ONC INFUSION NURSE   [facility] 7     8    LAB   8:30 AM   (15 min.)   [facility]   [facility] 9       10     11     12    PET ONCOLOGY WHOLE BODY   7:00 AM   (30 min.)   [id number]   [facility] 13    RETURN   7:45 AM   (45 min.)   Jackie Ferris APRN CNP   [facility]    LAB PERIPHERAL   7:45 AM   (15 min.)   [facility] DRAW   [facility]    ONC INFUSION 2 HR (120 MIN)   8:30 AM   (120 min.)    ONC INFUSION NURSE   [facility] 14     15    VIDEO VISIT RETURN  10:45 AM   (45 min.)   Jackie Ferris APRN CNP   [facility] 16       17     18     19     20    LAB PERIPHERAL  11:45 AM   (15 min.)   [facility] DRAW   M  [facility]    RETURN  12:15 PM   (45 min.)   Estefany Pepe PA-C   [facility]    ONC INFUSION 2 HR (120 MIN)   1:00 PM   (120 min.)   [facility] ONC INFUSION NURSE   [facility] 21     22     23       24     25     26     27    LAB PERIPHERAL   1:00 PM   (15 min.)   [facility]   [facility]    RETURN   1:15 PM   (45 min.)   Jackie Ferris APRN CNP   [facility]    ONC INFUSION 2 HR (120 MIN)   2:30 PM   (120 min.)   [facility] ONC INFUSION NURSE   [facility] 28 29 30 31 August 2022 Sunday Monday Tuesday Wednesday Thursday Friday Saturday        1    [facility] RETURN GENERAL   8:45 AM   (20 min.)   Fanny Ponce MD   [facility] 2     3    LAB PERIPHERAL   2:00 PM   (15 min.)    [facility] DRAW   [facility]    ONC INFUSION 2 HR (120 MIN)   2:30 PM   (120 min.)    ONC INFUSION NURSE   [facility] 4     5     6       7     8     9     10    LAB PERIPHERAL  12:30 PM   (15 min.)   [facility] DRAW   [facility]    RETURN  12:45 PM   (30 min.)   Payton Reinoso MD   [facility] [place] 11     12     13       14     15     16     17    LAB PERIPHERAL   2:00 PM   (15 min.)    [facility] DRAW   [facility]    ONC INFUSION 2 HR (120 MIN)   2:30 PM   (120 min.)   [facility] ONC INFUSION NURSE   [facility] 18     19     20       21     22     23     24     25     26     27       28     29     30     31                                       Recent Results (from the past 24 hour(s))   HCG Qual, Urine ([id number])    Collection Time: 07/20/22 11:55 AM   Result Value Ref Range     hCG Urine Qualitative Negative Negative   CBC with platelets and differential    Collection Time: 07/20/22 11:56 AM   Result Value Ref Range    WBC Count 4.5 4.0 - 11.0 10e3/uL    RBC Count 4.22 3.80 - 5.20 10e6/uL    Hemoglobin 13.4 11.7 - 15.7 g/dL    Hematocrit 39.7 35.0 - 47.0 %    MCV 94 78 - 100 fL    MCH 31.8 26.5 - 33.0 pg    MCHC 33.8 31.5 - 36.5 g/dL    RDW 12.7 10.0 - 15.0 %    Platelet Count 164 150 - 450 10e3/uL    % Neutrophils 79 %    % Lymphocytes 8 %    % Monocytes 9 %    % Eosinophils 4 %    % Basophils 0 %    % Immature Granulocytes 0 %    NRBCs per 100 WBC 0 <1 /100    Absolute Neutrophils 3.6 1.6 - 8.3 10e3/uL    Absolute Lymphocytes 0.4 (L) 0.8 - 5.3 10e3/uL    Absolute Monocytes 0.4 0.0 - 1.3 10e3/uL    Absolute Eosinophils 0.2 0.0 - 0.7 10e3/uL    Absolute Basophils 0.0 0.0 - 0.2 10e3/uL    Absolute Immature Granulocytes 0.0 <=0.4 10e3/uL    Absolute NRBCs 0.0 10e3/uL

## 2022-07-22 ENCOUNTER — PATIENT OUTREACH (OUTPATIENT)
Dept: ONCOLOGY | Facility: CLINIC | Age: 52
End: 2022-07-22

## 2022-07-22 NOTE — PROGRESS NOTES
[facility]: Cancer Care                                                                                          Margo calls stating that her  has been diagnosed with COVID-19 yesterday.  Once they found out he has quarantined himself in the basement.  She asks if there is anything she needs to do at this time.    I advised that they continue to keep distanced.  She was advised if she develops any symptoms including a runny nose, cough, fatigue, fever or any other concerns she will take a home test.  Margo has received the COVID vaccination and Evusheld.  She understands if she tests positive she should contact our office for a potential referral for antibodies.    She was also encouraged to have her  contact his PCP and inform them of his diagnosis and her immunocompromised status.    Margo is in agreement with the above recommendations.    Signature:  Mattie Rodriguez RN

## 2022-07-26 NOTE — PROGRESS NOTES
[facility]  Date of Visit: Jul 27, 2022   Oncologist: Dr. Payton Reinoso    Reason for visit: myeloma follow-up           Oncology History   52 year old female with past medical history significant for Irritable bowel disease, allergies, large plasmacytoma of the L pelvis, and newly diagnosed multiple myeloma.     Early in April 2021 , she noted to have left sided groin pain, constant and was affecting her daily activities  MRI showed large, expansile, permeative mass with indistinct borders involving the left superior and inferior pubic rami, pubic symphysis and with possible extension to the left acetabulum.     We completed the following work-up:   - Biopsy 9/7/2021: plasmacytoma.  Flow cytometry showed kappa monotypic plasma cells, polytypic B cells. FISH results from plasmacytoma showed a gain of 11q, IGH-CCND1 fusion but no losses of 1q or TP53 and no gain of 1q.  - Bone marrow biopsy 09/22/21: Marrow cellularity 50% with 25% interstitial infiltration w/  Kappa-monotypic, moderately atypical plasma cells. Flow with 0.7% kappa-monotypica plasma cells. FISH and cytogenetics pending.   - PET CT 09/24/21 w/ hypermetabolic pathologic fracture of the L pubic ramus w/ aggressive appearing osteolysis but no additional suspicious lytic or blastic osseous lesions.     Treatment to date:   - Zometa q4 weeks started 9/27/21  - Radiation to left pubic ramus x18 fractions, completed 11/11/21.   - RVD C1D1 11/15/21  - RVD C2D1 12/6/21  - RVD C3D1  12/27/21  - RVD C4 D1 1/17/22, elizabeth added on D8 due to inadequate response of urinary M-spike and FLCs remaining over 100 mg/dL after 4 cycles of RVD    - Elizabeth-RVD C1D8 1/24/22  - Elizabeth-RVD C2D1 2/7/22  - Elizabeth-RVD C3D1 2/28/22  - Elizabeth-RVD C4D1 3/29/22  - Elizabeth-RVD C5D1 4/19/22    Disease response: NM     - 5/19/22: Continued monotherapy with Daratumumab; held Revlimid and Velcade.    - 6/21/22: Transitioned to KCD, received C1 days 1 and 2- had  significant PVCs at cardiac follow-uyp after 1st week of Kyprolis, held further doses.    - Transitioned to Sarclisa/Pom/Dex 7/6/22. Started Pom on 7/11.           INTERIM HISTORY:   Margo is here for oncology follow-up visit today and consideration of C1D22 of Sarclisa.     She is having diarrhea again, anywhere from 1-6 episodes per day.  She took Imodium on the day she had 6 episodes and then was constipated the following day.  She had one episode of incontinence which was related to not having access to a bathroom, but she does note a sense of urgency with most bowel movements. Appetite is poor but she is eating and maintaining her weight.    She is noticing numbness in her toes to the ball of the foot.  Occasional shooting pain in her right great toe, but feels tolerable overall.  No tripping or stumbling.    Chest tightness and shortness of breath is stable - occurs with activity, resolves with rest.  Not increasing in severity or frequency.    Denies fevers, chills, headaches, vision or hearing changes, new lumps or bumps, chest pain, cough, abdominal pain, vomiting, swelling of extremities, bleeding issues, or rash.           Physical Exam:     /69 (BP Location: Right arm, Patient Position: Sitting, Cuff Size: Adult Regular)   Pulse 71   Temp 98.4  F (36.9  C) (Oral)   Resp 16   Wt 67.4 kg (148 lb 8 oz)   SpO2 98%   BMI 22.53 kg/m     General: well appearing, no acute distress  HEENT: normocephalic, atraumatic, PERRLA, sclerae nonicteric  Lymph: no palpable cervical, supraclavicular or axillary lymphadenopathy   CV: regular rate and rhythm, no murmurs  Lungs: clear to auscultation bilaterally, no wheezes/rales/rhonchi  Abd: soft, positive bowel sounds, non-distended, non-tender  MSK: full range of motion in all four extremities, no peripheral edema  Neuro: alert and oriented x3, CN grossly intact   Psych: appropriate mood and affect  Skin: no rashes or lesions           Laboratory Data:      I  personally reviewed the following labs:    Most Recent 3 CBC's:  Recent Labs   Lab Test 07/27/22  1311 07/20/22  1156 07/13/22  0813   WBC 3.3* 4.5 4.6   HGB 12.9 13.4 13.6   MCV 94 94 95    164 255     Most Recent 3 BMP's:  Recent Labs   Lab Test 07/13/22  0900 07/06/22  0823 06/21/22  1307 06/06/22  1143 05/24/22  1527   NA  --  139 144  --  142   POTASSIUM  --  3.8 3.6  --  3.4   CHLORIDE  --  113* 115*  --  113*   CO2  --  24 20  --  18*   BUN  --  22 26  --  22   CR 0.66 0.72 0.67   < > 0.72   ANIONGAP  --  2* 9  --  11   MEKA 8.8 9.3 9.2   < > 8.9   GLC  --  94 94  --  146*    < > = values in this interval not displayed.     Most Recent 2 LFT's:  Recent Labs   Lab Test 07/06/22  0823 06/21/22  1307   AST 15 14   ALT 22 27   ALKPHOS 53 51   BILITOTAL 0.5 0.5       Imaging:  No new imaging to review.         Assessment and Recommendations   Kristie Cornejo is a 52 year old female who was diagnosed with Plasmacytoma after she presented with left sided groin pain now found to have multiple myeloma after further workup with bone marrow biopsy.     # Multiple myeloma with associated large plasmacytoma of the L pelvis. Kappa light chain disease.     BMBx with 25% plasma cell infiltration; standard risk with gain of 11q, IGH-CCND1 fusion but no losses of 1q or TP53 and no gain of 1q.   SPEP w/out a monoclonal peak  UPEP positive. 70% paraprotein. Large monoclonal free immunoglobulin light chain of kappa chain type.     No anemia, normal creat, Ca, no other skeletal lesions. Alb normal, B2M normal.  Stage I Light chain disease with left pelvic bone pathologic fracture she is classified as having symptomatic multiple myeloma.     RVD C1D1 11/15/21 (had a slight delay in the start of her Revlimid on 11/19/21 due to the timing of her pregnancy tests).  She tolerated cycle 1 well with some mild nausea and a headache on her week off.  RVD C2D1 12/6/21, tolerated well overall, but has had more consistent sensation  "of her hands and feet being hot. No tingling, pain, loss of sensation, or changes to her strength.  RVD C3D1 12/27/21, tolerated well overall with ongoing sensation of \"hot\" hands and feet  RVD C4 D1 1/17/22, elizabeth added on D8 due to inadequate response of urinary M-spike and FLCs remaining over 100 mg/dL after 3 cycles of RVD    She completed 5 cycles of Elizabeth plus RVD and acheived a partial response.  Her free kappa light chains were at least 50% reduced but the response plateaued, and now the kappa light chains are actually increasing again. She met with Dr. Reinoso and the plan was established to switch to Kyprolis, Cytoxan, and dexamethasone.  Margo continued on daratumumab monotherapy while awaiting final cardiac clearance due to potential cardiotoxicity of Kyprolis.  She received the first week of KCD (2 doses of Kyprolis) prior to her cardiac follow-up and was found to have significant PVCs at that appointment, which Dr. Thomas suspects were induced by the chemo.    Discussed with the wider hematology team on 6/28.  The general consensus was to transition to Sarclisa/Pom/Dex to avoid the cardiotoxicity of Kyprolis.  Started C1D1 7/6/22.  Her diarrhea has returned with being back on treatment, but she thinks it is manageable.     Per Dr. Thomas in cardiology, as long as her symptoms are tolerable and there are no major changes on EKG, he is comfortable pushing forward with treatment.  Recommends repeating Zio Patch and TTE during the transition from treatment to stem cell transplant.     She was feeling more symptomatic at her infusion appt on 7/13, with worse shortness of breath after her walk in from the parking lot.  EKG 7/13 with no major changes, troponin WNL.      Today, she is clinically stable.  No major changes since last visit.  Will proceed with C1D22 Sarclisa today.  She will return in 1 week for C2D1 Sarclisa. Next visit with Dr. Reinoso on 8/10; I will see her again prior to C3.     She " "confirms taking ASA.  She gets weekly dex when she comes in for Sarclisa infusion.  Sarclisa changes to every other week with C2, so she will take oral dexamethasone at home on days 8 and 15.      #Bone Health  She will continue monthly Zometa (last received on 7/13/22). She will continue Calcium/Vitamin D supplements.       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 Currently having no pain and able to walk 1 mile without any assistive devices.  She is interested in pursuing other activities like gentle cross-country skiing.  Had follow-up with Dr. Philip on 1/6/22 and was cleared to resume regular activity as tolerated. She should still refrain from high-impact activities.    PET on 7/12/22 shows left pubic ramus lesion is smaller in size but brighter FDG. No new lesions.      # PVCs  She had an ECHO 12/1 and had follow-up with cardiology 12/7 (notes in [facility]).  She had follow-up with cardiology here at the  and they recommend an ablation. Stress test was completed and she was started on Toprol XL; however, Toprol XL was discontinued due to significant side effects of orthostatic hypotension, pre-syncopal episodes, shakiness, and weakness.   - She had an ablation 3/23/22 but has not experienced relief from the feeling of being nauseated and short of breath after climbing the stairs.  She also reports episodes of palpitations that \"radiate into her neck.\" Denies chest pain or shortness of breath.   - EKG done 4/4 showed sinus rhythm and nonspecific ST and T wave abnormality. Troponin negative.  - Cardiac MRI ruled out amyloid.  LFEF 50%, no evidence of infiltrative cardiomyopathy.  - She followed up with cardiology and they felt that the cause may be musculoskeletal or pericarditis. They suggested taking ibuprofen for pain. Her pain has subsided and " she actually has not needed to take ibuprofen.   - Post ablation Zio patch was performed showing a significant reduction in PVCs; however, by the time she had her cardiology follow-up, she had restarted chemotherapy and the PVCs had returned.  Dr. Thomas is comfortable proceeding with treatment as long as symptoms are tolerable and there are no major EKG changes.  - Symptoms have been stable since last visit.  No changes or interventions needed today.      #Diarrhea  She was having intermittent diarrhea with associated abdominal cramping and nausea, but developed much more significant diarrhea with 10-12 liquid episodes and was seen as an add-on on 4/4/22 for this.   - C.diff PCR negative 2/8/22, repeat on 4/5 negative  - Enteric stool panel 4/5/22 negative  - Underwent colonoscopy on 4/13/22 to pursue biopsy for amyloid (discussed below). [place] red stain is negative for amyloid deposits. Pathology showed colonic mucosa with focal active colitis, negative for signs of chronicity and lymphocytic colitis. Per patient report, GI reviewed results with her and did not feel she needed treatment for these findings at this time. They advised her to monitor her symptoms and to call them if they do not improve.Dr. Montgomery agreed with following conservatively for now since she reports some improvement in her symptoms.  If diarrhea becomes bothersome again, then they will arrange for expeditious evaluation in the IBD clinic.   - CMV PCR (4/4/22) not detected  - Diarrhea is currently mangeable.  Continue to monitor.      #COVID prophylaxis  - She received EVUSHELD 1/20/22 and 3/8/22  - Received 3 COVID vaccines to date      #Reflux  She reported episodes of reflux with bile, especially when she lays down at night.  She has started to elevate her head on pillows and is also trying to avoid eating within 1-2 hours of going to bed.  Stable currently on Omeprazole 20 mg daily.     #Skin Lesion  She has a new hyperpigmented skin  lesion on her left posterior forearm that she states appeared this summer and has been consistently peeling.  Will place a derm referral for further evaluation.      44 minutes spent on the date of the encounter doing chart review, review of test results, interpretation of tests, patient visit and documentation     DORON Hall [facility]  [address] [place] [id number] [phone]

## 2022-07-27 ENCOUNTER — ONCOLOGY VISIT (OUTPATIENT)
Dept: ONCOLOGY | Facility: CLINIC | Age: 52
End: 2022-07-27
Attending: REGISTERED NURSE
Payer: COMMERCIAL

## 2022-07-27 ENCOUNTER — APPOINTMENT (OUTPATIENT)
Dept: LAB | Facility: CLINIC | Age: 52
End: 2022-07-27
Payer: COMMERCIAL

## 2022-07-27 VITALS
DIASTOLIC BLOOD PRESSURE: 69 MMHG | WEIGHT: 148.5 LBS | RESPIRATION RATE: 16 BRPM | BODY MASS INDEX: 22.53 KG/M2 | TEMPERATURE: 98.4 F | SYSTOLIC BLOOD PRESSURE: 115 MMHG | OXYGEN SATURATION: 98 % | HEART RATE: 71 BPM

## 2022-07-27 DIAGNOSIS — C90.00 LIGHT CHAIN MYELOMA (H): Primary | ICD-10-CM

## 2022-07-27 DIAGNOSIS — L98.9 SKIN LESION: ICD-10-CM

## 2022-07-27 DIAGNOSIS — R19.7 DIARRHEA, UNSPECIFIED TYPE: ICD-10-CM

## 2022-07-27 DIAGNOSIS — E87.6 HYPOKALEMIA: ICD-10-CM

## 2022-07-27 DIAGNOSIS — I49.3 PVC'S (PREMATURE VENTRICULAR CONTRACTIONS): ICD-10-CM

## 2022-07-27 LAB
BASOPHILS # BLD MANUAL: 0 10E3/UL (ref 0–0.2)
BASOPHILS NFR BLD MANUAL: 1 %
EOSINOPHIL # BLD MANUAL: 0.2 10E3/UL (ref 0–0.7)
EOSINOPHIL NFR BLD MANUAL: 7 %
ERYTHROCYTE [DISTWIDTH] IN BLOOD BY AUTOMATED COUNT: 12.7 % (ref 10–15)
HCT VFR BLD AUTO: 37.5 % (ref 35–47)
HGB BLD-MCNC: 12.9 G/DL (ref 11.7–15.7)
LYMPHOCYTES # BLD MANUAL: 0.5 10E3/UL (ref 0.8–5.3)
LYMPHOCYTES NFR BLD MANUAL: 16 %
MCH RBC QN AUTO: 32.3 PG (ref 26.5–33)
MCHC RBC AUTO-ENTMCNC: 34.4 G/DL (ref 31.5–36.5)
MCV RBC AUTO: 94 FL (ref 78–100)
METAMYELOCYTES # BLD MANUAL: 0 10E3/UL
METAMYELOCYTES NFR BLD MANUAL: 1 %
MONOCYTES # BLD MANUAL: 0.3 10E3/UL (ref 0–1.3)
MONOCYTES NFR BLD MANUAL: 8 %
NEUTROPHILS # BLD MANUAL: 2.2 10E3/UL (ref 1.6–8.3)
NEUTROPHILS NFR BLD MANUAL: 67 %
PLAT MORPH BLD: ABNORMAL
PLATELET # BLD AUTO: 173 10E3/UL (ref 150–450)
RBC # BLD AUTO: 3.99 10E6/UL (ref 3.8–5.2)
RBC MORPH BLD: ABNORMAL
TARGETS BLD QL SMEAR: SLIGHT
WBC # BLD AUTO: 3.3 10E3/UL (ref 4–11)

## 2022-07-27 PROCEDURE — 250N000013 HC RX MED GY IP 250 OP 250 PS 637

## 2022-07-27 PROCEDURE — 96375 TX/PRO/DX INJ NEW DRUG ADDON: CPT

## 2022-07-27 PROCEDURE — 36415 COLL VENOUS BLD VENIPUNCTURE: CPT

## 2022-07-27 PROCEDURE — 85014 HEMATOCRIT: CPT

## 2022-07-27 PROCEDURE — G0463 HOSPITAL OUTPT CLINIC VISIT: HCPCS

## 2022-07-27 PROCEDURE — 99215 OFFICE O/P EST HI 40 MIN: CPT | Performed by: REGISTERED NURSE

## 2022-07-27 PROCEDURE — 250N000011 HC RX IP 250 OP 636

## 2022-07-27 PROCEDURE — 258N000003 HC RX IP 258 OP 636

## 2022-07-27 PROCEDURE — 96413 CHEMO IV INFUSION 1 HR: CPT

## 2022-07-27 PROCEDURE — 85007 BL SMEAR W/DIFF WBC COUNT: CPT

## 2022-07-27 RX ORDER — POTASSIUM CHLORIDE 750 MG/1
20 TABLET, EXTENDED RELEASE ORAL 2 TIMES DAILY
Qty: 360 TABLET | Refills: 3 | Status: ON HOLD | OUTPATIENT
Start: 2022-07-27 | End: 2023-01-10

## 2022-07-27 RX ORDER — ACETAMINOPHEN 325 MG/1
650 TABLET ORAL ONCE
Status: COMPLETED | OUTPATIENT
Start: 2022-07-27 | End: 2022-07-27

## 2022-07-27 RX ADMIN — FAMOTIDINE 20 MG: 10 INJECTION INTRAVENOUS at 14:47

## 2022-07-27 RX ADMIN — DEXAMETHASONE SODIUM PHOSPHATE: 10 INJECTION, SOLUTION INTRAMUSCULAR; INTRAVENOUS at 14:47

## 2022-07-27 RX ADMIN — DIPHENHYDRAMINE HYDROCHLORIDE 50 MG: 50 INJECTION, SOLUTION INTRAMUSCULAR; INTRAVENOUS at 15:00

## 2022-07-27 RX ADMIN — SODIUM CHLORIDE 700 MG: 9 INJECTION, SOLUTION INTRAVENOUS at 15:14

## 2022-07-27 RX ADMIN — SODIUM CHLORIDE 250 ML: 9 INJECTION, SOLUTION INTRAVENOUS at 14:47

## 2022-07-27 RX ADMIN — ACETAMINOPHEN 650 MG: 325 TABLET ORAL at 14:49

## 2022-07-27 ASSESSMENT — PAIN SCALES - GENERAL: PAINLEVEL: NO PAIN (0)

## 2022-07-27 NOTE — NURSING NOTE
"Oncology Rooming Note    July 27, 2022 1:22 PM   Kristie Cornejo is a 52 year old female who presents for:    Chief Complaint   Patient presents with     Blood Draw     Labs drawn via [iv by rn in lab. VS taken.     Oncology Clinic Visit     PLASMACYTOMA     Initial Vitals: /69 (BP Location: Right arm, Patient Position: Sitting, Cuff Size: Adult Regular)   Pulse 71   Temp 98.4  F (36.9  C) (Oral)   Resp 16   Wt 67.4 kg (148 lb 8 oz)   SpO2 98%   BMI 22.53 kg/m   Estimated body mass index is 22.53 kg/m  as calculated from the following:    Height as of 7/20/22: 1.729 m (5' 8.07\").    Weight as of this encounter: 67.4 kg (148 lb 8 oz). Body surface area is 1.8 meters squared.  No Pain (0) Comment: Data Unavailable   No LMP recorded. (Menstrual status: IUD).  Allergies reviewed: Yes  Medications reviewed: Yes    Medications: Medication refills not needed today.  Pharmacy name entered into [facility]:    [facility] - [place], [place] - [address]  [id number] - [place], [place] - [address]  [facility] - [place], [place] - [address]  [facility] - [place], [place] - [address] [place] [address] PHARMACY [facility] - [place], [place] - [phone]    Clinical concerns: none.      Floridalma Carey CMA            "

## 2022-07-27 NOTE — LETTER
7/27/2022         RE: Kristie Cornejo  [address]  [facility] [address]        Dear Colleague,    Thank you for referring your patient, Kristie Cornejo, to the [facility]. Please see a copy of my visit note below.    [facility]  Date of Visit: Jul 27, 2022   Oncologist: Dr. Payton Reinoso    Reason for visit: myeloma follow-up           Oncology History   52 year old female with past medical history significant for Irritable bowel disease, allergies, large plasmacytoma of the L pelvis, and newly diagnosed multiple myeloma.     Early in April 2021 , she noted to have left sided groin pain, constant and was affecting her daily activities  MRI showed large, expansile, permeative mass with indistinct borders involving the left superior and inferior pubic rami, pubic symphysis and with possible extension to the left acetabulum.     We completed the following work-up:   - Biopsy 9/7/2021: plasmacytoma.  Flow cytometry showed kappa monotypic plasma cells, polytypic B cells. FISH results from plasmacytoma showed a gain of 11q, IGH-CCND1 fusion but no losses of 1q or TP53 and no gain of 1q.  - Bone marrow biopsy 09/22/21: Marrow cellularity 50% with 25% interstitial infiltration w/  Kappa-monotypic, moderately atypical plasma cells. Flow with 0.7% kappa-monotypica plasma cells. FISH and cytogenetics pending.   - PET CT 09/24/21 w/ hypermetabolic pathologic fracture of the L pubic ramus w/ aggressive appearing osteolysis but no additional suspicious lytic or blastic osseous lesions.     Treatment to date:   - Zometa q4 weeks started 9/27/21  - Radiation to left pubic ramus x18 fractions, completed 11/11/21.   - RVD C1D1 11/15/21  - RVD C2D1 12/6/21  - RVD C3D1  12/27/21  - RVD C4 D1 1/17/22, elizabeth added on D8 due to inadequate response of urinary M-spike and FLCs remaining over 100 mg/dL after 4 cycles of RVD    - Elizabeth-RVD C1D8 1/24/22  -  Elizabeth-RVD C2D1 2/7/22  - Elizabeth-RVD C3D1 2/28/22  - Elizabeth-RVD C4D1 3/29/22  - Elizabeth-RVD C5D1 4/19/22    Disease response: MO     - 5/19/22: Continued monotherapy with Daratumumab; held Revlimid and Velcade.    - 6/21/22: Transitioned to KCD, received C1 days 1 and 2- had significant PVCs at cardiac follow-uyp after 1st week of Kyprolis, held further doses.    - Transitioned to Sarclisa/Pom/Dex 7/6/22. Started Pom on 7/11.           INTERIM HISTORY:   Margo is here for oncology follow-up visit today and consideration of C1D22 of Sarclisa.     She is having diarrhea again, anywhere from 1-6 episodes per day.  She took Imodium on the day she had 6 episodes and then was constipated the following day.  She had one episode of incontinence which was related to not having access to a bathroom, but she does note a sense of urgency with most bowel movements. Appetite is poor but she is eating and maintaining her weight.    She is noticing numbness in her toes to the ball of the foot.  Occasional shooting pain in her right great toe, but feels tolerable overall.  No tripping or stumbling.    Chest tightness and shortness of breath is stable - occurs with activity, resolves with rest.  Not increasing in severity or frequency.    Denies fevers, chills, headaches, vision or hearing changes, new lumps or bumps, chest pain, cough, abdominal pain, vomiting, swelling of extremities, bleeding issues, or rash.           Physical Exam:     /69 (BP Location: Right arm, Patient Position: Sitting, Cuff Size: Adult Regular)   Pulse 71   Temp 98.4  F (36.9  C) (Oral)   Resp 16   Wt 67.4 kg (148 lb 8 oz)   SpO2 98%   BMI 22.53 kg/m     General: well appearing, no acute distress  HEENT: normocephalic, atraumatic, PERRLA, sclerae nonicteric  Lymph: no palpable cervical, supraclavicular or axillary lymphadenopathy   CV: regular rate and rhythm, no murmurs  Lungs: clear to auscultation bilaterally, no wheezes/rales/rhonchi  Abd: soft,  positive bowel sounds, non-distended, non-tender  MSK: full range of motion in all four extremities, no peripheral edema  Neuro: alert and oriented x3, CN grossly intact   Psych: appropriate mood and affect  Skin: no rashes or lesions           Laboratory Data:      I personally reviewed the following labs:    Most Recent 3 CBC's:  Recent Labs   Lab Test 07/27/22  1311 07/20/22  1156 07/13/22  0813   WBC 3.3* 4.5 4.6   HGB 12.9 13.4 13.6   MCV 94 94 95    164 255     Most Recent 3 BMP's:  Recent Labs   Lab Test 07/13/22  0900 07/06/22  0823 06/21/22  1307 06/06/22  1143 05/24/22  1527   NA  --  139 144  --  142   POTASSIUM  --  3.8 3.6  --  3.4   CHLORIDE  --  113* 115*  --  113*   CO2  --  24 20  --  18*   BUN  --  22 26  --  22   CR 0.66 0.72 0.67   < > 0.72   ANIONGAP  --  2* 9  --  11   MEKA 8.8 9.3 9.2   < > 8.9   GLC  --  94 94  --  146*    < > = values in this interval not displayed.     Most Recent 2 LFT's:  Recent Labs   Lab Test 07/06/22  0823 06/21/22  1307   AST 15 14   ALT 22 27   ALKPHOS 53 51   BILITOTAL 0.5 0.5       Imaging:  No new imaging to review.         Assessment and Recommendations   Kristie Cornejo is a 52 year old female who was diagnosed with Plasmacytoma after she presented with left sided groin pain now found to have multiple myeloma after further workup with bone marrow biopsy.     # Multiple myeloma with associated large plasmacytoma of the L pelvis. Kappa light chain disease.     BMBx with 25% plasma cell infiltration; standard risk with gain of 11q, IGH-CCND1 fusion but no losses of 1q or TP53 and no gain of 1q.   SPEP w/out a monoclonal peak  UPEP positive. 70% paraprotein. Large monoclonal free immunoglobulin light chain of kappa chain type.     No anemia, normal creat, Ca, no other skeletal lesions. Alb normal, B2M normal.  Stage I Light chain disease with left pelvic bone pathologic fracture she is classified as having symptomatic multiple myeloma.     RVD C1D1  "11/15/21 (had a slight delay in the start of her Revlimid on 11/19/21 due to the timing of her pregnancy tests).  She tolerated cycle 1 well with some mild nausea and a headache on her week off.  RVD C2D1 12/6/21, tolerated well overall, but has had more consistent sensation of her hands and feet being hot. No tingling, pain, loss of sensation, or changes to her strength.  RVD C3D1 12/27/21, tolerated well overall with ongoing sensation of \"hot\" hands and feet  RVD C4 D1 1/17/22, elizabeth added on D8 due to inadequate response of urinary M-spike and FLCs remaining over 100 mg/dL after 3 cycles of RVD    She completed 5 cycles of Elizabeth plus RVD and acheived a partial response.  Her free kappa light chains were at least 50% reduced but the response plateaued, and now the kappa light chains are actually increasing again. She met with Dr. Reinoso and the plan was established to switch to Kyprolis, Cytoxan, and dexamethasone.  Margo continued on daratumumab monotherapy while awaiting final cardiac clearance due to potential cardiotoxicity of Kyprolis.  She received the first week of KCD (2 doses of Kyprolis) prior to her cardiac follow-up and was found to have significant PVCs at that appointment, which Dr. Thomas suspects were induced by the chemo.    Discussed with the wider hematology team on 6/28.  The general consensus was to transition to Sarclisa/Pom/Dex to avoid the cardiotoxicity of Kyprolis.  Started C1D1 7/6/22.  Her diarrhea has returned with being back on treatment, but she thinks it is manageable.     Per Dr. Thomas in cardiology, as long as her symptoms are tolerable and there are no major changes on EKG, he is comfortable pushing forward with treatment.  Recommends repeating Zio Patch and TTE during the transition from treatment to stem cell transplant.     She was feeling more symptomatic at her infusion appt on 7/13, with worse shortness of breath after her walk in from the parking lot.  EKG 7/13 with no " "major changes, troponin WNL.      Today, she is clinically stable.  No major changes since last visit.  Will proceed with C1D22 Sarclisa today.  She will return in 1 week for C2D1 Sarclisa. Next visit with Dr. Reinoso on 8/10; I will see her again prior to C3.     She confirms taking ASA.  She gets weekly dex when she comes in for Sarclisa infusion.  Sarclisa changes to every other week with C2, so she will take oral dexamethasone at home on days 8 and 15.      #Bone Health  She will continue monthly Zometa (last received on 7/13/22). She will continue Calcium/Vitamin D supplements.       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 Currently having no pain and able to walk 1 mile without any assistive devices.  She is interested in pursuing other activities like gentle cross-country skiing.  Had follow-up with Dr. Philip on 1/6/22 and was cleared to resume regular activity as tolerated. She should still refrain from high-impact activities.    PET on 7/12/22 shows left pubic ramus lesion is smaller in size but brighter FDG. No new lesions.      # PVCs  She had an ECHO 12/1 and had follow-up with cardiology 12/7 (notes in [facility]where).  She had follow-up with cardiology here at the  and they recommend an ablation. Stress test was completed and she was started on Toprol XL; however, Toprol XL was discontinued due to significant side effects of orthostatic hypotension, pre-syncopal episodes, shakiness, and weakness.   - She had an ablation 3/23/22 but has not experienced relief from the feeling of being nauseated and short of breath after climbing the stairs.  She also reports episodes of palpitations that \"radiate into her neck.\" Denies chest pain or shortness of breath.   - EKG done 4/4 showed sinus rhythm and nonspecific ST and T wave abnormality. Troponin " negative.  - Cardiac MRI ruled out amyloid.  LFEF 50%, no evidence of infiltrative cardiomyopathy.  - She followed up with cardiology and they felt that the cause may be musculoskeletal or pericarditis. They suggested taking ibuprofen for pain. Her pain has subsided and she actually has not needed to take ibuprofen.   - Post ablation Zio patch was performed showing a significant reduction in PVCs; however, by the time she had her cardiology follow-up, she had restarted chemotherapy and the PVCs had returned.  Dr. Thomas is comfortable proceeding with treatment as long as symptoms are tolerable and there are no major EKG changes.  - Symptoms have been stable since last visit.  No changes or interventions needed today.      #Diarrhea  She was having intermittent diarrhea with associated abdominal cramping and nausea, but developed much more significant diarrhea with 10-12 liquid episodes and was seen as an add-on on 4/4/22 for this.   - C.diff PCR negative 2/8/22, repeat on 4/5 negative  - Enteric stool panel 4/5/22 negative  - Underwent colonoscopy on 4/13/22 to pursue biopsy for amyloid (discussed below). Congo red stain is negative for amyloid deposits. Pathology showed colonic mucosa with focal active colitis, negative for signs of chronicity and lymphocytic colitis. Per patient report, GI reviewed results with her and did not feel she needed treatment for these findings at this time. They advised her to monitor her symptoms and to call them if they do not improve.Dr. Montgomery agreed with following conservatively for now since she reports some improvement in her symptoms.  If diarrhea becomes bothersome again, then they will arrange for expeditious evaluation in the IBD clinic.   - CMV PCR (4/4/22) not detected  - Diarrhea is currently mangeable.  Continue to monitor.      #COVID prophylaxis  - She received EVUSHELD 1/20/22 and 3/8/22  - Received 3 COVID vaccines to date      #Reflux  She reported episodes of  reflux with bile, especially when she lays down at night.  She has started to elevate her head on pillows and is also trying to avoid eating within 1-2 hours of going to bed.  Stable currently on Omeprazole 20 mg daily.     #Skin Lesion  She has a new hyperpigmented skin lesion on her left posterior forearm that she states appeared this summer and has been consistently peeling.  Will place a derm referral for further evaluation.      44 minutes spent on the date of the encounter doing chart review, review of test results, interpretation of tests, patient visit and documentation           Again, thank you for allowing me to participate in the care of your patient.      Sincerely,    DORON Hall CNP

## 2022-07-27 NOTE — NURSING NOTE
Chief Complaint   Patient presents with     Blood Draw     Labs drawn via [iv by rn in lab. VS taken.     Labs drawn from PIV placed by RN. Line flushed with saline. Vitals taken. Pt checked in for appointment(s).    Omega Nair RN

## 2022-07-27 NOTE — PATIENT INSTRUCTIONS
[facility] Triage and after hours / weekends / holidays:  [phone]    Please call the triage or after hours line if you experience a temperature greater than or equal to 100.4, shaking chills, have uncontrolled nausea, vomiting and/or diarrhea, dizziness, shortness of breath, chest pain, bleeding, unexplained bruising, or if you have any other new/concerning symptoms, questions or concerns.      If you are having any concerning symptoms or wish to speak to a provider before your next infusion visit, please call your care coordinator or triage to notify them so we can adequately serve you.     If you need a refill on a narcotic prescription or other medication, please call before your infusion appointment.

## 2022-07-27 NOTE — PROGRESS NOTES
Infusion Nursing Note:  Kristie Cornejo presents today for C1D22 Isatuximab-irfc.    Patient seen by provider today: Yes: Jackie Ferris CNP prior to infusion visit   present during visit today: Not Applicable.    Note: Margo denied any questions or concerns following her visit with the provider prior to infusion today.    Intravenous Access:  Peripheral IV placed.    Treatment Conditions:   Latest Reference Range & Units 07/27/22 13:11   WBC 4.0 - 11.0 10e3/uL 3.3 (L)   Hemoglobin 11.7 - 15.7 g/dL 12.9   Hematocrit 35.0 - 47.0 % 37.5   Platelet Count 150 - 450 10e3/uL 173   RBC Count 3.80 - 5.20 10e6/uL 3.99   MCV 78 - 100 fL 94   MCH 26.5 - 33.0 pg 32.3   MCHC 31.5 - 36.5 g/dL 34.4   RDW 10.0 - 15.0 % 12.7   % Neutrophils % 67   % Lymphocytes % 16   % Monocytes % 8   % Eosinophils % 7   % Basophils % 1   % Metamyelocytes % 1   Absolute Basophils 0.0 - 0.2 10e3/uL 0.0   Absolute Neutrophil 1.6 - 8.3 10e3/uL 2.2   Absolute Lymphocytes 0.8 - 5.3 10e3/uL 0.5 (L)   Absolute Monocytes 0.0 - 1.3 10e3/uL 0.3   Absolute Eosinophils 0.0 - 0.7 10e3/uL 0.2   Absolute Metamyelocytes <=0.0 10e3/uL 0.0   RBC Morphology  Confirmed RBC Indices   Platelet Morphology Automated Count Confirmed. Platelet morphology is normal.  Automated Count Confirmed. Platelet morphology is normal.   Target Cells None Seen  Slight !     Results reviewed, labs MET treatment parameters, ok to proceed with treatment.    Post Infusion Assessment:  Patient tolerated infusion without incident.  Blood return noted pre and post infusion.  Site patent and intact, free from redness, edema or discomfort.  No evidence of extravasations.  Access discontinued per protocol.     Discharge Plan:   Patient declined prescription refills.  Discharge instructions reviewed with: Patient.  Patient and/or family verbalized understanding of discharge instructions and all questions answered.  AVS to patient via [employer].  Patient will return 8/3 for next  appointment.   Patient discharged in stable condition accompanied by: self.  Departure Mode: Ambulatory.  Face to Face time: 0.      Kristie Dickens RN

## 2022-07-28 ENCOUNTER — LAB (OUTPATIENT)
Dept: LAB | Facility: CLINIC | Age: 52
End: 2022-07-28
Payer: COMMERCIAL

## 2022-07-28 DIAGNOSIS — C90.30 PLASMACYTOMA OF BONE (H): ICD-10-CM

## 2022-07-28 LAB — HCG UR QL: NEGATIVE

## 2022-07-28 PROCEDURE — 81025 URINE PREGNANCY TEST: CPT

## 2022-08-01 ENCOUNTER — OFFICE VISIT (OUTPATIENT)
Dept: OPHTHALMOLOGY | Facility: CLINIC | Age: 52
End: 2022-08-01
Payer: COMMERCIAL

## 2022-08-01 ENCOUNTER — TELEPHONE (OUTPATIENT)
Dept: ONCOLOGY | Facility: CLINIC | Age: 52
End: 2022-08-01

## 2022-08-01 DIAGNOSIS — H04.123 DRY EYES, BILATERAL: ICD-10-CM

## 2022-08-01 DIAGNOSIS — H01.00B BLEPHARITIS OF UPPER AND LOWER EYELIDS OF BOTH EYES, UNSPECIFIED TYPE: Primary | ICD-10-CM

## 2022-08-01 DIAGNOSIS — H10.13 ALLERGIC CONJUNCTIVITIS, BILATERAL: ICD-10-CM

## 2022-08-01 DIAGNOSIS — H01.00A BLEPHARITIS OF UPPER AND LOWER EYELIDS OF BOTH EYES, UNSPECIFIED TYPE: Primary | ICD-10-CM

## 2022-08-01 PROCEDURE — 99213 OFFICE O/P EST LOW 20 MIN: CPT | Performed by: OPHTHALMOLOGY

## 2022-08-01 ASSESSMENT — TONOMETRY
IOP_METHOD: ICARE
OD_IOP_MMHG: 15
OS_IOP_MMHG: 14

## 2022-08-01 ASSESSMENT — VISUAL ACUITY
METHOD: SNELLEN - LINEAR
OS_CC+: -1
CORRECTION_TYPE: GLASSES
OS_CC: 20/20
OD_CC: 20/20

## 2022-08-01 ASSESSMENT — CONF VISUAL FIELD
METHOD: COUNTING FINGERS
OD_NORMAL: 1
OS_NORMAL: 1

## 2022-08-01 ASSESSMENT — CUP TO DISC RATIO
OS_RATIO: 0.2
OD_RATIO: 0.2

## 2022-08-01 ASSESSMENT — EXTERNAL EXAM - LEFT EYE: OS_EXAM: NORMAL

## 2022-08-01 ASSESSMENT — EXTERNAL EXAM - RIGHT EYE: OD_EXAM: NORMAL

## 2022-08-01 NOTE — TELEPHONE ENCOUNTER
Must fill with [facility]- first fill was at [facility]- all additional fills must go to [facility]. Sharepoint updated.    Thank you,    Anette Mcdermott  Oncology Pharmacy Liaison II  [email or web address]  Phone: [phone]  Fax: [phone]

## 2022-08-01 NOTE — TELEPHONE ENCOUNTER
Oral Chemotherapy Monitoring Program    Medication: Pomalyst  Rx:  4 mg PO daily on days 1-21 of repeated 28 day cycles  Auth #: [id number]  Risk Category: Adult female WITH reproductive capacity.    Routine survey questions reviewed.    Anette Mcdermott  Oncology Pharmacy Liaison II  [email or web address]  Phone: [phone]  Fax: [phone]

## 2022-08-01 NOTE — NURSING NOTE
"Chief Complaints and History of Present Illnesses   Patient presents with     Follow Up      Follow Up-blepharitis.     Chief Complaint(s) and History of Present Illness(es)     Follow Up     Laterality: both eyes    Associated symptoms: itching.  Negative for dryness and eye pain    Treatments tried: eye drops    Comments:  Follow Up-blepharitis.              Comments     Patient reports that she is doing well. Each eye a little \"gooey today\" States she is allergic to dog and fell asleep with dog outdoors feeling related to them.   Started a new chemo. Medication could not find on medication profile but others up to date.   No issues with Chalazion issues since last visit.   Washes eye lids in the morning. Uses eye wash at night PRN but overall they have been doing good. Compress PRN with itching does relieve. Last needed was 3 weeks ago.   OTC allergy drops PRN each eye. Vision each eye is good and improved since last visit.     Kamilla Lassiter, HEATH COT 9:15 AM August 1, 2022                           "

## 2022-08-03 ENCOUNTER — DOCUMENTATION ONLY (OUTPATIENT)
Dept: ONCOLOGY | Facility: CLINIC | Age: 52
End: 2022-08-03

## 2022-08-03 ENCOUNTER — INFUSION THERAPY VISIT (OUTPATIENT)
Dept: ONCOLOGY | Facility: CLINIC | Age: 52
End: 2022-08-03
Payer: COMMERCIAL

## 2022-08-03 ENCOUNTER — APPOINTMENT (OUTPATIENT)
Dept: LAB | Facility: CLINIC | Age: 52
End: 2022-08-03
Payer: COMMERCIAL

## 2022-08-03 VITALS
RESPIRATION RATE: 16 BRPM | BODY MASS INDEX: 22.09 KG/M2 | HEART RATE: 76 BPM | WEIGHT: 145.6 LBS | TEMPERATURE: 98.7 F | SYSTOLIC BLOOD PRESSURE: 128 MMHG | DIASTOLIC BLOOD PRESSURE: 75 MMHG | OXYGEN SATURATION: 98 %

## 2022-08-03 DIAGNOSIS — C90.00 LIGHT CHAIN MYELOMA (H): Primary | ICD-10-CM

## 2022-08-03 LAB
ALBUMIN SERPL-MCNC: 4.1 G/DL (ref 3.4–5)
ALP SERPL-CCNC: 53 U/L (ref 40–150)
ALT SERPL W P-5'-P-CCNC: 21 U/L (ref 0–50)
ANION GAP SERPL CALCULATED.3IONS-SCNC: 8 MMOL/L (ref 3–14)
AST SERPL W P-5'-P-CCNC: 12 U/L (ref 0–45)
BASOPHILS # BLD AUTO: 0 10E3/UL (ref 0–0.2)
BASOPHILS NFR BLD AUTO: 1 %
BILIRUB SERPL-MCNC: 0.6 MG/DL (ref 0.2–1.3)
BUN SERPL-MCNC: 21 MG/DL (ref 7–30)
CALCIUM SERPL-MCNC: 9.4 MG/DL (ref 8.5–10.1)
CHLORIDE BLD-SCNC: 110 MMOL/L (ref 94–109)
CO2 SERPL-SCNC: 23 MMOL/L (ref 20–32)
CREAT SERPL-MCNC: 0.81 MG/DL (ref 0.52–1.04)
EOSINOPHIL # BLD AUTO: 0.1 10E3/UL (ref 0–0.7)
EOSINOPHIL NFR BLD AUTO: 4 %
ERYTHROCYTE [DISTWIDTH] IN BLOOD BY AUTOMATED COUNT: 12.8 % (ref 10–15)
GFR SERPL CREATININE-BSD FRML MDRD: 87 ML/MIN/1.73M2
GLUCOSE BLD-MCNC: 97 MG/DL (ref 70–99)
HCT VFR BLD AUTO: 39.5 % (ref 35–47)
HGB BLD-MCNC: 13.3 G/DL (ref 11.7–15.7)
HOLD SPECIMEN: NORMAL
IMM GRANULOCYTES # BLD: 0 10E3/UL
IMM GRANULOCYTES NFR BLD: 1 %
LYMPHOCYTES # BLD AUTO: 1 10E3/UL (ref 0.8–5.3)
LYMPHOCYTES NFR BLD AUTO: 29 %
MCH RBC QN AUTO: 31.8 PG (ref 26.5–33)
MCHC RBC AUTO-ENTMCNC: 33.7 G/DL (ref 31.5–36.5)
MCV RBC AUTO: 95 FL (ref 78–100)
MONOCYTES # BLD AUTO: 1 10E3/UL (ref 0–1.3)
MONOCYTES NFR BLD AUTO: 29 %
NEUTROPHILS # BLD AUTO: 1.3 10E3/UL (ref 1.6–8.3)
NEUTROPHILS NFR BLD AUTO: 36 %
NRBC # BLD AUTO: 0 10E3/UL
NRBC BLD AUTO-RTO: 0 /100
PLAT MORPH BLD: NORMAL
PLATELET # BLD AUTO: 287 10E3/UL (ref 150–450)
POTASSIUM BLD-SCNC: 3.4 MMOL/L (ref 3.4–5.3)
PROT SERPL-MCNC: 7 G/DL (ref 6.8–8.8)
RBC # BLD AUTO: 4.18 10E6/UL (ref 3.8–5.2)
RBC MORPH BLD: NORMAL
SODIUM SERPL-SCNC: 141 MMOL/L (ref 133–144)
WBC # BLD AUTO: 3.4 10E3/UL (ref 4–11)

## 2022-08-03 PROCEDURE — 250N000013 HC RX MED GY IP 250 OP 250 PS 637

## 2022-08-03 PROCEDURE — 80053 COMPREHEN METABOLIC PANEL: CPT

## 2022-08-03 PROCEDURE — 96413 CHEMO IV INFUSION 1 HR: CPT

## 2022-08-03 PROCEDURE — 96367 TX/PROPH/DG ADDL SEQ IV INF: CPT

## 2022-08-03 PROCEDURE — 96375 TX/PRO/DX INJ NEW DRUG ADDON: CPT

## 2022-08-03 PROCEDURE — 250N000011 HC RX IP 250 OP 636

## 2022-08-03 PROCEDURE — 85004 AUTOMATED DIFF WBC COUNT: CPT

## 2022-08-03 PROCEDURE — 258N000003 HC RX IP 258 OP 636

## 2022-08-03 PROCEDURE — 36415 COLL VENOUS BLD VENIPUNCTURE: CPT

## 2022-08-03 RX ORDER — DIPHENHYDRAMINE HCL 25 MG
50 CAPSULE ORAL ONCE
Status: COMPLETED | OUTPATIENT
Start: 2022-08-03 | End: 2022-08-03

## 2022-08-03 RX ORDER — ACETAMINOPHEN 325 MG/1
650 TABLET ORAL ONCE
Status: COMPLETED | OUTPATIENT
Start: 2022-08-03 | End: 2022-08-03

## 2022-08-03 RX ADMIN — ACETAMINOPHEN 650 MG: 325 TABLET ORAL at 15:28

## 2022-08-03 RX ADMIN — FAMOTIDINE 20 MG: 10 INJECTION, SOLUTION INTRAVENOUS at 15:32

## 2022-08-03 RX ADMIN — DIPHENHYDRAMINE HYDROCHLORIDE 50 MG: 25 CAPSULE ORAL at 15:28

## 2022-08-03 RX ADMIN — SODIUM CHLORIDE 250 ML: 9 INJECTION, SOLUTION INTRAVENOUS at 15:29

## 2022-08-03 RX ADMIN — SODIUM CHLORIDE 700 MG: 9 INJECTION, SOLUTION INTRAVENOUS at 15:55

## 2022-08-03 RX ADMIN — DEXAMETHASONE SODIUM PHOSPHATE: 10 INJECTION, SOLUTION INTRAMUSCULAR; INTRAVENOUS at 15:36

## 2022-08-03 ASSESSMENT — PAIN SCALES - GENERAL: PAINLEVEL: NO PAIN (0)

## 2022-08-03 NOTE — PROGRESS NOTES
"Infusion Nursing Note:  Kristie Cornejo presents today for cycle 2 day 1 isatuximab-[facility].    Patient seen by provider today: No    present during visit today: Not Applicable.    Note:   Patient had some diarrhea over the weekend that resolved with prn imodium. She has been more \"gasy\" today. She denies fevers, chills, SOB, chest pain, and pain.    Patient confirms she has enough dexamethasone at home and declined refills today.    Intravenous Access:  Peripheral IV placed.    Treatment Conditions:  Lab Results   Component Value Date    HGB 13.3 08/03/2022    WBC 3.4 (L) 08/03/2022    ANEU 2.2 07/27/2022    ANEUTAUTO 1.3 (L) 08/03/2022     08/03/2022      Lab Results   Component Value Date     08/03/2022    POTASSIUM 3.4 08/03/2022    CR 0.81 08/03/2022    MEKA 9.4 08/03/2022    BILITOTAL 0.6 08/03/2022    ALBUMIN 4.1 08/03/2022    ALT 21 08/03/2022    AST 12 08/03/2022     Results reviewed, labs MET treatment parameters, ok to proceed with treatment.    Post Infusion Assessment:  Patient tolerated infusion without incident.  Blood return noted pre and post infusion.  Site patent and intact, free from redness, edema or discomfort.  No evidence of extravasations.  Access discontinued per protocol.     Discharge Plan:   Patient declined prescription refills.  Discharge instructions reviewed with: Patient.  Patient and/or family verbalized understanding of discharge instructions and all questions answered.  AVS to patient via [employer].  Patient will return 8/10 for next provider appointment and 8/17 for next infusion appointment.   Patient discharged in stable condition accompanied by: self.  Departure Mode: Ambulatory.      Azucena Tafoya RN                    "

## 2022-08-03 NOTE — PROGRESS NOTES
Oral Chemotherapy Monitoring Program  Lab Follow Up    Reviewed lab results from 8/3.    ORAL CHEMOTHERAPY 5/21/2022 5/24/2022 7/5/2022 7/7/2022 7/18/2022 7/27/2022 8/3/2022   Assessment Type Chart Review Chart Review Initial Work up New Teach Initial Follow up Refill Lab Monitoring   Diagnosis Code Multiple Myeloma Multiple Myeloma Multiple Myeloma Multiple Myeloma Multiple Myeloma Multiple Myeloma Multiple Myeloma   Providers Dr. Kemar Reinoso   [facility]   Drug Name - Revlimid (lenalidomide) Pomalyst (pomalidomide) Pomalyst (pomalidomide) Pomalyst (pomalidomide) Pomalyst (pomalidomide) Pomalyst (pomalidomide)   Dose - 25 mg 4 mg 4 mg 4 mg 4 mg 4 mg   Current Schedule - Daily Daily Daily Daily Daily Daily   Cycle Details - Other 3 weeks on, 1 week off 3 weeks on, 1 week off 3 weeks on, 1 week off 3 weeks on, 1 week off 3 weeks on, 1 week off   Start Date of Last Cycle - (No Data) - - 7/11/2022 - -   Planned next cycle start date - - 7/6/2022 - 8/8/2022 8/3/2022 -   Doses missed in last 2 weeks - - - - 0 - -   Adherence Assessment - - - - Adherent - -   Adverse Effects - - - - No AE identified during assessment - -   Nausea - - - - - - -   Pharmacist Intervention(nausea) - - - - - - -   Fatigue - - - - - - -   Pharmacist Intervention(fatigue) - - - - - - -   Intervention(s) - - - - - - -   Other (See Note for Details) - - - - - - -   Pharmacist intervention(other) - - - - - - -   Intervention(s) - - - - - - -   Any new drug interactions? - - - - - - -   Is the dose as ordered appropriate for the patient? - - - - - - -   Has the patient missed any days of school, work, or other routine activity? - - - - - - -   Since the last time we talked, have you been hospitalized or used the emergency room? - - - - - - -       Labs:  _  Result Component Current Result Ref Range    Sodium 141 (8/3/2022) 133 - 144 mmol/L     _  Result Component Current Result Ref Range   Potassium 3.4 (8/3/2022) 3.4 - 5.3 mmol/L     _  Result Component Current Result Ref Range   Calcium 9.4 (8/3/2022) 8.5 - 10.1 mg/dL     No results found for Mag within last 30 days.     No results found for Phos within last 30 days.     _  Result Component Current Result Ref Range   Albumin 4.1 (8/3/2022) 3.4 - 5.0 g/dL     _  Result Component Current Result Ref Range   Urea Nitrogen 21 (8/3/2022) 7 - 30 mg/dL     _  Result Component Current Result Ref Range   Creatinine 0.81 (8/3/2022) 0.52 - 1.04 mg/dL     _  Result Component Current Result Ref Range   AST 12 (8/3/2022) 0 - 45 U/L     _  Result Component Current Result Ref Range   ALT 21 (8/3/2022) 0 - 50 U/L     _  Result Component Current Result Ref Range   Bilirubin Total 0.6 (8/3/2022) 0.2 - 1.3 mg/dL     _  Result Component Current Result Ref Range   WBC Count 3.4 (L) (8/3/2022) 4.0 - 11.0 10e3/uL     _  Result Component Current Result Ref Range   Hemoglobin 13.3 (8/3/2022) 11.7 - 15.7 g/dL     _  Result Component Current Result Ref Range   Platelet Count 287 (8/3/2022) 150 - 450 10e3/uL     _  Result Component Current Result Ref Range   Absolute Neutrophils 2.2 (7/27/2022) 1.6 - 8.3 10e3/uL       Assessment & Plan:  Results are concerning for ANC decrease from 2.2 to 1.3; however, still above parameters of ANC >1. No dose adjsutments needed at this time. Will continue to monitor closely.  Plan to continue pomalidomide (Pomalyst) as prescribed. "[employer] message sent to patient regarding results and plan.       Follow-Up:  8/10 lab + Dr. Reinoso appt.    Verenice Wu, PharmD, BCACP  Oral Chemotherapy Monitoring Program  [facility]  [phone]  August 3, 2022

## 2022-08-03 NOTE — PATIENT INSTRUCTIONS
[facility] Triage and after hours / weekends / holidays:  [phone]    Please call the triage or after hours line if you experience a temperature greater than or equal to 100.5, shaking chills, have uncontrolled nausea, vomiting and/or diarrhea, dizziness, shortness of breath, chest pain, bleeding, unexplained bruising, or if you have any other new/concerning symptoms, questions or concerns.      If you are having any concerning symptoms or wish to speak to a provider before your next infusion visit, please call your care coordinator or triage to notify them so we can adequately serve you.     If you need a refill on a narcotic prescription or other medication, please call before your infusion appointment.      August 2022 Sunday Monday Tuesday Wednesday Thursday Friday Saturday        1    [facility] RETURN GENERAL   8:45 AM   (20 min.)   Fanny Ponce MD   [facility] 2     3    LAB PERIPHERAL   2:00 PM   (15 min.)   [facility] DRAW   [facility]    ONC INFUSION 2 HR (120 MIN)   2:30 PM   (120 min.)    ONC INFUSION NURSE   [facility] 4     5     6       7     8     9     10    LAB PERIPHERAL  12:30 PM   (15 min.)   [facility]   [facility]    RETURN  12:45 PM   (30 min.)   Payton Reinoso MD   [facility] Blood and Marrow Trans[facility] [place] 11     12     13       14     15     16     17    LAB PERIPHERAL   2:00 PM   (15 min.)   [facility]   [facility]    ONC INFUSION 2 HR (120 MIN)   2:30 PM   (120 min.)    ONC INFUSION NURSE   [facility] 18     19     20       21     22     23     24     25     26     27       28     29     30     31    LAB PERIPHERAL   1:45 PM   (15 min.)   [facility]RAW   [facility]    RETURN   2:15 PM   (45 min.)   Jackie Ferris,  APRN CNP   [facility]    ONC INFUSION 2 HR (120 MIN)   3:00 PM   (120 min.)    ONC INFUSION NURSE   [facility]                              September 2022 Sunday Monday Tuesday Wednesday Thursday Friday Saturday                       1     2     3       4     5     6     7     8     9     10       11     12     13     14    LAB PERIPHERAL   1:45 PM   (15 min.)   [facility] LAB DRAW   [facility]    ONC INFUSION 2 HR (120 MIN)   2:30 PM   (120 min.)    ONC INFUSION NURSE   [facility] 15     16     17       18     19     20     21     22     23     24       25     26     27     28     29     30                             Recent Results (from the past 24 hour(s))   Comprehensive metabolic panel    Collection Time: 08/03/22  2:25 PM   Result Value Ref Range    Sodium 141 133 - 144 mmol/L    Potassium 3.4 3.4 - 5.3 mmol/L    Chloride 110 (H) 94 - 109 mmol/L    Carbon Dioxide (CO2) 23 20 - 32 mmol/L    Anion Gap 8 3 - 14 mmol/L    Urea Nitrogen 21 7 - 30 mg/dL    Creatinine 0.81 0.52 - 1.04 mg/dL    Calcium 9.4 8.5 - 10.1 mg/dL    Glucose 97 70 - 99 mg/dL    Alkaline Phosphatase 53 40 - 150 U/L    AST 12 0 - 45 U/L    ALT 21 0 - 50 U/L    Protein Total 7.0 6.8 - 8.8 g/dL    Albumin 4.1 3.4 - 5.0 g/dL    Bilirubin Total 0.6 0.2 - 1.3 mg/dL    GFR Estimate 87 >60 mL/min/1.73m2   CBC with platelets and differential    Collection Time: 08/03/22  2:25 PM   Result Value Ref Range    WBC Count 3.4 (L) 4.0 - 11.0 10e3/uL    RBC Count 4.18 3.80 - 5.20 10e6/uL    Hemoglobin 13.3 11.7 - 15.7 g/dL    Hematocrit 39.5 35.0 - 47.0 %    MCV 95 78 - 100 fL    MCH 31.8 26.5 - 33.0 pg    MCHC 33.7 31.5 - 36.5 g/dL    RDW 12.8 10.0 - 15.0 %    Platelet Count 287 150 - 450 10e3/uL    % Neutrophils 36 %    % Lymphocytes 29 %    % Monocytes 29 %    % Eosinophils 4 %    % Basophils 1 %    % Immature Granulocytes 1 %     NRBCs per 100 WBC 0 <1 /100    Absolute Neutrophils 1.3 (L) 1.6 - 8.3 10e3/uL    Absolute Lymphocytes 1.0 0.8 - 5.3 10e3/uL    Absolute Monocytes 1.0 0.0 - 1.3 10e3/uL    Absolute Eosinophils 0.1 0.0 - 0.7 10e3/uL    Absolute Basophils 0.0 0.0 - 0.2 10e3/uL    Absolute Immature Granulocytes 0.0 <=0.4 10e3/uL    Absolute NRBCs 0.0 10e3/uL   RBC and Platelet Morphology    Collection Time: 08/03/22  2:25 PM   Result Value Ref Range    Platelet Assessment  Automated Count Confirmed. Platelet morphology is normal.     Automated Count Confirmed. Platelet morphology is normal.    RBC Morphology Confirmed RBC Indices    Extra Serum Separator Tube (SST)    Collection Time: 08/03/22  2:27 PM   Result Value Ref Range    Hold Specimen [facility]

## 2022-08-10 ENCOUNTER — OFFICE VISIT (OUTPATIENT)
Dept: TRANSPLANT | Facility: CLINIC | Age: 52
End: 2022-08-10
Payer: COMMERCIAL

## 2022-08-10 ENCOUNTER — APPOINTMENT (OUTPATIENT)
Dept: LAB | Facility: CLINIC | Age: 52
End: 2022-08-10
Payer: COMMERCIAL

## 2022-08-10 VITALS
TEMPERATURE: 98 F | RESPIRATION RATE: 16 BRPM | HEART RATE: 75 BPM | DIASTOLIC BLOOD PRESSURE: 75 MMHG | WEIGHT: 150.4 LBS | SYSTOLIC BLOOD PRESSURE: 130 MMHG | BODY MASS INDEX: 22.82 KG/M2 | OXYGEN SATURATION: 98 %

## 2022-08-10 DIAGNOSIS — C90.00 LIGHT CHAIN MYELOMA (H): Primary | ICD-10-CM

## 2022-08-10 DIAGNOSIS — Z71.9 VISIT FOR COUNSELING: ICD-10-CM

## 2022-08-10 LAB
ALBUMIN SERPL-MCNC: 3.7 G/DL (ref 3.4–5)
ALP SERPL-CCNC: 63 U/L (ref 40–150)
ALT SERPL W P-5'-P-CCNC: 22 U/L (ref 0–50)
ANION GAP SERPL CALCULATED.3IONS-SCNC: 7 MMOL/L (ref 3–14)
AST SERPL W P-5'-P-CCNC: 12 U/L (ref 0–45)
BASOPHILS # BLD AUTO: 0.1 10E3/UL (ref 0–0.2)
BASOPHILS NFR BLD AUTO: 1 %
BILIRUB SERPL-MCNC: 0.4 MG/DL (ref 0.2–1.3)
BUN SERPL-MCNC: 21 MG/DL (ref 7–30)
CALCIUM SERPL-MCNC: 8.9 MG/DL (ref 8.5–10.1)
CHLORIDE BLD-SCNC: 112 MMOL/L (ref 94–109)
CO2 SERPL-SCNC: 24 MMOL/L (ref 20–32)
CREAT SERPL-MCNC: 0.72 MG/DL (ref 0.52–1.04)
EOSINOPHIL # BLD AUTO: 0.2 10E3/UL (ref 0–0.7)
EOSINOPHIL NFR BLD AUTO: 4 %
ERYTHROCYTE [DISTWIDTH] IN BLOOD BY AUTOMATED COUNT: 13.6 % (ref 10–15)
GFR SERPL CREATININE-BSD FRML MDRD: >90 ML/MIN/1.73M2
GLUCOSE BLD-MCNC: 129 MG/DL (ref 70–99)
HCT VFR BLD AUTO: 38.6 % (ref 35–47)
HGB BLD-MCNC: 12.6 G/DL (ref 11.7–15.7)
IMM GRANULOCYTES # BLD: 0 10E3/UL
IMM GRANULOCYTES NFR BLD: 0 %
LDH SERPL L TO P-CCNC: 140 U/L (ref 81–234)
LYMPHOCYTES # BLD AUTO: 0.3 10E3/UL (ref 0.8–5.3)
LYMPHOCYTES NFR BLD AUTO: 6 %
MCH RBC QN AUTO: 31.8 PG (ref 26.5–33)
MCHC RBC AUTO-ENTMCNC: 32.6 G/DL (ref 31.5–36.5)
MCV RBC AUTO: 98 FL (ref 78–100)
MONOCYTES # BLD AUTO: 0.2 10E3/UL (ref 0–1.3)
MONOCYTES NFR BLD AUTO: 4 %
NEUTROPHILS # BLD AUTO: 3.9 10E3/UL (ref 1.6–8.3)
NEUTROPHILS NFR BLD AUTO: 85 %
NRBC # BLD AUTO: 0 10E3/UL
NRBC BLD AUTO-RTO: 0 /100
PLATELET # BLD AUTO: 252 10E3/UL (ref 150–450)
POTASSIUM BLD-SCNC: 4.3 MMOL/L (ref 3.4–5.3)
PROT SERPL-MCNC: 6.4 G/DL (ref 6.8–8.8)
RBC # BLD AUTO: 3.96 10E6/UL (ref 3.8–5.2)
SODIUM SERPL-SCNC: 143 MMOL/L (ref 133–144)
WBC # BLD AUTO: 4.6 10E3/UL (ref 4–11)

## 2022-08-10 PROCEDURE — G0463 HOSPITAL OUTPT CLINIC VISIT: HCPCS

## 2022-08-10 PROCEDURE — 85025 COMPLETE CBC W/AUTO DIFF WBC: CPT | Performed by: PATHOLOGY

## 2022-08-10 PROCEDURE — 83521 IG LIGHT CHAINS FREE EACH: CPT

## 2022-08-10 PROCEDURE — 99214 OFFICE O/P EST MOD 30 MIN: CPT

## 2022-08-10 PROCEDURE — 83615 LACTATE (LD) (LDH) ENZYME: CPT | Performed by: PATHOLOGY

## 2022-08-10 PROCEDURE — 80053 COMPREHEN METABOLIC PANEL: CPT | Performed by: PATHOLOGY

## 2022-08-10 PROCEDURE — 36415 COLL VENOUS BLD VENIPUNCTURE: CPT

## 2022-08-10 RX ORDER — ALBUTEROL SULFATE 0.83 MG/ML
2.5 SOLUTION RESPIRATORY (INHALATION)
Status: CANCELLED | OUTPATIENT
Start: 2022-10-05

## 2022-08-10 RX ORDER — ALBUTEROL SULFATE 0.83 MG/ML
2.5 SOLUTION RESPIRATORY (INHALATION)
Status: CANCELLED | OUTPATIENT
Start: 2022-09-21

## 2022-08-10 RX ORDER — MEPERIDINE HYDROCHLORIDE 25 MG/ML
25 INJECTION INTRAMUSCULAR; INTRAVENOUS; SUBCUTANEOUS EVERY 30 MIN PRN
Status: CANCELLED | OUTPATIENT
Start: 2022-10-05

## 2022-08-10 RX ORDER — MEPERIDINE HYDROCHLORIDE 25 MG/ML
25 INJECTION INTRAMUSCULAR; INTRAVENOUS; SUBCUTANEOUS EVERY 30 MIN PRN
Status: CANCELLED | OUTPATIENT
Start: 2022-09-01

## 2022-08-10 RX ORDER — DIPHENHYDRAMINE HCL 25 MG
50 CAPSULE ORAL ONCE
Status: CANCELLED | OUTPATIENT
Start: 2022-10-19

## 2022-08-10 RX ORDER — ALBUTEROL SULFATE 90 UG/1
1-2 AEROSOL, METERED RESPIRATORY (INHALATION)
Status: CANCELLED
Start: 2022-09-01

## 2022-08-10 RX ORDER — EPINEPHRINE 1 MG/ML
0.3 INJECTION, SOLUTION INTRAMUSCULAR; SUBCUTANEOUS EVERY 5 MIN PRN
Status: CANCELLED | OUTPATIENT
Start: 2022-10-05

## 2022-08-10 RX ORDER — NALOXONE HYDROCHLORIDE 0.4 MG/ML
0.2 INJECTION, SOLUTION INTRAMUSCULAR; INTRAVENOUS; SUBCUTANEOUS
Status: CANCELLED | OUTPATIENT
Start: 2022-09-01

## 2022-08-10 RX ORDER — METHYLPREDNISOLONE SODIUM SUCCINATE 125 MG/2ML
125 INJECTION, POWDER, LYOPHILIZED, FOR SOLUTION INTRAMUSCULAR; INTRAVENOUS
Status: CANCELLED
Start: 2022-10-05

## 2022-08-10 RX ORDER — ACETAMINOPHEN 325 MG/1
650 TABLET ORAL ONCE
Status: CANCELLED | OUTPATIENT
Start: 2022-10-05

## 2022-08-10 RX ORDER — DIPHENHYDRAMINE HYDROCHLORIDE 50 MG/ML
50 INJECTION INTRAMUSCULAR; INTRAVENOUS
Status: CANCELLED
Start: 2022-09-01

## 2022-08-10 RX ORDER — HEPARIN SODIUM (PORCINE) LOCK FLUSH IV SOLN 100 UNIT/ML 100 UNIT/ML
5 SOLUTION INTRAVENOUS
Status: CANCELLED | OUTPATIENT
Start: 2022-09-01

## 2022-08-10 RX ORDER — HEPARIN SODIUM,PORCINE 10 UNIT/ML
5 VIAL (ML) INTRAVENOUS
Status: CANCELLED | OUTPATIENT
Start: 2022-09-01

## 2022-08-10 RX ORDER — DIPHENHYDRAMINE HYDROCHLORIDE 50 MG/ML
50 INJECTION INTRAMUSCULAR; INTRAVENOUS
Status: CANCELLED
Start: 2022-09-21

## 2022-08-10 RX ORDER — NALOXONE HYDROCHLORIDE 0.4 MG/ML
0.2 INJECTION, SOLUTION INTRAMUSCULAR; INTRAVENOUS; SUBCUTANEOUS
Status: CANCELLED | OUTPATIENT
Start: 2022-09-21

## 2022-08-10 RX ORDER — LORAZEPAM 2 MG/ML
0.5 INJECTION INTRAMUSCULAR EVERY 4 HOURS PRN
Status: CANCELLED | OUTPATIENT
Start: 2022-09-01

## 2022-08-10 RX ORDER — MEPERIDINE HYDROCHLORIDE 25 MG/ML
25 INJECTION INTRAMUSCULAR; INTRAVENOUS; SUBCUTANEOUS EVERY 30 MIN PRN
Status: CANCELLED | OUTPATIENT
Start: 2022-09-21

## 2022-08-10 RX ORDER — ALBUTEROL SULFATE 0.83 MG/ML
2.5 SOLUTION RESPIRATORY (INHALATION)
Status: CANCELLED | OUTPATIENT
Start: 2022-09-01

## 2022-08-10 RX ORDER — METHYLPREDNISOLONE SODIUM SUCCINATE 125 MG/2ML
125 INJECTION, POWDER, LYOPHILIZED, FOR SOLUTION INTRAMUSCULAR; INTRAVENOUS
Status: CANCELLED
Start: 2022-10-19

## 2022-08-10 RX ORDER — METHYLPREDNISOLONE SODIUM SUCCINATE 125 MG/2ML
125 INJECTION, POWDER, LYOPHILIZED, FOR SOLUTION INTRAMUSCULAR; INTRAVENOUS
Status: CANCELLED
Start: 2022-09-01

## 2022-08-10 RX ORDER — LORAZEPAM 2 MG/ML
0.5 INJECTION INTRAMUSCULAR EVERY 4 HOURS PRN
Status: CANCELLED | OUTPATIENT
Start: 2022-10-05

## 2022-08-10 RX ORDER — ACETAMINOPHEN 325 MG/1
650 TABLET ORAL ONCE
Status: CANCELLED | OUTPATIENT
Start: 2022-09-01

## 2022-08-10 RX ORDER — LORAZEPAM 2 MG/ML
0.5 INJECTION INTRAMUSCULAR EVERY 4 HOURS PRN
Status: CANCELLED | OUTPATIENT
Start: 2022-09-21

## 2022-08-10 RX ORDER — HEPARIN SODIUM,PORCINE 10 UNIT/ML
5 VIAL (ML) INTRAVENOUS
Status: CANCELLED | OUTPATIENT
Start: 2022-10-19

## 2022-08-10 RX ORDER — NALOXONE HYDROCHLORIDE 0.4 MG/ML
0.2 INJECTION, SOLUTION INTRAMUSCULAR; INTRAVENOUS; SUBCUTANEOUS
Status: CANCELLED | OUTPATIENT
Start: 2022-10-19

## 2022-08-10 RX ORDER — DIPHENHYDRAMINE HYDROCHLORIDE 50 MG/ML
50 INJECTION INTRAMUSCULAR; INTRAVENOUS
Status: CANCELLED
Start: 2022-10-05

## 2022-08-10 RX ORDER — ALBUTEROL SULFATE 90 UG/1
1-2 AEROSOL, METERED RESPIRATORY (INHALATION)
Status: CANCELLED
Start: 2022-10-19

## 2022-08-10 RX ORDER — ACETAMINOPHEN 325 MG/1
650 TABLET ORAL ONCE
Status: CANCELLED | OUTPATIENT
Start: 2022-09-21

## 2022-08-10 RX ORDER — EPINEPHRINE 1 MG/ML
0.3 INJECTION, SOLUTION INTRAMUSCULAR; SUBCUTANEOUS EVERY 5 MIN PRN
Status: CANCELLED | OUTPATIENT
Start: 2022-10-19

## 2022-08-10 RX ORDER — NALOXONE HYDROCHLORIDE 0.4 MG/ML
0.2 INJECTION, SOLUTION INTRAMUSCULAR; INTRAVENOUS; SUBCUTANEOUS
Status: CANCELLED | OUTPATIENT
Start: 2022-10-05

## 2022-08-10 RX ORDER — ACETAMINOPHEN 325 MG/1
650 TABLET ORAL ONCE
Status: CANCELLED | OUTPATIENT
Start: 2022-10-19

## 2022-08-10 RX ORDER — MEPERIDINE HYDROCHLORIDE 25 MG/ML
25 INJECTION INTRAMUSCULAR; INTRAVENOUS; SUBCUTANEOUS EVERY 30 MIN PRN
Status: CANCELLED | OUTPATIENT
Start: 2022-10-19

## 2022-08-10 RX ORDER — METHYLPREDNISOLONE SODIUM SUCCINATE 125 MG/2ML
125 INJECTION, POWDER, LYOPHILIZED, FOR SOLUTION INTRAMUSCULAR; INTRAVENOUS
Status: CANCELLED
Start: 2022-09-21

## 2022-08-10 RX ORDER — HEPARIN SODIUM,PORCINE 10 UNIT/ML
5 VIAL (ML) INTRAVENOUS
Status: CANCELLED | OUTPATIENT
Start: 2022-09-21

## 2022-08-10 RX ORDER — ALBUTEROL SULFATE 90 UG/1
1-2 AEROSOL, METERED RESPIRATORY (INHALATION)
Status: CANCELLED
Start: 2022-10-05

## 2022-08-10 RX ORDER — ALBUTEROL SULFATE 90 UG/1
1-2 AEROSOL, METERED RESPIRATORY (INHALATION)
Status: CANCELLED
Start: 2022-09-21

## 2022-08-10 RX ORDER — HEPARIN SODIUM,PORCINE 10 UNIT/ML
5 VIAL (ML) INTRAVENOUS
Status: CANCELLED | OUTPATIENT
Start: 2022-10-05

## 2022-08-10 RX ORDER — HEPARIN SODIUM (PORCINE) LOCK FLUSH IV SOLN 100 UNIT/ML 100 UNIT/ML
5 SOLUTION INTRAVENOUS
Status: CANCELLED | OUTPATIENT
Start: 2022-09-21

## 2022-08-10 RX ORDER — HEPARIN SODIUM (PORCINE) LOCK FLUSH IV SOLN 100 UNIT/ML 100 UNIT/ML
5 SOLUTION INTRAVENOUS
Status: CANCELLED | OUTPATIENT
Start: 2022-10-19

## 2022-08-10 RX ORDER — ALBUTEROL SULFATE 0.83 MG/ML
2.5 SOLUTION RESPIRATORY (INHALATION)
Status: CANCELLED | OUTPATIENT
Start: 2022-10-19

## 2022-08-10 RX ORDER — EPINEPHRINE 1 MG/ML
0.3 INJECTION, SOLUTION INTRAMUSCULAR; SUBCUTANEOUS EVERY 5 MIN PRN
Status: CANCELLED | OUTPATIENT
Start: 2022-09-21

## 2022-08-10 RX ORDER — DIPHENHYDRAMINE HYDROCHLORIDE 50 MG/ML
50 INJECTION INTRAMUSCULAR; INTRAVENOUS
Status: CANCELLED
Start: 2022-10-19

## 2022-08-10 RX ORDER — HEPARIN SODIUM (PORCINE) LOCK FLUSH IV SOLN 100 UNIT/ML 100 UNIT/ML
5 SOLUTION INTRAVENOUS
Status: CANCELLED | OUTPATIENT
Start: 2022-10-05

## 2022-08-10 RX ORDER — DIPHENHYDRAMINE HCL 25 MG
50 CAPSULE ORAL ONCE
Status: CANCELLED | OUTPATIENT
Start: 2022-10-05

## 2022-08-10 RX ORDER — EPINEPHRINE 1 MG/ML
0.3 INJECTION, SOLUTION INTRAMUSCULAR; SUBCUTANEOUS EVERY 5 MIN PRN
Status: CANCELLED | OUTPATIENT
Start: 2022-09-01

## 2022-08-10 RX ORDER — DIPHENHYDRAMINE HCL 25 MG
50 CAPSULE ORAL ONCE
Status: CANCELLED | OUTPATIENT
Start: 2022-09-21

## 2022-08-10 RX ORDER — LORAZEPAM 2 MG/ML
0.5 INJECTION INTRAMUSCULAR EVERY 4 HOURS PRN
Status: CANCELLED | OUTPATIENT
Start: 2022-10-19

## 2022-08-10 RX ORDER — DIPHENHYDRAMINE HCL 25 MG
50 CAPSULE ORAL ONCE
Status: CANCELLED | OUTPATIENT
Start: 2022-09-01

## 2022-08-10 ASSESSMENT — PAIN SCALES - GENERAL: PAINLEVEL: NO PAIN (0)

## 2022-08-10 NOTE — NURSING NOTE
"Oncology Rooming Note    August 10, 2022 2:29 PM   Kristie Cornejo is a 52 year old female who presents for:    Chief Complaint   Patient presents with     Oncology Clinic Visit     Light chain myeloma (H)     Initial Vitals: /75   Pulse 75   Temp 98  F (36.7  C)   Resp 16   Wt 68.2 kg (150 lb 6.4 oz)   SpO2 98%   BMI 22.82 kg/m   Estimated body mass index is 22.82 kg/m  as calculated from the following:    Height as of 7/20/22: 1.729 m (5' 8.07\").    Weight as of this encounter: 68.2 kg (150 lb 6.4 oz). Body surface area is 1.81 meters squared.  No Pain (0) Comment: Data Unavailable   No LMP recorded. (Menstrual status: IUD).  Allergies reviewed: Yes  Medications reviewed: Yes    Medications: Medication refills not needed today.  Pharmacy name entered into [employer]:    [facility] [place] - [place], [place] - [address]  [id number] - [place], [place] - [address] AT [facility] [address]  [facility] - [place], [place] - [address]  [facility] - [place], [place] - [address] [place] [address] PHARMACY [facility] - [place], [place] - [phone]  [facility] - [place], [place] - [address] [place] [address]    Clinical concerns: Patient would like to discuss her plan of care, please review labs-labs to be drawn in clinic today. Reports left hip pain 1/10-preferred pharmacy is here on site, please refill Acyclovir.       Martine Ramos LPN August 10, 2022 2:30 PM              "

## 2022-08-10 NOTE — PROGRESS NOTES
[facility]  Date of Visit: Aug 10, 2022   Oncologist: Dr. Payton Reinoso    Reason for visit: myeloma follow-up           Oncology History   52 year old female with past medical history significant for Irritable bowel disease, allergies, large plasmacytoma of the L pelvis, and newly diagnosed multiple myeloma.     Early in April 2021 , she noted to have left sided groin pain, constant and was affecting her daily activities  MRI showed large, expansile, permeative mass with indistinct borders involving the left superior and inferior pubic rami, pubic symphysis and with possible extension to the left acetabulum.     We completed the following work-up:   - Biopsy 9/7/2021: plasmacytoma.  Flow cytometry showed kappa monotypic plasma cells, polytypic B cells. FISH results from plasmacytoma showed a gain of 11q, IGH-CCND1 fusion but no losses of 1q or TP53 and no gain of 1q.  - Bone marrow biopsy 09/22/21: Marrow cellularity 50% with 25% interstitial infiltration w/  Kappa-monotypic, moderately atypical plasma cells. Flow with 0.7% kappa-monotypica plasma cells. FISH and cytogenetics pending.   - PET CT 09/24/21 w/ hypermetabolic pathologic fracture of the L pubic ramus w/ aggressive appearing osteolysis but no additional suspicious lytic or blastic osseous lesions.     Treatment to date:   - Zometa q4 weeks started 9/27/21  - Radiation to left pubic ramus x18 fractions, completed 11/11/21.   - RVD C1D1 11/15/21  - RVD C2D1 12/6/21  - RVD C3D1  12/27/21  - RVD C4 D1 1/17/22, elizabeth added on D8 due to inadequate response of urinary M-spike and FLCs remaining over 100 mg/dL after 4 cycles of RVD    - Elizabeth-RVD C1D8 1/24/22  - Elizabeth-RVD C2D1 2/7/22  S/p 3 cycles of Elizabeth+RVD now -   Disease response: [place]     Margo developed chest pain and increased PVC after 1st dose of carfilzomib. Her cardiologist attributed these side effects for carf and recommended not to continue this medication.  Patient is not tolerating it.  Heart MRI is negative for amyloid.    6/2022 Carfilzomib, Dex and Cytoxan 300mg/m2 weekly x 1 dose - not tolerating due to chest pain and arrythmia - evaluated by cardiology and recommended to stop Carfilzomib    7/6/2022  changde the therapy to Isatuximab 10mg/kg weekly combined with Pomalyst 4mg  Days 1-21 and DXM 20mg weekly.              INTERIM HISTORY:   Margo presents for oncology follow-up visit today. No new side effects from Isatuximab/Pom/DXM; she is tolerating the treatment to the point she is willing to continue.     Margo is feeling ok, no new complains, energy is stable, no recent infections.    no more diarrhea, no new bony pains or aches, energy level is ok.     Her neuropathy has been stable but bothersome up to her shins, not painful   she denies tingling or burning.  No recent infections.       Denies pain, denies changes in strength or motor function.    She is  Had ablation of the SVT in March but continues to have palpitation, chest tightness and exertional dyspnea.    Denies fevers, chills, CP, SOB, vomiting.           Physical Exam:     There were no vitals taken for this visit.   Wt Readings from Last 4 Encounters:   08/03/22 66 kg (145 lb 9.6 oz)   07/27/22 67.4 kg (148 lb 8 oz)   07/20/22 65.9 kg (145 lb 3.2 oz)   07/13/22 66 kg (145 lb 9.6 oz)     General: well appearing, no acute distress. /75   Pulse 75   Temp 98  F (36.7  C)   Resp 16   Wt 68.2 kg (150 lb 6.4 oz)   SpO2 98%   BMI 22.82 kg/m      HEENT: normocephalic, atraumatic, PERRLA, sclerae nonicteric, she has styes present on the upper and lower lids of both eyes, no drainage; dry mucous membranes, oropharynx is clear  Lymph: no palpable cervical, supraclavicular or axillary lymphadenopathy   CV: S1 S2, RRR, no murmur, click or rub  Lungs: clear to auscultation bilaterally, no wheezes/rales/rhonchi  Abd: soft, positive bowel sounds, non-distended, non-tender  MSK: no peripheral  edema  Neuro: alert and oriented x3, CN grossly intact; bilateral  strength 5/5  Psych: appropriate mood and affect  Skin:no visible rashes or lesions on exposed skin          Laboratory Data:      Lab Results   Component Value Date    WBC 3.4 (L) 08/03/2022    ANEU 2.2 07/27/2022    HGB 13.3 08/03/2022    HCT 39.5 08/03/2022     08/03/2022     08/03/2022    POTASSIUM 3.4 08/03/2022    CHLORIDE 110 (H) 08/03/2022    CO2 23 08/03/2022    GLC 97 08/03/2022    BUN 21 08/03/2022    CR 0.81 08/03/2022    AST 12 08/03/2022    ALT 21 08/03/2022         Latest Reference Range & Units 08/10/22 14:02   Kappa Free Lt Chain 0.33 - 1.94 mg/dL 33.36 (H)   Kappa Lambda Ratio 0.26 - 1.65  166.80 (H)   Lambda Free Lt Chain 0.57 - 2.63 mg/dL 0.20 (L)   (H): Data is abnormally high  (L): Data is abnormally low           Assessment and Recommendations   Kristie Cornejo is a 52 year old female who was diagnosed with Plasmacytoma after she presented with left sided groin pain now found to have multiple myeloma after further workup with bone marrow biopsy.     # Multiple myeloma with associated large plasmacytoma of the L pelvis. Kappa light chain disease.     BMBx with 25% plasma cell infiltration; standard risk with gain of 11q, IGH-CCND1 fusion but no losses of 1q or TP53 and no gain of 1q.   SPEP w/out a monoclonal peak  UPEP positive. 70% paraprotein. Large monoclonal free immunoglobulin light chain of kappa chain type.     No anemia, normal creat, Ca, no other skeletal lesions. Alb normal, B2M normal.  Stage I Light chain disease with left pelvic bone pathologic fracture she is classified as having symptomatic multiple myeloma.     She did not respond to RVD x 4 cycles, Elizabeth+ RVD (since 2/22) x 4 cycles.   Her free kappa light chains are at least 50% reduced but the response is sluggish.   Also, she is not tolerating Velcade.     Now C1Day 21  Isatuximab 10mg/kg weekly combined with Pomalyst 4mg  Days 1-21 and  DXM 20mg weekly.   Tolerating very well,  Disease seems improving - cont for several cycles.  We talked about other therapies such as CAR-T and alloHSCT. I will make a BMT referral.   She failed 4 lines of therapy now.        #Bone Health  She will continue monthly Zometa (last received on 7/2022). Next dose due sept 22. She will continue Calcium/Vitamin D supplements.     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       # PVCs  S/p  ablation procedure  Cardiac MRI ok   Also ziopatch and f/u with cardiology  started on Toprol XL; however, Toprol XL was discontinued due to significant side effects of orthostatic hypotension, pre-syncopal episodes, shakiness, and weakness.   S/p ablation  3/23/22 but her symptoms continue.   Carfilzomib is cardiotoxic and contraindicated .      #COVID prophylaxis  - She received EVUSHELD 1/20/22.  - Received 3 COVID vaccines to date     .  She had a second opinion at [facility] with recommendations to consider [place] [facility] in the future.     Plan:  Cont current regimen   F.u with SULEIMAN before each cycle  F/u with me in 3 months  BMT referral        Payton Reinoso MD  Professor of Medicine

## 2022-08-10 NOTE — LETTER
8/10/2022         RE: Kristie Cornejo  [address]  [facility] [address]        Dear Colleague,    Thank you for referring your patient, Kristie Cornejo, to the [facility] [place]. Please see a copy of my visit note below.    [facility]  Date of Visit: Aug 10, 2022   Oncologist: Dr. Payton Reinoso    Reason for visit: myeloma follow-up           Oncology History   52 year old female with past medical history significant for Irritable bowel disease, allergies, large plasmacytoma of the L pelvis, and newly diagnosed multiple myeloma.     Early in April 2021 , she noted to have left sided groin pain, constant and was affecting her daily activities  MRI showed large, expansile, permeative mass with indistinct borders involving the left superior and inferior pubic rami, pubic symphysis and with possible extension to the left acetabulum.     We completed the following work-up:   - Biopsy 9/7/2021: plasmacytoma.  Flow cytometry showed kappa monotypic plasma cells, polytypic B cells. FISH results from plasmacytoma showed a gain of 11q, IGH-CCND1 fusion but no losses of 1q or TP53 and no gain of 1q.  - Bone marrow biopsy 09/22/21: Marrow cellularity 50% with 25% interstitial infiltration w/  Kappa-monotypic, moderately atypical plasma cells. Flow with 0.7% kappa-monotypica plasma cells. FISH and cytogenetics pending.   - PET CT 09/24/21 w/ hypermetabolic pathologic fracture of the L pubic ramus w/ aggressive appearing osteolysis but no additional suspicious lytic or blastic osseous lesions.     Treatment to date:   - Zometa q4 weeks started 9/27/21  - Radiation to left pubic ramus x18 fractions, completed 11/11/21.   - RVD C1D1 11/15/21  - RVD C2D1 12/6/21  - RVD C3D1  12/27/21  - RVD C4 D1 1/17/22, michelle added on D8 due to inadequate response of urinary M-spike and FLCs remaining over 100 mg/dL after 4 cycles of RVD    -  Elizabeth-RVD C1D8 1/24/22  - Elizabeth-RVD C2D1 2/7/22  S/p 3 cycles of Elizabeth+RVD now -   Disease response: [place]     Margo developed chest pain and increased PVC after 1st dose of carfilzomib. Her cardiologist attributed these side effects for carf and recommended not to continue this medication. Patient is not tolerating it.  Heart MRI is negative for amyloid.    6/2022 Carfilzomib, Dex and Cytoxan 300mg/m2 weekly x 1 dose - not tolerating due to chest pain and arrythmia - evaluated by cardiology and recommended to stop Carfilzomib    7/6/2022  changde the therapy to Isatuximab 10mg/kg weekly combined with Pomalyst 4mg  Days 1-21 and DXM 20mg weekly.              INTERIM HISTORY:   Margo presents for oncology follow-up visit today. No new side effects from Isatuximab/Pom/DXM; she is tolerating the treatment to the point she is willing to continue.     Margo is feeling ok, no new complains, energy is stable, no recent infections.    no more diarrhea, no new bony pains or aches, energy level is ok.     Her neuropathy has been stable but bothersome up to her shins, not painful   she denies tingling or burning.  No recent infections.       Denies pain, denies changes in strength or motor function.    She is  Had ablation of the SVT in March but continues to have palpitation, chest tightness and exertional dyspnea.    Denies fevers, chills, CP, SOB, vomiting.           Physical Exam:     There were no vitals taken for this visit.   Wt Readings from Last 4 Encounters:   08/03/22 66 kg (145 lb 9.6 oz)   07/27/22 67.4 kg (148 lb 8 oz)   07/20/22 65.9 kg (145 lb 3.2 oz)   07/13/22 66 kg (145 lb 9.6 oz)     General: well appearing, no acute distress. /75   Pulse 75   Temp 98  F (36.7  C)   Resp 16   Wt 68.2 kg (150 lb 6.4 oz)   SpO2 98%   BMI 22.82 kg/m      HEENT: normocephalic, atraumatic, PERRLA, sclerae nonicteric, she has styes present on the upper and lower lids of both eyes, no drainage; dry mucous membranes, oropharynx  is clear  Lymph: no palpable cervical, supraclavicular or axillary lymphadenopathy   CV: S1 S2, RRR, no murmur, click or rub  Lungs: clear to auscultation bilaterally, no wheezes/rales/rhonchi  Abd: soft, positive bowel sounds, non-distended, non-tender  MSK: no peripheral edema  Neuro: alert and oriented x3, CN grossly intact; bilateral  strength 5/5  Psych: appropriate mood and affect  Skin:no visible rashes or lesions on exposed skin          Laboratory Data:      Lab Results   Component Value Date    WBC 3.4 (L) 08/03/2022    ANEU 2.2 07/27/2022    HGB 13.3 08/03/2022    HCT 39.5 08/03/2022     08/03/2022     08/03/2022    POTASSIUM 3.4 08/03/2022    CHLORIDE 110 (H) 08/03/2022    CO2 23 08/03/2022    GLC 97 08/03/2022    BUN 21 08/03/2022    CR 0.81 08/03/2022    AST 12 08/03/2022    ALT 21 08/03/2022         Latest Reference Range & Units 08/10/22 14:02   Kappa Free Lt Chain 0.33 - 1.94 mg/dL 33.36 (H)   Kappa Lambda Ratio 0.26 - 1.65  166.80 (H)   Lambda Free Lt Chain 0.57 - 2.63 mg/dL 0.20 (L)   (H): Data is abnormally high  (L): Data is abnormally low           Assessment and Recommendations   Kristie Cornejo is a 52 year old female who was diagnosed with Plasmacytoma after she presented with left sided groin pain now found to have multiple myeloma after further workup with bone marrow biopsy.     # Multiple myeloma with associated large plasmacytoma of the L pelvis. Kappa light chain disease.     BMBx with 25% plasma cell infiltration; standard risk with gain of 11q, IGH-CCND1 fusion but no losses of 1q or TP53 and no gain of 1q.   SPEP w/out a monoclonal peak  UPEP positive. 70% paraprotein. Large monoclonal free immunoglobulin light chain of kappa chain type.     No anemia, normal creat, Ca, no other skeletal lesions. Alb normal, B2M normal.  Stage I Light chain disease with left pelvic bone pathologic fracture she is classified as having symptomatic multiple myeloma.     She did  not respond to RVD x 4 cycles, Elizabeth+ RVD (since 2/22) x 4 cycles.   Her free kappa light chains are at least 50% reduced but the response is sluggish.   Also, she is not tolerating Velcade.     Now C1Day 21  Isatuximab 10mg/kg weekly combined with Pomalyst 4mg  Days 1-21 and DXM 20mg weekly.   Tolerating very well,  Disease seems improving - cont for several cycles.  We talked about other therapies such as CAR-T and alloHSCT. I will make a BMT referral.   She failed 4 lines of therapy now.        #Bone Health  She will continue monthly Zometa (last received on 7/2022). Next dose due sept 22. She will continue Calcium/Vitamin D supplements.     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       # PVCs  S/p  ablation procedure  Cardiac MRI ok   Also ziopatch and f/u with cardiology  started on Toprol XL; however, Toprol XL was discontinued due to significant side effects of orthostatic hypotension, pre-syncopal episodes, shakiness, and weakness.   S/p ablation  3/23/22 but her symptoms continue.   Carfilzomib is cardiotoxic and contraindicated .      #COVID prophylaxis  - She received EVUSHELD 1/20/22.  - Received 3 COVID vaccines to date     .  She had a second opinion at [facility] with recommendations to consider [place] [facility] in the future.     Plan:  Cont current regimen   F.u with SULEIMAN before each cycle  F/u with me in 3 months  BMT referral        Again, thank you for allowing me to participate in the care of your patient.      Sincerely,    Payton Reinoso MD

## 2022-08-10 NOTE — NURSING NOTE
Blood specimens collected in clinic today per provider order. Patient tolerated without incident and was discharged after. Please see flow sheet for additional details.       Martine Ramos LPN August 10, 2022 2:28 PM

## 2022-08-11 LAB
KAPPA LC FREE SER-MCNC: 33.36 MG/DL (ref 0.33–1.94)
KAPPA LC FREE/LAMBDA FREE SER NEPH: 166.8 {RATIO} (ref 0.26–1.65)
LAMBDA LC FREE SERPL-MCNC: 0.2 MG/DL (ref 0.57–2.63)

## 2022-08-15 ENCOUNTER — TELEPHONE (OUTPATIENT)
Dept: TRANSPLANT | Facility: CLINIC | Age: 52
End: 2022-08-15

## 2022-08-15 DIAGNOSIS — C90.02 MULTIPLE MYELOMA IN RELAPSE (H): Primary | ICD-10-CM

## 2022-08-17 ENCOUNTER — INFUSION THERAPY VISIT (OUTPATIENT)
Dept: ONCOLOGY | Facility: CLINIC | Age: 52
End: 2022-08-17
Payer: COMMERCIAL

## 2022-08-17 ENCOUNTER — APPOINTMENT (OUTPATIENT)
Dept: LAB | Facility: CLINIC | Age: 52
End: 2022-08-17
Payer: COMMERCIAL

## 2022-08-17 ENCOUNTER — ONCOLOGY VISIT (OUTPATIENT)
Dept: ONCOLOGY | Facility: CLINIC | Age: 52
End: 2022-08-17
Payer: COMMERCIAL

## 2022-08-17 VITALS
BODY MASS INDEX: 22.52 KG/M2 | WEIGHT: 148.4 LBS | HEART RATE: 88 BPM | SYSTOLIC BLOOD PRESSURE: 140 MMHG | TEMPERATURE: 98.6 F | OXYGEN SATURATION: 100 % | RESPIRATION RATE: 16 BRPM | DIASTOLIC BLOOD PRESSURE: 83 MMHG

## 2022-08-17 DIAGNOSIS — C90.00 LIGHT CHAIN MYELOMA (H): Primary | ICD-10-CM

## 2022-08-17 DIAGNOSIS — C90.30 PLASMACYTOMA OF BONE (H): ICD-10-CM

## 2022-08-17 LAB
BASOPHILS # BLD AUTO: 0.1 10E3/UL (ref 0–0.2)
BASOPHILS NFR BLD AUTO: 2 %
CALCIUM SERPL-MCNC: 9.6 MG/DL (ref 8.5–10.1)
CREAT SERPL-MCNC: 0.77 MG/DL (ref 0.52–1.04)
EOSINOPHIL # BLD AUTO: 0.3 10E3/UL (ref 0–0.7)
EOSINOPHIL NFR BLD AUTO: 6 %
ERYTHROCYTE [DISTWIDTH] IN BLOOD BY AUTOMATED COUNT: 13.5 % (ref 10–15)
GFR SERPL CREATININE-BSD FRML MDRD: >90 ML/MIN/1.73M2
HCT VFR BLD AUTO: 38.8 % (ref 35–47)
HGB BLD-MCNC: 13.1 G/DL (ref 11.7–15.7)
HOLD SPECIMEN: NORMAL
IMM GRANULOCYTES # BLD: 0.1 10E3/UL
IMM GRANULOCYTES NFR BLD: 1 %
LYMPHOCYTES # BLD AUTO: 0.7 10E3/UL (ref 0.8–5.3)
LYMPHOCYTES NFR BLD AUTO: 13 %
MCH RBC QN AUTO: 31.9 PG (ref 26.5–33)
MCHC RBC AUTO-ENTMCNC: 33.8 G/DL (ref 31.5–36.5)
MCV RBC AUTO: 94 FL (ref 78–100)
MONOCYTES # BLD AUTO: 0.8 10E3/UL (ref 0–1.3)
MONOCYTES NFR BLD AUTO: 15 %
NEUTROPHILS # BLD AUTO: 3.3 10E3/UL (ref 1.6–8.3)
NEUTROPHILS NFR BLD AUTO: 63 %
NRBC # BLD AUTO: 0 10E3/UL
NRBC BLD AUTO-RTO: 0 /100
PLATELET # BLD AUTO: 251 10E3/UL (ref 150–450)
RBC # BLD AUTO: 4.11 10E6/UL (ref 3.8–5.2)
WBC # BLD AUTO: 5.3 10E3/UL (ref 4–11)

## 2022-08-17 PROCEDURE — 250N000011 HC RX IP 250 OP 636: Performed by: REGISTERED NURSE

## 2022-08-17 PROCEDURE — 258N000003 HC RX IP 258 OP 636

## 2022-08-17 PROCEDURE — 96375 TX/PRO/DX INJ NEW DRUG ADDON: CPT

## 2022-08-17 PROCEDURE — 82565 ASSAY OF CREATININE: CPT | Performed by: PHYSICIAN ASSISTANT

## 2022-08-17 PROCEDURE — 99214 OFFICE O/P EST MOD 30 MIN: CPT | Performed by: PHYSICIAN ASSISTANT

## 2022-08-17 PROCEDURE — 96413 CHEMO IV INFUSION 1 HR: CPT

## 2022-08-17 PROCEDURE — 85004 AUTOMATED DIFF WBC COUNT: CPT

## 2022-08-17 PROCEDURE — 250N000011 HC RX IP 250 OP 636

## 2022-08-17 PROCEDURE — 96367 TX/PROPH/DG ADDL SEQ IV INF: CPT

## 2022-08-17 PROCEDURE — 83521 IG LIGHT CHAINS FREE EACH: CPT | Mod: 59 | Performed by: PHYSICIAN ASSISTANT

## 2022-08-17 PROCEDURE — 250N000013 HC RX MED GY IP 250 OP 250 PS 637

## 2022-08-17 PROCEDURE — 82310 ASSAY OF CALCIUM: CPT | Performed by: PHYSICIAN ASSISTANT

## 2022-08-17 PROCEDURE — 36415 COLL VENOUS BLD VENIPUNCTURE: CPT | Performed by: PHYSICIAN ASSISTANT

## 2022-08-17 RX ORDER — ZOLEDRONIC ACID 0.04 MG/ML
4 INJECTION, SOLUTION INTRAVENOUS ONCE
Status: CANCELLED
Start: 2022-09-07 | End: 2022-09-07

## 2022-08-17 RX ORDER — DIPHENHYDRAMINE HYDROCHLORIDE 50 MG/ML
50 INJECTION INTRAMUSCULAR; INTRAVENOUS
Status: CANCELLED
Start: 2022-09-07

## 2022-08-17 RX ORDER — ALBUTEROL SULFATE 0.83 MG/ML
2.5 SOLUTION RESPIRATORY (INHALATION)
Status: CANCELLED | OUTPATIENT
Start: 2022-09-07

## 2022-08-17 RX ORDER — MEPERIDINE HYDROCHLORIDE 25 MG/ML
25 INJECTION INTRAMUSCULAR; INTRAVENOUS; SUBCUTANEOUS EVERY 30 MIN PRN
Status: CANCELLED | OUTPATIENT
Start: 2022-09-07

## 2022-08-17 RX ORDER — HEPARIN SODIUM,PORCINE 10 UNIT/ML
5 VIAL (ML) INTRAVENOUS
Status: CANCELLED | OUTPATIENT
Start: 2022-09-07

## 2022-08-17 RX ORDER — EPINEPHRINE 1 MG/ML
0.3 INJECTION, SOLUTION INTRAMUSCULAR; SUBCUTANEOUS EVERY 5 MIN PRN
Status: CANCELLED | OUTPATIENT
Start: 2022-09-07

## 2022-08-17 RX ORDER — DIPHENHYDRAMINE HCL 25 MG
50 CAPSULE ORAL ONCE
Status: COMPLETED | OUTPATIENT
Start: 2022-08-17 | End: 2022-08-17

## 2022-08-17 RX ORDER — ALBUTEROL SULFATE 90 UG/1
1-2 AEROSOL, METERED RESPIRATORY (INHALATION)
Status: CANCELLED
Start: 2022-09-07

## 2022-08-17 RX ORDER — ZOLEDRONIC ACID 0.04 MG/ML
4 INJECTION, SOLUTION INTRAVENOUS ONCE
Status: COMPLETED | OUTPATIENT
Start: 2022-08-17 | End: 2022-08-17

## 2022-08-17 RX ORDER — NALOXONE HYDROCHLORIDE 0.4 MG/ML
0.2 INJECTION, SOLUTION INTRAMUSCULAR; INTRAVENOUS; SUBCUTANEOUS
Status: CANCELLED | OUTPATIENT
Start: 2022-09-07

## 2022-08-17 RX ORDER — ACETAMINOPHEN 325 MG/1
650 TABLET ORAL ONCE
Status: COMPLETED | OUTPATIENT
Start: 2022-08-17 | End: 2022-08-17

## 2022-08-17 RX ORDER — METHYLPREDNISOLONE SODIUM SUCCINATE 125 MG/2ML
125 INJECTION, POWDER, LYOPHILIZED, FOR SOLUTION INTRAMUSCULAR; INTRAVENOUS
Status: CANCELLED
Start: 2022-09-07

## 2022-08-17 RX ORDER — HEPARIN SODIUM (PORCINE) LOCK FLUSH IV SOLN 100 UNIT/ML 100 UNIT/ML
5 SOLUTION INTRAVENOUS
Status: CANCELLED | OUTPATIENT
Start: 2022-09-07

## 2022-08-17 RX ADMIN — DEXAMETHASONE SODIUM PHOSPHATE: 10 INJECTION, SOLUTION INTRAMUSCULAR; INTRAVENOUS at 16:26

## 2022-08-17 RX ADMIN — FAMOTIDINE 20 MG: 10 INJECTION INTRAVENOUS at 16:18

## 2022-08-17 RX ADMIN — DIPHENHYDRAMINE HYDROCHLORIDE 50 MG: 25 CAPSULE ORAL at 16:05

## 2022-08-17 RX ADMIN — SODIUM CHLORIDE 250 ML: 9 INJECTION, SOLUTION INTRAVENOUS at 16:05

## 2022-08-17 RX ADMIN — SODIUM CHLORIDE 700 MG: 9 INJECTION, SOLUTION INTRAVENOUS at 16:42

## 2022-08-17 RX ADMIN — ACETAMINOPHEN 650 MG: 325 TABLET ORAL at 16:05

## 2022-08-17 RX ADMIN — ZOLEDRONIC ACID 4 MG: 0.04 INJECTION, SOLUTION INTRAVENOUS at 16:05

## 2022-08-17 ASSESSMENT — PAIN SCALES - GENERAL: PAINLEVEL: NO PAIN (0)

## 2022-08-17 NOTE — PROGRESS NOTES
[facility]  Date of Visit: Aug 17, 2022   Oncologist: Dr. Payton Reinoso    Reason for visit: myeloma follow-up           Oncology History   52 year old female with past medical history significant for Irritable bowel disease, allergies, large plasmacytoma of the L pelvis, and newly diagnosed multiple myeloma.     Early in April 2021 , she noted to have left sided groin pain, constant and was affecting her daily activities  MRI showed large, expansile, permeative mass with indistinct borders involving the left superior and inferior pubic rami, pubic symphysis and with possible extension to the left acetabulum.     We completed the following work-up:   - Biopsy 9/7/2021: plasmacytoma.  Flow cytometry showed kappa monotypic plasma cells, polytypic B cells. FISH results from plasmacytoma showed a gain of 11q, IGH-CCND1 fusion but no losses of 1q or TP53 and no gain of 1q.  - Bone marrow biopsy 09/22/21: Marrow cellularity 50% with 25% interstitial infiltration w/  Kappa-monotypic, moderately atypical plasma cells. Flow with 0.7% kappa-monotypica plasma cells. FISH and cytogenetics pending.   - PET CT 09/24/21 w/ hypermetabolic pathologic fracture of the L pubic ramus w/ aggressive appearing osteolysis but no additional suspicious lytic or blastic osseous lesions.     Treatment to date:   - Zometa q4 weeks started 9/27/21  - Radiation to left pubic ramus x18 fractions, completed 11/11/21.   - RVD C1D1 11/15/21  - RVD C2D1 12/6/21  - RVD C3D1  12/27/21  - RVD C4 D1 1/17/22, elizabeth added on D8 due to inadequate response of urinary M-spike and FLCs remaining over 100 mg/dL after 4 cycles of RVD    - Elizabeth-RVD C1D8 1/24/22  - Elizabeth-RVD C2D1 2/7/22  - Elizabeth-RVD C3D1 2/28/22  - Elizabeth-RVD C4D1 3/29/22  - Elizabeth-RVD C5D1 4/19/22    Disease response: MS     - 5/19/22: Continued monotherapy with Daratumumab; held Revlimid and Velcade.    - 6/21/22: Transitioned to KCD, received C1 days 1 and 2- had  significant PVCs at cardiac follow-uyp after 1st week of Kyprolis, held further doses.    - Transitioned to Sarclisa/Pom/Dex 7/6/22. Started Pom on 7/11.           INTERIM HISTORY:   Overall she is tolerating this regiment much better.  She is still having diarrhea though it is about 1 stool a day rather than the significant amount she was having prior.  It is not every day but it is most days of the week.  Will also have some constipation.  Her energy levels tend to wax and wane.  She will have really good days and worse days.  On Sunday she was not able to do much but today she pulled a full plot of her land full of the thistles.  She denies any fevers or chills.  She is overall eating fairly well.  She continues to have PVCs though they are nothing like when she was on the Cytoxan.  Denies any change of respiratory symptoms.  No other new symptoms to report.     ROS: 10 point ROS neg other than the symptoms noted above in the HPI.           Physical Exam:     BP (!) 140/83   Pulse 88   Temp 98.6  F (37  C) (Oral)   Resp 16   Wt 67.3 kg (148 lb 6.4 oz)   SpO2 100%   BMI 22.52 kg/m       General: Patient appears well in no acute distress.   Skin: No visualized rash or lesions on visualized skin  Eyes: EOMI, no erythema, sclera icterus or discharge noted  Resp: Appears to be breathing comfortably without accessory muscle usage, speaking in full sentences, no cough  MSK: Appears to have normal range of motion based on visualized movements  Neurologic: No apparent tremors, facial movements symmetric  Psych: affect normal, alert and oriented           Laboratory Data:      I personally reviewed the following labs:    Most Recent 3 CBC's:  Recent Labs   Lab Test 08/10/22  1402 08/03/22  1425 07/27/22  1311   WBC 4.6 3.4* 3.3*   HGB 12.6 13.3 12.9   MCV 98 95 94    287 173     Most Recent 3 BMP's:  Recent Labs   Lab Test 08/10/22  1402 08/03/22  1425 07/13/22  0900 07/06/22  0823    141  --  139    POTASSIUM 4.3 3.4  --  3.8   CHLORIDE 112* 110*  --  113*   CO2 24 23  --  24   BUN 21 21  --  22   CR 0.72 0.81 0.66 0.72   ANIONGAP 7 8  --  2*   MEKA 8.9 9.4 8.8 9.3   * 97  --  94     Most Recent 2 LFT's:  Recent Labs   Lab Test 08/10/22  1402 08/03/22  1425   AST 12 12   ALT 22 21   ALKPHOS 63 53   BILITOTAL 0.4 0.6      Latest Reference Range & Units 05/24/22 15:27 06/21/22 15:19 07/06/22 08:28 08/10/22 14:02   Kappa Free Lt Chain 0.33 - 1.94 mg/dL 43.60 (H) 45.53 (H) 49.34 (H) 33.36 (H)   Kappa Lambda Ratio 0.26 - 1.65  256.47 (H) 284.56 (H) 352.43 (H) 166.80 (H)   Lambda Free Lt Chain 0.57 - 2.63 mg/dL 0.17 (L) 0.16 (L) 0.14 (L) 0.20 (L)   (H): Data is abnormally high  (L): Data is abnormally low    Imaging:  No new imaging to review.         Assessment and Recommendations   Kristie Cornejo is a 52 year old female who was diagnosed with Plasmacytoma after she presented with left sided groin pain now found to have multiple myeloma after further workup with bone marrow biopsy.     # Multiple myeloma with associated large plasmacytoma of the L pelvis. Kappa light chain disease.     BMBx with 25% plasma cell infiltration; standard risk with gain of 11q, IGH-CCND1 fusion but no losses of 1q or TP53 and no gain of 1q.   SPEP w/out a monoclonal peak  UPEP positive. 70% paraprotein. Large monoclonal free immunoglobulin light chain of kappa chain type.     No anemia, normal creat, Ca, no other skeletal lesions. Alb normal, B2M normal.  Stage I Light chain disease with left pelvic bone pathologic fracture she is classified as having symptomatic multiple myeloma.   -s/p michelle-RVD, michelle alone and KCD with either little benefit or significant symptoms  -transitioned to Sarclisa/Pom/Dex to avoid the cardiotoxicity of Kyprolis.  Started C1D1 7/6/22.  Her diarrhea has returned with being back on treatment but manageable  -there has been some response with her kappa light chains which is exciting! Hopefully she  "will continue to respond. Will continue to monitor prior to each cycle  -will follow-up prior to next infusion     Continue ASA.      #Bone Health  She will continue monthly Zometa (last received on 7/13/22). She will continue Calcium/Vitamin D supplements.       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 Currently having no pain and able to walk 1 mile without any assistive devices.  She is interested in pursuing other activities like gentle cross-country skiing.  Had follow-up with Dr. Philip on 1/6/22 and was cleared to resume regular activity as tolerated. She should still refrain from high-impact activities.    PET on 7/12/22 shows left pubic ramus lesion is smaller in size but brighter FDG. No new lesions.      # PVCs  She had an ECHO 12/1 and had follow-up with cardiology 12/7 (notes in [facility]).  She had follow-up with cardiology here at the  and they recommend an ablation. Stress test was completed   -started on Toprol XL however was discontinued due to significant side effects of orthostatic hypotension, pre-syncopal episodes, shakiness, and weakness.   - She had an ablation 3/23/22 but has not experienced relief from the feeling of being nauseated and short of breath after climbing the stairs.  She also reports episodes of palpitations that \"radiate into her neck.\" Denies chest pain or shortness of breath.   - EKG done 4/4 showed sinus rhythm and nonspecific ST and T wave abnormality. Troponin negative.  - Cardiac MRI ruled out amyloid.  LFEF 50%, no evidence of infiltrative cardiomyopathy.  - She followed up with cardiology and they felt that the cause may be musculoskeletal or pericarditis. They suggested taking ibuprofen for pain. Her pain has subsided and she actually has not needed to take ibuprofen.   - Post ablation Zio patch was " performed showing a significant reduction in PVCs; however, by the time she had her cardiology follow-up, she had restarted chemotherapy and the PVCs had returned.  Dr. Thomas is comfortable proceeding with treatment as long as symptoms are tolerable and there are no major EKG changes. Would need to repeat if she were to do BMT  - EKG 7/13 with no major changes, troponin WNL  -she continues to have PVCs, though much improved from when she was on cytoxan. Wants to avoid ablation again if possible      #Diarrhea  She was having intermittent diarrhea with associated abdominal cramping and nausea, but developed much more significant diarrhea with 10-12 liquid episodes and was seen as an add-on on 4/4/22 for this.   - C.diff PCR negative 2/8/22, repeat on 4/5 negative  - Enteric stool panel 4/5/22 negative  - Underwent colonoscopy on 4/13/22 to pursue biopsy for amyloid (discussed below). Congo red stain is negative for amyloid deposits. Pathology showed colonic mucosa with focal active colitis, negative for signs of chronicity and lymphocytic colitis. Per patient report, GI reviewed results with her and did not feel she needed treatment for these findings at this time. They advised her to monitor her symptoms and to call them if they do not improve.Dr. Montgomery agreed with following conservatively for now since she reports some improvement in her symptoms.  If diarrhea becomes bothersome again, then they will arrange for expeditious evaluation in the IBD clinic.   - CMV PCR (4/4/22) not detected  -having continued diarrhea, though intermittent constipation. Suggested trying metamucil or benefiber to try and regular her      #COVID prophylaxis  - She received EVUSHELD 1/20/22 and 3/8/22, can repeat in Sept  - Received 3 COVID vaccines to date    #Reflux  She reported episodes of reflux with bile, especially when she lays down at night.  She has started to elevate her head on pillows and is also trying to avoid eating  within 1-2 hours of going to bed.  Stable currently on Omeprazole 20 mg daily.   -consider increasing to BID as she is still having gastric pain after eating    #Skin Lesion  She has a new hyperpigmented skin lesion on her left posterior forearm that she states appeared this summer and has been consistently peeling.    -will see hubert Gardiner PA-C  [facility]  [address] [place] [address] [phone]

## 2022-08-17 NOTE — PATIENT INSTRUCTIONS
[facility] Triage and after hours / weekends / holidays:  [phone]    Please call the triage or after hours line if you experience a temperature greater than or equal to 100.4, shaking chills, have uncontrolled nausea, vomiting and/or diarrhea, dizziness, shortness of breath, chest pain, bleeding, unexplained bruising, or if you have any other new/concerning symptoms, questions or concerns.      If you are having any concerning symptoms or wish to speak to a provider before your next infusion visit, please call your care coordinator or triage to notify them so we can adequately serve you.     If you need a refill on a narcotic prescription or other medication, please call before your infusion appointment.

## 2022-08-17 NOTE — LETTER
8/17/2022         RE: Kristie Cornejo  [address]  [facility] [address]        Dear Colleague,    Thank you for referring your patient, Kristie Cornejo, to the [facility]. Please see a copy of my visit note below.    [facility]  Date of Visit: Aug 17, 2022   Oncologist: Dr. Payton Reinoso    Reason for visit: myeloma follow-up           Oncology History   52 year old female with past medical history significant for Irritable bowel disease, allergies, large plasmacytoma of the L pelvis, and newly diagnosed multiple myeloma.     Early in April 2021 , she noted to have left sided groin pain, constant and was affecting her daily activities  MRI showed large, expansile, permeative mass with indistinct borders involving the left superior and inferior pubic rami, pubic symphysis and with possible extension to the left acetabulum.     We completed the following work-up:   - Biopsy 9/7/2021: plasmacytoma.  Flow cytometry showed kappa monotypic plasma cells, polytypic B cells. FISH results from plasmacytoma showed a gain of 11q, IGH-CCND1 fusion but no losses of 1q or TP53 and no gain of 1q.  - Bone marrow biopsy 09/22/21: Marrow cellularity 50% with 25% interstitial infiltration w/  Kappa-monotypic, moderately atypical plasma cells. Flow with 0.7% kappa-monotypica plasma cells. FISH and cytogenetics pending.   - PET CT 09/24/21 w/ hypermetabolic pathologic fracture of the L pubic ramus w/ aggressive appearing osteolysis but no additional suspicious lytic or blastic osseous lesions.     Treatment to date:   - Zometa q4 weeks started 9/27/21  - Radiation to left pubic ramus x18 fractions, completed 11/11/21.   - RVD C1D1 11/15/21  - RVD C2D1 12/6/21  - RVD C3D1  12/27/21  - RVD C4 D1 1/17/22, elizabeth added on D8 due to inadequate response of urinary M-spike and FLCs remaining over 100 mg/dL after 4 cycles of RVD    - Elizabeth-RVD C1D8 1/24/22  -  Elizabeth-RVD C2D1 2/7/22  - Elizabeth-RVD C3D1 2/28/22  - Elizabeth-RVD C4D1 3/29/22  - Elizabeth-RVD C5D1 4/19/22    Disease response: NC     - 5/19/22: Continued monotherapy with Daratumumab; held Revlimid and Velcade.    - 6/21/22: Transitioned to KCD, received C1 days 1 and 2- had significant PVCs at cardiac follow-uyp after 1st week of Kyprolis, held further doses.    - Transitioned to Sarclisa/Pom/Dex 7/6/22. Started Pom on 7/11.           INTERIM HISTORY:   Overall she is tolerating this regiment much better.  She is still having diarrhea though it is about 1 stool a day rather than the significant amount she was having prior.  It is not every day but it is most days of the week.  Will also have some constipation.  Her energy levels tend to wax and wane.  She will have really good days and worse days.  On Sunday she was not able to do much but today she pulled a full plot of her land full of the thistles.  She denies any fevers or chills.  She is overall eating fairly well.  She continues to have PVCs though they are nothing like when she was on the Cytoxan.  Denies any change of respiratory symptoms.  No other new symptoms to report.     ROS: 10 point ROS neg other than the symptoms noted above in the HPI.           Physical Exam:     BP (!) 140/83   Pulse 88   Temp 98.6  F (37  C) (Oral)   Resp 16   Wt 67.3 kg (148 lb 6.4 oz)   SpO2 100%   BMI 22.52 kg/m       General: Patient appears well in no acute distress.   Skin: No visualized rash or lesions on visualized skin  Eyes: EOMI, no erythema, sclera icterus or discharge noted  Resp: Appears to be breathing comfortably without accessory muscle usage, speaking in full sentences, no cough  MSK: Appears to have normal range of motion based on visualized movements  Neurologic: No apparent tremors, facial movements symmetric  Psych: affect normal, alert and oriented           Laboratory Data:      I personally reviewed the following labs:    Most Recent 3 CBC's:  Recent Labs    Lab Test 08/10/22  1402 08/03/22  1425 07/27/22  1311   WBC 4.6 3.4* 3.3*   HGB 12.6 13.3 12.9   MCV 98 95 94    287 173     Most Recent 3 BMP's:  Recent Labs   Lab Test 08/10/22  1402 08/03/22  1425 07/13/22  0900 07/06/22  0823    141  --  139   POTASSIUM 4.3 3.4  --  3.8   CHLORIDE 112* 110*  --  113*   CO2 24 23  --  24   BUN 21 21  --  22   CR 0.72 0.81 0.66 0.72   ANIONGAP 7 8  --  2*   MEKA 8.9 9.4 8.8 9.3   * 97  --  94     Most Recent 2 LFT's:  Recent Labs   Lab Test 08/10/22  1402 08/03/22  1425   AST 12 12   ALT 22 21   ALKPHOS 63 53   BILITOTAL 0.4 0.6      Latest Reference Range & Units 05/24/22 15:27 06/21/22 15:19 07/06/22 08:28 08/10/22 14:02   Kappa Free Lt Chain 0.33 - 1.94 mg/dL 43.60 (H) 45.53 (H) 49.34 (H) 33.36 (H)   Kappa Lambda Ratio 0.26 - 1.65  256.47 (H) 284.56 (H) 352.43 (H) 166.80 (H)   Lambda Free Lt Chain 0.57 - 2.63 mg/dL 0.17 (L) 0.16 (L) 0.14 (L) 0.20 (L)   (H): Data is abnormally high  (L): Data is abnormally low    Imaging:  No new imaging to review.         Assessment and Recommendations   Kristie Cornejo is a 52 year old female who was diagnosed with Plasmacytoma after she presented with left sided groin pain now found to have multiple myeloma after further workup with bone marrow biopsy.     # Multiple myeloma with associated large plasmacytoma of the L pelvis. Kappa light chain disease.     BMBx with 25% plasma cell infiltration; standard risk with gain of 11q, IGH-CCND1 fusion but no losses of 1q or TP53 and no gain of 1q.   SPEP w/out a monoclonal peak  UPEP positive. 70% paraprotein. Large monoclonal free immunoglobulin light chain of kappa chain type.     No anemia, normal creat, Ca, no other skeletal lesions. Alb normal, B2M normal.  Stage I Light chain disease with left pelvic bone pathologic fracture she is classified as having symptomatic multiple myeloma.   -s/p michelle-RVD, michelle alone and KCD with either little benefit or significant  "symptoms  -transitioned to Sarclisa/Pom/Dex to avoid the cardiotoxicity of Kyprolis.  Started C1D1 7/6/22.  Her diarrhea has returned with being back on treatment but manageable  -there has been some response with her kappa light chains which is exciting! Hopefully she will continue to respond. Will continue to monitor prior to each cycle  -will follow-up prior to next infusion     Continue ASA.      #Bone Health  She will continue monthly Zometa (last received on 7/13/22). She will continue Calcium/Vitamin D supplements.       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 Currently having no pain and able to walk 1 mile without any assistive devices.  She is interested in pursuing other activities like gentle cross-country skiing.  Had follow-up with Dr. Philip on 1/6/22 and was cleared to resume regular activity as tolerated. She should still refrain from high-impact activities.    PET on 7/12/22 shows left pubic ramus lesion is smaller in size but brighter FDG. No new lesions.      # PVCs  She had an ECHO 12/1 and had follow-up with cardiology 12/7 (notes in [facility]).  She had follow-up with cardiology here at the  and they recommend an ablation. Stress test was completed   -started on Toprol XL however was discontinued due to significant side effects of orthostatic hypotension, pre-syncopal episodes, shakiness, and weakness.   - She had an ablation 3/23/22 but has not experienced relief from the feeling of being nauseated and short of breath after climbing the stairs.  She also reports episodes of palpitations that \"radiate into her neck.\" Denies chest pain or shortness of breath.   - EKG done 4/4 showed sinus rhythm and nonspecific ST and T wave abnormality. Troponin negative.  - Cardiac MRI ruled out amyloid.  LFEF 50%, no evidence of infiltrative " cardiomyopathy.  - She followed up with cardiology and they felt that the cause may be musculoskeletal or pericarditis. They suggested taking ibuprofen for pain. Her pain has subsided and she actually has not needed to take ibuprofen.   - Post ablation Zio patch was performed showing a significant reduction in PVCs; however, by the time she had her cardiology follow-up, she had restarted chemotherapy and the PVCs had returned.  Dr. Thomas is comfortable proceeding with treatment as long as symptoms are tolerable and there are no major EKG changes. Would need to repeat if she were to do BMT  - EKG 7/13 with no major changes, troponin WNL  -she continues to have PVCs, though much improved from when she was on cytoxan. Wants to avoid ablation again if possible      #Diarrhea  She was having intermittent diarrhea with associated abdominal cramping and nausea, but developed much more significant diarrhea with 10-12 liquid episodes and was seen as an add-on on 4/4/22 for this.   - C.diff PCR negative 2/8/22, repeat on 4/5 negative  - Enteric stool panel 4/5/22 negative  - Underwent colonoscopy on 4/13/22 to pursue biopsy for amyloid (discussed below). Congo red stain is negative for amyloid deposits. Pathology showed colonic mucosa with focal active colitis, negative for signs of chronicity and lymphocytic colitis. Per patient report, GI reviewed results with her and did not feel she needed treatment for these findings at this time. They advised her to monitor her symptoms and to call them if they do not improve.Dr. Montgomery agreed with following conservatively for now since she reports some improvement in her symptoms.  If diarrhea becomes bothersome again, then they will arrange for expeditious evaluation in the IBD clinic.   - CMV PCR (4/4/22) not detected  -having continued diarrhea, though intermittent constipation. Suggested trying metamucil or benefiber to try and regular her      #COVID prophylaxis  - She received  ABIGAIL 1/20/22 and 3/8/22, can repeat in Sept  - Received 3 COVID vaccines to date    #Reflux  She reported episodes of reflux with bile, especially when she lays down at night.  She has started to elevate her head on pillows and is also trying to avoid eating within 1-2 hours of going to bed.  Stable currently on Omeprazole 20 mg daily.   -consider increasing to BID as she is still having gastric pain after eating    #Skin Lesion  She has a new hyperpigmented skin lesion on her left posterior forearm that she states appeared this summer and has been consistently peeling.    -will see hubert Gardiner PA-C  [facility]  [address] [place] [address] [phone]

## 2022-08-17 NOTE — PROGRESS NOTES
Infusion Nursing Note:  Kristie Cornejo presents today for C2D15 Isatuximab-irfc/Zometa.    Patient seen by provider today: Yes: Quyen Gardiner PA-C during infusion visit   present during visit today: Not Applicable.    Note: Margo denied any questions or concerns following her visit with the provider during infusion today.    Margo confirmed that she is taking her calcium and vitamin D supplements as prescribed. She denied any jaw pain. Denied any upcoming dental procedures.    Intravenous Access:  Peripheral IV placed.    Treatment Conditions:   Latest Reference Range & Units 08/17/22 14:18 08/17/22 14:22   Creatinine 0.52 - 1.04 mg/dL  0.77   GFR Estimate >60 mL/min/1.73m2  >90   Calcium 8.5 - 10.1 mg/dL  9.6   WBC 4.0 - 11.0 10e3/uL 5.3    Hemoglobin 11.7 - 15.7 g/dL 13.1    Hematocrit 35.0 - 47.0 % 38.8    Platelet Count 150 - 450 10e3/uL 251    RBC Count 3.80 - 5.20 10e6/uL 4.11    MCV 78 - 100 fL 94    MCH 26.5 - 33.0 pg 31.9    MCHC 31.5 - 36.5 g/dL 33.8    RDW 10.0 - 15.0 % 13.5    % Neutrophils % 63    % Lymphocytes % 13    % Monocytes % 15    % Eosinophils % 6    % Basophils % 2    Absolute Basophils 0.0 - 0.2 10e3/uL 0.1    Absolute Eosinophils 0.0 - 0.7 10e3/uL 0.3    Absolute Immature Granulocytes <=0.4 10e3/uL 0.1    Absolute Lymphocytes 0.8 - 5.3 10e3/uL 0.7 (L)    Absolute Monocytes 0.0 - 1.3 10e3/uL 0.8    % Immature Granulocytes % 1    Absolute Neutrophils 1.6 - 8.3 10e3/uL 3.3    Absolute NRBCs 10e3/uL 0.0    NRBCs per 100 WBC <1 /100 0      Results reviewed, labs MET treatment parameters, ok to proceed with treatment.    Post Infusion Assessment:  Patient tolerated infusion without incident.  Blood return noted pre and post infusion.  Site patent and intact, free from redness, edema or discomfort.  No evidence of extravasations.  Access discontinued per protocol.     Discharge Plan:   Prescription refills given for acyclovir.  Discharge instructions reviewed with: Patient.  Patient  and/or family verbalized understanding of discharge instructions and all questions answered.  AVS to patient via [email or web address].  Patient will return 8/31 for next appointment.   Patient discharged in stable condition accompanied by: self.  Departure Mode: Ambulatory.  Face to Face time: 0.      Kristie Dickens RN

## 2022-08-18 LAB
KAPPA LC FREE SER-MCNC: 31.35 MG/DL (ref 0.33–1.94)
KAPPA LC FREE/LAMBDA FREE SER NEPH: 149.29 {RATIO} (ref 0.26–1.65)
LAMBDA LC FREE SERPL-MCNC: 0.21 MG/DL (ref 0.57–2.63)

## 2022-08-24 ENCOUNTER — TELEPHONE (OUTPATIENT)
Dept: ONCOLOGY | Facility: CLINIC | Age: 52
End: 2022-08-24

## 2022-08-24 ENCOUNTER — LAB (OUTPATIENT)
Dept: LAB | Facility: CLINIC | Age: 52
End: 2022-08-24
Payer: COMMERCIAL

## 2022-08-24 DIAGNOSIS — C90.00 LIGHT CHAIN MYELOMA (H): Primary | ICD-10-CM

## 2022-08-24 DIAGNOSIS — C90.30 PLASMACYTOMA OF BONE (H): ICD-10-CM

## 2022-08-24 LAB — HCG UR QL: NEGATIVE

## 2022-08-24 PROCEDURE — 81025 URINE PREGNANCY TEST: CPT

## 2022-08-24 NOTE — TELEPHONE ENCOUNTER
Oral Chemotherapy Monitoring Program    Subjective/Objective:  Kristie Cornejo is a 52 year old female contacted by phone for a follow-up visit for oral chemotherapy pomalidomide. Margo confirmed dose of 4 mg daily for 3 weeks on with 1 week off. She has not been hospitalized in the last 2 weeks. She is adherent and has not missed any doses in the last 2 weeks. She has recently started metamucil for ongoing diarrhea/constipation however she does not feel like this works. She will have a BM every other day which has diarrhea consistency. She is experiencing abdominal pain about 20 minutes after every time she eats, this is characterized by a sharp pain most likely due to gas. We discussed she should continue a high fiber diet and increase daily fluids. Margo expressed some concern for a rash that has spread from her face to chest and stomach. These spots are not raised, some are more red in color, they are dry, and some are peeling. Margo reports this started 2 weeks ago. She currently utilizes Cetaphil lotion and applies generously. Margo expressed concern to Dr. Reinoso and was told to see a dermatologist. However, this has progressively gotten worse, IB sent to Dr. Reinoso regarding this concern. All adverse effects are manageable and she feels overall fairly well in comparison to previous chemotherapy medications.     ORAL CHEMOTHERAPY 5/24/2022 7/5/2022 7/7/2022 7/18/2022 7/27/2022 8/3/2022 8/24/2022   Assessment Type Chart Review Initial Work up New Teach Initial Follow up Refill Lab Monitoring Monthly Follow up   Diagnosis Code Multiple Myeloma Multiple Myeloma Multiple Myeloma Multiple Myeloma Multiple Myeloma Multiple Myeloma Multiple Myeloma   Providers Dr. Kemar Reinoso   [facility]   Drug Name Revlimid (lenalidomide) Pomalyst (pomalidomide) Pomalyst  (pomalidomide) Pomalyst (pomalidomide) Pomalyst (pomalidomide) Pomalyst (pomalidomide) Pomalyst (pomalidomide)   Dose 25 mg 4 mg 4 mg 4 mg 4 mg 4 mg 4 mg   Current Schedule Daily Daily Daily Daily Daily Daily Daily   Cycle Details Other 3 weeks on, 1 week off 3 weeks on, 1 week off 3 weeks on, 1 week off 3 weeks on, 1 week off 3 weeks on, 1 week off 3 weeks on, 1 week off   Start Date of Last Cycle (No Data) - - 7/11/2022 - 8/3/2022 8/3/2022   Planned next cycle start date - 7/6/2022 - 8/8/2022 8/3/2022 - 8/31/2022   Doses missed in last 2 weeks - - - 0 - - 0   Adherence Assessment - - - Adherent - - Adherent   Adverse Effects - - - No AE identified during assessment - - Rash;Diarrhea   Nausea - - - - - - -   Pharmacist Intervention(nausea) - - - - - - -   Diarrhea - - - - - - Grade 1   Pharmacist Intervention(diarrhea) - - - - - - No   Rash - - - - - - Grade 1   Pharmacist Intervention(Rash) - - - - - - Yes   Intervention(s) - - - - - - Referral to oncology provider   Fatigue - - - - - - -   Pharmacist Intervention(fatigue) - - - - - - -   Intervention(s) - - - - - - -   Other (See Note for Details) - - - - - - -   Pharmacist intervention(other) - - - - - - -   Intervention(s) - - - - - - -   Any new drug interactions? - - - - - - -   Is the dose as ordered appropriate for the patient? - - - - - - -   Has the patient missed any days of school, work, or other routine activity? - - - - - - -   Since the last time we talked, have you been hospitalized or used the emergency room? - - - - - - -       Last PHQ-2 Score on record:   PHQ-2 ( [facility]) 1/6/2022 8/26/2021   Q1: Little interest or pleasure in doing things 0 0   Q2: Feeling down, depressed or hopeless 0 0   PHQ-2 Score 0 0   PHQ-2 Total Score (12-17 Years)- Positive if 3 or more points; Administer PHQ-A if positive 0 0   Q1: Little interest or pleasure in doing things - Not at all   Q2: Feeling down, depressed or hopeless - Not at all   PHQ-2 Score - 0  "      Vitals:  BP:   BP Readings from Last 1 Encounters:   08/17/22 (!) 140/83     Wt Readings from Last 1 Encounters:   08/17/22 67.3 kg (148 lb 6.4 oz)     Estimated body surface area is 1.8 meters squared as calculated from the following:    Height as of 7/20/22: 1.729 m (5' 8.07\").    Weight as of 8/17/22: 67.3 kg (148 lb 6.4 oz).    Labs:  _  Result Component Current Result Ref Range   Sodium 143 (8/10/2022) 133 - 144 mmol/L     _  Result Component Current Result Ref Range   Potassium 4.3 (8/10/2022) 3.4 - 5.3 mmol/L     _  Result Component Current Result Ref Range   Calcium 9.6 (8/17/2022) 8.5 - 10.1 mg/dL     No results found for Mag within last 30 days.     No results found for Phos within last 30 days.     _  Result Component Current Result Ref Range   Albumin 3.7 (8/10/2022) 3.4 - 5.0 g/dL     _  Result Component Current Result Ref Range   Urea Nitrogen 21 (8/10/2022) 7 - 30 mg/dL     _  Result Component Current Result Ref Range   Creatinine 0.77 (8/17/2022) 0.52 - 1.04 mg/dL     _  Result Component Current Result Ref Range   AST 12 (8/10/2022) 0 - 45 U/L     _  Result Component Current Result Ref Range   ALT 22 (8/10/2022) 0 - 50 U/L     _  Result Component Current Result Ref Range   Bilirubin Total 0.4 (8/10/2022) 0.2 - 1.3 mg/dL     _  Result Component Current Result Ref Range   WBC Count 5.3 (8/17/2022) 4.0 - 11.0 10e3/uL     _  Result Component Current Result Ref Range   Hemoglobin 13.1 (8/17/2022) 11.7 - 15.7 g/dL     _  Result Component Current Result Ref Range   Platelet Count 251 (8/17/2022) 150 - 450 10e3/uL     _  Result Component Current Result Ref Range   Absolute Neutrophils 2.2 (7/27/2022) 1.6 - 8.3 10e3/uL     _  Result Component Current Result Ref Range   Absolute Neutrophils 3.3 (8/17/2022) 1.6 - 8.3 10e3/uL      Assessment/Plan:  Patient is tolerating pomalidomide therapy with manageable adverse effects. IB sent to Dr. Reinoso regarding current adverse effects.     Follow-Up:  Provider " appt 8/20/22.    Refill Due:  9/21/22    Yandy Wyatt  Pharmacy Intern  Oral Chemotherapy Monitoring Program   [facility]  [phone]

## 2022-08-25 ENCOUNTER — TELEPHONE (OUTPATIENT)
Dept: ONCOLOGY | Facility: CLINIC | Age: 52
End: 2022-08-25

## 2022-08-25 NOTE — TELEPHONE ENCOUNTER
Oral Chemotherapy Monitoring Program     Spoke to Margo. Per Dr. Reinoso, could hold pomalidomide (Pomalyst) due to rash; however, pt already started her week off yesterday. We discussed to keep the derm appt which is scheduled for November (earliest she could get in) and continue to monitor during this week off. She has an appt scheduled with DORON Sullivan CNP next week so will plan to follow-up with her then. We did discuss any new medication or culprits that could be causing the rash (ie. Detergent, coap, medications, etc) and she denies any changes. Pt verbalized understanding and agrees to plan. No further questions or concerns at this time, but encouraged pt to call if any were to arise.    Verenice Wu, PharmD, BCACP  Oral Chemotherapy Monitoring Program  [facility]  [phone]  August 25, 2022

## 2022-08-25 NOTE — PROGRESS NOTES
Infusion Nursing Note:  Kristie Cornejo presents today for Zometa.    Patient seen by provider today: No   present during visit today: Not Applicable.    Note: Patient reports improvement in her diarrhea from earlier in the week.  She states she had increased abdominal pain yesterday, but feels much better today.  She states she is trying to eat small frequent meals, but hasn't had much of an appetite.  She otherwise reports no changes.    Intravenous Access:  Peripheral IV placed.    Treatment Conditions:  Lab Results   Component Value Date     04/25/2022    POTASSIUM 3.0 (L) 04/25/2022    CR 0.73 04/25/2022    MEKA 9.4 04/25/2022    BILITOTAL 0.5 04/25/2022    ALBUMIN 3.6 04/25/2022    ALT 23 04/25/2022    AST 13 04/25/2022     Results reviewed, labs MET treatment parameters, ok to proceed with treatment.      Post Infusion Assessment:  Patient tolerated infusion without incident.  Blood return noted pre and post infusion.  Site patent and intact, free from redness, edema or discomfort.  No evidence of extravasations.  Access discontinued per protocol.       Discharge Plan:   Patient declined prescription refills.  Discharge instructions reviewed with: Patient.  Patient and/or family verbalized understanding of discharge instructions and all questions answered.  AVS to patient via [employer].  Patient will return 5/2/22 for next appointment.   Patient discharged in stable condition accompanied by: self.  Departure Mode: Ambulatory.  Face to Face time: 0.      OSMIN MIRANDA RN                     No

## 2022-08-26 ENCOUNTER — TELEPHONE (OUTPATIENT)
Dept: ONCOLOGY | Facility: CLINIC | Age: 52
End: 2022-08-26

## 2022-08-26 ENCOUNTER — TELEPHONE (OUTPATIENT)
Dept: TRANSPLANT | Facility: CLINIC | Age: 52
End: 2022-08-26

## 2022-08-26 ENCOUNTER — PATIENT OUTREACH (OUTPATIENT)
Dept: ONCOLOGY | Facility: CLINIC | Age: 52
End: 2022-08-26

## 2022-08-26 NOTE — PROGRESS NOTES
[facility]: Cancer Care Short Note                                    Discussion with Patient:                                                      INBOUND CALL: VM received from patient   Margo heard from oral chemo team who discussed rash with Dr. Reinoso. Dr. Reinoso suggests holding pomalyst at this time. Pt is on her off week of pomalyst. Wondering if she should still have it refilled. Callback requested.     OUTBOUND CALL: RNCC called patient to discuss recs from OTC and Robb Reinoso.   Rash is on the face and chest- circular red spots that are flaky. No itching, pain, open areas, etc. Using sunscreen when outside  She states she has never really needed to wash her face regualrly so she has started doing that now. Applying lotion on it 2-3x times a day which seems to help.   Margo is concerned about stopping the pomalyst, as her labs are starting to improve. She voices concern about stopping the pomalayst, as her labs are finally starting to improve and the rash is not bothersome.   RNCC spoke with OTC. At this time patient should have pomalyst refilled but will hold off on taking it until after eval with Jackie next week. She is agreeable to this plan       Intervention/Education provided during outreach:                                                         Patient to follow up as scheduled at next appt  Patient to call/pocketfungameshart message with updates    Patient verbalizes understanding of POC and has no other questions or concerns at this time.     Griselda Valdez, RN, MSN, OCN   RN Care Coordinator   [facility]   [address] [place] [address] [id number] [phone]

## 2022-08-26 NOTE — TELEPHONE ENCOUNTER
Oral Chemotherapy Monitoring Program    Medication: Pomalyst  Rx:  4 mg PO daily on days 1-21 of repeated 28 day cycles  Auth #: [id number]  Risk Category: Adult female WITH reproductive capacity.    Routine survey questions reviewed.      Anette Mcdermott  Oncology Pharmacy Liaison II  [email or web address]  Phone: [phone]  Fax: [phone]

## 2022-08-29 ENCOUNTER — CARE COORDINATION (OUTPATIENT)
Dept: TRANSPLANT | Facility: CLINIC | Age: 52
End: 2022-08-29

## 2022-08-29 NOTE — PROGRESS NOTES
[facility] BMT and Cell Therapy Program  RN Coordinator Pre-Visit Documentation      Kristie Cornejo is a 52 year old female who has been referred to the [facility] BMT and Cell Therapy Program for hematopoietic cell transplant or immune effector cell therapy.      Referring MD Name: Dr. 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    The appropriate disease evaluation form has been emailed to the physician and their continuity clinic fellow to complete.      Patient Care Team       Relationship Specialty Notifications Start End    Rena Sheffield MD PCP - General Family Medicine  10/13/21     Phone: [phone] Fax: [phone]         [facility] [address] [facility] [address]    Edu Philip MD Assigned Musculoskeletal Provider   9/5/21     Phone: [phone] Fax: [phone]         [facility] [address] [address]    Griselda Valdez, RN Specialty Care Coordinator Hematology & Oncology Admissions 9/16/21     Phone: [phone]         Payton Reinoso MD MD Hematology All results, Admissions 9/16/21     Phone: [phone] Fax: [phone]         [address] [facility] [address] [address]    Alem Thomas MD  Cardiovascular Disease  1/17/22     Phone: [phone] Fax: [phone]         [address] [facility] [address]    Alem Thomas MD Assigned Heart and Vascular Provider   2/6/22     Phone: [phone] Fax: [phone]         [address] [place] [address]    Shabana Bauman MD MD Ophthalmology  4/6/22     REFERRED BY PAYTON REINOSO    Phone: [phone] Fax: [phone]         [address] [facility] [address]    Payton Reinoso MD Assigned Cancer Care Provider   5/28/22     Phone: [phone] Fax:  [phone]         [address] [facility] [address] [facility] [address]    Fanny Ponce MD Assigned Surgical Provider   6/4/22     Phone: [phone] Fax: [phone]         [address] [address]    Nika Laboy, RN Specialty Care Coordinator Cardiology Admissions 6/27/22     Phone: [phone] Fax: [phone]         [address] [facility] [address]    Gee Peraza, RN Specialty Care Coordinator BMT - Adult Admissions 7/1/22     Phone: [phone]         Jeanmarie Marquez MD MD Hematology  7/1/22     Phone: [phone] Fax: [phone]         [address] [place] [address]    Rosangela Rosales, [facility]  BMT - Adult Admissions 7/7/22     Phone: [phone]                 Opal Mckenzie RN

## 2022-08-30 ENCOUNTER — OFFICE VISIT (OUTPATIENT)
Dept: TRANSPLANT | Facility: CLINIC | Age: 52
End: 2022-08-30
Payer: COMMERCIAL

## 2022-08-30 VITALS
WEIGHT: 149 LBS | DIASTOLIC BLOOD PRESSURE: 73 MMHG | SYSTOLIC BLOOD PRESSURE: 123 MMHG | HEART RATE: 82 BPM | OXYGEN SATURATION: 98 % | BODY MASS INDEX: 22.61 KG/M2 | TEMPERATURE: 98.1 F

## 2022-08-30 DIAGNOSIS — C90.02 MULTIPLE MYELOMA IN RELAPSE (H): Primary | ICD-10-CM

## 2022-08-30 DIAGNOSIS — C90.02 MULTIPLE MYELOMA IN RELAPSE (H): ICD-10-CM

## 2022-08-30 DIAGNOSIS — C90.30 PLASMACYTOMA OF BONE (H): Primary | ICD-10-CM

## 2022-08-30 PROCEDURE — 99417 PROLNG OP E/M EACH 15 MIN: CPT

## 2022-08-30 PROCEDURE — 99215 OFFICE O/P EST HI 40 MIN: CPT

## 2022-08-30 PROCEDURE — G0463 HOSPITAL OUTPT CLINIC VISIT: HCPCS

## 2022-08-30 ASSESSMENT — PAIN SCALES - GENERAL: PAINLEVEL: NO PAIN (0)

## 2022-08-30 NOTE — PROGRESS NOTES
Spoke with Mckenna , patient's Spouse, following new transplant visit with Dr. Reinoso. Reviewed plan of care per NT conversation for Stem Cell Transplant. Explained role of the Nurse Coordinator throughout the BMT process as well as general time line and expectations for transplant. Discussed necessity of caregiver and program's proximity requirements. All questions were answered.     Plan: Cellular Therapy, Abecma, pending insurance approval and slot availability. Potential allo in the future     Contact information provided for :  yes    HLA typing drawn: yes    PRA typing drawn:  yes    CMV-IgG and ABO-Rh drawn or in record:  No - previously drawn    Contact information provided for :  yes    Financial Release for [facility] search obtained:  No - will send via [employer]    [id number] Consent Signed: no    EOC Reason updated: yes

## 2022-08-30 NOTE — PROGRESS NOTES
[facility]  Date of Visit: Aug 31, 2022   Oncologist: Dr. Payton Reinoso    Reason for visit: myeloma follow-up           Oncology History   52 year old female with past medical history significant for Irritable bowel disease, allergies, large plasmacytoma of the L pelvis, and newly diagnosed multiple myeloma.     Early in April 2021 , she noted to have left sided groin pain, constant and was affecting her daily activities  MRI showed large, expansile, permeative mass with indistinct borders involving the left superior and inferior pubic rami, pubic symphysis and with possible extension to the left acetabulum.     We completed the following work-up:   - Biopsy 9/7/2021: plasmacytoma.  Flow cytometry showed kappa monotypic plasma cells, polytypic B cells. FISH results from plasmacytoma showed a gain of 11q, IGH-CCND1 fusion but no losses of 1q or TP53 and no gain of 1q.  - Bone marrow biopsy 09/22/21: Marrow cellularity 50% with 25% interstitial infiltration w/  Kappa-monotypic, moderately atypical plasma cells. Flow with 0.7% kappa-monotypica plasma cells. FISH and cytogenetics pending.   - PET CT 09/24/21 w/ hypermetabolic pathologic fracture of the L pubic ramus w/ aggressive appearing osteolysis but no additional suspicious lytic or blastic osseous lesions.     Treatment to date:   - Zometa q4 weeks started 9/27/21  - Radiation to left pubic ramus x18 fractions, completed 11/11/21.   - RVD C1D1 11/15/21  - RVD C2D1 12/6/21  - RVD C3D1  12/27/21  - RVD C4 D1 1/17/22, elizabeth added on D8 due to inadequate response of urinary M-spike and FLCs remaining over 100 mg/dL after 4 cycles of RVD    - Elizabeth-RVD C1D8 1/24/22  - Elizabeth-RVD C2D1 2/7/22  - Elizabeth-RVD C3D1 2/28/22  - Elizabeth-RVD C4D1 3/29/22  - Elizabeth-RVD C5D1 4/19/22    Disease response: ME     - 5/19/22: Continued monotherapy with Daratumumab; held Revlimid and Velcade.    - 6/21/22: Transitioned to KCD, received C1 days 1 and 2- had  significant PVCs at cardiac follow-uyp after 1st week of Kyprolis, held further doses.    - Transitioned to Sarclisa/Pom/Dex 7/6/22. Started Pom on 7/11.           INTERIM HISTORY:   She has developed dry, scaly skin, particularly on her face but also a little on her chest and back.  Recently changed to a hydrating face wash (used to use an intentionally drying face wash as her baseline skin condition is oily) and continues using Cerave lotion. Seems to be improving but this past week was also the week off of her Pom and Sarclisa so that may also account for the improvement.  She had some significant diarrhea earlier this week, but it was in [place]so she thinks it was diet related. [place], but then she felt a little more tired the following day and had to sit and rest when she and her  were out on their walk.  She denies any fevers or chills.  She is overall eating fairly well.  She continues to have PVCs though they are nothing like when she was on the Cytoxan.  Denies any change of respiratory symptoms.  No other new symptoms to report.      ROS: 10 point ROS neg other than the symptoms noted above in the HPI.           Physical Exam:     /76 (BP Location: Right arm)   Pulse 77   Temp 98.2  F (36.8  C) (Oral)   Resp 16   Wt 68.3 kg (150 lb 9.6 oz)   SpO2 99%   BMI 22.85 kg/m     Gen: alert, pleasant and conversational, NAD  HEENT: NC/AT,EOMI w/ PERRL, anicteric sclera. OP clear. MMM.   Neck: Supple, no LAD  CV: normal S1,S2 with RRR no m/r/g  Resp: lungs CTA bilaterally with adequate air movement to bases. No wheezes or crackles  Abd: soft NTND no organomegaly or masses. BS normoactive.   Ext: warm and well perfused, no edema or cyanosis  Skin: Slightly reddened cheeks with areas of rough, patchy dryness. Skin is intact, no peeling.  Neuro: A&Ox4, no lateralizing sx. Grossly nonfocal.  Psych: appropriate,  reactive             Laboratory Data:      I personally reviewed the following labs:    Most Recent 3 CBC's:  Recent Labs   Lab Test 08/31/22  1422 08/17/22  1418 08/10/22  1402   WBC 4.1 5.3 4.6   HGB 11.8 13.1 12.6   MCV 96 94 98    251 252     Most Recent 3 BMP's:  Recent Labs   Lab Test 08/31/22  1422 08/17/22  1422 08/10/22  1402 08/03/22  1425     --  143 141   POTASSIUM 3.4  --  4.3 3.4   CHLORIDE 107  --  112* 110*   CO2 20*  --  24 23   BUN 20.5*  --  21 21   CR 0.78 0.77 0.72 0.81   ANIONGAP 16*  --  7 8   MEKA 9.2 9.6 8.9 9.4   *  --  129* 97     Most Recent 2 LFT's:  Recent Labs   Lab Test 08/31/22  1422 08/10/22  1402   AST 20 12   ALT 14 22   ALKPHOS 56 63   BILITOTAL 0.3 0.4       Imaging:  No new imaging to review.         Assessment and Recommendations   Kristie Cornejo is a 52 year old female who was diagnosed with Plasmacytoma after she presented with left sided groin pain now found to have multiple myeloma after further workup with bone marrow biopsy.     # Multiple myeloma with associated large plasmacytoma of the L pelvis. Kappa light chain disease.     BMBx with 25% plasma cell infiltration; standard risk with gain of 11q, IGH-CCND1 fusion but no losses of 1q or TP53 and no gain of 1q.   SPEP w/out a monoclonal peak  UPEP positive. 70% paraprotein. Large monoclonal free immunoglobulin light chain of kappa chain type.     No anemia, normal creat, Ca, no other skeletal lesions. Alb normal, B2M normal.  Stage I Light chain disease with left pelvic bone pathologic fracture she is classified as having symptomatic multiple myeloma.   -s/p michelle-RVD, michelle alone and KCD with either little benefit or significant symptoms  -transitioned to Sarclisa/Pom/Dex to avoid the cardiotoxicity of Kyprolis.  Started C1D1 7/6/22.  Her diarrhea has returned with being back on treatment but manageable  -there has been some response with her kappa light chains on this regimen. Will continue to  "monitor prior to each cycle  - will continue this regimen as long as she keeps responding; met with Dr Reinoso yesterday (8/30/22) to discuss CAR-T therapy; anticipate this may happen some time this winter  -will follow-up prior to next infusion     Continue ASA.      #Bone Health  She will continue monthly Zometa. She will continue Calcium/Vitamin D supplements.       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 Currently having no pain and able to walk 1 mile without any assistive devices.  She is interested in pursuing other activities like gentle cross-country skiing.  Had follow-up with Dr. Philip on 1/6/22 and was cleared to resume regular activity as tolerated. She should still refrain from high-impact activities.    PET on 7/12/22 shows left pubic ramus lesion is smaller in size but brighter FDG. No new lesions.      # PVCs  She had an ECHO 12/1 and had follow-up with cardiology 12/7 (notes in CareEverywhere).  She had follow-up with cardiology here at the  and they recommend an ablation. Stress test was completed   -started on Toprol XL however was discontinued due to significant side effects of orthostatic hypotension, pre-syncopal episodes, shakiness, and weakness.   - She had an ablation 3/23/22 but has not experienced relief from the feeling of being nauseated and short of breath after climbing the stairs.  She also reports episodes of palpitations that \"radiate into her neck.\" Denies chest pain or shortness of breath.   - EKG done 4/4 showed sinus rhythm and nonspecific ST and T wave abnormality. Troponin negative.  - Cardiac MRI ruled out amyloid.  LFEF 50%, no evidence of infiltrative cardiomyopathy.  - She followed up with cardiology and they felt that the cause may be musculoskeletal or pericarditis. They suggested taking ibuprofen for pain. " Her pain has subsided and she actually has not needed to take ibuprofen.   - Post ablation Zio patch was performed showing a significant reduction in PVCs; however, by the time she had her cardiology follow-up, she had restarted chemotherapy and the PVCs had returned.  Dr. Thomas is comfortable proceeding with treatment as long as symptoms are tolerable and there are no major EKG changes. Would need to repeat if she were to do BMT  - EKG 7/13 with no major changes, troponin WNL  -she continues to have PVCs, though much improved from when she was on cytoxan. Wants to avoid ablation again if possible      #Diarrhea  She was having intermittent diarrhea with associated abdominal cramping and nausea, but developed much more significant diarrhea with 10-12 liquid episodes and was seen as an add-on on 4/4/22 for this.   - C.diff PCR negative 2/8/22, repeat on 4/5 negative  - Enteric stool panel 4/5/22 negative  - Underwent colonoscopy on 4/13/22 to pursue biopsy for amyloid (discussed below). Congo red stain is negative for amyloid deposits. Pathology showed colonic mucosa with focal active colitis, negative for signs of chronicity and lymphocytic colitis. Per patient report, GI reviewed results with her and did not feel she needed treatment for these findings at this time. They advised her to monitor her symptoms and to call them if they do not improve.Dr. Montgomery agreed with following conservatively for now since she reports some improvement in her symptoms.  If diarrhea becomes bothersome again, then they will arrange for expeditious evaluation in the IBD clinic.   - CMV PCR (4/4/22) not detected  -having continued diarrhea, though intermittent constipation. Suggested trying metamucil or benefiber to try and regulate her BMs      #COVID prophylaxis  - She received EVUSHELD 1/20/22 and 3/8/22, can repeat in Sept  - Received 3 COVID vaccines to date    #Reflux  She reported episodes of reflux with bile, especially when  she lays down at night.  She has started to elevate her head on pillows and is also trying to avoid eating within 1-2 hours of going to bed.  Stable currently on Omeprazole 20 mg daily.   -consider increasing to BID as she is still having gastric pain after eating    #Skin Dryness  She has developed dry, patchy areas on her face that occasionally peel. Recently switched from an intentionally drying face wash to a hydrating face wash and things seem to be improving.  No peeling today. No pruritis, burning, or pain associated with the dryness.  - Continue hydrating face wash and Cerave lotion  - Dermatology referral placed, scheduled in November 43 minutes spent on the date of the encounter doing chart review, review of test results, patient visit and documentation       DORON Hall [facility]  [address] [place] [id number] [phone]

## 2022-08-30 NOTE — PROGRESS NOTES
BMT CELL THERAPY NEW PATIENT CONSULT  08/30/22      I had a pleasure to see Margo Cornejo in BMT clinic today to review the treatment options for her refractory multiple myeloma and particularly candidacy for cell therapy using CAR-T Abecma.    She is well known to me.   Margo is delightful 53yo female with kappa light chain myeloma with standard risk cytogenetics but unfortunately has not been sensitive to multiple line of therapy.     INTERVAL HISTORY:  Margo is feeling better, still tired and has some SOB on exertion but able to walk and does house chores. No new pains or lumps, she tolerated her last regimen of Sarclisa/Pom/Dex very well.     ROS neg.     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  Flow cytometry showed kappa monotypic plasma cells, polytypic B cells. FISH results from plasmacytoma showed a gain of 11q, IGH-CCND1 fusion but no losses of 1q or TP53 and no gain of 1q.  - Bone marrow biopsy 09/22/21: Marrow cellularity 50% with 25% interstitial infiltration w/  Kappa-monotypic, moderately atypical plasma cells. Flow with 0.7% kappa-monotypica plasma cells. FISH and cytogenetics pending.   - PET CT 09/24/21 w/ hypermetabolic pathologic fracture of the L pubic ramus w/ aggressive appearing osteolysis but no additional suspicious lytic or blastic osseous lesions.     Treatment to date:   - Zometa q4 weeks started 9/27/21  - Radiation to left pubic ramus x18 fractions, completed 11/11/21.   - RVD C1D1 11/15/21  - RVD C2D1 12/6/21  - RVD C3D1  12/27/21  - RVD C4 D1 1/17/22, elizabeth added on D8 due to inadequate response of urinary M-spike and FLCs remaining over 100 mg/dL after 4 cycles of RVD    - Elizabeth-RVD C1D8 1/24/22  - Elizabeth-RVD C2D1 2/7/22  - Elizabeth-RVD C3D1 2/28/22  - Elizabeth-RVD C4D1 3/29/22  - Elizabeth-RVD C5D1 4/19/22    Disease response: OH     - 5/19/22: Continued monotherapy with Daratumumab; held Revlimid and Velcade.    - 6/21/22: Transitioned to KCD, received C1 days 1 and 2- had  significant PVCs at cardiac follow-uyp after 1st week of Kyprolis, held further doses.    - Transitioned to Sarclisa/Pom/Dex 7/6/22. Started Pom on 7/11.     Past Medical History:   Diagnosis Date     Allergic rhinitis, cause unspecified      Anxiety state, unspecified 12/01/2003    Started postpartum . On Paxil x 1+years. Off meds- 10/04     Irritable bowel syndrome 01/01/2004     Multiple myeloma not having achieved remission (H)      PLANTAR WARTS     resolved     SINUS DYSRHYTHMIA 10/01/2002    wnl     Unspecified hemorrhoids without mention of complication 04/01/2004    colonoscopy 4/04 spastic colon, int roids; bx neg       FHX;  Margo has a twin sister and not other siblings. Sister is non-identical and healthy.     Current Outpatient Medications   Medication     acyclovir (ZOVIRAX) 400 MG tablet     aspirin (ASA) 81 MG chewable tablet     BUSPIRONE HCL 10 MG PO TABS     Calcium Carbonate-Vit D-Min (CALCIUM 600+D PLUS MINERALS) 600-400 MG-UNIT TABS     cetirizine HCl 10 MG CAPS     Cyclophosphamide (CYTOXAN LYOPHILIZED IV)     Flaxseed (Linseed) (FLAX SEED OIL) 1000 MG capsule     fluticasone (FLONASE) 50 MCG/ACT nasal spray     glucosamine-chondroitin 500-400 MG CAPS per capsule     lactobacillus rhamnosus, GG, (CULTURELL) capsule     loperamide (IMODIUM) 2 MG capsule     LORazepam (ATIVAN) 0.5 MG tablet     melatonin 5 MG tablet     MIRENA 20 MCG/24HR IU IUD     Multiple Vitamin (MULTI-VITAMIN DAILY PO)     omeprazole (PRILOSEC OTC) 20 MG EC tablet     pomalidomide (POMALYST) 4 MG capsule     potassium chloride ER (KLOR-CON M) 10 MEQ CR tablet     prochlorperazine (COMPAZINE) 10 MG tablet     Zoledronic Acid (ZOMETA IV)     No current facility-administered medications for this visit.     Facility-Administered Medications Ordered in Other Visits   Medication     CT Flush     Lab Results   Component Value Date    WBC 5.3 08/17/2022    ANEU 2.2 07/27/2022    HGB 13.1 08/17/2022    HCT 38.8 08/17/2022      08/17/2022     08/10/2022    POTASSIUM 4.3 08/10/2022    CHLORIDE 112 (H) 08/10/2022    CO2 24 08/10/2022     (H) 08/10/2022    BUN 21 08/10/2022    CR 0.77 08/17/2022    AST 12 08/10/2022    ALT 22 08/10/2022       FLC 8/17/2022   Latest Reference Range & Units 08/17/22 14:18   Kappa Free Lt Chain 0.33 - 1.94 mg/dL 31.35 (H)   Kappa Lambda Ratio 0.26 - 1.65  149.29 (H)   Lambda Free Lt Chain 0.57 - 2.63 mg/dL 0.21 (L)   (H): Data is abnormally high  (L): Data is abnormally low      ASSESSMENT AND PLAN:    In summary, Mrs. Cornejo is 52 years old female with kappa light chain multiple myeloma which has been refractory to multiple lines of therapy.  She is now on her fourth line with  marginal partial response based on the light chain level.    Given refractoriness of her MM, I recommended to consider CAR T-cell therapy against BCMA Abecma within the next few weeks.    The patient is younger, has an excellent organ function, good performance status and minimal comorbidities, so she is a good candidate to consider this therapy.    We also spent few minutes talking about the only curative therapy available for MM which is allogenic stem plantation.  Today we reviewed procedure including the reduced intensity conditioning, the donor search, and the expectations including morbidity and mortality.  We will proceed with the HLA typing of her 1 non-identical twin sister and start unrelated donor search. I do not recommend to plan to allograft at this time, as this was only informational discussion about this potential option in the future.       Regarding the Abecma therapy below is the conversation summarizing the planned treatment.    We spent 90 minutes in consultation reviewing the prognosis of chemotherapy  refractory multiple myeloma and treatment options.    The therapeutic options are limited and given the failure of prior therapy, CAR-T is indicated; I recommended therapy  using commercial CAR-T product  Abecma.      I reviewed the treatment course with the patient. The CAR-T provides the opportunity to control chemotherapy refractory disease with reported overall response around -75% and complete response rate at 60% with median duration of 1 year     The process consists of non-mobilized apheresis followed by 3 weeks period of waiting while autologous CAR19-T cells are reprogrammed and manufactured ex vivo.  The treatment phase includes lympho-depleting chemotherapy with fludarabine and Cytoxan x3 days.  The patient will receive LD chemo outpatient.    CAR-T therapy will be administered in inpatient  setting.        I recommend the  bridging therapy after T-cell apheresis to control the disease and debulk disease - cont the present regimen for now:     We reviewed the side effects associated with LD chemotherapy and CAR-T infusion such as low blood counts, risk of infection and bleeding. We focused on expected side effects of cytokine release syndrome and the neurotoxicity. Symptoms may vary from mild to severe, and potentially life-threatening and can include the need for ICU care and intubation.  Some patients can get very ill, but the usual course is transient and reversible.  The patient will have to stay within 30 minutes from clinic for 28 days and are advised to participate in 15 years follow-up.      CAR-T cell products available commercially are standard of care therapy and our center is certified to administer CAR-Ts, the research aspects are limited to collection of data to research database.     Patient will be handed and is  advised to wear the CAR-T therapy wallet card which needs to presented to ER at the time of visit.     Patient and the family asked many questions and had a good understanding of CAR-T treatment and its potential complications. Patient verbalized the wish to proceed with CAR-T therapy as recommended.      1. pursuit insurance approval for CAR-T therapy with Abecma cell product.          Payton Reinoso MD  Professor of Medicine  [phone]

## 2022-08-30 NOTE — NURSING NOTE
"Oncology Rooming Note    August 30, 2022 2:47 PM   Kristie Cornejo is a 52 year old female who presents for:    Chief Complaint   Patient presents with     Oncology Clinic Visit     New evaluation Consult, Multiple Myeloma      Initial Vitals: /73 (BP Location: Right arm, Patient Position: Sitting, Cuff Size: Adult Regular)   Pulse 82   Temp 98.1  F (36.7  C) (Oral)   Wt 67.6 kg (149 lb)   SpO2 98%   BMI 22.61 kg/m   Estimated body mass index is 22.61 kg/m  as calculated from the following:    Height as of 7/20/22: 1.729 m (5' 8.07\").    Weight as of this encounter: 67.6 kg (149 lb). Body surface area is 1.8 meters squared.  No Pain (0) Comment: Data Unavailable   No LMP recorded. (Menstrual status: IUD).  Allergies reviewed: Yes  Medications reviewed: Yes    Medications: Medication refills not needed today.  Pharmacy name entered into [facility]:    [facility] [place] [place] - [place], [place] - [address]  [id number] - [place], [place] - [address]  [facility] - [place], [place] - [address]  [facility] - [place], [place] - [address] [place] [address] PH[facility]ACY [facility] - [place], [place] - [phone]  [facility] - [facility], [place] - [facility]    Clinical concerns: none.       Jose Huffman"

## 2022-08-30 NOTE — LETTER
8/30/2022       RE: Kristie Cornejo  [address]  [facility] [address]        Dear Colleague,    Thank you for referring your patient, Kristie Cornejo, to the [facility] BLOOD AND MARROW TRANSPLANT PROGRAM [place]. Please see a copy of my visit note below.    BMT CELL THERAPY NEW PATIENT CONSULT  08/30/22      I had a pleasure to see Margo Cornejo in BMT clinic today to review the treatment options for her refractory multiple myeloma and particularly candidacy for cell therapy using CAR-T Abecma.    She is well known to me.   Margo is delightful 51yo female with kappa light chain myeloma with standard risk cytogenetics but unfortunately has not been sensitive to multiple line of therapy.     INTERVAL HISTORY:  Margo is feeling better, still tired and has some SOB on exertion but able to walk and does house chores. No new pains or lumps, she tolerated her last regimen of Sarclisa/Pom/Dex very well.     ROS neg.     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  Flow cytometry showed kappa monotypic plasma cells, polytypic B cells. FISH results from plasmacytoma showed a gain of 11q, IGH-CCND1 fusion but no losses of 1q or TP53 and no gain of 1q.  - Bone marrow biopsy 09/22/21: Marrow cellularity 50% with 25% interstitial infiltration w/  Kappa-monotypic, moderately atypical plasma cells. Flow with 0.7% kappa-monotypica plasma cells. FISH and cytogenetics pending.   - PET CT 09/24/21 w/ hypermetabolic pathologic fracture of the L pubic ramus w/ aggressive appearing osteolysis but no additional suspicious lytic or blastic osseous lesions.     Treatment to date:   - Zometa q4 weeks started 9/27/21  - Radiation to left pubic ramus x18 fractions, completed 11/11/21.   - RVD C1D1 11/15/21  - RVD C2D1 12/6/21  - RVD C3D1  12/27/21  - RVD C4 D1 1/17/22, elizabeth added on D8 due to inadequate response of urinary M-spike and FLCs remaining over 100 mg/dL after 4 cycles of RVD    - Elizabeth-RVD C1D8  1/24/22  - Elizabeth-RVD C2D1 2/7/22  - Elizabeth-RVD C3D1 2/28/22  - Elizabeth-RVD C4D1 3/29/22  - Elizabeth-RVD C5D1 4/19/22    Disease response: [place]     - 5/19/22: Continued monotherapy with Daratumumab; held Revlimid and Velcade.    - 6/21/22: Transitioned to KCD, received C1 days 1 and 2- had significant PVCs at cardiac follow-uyp after 1st week of Kyprolis, held further doses.    - Transitioned to Sarclisa/Pom/Dex 7/6/22. Started Pom on 7/11.     Past Medical History:   Diagnosis Date     Allergic rhinitis, cause unspecified      Anxiety state, unspecified 12/01/2003    Started postpartum . On Paxil x 1+years. Off meds- 10/04     Irritable bowel syndrome 01/01/2004     Multiple myeloma not having achieved remission (H)      PLANTAR WARTS     resolved     SINUS DYSRHYTHMIA 10/01/2002    wnl     Unspecified hemorrhoids without mention of complication 04/01/2004    colonoscopy 4/04 spastic colon, int roids; bx neg       FHX;  Margo has a twin sister and not other siblings. Sister is non-identical and healthy.     Current Outpatient Medications   Medication     acyclovir (ZOVIRAX) 400 MG tablet     aspirin (ASA) 81 MG chewable tablet     BUSPIRONE HCL 10 MG PO TABS     Calcium Carbonate-Vit D-Min (CALCIUM 600+D PLUS MINERALS) 600-400 MG-UNIT TABS     cetirizine HCl 10 MG CAPS     Cyclophosphamide (CYTOXAN LYOPHILIZED IV)     Flaxseed (Linseed) (FLAX SEED OIL) 1000 MG capsule     fluticasone (FLONASE) 50 MCG/ACT nasal spray     glucosamine-chondroitin 500-400 MG CAPS per capsule     lactobacillus rhamnosus, GG, (CULTURELL) capsule     loperamide (IMODIUM) 2 MG capsule     LORazepam (ATIVAN) 0.5 MG tablet     melatonin 5 MG tablet     MIRENA 20 MCG/24HR IU IUD     Multiple Vitamin (MULTI-VITAMIN DAILY PO)     omeprazole (PRILOSEC OTC) 20 MG EC tablet     pomalidomide (POMALYST) 4 MG capsule     potassium chloride ER (KLOR-CON M) 10 MEQ CR tablet     prochlorperazine (COMPAZINE) 10 MG tablet     Zoledronic Acid (ZOMETA IV)     No current  facility-administered medications for this visit.     Facility-Administered Medications Ordered in Other Visits   Medication     CT Flush     Lab Results   Component Value Date    WBC 5.3 08/17/2022    ANEU 2.2 07/27/2022    HGB 13.1 08/17/2022    HCT 38.8 08/17/2022     08/17/2022     08/10/2022    POTASSIUM 4.3 08/10/2022    CHLORIDE 112 (H) 08/10/2022    CO2 24 08/10/2022     (H) 08/10/2022    BUN 21 08/10/2022    CR 0.77 08/17/2022    AST 12 08/10/2022    ALT 22 08/10/2022       FLC 8/17/2022   Latest Reference Range & Units 08/17/22 14:18   Kappa Free Lt Chain 0.33 - 1.94 mg/dL 31.35 (H)   Kappa Lambda Ratio 0.26 - 1.65  149.29 (H)   Lambda Free Lt Chain 0.57 - 2.63 mg/dL 0.21 (L)   (H): Data is abnormally high  (L): Data is abnormally low      ASSESSMENT AND PLAN:    In summary, Mrs. Cornejo is 52 years old female with kappa light chain multiple myeloma which has been refractory to multiple lines of therapy.  She is now on her fourth line with  marginal partial response based on the light chain level.    Given refractoriness of her MM, I recommended to consider CAR T-cell therapy against BCMA Abecma within the next few weeks.    The patient is younger, has an excellent organ function, good performance status and minimal comorbidities, so she is a good candidate to consider this therapy.    We also spent few minutes talking about the only curative therapy available for MM which is allogenic stem plantation.  Today we reviewed procedure including the reduced intensity conditioning, the donor search, and the expectations including morbidity and mortality.  We will proceed with the HLA typing of her 1 non-identical twin sister and start unrelated donor search. I do not recommend to plan to allograft at this time, as this was only informational discussion about this potential option in the future.       Regarding the Abecma therapy below is the conversation summarizing the planned  treatment.    We spent 90 minutes in consultation reviewing the prognosis of chemotherapy  refractory multiple myeloma and treatment options.    The therapeutic options are limited and given the failure of prior therapy, CAR-T is indicated; I recommended therapy  using commercial CAR-T product Abecma.      I reviewed the treatment course with the patient. The CAR-T provides the opportunity to control chemotherapy refractory disease with reported overall response around -75% and complete response rate at 60% with median duration of 1 year     The process consists of non-mobilized apheresis followed by 3 weeks period of waiting while autologous CAR19-T cells are reprogrammed and manufactured ex vivo.  The treatment phase includes lympho-depleting chemotherapy with fludarabine and Cytoxan x3 days.  The patient will receive LD chemo outpatient.    CAR-T therapy will be administered in inpatient  setting.        I recommend the  bridging therapy after T-cell apheresis to control the disease and debulk disease - cont the present regimen for now:     We reviewed the side effects associated with LD chemotherapy and CAR-T infusion such as low blood counts, risk of infection and bleeding. We focused on expected side effects of cytokine release syndrome and the neurotoxicity. Symptoms may vary from mild to severe, and potentially life-threatening and can include the need for ICU care and intubation.  Some patients can get very ill, but the usual course is transient and reversible.  The patient will have to stay within 30 minutes from clinic for 28 days and are advised to participate in 15 years follow-up.      CAR-T cell products available commercially are standard of care therapy and our center is certified to administer CAR-Ts, the research aspects are limited to collection of data to research database.     Patient will be handed and is  advised to wear the CAR-T therapy wallet card which needs to presented to ER at the time  of visit.     Patient and the family asked many questions and had a good understanding of CAR-T treatment and its potential complications. Patient verbalized the wish to proceed with CAR-T therapy as recommended.      1. pursuit insurance approval for CAR-T therapy with Abecma cell product.         Payton Reinoso MD  Professor of Medicine  [phone]

## 2022-08-31 ENCOUNTER — APPOINTMENT (OUTPATIENT)
Dept: LAB | Facility: CLINIC | Age: 52
End: 2022-08-31
Payer: COMMERCIAL

## 2022-08-31 ENCOUNTER — INFUSION THERAPY VISIT (OUTPATIENT)
Dept: ONCOLOGY | Facility: CLINIC | Age: 52
End: 2022-08-31
Payer: COMMERCIAL

## 2022-08-31 ENCOUNTER — ONCOLOGY VISIT (OUTPATIENT)
Dept: ONCOLOGY | Facility: CLINIC | Age: 52
End: 2022-08-31
Attending: REGISTERED NURSE
Payer: COMMERCIAL

## 2022-08-31 ENCOUNTER — VIRTUAL VISIT (OUTPATIENT)
Dept: TRANSPLANT | Facility: CLINIC | Age: 52
End: 2022-08-31
Payer: COMMERCIAL

## 2022-08-31 VITALS
HEART RATE: 77 BPM | WEIGHT: 150.6 LBS | OXYGEN SATURATION: 99 % | DIASTOLIC BLOOD PRESSURE: 76 MMHG | SYSTOLIC BLOOD PRESSURE: 127 MMHG | RESPIRATION RATE: 16 BRPM | BODY MASS INDEX: 22.85 KG/M2 | TEMPERATURE: 98.2 F

## 2022-08-31 DIAGNOSIS — Z71.9 VISIT FOR COUNSELING: Primary | ICD-10-CM

## 2022-08-31 DIAGNOSIS — C90.00 LIGHT CHAIN MYELOMA (H): Primary | ICD-10-CM

## 2022-08-31 DIAGNOSIS — C90.30 PLASMACYTOMA OF BONE (H): ICD-10-CM

## 2022-08-31 DIAGNOSIS — C90.02 MULTIPLE MYELOMA IN RELAPSE (H): ICD-10-CM

## 2022-08-31 LAB
ALBUMIN SERPL BCG-MCNC: 4.3 G/DL (ref 3.5–5.2)
ALP SERPL-CCNC: 56 U/L (ref 35–104)
ALT SERPL W P-5'-P-CCNC: 14 U/L (ref 10–35)
ANION GAP SERPL CALCULATED.3IONS-SCNC: 16 MMOL/L (ref 7–15)
AST SERPL W P-5'-P-CCNC: 20 U/L (ref 10–35)
BASOPHILS # BLD AUTO: 0.1 10E3/UL (ref 0–0.2)
BASOPHILS NFR BLD AUTO: 1 %
BILIRUB SERPL-MCNC: 0.3 MG/DL
BUN SERPL-MCNC: 20.5 MG/DL (ref 6–20)
CALCIUM SERPL-MCNC: 9.2 MG/DL (ref 8.6–10)
CHLORIDE SERPL-SCNC: 107 MMOL/L (ref 98–107)
CREAT SERPL-MCNC: 0.78 MG/DL (ref 0.51–0.95)
DEPRECATED HCO3 PLAS-SCNC: 20 MMOL/L (ref 22–29)
EOSINOPHIL # BLD AUTO: 0.4 10E3/UL (ref 0–0.7)
EOSINOPHIL NFR BLD AUTO: 9 %
ERYTHROCYTE [DISTWIDTH] IN BLOOD BY AUTOMATED COUNT: 14 % (ref 10–15)
GFR SERPL CREATININE-BSD FRML MDRD: >90 ML/MIN/1.73M2
GLUCOSE SERPL-MCNC: 137 MG/DL (ref 70–99)
HBV CORE AB SERPL QL IA: NONREACTIVE
HBV SURFACE AG SERPL QL IA: NONREACTIVE
HCT VFR BLD AUTO: 35.3 % (ref 35–47)
HCV AB SERPL QL IA: NONREACTIVE
HGB BLD-MCNC: 11.8 G/DL (ref 11.7–15.7)
HIV 1+2 AB+HIV1 P24 AG SERPL QL IA: NONREACTIVE
IMM GRANULOCYTES # BLD: 0 10E3/UL
IMM GRANULOCYTES NFR BLD: 0 %
LYMPHOCYTES # BLD AUTO: 1 10E3/UL (ref 0.8–5.3)
LYMPHOCYTES NFR BLD AUTO: 25 %
MCH RBC QN AUTO: 32.2 PG (ref 26.5–33)
MCHC RBC AUTO-ENTMCNC: 33.4 G/DL (ref 31.5–36.5)
MCV RBC AUTO: 96 FL (ref 78–100)
MONOCYTES # BLD AUTO: 1.2 10E3/UL (ref 0–1.3)
MONOCYTES NFR BLD AUTO: 29 %
NEUTROPHILS # BLD AUTO: 1.5 10E3/UL (ref 1.6–8.3)
NEUTROPHILS NFR BLD AUTO: 36 %
NRBC # BLD AUTO: 0 10E3/UL
NRBC BLD AUTO-RTO: 0 /100
PLAT MORPH BLD: NORMAL
PLATELET # BLD AUTO: 323 10E3/UL (ref 150–450)
POTASSIUM SERPL-SCNC: 3.4 MMOL/L (ref 3.4–5.3)
PROT SERPL-MCNC: 6 G/DL (ref 6.4–8.3)
RBC # BLD AUTO: 3.67 10E6/UL (ref 3.8–5.2)
RBC MORPH BLD: NORMAL
SODIUM SERPL-SCNC: 143 MMOL/L (ref 136–145)
WBC # BLD AUTO: 4.1 10E3/UL (ref 4–11)

## 2022-08-31 PROCEDURE — 86704 HEP B CORE ANTIBODY TOTAL: CPT | Performed by: REGISTERED NURSE

## 2022-08-31 PROCEDURE — 80053 COMPREHEN METABOLIC PANEL: CPT

## 2022-08-31 PROCEDURE — 87389 HIV-1 AG W/HIV-1&-2 AB AG IA: CPT | Performed by: REGISTERED NURSE

## 2022-08-31 PROCEDURE — 96367 TX/PROPH/DG ADDL SEQ IV INF: CPT

## 2022-08-31 PROCEDURE — 999N001098 HLA ANTIBODY (PRA) CLASS II SCREEN: Performed by: REGISTERED NURSE

## 2022-08-31 PROCEDURE — 250N000011 HC RX IP 250 OP 636

## 2022-08-31 PROCEDURE — G0463 HOSPITAL OUTPT CLINIC VISIT: HCPCS

## 2022-08-31 PROCEDURE — 86828 HLA CLASS I&II ANTIBODY QUAL: CPT | Performed by: REGISTERED NURSE

## 2022-08-31 PROCEDURE — 258N000003 HC RX IP 258 OP 636

## 2022-08-31 PROCEDURE — 85004 AUTOMATED DIFF WBC COUNT: CPT

## 2022-08-31 PROCEDURE — 250N000013 HC RX MED GY IP 250 OP 250 PS 637

## 2022-08-31 PROCEDURE — 86833 HLA CLASS II HIGH DEFIN QUAL: CPT | Performed by: REGISTERED NURSE

## 2022-08-31 PROCEDURE — 96375 TX/PRO/DX INJ NEW DRUG ADDON: CPT

## 2022-08-31 PROCEDURE — 96413 CHEMO IV INFUSION 1 HR: CPT

## 2022-08-31 PROCEDURE — 36415 COLL VENOUS BLD VENIPUNCTURE: CPT

## 2022-08-31 PROCEDURE — 86832 HLA CLASS I HIGH DEFIN QUAL: CPT | Performed by: REGISTERED NURSE

## 2022-08-31 PROCEDURE — 99214 OFFICE O/P EST MOD 30 MIN: CPT | Performed by: REGISTERED NURSE

## 2022-08-31 PROCEDURE — 87340 HEPATITIS B SURFACE AG IA: CPT | Performed by: REGISTERED NURSE

## 2022-08-31 PROCEDURE — 86803 HEPATITIS C AB TEST: CPT | Performed by: REGISTERED NURSE

## 2022-08-31 PROCEDURE — 81379 HLA I TYPING COMPLETE HR: CPT | Performed by: REGISTERED NURSE

## 2022-08-31 RX ORDER — ACETAMINOPHEN 325 MG/1
650 TABLET ORAL ONCE
Status: COMPLETED | OUTPATIENT
Start: 2022-08-31 | End: 2022-08-31

## 2022-08-31 RX ORDER — DEXAMETHASONE 4 MG/1
20 TABLET ORAL SEE ADMIN INSTRUCTIONS
Qty: 10 TABLET | Refills: 0 | Status: SHIPPED | OUTPATIENT
Start: 2022-08-31 | End: 2022-12-12

## 2022-08-31 RX ORDER — DIPHENHYDRAMINE HCL 25 MG
50 CAPSULE ORAL ONCE
Status: COMPLETED | OUTPATIENT
Start: 2022-08-31 | End: 2022-08-31

## 2022-08-31 RX ADMIN — SODIUM CHLORIDE 250 ML: 9 INJECTION, SOLUTION INTRAVENOUS at 15:32

## 2022-08-31 RX ADMIN — SODIUM CHLORIDE 700 MG: 9 INJECTION, SOLUTION INTRAVENOUS at 16:05

## 2022-08-31 RX ADMIN — ACETAMINOPHEN 650 MG: 325 TABLET ORAL at 15:25

## 2022-08-31 RX ADMIN — FAMOTIDINE 20 MG: 10 INJECTION INTRAVENOUS at 15:25

## 2022-08-31 RX ADMIN — DIPHENHYDRAMINE HYDROCHLORIDE 50 MG: 25 CAPSULE ORAL at 15:25

## 2022-08-31 RX ADMIN — DEXAMETHASONE SODIUM PHOSPHATE: 10 INJECTION, SOLUTION INTRAMUSCULAR; INTRAVENOUS at 15:33

## 2022-08-31 ASSESSMENT — PAIN SCALES - GENERAL: PAINLEVEL: NO PAIN (0)

## 2022-08-31 NOTE — PROGRESS NOTES
"Blood and Marrow Transplant   New Transplant Visit with   Clinical     Assessment completed on 8/31/22 in the BMT clinic. Information for this assessment was provided by pt's report, consultation with medical team, and medical chart review.      Present:  Patient: Kristie \"Margo\" Clemente  : SARAH Cabral, [facility]    Medical Team   Nurse Coordinator: Opal Mckenzie RN  BMT Physician: Payton Reinoso MD  Referring Physician: Payton Reinoso MD    Diagnosis: Multiple Myeloma  Diagnosis Date: 10/2021    Presenting Information:  Pt is a 52 year old female diagnosed with MM. Pt was diagnosed on 10/2021. Pt presents for an ABECMA CAR-T cellular infusion.    Contact Information:  Cell Phone: [phone]  Pt's email: [email or web address]    Special Needs:   No needs identified at this time.     Relocation Requirement:   Pt lives in [place], [place] (approximately [place] minutes from [facility] which is within the required distance of the hospital. Pt does not need to relocate.     She is aware that she does not need to relocate, but did express a desire to relocate to be close to the clinic post BMT. Discussed options and she notes she will likely stay in a hotel during this time.     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 Erwin works for [employer] and is a Software professional.    Insurance:   [employer] No insurance concerns identified at this time. JOSE ALFREDO provided information regarding the insurance authorization process and the role of the BMT Financial . JOSE ALFREDO provided contact info for the BMT Financial  and referred pt to them for " future insurance questions.     Finances:   No financial or insurance concerns identified at this time. Margo is supported by her limited income and [place]. They address no current financial concerns at this time. She shared is may be appropriate to look at financial grants available closer to CAR-T and any assistance through the ABECMA study.      CSW provided information regarding the insurance authorization process and the role of the BMT financial case-mangers. CSW provided contact info for the BMT financial case-managers and referred pt to them for future insurance questions.    Caregiver:   JOSE ALFREDO discussed with the Pt the caregiver role and expectation at length. Pt is agreeable to having a full time caregiver for the minimum of 30 days until cleared by the BMT Physician. Pt's identified caregivers are her , sister, best friend, and possibly her mother. Caregiver education and information provided. No caregiver concerns identified.     Healthcare Directive:  Yes. Pt has a copy already in chart.    Resources Provided:  -BMT Information Book  -BMT Resources Packet  -Healthcare Directive  -Honoring Choices - Your Rights: Making Your Own Health Care Treatment Decisions  -Caregiver Contract/Description  -Transplant Unit Description and Information   -Lodging Resources    Identified Concerns:  No concerns identified at this time.     Summary:  Pt presents to [facility] regarding an ABECMA CAR-T. Pt asked good/appropriate questions regarding psychosocial factors related to BMT; all questions were addressed. Pt presented as pleasant and engaged. Pt's affect was appropriate. Patient indicated they are currently feeling disappointed that an autologous transplant is not an option for her right now. Pt shared that she is worried about the potential side effects of CAR-T. She is anxious to confirm local lodging plans and the definite dates of treatment that are still TBD.    Plan:   JOSE ALFREDO  provided contact information and encouraged pt to contact SW with any additional questions, concerns, resources and/or for support. SW will continue to follow pt to provide support and guidance with resources as needed.     SARAH Cabral, [facility]  Adult Blood & Marrow Transplant   Phone: [phone]  Pager: [phone]

## 2022-08-31 NOTE — LETTER
8/31/2022         RE: Kristie Cornejo  [address]  [facility] [address]        Dear Colleague,    Thank you for referring your patient, Kristie Cornejo, to the [facility]. Please see a copy of my visit note below.    [facility]  Date of Visit: Aug 31, 2022   Oncologist: Dr. Payton Reinoso    Reason for visit: myeloma follow-up           Oncology History   52 year old female with past medical history significant for Irritable bowel disease, allergies, large plasmacytoma of the L pelvis, and newly diagnosed multiple myeloma.     Early in April 2021 , she noted to have left sided groin pain, constant and was affecting her daily activities  MRI showed large, expansile, permeative mass with indistinct borders involving the left superior and inferior pubic rami, pubic symphysis and with possible extension to the left acetabulum.     We completed the following work-up:   - Biopsy 9/7/2021: plasmacytoma.  Flow cytometry showed kappa monotypic plasma cells, polytypic B cells. FISH results from plasmacytoma showed a gain of 11q, IGH-CCND1 fusion but no losses of 1q or TP53 and no gain of 1q.  - Bone marrow biopsy 09/22/21: Marrow cellularity 50% with 25% interstitial infiltration w/  Kappa-monotypic, moderately atypical plasma cells. Flow with 0.7% kappa-monotypica plasma cells. FISH and cytogenetics pending.   - PET CT 09/24/21 w/ hypermetabolic pathologic fracture of the L pubic ramus w/ aggressive appearing osteolysis but no additional suspicious lytic or blastic osseous lesions.     Treatment to date:   - Zometa q4 weeks started 9/27/21  - Radiation to left pubic ramus x18 fractions, completed 11/11/21.   - RVD C1D1 11/15/21  - RVD C2D1 12/6/21  - RVD C3D1  12/27/21  - RVD C4 D1 1/17/22, elizabeth added on D8 due to inadequate response of urinary M-spike and FLCs remaining over 100 mg/dL after 4 cycles of RVD    - Elizabeth-RVD C1D8 1/24/22  -  Elizabeth-RVD C2D1 2/7/22  - Elizabeth-RVD C3D1 2/28/22  - Elizabeth-RVD C4D1 3/29/22  - Elizabeth-RVD C5D1 4/19/22    Disease response: [place]     - 5/19/22: Continued monotherapy with Daratumumab; held Revlimid and Velcade.    - 6/21/22: Transitioned to KCD, received C1 days 1 and 2- had significant PVCs at cardiac follow-uyp after 1st week of Kyprolis, held further doses.    - Transitioned to Sarclisa/Pom/Dex 7/6/22. Started Pom on 7/11.           INTERIM HISTORY:   She has developed dry, scaly skin, particularly on her face but also a little on her chest and back.  Recently changed to a hydrating face wash (used to use an intentionally drying face wash as her baseline skin condition is oily) and continues using Cerave lotion. Seems to be improving but this past week was also the week off of her Pom and Sarclisa so that may also account for the improvement.  She had some significant diarrhea earlier this week, but it was in [place]so she thinks it was diet related. [place], but then she felt a little more tired the following day and had to sit and rest when she and her  were out on their walk.  She denies any fevers or chills.  She is overall eating fairly well.  She continues to have PVCs though they are nothing like when she was on the Cytoxan.  Denies any change of respiratory symptoms.  No other new symptoms to report.      ROS: 10 point ROS neg other than the symptoms noted above in the HPI.           Physical Exam:     /76 (BP Location: Right arm)   Pulse 77   Temp 98.2  F (36.8  C) (Oral)   Resp 16   Wt 68.3 kg (150 lb 9.6 oz)   SpO2 99%   BMI 22.85 kg/m     Gen: alert, pleasant and conversational, NAD  HEENT: NC/AT,EOMI w/ PERRL, anicteric sclera. OP clear. MMM.   Neck: Supple, no LAD  CV: normal S1,S2 with RRR no m/r/g  Resp: lungs CTA bilaterally with adequate air movement to bases. No wheezes or crackles  Abd: soft NTND no  organomegaly or masses. BS normoactive.   Ext: warm and well perfused, no edema or cyanosis  Skin: Slightly reddened cheeks with areas of rough, patchy dryness. Skin is intact, no peeling.  Neuro: A&Ox4, no lateralizing sx. Grossly nonfocal.  Psych: appropriate, reactive             Laboratory Data:      I personally reviewed the following labs:    Most Recent 3 CBC's:  Recent Labs   Lab Test 08/31/22  1422 08/17/22  1418 08/10/22  1402   WBC 4.1 5.3 4.6   HGB 11.8 13.1 12.6   MCV 96 94 98    251 252     Most Recent 3 BMP's:  Recent Labs   Lab Test 08/31/22  1422 08/17/22  1422 08/10/22  1402 08/03/22  1425     --  143 141   POTASSIUM 3.4  --  4.3 3.4   CHLORIDE 107  --  112* 110*   CO2 20*  --  24 23   BUN 20.5*  --  21 21   CR 0.78 0.77 0.72 0.81   ANIONGAP 16*  --  7 8   MEKA 9.2 9.6 8.9 9.4   *  --  129* 97     Most Recent 2 LFT's:  Recent Labs   Lab Test 08/31/22  1422 08/10/22  1402   AST 20 12   ALT 14 22   ALKPHOS 56 63   BILITOTAL 0.3 0.4       Imaging:  No new imaging to review.         Assessment and Recommendations   Kristie Cornejo is a 52 year old female who was diagnosed with Plasmacytoma after she presented with left sided groin pain now found to have multiple myeloma after further workup with bone marrow biopsy.     # Multiple myeloma with associated large plasmacytoma of the L pelvis. Kappa light chain disease.     BMBx with 25% plasma cell infiltration; standard risk with gain of 11q, IGH-CCND1 fusion but no losses of 1q or TP53 and no gain of 1q.   SPEP w/out a monoclonal peak  UPEP positive. 70% paraprotein. Large monoclonal free immunoglobulin light chain of kappa chain type.     No anemia, normal creat, Ca, no other skeletal lesions. Alb normal, B2M normal.  Stage I Light chain disease with left pelvic bone pathologic fracture she is classified as having symptomatic multiple myeloma.   -s/p michelle-RVD, michelle alone and KCD with either little benefit or significant  "symptoms  -transitioned to Sarclisa/Pom/Dex to avoid the cardiotoxicity of Kyprolis.  Started C1D1 7/6/22.  Her diarrhea has returned with being back on treatment but manageable  -there has been some response with her kappa light chains on this regimen. Will continue to monitor prior to each cycle  - will continue this regimen as long as she keeps responding; met with Dr Reinoso yesterday (8/30/22) to discuss CAR-T therapy; anticipate this may happen some time this winter  -will follow-up prior to next infusion     Continue ASA.      #Bone Health  She will continue monthly Zometa. She will continue Calcium/Vitamin D supplements.       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 Currently having no pain and able to walk 1 mile without any assistive devices.  She is interested in pursuing other activities like gentle cross-country skiing.  Had follow-up with Dr. Philip on 1/6/22 and was cleared to resume regular activity as tolerated. She should still refrain from high-impact activities.    PET on 7/12/22 shows left pubic ramus lesion is smaller in size but brighter FDG. No new lesions.      # PVCs  She had an ECHO 12/1 and had follow-up with cardiology 12/7 (notes in CareEverywhere).  She had follow-up with cardiology here at the  and they recommend an ablation. Stress test was completed   -started on Toprol XL however was discontinued due to significant side effects of orthostatic hypotension, pre-syncopal episodes, shakiness, and weakness.   - She had an ablation 3/23/22 but has not experienced relief from the feeling of being nauseated and short of breath after climbing the stairs.  She also reports episodes of palpitations that \"radiate into her neck.\" Denies chest pain or shortness of breath.   - EKG done 4/4 showed sinus rhythm and nonspecific ST and T " wave abnormality. Troponin negative.  - Cardiac MRI ruled out amyloid.  LFEF 50%, no evidence of infiltrative cardiomyopathy.  - She followed up with cardiology and they felt that the cause may be musculoskeletal or pericarditis. They suggested taking ibuprofen for pain. Her pain has subsided and she actually has not needed to take ibuprofen.   - Post ablation Zio patch was performed showing a significant reduction in PVCs; however, by the time she had her cardiology follow-up, she had restarted chemotherapy and the PVCs had returned.  Dr. Thomas is comfortable proceeding with treatment as long as symptoms are tolerable and there are no major EKG changes. Would need to repeat if she were to do BMT  - EKG 7/13 with no major changes, troponin WNL  -she continues to have PVCs, though much improved from when she was on cytoxan. Wants to avoid ablation again if possible      #Diarrhea  She was having intermittent diarrhea with associated abdominal cramping and nausea, but developed much more significant diarrhea with 10-12 liquid episodes and was seen as an add-on on 4/4/22 for this.   - C.diff PCR negative 2/8/22, repeat on 4/5 negative  - Enteric stool panel 4/5/22 negative  - Underwent colonoscopy on 4/13/22 to pursue biopsy for amyloid (discussed below). [place] red stain is negative for amyloid deposits. Pathology showed colonic mucosa with focal active colitis, negative for signs of chronicity and lymphocytic colitis. Per patient report, GI reviewed results with her and did not feel she needed treatment for these findings at this time. They advised her to monitor her symptoms and to call them if they do not improve.Dr. Montgomery agreed with following conservatively for now since she reports some improvement in her symptoms.  If diarrhea becomes bothersome again, then they will arrange for expeditious evaluation in the IBD clinic.   - CMV PCR (4/4/22) not detected  -having continued diarrhea, though intermittent  constipation. Suggested trying metamucil or benefiber to try and regulate her BMs      #COVID prophylaxis  - She received EVUSHELD 1/20/22 and 3/8/22, can repeat in Sept  - Received 3 COVID vaccines to date    #Reflux  She reported episodes of reflux with bile, especially when she lays down at night.  She has started to elevate her head on pillows and is also trying to avoid eating within 1-2 hours of going to bed.  Stable currently on Omeprazole 20 mg daily.   -consider increasing to BID as she is still having gastric pain after eating    #Skin Dryness  She has developed dry, patchy areas on her face that occasionally peel. Recently switched from an intentionally drying face wash to a hydrating face wash and things seem to be improving.  No peeling today. No pruritis, burning, or pain associated with the dryness.  - Continue hydrating face wash and Cerave lotion  - Dermatology referral placed, scheduled in November    43 minutes spent on the date of the encounter doing chart review, review of test results, patient visit and documentation         Again, thank you for allowing me to participate in the care of your patient.      Sincerely,    DORON Hall CNP

## 2022-08-31 NOTE — NURSING NOTE
"Oncology Rooming Note    August 31, 2022 2:30 PM   Kristie Cornejo is a 52 year old female who presents for:    Chief Complaint   Patient presents with     Oncology Clinic Visit     Plasmacytoma of bone     Initial Vitals: /76 (BP Location: Right arm)   Pulse 77   Temp 98.2  F (36.8  C) (Oral)   Resp 16   Wt 68.3 kg (150 lb 9.6 oz)   SpO2 99%   BMI 22.85 kg/m   Estimated body mass index is 22.85 kg/m  as calculated from the following:    Height as of 7/20/22: 1.729 m (5' 8.07\").    Weight as of this encounter: 68.3 kg (150 lb 9.6 oz). Body surface area is 1.81 meters squared.  No Pain (0) Comment: Data Unavailable   No LMP recorded. (Menstrual status: IUD).  Allergies reviewed: Yes  Medications reviewed: Yes    Medications: Medication refills not needed today.  Pharmacy name entered into [employer]:    [facility] - [place], [place] - [address]  [id number] - [place], [place] - [address]  [facility] - [place], [place] - [address]  [facility] [place] [address] [place] [address] PHARMACY [facility] - [place], [place] - [phone]  [facility] - [facility], [place] - [address]    Clinical concerns:        Yasmin Quintero CMA              "

## 2022-08-31 NOTE — PROGRESS NOTES
Infusion Nursing Note:  Kristie Cornejo presents today for cycle 3 day 1 isatuximab-[facility].    Patient seen by provider today: Yes: Jackie Ferris CNP   present during visit today: Not Applicable.    Note: Pt presents today feeling well. She was assessed in clinic by Jackie Ferris CNP. Pt wishes to proceed with planned treatment.    Pt verbalized she has enough dexamethasone for this cycle at home and does not need a refill at this time.     Intravenous Access:  Peripheral IV placed in lab.    Treatment Conditions:  Lab Results   Component Value Date    HGB 11.8 08/31/2022    WBC 4.1 08/31/2022    ANEU 2.2 07/27/2022    ANEUTAUTO 1.5 (L) 08/31/2022     08/31/2022      Lab Results   Component Value Date     08/31/2022    POTASSIUM 3.4 08/31/2022    CR 0.78 08/31/2022    MEKA 9.2 08/31/2022    BILITOTAL 0.3 08/31/2022    ALBUMIN 4.3 08/31/2022    ALT 14 08/31/2022    AST 20 08/31/2022     Results reviewed, labs MET treatment parameters, ok to proceed with treatment.    Post Infusion Assessment:  Patient tolerated infusion without incident.  Blood return noted pre and post infusion.  Site patent and intact, free from redness, edema or discomfort.  No evidence of extravasations.  Access discontinued per protocol.     Discharge Plan:   Patient declined prescription refills.  Discharge instructions reviewed with: Patient.  Patient and/or family verbalized understanding of discharge instructions and all questions answered.  AVS to patient via [employer].  Patient will return 09/14/22 for next appointment.   Patient discharged in stable condition accompanied by: self.  Departure Mode: Ambulatory.      Emily Meese, RN

## 2022-08-31 NOTE — NURSING NOTE
Chief Complaint   Patient presents with     Oncology Clinic Visit     Plasmacytoma of bone     Blood Draw     Labs drawn via piv start by RN. Vitals taken.     Labs drawn from PIV placed by RN. Line flushed with saline. Vitals taken. Pt checked in for appointment(s).    Awa Agarwal RN

## 2022-08-31 NOTE — LETTER
"    8/31/2022         RE: Kristie Cornejo  [address]  [facility] [address]        Dear Colleague,    Thank you for referring your patient, Kristie Cornejo, to the [facility]. Please see a copy of my visit note below.    Blood and Marrow Transplant   New Transplant Visit with   Clinical     Assessment completed on 8/31/22 in the BMT clinic. Information for this assessment was provided by pt's report, consultation with medical team, and medical chart review.      Present:  Patient: Kristie \"Margo\" Clemente  : SARAH Cabral, [facility]    Medical Team   Nurse Coordinator: Opal Mckenzie RN  BMT Physician: Payton Reinoso MD  Referring Physician: Payton Reinoso MD    Diagnosis: Multiple Myeloma  Diagnosis Date: 10/2021    Presenting Information:  Pt is a 52 year old female diagnosed with MM. Pt was diagnosed on 10/2021. Pt presents for an ABECMA CAR-T cellular infusion.    Contact Information:  Cell Phone: [phone]  Pt's email: [email or web address]    Special Needs:   No needs identified at this time.     Relocation Requirement:   Pt lives in [place], [place] (approximately [place] minutes from [facility]) which is within the required distance of the hospital. Pt does not need to relocate.     She is aware that she does not need to relocate, but did express a desire to relocate to be close to the clinic post BMT. Discussed options and she notes she will likely stay in a hotel during this time.     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 Erwin works for [employer] and is a Software " professional.    Insurance:   [employer]. No insurance concerns identified at this time. SW provided information regarding the insurance authorization process and the role of the BMT Financial . SW provided contact info for the BMT Financial  and referred pt to them for future insurance questions.     Finances:   No financial or insurance concerns identified at this time. Margo is supported by her limited income and [place]. They address no current financial concerns at this time. She shared is may be appropriate to look at financial grants available closer to CAR-T and any assistance through the ABECMA study.      CSW provided information regarding the insurance authorization process and the role of the BMT financial case-mangers. CSW provided contact info for the BMT financial case-managers and referred pt to them for future insurance questions.    Caregiver:   JOSE ALFREDO discussed with the Pt the caregiver role and expectation at length. Pt is agreeable to having a full time caregiver for the minimum of 30 days until cleared by the BMT Physician. Pt's identified caregivers are her , sister, best friend, and possibly her mother. Caregiver education and information provided. No caregiver concerns identified.     Healthcare Directive:  Yes. Pt has a copy already in chart.    Resources Provided:  -BMT Information Book  -BMT Resources Packet  -Healthcare Directive  -Honoring Choices - Your Rights: Making Your Own Health Care Treatment Decisions  -Caregiver Contract/Description  -Transplant Unit Description and Information   -Lodging Resources    Identified Concerns:  No concerns identified at this time.     Summary:  Pt presents to [facility] regarding an ABECMA CAR-T. Pt asked good/appropriate questions regarding psychosocial factors related to BMT; all questions were addressed. Pt presented as pleasant and engaged. Pt's affect was appropriate. Patient  indicated they are currently feeling disappointed that an autologous transplant is not an option for her right now. Pt shared that she is worried about the potential side effects of CAR-T. She is anxious to confirm local lodging plans and the definite dates of treatment that are still TBD.    Plan:   SW provided contact information and encouraged pt to contact SW with any additional questions, concerns, resources and/or for support. SW will continue to follow pt to provide support and guidance with resources as needed.         Again, thank you for allowing me to participate in the care of your patient.      Sincerely,    SARAH Tony

## 2022-09-07 LAB
A*: NORMAL
A*LOCUS SEROLOGIC EQUIVALENT: 2
A*LOCUS: NORMAL
A*SEROLOGIC EQUIVALENT: 29
ABTEST METHOD: NORMAL
B*: NORMAL
B*LOCUS SEROLOGIC EQUIVALENT: 62
B*LOCUS: NORMAL
B*SEROLOGIC EQUIVALENT: 44
BW-1: NORMAL
BW-2: NORMAL
C*: NORMAL
C*LOCUS SEROLOGIC EQUIVALENT: 10
C*LOCUS: NORMAL
C*SEROLOGIC EQUIVALENT: 16

## 2022-09-08 LAB
DPA1*: NORMAL
DPA1*LOCUS: NORMAL
DPB1*: NORMAL
DPB1*LOCUS: NORMAL
DQA1*: NORMAL
DQA1*LOCUS: NORMAL
DQB1*: NORMAL
DQB1*LOCUS SEROLOGIC EQUIVALENT: 2
DQB1*LOCUS: NORMAL
DQB1*SEROLOGIC EQUIVALENT: 6
DRB1*: NORMAL
DRB1*LOCUS SEROLOGIC EQUIVALENT: 7
DRB1*LOCUS: NORMAL
DRB1*SEROLOGIC EQUIVALENT: 13
DRB3*LOCUS SEROLOGIC EQUIVALENT: 52
DRB3*LOCUS: NORMAL
DRB4*: NORMAL
DRB4*SEROLOGIC EQUIVALENT: 53
DRSSO TEST METHOD: NORMAL
SA 1 CELL: NORMAL
SA 1 TEST METHOD: NORMAL
SA 2 CELL: NORMAL
SA 2 TEST METHOD: NORMAL
SA1 HI RISK ABY: NORMAL
SA1 MOD RISK ABY: NORMAL
SA2 HI RISK ABY: NORMAL
SA2 MOD RISK ABY: NORMAL
SCR 1 TEST METHOD: NORMAL
SCR1 CELL: NORMAL
SCR1 RESULT: NORMAL
SCR2 CELL: NORMAL
SCR2 RESULT: NORMAL
SCR2 TEST METHOD: NORMAL
ZZZSA 1  COMMENTS: NORMAL
ZZZSA 2 COMMENTS: NORMAL
ZZZSCR1 COMMENTS: NORMAL
ZZZSCR2 COMMENTS: NORMAL

## 2022-09-09 ENCOUNTER — VIRTUAL VISIT (OUTPATIENT)
Dept: FAMILY MEDICINE | Facility: CLINIC | Age: 52
End: 2022-09-09
Payer: COMMERCIAL

## 2022-09-09 ENCOUNTER — HOME INFUSION (PRE-WILLOW HOME INFUSION) (OUTPATIENT)
Dept: PHARMACY | Facility: CLINIC | Age: 52
End: 2022-09-09

## 2022-09-09 ENCOUNTER — PATIENT OUTREACH (OUTPATIENT)
Dept: ONCOLOGY | Facility: CLINIC | Age: 52
End: 2022-09-09

## 2022-09-09 DIAGNOSIS — U07.1 INFECTION DUE TO 2019 NOVEL CORONAVIRUS: Primary | ICD-10-CM

## 2022-09-09 DIAGNOSIS — C90.00 LIGHT CHAIN MYELOMA (H): ICD-10-CM

## 2022-09-09 PROCEDURE — 99213 OFFICE O/P EST LOW 20 MIN: CPT | Mod: 95 | Performed by: PHYSICIAN ASSISTANT

## 2022-09-09 NOTE — PROGRESS NOTES
Margo is a 52 year old who is being evaluated via a billable video visit.      How would you like to obtain your AVS? MyChart  If the video visit is dropped, the invitation should be resent by: Text to cell phone: [phone]  Will anyone else be joining your video visit? No          Assessment & Plan     1. Infection due to 2019 novel coronavirus    2. Light chain myeloma (H)      Patient high risk due to chemo status. Paxlovid with interactions to dexamethasone. Patient was referred for monoclonal antibody treatment.   Discussed monitoring for worsening shortness of breath, persistent fever or low SPO2.                  No follow-ups on file.    Carissa Maciel PA-C  [facility]    Subjective   Margo is a 52 year old, presenting for the following health issues:  No chief complaint on file.      HPI       COVID-19 Symptom Review  How many days ago did these symptoms start? x1 day    Are any of the following symptoms significant for you?    New or worsening difficulty breathing? No    Worsening cough? Yes, I am coughing up mucus.    Fever or chills? No    Headache: No    Sore throat: YES- improving as the day is going on.     Chest pain: No, sob    Diarrhea: YES- but chemo causes diarrhea    Body aches? No    What treatments has patient tried? none   Does patient live in a nursing home, group home, or shelter? No  Does patient have a way to get food/medications during quarantined? Yes, I have a friend or family member who can help me.          Tested positive yesterday evening. Symptoms started in the afternoon.   Shortness of breath, also with her chemo.         Review of Systems   Constitutional, HEENT, cardiovascular, pulmonary, gi and gu systems are negative, except as otherwise noted.      Objective           Vitals:  No vitals were obtained today due to virtual visit.    Physical Exam   GENERAL: Healthy, alert and no distress  EYES: Eyes grossly normal to inspection.  No discharge or  erythema, or obvious scleral/conjunctival abnormalities.  RESP: No audible wheeze, cough, or visible cyanosis.  No visible retractions or increased work of breathing.    SKIN: Visible skin clear. No significant rash, abnormal pigmentation or lesions.  NEURO: Cranial nerves grossly intact.  Mentation and speech appropriate for age.  PSYCH: Mentation appears normal, affect normal/bright, judgement and insight intact, normal speech and appearance well-groomed.                Video-Visit Details    Video Start Time: 1129    Type of service:  Video Visit    Video End Time:11:46 AM    Originating Location (pt. Location): Home    Distant Location (provider location):  [facility]     Platform used for Video Visit: [employer]

## 2022-09-09 NOTE — PROGRESS NOTES
[facility]: Cancer Care Short Note                                    Discussion with Patient:                                                      INBOUND CALL: VM received from patient. She states she tested positive for covid on 9/8. Wondeting how this will effect upcoming chemo. Callback requested.     OUTBOUND CALL: RNCC called patient to discuss recent covid + test. States she is feeling ok- feels like she just has a cold.   Symptoms started 9/8- stuffy nose, cough, and sore throat.  Pt states she at baseline has some SOB but does not feel that it is any worse. Also does not feel as though she has a fever, though she has not checked her temperature.   RNCC advised patient to contact covid clinic today to discuss antivirals or monoclonal antibodies. Also advised patient to have   pulse ox- Advised her to go to ED if SOB worsens and/or she has O2 sats at rest <90%. Also advised to monitor for fever (can be managed with tylenol sparingly), push PO fluid, and isolate.   RNCC will discuss with SULEIMAN and MD about plan of care in regards to chemo next week.     Intervention/Education provided during outreach:                                                         Patient to follow up as scheduled at next appt  Patient to call/[employer] message with updates  Confirmed patient has clinic and triage numbers    Patient verbalizes understanding of POC and has no other questions or concerns at this time.     Griselda Valdez, RN, MSN, OCN   RN Care Coordinator   [facility]   [address] [place] [id number] [phone]

## 2022-09-12 ENCOUNTER — HOME INFUSION (PRE-WILLOW HOME INFUSION) (OUTPATIENT)
Dept: PHARMACY | Facility: CLINIC | Age: 52
End: 2022-09-12

## 2022-09-12 ENCOUNTER — PATIENT OUTREACH (OUTPATIENT)
Dept: ONCOLOGY | Facility: CLINIC | Age: 52
End: 2022-09-12

## 2022-09-12 DIAGNOSIS — C90.30 PLASMACYTOMA OF BONE (H): ICD-10-CM

## 2022-09-12 RX ORDER — ACYCLOVIR 400 MG/1
400 TABLET ORAL 2 TIMES DAILY
Qty: 180 TABLET | Refills: 3 | Status: SHIPPED | OUTPATIENT
Start: 2022-09-12 | End: 2022-12-30

## 2022-09-12 NOTE — PROGRESS NOTES
[facility]: Cancer Care Short Note                                    Discussion with Patient:                                                        OUTBOUND CALL: RNCC called patient.   Pt received monoclonals over the weekend  Feeling better today, though states yesterday she felt like her symptoms were worse. Very grateful that she was able to receive monoclonals.   Continues to have a cough, congestion and runny nose  Pt also requires a refill on acyclovir-- sent to [facility] pharmacy per patient request.   Dr. Reinoso would like to push back chemo by one week. RNCC to let scheduling know to adjust schedule.   Pt is wondering if she needs a dose of dex this week. As she usually takes a dose on days 8 and 22 per her treatment plan. RNCC will reach out to MD and SULEIMAN for guidance on both this and if she should be holding the pomalyst.     9/13- RNCC discussed with SULEIMAN and MD. No dex this week and pt can hold pomalyst until appt next week with SULEIMAN and IV chemo.       Intervention/Education provided during outreach:                                                         Patient to follow up as scheduled at next appt  Confirmed patient has clinic and triage numbers    Patient verbalizes understanding of POC and has no other questions or concerns at this time.     Griselda Valdez, RN, MSN, OCN   RN Care Coordinator   [facility]   [address] [place] [id number] [phone]

## 2022-09-14 DIAGNOSIS — C90.30 PLASMACYTOMA OF BONE (H): Primary | ICD-10-CM

## 2022-09-19 ENCOUNTER — NURSE TRIAGE (OUTPATIENT)
Dept: ONCOLOGY | Facility: CLINIC | Age: 52
End: 2022-09-19

## 2022-09-19 ENCOUNTER — NURSE TRIAGE (OUTPATIENT)
Dept: NURSING | Facility: CLINIC | Age: 52
End: 2022-09-19

## 2022-09-19 RX ORDER — ALBUTEROL SULFATE 90 UG/1
1-2 AEROSOL, METERED RESPIRATORY (INHALATION)
Status: CANCELLED
Start: 2022-09-28

## 2022-09-19 RX ORDER — DIPHENHYDRAMINE HYDROCHLORIDE 50 MG/ML
50 INJECTION INTRAMUSCULAR; INTRAVENOUS
Status: CANCELLED
Start: 2022-09-28

## 2022-09-19 RX ORDER — HEPARIN SODIUM (PORCINE) LOCK FLUSH IV SOLN 100 UNIT/ML 100 UNIT/ML
5 SOLUTION INTRAVENOUS
Status: CANCELLED | OUTPATIENT
Start: 2022-09-28

## 2022-09-19 RX ORDER — HEPARIN SODIUM,PORCINE 10 UNIT/ML
5 VIAL (ML) INTRAVENOUS
Status: CANCELLED | OUTPATIENT
Start: 2022-09-28

## 2022-09-19 RX ORDER — METHYLPREDNISOLONE SODIUM SUCCINATE 125 MG/2ML
125 INJECTION, POWDER, LYOPHILIZED, FOR SOLUTION INTRAMUSCULAR; INTRAVENOUS
Status: CANCELLED
Start: 2022-09-28

## 2022-09-19 RX ORDER — NALOXONE HYDROCHLORIDE 0.4 MG/ML
0.2 INJECTION, SOLUTION INTRAMUSCULAR; INTRAVENOUS; SUBCUTANEOUS
Status: CANCELLED | OUTPATIENT
Start: 2022-09-28

## 2022-09-19 RX ORDER — ZOLEDRONIC ACID 0.04 MG/ML
4 INJECTION, SOLUTION INTRAVENOUS ONCE
Status: CANCELLED
Start: 2022-09-28 | End: 2022-09-28

## 2022-09-19 RX ORDER — MEPERIDINE HYDROCHLORIDE 25 MG/ML
25 INJECTION INTRAMUSCULAR; INTRAVENOUS; SUBCUTANEOUS EVERY 30 MIN PRN
Status: CANCELLED | OUTPATIENT
Start: 2022-09-28

## 2022-09-19 RX ORDER — EPINEPHRINE 1 MG/ML
0.3 INJECTION, SOLUTION INTRAMUSCULAR; SUBCUTANEOUS EVERY 5 MIN PRN
Status: CANCELLED | OUTPATIENT
Start: 2022-09-28

## 2022-09-19 RX ORDER — ALBUTEROL SULFATE 0.83 MG/ML
2.5 SOLUTION RESPIRATORY (INHALATION)
Status: CANCELLED | OUTPATIENT
Start: 2022-09-28

## 2022-09-19 NOTE — TELEPHONE ENCOUNTER
[facility] Triage    Incoming call:    Cough, nasal drip, fatigue, negative covid today at noon.  No fever, not taking anything for cough as she felt it was post nasal drip. Feeling  Better. Not sore throat, no SOB, no chest pain.    She has her treatment, labs and Appt with Jackie on Wednesday.  She is wondering if she should keep her appt.    Advised water, rest, lozenges, keep appt on Wednesday (it is not tomorrow), monitor and if symptoms start going backwards please call triage.  If you have covid questions, please go to the COVID button in your mychart and follow instructions.    Routing to Jackie Ferris and Griselda Valdez

## 2022-09-19 NOTE — TELEPHONE ENCOUNTER
Tested positive on 9/8/22,  recvd mono-clonal therapy, tested negative this morning. Back on for chemo on 9/21/22.  Cough, no fevers, fatigue. Stating that the oncology clinic told her to call us to see about the covid guidelines and whether or not she should resume chemo. Writer went over the guidelines with the patient but advised that her oncologist should be the one deciding if she gets chemo or not. Pt verbalized understanding and agreement. She will call back if she has any further questions.     JEFFREY BARCLAY RN      Reason for Disposition    COVID-19 Home Isolation, questions about    Additional Information    Negative: SEVERE difficulty breathing (e.g., struggling for each breath, speaks in single words)    Negative: Difficult to awaken or acting confused (e.g., disoriented, slurred speech)    Negative: Bluish (or gray) lips or face now    Negative: Shock suspected (e.g., cold/pale/clammy skin, too weak to stand, low BP, rapid pulse)    Negative: Sounds like a life-threatening emergency to the triager    Negative: [1] Diagnosed or suspected COVID-19 AND [2] symptoms lasting 3 or more weeks    Negative: [1] COVID-19 exposure AND [2] no symptoms    Negative: COVID-19 vaccine reaction suspected (e.g., fever, headache, muscle aches) occurring 1 to 3 days after getting vaccine    Negative: COVID-19 vaccine, questions about    Negative: [1] Lives with someone known to have influenza (flu test positive) AND [2] flu-like symptoms (e.g., cough, runny nose, sore throat, SOB; with or without fever)    Negative: [1] Adult with possible COVID-19 symptoms AND [2] triager concerned about severity of symptoms or other causes    Negative: COVID-19 and breastfeeding, questions about    Negative: SEVERE or constant chest pain or pressure  (Exception: Mild central chest pain, present only when coughing.)    Negative: MODERATE difficulty breathing (e.g., speaks in phrases, SOB even at rest, pulse 100-120)    Negative:  Headache and stiff neck (can't touch chin to chest)    Negative: Oxygen level (e.g., pulse oximetry) 90 percent or lower    Negative: Chest pain or pressure    Negative: Patient sounds very sick or weak to the triager    Negative: MILD difficulty breathing (e.g., minimal/no SOB at rest, SOB with walking, pulse <100)    Negative: Fever > 103 F (39.4 C)    Negative: [1] Fever > 101 F (38.3 C) AND [2] over 60 years of age    Negative: [1] Fever > 100.0 F (37.8 C) AND [2] bedridden (e.g., nursing home patient, CVA, chronic illness, recovering from surgery)    Negative: HIGH RISK for severe COVID complications (e.g., weak immune system, age > 64 years, obesity with BMI > 25, pregnant, chronic lung disease or other chronic medical condition) (Exception: Already seen by PCP and no new or worsening symptoms.)    Negative: [1] HIGH RISK patient AND [2] influenza is widespread in the community AND [3] ONE OR MORE respiratory symptoms: cough, sore throat, runny or stuffy nose    Negative: [1] HIGH RISK patient AND [2] influenza exposure within the last 7 days AND [3] ONE OR MORE respiratory symptoms: cough, sore throat, runny or stuffy nose    Negative: Oxygen level (e.g., pulse oximetry) 91 to 94 percent    Negative: [1] COVID-19 infection suspected by caller or triager AND [2] mild symptoms (cough, fever, or others) AND [3] negative COVID-19 rapid test    Negative: Fever present > 3 days (72 hours)    Negative: [1] Fever returns after gone for over 24 hours AND [2] symptoms worse or not improved    Negative: [1] Continuous (nonstop) coughing interferes with work or school AND [2] no improvement using cough treatment per Care Advice    Negative: Cough present > 3 weeks    Negative: [1] COVID-19 diagnosed by positive lab test (e.g., PCR, rapid self-test kit) AND [2] NO symptoms (e.g., cough, fever, others)    Negative: [1] COVID-19 diagnosed by positive lab test (e.g., PCR, rapid self-test kit) AND [2] mild symptoms (e.g.,  cough, fever, others) AND [3] no complications or SOB    Negative: [1] COVID-19 diagnosed by doctor (or NP/PA) AND [2] mild symptoms (e.g., cough, fever, others) AND [3] no complications or SOB    Negative: [1] COVID-19 diagnosed AND [2] has mild nausea, vomiting or diarrhea    Negative: [1] COVID-19 infection suspected by caller or triager AND [2] mild symptoms (cough, fever, or others) AND [3] has not gotten tested yet    Protocols used: CORONAVIRUS (COVID-19) DIAGNOSED OR [id number] 1.18.2022

## 2022-09-20 NOTE — PROGRESS NOTES
[facility]  Date of Visit: Sep 21, 2022   Oncologist: Dr. Payton Reinoso    Reason for visit: myeloma follow-up           Oncology History   52 year old female with past medical history significant for Irritable bowel disease, allergies, large plasmacytoma of the L pelvis, and newly diagnosed multiple myeloma.     Early in April 2021 , she noted to have left sided groin pain, constant and was affecting her daily activities  MRI showed large, expansile, permeative mass with indistinct borders involving the left superior and inferior pubic rami, pubic symphysis and with possible extension to the left acetabulum.     We completed the following work-up:   - Biopsy 9/7/2021: plasmacytoma.  Flow cytometry showed kappa monotypic plasma cells, polytypic B cells. FISH results from plasmacytoma showed a gain of 11q, IGH-CCND1 fusion but no losses of 1q or TP53 and no gain of 1q.  - Bone marrow biopsy 09/22/21: Marrow cellularity 50% with 25% interstitial infiltration w/  Kappa-monotypic, moderately atypical plasma cells. Flow with 0.7% kappa-monotypica plasma cells. FISH and cytogenetics pending.   - PET CT 09/24/21 w/ hypermetabolic pathologic fracture of the L pubic ramus w/ aggressive appearing osteolysis but no additional suspicious lytic or blastic osseous lesions.     Treatment to date:   - Zometa q4 weeks started 9/27/21  - Radiation to left pubic ramus x18 fractions, completed 11/11/21.   - RVD C1D1 11/15/21  - RVD C2D1 12/6/21  - RVD C3D1  12/27/21  - RVD C4 D1 1/17/22, elizabeth added on D8 due to inadequate response of urinary M-spike and FLCs remaining over 100 mg/dL after 4 cycles of RVD    - Elizabeth-RVD C1D8 1/24/22  - Elizabeth-RVD C2D1 2/7/22  - Elizabeth-RVD C3D1 2/28/22  - Elizabeth-RVD C4D1 3/29/22  - Elizabeth-RVD C5D1 4/19/22    Disease response: NV     - 5/19/22: Continued monotherapy with Daratumumab; held Revlimid and Velcade.    - 6/21/22: Transitioned to KCD, received C1 days 1 and 2- had  significant PVCs at cardiac follow-uyp after 1st week of Kyprolis, held further doses.    - Transitioned to Sarclisa/Pom/Dex 7/6/22. Started Pom on 7/11.     -9/8/22 - COVID, delayed C3D15 treatment x 1 week          INTERIM HISTORY:   Margo is recovering from COVID, started having symptoms 9/7, tested positive 9/8, received monoclonal antibodies on 9/10.  She significant fatigue and cough 9/11-9/15 but has started feeling significantly better over the past few days with just an occasional mild lingering cough. She has had two negative home antigen tests this week. She never was febrile.  Skin on her face and chest continues to be dry with occasional scaling, continues to manage with a hydrating face wash and Cerave lotion.  She noticed an increase in some of her cardiac symptoms (palpitations and shortness of breath with exertion) while she had COVID.      ROS: 10 point ROS neg other than the symptoms noted above in the HPI.           Physical Exam:     /85   Pulse 72   Temp 98  F (36.7  C)   Resp 18   SpO2 96%    Gen: alert, pleasant and conversational, NAD  HEENT: NC/AT,EOMI w/ PERRL, anicteric sclera. OP clear. MMM.   Neck: Supple, no LAD  CV: normal S1,S2 with RRR no m/r/g  Resp: lungs CTA bilaterally with adequate air movement to bases. No wheezes or crackles  Abd: soft NTND no organomegaly or masses. BS normoactive.   Ext: warm and well perfused, no edema or cyanosis  Skin: Slightly reddened cheeks with areas of rough, patchy dryness. Skin is intact, no peeling.  Neuro: A&Ox4, no lateralizing sx. Grossly nonfocal.  Psych: appropriate, reactive             Laboratory Data:      I personally reviewed the following labs:    Most Recent 3 CBC's:  Recent Labs   Lab Test 09/21/22  0800 08/31/22  1422 08/17/22  1418   WBC 3.2* 4.1 5.3   HGB 12.2 11.8 13.1   MCV 97 96 94    323 251     Most Recent 3 BMP's:  Recent Labs   Lab Test 08/31/22  1422 08/17/22  1422 08/10/22  1402 08/03/22  1425     --   143 141   POTASSIUM 3.4  --  4.3 3.4   CHLORIDE 107  --  112* 110*   CO2 20*  --  24 23   BUN 20.5*  --  21 21   CR 0.78 0.77 0.72 0.81   ANIONGAP 16*  --  7 8   MEKA 9.2 9.6 8.9 9.4   *  --  129* 97     Most Recent 2 LFT's:  Recent Labs   Lab Test 08/31/22  1422 08/10/22  1402   AST 20 12   ALT 14 22   ALKPHOS 56 63   BILITOTAL 0.3 0.4       Imaging:  No new imaging to review.         Assessment and Recommendations   Kristie Cornejo is a 52 year old female who was diagnosed with Plasmacytoma after she presented with left sided groin pain now found to have multiple myeloma after further workup with bone marrow biopsy.     # Multiple myeloma with associated large plasmacytoma of the L pelvis. Kappa light chain disease.     BMBx with 25% plasma cell infiltration; standard risk with gain of 11q, IGH-CCND1 fusion but no losses of 1q or TP53 and no gain of 1q.   SPEP w/out a monoclonal peak  UPEP positive. 70% paraprotein. Large monoclonal free immunoglobulin light chain of kappa chain type.     No anemia, normal creat, Ca, no other skeletal lesions. Alb normal, B2M normal.  Stage I Light chain disease with left pelvic bone pathologic fracture she is classified as having symptomatic multiple myeloma.   -s/p michelle-RVD, michelle alone and KCD with either little benefit or significant symptoms  -transitioned to Sarclisa/Pom/Dex to avoid the cardiotoxicity of Kyprolis.  Started C1D1 7/6/22.  Her diarrhea has returned with being back on treatment but manageable  -there has been some response with her kappa light chains on this regimen. Will continue to monitor prior to each cycle  - will continue this regimen as long as she keeps responding; met with Dr Reinoso 8/30/22 to discuss CAR-T therapy; anticipate this may happen some time this winter  - C3D15 has been delayed one week due to COVID infection. She is recovering well with very minor lingering cough and no other symptoms.  Will resume treatment today.   "Instructed her to restart her Pomalyst and finish the supply she has (should have either 6 or 7 pills left).   -will follow-up prior to next infusion     Continue ASA.      #Bone Health  She will continue monthly Zometa. She will continue Calcium/Vitamin D supplements.       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 Currently having no pain and able to walk 1 mile without any assistive devices.  She is interested in pursuing other activities like gentle cross-country skiing.  Had follow-up with Dr. Philip on 1/6/22 and was cleared to resume regular activity as tolerated. She should still refrain from high-impact activities.    PET on 7/12/22 shows left pubic ramus lesion is smaller in size but brighter FDG. No new lesions.      # PVCs  She had an ECHO 12/1 and had follow-up with cardiology 12/7 (notes in [facility]).  She had follow-up with cardiology here at the  and they recommend an ablation. Stress test was completed   -started on Toprol XL however was discontinued due to significant side effects of orthostatic hypotension, pre-syncopal episodes, shakiness, and weakness.   - She had an ablation 3/23/22 but has not experienced relief from the feeling of being nauseated and short of breath after climbing the stairs.  She also reports episodes of palpitations that \"radiate into her neck.\" Denies chest pain or shortness of breath.   - EKG done 4/4 showed sinus rhythm and nonspecific ST and T wave abnormality. Troponin negative.  - Cardiac MRI ruled out amyloid.  LFEF 50%, no evidence of infiltrative cardiomyopathy.  - She followed up with cardiology and they felt that the cause may be musculoskeletal or pericarditis. They suggested taking ibuprofen for pain. Her pain has subsided and she actually has not needed to take ibuprofen.   - Post ablation Zio " patch was performed showing a significant reduction in PVCs; however, by the time she had her cardiology follow-up, she had restarted chemotherapy and the PVCs had returned.  Dr. Thomas is comfortable proceeding with treatment as long as symptoms are tolerable and there are no major EKG changes. Would need to repeat if she were to do BMT  - EKG 7/13 with no major changes, troponin WNL  -she continues to have PVCs, though much improved from when she was on cytoxan. Wants to avoid ablation again if possible      #Diarrhea  She was having intermittent diarrhea with associated abdominal cramping and nausea, but developed much more significant diarrhea with 10-12 liquid episodes and was seen as an add-on on 4/4/22 for this.   - C.diff PCR negative 2/8/22, repeat on 4/5 negative  - Enteric stool panel 4/5/22 negative  - Underwent colonoscopy on 4/13/22 to pursue biopsy for amyloid (discussed below). Congo red stain is negative for amyloid deposits. Pathology showed colonic mucosa with focal active colitis, negative for signs of chronicity and lymphocytic colitis. Per patient report, GI reviewed results with her and did not feel she needed treatment for these findings at this time. They advised her to monitor her symptoms and to call them if they do not improve.Dr. Montgomery agreed with following conservatively for now since she reports some improvement in her symptoms.  If diarrhea becomes bothersome again, then they will arrange for expeditious evaluation in the IBD clinic.   - CMV PCR (4/4/22) not detected  -having continued diarrhea, though intermittent constipation. Suggested trying metamucil or benefiber to try and regulate her BMs      #COVID prophylaxis  - She received EVUSHELD 1/20/22 and 3/8/22, can repeat in Sept  - Received 3 COVID vaccines to date    #Reflux  She reported episodes of reflux with bile, especially when she lays down at night.  She has started to elevate her head on pillows and is also trying to  avoid eating within 1-2 hours of going to bed.  Stable currently on Omeprazole 20 mg daily.     #Skin Dryness  She has developed dry, patchy areas on her face that occasionally peel.Switched from an intentionally drying face wash to a hydrating face wash and things seem to be improving.  No peeling today. No pruritis, burning, or pain associated with the dryness.  - Continue hydrating face wash and Cerave lotion  - Dermatology referral scheduled in November 36 minutes spent on the date of the encounter doing chart review, review of test results, patient visit and documentation       DORON Magaña [facility]  [address] [place] [address] [id number] [phone]

## 2022-09-21 ENCOUNTER — APPOINTMENT (OUTPATIENT)
Dept: LAB | Facility: CLINIC | Age: 52
End: 2022-09-21
Attending: REGISTERED NURSE
Payer: COMMERCIAL

## 2022-09-21 ENCOUNTER — INFUSION THERAPY VISIT (OUTPATIENT)
Dept: ONCOLOGY | Facility: CLINIC | Age: 52
End: 2022-09-21
Payer: COMMERCIAL

## 2022-09-21 ENCOUNTER — ONCOLOGY VISIT (OUTPATIENT)
Dept: ONCOLOGY | Facility: CLINIC | Age: 52
End: 2022-09-21
Attending: REGISTERED NURSE
Payer: COMMERCIAL

## 2022-09-21 VITALS — WEIGHT: 150 LBS | BODY MASS INDEX: 22.76 KG/M2

## 2022-09-21 VITALS
OXYGEN SATURATION: 96 % | HEART RATE: 72 BPM | DIASTOLIC BLOOD PRESSURE: 85 MMHG | RESPIRATION RATE: 18 BRPM | SYSTOLIC BLOOD PRESSURE: 124 MMHG | TEMPERATURE: 98 F

## 2022-09-21 DIAGNOSIS — C90.00 LIGHT CHAIN MYELOMA (H): Primary | ICD-10-CM

## 2022-09-21 DIAGNOSIS — U07.1 INFECTION DUE TO 2019 NOVEL CORONAVIRUS: ICD-10-CM

## 2022-09-21 DIAGNOSIS — I49.3 PVC'S (PREMATURE VENTRICULAR CONTRACTIONS): ICD-10-CM

## 2022-09-21 DIAGNOSIS — C90.30 PLASMACYTOMA OF BONE (H): ICD-10-CM

## 2022-09-21 LAB
BASOPHILS # BLD AUTO: 0.1 10E3/UL (ref 0–0.2)
BASOPHILS NFR BLD AUTO: 3 %
CALCIUM SERPL-MCNC: 9.5 MG/DL (ref 8.6–10)
CREAT SERPL-MCNC: 0.79 MG/DL (ref 0.51–0.95)
EOSINOPHIL # BLD AUTO: 0.5 10E3/UL (ref 0–0.7)
EOSINOPHIL NFR BLD AUTO: 17 %
ERYTHROCYTE [DISTWIDTH] IN BLOOD BY AUTOMATED COUNT: 13.7 % (ref 10–15)
GFR SERPL CREATININE-BSD FRML MDRD: 90 ML/MIN/1.73M2
HCT VFR BLD AUTO: 37.4 % (ref 35–47)
HGB BLD-MCNC: 12.2 G/DL (ref 11.7–15.7)
IMM GRANULOCYTES # BLD: 0 10E3/UL
IMM GRANULOCYTES NFR BLD: 0 %
LYMPHOCYTES # BLD AUTO: 1 10E3/UL (ref 0.8–5.3)
LYMPHOCYTES NFR BLD AUTO: 30 %
MCH RBC QN AUTO: 31.5 PG (ref 26.5–33)
MCHC RBC AUTO-ENTMCNC: 32.6 G/DL (ref 31.5–36.5)
MCV RBC AUTO: 97 FL (ref 78–100)
MONOCYTES # BLD AUTO: 0.8 10E3/UL (ref 0–1.3)
MONOCYTES NFR BLD AUTO: 25 %
NEUTROPHILS # BLD AUTO: 0.8 10E3/UL (ref 1.6–8.3)
NEUTROPHILS NFR BLD AUTO: 25 %
NRBC # BLD AUTO: 0 10E3/UL
NRBC BLD AUTO-RTO: 0 /100
PLAT MORPH BLD: NORMAL
PLATELET # BLD AUTO: 240 10E3/UL (ref 150–450)
RBC # BLD AUTO: 3.87 10E6/UL (ref 3.8–5.2)
RBC MORPH BLD: NORMAL
WBC # BLD AUTO: 3.2 10E3/UL (ref 4–11)

## 2022-09-21 PROCEDURE — 96413 CHEMO IV INFUSION 1 HR: CPT

## 2022-09-21 PROCEDURE — 250N000011 HC RX IP 250 OP 636

## 2022-09-21 PROCEDURE — 96375 TX/PRO/DX INJ NEW DRUG ADDON: CPT

## 2022-09-21 PROCEDURE — 258N000003 HC RX IP 258 OP 636

## 2022-09-21 PROCEDURE — 36415 COLL VENOUS BLD VENIPUNCTURE: CPT

## 2022-09-21 PROCEDURE — 82565 ASSAY OF CREATININE: CPT

## 2022-09-21 PROCEDURE — 85025 COMPLETE CBC W/AUTO DIFF WBC: CPT

## 2022-09-21 PROCEDURE — 250N000013 HC RX MED GY IP 250 OP 250 PS 637

## 2022-09-21 PROCEDURE — 82310 ASSAY OF CALCIUM: CPT

## 2022-09-21 PROCEDURE — 99214 OFFICE O/P EST MOD 30 MIN: CPT | Performed by: REGISTERED NURSE

## 2022-09-21 PROCEDURE — 96367 TX/PROPH/DG ADDL SEQ IV INF: CPT

## 2022-09-21 PROCEDURE — 250N000011 HC RX IP 250 OP 636: Performed by: REGISTERED NURSE

## 2022-09-21 RX ORDER — DIPHENHYDRAMINE HCL 25 MG
50 CAPSULE ORAL ONCE
Status: COMPLETED | OUTPATIENT
Start: 2022-09-21 | End: 2022-09-21

## 2022-09-21 RX ORDER — ZOLEDRONIC ACID 0.04 MG/ML
4 INJECTION, SOLUTION INTRAVENOUS ONCE
Status: COMPLETED | OUTPATIENT
Start: 2022-09-21 | End: 2022-09-21

## 2022-09-21 RX ORDER — ACETAMINOPHEN 325 MG/1
650 TABLET ORAL ONCE
Status: COMPLETED | OUTPATIENT
Start: 2022-09-21 | End: 2022-09-21

## 2022-09-21 RX ADMIN — SODIUM CHLORIDE 250 ML: 9 INJECTION, SOLUTION INTRAVENOUS at 08:54

## 2022-09-21 RX ADMIN — DIPHENHYDRAMINE HYDROCHLORIDE 50 MG: 25 CAPSULE ORAL at 08:58

## 2022-09-21 RX ADMIN — FAMOTIDINE 20 MG: 10 INJECTION INTRAVENOUS at 08:58

## 2022-09-21 RX ADMIN — ACETAMINOPHEN 650 MG: 325 TABLET ORAL at 08:58

## 2022-09-21 RX ADMIN — SODIUM CHLORIDE 700 MG: 9 INJECTION, SOLUTION INTRAVENOUS at 09:52

## 2022-09-21 RX ADMIN — DEXAMETHASONE SODIUM PHOSPHATE: 10 INJECTION, SOLUTION INTRAMUSCULAR; INTRAVENOUS at 09:09

## 2022-09-21 RX ADMIN — ZOLEDRONIC ACID 4 MG: 0.04 INJECTION, SOLUTION INTRAVENOUS at 09:26

## 2022-09-21 ASSESSMENT — PAIN SCALES - GENERAL: PAINLEVEL: NO PAIN (0)

## 2022-09-21 NOTE — PROGRESS NOTES
Infusion Nursing Note:  Kristie Cornejo presents today for Cycle 3 Day 15 Isatuximab, Zometa.    Patient seen by provider today: Yes: Jackie SEARS NP    Note: Pt reports feeling much better since having covid. Tested + 13 days ago. Tested negative on her home tests on 9/21 and 9/19. Has had no fevers with her infection course. Denies any recent dental concerns.     Intravenous Access:  Peripheral IV placed.    Treatment Conditions:  Lab Results   Component Value Date    HGB 12.2 09/21/2022    WBC 3.2 (L) 09/21/2022    ANEU 2.2 07/27/2022    ANEUTAUTO 0.8 (L) 09/21/2022     09/21/2022      Lab Results   Component Value Date     08/31/2022    POTASSIUM 3.4 08/31/2022    CR 0.79 09/21/2022    MEKA 9.5 09/21/2022    BILITOTAL 0.3 08/31/2022    ALBUMIN 4.3 08/31/2022    ALT 14 08/31/2022    AST 20 08/31/2022     Results reviewed, labs MET treatment parameters, ok to proceed with treatment.    Post Infusion Assessment:  Patient tolerated infusion without incident.  Blood return noted pre and post infusion.  Site patent and intact, free from redness, edema or discomfort. Access discontinued per protocol.     Discharge Plan:   Patient declined prescription refills.  AVS to patient via [employer].  Patient will return 10/5 for next appointment.   Patient discharged in stable condition accompanied by: self.  Departure Mode: Ambulatory.      Carissa Ruiz RN

## 2022-09-21 NOTE — LETTER
9/21/2022         RE: Kristie Cornejo  [address]  [facility] [address]        Dear Colleague,    Thank you for referring your patient, Kristie Cornejo, to the [facility]. Please see a copy of my visit note below.    [facility]  Date of Visit: Sep 21, 2022   Oncologist: Dr. Payton Reinoso    Reason for visit: myeloma follow-up           Oncology History   52 year old female with past medical history significant for Irritable bowel disease, allergies, large plasmacytoma of the L pelvis, and newly diagnosed multiple myeloma.     Early in April 2021 , she noted to have left sided groin pain, constant and was affecting her daily activities  MRI showed large, expansile, permeative mass with indistinct borders involving the left superior and inferior pubic rami, pubic symphysis and with possible extension to the left acetabulum.     We completed the following work-up:   - Biopsy 9/7/2021: plasmacytoma.  Flow cytometry showed kappa monotypic plasma cells, polytypic B cells. FISH results from plasmacytoma showed a gain of 11q, IGH-CCND1 fusion but no losses of 1q or TP53 and no gain of 1q.  - Bone marrow biopsy 09/22/21: Marrow cellularity 50% with 25% interstitial infiltration w/  Kappa-monotypic, moderately atypical plasma cells. Flow with 0.7% kappa-monotypica plasma cells. FISH and cytogenetics pending.   - PET CT 09/24/21 w/ hypermetabolic pathologic fracture of the L pubic ramus w/ aggressive appearing osteolysis but no additional suspicious lytic or blastic osseous lesions.     Treatment to date:   - Zometa q4 weeks started 9/27/21  - Radiation to left pubic ramus x18 fractions, completed 11/11/21.   - RVD C1D1 11/15/21  - RVD C2D1 12/6/21  - RVD C3D1  12/27/21  - RVD C4 D1 1/17/22, elizabeth added on D8 due to inadequate response of urinary M-spike and FLCs remaining over 100 mg/dL after 4 cycles of RVD    - Elizabeth-RVD C1D8 1/24/22  -  Elizabeth-RVD C2D1 2/7/22  - Elizabeth-RVD C3D1 2/28/22  - Elizabeth-RVD C4D1 3/29/22  - Elizabeth-RVD C5D1 4/19/22    Disease response: MT     - 5/19/22: Continued monotherapy with Daratumumab; held Revlimid and Velcade.    - 6/21/22: Transitioned to KCD, received C1 days 1 and 2- had significant PVCs at cardiac follow-uyp after 1st week of Kyprolis, held further doses.    - Transitioned to Sarclisa/Pom/Dex 7/6/22. Started Pom on 7/11.     -9/8/22 - COVID, delayed C3D15 treatment x 1 week          INTERIM HISTORY:   Margo is recovering from COVID, started having symptoms 9/7, tested positive 9/8, received monoclonal antibodies on 9/10.  She significant fatigue and cough 9/11-9/15 but has started feeling significantly better over the past few days with just an occasional mild lingering cough. She has had two negative home antigen tests this week. She never was febrile.  Skin on her face and chest continues to be dry with occasional scaling, continues to manage with a hydrating face wash and Cerave lotion.  She noticed an increase in some of her cardiac symptoms (palpitations and shortness of breath with exertion) while she had COVID.      ROS: 10 point ROS neg other than the symptoms noted above in the HPI.           Physical Exam:     /85   Pulse 72   Temp 98  F (36.7  C)   Resp 18   SpO2 96%    Gen: alert, pleasant and conversational, NAD  HEENT: NC/AT,EOMI w/ PERRL, anicteric sclera. OP clear. MMM.   Neck: Supple, no LAD  CV: normal S1,S2 with RRR no m/r/g  Resp: lungs CTA bilaterally with adequate air movement to bases. No wheezes or crackles  Abd: soft NTND no organomegaly or masses. BS normoactive.   Ext: warm and well perfused, no edema or cyanosis  Skin: Slightly reddened cheeks with areas of rough, patchy dryness. Skin is intact, no peeling.  Neuro: A&Ox4, no lateralizing sx. Grossly nonfocal.  Psych: appropriate, reactive             Laboratory Data:      I personally reviewed the following labs:    Most Recent 3  CBC's:  Recent Labs   Lab Test 09/21/22  0800 08/31/22  1422 08/17/22  1418   WBC 3.2* 4.1 5.3   HGB 12.2 11.8 13.1   MCV 97 96 94    323 251     Most Recent 3 BMP's:  Recent Labs   Lab Test 08/31/22  1422 08/17/22  1422 08/10/22  1402 08/03/22  1425     --  143 141   POTASSIUM 3.4  --  4.3 3.4   CHLORIDE 107  --  112* 110*   CO2 20*  --  24 23   BUN 20.5*  --  21 21   CR 0.78 0.77 0.72 0.81   ANIONGAP 16*  --  7 8   MEKA 9.2 9.6 8.9 9.4   *  --  129* 97     Most Recent 2 LFT's:  Recent Labs   Lab Test 08/31/22  1422 08/10/22  1402   AST 20 12   ALT 14 22   ALKPHOS 56 63   BILITOTAL 0.3 0.4       Imaging:  No new imaging to review.         Assessment and Recommendations   Kristie Cornejo is a 52 year old female who was diagnosed with Plasmacytoma after she presented with left sided groin pain now found to have multiple myeloma after further workup with bone marrow biopsy.     # Multiple myeloma with associated large plasmacytoma of the L pelvis. Kappa light chain disease.     BMBx with 25% plasma cell infiltration; standard risk with gain of 11q, IGH-CCND1 fusion but no losses of 1q or TP53 and no gain of 1q.   SPEP w/out a monoclonal peak  UPEP positive. 70% paraprotein. Large monoclonal free immunoglobulin light chain of kappa chain type.     No anemia, normal creat, Ca, no other skeletal lesions. Alb normal, B2M normal.  Stage I Light chain disease with left pelvic bone pathologic fracture she is classified as having symptomatic multiple myeloma.   -s/p michelle-RVD, michelle alone and KCD with either little benefit or significant symptoms  -transitioned to Sarclisa/Pom/Dex to avoid the cardiotoxicity of Kyprolis.  Started C1D1 7/6/22.  Her diarrhea has returned with being back on treatment but manageable  -there has been some response with her kappa light chains on this regimen. Will continue to monitor prior to each cycle  - will continue this regimen as long as she keeps responding; met with   "Kemar 8/30/22 to discuss CAR-T therapy; anticipate this may happen some time this winter  - C3D15 has been delayed one week due to COVID infection. She is recovering well with very minor lingering cough and no other symptoms.  Will resume treatment today.  Instructed her to restart her Pomalyst and finish the supply she has (should have either 6 or 7 pills left).   -will follow-up prior to next infusion     Continue ASA.      #Bone Health  She will continue monthly Zometa. She will continue Calcium/Vitamin D supplements.       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 Currently having no pain and able to walk 1 mile without any assistive devices.  She is interested in pursuing other activities like gentle cross-country skiing.  Had follow-up with Dr. Philip on 1/6/22 and was cleared to resume regular activity as tolerated. She should still refrain from high-impact activities.    PET on 7/12/22 shows left pubic ramus lesion is smaller in size but brighter FDG. No new lesions.      # PVCs  She had an ECHO 12/1 and had follow-up with cardiology 12/7 (notes in [facility]).  She had follow-up with cardiology here at the  and they recommend an ablation. Stress test was completed   -started on Toprol XL however was discontinued due to significant side effects of orthostatic hypotension, pre-syncopal episodes, shakiness, and weakness.   - She had an ablation 3/23/22 but has not experienced relief from the feeling of being nauseated and short of breath after climbing the stairs.  She also reports episodes of palpitations that \"radiate into her neck.\" Denies chest pain or shortness of breath.   - EKG done 4/4 showed sinus rhythm and nonspecific ST and T wave abnormality. Troponin negative.  - Cardiac MRI ruled out amyloid.  LFEF 50%, no evidence of " infiltrative cardiomyopathy.  - She followed up with cardiology and they felt that the cause may be musculoskeletal or pericarditis. They suggested taking ibuprofen for pain. Her pain has subsided and she actually has not needed to take ibuprofen.   - Post ablation Zio patch was performed showing a significant reduction in PVCs; however, by the time she had her cardiology follow-up, she had restarted chemotherapy and the PVCs had returned.  Dr. Thomas is comfortable proceeding with treatment as long as symptoms are tolerable and there are no major EKG changes. Would need to repeat if she were to do BMT  - EKG 7/13 with no major changes, troponin WNL  -she continues to have PVCs, though much improved from when she was on cytoxan. Wants to avoid ablation again if possible      #Diarrhea  She was having intermittent diarrhea with associated abdominal cramping and nausea, but developed much more significant diarrhea with 10-12 liquid episodes and was seen as an add-on on 4/4/22 for this.   - C.diff PCR negative 2/8/22, repeat on 4/5 negative  - Enteric stool panel 4/5/22 negative  - Underwent colonoscopy on 4/13/22 to pursue biopsy for amyloid (discussed below). Congo red stain is negative for amyloid deposits. Pathology showed colonic mucosa with focal active colitis, negative for signs of chronicity and lymphocytic colitis. Per patient report, GI reviewed results with her and did not feel she needed treatment for these findings at this time. They advised her to monitor her symptoms and to call them if they do not improve.Dr. Montgomery agreed with following conservatively for now since she reports some improvement in her symptoms.  If diarrhea becomes bothersome again, then they will arrange for expeditious evaluation in the IBD clinic.   - CMV PCR (4/4/22) not detected  -having continued diarrhea, though intermittent constipation. Suggested trying metamucil or benefiber to try and regulate her BMs      #COVID  prophylaxis  - She received EVUSHELD 1/20/22 and 3/8/22, can repeat in Sept  - Received 3 COVID vaccines to date    #Reflux  She reported episodes of reflux with bile, especially when she lays down at night.  She has started to elevate her head on pillows and is also trying to avoid eating within 1-2 hours of going to bed.  Stable currently on Omeprazole 20 mg daily.     #Skin Dryness  She has developed dry, patchy areas on her face that occasionally peel.Switched from an intentionally drying face wash to a hydrating face wash and things seem to be improving.  No peeling today. No pruritis, burning, or pain associated with the dryness.  - Continue hydrating face wash and Cerave lotion  - Dermatology referral scheduled in November 36 minutes spent on the date of the encounter doing chart review, review of test results, patient visit and documentation         Again, thank you for allowing me to participate in the care of your patient.      Sincerely,    DORON Magaña CNP

## 2022-09-28 DIAGNOSIS — C90.00 LIGHT CHAIN MYELOMA (H): Primary | ICD-10-CM

## 2022-09-29 ENCOUNTER — LAB (OUTPATIENT)
Dept: LAB | Facility: CLINIC | Age: 52
End: 2022-09-29
Payer: COMMERCIAL

## 2022-09-29 ENCOUNTER — TELEPHONE (OUTPATIENT)
Dept: ONCOLOGY | Facility: CLINIC | Age: 52
End: 2022-09-29

## 2022-09-29 DIAGNOSIS — C90.00 LIGHT CHAIN MYELOMA (H): ICD-10-CM

## 2022-09-29 DIAGNOSIS — C90.30 PLASMACYTOMA OF BONE (H): ICD-10-CM

## 2022-09-29 LAB — HCG UR QL: NEGATIVE

## 2022-09-29 PROCEDURE — 81025 URINE PREGNANCY TEST: CPT

## 2022-09-29 NOTE — TELEPHONE ENCOUNTER
Oral Chemotherapy Monitoring Program    Medication: Pomalyst  Rx:  4 mg PO daily on days 1-21 of repeated 28 day cycles  Auth #: [id number]  Risk Category: Adult female WITH reproductive capacity.    Routine survey questions reviewed.    Thank you,    Anette Mcdermott  Oncology Pharmacy Liaison II  [email or web address]  Phone: [phone]  Fax: [phone]

## 2022-10-04 NOTE — PROGRESS NOTES
[facility]  Date of Visit: Oct 5, 2022   Oncologist: Dr. Payton Reinoso    Reason for visit: myeloma follow-up           Oncology History   52 year old female with past medical history significant for Irritable bowel disease, allergies, large plasmacytoma of the L pelvis, and newly diagnosed multiple myeloma.     Early in April 2021 , she noted to have left sided groin pain, constant and was affecting her daily activities  MRI showed large, expansile, permeative mass with indistinct borders involving the left superior and inferior pubic rami, pubic symphysis and with possible extension to the left acetabulum.     We completed the following work-up:   - Biopsy 9/7/2021: plasmacytoma.  Flow cytometry showed kappa monotypic plasma cells, polytypic B cells. FISH results from plasmacytoma showed a gain of 11q, IGH-CCND1 fusion but no losses of 1q or TP53 and no gain of 1q.  - Bone marrow biopsy 09/22/21: Marrow cellularity 50% with 25% interstitial infiltration w/  Kappa-monotypic, moderately atypical plasma cells. Flow with 0.7% kappa-monotypica plasma cells. FISH and cytogenetics pending.   - PET CT 09/24/21 w/ hypermetabolic pathologic fracture of the L pubic ramus w/ aggressive appearing osteolysis but no additional suspicious lytic or blastic osseous lesions.     Treatment to date:   - Zometa q4 weeks started 9/27/21  - Radiation to left pubic ramus x18 fractions, completed 11/11/21.   - RVD C1D1 11/15/21  - RVD C2D1 12/6/21  - RVD C3D1  12/27/21  - RVD C4 D1 1/17/22, elizabeth added on D8 due to inadequate response of urinary M-spike and FLCs remaining over 100 mg/dL after 4 cycles of RVD    - Elizabeth-RVD C1D8 1/24/22  - Elizabeth-RVD C2D1 2/7/22  - Elizabeth-RVD C3D1 2/28/22  - Elizabeth-RVD C4D1 3/29/22  - Elizabeth-RVD C5D1 4/19/22    Disease response: NV     - 5/19/22: Continued monotherapy with Daratumumab; held Revlimid and Velcade.    - 6/21/22: Transitioned to KCD, received C1 days 1 and 2- had  significant PVCs at cardiac follow-uyp after 1st week of Kyprolis, held further doses.    - Transitioned to Sarclisa/Pom/Dex 7/6/22. Started Pom on 7/11.     -9/8/22 - COVID, delayed C3D15 treatment x 1 week          INTERIM HISTORY:   Was slightly nauseated this morning.  Felt some palpitations last night and today. Slight lingering cough from her COVID infection in early September.  Stable dryness to her face and back. Having some constipation with gas pains, is going to try some Miralax to resolve.       ROS: 10 point ROS neg other than the symptoms noted above in the HPI.           Physical Exam:     BP (!) 151/85   Pulse 86   Temp 98.1  F (36.7  C)   Resp 16   Wt 69 kg (152 lb 1.6 oz)   SpO2 99%   BMI 23.08 kg/m     Gen: alert, pleasant and conversational, NAD  HEENT: NC/AT,EOMI w/ PERRL, anicteric sclera. OP clear. MMM.   Neck: Supple, no LAD  CV: normal S1,S2 with RRR no m/r/g  Resp: lungs CTA bilaterally with adequate air movement to bases. No wheezes or crackles  Abd: soft NTND no organomegaly or masses. BS normoactive.   Ext: warm and well perfused, no edema or cyanosis  Skin: Slightly reddened cheeks with areas of rough, patchy dryness. Skin is intact, no peeling.  Neuro: A&Ox4, no lateralizing sx. Grossly nonfocal.  Psych: appropriate, reactive             Laboratory Data:      I personally reviewed the following labs:    Most Recent 3 CBC's:  Recent Labs   Lab Test 10/05/22  1322 09/21/22  0800 08/31/22  1422   WBC 5.3 3.2* 4.1   HGB 12.8 12.2 11.8   MCV 96 97 96    240 323     Most Recent 3 BMP's:  Recent Labs   Lab Test 10/05/22  1322 09/21/22  0800 08/31/22  1422 08/17/22  1422 08/10/22  1402     --  143  --  143   POTASSIUM 3.6  --  3.4  --  4.3   CHLORIDE 110*  --  107  --  112*   CO2 21*  --  20*  --  24   BUN 21.6*  --  20.5*  --  21   CR 0.76 0.79 0.78   < > 0.72   ANIONGAP 12  --  16*  --  7   MEKA 9.6 9.5 9.2   < > 8.9   *  --  137*  --  129*    < > = values in this  interval not displayed.     Most Recent 2 LFT's:  Recent Labs   Lab Test 10/05/22  1322 08/31/22  1422   AST 16 20   ALT 16 14   ALKPHOS 59 56   BILITOTAL 0.2 0.3       Imaging:  No new imaging to review.         Assessment and Recommendations   Kristie Cornejo is a 52 year old female who was diagnosed with Plasmacytoma after she presented with left sided groin pain now found to have multiple myeloma after further workup with bone marrow biopsy.     # Multiple myeloma with associated large plasmacytoma of the L pelvis. Kappa light chain disease.     BMBx with 25% plasma cell infiltration; standard risk with gain of 11q, IGH-CCND1 fusion but no losses of 1q or TP53 and no gain of 1q.   SPEP w/out a monoclonal peak  UPEP positive. 70% paraprotein. Large monoclonal free immunoglobulin light chain of kappa chain type.     No anemia, normal creat, Ca, no other skeletal lesions. Alb normal, B2M normal.  Stage I Light chain disease with left pelvic bone pathologic fracture she is classified as having symptomatic multiple myeloma.   -s/p michelle-RVD, michelle alone and KCD with either little benefit or significant symptoms  -transitioned to Sarclisa/Pom/Dex to avoid the cardiotoxicity of Kyprolis.  Started C1D1 7/6/22.  Her diarrhea has returned with being back on treatment but manageable  -there has been some response with her kappa light chains on this regimen. Will continue to monitor prior to each cycle  - will continue this regimen as long as she keeps responding; met with Dr Reinoso 8/30/22 to discuss CAR-T therapy; anticipate this may happen some time this winter  - Okay to proceed with C4 today  -will follow-up prior to next infusion     ADDENDUM:  FLCs are slightly increased from last check.  Discussed with Dr. Reinoso.  Will finish this cycle of Sarclisa/Pom/Dex and recheck labs toward the end of the cycle (anticipate lab check 10/31).  Will schedule with Dr. Young on 11/2 for review of results and discussion  "of next steps.  If FLCs are stable or improved, anticipate proceeding with next cycle of Sarclisa/Pom/Dex.  If FLCs are worsening, anticipate proceeding with belantamab.  I will request ophthalmology exam on 11/3 so that it is scheduled in case she needs it.    Continue ASA.      #Bone Health  She will continue monthly Zometa. She will continue Calcium/Vitamin D supplements.       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 Currently having no pain and able to walk 1 mile without any assistive devices.  She is interested in pursuing other activities like gentle cross-country skiing.  Had follow-up with Dr. Philip on 1/6/22 and was cleared to resume regular activity as tolerated. She should still refrain from high-impact activities.    PET on 7/12/22 shows left pubic ramus lesion is smaller in size but brighter FDG. No new lesions.      # PVCs  She had an ECHO 12/1 and had follow-up with cardiology 12/7 (notes in [facility]where).  She had follow-up with cardiology here at the  and they recommend an ablation. Stress test was completed   -started on Toprol XL however was discontinued due to significant side effects of orthostatic hypotension, pre-syncopal episodes, shakiness, and weakness.   - She had an ablation 3/23/22 but has not experienced relief from the feeling of being nauseated and short of breath after climbing the stairs.  She also reports episodes of palpitations that \"radiate into her neck.\" Denies chest pain or shortness of breath.   - EKG done 4/4 showed sinus rhythm and nonspecific ST and T wave abnormality. Troponin negative.  - Cardiac MRI ruled out amyloid.  LFEF 50%, no evidence of infiltrative cardiomyopathy.  - She followed up with cardiology and they felt that the cause may be musculoskeletal or pericarditis. They suggested taking ibuprofen " for pain. Her pain has subsided and she actually has not needed to take ibuprofen.   - Post ablation Zio patch was performed showing a significant reduction in PVCs; however, by the time she had her cardiology follow-up, she had restarted chemotherapy and the PVCs had returned.  Dr. Thomas is comfortable proceeding with treatment as long as symptoms are tolerable and there are no major EKG changes. Would need to repeat if she were to do BMT  - EKG 7/13 with no major changes, troponin WNL  -she continues to have PVCs, though much improved from when she was on cytoxan. Wants to avoid ablation again if possible      #Diarrhea  She was having intermittent diarrhea with associated abdominal cramping and nausea, but developed much more significant diarrhea with 10-12 liquid episodes and was seen as an add-on on 4/4/22 for this.   - C.diff PCR negative 2/8/22, repeat on 4/5 negative  - Enteric stool panel 4/5/22 negative  - Underwent colonoscopy on 4/13/22 to pursue biopsy for amyloid (discussed below). Congo red stain is negative for amyloid deposits. Pathology showed colonic mucosa with focal active colitis, negative for signs of chronicity and lymphocytic colitis. Per patient report, GI reviewed results with her and did not feel she needed treatment for these findings at this time. They advised her to monitor her symptoms and to call them if they do not improve.Dr. Montgomery agreed with following conservatively for now since she reports some improvement in her symptoms.  If diarrhea becomes bothersome again, then they will arrange for expeditious evaluation in the IBD clinic.   - CMV PCR (4/4/22) not detected  -has intermittent diarrhea and constipation.  Manages with metamucil and Miralax.      #COVID prophylaxis  - She received EVUSHELD 1/20/22 and 3/8/22  - Received 3 COVID vaccines to date  - Will do influenza vaccine today  - Holding off on COVID booster and Evusheld due to recent monoclonal antibodies following  COVID infection in early September.  Reconsider in mid-December.    #Reflux  She reported episodes of reflux with bile, especially when she lays down at night.  She has started to elevate her head on pillows and is also trying to avoid eating within 1-2 hours of going to bed.  Stable currently on Omeprazole 20 mg daily.     #Skin Dryness  She has developed dry, patchy areas on her face that occasionally peel.Switched from an intentionally drying face wash to a hydrating face wash and things seem to be improving.  No peeling today. No pruritis, burning, or pain associated with the dryness.  - Continue hydrating face wash and Cerave lotion  - Dermatology referral scheduled in November 33 minutes spent on the date of the encounter doing chart review, review of test results, patient visit and documentation       DORON Magaña CNP  [facility]  [address] [place] [id number]  [phone]

## 2022-10-05 ENCOUNTER — ONCOLOGY VISIT (OUTPATIENT)
Dept: ONCOLOGY | Facility: CLINIC | Age: 52
End: 2022-10-05
Payer: COMMERCIAL

## 2022-10-05 ENCOUNTER — LAB (OUTPATIENT)
Dept: LAB | Facility: CLINIC | Age: 52
End: 2022-10-05
Payer: COMMERCIAL

## 2022-10-05 ENCOUNTER — INFUSION THERAPY VISIT (OUTPATIENT)
Dept: ONCOLOGY | Facility: CLINIC | Age: 52
End: 2022-10-05
Payer: COMMERCIAL

## 2022-10-05 VITALS
HEART RATE: 86 BPM | BODY MASS INDEX: 23.08 KG/M2 | SYSTOLIC BLOOD PRESSURE: 151 MMHG | OXYGEN SATURATION: 99 % | WEIGHT: 152.1 LBS | RESPIRATION RATE: 16 BRPM | DIASTOLIC BLOOD PRESSURE: 85 MMHG | TEMPERATURE: 98.1 F

## 2022-10-05 DIAGNOSIS — C90.00 LIGHT CHAIN MYELOMA (H): Primary | ICD-10-CM

## 2022-10-05 DIAGNOSIS — I49.3 PVC'S (PREMATURE VENTRICULAR CONTRACTIONS): ICD-10-CM

## 2022-10-05 LAB
ALBUMIN SERPL BCG-MCNC: 4.3 G/DL (ref 3.5–5.2)
ALP SERPL-CCNC: 59 U/L (ref 35–104)
ALT SERPL W P-5'-P-CCNC: 16 U/L (ref 10–35)
ANION GAP SERPL CALCULATED.3IONS-SCNC: 12 MMOL/L (ref 7–15)
AST SERPL W P-5'-P-CCNC: 16 U/L (ref 10–35)
BASOPHILS # BLD AUTO: 0.1 10E3/UL (ref 0–0.2)
BASOPHILS NFR BLD AUTO: 1 %
BILIRUB SERPL-MCNC: 0.2 MG/DL
BUN SERPL-MCNC: 21.6 MG/DL (ref 6–20)
CALCIUM SERPL-MCNC: 9.6 MG/DL (ref 8.6–10)
CHLORIDE SERPL-SCNC: 110 MMOL/L (ref 98–107)
CREAT SERPL-MCNC: 0.76 MG/DL (ref 0.51–0.95)
DEPRECATED HCO3 PLAS-SCNC: 21 MMOL/L (ref 22–29)
EOSINOPHIL # BLD AUTO: 0.3 10E3/UL (ref 0–0.7)
EOSINOPHIL NFR BLD AUTO: 5 %
ERYTHROCYTE [DISTWIDTH] IN BLOOD BY AUTOMATED COUNT: 13.6 % (ref 10–15)
GFR SERPL CREATININE-BSD FRML MDRD: >90 ML/MIN/1.73M2
GLUCOSE SERPL-MCNC: 126 MG/DL (ref 70–99)
HCT VFR BLD AUTO: 38.6 % (ref 35–47)
HGB BLD-MCNC: 12.8 G/DL (ref 11.7–15.7)
IMM GRANULOCYTES # BLD: 0 10E3/UL
IMM GRANULOCYTES NFR BLD: 0 %
LYMPHOCYTES # BLD AUTO: 1.2 10E3/UL (ref 0.8–5.3)
LYMPHOCYTES NFR BLD AUTO: 23 %
MCH RBC QN AUTO: 31.8 PG (ref 26.5–33)
MCHC RBC AUTO-ENTMCNC: 33.2 G/DL (ref 31.5–36.5)
MCV RBC AUTO: 96 FL (ref 78–100)
MONOCYTES # BLD AUTO: 1 10E3/UL (ref 0–1.3)
MONOCYTES NFR BLD AUTO: 19 %
NEUTROPHILS # BLD AUTO: 2.7 10E3/UL (ref 1.6–8.3)
NEUTROPHILS NFR BLD AUTO: 52 %
NRBC # BLD AUTO: 0 10E3/UL
NRBC BLD AUTO-RTO: 0 /100
PLATELET # BLD AUTO: 322 10E3/UL (ref 150–450)
POTASSIUM SERPL-SCNC: 3.6 MMOL/L (ref 3.4–5.3)
PROT SERPL-MCNC: 6.3 G/DL (ref 6.4–8.3)
RBC # BLD AUTO: 4.03 10E6/UL (ref 3.8–5.2)
SODIUM SERPL-SCNC: 143 MMOL/L (ref 136–145)
TOTAL PROTEIN SERUM FOR ELP: 6.5 G/DL (ref 6.4–8.3)
WBC # BLD AUTO: 5.3 10E3/UL (ref 4–11)

## 2022-10-05 PROCEDURE — 96367 TX/PROPH/DG ADDL SEQ IV INF: CPT

## 2022-10-05 PROCEDURE — 99214 OFFICE O/P EST MOD 30 MIN: CPT | Performed by: REGISTERED NURSE

## 2022-10-05 PROCEDURE — 80053 COMPREHEN METABOLIC PANEL: CPT

## 2022-10-05 PROCEDURE — 999N000285 HC STATISTIC VASC ACCESS LAB DRAW WITH PIV START

## 2022-10-05 PROCEDURE — 84165 PROTEIN E-PHORESIS SERUM: CPT | Mod: TC | Performed by: STUDENT IN AN ORGANIZED HEALTH CARE EDUCATION/TRAINING PROGRAM

## 2022-10-05 PROCEDURE — 96413 CHEMO IV INFUSION 1 HR: CPT

## 2022-10-05 PROCEDURE — 83521 IG LIGHT CHAINS FREE EACH: CPT

## 2022-10-05 PROCEDURE — 999N000128 HC STATISTIC PERIPHERAL IV START W/O US GUIDANCE

## 2022-10-05 PROCEDURE — 250N000011 HC RX IP 250 OP 636

## 2022-10-05 PROCEDURE — 85004 AUTOMATED DIFF WBC COUNT: CPT

## 2022-10-05 PROCEDURE — 86334 IMMUNOFIX E-PHORESIS SERUM: CPT | Performed by: STUDENT IN AN ORGANIZED HEALTH CARE EDUCATION/TRAINING PROGRAM

## 2022-10-05 PROCEDURE — 96375 TX/PRO/DX INJ NEW DRUG ADDON: CPT

## 2022-10-05 PROCEDURE — 258N000003 HC RX IP 258 OP 636

## 2022-10-05 PROCEDURE — 36415 COLL VENOUS BLD VENIPUNCTURE: CPT

## 2022-10-05 PROCEDURE — G0463 HOSPITAL OUTPT CLINIC VISIT: HCPCS

## 2022-10-05 PROCEDURE — 84155 ASSAY OF PROTEIN SERUM: CPT

## 2022-10-05 PROCEDURE — 250N000013 HC RX MED GY IP 250 OP 250 PS 637

## 2022-10-05 PROCEDURE — G0008 ADMIN INFLUENZA VIRUS VAC: HCPCS | Performed by: REGISTERED NURSE

## 2022-10-05 PROCEDURE — 82784 ASSAY IGA/IGD/IGG/IGM EACH: CPT

## 2022-10-05 PROCEDURE — 90682 RIV4 VACC RECOMBINANT DNA IM: CPT | Performed by: REGISTERED NURSE

## 2022-10-05 PROCEDURE — 250N000011 HC RX IP 250 OP 636: Performed by: REGISTERED NURSE

## 2022-10-05 RX ORDER — DEXAMETHASONE 4 MG/1
20 TABLET ORAL SEE ADMIN INSTRUCTIONS
Qty: 10 TABLET | Refills: 0 | Status: SHIPPED | OUTPATIENT
Start: 2022-10-05 | End: 2022-12-21

## 2022-10-05 RX ORDER — DIPHENHYDRAMINE HCL 25 MG
50 CAPSULE ORAL ONCE
Status: COMPLETED | OUTPATIENT
Start: 2022-10-05 | End: 2022-10-05

## 2022-10-05 RX ORDER — ACETAMINOPHEN 325 MG/1
650 TABLET ORAL ONCE
Status: COMPLETED | OUTPATIENT
Start: 2022-10-05 | End: 2022-10-05

## 2022-10-05 RX ADMIN — FAMOTIDINE 20 MG: 10 INJECTION INTRAVENOUS at 14:42

## 2022-10-05 RX ADMIN — SODIUM CHLORIDE 700 MG: 9 INJECTION, SOLUTION INTRAVENOUS at 15:12

## 2022-10-05 RX ADMIN — INFLUENZA A VIRUS A/WISCONSIN/588/2019 (H1N1) RECOMBINANT HEMAGGLUTININ ANTIGEN, INFLUENZA A VIRUS A/DARWIN/6/2021 (H3N2) RECOMBINANT HEMAGGLUTININ ANTIGEN, INFLUENZA B VIRUS B/AUSTRIA/1359417/2021 RECOMBINANT HEMAGGLUTININ ANTIGEN, AND INFLUENZA B VIRUS B/PHUKET/3073/2013 RECOMBINANT HEMAGGLUTININ ANTIGEN 0.5 ML: 45; 45; 45; 45 INJECTION INTRAMUSCULAR at 15:31

## 2022-10-05 RX ADMIN — DIPHENHYDRAMINE HYDROCHLORIDE 50 MG: 25 CAPSULE ORAL at 14:20

## 2022-10-05 RX ADMIN — DEXAMETHASONE SODIUM PHOSPHATE: 10 INJECTION, SOLUTION INTRAMUSCULAR; INTRAVENOUS at 14:41

## 2022-10-05 RX ADMIN — SODIUM CHLORIDE 250 ML: 9 INJECTION, SOLUTION INTRAVENOUS at 14:41

## 2022-10-05 RX ADMIN — ACETAMINOPHEN 650 MG: 325 TABLET ORAL at 14:20

## 2022-10-05 ASSESSMENT — PAIN SCALES - GENERAL: PAINLEVEL: NO PAIN (0)

## 2022-10-05 NOTE — LETTER
10/5/2022         RE: Kristie Cornejo  [address]  [facility] [address]        Dear Colleague,    Thank you for referring your patient, Kristie Cornejo, to the [facility]. Please see a copy of my visit note below.    [facility]  Date of Visit: Oct 5, 2022   Oncologist: Dr. Payton Reinoso    Reason for visit: myeloma follow-up           Oncology History   52 year old female with past medical history significant for Irritable bowel disease, allergies, large plasmacytoma of the L pelvis, and newly diagnosed multiple myeloma.     Early in April 2021 , she noted to have left sided groin pain, constant and was affecting her daily activities  MRI showed large, expansile, permeative mass with indistinct borders involving the left superior and inferior pubic rami, pubic symphysis and with possible extension to the left acetabulum.     We completed the following work-up:   - Biopsy 9/7/2021: plasmacytoma.  Flow cytometry showed kappa monotypic plasma cells, polytypic B cells. FISH results from plasmacytoma showed a gain of 11q, IGH-CCND1 fusion but no losses of 1q or TP53 and no gain of 1q.  - Bone marrow biopsy 09/22/21: Marrow cellularity 50% with 25% interstitial infiltration w/  Kappa-monotypic, moderately atypical plasma cells. Flow with 0.7% kappa-monotypica plasma cells. FISH and cytogenetics pending.   - PET CT 09/24/21 w/ hypermetabolic pathologic fracture of the L pubic ramus w/ aggressive appearing osteolysis but no additional suspicious lytic or blastic osseous lesions.     Treatment to date:   - Zometa q4 weeks started 9/27/21  - Radiation to left pubic ramus x18 fractions, completed 11/11/21.   - RVD C1D1 11/15/21  - RVD C2D1 12/6/21  - RVD C3D1  12/27/21  - RVD C4 D1 1/17/22, elizabeth added on D8 due to inadequate response of urinary M-spike and FLCs remaining over 100 mg/dL after 4 cycles of RVD    - Elizabeth-RVD C1D8 1/24/22  -  Elizabeth-RVD C2D1 2/7/22  - Elizabeth-RVD C3D1 2/28/22  - Elizabeth-RVD C4D1 3/29/22  - Elizabeth-RVD C5D1 4/19/22    Disease response: TN     - 5/19/22: Continued monotherapy with Daratumumab; held Revlimid and Velcade.    - 6/21/22: Transitioned to KCD, received C1 days 1 and 2- had significant PVCs at cardiac follow-uyp after 1st week of Kyprolis, held further doses.    - Transitioned to Sarclisa/Pom/Dex 7/6/22. Started Pom on 7/11.     -9/8/22 - COVID, delayed C3D15 treatment x 1 week          INTERIM HISTORY:   Was slightly nauseated this morning.  Felt some palpitations last night and today. Slight lingering cough from her COVID infection in early September.  Stable dryness to her face and back. Having some constipation with gas pains, is going to try some Miralax to resolve.       ROS: 10 point ROS neg other than the symptoms noted above in the HPI.           Physical Exam:     BP (!) 151/85   Pulse 86   Temp 98.1  F (36.7  C)   Resp 16   Wt 69 kg (152 lb 1.6 oz)   SpO2 99%   BMI 23.08 kg/m     Gen: alert, pleasant and conversational, NAD  HEENT: NC/AT,EOMI w/ PERRL, anicteric sclera. OP clear. MMM.   Neck: Supple, no LAD  CV: normal S1,S2 with RRR no m/r/g  Resp: lungs CTA bilaterally with adequate air movement to bases. No wheezes or crackles  Abd: soft NTND no organomegaly or masses. BS normoactive.   Ext: warm and well perfused, no edema or cyanosis  Skin: Slightly reddened cheeks with areas of rough, patchy dryness. Skin is intact, no peeling.  Neuro: A&Ox4, no lateralizing sx. Grossly nonfocal.  Psych: appropriate, reactive             Laboratory Data:      I personally reviewed the following labs:    Most Recent 3 CBC's:  Recent Labs   Lab Test 10/05/22  1322 09/21/22  0800 08/31/22  1422   WBC 5.3 3.2* 4.1   HGB 12.8 12.2 11.8   MCV 96 97 96    240 323     Most Recent 3 BMP's:  Recent Labs   Lab Test 10/05/22  1322 09/21/22  0800 08/31/22  1422 08/17/22  1422 08/10/22  1402     --  143  --  143    POTASSIUM 3.6  --  3.4  --  4.3   CHLORIDE 110*  --  107  --  112*   CO2 21*  --  20*  --  24   BUN 21.6*  --  20.5*  --  21   CR 0.76 0.79 0.78   < > 0.72   ANIONGAP 12  --  16*  --  7   MEKA 9.6 9.5 9.2   < > 8.9   *  --  137*  --  129*    < > = values in this interval not displayed.     Most Recent 2 LFT's:  Recent Labs   Lab Test 10/05/22  1322 08/31/22  1422   AST 16 20   ALT 16 14   ALKPHOS 59 56   BILITOTAL 0.2 0.3       Imaging:  No new imaging to review.         Assessment and Recommendations   Kristie Cornejo is a 52 year old female who was diagnosed with Plasmacytoma after she presented with left sided groin pain now found to have multiple myeloma after further workup with bone marrow biopsy.     # Multiple myeloma with associated large plasmacytoma of the L pelvis. Kappa light chain disease.     BMBx with 25% plasma cell infiltration; standard risk with gain of 11q, IGH-CCND1 fusion but no losses of 1q or TP53 and no gain of 1q.   SPEP w/out a monoclonal peak  UPEP positive. 70% paraprotein. Large monoclonal free immunoglobulin light chain of kappa chain type.     No anemia, normal creat, Ca, no other skeletal lesions. Alb normal, B2M normal.  Stage I Light chain disease with left pelvic bone pathologic fracture she is classified as having symptomatic multiple myeloma.   -s/p michelle-RVD, michelle alone and KCD with either little benefit or significant symptoms  -transitioned to Sarclisa/Pom/Dex to avoid the cardiotoxicity of Kyprolis.  Started C1D1 7/6/22.  Her diarrhea has returned with being back on treatment but manageable  -there has been some response with her kappa light chains on this regimen. Will continue to monitor prior to each cycle  - will continue this regimen as long as she keeps responding; met with Dr Reinoso 8/30/22 to discuss CAR-T therapy; anticipate this may happen some time this winter  - Okay to proceed with C4 today  -will follow-up prior to next infusion  "    ADDENDUM:  FLCs are slightly increased from last check.  Discussed with Dr. Reinoso.  Will finish this cycle of Sarclisa/Pom/Dex and recheck labs toward the end of the cycle (anticipate lab check 10/31).  Will schedule with Dr. Young on 11/2 for review of results and discussion of next steps.  If FLCs are stable or improved, anticipate proceeding with next cycle of Sarclisa/Pom/Dex.  If FLCs are worsening, anticipate proceeding with belantamab.  I will request ophthalmology exam on 11/3 so that it is scheduled in case she needs it.    Continue ASA.      #Bone Health  She will continue monthly Zometa. She will continue Calcium/Vitamin D supplements.       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 Currently having no pain and able to walk 1 mile without any assistive devices.  She is interested in pursuing other activities like gentle cross-country skiing.  Had follow-up with Dr. Philip on 1/6/22 and was cleared to resume regular activity as tolerated. She should still refrain from high-impact activities.    PET on 7/12/22 shows left pubic ramus lesion is smaller in size but brighter FDG. No new lesions.      # PVCs  She had an ECHO 12/1 and had follow-up with cardiology 12/7 (notes in CareEverywhere).  She had follow-up with cardiology here at the  and they recommend an ablation. Stress test was completed   -started on Toprol XL however was discontinued due to significant side effects of orthostatic hypotension, pre-syncopal episodes, shakiness, and weakness.   - She had an ablation 3/23/22 but has not experienced relief from the feeling of being nauseated and short of breath after climbing the stairs.  She also reports episodes of palpitations that \"radiate into her neck.\" Denies chest pain or shortness of breath.   - EKG done 4/4 showed sinus " rhythm and nonspecific ST and T wave abnormality. Troponin negative.  - Cardiac MRI ruled out amyloid.  LFEF 50%, no evidence of infiltrative cardiomyopathy.  - She followed up with cardiology and they felt that the cause may be musculoskeletal or pericarditis. They suggested taking ibuprofen for pain. Her pain has subsided and she actually has not needed to take ibuprofen.   - Post ablation Zio patch was performed showing a significant reduction in PVCs; however, by the time she had her cardiology follow-up, she had restarted chemotherapy and the PVCs had returned.  Dr. Thomas is comfortable proceeding with treatment as long as symptoms are tolerable and there are no major EKG changes. Would need to repeat if she were to do BMT  - EKG 7/13 with no major changes, troponin WNL  -she continues to have PVCs, though much improved from when she was on cytoxan. Wants to avoid ablation again if possible    #Diarrhea  She was having intermittent diarrhea with associated abdominal cramping and nausea, but developed much more significant diarrhea with 10-12 liquid episodes and was seen as an add-on on 4/4/22 for this.   - C.diff PCR negative 2/8/22, repeat on 4/5 negative  - Enteric stool panel 4/5/22 negative  - Underwent colonoscopy on 4/13/22 to pursue biopsy for amyloid (discussed below). Congo red stain is negative for amyloid deposits. Pathology showed colonic mucosa with focal active colitis, negative for signs of chronicity and lymphocytic colitis. Per patient report, GI reviewed results with her and did not feel she needed treatment for these findings at this time. They advised her to monitor her symptoms and to call them if they do not improve.Dr. Montgomery agreed with following conservatively for now since she reports some improvement in her symptoms.  If diarrhea becomes bothersome again, then they will arrange for expeditious evaluation in the IBD clinic.   - CMV PCR (4/4/22) not detected  -has intermittent  diarrhea and constipation.  Manages with metamucil and Miralax.    #COVID prophylaxis  - She received EVUSHELD 1/20/22 and 3/8/22  - Received 3 COVID vaccines to date  - Will do influenza vaccine today  - Holding off on COVID booster and Evusheld due to recent monoclonal antibodies following COVID infection in early September.  Reconsider in mid-December.    #Reflux  She reported episodes of reflux with bile, especially when she lays down at night.  She has started to elevate her head on pillows and is also trying to avoid eating within 1-2 hours of going to bed.  Stable currently on Omeprazole 20 mg daily.     #Skin Dryness  She has developed dry, patchy areas on her face that occasionally peel.Switched from an intentionally drying face wash to a hydrating face wash and things seem to be improving.  No peeling today. No pruritis, burning, or pain associated with the dryness.  - Continue hydrating face wash and Cerave lotion  - Dermatology referral scheduled in November        33 minutes spent on the date of the encounter doing chart review, review of test results, patient visit and documentation       DORON Magaña [facility]  [address] [place] [id number]  [phone]

## 2022-10-05 NOTE — NURSING NOTE
Chief Complaint   Patient presents with     Blood Draw     Labs drawn by RN via PIV placed by VAT.     Labs drawn from PIV placed by RN. Line flushed with saline. Vitals taken. Pt checked in for appointment(s).    Jadyn Pan RN

## 2022-10-05 NOTE — PROGRESS NOTES
Infusion Nursing Note:  Kristie Cornejo presents today for Cycle 4 Day 1 Isatuximab-[facility].    Patient seen by provider today: Yes: Jackie Dunn NP   present during visit today: Not Applicable.    Note:     10/5/22 LYNNEB Jackie Dunn NP/Narcisa Morgan RN  -Give influenza vaccine today. Also can give Evusheld or Covid booster.     Discussed Covid booster/Evusheld administration with patient and pharmacy. Patient stated she was told by the Covid clinic when she received her monoclonal antibody infusion in September that she was not to received a booster vaccine until December. Patient stated she would wait on booster/Evusheld today and discuss with providers to determine plan for booster and next Evusheld dose.     Intravenous Access:  Peripheral IV placed.    Treatment Conditions:  Lab Results   Component Value Date    HGB 12.8 10/05/2022    WBC 5.3 10/05/2022    ANEU 2.2 07/27/2022    ANEUTAUTO 2.7 10/05/2022     10/05/2022      Lab Results   Component Value Date     10/05/2022    POTASSIUM 3.6 10/05/2022    CR 0.76 10/05/2022    MEKA 9.6 10/05/2022    BILITOTAL 0.2 10/05/2022    ALBUMIN 4.3 10/05/2022    ALT 16 10/05/2022    AST 16 10/05/2022     Results reviewed, labs MET treatment parameters, ok to proceed with treatment.    Post Infusion Assessment:  Patient tolerated infusion without incident.  Blood return noted pre and post infusion.  Site patent and intact, free from redness, edema or discomfort.  No evidence of extravasations.  Access discontinued per protocol.     Patient tolerated influenza vaccine injection without incident.    Discharge Plan:   Prescription refills given for Decadron.  Discharge instructions reviewed with: Patient.  Patient and/or family verbalized understanding of discharge instructions and all questions answered.  AVS to patient via [facility] [employer].  Patient will return 10/19/22 for next appointment.   Patient discharged in stable condition accompanied by:  self.  Departure Mode: Ambulatory.      Narcisa Morgan RN

## 2022-10-05 NOTE — NURSING NOTE
"Oncology Rooming Note    October 5, 2022 1:33 PM   Kristie Cornejo is a 52 year old female who presents for:    Chief Complaint   Patient presents with     Blood Draw     Labs drawn by RN via PIV placed by VAT.     Oncology Clinic Visit     Plasmacytoma     Initial Vitals: BP (!) 151/85   Pulse 86   Temp 98.1  F (36.7  C)   Resp 16   Wt 69 kg (152 lb 1.6 oz)   SpO2 99%   BMI 23.08 kg/m   Estimated body mass index is 23.08 kg/m  as calculated from the following:    Height as of 7/20/22: 1.729 m (5' 8.07\").    Weight as of this encounter: 69 kg (152 lb 1.6 oz). Body surface area is 1.82 meters squared.  No Pain (0) Comment: Data Unavailable   No LMP recorded. (Menstrual status: IUD).  Allergies reviewed: Yes  Medications reviewed: Yes    Medications: Pt will need refill of Dexamethasone in 2 weeks.  Pharmacy name entered into paOnde:    [facility] - [place], [place] - [address]  [id number] - [place], [place] - [address]  [facility] - [place], [place] - [address]  [facility] [place] - [address] [place] [address] PHARMACY [facility] - [place] - [phone]  [facility] - [place], [place] - [address] [place] [address] PHARMACY [id number] - [place], [place] - [address]    Clinical concerns: Pt presents today for f/u.       Toshia Ramírez LPN  10/5/2022              "

## 2022-10-06 LAB
ALBUMIN SERPL ELPH-MCNC: 4.4 G/DL (ref 3.7–5.1)
ALPHA1 GLOB SERPL ELPH-MCNC: 0.3 G/DL (ref 0.2–0.4)
ALPHA2 GLOB SERPL ELPH-MCNC: 0.9 G/DL (ref 0.5–0.9)
B-GLOBULIN SERPL ELPH-MCNC: 0.7 G/DL (ref 0.6–1)
GAMMA GLOB SERPL ELPH-MCNC: 0.3 G/DL (ref 0.7–1.6)
IGA SERPL-MCNC: 9 MG/DL (ref 84–499)
IGG SERPL-MCNC: 244 MG/DL (ref 610–1616)
IGM SERPL-MCNC: 11 MG/DL (ref 35–242)
KAPPA LC FREE SER-MCNC: 38.97 MG/DL (ref 0.33–1.94)
KAPPA LC FREE/LAMBDA FREE SER NEPH: 194.85 {RATIO} (ref 0.26–1.65)
LAMBDA LC FREE SERPL-MCNC: 0.2 MG/DL (ref 0.57–2.63)
M PROTEIN SERPL ELPH-MCNC: 0.1 G/DL
PROT PATTERN SERPL ELPH-IMP: ABNORMAL
PROT PATTERN SERPL IFE-IMP: NORMAL

## 2022-10-06 PROCEDURE — 86334 IMMUNOFIX E-PHORESIS SERUM: CPT | Mod: 26 | Performed by: STUDENT IN AN ORGANIZED HEALTH CARE EDUCATION/TRAINING PROGRAM

## 2022-10-06 PROCEDURE — 84165 PROTEIN E-PHORESIS SERUM: CPT | Mod: 26 | Performed by: STUDENT IN AN ORGANIZED HEALTH CARE EDUCATION/TRAINING PROGRAM

## 2022-10-07 ENCOUNTER — TELEPHONE (OUTPATIENT)
Dept: OPHTHALMOLOGY | Facility: CLINIC | Age: 52
End: 2022-10-07

## 2022-10-07 NOTE — TELEPHONE ENCOUNTER
Spoke with patient regarding offered sooner appointment for Baseline Exam with  before starting Infusions. Rescheduled patient accordingly and patient is aware of date, time and location and [facility]Per [facility]

## 2022-10-08 NOTE — PROGRESS NOTES
This is a recent snapshot of the patient's [facility] medical record.  For current drug dose and complete information and questions, call [phone] or In Basket pool, fv home infusion ([id number])  CSN Number:  [id number]

## 2022-10-08 NOTE — PROGRESS NOTES
This is a recent snapshot of the patient's [facility] medical record.  For current drug dose and complete information and questions, call [phone] or In Basket pool, fv home infusion ([id number])  CSN Number:  [id number]

## 2022-10-11 ENCOUNTER — OFFICE VISIT (OUTPATIENT)
Dept: OPHTHALMOLOGY | Facility: CLINIC | Age: 52
End: 2022-10-11
Payer: COMMERCIAL

## 2022-10-11 DIAGNOSIS — Z98.890 STATUS POST LASIK SURGERY OF BOTH EYES: ICD-10-CM

## 2022-10-11 DIAGNOSIS — H01.00A BLEPHARITIS OF UPPER AND LOWER EYELIDS OF BOTH EYES, UNSPECIFIED TYPE: ICD-10-CM

## 2022-10-11 DIAGNOSIS — H04.123 DRY EYES, BILATERAL: Primary | ICD-10-CM

## 2022-10-11 DIAGNOSIS — H18.523 ANTERIOR BASEMENT MEMBRANE DYSTROPHY (ABMD) OF BOTH EYES: ICD-10-CM

## 2022-10-11 DIAGNOSIS — H01.00B BLEPHARITIS OF UPPER AND LOWER EYELIDS OF BOTH EYES, UNSPECIFIED TYPE: ICD-10-CM

## 2022-10-11 DIAGNOSIS — Z79.899 EYE EXAM DUE TO HIGH RISK MEDICATION, ENCOUNTER FOR: ICD-10-CM

## 2022-10-11 PROCEDURE — 99213 OFFICE O/P EST LOW 20 MIN: CPT | Performed by: OPHTHALMOLOGY

## 2022-10-11 ASSESSMENT — CONF VISUAL FIELD
OD_SUPERIOR_NASAL_RESTRICTION: 0
OS_INFERIOR_NASAL_RESTRICTION: 0
OS_SUPERIOR_TEMPORAL_RESTRICTION: 0
OD_INFERIOR_NASAL_RESTRICTION: 0
OS_NORMAL: 1
OD_SUPERIOR_TEMPORAL_RESTRICTION: 0
OD_NORMAL: 1
OS_SUPERIOR_NASAL_RESTRICTION: 0
METHOD: COUNTING FINGERS
OD_INFERIOR_TEMPORAL_RESTRICTION: 0
OS_INFERIOR_TEMPORAL_RESTRICTION: 0

## 2022-10-11 ASSESSMENT — REFRACTION_WEARINGRX
OS_AXIS: 113
OD_AXIS: 142
OS_SPHERE: +0.25
SPECS_TYPE: PAL
OD_ADD: +2.50
OD_CYLINDER: +0.50
OD_SPHERE: +0.50
OS_CYLINDER: +0.75
OS_ADD: +2.50

## 2022-10-11 ASSESSMENT — VISUAL ACUITY
OD_CC: 20/20
METHOD: SNELLEN - LINEAR
OS_CC+: -1
CORRECTION_TYPE: GLASSES
OS_CC: 20/20

## 2022-10-11 ASSESSMENT — EXTERNAL EXAM - LEFT EYE: OS_EXAM: NORMAL

## 2022-10-11 ASSESSMENT — TONOMETRY
IOP_METHOD: TONOPEN
OD_IOP_MMHG: 12
OS_IOP_MMHG: 12

## 2022-10-11 ASSESSMENT — EXTERNAL EXAM - RIGHT EYE: OD_EXAM: NORMAL

## 2022-10-11 ASSESSMENT — CUP TO DISC RATIO
OS_RATIO: 0.2
OD_RATIO: 0.2

## 2022-10-11 NOTE — PROGRESS NOTES
HPI  Kristie Cornejo is a 52 year old year old here for follow-up blepharitis and preop corneal screening prior to possible change in chemotheraphy. Eyes have been comfortable.       HPI     Follow Up    In both eyes.  Since onset it is gradually improving.  Associated symptoms include glare, haloes and dryness.  Negative for eye pain, tearing, photophobia, flashes and floaters.  Treatments tried include artificial tears and warm compresses.  Pain was noted as 0/10. Additional comments: Pt here for baseline exam before starting infusions.  Hypermetropia, unspecified laterality - Both Eyes.           Comments    Pt could be starting infusions depending on upcoming lab results on 11/2/22.  Pt sates vision is better and new glasses stopped working.  Got new glasses elsewhere.  AT's PRN.  WC PRN.  Hx of ocular migraines.    HEATH Baumann October 11, 2022 1:24 PM              Last edited by Shayy Nunez COT on 10/11/2022  1:24 PM.        Starting new chemo (#6) in a few weeks. Transitioned to Sarclisa/Pom/Dex 7/6/22. Started Pom on 7/11- per last onc note reviewed from 10/5/22.  If FLCs are worsening, anticipate proceeding with belantamab.    PMH:    Past Medical History:   Diagnosis Date     Allergic rhinitis, cause unspecified      Anxiety state, unspecified 12/01/2003    Started postpartum . On Paxil x 1+years. Off meds- 10/04     Irritable bowel syndrome 01/01/2004     Multiple myeloma not having achieved remission (H)      PLANTAR WARTS     resolved     SINUS DYSRHYTHMIA 10/01/2002    wnl     Unspecified hemorrhoids without mention of complication 04/01/2004    colonoscopy 4/04 spastic colon, int roids; bx neg      POH:  Glasses for hypermetropia after laser in situ keratomileusis (LASIK) in the early 2000s both eyes , dry eye, no surgery, no trauma, ocular migraines, blepharitis  Oc Meds:  artificial tear drops as needed   FH:  Denies any glaucoma, age related macular degeneration, or other known eye  "diseases       Assessment & Plan       1. Dry eyes, bilateral - Both Eyes    2. Blepharitis of upper and lower eyelids of both eyes, unspecified type - Both Eyes    3. Status post LASIK surgery of both eyes - Both Eyes    4. 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 Eye exam due to high risk medication, encounter for      Comment: improved symptoms, baseline cornea signs of anterior basement membrane dystrophy (ABMD) and mixed dry eye documented    Plan:  continue ocusoft foam lid scrubs, warm moist compresses as needed   Antihistamine drops such as ketotifen or olpatadine ok for pet exposure  Continue artificial tear drops four times a day as needed (four times a day preservative-free artificial tears if starting BLENREP with follow-up eye exam within a month of initiation)    \"Belantamab mafodotin is associated with a high incidence (?20%) of keratopathy [85]. To mitigate such risks, an ophthalmic exam is recommended prior to and during belamaf therapy in order to assess baseline vision and possible adverse eye effects. Dosage can be reduced or held if ocular toxicity such as blurry vision, dry eyes, or corneal ulcers occurs. Belamaf should be discontinued if ocular toxicity is severe\"  BLENREP-Belantamab Injection, Powder, Lyophilized, for Solution: Highlights of Prescribing Information. Updated 8/2020. [employer]. [(accessed on 21 October 2020)]    -----------------------------------------------------------------------------------     Patient disposition:    Return in about 1 month (around 11/11/2022) for follow up- dry eye.     Complete documentation of historical and exam elements from today's encounter can be found in the full encounter summary report (not reduplicated in this progress note). I personally obtained the chief complaint(s) and history of present illness.  I have confirmed and edited as necessary the CC, HPI, PMH/PSH, social history, FMH, ROS, and " exam/neuro findings as obtained by the technician or others. I have examined this patient myself and I personally viewed the image(s) and studies listed above and the documentation reflects my findings and interpretation.  I formulated and edited as necessary the assessment and plan and discussed the findings and management plan with the patient and family.     Fanny Ponce MD

## 2022-10-18 RX ORDER — METHYLPREDNISOLONE SODIUM SUCCINATE 125 MG/2ML
125 INJECTION, POWDER, LYOPHILIZED, FOR SOLUTION INTRAMUSCULAR; INTRAVENOUS
Status: CANCELLED
Start: 2022-10-19

## 2022-10-18 RX ORDER — ALBUTEROL SULFATE 0.83 MG/ML
2.5 SOLUTION RESPIRATORY (INHALATION)
Status: CANCELLED | OUTPATIENT
Start: 2022-10-19

## 2022-10-18 RX ORDER — DIPHENHYDRAMINE HYDROCHLORIDE 50 MG/ML
50 INJECTION INTRAMUSCULAR; INTRAVENOUS
Status: CANCELLED
Start: 2022-10-19

## 2022-10-18 RX ORDER — HEPARIN SODIUM (PORCINE) LOCK FLUSH IV SOLN 100 UNIT/ML 100 UNIT/ML
5 SOLUTION INTRAVENOUS
Status: CANCELLED | OUTPATIENT
Start: 2022-10-19

## 2022-10-18 RX ORDER — MEPERIDINE HYDROCHLORIDE 25 MG/ML
25 INJECTION INTRAMUSCULAR; INTRAVENOUS; SUBCUTANEOUS EVERY 30 MIN PRN
Status: CANCELLED | OUTPATIENT
Start: 2022-10-19

## 2022-10-18 RX ORDER — ZOLEDRONIC ACID 0.04 MG/ML
4 INJECTION, SOLUTION INTRAVENOUS ONCE
Status: CANCELLED
Start: 2022-10-19 | End: 2022-10-19

## 2022-10-18 RX ORDER — NALOXONE HYDROCHLORIDE 0.4 MG/ML
0.2 INJECTION, SOLUTION INTRAMUSCULAR; INTRAVENOUS; SUBCUTANEOUS
Status: CANCELLED | OUTPATIENT
Start: 2022-10-19

## 2022-10-18 RX ORDER — HEPARIN SODIUM,PORCINE 10 UNIT/ML
5 VIAL (ML) INTRAVENOUS
Status: CANCELLED | OUTPATIENT
Start: 2022-10-19

## 2022-10-18 RX ORDER — ALBUTEROL SULFATE 90 UG/1
1-2 AEROSOL, METERED RESPIRATORY (INHALATION)
Status: CANCELLED
Start: 2022-10-19

## 2022-10-18 RX ORDER — EPINEPHRINE 1 MG/ML
0.3 INJECTION, SOLUTION INTRAMUSCULAR; SUBCUTANEOUS EVERY 5 MIN PRN
Status: CANCELLED | OUTPATIENT
Start: 2022-10-19

## 2022-10-19 ENCOUNTER — TELEPHONE (OUTPATIENT)
Dept: ONCOLOGY | Facility: CLINIC | Age: 52
End: 2022-10-19

## 2022-10-19 ENCOUNTER — APPOINTMENT (OUTPATIENT)
Dept: LAB | Facility: CLINIC | Age: 52
End: 2022-10-19
Payer: COMMERCIAL

## 2022-10-19 ENCOUNTER — INFUSION THERAPY VISIT (OUTPATIENT)
Dept: ONCOLOGY | Facility: CLINIC | Age: 52
End: 2022-10-19
Payer: COMMERCIAL

## 2022-10-19 VITALS
DIASTOLIC BLOOD PRESSURE: 86 MMHG | SYSTOLIC BLOOD PRESSURE: 125 MMHG | TEMPERATURE: 97.8 F | WEIGHT: 157.6 LBS | BODY MASS INDEX: 23.91 KG/M2 | OXYGEN SATURATION: 100 % | HEART RATE: 50 BPM | RESPIRATION RATE: 16 BRPM

## 2022-10-19 DIAGNOSIS — C90.30 PLASMACYTOMA OF BONE (H): ICD-10-CM

## 2022-10-19 DIAGNOSIS — C90.00 LIGHT CHAIN MYELOMA (H): Primary | ICD-10-CM

## 2022-10-19 LAB
BASOPHILS # BLD AUTO: 0.1 10E3/UL (ref 0–0.2)
BASOPHILS NFR BLD AUTO: 1 %
CALCIUM SERPL-MCNC: 9.4 MG/DL (ref 8.6–10)
CREAT SERPL-MCNC: 0.74 MG/DL (ref 0.51–0.95)
EOSINOPHIL # BLD AUTO: 0.5 10E3/UL (ref 0–0.7)
EOSINOPHIL NFR BLD AUTO: 9 %
ERYTHROCYTE [DISTWIDTH] IN BLOOD BY AUTOMATED COUNT: 13.5 % (ref 10–15)
GFR SERPL CREATININE-BSD FRML MDRD: >90 ML/MIN/1.73M2
HCT VFR BLD AUTO: 37.7 % (ref 35–47)
HGB BLD-MCNC: 12.4 G/DL (ref 11.7–15.7)
IMM GRANULOCYTES # BLD: 0 10E3/UL
IMM GRANULOCYTES NFR BLD: 0 %
LYMPHOCYTES # BLD AUTO: 0.7 10E3/UL (ref 0.8–5.3)
LYMPHOCYTES NFR BLD AUTO: 14 %
MCH RBC QN AUTO: 32 PG (ref 26.5–33)
MCHC RBC AUTO-ENTMCNC: 32.9 G/DL (ref 31.5–36.5)
MCV RBC AUTO: 97 FL (ref 78–100)
MONOCYTES # BLD AUTO: 1.1 10E3/UL (ref 0–1.3)
MONOCYTES NFR BLD AUTO: 21 %
NEUTROPHILS # BLD AUTO: 2.9 10E3/UL (ref 1.6–8.3)
NEUTROPHILS NFR BLD AUTO: 55 %
NRBC # BLD AUTO: 0 10E3/UL
NRBC BLD AUTO-RTO: 0 /100
PLATELET # BLD AUTO: 222 10E3/UL (ref 150–450)
RBC # BLD AUTO: 3.88 10E6/UL (ref 3.8–5.2)
WBC # BLD AUTO: 5.2 10E3/UL (ref 4–11)

## 2022-10-19 PROCEDURE — 96375 TX/PRO/DX INJ NEW DRUG ADDON: CPT

## 2022-10-19 PROCEDURE — 250N000013 HC RX MED GY IP 250 OP 250 PS 637

## 2022-10-19 PROCEDURE — 258N000003 HC RX IP 258 OP 636

## 2022-10-19 PROCEDURE — 82565 ASSAY OF CREATININE: CPT

## 2022-10-19 PROCEDURE — 250N000011 HC RX IP 250 OP 636

## 2022-10-19 PROCEDURE — 82310 ASSAY OF CALCIUM: CPT

## 2022-10-19 PROCEDURE — 96413 CHEMO IV INFUSION 1 HR: CPT

## 2022-10-19 PROCEDURE — 85025 COMPLETE CBC W/AUTO DIFF WBC: CPT

## 2022-10-19 PROCEDURE — 36415 COLL VENOUS BLD VENIPUNCTURE: CPT

## 2022-10-19 RX ORDER — ACETAMINOPHEN 325 MG/1
650 TABLET ORAL ONCE
Status: COMPLETED | OUTPATIENT
Start: 2022-10-19 | End: 2022-10-19

## 2022-10-19 RX ORDER — DIPHENHYDRAMINE HCL 25 MG
50 CAPSULE ORAL ONCE
Status: COMPLETED | OUTPATIENT
Start: 2022-10-19 | End: 2022-10-19

## 2022-10-19 RX ORDER — ZOLEDRONIC ACID 0.04 MG/ML
4 INJECTION, SOLUTION INTRAVENOUS ONCE
Status: COMPLETED | OUTPATIENT
Start: 2022-10-19 | End: 2022-10-19

## 2022-10-19 RX ADMIN — SODIUM CHLORIDE 700 MG: 9 INJECTION, SOLUTION INTRAVENOUS at 15:53

## 2022-10-19 RX ADMIN — DEXAMETHASONE SODIUM PHOSPHATE: 10 INJECTION, SOLUTION INTRAMUSCULAR; INTRAVENOUS at 14:55

## 2022-10-19 RX ADMIN — ACETAMINOPHEN 650 MG: 325 TABLET ORAL at 14:56

## 2022-10-19 RX ADMIN — SODIUM CHLORIDE 250 ML: 9 INJECTION, SOLUTION INTRAVENOUS at 14:55

## 2022-10-19 RX ADMIN — FAMOTIDINE 20 MG: 10 INJECTION INTRAVENOUS at 14:56

## 2022-10-19 RX ADMIN — DIPHENHYDRAMINE HYDROCHLORIDE 50 MG: 25 CAPSULE ORAL at 14:56

## 2022-10-19 RX ADMIN — ZOLEDRONIC ACID 4 MG: 0.04 INJECTION, SOLUTION INTRAVENOUS at 15:17

## 2022-10-19 ASSESSMENT — PAIN SCALES - GENERAL: PAINLEVEL: MODERATE PAIN (4)

## 2022-10-19 NOTE — NURSING NOTE
Chief Complaint   Patient presents with     Blood Draw     Labs drawn from PIV placed by RN. Line flushed with saline. Vitals taken. Pt checked in for appointment(s).      Marely PATTON RN PHN BSN  BMT/Oncology Lab

## 2022-10-19 NOTE — TELEPHONE ENCOUNTER
Oral Chemotherapy Monitoring Program    Subjective/Objective:  Kristie Cornejo is a 52 year old female contacted by phone for a follow-up visit for oral chemotherapy.  Pt confirms taking the appropriate dose of Pomalyst, 4mg daily for 21 days on, 7 days off. Denies new or worsening side effects, missed doses, and recent hospital or ED visits. Patient has not had any recent medication changes. Pt states she feels better on this chemo regimen than she has on all her past regimens, and she hopes she can continue on this regimen.     Pt states that a decision about starting her next cycle of Pomalyst will not be made until her 11/2 visit.  This makes her nervous, as it takes some time for [facility] to get the medication to her for her cycle. Pt states that after this cycle is complete, she will have 2 extra capsules of Pomalyst on hand from previous missed doses.    ORAL CHEMOTHERAPY 7/27/2022 8/3/2022 8/24/2022 8/25/2022 9/28/2022 9/29/2022 10/19/2022   Assessment Type Refill Lab Monitoring Monthly Follow up Other Other Refill;Lab Monitoring Lab Monitoring;Monthly Follow up   Diagnosis Code Multiple Myeloma Multiple Myeloma Multiple Myeloma Multiple Myeloma Multiple Myeloma Multiple Myeloma Multiple Myeloma   Providers Dr. Kemar Reinoso   [facility]   Drug Name Pomalyst (pomalidomide) Pomalyst (pomalidomide) Pomalyst (pomalidomide) Pomalyst (pomalidomide) Pomalyst (pomalidomide) Pomalyst (pomalidomide) Pomalyst (pomalidomide)   Dose 4 mg 4 mg 4 mg - 4 mg 4 mg 4 mg   Current Schedule Daily Daily Daily - Daily Daily Daily   Cycle Details 3 weeks on, 1 week off 3 weeks on, 1 week off 3 weeks on, 1 week off - 3 weeks on, 1 week off 3 weeks on, 1 week off 3 weeks on, 1 week off   Start Date of Last Cycle - 8/3/2022 8/3/2022 - - - -   Planned next cycle start date  "8/3/2022 - 8/31/2022 - - 10/5/2022 11/2/2022   Doses missed in last 2 weeks - - 0 - - - 0   Adherence Assessment - - Adherent - - - Adherent   Adverse Effects - - Rash;Diarrhea - - - -   Nausea - - - - - - -   Pharmacist Intervention(nausea) - - - - - - -   Diarrhea - - Grade 1 - - - -   Pharmacist Intervention(diarrhea) - - No - - - -   Rash - - Grade 1 - - - -   Pharmacist Intervention(Rash) - - Yes - - - -   Intervention(s) - - Referral to oncology provider - - - -   Fatigue - - - - - - Grade 1   Pharmacist Intervention(fatigue) - - - - - - No   Intervention(s) - - - - - - -   Other (See Note for Details) - - - - - - -   Pharmacist intervention(other) - - - - - - -   Intervention(s) - - - - - - -   Any new drug interactions? - - - - - - No   Is the dose as ordered appropriate for the patient? - - - - - - Yes   Has the patient missed any days of school, work, or other routine activity? - - - - - - -   Since the last time we talked, have you been hospitalized or used the emergency room? - - - - - - No       Last PHQ-2 Score on record:   PHQ-2 ( 1999 [employer]) 10/11/2022 1/6/2022   Q1: Little interest or pleasure in doing things 0 0   Q2: Feeling down, depressed or hopeless 0 0   PHQ-2 Score 0 0   PHQ-2 Total Score (12-17 Years)- Positive if 3 or more points; Administer PHQ-A if positive 0 0   Q1: Little interest or pleasure in doing things - -   Q2: Feeling down, depressed or hopeless - -   PHQ-2 Score - -       Vitals:  BP:   BP Readings from Last 1 Encounters:   10/19/22 125/86     Wt Readings from Last 1 Encounters:   10/19/22 71.5 kg (157 lb 9.6 oz)     Estimated body surface area is 1.85 meters squared as calculated from the following:    Height as of 7/20/22: 1.729 m (5' 8.07\").    Weight as of an earlier encounter on 10/19/22: 71.5 kg (157 lb 9.6 oz).    Labs:  _  Result Component Current Result Ref Range   Sodium 143 (10/5/2022) 136 - 145 mmol/L     _  Result Component Current Result Ref Range   Potassium 3.6 " (10/5/2022) 3.4 - 5.3 mmol/L     _  Result Component Current Result Ref Range   Calcium 9.4 (10/19/2022) 8.6 - 10.0 mg/dL     No results found for Mag within last 30 days.     No results found for Phos within last 30 days.     _  Result Component Current Result Ref Range   Albumin 4.3 (10/5/2022) 3.5 - 5.2 g/dL     _  Result Component Current Result Ref Range   Urea Nitrogen 21.6 (H) (10/5/2022) 6.0 - 20.0 mg/dL     _  Result Component Current Result Ref Range   Creatinine 0.74 (10/19/2022) 0.51 - 0.95 mg/dL     _  Result Component Current Result Ref Range   AST 16 (10/5/2022) 10 - 35 U/L     _  Result Component Current Result Ref Range   ALT 16 (10/5/2022) 10 - 35 U/L     _  Result Component Current Result Ref Range   Bilirubin Total 0.2 (10/5/2022) <=1.2 mg/dL     _  Result Component Current Result Ref Range   WBC Count 5.2 (10/19/2022) 4.0 - 11.0 10e3/uL     _  Result Component Current Result Ref Range   Hemoglobin 12.4 (10/19/2022) 11.7 - 15.7 g/dL     _  Result Component Current Result Ref Range   Platelet Count 222 (10/19/2022) 150 - 450 10e3/uL     No results found for ANC within last 30 days.     _  Result Component Current Result Ref Range   Absolute Neutrophils 2.9 (10/19/2022) 1.6 - 8.3 10e3/uL      Assessment/Plan:  Pt continues to tolerate Pomalyst well. Labs are stable and appropriate to continue Pomalyst as planned. Continue current therapy as planned.    Follow-Up:  10/24: Look for updates regarding next cycle, IB sent to care team to address patient concerns about next cycle.    Grace Myhre, [place]  Hematology/Oncology Pharmacist  [facility]  [phone]     ADDENDUM 10/20/22 1330:    Oral Chemotherapy Monitoring Program     Per Dr. Reinoso, pt should have next cycle of Pomalyst on hand to potentially start next cycle 11/2, pending treatment decision.  Pt verbalized understanding.    Grace Myhre, [place]  Hematology/Oncology Clinical Pharmacist  [facility]  [facility]  [phone]

## 2022-10-19 NOTE — PROGRESS NOTES
Infusion Nursing Note:  Kristie Cornejo presents today for Cycle 4 Day 15 Sarclisa/Zometa.    Patient seen by provider today: No   present during visit today: Not Applicable.    Note: Margo comes into the clinic today doing well overall. She does have some slight tingling in her hands when she flexes her hands back towards her wrist and ongoing tingling/numbness to her feet. She has some pain in her left hip/groin which she is wondering if it is from a torn muscle from swimming. She rates this pain 4/10 and declines intervention for it. She struggles on an off with constipation but uses Miralax for this. She also states that she still is SOB when walking flights of stairs but not when she is swimming. She otherwise feels well and does not attest to any chest tightness/signs of infection/dizziness/bruising/swelling/diarrhea/nausea/urinary issues/vision or hearing changes/fatigue.      Intravenous Access:  Peripheral IV placed.    Treatment Conditions:  Lab Results   Component Value Date    HGB 12.4 10/19/2022    WBC 5.2 10/19/2022    ANEU 2.2 07/27/2022    ANEUTAUTO 2.9 10/19/2022     10/19/2022      Lab Results   Component Value Date     10/05/2022    POTASSIUM 3.6 10/05/2022    CR 0.74 10/19/2022    MEKA 9.4 10/19/2022    BILITOTAL 0.2 10/05/2022    ALBUMIN 4.3 10/05/2022    ALT 16 10/05/2022    AST 16 10/05/2022     Results reviewed, labs MET treatment parameters, ok to proceed with treatment.    Post Infusion Assessment:  Patient tolerated infusion without incident.  Blood return noted pre and post infusion.  Site patent and intact, free from redness, edema or discomfort.  No evidence of extravasations.  Access discontinued per protocol.     Discharge Plan:   Patient declined prescription refills.  Discharge instructions reviewed with: Patient.  Patient and/or family verbalized understanding of discharge instructions and all questions answered.  AVS to patient via [employer].  Patient will  return 11/2 for next appointment.   Patient discharged in stable condition accompanied by: self.  Departure Mode: Ambulatory.      Meche Tolbert RN

## 2022-10-20 DIAGNOSIS — C90.00 LIGHT CHAIN MYELOMA (H): Primary | ICD-10-CM

## 2022-10-20 RX ORDER — NALOXONE HYDROCHLORIDE 0.4 MG/ML
0.2 INJECTION, SOLUTION INTRAMUSCULAR; INTRAVENOUS; SUBCUTANEOUS
Status: CANCELLED | OUTPATIENT
Start: 2022-11-09

## 2022-10-20 RX ORDER — EPINEPHRINE 1 MG/ML
0.3 INJECTION, SOLUTION INTRAMUSCULAR; SUBCUTANEOUS EVERY 5 MIN PRN
Status: CANCELLED | OUTPATIENT
Start: 2022-11-09

## 2022-10-20 RX ORDER — ALBUTEROL SULFATE 90 UG/1
1-2 AEROSOL, METERED RESPIRATORY (INHALATION)
Status: CANCELLED
Start: 2022-11-09

## 2022-10-20 RX ORDER — LORAZEPAM 2 MG/ML
0.5 INJECTION INTRAMUSCULAR EVERY 4 HOURS PRN
Status: CANCELLED | OUTPATIENT
Start: 2022-11-09

## 2022-10-20 RX ORDER — MEPERIDINE HYDROCHLORIDE 25 MG/ML
25 INJECTION INTRAMUSCULAR; INTRAVENOUS; SUBCUTANEOUS EVERY 30 MIN PRN
Status: CANCELLED | OUTPATIENT
Start: 2022-11-23

## 2022-10-20 RX ORDER — ACETAMINOPHEN 325 MG/1
650 TABLET ORAL ONCE
Status: CANCELLED | OUTPATIENT
Start: 2022-11-23

## 2022-10-20 RX ORDER — DIPHENHYDRAMINE HCL 25 MG
50 CAPSULE ORAL ONCE
Status: CANCELLED | OUTPATIENT
Start: 2022-11-09

## 2022-10-20 RX ORDER — METHYLPREDNISOLONE SODIUM SUCCINATE 125 MG/2ML
125 INJECTION, POWDER, LYOPHILIZED, FOR SOLUTION INTRAMUSCULAR; INTRAVENOUS
Status: CANCELLED
Start: 2022-11-09

## 2022-10-20 RX ORDER — HEPARIN SODIUM (PORCINE) LOCK FLUSH IV SOLN 100 UNIT/ML 100 UNIT/ML
5 SOLUTION INTRAVENOUS
Status: CANCELLED | OUTPATIENT
Start: 2022-11-09

## 2022-10-20 RX ORDER — ALBUTEROL SULFATE 90 UG/1
1-2 AEROSOL, METERED RESPIRATORY (INHALATION)
Status: CANCELLED
Start: 2022-11-23

## 2022-10-20 RX ORDER — DIPHENHYDRAMINE HCL 25 MG
50 CAPSULE ORAL ONCE
Status: CANCELLED | OUTPATIENT
Start: 2022-11-23

## 2022-10-20 RX ORDER — METHYLPREDNISOLONE SODIUM SUCCINATE 125 MG/2ML
125 INJECTION, POWDER, LYOPHILIZED, FOR SOLUTION INTRAMUSCULAR; INTRAVENOUS
Status: CANCELLED
Start: 2022-11-23

## 2022-10-20 RX ORDER — HEPARIN SODIUM (PORCINE) LOCK FLUSH IV SOLN 100 UNIT/ML 100 UNIT/ML
5 SOLUTION INTRAVENOUS
Status: CANCELLED | OUTPATIENT
Start: 2022-11-23

## 2022-10-20 RX ORDER — HEPARIN SODIUM,PORCINE 10 UNIT/ML
5 VIAL (ML) INTRAVENOUS
Status: CANCELLED | OUTPATIENT
Start: 2022-11-09

## 2022-10-20 RX ORDER — HEPARIN SODIUM,PORCINE 10 UNIT/ML
5 VIAL (ML) INTRAVENOUS
Status: CANCELLED | OUTPATIENT
Start: 2022-11-23

## 2022-10-20 RX ORDER — ALBUTEROL SULFATE 0.83 MG/ML
2.5 SOLUTION RESPIRATORY (INHALATION)
Status: CANCELLED | OUTPATIENT
Start: 2022-11-09

## 2022-10-20 RX ORDER — MEPERIDINE HYDROCHLORIDE 25 MG/ML
25 INJECTION INTRAMUSCULAR; INTRAVENOUS; SUBCUTANEOUS EVERY 30 MIN PRN
Status: CANCELLED | OUTPATIENT
Start: 2022-11-09

## 2022-10-20 RX ORDER — ALBUTEROL SULFATE 0.83 MG/ML
2.5 SOLUTION RESPIRATORY (INHALATION)
Status: CANCELLED | OUTPATIENT
Start: 2022-11-23

## 2022-10-20 RX ORDER — EPINEPHRINE 1 MG/ML
0.3 INJECTION, SOLUTION INTRAMUSCULAR; SUBCUTANEOUS EVERY 5 MIN PRN
Status: CANCELLED | OUTPATIENT
Start: 2022-11-23

## 2022-10-20 RX ORDER — LORAZEPAM 2 MG/ML
0.5 INJECTION INTRAMUSCULAR EVERY 4 HOURS PRN
Status: CANCELLED | OUTPATIENT
Start: 2022-11-23

## 2022-10-20 RX ORDER — ACETAMINOPHEN 325 MG/1
650 TABLET ORAL ONCE
Status: CANCELLED | OUTPATIENT
Start: 2022-11-09

## 2022-10-20 RX ORDER — DIPHENHYDRAMINE HYDROCHLORIDE 50 MG/ML
50 INJECTION INTRAMUSCULAR; INTRAVENOUS
Status: CANCELLED
Start: 2022-11-09

## 2022-10-20 RX ORDER — DIPHENHYDRAMINE HYDROCHLORIDE 50 MG/ML
50 INJECTION INTRAMUSCULAR; INTRAVENOUS
Status: CANCELLED
Start: 2022-11-23

## 2022-10-20 RX ORDER — NALOXONE HYDROCHLORIDE 0.4 MG/ML
0.2 INJECTION, SOLUTION INTRAMUSCULAR; INTRAVENOUS; SUBCUTANEOUS
Status: CANCELLED | OUTPATIENT
Start: 2022-11-23

## 2022-10-25 ENCOUNTER — LAB (OUTPATIENT)
Dept: LAB | Facility: CLINIC | Age: 52
End: 2022-10-25
Payer: COMMERCIAL

## 2022-10-25 DIAGNOSIS — C90.30 PLASMACYTOMA OF BONE (H): ICD-10-CM

## 2022-10-25 LAB — HCG UR QL: NEGATIVE

## 2022-10-25 PROCEDURE — 81025 URINE PREGNANCY TEST: CPT

## 2022-10-26 ENCOUNTER — TELEPHONE (OUTPATIENT)
Dept: ONCOLOGY | Facility: CLINIC | Age: 52
End: 2022-10-26

## 2022-10-26 DIAGNOSIS — C90.00 LIGHT CHAIN MYELOMA (H): Primary | ICD-10-CM

## 2022-10-26 RX ORDER — DEXAMETHASONE 4 MG/1
20 TABLET ORAL SEE ADMIN INSTRUCTIONS
Qty: 10 TABLET | Refills: 0 | Status: SHIPPED | OUTPATIENT
Start: 2022-10-26 | End: 2022-12-21

## 2022-10-26 NOTE — TELEPHONE ENCOUNTER
Oral Chemotherapy Monitoring Program    Medication: Pomalyst  Rx:  4 mg PO daily on days 1-21 of repeated 28 day cycles    Auth #:[id number]  Risk Category: Adult female WITH reproductive capacity.    Routine survey questions reviewed.    Thank you,    Anette Mcdermott  Oncology Pharmacy Liaison II  [email or web address]  Phone: [phone]  Fax: [phone]

## 2022-10-31 ENCOUNTER — LAB (OUTPATIENT)
Dept: LAB | Facility: CLINIC | Age: 52
End: 2022-10-31
Payer: COMMERCIAL

## 2022-10-31 DIAGNOSIS — C90.00 LIGHT CHAIN MYELOMA (H): ICD-10-CM

## 2022-10-31 LAB
ALBUMIN SERPL BCG-MCNC: 4.2 G/DL (ref 3.5–5.2)
ALP SERPL-CCNC: 54 U/L (ref 35–104)
ALT SERPL W P-5'-P-CCNC: 14 U/L (ref 10–35)
ANION GAP SERPL CALCULATED.3IONS-SCNC: 11 MMOL/L (ref 7–15)
AST SERPL W P-5'-P-CCNC: 18 U/L (ref 10–35)
BILIRUB SERPL-MCNC: 0.3 MG/DL
BUN SERPL-MCNC: 20.6 MG/DL (ref 6–20)
CALCIUM SERPL-MCNC: 9.1 MG/DL (ref 8.6–10)
CHLORIDE SERPL-SCNC: 108 MMOL/L (ref 98–107)
CREAT SERPL-MCNC: 0.75 MG/DL (ref 0.51–0.95)
DEPRECATED HCO3 PLAS-SCNC: 22 MMOL/L (ref 22–29)
GFR SERPL CREATININE-BSD FRML MDRD: >90 ML/MIN/1.73M2
GLUCOSE SERPL-MCNC: 99 MG/DL (ref 70–99)
POTASSIUM SERPL-SCNC: 4.2 MMOL/L (ref 3.4–5.3)
PROT SERPL-MCNC: 5.9 G/DL (ref 6.4–8.3)
SODIUM SERPL-SCNC: 141 MMOL/L (ref 136–145)
TOTAL PROTEIN SERUM FOR ELP: 5.8 G/DL (ref 6.4–8.3)

## 2022-10-31 PROCEDURE — 83521 IG LIGHT CHAINS FREE EACH: CPT

## 2022-10-31 PROCEDURE — 84165 PROTEIN E-PHORESIS SERUM: CPT

## 2022-10-31 PROCEDURE — 85025 COMPLETE CBC W/AUTO DIFF WBC: CPT

## 2022-10-31 PROCEDURE — 82784 ASSAY IGA/IGD/IGG/IGM EACH: CPT

## 2022-10-31 PROCEDURE — 86334 IMMUNOFIX E-PHORESIS SERUM: CPT

## 2022-10-31 PROCEDURE — 80053 COMPREHEN METABOLIC PANEL: CPT

## 2022-10-31 PROCEDURE — 84155 ASSAY OF PROTEIN SERUM: CPT | Mod: 59

## 2022-10-31 PROCEDURE — 36415 COLL VENOUS BLD VENIPUNCTURE: CPT

## 2022-11-01 LAB
ALBUMIN SERPL ELPH-MCNC: 4 G/DL (ref 3.7–5.1)
ALPHA1 GLOB SERPL ELPH-MCNC: 0.2 G/DL (ref 0.2–0.4)
ALPHA2 GLOB SERPL ELPH-MCNC: 0.7 G/DL (ref 0.5–0.9)
B-GLOBULIN SERPL ELPH-MCNC: 0.6 G/DL (ref 0.6–1)
BASOPHILS # BLD AUTO: 0 10E3/UL (ref 0–0.2)
BASOPHILS NFR BLD AUTO: 1 %
BURR CELLS BLD QL SMEAR: SLIGHT
EOSINOPHIL # BLD AUTO: 0.2 10E3/UL (ref 0–0.7)
EOSINOPHIL NFR BLD AUTO: 7 %
ERYTHROCYTE [DISTWIDTH] IN BLOOD BY AUTOMATED COUNT: 14 % (ref 10–15)
GAMMA GLOB SERPL ELPH-MCNC: 0.2 G/DL (ref 0.7–1.6)
HCT VFR BLD AUTO: 37.5 % (ref 35–47)
HGB BLD-MCNC: 12.6 G/DL (ref 11.7–15.7)
IGA SERPL-MCNC: 11 MG/DL (ref 84–499)
IGG SERPL-MCNC: 260 MG/DL (ref 610–1616)
IGM SERPL-MCNC: <10 MG/DL (ref 35–242)
IMM GRANULOCYTES # BLD: 0 10E3/UL
IMM GRANULOCYTES NFR BLD: 0 %
KAPPA LC FREE SER-MCNC: 30.57 MG/DL (ref 0.33–1.94)
KAPPA LC FREE/LAMBDA FREE SER NEPH: 127.38 {RATIO} (ref 0.26–1.65)
LAMBDA LC FREE SERPL-MCNC: 0.24 MG/DL (ref 0.57–2.63)
LYMPHOCYTES # BLD AUTO: 0.8 10E3/UL (ref 0.8–5.3)
LYMPHOCYTES NFR BLD AUTO: 26 %
M PROTEIN SERPL ELPH-MCNC: 0.1 G/DL
MCH RBC QN AUTO: 32.3 PG (ref 26.5–33)
MCHC RBC AUTO-ENTMCNC: 33.6 G/DL (ref 31.5–36.5)
MCV RBC AUTO: 96 FL (ref 78–100)
MONOCYTES # BLD AUTO: 0.8 10E3/UL (ref 0–1.3)
MONOCYTES NFR BLD AUTO: 23 %
NEUTROPHILS # BLD AUTO: 1.4 10E3/UL (ref 1.6–8.3)
NEUTROPHILS NFR BLD AUTO: 43 %
NRBC # BLD AUTO: 0 10E3/UL
NRBC BLD AUTO-RTO: 0 /100
PLAT MORPH BLD: ABNORMAL
PLATELET # BLD AUTO: 238 10E3/UL (ref 150–450)
PROT PATTERN SERPL ELPH-IMP: ABNORMAL
PROT PATTERN SERPL IFE-IMP: NORMAL
RBC # BLD AUTO: 3.9 10E6/UL (ref 3.8–5.2)
RBC MORPH BLD: ABNORMAL
WBC # BLD AUTO: 3.2 10E3/UL (ref 4–11)

## 2022-11-02 ENCOUNTER — INFUSION THERAPY VISIT (OUTPATIENT)
Dept: ONCOLOGY | Facility: CLINIC | Age: 52
End: 2022-11-02
Payer: COMMERCIAL

## 2022-11-02 ENCOUNTER — OFFICE VISIT (OUTPATIENT)
Dept: TRANSPLANT | Facility: CLINIC | Age: 52
End: 2022-11-02
Attending: REGISTERED NURSE
Payer: COMMERCIAL

## 2022-11-02 ENCOUNTER — APPOINTMENT (OUTPATIENT)
Dept: LAB | Facility: CLINIC | Age: 52
End: 2022-11-02
Payer: COMMERCIAL

## 2022-11-02 VITALS
RESPIRATION RATE: 16 BRPM | SYSTOLIC BLOOD PRESSURE: 140 MMHG | BODY MASS INDEX: 23.85 KG/M2 | HEART RATE: 67 BPM | DIASTOLIC BLOOD PRESSURE: 83 MMHG | WEIGHT: 157.2 LBS | OXYGEN SATURATION: 97 % | TEMPERATURE: 98 F

## 2022-11-02 VITALS — DIASTOLIC BLOOD PRESSURE: 77 MMHG | SYSTOLIC BLOOD PRESSURE: 132 MMHG | HEART RATE: 64 BPM

## 2022-11-02 DIAGNOSIS — C90.00 LIGHT CHAIN MYELOMA (H): Primary | ICD-10-CM

## 2022-11-02 DIAGNOSIS — C90.30 PLASMACYTOMA OF BONE (H): Primary | ICD-10-CM

## 2022-11-02 DIAGNOSIS — C90.00 LIGHT CHAIN MYELOMA (H): ICD-10-CM

## 2022-11-02 LAB
ACANTHOCYTES BLD QL SMEAR: SLIGHT
ALBUMIN SERPL BCG-MCNC: 4.4 G/DL (ref 3.5–5.2)
ALP SERPL-CCNC: 58 U/L (ref 35–104)
ALT SERPL W P-5'-P-CCNC: 17 U/L (ref 10–35)
ANION GAP SERPL CALCULATED.3IONS-SCNC: 12 MMOL/L (ref 7–15)
AST SERPL W P-5'-P-CCNC: 17 U/L (ref 10–35)
BASOPHILS # BLD AUTO: 0.1 10E3/UL (ref 0–0.2)
BASOPHILS NFR BLD AUTO: 1 %
BILIRUB SERPL-MCNC: 0.3 MG/DL
BUN SERPL-MCNC: 26.8 MG/DL (ref 6–20)
CALCIUM SERPL-MCNC: 9.4 MG/DL (ref 8.6–10)
CHLORIDE SERPL-SCNC: 105 MMOL/L (ref 98–107)
CREAT SERPL-MCNC: 0.86 MG/DL (ref 0.51–0.95)
DACRYOCYTES BLD QL SMEAR: SLIGHT
DEPRECATED HCO3 PLAS-SCNC: 22 MMOL/L (ref 22–29)
EOSINOPHIL # BLD AUTO: 0.2 10E3/UL (ref 0–0.7)
EOSINOPHIL NFR BLD AUTO: 4 %
ERYTHROCYTE [DISTWIDTH] IN BLOOD BY AUTOMATED COUNT: 14 % (ref 10–15)
GFR SERPL CREATININE-BSD FRML MDRD: 81 ML/MIN/1.73M2
GLUCOSE SERPL-MCNC: 150 MG/DL (ref 70–99)
HCT VFR BLD AUTO: 36.9 % (ref 35–47)
HGB BLD-MCNC: 12.1 G/DL (ref 11.7–15.7)
IMM GRANULOCYTES # BLD: 0 10E3/UL
IMM GRANULOCYTES NFR BLD: 0 %
LYMPHOCYTES # BLD AUTO: 1 10E3/UL (ref 0.8–5.3)
LYMPHOCYTES NFR BLD AUTO: 22 %
MCH RBC QN AUTO: 31.4 PG (ref 26.5–33)
MCHC RBC AUTO-ENTMCNC: 32.8 G/DL (ref 31.5–36.5)
MCV RBC AUTO: 96 FL (ref 78–100)
MONOCYTES # BLD AUTO: 1.1 10E3/UL (ref 0–1.3)
MONOCYTES NFR BLD AUTO: 26 %
NEUTROPHILS # BLD AUTO: 2 10E3/UL (ref 1.6–8.3)
NEUTROPHILS NFR BLD AUTO: 47 %
NRBC # BLD AUTO: 0 10E3/UL
NRBC BLD AUTO-RTO: 0 /100
PLAT MORPH BLD: ABNORMAL
PLATELET # BLD AUTO: 247 10E3/UL (ref 150–450)
POTASSIUM SERPL-SCNC: 3.4 MMOL/L (ref 3.4–5.3)
PROT SERPL-MCNC: 6.2 G/DL (ref 6.4–8.3)
RBC # BLD AUTO: 3.85 10E6/UL (ref 3.8–5.2)
RBC MORPH BLD: ABNORMAL
SODIUM SERPL-SCNC: 139 MMOL/L (ref 136–145)
TOTAL PROTEIN SERUM FOR ELP: 5.9 G/DL (ref 6.4–8.3)
WBC # BLD AUTO: 4.3 10E3/UL (ref 4–11)

## 2022-11-02 PROCEDURE — 250N000013 HC RX MED GY IP 250 OP 250 PS 637

## 2022-11-02 PROCEDURE — 96413 CHEMO IV INFUSION 1 HR: CPT

## 2022-11-02 PROCEDURE — 82784 ASSAY IGA/IGD/IGG/IGM EACH: CPT

## 2022-11-02 PROCEDURE — 85025 COMPLETE CBC W/AUTO DIFF WBC: CPT

## 2022-11-02 PROCEDURE — 80053 COMPREHEN METABOLIC PANEL: CPT

## 2022-11-02 PROCEDURE — 96375 TX/PRO/DX INJ NEW DRUG ADDON: CPT

## 2022-11-02 PROCEDURE — G0463 HOSPITAL OUTPT CLINIC VISIT: HCPCS | Mod: 25

## 2022-11-02 PROCEDURE — 250N000011 HC RX IP 250 OP 636

## 2022-11-02 PROCEDURE — 83521 IG LIGHT CHAINS FREE EACH: CPT

## 2022-11-02 PROCEDURE — 82040 ASSAY OF SERUM ALBUMIN: CPT

## 2022-11-02 PROCEDURE — 84165 PROTEIN E-PHORESIS SERUM: CPT | Mod: TC | Performed by: PATHOLOGY

## 2022-11-02 PROCEDURE — 99214 OFFICE O/P EST MOD 30 MIN: CPT

## 2022-11-02 PROCEDURE — G0463 HOSPITAL OUTPT CLINIC VISIT: HCPCS

## 2022-11-02 PROCEDURE — 258N000003 HC RX IP 258 OP 636

## 2022-11-02 PROCEDURE — 999N000248 HC STATISTIC IV INSERT WITH US BY RN

## 2022-11-02 PROCEDURE — 86334 IMMUNOFIX E-PHORESIS SERUM: CPT | Performed by: PATHOLOGY

## 2022-11-02 PROCEDURE — 84155 ASSAY OF PROTEIN SERUM: CPT | Mod: 91

## 2022-11-02 PROCEDURE — 36415 COLL VENOUS BLD VENIPUNCTURE: CPT

## 2022-11-02 RX ORDER — ACETAMINOPHEN 325 MG/1
650 TABLET ORAL ONCE
Status: COMPLETED | OUTPATIENT
Start: 2022-11-02 | End: 2022-11-02

## 2022-11-02 RX ORDER — DIPHENHYDRAMINE HCL 25 MG
50 CAPSULE ORAL ONCE
Status: COMPLETED | OUTPATIENT
Start: 2022-11-02 | End: 2022-11-02

## 2022-11-02 RX ADMIN — FAMOTIDINE 20 MG: 10 INJECTION INTRAVENOUS at 15:20

## 2022-11-02 RX ADMIN — ACETAMINOPHEN 650 MG: 325 TABLET ORAL at 15:12

## 2022-11-02 RX ADMIN — DIPHENHYDRAMINE HYDROCHLORIDE 50 MG: 25 CAPSULE ORAL at 15:12

## 2022-11-02 RX ADMIN — SODIUM CHLORIDE 250 ML: 9 INJECTION, SOLUTION INTRAVENOUS at 15:19

## 2022-11-02 RX ADMIN — DEXAMETHASONE SODIUM PHOSPHATE: 10 INJECTION, SOLUTION INTRAMUSCULAR; INTRAVENOUS at 15:26

## 2022-11-02 RX ADMIN — SODIUM CHLORIDE 700 MG: 9 INJECTION, SOLUTION INTRAVENOUS at 16:02

## 2022-11-02 ASSESSMENT — PAIN SCALES - GENERAL: PAINLEVEL: NO PAIN (0)

## 2022-11-02 NOTE — NURSING NOTE
Chief Complaint   Patient presents with     Blood Draw     Labs drawn with IV start by rn in lab. Vital signs taken.     Labs drawn with IV start by RN in lab. Vital signs taken. Pt checked in to next appointment.    Betsy Overton RN

## 2022-11-02 NOTE — PATIENT INSTRUCTIONS
[facility] Triage and after hours / weekends / holidays:  [phone]    Please call the triage or after hours line if you experience a temperature greater than or equal to 100.4, shaking chills, have uncontrolled nausea, vomiting and/or diarrhea, dizziness, shortness of breath, chest pain, bleeding, unexplained bruising, or if you have any other new/concerning symptoms, questions or concerns.      If you are having any concerning symptoms or wish to speak to a provider before your next infusion visit, please call your care coordinator or triage to notify them so we can adequately serve you.     If you need a refill on a narcotic prescription or other medication, please call before your infusion appointment.                 November 2022 Sunday Monday Tuesday Wednesday Thursday Friday Saturday             1     2    LAB CENTRAL   1:30 PM   (15 min.)   [facility] DRAW   [facility]    RETURN   1:45 PM   (30 min.)   [facility]   [facility]    ONC INFUSION 2 HR (120 MIN)   2:30 PM   (120 min.)    ONC INFUSION NURSE   [facility] 3     4     5       6     7     8     9     10    LAB CENTRAL   8:45 AM   (15 min.)   [facility] DRAW   [facility]    NEW   9:05 AM   (30 min.)   Erich Hall MD   [facility] [place] 11     12       13     14     15     16    LAB CENTRAL   1:45 PM   (15 min.)   [facility] DRAW   [facility]    ONC INFUSION 2 HR (120 MIN)   2:30 PM   (120 min.)    ONC INFUSION NURSE   [facility] 17    RETURN   9:45 AM   (30 min.)   Payton Reinoso MD   [facility] Trans[facility] [place] 18     19       20     21     22    RETURN ADULT EYE  10:25 AM   (20 min.)   Fanny Ponce MD   [facility] Eye [facility]  [facility] 23     24     25 26 27 28 29 30 December 2022 Sunday Monday Tuesday Wednesday Thursday Friday Saturday                       1     2     3       4     5     6     7     8     9     10       11     12     13     14     15     16     17       18     19     20     21     22     23     24       25     26     27     28     29     30     31                       Lab Results:  Recent Results (from the past 12 hour(s))   Comprehensive metabolic panel    Collection Time: 11/02/22  1:45 PM   Result Value Ref Range    Sodium 139 136 - 145 mmol/L    Potassium 3.4 3.4 - 5.3 mmol/L    Chloride 105 98 - 107 mmol/L    Carbon Dioxide (CO2) 22 22 - 29 mmol/L    Anion Gap 12 7 - 15 mmol/L    Urea Nitrogen 26.8 (H) 6.0 - 20.0 mg/dL    Creatinine 0.86 0.51 - 0.95 mg/dL    Calcium 9.4 8.6 - 10.0 mg/dL    Glucose 150 (H) 70 - 99 mg/dL    Alkaline Phosphatase 58 35 - 104 U/L    AST 17 10 - 35 U/L    ALT 17 10 - 35 U/L    Protein Total 6.2 (L) 6.4 - 8.3 g/dL    Albumin 4.4 3.5 - 5.2 g/dL    Bilirubin Total 0.3 <=1.2 mg/dL    GFR Estimate 81 >60 mL/min/1.73m2   CBC with platelets and differential    Collection Time: 11/02/22  1:45 PM   Result Value Ref Range    WBC Count 4.3 4.0 - 11.0 10e3/uL    RBC Count 3.85 3.80 - 5.20 10e6/uL    Hemoglobin 12.1 11.7 - 15.7 g/dL    Hematocrit 36.9 35.0 - 47.0 %    MCV 96 78 - 100 fL    MCH 31.4 26.5 - 33.0 pg    MCHC 32.8 31.5 - 36.5 g/dL    RDW 14.0 10.0 - 15.0 %    Platelet Count 247 150 - 450 10e3/uL    % Neutrophils 47 %    % Lymphocytes 22 %    % Monocytes 26 %    % Eosinophils 4 %    % Basophils 1 %    % Immature Granulocytes 0 %    NRBCs per 100 WBC 0 <1 /100    Absolute Neutrophils 2.0 1.6 - 8.3 10e3/uL    Absolute Lymphocytes 1.0 0.8 - 5.3 10e3/uL    Absolute Monocytes 1.1 0.0 - 1.3 10e3/uL    Absolute Eosinophils 0.2 0.0 - 0.7 10e3/uL    Absolute Basophils 0.1 0.0 - 0.2 10e3/uL    Absolute Immature Granulocytes 0.0 <=0.4  10e3/uL    Absolute NRBCs 0.0 10e3/uL   RBC and Platelet Morphology    Collection Time: 11/02/22  1:45 PM   Result Value Ref Range    Platelet Assessment  Automated Count Confirmed. Platelet morphology is normal.     Automated Count Confirmed. Platelet morphology is normal.    Acanthocytes Slight (A) None Seen    Teardrop Cells Slight (A) None Seen    RBC Morphology Confirmed RBC Indices

## 2022-11-02 NOTE — PROGRESS NOTES
BMT CELL THERAPY   11/02/22        I had a pleasure to see Margo Cornejo in BMT clinic today to review the treatment options for her refractory multiple myeloma and plans for cell therapy using CAR-T Abecma.    She is well known to me.   Margo is delightful 53yo female with kappa light chain myeloma with standard risk cytogenetics but unfortunately has not been sensitive to multiple line of therapy. She is now on 5th line of therapy with Isatuximab/Pom/Dex reaching stable disease.  Prior hx is summarized below.    INTERVAL HISTORY:  Margo is ok, had couple of episodes of palpitations past few days, no chest pain, also SOB when climbing stairs. Had been swimmingok,  No new body aches. No infections, had hot flashes which is new to her.     No new pains or lumps, she tolerated her last regimen of Sarclisa/Pom/Dex very well.     ROS neg.     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  Flow cytometry showed kappa monotypic plasma cells, polytypic B cells. FISH results from plasmacytoma showed a gain of 11q, IGH-CCND1 fusion but no losses of 1q or TP53 and no gain of 1q.  - Bone marrow biopsy 09/22/21: Marrow cellularity 50% with 25% interstitial infiltration w/  Kappa-monotypic, moderately atypical plasma cells. Flow with 0.7% kappa-monotypica plasma cells. FISH and cytogenetics pending.   - PET CT 09/24/21 w/ hypermetabolic pathologic fracture of the L pubic ramus w/ aggressive appearing osteolysis but no additional suspicious lytic or blastic osseous lesions.     Treatment to date:   - Zometa q4 weeks started 9/27/21  - Radiation to left pubic ramus x18 fractions, completed 11/11/21.   - RVD C1D1 11/15/21  - RVD C2D1 12/6/21  - RVD C3D1  12/27/21  - RVD C4 D1 1/17/22, elizabeth added on D8 due to inadequate response of urinary M-spike and FLCs remaining over 100 mg/dL after 4 cycles of RVD    - Elizabeth-RVD C1D8 1/24/22  - Elizabeth-RVD C2D1 2/7/22  - Elizabeth-RVD C3D1 2/28/22  - Elizabeth-RVD C4D1 3/29/22  - Elizabeth-RVD C5D1  4/19/22    Disease response: ID , FLC still at 40mg/dl    - 5/19/22: Continued monotherapy with Daratumumab; held Revlimid and Velcade.    - 6/21/22: Transitioned to KCD, received C1 days 1 and 2- had significant PVCs at cardiac follow-uyp after 1st week of Kyprolis, held further doses.    - Transitioned to Sarclisa/Pom/Dex 7/6/22. Started Pom on 7/11.  Now FLC are stable at 30mg/dl. No marrow since diagnosis.      Past Medical History:   Diagnosis Date     Allergic rhinitis, cause unspecified      Anxiety state, unspecified 12/01/2003    Started postpartum . On Paxil x 1+years. Off meds- 10/04     Irritable bowel syndrome 01/01/2004     Multiple myeloma not having achieved remission (H)      PLANTAR WARTS     resolved     SINUS DYSRHYTHMIA 10/01/2002    wnl     Unspecified hemorrhoids without mention of complication 04/01/2004    colonoscopy 4/04 spastic colon, int roids; bx neg       FHX;  Margo has a twin sister and not other siblings. Sister is non-identical and healthy.     Current Outpatient Medications   Medication     acyclovir (ZOVIRAX) 400 MG tablet     aspirin (ASA) 81 MG EC tablet     BUSPIRONE HCL 10 MG PO TABS     Calcium Carbonate-Vit D-Min (CALCIUM 600+D PLUS MINERALS) 600-400 MG-UNIT TABS     cetirizine HCl 10 MG CAPS     dexamethasone (DECADRON) 4 MG tablet     dexamethasone (DECADRON) 4 MG tablet     dexamethasone (DECADRON) 4 MG tablet     Flaxseed (Linseed) (FLAX SEED OIL) 1000 MG capsule     fluticasone (FLONASE) 50 MCG/ACT nasal spray     glucosamine-chondroitin 500-400 MG CAPS per capsule     lactobacillus rhamnosus, GG, (CULTURELL) capsule     loperamide (IMODIUM) 2 MG capsule     LORazepam (ATIVAN) 0.5 MG tablet     melatonin 5 MG tablet     MIRENA 20 MCG/24HR IU IUD     Multiple Vitamin (MULTI-VITAMIN DAILY PO)     omeprazole (PRILOSEC OTC) 20 MG EC tablet     pomalidomide (POMALYST) 4 MG capsule     pomalidomide (POMALYST) 4 MG capsule     pomalidomide (POMALYST) 4 MG capsule     potassium  chloride ER (KLOR-CON M) 10 MEQ CR tablet     prochlorperazine (COMPAZINE) 10 MG tablet     Zoledronic Acid (ZOMETA IV)     No current facility-administered medications for this visit.     Facility-Administered Medications Ordered in Other Visits   Medication     CT Flush     Lab Results   Component Value Date    WBC 4.3 11/02/2022    ANEU 2.2 07/27/2022    HGB 12.1 11/02/2022    HCT 36.9 11/02/2022     11/02/2022     11/02/2022    POTASSIUM 3.4 11/02/2022    CHLORIDE 105 11/02/2022    CO2 22 11/02/2022     (H) 11/02/2022    BUN 26.8 (H) 11/02/2022    CR 0.86 11/02/2022    AST 17 11/02/2022    ALT 17 11/02/2022     Kappa FLC 30.57  Lambda 0.24  IgG 260mg/dl           ASSESSMENT AND PLAN:    In summary, Mrs. Cornejo is 52 years old female with R/R kappa light chain multiple myeloma which has been refractory to 4 lines of therapy.  She is now on her fourth line with  marginal partial response based on the light chain level.    MM refractory to 4 lines of therapy.  Given refractoriness of her MM, I recommended to proceed with CAR T-cell therapy against BCMA Abecma.   Plan is to have work-up started on 11/14 and apheresis scheduled for 11/23.  Margo is concerned about Cytoxan as part of LD chemotherapy - she had severe palpitations with Carfil/Cy/Dxm regimen before but it was mostly attributed to carfilzomib which has known cardiotoxicity.   I explained that CYtoxan in necessary part of the abecma package.    She is agreeable to proceed with work-up. My recommendation is to obtain BMBx, PET and serum re-testing.   For now her last Seclusa will be on 11/2 and she will stop Pom and Dex on 11/14.    ID  Evushled in early December (prior to Abecma)  FLu vaccine already received  IgG level 220mg/dl.   Plan to receive IVIG next week and again in 1 week. recheck IgG prior to Abecma.         Plan:    MM. Relapsed.refractory  Cont Seclusa on11/2.  Arrange restaging including BMBx and PET in mid Nov  Abecma work  starts 11/14  Apheresis - plan for 11/21  [facility] - mid December     Payton Reinoso MD  Professor of Medicine  [phone]

## 2022-11-02 NOTE — PROGRESS NOTES
Infusion Nursing Note:  Kristie Cornejo presents today for Day 1 Cycle 5 Sarclisa.    Patient seen by provider today: Yes: Dr. Reinoso   present during visit today: Not Applicable.    Note: Pt presents to infusion feeling ok. Pt denies acute discomfort and states no acute complaints or concerns not addressed during visit with provider today.   Per Dr. Reinoso's note from in person visit:     Cont Seclusa on11/2.  Arrange restaging including BMBx and PET in mid Nov  Abecma work starts 11/14  Apheresis - plan for 11/21  Abecma - mid December     ID  Evushled in early December  FLu vaccine already received  IgG level 220mg/dl. Get IVIG next week and weekly x 2    Pt confirms the plan of proceeding with last Sarclisa before Abecma workup.    Intravenous Access:  Peripheral IV placed.    Treatment Conditions:  Lab Results   Component Value Date    HGB 12.1 11/02/2022    WBC 4.3 11/02/2022    ANEU 2.2 07/27/2022    ANEUTAUTO 2.0 11/02/2022     11/02/2022      Lab Results   Component Value Date     11/02/2022    POTASSIUM 3.4 11/02/2022    CR 0.86 11/02/2022    MEKA 9.4 11/02/2022    BILITOTAL 0.3 11/02/2022    ALBUMIN 4.4 11/02/2022    ALT 17 11/02/2022    AST 17 11/02/2022     Results reviewed, labs MET treatment parameters, ok to proceed with treatment.    Post Infusion Assessment:  Patient tolerated infusion without incident.  Blood return noted pre and post infusion.  Site patent and intact, free from redness, edema or discomfort.  No evidence of extravasations.  Access discontinued per protocol.     Discharge Plan:   Prescription refills given for Ativan.  Discharge instructions reviewed with: Patient.  Patient and/or family verbalized understanding of discharge instructions and all questions answered.  AVS to patient via [employer].  Patient will return next week for next appointment. IB sent to Rn Care coordinator Danni Valdez to see about scheduling.  Patient discharged in stable  condition accompanied by: .  Departure Mode: Ambulatory.  Face to Face time: 0 minutes.      Jorge Alberto Pearson RN

## 2022-11-02 NOTE — NURSING NOTE
"Oncology Rooming Note    November 2, 2022 1:58 PM   Kristie Cornejo is a 52 year old female who presents for:    Chief Complaint   Patient presents with     Blood Draw     Labs drawn with IV start by rn in lab. Vital signs taken.     Oncology Clinic Visit     Plasmacytoma of bone     Initial Vitals: BP (!) 140/83 (BP Location: Right arm, Patient Position: Sitting, Cuff Size: Adult Regular)   Pulse 67   Temp 98  F (36.7  C) (Oral)   Resp 16   Wt 71.3 kg (157 lb 3.2 oz)   SpO2 97%   BMI 23.85 kg/m   Estimated body mass index is 23.85 kg/m  as calculated from the following:    Height as of 7/20/22: 1.729 m (5' 8.07\").    Weight as of this encounter: 71.3 kg (157 lb 3.2 oz). Body surface area is 1.85 meters squared.  No Pain (0) Comment: Data Unavailable   No LMP recorded. (Menstrual status: IUD).  Allergies reviewed: Yes  Medications reviewed: Yes    Medications: Medication refills not needed today.  Pharmacy name entered into [employer]:    [facility] - [place], [place] - [address]  [id number] - [place], [place] - [address] AT [facility] [address]  [facility] - [place], [place] - [address]  [facility] - [place], [place] - [address] [place] [address] PHARMACY [facility] - [place], [place] - [phone]  [facility] - [facility], [place] - [address] [place] [address] PHARMACY [id number] - [place], [place] - [address]    Clinical concerns: none       Allison Wilkins"

## 2022-11-02 NOTE — LETTER
11/2/2022         RE: Kristie Cornejo  [address]  [facility] [address]        Dear Colleague,    Thank you for referring your patient, Kristie Cornejo, to the [facility]LOOD [facility] MARROW TRANSPLANT [facility] [place]. Please see a copy of my visit note below.    BMT CELL THERAPY   11/02/22        I had a pleasure to see Margo Cornejo in BMT clinic today to review the treatment options for her refractory multiple myeloma and plans for cell therapy using CAR-T Abecma.    She is well known to me.   Margo is delightful 53yo female with kappa light chain myeloma with standard risk cytogenetics but unfortunately has not been sensitive to multiple line of therapy. She is now on 5th line of therapy with Isatuximab/Pom/Dex reaching stable disease.  Prior hx is summarized below.    INTERVAL HISTORY:  Margo is ok, had couple of episodes of palpitations past few days, no chest pain, also SOB when climbing stairs. Had been swimmingok,  No new body aches. No infections, had hot flashes which is new to her.     No new pains or lumps, she tolerated her last regimen of Sarclisa/Pom/Dex very well.     ROS neg.     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  Flow cytometry showed kappa monotypic plasma cells, polytypic B cells. FISH results from plasmacytoma showed a gain of 11q, IGH-CCND1 fusion but no losses of 1q or TP53 and no gain of 1q.  - Bone marrow biopsy 09/22/21: Marrow cellularity 50% with 25% interstitial infiltration w/  Kappa-monotypic, moderately atypical plasma cells. Flow with 0.7% kappa-monotypica plasma cells. FISH and cytogenetics pending.   - PET CT 09/24/21 w/ hypermetabolic pathologic fracture of the L pubic ramus w/ aggressive appearing osteolysis but no additional suspicious lytic or blastic osseous lesions.     Treatment to date:   - Zometa q4 weeks started 9/27/21  - Radiation to left pubic ramus x18 fractions, completed 11/11/21.   - RVD C1D1 11/15/21  - RVD C2D1  12/6/21  - RVD C3D1  12/27/21  - RVD C4 D1 1/17/22, elizabeth added on D8 due to inadequate response of urinary M-spike and FLCs remaining over 100 mg/dL after 4 cycles of RVD    - Elizabeth-RVD C1D8 1/24/22  - Elizabeth-RVD C2D1 2/7/22  - Elizabeth-RVD C3D1 2/28/22  - Elizabeth-RVD C4D1 3/29/22  - Elizabeth-RVD C5D1 4/19/22    Disease response: SD , FLC still at 40mg/dl    - 5/19/22: Continued monotherapy with Daratumumab; held Revlimid and Velcade.    - 6/21/22: Transitioned to KCD, received C1 days 1 and 2- had significant PVCs at cardiac follow-uyp after 1st week of Kyprolis, held further doses.    - Transitioned to Sarclisa/Pom/Dex 7/6/22. Started Pom on 7/11.  Now FLC are stable at 30mg/dl. No marrow since diagnosis.      Past Medical History:   Diagnosis Date     Allergic rhinitis, cause unspecified      Anxiety state, unspecified 12/01/2003    Started postpartum . On Paxil x 1+years. Off meds- 10/04     Irritable bowel syndrome 01/01/2004     Multiple myeloma not having achieved remission (H)      PLANTAR WARTS     resolved     SINUS DYSRHYTHMIA 10/01/2002    wnl     Unspecified hemorrhoids without mention of complication 04/01/2004    colonoscopy 4/04 spastic colon, int roids; bx neg       FHX;  Margo has a twin sister and not other siblings. Sister is non-identical and healthy.     Current Outpatient Medications   Medication     acyclovir (ZOVIRAX) 400 MG tablet     aspirin (ASA) 81 MG EC tablet     BUSPIRONE HCL 10 MG PO TABS     Calcium Carbonate-Vit D-Min (CALCIUM 600+D PLUS MINERALS) 600-400 MG-UNIT TABS     cetirizine HCl 10 MG CAPS     dexamethasone (DECADRON) 4 MG tablet     dexamethasone (DECADRON) 4 MG tablet     dexamethasone (DECADRON) 4 MG tablet     Flaxseed (Linseed) (FLAX SEED OIL) 1000 MG capsule     fluticasone (FLONASE) 50 MCG/ACT nasal spray     glucosamine-chondroitin 500-400 MG CAPS per capsule     lactobacillus rhamnosus, GG, (CULTURELL) capsule     loperamide (IMODIUM) 2 MG capsule     LORazepam (ATIVAN) 0.5 MG  tablet     melatonin 5 MG tablet     MIRENA 20 MCG/24HR IU IUD     Multiple Vitamin (MULTI-VITAMIN DAILY PO)     omeprazole (PRILOSEC OTC) 20 MG EC tablet     pomalidomide (POMALYST) 4 MG capsule     pomalidomide (POMALYST) 4 MG capsule     pomalidomide (POMALYST) 4 MG capsule     potassium chloride ER (KLOR-CON M) 10 MEQ CR tablet     prochlorperazine (COMPAZINE) 10 MG tablet     Zoledronic Acid (ZOMETA IV)     No current facility-administered medications for this visit.     Facility-Administered Medications Ordered in Other Visits   Medication     CT Flush     Lab Results   Component Value Date    WBC 4.3 11/02/2022    ANEU 2.2 07/27/2022    HGB 12.1 11/02/2022    HCT 36.9 11/02/2022     11/02/2022     11/02/2022    POTASSIUM 3.4 11/02/2022    CHLORIDE 105 11/02/2022    CO2 22 11/02/2022     (H) 11/02/2022    BUN 26.8 (H) 11/02/2022    CR 0.86 11/02/2022    AST 17 11/02/2022    ALT 17 11/02/2022     Kappa FLC 30.57  Lambda 0.24  IgG 260mg/dl           ASSESSMENT AND PLAN:    In summary, Mrs. Cornejo is 52 years old female with R/R kappa light chain multiple myeloma which has been refractory to 4 lines of therapy.  She is now on her fourth line with  marginal partial response based on the light chain level.    MM refractory to 4 lines of therapy.  Given refractoriness of her MM, I recommended to proceed with CAR T-cell therapy against BCMA Abecma.   Plan is to have work-up started on 11/14 and apheresis scheduled for 11/23.  Margo is concerned about Cytoxan as part of LD chemotherapy - she had severe palpitations with Carfil/Cy/Dxm regimen before but it was mostly attributed to carfilzomib which has known cardiotoxicity.   I explained that CYtoxan in necessary part of the abecma package.    She is agreeable to proceed with work-up. My recommendation is to obtain BMBx, PET and serum re-testing.   For now her last Seclusa will be on 11/2 and she will stop Pom and Dex on 11/14.    ID  Evushled in  early December (prior to Abecma)  FLu vaccine already received  IgG level 220mg/dl.   Plan to receive IVIG next week and again in 1 week. recheck IgG prior to Abecma.         Plan:    MM. Relapsed.refractory  Cont Seclusa on11/2.  Arrange restaging including BMBx and PET in mid Nov  Abecma work starts 11/14  Apheresis - plan for 11/21  Abecma - mid December           Again, thank you for allowing me to participate in the care of your patient.      Sincerely,    No name on file

## 2022-11-03 LAB
ALBUMIN SERPL ELPH-MCNC: 4.2 G/DL (ref 3.7–5.1)
ALPHA1 GLOB SERPL ELPH-MCNC: 0.3 G/DL (ref 0.2–0.4)
ALPHA2 GLOB SERPL ELPH-MCNC: 0.7 G/DL (ref 0.5–0.9)
B-GLOBULIN SERPL ELPH-MCNC: 0.6 G/DL (ref 0.6–1)
GAMMA GLOB SERPL ELPH-MCNC: 0.2 G/DL (ref 0.7–1.6)
IGA SERPL-MCNC: 9 MG/DL (ref 84–499)
IGG SERPL-MCNC: 243 MG/DL (ref 610–1616)
IGM SERPL-MCNC: <10 MG/DL (ref 35–242)
KAPPA LC FREE SER-MCNC: 33.74 MG/DL (ref 0.33–1.94)
KAPPA LC FREE/LAMBDA FREE SER NEPH: 168.7 {RATIO} (ref 0.26–1.65)
LAMBDA LC FREE SERPL-MCNC: 0.2 MG/DL (ref 0.57–2.63)
M PROTEIN SERPL ELPH-MCNC: 0.1 G/DL
PROT PATTERN SERPL ELPH-IMP: ABNORMAL
PROT PATTERN SERPL IFE-IMP: NORMAL

## 2022-11-03 PROCEDURE — 84165 PROTEIN E-PHORESIS SERUM: CPT | Mod: 26

## 2022-11-03 PROCEDURE — 86334 IMMUNOFIX E-PHORESIS SERUM: CPT | Mod: 26

## 2022-11-04 ENCOUNTER — PATIENT OUTREACH (OUTPATIENT)
Dept: ONCOLOGY | Facility: CLINIC | Age: 52
End: 2022-11-04

## 2022-11-04 DIAGNOSIS — Z01.818 EXAMINATION PRIOR TO CHEMOTHERAPY: ICD-10-CM

## 2022-11-04 DIAGNOSIS — Z86.2 PERSONAL HISTORY OF DISEASES OF BLOOD AND BLOOD-FORMING ORGANS: ICD-10-CM

## 2022-11-04 DIAGNOSIS — C90.00 MYELOMA (H): ICD-10-CM

## 2022-11-04 DIAGNOSIS — C90.00 LIGHT CHAIN MYELOMA (H): ICD-10-CM

## 2022-11-04 DIAGNOSIS — D80.1 HYPOGAMMAGLOBULINEMIA (H): Primary | ICD-10-CM

## 2022-11-04 RX ORDER — DIPHENHYDRAMINE HYDROCHLORIDE 50 MG/ML
50 INJECTION INTRAMUSCULAR; INTRAVENOUS
Status: CANCELLED
Start: 2022-11-18

## 2022-11-04 RX ORDER — ALBUTEROL SULFATE 90 UG/1
1-2 AEROSOL, METERED RESPIRATORY (INHALATION)
Status: CANCELLED
Start: 2022-11-18

## 2022-11-04 RX ORDER — HEPARIN SODIUM (PORCINE) LOCK FLUSH IV SOLN 100 UNIT/ML 100 UNIT/ML
5 SOLUTION INTRAVENOUS
Status: CANCELLED | OUTPATIENT
Start: 2022-11-09

## 2022-11-04 RX ORDER — EPINEPHRINE 1 MG/ML
0.3 INJECTION, SOLUTION INTRAMUSCULAR; SUBCUTANEOUS EVERY 5 MIN PRN
Status: CANCELLED | OUTPATIENT
Start: 2022-11-18

## 2022-11-04 RX ORDER — DIPHENHYDRAMINE HCL 25 MG
50 CAPSULE ORAL ONCE
Status: CANCELLED
Start: 2022-11-09

## 2022-11-04 RX ORDER — HEPARIN SODIUM,PORCINE 10 UNIT/ML
5 VIAL (ML) INTRAVENOUS
Status: CANCELLED | OUTPATIENT
Start: 2022-11-18

## 2022-11-04 RX ORDER — MEPERIDINE HYDROCHLORIDE 25 MG/ML
25 INJECTION INTRAMUSCULAR; INTRAVENOUS; SUBCUTANEOUS EVERY 30 MIN PRN
Status: CANCELLED | OUTPATIENT
Start: 2022-11-09

## 2022-11-04 RX ORDER — ACETAMINOPHEN 325 MG/1
650 TABLET ORAL ONCE
Status: CANCELLED
Start: 2022-11-09

## 2022-11-04 RX ORDER — METHYLPREDNISOLONE SODIUM SUCCINATE 125 MG/2ML
125 INJECTION, POWDER, LYOPHILIZED, FOR SOLUTION INTRAMUSCULAR; INTRAVENOUS
Status: CANCELLED
Start: 2022-11-18

## 2022-11-04 RX ORDER — HEPARIN SODIUM (PORCINE) LOCK FLUSH IV SOLN 100 UNIT/ML 100 UNIT/ML
5 SOLUTION INTRAVENOUS
Status: CANCELLED | OUTPATIENT
Start: 2022-11-18

## 2022-11-04 RX ORDER — ALBUTEROL SULFATE 0.83 MG/ML
2.5 SOLUTION RESPIRATORY (INHALATION)
Status: CANCELLED | OUTPATIENT
Start: 2022-11-18

## 2022-11-04 RX ORDER — EPINEPHRINE 1 MG/ML
0.3 INJECTION, SOLUTION INTRAMUSCULAR; SUBCUTANEOUS EVERY 5 MIN PRN
Status: CANCELLED | OUTPATIENT
Start: 2022-11-09

## 2022-11-04 RX ORDER — METHYLPREDNISOLONE SODIUM SUCCINATE 125 MG/2ML
125 INJECTION, POWDER, LYOPHILIZED, FOR SOLUTION INTRAMUSCULAR; INTRAVENOUS
Status: CANCELLED
Start: 2022-11-09

## 2022-11-04 RX ORDER — DIPHENHYDRAMINE HCL 25 MG
50 CAPSULE ORAL ONCE
Status: CANCELLED
Start: 2022-11-18

## 2022-11-04 RX ORDER — DIPHENHYDRAMINE HYDROCHLORIDE 50 MG/ML
50 INJECTION INTRAMUSCULAR; INTRAVENOUS
Status: CANCELLED
Start: 2022-11-09

## 2022-11-04 RX ORDER — ACETAMINOPHEN 325 MG/1
650 TABLET ORAL ONCE
Status: CANCELLED
Start: 2022-11-18

## 2022-11-04 RX ORDER — MEPERIDINE HYDROCHLORIDE 25 MG/ML
25 INJECTION INTRAMUSCULAR; INTRAVENOUS; SUBCUTANEOUS EVERY 30 MIN PRN
Status: CANCELLED | OUTPATIENT
Start: 2022-11-18

## 2022-11-04 RX ORDER — ALBUTEROL SULFATE 0.83 MG/ML
2.5 SOLUTION RESPIRATORY (INHALATION)
Status: CANCELLED | OUTPATIENT
Start: 2022-11-09

## 2022-11-04 RX ORDER — ALBUTEROL SULFATE 90 UG/1
1-2 AEROSOL, METERED RESPIRATORY (INHALATION)
Status: CANCELLED
Start: 2022-11-09

## 2022-11-04 RX ORDER — HEPARIN SODIUM,PORCINE 10 UNIT/ML
5 VIAL (ML) INTRAVENOUS
Status: CANCELLED | OUTPATIENT
Start: 2022-11-09

## 2022-11-04 NOTE — PROGRESS NOTES
[facility]: Cancer Care Plan of Care Education Note                                    Discussion with Patient:                                                      Called patient to discuss upcoming IVIG on 11/7    Assessment:                                                      Assessment completed with:: Patient    Current living arrangement       Plan of Care Education   Yearly learning assessment completed?: Yes (see Education tab)  Diagnosis:: hypogammaglobulinemia  Does patient understand diagnosis?: Yes  Tx plan/regimen:: IVIG weekly x2  Does patient understand treatment plan/regimen?: Yes  Preparing for treatment:: Reviewed treatment preparation information with patient (vascular access, day of chemo, visitor policy, what to bring, etc.)  Vascular access:: Peripheral IV  Side effect education::  (infusion reaction)  Transportation means:: Regular car ( will drive her)  Safety/self care at home reviewed with patient:: Yes  Informal Support system:: Spouse  Coping - concerns/fears reviewed with patient:: Yes  Plan of Care:: Lab appointment;MD follow-up appointment;Treatment schedule (IVIG weekly x2, Sarclisa on 11/23, BMT being worked on now)  When to call provider:: Bleeding;Increased shortness of breath;New/worsening pain;Shaking chills;Temperature >100.4F;Uncontrolled diarrhea/constipation;Uncontrolled nausea/vomiting  Reasons for deferring treatment reviewed with patient:: No    Evaluation of Learning  Patient Education Provided: Yes  Readiness:: Eager  Method:: Literature;Explanation  Response:: Verbalizes understanding    Intervention/Education provided during outreach:                                                           Patient to follow up as scheduled at next appt  Patient to call/[employer] message with updates  Confirmed patient has clinic and triage numbers    Signature:  JELANI PLAZA RN

## 2022-11-07 ENCOUNTER — PATIENT OUTREACH (OUTPATIENT)
Dept: ONCOLOGY | Facility: CLINIC | Age: 52
End: 2022-11-07

## 2022-11-07 NOTE — PROGRESS NOTES
[facility]: Cancer Care Short Note                                    Discussion with Patient:                                                      INBOUND CALL: VM received from patient. Questions regarding upcoming schedule. Callback requested.     OUTBOUND CALL: RNCC called patient and LVM. Reviewed appts with her.   IVIG for today was cancelled due to requiring PA for insurance. Pt is on the waitlist for this week and will let pt know when approval has been obtained. Plan for IVIG this week and a week later. Apheresis on 11/22 with Sarclisa on 11/23.     Callback number below left for patient to discuss further.     Griselda Valdez, RN, MSN, OCN   RN Care Coordinator   [facility]   [address] [place] [address] [id number] [phone]

## 2022-11-08 ENCOUNTER — PATIENT OUTREACH (OUTPATIENT)
Dept: ONCOLOGY | Facility: CLINIC | Age: 52
End: 2022-11-08

## 2022-11-08 NOTE — PROGRESS NOTES
[facility]: Cancer Care Short Note                                    Discussion with Patient:                                                      INBOUND CALL: Inbound call received from patient. Pt and RNCC discussed appts below.     Future Appointments   Date Time Provider Department Center   11/10/2022  8:45 AM  [facility]ASONIC LAB DRAW [facility]   11/10/2022  9:20 AM Erich Hall MD [facility]   11/14/2022  8:30 AM  [facility]ASONIC LAB DRAW [facility]   11/14/2022  9:00 AM UC APHERESIS RN8 [facility]   11/14/2022 10:00 AM 1,  Bmt Nurse [facility]   11/14/2022 10:30 AM Coordinator,  Bmt Nurse [facility]   11/15/2022  1:00 PM [id number] [facility] O   11/15/2022  2:30 PM [email or web address] [facility] O   11/17/2022 10:00 AM Payton Reinoso MD [facility]   11/18/2022  7:15 AM  MASONIC LAB DRAW [facility]   11/18/2022  8:00 AM  ONC INFUSION NURSE [facility]   11/22/2022  8:00 AM UC APHERESIS RN5 [facility]   11/23/2022  1:45 PM  MASONIC LAB DRAW [facility]   11/23/2022  2:30 PM  ONC INFUSION NURSE [facility]     Plan for IVIG this week pending auth. Pt states her insurance liaison reached out to her and they approved the IVIG. RNCC will relay to financial team.   Pt notes she continues to have more PVCs which she mentioned to Dr. Reinoso. Is wondering if she should see new cardiologist prior to work up. RNCC noted patient has an echo and visit with Dr. Reinoso after. Will discuss with MD and let pt know if there are any further recommendations.       Intervention/Education provided during outreach:                                                         Patient to follow up as scheduled at next appt    Patient verbalizes understanding of POC and has no other questions or concerns at this time.     Griselda Valdez, RN, MSN, OCN   RN Care Coordinator   [facility]   [address] [place] [address] [id number] [phone]

## 2022-11-09 ENCOUNTER — INFUSION THERAPY VISIT (OUTPATIENT)
Dept: ONCOLOGY | Facility: CLINIC | Age: 52
End: 2022-11-09
Payer: COMMERCIAL

## 2022-11-09 VITALS
OXYGEN SATURATION: 100 % | SYSTOLIC BLOOD PRESSURE: 125 MMHG | RESPIRATION RATE: 18 BRPM | DIASTOLIC BLOOD PRESSURE: 83 MMHG | WEIGHT: 156.7 LBS | TEMPERATURE: 98.1 F | HEART RATE: 74 BPM | BODY MASS INDEX: 23.78 KG/M2

## 2022-11-09 DIAGNOSIS — C90.00 MULTIPLE MYELOMA NOT HAVING ACHIEVED REMISSION (H): Primary | ICD-10-CM

## 2022-11-09 DIAGNOSIS — C90.00 LIGHT CHAIN MYELOMA (H): ICD-10-CM

## 2022-11-09 DIAGNOSIS — D80.1 HYPOGAMMAGLOBULINEMIA (H): Primary | ICD-10-CM

## 2022-11-09 PROCEDURE — 96375 TX/PRO/DX INJ NEW DRUG ADDON: CPT

## 2022-11-09 PROCEDURE — 250N000013 HC RX MED GY IP 250 OP 250 PS 637

## 2022-11-09 PROCEDURE — 96366 THER/PROPH/DIAG IV INF ADDON: CPT

## 2022-11-09 PROCEDURE — 258N000003 HC RX IP 258 OP 636

## 2022-11-09 PROCEDURE — 96365 THER/PROPH/DIAG IV INF INIT: CPT

## 2022-11-09 PROCEDURE — 250N000011 HC RX IP 250 OP 636

## 2022-11-09 PROCEDURE — 999N000128 HC STATISTIC PERIPHERAL IV START W/O US GUIDANCE

## 2022-11-09 RX ORDER — ACETAMINOPHEN 325 MG/1
650 TABLET ORAL ONCE
Status: COMPLETED | OUTPATIENT
Start: 2022-11-09 | End: 2022-11-09

## 2022-11-09 RX ORDER — DIPHENHYDRAMINE HCL 25 MG
50 CAPSULE ORAL ONCE
Status: COMPLETED | OUTPATIENT
Start: 2022-11-09 | End: 2022-11-09

## 2022-11-09 RX ADMIN — HUMAN IMMUNOGLOBULIN G 30 G: 20 LIQUID INTRAVENOUS at 09:40

## 2022-11-09 RX ADMIN — SODIUM CHLORIDE 250 ML: 9 INJECTION, SOLUTION INTRAVENOUS at 08:41

## 2022-11-09 RX ADMIN — HYDROCORTISONE SODIUM SUCCINATE 100 MG: 100 INJECTION, POWDER, FOR SOLUTION INTRAMUSCULAR; INTRAVENOUS at 08:40

## 2022-11-09 RX ADMIN — DIPHENHYDRAMINE HYDROCHLORIDE 50 MG: 25 CAPSULE ORAL at 08:41

## 2022-11-09 RX ADMIN — ACETAMINOPHEN 650 MG: 325 TABLET ORAL at 08:41

## 2022-11-09 ASSESSMENT — PAIN SCALES - GENERAL: PAINLEVEL: NO PAIN (0)

## 2022-11-09 NOTE — PROGRESS NOTES
LEI Reinoso/Carissa Ruiz, RN 11/9/22:   -Pt should hold her oral Decadron today and only receive IV  Solu-cortef today as premedication prior to IVIG

## 2022-11-09 NOTE — PROGRESS NOTES
"Infusion Nursing Note:  Kristie Cornejo presents today for IVIG.    Patient seen by provider today: No    Note: {Not Applicable or free text:248321:s:\"N/A\"}.    Intravenous Access:  Peripheral IV placed.    Treatment Conditions:  Not Applicable. No labs needed today.    Post Infusion Assessment:  Patient tolerated infusion {tolerate:186150}  Blood return noted pre and post infusion.  Site patent and intact, free from redness, edema or discomfort.  No evidence of extravasations.  Access discontinued per protocol.     [facility]   [id number]    Carissa Ruiz RN  "

## 2022-11-09 NOTE — PROGRESS NOTES
"Infusion Nursing Note:  Kristie Cornejo presents today for IVIG.    Patient seen by provider today: No   present during visit today: Not Applicable.    Note: Patient denies the following: fevers, body aches, chills, headaches, vision changes, dizziness, chest pain, shortness of breath, nausea, vomiting, constipation, abdominal pain, rashes, bruising/bleeding, mouth sores, swelling or pain in the legs. Ok for treatment.     TORB Dr. Reinoso/Carissa Ruiz RN 11/9/22:   -Pt should hold her oral Decadron today and only receive IV  Solu-cortef today as premedication prior to IVIG    IVIG rates\"  35ml-15min  71ml-15min  106ml-15min  142ml-15 min  177ml-15min  213ml-15min  248ml-15min  284 for the rest of the infusion.    Intravenous Access:  Peripheral IV placed.    Treatment Conditions:  Not Applicable.      Post Infusion Assessment:  Patient tolerated infusion without incident.  Blood return noted pre and post infusion.  Site patent and intact, free from redness, edema or discomfort.  No evidence of extravasations.  Access discontinued per protocol.       Discharge Plan:   Patient declined prescription refills.  Discharge instructions reviewed with: Patient.  Patient and/or family verbalized understanding of discharge instructions and all questions answered.  AVS to patient via [employer].  Patient will return 11/18 for next appointment.   Patient discharged in stable condition accompanied by: self.  Departure Mode: Ambulatory.    Sailaja Ravi RN        "

## 2022-11-10 ENCOUNTER — APPOINTMENT (OUTPATIENT)
Dept: LAB | Facility: CLINIC | Age: 52
End: 2022-11-10
Payer: COMMERCIAL

## 2022-11-10 ENCOUNTER — OFFICE VISIT (OUTPATIENT)
Dept: DERMATOLOGY | Facility: CLINIC | Age: 52
End: 2022-11-10
Attending: REGISTERED NURSE
Payer: COMMERCIAL

## 2022-11-10 DIAGNOSIS — L98.9 SKIN LESION: ICD-10-CM

## 2022-11-10 DIAGNOSIS — L81.4 LENTIGINES: ICD-10-CM

## 2022-11-10 DIAGNOSIS — D18.01 CHERRY ANGIOMA: ICD-10-CM

## 2022-11-10 DIAGNOSIS — L82.1 SEBORRHEIC KERATOSES: ICD-10-CM

## 2022-11-10 DIAGNOSIS — L81.8 IDIOPATHIC GUTTATE HYPOMELANOSIS: ICD-10-CM

## 2022-11-10 DIAGNOSIS — C90.00 LIGHT CHAIN MYELOMA (H): Primary | ICD-10-CM

## 2022-11-10 DIAGNOSIS — D22.9 BENIGN NEVUS OF SKIN: ICD-10-CM

## 2022-11-10 PROCEDURE — 99203 OFFICE O/P NEW LOW 30 MIN: CPT | Mod: GC | Performed by: DERMATOLOGY

## 2022-11-10 ASSESSMENT — PAIN SCALES - GENERAL: PAINLEVEL: NO PAIN (0)

## 2022-11-10 NOTE — LETTER
11/10/2022       RE: Kristie Cornejo  [address]  [facility] [address]     Dear Colleague,    Thank you for referring your patient, Kristie Cornejo, to the [facility] [place] at [facility]. Please see a copy of my visit note below.    [facility] Dermatology Note  Encounter Date: Nov 10, 2022  Office Visit     Dermatology Problem List:  Last FBSE 11/10/22     #  Kappa light chain myeloma   - on 5th line of treatment fall 2022  - pending CAR-T cell therapy winter 2022/2023    # Intertrigo, R lateral inframammary region, mild  - barrier cream  - OTC topical hydrocortisone PRN  - consider miconazole 2% powder PRN if needed in the future     ____________________________________________    Assessment & Plan:   # Intertrigo; R lateral inframammary region. Patient has been using barrier cream with good effect. Discussed keeping the area dry and using a topical steroid if needed. She has hydrocortisone at home, but has not been using it because the intertrigo is minimally symptomatic. We could add an antifungal powder if needed as well in the future.    - continue barrier cream  - OTC topical hydrocortisone PRN  - consider miconazole 2% powder PRN if needed in the future     # Kappa light chain myeloma pending CAR-T cell therapy. Baseline cutaneous examination is benign without personal or family history of skin cancer. Plan to monitor annually; patient will contact the clinic with any changes or concerns.    - Monitor: Patient to continue monitoring at home and will contact the clinic for any changes.  - Sun protection: Counseled SPF30+ sunscreen, UPF clothing, sun avoidance, tanning bed avoidance.    # Benign findings including seborrheic keratoses, idiopathic guttate hypomelanosis, cherry angiomata, freckles, lentigines and nevi. Reassured patient regarding the benign nature of these findings.   - Monitor: Patient to  "continue monitoring at home and will contact the clinic for any changes.  - NTD: No further management at this time.  - Sun protection: Counseled SPF30+ sunscreen, UPF clothing, sun avoidance, tanning bed avoidance.  - ABCDEs of melanoma discussed with patient; educational handout provided    Procedures Performed:   - none    Follow-up: 1 year(s) in-person, or earlier for new or changing lesions    Staff: Dr. Gigi Hall MD PGY-2  Dermatology Resident  Pager: [phone]      ____________________________________________    CC: Skin Check (Margo is here today for a skin check )    HPI:  Ms. Kristie Cornejo is a(n) 52 year old female who presents today as a new patient for FBSE. She has a history of kappa light chain myeloma (diagnosed approximately 15 months ago) on her 5th line of treatment, following closely with Dr. Reinoso of heme/onc.     The patient notes the following today:  - she was first diagnosed with blood cancer in April 2021, where she fractured her pubic ramus while figure skating  - she and her oncologist are planning for CAR-T treatment within the next few months  - she has no personal or family history of skin cancer  - she has noticed a brown spot on her L dorsal forearm that \"flaked\" this summer  - it is not painful or growing  - she has also noticed some white spots on her arms and legs  - denies painful, growing or bleeding lesions  - she has a history of tanning bed use when she was younger, and recently (within the past 1-2 years) become more proactive about sunscreen/sun protection  - fraternal twin sister has hx of vitiligo; patient denies personal hx of vitiligo    Patient is otherwise feeling well, without additional skin concerns.    Labs Reviewed:  N/A    Physical Exam:  Vitals: There were no vitals taken for this visit.  SKIN: Total skin excluding the undergarment areas was performed. The exam included the head/face, neck, both arms, chest, back, abdomen, both " legs, digits and/or nails.   - on the sun-exposed arms and legs there are several white macules  - Multiple regular brown macules and papules on the arms, back and face with accentuation in sun-exposed areas  - on the L dorsal forearm there is a well-demarcated cerebriform tan to brown macule  - multiple bright red papules on the trunk and back  - macerated pink plaque beneath the R lateral breast  - No other lesions of concern on areas examined.     Medications:  Current Outpatient Medications   Medication     acyclovir (ZOVIRAX) 400 MG tablet     aspirin (ASA) 81 MG EC tablet     BUSPIRONE HCL 10 MG PO TABS     Calcium Carbonate-Vit D-Min (CALCIUM 600+D PLUS MINERALS) 600-400 MG-UNIT TABS     cetirizine HCl 10 MG CAPS     dexamethasone (DECADRON) 4 MG tablet     dexamethasone (DECADRON) 4 MG tablet     dexamethasone (DECADRON) 4 MG tablet     Flaxseed (Linseed) (FLAX SEED OIL) 1000 MG capsule     fluticasone (FLONASE) 50 MCG/ACT nasal spray     glucosamine-chondroitin 500-400 MG CAPS per capsule     lactobacillus rhamnosus, GG, (CULTURELL) capsule     loperamide (IMODIUM) 2 MG capsule     LORazepam (ATIVAN) 0.5 MG tablet     melatonin 5 MG tablet     MIRENA 20 MCG/24HR IU IUD     Multiple Vitamin (MULTI-VITAMIN DAILY PO)     omeprazole (PRILOSEC OTC) 20 MG EC tablet     pomalidomide (POMALYST) 4 MG capsule     pomalidomide (POMALYST) 4 MG capsule     pomalidomide (POMALYST) 4 MG capsule     potassium chloride ER (KLOR-CON M) 10 MEQ CR tablet     prochlorperazine (COMPAZINE) 10 MG tablet     Zoledronic Acid (ZOMETA IV)     No current facility-administered medications for this visit.     Facility-Administered Medications Ordered in Other Visits   Medication     CT Flush      Past Medical History:   Patient Active Problem List   Diagnosis     Allergic rhinitis     Anxiety state     Irritable bowel syndrome     Hemorrhoids     Sciatica     Encounter for insertion or removal of intrauterine contraceptive device      CARDIOVASCULAR SCREENING; LDL GOAL LESS THAN 160     Pelvic mass     Plasmacytoma of bone (H)     Light chain myeloma (H)     Dehydration     Hypogammaglobulinemia (H)     Past Medical History:   Diagnosis Date     Allergic rhinitis, cause unspecified      Anxiety state, unspecified 12/01/2003    Started postpartum . On Paxil x 1+years. Off meds- 10/04     Irritable bowel syndrome 01/01/2004     Multiple myeloma not having achieved remission (H)      PLANTAR WARTS     resolved     SINUS DYSRHYTHMIA 10/01/2002    wnl     Unspecified hemorrhoids without mention of complication 04/01/2004    colonoscopy 4/04 spastic colon, int roids; bx neg       CC Jackie Mona, APRN CNP  [address] [place] [address] on close of this encounter.

## 2022-11-10 NOTE — NURSING NOTE
Dermatology Rooming Note    Kristie Cornejo's goals for this visit include:   Chief Complaint   Patient presents with     Skin Check     Margo is here today for a skin check      JERONIMO Cueto

## 2022-11-10 NOTE — PROGRESS NOTES
[facility] Dermatology Note  Encounter Date: Nov 10, 2022  Office Visit     Dermatology Problem List:  Last FBSE 11/10/22     #  Kappa light chain myeloma   - on 5th line of treatment fall 2022  - pending CAR-T cell therapy winter 2022/2023    # Intertrigo, R lateral inframammary region, mild  - barrier cream  - OTC topical hydrocortisone PRN  - consider miconazole 2% powder PRN if needed in the future     ____________________________________________    Assessment & Plan:   # Intertrigo; R lateral inframammary region. Patient has been using barrier cream with good effect. Discussed keeping the area dry and using a topical steroid if needed. She has hydrocortisone at home, but has not been using it because the intertrigo is minimally symptomatic. We could add an antifungal powder if needed as well in the future.    - continue barrier cream  - OTC topical hydrocortisone PRN  - consider miconazole 2% powder PRN if needed in the future     # Kappa light chain myeloma pending CAR-T cell therapy. Baseline cutaneous examination is benign without personal or family history of skin cancer. Plan to monitor annually; patient will contact the clinic with any changes or concerns.    - Monitor: Patient to continue monitoring at home and will contact the clinic for any changes.  - Sun protection: Counseled SPF30+ sunscreen, UPF clothing, sun avoidance, tanning bed avoidance.    # Benign findings including seborrheic keratoses, idiopathic guttate hypomelanosis, cherry angiomata, freckles, lentigines and nevi. Reassured patient regarding the benign nature of these findings.   - Monitor: Patient to continue monitoring at home and will contact the clinic for any changes.  - NTD: No further management at this time.  - Sun protection: Counseled SPF30+ sunscreen, UPF clothing, sun avoidance, tanning bed avoidance.  - ABCDEs of melanoma discussed with patient; educational handout provided    Procedures Performed:   -  "none    Follow-up: 1 year(s) in-person, or earlier for new or changing lesions    Staff: Dr. Gigi Hall MD PGY-2  Dermatology Resident  Pager: [phone]      ____________________________________________    CC: Skin Check (Margo is here today for a skin check )    HPI:  Ms. Kristie Cornejo is a(n) 52 year old female who presents today as a new patient for FBSE. She has a history of kappa light chain myeloma (diagnosed approximately 15 months ago) on her 5th line of treatment, following closely with Dr. Reinoso of heme/onc.     The patient notes the following today:  - she was first diagnosed with blood cancer in April 2021, where she fractured her pubic ramus while figure skating  - she and her oncologist are planning for CAR-T treatment within the next few months  - she has no personal or family history of skin cancer  - she has noticed a brown spot on her L dorsal forearm that \"flaked\" this summer  - it is not painful or growing  - she has also noticed some white spots on her arms and legs  - denies painful, growing or bleeding lesions  - she has a history of tanning bed use when she was younger, and recently (within the past 1-2 years) become more proactive about sunscreen/sun protection  - fraternal twin sister has hx of vitiligo; patient denies personal hx of vitiligo    Patient is otherwise feeling well, without additional skin concerns.    Labs Reviewed:  N/A    Physical Exam:  Vitals: There were no vitals taken for this visit.  SKIN: Total skin excluding the undergarment areas was performed. The exam included the head/face, neck, both arms, chest, back, abdomen, both legs, digits and/or nails.   - on the sun-exposed arms and legs there are several white macules  - Multiple regular brown macules and papules on the arms, back and face with accentuation in sun-exposed areas  - on the L dorsal forearm there is a well-demarcated cerebriform tan to brown macule  - multiple bright red " papules on the trunk and back  - macerated pink plaque beneath the R lateral breast  - No other lesions of concern on areas examined.     Medications:  Current Outpatient Medications   Medication     acyclovir (ZOVIRAX) 400 MG tablet     aspirin (ASA) 81 MG EC tablet     BUSPIRONE HCL 10 MG PO TABS     Calcium Carbonate-Vit D-Min (CALCIUM 600+D PLUS MINERALS) 600-400 MG-UNIT TABS     cetirizine HCl 10 MG CAPS     dexamethasone (DECADRON) 4 MG tablet     dexamethasone (DECADRON) 4 MG tablet     dexamethasone (DECADRON) 4 MG tablet     Flaxseed (Linseed) (FLAX SEED OIL) 1000 MG capsule     fluticasone (FLONASE) 50 MCG/ACT nasal spray     glucosamine-chondroitin 500-400 MG CAPS per capsule     lactobacillus rhamnosus, GG, (CULTURELL) capsule     loperamide (IMODIUM) 2 MG capsule     LORazepam (ATIVAN) 0.5 MG tablet     melatonin 5 MG tablet     MIRENA 20 MCG/24HR IU IUD     Multiple Vitamin (MULTI-VITAMIN DAILY PO)     omeprazole (PRILOSEC OTC) 20 MG EC tablet     pomalidomide (POMALYST) 4 MG capsule     pomalidomide (POMALYST) 4 MG capsule     pomalidomide (POMALYST) 4 MG capsule     potassium chloride ER (KLOR-CON M) 10 MEQ CR tablet     prochlorperazine (COMPAZINE) 10 MG tablet     Zoledronic Acid (ZOMETA IV)     No current facility-administered medications for this visit.     Facility-Administered Medications Ordered in Other Visits   Medication     CT Flush      Past Medical History:   Patient Active Problem List   Diagnosis     Allergic rhinitis     Anxiety state     Irritable bowel syndrome     Hemorrhoids     Sciatica     Encounter for insertion or removal of intrauterine contraceptive device     CARDIOVASCULAR SCREENING; LDL GOAL LESS THAN 160     Pelvic mass     Plasmacytoma of bone (H)     Light chain myeloma (H)     Dehydration     Hypogammaglobulinemia (H)     Past Medical History:   Diagnosis Date     Allergic rhinitis, cause unspecified      Anxiety state, unspecified 12/01/2003    Started postpartum .  On Paxil x 1+years. Off meds- 10/04     Irritable bowel syndrome 01/01/2004     Multiple myeloma not having achieved remission (H)      PLANTAR WARTS     resolved     SINUS DYSRHYTHMIA 10/01/2002    wnl     Unspecified hemorrhoids without mention of complication 04/01/2004    colonoscopy 4/04 spastic colon, int roids; bx neg       CC Jackie Dunn, APRN CNP  [address] [place] [address] on close of this encounter.

## 2022-11-10 NOTE — PATIENT INSTRUCTIONS
Patient Education     Checking for Skin Cancer  You can find cancer early by checking your skin each month. There are 3 kinds of skin cancer. They are melanoma, basal cell carcinoma, and squamous cell carcinoma. Doing monthly skin checks is the best way to find new marks or skin changes. Follow the instructions below for checking your skin.   The ABCDEs of checking moles for melanoma   Check your moles or growths for signs of melanoma using ABCDE:   Asymmetry: the sides of the mole or growth don t match  Border: the edges are ragged, notched, or blurred  Color: the color within the mole or growth varies  Diameter: the mole or growth is larger than 6 mm (size of a pencil eraser)  Evolving: the size, shape, or color of the mole or growth is changing (evolving is not shown in the images below)    Checking for other types of skin cancer  Basal cell carcinoma or squamous cell carcinoma have symptoms such as:     A spot or mole that looks different from all other marks on your skin  Changes in how an area feels, such as itching, tenderness, or pain  Changes in the skin's surface, such as oozing, bleeding, or scaliness  A sore that does not heal  New swelling or redness beyond the border of a mole    Who s at risk?  Anyone can get skin cancer. But you are at greater risk if you have:   Fair skin, light-colored hair, or light-colored eyes  Many moles or abnormal moles on your skin  A history of sunburns from sunlight or tanning beds  A family history of skin cancer  A history of exposure to radiation or chemicals  A weakened immune system  If you have had skin cancer in the past, you are at risk for recurring skin cancer.   How to check your skin  Do your monthly skin checkups in front of a full-length mirror. Check all parts of your body, including your:   Head (ears, face, neck, and scalp)  Torso (front, back, and sides)  Arms (tops, undersides, upper, and lower armpits)  Hands (palms, backs, and fingers, including  under the nails)  Buttocks and genitals  Legs (front, back, and sides)  Feet (tops, soles, toes, including under the nails, and between toes)  If you have a lot of moles, take digital photos of them each month. Make sure to take photos both up close and from a distance. These can help you see if any moles change over time.   Most skin changes are not cancer. But if you see any changes in your skin, call your doctor right away. Only he or she can diagnose a problem. If you have skin cancer, seeing your doctor can be the first step toward getting the treatment that could save your life.   [employer] last reviewed this educational content on 4/1/2019 2000-2020 [employer]. [address] [place] [address], [place] [address]. All rights reserved. This information is not intended as a substitute for professional medical care. Always follow your healthcare professional's instructions.       When should I call my doctor?  If you are worsening or not improving, please, contact us or seek urgent care as noted below.     Who should I call with questions (adults)?  [facility] (adult and pediatric): [phone]  [facility] (adult): [phone]  For urgent needs outside of business hours call the [facility] at [phone] and ask for the dermatology resident on call to be paged  If this is a medical emergency and you are unable to reach an ER, Call 111    Who should I call with questions (pediatric)?  [facility]- Pediatric Dermatology  Dr. Lauren Queen, Dr. Fatuma Ang, Dr. Lucia Marinelli, ERICK Rodríguez, Dr. Mary Anne Culver, Dr. Nika Villanueva & Dr. Javier Puga  Non-urgent nurse triage line; [phone]- Talisha and Yesi DEMARCO Care Coordinatorsergio Sanchez (/Complex ) [phone]    If you need a prescription refill, please contact your pharmacy. Refills are approved or denied by our  Physicians during normal business hours, Monday through Fridays  Per office policy, refills will not be granted if you have not been seen within the past year (or sooner depending on your child's condition)    Scheduling Information:  Pediatric Appointment Scheduling and Call Center [phone]  Radiology Scheduling- [phone]  Sedation Unit Scheduling- [phone]  [place] Scheduling- General [phone]; Pediatric Dermatology [phone]  Main  Services: [phone]  Kinyarwanda: [phone]  Scottish: [phone]  Hmong/Tunisian/Romanian: [phone]  Preadmission Nursing Department Fax Number: [phone] (Fax all pre-operative paperwork to this number)    For urgent matters arising during evenings, weekends, or holidays that cannot wait for normal business hours please call [phone] and ask for the dermatology resident on call to be paged.

## 2022-11-13 LAB
ABO/RH(D): ABNORMAL
ANTIBODY SCREEN: POSITIVE
SPECIMEN EXPIRATION DATE: ABNORMAL

## 2022-11-14 ENCOUNTER — ALLIED HEALTH/NURSE VISIT (OUTPATIENT)
Dept: TRANSPLANT | Facility: CLINIC | Age: 52
End: 2022-11-14
Payer: COMMERCIAL

## 2022-11-14 ENCOUNTER — LAB (OUTPATIENT)
Dept: LAB | Facility: CLINIC | Age: 52
End: 2022-11-14
Payer: COMMERCIAL

## 2022-11-14 ENCOUNTER — MEDICAL CORRESPONDENCE (OUTPATIENT)
Dept: TRANSPLANT | Facility: CLINIC | Age: 52
End: 2022-11-14

## 2022-11-14 ENCOUNTER — HOSPITAL ENCOUNTER (OUTPATIENT)
Dept: LAB | Facility: CLINIC | Age: 52
Discharge: HOME | End: 2022-11-14
Payer: COMMERCIAL

## 2022-11-14 ENCOUNTER — OFFICE VISIT (OUTPATIENT)
Dept: TRANSPLANT | Facility: CLINIC | Age: 52
End: 2022-11-14
Payer: COMMERCIAL

## 2022-11-14 VITALS
SYSTOLIC BLOOD PRESSURE: 126 MMHG | DIASTOLIC BLOOD PRESSURE: 74 MMHG | BODY MASS INDEX: 24.05 KG/M2 | WEIGHT: 158.51 LBS | TEMPERATURE: 98.4 F | HEART RATE: 65 BPM | RESPIRATION RATE: 16 BRPM

## 2022-11-14 DIAGNOSIS — Z01.818 EXAMINATION PRIOR TO CHEMOTHERAPY: ICD-10-CM

## 2022-11-14 DIAGNOSIS — Z86.2 PERSONAL HISTORY OF DISEASES OF BLOOD AND BLOOD-FORMING ORGANS: ICD-10-CM

## 2022-11-14 DIAGNOSIS — C90.00 MYELOMA (H): ICD-10-CM

## 2022-11-14 DIAGNOSIS — C90.00 MULTIPLE MYELOMA NOT HAVING ACHIEVED REMISSION (H): Primary | ICD-10-CM

## 2022-11-14 LAB
ALBUMIN SERPL BCG-MCNC: 4.7 G/DL (ref 3.5–5.2)
ALP SERPL-CCNC: 69 U/L (ref 35–104)
ALT SERPL W P-5'-P-CCNC: 38 U/L (ref 10–35)
ANION GAP SERPL CALCULATED.3IONS-SCNC: 11 MMOL/L (ref 7–15)
ANTIBODY SCREEN, TUBE: NORMAL
APTT PPP: 24 SECONDS (ref 22–38)
AST SERPL W P-5'-P-CCNC: 27 U/L (ref 10–35)
BASOPHILS # BLD AUTO: 0.2 10E3/UL (ref 0–0.2)
BASOPHILS NFR BLD AUTO: 3 %
BILIRUB SERPL-MCNC: 0.3 MG/DL
BUN SERPL-MCNC: 21.7 MG/DL (ref 6–20)
CALCIUM SERPL-MCNC: 9.8 MG/DL (ref 8.6–10)
CD19 CELLS # BLD: 2 CELLS/UL (ref 107–698)
CD19 CELLS NFR BLD: <1 % (ref 6–27)
CD3 CELLS # BLD: 642 CELLS/UL (ref 603–2990)
CD3 CELLS NFR BLD: 99 % (ref 49–84)
CD3+CD4+ CELLS # BLD: 408 CELLS/UL (ref 441–2156)
CD3+CD4+ CELLS NFR BLD: 63 % (ref 28–63)
CD3+CD4+ CELLS/CD3+CD8+ CLL BLD: 1.71 % (ref 1.4–2.6)
CD3+CD8+ CELLS # BLD: 238 CELLS/UL (ref 125–1312)
CD3+CD8+ CELLS NFR BLD: 37 % (ref 10–40)
CD3-CD16+CD56+ CELLS # BLD: 2 CELLS/UL (ref 95–640)
CD3-CD16+CD56+ CELLS NFR BLD: 0 % (ref 4–25)
CHLORIDE SERPL-SCNC: 106 MMOL/L (ref 98–107)
CREAT SERPL-MCNC: 0.88 MG/DL (ref 0.51–0.95)
CRP SERPL-MCNC: <3 MG/L
D DIMER PPP FEU-MCNC: 0.34 UG/ML FEU (ref 0–0.5)
DEPRECATED HCO3 PLAS-SCNC: 25 MMOL/L (ref 22–29)
EOSINOPHIL # BLD AUTO: 0.4 10E3/UL (ref 0–0.7)
EOSINOPHIL NFR BLD AUTO: 8 %
ERYTHROCYTE [DISTWIDTH] IN BLOOD BY AUTOMATED COUNT: 14.3 % (ref 10–15)
FERRITIN SERPL-MCNC: 153 NG/ML (ref 11–328)
FIBRINOGEN PPP-MCNC: 437 MG/DL (ref 170–490)
GFR SERPL CREATININE-BSD FRML MDRD: 79 ML/MIN/1.73M2
GLUCOSE SERPL-MCNC: 77 MG/DL (ref 70–99)
HBV SURFACE AG SERPL QL IA: NONREACTIVE
HCG SERPL QL: NEGATIVE
HCT VFR BLD AUTO: 42.1 % (ref 35–47)
HCV AB SERPL QL IA: NONREACTIVE
HGB BLD-MCNC: 13.2 G/DL (ref 11.7–15.7)
IMM GRANULOCYTES # BLD: 0 10E3/UL
IMM GRANULOCYTES NFR BLD: 0 %
INR PPP: 0.95 (ref 0.85–1.15)
LDH SERPL L TO P-CCNC: 192 U/L (ref 0–250)
LYMPHOCYTES # BLD AUTO: 0.8 10E3/UL (ref 0.8–5.3)
LYMPHOCYTES NFR BLD AUTO: 15 %
MCH RBC QN AUTO: 31.2 PG (ref 26.5–33)
MCHC RBC AUTO-ENTMCNC: 31.4 G/DL (ref 31.5–36.5)
MCV RBC AUTO: 100 FL (ref 78–100)
MONOCYTES # BLD AUTO: 0.6 10E3/UL (ref 0–1.3)
MONOCYTES NFR BLD AUTO: 12 %
NEUTROPHILS # BLD AUTO: 3.3 10E3/UL (ref 1.6–8.3)
NEUTROPHILS NFR BLD AUTO: 62 %
NRBC # BLD AUTO: 0 10E3/UL
NRBC BLD AUTO-RTO: 0 /100
PLATELET # BLD AUTO: 192 10E3/UL (ref 150–450)
POTASSIUM SERPL-SCNC: 3.7 MMOL/L (ref 3.4–5.3)
PROT SERPL-MCNC: 7.5 G/DL (ref 6.4–8.3)
RBC # BLD AUTO: 4.23 10E6/UL (ref 3.8–5.2)
SARS-COV-2 RNA RESP QL NAA+PROBE: NEGATIVE
SODIUM SERPL-SCNC: 142 MMOL/L (ref 136–145)
SPECIMEN EXPIRATION DATE: NORMAL
T CELL EXTENDED COMMENT: ABNORMAL
TOTAL PROTEIN SERUM FOR ELP: 7.1 G/DL (ref 6.4–8.3)
URATE SERPL-MCNC: 1.6 MG/DL (ref 2.4–5.7)
WBC # BLD AUTO: 5.2 10E3/UL (ref 4–11)

## 2022-11-14 PROCEDURE — 82232 ASSAY OF BETA-2 PROTEIN: CPT

## 2022-11-14 PROCEDURE — 85379 FIBRIN DEGRADATION QUANT: CPT

## 2022-11-14 PROCEDURE — 84703 CHORIONIC GONADOTROPIN ASSAY: CPT

## 2022-11-14 PROCEDURE — 86665 EPSTEIN-BARR CAPSID VCA: CPT

## 2022-11-14 PROCEDURE — 85610 PROTHROMBIN TIME: CPT

## 2022-11-14 PROCEDURE — 84550 ASSAY OF BLOOD/URIC ACID: CPT

## 2022-11-14 PROCEDURE — 85004 AUTOMATED DIFF WBC COUNT: CPT

## 2022-11-14 PROCEDURE — 85384 FIBRINOGEN ACTIVITY: CPT

## 2022-11-14 PROCEDURE — 85730 THROMBOPLASTIN TIME PARTIAL: CPT

## 2022-11-14 PROCEDURE — 86140 C-REACTIVE PROTEIN: CPT

## 2022-11-14 PROCEDURE — 87521 HEPATITIS C PROBE&RVRS TRNSC: CPT

## 2022-11-14 PROCEDURE — 82947 ASSAY GLUCOSE BLOOD QUANT: CPT

## 2022-11-14 PROCEDURE — 86687 HTLV-I ANTIBODY: CPT

## 2022-11-14 PROCEDURE — 84155 ASSAY OF PROTEIN SERUM: CPT

## 2022-11-14 PROCEDURE — 86704 HEP B CORE ANTIBODY TOTAL: CPT

## 2022-11-14 PROCEDURE — G0463 HOSPITAL OUTPT CLINIC VISIT: HCPCS

## 2022-11-14 PROCEDURE — 83521 IG LIGHT CHAINS FREE EACH: CPT

## 2022-11-14 PROCEDURE — 86850 RBC ANTIBODY SCREEN: CPT

## 2022-11-14 PROCEDURE — 84165 PROTEIN E-PHORESIS SERUM: CPT | Mod: TC | Performed by: PATHOLOGY

## 2022-11-14 PROCEDURE — U0005 INFEC AGEN DETEC AMPLI PROBE: HCPCS

## 2022-11-14 PROCEDURE — 93005 ELECTROCARDIOGRAM TRACING: CPT

## 2022-11-14 PROCEDURE — 86335 IMMUNFIX E-PHORSIS/URINE/CSF: CPT | Performed by: PATHOLOGY

## 2022-11-14 PROCEDURE — 86780 TREPONEMA PALLIDUM: CPT

## 2022-11-14 PROCEDURE — 86706 HEP B SURFACE ANTIBODY: CPT

## 2022-11-14 PROCEDURE — 86334 IMMUNOFIX E-PHORESIS SERUM: CPT | Performed by: PATHOLOGY

## 2022-11-14 PROCEDURE — 86803 HEPATITIS C AB TEST: CPT

## 2022-11-14 PROCEDURE — 83615 LACTATE (LD) (LDH) ENZYME: CPT

## 2022-11-14 PROCEDURE — 36415 COLL VENOUS BLD VENIPUNCTURE: CPT

## 2022-11-14 PROCEDURE — 82728 ASSAY OF FERRITIN: CPT

## 2022-11-14 PROCEDURE — 86355 B CELLS TOTAL COUNT: CPT

## 2022-11-14 PROCEDURE — 86901 BLOOD TYPING SEROLOGIC RH(D): CPT

## 2022-11-14 PROCEDURE — 86696 HERPES SIMPLEX TYPE 2 TEST: CPT

## 2022-11-14 PROCEDURE — 87340 HEPATITIS B SURFACE AG IA: CPT

## 2022-11-14 PROCEDURE — 82784 ASSAY IGA/IGD/IGG/IGM EACH: CPT

## 2022-11-14 PROCEDURE — 87389 HIV-1 AG W/HIV-1&-2 AB AG IA: CPT

## 2022-11-14 NOTE — NURSING NOTE
EKG was performed today per order written by Dr. Reinoso.  Name and  verified with patient. Patient tolerated well without incident. File transmitted to chart.    Narcisa Maier CMA on 2022 at 12:03 PM

## 2022-11-14 NOTE — NURSING NOTE
Chief Complaint   Patient presents with     Labs Only     Labs drawn via  by RN.     Labs collected from venipuncture by RN. Checked in for appointment(s).    Awa Agarwal RN

## 2022-11-14 NOTE — CONSULTS
Transfusion Medicine Consultation    Kristie Cornejo [id number]   YOB: 1970 Age: 52 year old   Date of Consult: 11/14/2022     Reason for consult: Autologous Mononuclear Cell (MNC)  Collection           Assessment and Plan:   52 year old female presents for consultation for autologous MNC collection for Multiple Myeloma treatment with CAR-T. The plan is to collect for 1 to 3 days or until the target goal is met. A central line will be placed and will be used for access for the procedure.          Chief Complaint:   Transfusion medicine consultation.         History of Present Illness:   52 year old female presents for consultation for autologous  MNC  collection.  Her past medical history includes hip fracture due to Multiple Myeloma and PVCs.  She is currently well. The patient denies any back pain that would prevent her from tolerating the procedure. The patient confirms a recent flu vaccination.  No significant travel history. The patient has no identifiable risk factors for infectious disease. The procedure, risks/benefits were discussed with the patient and all of her questions were addressed at this time.             Past Medical History:     Past Medical History:   Diagnosis Date     Allergic rhinitis, cause unspecified      Anxiety state, unspecified 12/01/2003    Started postpartum . On Paxil x 1+years. Off meds- 10/04     Irritable bowel syndrome 01/01/2004     Multiple myeloma not having achieved remission (H)      PLANTAR WARTS     resolved     SINUS DYSRHYTHMIA 10/01/2002    wnl     Unspecified hemorrhoids without mention of complication 04/01/2004    colonoscopy 4/04 spastic colon, int roids; bx neg             Past Surgical History:     Past Surgical History:   Procedure Laterality Date     BIOPSY BONE PELVIS Left 09/07/2021    Procedure: Biopsy left pubic ramis;  Surgeon: Edu Philip MD;  Location: [facility] OR     COLONOSCOPY N/A 04/13/2022    Procedure: COLONOSCOPY, WITH BIOPSY;   Surgeon: Berto Montgomery MD;  Location:  GI     EP ABLATION PVC N/A 03/23/2022    Procedure: Ablation Premature Ventricular Contractions;  Surgeon: Alem Thomas MD;  Location:  [facility]     LASIK       [facility] NONSPECIFIC PROCEDURE  01/01/1987    [place] teeth removed     [facility] NONSPECIFIC PROCEDURE  01/01/1993    (R) knee surgery     [facility] NONSPECIFIC PROCEDURE  01/01/2003    (R) breast bx-negative 6/03; bx neg 10/02     [facility] NONSPECIFIC PROCEDURE      10/02 cardiac echo normal     [facility] NONSPECIFIC PROCEDURE  01/01/2004 4/04 colonosc int roids, spastic colon     [facility] NONSPECIFIC PROCEDURE  08/01/2005    D & C for missed AB     [facility] COLONOSCOPY THRU STOMA, DIAGNOSTIC  01/01/2004              Social History:     Social History     Tobacco Use     Smoking status: Never     Smokeless tobacco: Never   Substance Use Topics     Alcohol use: Yes     Comment: none to a couple. occasional             Family History:     Family History   Problem Relation Age of Onset     Neurologic Disorder Mother         toxo     Lipids Mother         low cholestrol     Lipids Father         high cholestrol     Glaucoma Maternal Grandmother      Diabetes Maternal Grandmother         breast cancer,stroke ischemic     Breast Cancer Maternal Grandmother         79yo/and autoimmune skin condition     C.A.D. Maternal Grandfather         strokes and mi, chronic respiratory from 2 hand smoke     Hypertension Paternal Grandmother         rheumatoid arthritis     Respiratory Paternal Grandfather         emphasema     Cancer - colorectal No family hx of      Prostate Cancer No family hx of      Macular Degeneration No family hx of              Immunizations:   Immunizations are up to date          Allergies:     Allergies   Allergen Reactions     Clarithromycin      Other reaction(s): GI intolerance     Amoxicillin      Other reaction(s): upset stomach  Other reaction(s): Gastrointestinal, GI intolerance, Other (see comments)  Other  reaction(s): upset stomach  Other reaction(s): upset stomach       Erythromycin      upsets stomach     Erythromycin      Other reaction(s): GI intolerance  upsets stomach  nausea     Fluconazole Rash     Itching,   Unsure if really allergy  Itching,   Unsure if really allergy    Other reaction(s): Other (see comments)  Itching,   Unsure if really allergy             Medications:     Current Outpatient Medications   Medication Sig     acyclovir (ZOVIRAX) 400 MG tablet Take 1 tablet (400 mg) by mouth 2 times daily     aspirin (ASA) 81 MG EC tablet Take 81 mg by mouth daily     BUSPIRONE HCL 10 MG PO TABS 1 TABLET 3 TIMES DAILY as needed     Calcium Carbonate-Vit D-Min (CALCIUM 600+D PLUS MINERALS) 600-400 MG-UNIT TABS      cetirizine HCl 10 MG CAPS Take 20 mg by mouth     dexamethasone (DECADRON) 4 MG tablet Take 5 tablets (20 mg) by mouth See Admin Instructions     Flaxseed (Linseed) (FLAX SEED OIL) 1000 MG capsule Take 1 capsule by mouth daily     fluticasone (FLONASE) 50 MCG/ACT nasal spray 1 spray     glucosamine-chondroitin 500-400 MG CAPS per capsule      lactobacillus rhamnosus, GG, (CULTURELL) capsule Take 1 capsule by mouth 2 times daily     loperamide (IMODIUM) 2 MG capsule Take 2 mg by mouth 4 times daily as needed for diarrhea     LORazepam (ATIVAN) 0.5 MG tablet Take 1 tablet (0.5 mg) by mouth every 4 hours as needed for anxiety, nausea or sleep (Patient taking differently: Take 0.5 mg by mouth every 4 hours as needed for anxiety, nausea or sleep)     melatonin 5 MG tablet Take 5 mg by mouth nightly as needed for sleep     MIRENA 20 MCG/24HR IU IUD use for up to 5 years, then remove     Multiple Vitamin (MULTI-VITAMIN DAILY PO) Take 1 tablet by mouth     omeprazole (PRILOSEC OTC) 20 MG EC tablet Take 1 tablet (20 mg) by mouth 2 times daily     pomalidomide (POMALYST) 4 MG capsule Take 1 capsule (4 mg) by mouth daily Swallow whole, do NOT break, crush, chew or open capsule. Take on days 1-21 of repeated  28 day cycles.     potassium chloride ER (KLOR-CON M) 10 MEQ CR tablet Take 2 tablets (20 mEq) by mouth 2 times daily     prochlorperazine (COMPAZINE) 10 MG tablet Take 10 mg by mouth every 6 hours as needed for nausea or vomiting     Zoledronic Acid (ZOMETA IV)      dexamethasone (DECADRON) 4 MG tablet Take 5 tablets (20 mg) by mouth See Admin Instructions     dexamethasone (DECADRON) 4 MG tablet Take 5 tablets (20 mg) by mouth See Admin Instructions     pomalidomide (POMALYST) 4 MG capsule Take 1 capsule (4 mg) by mouth daily Swallow whole, do NOT break, crush, chew or open capsule. Take on days 1-21 of repeated 28 day cycles.     pomalidomide (POMALYST) 4 MG capsule Take 1 capsule (4 mg) by mouth daily Swallow whole, do NOT break, crush, chew or open capsule. Take on days 1-21 of repeated 28 day cycles.     No current facility-administered medications for this encounter.     Facility-Administered Medications Ordered in Other Encounters   Medication     CT Flush             Review of Systems:     CONSTITUTIONAL: NEGATIVE for fever, chills, change in weight; night and day sweats   EYES: NEGATIVE for vision changes or irritation; has had lasix and wears glass  ENT/MOUTH: NEGATIVE for ear, mouth and throat problems  RESP: NEGATIVE for significant cough or SOB  CV: NEGATIVE for chest pain, palpitations or peripheral edema  GI: NEGATIVE for nausea, abdominal pain, heartburn, or change in bowel habits  NEURO: NEGATIVE for weakness, dizziness; POSITIVE for change in sensation  HEME/ALLERGY/IMMUNE: NEGATIVE for bleeding problems           Vital Signs:   /74   Pulse 65   Temp 98.4  F (36.9  C) (Oral)   Resp 16   Wt 71.9 kg (158 lb 8.2 oz)   BMI 24.05 kg/m              Data:     CBC RESULTS: Recent Labs   Lab Test 11/14/22  0945   WBC 5.2   RBC 4.23   HGB 13.2   HCT 42.1      MCH 31.2   MCHC 31.4*   RDW 14.3        Antibody Screen   Date Value Ref Range Status   07/06/2022 Positive (A) Negative  Final   02/13/2006 Neg  Final     Blood Bank Chart Comment   Date Value Ref Range Status   07/06/2022 BB Chart Comment  Final     Comment:     Patient received michelle most recently on 6/6/22, which is known to cause positive antibody screens. All cinically significant antibodies ruled out 7/6/22 misty David (Gurpreet) MD Heidy, PhD  PGY-2 Pathology Resident   Pager: [phone]

## 2022-11-14 NOTE — CONSULTS
APHERESIS INITIAL CONSULT CHECKLIST    Current Encounter Information  Current Encounter Information: Reason for Visit, Allergies and Current Meds  Procedure Requested: MNC/PBSC Collection  History of: (Reason for Apheresis): Multiple Myeloma    Access Assessment  Access Assessment  Vein Assessment:  Veins are adequate: No  Needs a catheter placed for Apheresis?: Yes, transfusion medicine physician informed.    Vital Signs  Vital Signs  BP: 126/74  Pulse: 65  Temp: 98.4  F (36.9  C)  Temp src: Oral  Resp: 16  Height:  (172.9cm)  Weight: 71.9 kg (158 lb 8.2 oz)    Reviewed   Review With Patient  Have you read the brochure Getting ready for Apheresis?: Yes  Have you had any invasive procedures, surgery, biopsy, bleeding in the last month?: No  Review medications and allergies: Yes  Have you ever been transfused?: No  Do you require pre-medication for blood products?: No  Patient given tour of the unit: No  Photophoresis: sun precautions reviewed with patient: N/A    Additional Information  Notes, needs and time spent with patient  Explain procedure, side effects or reactions, instructions: Yes  Patient has special need?: No  Time spent: 30 minutes face to face time spent reviewing history, vein assessment, need for low fat diet starting the day prior to collection and continuing through day of collection.

## 2022-11-14 NOTE — PROGRESS NOTES
BMT Teaching Flowsheet    Kristie Cornejo is a 52 year old female  Diagnoses of Examination prior to chemotherapy, Myeloma (H), and Personal history of diseases of blood and blood-forming organs were pertinent to this visit.    Teaching Topic: 2017-45 [facility] Apheresis    Person(s) involved in teaching: Patient  Motivation Level  Asks Questions: Yes  Eager to Learn: Yes  Cooperative: Yes  Receptive (willing/able to accept information): Yes  Any cultural factors/Islam beliefs that may influence understanding or compliance? No    Patient and Family demonstrates understanding of the following:  - Reason for the appointment, diagnosis and treatment plan: Yes  - Knowledge of proper use of medications and conditions for which they are ordered (with special attention to potential side effects or drug interactions): Yes  - Which situations necessitate calling provider and whom to contact: Yes    Teaching concerns addressed: None identified    Proper use and care of (medical equipment, care aids, etc.) Yes  Pain management techniques: NA  Patient instructed on hand hygiene: Yes  How and/when to access community resources: Yes    Infection Control:  Patient demonstrates understanding of the following:  Surgical procedure site care taught: NA  Signs and symptoms of infection taught: Yes  Wound care taught: NA  Central venous catheter care taught: Yes    Instructional Materials Used/Given:   Patient education folder with sample consents and general BMT office contact information.    For [facility] CAR-T patient wallet card given, will also be given at infusion workup. Patient instructed to remain within close proximity (at least two hours) for at least four weeks post infusion. CAR-T patient is instructed not to operate motorized vehicle or heavy machinery for a period of 8 weeks.    Time spent with patient: 30 minutes.  Specific Concerns: Yes- we will address the bridging chemo plan at her closing appointment.    Julianne WATSON  Richelle on 11/14/2022 at 1:24 PM  BMT Nurse Coordinator

## 2022-11-14 NOTE — LETTER
11/14/2022         RE: Kristie Cornejo  [address]  [facility] [address]        Dear Colleague,    Thank you for referring your patient, Kristie Cornejo, to the [facility]. Please see a copy of my visit note below.    BMT Teaching Flowsheet    Kristie Cornejo is a 52 year old female  Diagnoses of Examination prior to chemotherapy, Myeloma (H), and Personal history of diseases of blood and blood-forming organs were pertinent to this visit.    Teaching Topic: 2017-45 [facility] Apheresis    Person(s) involved in teaching: Patient  Motivation Level  Asks Questions: Yes  Eager to Learn: Yes  Cooperative: Yes  Receptive (willing/able to accept information): Yes  Any cultural factors/Evangelical beliefs that may influence understanding or compliance? No    Patient and Family demonstrates understanding of the following:  - Reason for the appointment, diagnosis and treatment plan: Yes  - Knowledge of proper use of medications and conditions for which they are ordered (with special attention to potential side effects or drug interactions): Yes  - Which situations necessitate calling provider and whom to contact: Yes    Teaching concerns addressed: None identified    Proper use and care of (medical equipment, care aids, etc.) Yes  Pain management techniques: NA  Patient instructed on hand hygiene: Yes  How and/when to access community resources: Yes    Infection Control:  Patient demonstrates understanding of the following:  Surgical procedure site care taught: NA  Signs and symptoms of infection taught: Yes  Wound care taught: NA  Central venous catheter care taught: Yes    Instructional Materials Used/Given:   Patient education folder with sample consents and general BMT office contact information.    For [facility] CAR-T patient wallet card given, will also be given at infusion workup. Patient instructed to remain within close proximity (at least two hours) for  at least four weeks post infusion. CAR-T patient is instructed not to operate motorized vehicle or heavy machinery for a period of 8 weeks.    Time spent with patient: 30 minutes.  Specific Concerns: Yes- we will address the bridging chemo plan at her closing appointment.    Julianne Peoples on 11/14/2022 at 1:24 PM  BMT Nurse Coordinator        Again, thank you for allowing me to participate in the care of your patient.      Sincerely,    BMT Nurse Coordinator

## 2022-11-15 ENCOUNTER — HOSPITAL ENCOUNTER (OUTPATIENT)
Dept: CARDIOLOGY | Facility: CLINIC | Age: 52
Discharge: HOME | End: 2022-11-15
Payer: COMMERCIAL

## 2022-11-15 DIAGNOSIS — Z01.818 EXAMINATION PRIOR TO CHEMOTHERAPY: ICD-10-CM

## 2022-11-15 DIAGNOSIS — C90.00 MYELOMA (H): ICD-10-CM

## 2022-11-15 DIAGNOSIS — Z86.2 PERSONAL HISTORY OF DISEASES OF BLOOD AND BLOOD-FORMING ORGANS: ICD-10-CM

## 2022-11-15 LAB
ATRIAL RATE - MUSE: 68 BPM
B2 MICROGLOB TUMOR MARKER SER-MCNC: 2 MG/L
DIASTOLIC BLOOD PRESSURE - MUSE: NORMAL MMHG
EBV VCA IGG SER IA-ACNC: 454 U/ML
EBV VCA IGG SER IA-ACNC: POSITIVE
HBV CORE AB SERPL QL IA: REACTIVE
HBV SURFACE AB SERPL IA-ACNC: 272.74 M[IU]/ML
HBV SURFACE AB SERPL IA-ACNC: REACTIVE M[IU]/ML
HIV 1+2 AB+HIV1 P24 AG SERPL QL IA: NONREACTIVE
HSV1 IGG SERPL QL IA: 16.5 INDEX
HSV1 IGG SERPL QL IA: ABNORMAL
HSV2 IGG SERPL QL IA: 1.98 INDEX
HSV2 IGG SERPL QL IA: ABNORMAL
IGG SERPL-MCNC: 899 MG/DL (ref 610–1616)
INTERPRETATION ECG - MUSE: NORMAL
KAPPA LC FREE SER-MCNC: 39.86 MG/DL (ref 0.33–1.94)
KAPPA LC FREE/LAMBDA FREE SER NEPH: 142.36 {RATIO} (ref 0.26–1.65)
LAMBDA LC FREE SERPL-MCNC: 0.28 MG/DL (ref 0.57–2.63)
LVEF ECHO: NORMAL
P AXIS - MUSE: NORMAL DEGREES
PR INTERVAL - MUSE: NORMAL MS
PROT ELPH PNL UR ELPH: NORMAL
QRS DURATION - MUSE: 74 MS
QT - MUSE: 410 MS
QTC - MUSE: 435 MS
R AXIS - MUSE: 27 DEGREES
SYSTOLIC BLOOD PRESSURE - MUSE: NORMAL MMHG
T AXIS - MUSE: 87 DEGREES
VENTRICULAR RATE- MUSE: 68 BPM

## 2022-11-15 PROCEDURE — 93306 TTE W/DOPPLER COMPLETE: CPT | Mod: 26 | Performed by: INTERNAL MEDICINE

## 2022-11-15 PROCEDURE — 86335 IMMUNFIX E-PHORSIS/URINE/CSF: CPT | Mod: 26

## 2022-11-15 PROCEDURE — 93306 TTE W/DOPPLER COMPLETE: CPT

## 2022-11-16 ENCOUNTER — HOSPITAL ENCOUNTER (OUTPATIENT)
Dept: PET IMAGING | Facility: CLINIC | Age: 52
Discharge: HOME | End: 2022-11-16
Payer: COMMERCIAL

## 2022-11-16 DIAGNOSIS — C90.00 MYELOMA (H): ICD-10-CM

## 2022-11-16 DIAGNOSIS — Z01.818 EXAMINATION PRIOR TO CHEMOTHERAPY: ICD-10-CM

## 2022-11-16 DIAGNOSIS — Z86.2 PERSONAL HISTORY OF DISEASES OF BLOOD AND BLOOD-FORMING ORGANS: ICD-10-CM

## 2022-11-16 LAB
ALBUMIN SERPL ELPH-MCNC: 4.5 G/DL (ref 3.7–5.1)
ALPHA1 GLOB SERPL ELPH-MCNC: 0.3 G/DL (ref 0.2–0.4)
ALPHA2 GLOB SERPL ELPH-MCNC: 0.8 G/DL (ref 0.5–0.9)
B-GLOBULIN SERPL ELPH-MCNC: 0.7 G/DL (ref 0.6–1)
GAMMA GLOB SERPL ELPH-MCNC: 0.8 G/DL (ref 0.7–1.6)
HBV DNA SERPL QL NAA+PROBE: NORMAL
HCV RNA SERPL QL NAA+PROBE: NORMAL
HIV1+2 RNA SERPL QL NAA+PROBE: NORMAL
M PROTEIN SERPL ELPH-MCNC: 0.1 G/DL
PROT PATTERN SERPL ELPH-IMP: ABNORMAL
PROT PATTERN SERPL IFE-IMP: NORMAL
WNV RNA SERPL DONR QL NAA+PROBE: NORMAL

## 2022-11-16 PROCEDURE — 78816 PET IMAGE W/CT FULL BODY: CPT | Mod: 26 | Performed by: RADIOLOGY

## 2022-11-16 PROCEDURE — 343N000001 HC RX 343

## 2022-11-16 PROCEDURE — 84165 PROTEIN E-PHORESIS SERUM: CPT | Mod: 26

## 2022-11-16 PROCEDURE — 78816 PET IMAGE W/CT FULL BODY: CPT | Mod: PS

## 2022-11-16 PROCEDURE — 86334 IMMUNOFIX E-PHORESIS SERUM: CPT | Mod: 26

## 2022-11-16 PROCEDURE — A9552 F18 FDG: HCPCS

## 2022-11-16 RX ADMIN — FLUDEOXYGLUCOSE F-18 10.07 MCI.: 500 INJECTION, SOLUTION INTRAVENOUS at 15:53

## 2022-11-17 ENCOUNTER — MEDICAL CORRESPONDENCE (OUTPATIENT)
Dept: TRANSPLANT | Facility: CLINIC | Age: 52
End: 2022-11-17

## 2022-11-17 ENCOUNTER — RESEARCH ENCOUNTER (OUTPATIENT)
Dept: TRANSPLANT | Facility: CLINIC | Age: 52
End: 2022-11-17

## 2022-11-17 ENCOUNTER — OFFICE VISIT (OUTPATIENT)
Dept: TRANSPLANT | Facility: CLINIC | Age: 52
End: 2022-11-17
Payer: COMMERCIAL

## 2022-11-17 VITALS
BODY MASS INDEX: 24.1 KG/M2 | SYSTOLIC BLOOD PRESSURE: 119 MMHG | RESPIRATION RATE: 16 BRPM | DIASTOLIC BLOOD PRESSURE: 80 MMHG | OXYGEN SATURATION: 100 % | TEMPERATURE: 98.3 F | HEART RATE: 65 BPM | WEIGHT: 158.8 LBS

## 2022-11-17 DIAGNOSIS — C90.30 PLASMACYTOMA OF BONE (H): ICD-10-CM

## 2022-11-17 DIAGNOSIS — C90.00 LIGHT CHAIN MYELOMA (H): Primary | ICD-10-CM

## 2022-11-17 DIAGNOSIS — Z01.818 EXAMINATION PRIOR TO CHEMOTHERAPY: ICD-10-CM

## 2022-11-17 DIAGNOSIS — C90.00 MULTIPLE MYELOMA NOT HAVING ACHIEVED REMISSION (H): ICD-10-CM

## 2022-11-17 DIAGNOSIS — Z86.2 PERSONAL HISTORY OF DISEASES OF BLOOD AND BLOOD-FORMING ORGANS: ICD-10-CM

## 2022-11-17 LAB — CK SERPL-CCNC: 47 U/L (ref 26–192)

## 2022-11-17 PROCEDURE — 36415 COLL VENOUS BLD VENIPUNCTURE: CPT

## 2022-11-17 PROCEDURE — 99215 OFFICE O/P EST HI 40 MIN: CPT

## 2022-11-17 PROCEDURE — 250N000011 HC RX IP 250 OP 636

## 2022-11-17 PROCEDURE — M0220 HC INJECTION TIXAGEVIMAB & CILGAVIMAB (EVUSHELD): HCPCS

## 2022-11-17 PROCEDURE — 82550 ASSAY OF CK (CPK): CPT

## 2022-11-17 PROCEDURE — 87517 HEPATITIS B DNA QUANT: CPT

## 2022-11-17 PROCEDURE — G0463 HOSPITAL OUTPT CLINIC VISIT: HCPCS | Mod: 25

## 2022-11-17 RX ORDER — POLYETHYLENE GLYCOL 3350 17 G/17G
1 POWDER, FOR SOLUTION ORAL DAILY
Status: ON HOLD | COMMUNITY
End: 2023-01-11

## 2022-11-17 RX ORDER — SIMVASTATIN 20 MG
40 TABLET ORAL DAILY
Qty: 65 TABLET | Refills: 0 | Status: SHIPPED | OUTPATIENT
Start: 2022-11-17 | End: 2023-01-18

## 2022-11-17 RX ADMIN — AZD7442 6 ML: KIT at 11:07

## 2022-11-17 ASSESSMENT — PAIN SCALES - GENERAL: PAINLEVEL: NO PAIN (0)

## 2022-11-17 NOTE — LETTER
11/17/2022         RE: Kristie Cornejo  [address]  [facility] [address]        Dear Colleague,    Thank you for referring your patient, Kristie Cornejo, to the [facility] [place]. Please see a copy of my visit note below.    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    Margo is feeling well, has persistent palpitations which are not worse but also not better, she would like to see cardiology again. Otherwise, no new aches or pain, no fevers or infections. She is active.   Eating well.   ROS: neg.     Past Medical History:   Diagnosis Date     Allergic rhinitis, cause unspecified      Anxiety state, unspecified 12/01/2003    Started postpartum . On Paxil x 1+years. Off meds- 10/04     Irritable bowel syndrome 01/01/2004     Multiple myeloma not having achieved remission (H)      PLANTAR WARTS     resolved     SINUS DYSRHYTHMIA 10/01/2002    wnl     Unspecified hemorrhoids without mention of complication 04/01/2004    colonoscopy 4/04 spastic colon, int roids; bx neg         Current Outpatient Medications   Medication     polyethylene glycol (MIRALAX) 17 g packet     acyclovir (ZOVIRAX) 400 MG tablet     aspirin (ASA) 81 MG EC tablet     BUSPIRONE HCL 10 MG PO TABS     Calcium Carbonate-Vit D-Min (CALCIUM 600+D PLUS MINERALS) 600-400 MG-UNIT TABS     cetirizine HCl 10 MG CAPS     dexamethasone (DECADRON) 4 MG tablet     dexamethasone (DECADRON) 4 MG tablet     dexamethasone (DECADRON) 4 MG tablet     Flaxseed (Linseed) (FLAX SEED OIL) 1000 MG capsule     fluticasone (FLONASE) 50 MCG/ACT nasal spray     glucosamine-chondroitin 500-400 MG CAPS per capsule     lactobacillus rhamnosus, GG, (CULTURELL) capsule     loperamide (IMODIUM) 2 MG capsule     LORazepam  (ATIVAN) 0.5 MG tablet     melatonin 5 MG tablet     MIRENA 20 MCG/24HR IU IUD     Multiple Vitamin (MULTI-VITAMIN DAILY PO)     omeprazole (PRILOSEC OTC) 20 MG EC tablet     pomalidomide (POMALYST) 4 MG capsule     pomalidomide (POMALYST) 4 MG capsule     pomalidomide (POMALYST) 4 MG capsule     potassium chloride ER (KLOR-CON M) 10 MEQ CR tablet     prochlorperazine (COMPAZINE) 10 MG tablet     study - simvastatin, IDS# [id number], 40 MG tablet     Zoledronic Acid (ZOMETA IV)     Current Facility-Administered Medications   Medication     cilgavimab 300 mg/tixagevimab 300 mg (EVUSHELD) - FULL DOSE     Facility-Administered Medications Ordered in Other Visits   Medication     CT Fl[place]     Physical Exam:  /80   Pulse 65   Temp 98.3  F (36.8  C) (Oral)   Resp 16   Wt 72 kg (158 lb 12.8 oz)   SpO2 100%   BMI 24.10 kg/m      Patient is ambulating , AOx3, NAD, well appearing patient. Tremors   HEENT: No mucositis, MM moist, no thrush or ulcers, buccal mucosa intact  Neck supple, no peripheral adenopathy Thyroid gland midline, not enlarged   Lungs: good air exchange, CTA, no crackles,no wheezing.   Heart RRR S1 S2, no murmur or gallop   Abd soft, NT, ND. Without hepato-splenomegaly, no ascites,    LE symmetrical, no edema   Skin without lesion or rashes. No petechiae or ecchymosis.  Neuro  Intact, AOx3    ASSESSMENT AND PLAN    Margo is a 53 yo with R/R MM now eligible for Abecma. She has ongoing palpitations and I will ask her cardiologist to evaluate her after apheresis again. She had previosly had ablation with reduction of PVCs. Echo and ekg are now normal.  Her CBC and other organ function is adequate to proceed with apheresis.  No contraindications. She will be collected next week.  Her bridging therapy is day 15 siltuximab on 11/23 and few days of Pomalyst.    ID  She received Evusehld today  Already had inluenza and covid vaccine  Talked to patient and evaluated by me. We discussed the risks and benefits of  receiving EVUSHELD, as well as alternative treatments. The Emergency Use Administration (EUA) was provided to the patient for review and an opportunity for questions was provided. Patient wishes to proceed with EVUSHELD.    CBC. Stable, ALC adequate    Cardiac  Hx of frequent PVC. Will see  after apheresis.            BMT and Cell Therapy Informed Consent Discussion     In today's visit, we discussed in detail the research for which Kristie Cornejo is eligible. We discussed the potential risks and potential benefits of each protocol individually. We explained potential alternatives to the protocols discussed. We explained to the patient that participation is voluntary and that consent may be withdrawn at any time.     We discussed:    The rationale for our approach to the disease treatment    The eligibility requirements for treatment in the context of clinical trials    The need for caregiver support and the caregiver's role in recovery    The importance of adherence to the treatment plan and appropriate follow up    The requirements for contraception while undergoing treatment    The potential risks of morbidity and mortality related to this treatment    The requirements for supportive care to reduce the risk of infection and other complications    The role of the dietician and PT/OT to reduce the risk of muscle loss/sarcopenia    Support that is available through our social workers and care team to mitigate distress    The desired outcomes/goals of treatment, including the possibility of long-term disease control    The patient completed the last round of treatment on 10/26/2022    HCT-CI score: 1.(arrythima)  We counseled the patient about the impact of this on the risk of treatment related and overall mortality. The score fit within treatment protocol eligibility criteria.       ECOG: (required for CAR-T): 1    Active infections:  none.     Reproductive status: What methods of birth control does the  patient plan to use during the treatment period beginning with conditioning and ending with the discontinuation of immune suppression (indicate with an X all that apply):  x__ The patient is confirmed to be sterile or post-menopausal    The patient received appropriate reproductive counseling and agreed with the need for effective contraception during the treatment procedures.      Dental health suitable to proceed: Yes        After our detailed discussion above, the patient signed the following consents for treatment and protocols:    [id number]    2019-35     Known issues that I take into account for medical decisions, with salient changes to the plan considering these complexities noted above.    Patient Active Problem List   Diagnosis     Allergic rhinitis     Anxiety state     Irritable bowel syndrome     Hemorrhoids     Sciatica     Encounter for insertion or removal of intrauterine contraceptive device     CARDIOVASCULAR SCREENING; LDL GOAL LESS THAN 160     Pelvic mass     Plasmacytoma of bone (H)     Light chain myeloma (H)     Dehydration     Hypogammaglobulinemia (H)         I spent 60 minutes in the care of this patient today, which included time necessary for preparation for the visit, obtaining history, ordering medications/tests/procedures as medically indicated, review of pertinent medical literature, counseling of the patient, communication of recommendations to the care team, and documentation time.    Relevant Results from BMT Allo Nk For Aml Recipient Work Up Orders from the Lab encounter on 11/14/22:   CBC with platelets and differential     Status: Abnormal    Collection Time: 11/14/22  9:45 AM   Result Value Ref Range    WBC Count 5.2 4.0 - 11.0 10e3/uL    RBC Count 4.23 3.80 - 5.20 10e6/uL    Hemoglobin 13.2 11.7 - 15.7 g/dL    Hematocrit 42.1 35.0 - 47.0 %     78 - 100 fL    MCH 31.2 26.5 - 33.0 pg    MCHC 31.4 (L) 31.5 - 36.5 g/dL    RDW 14.3 10.0 - 15.0 %    Platelet Count 192 150 -  450 10e3/uL    % Neutrophils 62 %    % Lymphocytes 15 %    % Monocytes 12 %    % Eosinophils 8 %    % Basophils 3 %    % Immature Granulocytes 0 %    NRBCs per 100 WBC 0 <1 /100    Absolute Neutrophils 3.3 1.6 - 8.3 10e3/uL    Absolute Lymphocytes 0.8 0.8 - 5.3 10e3/uL    Absolute Monocytes 0.6 0.0 - 1.3 10e3/uL    Absolute Eosinophils 0.4 0.0 - 0.7 10e3/uL    Absolute Basophils 0.2 0.0 - 0.2 10e3/uL    Absolute Immature Granulocytes 0.0 <=0.4 10e3/uL    Absolute NRBCs 0.0 10e3/uL   Adult Type and Screen     Status: Abnormal    Collection Time: 11/14/22  9:45 AM   Result Value Ref Range    ABO/RH(D) O POS     Antibody Screen Positive (A) Negative    SPECIMEN EXPIRATION DATE [id number]    HBV HCV HIV WNV by [facility]- SEND TO [facility]     Status: None    Collection Time: 11/14/22  9:45 AM   Result Value Ref Range    HEPATITIS B BY EDNA Non-Reactive     HCV by EDNA Non-Reactive     HIV By [facility] Non-Reactive     West Nile Virus By EDNA Non-Reactive     Narrative    Verified by Susannah Wallace on 11/16/2022.   CBC with platelets differential     Status: Abnormal    Collection Time: 11/14/22  9:45 AM    Narrative    The following orders were created for panel order CBC with platelets differential.  Procedure                               Abnormality         Status                     ---------                               -----------         ------                     CBC with platelets and d...[[id number]]  Abnormal            Final result                 Please view results for these tests on the individual orders.   Uric acid     Status: Abnormal    Collection Time: 11/14/22  9:45 AM   Result Value Ref Range    Uric Acid 1.6 (L) 2.4 - 5.7 mg/dL   INR     Status: Normal    Collection Time: 11/14/22  9:45 AM   Result Value Ref Range    INR 0.95 0.85 - 1.15   Partial thromboplastin time     Status: Normal    Collection Time: 11/14/22  9:45 AM   Result Value Ref Range    aPTT 24 22 - 38 Seconds   Hepatitis C  antibody (for Rapid Viral Testing. Must be resulted prior to Apheresis collection)     Status: Normal    Collection Time: 11/14/22  9:45 AM   Result Value Ref Range    Hepatitis C Antibody Nonreactive Nonreactive    Narrative    Assay performance characteristics have not been established for newborns, infants, and children.   Hepatitis B surface antigen (for Rapid Viral Testing. Must be resulted prior to Apheresis collection)     Status: Normal    Collection Time: 11/14/22  9:45 AM   Result Value Ref Range    Hepatitis B Surface Antigen Nonreactive Nonreactive   Hepatitis B core antibody (rapid viral testing)     Status: Abnormal    Collection Time: 11/14/22  9:45 AM   Result Value Ref Range    Hepatitis B Core Antibody Total Reactive (A) Nonreactive     Comment: A reactive result indicates acute, chronic or past/resolved hepatitis B infection.   Order if female with child bearing potential: HCG qualitative (must be resulted prior to Apheresis collection)     Status: Normal    Collection Time: 11/14/22  9:45 AM   Result Value Ref Range    hCG Serum Qualitative Negative Negative     Comment: This test is for screening purposes.  Results should be interpreted along with the clinical picture.  Confirmation testing is available if warranted by ordering KDQ039, HCG Quantitative Pregnancy.   Comprehensive metabolic panel     Status: Abnormal    Collection Time: 11/14/22  9:45 AM   Result Value Ref Range    Sodium 142 136 - 145 mmol/L    Potassium 3.7 3.4 - 5.3 mmol/L    Chloride 106 98 - 107 mmol/L    Carbon Dioxide (CO2) 25 22 - 29 mmol/L    Anion Gap 11 7 - 15 mmol/L    Urea Nitrogen 21.7 (H) 6.0 - 20.0 mg/dL    Creatinine 0.88 0.51 - 0.95 mg/dL    Calcium 9.8 8.6 - 10.0 mg/dL    Glucose 77 70 - 99 mg/dL    Alkaline Phosphatase 69 35 - 104 U/L    AST 27 10 - 35 U/L    ALT 38 (H) 10 - 35 U/L    Protein Total 7.5 6.4 - 8.3 g/dL    Albumin 4.7 3.5 - 5.2 g/dL    Bilirubin Total 0.3 <=1.2 mg/dL    GFR Estimate 79 >60  mL/min/1.73m2     Comment: Effective December 21, 2021 eGFRcr in adults is calculated using the 2021 CKD-EPI creatinine equation which includes age and gender (Clementina et al., [place], DOI: 10.1056/XGMGwa4163256)   ABO/Rh Type and Screen     Status: Abnormal    Collection Time: 11/14/22  9:45 AM    Narrative    The following orders were created for panel order ABO/Rh Type and Screen.  Procedure                               Abnormality         Status                     ---------                               -----------         ------                     Adult Type and Screen[[id number]]        Abnormal            Final result                 Please view results for these tests on the individual orders.   Relevant Results from BMT Allo Nk For Aml Recipient Work Up Orders from the Oncology Visit encounter on 07/06/22:   EKG 12-lead complete w/read - Clinics     Status: None    Collection Time: 07/06/22  9:34 AM   Result Value Ref Range    Systolic Blood Pressure  mmHg    Diastolic Blood Pressure  mmHg    Ventricular Rate 71 BPM    Atrial Rate 71 BPM    MN Interval 148 ms    QRS Duration 76 ms     ms    QTc 430 ms    P Axis -5 degrees    R AXIS 53 degrees    T Axis 29 degrees    Interpretation ECG       Sinus rhythm with occasional Premature ventricular complexes and Fusion complexes  Otherwise normal ECG  When compared with ECG of 27-JUN-2022 09:28,  Fusion complexes are now Present  Premature ventricular complexes are now Present  Confirmed by Alem Thomas ([id number]) on 7/7/2022 10:06:01 AM     Relevant Results from BMT Allo Nk For Aml Recipient Work Up Orders from the Office Visit encounter on 09/22/21:   Bone marrow biopsy     Status: Abnormal    Collection Time: 09/22/21 10:33 AM   Result Value Ref Range    Addendum       A Congo Red stain performed on the marrow core section with appropriate controls is negative for amyloid.       Comment: This is an appended report. These results have been appended to a  previously final verified report.    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  - Peripheral blood showing nonspecific red cell morphologic changes including slight increase in rouleaux formation but with otherwise normal automated blood counts and leukocyte differential.  - Please see Comment      Comment        - By separate report ([id number]) flow cytometric immunophenotyping performed on the marrow aspirate for this case shows kappa-monotypic plasma cells and polytypic B-cells.  The low percentage of kappa-monotypic plasma cells reported by flow cytometry method is noted; the  stained core section for this case is also reviewed by Dr. Joshi who concurs with the interpretation of 25% plasma cell infiltration by immunohistochemistry.  All unstained trephine imprints for this biopsy have been sent to the [facility] so they can be used, if needed, for myeloma FISH studies.   - Congo Red stain for amyloid performed on core biopsy sections is pending at the time of this report and will be reported in an addendum comment.   - Correlation with all pending ancillary bone marrow studies and clinical information is recommended.        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  This bone marrow biopsy is performed for  staging/rule out multiple myeloma.      Peripheral Hematologic Data       CBC WITH DIFFERENTIAL (09/22/2021 09:59 AM CDT):     RESULT VALUE REF. RANGE UNITS   WBC Count  Hemoglobin  Hematocrit  Platelet Count  RBC Count  MCV  MCH  MCHC  RDW 6.4 (NORMAL)    13.6 (NORMAL)     41.5 (NORMAL)  315 (NORMAL)  4.53 (NORMAL)       92 (NORMAL)     30.0 (NORMAL)     32.8 (NORMAL)     12.5 (NORMAL) 4.0-11.0  11.7-15.7  35.0-47.0  150-450  3.80-5.20    26.5-33.0  31.5-36.5  10.0-15.0 10e3/uL  g/dL  %  10e3/uL  10e6/uL  fL  pg  g/dL  %   % Neutrophils  % Lymphocytes  % Monocytes  % Eosinophils  % Basophils  % Immature Granulocytes  Absolute Neutrophils  Absolute Lymphocytes  Absolute Monocytes  Absolute Eosinophils  Absolute Basophils  Absolute Immature Granulocytes  NRBCs per 100 WBC  Absolute NRBCs 55  33  7  4  1  0  3.5 (NORMAL)  2.1 (NORMAL)  0.4 (NORMAL)  0.2 (NORMAL)  0.1 (NORMAL)  0.0 (NORMAL)  0 (NORMAL)  0.0 ( ) N/A  N/A  N/A  N/A  N/A  N/A  1.6-8.3  0.8-5.3  0.0-1.3  0.0-0.7  0.0-0.2  <=0.0  <1  <=0.0 %  %  %  %  %  %  10e3/uL  10e3/uL  10e3/uL  10e3/uL  10e3/uL  10e3/uL  /100  10e3/uL          Microscopic Description       PERIPHERAL BLOOD SMEAR MORPHOLOGY:  The red blood cells appear normochromic.  Poikilocytosis includes occasional elliptocytes.  Polychromasia is not increased.  Rouleaux formation is slightly increased. The morphology of the platelets is normal.  Rare lymphocytes appear plasmacytoid.    Bone marrow aspirates and trephine core biopsy touch imprints are reviewed.    BONE MARROW DIFFERENTIAL (500 cells on touch imprints)  Percent (%) Cell Population Reference Range (%)   0.4 Blasts  (0 - 1)   0.8 Neutrophil promyelocytes (2 - 4)   41.0 Neutrophils and precursors (54 - 63)   20.6 Erythroid precursors (18 - 24)   3.2 Monocytes (1 - 1.5)   3.0 Eosinophils (1 - 3))   0.2 Basophils (0 - 1))   13.8 Lymphocytes (8 - 12)   17.0 Plasma cells (0 - 1.5)     The aspirate smears appear  hemodilute.    Neutrophil maturation is complete. Erythroid maturation is complete. Megakaryocytes are seen best on trephine imprints.  The plasma cells are slightly to moderately atypical with the most striking feature being coarse, eosinophilic  cytoplasmic granules consistent with precipitated immunoglobulin.    IRON STAINS:   Dacie iron stain on concentrate smears:   Iron stains show 23% sideroblasts.    Prussian blue stain on particle crush:    Marrow particles are rare to absent.    TREPHINE SECTIONS:  Hematoxylin and eosin stains are reviewed.  The quality of the trephine core biopsy is excellent. The marrow cellularity is estimated at 50%. The cellular composition reflects the touch imprint differential. The number of megakaryocytes appears consistent with the degree of marrow cellularity, There is subtle interstitial infiltration by atypical plasma cells.   Few small muscular arteries are present in these sections.  Bone trabecula are intact without obvious increased osteoclastic activity.    IMMUNOHISTOCHEMISTRY:  Immunohistochemical stains are performed on the paraffin-embedded trephine core with appropriate controls.   highlights the interstitial infiltration by plasma cells comprising 25% of overall cellularity.  Stains for cytoplasmic kappa and lambda immunoglobulin light chains show kappa-monotypic plasma cells with rare, scattered lambda positive cells.    Note: These immunohistochemical stains are deemed medically necessary. Some of the antigens may also be evaluated by flow cytometry. Concurrent evaluation by immunohistochemistry on clot and/or trephine sections is indicated in this case in order to correlate immunophenotype with cell morphology and determine extent of involvement, spatial pattern, and focality of potential disease distribution.           Gross Description       Procedure/Gross Description   Aspirate(s) and trephine(s) procured by ERICK Hoffman    Specimen sent for Special  Studies:         Flow Cytometry: left aspirate        Cytogenetics: left aspirate          Biopsy aspiration site: left posterior iliac crest                                                      (Reference Range)          Amount of aspirate           3.7   mL        Fat and P.V. cell layer        1    %               (1 - 3)        Particles                             0   %        Myeloid-erythroid layer    2    %               (5 - 8)          Clot Section: no    Trephine biopsy site: left posterior iliac crest    Designated left posterior iliac crest is 1 cylinder of gritty tissue, labeled with the patient's name and hospital number, obtained with 11 gauge needle and a length of 25 mm; entirely submitted in 1 cassette; acetic zinc formalin fixed, decalcified, processed, and stained for hematoxylin and eosin per laboratory protocol.          MCRS Yes (A) N/A    Performing Labs       The technical component of this testing was completed at [facility]         Payton Reinoso MD

## 2022-11-17 NOTE — NURSING NOTE
EVUSHELD Administration Note:  Kristie Cornejo presents today for EVUSHELD.   present during visit today: Not Applicable.    Consent:   Informed Consent confirmed in chart, obtained on this date: 1/20/22    Post Injection Assessment:   Patient tolerated injection without incident.      Patient was observed in the room for a minimum of 10 minutes after injection per standard, then remained in the buidling for a total 60 minute observation period.         Discharge Plan:    Patient discharged in stable condition accompanied by: .  Departure Mode: Ambulatory.     Serina Brewer RN

## 2022-11-17 NOTE — PROGRESS NOTES
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    Margo is feeling well, has persistent palpitations which are not worse but also not better, she would like to see cardiology again. Otherwise, no new aches or pain, no fevers or infections. She is active.   Eating well.   ROS: neg.     Past Medical History:   Diagnosis Date     Allergic rhinitis, cause unspecified      Anxiety state, unspecified 12/01/2003    Started postpartum . On Paxil x 1+years. Off meds- 10/04     Irritable bowel syndrome 01/01/2004     Multiple myeloma not having achieved remission (H)      PLANTAR WARTS     resolved     SINUS DYSRHYTHMIA 10/01/2002    wnl     Unspecified hemorrhoids without mention of complication 04/01/2004    colonoscopy 4/04 spastic colon, int roids; bx neg         Current Outpatient Medications   Medication     polyethylene glycol (MIRALAX) 17 g packet     acyclovir (ZOVIRAX) 400 MG tablet     aspirin (ASA) 81 MG EC tablet     BUSPIRONE HCL 10 MG PO TABS     Calcium Carbonate-Vit D-Min (CALCIUM 600+D PLUS MINERALS) 600-400 MG-UNIT TABS     cetirizine HCl 10 MG CAPS     dexamethasone (DECADRON) 4 MG tablet     dexamethasone (DECADRON) 4 MG tablet     dexamethasone (DECADRON) 4 MG tablet     Flaxseed (Linseed) (FLAX SEED OIL) 1000 MG capsule     fluticasone (FLONASE) 50 MCG/ACT nasal spray     glucosamine-chondroitin 500-400 MG CAPS per capsule     lactobacillus rhamnosus, GG, (CULTURELL) capsule     loperamide (IMODIUM) 2 MG capsule     LORazepam (ATIVAN) 0.5 MG tablet     melatonin 5 MG tablet     MIRENA 20 MCG/24HR IU IUD     Multiple Vitamin (MULTI-VITAMIN DAILY PO)     omeprazole (PRILOSEC OTC) 20 MG EC tablet     pomalidomide (POMALYST) 4 MG capsule     pomalidomide (POMALYST) 4 MG capsule     pomalidomide (POMALYST) 4 MG  capsule     potassium chloride ER (KLOR-CON M) 10 MEQ CR tablet     prochlorperazine (COMPAZINE) 10 MG tablet     study - simvastatin, IDS# [id number], 40 MG tablet     Zoledronic Acid (ZOMETA IV)     Current Facility-Administered Medications   Medication     cilgavimab 300 mg/tixagevimab 300 mg (EVUSHELD) - FULL DOSE     Facility-Administered Medications Ordered in Other Visits   Medication     CT Flush     Physical Exam:  /80   Pulse 65   Temp 98.3  F (36.8  C) (Oral)   Resp 16   Wt 72 kg (158 lb 12.8 oz)   SpO2 100%   BMI 24.10 kg/m      Patient is ambulating , AOx3, NAD, well appearing patient. Tremors   HEENT: No mucositis, MM moist, no thrush or ulcers, buccal mucosa intact  Neck supple, no peripheral adenopathy Thyroid gland midline, not enlarged   Lungs: good air exchange, CTA, no crackles,no wheezing.   Heart RRR S1 S2, no murmur or gallop   Abd soft, NT, ND. Without hepato-splenomegaly, no ascites,    LE symmetrical, no edema   Skin without lesion or rashes. No petechiae or ecchymosis.  Neuro  Intact, AOx3    ASSESSMENT AND PLAN    Margo is a 53 yo with R/R MM now eligible for Abecma. She has ongoing palpitations and I will ask her cardiologist to evaluate her after apheresis again. She had previosly had ablation with reduction of PVCs. Echo and ekg are now normal.  Her CBC and other organ function is adequate to proceed with apheresis.  No contraindications. She will be collected next week.  Her bridging therapy is day 15 siltuximab on 11/23 and few days of Pomalyst.    ID  She received Evusehld today  Already had inluenza and covid vaccine  Talked to patient and evaluated by me. We discussed the risks and benefits of receiving EVUSHELD, as well as alternative treatments. The Emergency Use Administration (EUA) was provided to the patient for review and an opportunity for questions was provided. Patient wishes to proceed with EVUSHELD.    CBC. Stable, ALC adequate    Cardiac  Hx of frequent PVC. Will  see  after apheresis.            BMT and Cell Therapy Informed Consent Discussion     In today's visit, we discussed in detail the research for which Kristie Cornejo is eligible. We discussed the potential risks and potential benefits of each protocol individually. We explained potential alternatives to the protocols discussed. We explained to the patient that participation is voluntary and that consent may be withdrawn at any time.     We discussed:    The rationale for our approach to the disease treatment    The eligibility requirements for treatment in the context of clinical trials    The need for caregiver support and the caregiver's role in recovery    The importance of adherence to the treatment plan and appropriate follow up    The requirements for contraception while undergoing treatment    The potential risks of morbidity and mortality related to this treatment    The requirements for supportive care to reduce the risk of infection and other complications    The role of the dietician and PT/OT to reduce the risk of muscle loss/sarcopenia    Support that is available through our social workers and care team to mitigate distress    The desired outcomes/goals of treatment, including the possibility of long-term disease control    The patient completed the last round of treatment on 10/26/2022    HCT-CI score: 1.(arrythima)  We counseled the patient about the impact of this on the risk of treatment related and overall mortality. The score fit within treatment protocol eligibility criteria.       ECOG: (required for CAR-T): 1    Active infections:  none.     Reproductive status: What methods of birth control does the patient plan to use during the treatment period beginning with conditioning and ending with the discontinuation of immune suppression (indicate with an X all that apply):  x__ The patient is confirmed to be sterile or post-menopausal    The patient received appropriate reproductive  counseling and agreed with the need for effective contraception during the treatment procedures.      Dental health suitable to proceed: Yes        After our detailed discussion above, the patient signed the following consents for treatment and protocols:    [id number]    2019-35     Known issues that I take into account for medical decisions, with salient changes to the plan considering these complexities noted above.    Patient Active Problem List   Diagnosis     Allergic rhinitis     Anxiety state     Irritable bowel syndrome     Hemorrhoids     Sciatica     Encounter for insertion or removal of intrauterine contraceptive device     CARDIOVASCULAR SCREENING; LDL GOAL LESS THAN 160     Pelvic mass     Plasmacytoma of bone (H)     Light chain myeloma (H)     Dehydration     Hypogammaglobulinemia (H)         I spent 60 minutes in the care of this patient today, which included time necessary for preparation for the visit, obtaining history, ordering medications/tests/procedures as medically indicated, review of pertinent medical literature, counseling of the patient, communication of recommendations to the care team, and documentation time.    Relevant Results from BMT Allo Nk For Aml Recipient Work Up Orders from the Lab encounter on 11/14/22:   CBC with platelets and differential     Status: Abnormal    Collection Time: 11/14/22  9:45 AM   Result Value Ref Range    WBC Count 5.2 4.0 - 11.0 10e3/uL    RBC Count 4.23 3.80 - 5.20 10e6/uL    Hemoglobin 13.2 11.7 - 15.7 g/dL    Hematocrit 42.1 35.0 - 47.0 %     78 - 100 fL    MCH 31.2 26.5 - 33.0 pg    MCHC 31.4 (L) 31.5 - 36.5 g/dL    RDW 14.3 10.0 - 15.0 %    Platelet Count 192 150 - 450 10e3/uL    % Neutrophils 62 %    % Lymphocytes 15 %    % Monocytes 12 %    % Eosinophils 8 %    % Basophils 3 %    % Immature Granulocytes 0 %    NRBCs per 100 WBC 0 <1 /100    Absolute Neutrophils 3.3 1.6 - 8.3 10e3/uL    Absolute Lymphocytes 0.8 0.8 - 5.3 10e3/uL    Absolute  Monocytes 0.6 0.0 - 1.3 10e3/uL    Absolute Eosinophils 0.4 0.0 - 0.7 10e3/uL    Absolute Basophils 0.2 0.0 - 0.2 10e3/uL    Absolute Immature Granulocytes 0.0 <=0.4 10e3/uL    Absolute NRBCs 0.0 10e3/uL   Adult Type and Screen     Status: Abnormal    Collection Time: 11/14/22  9:45 AM   Result Value Ref Range    ABO/RH(D) O POS     Antibody Screen Positive (A) Negative    SPECIMEN EXPIRATION DATE [id number]    HBV HCV HIV WNV by [facility]- SEND TO [facility]     Status: None    Collection Time: 11/14/22  9:45 AM   Result Value Ref Range    HEPATITIS B BY EDNA Non-Reactive     HCV by EDNA Non-Reactive     HIV By [facility]na Non-Reactive     West Nile Virus By EDNA Non-Reactive     Narrative    Verified by Susannah Wallace on 11/16/2022.   CBC with platelets differential     Status: Abnormal    Collection Time: 11/14/22  9:45 AM    Narrative    The following orders were created for panel order CBC with platelets differential.  Procedure                               Abnormality         Status                     ---------                               -----------         ------                     CBC with platelets and d...[[id number]]  Abnormal            Final result                 Please view results for these tests on the individual orders.   Uric acid     Status: Abnormal    Collection Time: 11/14/22  9:45 AM   Result Value Ref Range    Uric Acid 1.6 (L) 2.4 - 5.7 mg/dL   INR     Status: Normal    Collection Time: 11/14/22  9:45 AM   Result Value Ref Range    INR 0.95 0.85 - 1.15   Partial thromboplastin time     Status: Normal    Collection Time: 11/14/22  9:45 AM   Result Value Ref Range    aPTT 24 22 - 38 Seconds   Hepatitis C antibody (for Rapid Viral Testing. Must be resulted prior to Apheresis collection)     Status: Normal    Collection Time: 11/14/22  9:45 AM   Result Value Ref Range    Hepatitis C Antibody Nonreactive Nonreactive    Narrative    Assay performance characteristics have not been  established for newborns, infants, and children.   Hepatitis B surface antigen (for Rapid Viral Testing. Must be resulted prior to Apheresis collection)     Status: Normal    Collection Time: 11/14/22  9:45 AM   Result Value Ref Range    Hepatitis B Surface Antigen Nonreactive Nonreactive   Hepatitis B core antibody (rapid viral testing)     Status: Abnormal    Collection Time: 11/14/22  9:45 AM   Result Value Ref Range    Hepatitis B Core Antibody Total Reactive (A) Nonreactive     Comment: A reactive result indicates acute, chronic or past/resolved hepatitis B infection.   Order if female with child bearing potential: HCG qualitative (must be resulted prior to Apheresis collection)     Status: Normal    Collection Time: 11/14/22  9:45 AM   Result Value Ref Range    hCG Serum Qualitative Negative Negative     Comment: This test is for screening purposes.  Results should be interpreted along with the clinical picture.  Confirmation testing is available if warranted by ordering [id number], HCG Quantitative Pregnancy.   Comprehensive metabolic panel     Status: Abnormal    Collection Time: 11/14/22  9:45 AM   Result Value Ref Range    Sodium 142 136 - 145 mmol/L    Potassium 3.7 3.4 - 5.3 mmol/L    Chloride 106 98 - 107 mmol/L    Carbon Dioxide (CO2) 25 22 - 29 mmol/L    Anion Gap 11 7 - 15 mmol/L    Urea Nitrogen 21.7 (H) 6.0 - 20.0 mg/dL    Creatinine 0.88 0.51 - 0.95 mg/dL    Calcium 9.8 8.6 - 10.0 mg/dL    Glucose 77 70 - 99 mg/dL    Alkaline Phosphatase 69 35 - 104 U/L    AST 27 10 - 35 U/L    ALT 38 (H) 10 - 35 U/L    Protein Total 7.5 6.4 - 8.3 g/dL    Albumin 4.7 3.5 - 5.2 g/dL    Bilirubin Total 0.3 <=1.2 mg/dL    GFR Estimate 79 >60 mL/min/1.73m2     Comment: Effective December 21, 2021 eGFRcr in adults is calculated using the 2021 CKD-EPI creatinine equation which includes age and gender (Clementina et al., NEJM, DOI: 10.1056/SDOUww0259281)   ABO/Rh Type and Screen     Status: Abnormal    Collection Time: 11/14/22   9:45 AM    Narrative    The following orders were created for panel order ABO/Rh Type and Screen.  Procedure                               Abnormality         Status                     ---------                               -----------         ------                     Adult Type and Screen[[id number]]        Abnormal            Final result                 Please view results for these tests on the individual orders.   Relevant Results from BMT Allo Nk For Aml Recipient Work Up Orders from the Oncology Visit encounter on 07/06/22:   EKG 12-lead complete w/read - Clinics     Status: None    Collection Time: 07/06/22  9:34 AM   Result Value Ref Range    Systolic Blood Pressure  mmHg    Diastolic Blood Pressure  mmHg    Ventricular Rate 71 BPM    Atrial Rate 71 BPM    SC Interval 148 ms    QRS Duration 76 ms     ms    QTc 430 ms    P Axis -5 degrees    R AXIS 53 degrees    T Axis 29 degrees    Interpretation ECG       Sinus rhythm with occasional Premature ventricular complexes and Fusion complexes  Otherwise normal ECG  When compared with ECG of 27-JUN-2022 09:28,  Fusion complexes are now Present  Premature ventricular complexes are now Present  Confirmed by Alem Thomas ([id number]) on 7/7/2022 10:06:01 AM     Relevant Results from BMT Allo Nk For Aml Recipient Work Up Orders from the Office Visit encounter on 09/22/21:   Bone marrow biopsy     Status: Abnormal    Collection Time: 09/22/21 10:33 AM   Result Value Ref Range    Addendum       A Congo Red stain performed on the marrow core section with appropriate controls is negative for amyloid.       Comment: This is an appended report. These results have been appended to a previously final verified report.    Final Diagnosis       Bone marrow, posterior iliac crest, left decalcified trephine biopsy and touch imprint; left direct aspirate smear, and concentrated aspirate smear; and peripheral smear:   Plasma cell myeloma with the following morphologic and  immunophenotypic features:  - Marrow cellularity 50% (normocellular for age), with 25% interstitial infiltration with kappa-monotypic, moderately atypical plasma cells interpreted by immunohistochemical staining of core biopsy sections  - Residual but relatively diminished, orderly and complete trilineage hematopoietic maturation in bone marrow.  - Peripheral blood showing nonspecific red cell morphologic changes including slight increase in rouleaux formation but with otherwise normal automated blood counts and leukocyte differential.  - Please see Comment      Comment        - By separate report ([id number]) flow cytometric immunophenotyping performed on the marrow aspirate for this case shows kappa-monotypic plasma cells and polytypic B-cells.  The low percentage of kappa-monotypic plasma cells reported by flow cytometry method is noted; the  stained core section for this case is also reviewed by Dr. Joshi who concurs with the interpretation of 25% plasma cell infiltration by immunohistochemistry.  All unstained trephine imprints for this biopsy have been sent to the [facility] so they can be used, if needed, for myeloma FISH studies.   - Congo Red stain for amyloid performed on core biopsy sections is pending at the time of this report and will be reported in an addendum comment.   - Correlation with all pending ancillary bone marrow studies and clinical information is recommended.        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  This bone marrow biopsy is performed for staging/rule out multiple myeloma.      Peripheral Hematologic Data       CBC WITH DIFFERENTIAL (09/22/2021 09:59 AM CDT):     RESULT VALUE REF. RANGE UNITS   WBC Count  Hemoglobin  Hematocrit  Platelet Count  RBC Count  MCV  MCH  MCHC  RDW 6.4 (NORMAL)    13.6 (NORMAL)      41.5 (NORMAL)  315 (NORMAL)  4.53 (NORMAL)       92 (NORMAL)     30.0 (NORMAL)     32.8 (NORMAL)     12.5 (NORMAL) 4.0-11.0  11.7-15.7  35.0-47.0  150-450  3.80-5.20    26.5-33.0  31.5-36.5  10.0-15.0 10e3/uL  g/dL  %  10e3/uL  10e6/uL  fL  pg  g/dL  %   % Neutrophils  % Lymphocytes  % Monocytes  % Eosinophils  % Basophils  % Immature Granulocytes  Absolute Neutrophils  Absolute Lymphocytes  Absolute Monocytes  Absolute Eosinophils  Absolute Basophils  Absolute Immature Granulocytes  NRBCs per 100 WBC  Absolute NRBCs 55  33  7  4  1  0  3.5 (NORMAL)  2.1 (NORMAL)  0.4 (NORMAL)  0.2 (NORMAL)  0.1 (NORMAL)  0.0 (NORMAL)  0 (NORMAL)  0.0 ( ) N/A  N/A  N/A  N/A  N/A  N/A  1.6-8.3  0.8-5.3  0.0-1.3  0.0-0.7  0.0-0.2  <=0.0  <1  <=0.0 %  %  %  %  %  %  10e3/uL  10e3/uL  10e3/uL  10e3/uL  10e3/uL  10e3/uL  /100  10e3/uL          Microscopic Description       PERIPHERAL BLOOD SMEAR MORPHOLOGY:  The red blood cells appear normochromic.  Poikilocytosis includes occasional elliptocytes.  Polychromasia is not increased.  Rouleaux formation is slightly increased. The morphology of the platelets is normal.  Rare lymphocytes appear plasmacytoid.    Bone marrow aspirates and trephine core biopsy touch imprints are reviewed.    BONE MARROW DIFFERENTIAL (500 cells on touch imprints)  Percent (%) Cell Population Reference Range (%)   0.4 Blasts  (0 - 1)   0.8 Neutrophil promyelocytes (2 - 4)   41.0 Neutrophils and precursors (54 - 63)   20.6 Erythroid precursors (18 - 24)   3.2 Monocytes (1 - 1.5)   3.0 Eosinophils (1 - 3))   0.2 Basophils (0 - 1))   13.8 Lymphocytes (8 - 12)   17.0 Plasma cells (0 - 1.5)     The aspirate smears appear hemodilute.    Neutrophil maturation is complete. Erythroid maturation is complete. Megakaryocytes are seen best on trephine imprints.  The plasma cells are slightly to moderately atypical with the most striking feature being coarse, eosinophilic  cytoplasmic granules consistent with  precipitated immunoglobulin.    IRON STAINS:   Dacie iron stain on concentrate smears:   Iron stains show 23% sideroblasts.    Prussian blue stain on particle crush:    Marrow particles are rare to absent.    TREPHINE SECTIONS:  Hematoxylin and eosin stains are reviewed.  The quality of the trephine core biopsy is excellent. The marrow cellularity is estimated at 50%. The cellular composition reflects the touch imprint differential. The number of megakaryocytes appears consistent with the degree of marrow cellularity, There is subtle interstitial infiltration by atypical plasma cells.   Few small muscular arteries are present in these sections.  Bone trabecula are intact without obvious increased osteoclastic activity.    IMMUNOHISTOCHEMISTRY:  Immunohistochemical stains are performed on the paraffin-embedded trephine core with appropriate controls.   highlights the interstitial infiltration by plasma cells comprising 25% of overall cellularity.  Stains for cytoplasmic kappa and lambda immunoglobulin light chains show kappa-monotypic plasma cells with rare, scattered lambda positive cells.    Note: These immunohistochemical stains are deemed medically necessary. Some of the antigens may also be evaluated by flow cytometry. Concurrent evaluation by immunohistochemistry on clot and/or trephine sections is indicated in this case in order to correlate immunophenotype with cell morphology and determine extent of involvement, spatial pattern, and focality of potential disease distribution.           Gross Description       Procedure/Gross Description   Aspirate(s) and trephine(s) procured by ERICK Hoffman    Specimen sent for Special Studies:         Flow Cytometry: left aspirate        Cytogenetics: left aspirate          Biopsy aspiration site: left posterior iliac crest                                                      (Reference Range)          Amount of aspirate           3.7   mL        Fat and P.V. cell layer         1    %               (1 - 3)        Particles                             0   %        Myeloid-erythroid layer    2    %               (5 - 8)          Clot Section: no    Trephine biopsy site: left posterior iliac crest    Designated left posterior iliac crest is 1 cylinder of gritty tissue, labeled with the patient's name and hospital number, obtained with 11 gauge needle and a length of 25 mm; entirely submitted in 1 cassette; acetic zinc formalin fixed, decalcified, processed, and stained for hematoxylin and eosin per laboratory protocol.          MCRS Yes (A) N/A    Performing Labs       The technical component of this testing was completed at [facility]         Payton Reinoso MD

## 2022-11-17 NOTE — NURSING NOTE
"Oncology Rooming Note    November 17, 2022 10:06 AM   Kristie Cornejo is a 52 year old female who presents for:    Chief Complaint   Patient presents with     Oncology Clinic Visit     Myeloma; plasmacytoma     Initial Vitals: Blood Pressure 119/80   Pulse 65   Temperature 98.3  F (36.8  C) (Oral)   Respiration 16   Weight 72 kg (158 lb 12.8 oz)   Oxygen Saturation 100%   Body Mass Index 24.10 kg/m   Estimated body mass index is 24.1 kg/m  as calculated from the following:    Height as of 7/20/22: 1.729 m (5' 8.07\").    Weight as of this encounter: 72 kg (158 lb 12.8 oz). Body surface area is 1.86 meters squared.  No Pain (0) Comment: Data Unavailable   No LMP recorded. (Menstrual status: IUD).  Allergies reviewed: Yes  Medications reviewed: Yes    Medications: Medication refills not needed today.  Pharmacy name entered into EPIC:    [facility] [place], [place] - [address]  [id number] - [place], [place] - [address] [facility] [address]  [facility] [place] - [address]  [facility] [place] [address] [place] [address] [facility]ARMACY [place], [place] [address] [facility] [phone]  [facility] - [facility], [place] - [address] [facility] - [place], [place] - [address]    Clinical concerns: none       Tammi Freeman MA            "

## 2022-11-17 NOTE — PROGRESS NOTES
[id number]: Informed Consent Note     The consent form, including purpose, risks and benefits, was reviewed with Kristie Cornejo, and all questions were answered before she signed the consent form. The patient understands that the study involves an active treatment phase as well as a post-treatment follow up phase.     Present during the discussion were Margo, her , this writer and Dr. Reinoso. A copy of the signed form was provided to the patient. No procedures specific to this study were performed prior to the patient signing the consent form.    Consent Version Date: 03 JUN 2022  Consent obtained by: Dr. Reinoso     Date: 17 NOV 2022  HIPAA authorization signed?: yes  HIPAA authorization version date: 03 JUN 2022    Margo was provided her drug diary today and was instructed on how to fill it out. She knows to  her simvastatin from the [facility] pharmacy today and begin taking on 11/18/22. Medications were reviewed by [facility] pharmacist and no drug interactions were identified with simvastatin.     Shoshana Caballero RN    Form 503.03.01 (Version 2)     Effective date: 01AUG2018     Next Review Date: 01AUG2020

## 2022-11-18 ENCOUNTER — INFUSION THERAPY VISIT (OUTPATIENT)
Dept: ONCOLOGY | Facility: CLINIC | Age: 52
End: 2022-11-18
Payer: COMMERCIAL

## 2022-11-18 ENCOUNTER — LAB (OUTPATIENT)
Dept: LAB | Facility: CLINIC | Age: 52
End: 2022-11-18
Payer: COMMERCIAL

## 2022-11-18 VITALS
TEMPERATURE: 97.9 F | HEART RATE: 71 BPM | RESPIRATION RATE: 16 BRPM | BODY MASS INDEX: 24.03 KG/M2 | SYSTOLIC BLOOD PRESSURE: 140 MMHG | WEIGHT: 158.4 LBS | OXYGEN SATURATION: 100 % | DIASTOLIC BLOOD PRESSURE: 80 MMHG

## 2022-11-18 DIAGNOSIS — C90.30 PLASMACYTOMA OF BONE (H): ICD-10-CM

## 2022-11-18 DIAGNOSIS — C90.00 LIGHT CHAIN MYELOMA (H): ICD-10-CM

## 2022-11-18 DIAGNOSIS — D80.1 HYPOGAMMAGLOBULINEMIA (H): Primary | ICD-10-CM

## 2022-11-18 LAB — HBV DNA SERPL NAA+PROBE-ACNC: NOT DETECTED IU/ML

## 2022-11-18 PROCEDURE — 999N000127 HC STATISTIC PERIPHERAL IV START W US GUIDANCE

## 2022-11-18 PROCEDURE — 258N000003 HC RX IP 258 OP 636

## 2022-11-18 PROCEDURE — 250N000011 HC RX IP 250 OP 636

## 2022-11-18 PROCEDURE — 96375 TX/PRO/DX INJ NEW DRUG ADDON: CPT

## 2022-11-18 PROCEDURE — 96367 TX/PROPH/DG ADDL SEQ IV INF: CPT

## 2022-11-18 PROCEDURE — 96366 THER/PROPH/DIAG IV INF ADDON: CPT

## 2022-11-18 PROCEDURE — 250N000013 HC RX MED GY IP 250 OP 250 PS 637

## 2022-11-18 PROCEDURE — 96365 THER/PROPH/DIAG IV INF INIT: CPT

## 2022-11-18 RX ORDER — ALBUTEROL SULFATE 0.83 MG/ML
2.5 SOLUTION RESPIRATORY (INHALATION)
Status: CANCELLED | OUTPATIENT
Start: 2022-11-25

## 2022-11-18 RX ORDER — METHYLPREDNISOLONE SODIUM SUCCINATE 125 MG/2ML
125 INJECTION, POWDER, LYOPHILIZED, FOR SOLUTION INTRAMUSCULAR; INTRAVENOUS
Status: CANCELLED
Start: 2022-11-25

## 2022-11-18 RX ORDER — ZOLEDRONIC ACID 0.04 MG/ML
4 INJECTION, SOLUTION INTRAVENOUS ONCE
Status: CANCELLED
Start: 2022-11-25 | End: 2022-11-25

## 2022-11-18 RX ORDER — DIPHENHYDRAMINE HYDROCHLORIDE 50 MG/ML
50 INJECTION INTRAMUSCULAR; INTRAVENOUS
Status: CANCELLED
Start: 2022-11-25

## 2022-11-18 RX ORDER — EPINEPHRINE 1 MG/ML
0.3 INJECTION, SOLUTION INTRAMUSCULAR; SUBCUTANEOUS EVERY 5 MIN PRN
Status: CANCELLED | OUTPATIENT
Start: 2022-11-25

## 2022-11-18 RX ORDER — DIPHENHYDRAMINE HCL 25 MG
50 CAPSULE ORAL ONCE
Status: COMPLETED | OUTPATIENT
Start: 2022-11-18 | End: 2022-11-18

## 2022-11-18 RX ORDER — ALBUTEROL SULFATE 90 UG/1
1-2 AEROSOL, METERED RESPIRATORY (INHALATION)
Status: CANCELLED
Start: 2022-11-25

## 2022-11-18 RX ORDER — ACETAMINOPHEN 325 MG/1
650 TABLET ORAL ONCE
Status: COMPLETED | OUTPATIENT
Start: 2022-11-18 | End: 2022-11-18

## 2022-11-18 RX ORDER — HEPARIN SODIUM,PORCINE 10 UNIT/ML
5 VIAL (ML) INTRAVENOUS
Status: CANCELLED | OUTPATIENT
Start: 2022-11-25

## 2022-11-18 RX ORDER — HEPARIN SODIUM (PORCINE) LOCK FLUSH IV SOLN 100 UNIT/ML 100 UNIT/ML
5 SOLUTION INTRAVENOUS
Status: CANCELLED | OUTPATIENT
Start: 2022-11-25

## 2022-11-18 RX ORDER — NALOXONE HYDROCHLORIDE 0.4 MG/ML
0.2 INJECTION, SOLUTION INTRAMUSCULAR; INTRAVENOUS; SUBCUTANEOUS
Status: CANCELLED | OUTPATIENT
Start: 2022-11-25

## 2022-11-18 RX ORDER — MEPERIDINE HYDROCHLORIDE 25 MG/ML
25 INJECTION INTRAMUSCULAR; INTRAVENOUS; SUBCUTANEOUS EVERY 30 MIN PRN
Status: CANCELLED | OUTPATIENT
Start: 2022-11-25

## 2022-11-18 RX ORDER — ZOLEDRONIC ACID 0.04 MG/ML
4 INJECTION, SOLUTION INTRAVENOUS ONCE
Status: COMPLETED | OUTPATIENT
Start: 2022-11-18 | End: 2022-11-18

## 2022-11-18 RX ADMIN — ZOLEDRONIC ACID 4 MG: 0.04 INJECTION, SOLUTION INTRAVENOUS at 08:35

## 2022-11-18 RX ADMIN — ACETAMINOPHEN 650 MG: 325 TABLET ORAL at 08:02

## 2022-11-18 RX ADMIN — DIPHENHYDRAMINE HYDROCHLORIDE 50 MG: 25 CAPSULE ORAL at 08:02

## 2022-11-18 RX ADMIN — HUMAN IMMUNOGLOBULIN G 30 G: 20 LIQUID INTRAVENOUS at 09:01

## 2022-11-18 RX ADMIN — SODIUM CHLORIDE 250 ML: 9 INJECTION, SOLUTION INTRAVENOUS at 08:01

## 2022-11-18 RX ADMIN — HYDROCORTISONE SODIUM SUCCINATE 100 MG: 100 INJECTION, POWDER, FOR SOLUTION INTRAMUSCULAR; INTRAVENOUS at 08:02

## 2022-11-18 ASSESSMENT — PAIN SCALES - GENERAL: PAINLEVEL: NO PAIN (0)

## 2022-11-18 NOTE — PROGRESS NOTES
Infusion Nursing Note:  Kristie Cornejo presents today for IVIG & Zometa.    Patient seen by provider: Yes: Dr. Reinoso, 11/17   present during visit today: Not Applicable.    Note: Margo presents to infusion today feeling well. She denies any pain, infectious symptoms, or other changes overnight. Denies any new dental concerns.    Intravenous Access:  Peripheral IV placed.    Treatment Conditions:  Lab Results   Component Value Date     11/14/2022    POTASSIUM 3.7 11/14/2022    CR 0.88 11/14/2022    MEKA 9.8 11/14/2022    BILITOTAL 0.3 11/14/2022    ALBUMIN 4.7 11/14/2022    ALT 38 (H) 11/14/2022    AST 27 11/14/2022       Post Infusion Assessment:  Patient tolerated infusion without incident.  Blood return noted pre and post infusion.  Site patent and intact, free from redness, edema or discomfort.  No evidence of extravasations.  Access discontinued per protocol.     Discharge Plan:   Patient declined prescription refills.  Discharge instructions reviewed with: Patient.  Patient and/or family verbalized understanding of discharge instructions and all questions answered.  AVS to patient via "[employer]T.  Patient will return 11/23 for next appointment.   Patient discharged in stable condition accompanied by: self.  Departure Mode: Ambulatory.      Marta Arita RN

## 2022-11-18 NOTE — NURSING NOTE
Chief Complaint   Patient presents with     Blood Draw     Piv placed by rn in lab. VS taken.      PIV placed by RN. Line flushed with saline. Vitals taken. Pt checked in for appointment(s).    Omega Nair RN

## 2022-11-21 ENCOUNTER — ANCILLARY PROCEDURE (OUTPATIENT)
Dept: RADIOLOGY | Facility: AMBULATORY SURGERY CENTER | Age: 52
End: 2022-11-21
Payer: COMMERCIAL

## 2022-11-21 ENCOUNTER — ANCILLARY PROCEDURE (OUTPATIENT)
Dept: CARDIOLOGY | Facility: CLINIC | Age: 52
End: 2022-11-21
Attending: INTERNAL MEDICINE
Payer: COMMERCIAL

## 2022-11-21 ENCOUNTER — ANESTHESIA (OUTPATIENT)
Dept: SURGERY | Facility: AMBULATORY SURGERY CENTER | Age: 52
End: 2022-11-21
Payer: COMMERCIAL

## 2022-11-21 ENCOUNTER — HOSPITAL ENCOUNTER (OUTPATIENT)
Facility: AMBULATORY SURGERY CENTER | Age: 52
Discharge: HOME | End: 2022-11-21
Attending: RADIOLOGY | Admitting: RADIOLOGY
Payer: COMMERCIAL

## 2022-11-21 ENCOUNTER — ANESTHESIA EVENT (OUTPATIENT)
Dept: SURGERY | Facility: AMBULATORY SURGERY CENTER | Age: 52
End: 2022-11-21
Payer: COMMERCIAL

## 2022-11-21 VITALS
HEART RATE: 56 BPM | DIASTOLIC BLOOD PRESSURE: 76 MMHG | BODY MASS INDEX: 23.95 KG/M2 | RESPIRATION RATE: 16 BRPM | TEMPERATURE: 98 F | OXYGEN SATURATION: 98 % | SYSTOLIC BLOOD PRESSURE: 136 MMHG | WEIGHT: 158 LBS | HEIGHT: 68 IN

## 2022-11-21 VITALS
DIASTOLIC BLOOD PRESSURE: 71 MMHG | BODY MASS INDEX: 24.01 KG/M2 | HEART RATE: 74 BPM | SYSTOLIC BLOOD PRESSURE: 124 MMHG | WEIGHT: 157.9 LBS | OXYGEN SATURATION: 98 %

## 2022-11-21 DIAGNOSIS — I49.3 PVC'S (PREMATURE VENTRICULAR CONTRACTIONS): ICD-10-CM

## 2022-11-21 DIAGNOSIS — C90.00 MULTIPLE MYELOMA NOT HAVING ACHIEVED REMISSION (H): ICD-10-CM

## 2022-11-21 DIAGNOSIS — I49.3 PVC'S (PREMATURE VENTRICULAR CONTRACTIONS): Primary | ICD-10-CM

## 2022-11-21 LAB
HCG UR QL: NEGATIVE
INTERNAL QC OK POCT: NORMAL
POCT KIT EXPIRATION DATE: NORMAL
POCT KIT LOT NUMBER: NORMAL

## 2022-11-21 PROCEDURE — G0463 HOSPITAL OUTPT CLINIC VISIT: HCPCS

## 2022-11-21 PROCEDURE — 93242 EXT ECG>48HR<7D RECORDING: CPT

## 2022-11-21 PROCEDURE — 93244 EXT ECG>48HR<7D REV&INTERPJ: CPT | Performed by: INTERNAL MEDICINE

## 2022-11-21 PROCEDURE — 81025 URINE PREGNANCY TEST: CPT | Performed by: PATHOLOGY

## 2022-11-21 PROCEDURE — 76937 US GUIDE VASCULAR ACCESS: CPT | Mod: 26 | Performed by: RADIOLOGY

## 2022-11-21 PROCEDURE — 77001 FLUOROGUIDE FOR VEIN DEVICE: CPT | Mod: 26 | Performed by: RADIOLOGY

## 2022-11-21 PROCEDURE — 36556 INSERT NON-TUNNEL CV CATH: CPT | Mod: RT

## 2022-11-21 PROCEDURE — 36556 INSERT NON-TUNNEL CV CATH: CPT | Mod: RT | Performed by: RADIOLOGY

## 2022-11-21 PROCEDURE — 99214 OFFICE O/P EST MOD 30 MIN: CPT | Mod: 25 | Performed by: INTERNAL MEDICINE

## 2022-11-21 RX ORDER — HEPARIN SODIUM (PORCINE) LOCK FLUSH IV SOLN 100 UNIT/ML 100 UNIT/ML
3 SOLUTION INTRAVENOUS ONCE
Status: CANCELLED | OUTPATIENT
Start: 2022-11-21 | End: 2022-11-21

## 2022-11-21 RX ORDER — HEPARIN SODIUM (PORCINE) LOCK FLUSH IV SOLN 100 UNIT/ML 100 UNIT/ML
3 SOLUTION INTRAVENOUS
Status: CANCELLED | OUTPATIENT
Start: 2022-11-21

## 2022-11-21 RX ORDER — HEPARIN SODIUM (PORCINE) LOCK FLUSH IV SOLN 100 UNIT/ML 100 UNIT/ML
3 SOLUTION INTRAVENOUS EVERY 24 HOURS
Status: CANCELLED | OUTPATIENT
Start: 2022-11-21

## 2022-11-21 RX ORDER — HEPARIN SODIUM (PORCINE) LOCK FLUSH IV SOLN 100 UNIT/ML 100 UNIT/ML
SOLUTION INTRAVENOUS DAILY PRN
Status: DISCONTINUED | OUTPATIENT
Start: 2022-11-21 | End: 2022-11-21 | Stop reason: HOSPADM

## 2022-11-21 RX ORDER — LIDOCAINE HYDROCHLORIDE 10 MG/ML
INJECTION, SOLUTION EPIDURAL; INFILTRATION; INTRACAUDAL; PERINEURAL DAILY PRN
Status: DISCONTINUED | OUTPATIENT
Start: 2022-11-21 | End: 2022-11-21 | Stop reason: HOSPADM

## 2022-11-21 RX ORDER — DIPHENHYDRAMINE HYDROCHLORIDE 50 MG/ML
6.25 INJECTION INTRAMUSCULAR; INTRAVENOUS ONCE
Status: COMPLETED | OUTPATIENT
Start: 2022-11-21 | End: 2022-11-21

## 2022-11-21 RX ADMIN — DIPHENHYDRAMINE HYDROCHLORIDE 6.5 MG: 50 INJECTION INTRAMUSCULAR; INTRAVENOUS at 09:58

## 2022-11-21 ASSESSMENT — PAIN SCALES - GENERAL: PAINLEVEL: NO PAIN (0)

## 2022-11-21 NOTE — BRIEF OP NOTE
[facility] [place]    Brief Operative Note    Pre-operative diagnosis: Multiple myeloma not having achieved remission (H) [C90.00]  Post-operative diagnosis Same as pre-operative diagnosis    Procedure: Procedure(s):  CENTRAL VENOUS CATHETER NON TUNNELED INSERTION, VASCULAR ACCESS CATHETER  Surgeon: Surgeon(s) and Role:     * Erich Bauman MD - Primary  Anesthesia: Local   Estimated Blood Loss: Minimal    Drains: None  Specimens: * No specimens in log *  Findings:   15cm 14F DL Schon catheter R IJV to SVC/RA jxn under U/S and fluoro.  Functions well, is heplocked and ready for use. .  Complications: None.  Implants: * No implants in log *

## 2022-11-21 NOTE — NURSING NOTE
Chief Complaint   Patient presents with     Follow Up     Return EP- 5 mo f/u for pvcs     Vitals were taken and medications reconciled.    Jhon Garcia, SEJAL  11:31 AM

## 2022-11-21 NOTE — LETTER
11/21/2022      RE: Kristie Cornejo  [address]  [facility] [address]       Dear Colleague,    Thank you for the opportunity to participate in the care of your patient, Kristie Cornejo, at the [facility] [place] [facility]. Please see a copy of my visit note below.    HPI:   Kristie Cornejo is a 52 year old female with a past medical history significant for Irritable bowel disease, allergies, large plasmacytoma of the L pelvis, newly diagnosed multiple myeloma, and PVCs.  She was referred for a cardiology evaluation.    She started having PVCs in 2001 and they are getting worse.  She stated that physical Ex might be a trigger of her PVCs.  Her PVCs are syymptomatic but she is having more PVCs than she feels.  She underwent catheter ablation of PVCs originating from the RVOT and anterolateral papillary muscle on 3/23/2022.  She is schedule to have CAR-T in a month.  Because she is still having PVCs, she is seen for an evaluation.    PAST MEDICAL HISTORY:  Past Medical History:   Diagnosis Date     Allergic rhinitis, cause unspecified      Anxiety state, unspecified 12/01/2003    Started postpartum . On Paxil x 1+years. Off meds- 10/04     Irritable bowel syndrome 01/01/2004     Multiple myeloma not having achieved remission (H)      PLANTAR WARTS     resolved     SINUS DYSRHYTHMIA 10/01/2002    wnl     Unspecified hemorrhoids without mention of complication 04/01/2004    colonoscopy 4/04 spastic colon, int roids; bx neg       CURRENT MEDICATIONS:  Current Outpatient Medications   Medication Sig Dispense Refill     acyclovir (ZOVIRAX) 400 MG tablet Take 1 tablet (400 mg) by mouth 2 times daily 180 tablet 3     aspirin (ASA) 81 MG EC tablet Take 81 mg by mouth daily       BUSPIRONE HCL 10 MG PO TABS 1 TABLET 3 TIMES DAILY as needed 30 Tab 3     Calcium Carbonate-Vit D-Min (CALCIUM 600+D PLUS MINERALS) 600-400 MG-UNIT TABS         cetirizine HCl 10 MG CAPS Take 20 mg by mouth       dexamethasone (DECADRON) 4 MG tablet Take 5 tablets (20 mg) by mouth See Admin Instructions 10 tablet 0     Flaxseed (Linseed) (FLAX SEED OIL) 1000 MG capsule Take 1 capsule by mouth daily       fluticasone (FLONASE) 50 MCG/ACT nasal spray 1 spray       glucosamine-chondroitin 500-400 MG CAPS per capsule        lactobacillus rhamnosus, GG, (CULTURELL) capsule Take 1 capsule by mouth 2 times daily       loperamide (IMODIUM) 2 MG capsule Take 2 mg by mouth 4 times daily as needed for diarrhea       LORazepam (ATIVAN) 0.5 MG tablet Take 1 tablet (0.5 mg) by mouth every 4 hours as needed for anxiety, nausea or sleep (Patient taking differently: Take 0.5 mg by mouth every 4 hours as needed for anxiety, nausea or sleep) 30 tablet 3     melatonin 5 MG tablet Take 5 mg by mouth nightly as needed for sleep       MIRENA 20 MCG/24HR IU IUD use for up to 5 years, then remove       Multiple Vitamin (MULTI-VITAMIN DAILY PO) Take 1 tablet by mouth       omeprazole (PRILOSEC OTC) 20 MG EC tablet Take 1 tablet (20 mg) by mouth 2 times daily 60 tablet 3     polyethylene glycol (MIRALAX) 17 g packet Take 1 packet by mouth daily       pomalidomide (POMALYST) 4 MG capsule Take 1 capsule (4 mg) by mouth daily Swallow whole, do NOT break, crush, chew or open capsule. Take on days 1-21 of repeated 28 day cycles. 21 capsule 0     pomalidomide (POMALYST) 4 MG capsule Take 1 capsule (4 mg) by mouth daily Swallow whole, do NOT break, crush, chew or open capsule. Take on days 1-21 of repeated 28 day cycles. 21 capsule 0     pomalidomide (POMALYST) 4 MG capsule Take 1 capsule (4 mg) by mouth daily Swallow whole, do NOT break, crush, chew or open capsule. Take on days 1-21 of repeated 28 day cycles. 21 capsule 0     potassium chloride ER (KLOR-CON M) 10 MEQ CR tablet Take 2 tablets (20 mEq) by mouth 2 times daily 360 tablet 3     prochlorperazine (COMPAZINE) 10 MG tablet Take 10 mg by mouth  every 6 hours as needed for nausea or vomiting       study - simvastatin, IDS# [id number], 40 MG tablet Take 1 tablet (40 mg) by mouth daily Take at least 5 daily doses prior to apheresis and continue until Day +30 after CAR-T infusion. 65 tablet 0     Zoledronic Acid (ZOMETA IV)        dexamethasone (DECADRON) 4 MG tablet Take 5 tablets (20 mg) by mouth See Admin Instructions (Patient not taking: Reported on 11/21/2022) 10 tablet 0     dexamethasone (DECADRON) 4 MG tablet Take 5 tablets (20 mg) by mouth See Admin Instructions (Patient not taking: Reported on 11/21/2022) 10 tablet 0       PAST SURGICAL HISTORY:  Past Surgical History:   Procedure Laterality Date     BIOPSY BONE PELVIS Left 09/07/2021    Procedure: Biopsy left pubic ramis;  Surgeon: Edu Philip MD;  Location: [facility] OR     COLONOSCOPY N/A 04/13/2022    Procedure: COLONOSCOPY, WITH BIOPSY;  Surgeon: Berto Montgomery MD;  Location:  GI     EP ABLATION PVC N/A 03/23/2022    Procedure: Ablation Premature Ventricular Contractions;  Surgeon: Alem Thomas MD;  Location:  HEART CARDIAC CATH LAB     IR CVC NON TUNNEL PLACEMENT > 5 YRS  11/21/2022     LASIK       [facility] NONSPECIFIC PROCEDURE  01/01/1987    [place] teeth removed     [facility] NONSPECIFIC PROCEDURE  01/01/1993    (R) knee surgery     [facility] NONSPECIFIC PROCEDURE  01/01/2003    (R) breast bx-negative 6/03; bx neg 10/02     [facility] NONSPECIFIC PROCEDURE      10/02 cardiac echo normal     [facility] NONSPECIFIC PROCEDURE  01/01/2004 4/04 colonosc int roids, spastic colon     [facility] NONSPECIFIC PROCEDURE  08/01/2005    D & C for missed AB     [facility] COLONOSCOPY THRU STOMA, DIAGNOSTIC  01/01/2004       ALLERGIES:     Allergies   Allergen Reactions     Amoxicillin      Other reaction(s): upset stomach  Other reaction(s): Gastrointestinal, GI intolerance, Other (see comments)  Other reaction(s): upset stomach  Other reaction(s): upset stomach       Clarithromycin      Other reaction(s): GI intolerance     Erythromycin       upsets stomach     Erythromycin      Other reaction(s): GI intolerance  upsets stomach  nausea     Fluconazole Rash     Itching,   Unsure if really allergy  Itching,   Unsure if really allergy    Other reaction(s): Other (see comments)  Itching,   Unsure if really allergy       FAMILY HISTORY:  + Premature coronary artery disease  - Atrial fibrillation  - Sudden cardiac death     SOCIAL HISTORY:  Social History     Tobacco Use     Smoking status: Never     Smokeless tobacco: Never   Vaping Use     Vaping Use: Never used   Substance Use Topics     Alcohol use: Yes     Comment: none to a couple. occasional     Drug use: No       ROS:   Answers for HPI/ROS submitted by the patient on 1/31/2022 were reviewed.  Constitutional: No fever, chills, or sweats. Weight stable.   ENT: No visual disturbance, ear ache, epistaxis, sore throat.   Cardiovascular: As per HPI.   Respiratory: No cough, hemoptysis.    GI: No nausea, vomiting, hematemesis, melena, or hematochezia.   : No hematuria.   Integument: Negative.   Psychiatric: Negative.   Hematologic:  Easy bruising, no easy bleeding.  Neuro: Negative.   Endocrinology: No significant heat or cold intolerance   Musculoskeletal: No myalgia.    Exam:  /71 (BP Location: Left arm, Patient Position: Chair, Cuff Size: Adult Regular)   Pulse 74   Wt 71.6 kg (157 lb 14.4 oz)   LMP  (LMP Unknown)   SpO2 98%   BMI 24.01 kg/m    GENERAL APPEARANCE: healthy, alert and no distress  HEENT: no icterus, no xanthelasmas, normal pupil size and reaction, normal palate, mucosa moist, no central cyanosis  NECK: no adenopathy, no asymmetry, masses, or scars, thyroid normal to palpation and no bruits, JVP not elevated  RESPIRATORY: lungs clear to auscultation - no rales, rhonchi or wheezes, no use of accessory muscles, no retractions, respirations are unlabored, normal respiratory rate  CARDIOVASCULAR: regular rhythm, normal S1 with physiologic split S2, no S3 or S4 and no murmur,  No click or rub, precordium quiet with normal PMI.  ABDOMEN: soft, non tender, without hepatosplenomegaly, no masses palpable, bowel sounds normal, aorta not enlarged by palpation, no abdominal bruits  EXTREMITIES: peripheral pulses normal, no edema, no bruits  NEURO: alert and oriented to person/place/time, normal speech, gait and affect  VASC: Radial, femoral, dorsalis pedis and posterior tibialis pulses are normal in volumes and symmetric bilaterally. No bruits are heard.  SKIN: no ecchymoses, no rashes    Labs:  CBC RESULTS:   Lab Results   Component Value Date    WBC 5.2 11/14/2022    WBC 7.3 02/13/2006    RBC 4.23 11/14/2022    RBC 4.25 02/13/2006    HGB 13.2 11/14/2022    HGB 12.2 08/29/2006    HCT 42.1 11/14/2022    HCT 38.0 02/13/2006     11/14/2022    MCV 89 02/13/2006    MCH 31.2 11/14/2022    MCH 30.6 02/13/2006    MCHC 31.4 (L) 11/14/2022    MCHC 34.2 02/13/2006    RDW 14.3 11/14/2022    RDW 12.3 02/13/2006     11/14/2022     02/13/2006       BMP RESULTS:  Lab Results   Component Value Date     11/14/2022    POTASSIUM 3.7 11/14/2022    POTASSIUM 4.3 08/10/2022    CHLORIDE 106 11/14/2022    CHLORIDE 112 (H) 08/10/2022    CO2 25 11/14/2022    CO2 24 08/10/2022    ANIONGAP 11 11/14/2022    ANIONGAP 7 08/10/2022    GLC 77 11/14/2022     (H) 08/10/2022    GLC 81 09/24/2021    BUN 21.7 (H) 11/14/2022    BUN 21 08/10/2022    CR 0.88 11/14/2022    GFRESTIMATED 79 11/14/2022    MEKA 9.8 11/14/2022        INR RESULTS:  Lab Results   Component Value Date    INR 0.95 11/14/2022       Procedures:  Holter ECG in 9/2021: Reviewed.      ECG on 10/8/2021: Reviewed.      ECG on 1/17/2022: Reviewed.      ECG on 1/31/2022: Reviewed.      Zio patch in 5-6/2022: Reviewed.            ECG on 6/27/2022: Reviewed.      TTE on 11/15/2022: Reviewed.  Interpretation Summary  Global and regional left ventricular function is normal with an EF of 55-60%.  Global right ventricular function is  normal.  Pulmonary artery systolic pressure is normal.  The inferior vena cava is normal.  No pericardial effusion is present.    ECG on 11/14/2022: Reviewed.        Assessment and Plan:  # Irritable bowel disease  # Allergies  # Large plasmacytoma of the L pelvis  # Multiple myeloma, newly diagnosed.  # Frequent PVCs Symptomatic. [place] = 15% per Holter ECG in 9/2021. Likely originating from the LCC and posteromedial papillary muscle.   -> s/p catheter ablation of PVCs originating from the RVOT and anterolateral papillary muscle on 3/23/2022.  -> PVC burden = 2.7% per Zio patch in 5-6/2022.  The PVC burden was reduced by the catheter ablation.  But, the patient is now having frequent PVCs again that are different from what were treated by the catheter ablation. It appears that chemo Thx induced new PVCs.  She is scheduled to have CAR-T in a month.  I will place her on a repeat a Zio patch and see her back in a couple of weeks.     I spent a total of 30 min today to review the records, see the patient, and complete the documents.    CC  Patient Care Team:  Rena Sheffield MD as PCP - General (Family Medicine)  Edu Philip MD as Assigned Musculoskeletal Provider  Griselda Valdez, RN as Specialty Care Coordinator (Hematology & Oncology)  Payton Reinoso MD as MD (Hematology)  Alem Thomas MD (Cardiovascular Disease)  Alem Thomas MD as Assigned Heart and Vascular Provider  Shabana Bauman MD as MD (Ophthalmology)  Payton Reinoso MD as Assigned Cancer Care Provider  Fanny Ponce MD as Assigned Surgical Provider  Nika Laboy, RN as Specialty Care Coordinator (Cardiology)  Gee Peraza, RN as Specialty Care Coordinator (BMT - Adult)  Jeanmarie Marquez MD as MD (Hematology)  Rosangela Rosales LICSW as  (BMT - Adult)  Carissa Maciel PA-C as Assigned PCP  Payton Reinoso MD as BMT Physician (Transplant)  GODWIN LEAHY        Please do not hesitate  to contact me if you have any questions/concerns.     Sincerely,     Alem Thomas MD

## 2022-11-21 NOTE — PROGRESS NOTES
HPI:   Kristie Cornejo is a 52 year old female with a past medical history significant for Irritable bowel disease, allergies, large plasmacytoma of the L pelvis, newly diagnosed multiple myeloma, and PVCs.  She was referred for a cardiology evaluation.    She started having PVCs in 2001 and they are getting worse.  She stated that physical Ex might be a trigger of her PVCs.  Her PVCs are syymptomatic but she is having more PVCs than she feels.  She underwent catheter ablation of PVCs originating from the RVOT and anterolateral papillary muscle on 3/23/2022.  She is schedule to have CAR-T in a month.  Because she is still having PVCs, she is seen for an evaluation.    PAST MEDICAL HISTORY:  Past Medical History:   Diagnosis Date     Allergic rhinitis, cause unspecified      Anxiety state, unspecified 12/01/2003    Started postpartum . On Paxil x 1+years. Off meds- 10/04     Irritable bowel syndrome 01/01/2004     Multiple myeloma not having achieved remission (H)      PLANTAR WARTS     resolved     SINUS DYSRHYTHMIA 10/01/2002    wnl     Unspecified hemorrhoids without mention of complication 04/01/2004    colonoscopy 4/04 spastic colon, int roids; bx neg       CURRENT MEDICATIONS:  Current Outpatient Medications   Medication Sig Dispense Refill     acyclovir (ZOVIRAX) 400 MG tablet Take 1 tablet (400 mg) by mouth 2 times daily 180 tablet 3     aspirin (ASA) 81 MG EC tablet Take 81 mg by mouth daily       BUSPIRONE HCL 10 MG PO TABS 1 TABLET 3 TIMES DAILY as needed 30 Tab 3     Calcium Carbonate-Vit D-Min (CALCIUM 600+D PLUS MINERALS) 600-400 MG-UNIT TABS        cetirizine HCl 10 MG CAPS Take 20 mg by mouth       dexamethasone (DECADRON) 4 MG tablet Take 5 tablets (20 mg) by mouth See Admin Instructions 10 tablet 0     Flaxseed (Linseed) (FLAX SEED OIL) 1000 MG capsule Take 1 capsule by mouth daily       fluticasone (FLONASE) 50 MCG/ACT nasal spray 1 spray       glucosamine-chondroitin 500-400 MG CAPS per  capsule        lactobacillus rhamnosus, GG, (CULTURELL) capsule Take 1 capsule by mouth 2 times daily       loperamide (IMODIUM) 2 MG capsule Take 2 mg by mouth 4 times daily as needed for diarrhea       LORazepam (ATIVAN) 0.5 MG tablet Take 1 tablet (0.5 mg) by mouth every 4 hours as needed for anxiety, nausea or sleep (Patient taking differently: Take 0.5 mg by mouth every 4 hours as needed for anxiety, nausea or sleep) 30 tablet 3     melatonin 5 MG tablet Take 5 mg by mouth nightly as needed for sleep       MIRENA 20 MCG/24HR IU IUD use for up to 5 years, then remove       Multiple Vitamin (MULTI-VITAMIN DAILY PO) Take 1 tablet by mouth       omeprazole (PRILOSEC OTC) 20 MG EC tablet Take 1 tablet (20 mg) by mouth 2 times daily 60 tablet 3     polyethylene glycol (MIRALAX) 17 g packet Take 1 packet by mouth daily       pomalidomide (POMALYST) 4 MG capsule Take 1 capsule (4 mg) by mouth daily Swallow whole, do NOT break, crush, chew or open capsule. Take on days 1-21 of repeated 28 day cycles. 21 capsule 0     pomalidomide (POMALYST) 4 MG capsule Take 1 capsule (4 mg) by mouth daily Swallow whole, do NOT break, crush, chew or open capsule. Take on days 1-21 of repeated 28 day cycles. 21 capsule 0     pomalidomide (POMALYST) 4 MG capsule Take 1 capsule (4 mg) by mouth daily Swallow whole, do NOT break, crush, chew or open capsule. Take on days 1-21 of repeated 28 day cycles. 21 capsule 0     potassium chloride ER (KLOR-CON M) 10 MEQ CR tablet Take 2 tablets (20 mEq) by mouth 2 times daily 360 tablet 3     prochlorperazine (COMPAZINE) 10 MG tablet Take 10 mg by mouth every 6 hours as needed for nausea or vomiting       study - simvastatin, IDS# 5641, 40 MG tablet Take 1 tablet (40 mg) by mouth daily Take at least 5 daily doses prior to apheresis and continue until Day +30 after CAR-T infusion. 65 tablet 0     Zoledronic Acid (ZOMETA IV)        dexamethasone (DECADRON) 4 MG tablet Take 5 tablets (20 mg) by mouth See  Admin Instructions (Patient not taking: Reported on 11/21/2022) 10 tablet 0     dexamethasone (DECADRON) 4 MG tablet Take 5 tablets (20 mg) by mouth See Admin Instructions (Patient not taking: Reported on 11/21/2022) 10 tablet 0       PAST SURGICAL HISTORY:  Past Surgical History:   Procedure Laterality Date     BIOPSY BONE PELVIS Left 09/07/2021    Procedure: Biopsy left pubic ramis;  Surgeon: Edu Philip MD;  Location: [facility]     COLONOSCOPY N/A 04/13/2022    Procedure: COLONOSCOPY, WITH BIOPSY;  Surgeon: Berto Montgomery MD;  Location:  GI     EP ABLATION PVC N/A 03/23/2022    Procedure: Ablation Premature Ventricular Contractions;  Surgeon: Alem Thomas MD;  Location:  HEART CARDIAC CATH LAB     IR CVC NON TUNNEL PLACEMENT > 5 YRS  11/21/2022     LASIK       [facility] NONSPECIFIC PROCEDURE  01/01/1987    [place] teeth removed     [facility] NONSPECIFIC PROCEDURE  01/01/1993    (R) knee surgery     [facility] NONSPECIFIC PROCEDURE  01/01/2003    (R) breast bx-negative 6/03; bx neg 10/02     [facility] NONSPECIFIC PROCEDURE      10/02 cardiac echo normal     [facility] NONSPECIFIC PROCEDURE  01/01/2004 4/04 colonosc int roids, spastic colon     [facility] NONSPECIFIC PROCEDURE  08/01/2005    D & C for missed AB     [facility] COLONOSCOPY THRU STOMA, DIAGNOSTIC  01/01/2004       ALLERGIES:     Allergies   Allergen Reactions     Amoxicillin      Other reaction(s): upset stomach  Other reaction(s): Gastrointestinal, GI intolerance, Other (see comments)  Other reaction(s): upset stomach  Other reaction(s): upset stomach       Clarithromycin      Other reaction(s): GI intolerance     Erythromycin      upsets stomach     Erythromycin      Other reaction(s): GI intolerance  upsets stomach  nausea     Fluconazole Rash     Itching,   Unsure if really allergy  Itching,   Unsure if really allergy    Other reaction(s): Other (see comments)  Itching,   Unsure if really allergy       FAMILY HISTORY:  + Premature coronary artery disease  - Atrial  fibrillation  - Sudden cardiac death     SOCIAL HISTORY:  Social History     Tobacco Use     Smoking status: Never     Smokeless tobacco: Never   Vaping Use     Vaping Use: Never used   Substance Use Topics     Alcohol use: Yes     Comment: none to a couple. occasional     Drug use: No       ROS:   Answers for HPI/ROS submitted by the patient on 1/31/2022 were reviewed.  Constitutional: No fever, chills, or sweats. Weight stable.   ENT: No visual disturbance, ear ache, epistaxis, sore throat.   Cardiovascular: As per HPI.   Respiratory: No cough, hemoptysis.    GI: No nausea, vomiting, hematemesis, melena, or hematochezia.   : No hematuria.   Integument: Negative.   Psychiatric: Negative.   Hematologic:  Easy bruising, no easy bleeding.  Neuro: Negative.   Endocrinology: No significant heat or cold intolerance   Musculoskeletal: No myalgia.    Exam:  /71 (BP Location: Left arm, Patient Position: Chair, Cuff Size: Adult Regular)   Pulse 74   Wt 71.6 kg (157 lb 14.4 oz)   LMP  (LMP Unknown)   SpO2 98%   BMI 24.01 kg/m    GENERAL APPEARANCE: healthy, alert and no distress  HEENT: no icterus, no xanthelasmas, normal pupil size and reaction, normal palate, mucosa moist, no central cyanosis  NECK: no adenopathy, no asymmetry, masses, or scars, thyroid normal to palpation and no bruits, JVP not elevated  RESPIRATORY: lungs clear to auscultation - no rales, rhonchi or wheezes, no use of accessory muscles, no retractions, respirations are unlabored, normal respiratory rate  CARDIOVASCULAR: regular rhythm, normal S1 with physiologic split S2, no S3 or S4 and no murmur, click or rub, precordium quiet with normal PMI.  ABDOMEN: soft, non tender, without hepatosplenomegaly, no masses palpable, bowel sounds normal, aorta not enlarged by palpation, no abdominal bruits  EXTREMITIES: peripheral pulses normal, no edema, no bruits  NEURO: alert and oriented to person/place/time, normal speech, gait and affect  VASC:  Radial, femoral, dorsalis pedis and posterior tibialis pulses are normal in volumes and symmetric bilaterally. No bruits are heard.  SKIN: no ecchymoses, no rashes    Labs:  CBC RESULTS:   Lab Results   Component Value Date    WBC 5.2 11/14/2022    WBC 7.3 02/13/2006    RBC 4.23 11/14/2022    RBC 4.25 02/13/2006    HGB 13.2 11/14/2022    HGB 12.2 08/29/2006    HCT 42.1 11/14/2022    HCT 38.0 02/13/2006     11/14/2022    MCV 89 02/13/2006    MCH 31.2 11/14/2022    MCH 30.6 02/13/2006    MCHC 31.4 (L) 11/14/2022    MCHC 34.2 02/13/2006    RDW 14.3 11/14/2022    RDW 12.3 02/13/2006     11/14/2022     02/13/2006       BMP RESULTS:  Lab Results   Component Value Date     11/14/2022    POTASSIUM 3.7 11/14/2022    POTASSIUM 4.3 08/10/2022    CHLORIDE 106 11/14/2022    CHLORIDE 112 (H) 08/10/2022    CO2 25 11/14/2022    CO2 24 08/10/2022    ANIONGAP 11 11/14/2022    ANIONGAP 7 08/10/2022    GLC 77 11/14/2022     (H) 08/10/2022    GLC 81 09/24/2021    BUN 21.7 (H) 11/14/2022    BUN 21 08/10/2022    CR 0.88 11/14/2022    GFRESTIMATED 79 11/14/2022    MEKA 9.8 11/14/2022        INR RESULTS:  Lab Results   Component Value Date    INR 0.95 11/14/2022       Procedures:  Holter ECG in 9/2021: Reviewed.      ECG on 10/8/2021: Reviewed.      ECG on 1/17/2022: Reviewed.      ECG on 1/31/2022: Reviewed.      Zio patch in 5-6/2022: Reviewed.            ECG on 6/27/2022: Reviewed.      TTE on 11/15/2022: Reviewed.  Interpretation Summary  Global and regional left ventricular function is normal with an EF of 55-60%.  Global right ventricular function is normal.  Pulmonary artery systolic pressure is normal.  The inferior vena cava is normal.  No pericardial effusion is present.    ECG on 11/14/2022: Reviewed.        Assessment and Plan:  # Irritable bowel disease  # Allergies  # Large plasmacytoma of the L pelvis  # Multiple myeloma, newly diagnosed.  # Frequent PVCs Symptomatic. [place] = 15% per Holter  ECG in 9/2021. Likely originating from the LCC and posteromedial papillary muscle.   -> s/p catheter ablation of PVCs originating from the RVOT and anterolateral papillary muscle on 3/23/2022.  -> PVC burden = 2.7% per Zio patch in 5-6/2022.  The PVC burden was reduced by the catheter ablation.  But, the patient is now having frequent PVCs again that are different from what were treated by the catheter ablation. It appears that chemo Thx induced new PVCs.  She is scheduled to have CAR-T in a month.  I will place her on a repeat a Zio patch and see her back in a couple of weeks.     I spent a total of 30 min today to review the records, see the patient, and complete the documents.    CC  Patient Care Team:  Rena Sheffield MD as PCP - General (Family Medicine)  Edu Philip MD as Assigned Musculoskeletal Provider  Griselda Valdez, RN as Specialty Care Coordinator (Hematology & Oncology)  Payton Reinoso MD as MD (Hematology)  Alem Thomas MD (Cardiovascular Disease)  Alem Thomas MD as Assigned Heart and Vascular Provider  Shabana Bauman MD as MD (Ophthalmology)  Payton Reinoso MD as Assigned Cancer Care Provider  Fanny Ponce MD as Assigned Surgical Provider  Nika Laboy, RN as Specialty Care Coordinator (Cardiology)  Gee Peraza, RN as Specialty Care Coordinator (BMT - Adult)  Jeanmarie Marquez MD as MD (Hematology)  Rosangela Rosales LICSW as  (BMT - Adult)  Carissa Maciel PA-C as Assigned PCP  Payton Reinoso MD as BMT Physician (Transplant)  GODWIN LEAHY

## 2022-11-21 NOTE — PATIENT INSTRUCTIONS
You were seen in the Electrophysiology Clinic today by: Dr Thomas    Plan:     Labs/Tests Needed:  7 day ziopatch heart monitor    Follow up Visit:  1 month with Dr Thomas        If you have further questions, please utilize [employer] to contact us.     Your Care Team:    [facility] Cardiology   Telephone Number     Nurse Line  Olamide Barroso, RN   Nika Laboy, NILAM   [phone]     For scheduling appointments:   Liliya   [phone]   For procedure scheduling:    Harini Couch     [phone]   For the Device Clinic (Pacemakers, ICDs, Loop Recorders)    During business hours: [phone]  After business hours:   [phone]- select option 4 and ask for job code [id number].       On-call cardiologist for after hours or on weekends:   [phone], option #4, and ask to speak to the on-call cardiologist.     Cardiovascular Clinic:   [address] [place] [address]. [place] [address]      As always, Thank you for trusting us with your health care needs!

## 2022-11-21 NOTE — ANESTHESIA PREPROCEDURE EVALUATION
Anesthesia Pre-Procedure Evaluation    Patient: Kristie Cornejo   MRN: [id number] : 1970        Procedure : Procedure(s):  CENTRAL VENOUS CATHETER NON TUNNELED INSERTION, VASCULAR ACCESS CATHETER          Past Medical History:   Diagnosis Date     Allergic rhinitis, cause unspecified      Anxiety state, unspecified 2003    Started postpartum . On Paxil x 1+years. Off meds- 10/04     Irritable bowel syndrome 2004     Multiple myeloma not having achieved remission (H)      PLANTAR WARTS     resolved     SINUS DYSRHYTHMIA 10/01/2002    wnl     Unspecified hemorrhoids without mention of complication 2004    colonoscopy  spastic colon, int roids; bx neg      Past Surgical History:   Procedure Laterality Date     BIOPSY BONE PELVIS Left 2021    Procedure: Biopsy left pubic ramis;  Surgeon: Edu Philip MD;  Location: [facility] OR     COLONOSCOPY N/A 2022    Procedure: COLONOSCOPY, WITH BIOPSY;  Surgeon: Berto Montgomery MD;  Location:  GI     EP ABLATION PVC N/A 2022    Procedure: Ablation Premature Ventricular Contractions;  Surgeon: Alem Thomas MD;  Location:  HEART CARDIAC CATH LAB     LASIK       [facility] NONSPECIFIC PROCEDURE  1987    [place] teeth removed     [facility] NONSPECIFIC PROCEDURE  1993    (R) knee surgery     [facility] NONSPECIFIC PROCEDURE  2003    (R) breast bx-negative ; bx neg 10/02     [facility] NONSPECIFIC PROCEDURE      10/02 cardiac echo normal     [facility] NONSPECIFIC PROCEDURE  2004 colonosc int roids, spastic colon     [facility] NONSPECIFIC PROCEDURE  2005    D & C for missed AB     [facility] COLONOSCOPY THRU STOMA, DIAGNOSTIC  2004      Allergies   Allergen Reactions     Amoxicillin      Other reaction(s): upset stomach  Other reaction(s): Gastrointestinal, GI intolerance, Other (see comments)  Other reaction(s): upset stomach  Other reaction(s): upset stomach       Clarithromycin      Other reaction(s): GI intolerance      Erythromycin      upsets stomach     Erythromycin      Other reaction(s): GI intolerance  upsets stomach  nausea     Fluconazole Rash     Itching,   Unsure if really allergy  Itching,   Unsure if really allergy    Other reaction(s): Other (see comments)  Itching,   Unsure if really allergy      Social History     Tobacco Use     Smoking status: Never     Smokeless tobacco: Never   Substance Use Topics     Alcohol use: Yes     Comment: none to a couple. occasional      Wt Readings from Last 1 Encounters:   11/21/22 71.7 kg (158 lb)        Anesthesia Evaluation            ROS/MED HX  ENT/Pulmonary:     (+) allergic rhinitis,     Neurologic:     (+) peripheral neuropathy,     Cardiovascular:       METS/Exercise Tolerance:     Hematologic:       Musculoskeletal:       GI/Hepatic:       Renal/Genitourinary:       Endo:       Psychiatric/Substance Use:       Infectious Disease:       Malignancy:   (+) Malignancy,     Other:               OUTSIDE LABS:  CBC:   Lab Results   Component Value Date    WBC 5.2 11/14/2022    WBC 4.3 11/02/2022    HGB 13.2 11/14/2022    HGB 12.1 11/02/2022    HCT 42.1 11/14/2022    HCT 36.9 11/02/2022     11/14/2022     11/02/2022     BMP:   Lab Results   Component Value Date     11/14/2022     11/02/2022    POTASSIUM 3.7 11/14/2022    POTASSIUM 3.4 11/02/2022    CHLORIDE 106 11/14/2022    CHLORIDE 105 11/02/2022    CO2 25 11/14/2022    CO2 22 11/02/2022    BUN 21.7 (H) 11/14/2022    BUN 26.8 (H) 11/02/2022    CR 0.88 11/14/2022    CR 0.86 11/02/2022    GLC 77 11/14/2022     (H) 11/02/2022     COAGS:   Lab Results   Component Value Date    PTT 24 11/14/2022    INR 0.95 11/14/2022    FIBR 437 11/14/2022     POC:   Lab Results   Component Value Date    HCG Negative 10/25/2022    HCGS Negative 11/14/2022     HEPATIC:   Lab Results   Component Value Date    ALBUMIN 4.7 11/14/2022    PROTTOTAL 7.5 11/14/2022    ALT 38 (H) 11/14/2022    AST 27 11/14/2022    ALKPHOS 69  11/14/2022    BILITOTAL 0.3 11/14/2022     OTHER:   Lab Results   Component Value Date    MEKA 9.8 11/14/2022    TSH 0.89 06/16/2010    CRP <3.00 11/14/2022    SED 7 08/26/2021       Anesthesia Plan    ASA Status:  3   - Procedure: Procedure only, no anesthetic delivered      Anesthesia Type: MAC.     - Reason for MAC: immobility needed, straight local not clinically adequate              Consents    Anesthesia Plan(s) and associated risks, benefits, and realistic alternatives discussed. Questions answered and patient/representative(s) expressed understanding.     - Discussed: Risks, Benefits and Alternatives for BOTH SEDATION and the PROCEDURE were discussed     - Discussed with:  Patient         Postoperative Care            Comments:           H&P reviewed: Unable to attach H&P to encounter due to EHR limitations. H&P Update: appropriate H&P reviewed, patient examined. No interval changes since H&P (within 30 days).         Huseyin Nicole MD, MD

## 2022-11-22 ENCOUNTER — MEDICAL CORRESPONDENCE (OUTPATIENT)
Dept: HEALTH INFORMATION MANAGEMENT | Facility: CLINIC | Age: 52
End: 2022-11-22

## 2022-11-22 ENCOUNTER — HOSPITAL ENCOUNTER (OUTPATIENT)
Dept: LAB | Facility: CLINIC | Age: 52
Discharge: HOME | End: 2022-11-22
Payer: COMMERCIAL

## 2022-11-22 ENCOUNTER — ANCILLARY PROCEDURE (OUTPATIENT)
Dept: ULTRASOUND IMAGING | Facility: CLINIC | Age: 52
End: 2022-11-22
Payer: COMMERCIAL

## 2022-11-22 VITALS
BODY MASS INDEX: 23.97 KG/M2 | DIASTOLIC BLOOD PRESSURE: 76 MMHG | WEIGHT: 157.63 LBS | HEART RATE: 75 BPM | RESPIRATION RATE: 16 BRPM | SYSTOLIC BLOOD PRESSURE: 133 MMHG | TEMPERATURE: 98.8 F

## 2022-11-22 DIAGNOSIS — C90.00 MULTIPLE MYELOMA NOT HAVING ACHIEVED REMISSION (H): ICD-10-CM

## 2022-11-22 LAB
ANION GAP SERPL CALCULATED.3IONS-SCNC: 11 MMOL/L (ref 7–15)
BASOPHILS # BLD MANUAL: 0.1 10E3/UL (ref 0–0.2)
BASOPHILS NFR BLD MANUAL: 2 %
BUN SERPL-MCNC: 12.3 MG/DL (ref 6–20)
CA-I BLD-MCNC: 4.2 MG/DL (ref 4.4–5.2)
CA-I BLD-MCNC: 4.9 MG/DL (ref 4.4–5.2)
CALCIUM SERPL-MCNC: 9.5 MG/DL (ref 8.6–10)
CHLORIDE SERPL-SCNC: 108 MMOL/L (ref 98–107)
CREAT SERPL-MCNC: 0.8 MG/DL (ref 0.51–0.95)
DEPRECATED HCO3 PLAS-SCNC: 23 MMOL/L (ref 22–29)
EOSINOPHIL # BLD MANUAL: 0.1 10E3/UL (ref 0–0.7)
EOSINOPHIL NFR BLD MANUAL: 3 %
ERYTHROCYTE [DISTWIDTH] IN BLOOD BY AUTOMATED COUNT: 14.1 % (ref 10–15)
FRAGMENTS BLD QL SMEAR: SLIGHT
GFR SERPL CREATININE-BSD FRML MDRD: 88 ML/MIN/1.73M2
GLUCOSE SERPL-MCNC: 119 MG/DL (ref 70–99)
HCT VFR BLD AUTO: 36 % (ref 35–47)
HGB BLD-MCNC: 11.9 G/DL (ref 11.7–15.7)
LYMPHOCYTES # BLD MANUAL: 1 10E3/UL (ref 0.8–5.3)
LYMPHOCYTES NFR BLD MANUAL: 28 %
MAGNESIUM SERPL-MCNC: 2.2 MG/DL (ref 1.7–2.3)
MCH RBC QN AUTO: 31.3 PG (ref 26.5–33)
MCHC RBC AUTO-ENTMCNC: 33.1 G/DL (ref 31.5–36.5)
MCV RBC AUTO: 95 FL (ref 78–100)
MONOCYTES # BLD MANUAL: 1.2 10E3/UL (ref 0–1.3)
MONOCYTES NFR BLD MANUAL: 33 %
NEUTROPHILS # BLD MANUAL: 1.2 10E3/UL (ref 1.6–8.3)
NEUTROPHILS NFR BLD MANUAL: 34 %
PLAT MORPH BLD: ABNORMAL
PLATELET # BLD AUTO: 236 10E3/UL (ref 150–450)
POTASSIUM SERPL-SCNC: 3.9 MMOL/L (ref 3.4–5.3)
RBC # BLD AUTO: 3.8 10E6/UL (ref 3.8–5.2)
RBC MORPH BLD: ABNORMAL
SODIUM SERPL-SCNC: 142 MMOL/L (ref 136–145)
WBC # BLD AUTO: 3.5 10E3/UL (ref 4–11)

## 2022-11-22 PROCEDURE — 82330 ASSAY OF CALCIUM: CPT | Performed by: STUDENT IN AN ORGANIZED HEALTH CARE EDUCATION/TRAINING PROGRAM

## 2022-11-22 PROCEDURE — 250N000009 HC RX 250: Performed by: STUDENT IN AN ORGANIZED HEALTH CARE EDUCATION/TRAINING PROGRAM

## 2022-11-22 PROCEDURE — 86803 HEPATITIS C AB TEST: CPT | Performed by: STUDENT IN AN ORGANIZED HEALTH CARE EDUCATION/TRAINING PROGRAM

## 2022-11-22 PROCEDURE — 87798 DETECT AGENT NOS DNA AMP: CPT | Performed by: STUDENT IN AN ORGANIZED HEALTH CARE EDUCATION/TRAINING PROGRAM

## 2022-11-22 PROCEDURE — 83735 ASSAY OF MAGNESIUM: CPT | Performed by: STUDENT IN AN ORGANIZED HEALTH CARE EDUCATION/TRAINING PROGRAM

## 2022-11-22 PROCEDURE — 99212 OFFICE O/P EST SF 10 MIN: CPT | Performed by: PHYSICIAN ASSISTANT

## 2022-11-22 PROCEDURE — 86780 TREPONEMA PALLIDUM: CPT | Performed by: STUDENT IN AN ORGANIZED HEALTH CARE EDUCATION/TRAINING PROGRAM

## 2022-11-22 PROCEDURE — 80048 BASIC METABOLIC PNL TOTAL CA: CPT | Performed by: STUDENT IN AN ORGANIZED HEALTH CARE EDUCATION/TRAINING PROGRAM

## 2022-11-22 PROCEDURE — 0537T HC CAR-T THERAPY, HARVEST BLD DRV T LYMPCYT, PER DAY, ADULT: CPT

## 2022-11-22 PROCEDURE — 85007 BL SMEAR W/DIFF WBC COUNT: CPT | Performed by: STUDENT IN AN ORGANIZED HEALTH CARE EDUCATION/TRAINING PROGRAM

## 2022-11-22 PROCEDURE — 85027 COMPLETE CBC AUTOMATED: CPT | Performed by: STUDENT IN AN ORGANIZED HEALTH CARE EDUCATION/TRAINING PROGRAM

## 2022-11-22 PROCEDURE — 86687 HTLV-I ANTIBODY: CPT | Performed by: STUDENT IN AN ORGANIZED HEALTH CARE EDUCATION/TRAINING PROGRAM

## 2022-11-22 PROCEDURE — 250N000011 HC RX IP 250 OP 636: Performed by: STUDENT IN AN ORGANIZED HEALTH CARE EDUCATION/TRAINING PROGRAM

## 2022-11-22 PROCEDURE — 36415 COLL VENOUS BLD VENIPUNCTURE: CPT | Performed by: STUDENT IN AN ORGANIZED HEALTH CARE EDUCATION/TRAINING PROGRAM

## 2022-11-22 RX ORDER — HEPARIN SODIUM (PORCINE) LOCK FLUSH IV SOLN 100 UNIT/ML 100 UNIT/ML
3 SOLUTION INTRAVENOUS ONCE
Status: COMPLETED | OUTPATIENT
Start: 2022-11-22 | End: 2022-11-22

## 2022-11-22 RX ADMIN — CALCIUM GLUCONATE 1432 MG/HR: 98 INJECTION, SOLUTION INTRAVENOUS at 08:25

## 2022-11-22 RX ADMIN — ANTICOAGULANT CITRATE DEXTROSE SOLUTION FORMULA A 1178 ML: 12.25; 11; 3.65 SOLUTION INTRAVENOUS at 08:25

## 2022-11-22 RX ADMIN — Medication 3 ML: at 12:50

## 2022-11-22 NOTE — PROGRESS NOTES
Per Dr. Thomas, patient to have 7-day Zio monitor placed.  Diagnosis: PVCs  Monitor placed: Yes  Patient Instructed: Yes  Patient verbalized understanding: Yes  Holter # [id number]    Monitor was placed by SEJAL Marr.  6:12 PM

## 2022-11-22 NOTE — PROCEDURES
Transfusion Medicine/Apheresis Procedure    Kristie Cornejo [id number]   YOB: 1970 Age: 52 year old     Procedure: Autologous mononuclear cell (MNC) collection           Assessment and Plan:   The patient is a 52 year old female with multiple myeloma who presents for autologous MNC collection for treatment with CAR-T (ABECMA). The plan is to collect for 1 dayt. A central line was placed and was used for access for the procedure.  She tolerated the collection without issue.  The procedure was completed but return (roughly 200 mL) was not given due to noted clot.  Continue as per BMT.         Chief Complaint:   MNC collection for CAR-T (ABECMA).         History of Present Illness:   52 year old female presents for autologous  MNC  collection.  Her past medical history includes hip fracture due to multiple myeloma and PVCs.  She is currently well. The patient denies any back pain that would prevent her from tolerating the procedure. The patient confirms a recent flu vaccination.  No significant travel history. The patient has no identifiable risk factors for infectious disease. The procedure, risks/benefits were discussed with the patient and all of her questions were addressed at the time of initial consult/consent.             Past Medical History:     Past Medical History:   Diagnosis Date     Allergic rhinitis, cause unspecified      Anxiety state, unspecified 12/01/2003    Started postpartum . On Paxil x 1+years. Off meds- 10/04     Irritable bowel syndrome 01/01/2004     Multiple myeloma not having achieved remission (H)      PLANTAR WARTS     resolved     SINUS DYSRHYTHMIA 10/01/2002    wnl     Unspecified hemorrhoids without mention of complication 04/01/2004    colonoscopy 4/04 spastic colon, int roids; bx neg             Past Surgical History:     Past Surgical History:   Procedure Laterality Date     BIOPSY BONE PELVIS Left 09/07/2021    Procedure: Biopsy left pubic ramis;  Surgeon:  Edu Philip MD;  Location: [facility] OR     COLONOSCOPY N/A 04/13/2022    Procedure: COLONOSCOPY, WITH BIOPSY;  Surgeon: Berto Montgomery MD;  Location:  GI     EP ABLATION PVC N/A 03/23/2022    Procedure: Ablation Premature Ventricular Contractions;  Surgeon: Alem Thoams MD;  Location:  [facility]     INSERT CATHETER VASCULAR ACCESS N/A 11/21/2022    Procedure: CENTRAL VENOUS CATHETER NON TUNNELED INSERTION, VASCULAR ACCESS CATHETER;  Surgeon: Erich Bauman MD;  Location: [facility] OR     IR CVC NON TUNNEL PLACEMENT > 5 YRS  11/21/2022     LASIK       Z NONSPECIFIC PROCEDURE  01/01/1987    [place] teeth removed     ZZ NONSPECIFIC PROCEDURE  01/01/1993    (R) knee surgery     Z NONSPECIFIC PROCEDURE  01/01/2003    (R) breast bx-negative 6/03; bx neg 10/02     Z NONSPECIFIC PROCEDURE      10/02 cardiac echo normal     Z NONSPECIFIC PROCEDURE  01/01/2004 4/04 colonosc int roids, spastic colon     [facility] NONSPECIFIC PROCEDURE  08/01/2005    D & C for missed AB     [facility] COLONOSCOPY THRU STOMA, DIAGNOSTIC  01/01/2004              Social History:     Social History     Tobacco Use     Smoking status: Never     Smokeless tobacco: Never   Substance Use Topics     Alcohol use: Yes     Comment: none to a couple. occasional             Family History:     Family History   Problem Relation Age of Onset     Neurologic Disorder Mother         toxo     Lipids Mother         low cholestrol     Lipids Father         high cholestrol     Glaucoma Maternal Grandmother      Diabetes Maternal Grandmother         breast cancer,stroke ischemic     Breast Cancer Maternal Grandmother         79yo/and autoimmune skin condition     C.A.D. Maternal Grandfather         strokes and mi, chronic respiratory from 2 hand smoke     Hypertension Paternal Grandmother         rheumatoid arthritis     Respiratory Paternal Grandfather         emphasema     Cancer - colorectal No family hx of      Prostate Cancer No family  hx of      Macular Degeneration No family hx of              Immunizations:   Immunizations are up to date          Allergies:     Allergies   Allergen Reactions     Amoxicillin      Other reaction(s): upset stomach  Other reaction(s): Gastrointestinal, GI intolerance, Other (see comments)  Other reaction(s): upset stomach  Other reaction(s): upset stomach       Clarithromycin      Other reaction(s): GI intolerance     Erythromycin      upsets stomach     Erythromycin      Other reaction(s): GI intolerance  upsets stomach  nausea     Fluconazole Rash     Itching,   Unsure if really allergy  Itching,   Unsure if really allergy    Other reaction(s): Other (see comments)  Itching,   Unsure if really allergy             Medications:     Current Outpatient Medications   Medication Sig     acyclovir (ZOVIRAX) 400 MG tablet Take 1 tablet (400 mg) by mouth 2 times daily     aspirin (ASA) 81 MG EC tablet Take 81 mg by mouth daily     BUSPIRONE HCL 10 MG PO TABS 1 TABLET 3 TIMES DAILY as needed     Calcium Carbonate-Vit D-Min (CALCIUM 600+D PLUS MINERALS) 600-400 MG-UNIT TABS      cetirizine HCl 10 MG CAPS Take 20 mg by mouth     dexamethasone (DECADRON) 4 MG tablet Take 5 tablets (20 mg) by mouth See Admin Instructions (Patient not taking: Reported on 11/21/2022)     dexamethasone (DECADRON) 4 MG tablet Take 5 tablets (20 mg) by mouth See Admin Instructions (Patient not taking: Reported on 11/21/2022)     dexamethasone (DECADRON) 4 MG tablet Take 5 tablets (20 mg) by mouth See Admin Instructions     Flaxseed (Linseed) (FLAX SEED OIL) 1000 MG capsule Take 1 capsule by mouth daily     fluticasone (FLONASE) 50 MCG/ACT nasal spray 1 spray     glucosamine-chondroitin 500-400 MG CAPS per capsule      lactobacillus rhamnosus, GG, (CULTURELL) capsule Take 1 capsule by mouth 2 times daily     loperamide (IMODIUM) 2 MG capsule Take 2 mg by mouth 4 times daily as needed for diarrhea     LORazepam (ATIVAN) 0.5 MG tablet Take 1 tablet  (0.5 mg) by mouth every 4 hours as needed for anxiety, nausea or sleep (Patient taking differently: Take 0.5 mg by mouth every 4 hours as needed for anxiety, nausea or sleep)     melatonin 5 MG tablet Take 5 mg by mouth nightly as needed for sleep     MIRENA 20 MCG/24HR IU IUD use for up to 5 years, then remove     Multiple Vitamin (MULTI-VITAMIN DAILY PO) Take 1 tablet by mouth     omeprazole (PRILOSEC OTC) 20 MG EC tablet Take 1 tablet (20 mg) by mouth 2 times daily     polyethylene glycol (MIRALAX) 17 g packet Take 1 packet by mouth daily     pomalidomide (POMALYST) 4 MG capsule Take 1 capsule (4 mg) by mouth daily Swallow whole, do NOT break, crush, chew or open capsule. Take on days 1-21 of repeated 28 day cycles.     pomalidomide (POMALYST) 4 MG capsule Take 1 capsule (4 mg) by mouth daily Swallow whole, do NOT break, crush, chew or open capsule. Take on days 1-21 of repeated 28 day cycles.     pomalidomide (POMALYST) 4 MG capsule Take 1 capsule (4 mg) by mouth daily Swallow whole, do NOT break, crush, chew or open capsule. Take on days 1-21 of repeated 28 day cycles.     potassium chloride ER (KLOR-CON M) 10 MEQ CR tablet Take 2 tablets (20 mEq) by mouth 2 times daily     prochlorperazine (COMPAZINE) 10 MG tablet Take 10 mg by mouth every 6 hours as needed for nausea or vomiting     study - simvastatin, IDS# 5641, 40 MG tablet Take 1 tablet (40 mg) by mouth daily Take at least 5 daily doses prior to apheresis and continue until Day +30 after CAR-T infusion.     Zoledronic Acid (ZOMETA IV)      No current facility-administered medications for this encounter.     Facility-Administered Medications Ordered in Other Encounters   Medication     CT Flush             Vital Signs:     Vitals:    11/22/22 0813 11/22/22 1245   BP: 136/70 133/76   Pulse: 76 75   Resp: 16 16   Temp: 98.5  F (36.9  C) 98.8  F (37.1  C)   TempSrc: Oral Oral   Weight: 71.5 kg (157 lb 10.1 oz)                Data:     [facility]   Lab  11/22/22  0825      POTASSIUM 3.9   CHLORIDE 108*   MEKA 9.5   CO2 23   BUN 12.3   CR 0.80   *     CBC  Recent Labs   Lab 11/22/22  0825   WBC 3.5*   RBC 3.80   HGB 11.9   HCT 36.0   MCV 95   MCH 31.3   MCHC 33.1   RDW 14.1        Attestation:  During the MNC collection the patient was directly seen and evaluated by me, Javier Jesus M.D..    Javier Jesus M.D.  Professor, Transfusion Medicine  Laboratory Medicine & Pathology  Pager: [phone]

## 2022-11-22 NOTE — DISCHARGE INSTRUCTIONS
A collaboration between [facility] and [facility]  Experts in minimally invasive, targeted treatments performed using imaging guidance    Tunneled Central Venous Catheter Removal    Today you had your existing tunneled central venous catheter removed because it was no longer needed for treatment.    Your catheter was removed by: Arley Nicholson PA-C    After you go home:  Avoid lying flat or bending at the waist for 2 hours following removal of the catheter to reduce risk of bleeding from catheter site.  Keep any applied tape/gauze dressings clean and dry. Change tape/gauze dressings if they get wet or soiled.  You may shower following the procedure, however cover and protect from moisture any tape/gauze dressings.   You may remove tape/gauze dressings after 3 days if the site looks like it is in the process of healing or scabbing over.  Avoid strenuous activities (elevating heart rate) or lifting more than 10 pounds for the next 3 days. Walking, elliptical and golf are examples of acceptable activities.  If there is bleeding or oozing from the procedure site, apply firm pressure to the area above your collar bone (where the catheter entered the bloodstream) for 10 minutes.  If the bleeding continues, continue to hold pressure and seek medical attention at the phone numbers below.  Mild procedure site discomfort can be treated with an ice pack and over-the-counter pain relievers.           Call our Interventional Radiology (IR) service if:  If you start bleeding from the procedure site. Our radiology provider can help you decide if you need to return to the hospital.  If you have new or worsening pain related to the procedure.  If you have concerning swelling at the procedure site.  If you develop hives or a rash or any unexplained itching.  If you have a fever of greater than 100.5  F and chills in the first 5 days after procedure.  Any other concerns related to  your procedure.    Contact Number:  [phone]  (Unomy control desk)  Monday - Friday 8:00 am - 4:30 pm    After hours for urgent concerns:  [phone]  After 4:30 pm Monday - Friday, Weekends and Holidays.   Ask for Interventional Radiology on-call.  Someone is available 24 hours a day.  [facility] toll free number:  [phone]

## 2022-11-22 NOTE — PROGRESS NOTES
Interventional Radiology Brief Post Procedure Note    Procedure: Non-tunneled central venous catheter removal.    Proceduralist: Harry Nicholson [facility], JUSTIN    Assistant: DEYSI Stevenson    Time Out: Prior to the start of the procedure and with procedural staff participation, I verbally confirmed the patient s identity using two indicators, relevant allergies, that the procedure was appropriate and matched the consent or emergent situation, and that the correct equipment/implants were available. Immediately prior to starting the procedure I conducted the Time Out with the procedural staff and re-confirmed the patient s name, procedure, and site/side. (The Joint Commission universal protocol was followed.)  Yes    Medications   Medication Event Details Admin User Admin Time       Sedation: None.    Findings: Existing right internal jugular dual lumen non-tunneled central venous catheter removed intact and without difficulty.    Estimated Blood Loss: Minimal    Fluoroscopy Time:  None.    SPECIMENS: None    Complications: 1. None     Condition: Stable    Plan: Follow up per primary team. Upright for 2 hours, no strenuous activity for 3 days.    Comments: See dictated procedure note for full details.    Harry Nicholson PA-C

## 2022-11-22 NOTE — DISCHARGE INSTRUCTIONS
Autologous HPC/MNC Collection:   In most cases, the cell dose report will be available tomorrow morning.   The Bone Marrow Transplant (BMT) clinic staff looks at your report, and a decision is made if you will need another collection.   Remember it is important to follow a low fat diet during the collection process. Sometimes following the procedure, your blood platelet count may be low.  If you are told your platelet count is low, you need to avoid taking aspirin/aspirin containing products and avoid heavy physical activity and activities that may result in bruising or traumatic injury.  To contact the BMT fellow or attending physician after 5 p.m. call [phone].

## 2022-11-23 ENCOUNTER — INFUSION THERAPY VISIT (OUTPATIENT)
Dept: ONCOLOGY | Facility: CLINIC | Age: 52
End: 2022-11-23
Payer: COMMERCIAL

## 2022-11-23 ENCOUNTER — APPOINTMENT (OUTPATIENT)
Dept: LAB | Facility: CLINIC | Age: 52
End: 2022-11-23
Payer: COMMERCIAL

## 2022-11-23 VITALS
RESPIRATION RATE: 18 BRPM | TEMPERATURE: 98.5 F | WEIGHT: 157.6 LBS | OXYGEN SATURATION: 98 % | SYSTOLIC BLOOD PRESSURE: 146 MMHG | BODY MASS INDEX: 23.96 KG/M2 | DIASTOLIC BLOOD PRESSURE: 91 MMHG | HEART RATE: 84 BPM

## 2022-11-23 DIAGNOSIS — C90.00 LIGHT CHAIN MYELOMA (H): Primary | ICD-10-CM

## 2022-11-23 LAB
BASOPHILS # BLD MANUAL: 0 10E3/UL (ref 0–0.2)
BASOPHILS NFR BLD MANUAL: 1 %
EOSINOPHIL # BLD MANUAL: 0.2 10E3/UL (ref 0–0.7)
EOSINOPHIL NFR BLD MANUAL: 4 %
ERYTHROCYTE [DISTWIDTH] IN BLOOD BY AUTOMATED COUNT: 14.1 % (ref 10–15)
HCT VFR BLD AUTO: 36 % (ref 35–47)
HGB BLD-MCNC: 12 G/DL (ref 11.7–15.7)
LYMPHOCYTES # BLD MANUAL: 1.2 10E3/UL (ref 0.8–5.3)
LYMPHOCYTES NFR BLD MANUAL: 30 %
MCH RBC QN AUTO: 31.5 PG (ref 26.5–33)
MCHC RBC AUTO-ENTMCNC: 33.3 G/DL (ref 31.5–36.5)
MCV RBC AUTO: 95 FL (ref 78–100)
MONOCYTES # BLD MANUAL: 1.2 10E3/UL (ref 0–1.3)
MONOCYTES NFR BLD MANUAL: 30 %
NEUTROPHILS # BLD MANUAL: 1.4 10E3/UL (ref 1.6–8.3)
NEUTROPHILS NFR BLD MANUAL: 35 %
PLAT MORPH BLD: ABNORMAL
PLATELET # BLD AUTO: 222 10E3/UL (ref 150–450)
RBC # BLD AUTO: 3.81 10E6/UL (ref 3.8–5.2)
RBC MORPH BLD: ABNORMAL
WBC # BLD AUTO: 4 10E3/UL (ref 4–11)

## 2022-11-23 PROCEDURE — 85027 COMPLETE CBC AUTOMATED: CPT

## 2022-11-23 PROCEDURE — 85007 BL SMEAR W/DIFF WBC COUNT: CPT

## 2022-11-23 PROCEDURE — 96413 CHEMO IV INFUSION 1 HR: CPT

## 2022-11-23 PROCEDURE — 96375 TX/PRO/DX INJ NEW DRUG ADDON: CPT

## 2022-11-23 PROCEDURE — 250N000011 HC RX IP 250 OP 636

## 2022-11-23 PROCEDURE — 36415 COLL VENOUS BLD VENIPUNCTURE: CPT

## 2022-11-23 PROCEDURE — 250N000013 HC RX MED GY IP 250 OP 250 PS 637

## 2022-11-23 PROCEDURE — 258N000003 HC RX IP 258 OP 636

## 2022-11-23 RX ORDER — ACETAMINOPHEN 325 MG/1
650 TABLET ORAL ONCE
Status: COMPLETED | OUTPATIENT
Start: 2022-11-23 | End: 2022-11-23

## 2022-11-23 RX ORDER — DIPHENHYDRAMINE HCL 25 MG
50 CAPSULE ORAL ONCE
Status: COMPLETED | OUTPATIENT
Start: 2022-11-23 | End: 2022-11-23

## 2022-11-23 RX ADMIN — FAMOTIDINE 20 MG: 10 INJECTION INTRAVENOUS at 14:24

## 2022-11-23 RX ADMIN — DIPHENHYDRAMINE HYDROCHLORIDE 50 MG: 25 CAPSULE ORAL at 14:24

## 2022-11-23 RX ADMIN — ACETAMINOPHEN 650 MG: 325 TABLET ORAL at 14:24

## 2022-11-23 RX ADMIN — SODIUM CHLORIDE 250 ML: 9 INJECTION, SOLUTION INTRAVENOUS at 14:24

## 2022-11-23 RX ADMIN — DEXAMETHASONE SODIUM PHOSPHATE: 10 INJECTION, SOLUTION INTRAMUSCULAR; INTRAVENOUS at 14:33

## 2022-11-23 RX ADMIN — SODIUM CHLORIDE 700 MG: 9 INJECTION, SOLUTION INTRAVENOUS at 15:38

## 2022-11-23 ASSESSMENT — PAIN SCALES - GENERAL: PAINLEVEL: NO PAIN (0)

## 2022-11-23 NOTE — PROGRESS NOTES
Infusion Nursing Note:  Kristie Cornejo presents today for C5D15 [place].    Patient seen by provider today: No   present during visit today: Not Applicable.    Note: Margo presents to infusion today feeling well. She denies any pain, infectious symptoms, or other concerns.    Margo states Dr. Reinoso told her to stop taking dexamethasone at home until her CAR-T therapy.    Intravenous Access:  Peripheral IV placed.    Treatment Conditions:  Lab Results   Component Value Date    HGB 12.0 11/23/2022    WBC 4.0 11/23/2022    ANEU 1.2 (L) 11/22/2022    ANEUTAUTO 3.3 11/14/2022     11/23/2022      Results reviewed, labs MET treatment parameters, ok to proceed with treatment.    Post Infusion Assessment:  Patient tolerated infusion without incident.  Blood return noted pre and post infusion.  Site patent and intact, free from redness, edema or discomfort.  No evidence of extravasations.  Access discontinued per protocol.     Discharge Plan:   Patient declined prescription refills.  Discharge instructions reviewed with: Patient.  Patient and/or family verbalized understanding of discharge instructions and all questions answered.  AVS to patient via [employer].  Patient does not have any future appointments scheduled as it is her last chemo before her upcoming transplant.  Patient discharged in stable condition accompanied by: self.  Departure Mode: Ambulatory.      Marta Arita RN

## 2022-11-23 NOTE — ORAL ONC MGMT
[facility] Oral Chemotherapy Monitoring Program    Thank you for the opportunity to be a part in the care of this patient's oral chemotherapy. The oncology pharmacy will no longer be following this patient for oral chemotherapy. If there are any questions or the plan changes, feel free to contact us.    ORAL CHEMOTHERAPY 8/24/2022 8/25/2022 9/28/2022 9/29/2022 10/19/2022 10/26/2022 11/23/2022   Assessment Type Monthly Follow up Other Other Refill;Lab Monitoring Lab Monitoring;Monthly Follow up Refill Discontinuation   Stop Date - - - - - - 11/23/2022   Reason for Discontinuation - - - - - - Therapy complete   Diagnosis Code Multiple Myeloma Multiple Myeloma Multiple Myeloma Multiple Myeloma Multiple Myeloma Multiple Myeloma Multiple Myeloma   Providers Dr. Kemar Reinoso   [facility]   Drug Name Pomalyst (pomalidomide) Pomalyst (pomalidomide) Pomalyst (pomalidomide) Pomalyst (pomalidomide) Pomalyst (pomalidomide) Pomalyst (pomalidomide) Pomalyst (pomalidomide)   Dose 4 mg - 4 mg 4 mg 4 mg 4 mg -   Current Schedule Daily - Daily Daily Daily Daily -   Cycle Details 3 weeks on, 1 week off - 3 weeks on, 1 week off 3 weeks on, 1 week off 3 weeks on, 1 week off 3 weeks on, 1 week off -   Start Date of Last Cycle 8/3/2022 - - - - - -   Planned next cycle start date 8/31/2022 - - 10/5/2022 11/2/2022 - -   Doses missed in last 2 weeks 0 - - - 0 - -   Adherence Assessment Adherent - - - Adherent - -   Adverse Effects Rash;Diarrhea - - - - - -   Nausea - - - - - - -   Pharmacist Intervention(nausea) - - - - - - -   Diarrhea Grade 1 - - - - - -   Pharmacist Intervention(diarrhea) No - - - - - -   Rash Grade 1 - - - - - -   Pharmacist Intervention(Rash) Yes - - - - - -   Intervention(s) Referral to oncology provider - - - - - -   Fatigue - - - -  Grade 1 - -   Pharmacist Intervention(fatigue) - - - - No - -   Intervention(s) - - - - - - -   Other (See Note for Details) - - - - - - -   Pharmacist intervention(other) - - - - - - -   Intervention(s) - - - - - - -   Any new drug interactions? - - - - No - -   Is the dose as ordered appropriate for the patient? - - - - Yes - -   Has the patient missed any days of school, work, or other routine activity? - - - - - - -   Since the last time we talked, have you been hospitalized or used the emergency room? - - - - No - -

## 2022-11-25 LAB
DONOR CYTOMEGALOVIRUS ABY: POSITIVE
DONOR HEP B CORE ABY: ABNORMAL
DONOR HEP B SURF AGN: NON REACTIVE
DONOR HEPATITIS C ABY: NON REACTIVE
DONOR HTLV 1&2 ANTIBODY: ABNORMAL
DONOR TREPONEMA PAL ABY: NON REACTIVE
HBV DNA SERPL QL NAA+PROBE: NORMAL
HCV RNA SERPL QL NAA+PROBE: NORMAL
HIV1+2 AB SERPL QL IA: NON REACTIVE
HIV1+2 RNA SERPL QL NAA+PROBE: NORMAL
TRYPANOSOMA CRUZI: NON REACTIVE
WNV RNA SERPL DONR QL NAA+PROBE: NORMAL

## 2022-11-29 ENCOUNTER — TELEPHONE (OUTPATIENT)
Dept: TRANSPLANT | Facility: CLINIC | Age: 52
End: 2022-11-29

## 2022-11-29 NOTE — PROGRESS NOTES
I talked with and examined Kristie Cornejo and I agree with the assessment and the plan. TAM Yu MD.

## 2022-11-29 NOTE — TELEPHONE ENCOUNTER
BMT Nurse Triage - Generic/Other Symptoms     Treating Provider:   Dr. Reinoso    Date of last office visit: 11/22/22 w/ SULEIMAN (apheresis)    Onset of symptoms: 11/29/22      Duration of symptoms: <1 day    Brief description of symptoms: New bump under the skin of L armpit, smaller than a dime. Feels like a bruise, painful with palpation. No previous history of anything like this. No other symptoms, no fevers, has been chilly this morning but went for a walk outside.     Patient is receiving bridging chemo prior to Abecma but doesn't have any other follow up scheduled. Will get her scheduled for follow up with SULEIMAN after confirming whether she is being followed in oncology or BMT in between her apheresis and infusion.    Update: Discussed with SULEIMAN - confirmed with patient she has not had any recent vaccines within the last 3-4 weeks. Possible infection, patient will use a warm pack and monitor symptoms. Will call if anything worsens or changes, or if any new symptoms develop.     Celia Bender RN Care Coordinator - BMT  Phone: [phone]  Pager: [phone]

## 2022-12-01 ENCOUNTER — PATIENT OUTREACH (OUTPATIENT)
Dept: ONCOLOGY | Facility: CLINIC | Age: 52
End: 2022-12-01

## 2022-12-01 LAB
DONOR CYTOMEGALOVIRUS ABY: POSITIVE
DONOR HEP B CORE ABY: ABNORMAL
DONOR HEP B SURF AGN: ABNORMAL
DONOR HEPATITIS C ABY: ABNORMAL
DONOR HTLV 1&2 ANTIBODY: ABNORMAL
DONOR TREPONEMA PAL ABY: ABNORMAL
HIV1+2 AB SERPL QL IA: ABNORMAL
TRYPANOSOMA CRUZI: ABNORMAL

## 2022-12-01 NOTE — PROGRESS NOTES
[facility]: Cancer Care                                                                                          Outgoing call to pt to follow up on scheduled appointment tomorrow.  Pt reports new lump in her L armpit that she called in about on 11/29 is slightly larger, now size of a dime and pain/discomfort has increased since talking with nurse on 11/29.  Pt states she otherwise is feeling well.  Pt states she is concerned about this lump but does feel she is ok to wait until next week for appt.    Per Quyen SULEIMAN, recommends to continue to monitor this lump and call triage if pt has drainage, increased pain, or temp.  Pt is ok to wait until next week for visit.  Note sent to scheduling to reschedule pt with Jackie on 12/8 with labs prior.      Chery Rivers, BANDARN, RN  RN Care Coordinator  [facility]    
Patient/Family

## 2022-12-08 ENCOUNTER — ONCOLOGY VISIT (OUTPATIENT)
Dept: ONCOLOGY | Facility: CLINIC | Age: 52
End: 2022-12-08
Attending: PHYSICIAN ASSISTANT
Payer: COMMERCIAL

## 2022-12-08 ENCOUNTER — APPOINTMENT (OUTPATIENT)
Dept: LAB | Facility: CLINIC | Age: 52
End: 2022-12-08
Attending: PHYSICIAN ASSISTANT
Payer: COMMERCIAL

## 2022-12-08 VITALS
OXYGEN SATURATION: 99 % | WEIGHT: 160 LBS | HEART RATE: 82 BPM | TEMPERATURE: 98.3 F | SYSTOLIC BLOOD PRESSURE: 154 MMHG | RESPIRATION RATE: 18 BRPM | DIASTOLIC BLOOD PRESSURE: 88 MMHG | BODY MASS INDEX: 24.33 KG/M2

## 2022-12-08 DIAGNOSIS — C90.00 LIGHT CHAIN MYELOMA (H): Primary | ICD-10-CM

## 2022-12-08 DIAGNOSIS — I49.3 PVC'S (PREMATURE VENTRICULAR CONTRACTIONS): ICD-10-CM

## 2022-12-08 LAB
HOLD SPECIMEN: NORMAL
HOLD SPECIMEN: NORMAL
TOTAL PROTEIN SERUM FOR ELP: 7 G/DL (ref 6.4–8.3)

## 2022-12-08 PROCEDURE — 84165 PROTEIN E-PHORESIS SERUM: CPT | Mod: TC | Performed by: PATHOLOGY

## 2022-12-08 PROCEDURE — 86334 IMMUNOFIX E-PHORESIS SERUM: CPT | Performed by: PATHOLOGY

## 2022-12-08 PROCEDURE — 82784 ASSAY IGA/IGD/IGG/IGM EACH: CPT | Performed by: REGISTERED NURSE

## 2022-12-08 PROCEDURE — 83521 IG LIGHT CHAINS FREE EACH: CPT | Performed by: REGISTERED NURSE

## 2022-12-08 PROCEDURE — G0463 HOSPITAL OUTPT CLINIC VISIT: HCPCS

## 2022-12-08 PROCEDURE — 36415 COLL VENOUS BLD VENIPUNCTURE: CPT | Performed by: REGISTERED NURSE

## 2022-12-08 PROCEDURE — 84155 ASSAY OF PROTEIN SERUM: CPT | Performed by: REGISTERED NURSE

## 2022-12-08 PROCEDURE — 99214 OFFICE O/P EST MOD 30 MIN: CPT | Performed by: REGISTERED NURSE

## 2022-12-08 ASSESSMENT — PAIN SCALES - GENERAL: PAINLEVEL: NO PAIN (0)

## 2022-12-08 NOTE — PROGRESS NOTES
[facility]  Date of Visit: Dec 8, 2022   Oncologist: Dr. Payton Reinoso    Reason for visit: myeloma follow-up           Oncology History   52 year old female with past medical history significant for Irritable bowel disease, allergies, large plasmacytoma of the L pelvis, and newly diagnosed multiple myeloma.     Early in April 2021 , she noted to have left sided groin pain, constant and was affecting her daily activities  MRI showed large, expansile, permeative mass with indistinct borders involving the left superior and inferior pubic rami, pubic symphysis and with possible extension to the left acetabulum.     We completed the following work-up:   - Biopsy 9/7/2021: plasmacytoma.  Flow cytometry showed kappa monotypic plasma cells, polytypic B cells. FISH results from plasmacytoma showed a gain of 11q, IGH-CCND1 fusion but no losses of 1q or TP53 and no gain of 1q.  - Bone marrow biopsy 09/22/21: Marrow cellularity 50% with 25% interstitial infiltration w/  Kappa-monotypic, moderately atypical plasma cells. Flow with 0.7% kappa-monotypica plasma cells. FISH and cytogenetics pending.   - PET CT 09/24/21 w/ hypermetabolic pathologic fracture of the L pubic ramus w/ aggressive appearing osteolysis but no additional suspicious lytic or blastic osseous lesions.     Treatment to date:   - Zometa q4 weeks started 9/27/21  - Radiation to left pubic ramus x18 fractions, completed 11/11/21.   - RVD C1D1 11/15/21  - RVD C2D1 12/6/21  - RVD C3D1  12/27/21  - RVD C4 D1 1/17/22, elizabeth added on D8 due to inadequate response of urinary M-spike and FLCs remaining over 100 mg/dL after 4 cycles of RVD    - Elizabeth-RVD C1D8 1/24/22  - Elizabeth-RVD C2D1 2/7/22  - Elizabeth-RVD C3D1 2/28/22  - Elizabeth-RVD C4D1 3/29/22  - Elizabeth-RVD C5D1 4/19/22    Disease response: [facility]     - 5/19/22: Continued monotherapy with Daratumumab; held Revlimid and Velcade.    - 6/21/22: Transitioned to KCD, received C1 days 1 and 2- had  "significant PVCs at cardiac follow-uyp after 1st week of Kyprolis, held further doses.    - Transitioned to Sarclisa/Pom/Dex 7/6/22. Started Pom on 7/11.     -9/8/22 - COVID, delayed C3D15 treatment x 1 week    - Apheresis for CAR-T on 11/22/22; bridging with Sarclisa given 11/23/22           INTERIM HISTORY:   Diarrhea twice yesterday, on and off the past 10 days, but overall feels pretty good today.  Last Pomalyst somewhere around 12/2  Energy has been a little better recently, although she had a day where she \"crashed\" after several higher-energy days.  Was able to ice skate for over an hour this morning which she was happy about  Still getting occasional palpitations and shortness of breath with stairs.  No new symptoms to report.       ROS: 10 point ROS neg other than the symptoms noted above in the HPI.           Physical Exam:     BP (!) 154/88   Pulse 82   Temp 98.3  F (36.8  C) (Oral)   Resp 18   Wt 72.6 kg (160 lb)   LMP  (LMP Unknown)   SpO2 99%   BMI 24.33 kg/m     Gen: alert, pleasant and conversational, NAD  HEENT: NC/AT,EOMI w/ PERRL, anicteric sclera. OP clear. MMM.   Neck: Supple, no LAD  CV: normal S1,S2 with RRR no m/r/g  Resp: lungs CTA bilaterally with adequate air movement to bases. No wheezes or crackles  Abd: soft NTND no organomegaly or masses. BS normoactive.   Ext: warm and well perfused, no edema or cyanosis  Skin: Small (<0.5 cm x 0.5 cm) lump in left armpit, mobile, slightly erythematous, slightly tender  Neuro: A&Ox4, no lateralizing sx. Grossly nonfocal.  Psych: appropriate, reactive             Laboratory Data:      I personally reviewed the following labs:    Most Recent 3 CBC's:  Recent Labs   Lab Test 11/23/22  1356 11/22/22  0825 11/14/22  0945   WBC 4.0 3.5* 5.2   HGB 12.0 11.9 13.2   MCV 95 95 100    236 192     Most Recent 3 BMP's:  Recent Labs   Lab Test 11/22/22  0825 11/14/22  0945 11/02/22  1345    142 139   POTASSIUM 3.9 3.7 3.4   CHLORIDE 108* 106 105 "   CO2 23 25 22   BUN 12.3 21.7* 26.8*   CR 0.80 0.88 0.86   ANIONGAP 11 11 12   MEKA 9.5 9.8 9.4   * 77 150*     Most Recent 2 LFT's:  Recent Labs   Lab Test 11/14/22  0945 11/02/22  1345   AST 27 17   ALT 38* 17   ALKPHOS 69 58   BILITOTAL 0.3 0.3       Imaging:  No new imaging to review.         Assessment and Recommendations   Kristie Cornejo is a 52 year old female who was diagnosed with Plasmacytoma after she presented with left sided groin pain now found to have multiple myeloma after further workup with bone marrow biopsy.     # Multiple myeloma with associated large plasmacytoma of the L pelvis. Kappa light chain disease.     BMBx with 25% plasma cell infiltration; standard risk with gain of 11q, IGH-CCND1 fusion but no losses of 1q or TP53 and no gain of 1q.   SPEP w/out a monoclonal peak  UPEP positive. 70% paraprotein. Large monoclonal free immunoglobulin light chain of kappa chain type.     No anemia, normal creat, Ca, no other skeletal lesions. Alb normal, B2M normal.  Stage I Light chain disease with left pelvic bone pathologic fracture she is classified as having symptomatic multiple myeloma.   -s/p michelle-RVD, michelle alone and KCD with either little benefit or significant symptoms  -transitioned to Sarclisa/Pom/Dex to avoid the cardiotoxicity of Kyprolis.  Started C1D1 7/6/22.  Her diarrhea has returned with being back on treatment but manageable  -there has been some response with her kappa light chains on this regimen. Will continue to monitor prior to each cycle  - Collected for CAR-T on 11/22/22  - Received Sarclisa 11/23/22, Pomalyst through ~ 12/2/22  - Currently off all therapy, awaiting infusion of CAR-T cells, anticipate toward the end of December.  - No symptoms requiring regular follow-up while she awaits CAR-T manufacturing.  She will reach out if anything changes, otherwise await communication from cellular therapy team regarding timing of her CAR-T infusion    Continue ASA.   "    #Bone Health  She will continue monthly Zometa. She will continue Calcium/Vitamin D supplements.       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 Currently having no pain and able to walk 1 mile without any assistive devices.  She is interested in pursuing other activities like gentle cross-country skiing.  Had follow-up with Dr. Philip on 1/6/22 and was cleared to resume regular activity as tolerated. She should still refrain from high-impact activities.    PET on 7/12/22 shows left pubic ramus lesion is smaller in size but brighter FDG. No new lesions.      # PVCs  She had an ECHO 12/1 and had follow-up with cardiology 12/7 (notes in [facility]where).  She had follow-up with cardiology here at the  and they recommend an ablation. Stress test was completed   -started on Toprol XL however was discontinued due to significant side effects of orthostatic hypotension, pre-syncopal episodes, shakiness, and weakness.   - She had an ablation 3/23/22 but has not experienced relief from the feeling of being nauseated and short of breath after climbing the stairs.  She also reports episodes of palpitations that \"radiate into her neck.\" Denies chest pain or shortness of breath.   - EKG done 4/4 showed sinus rhythm and nonspecific ST and T wave abnormality. Troponin negative.  - Cardiac MRI ruled out amyloid.  LFEF 50%, no evidence of infiltrative cardiomyopathy.  - She followed up with cardiology and they felt that the cause may be musculoskeletal or pericarditis. They suggested taking ibuprofen for pain. Her pain has subsided and she actually has not needed to take ibuprofen.   - Post ablation Zio patch was performed showing a significant reduction in PVCs; however, by the time she had her cardiology follow-up, she had restarted chemotherapy and the PVCs had " returned.  Dr. Thomas is comfortable proceeding with treatment as long as symptoms are tolerable and there are no major EKG changes. Would need to repeat if she were to do BMT  - EKG 7/13 with no major changes, troponin WNL  -she continues to have PVCs, though much improved from when she was on cytoxan. Wants to avoid ablation again if possible  - Completed ZioPatch recently, has follow-up with cardiology next week    #Diarrhea  She was having intermittent diarrhea with associated abdominal cramping and nausea, but developed much more significant diarrhea with 10-12 liquid episodes and was seen as an add-on on 4/4/22 for this.   - C.diff PCR negative 2/8/22, repeat on 4/5 negative  - Enteric stool panel 4/5/22 negative  - Underwent colonoscopy on 4/13/22 to pursue biopsy for amyloid (discussed below). Congo red stain is negative for amyloid deposits. Pathology showed colonic mucosa with focal active colitis, negative for signs of chronicity and lymphocytic colitis. Per patient report, GI reviewed results with her and did not feel she needed treatment for these findings at this time. They advised her to monitor her symptoms and to call them if they do not improve.Dr. Montgomery agreed with following conservatively for now since she reports some improvement in her symptoms.  If diarrhea becomes bothersome again, then they will arrange for expeditious evaluation in the IBD clinic.   - CMV PCR (4/4/22) not detected  -has intermittent diarrhea and constipation.  Manages with metamucil and Miralax.      #COVID prophylaxis  - She received EVUSHELD 1/20/22 and 3/8/22  - Received 3 COVID vaccines to date  - Will do influenza vaccine today  - Holding off on COVID booster and Evusheld due to recent monoclonal antibodies following COVID infection in early September.  Reconsider in mid-December.    #Reflux  She reported episodes of reflux with bile, especially when she lays down at night.  She has started to elevate her head on  pillows and is also trying to avoid eating within 1-2 hours of going to bed.  Stable currently on Omeprazole 20 mg daily.     # Lump, left armpit  She has a small, tender, erythematous lump in her left armpit. Has the feel and appearance of a small cyst. She reports it seems to be getting smaller over the past few days. Advised her to apply warm compresses three times a day and let us know if any worsening signs or symptoms (increased tenderness, drainage, discoloration, growth in size).         40 minutes spent on the date of the encounter doing chart review, review of test results, patient visit and documentation       DORON Magaña [facility]  [address] [place] [address] [phone]

## 2022-12-08 NOTE — NURSING NOTE
Chief Complaint   Patient presents with     Blood Draw     Vpt blood draw by lab RN. Vitals taken and appointment arrived     Laura Jenkins RN

## 2022-12-08 NOTE — NURSING NOTE
"Oncology Rooming Note    December 8, 2022 2:55 PM   Kristie Cornejo is a 52 year old female who presents for:    Chief Complaint   Patient presents with     Blood Draw     Vpt blood draw by lab RN. Vitals taken and appointment arrived     Oncology Clinic Visit     Light chain myeloma (H)     Initial Vitals: BP (!) 154/88   Pulse 82   Temp 98.3  F (36.8  C) (Oral)   Resp 18   Wt 72.6 kg (160 lb)   LMP  (LMP Unknown)   SpO2 99%   BMI 24.33 kg/m   Estimated body mass index is 24.33 kg/m  as calculated from the following:    Height as of 11/21/22: 1.727 m (5' 8\").    Weight as of this encounter: 72.6 kg (160 lb). Body surface area is 1.87 meters squared.  No Pain (0) Comment: Data Unavailable   No LMP recorded (lmp unknown). (Menstrual status: IUD).  Allergies reviewed: Yes  Medications reviewed: Yes    Medications: Medication refills not needed today.  Pharmacy name entered into [facility]:    [facility] - [place], [place] - [address]  [id number] - [place], [place] - [address] [facility] [address]  [facility] - [place], [place] - [address]   [facility] [place] - [address] [place] [address] PHARMACY [facility] - [place] - [id number]  [facility] - [place], [place] - [address] [place] [address] PHARMACY [facility] - [place], [place] - [address]    Clinical concerns: Patient has a new bump under her left arm pit-please assess. Reports that she has been nauseous lately and has had diarrhea over the last week and a half. Patient has questions about her labs, CAR-T, Pet Scan and cardiology to be addressed today.        Martine Ramos LPN December 8, 2022 3:05 PM              "

## 2022-12-08 NOTE — LETTER
12/8/2022         RE: Kristie Cornejo  [address]  [facility] [address]        Dear Colleague,    Thank you for referring your patient, Kristie Cornejo, to the [facility]. Please see a copy of my visit note below.    [facility]  Date of Visit: Dec 8, 2022   Oncologist: Dr. Payton Reinoso    Reason for visit: myeloma follow-up           Oncology History   52 year old female with past medical history significant for Irritable bowel disease, allergies, large plasmacytoma of the L pelvis, and newly diagnosed multiple myeloma.     Early in April 2021 , she noted to have left sided groin pain, constant and was affecting her daily activities  MRI showed large, expansile, permeative mass with indistinct borders involving the left superior and inferior pubic rami, pubic symphysis and with possible extension to the left acetabulum.     We completed the following work-up:   - Biopsy 9/7/2021: plasmacytoma.  Flow cytometry showed kappa monotypic plasma cells, polytypic B cells. FISH results from plasmacytoma showed a gain of 11q, IGH-CCND1 fusion but no losses of 1q or TP53 and no gain of 1q.  - Bone marrow biopsy 09/22/21: Marrow cellularity 50% with 25% interstitial infiltration w/  Kappa-monotypic, moderately atypical plasma cells. Flow with 0.7% kappa-monotypica plasma cells. FISH and cytogenetics pending.   - PET CT 09/24/21 w/ hypermetabolic pathologic fracture of the L pubic ramus w/ aggressive appearing osteolysis but no additional suspicious lytic or blastic osseous lesions.     Treatment to date:   - Zometa q4 weeks started 9/27/21  - Radiation to left pubic ramus x18 fractions, completed 11/11/21.   - RVD C1D1 11/15/21  - RVD C2D1 12/6/21  - RVD C3D1  12/27/21  - RVD C4 D1 1/17/22, elizabeth added on D8 due to inadequate response of urinary M-spike and FLCs remaining over 100 mg/dL after 4 cycles of RVD    - Elizabeth-RVD C1D8 1/24/22  -  "Elizabeth-RVD C2D1 2/7/22  - Elizabeth-RVD C3D1 2/28/22  - Elizabeth-RVD C4D1 3/29/22  - Elizabeth-RVD C5D1 4/19/22    Disease response: [place]     - 5/19/22: Continued monotherapy with Daratumumab; held Revlimid and Velcade.    - 6/21/22: Transitioned to KCD, received C1 days 1 and 2- had significant PVCs at cardiac follow-uyp after 1st week of Kyprolis, held further doses.    - Transitioned to Sarclisa/Pom/Dex 7/6/22. Started Pom on 7/11.     -9/8/22 - COVID, delayed C3D15 treatment x 1 week    - Apheresis for CAR-T on 11/22/22; bridging with Sarclisa given 11/23/22           INTERIM HISTORY:   Diarrhea twice yesterday, on and off the past 10 days, but overall feels pretty good today.  Last Pomalyst somewhere around 12/2  Energy has been a little better recently, although she had a day where she \"crashed\" after several higher-energy days.  Was able to ice skate for over an hour this morning which she was happy about  Still getting occasional palpitations and shortness of breath with stairs.  No new symptoms to report.       ROS: 10 point ROS neg other than the symptoms noted above in the HPI.           Physical Exam:     BP (!) 154/88   Pulse 82   Temp 98.3  F (36.8  C) (Oral)   Resp 18   Wt 72.6 kg (160 lb)   LMP  (LMP Unknown)   SpO2 99%   BMI 24.33 kg/m     Gen: alert, pleasant and conversational, NAD  HEENT: NC/AT,EOMI w/ PERRL, anicteric sclera. OP clear. MMM.   Neck: Supple, no LAD  CV: normal S1,S2 with RRR no m/r/g  Resp: lungs CTA bilaterally with adequate air movement to bases. No wheezes or crackles  Abd: soft NTND no organomegaly or masses. BS normoactive.   Ext: warm and well perfused, no edema or cyanosis  Skin: Small (<0.5 cm x 0.5 cm) lump in left armpit, mobile, slightly erythematous, slightly tender  Neuro: A&Ox4, no lateralizing sx. Grossly nonfocal.  Psych: appropriate, reactive             Laboratory Data:      I personally reviewed the following labs:    Most Recent 3 CBC's:  Recent Labs   Lab Test " 11/23/22  1356 11/22/22  0825 11/14/22  0945   WBC 4.0 3.5* 5.2   HGB 12.0 11.9 13.2   MCV 95 95 100    236 192     Most Recent 3 BMP's:  Recent Labs   Lab Test 11/22/22  0825 11/14/22  0945 11/02/22  1345    142 139   POTASSIUM 3.9 3.7 3.4   CHLORIDE 108* 106 105   CO2 23 25 22   BUN 12.3 21.7* 26.8*   CR 0.80 0.88 0.86   ANIONGAP 11 11 12   MEKA 9.5 9.8 9.4   * 77 150*     Most Recent 2 LFT's:  Recent Labs   Lab Test 11/14/22  0945 11/02/22  1345   AST 27 17   ALT 38* 17   ALKPHOS 69 58   BILITOTAL 0.3 0.3       Imaging:  No new imaging to review.         Assessment and Recommendations   Kristie Cornejo is a 52 year old female who was diagnosed with Plasmacytoma after she presented with left sided groin pain now found to have multiple myeloma after further workup with bone marrow biopsy.     # Multiple myeloma with associated large plasmacytoma of the L pelvis. Kappa light chain disease.     BMBx with 25% plasma cell infiltration; standard risk with gain of 11q, IGH-CCND1 fusion but no losses of 1q or TP53 and no gain of 1q.   SPEP w/out a monoclonal peak  UPEP positive. 70% paraprotein. Large monoclonal free immunoglobulin light chain of kappa chain type.     No anemia, normal creat, Ca, no other skeletal lesions. Alb normal, B2M normal.  Stage I Light chain disease with left pelvic bone pathologic fracture she is classified as having symptomatic multiple myeloma.   -s/p michelle-RVD, michelle alone and KCD with either little benefit or significant symptoms  -transitioned to Sarclisa/Pom/Dex to avoid the cardiotoxicity of Kyprolis.  Started C1D1 7/6/22.  Her diarrhea has returned with being back on treatment but manageable  -there has been some response with her kappa light chains on this regimen. Will continue to monitor prior to each cycle  - Collected for CAR-T on 11/22/22  - Received Sarclisa 11/23/22, Pomalyst through ~ 12/2/22  - Currently off all therapy, awaiting infusion of CAR-T cells,  "anticipate toward the end of December.  - No symptoms requiring regular follow-up while she awaits CAR-T manufacturing.  She will reach out if anything changes, otherwise await communication from cellular therapy team regarding timing of her CAR-T infusion    Continue ASA.      #Bone Health  She will continue monthly Zometa. She will continue Calcium/Vitamin D supplements.       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 Currently having no pain and able to walk 1 mile without any assistive devices.  She is interested in pursuing other activities like gentle cross-country skiing.  Had follow-up with Dr. Philip on 1/6/22 and was cleared to resume regular activity as tolerated. She should still refrain from high-impact activities.    PET on 7/12/22 shows left pubic ramus lesion is smaller in size but brighter FDG. No new lesions.      # PVCs  She had an ECHO 12/1 and had follow-up with cardiology 12/7 (notes in CareEverywhere).  She had follow-up with cardiology here at the  and they recommend an ablation. Stress test was completed   -started on Toprol XL however was discontinued due to significant side effects of orthostatic hypotension, pre-syncopal episodes, shakiness, and weakness.   - She had an ablation 3/23/22 but has not experienced relief from the feeling of being nauseated and short of breath after climbing the stairs.  She also reports episodes of palpitations that \"radiate into her neck.\" Denies chest pain or shortness of breath.   - EKG done 4/4 showed sinus rhythm and nonspecific ST and T wave abnormality. Troponin negative.  - Cardiac MRI ruled out amyloid.  LFEF 50%, no evidence of infiltrative cardiomyopathy.  - She followed up with cardiology and they felt that the cause may be musculoskeletal or pericarditis. They suggested taking ibuprofen " for pain. Her pain has subsided and she actually has not needed to take ibuprofen.   - Post ablation Zio patch was performed showing a significant reduction in PVCs; however, by the time she had her cardiology follow-up, she had restarted chemotherapy and the PVCs had returned.  Dr. Thomas is comfortable proceeding with treatment as long as symptoms are tolerable and there are no major EKG changes. Would need to repeat if she were to do BMT  - EKG 7/13 with no major changes, troponin WNL  -she continues to have PVCs, though much improved from when she was on cytoxan. Wants to avoid ablation again if possible  - Completed ZioPatch recently, has follow-up with cardiology next week    #Diarrhea  She was having intermittent diarrhea with associated abdominal cramping and nausea, but developed much more significant diarrhea with 10-12 liquid episodes and was seen as an add-on on 4/4/22 for this.   - C.diff PCR negative 2/8/22, repeat on 4/5 negative  - Enteric stool panel 4/5/22 negative  - Underwent colonoscopy on 4/13/22 to pursue biopsy for amyloid (discussed below). Congo red stain is negative for amyloid deposits. Pathology showed colonic mucosa with focal active colitis, negative for signs of chronicity and lymphocytic colitis. Per patient report, GI reviewed results with her and did not feel she needed treatment for these findings at this time. They advised her to monitor her symptoms and to call them if they do not improve.Dr. Montgomery agreed with following conservatively for now since she reports some improvement in her symptoms.  If diarrhea becomes bothersome again, then they will arrange for expeditious evaluation in the IBD clinic.   - CMV PCR (4/4/22) not detected  -has intermittent diarrhea and constipation.  Manages with metamucil and Miralax.      #COVID prophylaxis  - She received EVUSHELD 1/20/22 and 3/8/22  - Received 3 COVID vaccines to date  - Will do influenza vaccine today  - Holding off on COVID  booster and Evusheld due to recent monoclonal antibodies following COVID infection in early September.  Reconsider in mid-December.    #Reflux  She reported episodes of reflux with bile, especially when she lays down at night.  She has started to elevate her head on pillows and is also trying to avoid eating within 1-2 hours of going to bed.  Stable currently on Omeprazole 20 mg daily.     # Lump, left armpit  She has a small, tender, erythematous lump in her left armpit. Has the feel and appearance of a small cyst. She reports it seems to be getting smaller over the past few days. Advised her to apply warm compresses three times a day and let us know if any worsening signs or symptoms (increased tenderness, drainage, discoloration, growth in size).       40 minutes spent on the date of the encounter doing chart review, review of test results, patient visit and documentation       DORON Magaña CNP  [facility]  [address] [place] [id number] [phone]

## 2022-12-09 ENCOUNTER — RESEARCH ENCOUNTER (OUTPATIENT)
Dept: TRANSPLANT | Facility: CLINIC | Age: 52
End: 2022-12-09

## 2022-12-09 LAB
ALBUMIN SERPL ELPH-MCNC: 4.5 G/DL (ref 3.7–5.1)
ALPHA1 GLOB SERPL ELPH-MCNC: 0.3 G/DL (ref 0.2–0.4)
ALPHA2 GLOB SERPL ELPH-MCNC: 0.8 G/DL (ref 0.5–0.9)
B-GLOBULIN SERPL ELPH-MCNC: 0.7 G/DL (ref 0.6–1)
GAMMA GLOB SERPL ELPH-MCNC: 0.7 G/DL (ref 0.7–1.6)
IGA SERPL-MCNC: 12 MG/DL (ref 84–499)
IGG SERPL-MCNC: 856 MG/DL (ref 610–1616)
IGM SERPL-MCNC: 13 MG/DL (ref 35–242)
KAPPA LC FREE SER-MCNC: 36.33 MG/DL (ref 0.33–1.94)
KAPPA LC FREE/LAMBDA FREE SER NEPH: 157.96 {RATIO} (ref 0.26–1.65)
LAMBDA LC FREE SERPL-MCNC: 0.23 MG/DL (ref 0.57–2.63)
M PROTEIN SERPL ELPH-MCNC: 0.1 G/DL
PROT PATTERN SERPL ELPH-IMP: ABNORMAL
PROT PATTERN SERPL IFE-IMP: NORMAL

## 2022-12-09 PROCEDURE — 86334 IMMUNOFIX E-PHORESIS SERUM: CPT | Mod: 26

## 2022-12-09 PROCEDURE — 84165 PROTEIN E-PHORESIS SERUM: CPT | Mod: 26

## 2022-12-09 NOTE — PROGRESS NOTES
[id number]: Study Visit Note   Subject name: Kristie Cornejo     Assessment of Adverse Events    Adverse events have been assessed weekly per protocol. Called patient today. She stated she has been doing well, denies any complaints. Endorses that she was only able to take 4 doses of simvastatin prior to apheresis. The morning of the procedure was hectic and her son missed the school bus. No adverse events noted related to study medication.     Kristie Cornejo was given the opportunity to ask any trial related questions.      Shoshana Caballero RN

## 2022-12-12 ENCOUNTER — OFFICE VISIT (OUTPATIENT)
Dept: CARDIOLOGY | Facility: CLINIC | Age: 52
End: 2022-12-12
Attending: INTERNAL MEDICINE
Payer: COMMERCIAL

## 2022-12-12 VITALS
BODY MASS INDEX: 24.81 KG/M2 | HEIGHT: 67 IN | DIASTOLIC BLOOD PRESSURE: 92 MMHG | WEIGHT: 158.1 LBS | HEART RATE: 73 BPM | OXYGEN SATURATION: 99 % | SYSTOLIC BLOOD PRESSURE: 135 MMHG

## 2022-12-12 DIAGNOSIS — I49.3 PVC'S (PREMATURE VENTRICULAR CONTRACTIONS): Primary | ICD-10-CM

## 2022-12-12 PROCEDURE — 99214 OFFICE O/P EST MOD 30 MIN: CPT | Performed by: INTERNAL MEDICINE

## 2022-12-12 PROCEDURE — G0463 HOSPITAL OUTPT CLINIC VISIT: HCPCS

## 2022-12-12 PROCEDURE — G0463 HOSPITAL OUTPT CLINIC VISIT: HCPCS | Performed by: INTERNAL MEDICINE

## 2022-12-12 ASSESSMENT — PAIN SCALES - GENERAL: PAINLEVEL: NO PAIN (0)

## 2022-12-12 NOTE — PROGRESS NOTES
HPI:   Kristie Cornejo is a 52 year old female with a past medical history significant for Irritable bowel disease, allergies, large plasmacytoma of the L pelvis, newly diagnosed multiple myeloma, and PVCs.  She was referred for a cardiology evaluation.    She started having PVCs in 2001 and they are getting worse.  She stated that physical Ex might be a trigger of her PVCs.  Her PVCs are syymptomatic but she is having more PVCs than she feels.  She underwent catheter ablation of PVCs originating from the RVOT and anterolateral papillary muscle on 3/23/2022.  She is schedule to have CAR-T in a month.  Because she is still having PVCs, she took TTE and Zio patch.  She is now seen for an evaluation.    PAST MEDICAL HISTORY:  Past Medical History:   Diagnosis Date     Allergic rhinitis, cause unspecified      Anxiety state, unspecified 12/01/2003    Started postpartum . On Paxil x 1+years. Off meds- 10/04     Irritable bowel syndrome 01/01/2004     Multiple myeloma not having achieved remission (H)      PLANTAR WARTS     resolved     SINUS DYSRHYTHMIA 10/01/2002    wnl     Unspecified hemorrhoids without mention of complication 04/01/2004    colonoscopy 4/04 spastic colon, int roids; bx neg       CURRENT MEDICATIONS:  Current Outpatient Medications   Medication Sig Dispense Refill     acyclovir (ZOVIRAX) 400 MG tablet Take 1 tablet (400 mg) by mouth 2 times daily 180 tablet 3     aspirin (ASA) 81 MG EC tablet Take 81 mg by mouth daily       BUSPIRONE HCL 10 MG PO TABS 1 TABLET 3 TIMES DAILY as needed 30 Tab 3     Calcium Carbonate-Vit D-Min (CALCIUM 600+D PLUS MINERALS) 600-400 MG-UNIT TABS        cetirizine HCl 10 MG CAPS Take 20 mg by mouth       dexamethasone (DECADRON) 4 MG tablet Take 5 tablets (20 mg) by mouth See Admin Instructions (Patient not taking: Reported on 11/21/2022) 10 tablet 0     dexamethasone (DECADRON) 4 MG tablet Take 5 tablets (20 mg) by mouth See Admin Instructions (Patient not taking:  Reported on 11/21/2022) 10 tablet 0     dexamethasone (DECADRON) 4 MG tablet Take 5 tablets (20 mg) by mouth See Admin Instructions 10 tablet 0     Flaxseed (Linseed) (FLAX SEED OIL) 1000 MG capsule Take 1 capsule by mouth daily       fluticasone (FLONASE) 50 MCG/ACT nasal spray 1 spray       glucosamine-chondroitin 500-400 MG CAPS per capsule        lactobacillus rhamnosus, GG, (CULTURELL) capsule Take 1 capsule by mouth 2 times daily       loperamide (IMODIUM) 2 MG capsule Take 2 mg by mouth 4 times daily as needed for diarrhea       LORazepam (ATIVAN) 0.5 MG tablet Take 1 tablet (0.5 mg) by mouth every 4 hours as needed for anxiety, nausea or sleep (Patient taking differently: Take 0.5 mg by mouth every 4 hours as needed for anxiety, nausea or sleep) 30 tablet 3     melatonin 5 MG tablet Take 5 mg by mouth nightly as needed for sleep       MIRENA 20 MCG/24HR IU IUD use for up to 5 years, then remove       Multiple Vitamin (MULTI-VITAMIN DAILY PO) Take 1 tablet by mouth       omeprazole (PRILOSEC OTC) 20 MG EC tablet Take 1 tablet (20 mg) by mouth 2 times daily 60 tablet 3     polyethylene glycol (MIRALAX) 17 g packet Take 1 packet by mouth daily       pomalidomide (POMALYST) 4 MG capsule Take 1 capsule (4 mg) by mouth daily Swallow whole, do NOT break, crush, chew or open capsule. Take on days 1-21 of repeated 28 day cycles. 21 capsule 0     pomalidomide (POMALYST) 4 MG capsule Take 1 capsule (4 mg) by mouth daily Swallow whole, do NOT break, crush, chew or open capsule. Take on days 1-21 of repeated 28 day cycles. 21 capsule 0     pomalidomide (POMALYST) 4 MG capsule Take 1 capsule (4 mg) by mouth daily Swallow whole, do NOT break, crush, chew or open capsule. Take on days 1-21 of repeated 28 day cycles. 21 capsule 0     potassium chloride ER (KLOR-CON M) 10 MEQ CR tablet Take 2 tablets (20 mEq) by mouth 2 times daily 360 tablet 3     prochlorperazine (COMPAZINE) 10 MG tablet Take 10 mg by mouth every 6 hours as  needed for nausea or vomiting       study - simvastatin, IDS# [id number], 40 MG tablet Take 1 tablet (40 mg) by mouth daily Take at least 5 daily doses prior to apheresis and continue until Day +30 after CAR-T infusion. 65 tablet 0     Zoledronic Acid (ZOMETA IV)          PAST SURGICAL HISTORY:  Past Surgical History:   Procedure Laterality Date     BIOPSY BONE PELVIS Left 09/07/2021    Procedure: Biopsy left pubic ramis;  Surgeon: Edu Philip MD;  [facility]: [facility]     COLONOSCOPY N/A 04/13/2022    Procedure: COLONOSCOPY, WITH BIOPSY;  Surgeon: Berto Montgomery MD;  Location:  GI     EP ABLATION PVC N/A 03/23/2022    Procedure: Ablation Premature Ventricular Contractions;  Surgeon: Alem Thomas MD;  Location:  HEART CARDIAC CATH LAB     INSERT CATHETER VASCULAR ACCESS N/A 11/21/2022    Procedure: CENTRAL VENOUS CATHETER NON TUNNELED INSERTION, VASCULAR ACCESS CATHETER;  Surgeon: Erich Bauman MD;  Location: [facility] OR     IR CVC NON TUNNEL LINE REMOVAL  11/22/2022     IR CVC NON TUNNEL PLACEMENT > 5 YRS  11/21/2022     LASIK       [facility] NONSPECIFIC PROCEDURE  01/01/1987    [place] teeth removed     [facility] NONSPECIFIC PROCEDURE  01/01/1993    (R) knee surgery     [facility] NONSPECIFIC PROCEDURE  01/01/2003    (R) breast bx-negative 6/03; bx neg 10/02     [facility] NONSPECIFIC PROCEDURE      10/02 cardiac echo normal     [facility] NONSPECIFIC PROCEDURE  01/01/2004 4/04 colonosc int roids, spastic colon     [facility] NONSPECIFIC PROCEDURE  08/01/2005    D & C for missed AB     [facility] COLONOSCOPY THRU STOMA, DIAGNOSTIC  01/01/2004       ALLERGIES:     Allergies   Allergen Reactions     Amoxicillin      Other reaction(s): upset stomach  Other reaction(s): Gastrointestinal, GI intolerance, Other (see comments)  Other reaction(s): upset stomach  Other reaction(s): upset stomach       Clarithromycin      Other reaction(s): GI intolerance     Erythromycin      upsets stomach     Erythromycin      Other reaction(s): GI intolerance  upsets  stomach  nausea     Fluconazole Rash     Itching,   Unsure if really allergy  Itching,   Unsure if really allergy    Other reaction(s): Other (see comments)  Itching,   Unsure if really allergy       FAMILY HISTORY:  + Premature coronary artery disease  - Atrial fibrillation  - Sudden cardiac death     SOCIAL HISTORY:  Social History     Tobacco Use     Smoking status: Never     Smokeless tobacco: Never   Vaping Use     Vaping Use: Never used   Substance Use Topics     Alcohol use: Yes     Comment: none to a couple. occasional     Drug use: No       ROS:   Answers for HPI/ROS submitted by the patient on 1/31/2022 were reviewed.  Constitutional: No fever, chills, or sweats. Weight stable.   ENT: No visual disturbance, ear ache, epistaxis, sore throat.   Cardiovascular: As per HPI.   Respiratory: No cough, hemoptysis.    GI: No nausea, vomiting, hematemesis, melena, or hematochezia.   : No hematuria.   Integument: Negative.   Psychiatric: Negative.   Hematologic:  Easy bruising, no easy bleeding.  Neuro: Negative.   Endocrinology: No significant heat or cold intolerance   Musculoskeletal: No myalgia.    Exam:  LMP  (LMP Unknown)   GENERAL APPEARANCE: healthy, alert and no distress  HEENT: no icterus, no xanthelasmas, normal pupil size and reaction, normal palate, mucosa moist, no central cyanosis  NECK: no adenopathy, no asymmetry, masses, or scars, thyroid normal to palpation and no bruits, JVP not elevated  RESPIRATORY: lungs clear to auscultation - no rales, rhonchi or wheezes, no use of accessory muscles, no retractions, respirations are unlabored, normal respiratory rate  CARDIOVASCULAR: regular rhythm, normal S1 with physiologic split S2, no S3 or S4 and no murmur, click or rub, precordium quiet with normal PMI.  ABDOMEN: soft, non tender, without hepatosplenomegaly, no masses palpable, bowel sounds normal, aorta not enlarged by palpation, no abdominal bruits  EXTREMITIES: peripheral pulses normal, no edema,  no bruits  NEURO: alert and oriented to person/place/time, normal speech, gait and affect  VASC: Radial, femoral, dorsalis pedis and posterior tibialis pulses are normal in volumes and symmetric bilaterally. No bruits are heard.  SKIN: no ecchymoses, no rashes    Labs:  CBC RESULTS:   Lab Results   Component Value Date    WBC 4.0 11/23/2022    WBC 7.3 02/13/2006    RBC 3.81 11/23/2022    RBC 4.25 02/13/2006    HGB 12.0 11/23/2022    HGB 12.2 08/29/2006    HCT 36.0 11/23/2022    HCT 38.0 02/13/2006    MCV 95 11/23/2022    MCV 89 02/13/2006    MCH 31.5 11/23/2022    MCH 30.6 02/13/2006    MCHC 33.3 11/23/2022    MCHC 34.2 02/13/2006    RDW 14.1 11/23/2022    RDW 12.3 02/13/2006     11/23/2022     02/13/2006       BMP RESULTS:  Lab Results   Component Value Date     11/22/2022    POTASSIUM 3.9 11/22/2022    POTASSIUM 4.3 08/10/2022    CHLORIDE 108 (H) 11/22/2022    CHLORIDE 112 (H) 08/10/2022    CO2 23 11/22/2022    CO2 24 08/10/2022    ANIONGAP 11 11/22/2022    ANIONGAP 7 08/10/2022     (H) 11/22/2022     (H) 08/10/2022    GLC 81 09/24/2021    BUN 12.3 11/22/2022    BUN 21 08/10/2022    CR 0.80 11/22/2022    GFRESTIMATED 88 11/22/2022    MEKA 9.5 11/22/2022        INR RESULTS:  Lab Results   Component Value Date    INR 0.95 11/14/2022       Procedures:  Holter ECG in 9/2021: Reviewed.      ECG on 10/8/2021: Reviewed.      ECG on 1/17/2022: Reviewed.      ECG on 1/31/2022: Reviewed.      Zio patch in 5-6/2022: Reviewed.            ECG on 6/27/2022: Reviewed.      TTE on 11/15/2022: Reviewed.  Interpretation Summary  Global and regional left ventricular function is normal with an EF of 55-60%.  Global right ventricular function is normal.  Pulmonary artery systolic pressure is normal.  The inferior vena cava is normal.  No pericardial effusion is present.    ECG on 11/14/2022: Reviewed.      Zio patch in 11/2022: Reviewed.          TTE on 11/15/2022: Reviewed.  Interpretation  Summary  Global and regional left ventricular function is normal with an EF of 55-60%.  Global right ventricular function is normal.  Pulmonary artery systolic pressure is normal.  The inferior vena cava is normal.  No pericardial effusion is present.    Assessment and Plan:  # Irritable bowel disease  # Allergies  # Large plasmacytoma of the L pelvis  # Multiple myeloma, newly diagnosed.  # Frequent PVCs Symptomatic. [place] = 15% per Holter ECG in 9/2021. Likely originating from the LCC and posteromedial papillary muscle.   -> s/p catheter ablation of PVCs originating from the RVOT and anterolateral papillary muscle on 3/23/2022.  -> PVC burden = 2.7% per Zio patch in 5-6/2022.  The PVC burden was reduced by the catheter ablation.  But, the patient is now having frequent PVCs again that are different from what were treated by the catheter ablation. It appears that chemo Thx induced new PVCs.  -> PVC burden = 1.4% per Zio patch in 11/2022.  TTE was normal on 11/15/2022.  She would be able to tolerate CAR-T.  I will see her back in 6 months if she does well after CAR-T.     I spent a total of 30 min today to review the records, see the patient, and complete the documents.    CC  Patient Care Team:  Rena Sheffield MD as PCP - General (Family Medicine)  Edu Philip MD as Assigned Musculoskeletal Provider  Griselda Valdez, RN as Specialty Care Coordinator (Hematology & Oncology)  Payton Reinoso MD as MD (Hematology)  Alem Thomas MD (Cardiovascular Disease)  Alem Thomas MD as Assigned Heart and Vascular Provider  Shabana Bauman MD as MD (Ophthalmology)  Payton Reinoso MD as Assigned Cancer Care Provider  Fanny Ponce MD as Assigned Surgical Provider  Nika Laboy, RN as Specialty Care Coordinator (Cardiology)  Gee Peraza, RN as Specialty Care Coordinator (BMT - Adult)  Jeanmarie Marquez MD as MD (Hematology)  Rosangela Rosales LICSW as  (BMT -  Adult)  Carissa Maciel PA-C as Assigned PCP  Payton Reinoso MD as BMT Physician (Transplant)  GODWIN LEAHY J

## 2022-12-12 NOTE — LETTER
12/12/2022      RE: Kristie Cornejo  [address]  [facility] [place] [address]       Dear Colleague,    Thank you for the opportunity to participate in the care of your patient, Kristie Cornejo, at the [facility] [place] at [facility]. Please see a copy of my visit note below.    HPI:   Kristie Cornejo is a 52 year old female with a past medical history significant for Irritable bowel disease, allergies, large plasmacytoma of the L pelvis, newly diagnosed multiple myeloma, and PVCs.  She was referred for a cardiology evaluation.    She started having PVCs in 2001 and they are getting worse.  She stated that physical Ex might be a trigger of her PVCs.  Her PVCs are syymptomatic but she is having more PVCs than she feels.  She underwent catheter ablation of PVCs originating from the RVOT and anterolateral papillary muscle on 3/23/2022.  She is schedule to have CAR-T in a month.  Because she is still having PVCs, she took TTE and Zio patch.  She is now seen for an evaluation.    PAST MEDICAL HISTORY:  Past Medical History:   Diagnosis Date     Allergic rhinitis, cause unspecified      Anxiety state, unspecified 12/01/2003    Started postpartum . On Paxil x 1+years. Off meds- 10/04     Irritable bowel syndrome 01/01/2004     Multiple myeloma not having achieved remission (H)      PLANTAR WARTS     resolved     SINUS DYSRHYTHMIA 10/01/2002    wnl     Unspecified hemorrhoids without mention of complication 04/01/2004    colonoscopy 4/04 spastic colon, int roids; bx neg       CURRENT MEDICATIONS:  Current Outpatient Medications   Medication Sig Dispense Refill     acyclovir (ZOVIRAX) 400 MG tablet Take 1 tablet (400 mg) by mouth 2 times daily 180 tablet 3     aspirin (ASA) 81 MG EC tablet Take 81 mg by mouth daily       BUSPIRONE HCL 10 MG PO TABS 1 TABLET 3 TIMES DAILY as needed 30 Tab 3     Calcium Carbonate-Vit D-Min (CALCIUM 600+D PLUS MINERALS)  600-400 MG-UNIT TABS        cetirizine HCl 10 MG CAPS Take 20 mg by mouth       dexamethasone (DECADRON) 4 MG tablet Take 5 tablets (20 mg) by mouth See Admin Instructions (Patient not taking: Reported on 11/21/2022) 10 tablet 0     dexamethasone (DECADRON) 4 MG tablet Take 5 tablets (20 mg) by mouth See Admin Instructions (Patient not taking: Reported on 11/21/2022) 10 tablet 0     dexamethasone (DECADRON) 4 MG tablet Take 5 tablets (20 mg) by mouth See Admin Instructions 10 tablet 0     Flaxseed (Linseed) (FLAX SEED OIL) 1000 MG capsule Take 1 capsule by mouth daily       fluticasone (FLONASE) 50 MCG/ACT nasal spray 1 spray       glucosamine-chondroitin 500-400 MG CAPS per capsule        lactobacillus rhamnosus, GG, (CULTURELL) capsule Take 1 capsule by mouth 2 times daily       loperamide (IMODIUM) 2 MG capsule Take 2 mg by mouth 4 times daily as needed for diarrhea       LORazepam (ATIVAN) 0.5 MG tablet Take 1 tablet (0.5 mg) by mouth every 4 hours as needed for anxiety, nausea or sleep (Patient taking differently: Take 0.5 mg by mouth every 4 hours as needed for anxiety, nausea or sleep) 30 tablet 3     melatonin 5 MG tablet Take 5 mg by mouth nightly as needed for sleep       MIRENA 20 MCG/24HR IU IUD use for up to 5 years, then remove       Multiple Vitamin (MULTI-VITAMIN DAILY PO) Take 1 tablet by mouth       omeprazole (PRILOSEC OTC) 20 MG EC tablet Take 1 tablet (20 mg) by mouth 2 times daily 60 tablet 3     polyethylene glycol (MIRALAX) 17 g packet Take 1 packet by mouth daily       pomalidomide (POMALYST) 4 MG capsule Take 1 capsule (4 mg) by mouth daily Swallow whole, do NOT break, crush, chew or open capsule. Take on days 1-21 of repeated 28 day cycles. 21 capsule 0     pomalidomide (POMALYST) 4 MG capsule Take 1 capsule (4 mg) by mouth daily Swallow whole, do NOT break, crush, chew or open capsule. Take on days 1-21 of repeated 28 day cycles. 21 capsule 0     pomalidomide (POMALYST) 4 MG capsule Take  1 capsule (4 mg) by mouth daily Swallow whole, do NOT break, crush, chew or open capsule. Take on days 1-21 of repeated 28 day cycles. 21 capsule 0     potassium chloride ER (KLOR-CON M) 10 MEQ CR tablet Take 2 tablets (20 mEq) by mouth 2 times daily 360 tablet 3     prochlorperazine (COMPAZINE) 10 MG tablet Take 10 mg by mouth every 6 hours as needed for nausea or vomiting       study - simvastatin, IDS# 5641, 40 MG tablet Take 1 tablet (40 mg) by mouth daily Take at least 5 daily doses prior to apheresis and continue until Day +30 after CAR-T infusion. 65 tablet 0     Zoledronic Acid (ZOMETA IV)          PAST SURGICAL HISTORY:  Past Surgical History:   Procedure Laterality Date     BIOPSY BONE PELVIS Left 09/07/2021    Procedure: Biopsy left pubic ramis;  Surgeon: Edu Philip MD;  [facility]     COLONOSCOPY N/A 04/13/2022    Procedure: COLONOSCOPY, WITH BIOPSY;  Surgeon: Berto Montgomery MD;  Location:  GI     EP ABLATION PVC N/A 03/23/2022    Procedure: Ablation Premature Ventricular Contractions;  Surgeon: Alem Thomas MD;  Location:  HEART CARDIAC CATH LAB     INSERT CATHETER VASCULAR ACCESS N/A 11/21/2022    Procedure: CENTRAL VENOUS CATHETER NON TUNNELED INSERTION, VASCULAR ACCESS CATHETER;  Surgeon: Erich Bauman MD;  Location: [facility] OR     IR CVC NON TUNNEL LINE REMOVAL  11/22/2022     IR CVC NON TUNNEL PLACEMENT > 5 YRS  11/21/2022     LASIK       [facility] NONSPECIFIC PROCEDURE  01/01/1987    [place] teeth removed     [facility] NONSPECIFIC PROCEDURE  01/01/1993    (R) knee surgery     [facility] NONSPECIFIC PROCEDURE  01/01/2003    (R) breast bx-negative 6/03; bx neg 10/02     [facility] NONSPECIFIC PROCEDURE      10/02 cardiac echo normal     [facility] NONSPECIFIC PROCEDURE  01/01/2004 4/04 colonosc int roids, spastic colon     [facility] NONSPECIFIC PROCEDURE  08/01/2005    D & C for missed AB     [facility] COLONOSCOPY THRU STOMA, DIAGNOSTIC  01/01/2004       ALLERGIES:     Allergies   Allergen Reactions      Amoxicillin      Other reaction(s): upset stomach  Other reaction(s): Gastrointestinal, GI intolerance, Other (see comments)  Other reaction(s): upset stomach  Other reaction(s): upset stomach       Clarithromycin      Other reaction(s): GI intolerance     Erythromycin      upsets stomach     Erythromycin      Other reaction(s): GI intolerance  upsets stomach  nausea     Fluconazole Rash     Itching,   Unsure if really allergy  Itching,   Unsure if really allergy    Other reaction(s): Other (see comments)  Itching,   Unsure if really allergy       FAMILY HISTORY:  + Premature coronary artery disease  - Atrial fibrillation  - Sudden cardiac death     SOCIAL HISTORY:  Social History     Tobacco Use     Smoking status: Never     Smokeless tobacco: Never   Vaping Use     Vaping Use: Never used   Substance Use Topics     Alcohol use: Yes     Comment: none to a couple. occasional     Drug use: No       ROS:   Answers for HPI/ROS submitted by the patient on 1/31/2022 were reviewed.  Constitutional: No fever, chills, or sweats. Weight stable.   ENT: No visual disturbance, ear ache, epistaxis, sore throat.   Cardiovascular: As per HPI.   Respiratory: No cough, hemoptysis.    GI: No nausea, vomiting, hematemesis, melena, or hematochezia.   : No hematuria.   Integument: Negative.   Psychiatric: Negative.   Hematologic:  Easy bruising, no easy bleeding.  Neuro: Negative.   Endocrinology: No significant heat or cold intolerance   Musculoskeletal: No myalgia.    Exam:  LMP  (LMP Unknown)   GENERAL APPEARANCE: healthy, alert and no distress  HEENT: no icterus, no xanthelasmas, normal pupil size and reaction, normal palate, mucosa moist, no central cyanosis  NECK: no adenopathy, no asymmetry, masses, or scars, thyroid normal to palpation and no bruits, JVP not elevated  RESPIRATORY: lungs clear to auscultation - no rales, rhonchi or wheezes, no use of accessory muscles, no retractions, respirations are unlabored, normal  respiratory rate  CARDIOVASCULAR: regular rhythm, normal S1 with physiologic split S2, no S3 or S4 and no murmur, click or rub, precordium quiet with normal PMI.  ABDOMEN: soft, non tender, without hepatosplenomegaly, no masses palpable, bowel sounds normal, aorta not enlarged by palpation, no abdominal bruits  EXTREMITIES: peripheral pulses normal, no edema, no bruits  NEURO: alert and oriented to person/place/time, normal speech, gait and affect  VASC: Radial, femoral, dorsalis pedis and posterior tibialis pulses are normal in volumes and symmetric bilaterally. No bruits are heard.  SKIN: no ecchymoses, no rashes    Labs:  CBC RESULTS:   Lab Results   Component Value Date    WBC 4.0 11/23/2022    WBC 7.3 02/13/2006    RBC 3.81 11/23/2022    RBC 4.25 02/13/2006    HGB 12.0 11/23/2022    HGB 12.2 08/29/2006    HCT 36.0 11/23/2022    HCT 38.0 02/13/2006    MCV 95 11/23/2022    MCV 89 02/13/2006    MCH 31.5 11/23/2022    MCH 30.6 02/13/2006    MCHC 33.3 11/23/2022    MCHC 34.2 02/13/2006    RDW 14.1 11/23/2022    RDW 12.3 02/13/2006     11/23/2022     02/13/2006       BMP RESULTS:  Lab Results   Component Value Date     11/22/2022    POTASSIUM 3.9 11/22/2022    POTASSIUM 4.3 08/10/2022    CHLORIDE 108 (H) 11/22/2022    CHLORIDE 112 (H) 08/10/2022    CO2 23 11/22/2022    CO2 24 08/10/2022    ANIONGAP 11 11/22/2022    ANIONGAP 7 08/10/2022     (H) 11/22/2022     (H) 08/10/2022    GLC 81 09/24/2021    BUN 12.3 11/22/2022    BUN 21 08/10/2022    CR 0.80 11/22/2022    GFRESTIMATED 88 11/22/2022    MEKA 9.5 11/22/2022        INR RESULTS:  Lab Results   Component Value Date    INR 0.95 11/14/2022       Procedures:  Holter ECG in 9/2021: Reviewed.      ECG on 10/8/2021: Reviewed.      ECG on 1/17/2022: Reviewed.      ECG on 1/31/2022: Reviewed.      Zio patch in 5-6/2022: Reviewed.            ECG on 6/27/2022: Reviewed.      TTE on 11/15/2022: Reviewed.  Interpretation Summary  Global and  regional left ventricular function is normal with an EF of 55-60%.  Global right ventricular function is normal.  Pulmonary artery systolic pressure is normal.  The inferior vena cava is normal.  No pericardial effusion is present.    ECG on 11/14/2022: Reviewed.      Zio patch in 11/2022: Reviewed.          TTE on 11/15/2022: Reviewed.  Interpretation Summary  Global and regional left ventricular function is normal with an EF of 55-60%.  Global right ventricular function is normal.  Pulmonary artery systolic pressure is normal.  The inferior vena cava is normal.  No pericardial effusion is present.    Assessment and Plan:  # Irritable bowel disease  # Allergies  # Large plasmacytoma of the L pelvis  # Multiple myeloma, newly diagnosed.  # Frequent PVCs Symptomatic. [place] = 15% per Holter ECG in 9/2021. Likely originating from the LCC and posteromedial papillary muscle.   -> s/p catheter ablation of PVCs originating from the RVOT and anterolateral papillary muscle on 3/23/2022.  -> PVC burden = 2.7% per Zio patch in 5-6/2022.  The PVC burden was reduced by the catheter ablation.  But, the patient is now having frequent PVCs again that are different from what were treated by the catheter ablation. It appears that chemo Thx induced new PVCs.  -> PVC burden = 1.4% per Zio patch in 11/2022.  TTE was normal on 11/15/2022.  She would be able to tolerate CAR-T.  I will see her back in 6 months if she does well after CAR-T.     I spent a total of 30 min today to review the records, see the patient, and complete the documents.    Please do not hesitate to contact me if you have any questions/concerns.     Sincerely,     Alem Thomas MD    CC  Patient Care Team:  Rena Sheffield MD as PCP - General (Family Medicine)  Edu Philip MD as Assigned Musculoskeletal Provider  Griselda Valdez RN as Specialty Care Coordinator (Hematology & Oncology)  Payton Reinoso MD as MD (Hematology)  Alem Thomas MD  (Cardiovascular Disease)  Alem Thomas MD as Assigned Heart and Vascular Provider  Shabana Bauman MD as MD (Ophthalmology)  Payton Reinoso MD as Assigned Cancer Care Provider  Fanny Ponce MD as Assigned Surgical Provider  Nika Laboy, RN as Specialty Care Coordinator (Cardiology)  Gee Peraza, RN as Specialty Care Coordinator (BMT - Adult)  Jeanmarie Marquez MD as MD (Hematology)  Rosangela Rosales LICSW as  (BMT - Adult)  Carissa Maciel PA-C as Assigned PCP  Payton Reinoso MD as BMT Physician (Transplant)  GODWIN LEAHY

## 2022-12-12 NOTE — PATIENT INSTRUCTIONS
You were seen in the Electrophysiology Clinic today by: Dr Thomas    Plan:     Follow up Visit:  6 months with Dr Thomas      If you have further questions, please utilize SocialFlow to contact us.     Your Care Team:    EP Cardiology   Telephone Number     Nurse Line  Olamide Barroso, RN   Nika Laboy, RN   [phone]     For scheduling appointments:   Liliya   [phone]   For procedure scheduling:    Harini Couch     [phone]   For the Device Clinic (Pacemakers, ICDs, Loop Recorders)    During business hours: [phone]  After business hours:   [phone]- select option 4 and ask for job code [id number].       On-call cardiologist for after hours or on weekends:   [phone], option #4, and ask to speak to the on-call cardiologist.     Cardiovascular Clinic:   [address] [place] [address]. [place] [address]      As always, Thank you for trusting us with your health care needs!

## 2022-12-12 NOTE — NURSING NOTE
Chief Complaint   Patient presents with     Follow Up     Return EP- 1 mo f/u for pvcs     Vitals were taken and medications reconciled.    SEJAL Wakefield  11:03 AM

## 2022-12-15 DIAGNOSIS — C90.00 MULTIPLE MYELOMA NOT HAVING ACHIEVED REMISSION (H): ICD-10-CM

## 2022-12-15 DIAGNOSIS — Z86.2 PERSONAL HISTORY OF DISEASES OF BLOOD AND BLOOD-FORMING ORGANS: ICD-10-CM

## 2022-12-15 DIAGNOSIS — Z01.818 EXAMINATION PRIOR TO CHEMOTHERAPY: ICD-10-CM

## 2022-12-19 DIAGNOSIS — C90.00 LIGHT CHAIN MYELOMA (H): Primary | ICD-10-CM

## 2022-12-19 DIAGNOSIS — C90.00 MULTIPLE MYELOMA NOT HAVING ACHIEVED REMISSION (H): ICD-10-CM

## 2022-12-20 LAB
ABO/RH(D): ABNORMAL
ANTIBODY SCREEN: POSITIVE
SPECIMEN EXPIRATION DATE: ABNORMAL

## 2022-12-21 ENCOUNTER — ONCOLOGY VISIT (OUTPATIENT)
Dept: TRANSPLANT | Facility: CLINIC | Age: 52
End: 2022-12-21
Payer: COMMERCIAL

## 2022-12-21 ENCOUNTER — ALLIED HEALTH/NURSE VISIT (OUTPATIENT)
Dept: TRANSPLANT | Facility: CLINIC | Age: 52
End: 2022-12-21
Payer: COMMERCIAL

## 2022-12-21 ENCOUNTER — MEDICAL CORRESPONDENCE (OUTPATIENT)
Dept: TRANSPLANT | Facility: CLINIC | Age: 52
End: 2022-12-21

## 2022-12-21 VITALS
SYSTOLIC BLOOD PRESSURE: 112 MMHG | WEIGHT: 158.3 LBS | HEIGHT: 67 IN | RESPIRATION RATE: 18 BRPM | TEMPERATURE: 97.9 F | DIASTOLIC BLOOD PRESSURE: 75 MMHG | BODY MASS INDEX: 24.84 KG/M2 | HEART RATE: 76 BPM | OXYGEN SATURATION: 99 %

## 2022-12-21 DIAGNOSIS — Z86.2 PERSONAL HISTORY OF DISEASES OF BLOOD AND BLOOD-FORMING ORGANS: ICD-10-CM

## 2022-12-21 DIAGNOSIS — C90.00 MULTIPLE MYELOMA NOT HAVING ACHIEVED REMISSION (H): ICD-10-CM

## 2022-12-21 DIAGNOSIS — Z01.818 EXAMINATION PRIOR TO CHEMOTHERAPY: ICD-10-CM

## 2022-12-21 DIAGNOSIS — Z01.818 EXAMINATION PRIOR TO CHEMOTHERAPY: Primary | ICD-10-CM

## 2022-12-21 DIAGNOSIS — Z71.9 VISIT FOR COUNSELING: Primary | ICD-10-CM

## 2022-12-21 LAB
ALBUMIN SERPL BCG-MCNC: 4.7 G/DL (ref 3.5–5.2)
ALBUMIN UR-MCNC: 20 MG/DL
ALP SERPL-CCNC: 50 U/L (ref 35–104)
ALT SERPL W P-5'-P-CCNC: 28 U/L (ref 10–35)
ANION GAP SERPL CALCULATED.3IONS-SCNC: 11 MMOL/L (ref 7–15)
ANTIBODY SCREEN, TUBE: NORMAL
APPEARANCE UR: CLEAR
APTT PPP: 27 SECONDS (ref 22–38)
AST SERPL W P-5'-P-CCNC: 23 U/L (ref 10–35)
BASOPHILS # BLD AUTO: 0 10E3/UL (ref 0–0.2)
BASOPHILS NFR BLD AUTO: 1 %
BILIRUB SERPL-MCNC: 0.4 MG/DL
BILIRUB UR QL STRIP: NEGATIVE
BUN SERPL-MCNC: 21.9 MG/DL (ref 6–20)
CALCIUM SERPL-MCNC: 10.2 MG/DL (ref 8.6–10)
CHLORIDE SERPL-SCNC: 107 MMOL/L (ref 98–107)
COLOR UR AUTO: YELLOW
CREAT SERPL-MCNC: 0.81 MG/DL (ref 0.51–0.95)
CRP SERPL-MCNC: <3 MG/L
DEPRECATED HCO3 PLAS-SCNC: 24 MMOL/L (ref 22–29)
EOSINOPHIL # BLD AUTO: 0.1 10E3/UL (ref 0–0.7)
EOSINOPHIL NFR BLD AUTO: 4 %
ERYTHROCYTE [DISTWIDTH] IN BLOOD BY AUTOMATED COUNT: 14.2 % (ref 10–15)
FERRITIN SERPL-MCNC: 65 NG/ML (ref 11–328)
FIBRINOGEN PPP-MCNC: 332 MG/DL (ref 170–490)
FLUAV RNA SPEC QL NAA+PROBE: NEGATIVE
FLUBV RNA RESP QL NAA+PROBE: NEGATIVE
GFR SERPL CREATININE-BSD FRML MDRD: 87 ML/MIN/1.73M2
GLUCOSE SERPL-MCNC: 89 MG/DL (ref 70–99)
GLUCOSE UR STRIP-MCNC: 50 MG/DL
HCG SERPL QL: NEGATIVE
HCT VFR BLD AUTO: 39.6 % (ref 35–47)
HGB BLD-MCNC: 12.7 G/DL (ref 11.7–15.7)
HGB UR QL STRIP: NEGATIVE
IMM GRANULOCYTES # BLD: 0 10E3/UL
IMM GRANULOCYTES NFR BLD: 0 %
INR PPP: 0.91 (ref 0.85–1.15)
KETONES UR STRIP-MCNC: NEGATIVE MG/DL
LDH SERPL L TO P-CCNC: 195 U/L (ref 0–250)
LEUKOCYTE ESTERASE UR QL STRIP: ABNORMAL
LYMPHOCYTES # BLD AUTO: 0.9 10E3/UL (ref 0.8–5.3)
LYMPHOCYTES NFR BLD AUTO: 26 %
MAGNESIUM SERPL-MCNC: 2 MG/DL (ref 1.7–2.3)
MCH RBC QN AUTO: 31 PG (ref 26.5–33)
MCHC RBC AUTO-ENTMCNC: 32.1 G/DL (ref 31.5–36.5)
MCV RBC AUTO: 97 FL (ref 78–100)
MONOCYTES # BLD AUTO: 0.5 10E3/UL (ref 0–1.3)
MONOCYTES NFR BLD AUTO: 15 %
NEUTROPHILS # BLD AUTO: 1.9 10E3/UL (ref 1.6–8.3)
NEUTROPHILS NFR BLD AUTO: 54 %
NITRATE UR QL: NEGATIVE
NRBC # BLD AUTO: 0 10E3/UL
NRBC BLD AUTO-RTO: 0 /100
PH UR STRIP: 5.5 [PH] (ref 5–7)
PHOSPHATE SERPL-MCNC: 4 MG/DL (ref 2.5–4.5)
PLATELET # BLD AUTO: 289 10E3/UL (ref 150–450)
POTASSIUM SERPL-SCNC: 4.1 MMOL/L (ref 3.4–5.3)
PROT SERPL-MCNC: 7 G/DL (ref 6.4–8.3)
RBC # BLD AUTO: 4.1 10E6/UL (ref 3.8–5.2)
RBC URINE: 1 /HPF
RSV RNA SPEC NAA+PROBE: NEGATIVE
SARS-COV-2 RNA RESP QL NAA+PROBE: NEGATIVE
SODIUM SERPL-SCNC: 142 MMOL/L (ref 136–145)
SP GR UR STRIP: 1.02 (ref 1–1.03)
SPECIMEN EXPIRATION DATE: NORMAL
SQUAMOUS EPITHELIAL: 1 /HPF
TOTAL PROTEIN SERUM FOR ELP: 6.6 G/DL (ref 6.4–8.3)
URATE SERPL-MCNC: 1.4 MG/DL (ref 2.4–5.7)
UROBILINOGEN UR STRIP-MCNC: NORMAL MG/DL
WBC # BLD AUTO: 3.5 10E3/UL (ref 4–11)
WBC URINE: 5 /HPF

## 2022-12-21 PROCEDURE — 86687 HTLV-I ANTIBODY: CPT

## 2022-12-21 PROCEDURE — 84703 CHORIONIC GONADOTROPIN ASSAY: CPT

## 2022-12-21 PROCEDURE — 81001 URINALYSIS AUTO W/SCOPE: CPT

## 2022-12-21 PROCEDURE — 82784 ASSAY IGA/IGD/IGG/IGM EACH: CPT

## 2022-12-21 PROCEDURE — 84165 PROTEIN E-PHORESIS SERUM: CPT | Mod: TC | Performed by: PATHOLOGY

## 2022-12-21 PROCEDURE — 83735 ASSAY OF MAGNESIUM: CPT

## 2022-12-21 PROCEDURE — 84550 ASSAY OF BLOOD/URIC ACID: CPT

## 2022-12-21 PROCEDURE — 99214 OFFICE O/P EST MOD 30 MIN: CPT

## 2022-12-21 PROCEDURE — 87798 DETECT AGENT NOS DNA AMP: CPT

## 2022-12-21 PROCEDURE — 80053 COMPREHEN METABOLIC PANEL: CPT

## 2022-12-21 PROCEDURE — 82728 ASSAY OF FERRITIN: CPT

## 2022-12-21 PROCEDURE — G0463 HOSPITAL OUTPT CLINIC VISIT: HCPCS

## 2022-12-21 PROCEDURE — 83021 HEMOGLOBIN CHROMOTOGRAPHY: CPT

## 2022-12-21 PROCEDURE — 85384 FIBRINOGEN ACTIVITY: CPT

## 2022-12-21 PROCEDURE — 86696 HERPES SIMPLEX TYPE 2 TEST: CPT

## 2022-12-21 PROCEDURE — 85610 PROTHROMBIN TIME: CPT

## 2022-12-21 PROCEDURE — 86850 RBC ANTIBODY SCREEN: CPT

## 2022-12-21 PROCEDURE — 86665 EPSTEIN-BARR CAPSID VCA: CPT

## 2022-12-21 PROCEDURE — 84100 ASSAY OF PHOSPHORUS: CPT

## 2022-12-21 PROCEDURE — 83615 LACTATE (LD) (LDH) ENZYME: CPT

## 2022-12-21 PROCEDURE — 86780 TREPONEMA PALLIDUM: CPT

## 2022-12-21 PROCEDURE — 85025 COMPLETE CBC W/AUTO DIFF WBC: CPT

## 2022-12-21 PROCEDURE — 84155 ASSAY OF PROTEIN SERUM: CPT

## 2022-12-21 PROCEDURE — 85730 THROMBOPLASTIN TIME PARTIAL: CPT

## 2022-12-21 PROCEDURE — 87637 SARSCOV2&INF A&B&RSV AMP PRB: CPT

## 2022-12-21 PROCEDURE — 83521 IG LIGHT CHAINS FREE EACH: CPT

## 2022-12-21 PROCEDURE — 36415 COLL VENOUS BLD VENIPUNCTURE: CPT

## 2022-12-21 PROCEDURE — 86140 C-REACTIVE PROTEIN: CPT

## 2022-12-21 PROCEDURE — 82232 ASSAY OF BETA-2 PROTEIN: CPT

## 2022-12-21 PROCEDURE — 86334 IMMUNOFIX E-PHORESIS SERUM: CPT | Performed by: PATHOLOGY

## 2022-12-21 RX ORDER — LYSINE HCL 500 MG
TABLET ORAL
Status: ON HOLD | COMMUNITY
Start: 2022-12-21 | End: 2023-01-10

## 2022-12-21 RX ORDER — OMEPRAZOLE 20 MG/1
20 TABLET, DELAYED RELEASE ORAL DAILY
COMMUNITY
Start: 2022-12-21

## 2022-12-21 ASSESSMENT — ANXIETY QUESTIONNAIRES
2. NOT BEING ABLE TO STOP OR CONTROL WORRYING: NOT AT ALL
GAD7 TOTAL SCORE: 0
IF YOU CHECKED OFF ANY PROBLEMS ON THIS QUESTIONNAIRE, HOW DIFFICULT HAVE THESE PROBLEMS MADE IT FOR YOU TO DO YOUR WORK, TAKE CARE OF THINGS AT HOME, OR GET ALONG WITH OTHER PEOPLE: NOT DIFFICULT AT ALL
GAD7 TOTAL SCORE: 0
5. BEING SO RESTLESS THAT IT IS HARD TO SIT STILL: NOT AT ALL
7. FEELING AFRAID AS IF SOMETHING AWFUL MIGHT HAPPEN: NOT AT ALL
3. WORRYING TOO MUCH ABOUT DIFFERENT THINGS: NOT AT ALL
6. BECOMING EASILY ANNOYED OR IRRITABLE: NOT AT ALL
1. FEELING NERVOUS, ANXIOUS, OR ON EDGE: NOT AT ALL

## 2022-12-21 ASSESSMENT — PATIENT HEALTH QUESTIONNAIRE - PHQ9
SUM OF ALL RESPONSES TO PHQ QUESTIONS 1-9: 2
5. POOR APPETITE OR OVEREATING: NOT AT ALL

## 2022-12-21 ASSESSMENT — PAIN SCALES - GENERAL: PAINLEVEL: NO PAIN (0)

## 2022-12-21 NOTE — PROGRESS NOTES
BMT Teaching Flowsheet    Kristie Cornejo is a 52 year old female  The primary encounter diagnosis was Examination prior to chemotherapy. Diagnoses of Multiple myeloma not having achieved remission (H) and Personal history of diseases of blood and blood-forming organs were also pertinent to this visit.    Teaching Topic: CAR T work up for MM    Person(s) involved in teaching: Patient  Motivation Level  Asks Questions: Yes  Eager to Learn: Yes  Cooperative: Yes  Receptive (willing/able to accept information): Yes  Any cultural factors/Yazdanism beliefs that may influence understanding or compliance? No    Patient and Family demonstrates understanding of the following:  - Reason for the appointment, diagnosis and treatment plan: Yes  - Knowledge of proper use of medications and conditions for which they are ordered (with special attention to potential side effects or drug interactions): Yes  - Which situations necessitate calling provider and whom to contact: Yes    Teaching concerns addressed: reviewed work up calendar including discussion of all tests and procedures associated with work up, labs drawn, ua collected, covid/resp panel swab sent. 24 hr urine collection kit given to pt who verbalixzed understanding, frailty completed    Proper use and care of (medical equipment, care aids, etc.) NA  Pain management techniques: Yes  Patient instructed on hand hygiene: Yes  How and/when to access community resources: Yes    Infection Control:  Patient and Family demonstrates understanding of the following:  Surgical procedure site care taught: NA  Signs and symptoms of infection taught: NA  Wound care taught: NA  Central venous catheter care taught: NA    Instructional Materials Used/Given:   Calendar, 24 hr urine kit.    For Kymriah/Yescarta/CAR-T patient wallet card given. Patient instructed to remain within close proximity (at least two hours) for at least four weeks post infusion. CAR-T patient is instructed not to  operate motorized vehicle or heavy machinery for a period of 8 weeks.    Time spent with patient: 30 minutes.  Specific Concerns: NA

## 2022-12-21 NOTE — PROGRESS NOTES
[facility]  BMTCT OPEN VISIT    December 21, 2022      Krisite Cornejo is a 52 year old female undergoing evaluation prior to hematopoietic cell transplant or immune effector cell therapy.    Reason for BMTCT: refractory MM pre-CAR-T    Recent chemotherapy: Sarclisa/Pom (bridging after apheresis); end 12/2/23    Recent infections: no    Blood thinner use? No    Treatment for diabetes? No      Today, the patient notes the following symptoms:  Review Of Systems  Skin: negative  Eyes: negative  Ears/Nose/Throat: negative  Respiratory: No shortness of breath, dyspnea on exertion, cough, or hemoptysis  Cardiovascular: hx palpitations; s/p ablation 4/2022; recent Ziopatch  Gastrointestinal: hx IBD; occasional diarrhea/constipation  Genitourinary: negative  Musculoskeletal: negative  Neurologic: numbness or tingling (altered sensation) of feet and and legs; improving  Psychiatric: negative  Hematologic/Lymphatic/Immunologic: negative  Endocrine: negative      Kristie Cornejo's History    Past Medical History:   Diagnosis Date     Allergic rhinitis, cause unspecified      Anxiety state, unspecified 12/01/2003    Started postpartum . On Paxil x 1+years. Off meds- 10/04     Irritable bowel syndrome 01/01/2004     Multiple myeloma not having achieved remission (H)      PLANTAR WARTS     resolved     SINUS DYSRHYTHMIA 10/01/2002    wnl     Unspecified hemorrhoids without mention of complication 04/01/2004    colonoscopy 4/04 spastic colon, int roids; bx neg       Past Surgical History:   Procedure Laterality Date     BIOPSY BONE PELVIS Left 09/07/2021    Procedure: Biopsy left pubic ramis;  Surgeon: Edu Philip MD;  Location: [facility] OR     COLONOSCOPY N/A 04/13/2022    Procedure: COLONOSCOPY, WITH BIOPSY;  Surgeon: Berto Montgomery MD;  Location:  GI     EP ABLATION PVC N/A 03/23/2022    Procedure: Ablation Premature Ventricular Contractions;  Surgeon: Alem Thomas MD;  Location:  HEART CARDIAC CATH  LAB     INSERT CATHETER VASCULAR ACCESS N/A 11/21/2022    Procedure: CENTRAL VENOUS CATHETER NON TUNNELED INSERTION, VASCULAR ACCESS CATHETER;  Surgeon: Erich Bauman MD;  Location: [facility] OR     IR CVC NON TUNNEL LINE REMOVAL  11/22/2022     IR CVC NON TUNNEL PLACEMENT > 5 YRS  11/21/2022     LASIK       Z NONSPECIFIC PROCEDURE  01/01/1987    [place] teeth removed     Z NONSPECIFIC PROCEDURE  01/01/1993    (R) knee surgery     Z NONSPECIFIC PROCEDURE  01/01/2003    (R) breast bx-negative 6/03; bx neg 10/02     Z NONSPECIFIC PROCEDURE      10/02 cardiac echo normal     Z NONSPECIFIC PROCEDURE  01/01/2004 4/04 colonosc int roids, spastic colon     [facility] NONSPECIFIC PROCEDURE  08/01/2005    D & C for missed AB     ZZ COLONOSCOPY THRU STOMA, DIAGNOSTIC  01/01/2004       Family History   Problem Relation Age of Onset     Neurologic Disorder Mother         toxo     Lipids Mother         low cholestrol     Lipids Father         high cholestrol     Glaucoma Maternal Grandmother      Diabetes Maternal Grandmother         breast cancer,stroke ischemic     Breast Cancer Maternal Grandmother         81yo/and autoimmune skin condition     C.A.D. Maternal Grandfather         strokes and mi, chronic respiratory from 2 hand smoke     Hypertension Paternal Grandmother         rheumatoid arthritis     Respiratory Paternal Grandfather         emphasema     Cancer - colorectal No family hx of      Prostate Cancer No family hx of      Macular Degeneration No family hx of        Social History     Tobacco Use     Smoking status: Never     Smokeless tobacco: Never   Substance Use Topics     Alcohol use: Yes     Comment: none to a couple. occasional           Kristie Cornejo's Medications and Allergies    Current Outpatient Medications   Medication     Calcium Carbonate-Vit D-Min (CALCIUM 600+D PLUS MINERALS) 600-400 MG-UNIT TABS     omeprazole (PRILOSEC OTC) 20 MG EC tablet     acyclovir (ZOVIRAX) 400 MG tablet      BUSPIRONE HCL 10 MG PO TABS     cetirizine HCl 10 MG CAPS     Flaxseed (Linseed) (FLAX SEED OIL) 1000 MG capsule     fluticasone (FLONASE) 50 MCG/ACT nasal spray     glucosamine-chondroitin 500-400 MG CAPS per capsule     lactobacillus rhamnosus, GG, (CULTURELL) capsule     LORazepam (ATIVAN) 0.5 MG tablet     melatonin 5 MG tablet     MIRENA 20 MCG/24HR IU IUD     Multiple Vitamin (MULTI-VITAMIN DAILY PO)     polyethylene glycol (MIRALAX) 17 g packet     potassium chloride ER (KLOR-CON M) 10 MEQ CR tablet     prochlorperazine (COMPAZINE) 10 MG tablet     study - simvastatin, IDS# [id number], 40 MG tablet     Zoledronic Acid (ZOMETA IV)     No current facility-administered medications for this visit.     Facility-Administered Medications Ordered in Other Visits   Medication     CT Flush          Allergies   Allergen Reactions     Amoxicillin      Other reaction(s): upset stomach  Other reaction(s): Gastrointestinal, GI intolerance, Other (see comments)  Other reaction(s): upset stomach  Other reaction(s): upset stomach       Clarithromycin      Other reaction(s): GI intolerance     Erythromycin      upsets stomach     Erythromycin      Other reaction(s): GI intolerance  upsets stomach  nausea     Fluconazole Rash     Itching,   Unsure if really allergy  Itching,   Unsure if really allergy    Other reaction(s): Other (see comments)  Itching,   Unsure if really allergy           Physical Examination    LMP  (LMP Unknown)     Exam:  Constitutional: healthy, alert and no distress  Head: AT/NC  ENT: OP Moist without lesions; good dentition  Cardiovascular: RRR no mrg  Respiratory: CTAB  Gastrointestinal: Abdomen soft, non-tender. BS normal. No masses, organomegaly  : Deferred  Musculoskeletal: extremities normal- no gross deformities noted, gait normal and normal muscle tone  Skin: no suspicious lesions or rashes  Neurologic: non-focal  Psychiatric: mentation appears normal and affect  normal/bright  Hematologic/Lymphatic/Immunologic: Normal cervical lymph nodes        Frailty Screening    1. Weight loss: Have you lost >10 pounds (or >5% body weight) unintentionally over the last year? No  ; she did lose 20lb this past summer but gained back and stable recently    2. Exhaustion: How often in the past week did you feel that:  o I feel that everything I do is an effort : .Exhaustion: 1 = some of the time (1-2 days)  o I feel I cannot get going: Exhaustion: 0 = rarely or none of the time (<1 day)    3. Weakness:  Hand  strength (measured by MA; calculate average): 18     Male BMI Frailty Criteria for  Male  Strength Female BMI Frailty Criteria for  Female  Strength   ?24 ?29 ?23 ?17   24.1 - 26 ?30 23.1 - 26 ?17.3   26.1 - 28 ?30 26.1 - 29 ?18   >28 ?32 >29 ?21     4. Slowness:  15 foot walk time (measured by MA):  16sec    Height Frailty Criteria for  15 Foot Walk Time   Men   ?173 cm ? 7 seconds    >173 cm  ? 6 seconds   Women   ?159 cm ? 7 seconds   >159 cm  ? 6 seconds     5. Physical activity:     *Please complete 2 calculations for kcal (see frailty worksheet for equations)     Energy expenditure for frailty: >270 kcal expended per week     Gender Frailty Criteria for Low Physical Activity   Male <383 kcal/week   Female <270 kcal/nahun Cornejo did not meet any criteria for prefrailty (score 1-2) or frailty (score 3+):   Frailty Score is: 0      Additional assessments not to be used in frailty calculation:   What types of physical activity can you tolerate? walks 1.5miles 4x/week, swims 1/2mile 3x/week, ice skating 1-2x/w recently    Sit to stand test (time to complete 5 reps): 16 seconds    Standing balance in 10 seconds:  Record the Total number of seconds(0-30)--add a+b+c ( take best attempt for each)    First attempt: 30 seconds    Second attempt: na      Overall Assessment  I have reviewed the diagnostic data, medications, frailty screening, and general  processes prior to BMTCT.  I have notified the Primary BMT Physician/and or Attending Physician in the clinic of any issues. We also discussed in detail the database and biorepository research for which Kristie Cornejo is eligible. We discussed the potential risks and potential benefits of each of these protocols individually. We explained potential alternatives to the protocols discussed. We explained to the patient that participation is voluntary and that consent may be withdrawn at any time.       Consents Signed:    Blood transfusion consent form    [facility] database    [facility] BMTCT Database    Present during the discussion were the patient and her . Copies of the signed consent forms will be provided to the patient on admission. No procedures specific to any studies were performed prior to the patient signing the consent form.    Kristie Cornejo had the opportunity to ask questions, and I answered all of the questions to the best of my ability.      Tabitha Bill PA-C

## 2022-12-21 NOTE — PROGRESS NOTES
"Blood and Marrow Transplant   Psychosocial Assessment with   Clinical     Assessment completed on 12/21/22 of Pt's living situation, support system, financial status, functional status, coping, stressors, need for resources and social work intervention provided as needed.  Information for this assessment was provided by Pt and spouse's report in addition to medical chart review and consultation with medical team.     Present at Assessment:   Patient: Kristie \"Margo\" Clemente  Spouse: Erwin \"Jesse\" Clemente  : SARAH Cabral LICSW    Diagnosis: Multiple Myeloma     Date of Diagnosis: 10/2021    Cellular therapy type: ABECMA CAR-T    Physician: Payton Reinoso MD    Nurse Coordinator: Julianne Peoples RN    Social Workers: SARAH Cabral LICSW     Permanent Address:   [address] [place] [id number]    Living Situation: Pt and spouse live in [place], [place] w/ their 15 y/o son Saul.    Local Address: Pt lives in [place], [place] (approximately [place] from [facility] which is within the required distance of the hospital. Pt does not need to relocate.     Contact Information:  Cell Phone: [phone]  Pt's email: [email or web address]  Pt's spouse's Phone: [phone]    Presenting Information:  Margo Cornejo is a 52 year old female diagnosed with Multiple Myeloma who presents for evaluation for an ABECMA CAR-T at the [facility] ([facility]). Pt was accompanied to today's visit by her  Jesse.     Decision Making: Self    Health Care Directive: Yes. Pt has a health care directive already on file.     Relationship Status: . Pt and pt's spouse have been  for 29 years. Pt described relationship as stable/supportive.     Special Needs: None identified at this time.     Family/Support System: Pt endorsed a strong support system including family and close friends who will be available to support pt throughout transplant process.     Family " "Information:  Spouse: Erwin \"Jesse\" Cornejo  Parents: Ernesto & Janet  Siblings: 1 sister - Misti  Children: 2 - 1 daughter Giles (20) student at [employer] ; 1 son Saul (16) [facility]  Friends:  Pt endorsed a good friend support system.    Caregiver: SW discussed with pt and pt's spouse the caregiver role and expectation at length. Pt is agreeable to having a full time caregiver for a minimum of 30 days until cleared by the BMT physician. Pt and pt's spouse confirmed understanding of the caregiver requirement. Pt's primary caregiver will be her spouse Jesse with her sister Misti, friend Bhavna, and friend Stephanie each assisting for a week as well as a secondary or back-up to assist as needed. Pt reviewed and signed the caregiver contract which will be scanned into the EMR. Caregiver education and resources provided. No caregiver concerns identified.     Caregiver Contact Information:  Jesse Cornejo (spouse) - cell phone: [phone]  Misti Preciado (sister) - cell phone: [phone]    Transportation Mode: Personal Vehicle. Pt is aware of driving restrictions post-BMT and the need for the caregiver is to drive until cleared to drive by the BMT physician. SW provided information on parking info and monthly parking pass options.    Insurance: Pt has [employer] health insurance. Pt denied specific insurance concerns at this time. SW reiterated information about the BMT Financial  should specific insurance questions arise as pt moves through transplant process.     Sources of Income: Pt's source of income is spouse's employment income. No financial concerns identified at this time. Pt shared she may be interested in receiving general information re: SSDI. SW provided Cancer Legal Care resource during visit today. SW discussed milly options and asked pt to let SW know if they would like to apply in the future.     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 Erwin works for [employer] and is a Software professional.    Mental Health: Pt reported a hx of Anxiety that she first noticed approximately 20+ years ago. Pt is currently taking medication and pt feels the medication is working well. Pt shared that she feels she has learned about her own triggers and various coping mechanisms that has greatly improved her mental health. Pt denies increase in mental health concerns since MM diagnosis in 2021. Pt shared she initially experienced panic attacks 20+ years ago, but has not experienced this in \"quite some time.\"    We discussed how many patients may see an increase in feelings of anxiety or depression while hospitalized for extended periods of time and pursue  isolation precautions post-BMT. Encouraged Pt and spouse to let us know if they are noticing an increase in symptoms. Pt believes she would be able to identify symptoms of depression/anxiety throughout the transplant process. We talked about the variety of modalities available to use as coping mechanisms (including but not limited to guided imagery, relaxation techniques, progressive muscle relaxation, counseling/talk therapy and medication). Pt has a friend that is a music therapist who will likely visit with her in the hospital.    PHQ-9:  Pt scored a 2 which indicates \"none\" on the depression severity scale. Pt endorses this is an accurate reflection of her emotional state.    GAD7:  Pt scored a 0 which indicates no sign of anxiety on the Generalized Anxiety Disorder Questionnaire. Pt endorses this is an accurate reflection of her emotional state.    Chemical Use:   Tobacco: No hx  Alcohol: No hx  Marijuana: No hx  Other Drugs: No hx  Based on the information provided, there appear to be no specific risks or concerns identified at this time.     Trauma/Loss/Abuse History: Multiple losses associated with cancer " "diagnosis and treatment, including health, employment, changes to physical appearance, etc.     Spirituality: Pt identified as non-spiritual. SW explained that there are Chaplains on the unit and pt can request to meet with a  at anytime.    Coping: Pt noted that she is currently feeling \"positive, hopeful, prepared, nervous a little, and ready to begin\". Pt shared that her main coping mechanisms are talking w/ friends/family, reading books, taking naps sometimes, gathering education information, and working on my hobbies. Pt noted that she enjoys sewing, swimming, figure skating, walking, skiing, and music. SW and pt discussed additional positive coping mechanisms that pt can utilize while in the hospital. While hospitalized, pt plans to take naps, read, talk on her phone, and work on crossword puzzles.     Caregiver Coping: Pt's spouse noted that he is feeling \"depressed, worried, and ready to begin\" at this time. Pt's spouse noted that he solitario by reading books, exercise, and working on his hobbies. Pt's spouse shared that he goes on walks daily outside no matter the weather and that he enjoys this. Pt's spouse presented quiet and reserved during SW visit. Caregiver resources offered/reviewed to Pt's spouse.    Education Provided: Transplant process expectations, Caregiver requirements, Caregiver self-care, Financial issues related to transplant, Financial resources/grants available, Common psychosocial stressors pre/post transplant, Support group(s) available, Hospital resources available, Web site information, Social Work role and Resources for children/siblings    Interventions Provided: Psychosocial Support and Education     Assessment and Recommendations for Team:  Pt is a 52 year old female diagnosed with MM who is here undergoing preparation for a planned ABECMA CAR-T.     Pt is pleasant, calm and able to articulate concerns/coping mechanisms in an appropriate manner. During our meeting pt was " alert, she was interactive, affect was full, she displayed appropriate eye contact, memory and thought processes. Pt feels comfortable communicating with the medical team. Pt has a strong supportive network of family and friends who are involved. Pt has developed strong coping mechanisms.     Pt will benefit from ongoing psychosocial support in regards to coping with the adjustment to the BMT process. CSW has discussed  psychosocial support options in regards to coping with the adjustment to the BMT process and support groups opportunities.      Pt has a strong support system and a confirmed caregiver plan. Pt verbalizes understanding of the transplant process and wanting to proceed. SW provided contact information and encouraged pt to contact SW with questions, concerns, resources and for support.    Per this assessment, SW did not identify any barriers to this patient moving forward with transplant.    Important Information:   -would like a treadmill while IP.     Follow up Planned:   Psychosocial support  Financial resources - SW will provide SSDI general information.     SARAH Cabral, [facility]  Adult Blood & Marrow Transplant   Phone: [phone]  Pager: [phone]

## 2022-12-21 NOTE — LETTER
12/21/2022         RE: Kristie Cornejo  [address]  [facility] [address]        Dear Colleague,    Thank you for referring your patient, Kristie Cornejo, to the [facility] BLOOD AND MARROW TRANSPLANT PROGRAM [place]. Please see a copy of my visit note below.    [facility]  BMTCT OPEN VISIT    December 21, 2022      Kristie Cornejo is a 52 year old female undergoing evaluation prior to hematopoietic cell transplant or immune effector cell therapy.    Reason for BMTCT: refractory MM pre-CAR-T    Recent chemotherapy: Sarclisa/Pom (bridging after apheresis); end 12/2/23    Recent infections: no    Blood thinner use? No    Treatment for diabetes? No      Today, the patient notes the following symptoms:  Review Of Systems  Skin: negative  Eyes: negative  Ears/Nose/Throat: negative  Respiratory: No shortness of breath, dyspnea on exertion, cough, or hemoptysis  Cardiovascular: hx palpitations; s/p ablation 4/2022; recent Ziopatch  Gastrointestinal: hx IBD; occasional diarrhea/constipation  Genitourinary: negative  Musculoskeletal: negative  Neurologic: numbness or tingling (altered sensation) of feet and and legs; improving  Psychiatric: negative  Hematologic/Lymphatic/Immunologic: negative  Endocrine: negative      Kristie Cornejo's History    Past Medical History:   Diagnosis Date     Allergic rhinitis, cause unspecified      Anxiety state, unspecified 12/01/2003    Started postpartum . On Paxil x 1+years. Off meds- 10/04     Irritable bowel syndrome 01/01/2004     Multiple myeloma not having achieved remission (H)      PLANTAR WARTS     resolved     SINUS DYSRHYTHMIA 10/01/2002    wnl     Unspecified hemorrhoids without mention of complication 04/01/2004    colonoscopy 4/04 spastic colon, int roids; bx neg       Past Surgical History:   Procedure Laterality Date     BIOPSY BONE PELVIS Left 09/07/2021    Procedure: Biopsy left pubic ramis;  Surgeon: Edu Philip MD;  Location:  [facility] OR     COLONOSCOPY N/A 04/13/2022    Procedure: COLONOSCOPY, WITH BIOPSY;  Surgeon: Berto Montgomery MD;  Location:  GI     EP ABLATION PVC N/A 03/23/2022    Procedure: Ablation Premature Ventricular Contractions;  Surgeon: Alem Thomas MD;  Location:  [facility] CARDIAC CATH LAB     INSERT CATHETER VASCULAR ACCESS N/A 11/21/2022    Procedure: CENTRAL VENOUS CATHETER NON TUNNELED INSERTION, VASCULAR ACCESS CATHETER;  Surgeon: Erich Bauman MD;  Location: [facility] OR     IR CVC NON TUNNEL LINE REMOVAL  11/22/2022     IR CVC NON TUNNEL PLACEMENT > 5 YRS  11/21/2022     LASIK       ZZC NONSPECIFIC PROCEDURE  01/01/1987    [place] teeth removed     ZZC NONSPECIFIC PROCEDURE  01/01/1993    (R) knee surgery     ZZC NONSPECIFIC PROCEDURE  01/01/2003    (R) breast bx-negative 6/03; bx neg 10/02     ZZC NONSPECIFIC PROCEDURE      10/02 cardiac echo normal     ZZC NONSPECIFIC PROCEDURE  01/01/2004 4/04 colonosc int roids, spastic colon     ZZC NONSPECIFIC PROCEDURE  08/01/2005    D & C for missed AB     ZZHC COLONOSCOPY THRU STOMA, DIAGNOSTIC  01/01/2004       Family History   Problem Relation Age of Onset     Neurologic Disorder Mother         toxo     Lipids Mother         low cholestrol     Lipids Father         high cholestrol     Glaucoma Maternal Grandmother      Diabetes Maternal Grandmother         breast cancer,stroke ischemic     Breast Cancer Maternal Grandmother         79yo/and autoimmune skin condition     C.A.D. Maternal Grandfather         strokes and mi, chronic respiratory from 2 hand smoke     Hypertension Paternal Grandmother         rheumatoid arthritis     Respiratory Paternal Grandfather         emphasema     Cancer - colorectal No family hx of      Prostate Cancer No family hx of      Macular Degeneration No family hx of        Social History     Tobacco Use     Smoking status: Never     Smokeless tobacco: Never   Substance Use Topics     Alcohol use: Yes     Comment: none to a couple.  occasional           Kristie Cornejo's Medications and Allergies    Current Outpatient Medications   Medication     Calcium Carbonate-Vit D-Min (CALCIUM 600+D PLUS MINERALS) 600-400 MG-UNIT TABS     omeprazole (PRILOSEC OTC) 20 MG EC tablet     acyclovir (ZOVIRAX) 400 MG tablet     BUSPIRONE HCL 10 MG PO TABS     cetirizine HCl 10 MG CAPS     Flaxseed (Linseed) (FLAX SEED OIL) 1000 MG capsule     fluticasone (FLONASE) 50 MCG/ACT nasal spray     glucosamine-chondroitin 500-400 MG CAPS per capsule     lactobacillus rhamnosus, GG, (CULTURELL) capsule     LORazepam (ATIVAN) 0.5 MG tablet     melatonin 5 MG tablet     MIRENA 20 MCG/24HR IU IUD     Multiple Vitamin (MULTI-VITAMIN DAILY PO)     polyethylene glycol (MIRALAX) 17 g packet     potassium chloride ER (KLOR-CON M) 10 MEQ CR tablet     prochlorperazine (COMPAZINE) 10 MG tablet     study - simvastatin, IDS# 5641, 40 MG tablet     Zoledronic Acid (ZOMETA IV)     No current facility-administered medications for this visit.     Facility-Administered Medications Ordered in Other Visits   Medication     CT Flush          Allergies   Allergen Reactions     Amoxicillin      Other reaction(s): upset stomach  Other reaction(s): Gastrointestinal, GI intolerance, Other (see comments)  Other reaction(s): upset stomach  Other reaction(s): upset stomach       Clarithromycin      Other reaction(s): GI intolerance     Erythromycin      upsets stomach     Erythromycin      Other reaction(s): GI intolerance  upsets stomach  nausea     Fluconazole Rash     Itching,   Unsure if really allergy  Itching,   Unsure if really allergy    Other reaction(s): Other (see comments)  Itching,   Unsure if really allergy           Physical Examination    LMP  (LMP Unknown)     Exam:  Constitutional: healthy, alert and no distress  Head: AT/NC  ENT: OP Moist without lesions; good dentition  Cardiovascular: RRR no mrg  Respiratory: CTAB  Gastrointestinal: Abdomen soft, non-tender. BS normal. No  masses, organomegaly  : Deferred  Musculoskeletal: extremities normal- no gross deformities noted, gait normal and normal muscle tone  Skin: no suspicious lesions or rashes  Neurologic: non-focal  Psychiatric: mentation appears normal and affect normal/bright  Hematologic/Lymphatic/Immunologic: Normal cervical lymph nodes        Frailty Screening    1. Weight loss: Have you lost >10 pounds (or >5% body weight) unintentionally over the last year? No  ; she did lose 20lb this past summer but gained back and stable recently    2. Exhaustion: How often in the past week did you feel that:  o I feel that everything I do is an effort : .Exhaustion: 1 = some of the time (1-2 days)  o I feel I cannot get going: Exhaustion: 0 = rarely or none of the time (<1 day)    3. Weakness:  Hand  strength (measured by MA; calculate average): 18     Male BMI Frailty Criteria for  Male  Strength Female BMI Frailty Criteria for  Female  Strength   ?24 ?29 ?23 ?17   24.1 - 26 ?30 23.1 - 26 ?17.3   26.1 - 28 ?30 26.1 - 29 ?18   >28 ?32 >29 ?21     4. Slowness:  15 foot walk time (measured by MA):  16sec    Height Frailty Criteria for  15 Foot Walk Time   Men   ?173 cm ? 7 seconds    >173 cm  ? 6 seconds   Women   ?159 cm ? 7 seconds   >159 cm  ? 6 seconds     5. Physical activity:     *Please complete 2 calculations for kcal (see frailty worksheet for equations)     Energy expenditure for frailty: >270 kcal expended per week     Gender Frailty Criteria for Low Physical Activity   Male <383 kcal/week   Female <270 kcal/wefranci         Kristie Cornejo did not meet any criteria for prefrailty (score 1-2) or frailty (score 3+):   Frailty Score is: 0      Additional assessments not to be used in frailty calculation:   What types of physical activity can you tolerate? walks 1.5miles 4x/week, swims 1/2mile 3x/week, ice skating 1-2x/w recently    Sit to stand test (time to complete 5 reps): 16 seconds    Standing balance in 10 seconds:   Record the Total number of seconds(0-30)--add a+b+c ( take best attempt for each)    First attempt: 30 seconds    Second attempt: na      Overall Assessment  I have reviewed the diagnostic data, medications, frailty screening, and general processes prior to BMTCT.  I have notified the Primary BMT Physician/and or Attending Physician in the clinic of any issues. We also discussed in detail the database and biorepository research for which Kristie Cornejo is eligible. We discussed the potential risks and potential benefits of each of these protocols individually. We explained potential alternatives to the protocols discussed. We explained to the patient that participation is voluntary and that consent may be withdrawn at any time.       Consents Signed:    Blood transfusion consent form    [facility] database    [facility] BMTCT Database    Present during the discussion were the patient and her . Copies of the signed consent forms will be provided to the patient on admission. No procedures specific to any studies were performed prior to the patient signing the consent form.    Kristie Cornejo had the opportunity to ask questions, and I answered all of the questions to the best of my ability.      Tabitha Bill PA-C

## 2022-12-21 NOTE — NURSING NOTE
"Oncology Rooming Note    December 21, 2022 10:00 AM   Kristie Cornejo is a 52 year old female who presents for:    Chief Complaint   Patient presents with     Oncology Clinic Visit     CAR T work up for MM     Initial Vitals: /75   Pulse 76   Temp 97.9  F (36.6  C)   Resp 18   Ht 1.689 m (5' 6.5\")   Wt 71.8 kg (158 lb 4.8 oz)   LMP  (LMP Unknown)   SpO2 99%   BMI 25.17 kg/m   Estimated body mass index is 25.17 kg/m  as calculated from the following:    Height as of this encounter: 1.689 m (5' 6.5\").    Weight as of this encounter: 71.8 kg (158 lb 4.8 oz). Body surface area is 1.84 meters squared.  No Pain (0) Comment: Data Unavailable   No LMP recorded (lmp unknown). (Menstrual status: IUD).  Allergies reviewed: Yes  Medications reviewed: Yes    Medications: Medication refills not needed today.  Pharmacy name entered into [facility]:    [facility] - [place], [place] - [address]  [id number] - [place], [place] - [address]  [facility] - [place], [place] - [address]  [facility] [place] - [address] [place] [address] [facility]ARM[facility]Y [facility] - [place], [place] - [phone]  [facility] - [facility] [place] - [address] [id number] - [place], [place] - [address]    Clinical concerns: none       Ruby Michele RN                "

## 2022-12-21 NOTE — NURSING NOTE
BMT SHEN Frailty assessment done on patient. Patient had no questions and showed understanding of what assessment was being done. Paper work placed in folder for Frailty appt next week      Floridalma Carey CMA

## 2022-12-22 ENCOUNTER — OFFICE VISIT (OUTPATIENT)
Dept: TRANSPLANT | Facility: CLINIC | Age: 52
End: 2022-12-22
Attending: STUDENT IN AN ORGANIZED HEALTH CARE EDUCATION/TRAINING PROGRAM
Payer: COMMERCIAL

## 2022-12-22 VITALS
BODY MASS INDEX: 25.28 KG/M2 | HEART RATE: 85 BPM | WEIGHT: 159 LBS | OXYGEN SATURATION: 99 % | SYSTOLIC BLOOD PRESSURE: 119 MMHG | TEMPERATURE: 98 F | DIASTOLIC BLOOD PRESSURE: 85 MMHG | RESPIRATION RATE: 16 BRPM

## 2022-12-22 DIAGNOSIS — C90.00 MULTIPLE MYELOMA NOT HAVING ACHIEVED REMISSION (H): ICD-10-CM

## 2022-12-22 DIAGNOSIS — C90.00 MULTIPLE MYELOMA NOT HAVING ACHIEVED REMISSION (H): Primary | ICD-10-CM

## 2022-12-22 DIAGNOSIS — Z86.2 PERSONAL HISTORY OF DISEASES OF BLOOD AND BLOOD-FORMING ORGANS: ICD-10-CM

## 2022-12-22 DIAGNOSIS — Z01.818 EXAMINATION PRIOR TO CHEMOTHERAPY: ICD-10-CM

## 2022-12-22 LAB
ALBUMIN SERPL ELPH-MCNC: 4.4 G/DL (ref 3.7–5.1)
ALPHA1 GLOB SERPL ELPH-MCNC: 0.3 G/DL (ref 0.2–0.4)
ALPHA2 GLOB SERPL ELPH-MCNC: 0.7 G/DL (ref 0.5–0.9)
B-GLOBULIN SERPL ELPH-MCNC: 0.7 G/DL (ref 0.6–1)
B2 MICROGLOB TUMOR MARKER SER-MCNC: 1.6 MG/L
DONOR CYTOMEGALOVIRUS ABY: POSITIVE
DONOR HEP B CORE ABY: ABNORMAL
DONOR HEP B SURF AGN: ABNORMAL
DONOR HEPATITIS C ABY: ABNORMAL
DONOR HTLV 1&2 ANTIBODY: ABNORMAL
DONOR TREPONEMA PAL ABY: ABNORMAL
EBV VCA IGG SER IA-ACNC: 277 U/ML
EBV VCA IGG SER IA-ACNC: POSITIVE
GAMMA GLOB SERPL ELPH-MCNC: 0.5 G/DL (ref 0.7–1.6)
HBV DNA SERPL QL NAA+PROBE: NORMAL
HCV RNA SERPL QL NAA+PROBE: NORMAL
HIV1+2 AB SERPL QL IA: ABNORMAL
HIV1+2 RNA SERPL QL NAA+PROBE: NORMAL
HSV1 IGG SERPL QL IA: 8.11 INDEX
HSV1 IGG SERPL QL IA: ABNORMAL
HSV2 IGG SERPL QL IA: 0.89 INDEX
HSV2 IGG SERPL QL IA: ABNORMAL
IGG SERPL-MCNC: 661 MG/DL (ref 610–1616)
KAPPA LC FREE SER-MCNC: 46.42 MG/DL (ref 0.33–1.94)
KAPPA LC FREE/LAMBDA FREE SER NEPH: 244.32 {RATIO} (ref 0.26–1.65)
LAMBDA LC FREE SERPL-MCNC: 0.19 MG/DL (ref 0.57–2.63)
M PROTEIN SERPL ELPH-MCNC: 0 G/DL
PROT PATTERN SERPL ELPH-IMP: ABNORMAL
PROT PATTERN SERPL IFE-IMP: NORMAL
TRYPANOSOMA CRUZI: ABNORMAL
WNV RNA SERPL DONR QL NAA+PROBE: NORMAL

## 2022-12-22 PROCEDURE — 99214 OFFICE O/P EST MOD 30 MIN: CPT

## 2022-12-22 PROCEDURE — G0463 HOSPITAL OUTPT CLINIC VISIT: HCPCS

## 2022-12-22 PROCEDURE — 84165 PROTEIN E-PHORESIS SERUM: CPT | Mod: 26

## 2022-12-22 PROCEDURE — 86334 IMMUNOFIX E-PHORESIS SERUM: CPT | Mod: 26

## 2022-12-22 PROCEDURE — 81050 URINALYSIS VOLUME MEASURE: CPT | Performed by: PATHOLOGY

## 2022-12-22 PROCEDURE — 86335 IMMUNFIX E-PHORSIS/URINE/CSF: CPT | Performed by: PATHOLOGY

## 2022-12-22 RX ORDER — ACETAMINOPHEN 325 MG/1
325-650 TABLET ORAL EVERY 6 HOURS PRN
COMMUNITY
End: 2022-12-30

## 2022-12-22 ASSESSMENT — PAIN SCALES - GENERAL: PAINLEVEL: NO PAIN (0)

## 2022-12-22 NOTE — PROGRESS NOTES
Pharmacy Assessment - Pre-Stem Cell Transplant    Assessments & Recommendations:  1) Fluconazole intolerance: itchiness/rash unclear if from fluconazole or allergy shots she was receiving at the time.  She is willing to try again.   2) Pharmacy will secure 2 doses of tocilizumab once dates of LD chemo/CART are established.   3) Recommend to stop probiotic once LD chemo starts until count and GI recovery.     History of Present Illness:  Kristie Cornejo is a 52 year old year old female diagnosed with multiple myeloma.  she has been treated with RVD, michelle-RVD, KCD, isatuximab/pom/dex.  she is now being work up for Abecma cellular therapy on protocol 2017-45 armD, which utilizes fludarabine, cyclophosphamide as a lymphodepleting regimen.    Pertinent labs/tests:  Viral Testing:  CMV(+) / HSV(+) / EBV(pending)  Ejection Fraction: 55-60% (11/15/22)  QTc: 435msec (11/14/22)    Weights:   Wt Readings from Last 3 Encounters:   12/21/22 71.8 kg (158 lb 4.8 oz)   12/12/22 71.7 kg (158 lb 1.6 oz)   12/08/22 72.6 kg (160 lb)   Ideal body weight: 60.5 kg (133 lb 4.3 oz)  Adjusted ideal body weight: 65 kg (143 lb 4.5 oz)  % IBW:  119%  There is no height or weight on file to calculate BMI.    Primary BMT Physician: Dr Reinoso  BMT RN Coordinator:  Julianne Ruiz    Past Medical History:  Past Medical History:   Diagnosis Date     Allergic rhinitis, cause unspecified      Anxiety state, unspecified 12/01/2003    Started postpartum . On Paxil x 1+years. Off meds- 10/04     Irritable bowel syndrome 01/01/2004     Multiple myeloma not having achieved remission (H)      PLANTAR WARTS     resolved     SINUS DYSRHYTHMIA 10/01/2002    wnl     Unspecified hemorrhoids without mention of complication 04/01/2004    colonoscopy 4/04 spastic colon, int roids; bx neg       Medication Allergies:  Allergies   Allergen Reactions     Amoxicillin      Other reaction(s): upset stomach  Other reaction(s): Gastrointestinal, GI intolerance, Other  (see comments)  Other reaction(s): upset stomach  Other reaction(s): upset stomach       Clarithromycin      Other reaction(s): GI intolerance     Erythromycin      upsets stomach     Erythromycin      Other reaction(s): GI intolerance  upsets stomach  nausea     Fluconazole Rash     Itching,   Unsure if really allergy  Itching,   Unsure if really allergy    Other reaction(s): Other (see comments)  Itching,   Unsure if really allergy       Current Medications (pre-admit):  Current Outpatient Medications   Medication Sig Dispense Refill     acetaminophen (TYLENOL) 325 MG tablet Take 325-650 mg by mouth every 6 hours as needed for mild pain       acyclovir (ZOVIRAX) 400 MG tablet Take 1 tablet (400 mg) by mouth 2 times daily 180 tablet 3     BUSPIRONE HCL 10 MG PO TABS 1 TABLET 3 TIMES DAILY as needed (Patient not taking: Reported on 12/21/2022) 30 Tab 3     Calcium Carbonate-Vit D-Min (CALCIUM 600+D PLUS MINERALS) 600-400 MG-UNIT TABS HOLD (Patient not taking: Reported on 12/22/2022)       cetirizine HCl 10 MG CAPS Take 10 mg by mouth daily       Flaxseed (Linseed) (FLAX SEED OIL) 1000 MG capsule Take 1 capsule by mouth daily       fluticasone (FLONASE) 50 MCG/ACT nasal spray 1 spray (Patient not taking: Reported on 12/21/2022)       glucosamine-chondroitin 500-400 MG CAPS per capsule Take 2 capsules by mouth daily       lactobacillus rhamnosus, GG, (CULTURELL) capsule Take 1 capsule by mouth daily       LORazepam (ATIVAN) 0.5 MG tablet Take 1 tablet (0.5 mg) by mouth every 4 hours as needed for anxiety, nausea or sleep (Patient not taking: Reported on 12/21/2022) 30 tablet 3     melatonin 5 MG tablet Take 5 mg by mouth nightly as needed for sleep (Patient not taking: Reported on 12/21/2022)       MIRENA 20 MCG/24HR IU IUD use for up to 5 years, then remove       Multiple Vitamin (MULTI-VITAMIN DAILY PO) Take 1 tablet by mouth daily       omeprazole (PRILOSEC OTC) 20 MG EC tablet Take 1 tablet (20 mg) by mouth daily        polyethylene glycol (MIRALAX) 17 g packet Take 1 packet by mouth daily       potassium chloride ER (KLOR-CON M) 10 MEQ CR tablet Take 2 tablets (20 mEq) by mouth 2 times daily 360 tablet 3     prochlorperazine (COMPAZINE) 10 MG tablet Take 10 mg by mouth every 6 hours as needed for nausea or vomiting (Patient not taking: Reported on 12/21/2022)       study - simvastatin, IDS# 5641, 40 MG tablet Take 1 tablet (40 mg) by mouth daily Take at least 5 daily doses prior to apheresis and continue until Day +30 after CAR-T infusion. 65 tablet 0     Zoledronic Acid (ZOMETA IV)          Herbal Medication/Nutritional Supplements: flaxseed, glucosamine/chondroitin, lactobacillus, MVI, KCl     Smoking/Past Drug Use: Didn't discuss    Nausea/Vomiting, Pain, or other issues:  Tolerated previous multiple myeloma with minimal N/V issues.  Some nausea, no vomiting.     Summary:  I met with Kristie Cornejo for approximately 45 minutes.  We discussed allergies, home medications, LD chemo (fludarabine, cyclophosphamide), CAR-T, CRS, neurotoxicity, tocilizumab, steroid avoidance, anti-infective prophylaxis (levofloxacin, fluconazole, acyclovir, pentamidine/Bactrim).    Jose Schroeder, PharmD

## 2022-12-22 NOTE — LETTER
Date:December 23, 2022      Provider requested that no letter be sent. Do not send.       [facility]

## 2022-12-22 NOTE — PROGRESS NOTES
BMT Teaching Flowsheet    Kristie Cornejo is a 52 year old female  Diagnoses of Examination prior to chemotherapy, Multiple myeloma not having achieved remission (H), and Personal history of diseases of blood and blood-forming organs were pertinent to this visit.    Teaching Topic: 2017-45    Person(s) involved in teaching: Patient  Motivation Level  Asks Questions: Yes  Eager to Learn: Yes  Cooperative: Yes  Receptive (willing/able to accept information): Yes  Any cultural factors/Yarsanism beliefs that may influence understanding or compliance? No    Patient demonstrates understanding of the following:  - Reason for the appointment, diagnosis and treatment plan: Yes  - Knowledge of proper use of medications and conditions for which they are ordered (with special attention to potential side effects or drug interactions): Yes  - Which situations necessitate calling provider and whom to contact: Yes    Teaching concerns addressed: None identified    Proper use and care of (medical equipment, care aids, etc.) Yes  Pain management techniques: Yes  Patient instructed on hand hygiene: Yes  How and/when to access community resources: Yes    Infection Control:  Patient demonstrates understanding of the following:  Surgical procedure site care taught: NA  Signs and symptoms of infection taught: Yes  Wound care taught: NA  Central venous catheter care taught: NA    Instructional Materials Used/Given:   Patient education binder complete with sample calendar, LD chemo medication information, CART therapy side effects and restrictions, copies of consents.    For Kymriah/Yescarta/CAR-T patient wallet card given. Patient instructed to remain within close proximity (at least two hours) for at least four weeks post infusion. CAR-T patient is instructed not to operate motorized vehicle or heavy machinery for a period of 8 weeks.    Time spent with patient: 45 minutes.  Specific Concerns: No, explain: None identified. Patient  would like to get scheduled for LD chemo ASAP.    Julianne Peoples on 12/22/2022 at 1:49 PM  BMT Nurse Coordinator

## 2022-12-22 NOTE — LETTER
12/22/2022         RE: Kristie Cornejo  [address]  [facility] [address]        Dear Colleague,    Thank you for referring your patient, Kristie Cornejo, to the [facility] [place]. Please see a copy of my visit note below.    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    Margo is feeling well, no new concerns, no infections, no new pains or new lumps, She is active.   Eating well.   ROS: neg.     Past Medical History:   Diagnosis Date     Allergic rhinitis, cause unspecified      Anxiety state, unspecified 12/01/2003    Started postpartum . On Paxil x 1+years. Off meds- 10/04     Irritable bowel syndrome 01/01/2004     Multiple myeloma not having achieved remission (H)      PLANTAR WARTS     resolved     SINUS DYSRHYTHMIA 10/01/2002    wnl     Unspecified hemorrhoids without mention of complication 04/01/2004    colonoscopy 4/04 spastic colon, int roids; bx neg         Current Outpatient Medications   Medication     acetaminophen (TYLENOL) 325 MG tablet     acyclovir (ZOVIRAX) 400 MG tablet     BUSPIRONE HCL 10 MG PO TABS     Calcium Carbonate-Vit D-Min (CALCIUM 600+D PLUS MINERALS) 600-400 MG-UNIT TABS     cetirizine HCl 10 MG CAPS     Flaxseed (Linseed) (FLAX SEED OIL) 1000 MG capsule     fluticasone (FLONASE) 50 MCG/ACT nasal spray     glucosamine-chondroitin 500-400 MG CAPS per capsule     lactobacillus rhamnosus, GG, (CULTURELL) capsule     LORazepam (ATIVAN) 0.5 MG tablet     melatonin 5 MG tablet     MIRENA 20 MCG/24HR IU IUD     Multiple Vitamin (MULTI-VITAMIN DAILY PO)     omeprazole (PRILOSEC OTC) 20 MG EC tablet     polyethylene glycol (MIRALAX) 17 g packet     potassium chloride ER (KLOR-CON M) 10 MEQ CR tablet     prochlorperazine (COMPAZINE) 10  MG tablet     study - simvastatin, IDS# [id number], 40 MG tablet     Zoledronic Acid (ZOMETA IV)     No current facility-administered medications for this visit.     Facility-Administered Medications Ordered in Other Visits   Medication     CT Flush     Physical Exam:  /85   Pulse 85   Temp 98  F (36.7  C) (Oral)   Resp 16   Wt 72.1 kg (159 lb)   LMP  (LMP Unknown)   SpO2 99%   BMI 25.28 kg/m   ECOG 1    Patient is ambulating , AOx3, NAD, well appearing patient. Tremors   HEENT: No mucositis, MM moist, no thrush or ulcers, buccal mucosa intact  Neck supple, no peripheral adenopathy Thyroid gland midline, not enlarged   Lungs: good air exchange, CTA, no crackles,no wheezing.   Heart RRR S1 S2, no murmur or gallop   Abd soft, NT, ND. Without hepato-splenomegaly, no ascites,    LE symmetrical, no edema   Skin without lesion or rashes. No petechiae or ecchymosis.  Neuro  Intact, AOx3    ASSESSMENT AND PLAN    Margo is a 51 yo with R/R MM now eligible for Abecma. She has ongoing palpitations but was cleared by cardiology, her PVC frequency is much improved.    She had previosly had ablation with reduction of PVCs. Echo and ekg are now normal.  Her CBC and other organ function is adequate to proceed with apheresis.  No contraindications. She will be collected next week.  Her bridging therapy was not effective - FLC showed ongoing progression of her MM.     ID  She received Evusehld today  Already had inluenza and covid vaccine     CBC. Stable, ALC adequate    Plan:  Doing well, proceed with work-up for Abecma therapy as planned.  No outstanding issues. She is at high risk for CRS and ICANS.       Known issues that I take into account for medical decisions, with salient changes to the plan considering these complexities noted above.    Patient Active Problem List   Diagnosis     Allergic rhinitis     Anxiety state     Irritable bowel syndrome     Hemorrhoids     Sciatica     Encounter for insertion or removal of  intrauterine contraceptive device     CARDIOVASCULAR SCREENING; LDL GOAL LESS THAN 160     Pelvic mass     Plasmacytoma of bone (H)     Light chain myeloma (H)     Dehydration     Hypogammaglobulinemia (H)     Multiple myeloma not having achieved remission (H)         I spent 60 minutes in the care of this patient today, which included time necessary for preparation for the visit, obtaining history, ordering medications/tests/procedures as medically indicated, review of pertinent medical literature, counseling of the patient, communication of recommendations to the care team, and documentation time.    Relevant Results from BMT Allo Nk For Aml Recipient Work Up Orders from the Allied Health/Nurse Visit encounter on 12/21/22:   CBC with platelets and differential     Status: Abnormal    Collection Time: 12/21/22  9:40 AM   Result Value Ref Range    WBC Count 3.5 (L) 4.0 - 11.0 10e3/uL    RBC Count 4.10 3.80 - 5.20 10e6/uL    Hemoglobin 12.7 11.7 - 15.7 g/dL    Hematocrit 39.6 35.0 - 47.0 %    MCV 97 78 - 100 fL    MCH 31.0 26.5 - 33.0 pg    MCHC 32.1 31.5 - 36.5 g/dL    RDW 14.2 10.0 - 15.0 %    Platelet Count 289 150 - 450 10e3/uL    % Neutrophils 54 %    % Lymphocytes 26 %    % Monocytes 15 %    % Eosinophils 4 %    % Basophils 1 %    % Immature Granulocytes 0 %    NRBCs per 100 WBC 0 <1 /100    Absolute Neutrophils 1.9 1.6 - 8.3 10e3/uL    Absolute Lymphocytes 0.9 0.8 - 5.3 10e3/uL    Absolute Monocytes 0.5 0.0 - 1.3 10e3/uL    Absolute Eosinophils 0.1 0.0 - 0.7 10e3/uL    Absolute Basophils 0.0 0.0 - 0.2 10e3/uL    Absolute Immature Granulocytes 0.0 <=0.4 10e3/uL    Absolute NRBCs 0.0 10e3/uL   Adult Type and Screen     Status: Abnormal    Collection Time: 12/21/22  9:40 AM   Result Value Ref Range    ABO/RH(D) O POS     Antibody Screen Positive (A) Negative    SPECIMEN EXPIRATION DATE [id number]    HBV HCV HIV WNV by [facility]- SEND TO [facility]     Status: None    Collection Time: 12/21/22  9:40 AM    Result Value Ref Range    HEPATITIS B BY EDNA Non-Reactive     HCV by E[facility] Non-Reactive     HIV By [facility] Non-Reactive     West Nile Virus By [facility] Non-Reactive     Narrative    Verified by Susannah Wallace on 12/22/2022.   CBC with platelets differential     Status: Abnormal    Collection Time: 12/21/22  9:40 AM    Narrative    The following orders were created for panel order CBC with platelets differential.  Procedure                               Abnormality         Status                     ---------                               -----------         ------                     CBC with platelets and d...[[id number]]  Abnormal            Final result                 Please view results for these tests on the individual orders.   Uric acid     Status: Abnormal    Collection Time: 12/21/22  9:40 AM   Result Value Ref Range    Uric Acid 1.4 (L) 2.4 - 5.7 mg/dL   UA with Microscopic     Status: Abnormal    Collection Time: 12/21/22  9:40 AM   Result Value Ref Range    Color Urine Yellow Colorless, Straw, Light Yellow, Yellow    Appearance Urine Clear Clear    Glucose Urine 50 (A) Negative mg/dL    Bilirubin Urine Negative Negative    Ketones Urine Negative Negative mg/dL    Specific Gravity Urine 1.024 1.003 - 1.035    Blood Urine Negative Negative    pH Urine 5.5 5.0 - 7.0    Protein Albumin Urine 20 (A) Negative mg/dL    Urobilinogen Urine Normal Normal, 2.0 mg/dL    Nitrite Urine Negative Negative    Leukocyte Esterase Urine Trace (A) Negative    RBC Urine 1 <=2 /HPF    WBC Urine 5 <=5 /HPF    Squamous Epithelials Urine 1 <=1 /HPF   INR     Status: Normal    Collection Time: 12/21/22  9:40 AM   Result Value Ref Range    INR 0.91 0.85 - 1.15   Phosphorus     Status: Normal    Collection Time: 12/21/22  9:40 AM   Result Value Ref Range    Phosphorus 4.0 2.5 - 4.5 mg/dL   Partial thromboplastin time     Status: Normal    Collection Time: 12/21/22  9:40 AM   Result Value Ref Range    aPTT 27 22 - 38 Seconds   Order  if female with child bearing potential: HCG qualitative     Status: Normal    Collection Time: 12/21/22  9:40 AM   Result Value Ref Range    hCG Serum Qualitative Negative Negative     Comment: This test is for screening purposes.  Results should be interpreted along with the clinical picture.  Confirmation testing is available if warranted by ordering [id number], HCG Quantitative Pregnancy.   Comprehensive metabolic panel     Status: Abnormal    Collection Time: 12/21/22  9:40 AM   Result Value Ref Range    Sodium 142 136 - 145 mmol/L    Potassium 4.1 3.4 - 5.3 mmol/L    Chloride 107 98 - 107 mmol/L    Carbon Dioxide (CO2) 24 22 - 29 mmol/L    Anion Gap 11 7 - 15 mmol/L    Urea Nitrogen 21.9 (H) 6.0 - 20.0 mg/dL    Creatinine 0.81 0.51 - 0.95 mg/dL    Calcium 10.2 (H) 8.6 - 10.0 mg/dL    Glucose 89 70 - 99 mg/dL    Alkaline Phosphatase 50 35 - 104 U/L    AST 23 10 - 35 U/L    ALT 28 10 - 35 U/L    Protein Total 7.0 6.4 - 8.3 g/dL    Albumin 4.7 3.5 - 5.2 g/dL    Bilirubin Total 0.4 <=1.2 mg/dL    GFR Estimate 87 >60 mL/min/1.73m2     Comment: Effective December 21, 2021 eGFRcr in adults is calculated using the 2021 CKD-EPI creatinine equation which includes age and gender (Clementina et al., [place], DOI: 10.1056/LTSWww6993620)   ABO/Rh type and screen     Status: Abnormal    Collection Time: 12/21/22  9:40 AM    Narrative    The following orders were created for panel order ABO/Rh type and screen.  Procedure                               Abnormality         Status                     ---------                               -----------         ------                     Adult Type and Screen[[id number]]        Abnormal            Final result                 Please view results for these tests on the individual orders.   Relevant Results from BMT Allo Nk For Aml Recipient Work Up Orders from the Lab encounter on 11/14/22:   Hepatitis C antibody (for Rapid Viral Testing. Must be resulted prior to Apheresis collection)      Status: Normal    Collection Time: 11/14/22  9:45 AM   Result Value Ref Range    Hepatitis C Antibody Nonreactive Nonreactive    Narrative    Assay performance characteristics have not been established for newborns, infants, and children.   Hepatitis B surface antigen (for Rapid Viral Testing. Must be resulted prior to Apheresis collection)     Status: Normal    Collection Time: 11/14/22  9:45 AM   Result Value Ref Range    Hepatitis B Surface Antigen Nonreactive Nonreactive   Hepatitis B core antibody (rapid viral testing)     Status: Abnormal    Collection Time: 11/14/22  9:45 AM   Result Value Ref Range    Hepatitis B Core Antibody Total Reactive (A) Nonreactive     Comment: A reactive result indicates acute, chronic or past/resolved hepatitis B infection.   Relevant Results from BMT Allo Nk For Aml Recipient Work Up Orders from the Oncology Visit encounter on 07/06/22:   EKG 12-lead complete w/read - Clinics     Status: None    Collection Time: 07/06/22  9:34 AM   Result Value Ref Range    Systolic Blood Pressure  mmHg    Diastolic Blood Pressure  mmHg    Ventricular Rate 71 BPM    Atrial Rate 71 BPM    DE Interval 148 ms    QRS Duration 76 ms     ms    QTc 430 ms    P Axis -5 degrees    R AXIS 53 degrees    T Axis 29 degrees    Interpretation ECG       Sinus rhythm with occasional Premature ventricular complexes and Fusion complexes  Otherwise normal ECG  When compared with ECG of 27-JUN-2022 09:28,  Fusion complexes are now Present  Premature ventricular complexes are now Present  Confirmed by Alem Thomas ([id number]) on 7/7/2022 10:06:01 AM     Relevant Results from BMT Allo Nk For Aml Recipient Work Up Orders from the Office Visit encounter on 09/22/21:   Bone marrow biopsy     Status: Abnormal    Collection Time: 09/22/21 10:33 AM   Result Value Ref Range    Addendum       A Congo Red stain performed on the marrow core section with appropriate controls is negative for amyloid.       Comment: This is an  appended report. These results have been appended to a previously final verified report.    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  - Peripheral blood showing nonspecific red cell morphologic changes including slight increase in rouleaux formation but with otherwise normal automated blood counts and leukocyte differential.  - Please see Comment      Comment        - By separate report ([id number]) flow cytometric immunophenotyping performed on the marrow aspirate for this case shows kappa-monotypic plasma cells and polytypic B-cells.  The low percentage of kappa-monotypic plasma cells reported by flow cytometry method is noted; the  stained core section for this case is also reviewed by Dr. Joshi who concurs with the interpretation of 25% plasma cell infiltration by immunohistochemistry.  All unstained trephine imprints for this biopsy have been sent to the [facility] so they can be used, if needed, for myeloma FISH studies.   - Congo Red stain for amyloid performed on core biopsy sections is pending at the time of this report and will be reported in an addendum comment.   - Correlation with all pending ancillary bone marrow studies and clinical information is recommended.        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  This bone marrow biopsy is performed for staging/rule out multiple myeloma.      Peripheral Hematologic Data       CBC WITH DIFFERENTIAL (09/22/2021 09:59 AM CDT):     RESULT VALUE REF. RANGE UNITS   WBC Count  Hemoglobin  Hematocrit  Platelet Count  RBC Count  MCV  MCH  MCHC  RDW 6.4 (NORMAL)    13.6 (NORMAL)     41.5 (NORMAL)  315 (NORMAL)  4.53 (NORMAL)       92 (NORMAL)     30.0 (NORMAL)     32.8 (NORMAL)     12.5 (NORMAL) 4.0-11.0  11.7-15.7  35.0-47.0  150-450  3.80-5.20    26.5-33.0  31.5-36.5  10.0-15.0 10e3/uL  g/dL  %  10e3/uL  10e6/uL  fL  pg  g/dL  %   % Neutrophils  % Lymphocytes  % Monocytes  % Eosinophils  % Basophils  % Immature Granulocytes  Absolute Neutrophils  Absolute Lymphocytes  Absolute Monocytes  Absolute Eosinophils  Absolute Basophils  Absolute Immature Granulocytes  NRBCs per 100 WBC  Absolute NRBCs 55  33  7  4  1  0  3.5 (NORMAL)  2.1 (NORMAL)  0.4 (NORMAL)  0.2 (NORMAL)  0.1 (NORMAL)  0.0 (NORMAL)  0 (NORMAL)  0.0 ( ) N/A  N/A  N/A  N/A  N/A  N/A  1.6-8.3  0.8-5.3  0.0-1.3  0.0-0.7  0.0-0.2  <=0.0  <1  <=0.0 %  %  %  %  %  %  10e3/uL  10e3/uL  10e3/uL  10e3/uL  10e3/uL  10e3/uL  /100  10e3/uL          Microscopic Description       PERIPHERAL BLOOD SMEAR MORPHOLOGY:  The red blood cells appear normochromic.  Poikilocytosis includes occasional elliptocytes.  Polychromasia is not increased.  Rouleaux formation is slightly increased. The morphology of the platelets is normal.  Rare lymphocytes appear plasmacytoid.    Bone marrow aspirates and trephine core biopsy touch imprints are reviewed.    BONE MARROW DIFFERENTIAL (500 cells on touch imprints)  Percent (%) Cell Population Reference Range (%)   0.4 Blasts  (0 - 1)   0.8 Neutrophil promyelocytes (2 - 4)   41.0 Neutrophils and precursors (54 - 63)   20.6 Erythroid precursors (18 - 24)   3.2 Monocytes (1 - 1.5)   3.0 Eosinophils (1 - 3))   0.2 Basophils (0 - 1))   13.8 Lymphocytes (8 - 12)   17.0 Plasma cells (0 - 1.5)      The aspirate smears appear hemodilute.    Neutrophil maturation is complete. Erythroid maturation is complete. Megakaryocytes are seen best on trephine imprints.  The plasma cells are slightly to moderately atypical with the most striking feature being coarse, eosinophilic  cytoplasmic granules consistent with precipitated immunoglobulin.    IRON STAINS:   Dacie iron stain on concentrate smears:   Iron stains show 23% sideroblasts.    Prussian blue stain on particle crush:    Marrow particles are rare to absent.    TREPHINE SECTIONS:  Hematoxylin and eosin stains are reviewed.  The quality of the trephine core biopsy is excellent. The marrow cellularity is estimated at 50%. The cellular composition reflects the touch imprint differential. The number of megakaryocytes appears consistent with the degree of marrow cellularity, There is subtle interstitial infiltration by atypical plasma cells.   Few small muscular arteries are present in these sections.  Bone trabecula are intact without obvious increased osteoclastic activity.    IMMUNOHISTOCHEMISTRY:  Immunohistochemical stains are performed on the paraffin-embedded trephine core with appropriate controls.   highlights the interstitial infiltration by plasma cells comprising 25% of overall cellularity.  Stains for cytoplasmic kappa and lambda immunoglobulin light chains show kappa-monotypic plasma cells with rare, scattered lambda positive cells.    Note: These immunohistochemical stains are deemed medically necessary. Some of the antigens may also be evaluated by flow cytometry. Concurrent evaluation by immunohistochemistry on clot and/or trephine sections is indicated in this case in order to correlate immunophenotype with cell morphology and determine extent of involvement, spatial pattern, and focality of potential disease distribution.           Gross Description       Procedure/Gross Description   Aspirate(s) and trephine(s) procured by [facility]  [facility]    Specimen sent for Special Studies:         Flow Cytometry: left aspirate        Cytogenetics: left aspirate          Biopsy aspiration site: left posterior iliac crest                                                      (Reference Range)          Amount of aspirate           3.7   mL        Fat and P.V. cell layer        1    %               (1 - 3)        Particles                             0   %        Myeloid-erythroid layer    2    %               (5 - 8)          Clot Section: no    Trephine biopsy site: left posterior iliac crest    Designated left posterior iliac crest is 1 cylinder of gritty tissue, labeled with the patient's name and hospital number, obtained with 11 gauge needle and a length of 25 mm; entirely submitted in 1 cassette; acetic zinc formalin fixed, decalcified, processed, and stained for hematoxylin and eosin per laboratory protocol.          MCRS Yes (A) N/A    Performing Labs       The technical component of this testing was completed at [facility]         Payton Reinoso MD

## 2022-12-22 NOTE — LETTER
12/22/2022         RE: Kristie Cornejo  [address]  [facility] [address]        Dear Colleague,    Thank you for referring your patient, Kristie Cornejo, to the [facility] [place]. Please see a copy of my visit note below.    Pharmacy Assessment - Pre-Stem Cell Transplant    Assessments & Recommendations:  1) Fluconazole intolerance: itchiness/rash unclear if from fluconazole or allergy shots she was receiving at the time.  She is willing to try again.   2) Pharmacy will secure 2 doses of tocilizumab once dates of LD chemo/CART are established.   3) Recommend to stop probiotic once LD chemo starts until count and GI recovery.     History of Present Illness:  Kristie Cornejo is a 52 year old year old female diagnosed with multiple myeloma.  she has been treated with RVD, michelle-RVD, KCD, isatuximab/pom/dex.  she is now being work up for [facility] cellular therapy on protocol 2017-45 armD, which utilizes fludarabine, cyclophosphamide as a lymphodepleting regimen.    Pertinent labs/tests:  Viral Testing:  CMV(+) / HSV(+) / EBV(pending)  Ejection Fraction: 55-60% (11/15/22)  QTc: 435msec (11/14/22)    Weights:   Wt Readings from Last 3 Encounters:   12/21/22 71.8 kg (158 lb 4.8 oz)   12/12/22 71.7 kg (158 lb 1.6 oz)   12/08/22 72.6 kg (160 lb)   Ideal body weight: 60.5 kg (133 lb 4.3 oz)  Adjusted ideal body weight: 65 kg (143 lb 4.5 oz)  % IBW:  119%  There is no height or weight on file to calculate BMI.    Primary BMT Physician: Dr Reinoso  BMT RN Coordinator:  Julianne Ruiz    Past Medical History:  Past Medical History:   Diagnosis Date     Allergic rhinitis, cause unspecified      Anxiety state, unspecified 12/01/2003    Started postpartum . On Paxil x 1+years. Off meds- 10/04     Irritable bowel syndrome 01/01/2004     Multiple myeloma not having achieved remission (H)      PLANTAR WARTS     resolved     SINUS DYSRHYTHMIA 10/01/2002    wnl      Unspecified hemorrhoids without mention of complication 04/01/2004    colonoscopy 4/04 spastic colon, int roids; bx neg       Medication Allergies:  Allergies   Allergen Reactions     Amoxicillin      Other reaction(s): upset stomach  Other reaction(s): Gastrointestinal, GI intolerance, Other (see comments)  Other reaction(s): upset stomach  Other reaction(s): upset stomach       Clarithromycin      Other reaction(s): GI intolerance     Erythromycin      upsets stomach     Erythromycin      Other reaction(s): GI intolerance  upsets stomach  nausea     Fluconazole Rash     Itching,   Unsure if really allergy  Itching,   Unsure if really allergy    Other reaction(s): Other (see comments)  Itching,   Unsure if really allergy       Current Medications (pre-admit):  Current Outpatient Medications   Medication Sig Dispense Refill     acetaminophen (TYLENOL) 325 MG tablet Take 325-650 mg by mouth every 6 hours as needed for mild pain       acyclovir (ZOVIRAX) 400 MG tablet Take 1 tablet (400 mg) by mouth 2 times daily 180 tablet 3     BUSPIRONE HCL 10 MG PO TABS 1 TABLET 3 TIMES DAILY as needed (Patient not taking: Reported on 12/21/2022) 30 Tab 3     Calcium Carbonate-Vit D-Min (CALCIUM 600+D PLUS MINERALS) 600-400 MG-UNIT TABS HOLD (Patient not taking: Reported on 12/22/2022)       cetirizine HCl 10 MG CAPS Take 10 mg by mouth daily       Flaxseed (Linseed) (FLAX SEED OIL) 1000 MG capsule Take 1 capsule by mouth daily       fluticasone (FLONASE) 50 MCG/ACT nasal spray 1 spray (Patient not taking: Reported on 12/21/2022)       glucosamine-chondroitin 500-400 MG CAPS per capsule Take 2 capsules by mouth daily       lactobacillus rhamnosus, GG, (CULTURELL) capsule Take 1 capsule by mouth daily       LORazepam (ATIVAN) 0.5 MG tablet Take 1 tablet (0.5 mg) by mouth every 4 hours as needed for anxiety, nausea or sleep (Patient not taking: Reported on 12/21/2022) 30 tablet 3     melatonin 5 MG tablet Take 5 mg by mouth nightly  as needed for sleep (Patient not taking: Reported on 12/21/2022)       MIRENA 20 MCG/24HR IU IUD use for up to 5 years, then remove       Multiple Vitamin (MULTI-VITAMIN DAILY PO) Take 1 tablet by mouth daily       omeprazole (PRILOSEC OTC) 20 MG EC tablet Take 1 tablet (20 mg) by mouth daily       polyethylene glycol (MIRALAX) 17 g packet Take 1 packet by mouth daily       potassium chloride ER (KLOR-CON M) 10 MEQ CR tablet Take 2 tablets (20 mEq) by mouth 2 times daily 360 tablet 3     prochlorperazine (COMPAZINE) 10 MG tablet Take 10 mg by mouth every 6 hours as needed for nausea or vomiting (Patient not taking: Reported on 12/21/2022)       study - simvastatin, IDS# [id number], 40 MG tablet Take 1 tablet (40 mg) by mouth daily Take at least 5 daily doses prior to apheresis and continue until Day +30 after CAR-T infusion. 65 tablet 0     Zoledronic Acid (ZOMETA IV)          Herbal Medication/Nutritional Supplements: flaxseed, glucosamine/chondroitin, lactobacillus, MVI, KCl     Smoking/Past Drug Use: Didn't discuss    Nausea/Vomiting, Pain, or other issues:  Tolerated previous multiple myeloma with minimal N/V issues.  Some nausea, no vomiting.     Summary:  I met with Kristie Cornejo for approximately 45 minutes.  We discussed allergies, home medications, LD chemo (fludarabine, cyclophosphamide), CAR-T, CRS, neurotoxicity, tocilizumab, steroid avoidance, anti-infective prophylaxis (levofloxacin, fluconazole, acyclovir, pentamidine/Bactrim).    Jose Schroeder, PharmD

## 2022-12-22 NOTE — LETTER
12/22/2022         RE: Kristie Cornejo  [address]  [facility] [address]        Dear Colleague,    Thank you for referring your patient, Kristie Cornejo, to the [facility] [place]. Please see a copy of my visit note below.    BMT Teaching Flowsheet    Kristie Cornejo is a 52 year old female  Diagnoses of Examination prior to chemotherapy, Multiple myeloma not having achieved remission (H), and Personal history of diseases of blood and blood-forming organs were pertinent to this visit.    Teaching Topic: 2017-45    Person(s) involved in teaching: Patient  Motivation Level  Asks Questions: Yes  Eager to Learn: Yes  Cooperative: Yes  Receptive (willing/able to accept information): Yes  Any cultural factors/Muslim beliefs that may influence understanding or compliance? No    Patient demonstrates understanding of the following:  - Reason for the appointment, diagnosis and treatment plan: Yes  - Knowledge of proper use of medications and conditions for which they are ordered (with special attention to potential side effects or drug interactions): Yes  - Which situations necessitate calling provider and whom to contact: Yes    Teaching concerns addressed: None identified    Proper use and care of (medical equipment, care aids, etc.) Yes  Pain management techniques: Yes  Patient instructed on hand hygiene: Yes  How and/when to access community resources: Yes    Infection Control:  Patient demonstrates understanding of the following:  Surgical procedure site care taught: NA  Signs and symptoms of infection taught: Yes  Wound care taught: NA  Central venous catheter care taught: NA    Instructional Materials Used/Given:   Patient education binder complete with sample calendar, LD chemo medication information, CART therapy side effects and restrictions, copies of consents.    For Vaishnavi/Yescarta/CAR-T patient wallet card given. Patient instructed to remain  within close proximity (at least two hours) for at least four weeks post infusion. CAR-T patient is instructed not to operate motorized vehicle or heavy machinery for a period of 8 weeks.    Time spent with patient: 45 minutes.  Specific Concerns: No, explain: None identified. Patient would like to get scheduled for LD chemo ASAP.    Julianne Peoples on 12/22/2022 at 1:49 PM  BMT Nurse Coordinator      Again, thank you for allowing me to participate in the care of your patient.        Sincerely,        BMT Nurse Coordinator

## 2022-12-23 ENCOUNTER — APPOINTMENT (OUTPATIENT)
Dept: LAB | Facility: CLINIC | Age: 52
End: 2022-12-23
Attending: PHYSICIAN ASSISTANT
Payer: COMMERCIAL

## 2022-12-23 ENCOUNTER — OFFICE VISIT (OUTPATIENT)
Dept: TRANSPLANT | Facility: CLINIC | Age: 52
End: 2022-12-23
Attending: PHYSICIAN ASSISTANT
Payer: COMMERCIAL

## 2022-12-23 VITALS
OXYGEN SATURATION: 98 % | WEIGHT: 159.9 LBS | HEART RATE: 68 BPM | SYSTOLIC BLOOD PRESSURE: 121 MMHG | RESPIRATION RATE: 16 BRPM | BODY MASS INDEX: 25.42 KG/M2 | DIASTOLIC BLOOD PRESSURE: 78 MMHG | TEMPERATURE: 97.8 F

## 2022-12-23 DIAGNOSIS — Z01.818 EXAMINATION PRIOR TO CHEMOTHERAPY: ICD-10-CM

## 2022-12-23 DIAGNOSIS — Z86.2 PERSONAL HISTORY OF DISEASES OF BLOOD AND BLOOD-FORMING ORGANS: ICD-10-CM

## 2022-12-23 DIAGNOSIS — C90.00 MULTIPLE MYELOMA NOT HAVING ACHIEVED REMISSION (H): ICD-10-CM

## 2022-12-23 LAB
HGB S BLD QL: NEGATIVE
PROT ELPH PNL UR ELPH: NORMAL

## 2022-12-23 PROCEDURE — 88311 DECALCIFY TISSUE: CPT | Mod: TC

## 2022-12-23 PROCEDURE — 88275 CYTOGENETICS 100-300: CPT

## 2022-12-23 PROCEDURE — 88188 FLOWCYTOMETRY/READ 9-15: CPT | Mod: GC | Performed by: PATHOLOGY

## 2022-12-23 PROCEDURE — 86335 IMMUNFIX E-PHORSIS/URINE/CSF: CPT | Mod: 26

## 2022-12-23 PROCEDURE — 38222 DX BONE MARROW BX & ASPIR: CPT | Mod: LT | Performed by: PHYSICIAN ASSISTANT

## 2022-12-23 PROCEDURE — 88185 FLOWCYTOMETRY/TC ADD-ON: CPT

## 2022-12-23 PROCEDURE — 38222 DX BONE MARROW BX & ASPIR: CPT | Performed by: PHYSICIAN ASSISTANT

## 2022-12-23 PROCEDURE — 88291 CYTO/MOLECULAR REPORT: CPT | Performed by: MEDICAL GENETICS

## 2022-12-23 PROCEDURE — 250N000011 HC RX IP 250 OP 636: Performed by: PHYSICIAN ASSISTANT

## 2022-12-23 PROCEDURE — 93005 ELECTROCARDIOGRAM TRACING: CPT

## 2022-12-23 PROCEDURE — 88305 TISSUE EXAM BY PATHOLOGIST: CPT | Mod: TC

## 2022-12-23 PROCEDURE — 88237 TISSUE CULTURE BONE MARROW: CPT

## 2022-12-23 RX ADMIN — MIDAZOLAM 2 MG: 1 INJECTION INTRAMUSCULAR; INTRAVENOUS at 09:17

## 2022-12-23 ASSESSMENT — PAIN SCALES - GENERAL
PAINLEVEL: NO PAIN (0)
PAINLEVEL: MILD PAIN (2)

## 2022-12-23 NOTE — NURSING NOTE
Chief Complaint   Patient presents with     Blood Draw     Labs drawn via  by RN. VS taken.     Labs collected from venipuncture by RN. Vitals taken. Checked in for appointment(s).    Elkin Arenas RN

## 2022-12-23 NOTE — NURSING NOTE
"Oncology Rooming Note    December 23, 2022 9:15 AM   Kristie Cornejo is a 52 year old female who presents for:    Chief Complaint   Patient presents with     Oncology Clinic Visit     Multiple Myeloma     Initial Vitals: /85   Pulse 85   Temp 98  F (36.7  C) (Oral)   Resp 16   Wt 72.1 kg (159 lb)   LMP  (LMP Unknown)   SpO2 99%   BMI 25.28 kg/m   Estimated body mass index is 25.28 kg/m  as calculated from the following:    Height as of 12/21/22: 1.689 m (5' 6.5\").    Weight as of this encounter: 72.1 kg (159 lb). Body surface area is 1.84 meters squared.  No Pain (0) Comment: Data Unavailable   No LMP recorded (lmp unknown). (Menstrual status: IUD).  Allergies reviewed: Yes  Medications reviewed: Yes    Medications: Medication refills not needed today.  Pharmacy name entered into EPIC:    [facility] [place], [place] - [address]  [id number] - [place], [place] - [address]  [facility] [place] - [address]  [facility] [place] [address] [place] [address] PH[facility]ACY [facility] - [place], [place] - [phone]  [facility] - [facility], [place] - [address] [facility] - [place], [place] - [address]    Clinical concerns:        Yasmin Quintero CMA              "

## 2022-12-23 NOTE — PROGRESS NOTES
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    Margo is feeling well, no new concerns, no infections, no new pains or new lumps, She is active.   Eating well.   ROS: neg.     Past Medical History:   Diagnosis Date     Allergic rhinitis, cause unspecified      Anxiety state, unspecified 12/01/2003    Started postpartum . On Paxil x 1+years. Off meds- 10/04     Irritable bowel syndrome 01/01/2004     Multiple myeloma not having achieved remission (H)      PLANTAR WARTS     resolved     SINUS DYSRHYTHMIA 10/01/2002    wnl     Unspecified hemorrhoids without mention of complication 04/01/2004    colonoscopy 4/04 spastic colon, int roids; bx neg         Current Outpatient Medications   Medication     acetaminophen (TYLENOL) 325 MG tablet     acyclovir (ZOVIRAX) 400 MG tablet     BUSPIRONE HCL 10 MG PO TABS     Calcium Carbonate-Vit D-Min (CALCIUM 600+D PLUS MINERALS) 600-400 MG-UNIT TABS     cetirizine HCl 10 MG CAPS     Flaxseed (Linseed) (FLAX SEED OIL) 1000 MG capsule     fluticasone (FLONASE) 50 MCG/ACT nasal spray     glucosamine-chondroitin 500-400 MG CAPS per capsule     lactobacillus rhamnosus, GG, (CULTURELL) capsule     LORazepam (ATIVAN) 0.5 MG tablet     melatonin 5 MG tablet     MIRENA 20 MCG/24HR IU IUD     Multiple Vitamin (MULTI-VITAMIN DAILY PO)     omeprazole (PRILOSEC OTC) 20 MG EC tablet     polyethylene glycol (MIRALAX) 17 g packet     potassium chloride ER (KLOR-CON M) 10 MEQ CR tablet     prochlorperazine (COMPAZINE) 10 MG tablet     study - simvastatin, IDS# [id number], 40 MG tablet     Zoledronic Acid (ZOMETA IV)     No current facility-administered medications for this visit.     Facility-Administered Medications Ordered in Other Visits   Medication     CT Flush     Physical Exam:  /85   Pulse 85    Temp 98  F (36.7  C) (Oral)   Resp 16   Wt 72.1 kg (159 lb)   LMP  (LMP Unknown)   SpO2 99%   BMI 25.28 kg/m   ECOG 1    Patient is ambulating , AOx3, NAD, well appearing patient. Tremors   HEENT: No mucositis, MM moist, no thrush or ulcers, buccal mucosa intact  Neck supple, no peripheral adenopathy Thyroid gland midline, not enlarged   Lungs: good air exchange, CTA, no crackles,no wheezing.   Heart RRR S1 S2, no murmur or gallop   Abd soft, NT, ND. Without hepato-splenomegaly, no ascites,    LE symmetrical, no edema   Skin without lesion or rashes. No petechiae or ecchymosis.  Neuro  Intact, AOx3    ASSESSMENT AND PLAN    Margo is a 53 yo with R/R MM now eligible for Abecma. She has ongoing palpitations but was cleared by cardiology, her PVC frequency is much improved.    She had previosly had ablation with reduction of PVCs. Echo and ekg are now normal.  Her CBC and other organ function is adequate to proceed with apheresis.  No contraindications. She will be collected next week.  Her bridging therapy was not effective - FLC showed ongoing progression of her MM.     ID  She received Evusehld today  Already had inluenza and covid vaccine     CBC. Stable, ALC adequate    Plan:  Doing well, proceed with work-up for Abecma therapy as planned.  No outstanding issues. She is at high risk for CRS and ICANS.       Known issues that I take into account for medical decisions, with salient changes to the plan considering these complexities noted above.    Patient Active Problem List   Diagnosis     Allergic rhinitis     Anxiety state     Irritable bowel syndrome     Hemorrhoids     Sciatica     Encounter for insertion or removal of intrauterine contraceptive device     CARDIOVASCULAR SCREENING; LDL GOAL LESS THAN 160     Pelvic mass     Plasmacytoma of bone (H)     Light chain myeloma (H)     Dehydration     Hypogammaglobulinemia (H)     Multiple myeloma not having achieved remission (H)         I spent 60 minutes in  the care of this patient today, which included time necessary for preparation for the visit, obtaining history, ordering medications/tests/procedures as medically indicated, review of pertinent medical literature, counseling of the patient, communication of recommendations to the care team, and documentation time.    Relevant Results from BMT Allo Nk For Aml Recipient Work Up Orders from the Allied Health/Nurse Visit encounter on 12/21/22:   CBC with platelets and differential     Status: Abnormal    Collection Time: 12/21/22  9:40 AM   Result Value Ref Range    WBC Count 3.5 (L) 4.0 - 11.0 10e3/uL    RBC Count 4.10 3.80 - 5.20 10e6/uL    Hemoglobin 12.7 11.7 - 15.7 g/dL    Hematocrit 39.6 35.0 - 47.0 %    MCV 97 78 - 100 fL    MCH 31.0 26.5 - 33.0 pg    MCHC 32.1 31.5 - 36.5 g/dL    RDW 14.2 10.0 - 15.0 %    Platelet Count 289 150 - 450 10e3/uL    % Neutrophils 54 %    % Lymphocytes 26 %    % Monocytes 15 %    % Eosinophils 4 %    % Basophils 1 %    % Immature Granulocytes 0 %    NRBCs per 100 WBC 0 <1 /100    Absolute Neutrophils 1.9 1.6 - 8.3 10e3/uL    Absolute Lymphocytes 0.9 0.8 - 5.3 10e3/uL    Absolute Monocytes 0.5 0.0 - 1.3 10e3/uL    Absolute Eosinophils 0.1 0.0 - 0.7 10e3/uL    Absolute Basophils 0.0 0.0 - 0.2 10e3/uL    Absolute Immature Granulocytes 0.0 <=0.4 10e3/uL    Absolute NRBCs 0.0 10e3/uL   Adult Type and Screen     Status: Abnormal    Collection Time: 12/21/22  9:40 AM   Result Value Ref Range    ABO/RH(D) O POS     Antibody Screen Positive (A) Negative    SPECIMEN EXPIRATION DATE [id number]    HBV HCV HIV WNV by [facility]- SEND TO [facility]     Status: None    Collection Time: 12/21/22  9:40 AM   Result Value Ref Range    HEPATITIS B BY [facility] Non-Reactive     HCV by [facility] Non-Reactive     HIV By [facility] Non-Reactive     West Nile Virus By [facility] Non-Reactive     Narrative    Verified by Susannah Wallace on 12/22/2022.   CBC with platelets differential     Status: Abnormal    Collection  Time: 12/21/22  9:40 AM    Narrative    The following orders were created for panel order CBC with platelets differential.  Procedure                               Abnormality         Status                     ---------                               -----------         ------                     CBC with platelets and d...[[id number]]  Abnormal            Final result                 Please view results for these tests on the individual orders.   Uric acid     Status: Abnormal    Collection Time: 12/21/22  9:40 AM   Result Value Ref Range    Uric Acid 1.4 (L) 2.4 - 5.7 mg/dL   UA with Microscopic     Status: Abnormal    Collection Time: 12/21/22  9:40 AM   Result Value Ref Range    Color Urine Yellow Colorless, Straw, Light Yellow, Yellow    Appearance Urine Clear Clear    Glucose Urine 50 (A) Negative mg/dL    Bilirubin Urine Negative Negative    Ketones Urine Negative Negative mg/dL    Specific Gravity Urine 1.024 1.003 - 1.035    Blood Urine Negative Negative    pH Urine 5.5 5.0 - 7.0    Protein Albumin Urine 20 (A) Negative mg/dL    Urobilinogen Urine Normal Normal, 2.0 mg/dL    Nitrite Urine Negative Negative    Leukocyte Esterase Urine Trace (A) Negative    RBC Urine 1 <=2 /HPF    WBC Urine 5 <=5 /HPF    Squamous Epithelials Urine 1 <=1 /HPF   INR     Status: Normal    Collection Time: 12/21/22  9:40 AM   Result Value Ref Range    INR 0.91 0.85 - 1.15   Phosphorus     Status: Normal    Collection Time: 12/21/22  9:40 AM   Result Value Ref Range    Phosphorus 4.0 2.5 - 4.5 mg/dL   Partial thromboplastin time     Status: Normal    Collection Time: 12/21/22  9:40 AM   Result Value Ref Range    aPTT 27 22 - 38 Seconds   Order if female with child bearing potential: HCG qualitative     Status: Normal    Collection Time: 12/21/22  9:40 AM   Result Value Ref Range    hCG Serum Qualitative Negative Negative     Comment: This test is for screening purposes.  Results should be interpreted along with the clinical  picture.  Confirmation testing is available if warranted by ordering [id number], HCG Quantitative Pregnancy.   Comprehensive metabolic panel     Status: Abnormal    Collection Time: 12/21/22  9:40 AM   Result Value Ref Range    Sodium 142 136 - 145 mmol/L    Potassium 4.1 3.4 - 5.3 mmol/L    Chloride 107 98 - 107 mmol/L    Carbon Dioxide (CO2) 24 22 - 29 mmol/L    Anion Gap 11 7 - 15 mmol/L    Urea Nitrogen 21.9 (H) 6.0 - 20.0 mg/dL    Creatinine 0.81 0.51 - 0.95 mg/dL    Calcium 10.2 (H) 8.6 - 10.0 mg/dL    Glucose 89 70 - 99 mg/dL    Alkaline Phosphatase 50 35 - 104 U/L    AST 23 10 - 35 U/L    ALT 28 10 - 35 U/L    Protein Total 7.0 6.4 - 8.3 g/dL    Albumin 4.7 3.5 - 5.2 g/dL    Bilirubin Total 0.4 <=1.2 mg/dL    GFR Estimate 87 >60 mL/min/1.73m2     Comment: Effective December 21, 2021 eGFRcr in adults is calculated using the 2021 CKD-EPI creatinine equation which includes age and gender (Clementina et al., NEJ, DOI: 10.1056/OCCBjn6789201)   ABO/Rh type and screen     Status: Abnormal    Collection Time: 12/21/22  9:40 AM    Narrative    The following orders were created for panel order ABO/Rh type and screen.  Procedure                               Abnormality         Status                     ---------                               -----------         ------                     Adult Type and Screen[[id number]]        Abnormal            Final result                 Please view results for these tests on the individual orders.   Relevant Results from BMT Allo Nk For Aml Recipient Work Up Orders from the Lab encounter on 11/14/22:   Hepatitis C antibody (for Rapid Viral Testing. Must be resulted prior to Apheresis collection)     Status: Normal    Collection Time: 11/14/22  9:45 AM   Result Value Ref Range    Hepatitis C Antibody Nonreactive Nonreactive    Narrative    Assay performance characteristics have not been established for newborns, infants, and children.   Hepatitis B surface antigen (for Rapid Viral  Testing. Must be resulted prior to Apheresis collection)     Status: Normal    Collection Time: 11/14/22  9:45 AM   Result Value Ref Range    Hepatitis B Surface Antigen Nonreactive Nonreactive   Hepatitis B core antibody (rapid viral testing)     Status: Abnormal    Collection Time: 11/14/22  9:45 AM   Result Value Ref Range    Hepatitis B Core Antibody Total Reactive (A) Nonreactive     Comment: A reactive result indicates acute, chronic or past/resolved hepatitis B infection.   Relevant Results from BMT Allo Nk For Aml Recipient Work Up Orders from the Oncology Visit encounter on 07/06/22:   EKG 12-lead complete w/read - Clinics     Status: None    Collection Time: 07/06/22  9:34 AM   Result Value Ref Range    Systolic Blood Pressure  mmHg    Diastolic Blood Pressure  mmHg    Ventricular Rate 71 BPM    Atrial Rate 71 BPM    TN Interval 148 ms    QRS Duration 76 ms     ms    QTc 430 ms    P Axis -5 degrees    R AXIS 53 degrees    T Axis 29 degrees    Interpretation ECG       Sinus rhythm with occasional Premature ventricular complexes and Fusion complexes  Otherwise normal ECG  When compared with ECG of 27-JUN-2022 09:28,  Fusion complexes are now Present  Premature ventricular complexes are now Present  Confirmed by Alem Thomas ([id number]) on 7/7/2022 10:06:01 AM     Relevant Results from BMT Allo Nk For Aml Recipient Work Up Orders from the Office Visit encounter on 09/22/21:   Bone marrow biopsy     Status: Abnormal    Collection Time: 09/22/21 10:33 AM   Result Value Ref Range    Addendum       A Congo Red stain performed on the marrow core section with appropriate controls is negative for amyloid.       Comment: This is an appended report. These results have been appended to a previously final verified report.    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  - Peripheral blood showing nonspecific red cell morphologic changes including slight increase in rouleaux formation but with otherwise normal automated blood counts and leukocyte differential.  - Please see Comment      Comment        - By separate report ([id number]) flow cytometric immunophenotyping performed on the marrow aspirate for this case shows kappa-monotypic plasma cells and polytypic B-cells.  The low percentage of kappa-monotypic plasma cells reported by flow cytometry method is noted; the  stained core section for this case is also reviewed by Dr. Joshi who concurs with the interpretation of 25% plasma cell infiltration by immunohistochemistry.  All unstained trephine imprints for this biopsy have been sent to the [facility] so they can be used, if needed, for myeloma FISH studies.   - Congo Red stain for amyloid performed on core biopsy sections is pending at the time of this report and will be reported in an addendum comment.   - Correlation with all pending ancillary bone marrow studies and clinical information is recommended.        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  This bone marrow biopsy is performed for staging/rule out multiple myeloma.      Peripheral Hematologic Data       CBC WITH DIFFERENTIAL (09/22/2021 09:59 AM CDT):     RESULT VALUE REF. RANGE UNITS   WBC Count  Hemoglobin  Hematocrit  Platelet Count  RBC  Count  MCV  MCH  MCHC  RDW 6.4 (NORMAL)    13.6 (NORMAL)     41.5 (NORMAL)  315 (NORMAL)  4.53 (NORMAL)       92 (NORMAL)     30.0 (NORMAL)     32.8 (NORMAL)     12.5 (NORMAL) 4.0-11.0  11.7-15.7  35.0-47.0  150-450  3.80-5.20    26.5-33.0  31.5-36.5  10.0-15.0 10e3/uL  g/dL  %  10e3/uL  10e6/uL  fL  pg  g/dL  %   % Neutrophils  % Lymphocytes  % Monocytes  % Eosinophils  % Basophils  % Immature Granulocytes  Absolute Neutrophils  Absolute Lymphocytes  Absolute Monocytes  Absolute Eosinophils  Absolute Basophils  Absolute Immature Granulocytes  NRBCs per 100 WBC  Absolute NRBCs 55  33  7  4  1  0  3.5 (NORMAL)  2.1 (NORMAL)  0.4 (NORMAL)  0.2 (NORMAL)  0.1 (NORMAL)  0.0 (NORMAL)  0 (NORMAL)  0.0 ( ) N/A  N/A  N/A  N/A  N/A  N/A  1.6-8.3  0.8-5.3  0.0-1.3  0.0-0.7  0.0-0.2  <=0.0  <1  <=0.0 %  %  %  %  %  %  10e3/uL  10e3/uL  10e3/uL  10e3/uL  10e3/uL  10e3/uL  /100  10e3/uL          Microscopic Description       PERIPHERAL BLOOD SMEAR MORPHOLOGY:  The red blood cells appear normochromic.  Poikilocytosis includes occasional elliptocytes.  Polychromasia is not increased.  Rouleaux formation is slightly increased. The morphology of the platelets is normal.  Rare lymphocytes appear plasmacytoid.    Bone marrow aspirates and trephine core biopsy touch imprints are reviewed.    BONE MARROW DIFFERENTIAL (500 cells on touch imprints)  Percent (%) Cell Population Reference Range (%)   0.4 Blasts  (0 - 1)   0.8 Neutrophil promyelocytes (2 - 4)   41.0 Neutrophils and precursors (54 - 63)   20.6 Erythroid precursors (18 - 24)   3.2 Monocytes (1 - 1.5)   3.0 Eosinophils (1 - 3))   0.2 Basophils (0 - 1))   13.8 Lymphocytes (8 - 12)   17.0 Plasma cells (0 - 1.5)     The aspirate smears appear hemodilute.    Neutrophil maturation is complete. Erythroid maturation is complete. Megakaryocytes are seen best on trephine imprints.  The plasma cells are slightly to moderately atypical with the most striking feature being coarse,  eosinophilic  cytoplasmic granules consistent with precipitated immunoglobulin.    IRON STAINS:   Dacie iron stain on concentrate smears:   Iron stains show 23% sideroblasts.    Prussian blue stain on particle crush:    Marrow particles are rare to absent.    TREPHINE SECTIONS:  Hematoxylin and eosin stains are reviewed.  The quality of the trephine core biopsy is excellent. The marrow cellularity is estimated at 50%. The cellular composition reflects the touch imprint differential. The number of megakaryocytes appears consistent with the degree of marrow cellularity, There is subtle interstitial infiltration by atypical plasma cells.   Few small muscular arteries are present in these sections.  Bone trabecula are intact without obvious increased osteoclastic activity.    IMMUNOHISTOCHEMISTRY:  Immunohistochemical stains are performed on the paraffin-embedded trephine core with appropriate controls.   highlights the interstitial infiltration by plasma cells comprising 25% of overall cellularity.  Stains for cytoplasmic kappa and lambda immunoglobulin light chains show kappa-monotypic plasma cells with rare, scattered lambda positive cells.    Note: These immunohistochemical stains are deemed medically necessary. Some of the antigens may also be evaluated by flow cytometry. Concurrent evaluation by immunohistochemistry on clot and/or trephine sections is indicated in this case in order to correlate immunophenotype with cell morphology and determine extent of involvement, spatial pattern, and focality of potential disease distribution.           Gross Description       Procedure/Gross Description   Aspirate(s) and trephine(s) procured by ERICK Hoffman    Specimen sent for Special Studies:         Flow Cytometry: left aspirate        Cytogenetics: left aspirate          Biopsy aspiration site: left posterior iliac crest                                                      (Reference Range)          Amount of  aspirate           3.7   mL        Fat and P.V. cell layer        1    %               (1 - 3)        Particles                             0   %        Myeloid-erythroid layer    2    %               (5 - 8)          Clot Section: no    Trephine biopsy site: left posterior iliac crest    Designated left posterior iliac crest is 1 cylinder of gritty tissue, labeled with the patient's name and hospital number, obtained with 11 gauge needle and a length of 25 mm; entirely submitted in 1 cassette; acetic zinc formalin fixed, decalcified, processed, and stained for hematoxylin and eosin per laboratory protocol.          MCRS Yes (A) N/A    Performing Labs       The technical component of this testing was completed at [facility]         Payton Reinoso MD

## 2022-12-23 NOTE — NURSING NOTE
BMT Teaching Flowsheet  Teaching Topic: bone marrow biopsy  Person(s) involved in teaching: Patient  Motivation Level  Asks Questions: Yes  Eager to Learn: Yes  Cooperative: Yes  Receptive (willing/able to accept information): Yes  Patient demonstrates understanding of the following:   - Reason for the appointment, diagnosis and treatment plan: Yes  - Knowledge of proper use of medications and conditions for which they are ordered (with special attention to potential side effects or drug interactions): Yes  - Which situations necessitate calling provider and whom to contact: Yes  Teaching concerns addressed: what to expect during and post procedure including restrictions  Proper use and care of (medical equipment, care aids, etc.) NA  Pain management techniques: Yes  Patient instructed on hand hygiene: NA  How and/when to access community resources: NA  Infection Control:  Patient demonstrates understanding of the following:   Surgical procedure site care taught Yes  Signs and symptoms of infection taught Yes  Wound care taught Yes  Central venous catheter care taught NA  Instructional Materials Used/Given: post procedure instructions  Pt requests IV versed

## 2022-12-23 NOTE — LETTER
12/23/2022         RE: Kristie Cornejo  [address]  [facility] [address]        Dear Colleague,    Thank you for referring your patient, Kristie Cornejo, to the [facility]ARROW [facility] [place]. Please see a copy of my visit note below.    BMT ONC Adult Bone Marrow Biopsy Procedure Note  December 23, 2022  /80 (BP Location: Right arm, Patient Position: Chair, Cuff Size: Adult Regular)   Pulse 74   Temp 97.8  F (36.6  C) (Oral)   Resp 18   Wt 72.5 kg (159 lb 14.4 oz)   LMP  (LMP Unknown)   SpO2 100%   BMI 25.42 kg/m       Learning needs assessment complete within 12 months? YES    DIAGNOSIS: MM     PROCEDURE: Unilateral Bone Marrow Biopsy and Unilateral Aspirate    [place] [facility] [place]    Patient s identification was positively verified by verbal identification and invasive procedure safety checklist was completed. Informed consent was obtained. Following the administration of 2mg Midazolam as pre-medication, patient was placed in the prone position and prepped and draped in a sterile manner. Approximately 15 cc of 1% Lidocaine was used over the left posterior iliac spine. Following this a 3 mm incision was made. Trephine bone marrow core(s) was (were) obtained from the [facility]. Bone marrow aspirates were obtained from the [facility]. Aspirates were sent for morphology, immunophenotyping, cytogenetics and molecular diagnostics process and hold. A total of approximately 20 ml of marrow was aspirated. Following this procedure a sterile dressing was applied to the bone marrow biopsy site(s). The patient was placed in the supine position to maintain pressure on the biopsy site. Post-procedure wound care instructions were given.     Complications: NO    Pre-procedural pain: 0 out of 10 on the numeric pain rating scale.     Procedural pain: 3 out of 10 on the numeric pain rating scale.     Post-procedural pain assessment: 0 out of 10 on the numeric pain rating scale.      Interventions: NO    Length of procedure:20 minutes or less      Procedure performed by: Mary Anne Soni PA-C [phone]      [facility] Advanced Practice Provider

## 2022-12-23 NOTE — NURSING NOTE
EKG was performed today per order written by Payton Reinoso.  Name and  verified with patient. Patient tolerated well without incident. File transmitted to chart.    Jose Huffman on 2022 at 9:57 AM

## 2022-12-23 NOTE — NURSING NOTE
Chief Complaint   Patient presents with     Blood Draw     Labs drawn via  by RN. VS taken.     Bone Marrow Biopsy     Pre CAR T for MM     Patient supine for 30 minutes following biopsy. After 30 minutes, dressing clean, dry and intact. Vital signs stable. See DOC flow sheets for details. Left ambulatory with family member.    Quincy Gibbons RN

## 2022-12-23 NOTE — PROGRESS NOTES
BMT ONC Adult Bone Marrow Biopsy Procedure Note  December 23, 2022  /80 (BP Location: Right arm, Patient Position: Chair, Cuff Size: Adult Regular)   Pulse 74   Temp 97.8  F (36.6  C) (Oral)   Resp 18   Wt 72.5 kg (159 lb 14.4 oz)   LMP  (LMP Unknown)   SpO2 100%   BMI 25.42 kg/m       Learning needs assessment complete within 12 months? YES    DIAGNOSIS: MM     PROCEDURE: Unilateral Bone Marrow Biopsy and Unilateral Aspirate    LOCATION: [facility] [place] 2nd Floor    Patient s identification was positively verified by verbal identification and invasive procedure safety checklist was completed. Informed consent was obtained. Following the administration of 2mg Midazolam as pre-medication, patient was placed in the prone position and prepped and draped in a sterile manner. Approximately 15 cc of 1% Lidocaine was used over the left posterior iliac spine. Following this a 3 mm incision was made. Trephine bone marrow core(s) was (were) obtained from the [facility]. Bone marrow aspirates were obtained from the [facility]. Aspirates were sent for morphology, immunophenotyping, cytogenetics and molecular diagnostics process and hold. A total of approximately 20 ml of marrow was aspirated. Following this procedure a sterile dressing was applied to the bone marrow biopsy site(s). The patient was placed in the supine position to maintain pressure on the biopsy site. Post-procedure wound care instructions were given.     Complications: NO    Pre-procedural pain: 0 out of 10 on the numeric pain rating scale.     Procedural pain: 3 out of 10 on the numeric pain rating scale.     Post-procedural pain assessment: 0 out of 10 on the numeric pain rating scale.     Interventions: NO    Length of procedure:20 minutes or less      Procedure performed by: Mary Anne Soni PA-C [phone]

## 2022-12-26 ENCOUNTER — DOCUMENTATION ONLY (OUTPATIENT)
Dept: TRANSPLANT | Facility: CLINIC | Age: 52
End: 2022-12-26

## 2022-12-26 ENCOUNTER — HOSPITAL ENCOUNTER (OUTPATIENT)
Dept: CARDIOLOGY | Facility: CLINIC | Age: 52
Discharge: HOME | End: 2022-12-26
Payer: COMMERCIAL

## 2022-12-26 DIAGNOSIS — C90.00 LIGHT CHAIN MYELOMA (H): ICD-10-CM

## 2022-12-26 DIAGNOSIS — C90.00 MULTIPLE MYELOMA NOT HAVING ACHIEVED REMISSION (H): Primary | ICD-10-CM

## 2022-12-26 DIAGNOSIS — Z86.2 PERSONAL HISTORY OF DISEASES OF BLOOD AND BLOOD-FORMING ORGANS: ICD-10-CM

## 2022-12-26 LAB
ALBUMIN MFR UR ELPH: 5.5 %
ALPHA1 GLOB MFR UR ELPH: 1.9 %
ALPHA2 GLOB MFR UR ELPH: 4.5 %
ATRIAL RATE - MUSE: 63 BPM
B-GLOBULIN MFR UR ELPH: 85.8 %
DIASTOLIC BLOOD PRESSURE - MUSE: NORMAL MMHG
GAMMA GLOB MFR UR ELPH: 2.3 %
INTERPRETATION ECG - MUSE: NORMAL
INTERPRETATION: NORMAL
LVEF ECHO: NORMAL
M PROTEIN MFR UR ELPH: 83.6 %
P AXIS - MUSE: 35 DEGREES
PATH REPORT.COMMENTS IMP SPEC: ABNORMAL
PATH REPORT.COMMENTS IMP SPEC: YES
PATH REPORT.COMMENTS IMP SPEC: YES
PATH REPORT.FINAL DX SPEC: ABNORMAL
PATH REPORT.FINAL DX SPEC: ABNORMAL
PATH REPORT.GROSS SPEC: ABNORMAL
PATH REPORT.MICROSCOPIC SPEC OTHER STN: ABNORMAL
PATH REPORT.RELEVANT HX SPEC: ABNORMAL
PATH REPORT.RELEVANT HX SPEC: ABNORMAL
PR INTERVAL - MUSE: 166 MS
PROT PATTERN UR ELPH-IMP: ABNORMAL
QRS DURATION - MUSE: 82 MS
QT - MUSE: 412 MS
QTC - MUSE: 421 MS
R AXIS - MUSE: 11 DEGREES
SYSTOLIC BLOOD PRESSURE - MUSE: NORMAL MMHG
T AXIS - MUSE: -24 DEGREES
VENTRICULAR RATE- MUSE: 63 BPM

## 2022-12-26 PROCEDURE — 93308 TTE F-UP OR LMTD: CPT

## 2022-12-26 PROCEDURE — 93325 DOPPLER ECHO COLOR FLOW MAPG: CPT

## 2022-12-26 PROCEDURE — 88341 IMHCHEM/IMCYTCHM EA ADD ANTB: CPT | Mod: 26 | Performed by: PATHOLOGY

## 2022-12-26 PROCEDURE — 93308 TTE F-UP OR LMTD: CPT | Mod: 26 | Performed by: INTERNAL MEDICINE

## 2022-12-26 PROCEDURE — 88342 IMHCHEM/IMCYTCHM 1ST ANTB: CPT | Mod: 26 | Performed by: PATHOLOGY

## 2022-12-26 PROCEDURE — 85097 BONE MARROW INTERPRETATION: CPT | Performed by: PATHOLOGY

## 2022-12-26 PROCEDURE — 88311 DECALCIFY TISSUE: CPT | Mod: 26 | Performed by: PATHOLOGY

## 2022-12-26 PROCEDURE — 93321 DOPPLER ECHO F-UP/LMTD STD: CPT | Mod: 26 | Performed by: INTERNAL MEDICINE

## 2022-12-26 PROCEDURE — 88305 TISSUE EXAM BY PATHOLOGIST: CPT | Mod: 26 | Performed by: PATHOLOGY

## 2022-12-26 PROCEDURE — 84166 PROTEIN E-PHORESIS/URINE/CSF: CPT | Mod: 26

## 2022-12-26 PROCEDURE — 93325 DOPPLER ECHO COLOR FLOW MAPG: CPT | Mod: 26 | Performed by: INTERNAL MEDICINE

## 2022-12-26 PROCEDURE — 85060 BLOOD SMEAR INTERPRETATION: CPT | Performed by: PATHOLOGY

## 2022-12-27 RX ORDER — SODIUM CHLORIDE 9 MG/ML
INJECTION, SOLUTION INTRAVENOUS CONTINUOUS
Status: CANCELLED | OUTPATIENT
Start: 2022-12-27

## 2022-12-27 RX ORDER — LIDOCAINE 40 MG/G
CREAM TOPICAL
Status: CANCELLED | OUTPATIENT
Start: 2022-12-27

## 2022-12-28 DIAGNOSIS — C90.00 MULTIPLE MYELOMA NOT HAVING ACHIEVED REMISSION (H): Primary | ICD-10-CM

## 2022-12-28 DIAGNOSIS — C90.30 PLASMACYTOMA OF BONE (H): ICD-10-CM

## 2022-12-28 DIAGNOSIS — C90.00 LIGHT CHAIN MYELOMA (H): Primary | ICD-10-CM

## 2022-12-29 ENCOUNTER — OFFICE VISIT (OUTPATIENT)
Dept: TRANSPLANT | Facility: CLINIC | Age: 52
End: 2022-12-29
Attending: STUDENT IN AN ORGANIZED HEALTH CARE EDUCATION/TRAINING PROGRAM
Payer: COMMERCIAL

## 2022-12-29 ENCOUNTER — MEDICAL CORRESPONDENCE (OUTPATIENT)
Dept: TRANSPLANT | Facility: CLINIC | Age: 52
End: 2022-12-29

## 2022-12-29 VITALS
DIASTOLIC BLOOD PRESSURE: 76 MMHG | WEIGHT: 163 LBS | BODY MASS INDEX: 25.91 KG/M2 | OXYGEN SATURATION: 99 % | TEMPERATURE: 98.5 F | SYSTOLIC BLOOD PRESSURE: 119 MMHG | RESPIRATION RATE: 16 BRPM | HEART RATE: 77 BPM

## 2022-12-29 DIAGNOSIS — Z01.818 EXAMINATION PRIOR TO CHEMOTHERAPY: ICD-10-CM

## 2022-12-29 DIAGNOSIS — C90.00 MULTIPLE MYELOMA NOT HAVING ACHIEVED REMISSION (H): Primary | ICD-10-CM

## 2022-12-29 DIAGNOSIS — C90.00 LIGHT CHAIN MYELOMA (H): Primary | ICD-10-CM

## 2022-12-29 DIAGNOSIS — Z86.2 PERSONAL HISTORY OF DISEASES OF BLOOD AND BLOOD-FORMING ORGANS: ICD-10-CM

## 2022-12-29 PROCEDURE — G0463 HOSPITAL OUTPT CLINIC VISIT: HCPCS

## 2022-12-29 PROCEDURE — 99215 OFFICE O/P EST HI 40 MIN: CPT

## 2022-12-29 RX ORDER — HEPARIN SODIUM (PORCINE) LOCK FLUSH IV SOLN 100 UNIT/ML 100 UNIT/ML
5 SOLUTION INTRAVENOUS
Status: CANCELLED | OUTPATIENT
Start: 2022-12-30

## 2022-12-29 RX ORDER — DIPHENHYDRAMINE HYDROCHLORIDE 50 MG/ML
50 INJECTION INTRAMUSCULAR; INTRAVENOUS
Status: CANCELLED
Start: 2022-12-31

## 2022-12-29 RX ORDER — DIPHENHYDRAMINE HYDROCHLORIDE 50 MG/ML
50 INJECTION INTRAMUSCULAR; INTRAVENOUS
Status: CANCELLED
Start: 2023-01-01

## 2022-12-29 RX ORDER — DIPHENHYDRAMINE HYDROCHLORIDE 50 MG/ML
50 INJECTION INTRAMUSCULAR; INTRAVENOUS
Status: CANCELLED
Start: 2022-12-30

## 2022-12-29 RX ORDER — HEPARIN SODIUM (PORCINE) LOCK FLUSH IV SOLN 100 UNIT/ML 100 UNIT/ML
5 SOLUTION INTRAVENOUS
Status: CANCELLED | OUTPATIENT
Start: 2023-01-01

## 2022-12-29 RX ORDER — ALBUTEROL SULFATE 0.83 MG/ML
2.5 SOLUTION RESPIRATORY (INHALATION)
Status: CANCELLED | OUTPATIENT
Start: 2022-12-30

## 2022-12-29 RX ORDER — HEPARIN SODIUM,PORCINE 10 UNIT/ML
5 VIAL (ML) INTRAVENOUS
Status: CANCELLED | OUTPATIENT
Start: 2023-01-01

## 2022-12-29 RX ORDER — HEPARIN SODIUM,PORCINE 10 UNIT/ML
5 VIAL (ML) INTRAVENOUS
Status: CANCELLED | OUTPATIENT
Start: 2022-12-30

## 2022-12-29 RX ORDER — ONDANSETRON 2 MG/ML
8 INJECTION INTRAMUSCULAR; INTRAVENOUS ONCE
Status: CANCELLED | OUTPATIENT
Start: 2023-01-01

## 2022-12-29 RX ORDER — ALBUTEROL SULFATE 90 UG/1
1-2 AEROSOL, METERED RESPIRATORY (INHALATION)
Status: CANCELLED
Start: 2023-01-01

## 2022-12-29 RX ORDER — SODIUM CHLORIDE 9 MG/ML
INJECTION, SOLUTION INTRAVENOUS CONTINUOUS
Status: CANCELLED | OUTPATIENT
Start: 2022-12-31

## 2022-12-29 RX ORDER — LORAZEPAM 2 MG/ML
0.5 INJECTION INTRAMUSCULAR EVERY 4 HOURS PRN
Status: CANCELLED | OUTPATIENT
Start: 2022-12-31

## 2022-12-29 RX ORDER — LORAZEPAM 2 MG/ML
0.5 INJECTION INTRAMUSCULAR EVERY 4 HOURS PRN
Status: CANCELLED | OUTPATIENT
Start: 2023-01-01

## 2022-12-29 RX ORDER — MEPERIDINE HYDROCHLORIDE 25 MG/ML
25 INJECTION INTRAMUSCULAR; INTRAVENOUS; SUBCUTANEOUS EVERY 30 MIN PRN
Status: CANCELLED | OUTPATIENT
Start: 2022-12-31

## 2022-12-29 RX ORDER — ONDANSETRON 2 MG/ML
8 INJECTION INTRAMUSCULAR; INTRAVENOUS ONCE
Status: CANCELLED | OUTPATIENT
Start: 2022-12-31

## 2022-12-29 RX ORDER — MEPERIDINE HYDROCHLORIDE 25 MG/ML
25 INJECTION INTRAMUSCULAR; INTRAVENOUS; SUBCUTANEOUS EVERY 30 MIN PRN
Status: CANCELLED | OUTPATIENT
Start: 2022-12-30

## 2022-12-29 RX ORDER — HEPARIN SODIUM (PORCINE) LOCK FLUSH IV SOLN 100 UNIT/ML 100 UNIT/ML
5 SOLUTION INTRAVENOUS
Status: CANCELLED | OUTPATIENT
Start: 2022-12-31

## 2022-12-29 RX ORDER — ALBUTEROL SULFATE 90 UG/1
2 AEROSOL, METERED RESPIRATORY (INHALATION) ONCE
Status: CANCELLED
Start: 2022-12-30 | End: 2022-12-30

## 2022-12-29 RX ORDER — ALBUTEROL SULFATE 0.83 MG/ML
2.5 SOLUTION RESPIRATORY (INHALATION)
Status: CANCELLED | OUTPATIENT
Start: 2023-01-01

## 2022-12-29 RX ORDER — EPINEPHRINE 1 MG/ML
0.3 INJECTION, SOLUTION INTRAMUSCULAR; SUBCUTANEOUS EVERY 5 MIN PRN
Status: CANCELLED | OUTPATIENT
Start: 2022-12-31

## 2022-12-29 RX ORDER — ALBUTEROL SULFATE 90 UG/1
1-2 AEROSOL, METERED RESPIRATORY (INHALATION)
Status: CANCELLED
Start: 2022-12-31

## 2022-12-29 RX ORDER — METHYLPREDNISOLONE SODIUM SUCCINATE 125 MG/2ML
125 INJECTION, POWDER, LYOPHILIZED, FOR SOLUTION INTRAMUSCULAR; INTRAVENOUS
Status: CANCELLED
Start: 2022-12-30

## 2022-12-29 RX ORDER — LORAZEPAM 2 MG/ML
0.5 INJECTION INTRAMUSCULAR EVERY 4 HOURS PRN
Status: CANCELLED | OUTPATIENT
Start: 2022-12-30

## 2022-12-29 RX ORDER — ALBUTEROL SULFATE 0.83 MG/ML
2.5 SOLUTION RESPIRATORY (INHALATION)
Status: CANCELLED | OUTPATIENT
Start: 2022-12-31

## 2022-12-29 RX ORDER — ALBUTEROL SULFATE 90 UG/1
1-2 AEROSOL, METERED RESPIRATORY (INHALATION)
Status: CANCELLED
Start: 2022-12-30

## 2022-12-29 RX ORDER — METHYLPREDNISOLONE SODIUM SUCCINATE 125 MG/2ML
125 INJECTION, POWDER, LYOPHILIZED, FOR SOLUTION INTRAMUSCULAR; INTRAVENOUS
Status: CANCELLED
Start: 2022-12-31

## 2022-12-29 RX ORDER — SODIUM CHLORIDE 9 MG/ML
INJECTION, SOLUTION INTRAVENOUS CONTINUOUS
Status: CANCELLED | OUTPATIENT
Start: 2023-01-01

## 2022-12-29 RX ORDER — MEPERIDINE HYDROCHLORIDE 25 MG/ML
25 INJECTION INTRAMUSCULAR; INTRAVENOUS; SUBCUTANEOUS EVERY 30 MIN PRN
Status: CANCELLED | OUTPATIENT
Start: 2023-01-01

## 2022-12-29 RX ORDER — EPINEPHRINE 1 MG/ML
0.3 INJECTION, SOLUTION INTRAMUSCULAR; SUBCUTANEOUS EVERY 5 MIN PRN
Status: CANCELLED | OUTPATIENT
Start: 2022-12-30

## 2022-12-29 RX ORDER — SODIUM CHLORIDE 9 MG/ML
INJECTION, SOLUTION INTRAVENOUS CONTINUOUS
Status: CANCELLED | OUTPATIENT
Start: 2022-12-30

## 2022-12-29 RX ORDER — METHYLPREDNISOLONE SODIUM SUCCINATE 125 MG/2ML
125 INJECTION, POWDER, LYOPHILIZED, FOR SOLUTION INTRAMUSCULAR; INTRAVENOUS
Status: CANCELLED
Start: 2023-01-01

## 2022-12-29 RX ORDER — EPINEPHRINE 1 MG/ML
0.3 INJECTION, SOLUTION INTRAMUSCULAR; SUBCUTANEOUS EVERY 5 MIN PRN
Status: CANCELLED | OUTPATIENT
Start: 2023-01-01

## 2022-12-29 RX ORDER — ONDANSETRON 2 MG/ML
8 INJECTION INTRAMUSCULAR; INTRAVENOUS ONCE
Status: CANCELLED | OUTPATIENT
Start: 2022-12-30

## 2022-12-29 RX ORDER — HEPARIN SODIUM,PORCINE 10 UNIT/ML
5 VIAL (ML) INTRAVENOUS
Status: CANCELLED | OUTPATIENT
Start: 2022-12-31

## 2022-12-29 RX ORDER — PENTAMIDINE ISETHIONATE 300 MG/300MG
300 INHALANT RESPIRATORY (INHALATION) ONCE
Status: CANCELLED | OUTPATIENT
Start: 2022-12-30 | End: 2022-12-30

## 2022-12-29 ASSESSMENT — PAIN SCALES - GENERAL: PAINLEVEL: MILD PAIN (2)

## 2022-12-29 NOTE — NURSING NOTE
"Oncology Rooming Note    December 29, 2022 4:26 PM   Kristie Cornejo is a 52 year old female who presents for:    Chief Complaint   Patient presents with     Oncology Clinic Visit     Multiple myeloma     Initial Vitals: /76   Pulse 77   Temp 98.5  F (36.9  C) (Oral)   Resp 16   Wt 73.9 kg (163 lb)   LMP  (LMP Unknown)   SpO2 99%   BMI 25.91 kg/m   Estimated body mass index is 25.91 kg/m  as calculated from the following:    Height as of 12/21/22: 1.689 m (5' 6.5\").    Weight as of this encounter: 73.9 kg (163 lb). Body surface area is 1.86 meters squared.  Mild Pain (2) Comment: Data Unavailable   No LMP recorded (lmp unknown). (Menstrual status: IUD).  Allergies reviewed: Yes  Medications reviewed: Yes    Medications: Medication refills not needed today.  Pharmacy name entered into EPIC:    [facility] - [place], [place] - [address]  [id number] - [place], [place] - [address]  [facility] [place] [address]  [facility] [place] [address] [place] [address] PHARMACY [place], [place] [phone]  [facility] [place] [facility] [address] [facility] - [place], [place] - [address]    Clinical concerns: none       Yudelka Roque CMA            "

## 2022-12-29 NOTE — LETTER
12/29/2022         RE: Kristie Cornejo  [address]  [facility] [address]        Dear Colleague,    Thank you for referring your patient, Kristie Cornejo, to the [facility] BLO[facility] AND MARROW [facility]ANSPLANT [facility] [place]. Please see a copy of my visit note below.         BMT / Cell Therapy Close Note      Kristie Cornejo is a 52 year old female referred by Dr. Payton Reinoso for multiple myeloma.      Disease presentation and baseline characteristics:    9/22/2: IgG kappa multiple myeloma, high risk (gain 1q)    Date Treatment Name Response Side Effects / Toxicities   11/15/21 VRd x3 cycles PD    1/17/22 D-VRd x 5 cycles [place]    5/19/22 Daratumumab monotherapy PD    6/21/22 KCd x 1 cycle  Significant PVCs requiring discontinuation of therapy   7/6/22 Shruthi-Pd x 4 cycles PD           HPI:  Please see my entry above for hematologic history.  Margo presents today accompanied by her spouse Erwin to review her pre-CAR-T evaluation.  She reports she is doing well and denies any acute concerns today.      ASSESSMENT AND PLAN:    BMT and Cell Therapy Informed Consent Discussion     In today's visit, we discussed in detail the research for which Kristie Cornejo is eligible. We discussed the potential risks and potential benefits of each protocol individually. We explained potential alternatives to the protocols discussed. We explained to the patient that participation is voluntary and that consent may be withdrawn at any time.     We discussed:    The rationale for our approach to the disease treatment    The eligibility requirements for treatment in the context of clinical trials    The need for caregiver support and the caregiver's role in recovery    The importance of adherence to the treatment plan and appropriate follow up    The requirements for contraception while undergoing treatment    The potential risks of morbidity and mortality related to this treatment    The requirements for  supportive care to reduce the risk of infection and other complications    The role of the dietician and PT/OT to reduce the risk of muscle loss/sarcopenia    Support that is available through our social workers and care team to mitigate distress    The desired outcomes/goals of treatment, including the possibility of long-term disease control    The patient completed the last round of treatment on 22.     ECO    Active infections:  None.    Reproductive status: What methods of birth control does the patient plan to use during the treatment period beginning with conditioning and ending with the discontinuation of immune suppression (indicate with an X all that apply):  __ The patient is confirmed to be sterile or post-menopausal  X  Sexual abstinence  __ Condoms  __ Implants  __ Injectables  __ Oral contraceptives  X  Intrauterine devices (IUD)  __ Other (describe)    The patient received appropriate reproductive counseling and agreed with the need for effective contraception during the treatment procedures.    We reviewed CAR-T therapy in detail today, including CRS and neurotoxicity.  Margo is comfortable with these potential side effects and is appropriate to proceed with therapy.    Known issues that I take into account for medical decisions, with salient changes to the plan considering these complexities noted above.    Patient Active Problem List   Diagnosis     Allergic rhinitis     Anxiety state     Irritable bowel syndrome     Hemorrhoids     Sciatica     Encounter for insertion or removal of intrauterine contraceptive device     CARDIOVASCULAR SCREENING; LDL GOAL LESS THAN 160     Pelvic mass     Plasmacytoma of bone (H)     Light chain myeloma (H)     Dehydration     Hypogammaglobulinemia (H)     Multiple myeloma not having achieved remission (H)       I spent 60 minutes in the care of this patient today, which included time necessary for preparation for the visit, obtaining history, ordering  medications/tests/procedures as medically indicated, review of pertinent medical literature, counseling of the patient, communication of recommendations to the care team, and documentation time.    Charan Cameron MD      ROS:    10 point ROS neg other than the symptoms noted above in the HPI.      Past Medical History:   Diagnosis Date     Allergic rhinitis, cause unspecified      Anxiety state, unspecified 12/01/2003    Started postpartum . On Paxil x 1+years. Off meds- 10/04     Irritable bowel syndrome 01/01/2004     Multiple myeloma not having achieved remission (H)      PLANTAR WARTS     resolved     SINUS DYSRHYTHMIA 10/01/2002    wnl     Unspecified hemorrhoids without mention of complication 04/01/2004    colonoscopy 4/04 spastic colon, int roids; bx neg       Past Surgical History:   Procedure Laterality Date     BIOPSY BONE PELVIS Left 09/07/2021    Procedure: Biopsy left pubic ramis;  Surgeon: Edu Philip MD;  Location: [facility] OR     COLONOSCOPY N/A 04/13/2022    Procedure: COLONOSCOPY, WITH BIOPSY;  Surgeon: Berto Montgomery MD;  Location:  GI     EP ABLATION PVC N/A 03/23/2022    Procedure: Ablation Premature Ventricular Contractions;  Surgeon: Alem Thomas MD;  Location:  [facility]ATH LAB     INSERT CATHETER VASCULAR ACCESS N/A 11/21/2022    Procedure: CENTRAL VENOUS CATHETER NON TUNNELED INSERTION, VASCULAR ACCESS CATHETER;  Surgeon: Erich Bauman MD;  Location: [facility] OR     IR CVC NON TUNNEL LINE REMOVAL  11/22/2022     IR CVC NON TUNNEL PLACEMENT > 5 YRS  11/21/2022     LASIK       ZZC NONSPECIFIC PROCEDURE  01/01/1987    [place] teeth removed     ZZC NONSPECIFIC PROCEDURE  01/01/1993    (R) knee surgery     ZZC NONSPECIFIC PROCEDURE  01/01/2003    (R) breast bx-negative 6/03; bx neg 10/02     ZZC NONSPECIFIC PROCEDURE      10/02 cardiac echo normal     ZZC NONSPECIFIC PROCEDURE  01/01/2004 4/04 colonosc int roids, spastic colon     ZZC NONSPECIFIC PROCEDURE  08/01/2005     D & C for missed AB     ZZHC COLONOSCOPY THRU STOMA, DIAGNOSTIC  01/01/2004       Family History   Problem Relation Age of Onset     Neurologic Disorder Mother         toxo     Lipids Mother         low cholestrol     Lipids Father         high cholestrol     Glaucoma Maternal Grandmother      Diabetes Maternal Grandmother         breast cancer,stroke ischemic     Breast Cancer Maternal Grandmother         79yo/and autoimmune skin condition     C.A.D. Maternal Grandfather         strokes and mi, chronic respiratory from 2 hand smoke     Hypertension Paternal Grandmother         rheumatoid arthritis     Respiratory Paternal Grandfather         emphasema     Cancer - colorectal No family hx of      Prostate Cancer No family hx of      Macular Degeneration No family hx of        Social History     Tobacco Use     Smoking status: Never     Smokeless tobacco: Never   Vaping Use     Vaping Use: Never used   Substance Use Topics     Alcohol use: Yes     Comment: none to a couple. occasional     Drug use: No         Allergies   Allergen Reactions     Amoxicillin      Other reaction(s): upset stomach  Other reaction(s): Gastrointestinal, GI intolerance, Other (see comments)  Other reaction(s): upset stomach  Other reaction(s): upset stomach       Clarithromycin      Other reaction(s): GI intolerance     Erythromycin      upsets stomach     Erythromycin      Other reaction(s): GI intolerance  upsets stomach  nausea     Fluconazole Rash     Itching,   Unsure if really allergy, was taking allergy shots at the time.  She is willing to rechallenge.           Current Outpatient Medications   Medication Sig Dispense Refill     acetaminophen (TYLENOL) 325 MG tablet Take 325-650 mg by mouth every 6 hours as needed for mild pain       acyclovir (ZOVIRAX) 400 MG tablet Take 1 tablet (400 mg) by mouth 2 times daily 180 tablet 3     BUSPIRONE HCL 10 MG PO TABS 1 TABLET 3 TIMES DAILY as needed (Patient not taking: Reported on  2022) 30 Tab 3     Calcium Carbonate-Vit D-Min (CALCIUM 600+D PLUS MINERALS) 600-400 MG-UNIT TABS HOLD (Patient not taking: Reported on 2022)       cetirizine HCl 10 MG CAPS Take 10 mg by mouth daily       Flaxseed (Linseed) (FLAX SEED OIL) 1000 MG capsule Take 1 capsule by mouth daily       fluticasone (FLONASE) 50 MCG/ACT nasal spray 1 spray (Patient not taking: Reported on 2022)       glucosamine-chondroitin 500-400 MG CAPS per capsule Take 2 capsules by mouth daily       lactobacillus rhamnosus, GG, (CULTURELL) capsule Take 1 capsule by mouth daily       LORazepam (ATIVAN) 0.5 MG tablet Take 1 tablet (0.5 mg) by mouth every 4 hours as needed for anxiety, nausea or sleep (Patient not taking: Reported on 2022) 30 tablet 3     melatonin 5 MG tablet Take 5 mg by mouth nightly as needed for sleep (Patient not taking: Reported on 2022)       MIRENA 20 MCG/24HR IU IUD use for up to 5 years, then remove       Multiple Vitamin (MULTI-VITAMIN DAILY PO) Take 1 tablet by mouth daily       omeprazole (PRILOSEC OTC) 20 MG EC tablet Take 1 tablet (20 mg) by mouth daily       polyethylene glycol (MIRALAX) 17 g packet Take 1 packet by mouth daily       potassium chloride ER (KLOR-CON M) 10 MEQ CR tablet Take 2 tablets (20 mEq) by mouth 2 times daily 360 tablet 3     prochlorperazine (COMPAZINE) 10 MG tablet Take 10 mg by mouth every 6 hours as needed for nausea or vomiting (Patient not taking: Reported on 2022)       study - simvastatin, IDS# [id number], 40 MG tablet Take 1 tablet (40 mg) by mouth daily Take at least 5 daily doses prior to apheresis and continue until Day +30 after CAR-T infusion. 65 tablet 0     Zoledronic Acid (ZOMETA IV)            Physical Exam:     Vital Signs: /76   Pulse 77   Temp 98.5  F (36.9  C) (Oral)   Resp 16   Wt 73.9 kg (163 lb)   LMP  (LMP Unknown)   SpO2 99%   BMI 25.91 kg/m      ECO  General Appearance: alert, and no distress  Eyes: PERRL,  conjunctiva and lids normal, sclera nonicteric  Ears/Nose/M/Throat: Oral mucosa and posterior oropharynx normal, moist mucous membranes  Neck supple, non-tender, free range of motion, no adenopathy  Cardio/Vascular:regular rate and rhythm, normal S1 and S2, no murmur  Resp Effort And Auscultation: Normal - Clear to auscultation without rales, rhonchi, or wheezing.  GI: soft, nontender, bowel sounds present in all four quadrants, no hepatosplenomegaly  Lymphatics:no significant enlargement of lymph nodes globally   Musculoskeletal: Musculoskeletal normal  Edema: none  Skin: Skin color, texture, turgor normal. No rashes or lesions.  Neurologic: Gait normal.  Sensation grossly WNL.  Psych/Affect: Mood and affect are appropriate.    Blood Counts     Recent Labs   Lab Test 12/23/22  0852 12/21/22 0940 11/23/22  1356 11/22/22  0825 11/14/22  0945   HGB 12.6 12.7 12.0   < > 13.2   HCT 38.3 39.6 36.0   < > 42.1   WBC 4.1 3.5* 4.0   < > 5.2   ANEUTAUTO 2.4 1.9  --   --  3.3   ALYMPAUTO 1.0 0.9  --   --  0.8   AMONOAUTO 0.5 0.5  --   --  0.6   AEOSAUTO 0.1 0.1  --   --  0.4   ABSBASO 0.0 0.0  --   --  0.2   NRBCMAN 0.0 0.0  --   --  0.0    289 222   < > 192    < > = values in this interval not displayed.       ABO/RH    Recent Labs   Lab Test 12/21/22 0940   ABORH O POS       Chemistries     Basic Panel  Recent Labs   Lab Test 12/21/22 0940 11/22/22  0825 11/14/22  0945    142 142   POTASSIUM 4.1 3.9 3.7   CHLORIDE 107 108* 106   CO2 24 23 25   BUN 21.9* 12.3 21.7*   CR 0.81 0.80 0.88   GLC 89 119* 77        Calcium, Magnesium, Phosphorus  Recent Labs   Lab Test 12/21/22 0940 11/22/22  0825 11/14/22  0945   MEKA 10.2* 9.5 9.8   MAG 2.0 2.2  --    PHOS 4.0  --   --         LFTs  Recent Labs   Lab Test 12/21/22  0940 11/14/22  0945 11/02/22  1345   BILITOTAL 0.4 0.3 0.3   ALKPHOS 50 69 58   AST 23 27 17   ALT 28 38* 17   ALBUMIN 4.7 4.7 4.4       LDH  Recent Labs   Lab Test 12/21/22  0940 11/14/22  0945  08/10/22  1402    192 140       B2-Microglobulin  Recent Labs   Lab Test 12/21/22  0940 11/14/22  0945 09/15/21  1534   [id number] 1.6 2.0 1.8         Immunoglobulins     Recent Labs   Lab Test 12/21/22  0940 12/08/22  1441 11/14/22  0945 11/02/22  1345 10/31/22  0929 10/05/22  1436    856 899 243* 260* 244*       Recent Labs   Lab Test 12/08/22  1441 11/02/22  1345 10/31/22  0929 10/05/22  1436 06/21/22  1519 05/19/22  1007   IGA 12* 9* 11* 9* 5* 5*       Recent Labs   Lab Test 12/08/22  1441 11/02/22  1345 10/31/22  0929 10/05/22  1436 06/21/22  1519 05/19/22  1007   IGM 13* <10* <10* 11* 10* <10*         Monocloncal Protein Studies     M spike    Recent Labs   Lab Test 12/21/22  0940 12/08/22  1441 11/14/22  0945 11/02/22  1345 10/31/22  0929 10/05/22  1436   ELPM 0.0 0.1* 0.1* 0.1* 0.1* 0.1*       Kappa FLC    Recent Labs   Lab Test 12/21/22  0940 12/08/22  1441 11/14/22  0945 11/02/22  1345 10/31/22  0929 10/05/22  1436   KFLCA 46.42* 36.33* 39.86* 33.74* 30.57* 38.97*       Lambda FLC    Recent Labs   Lab Test 12/21/22  0940 12/08/22  1441 11/14/22  0945 11/02/22  1345 10/31/22  0929 10/05/22  1436   LFLCA 0.19* 0.23* 0.28* 0.20* 0.24* 0.20*       FLC Ratio    Recent Labs   Lab Test 12/21/22  0940 12/08/22  1441 11/14/22  0945 11/02/22  1345 10/31/22  0929 10/05/22  1436   KLRA 244.32* 157.96* 142.36* 168.70* 127.38* 194.85*       Infectious Disease Markers     [facility] IDM    Recent Labs   Lab Test 12/21/22  0940   DCMIG Positive*   DHBSAG Non-Reactive   DHBCAB Non-Reactive   DHIVAB Non-Reactive   DHCVAB Non-Reactive   DHTLVA Non-Reactive   TCRUZI Non-Reactive   DTRPAB Non-Reactive         Hepatitis and HIV    Recent Labs   Lab Test 11/14/22  0945   HEPBANG Nonreactive   HBCAB Reactive*   AUSAB 272.74   HCVAB Nonreactive   HIAGAB Nonreactive     EBV    Recent Labs   Lab Test 12/21/22  0940   EBVCAG Positive*       Bone Marrow Biopsy       Morphology    Results for orders placed or  performed in visit on 12/23/22 (from the past 8760 hour(s))   Bone marrow biopsy   Result Value    Final Diagnosis      Bone marrow, posterior iliac crest, left decalcified trephine biopsy and touch imprint; left direct aspirate smear, and concentrated aspirate smear; and peripheral smear:    Persistent multiple myeloma characterized by:  - Slightly hypocellular bone marrow with trilineage hematopoiesis and 5% kappa monotypic plasma cells  - Peripheral blood showing normal hemogram and differential      Comment      Concurrent flow cytometry was performed ([id number]) and shows kappa monotypic plasma cells. Plasma cells are present at a higher percentage in the core biopsy (5%) than in the aspirates or touch imprints.         Clinical Information      From Epic electronic medical record; 52-year-old female is undergoing evaluation prior to hematopoietic cell transplant or immune effector cell therapy for refractory kappa light chain multiple myeloma.      Peripheral Hematologic Data      CBC WITH DIFFERENTIAL(12/23/2022 09:02 AM CST):     RESULT VALUE REF. RANGE UNITS   WBC Count  Hemoglobin  Hematocrit  Platelet Count  RBC Count  MCV  MCH  MCHC  RDW 4.1 (NORMAL)    12.6 (NORMAL)     38.3 (NORMAL)  310 (NORMAL)  4.01 (NORMAL)       96 (NORMAL)     31.4 (NORMAL)     32.9 (NORMAL)     13.9 (NORMAL) 4.0-11.0  11.7-15.7  35.0-47.0  150-450  3.80-5.20    26.5-33.0  31.5-36.5  10.0-15.0 10e3/uL  g/dL  %  10e3/uL  10e6/uL  fL  pg  g/dL  %   % Neutrophils  % Lymphocytes  % Monocytes  % Eosinophils  % Basophils  % Immature Granulocytes  Absolute Neutrophils  Absolute Lymphocytes  Absolute Monocytes  Absolute Eosinophils  Absolute Basophils  Absolute Immature Granulocytes  NRBCs per 100 WBC  Absolute NRBCs 60  24  12  3  1  0  2.4 (NORMAL)  1.0 (NORMAL)  0.5 (NORMAL)  0.1 (NORMAL)  0.0 (NORMAL)  0.0 (NORMAL)  0 (NORMAL)  0.0 () N/A  N/A  N/A  N/A  N/A  N/A  1.6-8.3  0.8-5.3  0.0-1.3  0.0-0.7  0.0-0.2  <=0.4  <1  <=0.0  %  %  %  %  %  %  10e3/uL  10e3/uL  10e3/uL  10e3/uL  10e3/uL  10e3/uL  /100  10e3/uL         Microscopic Description      PERIPHERAL BLOOD SMEAR MORPHOLOGY:  The red blood cells appear normochromic.  Poikilocytosis includes rare to occasional echinocytes and rare elliptocytes.  Polychromasia is not increased.  Rouleaux formation may be slightly increased. The morphology of the platelets is normal.  Many platelet clumps are seen on the feather edge.    Bone marrow aspirates and trephine core biopsy touch imprints are reviewed.    BONE MARROW DIFFERENTIAL (500 cells on touch imprints)  Percent (%) Cell Population Reference Range (%)   0.0 Blasts  (0 - 1)   1.2 Neutrophil promyelocytes (2 - 4)   60.8 Neutrophils and precursors (54 - 63)   26.8 Erythroid precursors (18 - 24)   2.4 Monocytes (1 - 1.5)   2.0 Eosinophils (1 - 3))   0.0 Basophils (0 - 1))   6.0 Lymphocytes (8 - 12)   0.8 Plasma cells (0 - 1.5)     The aspirate smears appear hemodilute.    Neutrophil maturation is complete. Erythroid maturation is complete. Megakaryocytes are present.    TREPHINE SECTIONS:  Hematoxylin and eosin stains are reviewed. The quality of the trephine core biopsy is adequate , with moderate fragmentation artifact. The marrow cellularity is estimated at 20-30%. The cellular composition reflects the touch imprints differential. The number of megakaryocytes appears consistent with the degree of marrow cellularity.     IMMUNOHISTOCHEMISTRY:  Immunohistochemical stains are performed on the paraffin-embedded trephine core with appropriate controls.    Stains for cytoplasmic kappa and lambda immunoglobulin light chains show scattered single and small clusters of kappa-monotypic plasma cells that by  account for about 5% of marrow cellularity.     Note: These immunohistochemical stains are deemed medically necessary. Some of the antigens may also be evaluated by flow cytometry. Concurrent evaluation by immunohistochemistry on clot  and/or trephine sections is indicated in this case in order to correlate immunophenotype with cell morphology and determine extent of involvement, spatial pattern, and focality of potential disease distribution.         Gross Description      Procedure/Gross Description   Aspirate(s) and trephine(s) procured by ERICK Roberto    Specimen sent for Special Studies:         Flow Cytometry: left aspirate        Cytogenetics: left aspirate        Molecular Diagnostics: left aspirate          Biopsy aspiration site: left posterior iliac crest                                                      (Reference Range)          Amount of aspirate           3.8   mL        Fat and P.V. cell layer        1    %               (1 - 3)        Particles                             trace   %        Myeloid-erythroid layer    1    %               (5 - 8)          Clot Section: no    Trephine biopsy site: left posterior iliac crest    Designated left posterior iliac crest is 1 cylinder of gritty tissue, labeled with the patient's name and hospital number, obtained with 11 gauge needle and a length of 20 mm; entirely submitted in 1 cassette; acetic zinc formalin fixed, decalcified, processed, and stained for hematoxylin and eosin per laboratory protocol.        MCRS Yes (A)    Performing Labs      The technical component of this testing was completed at [facility]           Echocardiogram       Results for orders placed during the hospital encounter of 11/15/22    ECHOCARDIOGRAM COMPLETE    Status: Normal 11/15/2022    Narrative  [id number]  [id number]  [id number]  [id number]^RONALD^MARCELLUS    [facility]  Echocardiography Laboratory  [address] [place] [id number]    Name: SANTO LUIS  MRN: [id number]  : 1970  Study Date: 11/15/2022 12:51 PM  Age: 52 yrs  Gender: Female  Patient Location: [facility]  Reason For  Study: Examination prior to chemotherapy, Myeloma (H)  Ordering Physician: MARCELLUS CARDENAS  Referring Physician: MARCELLUS CARDENAS  Performed By: Ly Christine    BSA: 1.8 m2  Height: 68 in  Weight: 158 lb  HR: 55  BP: 126/74 mmHg  ______________________________________________________________________________  Procedure  Echocardiogram with two-dimensional, color and spectral Doppler performed.  ______________________________________________________________________________  Interpretation Summary  Global and regional left ventricular function is normal with an EF of 55-60%.  Global right ventricular function is normal.  Pulmonary artery systolic pressure is normal.  The inferior vena cava is normal.  No pericardial effusion is present.  ______________________________________________________________________________  Left Ventricle  Left ventricular size is normal. Left ventricular wall thickness is normal.  Global and regional left ventricular function is normal with an EF of 55-60%.  Left ventricular diastolic function is normal. Global peak LV longitudinal  strain is averaged at -22%. This is within reported normal limits (normal <-  18%). No regional wall motion abnormalities are seen.    Right Ventricle  The right ventricle is normal size. Global right ventricular function is  normal.    Atria  Both atria appear normal.    Mitral Valve  The mitral valve is normal.    Aortic Valve  Aortic valve is normal in structure and function.    Tricuspid Valve  The tricuspid valve is normal. Trace to mild tricuspid insufficiency is  present. The right ventricular systolic pressure is approximated at 17.0 mmHg  plus the right atrial pressure. Pulmonary artery systolic pressure is normal.    Pulmonic Valve  The pulmonic valve is normal.    Vessels  The aorta root is normal. The inferior vena cava is normal.    Pericardium  No pericardial effusion is present.    Compared to Previous Study  This study was compared with  the study from 22 . LV function is normal.  ______________________________________________________________________________  MMode/2D Measurements & Calculations  IVSd: 1.3 cm    LVIDd: 4.6 cm  LVIDs: 3.1 cm  LVPWd: 0.99 cm  FS: 32.2 %  LV mass(C)d: 192.9 grams  LV mass(C)dI: 104.3 grams/m2  Ao root diam: 3.1 cm  asc Aorta Diam: 2.8 cm  LVOT diam: 2.0 cm  LVOT area: 3.1 cm2  LA Volume (BP): 54.5 ml  LA Volume Index (BP): 29.5 ml/m2  RWT: 0.44    Doppler Measurements & Calculations  MV E max antonia: 49.3 cm/sec  MV A max antonia: 86.5 cm/sec  MV E/A: 0.57  MV dec slope: 176.0 cm/sec2  MV dec time: 0.28 sec  PA acc time: 0.13 sec  TR max antonia: 206.0 cm/sec  TR max P.0 mmHg  E/E' av.8  Lateral E/e': 6.9  Medial E/e': 8.7    ______________________________________________________________________________  Report approved by: Rae Galvez 11/15/2022 02:50 PM        PET Scan       Results for orders placed during the hospital encounter of 22    PET ONCOLOGY WHOLE BODY    Status: Normal 2022    Narrative  EXAM: Combined report of PET and CT on 2022 4:07 PM:    1. PET of the neck, chest, abdomen, and pelvis.  2. PET CT fusion for attenuation correction and anatomical  localization.  3. Nondiagnostic CT of the head, neck, chest, abdomen, pelvis, lower  extremities without contrast was obtained for interpretation.  4. 3D MIP and PET-CT fused images were processed on an independent  workstation and archived to PACS and reviewed by a radiologist.    TECHNIQUE:    1. PET: The patient received 10.07 mCi of F-18-FDG. The serum glucose  was 87 mg/dl prior to administration. Body weight was 71.9 kg. Images  were evaluated in the axial, sagittal, and coronal planes as well as  the rotational whole body MIP. Images were acquired from the cranial  vertex to the feet.    UPTAKE WAS MEASURED AT 78 MINUTES.    BACKGROUND: Liver SUV max = 3.5, Aorta Blood SUV Max = 2.0.    2. CT: Volumetric acquisition for  clinical interpretation of the head,  neck, chest, abdomen, pelvis, and lower extremities acquired. The  images were reviewed in bone, soft tissue, and lung windows.    3. 3D MIP and PET-CT fused images were processed on an independent  workstation and archived to PACS and reviewed by a radiologist.    HISTORY: MM pre Abecma apheresis restaging; Examination prior to  chemotherapy; Myeloma; Personal history of diseases of blood and  blood-forming organs.    ADDITIONAL INFORMATION OBTAINED FROM EMR: 52-year-old female with  refractory multiple myeloma. Now on fifth line of therapy with  isatuximab/pom/dex reaching stable disease as of 11/2. Also on CAR-T  Abecma. Apheresis planned 11/21.    COMPARISON: PET CT 7/12/2022.    FINDINGS:    HEAD/NECK:  There is no suspicious FDG uptake in the neck. Mildly avid,  non-enlarged left cervical level 2B node is likely reactive.    The paranasal sinuses are clear. The mastoid air cells are clear.    THORAX:  There is no suspicious FDG uptake in the chest. Few mildly avid,  non-enlarged left axillary nodes are likely reactive/related to  vaccination.    The central tracheobronchial tree is clear. No pleural effusion or  pneumothorax. No acute consolidation. Right middle lobe calcified  granuloma. Mild scattered coronary calcifications.    ABDOMEN/PELVIS:  There is no suspicious FDG uptake in the abdomen or pelvis or  hypermetabolic adenopathy.    There is new focal cutaneous uptake in right lower ventral wall  (3/272), likely infective/inflammatory.    Intrauterine contraceptive device in place. Rectus diastasis.    LOWER EXTREMITIES:  There is no suspicious FDG uptake in the visualized lower extremities.    MUSCULOSKELETAL:  There is interval decreased intensity of uptake within osteolytic left  pubic ramus lesion with associated pathologic fracture, SUV max 6.8,  previously 11.6. No new hypermetabolic osseous lesions identified.  Focus of increased uptake about the left anterior  superior iliac spine  and right greater trochanter likely represent enthesopathy (3/246).    Impression  IMPRESSION:    1. Interval partial treatment response with decreased metabolic  activity in the left pubic ramus osteolytic lesion and associated  pathologic fracture.    2. No new metabolically-active myelomatous lesions identified.    I have personally reviewed the examination and initial interpretation  and I agree with the findings.    HILDA DYSON MD      SYSTEM ID:  [id number]     Kristie understood the above assessment and recommendations.  Multiple questions answered.  No barriers to learning identified.      Known issues that I take into account for medical decisions, with salient changes to the plan considering these complexities noted above.    Patient Active Problem List   Diagnosis     Allergic rhinitis     Anxiety state     Irritable bowel syndrome     Hemorrhoids     Sciatica     Encounter for insertion or removal of intrauterine contraceptive device     CARDIOVASCULAR SCREENING; LDL GOAL LESS THAN 160     Pelvic mass     Plasmacytoma of bone (H)     Light chain myeloma (H)     Dehydration     Hypogammaglobulinemia (H)     Multiple myeloma not having achieved remission (H)       ------------------------------------------------------------------------------------------------------------------------------------------------    Patient Care Team       Relationship Specialty Notifications Start End    Rena Sheffield MD PCP - General Family Medicine  10/13/21     Phone: [phone] Fax: [phone]         [facility] [address] [facility] [address]    Edu Philip MD Assigned Musculoskeletal Provider   9/5/21     Phone: [phone] Fax: [phone] [address] [facility] [address]    Griselda Valdez, RN Specialty Care Coordinator Hematology & Oncology Admissions 9/16/21     Phone: [phone]         Payton Reinoso MD MD Hematology All results,  Admissions 9/16/21     Phone: [phone] Fax: [phone]         [address] [address]    Alem Thomas MD  Cardiovascular Disease  1/17/22     Phone: [phone] Fax: [phone]         [address] [facility] [address]    Alem Thomas MD Assigned Heart and Vascular Provider   2/6/22     Phone: [phone] Fax: [phone]         [address] [place] [address]    Shabana Bauman MD MD Ophthalmology  4/6/22     REFERRED BY PAYTON REINOSO    Phone: [phone] Fax: [phone]         [address] [place] [address]    Payton Reinoso MD Assigned Cancer Care Provider   5/28/22     Phone: [phone] Fax: [phone]         [address] [place] [address]    Fanny Ponce MD Assigned Surgical Provider   6/4/22      [address] [place] [address]    Nika Laboy, RN Specialty Care Coordinator Cardiology Admissions 6/27/22     Phone: [phone] Fax: [phone]         [address] [place] [address]    Rosangela Rosales, [facility]  BMT - Adult Admissions 7/7/22     Phone: [phone]         Carissa Maciel, PA-C Assigned PCP   9/17/22     Phone: [phone] Fax: [phone]         [address] [facility] [address]    Payton Reinoso MD BMT Physician Transplant  11/14/22     Phone: [phone] Fax: [phone]         [address] [address]    Julianne Peoples BMT Nurse Coordinator   12/19/22     CART/ BMT Coordinator

## 2022-12-30 ENCOUNTER — HOSPITAL ENCOUNTER (OUTPATIENT)
Facility: AMBULATORY SURGERY CENTER | Age: 52
Discharge: HOME | End: 2022-12-30
Attending: PHYSICIAN ASSISTANT

## 2022-12-30 ENCOUNTER — LAB (OUTPATIENT)
Dept: LAB | Facility: CLINIC | Age: 52
End: 2022-12-30
Payer: COMMERCIAL

## 2022-12-30 ENCOUNTER — CARE COORDINATION (OUTPATIENT)
Dept: TRANSPLANT | Facility: CLINIC | Age: 52
End: 2022-12-30

## 2022-12-30 ENCOUNTER — INFUSION THERAPY VISIT (OUTPATIENT)
Dept: TRANSPLANT | Facility: CLINIC | Age: 52
End: 2022-12-30
Payer: COMMERCIAL

## 2022-12-30 ENCOUNTER — ONCOLOGY VISIT (OUTPATIENT)
Dept: TRANSPLANT | Facility: CLINIC | Age: 52
End: 2022-12-30
Payer: COMMERCIAL

## 2022-12-30 VITALS
RESPIRATION RATE: 16 BRPM | WEIGHT: 160.9 LBS | TEMPERATURE: 98 F | DIASTOLIC BLOOD PRESSURE: 73 MMHG | OXYGEN SATURATION: 98 % | HEART RATE: 86 BPM | SYSTOLIC BLOOD PRESSURE: 128 MMHG | BODY MASS INDEX: 25.58 KG/M2

## 2022-12-30 DIAGNOSIS — C90.00 MULTIPLE MYELOMA NOT HAVING ACHIEVED REMISSION (H): ICD-10-CM

## 2022-12-30 DIAGNOSIS — C90.30 PLASMACYTOMA OF BONE (H): ICD-10-CM

## 2022-12-30 DIAGNOSIS — C90.00 LIGHT CHAIN MYELOMA (H): ICD-10-CM

## 2022-12-30 DIAGNOSIS — C90.00 LIGHT CHAIN MYELOMA (H): Primary | ICD-10-CM

## 2022-12-30 LAB
CK SERPL-CCNC: 65 U/L (ref 26–192)
HCG SERPL QL: NEGATIVE
IGA SERPL-MCNC: 12 MG/DL (ref 84–499)
IGG SERPL-MCNC: 576 MG/DL (ref 610–1616)
IGM SERPL-MCNC: <10 MG/DL (ref 35–242)
KAPPA LC FREE SER-MCNC: 54.74 MG/DL (ref 0.33–1.94)
KAPPA LC FREE/LAMBDA FREE SER NEPH: 322 {RATIO} (ref 0.26–1.65)
LAMBDA LC FREE SERPL-MCNC: 0.17 MG/DL (ref 0.57–2.63)
TOTAL PROTEIN SERUM FOR ELP: 6.4 G/DL (ref 6.4–8.3)

## 2022-12-30 PROCEDURE — 82784 ASSAY IGA/IGD/IGG/IGM EACH: CPT

## 2022-12-30 PROCEDURE — 86334 IMMUNOFIX E-PHORESIS SERUM: CPT | Performed by: PATHOLOGY

## 2022-12-30 PROCEDURE — 84703 CHORIONIC GONADOTROPIN ASSAY: CPT

## 2022-12-30 PROCEDURE — 84165 PROTEIN E-PHORESIS SERUM: CPT | Mod: TC | Performed by: PATHOLOGY

## 2022-12-30 PROCEDURE — 84155 ASSAY OF PROTEIN SERUM: CPT

## 2022-12-30 PROCEDURE — 99214 OFFICE O/P EST MOD 30 MIN: CPT

## 2022-12-30 PROCEDURE — G0463 HOSPITAL OUTPT CLINIC VISIT: HCPCS | Mod: 25

## 2022-12-30 PROCEDURE — 999N000285 HC STATISTIC VASC ACCESS LAB DRAW WITH PIV START

## 2022-12-30 PROCEDURE — 999N000127 HC STATISTIC PERIPHERAL IV START W US GUIDANCE

## 2022-12-30 PROCEDURE — 96413 CHEMO IV INFUSION 1 HR: CPT

## 2022-12-30 PROCEDURE — 96417 CHEMO IV INFUS EACH ADDL SEQ: CPT

## 2022-12-30 PROCEDURE — 250N000011 HC RX IP 250 OP 636

## 2022-12-30 PROCEDURE — 82550 ASSAY OF CK (CPK): CPT

## 2022-12-30 PROCEDURE — 96375 TX/PRO/DX INJ NEW DRUG ADDON: CPT

## 2022-12-30 PROCEDURE — 258N000003 HC RX IP 258 OP 636

## 2022-12-30 PROCEDURE — 83521 IG LIGHT CHAINS FREE EACH: CPT

## 2022-12-30 RX ORDER — SODIUM CHLORIDE 9 MG/ML
INJECTION, SOLUTION INTRAVENOUS CONTINUOUS
Status: DISCONTINUED | OUTPATIENT
Start: 2022-12-30 | End: 2022-12-30 | Stop reason: HOSPADM

## 2022-12-30 RX ORDER — ALLOPURINOL 300 MG/1
300 TABLET ORAL DAILY
Qty: 14 TABLET | Refills: 0 | Status: ON HOLD | OUTPATIENT
Start: 2022-12-30 | End: 2023-01-17

## 2022-12-30 RX ORDER — LEVOFLOXACIN 250 MG/1
250 TABLET, FILM COATED ORAL DAILY
Qty: 30 TABLET | Refills: 0 | Status: SHIPPED | OUTPATIENT
Start: 2022-12-30 | End: 2023-02-09

## 2022-12-30 RX ORDER — ACYCLOVIR 800 MG/1
800 TABLET ORAL EVERY 12 HOURS
Qty: 60 TABLET | Refills: 0 | Status: SHIPPED | OUTPATIENT
Start: 2022-12-30 | End: 2023-03-09

## 2022-12-30 RX ORDER — HEPARIN SODIUM (PORCINE) LOCK FLUSH IV SOLN 100 UNIT/ML 100 UNIT/ML
5 SOLUTION INTRAVENOUS
Status: CANCELLED | OUTPATIENT
Start: 2022-12-30

## 2022-12-30 RX ORDER — ACYCLOVIR 400 MG/1
800 TABLET ORAL 2 TIMES DAILY
Qty: 120 TABLET | Refills: 1 | Status: ON HOLD | OUTPATIENT
Start: 2022-12-30 | End: 2023-01-04

## 2022-12-30 RX ORDER — FLUCONAZOLE 200 MG/1
200 TABLET ORAL DAILY
Qty: 30 TABLET | Refills: 1 | Status: SHIPPED | OUTPATIENT
Start: 2022-12-30 | End: 2023-02-09

## 2022-12-30 RX ORDER — LEVOFLOXACIN 250 MG/1
250 TABLET, FILM COATED ORAL DAILY
Qty: 30 TABLET | Refills: 0 | Status: ON HOLD | OUTPATIENT
Start: 2022-12-30 | End: 2023-01-04

## 2022-12-30 RX ORDER — ONDANSETRON 2 MG/ML
8 INJECTION INTRAMUSCULAR; INTRAVENOUS ONCE
Status: COMPLETED | OUTPATIENT
Start: 2022-12-30 | End: 2022-12-30

## 2022-12-30 RX ORDER — FLUCONAZOLE 100 MG/1
100 TABLET ORAL DAILY
Qty: 30 TABLET | Refills: 0 | Status: ON HOLD | OUTPATIENT
Start: 2022-12-30 | End: 2023-01-04

## 2022-12-30 RX ORDER — PENTAMIDINE ISETHIONATE 300 MG/300MG
300 INHALANT RESPIRATORY (INHALATION)
Status: CANCELLED
Start: 2022-12-30

## 2022-12-30 RX ORDER — ALBUTEROL SULFATE 0.83 MG/ML
2.5 SOLUTION RESPIRATORY (INHALATION)
Status: CANCELLED
Start: 2022-12-30

## 2022-12-30 RX ORDER — ALLOPURINOL 300 MG/1
300 TABLET ORAL DAILY
Qty: 30 TABLET | Refills: 0 | Status: ON HOLD | OUTPATIENT
Start: 2022-12-30 | End: 2023-01-04

## 2022-12-30 RX ORDER — HEPARIN SODIUM,PORCINE 10 UNIT/ML
5 VIAL (ML) INTRAVENOUS
Status: CANCELLED | OUTPATIENT
Start: 2022-12-30

## 2022-12-30 RX ADMIN — FLUDARABINE PHOSPHATE 55 MG: 25 INJECTION, SOLUTION INTRAVENOUS at 10:36

## 2022-12-30 RX ADMIN — ONDANSETRON 8 MG: 2 INJECTION INTRAMUSCULAR; INTRAVENOUS at 08:36

## 2022-12-30 RX ADMIN — SODIUM CHLORIDE: 9 INJECTION, SOLUTION INTRAVENOUS at 08:06

## 2022-12-30 RX ADMIN — CYCLOPHOSPHAMIDE 550 MG: 1 INJECTION, POWDER, FOR SOLUTION INTRAVENOUS; ORAL at 09:30

## 2022-12-30 ASSESSMENT — PAIN SCALES - GENERAL: PAINLEVEL: NO PAIN (0)

## 2022-12-30 NOTE — PROGRESS NOTES
BMT Daily Progress Note      Chief Complaint: multiple myeloma  Flu/cy Car-t      Disease presentation and baseline characteristics:    9/22/2: IgG kappa multiple myeloma, high risk (gain 1q)     Date Treatment Name Response Side Effects / Toxicities   11/15/21 VRd x3 cycles PD     1/17/22 D-VRd x 5 cycles CT     5/19/22 Daratumumab monotherapy PD     6/21/22 KCd x 1 cycle   Significant PVCs requiring discontinuation of therapy   7/6/22 Shruthi-Pd x 4 cycles PD             HPI: Margo is feeling well this morning. No cough or fever. Notes occasional watery mixed with stool diarrhea, three times yesterday. No blood.     ROS: 8 point review with pertinent positive and negatives in HPI    Physical Exam:     Vital Signs: /73   Pulse 86   Temp 98  F (36.7  C)   Resp 16   Wt 73 kg (160 lb 14.4 oz)   LMP  (LMP Unknown)   SpO2 98%   BMI 25.58 kg/m    General Appearance: healthy, alert and no distress  Eyes: PERRL, conjunctiva and lids normal, sclera nonicteric  Ears/Nose/M/Throat: Oral mucosa and posterior oropharynx normal, moist mucous membranes  Cardio/Vascular:regular rate and rhythm, normal S1 and S2, no murmur  Resp Effort And Auscultation: Normal - Clear to auscultation without rales, rhonchi, or wheezing.  GI: soft, nontender, bowel sounds present in all four quadrants, no hepatosplenomegaly  Musculoskeletal: Musculoskeletal normal  Edema: none  Psych/Affect: NEGATIVE for changes in mood or affect  Vascular Access: PIV (PICC to be placed within the next week)    Assessment Plans:     BMT Multiple myeloma  CAR-T cell product:  Abecma (Idecabtegene vicleucel)   Anticipated date of infusion:  1/4/22 or 1/5/22  Flu/Cy 12/30/2022- 1/1/2023  Allopurinol per protocol  Statin prophy protocol    Hematology: counts stable    Infectious Disease: afebrile  Prophy: Acyclovir, fluc (requested she start 12/30); given levaquin but requested she hold until directed to start  Pentamidine not yet given, requested for next  week    Renal: Cr/lytes wnl despite some watery stools    GI:   H/o constipation, more recently diarrhea (water mixed with some formed stool) 1-3x/day. Enteric and c dif kits sent home, pt will bring sample if persistent/worsening.  Omeprazole      Active Problems:     Patient Active Problem List   Diagnosis     Allergic rhinitis     Anxiety state     Irritable bowel syndrome     Hemorrhoids     Sciatica     Encounter for insertion or removal of intrauterine contraceptive device     CARDIOVASCULAR SCREENING; LDL GOAL LESS THAN 160     Pelvic mass     Plasmacytoma of bone (H)     Light chain myeloma (H)     Dehydration     Hypogammaglobulinemia (H)     Multiple myeloma not having achieved remission (H)       Plan: start fluconazole,  mg BID  Pentamidine requested for next week  Cont statin per trial   Returns over weekend to complete Flu/cy x3 days    Counseling time: 25 minutes  Total time: 35    Diana Villa PAC  [phone]

## 2022-12-30 NOTE — LETTER
Date:January 2, 2023      Provider requested that no letter be sent. Do not send.       [facility]

## 2022-12-30 NOTE — PROGRESS NOTES
BMT / Cell Therapy Close Note      Kristie Cornejo is a 52 year old female referred by Dr. Payton Reinoso for multiple myeloma.      Disease presentation and baseline characteristics:    9/22/2: IgG kappa multiple myeloma, high risk (gain 1q)    Date Treatment Name Response Side Effects / Toxicities   11/15/21 VRd x3 cycles PD    1/17/22 D-VRd x 5 cycles LA    5/19/22 Daratumumab monotherapy PD    6/21/22 KCd x 1 cycle  Significant PVCs requiring discontinuation of therapy   7/6/22 Shruthi-Pd x 4 cycles PD           HPI:  Please see my entry above for hematologic history.  Margo presents today accompanied by her spouse Erwin to review her pre-CAR-T evaluation.  She reports she is doing well and denies any acute concerns today.      ASSESSMENT AND PLAN:    BMT and Cell Therapy Informed Consent Discussion     In today's visit, we discussed in detail the research for which Kristie Cornejo is eligible. We discussed the potential risks and potential benefits of each protocol individually. We explained potential alternatives to the protocols discussed. We explained to the patient that participation is voluntary and that consent may be withdrawn at any time.     We discussed:    The rationale for our approach to the disease treatment    The eligibility requirements for treatment in the context of clinical trials    The need for caregiver support and the caregiver's role in recovery    The importance of adherence to the treatment plan and appropriate follow up    The requirements for contraception while undergoing treatment    The potential risks of morbidity and mortality related to this treatment    The requirements for supportive care to reduce the risk of infection and other complications    The role of the dietician and PT/OT to reduce the risk of muscle loss/sarcopenia    Support that is available through our social workers and care team to mitigate distress    The desired outcomes/goals of treatment,  including the possibility of long-term disease control    The patient completed the last round of treatment on 22.     ECO    Active infections:  None.    Reproductive status: What methods of birth control does the patient plan to use during the treatment period beginning with conditioning and ending with the discontinuation of immune suppression (indicate with an X all that apply):  __ The patient is confirmed to be sterile or post-menopausal  X  Sexual abstinence  __ Condoms  __ Implants  __ Injectables  __ Oral contraceptives  X  Intrauterine devices (IUD)  __ Other (describe)    The patient received appropriate reproductive counseling and agreed with the need for effective contraception during the treatment procedures.    We reviewed CAR-T therapy in detail today, including CRS and neurotoxicity.  Margo is comfortable with these potential side effects and is appropriate to proceed with therapy.    Known issues that I take into account for medical decisions, with salient changes to the plan considering these complexities noted above.    Patient Active Problem List   Diagnosis     Allergic rhinitis     Anxiety state     Irritable bowel syndrome     Hemorrhoids     Sciatica     Encounter for insertion or removal of intrauterine contraceptive device     CARDIOVASCULAR SCREENING; LDL GOAL LESS THAN 160     Pelvic mass     Plasmacytoma of bone (H)     Light chain myeloma (H)     Dehydration     Hypogammaglobulinemia (H)     Multiple myeloma not having achieved remission (H)       I spent 60 minutes in the care of this patient today, which included time necessary for preparation for the visit, obtaining history, ordering medications/tests/procedures as medically indicated, review of pertinent medical literature, counseling of the patient, communication of recommendations to the care team, and documentation time.    Charan Cameron MD      ROS:    10 point ROS neg other than the symptoms noted above in the HPI.       Past Medical History:   Diagnosis Date     Allergic rhinitis, cause unspecified      Anxiety state, unspecified 12/01/2003    Started postpartum . On Paxil x 1+years. Off meds- 10/04     Irritable bowel syndrome 01/01/2004     Multiple myeloma not having achieved remission (H)      PLANTAR WARTS     resolved     SINUS DYSRHYTHMIA 10/01/2002    wnl     Unspecified hemorrhoids without mention of complication 04/01/2004    colonoscopy 4/04 spastic colon, int roids; bx neg       Past Surgical History:   Procedure Laterality Date     BIOPSY BONE PELVIS Left 09/07/2021    Procedure: Biopsy left pubic ramis;  Surgeon: Edu Philip MD;  Location: [facility]     COLONOSCOPY N/A 04/13/2022    Procedure: COLONOSCOPY, WITH BIOPSY;  Surgeon: Berto Montgomery MD;  Location:  GI     EP ABLATION PVC N/A 03/23/2022    Procedure: Ablation Premature Ventricular Contractions;  Surgeon: Alem Thomas MD;  Location:  [facility]     INSERT CATHETER VASCULAR ACCESS N/A 11/21/2022    Procedure: CENTRAL VENOUS CATHETER NON TUNNELED INSERTION, VASCULAR ACCESS CATHETER;  Surgeon: Erich Bauman MD;  Location: [facility] OR     IR CVC NON TUNNEL LINE REMOVAL  11/22/2022     IR CVC NON TUNNEL PLACEMENT > 5 YRS  11/21/2022     LASIK       ZZC NONSPECIFIC PROCEDURE  01/01/1987    [place] teeth removed     ZZC NONSPECIFIC PROCEDURE  01/01/1993    (R) knee surgery     ZZC NONSPECIFIC PROCEDURE  01/01/2003    (R) breast bx-negative 6/03; bx neg 10/02     ZZC NONSPECIFIC PROCEDURE      10/02 cardiac echo normal     ZZC NONSPECIFIC PROCEDURE  01/01/2004 4/04 colonosc int roids, spastic colon     ZZC NONSPECIFIC PROCEDURE  08/01/2005    D & C for missed AB     ZZHC COLONOSCOPY THRU STOMA, DIAGNOSTIC  01/01/2004       Family History   Problem Relation Age of Onset     Neurologic Disorder Mother         toxo     Lipids Mother         low cholestrol     Lipids Father         high cholestrol     Glaucoma Maternal Grandmother       Diabetes Maternal Grandmother         breast cancer,stroke ischemic     Breast Cancer Maternal Grandmother         81yo/and autoimmune skin condition     C.A.D. Maternal Grandfather         strokes and mi, chronic respiratory from 2 hand smoke     Hypertension Paternal Grandmother         rheumatoid arthritis     Respiratory Paternal Grandfather         emphasema     Cancer - colorectal No family hx of      Prostate Cancer No family hx of      Macular Degeneration No family hx of        Social History     Tobacco Use     Smoking status: Never     Smokeless tobacco: Never   Vaping Use     Vaping Use: Never used   Substance Use Topics     Alcohol use: Yes     Comment: none to a couple. occasional     Drug use: No         Allergies   Allergen Reactions     Amoxicillin      Other reaction(s): upset stomach  Other reaction(s): Gastrointestinal, GI intolerance, Other (see comments)  Other reaction(s): upset stomach  Other reaction(s): upset stomach       Clarithromycin      Other reaction(s): GI intolerance     Erythromycin      upsets stomach     Erythromycin      Other reaction(s): GI intolerance  upsets stomach  nausea     Fluconazole Rash     Itching,   Unsure if really allergy, was taking allergy shots at the time.  She is willing to rechallenge.           Current Outpatient Medications   Medication Sig Dispense Refill     acetaminophen (TYLENOL) 325 MG tablet Take 325-650 mg by mouth every 6 hours as needed for mild pain       acyclovir (ZOVIRAX) 400 MG tablet Take 1 tablet (400 mg) by mouth 2 times daily 180 tablet 3     BUSPIRONE HCL 10 MG PO TABS 1 TABLET 3 TIMES DAILY as needed (Patient not taking: Reported on 12/21/2022) 30 Tab 3     Calcium Carbonate-Vit D-Min (CALCIUM 600+D PLUS MINERALS) 600-400 MG-UNIT TABS HOLD (Patient not taking: Reported on 12/22/2022)       cetirizine HCl 10 MG CAPS Take 10 mg by mouth daily       Flaxseed (Linseed) (FLAX SEED OIL) 1000 MG capsule Take 1 capsule by mouth daily        fluticasone (FLONASE) 50 MCG/ACT nasal spray 1 spray (Patient not taking: Reported on 2022)       glucosamine-chondroitin 500-400 MG CAPS per capsule Take 2 capsules by mouth daily       lactobacillus rhamnosus, GG, (CULTURELL) capsule Take 1 capsule by mouth daily       LORazepam (ATIVAN) 0.5 MG tablet Take 1 tablet (0.5 mg) by mouth every 4 hours as needed for anxiety, nausea or sleep (Patient not taking: Reported on 2022) 30 tablet 3     melatonin 5 MG tablet Take 5 mg by mouth nightly as needed for sleep (Patient not taking: Reported on 2022)       MIRENA 20 MCG/24HR IU IUD use for up to 5 years, then remove       Multiple Vitamin (MULTI-VITAMIN DAILY PO) Take 1 tablet by mouth daily       omeprazole (PRILOSEC OTC) 20 MG EC tablet Take 1 tablet (20 mg) by mouth daily       polyethylene glycol (MIRALAX) 17 g packet Take 1 packet by mouth daily       potassium chloride ER (KLOR-CON M) 10 MEQ CR tablet Take 2 tablets (20 mEq) by mouth 2 times daily 360 tablet 3     prochlorperazine (COMPAZINE) 10 MG tablet Take 10 mg by mouth every 6 hours as needed for nausea or vomiting (Patient not taking: Reported on 2022)       study - simvastatin, IDS# [id number], 40 MG tablet Take 1 tablet (40 mg) by mouth daily Take at least 5 daily doses prior to apheresis and continue until Day +30 after CAR-T infusion. 65 tablet 0     Zoledronic Acid (ZOMETA IV)            Physical Exam:     Vital Signs: /76   Pulse 77   Temp 98.5  F (36.9  C) (Oral)   Resp 16   Wt 73.9 kg (163 lb)   LMP  (LMP Unknown)   SpO2 99%   BMI 25.91 kg/m      ECO  General Appearance: alert, and no distress  Eyes: PERRL, conjunctiva and lids normal, sclera nonicteric  Ears/Nose/M/Throat: Oral mucosa and posterior oropharynx normal, moist mucous membranes  Neck supple, non-tender, free range of motion, no adenopathy  Cardio/Vascular:regular rate and rhythm, normal S1 and S2, no murmur  Resp Effort And Auscultation: Normal -  Clear to auscultation without rales, rhonchi, or wheezing.  GI: soft, nontender, bowel sounds present in all four quadrants, no hepatosplenomegaly  Lymphatics:no significant enlargement of lymph nodes globally   Musculoskeletal: Musculoskeletal normal  Edema: none  Skin: Skin color, texture, turgor normal. No rashes or lesions.  Neurologic: Gait normal.  Sensation grossly WNL.  Psych/Affect: Mood and affect are appropriate.    Blood Counts     Recent Labs   Lab Test 12/23/22  0852 12/21/22  0940 11/23/22  1356 11/22/22  0825 11/14/22  0945   HGB 12.6 12.7 12.0   < > 13.2   HCT 38.3 39.6 36.0   < > 42.1   WBC 4.1 3.5* 4.0   < > 5.2   ANEUTAUTO 2.4 1.9  --   --  3.3   ALYMPAUTO 1.0 0.9  --   --  0.8   AMONOAUTO 0.5 0.5  --   --  0.6   AEOSAUTO 0.1 0.1  --   --  0.4   ABSBASO 0.0 0.0  --   --  0.2   NRBCMAN 0.0 0.0  --   --  0.0    289 222   < > 192    < > = values in this interval not displayed.       ABO/RH    Recent Labs   Lab Test 12/21/22  0940   ABORH O POS       Chemistries     Basic Panel  Recent Labs   Lab Test 12/21/22  0940 11/22/22  0825 11/14/22  0945    142 142   POTASSIUM 4.1 3.9 3.7   CHLORIDE 107 108* 106   CO2 24 23 25   BUN 21.9* 12.3 21.7*   CR 0.81 0.80 0.88   GLC 89 119* 77        Calcium, Magnesium, Phosphorus  Recent Labs   Lab Test 12/21/22  0940 11/22/22  0825 11/14/22  0945   MEKA 10.2* 9.5 9.8   MAG 2.0 2.2  --    PHOS 4.0  --   --         LFTs  Recent Labs   Lab Test 12/21/22  0940 11/14/22  0945 11/02/22  1345   BILITOTAL 0.4 0.3 0.3   ALKPHOS 50 69 58   AST 23 27 17   ALT 28 38* 17   ALBUMIN 4.7 4.7 4.4       LDH  Recent Labs   Lab Test 12/21/22  0940 11/14/22  0945 08/10/22  1402    192 140       B2-Microglobulin  Recent Labs   Lab Test 12/21/22  0940 11/14/22  0945 09/15/21  1534   JUES1MHDG 1.6 2.0 1.8         Immunoglobulins     Recent Labs   Lab Test 12/21/22  0940 12/08/22  1441 11/14/22  0945 11/02/22  1345 10/31/22  0929 10/05/22  1436    856 899 243*  260* 244*       Recent Labs   Lab Test 12/08/22  1441 11/02/22  1345 10/31/22  0929 10/05/22  1436 06/21/22  1519 05/19/22  1007   IGA 12* 9* 11* 9* 5* 5*       Recent Labs   Lab Test 12/08/22  1441 11/02/22  1345 10/31/22  0929 10/05/22  1436 06/21/22  1519 05/19/22  1007   IGM 13* <10* <10* 11* 10* <10*         Monocloncal Protein Studies     M spike    Recent Labs   Lab Test 12/21/22  0940 12/08/22  1441 11/14/22  0945 11/02/22  1345 10/31/22  0929 10/05/22  1436   ELPM 0.0 0.1* 0.1* 0.1* 0.1* 0.1*       Kappa FLC    Recent Labs   Lab Test 12/21/22  0940 12/08/22  1441 11/14/22  0945 11/02/22  1345 10/31/22  0929 10/05/22  1436   KFLCA 46.42* 36.33* 39.86* 33.74* 30.57* 38.97*       Lambda FLC    Recent Labs   Lab Test 12/21/22  0940 12/08/22  1441 11/14/22  0945 11/02/22  1345 10/31/22  0929 10/05/22  1436   LFLCA 0.19* 0.23* 0.28* 0.20* 0.24* 0.20*       FLC Ratio    Recent Labs   Lab Test 12/21/22  0940 12/08/22  1441 11/14/22  0945 11/02/22  1345 10/31/22  0929 10/05/22  1436   KLRA 244.32* 157.96* 142.36* 168.70* 127.38* 194.85*       Infectious Disease Markers     [facility] IDM    Recent Labs   Lab Test 12/21/22  0940   DCMIG Positive*   DHBSAG Non-Reactive   DHBCAB Non-Reactive   DHIVAB Non-Reactive   DHCVAB Non-Reactive   DHTLVA Non-Reactive   TCRUZI Non-Reactive   DTRPAB Non-Reactive         Hepatitis and HIV    Recent Labs   Lab Test 11/14/22  0945   HEPBANG Nonreactive   HBCAB Reactive*   AUSAB 272.74   HCVAB Nonreactive   HIAGAB Nonreactive     EBV    Recent Labs   Lab Test 12/21/22  0940   EBVCAG Positive*       Bone Marrow Biopsy       Morphology    Results for orders placed or performed in visit on 12/23/22 (from the past 8760 hour(s))   Bone marrow biopsy   Result Value    Final Diagnosis      Bone marrow, posterior iliac crest, left decalcified trephine biopsy and touch imprint; left direct aspirate smear, and concentrated aspirate smear; and peripheral smear:    Persistent multiple  myeloma characterized by:  - Slightly hypocellular bone marrow with trilineage hematopoiesis and 5% kappa monotypic plasma cells  - Peripheral blood showing normal hemogram and differential      Comment      Concurrent flow cytometry was performed ([id number]) and shows kappa monotypic plasma cells. Plasma cells are present at a higher percentage in the core biopsy (5%) than in the aspirates or touch imprints.         Clinical Information      From Epic electronic medical record; 52-year-old female is undergoing evaluation prior to hematopoietic cell transplant or immune effector cell therapy for refractory kappa light chain multiple myeloma.      Peripheral Hematologic Data      CBC WITH DIFFERENTIAL(12/23/2022 09:02 AM CST):     RESULT VALUE REF. RANGE UNITS   WBC Count  Hemoglobin  Hematocrit  Platelet Count  RBC Count  MCV  MCH  MCHC  RDW 4.1 (NORMAL)    12.6 (NORMAL)     38.3 (NORMAL)  310 (NORMAL)  4.01 (NORMAL)       96 (NORMAL)     31.4 (NORMAL)     32.9 (NORMAL)     13.9 (NORMAL) 4.0-11.0  11.7-15.7  35.0-47.0  150-450  3.80-5.20    26.5-33.0  31.5-36.5  10.0-15.0 10e3/uL  g/dL  %  10e3/uL  10e6/uL  fL  pg  g/dL  %   % Neutrophils  % Lymphocytes  % Monocytes  % Eosinophils  % Basophils  % Immature Granulocytes  Absolute Neutrophils  Absolute Lymphocytes  Absolute Monocytes  Absolute Eosinophils  Absolute Basophils  Absolute Immature Granulocytes  NRBCs per 100 WBC  Absolute NRBCs 60  24  12  3  1  0  2.4 (NORMAL)  1.0 (NORMAL)  0.5 (NORMAL)  0.1 (NORMAL)  0.0 (NORMAL)  0.0 (NORMAL)  0 (NORMAL)  0.0 () N/A  N/A  N/A  N/A  N/A  N/A  1.6-8.3  0.8-5.3  0.0-1.3  0.0-0.7  0.0-0.2  <=0.4  <1  <=0.0 %  %  %  %  %  %  10e3/uL  10e3/uL  10e3/uL  10e3/uL  10e3/uL  10e3/uL  /100  10e3/uL         Microscopic Description      PERIPHERAL BLOOD SMEAR MORPHOLOGY:  The red blood cells appear normochromic.  Poikilocytosis includes rare to occasional echinocytes and rare elliptocytes.  Polychromasia is not increased.   Rouleaux formation may be slightly increased. The morphology of the platelets is normal.  Many platelet clumps are seen on the feather edge.    Bone marrow aspirates and trephine core biopsy touch imprints are reviewed.    BONE MARROW DIFFERENTIAL (500 cells on touch imprints)  Percent (%) Cell Population Reference Range (%)   0.0 Blasts  (0 - 1)   1.2 Neutrophil promyelocytes (2 - 4)   60.8 Neutrophils and precursors (54 - 63)   26.8 Erythroid precursors (18 - 24)   2.4 Monocytes (1 - 1.5)   2.0 Eosinophils (1 - 3))   0.0 Basophils (0 - 1))   6.0 Lymphocytes (8 - 12)   0.8 Plasma cells (0 - 1.5)     The aspirate smears appear hemodilute.    Neutrophil maturation is complete. Erythroid maturation is complete. Megakaryocytes are present.    TREPHINE SECTIONS:  Hematoxylin and eosin stains are reviewed. The quality of the trephine core biopsy is adequate , with moderate fragmentation artifact. The marrow cellularity is estimated at 20-30%. The cellular composition reflects the touch imprints differential. The number of megakaryocytes appears consistent with the degree of marrow cellularity.     IMMUNOHISTOCHEMISTRY:  Immunohistochemical stains are performed on the paraffin-embedded trephine core with appropriate controls.    Stains for cytoplasmic kappa and lambda immunoglobulin light chains show scattered single and small clusters of kappa-monotypic plasma cells that by  account for about 5% of marrow cellularity.     Note: These immunohistochemical stains are deemed medically necessary. Some of the antigens may also be evaluated by flow cytometry. Concurrent evaluation by immunohistochemistry on clot and/or trephine sections is indicated in this case in order to correlate immunophenotype with cell morphology and determine extent of involvement, spatial pattern, and focality of potential disease distribution.         Gross Description      Procedure/Gross Description   Aspirate(s) and trephine(s) procured by BASIL  ERICK Soni    Specimen sent for Special Studies:         Flow Cytometry: left aspirate        Cytogenetics: left aspirate        Molecular Diagnostics: left aspirate          Biopsy aspiration site: left posterior iliac crest                                                      (Reference Range)          Amount of aspirate           3.8   mL        Fat and P.V. cell layer        1    %               (1 - 3)        Particles                             trace   %        Myeloid-erythroid layer    1    %               (5 - 8)          Clot Section: no    Trephine biopsy site: left posterior iliac crest    Designated left posterior iliac crest is 1 cylinder of gritty tissue, labeled with the patient's name and hospital number, obtained with 11 gauge needle and a length of 20 mm; entirely submitted in 1 cassette; acetic zinc formalin fixed, decalcified, processed, and stained for hematoxylin and eosin per laboratory protocol.        MCRS Yes (A)    Performing Labs      The technical component of this testing was completed at [facility]           Echocardiogram       Results for orders placed during the hospital encounter of 11/15/22    ECHOCARDIOGRAM COMPLETE    Status: Normal 11/15/2022    Narrative  [id number]  [id number]  [id number]  [id number]^RONALD^MARCELLUS    [facility]  Echocardiography Laboratory  [address] [place] [id number]    Name: SANTO LUIS  MRN: [id number]  : 1970  Study Date: 11/15/2022 12:51 PM  Age: 52 yrs  Gender: Female  Patient Location: [facility]  Reason For Study: Examination prior to chemotherapy, Myeloma (H)  Ordering Physician: MARCELLUS CARDENAS  Referring Physician: MARCELLUS CARDENAS  Performed By: Ly Christine    BSA: 1.8 m2  Height: 68 in  Weight: 158 lb  HR: 55  BP: 126/74  mmHg  ______________________________________________________________________________  Procedure  Echocardiogram with two-dimensional, color and spectral Doppler performed.  ______________________________________________________________________________  Interpretation Summary  Global and regional left ventricular function is normal with an EF of 55-60%.  Global right ventricular function is normal.  Pulmonary artery systolic pressure is normal.  The inferior vena cava is normal.  No pericardial effusion is present.  ______________________________________________________________________________  Left Ventricle  Left ventricular size is normal. Left ventricular wall thickness is normal.  Global and regional left ventricular function is normal with an EF of 55-60%.  Left ventricular diastolic function is normal. Global peak LV longitudinal  strain is averaged at -22%. This is within reported normal limits (normal <-  18%). No regional wall motion abnormalities are seen.    Right Ventricle  The right ventricle is normal size. Global right ventricular function is  normal.    Atria  Both atria appear normal.    Mitral Valve  The mitral valve is normal.    Aortic Valve  Aortic valve is normal in structure and function.    Tricuspid Valve  The tricuspid valve is normal. Trace to mild tricuspid insufficiency is  present. The right ventricular systolic pressure is approximated at 17.0 mmHg  plus the right atrial pressure. Pulmonary artery systolic pressure is normal.    Pulmonic Valve  The pulmonic valve is normal.    Vessels  The aorta root is normal. The inferior vena cava is normal.    Pericardium  No pericardial effusion is present.    Compared to Previous Study  This study was compared with the study from 4.28.22 . LV function is normal.  ______________________________________________________________________________  MMode/2D Measurements & Calculations  IVSd: 1.3 cm    LVIDd: 4.6 cm  LVIDs: 3.1 cm  LVPWd: 0.99 cm  FS:  32.2 %  LV mass(C)d: 192.9 grams  LV mass(C)dI: 104.3 grams/m2  Ao root diam: 3.1 cm  asc Aorta Diam: 2.8 cm  LVOT diam: 2.0 cm  LVOT area: 3.1 cm2  LA Volume (BP): 54.5 ml  LA Volume Index (BP): 29.5 ml/m2  RWT: 0.44    Doppler Measurements & Calculations  MV E max antonia: 49.3 cm/sec  MV A max antonia: 86.5 cm/sec  MV E/A: 0.57  MV dec slope: 176.0 cm/sec2  MV dec time: 0.28 sec  PA acc time: 0.13 sec  TR max antonia: 206.0 cm/sec  TR max P.0 mmHg  E/E' av.8  Lateral E/e': 6.9  Medial E/e': 8.7    ______________________________________________________________________________  Report approved by: Rae Galvez 11/15/2022 02:50 PM        PET Scan       Results for orders placed during the hospital encounter of 22    PET ONCOLOGY WHOLE BODY    Status: Normal 2022    Narrative  EXAM: Combined report of PET and CT on 2022 4:07 PM:    1. PET of the neck, chest, abdomen, and pelvis.  2. PET CT fusion for attenuation correction and anatomical  localization.  3. Nondiagnostic CT of the head, neck, chest, abdomen, pelvis, lower  extremities without contrast was obtained for interpretation.  4. 3D MIP and PET-CT fused images were processed on an independent  workstation and archived to PACS and reviewed by a radiologist.    TECHNIQUE:    1. PET: The patient received 10.07 mCi of F-18-FDG. The serum glucose  was 87 mg/dl prior to administration. Body weight was 71.9 kg. Images  were evaluated in the axial, sagittal, and coronal planes as well as  the rotational whole body MIP. Images were acquired from the cranial  vertex to the feet.    UPTAKE WAS MEASURED AT 78 MINUTES.    BACKGROUND: Liver SUV max = 3.5, Aorta Blood SUV Max = 2.0.    2. CT: Volumetric acquisition for clinical interpretation of the head,  neck, chest, abdomen, pelvis, and lower extremities acquired. The  images were reviewed in bone, soft tissue, and lung windows.    3. 3D MIP and PET-CT fused images were processed on an  independent  workstation and archived to PACS and reviewed by a radiologist.    HISTORY: MM pre Abecma apheresis restaging; Examination prior to  chemotherapy; Myeloma; Personal history of diseases of blood and  blood-forming organs.    ADDITIONAL INFORMATION OBTAINED FROM EMR: 52-year-old female with  refractory multiple myeloma. Now on fifth line of therapy with  isatuximab/pom/dex reaching stable disease as of 11/2. Also on CAR-T  Abecma. Apheresis planned 11/21.    COMPARISON: PET CT 7/12/2022.    FINDINGS:    HEAD/NECK:  There is no suspicious FDG uptake in the neck. Mildly avid,  non-enlarged left cervical level 2B node is likely reactive.    The paranasal sinuses are clear. The mastoid air cells are clear.    THORAX:  There is no suspicious FDG uptake in the chest. Few mildly avid,  non-enlarged left axillary nodes are likely reactive/related to  vaccination.    The central tracheobronchial tree is clear. No pleural effusion or  pneumothorax. No acute consolidation. Right middle lobe calcified  granuloma. Mild scattered coronary calcifications.    ABDOMEN/PELVIS:  There is no suspicious FDG uptake in the abdomen or pelvis or  hypermetabolic adenopathy.    There is new focal cutaneous uptake in right lower ventral wall  (3/272), likely infective/inflammatory.    Intrauterine contraceptive device in place. Rectus diastasis.    LOWER EXTREMITIES:  There is no suspicious FDG uptake in the visualized lower extremities.    MUSCULOSKELETAL:  There is interval decreased intensity of uptake within osteolytic left  pubic ramus lesion with associated pathologic fracture, SUV max 6.8,  previously 11.6. No new hypermetabolic osseous lesions identified.  Focus of increased uptake about the left anterior superior iliac spine  and right greater trochanter likely represent enthesopathy (3/246).    Impression  IMPRESSION:    1. Interval partial treatment response with decreased metabolic  activity in the left pubic ramus  osteolytic lesion and associated  pathologic fracture.    2. No new metabolically-active myelomatous lesions identified.    I have personally reviewed the examination and initial interpretation  and I agree with the findings.    HILDA DYSON MD      SYSTEM ID:  [id number]     Kristie understood the above assessment and recommendations.  Multiple questions answered.  No barriers to learning identified.      Known issues that I take into account for medical decisions, with salient changes to the plan considering these complexities noted above.    Patient Active Problem List   Diagnosis     Allergic rhinitis     Anxiety state     Irritable bowel syndrome     Hemorrhoids     Sciatica     Encounter for insertion or removal of intrauterine contraceptive device     CARDIOVASCULAR SCREENING; LDL GOAL LESS THAN 160     Pelvic mass     Plasmacytoma of bone (H)     Light chain myeloma (H)     Dehydration     Hypogammaglobulinemia (H)     Multiple myeloma not having achieved remission (H)       ------------------------------------------------------------------------------------------------------------------------------------------------    Patient Care Team       Relationship Specialty Notifications Start End    Rena Sheffield MD PCP - General Family Medicine  10/13/21     Phone: [phone] Fax: [phone]         [facility] [address] [facility] [address]    Edu Philip MD Assigned Musculoskeletal Provider   9/5/21     Phone: [phone] Fax: [phone]         [address] [facility] [address]    Griselda Valdez, RN Specialty Care Coordinator Hematology & Oncology Admissions 9/16/21     Phone: [phone]         Payton Reinoso MD MD Hematology All results, Admissions 9/16/21     Phone: [phone] Fax: [phone]         [address] [facility]  [facility] [address]    Alem Thomas MD  Cardiovascular Disease  1/17/22     Phone: [phone] Fax: [phone]          [address] [facility] [address]    Alem Thomas MD Assigned Heart and Vascular Provider   2/6/22     Phone: [phone] Fax: [phone]          [facility] [address]    Shabana Bauman MD MD Ophthalmology  4/6/22     REFERRED BY PAYTON REINOSO    Phone: [phone] Fax: [phone]         [address] [facility] [address]    Payton Reinoso MD Assigned Cancer Care Provider   5/28/22     Phone: [phone] Fax: [phone]         [address] [place] [address]    Fanny Ponce MD Assigned Surgical Provider   6/4/22      [address] [place] [address]    Nika Laboy, RN Specialty Care Coordinator Cardiology Admissions 6/27/22     Phone: [phone] Fax: [phone]         [address] [facility] [address]    Rosangela Rosales, [facility]  BMT - Adult Admissions 7/7/22     Phone: [phone]         Carissa Maciel PA-C Assigned PCP   9/17/22     Phone: [phone] Fax: [phone]         [address] [facility] [address]    Payton Reinoso MD BMT Physician Transplant  11/14/22     Phone: [phone] Fax: [phone]         [address] [place] [address]    Julianne Peoples BMT Nurse Coordinator   12/19/22     CART/ BMT Coordinator

## 2022-12-30 NOTE — LETTER
12/30/2022         RE: Kristie Cornejo  [address]  [facility] [address]        Dear Colleague,    Thank you for referring your patient, Kristie Cornejo, to the [facility]PL[facility] PROGRAM [place]. Please see a copy of my visit note below.    Infusion Nursing Note:  Kristie Cornejo presents today for scheduled day -5 Flu/Cy chemo infusions.    Patient seen by provider today: Yes: Diana Villa, SULEIMAN   present during visit today: Not Applicable.    Note: VSS. Meds and allergies reviewed. Pt assessed by provider prior to infusion. Pt received  mL/hr starting 1 hour prior to chemo and continued for 1 hour post Cytoxan. Pt received 550 mg Cytoxan IV followed by 55 mg Fludarabine IV. PIV left in place post infusion d/t pt returning for chemo tomorrow and Sunday and no vascular access availability over the weekend. PIV was intact and had good blood return post infusion.      This writer notified pharmacist Seferino Levine of patient starting lymphodepleting chemotherapy in preparation for Abecma for the purpose that the patient will need to have a reserve of 2 doses of tocilizumab available for the next 4 weeks.     This writer has released the nursing communication orders in the treatment plan in addition to this note to verify this information was relayed to pharmacy staff.      Intravenous Access:  Peripheral IV placed by vascular access.    Treatment Conditions:  Not Applicable.    Post Infusion Assessment:  Patient tolerated infusion without incident.  Blood return noted pre and post infusion.  Site patent and intact, free from redness, edema or discomfort.  No evidence of extravasations.     Discharge Plan:   Copy of AVS reviewed with patient and/or family.  Patient will return tomorrow for next appointment.  Patient discharged in stable condition accompanied by: .  Departure Mode: Ambulatory.      Sweta Muro RN                          Again,  thank you for allowing me to participate in the care of your patient.        Sincerely,        BMT Infusion Nurse

## 2022-12-30 NOTE — NURSING NOTE
Chief Complaint   Patient presents with     Blood Draw     Labs drawn by VAT via PIV, vitals taken.     Labs drawn from PIV placed by VAT. Line flushed with saline. Vitals taken. Pt checked in for appointment(s).    Jadyn Pan RN

## 2022-12-30 NOTE — LETTER
12/30/2022         RE: Kristie Cornejo  [address]  [facility] [address]        Dear Colleague,    Thank you for referring your patient, Kristie Cornejo, to the [facility] BLOOD AND MARROW TRANSPLANT PROGRAM [place]. Please see a copy of my visit note below.         BMT Daily Progress Note      Chief Complaint: multiple myeloma  Flu/cy Car-t      Disease presentation and baseline characteristics:    9/22/2: IgG kappa multiple myeloma, high risk (gain 1q)     Date Treatment Name Response Side Effects / Toxicities   11/15/21 VRd x3 cycles PD     1/17/22 D-VRd x 5 cycles AK     5/19/22 Daratumumab monotherapy PD     6/21/22 KCd x 1 cycle   Significant PVCs requiring discontinuation of therapy   7/6/22 Shruthi-Pd x 4 cycles PD             HPI: Margo is feeling well this morning. No cough or fever. Notes occasional watery mixed with stool diarrhea, three times yesterday. No blood.     ROS: 8 point review with pertinent positive and negatives in HPI    Physical Exam:     Vital Signs: /73   Pulse 86   Temp 98  F (36.7  C)   Resp 16   Wt 73 kg (160 lb 14.4 oz)   LMP  (LMP Unknown)   SpO2 98%   BMI 25.58 kg/m    General Appearance: healthy, alert and no distress  Eyes: PERRL, conjunctiva and lids normal, sclera nonicteric  Ears/Nose/M/Throat: Oral mucosa and posterior oropharynx normal, moist mucous membranes  Cardio/Vascular:regular rate and rhythm, normal S1 and S2, no murmur  Resp Effort And Auscultation: Normal - Clear to auscultation without rales, rhonchi, or wheezing.  GI: soft, nontender, bowel sounds present in all four quadrants, no hepatosplenomegaly  Musculoskeletal: Musculoskeletal normal  Edema: none  Psych/Affect: NEGATIVE for changes in mood or affect  Vascular Access: PIV (PICC to be placed within the next week)    Assessment Plans:     BMT Multiple myeloma  CAR-T cell product:  Abecma (Idecabtegene vicleucel)   Anticipated date of infusion:  1/4/22 or 1/5/22  Flu/Cy  12/30/2022- 1/1/2023  Allopurinol per protocol  Statin prophy protocol    Hematology: counts stable    Infectious Disease: afebrile  Prophy: Acyclovir, fluc (requested she start 12/30); given levaquin but requested she hold until directed to start  Pentamidine not yet given, requested for next week    Renal: Cr/lytes wnl despite some watery stools    GI:   H/o constipation, more recently diarrhea (water mixed with some formed stool) 1-3x/day. Enteric and c dif kits sent home, pt will bring sample if persistent/worsening.  Omeprazole      Active Problems:     Patient Active Problem List   Diagnosis     Allergic rhinitis     Anxiety state     Irritable bowel syndrome     Hemorrhoids     Sciatica     Encounter for insertion or removal of intrauterine contraceptive device     CARDIOVASCULAR SCREENING; LDL GOAL LESS THAN 160     Pelvic mass     Plasmacytoma of bone (H)     Light chain myeloma (H)     Dehydration     Hypogammaglobulinemia (H)     Multiple myeloma not having achieved remission (H)       Plan: start fluconazole,  mg BID  Pentamidine requested for next week  Cont statin per trial   Returns over weekend to complete Flu/cy x3 days    Counseling time: 25 minutes  Total time: 35    Diana Villa [facility]  [phone]      Again, thank you for allowing me to participate in the care of your patient.        Sincerely,        BMT Advanced Practice Provider

## 2022-12-30 NOTE — PROGRESS NOTES
Infusion Nursing Note:  Kristie Cornejo presents today for scheduled day -5 Flu/Cy chemo infusions.    Patient seen by provider today: Yes: Diana Villa, SULEIMAN   present during visit today: Not Applicable.    Note: VSS. Meds and allergies reviewed. Pt assessed by provider prior to infusion. Pt received  mL/hr starting 1 hour prior to chemo and continued for 1 hour post Cytoxan. Pt received 550 mg Cytoxan IV followed by 55 mg Fludarabine IV. PIV left in place post infusion d/t pt returning for chemo tomorrow and Sunday and no vascular access availability over the weekend. PIV was intact and had good blood return post infusion.      This writer notified pharmacist Seferino Levine of patient starting lymphodepleting chemotherapy in preparation for Abecma for the purpose that the patient will need to have a reserve of 2 doses of tocilizumab available for the next 4 weeks.     This writer has released the nursing communication orders in the treatment plan in addition to this note to verify this information was relayed to pharmacy staff.      Intravenous Access:  Peripheral IV placed by vascular access.    Treatment Conditions:  Not Applicable.    Post Infusion Assessment:  Patient tolerated infusion without incident.  Blood return noted pre and post infusion.  Site patent and intact, free from redness, edema or discomfort.  No evidence of extravasations.     Discharge Plan:   Copy of AVS reviewed with patient and/or family.  Patient will return tomorrow for next appointment.  Patient discharged in stable condition accompanied by: .  Departure Mode: Ambulatory.      Sweta Muro RN

## 2022-12-30 NOTE — NURSING NOTE
"Oncology Rooming Note    December 30, 2022 7:55 AM   Kristie Cornejo is a 52 year old female who presents for:    Chief Complaint   Patient presents with     Blood Draw     Labs drawn by VAT via PIV, vitals taken.     Initial Vitals: /73   Pulse 86   Temp 98  F (36.7  C)   Resp 16   Wt 73 kg (160 lb 14.4 oz)   LMP  (LMP Unknown)   SpO2 98%   BMI 25.58 kg/m   Estimated body mass index is 25.58 kg/m  as calculated from the following:    Height as of 12/21/22: 1.689 m (5' 6.5\").    Weight as of this encounter: 73 kg (160 lb 14.4 oz). Body surface area is 1.85 meters squared.  No Pain (0) Comment: Data Unavailable   No LMP recorded (lmp unknown). (Menstrual status: IUD).  Allergies reviewed: Yes  Medications reviewed: Yes    Medications: Medication refills not needed today.  Pharmacy name entered into [facility]:    [facility] - [place], [place] - [address]  [id number] - [place], [place] - [address]  [facility] - [place] - [address]  [facility] [place] [address] [place] [address] PHARMACY [facility] [place] - [address] [facility] [phone]  [facility] - [facility] [place] - [address] [facility] - [place], [place] - [address]    Clinical concerns: Wondering about monthly Zometa, last had 11/18/22.      Sweta Muro RN              "

## 2022-12-30 NOTE — LETTER
Date:December 30, 2022      Provider requested that no letter be sent. Do not send.       [facility]

## 2022-12-31 ENCOUNTER — INFUSION THERAPY VISIT (OUTPATIENT)
Dept: TRANSPLANT | Facility: CLINIC | Age: 52
End: 2022-12-31
Payer: COMMERCIAL

## 2022-12-31 ENCOUNTER — APPOINTMENT (OUTPATIENT)
Dept: LAB | Facility: CLINIC | Age: 52
End: 2022-12-31
Payer: COMMERCIAL

## 2022-12-31 VITALS
TEMPERATURE: 98 F | OXYGEN SATURATION: 100 % | RESPIRATION RATE: 16 BRPM | SYSTOLIC BLOOD PRESSURE: 124 MMHG | HEART RATE: 69 BPM | DIASTOLIC BLOOD PRESSURE: 85 MMHG

## 2022-12-31 DIAGNOSIS — C90.00 LIGHT CHAIN MYELOMA (H): Primary | ICD-10-CM

## 2022-12-31 DIAGNOSIS — C90.00 MULTIPLE MYELOMA NOT HAVING ACHIEVED REMISSION (H): ICD-10-CM

## 2022-12-31 LAB
ALBUMIN SERPL BCG-MCNC: 4.4 G/DL (ref 3.5–5.2)
ALP SERPL-CCNC: 47 U/L (ref 35–104)
ALT SERPL W P-5'-P-CCNC: 23 U/L (ref 10–35)
ANION GAP SERPL CALCULATED.3IONS-SCNC: 10 MMOL/L (ref 7–15)
AST SERPL W P-5'-P-CCNC: 18 U/L (ref 10–35)
BASOPHILS # BLD AUTO: 0 10E3/UL (ref 0–0.2)
BASOPHILS NFR BLD AUTO: 1 %
BILIRUB SERPL-MCNC: 0.4 MG/DL
BUN SERPL-MCNC: 14.6 MG/DL (ref 6–20)
CALCIUM SERPL-MCNC: 9.3 MG/DL (ref 8.6–10)
CHLORIDE SERPL-SCNC: 110 MMOL/L (ref 98–107)
CREAT SERPL-MCNC: 0.75 MG/DL (ref 0.51–0.95)
DEPRECATED HCO3 PLAS-SCNC: 22 MMOL/L (ref 22–29)
EOSINOPHIL # BLD AUTO: 0.2 10E3/UL (ref 0–0.7)
EOSINOPHIL NFR BLD AUTO: 4 %
ERYTHROCYTE [DISTWIDTH] IN BLOOD BY AUTOMATED COUNT: 13.6 % (ref 10–15)
GFR SERPL CREATININE-BSD FRML MDRD: >90 ML/MIN/1.73M2
GLUCOSE SERPL-MCNC: 100 MG/DL (ref 70–99)
HCT VFR BLD AUTO: 37.2 % (ref 35–47)
HGB BLD-MCNC: 12.3 G/DL (ref 11.7–15.7)
IMM GRANULOCYTES # BLD: 0 10E3/UL
IMM GRANULOCYTES NFR BLD: 0 %
LYMPHOCYTES # BLD AUTO: 0.5 10E3/UL (ref 0.8–5.3)
LYMPHOCYTES NFR BLD AUTO: 13 %
MAGNESIUM SERPL-MCNC: 2.1 MG/DL (ref 1.7–2.3)
MCH RBC QN AUTO: 31.4 PG (ref 26.5–33)
MCHC RBC AUTO-ENTMCNC: 33.1 G/DL (ref 31.5–36.5)
MCV RBC AUTO: 95 FL (ref 78–100)
MONOCYTES # BLD AUTO: 0.3 10E3/UL (ref 0–1.3)
MONOCYTES NFR BLD AUTO: 7 %
NEUTROPHILS # BLD AUTO: 2.5 10E3/UL (ref 1.6–8.3)
NEUTROPHILS NFR BLD AUTO: 75 %
NRBC # BLD AUTO: 0 10E3/UL
NRBC BLD AUTO-RTO: 0 /100
PHOSPHATE SERPL-MCNC: 3.3 MG/DL (ref 2.5–4.5)
PLATELET # BLD AUTO: 315 10E3/UL (ref 150–450)
POTASSIUM SERPL-SCNC: 4.2 MMOL/L (ref 3.4–5.3)
PROT SERPL-MCNC: 6.4 G/DL (ref 6.4–8.3)
RBC # BLD AUTO: 3.92 10E6/UL (ref 3.8–5.2)
SODIUM SERPL-SCNC: 142 MMOL/L (ref 136–145)
URATE SERPL-MCNC: 1.4 MG/DL (ref 2.4–5.7)
WBC # BLD AUTO: 3.4 10E3/UL (ref 4–11)

## 2022-12-31 PROCEDURE — 84100 ASSAY OF PHOSPHORUS: CPT

## 2022-12-31 PROCEDURE — 96375 TX/PRO/DX INJ NEW DRUG ADDON: CPT

## 2022-12-31 PROCEDURE — 96413 CHEMO IV INFUSION 1 HR: CPT

## 2022-12-31 PROCEDURE — 96417 CHEMO IV INFUS EACH ADDL SEQ: CPT

## 2022-12-31 PROCEDURE — 83735 ASSAY OF MAGNESIUM: CPT

## 2022-12-31 PROCEDURE — 258N000003 HC RX IP 258 OP 636

## 2022-12-31 PROCEDURE — 84550 ASSAY OF BLOOD/URIC ACID: CPT

## 2022-12-31 PROCEDURE — 85025 COMPLETE CBC W/AUTO DIFF WBC: CPT

## 2022-12-31 PROCEDURE — 36415 COLL VENOUS BLD VENIPUNCTURE: CPT

## 2022-12-31 PROCEDURE — 250N000011 HC RX IP 250 OP 636

## 2022-12-31 PROCEDURE — 80053 COMPREHEN METABOLIC PANEL: CPT

## 2022-12-31 RX ORDER — ONDANSETRON 2 MG/ML
8 INJECTION INTRAMUSCULAR; INTRAVENOUS ONCE
Status: COMPLETED | OUTPATIENT
Start: 2022-12-31 | End: 2022-12-31

## 2022-12-31 RX ORDER — SODIUM CHLORIDE 9 MG/ML
INJECTION, SOLUTION INTRAVENOUS CONTINUOUS
Status: DISCONTINUED | OUTPATIENT
Start: 2022-12-31 | End: 2022-12-31 | Stop reason: HOSPADM

## 2022-12-31 RX ADMIN — FLUDARABINE PHOSPHATE 55 MG: 25 INJECTION, SOLUTION INTRAVENOUS at 10:24

## 2022-12-31 RX ADMIN — ONDANSETRON 8 MG: 2 INJECTION INTRAMUSCULAR; INTRAVENOUS at 08:33

## 2022-12-31 RX ADMIN — SODIUM CHLORIDE: 9 INJECTION, SOLUTION INTRAVENOUS at 07:55

## 2022-12-31 RX ADMIN — CYCLOPHOSPHAMIDE 550 MG: 500 INJECTION, POWDER, FOR SOLUTION INTRAVENOUS; ORAL at 09:11

## 2022-12-31 ASSESSMENT — PAIN SCALES - GENERAL: PAINLEVEL: NO PAIN (0)

## 2022-12-31 NOTE — NURSING NOTE
Chief Complaint   Patient presents with     Infusion     Pt here for IV Cytoxan and Fludarabine with IV Fluids and Zofran IV Push Premed. Pt is Pre CAR-T for Multiple Myeloma.      Rosangela Chambers RN

## 2022-12-31 NOTE — LETTER
Date:January 2, 2023      Provider requested that no letter be sent. Do not send.       [facility]

## 2022-12-31 NOTE — LETTER
12/31/2022         RE: Kristie Cornejo  [address]  [facility] [address]        Dear Colleague,    Thank you for referring your patient, Kristie Cornejo, to the [facility] BLOOD AND MARROW TRANSPLANT PROGRAM [place]. Please see a copy of my visit note below.    Infusion Nursing Note:  Kristie Cornejo presents today for Fludarabine/Cytoxan with IV Fluids and IV Zofran Premed. See MAR.     Patient seen by provider today: No   present during visit today: Not Applicable.    Note: Pt received Cycle 1, Day 2 Flu/Cy with IVF and IV Zofran Premed. Pt tolerated infusions well. Labs drawn by RN in infusion via Right Arm VPT (at this point was unable to get blood return from PIV). No lab parameters to meet for today's dose, but labs WNL. Pt assessment completed by this RN. Pt feeling well; denies any recent N/V/D. VSS. Chemo checked with another RN prior to administration. PIV removed prior to discharge as it was causing Pt some discomfort and seemed to infiltrate right at the very end of post chemo fluid flush.     Intravenous Access:  Peripheral IV placed- removed in tact prior to d/c.     Treatment Conditions:  Not Applicable.    Post Infusion Assessment:  Patient tolerated infusion without incident.  Blood return noted pre and post infusion.  Site patent and intact, free from redness, edema or discomfort.     Discharge Plan:   Copy of AVS reviewed with patient and/or family.  Patient will return tomorrow for next appointment and Day 3 of Flu/Cy.  Patient discharged in stable condition accompanied by: daughter.  Departure Mode: Ambulatory.      Rosangela Chambers RN                          Again, thank you for allowing me to participate in the care of your patient.        Sincerely,        BMT Infusion Nurse

## 2022-12-31 NOTE — PROGRESS NOTES
Infusion Nursing Note:  Kristie Cornejo presents today for Fludarabine/Cytoxan with IV Fluids and IV Zofran Premed. See MAR.     Patient seen by provider today: No   present during visit today: Not Applicable.    Note: Pt received Cycle 1, Day 2 Flu/Cy with IVF and IV Zofran Premed. Pt tolerated infusions well. Labs drawn by RN in infusion via Right Arm VPT (at this point was unable to get blood return from PIV). No lab parameters to meet for today's dose, but labs WNL. Pt assessment completed by this RN. Pt feeling well; denies any recent N/V/D. VSS. Chemo checked with another RN prior to administration. PIV removed prior to discharge as it was causing Pt some discomfort and seemed to infiltrate right at the very end of post chemo fluid flush.     Intravenous Access:  Peripheral IV placed- removed in tact prior to d/c.     Treatment Conditions:  Not Applicable.    Post Infusion Assessment:  Patient tolerated infusion without incident.  Blood return noted pre and post infusion.  Site patent and intact, free from redness, edema or discomfort.     Discharge Plan:   Copy of AVS reviewed with patient and/or family.  Patient will return tomorrow for next appointment and Day 3 of Flu/Cy.  Patient discharged in stable condition accompanied by: daughter.  Departure Mode: Ambulatory.      Rosangela Chambers RN

## 2023-01-01 ENCOUNTER — INFUSION THERAPY VISIT (OUTPATIENT)
Dept: TRANSPLANT | Facility: CLINIC | Age: 53
End: 2023-01-01
Payer: COMMERCIAL

## 2023-01-01 ENCOUNTER — APPOINTMENT (OUTPATIENT)
Dept: LAB | Facility: CLINIC | Age: 53
End: 2023-01-01
Payer: COMMERCIAL

## 2023-01-01 VITALS
SYSTOLIC BLOOD PRESSURE: 117 MMHG | WEIGHT: 159.1 LBS | BODY MASS INDEX: 25.29 KG/M2 | DIASTOLIC BLOOD PRESSURE: 72 MMHG | HEART RATE: 70 BPM | TEMPERATURE: 98 F | OXYGEN SATURATION: 98 % | RESPIRATION RATE: 16 BRPM

## 2023-01-01 DIAGNOSIS — Z86.2 PERSONAL HISTORY OF DISEASES OF BLOOD AND BLOOD-FORMING ORGANS: ICD-10-CM

## 2023-01-01 DIAGNOSIS — C90.00 MULTIPLE MYELOMA NOT HAVING ACHIEVED REMISSION (H): Primary | ICD-10-CM

## 2023-01-01 DIAGNOSIS — C90.00 LIGHT CHAIN MYELOMA (H): ICD-10-CM

## 2023-01-01 LAB
ANION GAP SERPL CALCULATED.3IONS-SCNC: 11 MMOL/L (ref 7–15)
BASOPHILS # BLD AUTO: 0 10E3/UL (ref 0–0.2)
BASOPHILS NFR BLD AUTO: 1 %
BUN SERPL-MCNC: 13.4 MG/DL (ref 6–20)
CALCIUM SERPL-MCNC: 9.6 MG/DL (ref 8.6–10)
CHLORIDE SERPL-SCNC: 108 MMOL/L (ref 98–107)
CREAT SERPL-MCNC: 0.72 MG/DL (ref 0.51–0.95)
DEPRECATED HCO3 PLAS-SCNC: 21 MMOL/L (ref 22–29)
EOSINOPHIL # BLD AUTO: 0.2 10E3/UL (ref 0–0.7)
EOSINOPHIL NFR BLD AUTO: 4 %
ERYTHROCYTE [DISTWIDTH] IN BLOOD BY AUTOMATED COUNT: 13.6 % (ref 10–15)
GFR SERPL CREATININE-BSD FRML MDRD: >90 ML/MIN/1.73M2
GLUCOSE SERPL-MCNC: 112 MG/DL (ref 70–99)
HCT VFR BLD AUTO: 35.8 % (ref 35–47)
HGB BLD-MCNC: 12.1 G/DL (ref 11.7–15.7)
IMM GRANULOCYTES # BLD: 0 10E3/UL
IMM GRANULOCYTES NFR BLD: 0 %
LYMPHOCYTES # BLD AUTO: 0.1 10E3/UL (ref 0.8–5.3)
LYMPHOCYTES NFR BLD AUTO: 2 %
MAGNESIUM SERPL-MCNC: 2 MG/DL (ref 1.7–2.3)
MCH RBC QN AUTO: 31.8 PG (ref 26.5–33)
MCHC RBC AUTO-ENTMCNC: 33.8 G/DL (ref 31.5–36.5)
MCV RBC AUTO: 94 FL (ref 78–100)
MONOCYTES # BLD AUTO: 0.2 10E3/UL (ref 0–1.3)
MONOCYTES NFR BLD AUTO: 6 %
NEUTROPHILS # BLD AUTO: 3.4 10E3/UL (ref 1.6–8.3)
NEUTROPHILS NFR BLD AUTO: 87 %
NRBC # BLD AUTO: 0 10E3/UL
NRBC BLD AUTO-RTO: 0 /100
PHOSPHATE SERPL-MCNC: 3.8 MG/DL (ref 2.5–4.5)
PLATELET # BLD AUTO: 301 10E3/UL (ref 150–450)
POTASSIUM SERPL-SCNC: 4.3 MMOL/L (ref 3.4–5.3)
RBC # BLD AUTO: 3.8 10E6/UL (ref 3.8–5.2)
SODIUM SERPL-SCNC: 140 MMOL/L (ref 136–145)
URATE SERPL-MCNC: 1.1 MG/DL (ref 2.4–5.7)
WBC # BLD AUTO: 3.9 10E3/UL (ref 4–11)

## 2023-01-01 PROCEDURE — 83735 ASSAY OF MAGNESIUM: CPT

## 2023-01-01 PROCEDURE — U0005 INFEC AGEN DETEC AMPLI PROBE: HCPCS

## 2023-01-01 PROCEDURE — 250N000011 HC RX IP 250 OP 636

## 2023-01-01 PROCEDURE — 96415 CHEMO IV INFUSION ADDL HR: CPT

## 2023-01-01 PROCEDURE — 80048 BASIC METABOLIC PNL TOTAL CA: CPT

## 2023-01-01 PROCEDURE — 84550 ASSAY OF BLOOD/URIC ACID: CPT

## 2023-01-01 PROCEDURE — 258N000003 HC RX IP 258 OP 636

## 2023-01-01 PROCEDURE — 96413 CHEMO IV INFUSION 1 HR: CPT

## 2023-01-01 PROCEDURE — 36415 COLL VENOUS BLD VENIPUNCTURE: CPT

## 2023-01-01 PROCEDURE — 84100 ASSAY OF PHOSPHORUS: CPT

## 2023-01-01 PROCEDURE — 96375 TX/PRO/DX INJ NEW DRUG ADDON: CPT

## 2023-01-01 PROCEDURE — 96417 CHEMO IV INFUS EACH ADDL SEQ: CPT

## 2023-01-01 PROCEDURE — 85025 COMPLETE CBC W/AUTO DIFF WBC: CPT

## 2023-01-01 RX ORDER — ONDANSETRON 2 MG/ML
8 INJECTION INTRAMUSCULAR; INTRAVENOUS ONCE
Status: COMPLETED | OUTPATIENT
Start: 2023-01-01 | End: 2023-01-01

## 2023-01-01 RX ORDER — SODIUM CHLORIDE 9 MG/ML
INJECTION, SOLUTION INTRAVENOUS CONTINUOUS
Status: DISCONTINUED | OUTPATIENT
Start: 2023-01-01 | End: 2023-01-01 | Stop reason: HOSPADM

## 2023-01-01 RX ADMIN — CYCLOPHOSPHAMIDE 550 MG: 1 INJECTION, POWDER, FOR SOLUTION INTRAVENOUS; ORAL at 10:02

## 2023-01-01 RX ADMIN — FLUDARABINE PHOSPHATE 55 MG: 25 INJECTION, SOLUTION INTRAVENOUS at 11:13

## 2023-01-01 RX ADMIN — ONDANSETRON 8 MG: 2 INJECTION INTRAMUSCULAR; INTRAVENOUS at 09:34

## 2023-01-01 RX ADMIN — SODIUM CHLORIDE: 9 INJECTION, SOLUTION INTRAVENOUS at 09:00

## 2023-01-01 ASSESSMENT — PAIN SCALES - GENERAL: PAINLEVEL: NO PAIN (0)

## 2023-01-01 NOTE — PROGRESS NOTES
Infusion Nursing Note:  Kristie Cornejo presents today for Fludarabine and Cytoxan with IV Fluids and IV Zofran Premed. See MAR.     Patient seen by provider today: No   present during visit today: Not Applicable.    Note: Pt received Cycle 1, Day 3 of Flu/Cy with continuous IV Fluids starting one hour prior, and IV Push Zofran Premed. Pt tolerated chemo well. Prior to infusion Pt stated she a little Nausea, but was relieved with Zofran Premed. Pt assessment completed by this RN. No lab parameters to meet today. PIV placed in right Arm and blood return was noted both before and after each infusion today. Labs drawn by this RN. Chemo checked with another RN prior to administration per protocol.       Intravenous Access:  Peripheral IV placed- removed in tact prior to discharge.    Treatment Conditions:  Not Applicable.    Post Infusion Assessment:  Patient tolerated infusion without incident.  Blood return noted pre and post infusion.  Site patent and intact, free from redness, edema or discomfort.  Access discontinued per protocol.     Discharge Plan:   Patient discharged in stable condition accompanied by: .  Departure Mode: Ambulatory.      Rosangela Chambers RN

## 2023-01-01 NOTE — NURSING NOTE
Chief Complaint   Patient presents with     Infusion     Pt here for IV Cytoxan and Fludarabine with IV Fluids and IV Zofran Premed. Pt is Pre CAR-T for Multiple Myeloma.      Rosangela Chambers RN

## 2023-01-01 NOTE — LETTER
Date:January 2, 2023      Provider requested that no letter be sent. Do not send.       [facility]

## 2023-01-01 NOTE — LETTER
1/1/2023         RE: Kristie Cornejo  [address]  [facility] [address]        Dear Colleague,    Thank you for referring your patient, Kristie Cornejo, to the [facility] BLOOD AND MARROW TRANSPLANT PROGRAM [place]. Please see a copy of my visit note below.    Infusion Nursing Note:  Kristie Cornejo presents today for Fludarabine and Cytoxan with IV Fluids and IV Zofran Premed. See MAR.     Patient seen by provider today: No   present during visit today: Not Applicable.    Note: Pt received Cycle 1, Day 3 of Flu/Cy with continuous IV Fluids starting one hour prior, and IV Push Zofran Premed. Pt tolerated chemo well. Prior to infusion Pt stated she a little Nausea, but was relieved with Zofran Premed. Pt assessment completed by this RN. No lab parameters to meet today. PIV placed in right Arm and blood return was noted both before and after each infusion today. Labs drawn by this RN. Chemo checked with another RN prior to administration per protocol.       Intravenous Access:  Peripheral IV placed- removed in tact prior to discharge.    Treatment Conditions:  Not Applicable.    Post Infusion Assessment:  Patient tolerated infusion without incident.  Blood return noted pre and post infusion.  Site patent and intact, free from redness, edema or discomfort.  Access discontinued per protocol.     Discharge Plan:   Patient discharged in stable condition accompanied by: .  Departure Mode: Ambulatory.      Rosangela Chambers, NILAM                          Again, thank you for allowing me to participate in the care of your patient.        Sincerely,        BMT Infusion Nurse

## 2023-01-02 LAB
ALBUMIN SERPL ELPH-MCNC: 4.3 G/DL (ref 3.7–5.1)
ALPHA1 GLOB SERPL ELPH-MCNC: 0.3 G/DL (ref 0.2–0.4)
ALPHA2 GLOB SERPL ELPH-MCNC: 0.7 G/DL (ref 0.5–0.9)
B-GLOBULIN SERPL ELPH-MCNC: 0.6 G/DL (ref 0.6–1)
GAMMA GLOB SERPL ELPH-MCNC: 0.5 G/DL (ref 0.7–1.6)
M PROTEIN SERPL ELPH-MCNC: 0 G/DL
PROT PATTERN SERPL ELPH-IMP: ABNORMAL
PROT PATTERN SERPL IFE-IMP: NORMAL
SARS-COV-2 RNA RESP QL NAA+PROBE: NEGATIVE

## 2023-01-02 PROCEDURE — 86334 IMMUNOFIX E-PHORESIS SERUM: CPT | Mod: 26

## 2023-01-02 PROCEDURE — 84165 PROTEIN E-PHORESIS SERUM: CPT | Mod: 26

## 2023-01-03 ENCOUNTER — OFFICE VISIT (OUTPATIENT)
Dept: TRANSPLANT | Facility: CLINIC | Age: 53
End: 2023-01-03
Attending: PHYSICIAN ASSISTANT
Payer: COMMERCIAL

## 2023-01-03 ENCOUNTER — LAB (OUTPATIENT)
Dept: LAB | Facility: CLINIC | Age: 53
End: 2023-01-03
Attending: PHYSICIAN ASSISTANT
Payer: COMMERCIAL

## 2023-01-03 ENCOUNTER — HOSPITAL ENCOUNTER (OUTPATIENT)
Dept: VASCULAR ULTRASOUND | Facility: CLINIC | Age: 53
Discharge: HOME | End: 2023-01-03
Payer: COMMERCIAL

## 2023-01-03 ENCOUNTER — HOSPITAL ENCOUNTER (OUTPATIENT)
Dept: GENERAL RADIOLOGY | Facility: CLINIC | Age: 53
Discharge: HOME | End: 2023-01-03
Payer: COMMERCIAL

## 2023-01-03 ENCOUNTER — CARE COORDINATION (OUTPATIENT)
Dept: ONCOLOGY | Facility: CLINIC | Age: 53
End: 2023-01-03

## 2023-01-03 VITALS
TEMPERATURE: 98.5 F | DIASTOLIC BLOOD PRESSURE: 72 MMHG | RESPIRATION RATE: 16 BRPM | SYSTOLIC BLOOD PRESSURE: 121 MMHG | OXYGEN SATURATION: 100 % | HEART RATE: 71 BPM

## 2023-01-03 DIAGNOSIS — C90.00 MULTIPLE MYELOMA NOT HAVING ACHIEVED REMISSION (H): Primary | ICD-10-CM

## 2023-01-03 DIAGNOSIS — C90.30 PLASMACYTOMA OF BONE (H): Primary | ICD-10-CM

## 2023-01-03 DIAGNOSIS — C90.00 LIGHT CHAIN MYELOMA (H): ICD-10-CM

## 2023-01-03 DIAGNOSIS — C90.30 PLASMACYTOMA OF BONE (H): ICD-10-CM

## 2023-01-03 DIAGNOSIS — C90.00 MULTIPLE MYELOMA NOT HAVING ACHIEVED REMISSION (H): ICD-10-CM

## 2023-01-03 DIAGNOSIS — Z00.6 RESEARCH STUDY PATIENT: Primary | ICD-10-CM

## 2023-01-03 DIAGNOSIS — C90.00 LIGHT CHAIN MYELOMA (H): Primary | ICD-10-CM

## 2023-01-03 DIAGNOSIS — Z86.2 PERSONAL HISTORY OF DISEASES OF BLOOD AND BLOOD-FORMING ORGANS: ICD-10-CM

## 2023-01-03 LAB
ALBUMIN SERPL BCG-MCNC: 4.6 G/DL (ref 3.5–5.2)
ALP SERPL-CCNC: 47 U/L (ref 35–104)
ALT SERPL W P-5'-P-CCNC: 24 U/L (ref 10–35)
ANION GAP SERPL CALCULATED.3IONS-SCNC: 15 MMOL/L (ref 7–15)
AST SERPL W P-5'-P-CCNC: 23 U/L (ref 10–35)
BASOPHILS # BLD AUTO: 0 10E3/UL (ref 0–0.2)
BASOPHILS NFR BLD AUTO: 0 %
BILIRUB DIRECT SERPL-MCNC: <0.2 MG/DL (ref 0–0.3)
BILIRUB SERPL-MCNC: 0.4 MG/DL
BUN SERPL-MCNC: 18.6 MG/DL (ref 6–20)
CALCIUM SERPL-MCNC: 9.4 MG/DL (ref 8.6–10)
CHLORIDE SERPL-SCNC: 105 MMOL/L (ref 98–107)
CK SERPL-CCNC: 52 U/L (ref 26–192)
CREAT SERPL-MCNC: 0.78 MG/DL (ref 0.51–0.95)
DEPRECATED HCO3 PLAS-SCNC: 19 MMOL/L (ref 22–29)
EOSINOPHIL # BLD AUTO: 0.1 10E3/UL (ref 0–0.7)
EOSINOPHIL NFR BLD AUTO: 5 %
ERYTHROCYTE [DISTWIDTH] IN BLOOD BY AUTOMATED COUNT: 13.4 % (ref 10–15)
GFR SERPL CREATININE-BSD FRML MDRD: >90 ML/MIN/1.73M2
GLUCOSE SERPL-MCNC: 108 MG/DL (ref 70–99)
HCT VFR BLD AUTO: 38 % (ref 35–47)
HGB BLD-MCNC: 12.7 G/DL (ref 11.7–15.7)
IMM GRANULOCYTES # BLD: 0 10E3/UL
IMM GRANULOCYTES NFR BLD: 0 %
LYMPHOCYTES # BLD AUTO: 0.1 10E3/UL (ref 0.8–5.3)
LYMPHOCYTES NFR BLD AUTO: 2 %
MCH RBC QN AUTO: 31.1 PG (ref 26.5–33)
MCHC RBC AUTO-ENTMCNC: 33.4 G/DL (ref 31.5–36.5)
MCV RBC AUTO: 93 FL (ref 78–100)
MONOCYTES # BLD AUTO: 0.1 10E3/UL (ref 0–1.3)
MONOCYTES NFR BLD AUTO: 4 %
NEUTROPHILS # BLD AUTO: 2.6 10E3/UL (ref 1.6–8.3)
NEUTROPHILS NFR BLD AUTO: 89 %
NRBC # BLD AUTO: 0 10E3/UL
NRBC BLD AUTO-RTO: 0 /100
PLATELET # BLD AUTO: 260 10E3/UL (ref 150–450)
POTASSIUM SERPL-SCNC: 4.2 MMOL/L (ref 3.4–5.3)
PROT SERPL-MCNC: 7 G/DL (ref 6.4–8.3)
RBC # BLD AUTO: 4.08 10E6/UL (ref 3.8–5.2)
SODIUM SERPL-SCNC: 139 MMOL/L (ref 136–145)
WBC # BLD AUTO: 2.9 10E3/UL (ref 4–11)

## 2023-01-03 PROCEDURE — 82550 ASSAY OF CK (CPK): CPT

## 2023-01-03 PROCEDURE — 94642 AEROSOL INHALATION TREATMENT: CPT | Performed by: INTERNAL MEDICINE

## 2023-01-03 PROCEDURE — 36415 COLL VENOUS BLD VENIPUNCTURE: CPT

## 2023-01-03 PROCEDURE — 71045 X-RAY EXAM CHEST 1 VIEW: CPT | Mod: 26 | Performed by: RADIOLOGY

## 2023-01-03 PROCEDURE — 999N000065 XR CHEST 1 VIEW

## 2023-01-03 PROCEDURE — 80053 COMPREHEN METABOLIC PANEL: CPT

## 2023-01-03 PROCEDURE — G0463 HOSPITAL OUTPT CLINIC VISIT: HCPCS

## 2023-01-03 PROCEDURE — 94640 AIRWAY INHALATION TREATMENT: CPT | Performed by: INTERNAL MEDICINE

## 2023-01-03 PROCEDURE — G0463 HOSPITAL OUTPT CLINIC VISIT: HCPCS | Mod: 25

## 2023-01-03 PROCEDURE — 96450 CHEMOTHERAPY INTO CNS: CPT

## 2023-01-03 PROCEDURE — 85025 COMPLETE CBC W/AUTO DIFF WBC: CPT

## 2023-01-03 PROCEDURE — 99001 SPECIMEN HANDLING PT-LAB: CPT

## 2023-01-03 PROCEDURE — 36569 INSJ PICC 5 YR+ W/O IMAGING: CPT

## 2023-01-03 PROCEDURE — 250N000011 HC RX IP 250 OP 636: Performed by: STUDENT IN AN ORGANIZED HEALTH CARE EDUCATION/TRAINING PROGRAM

## 2023-01-03 PROCEDURE — 62270 DX LMBR SPI PNXR: CPT | Performed by: STUDENT IN AN ORGANIZED HEALTH CARE EDUCATION/TRAINING PROGRAM

## 2023-01-03 PROCEDURE — 82248 BILIRUBIN DIRECT: CPT

## 2023-01-03 RX ORDER — PENTAMIDINE ISETHIONATE 300 MG/300MG
300 INHALANT RESPIRATORY (INHALATION)
Status: DISCONTINUED | OUTPATIENT
Start: 2023-01-03 | End: 2023-01-03 | Stop reason: HOSPADM

## 2023-01-03 RX ORDER — ALBUTEROL SULFATE 0.83 MG/ML
2.5 SOLUTION RESPIRATORY (INHALATION)
Status: CANCELLED
Start: 2023-02-03

## 2023-01-03 RX ORDER — HEPARIN SODIUM (PORCINE) LOCK FLUSH IV SOLN 100 UNIT/ML 100 UNIT/ML
5 SOLUTION INTRAVENOUS
Status: CANCELLED | OUTPATIENT
Start: 2023-02-03

## 2023-01-03 RX ORDER — HEPARIN SODIUM,PORCINE 10 UNIT/ML
5 VIAL (ML) INTRAVENOUS
Status: DISCONTINUED | OUTPATIENT
Start: 2023-01-03 | End: 2023-01-03 | Stop reason: HOSPADM

## 2023-01-03 RX ORDER — HEPARIN SODIUM,PORCINE 10 UNIT/ML
5 VIAL (ML) INTRAVENOUS
Status: CANCELLED | OUTPATIENT
Start: 2023-02-03

## 2023-01-03 RX ORDER — PENTAMIDINE ISETHIONATE 300 MG/300MG
300 INHALANT RESPIRATORY (INHALATION)
Status: CANCELLED
Start: 2023-02-03

## 2023-01-03 RX ORDER — ALBUTEROL SULFATE 0.83 MG/ML
2.5 SOLUTION RESPIRATORY (INHALATION)
Status: COMPLETED | OUTPATIENT
Start: 2023-01-03 | End: 2023-01-03

## 2023-01-03 RX ADMIN — PENTAMIDINE ISETHIONATE 300 MG: 300 INHALANT RESPIRATORY (INHALATION) at 14:54

## 2023-01-03 RX ADMIN — Medication 5 ML: at 11:31

## 2023-01-03 RX ADMIN — ALBUTEROL SULFATE 2.5 MG: 0.83 SOLUTION RESPIRATORY (INHALATION) at 14:46

## 2023-01-03 ASSESSMENT — PAIN SCALES - GENERAL: PAINLEVEL: NO PAIN (0)

## 2023-01-03 NOTE — PROGRESS NOTES
BMT ONC Adult Lumbar Puncture and Intrathecal Chemotherapy Administration Procedure Note    January 3, 2023    Procedure: Lumbar Puncture to obtain CSF and give intrathecal chemotherapy    Diagnosis: multiplemyeloma    Learning needs assessment complete within 12 months: YES     Vitals reviewed:  YES    Informed Consent: Procedure, benefits, risks, and alternatives explained to the patient who voiced understanding of the information and agreed to proceed with lumbar puncture. Risks include bleeding, headache, and or infection.   Patient safety checklist completed.    Labs: Reviewed:     Lab Results   Component Value Date    INR 0.91 12/21/2022     01/03/2023     02/13/2006        Other n/a (not on any blood thinning medications)    Description:. The patient was seated at the bedside with knees dangling. The L3-4 disc space was prepped and draped in a sterile fashion. Local anesthesia with 3mL 1% preservative-free lidocaine was used. A 22 gauge, 4 inch needle was placed on the first  attempt.  7 mL spinal fluid collected. Specimen appears clear. Specimen sent for research only.     dexamethasone - see Dr Cameron's note in 5mL of 0.9% preservative free sodium chloride was administered intrathecally slowly in a barbotage fashion.  The needle was removed and a bandage was applied to the site.  Chemotherapy double-check was performed per institutional policy.  Patient tolerated well.  Post-procedure pain assessment: 0 out of 10 on the numeric pain rating scale  Interventions: No    Complications: No     Disposition: Patient will remain on back for 1 hour post procedure. Avoid soaking in a tub tonight.  Call if develops headaches, fevers and or chills.     Performed by:   Diana Villa PA-C

## 2023-01-03 NOTE — LETTER
1/3/2023         RE: Kristie Cornejo  [address]  [facility] [address]        Dear Colleague,    Thank you for referring your patient, Kristie Cornejo, to the [facility] BLOOD AND MARROW TRANSPLANT PROGRAM [place]. Please see a copy of my visit note below.    BMT ONC Adult Lumbar Puncture and Intrathecal Chemotherapy Administration Procedure Note    January 3, 2023    Procedure: Lumbar Puncture to obtain CSF and give intrathecal chemotherapy    Diagnosis: multiplemyeloma    Learning needs assessment complete within 12 months: YES     Vitals reviewed:  YES    Informed Consent: Procedure, benefits, risks, and alternatives explained to the patient who voiced understanding of the information and agreed to proceed with lumbar puncture. Risks include bleeding, headache, and or infection.   Patient safety checklist completed.    Labs: Reviewed:     Lab Results   Component Value Date    INR 0.91 12/21/2022     01/03/2023     02/13/2006        Other n/a (not on any blood thinning medications)    Description:. The patient was seated at the bedside with knees dangling. The L3-4 disc space was prepped and draped in a sterile fashion. Local anesthesia with 3mL 1% preservative-free lidocaine was used. A 22 gauge, 4 inch needle was placed on the first  attempt.  7 mL spinal fluid collected. Specimen appears clear. Specimen sent for research only.     dexamethasone - see Dr Cameron's note in 5mL of 0.9% preservative free sodium chloride was administered intrathecally slowly in a barbotage fashion.  The needle was removed and a bandage was applied to the site.  Chemotherapy double-check was performed per institutional policy.  Patient tolerated well.  Post-procedure pain assessment: 0 out of 10 on the numeric pain rating scale  Interventions: No    Complications: No     Disposition: Patient will remain on back for 1 hour post procedure. Avoid soaking in a tub tonight.  Call if develops  headaches, fevers and or chills.     Performed by:   Diana Villa PA-C

## 2023-01-03 NOTE — PROGRESS NOTES
BMT / Cell Therapy Progress/Procedure Note      Kristie Cornejo is a 52 year old female referred by Dr. Payton Reinoso for multiple myeloma.      Disease presentation and baseline characteristics:    9/22/2: IgG kappa multiple myeloma, high risk (gain 1q)    Date Treatment Name Response Side Effects / Toxicities   11/15/21 VRd x3 cycles PD    1/17/22 D-VRd x 5 cycles WA    5/19/22 Daratumumab monotherapy PD    6/21/22 KCd x 1 cycle  Significant PVCs requiring discontinuation of therapy   7/6/22 Shruthi-Pd x 4 cycles PD         HPI:  Please see my entry above for hematologic history.  Margo presents today for IT dexamethasone as part of the neurotox prevention study.  She reports she is doing well and denies any new concerns since our last visit.  She notes she had an LP in the 90s with persistent headaches for 5 days afterwards.      [facility] Procedure Note          Lumbar Puncture:      Time: 12:49 PM  Performed by: Diana Villa PA-C, Charan Cameron MD  Authorized by: Charan Cameron MD    Indications: clinical trial    Consent given by: Patient who states understanding of the procedure being performed after discussing the risks, benefits and alternatives.    Prior to the start of the procedure and with procedural staff participation, I verbally confirmed the patient s identity using two indicators, relevant allergies, that the procedure was appropriate and matched the consent or emergent situation, and that the correct equipment/implants were available. Immediately prior to starting the procedure I conducted the Time Out with the procedural staff and re-confirmed the patient s name, procedure, and site/side. (The Joint Commission universal protocol was followed.) Yes    Under sterile conditions the patient was positioned Sitting, bent forward. Betadine solution and sterile drapes were utilized.  Local anesthetic at the site: 2 ml of lidocaine 1% without epinephrine from the LP tray  A 21 G   spinal needle was inserted at the L 3-4 interspace.  Opening Pressurewas not checked.  A total of 6mL of clear and colorless spinal fluid was obtained and sent to the laboratory.   After the needle was removed, a bandaid and pressure were applied and the patient was instructed to stay horizontal.  Complications:  None    Patient tolerance: Patient tolerated the procedure well with no immediate complications.      I spent 20 minutes in the care of this patient today, which included time necessary for preparation for the visit, obtaining history, ordering medications/tests/procedures as medically indicated, review of pertinent medical literature, counseling of the patient, communication of recommendations to the care team, and documentation time.    Charan Cameron MD      ROS:    10 point ROS neg other than the symptoms noted above in the HPI.      Allergies   Allergen Reactions     Amoxicillin      Other reaction(s): upset stomach  Other reaction(s): Gastrointestinal, GI intolerance, Other (see comments)  Other reaction(s): upset stomach  Other reaction(s): upset stomach       Clarithromycin      Other reaction(s): GI intolerance     Erythromycin      upsets stomach     Erythromycin      Other reaction(s): GI intolerance  upsets stomach  nausea     Fluconazole Rash     Itching,   Unsure if really allergy, was taking allergy shots at the time.  She is willing to rechallenge.           Current Outpatient Medications   Medication Sig Dispense Refill     acyclovir (ZOVIRAX) 800 MG tablet Take 1 tablet (800 mg) by mouth every 12 hours 60 tablet 0     allopurinol (ZYLOPRIM) 300 MG tablet Take 1 tablet (300 mg) by mouth daily 30 tablet 0     cetirizine HCl 10 MG CAPS Take 10 mg by mouth daily       Flaxseed (Linseed) (FLAX SEED OIL) 1000 MG capsule Take 1 capsule by mouth daily       fluconazole (DIFLUCAN) 200 MG tablet Take 1 tablet (200 mg) by mouth daily 30 tablet 1     Multiple Vitamin (MULTI-VITAMIN DAILY PO) Take 1  tablet by mouth daily       omeprazole (PRILOSEC OTC) 20 MG EC tablet Take 1 tablet (20 mg) by mouth daily       polyethylene glycol (MIRALAX) 17 g packet Take 1 packet by mouth daily       potassium chloride ER (KLOR-CON M) 10 MEQ CR tablet Take 2 tablets (20 mEq) by mouth 2 times daily 360 tablet 3     study - simvastatin, IDS# 5641, 40 MG tablet Take 1 tablet (40 mg) by mouth daily Take at least 5 daily doses prior to apheresis and continue until Day +30 after CAR-T infusion. 65 tablet 0     acyclovir (ZOVIRAX) 400 MG tablet Take 2 tablets (800 mg) by mouth 2 times daily (Patient not taking: Reported on 12/31/2022) 120 tablet 1     allopurinol (ZYLOPRIM) 300 MG tablet Take 1 tablet (300 mg) by mouth daily for 14 days (Patient not taking: Reported on 1/3/2023) 14 tablet 0     BUSPIRONE HCL 10 MG PO TABS 1 TABLET 3 TIMES DAILY as needed (Patient not taking: Reported on 12/21/2022) 30 Tab 3     Calcium Carbonate-Vit D-Min (CALCIUM 600+D PLUS MINERALS) 600-400 MG-UNIT TABS HOLD (Patient not taking: Reported on 12/22/2022)       fluconazole (DIFLUCAN) 100 MG tablet Take 1 tablet (100 mg) by mouth daily (Patient not taking: Reported on 12/31/2022) 30 tablet 0     fluticasone (FLONASE) 50 MCG/ACT nasal spray 1 spray (Patient not taking: Reported on 12/21/2022)       glucosamine-chondroitin 500-400 MG CAPS per capsule Take 2 capsules by mouth daily       lactobacillus rhamnosus, GG, (CULTURELL) capsule Take 1 capsule by mouth daily (Patient not taking: Reported on 12/30/2022)       levofloxacin (LEVAQUIN) 250 MG tablet Take 1 tablet (250 mg) by mouth daily (Patient not taking: Reported on 12/31/2022) 30 tablet 0     levofloxacin (LEVAQUIN) 250 MG tablet Take 1 tablet (250 mg) by mouth daily (Patient not taking: Reported on 12/31/2022) 30 tablet 0     LORazepam (ATIVAN) 0.5 MG tablet Take 1 tablet (0.5 mg) by mouth every 4 hours as needed for anxiety, nausea or sleep (Patient not taking: Reported on 12/31/2022) 30 tablet  3     melatonin 5 MG tablet Take 5 mg by mouth nightly as needed for sleep (Patient not taking: Reported on 2022)       MIRENA 20 MCG/24HR IU IUD use for up to 5 years, then remove (Patient not taking: Reported on 1/3/2023)       prochlorperazine (COMPAZINE) 10 MG tablet Take 10 mg by mouth every 6 hours as needed for nausea or vomiting (Patient not taking: Reported on 2022)       Zoledronic Acid (ZOMETA IV)  (Patient not taking: Reported on 2022)         Physical Exam:     Vital Signs: /72   Pulse 71   Temp 98.5  F (36.9  C) (Oral)   Resp 16   LMP  (LMP Unknown)   SpO2 100%     ECO  General Appearance: alert, and no distress  Eyes: PERRL, conjunctiva and lids normal, sclera nonicteric  Resp Effort And Auscultation: Breathing comfortably on room air  Edema: none  Skin: Skin color, texture, turgor normal. No rashes or lesions.  Neurologic: Sensation grossly WNL.  Psych/Affect: Mood and affect are appropriate.    ------------------------------------------------------------------------------------------------------------------------------------------------    Patient Care Team       Relationship Specialty Notifications Start End    Rena Sheffield MD PCP - General Family Medicine  10/13/21     Phone: [phone] Fax: [phone]         [facility] [address] [facility] [address]    Edu Philip MD Assigned Musculoskeletal Provider   21     Phone: [phone] Fax: [phone] [address] [facility] [address] [facility] [address]    Griselda Valdez, RN Specialty Care Coordinator Hematology & Oncology Admissions 21     Phone: [phone]         Payton Reinoso MD MD Hematology All results, Admissions 21     Phone: [phone] Fax: [phone]         [address] [place] [address]    Alem Thomas MD  Cardiovascular Disease  22     Phone: [phone] Fax: [phone]         [address] [place] [address]     Alem Thomas MD Assigned Heart and Vascular Provider   2/6/22     Phone: [phone] Fax: [phone]         [address] [facility] [address]    Shabana Bauman MD MD Ophthalmology  4/6/22     REFERRED BY PAYTON REINOSO    Phone: [phone] Fax: [phone]         [address] [facility] [address]    Payton Reinoso MD Assigned Cancer Care Provider   5/28/22     Phone: [phone] Fax: [phone]         [address] [place] [address]    Fanny Ponce MD Assigned Surgical Provider   6/4/22      [address] [place] [address]    Nika Laboy, RN Specialty Care Coordinator Cardiology Admissions 6/27/22     Phone: [phone] Fax: [phone]         [address] [facility] [address]    Rosangela Rosales [facility]  BMT - Adult Admissions 7/7/22     Phone: [phone]         Carissa Maciel PA-C Assigned PCP   9/17/22     Phone: [phone] Fax: [phone] [address] [facility] [address]    Payton Reinoso MD BMT Physician Transplant  11/14/22     Phone: [phone] Fax: [phone]         [address] [place] [address]    Julianne Peoples BMT Nurse Coordinator   12/19/22     CART/ BMT Coordinator

## 2023-01-03 NOTE — NURSING NOTE
"Oncology Rooming Note    January 3, 2023 12:07 PM   Kristie Cornejo is a 52 year old female who presents for:    Chief Complaint   Patient presents with     Labs Only     Labs drawn via picc by RN in lab, vitals completed.     Oncology Clinic Visit     Plasmacytoma of Bone     Initial Vitals: /72   Pulse 71   Temp 98.5  F (36.9  C) (Oral)   Resp 16   LMP  (LMP Unknown)   SpO2 100%  Estimated body mass index is 25.29 kg/m  as calculated from the following:    Height as of 12/21/22: 1.689 m (5' 6.5\").    Weight as of 1/1/23: 72.2 kg (159 lb 1.6 oz). There is no height or weight on file to calculate BSA.  No Pain (0) Comment: Data Unavailable   No LMP recorded (lmp unknown). (Menstrual status: IUD).  Allergies reviewed: Yes  Medications reviewed: Yes    Medications: Medication refills not needed today.  Pharmacy name entered into [employer]:    [facility] - [place], [place] - [address]  [id number] - [place], [place] - [address]  [facility] - [place], [place] - [address]  [facility] - [place], [place] - [address] [place] [address] PHARMACY [facility] - [place], [place] - [address] [facility] [address]  [facility] - [facility], [place] - [address] [place] [address] PHARMACY 04[facility] - [place], [place] - [address]    Clinical concerns: NONE      Nelda López EMT            "

## 2023-01-03 NOTE — PROGRESS NOTES
BMT RNCC Admission Handoff:    See bookmarked notes under Lashawn Flannery    Treatment protocol: 2017-45 arm F (Abecma CAR-T)    TBI: No    IR consult needed? No - PICC line placed 1/3/23    URD or related donor: CAR-T    NMDP Repository Sample Needed ([id number])? NA   If yes, please order with admission labs.    Covid test 1/1/23      Lashawn Flannery, RN, BSN  RN Care Coordinator - BMT  Ph [phone]  Pg [phone]

## 2023-01-03 NOTE — PROCEDURES
[facility]    Triple Lumen PICC Placement    Date/Time: 1/3/2023 10:35 AM  Performed by: NILAM Dickey  Authorized by: Payton Reinoso MD   Indications: vascular access      UNIVERSAL PROTOCOL   Site Marked: Yes  Prior Images Obtained and Reviewed:  Yes  Required items: Required blood products, implants, devices and special equipment available    Patient identity confirmed:  Verbally with patient, arm band, provided demographic data, hospital-assigned identification number and anonymous protocol, patient vented/unresponsive  Patient was reevaluated immediately before administering moderate or deep sedation or anesthesia  Confirmation Checklist:  Patient's identity using two indicators, relevant allergies, procedure was appropriate and matched the consent or emergent situation and correct equipment/implants were available  Time out: Immediately prior to the procedure a time out was called    Universal Protocol: the Joint Commission Universal Protocol was followed    Preparation: Patient was prepped and draped in usual sterile fashion       ANESTHESIA    Anesthesia: See MAR for details  Local Anesthetic:  Lidocaine 1% without epinephrine      SEDATION    Patient Sedated: No        Preparation: skin prepped with ChloraPrep  Skin prep agent: skin prep agent completely dried prior to procedure  Sterile barriers: maximum sterile barriers were used: cap, mask, sterile gown, sterile gloves, and large sterile sheet  Hand hygiene: hand hygiene performed prior to central venous catheter insertion  Type of line used: PICC  Catheter type: triple lumen  Lumen type: power PICC and non-valved  Lumen Identification: Red, White and Gray  Catheter size: 5 Fr  Brand: Bard  Lot number: [id number]  Placement method: venipuncture, MST and ultrasound  Number of attempts: 1  Difficulty threading catheter: no  Successful placement: yes  Orientation: left    Location: basilic vein  Tip  Location: C  Arm circumference: adults 10 cm  Extremity circumference: 29  Visible catheter length: 0  Total catheter length: 44  Dressing and securement: chlorhexidine disc applied, dressing applied, line secured, statlock, sterile dressing applied and transparent dressing  Post procedure assessment: blood return through all ports, free fluid flow and placement verified by x-ray  PROCEDURE   Patient Tolerance:  Patient tolerated the procedure well with no immediate complicationsDescribe Procedure: Picc ok to use

## 2023-01-03 NOTE — PROGRESS NOTES
Kristie MARLO Clemente was seen today for a Pentamidine nebulizer tx ordered by Diana Villa PA-C.    Patient was first given 2.5 mg of albuterol nebulizer, after which Pentamidine 300 mg (Lot # [id number]) mixed with 6cc Sterile H20 was administered through a filtered nebulizer.    Pre-treatment: SpO2 = 100%   HR = 95  BBS = clear   Post-treatment: SpO2 = 100%  HR = 106  BBS = clear    No adverse side effects noted by the patient.    This service today was provided under the  supervision of Dr. Renee, who was available if needed.     Procedure was completed by Eddi Guerra.

## 2023-01-03 NOTE — NURSING NOTE
Chief Complaint   Patient presents with     Labs Only     Labs drawn via picc by RN in lab, vitals completed.     Line flushed with heparin. Pt was checked in to next appt.    Serina Brewer RN

## 2023-01-03 NOTE — LETTER
1/3/2023         RE: Kristie Cornejo  [address]  [facility] [address]        Dear Colleague,    Thank you for referring your patient, Kristie Cornejo, to the [facility] BLOOD AND MARROW TRANSPLANT PROGRAM [place]. Please see a copy of my visit note below.         BMT / Cell Therapy Progress/Procedure Note      Kristie Cornejo is a 52 year old female referred by Dr. Payton Reinoso for multiple myeloma.      Disease presentation and baseline characteristics:    9/22/2: IgG kappa multiple myeloma, high risk (gain 1q)    Date Treatment Name Response Side Effects / Toxicities   11/15/21 VRd x3 cycles PD    1/17/22 D-VRd x 5 cycles [place]    5/19/22 Daratumumab monotherapy PD    6/21/22 KCd x 1 cycle  Significant PVCs requiring discontinuation of therapy   7/6/22 Shruthi-Pd x 4 cycles PD         HPI:  Please see my entry above for hematologic history.  Margo presents today for IT dexamethasone as part of the neurotox prevention study.  She reports she is doing well and denies any new concerns since our last visit.  She notes she had an LP in the 90s with persistent headaches for 5 days afterwards.      [facility] Procedure Note          Lumbar Puncture:      Time: 12:49 PM  Performed by: Diana Villa PA-C, Charan Cameron MD  Authorized by: Charan Cameron MD    Indications: clinical trial    Consent given by: Patient who states understanding of the procedure being performed after discussing the risks, benefits and alternatives.    Prior to the start of the procedure and with procedural staff participation, I verbally confirmed the patient s identity using two indicators, relevant allergies, that the procedure was appropriate and matched the consent or emergent situation, and that the correct equipment/implants were available. Immediately prior to starting the procedure I conducted the Time Out with the procedural staff and re-confirmed the patient s name, procedure, and site/side. (The  Joint Commission universal protocol was followed.) Yes    Under sterile conditions the patient was positioned Sitting, bent forward. Betadine solution and sterile drapes were utilized.  Local anesthetic at the site: 2 ml of lidocaine 1% without epinephrine from the LP tray  A 21 G  spinal needle was inserted at the L 3-4 interspace.  Opening Pressurewas not checked.  A total of 6mL of clear and colorless spinal fluid was obtained and sent to the laboratory.   After the needle was removed, a bandaid and pressure were applied and the patient was instructed to stay horizontal.  Complications:  None    Patient tolerance: Patient tolerated the procedure well with no immediate complications.      I spent 20 minutes in the care of this patient today, which included time necessary for preparation for the visit, obtaining history, ordering medications/tests/procedures as medically indicated, review of pertinent medical literature, counseling of the patient, communication of recommendations to the care team, and documentation time.    Charan Cameron MD      ROS:    10 point ROS neg other than the symptoms noted above in the HPI.      Allergies   Allergen Reactions     Amoxicillin      Other reaction(s): upset stomach  Other reaction(s): Gastrointestinal, GI intolerance, Other (see comments)  Other reaction(s): upset stomach  Other reaction(s): upset stomach       Clarithromycin      Other reaction(s): GI intolerance     Erythromycin      upsets stomach     Erythromycin      Other reaction(s): GI intolerance  upsets stomach  nausea     Fluconazole Rash     Itching,   Unsure if really allergy, was taking allergy shots at the time.  She is willing to rechallenge.           Current Outpatient Medications   Medication Sig Dispense Refill     acyclovir (ZOVIRAX) 800 MG tablet Take 1 tablet (800 mg) by mouth every 12 hours 60 tablet 0     allopurinol (ZYLOPRIM) 300 MG tablet Take 1 tablet (300 mg) by mouth daily 30 tablet 0      cetirizine HCl 10 MG CAPS Take 10 mg by mouth daily       Flaxseed (Linseed) (FLAX SEED OIL) 1000 MG capsule Take 1 capsule by mouth daily       fluconazole (DIFLUCAN) 200 MG tablet Take 1 tablet (200 mg) by mouth daily 30 tablet 1     Multiple Vitamin (MULTI-VITAMIN DAILY PO) Take 1 tablet by mouth daily       omeprazole (PRILOSEC OTC) 20 MG EC tablet Take 1 tablet (20 mg) by mouth daily       polyethylene glycol (MIRALAX) 17 g packet Take 1 packet by mouth daily       potassium chloride ER (KLOR-CON M) 10 MEQ CR tablet Take 2 tablets (20 mEq) by mouth 2 times daily 360 tablet 3     study - simvastatin, IDS# 5641, 40 MG tablet Take 1 tablet (40 mg) by mouth daily Take at least 5 daily doses prior to apheresis and continue until Day +30 after CAR-T infusion. 65 tablet 0     acyclovir (ZOVIRAX) 400 MG tablet Take 2 tablets (800 mg) by mouth 2 times daily (Patient not taking: Reported on 12/31/2022) 120 tablet 1     allopurinol (ZYLOPRIM) 300 MG tablet Take 1 tablet (300 mg) by mouth daily for 14 days (Patient not taking: Reported on 1/3/2023) 14 tablet 0     BUSPIRONE HCL 10 MG PO TABS 1 TABLET 3 TIMES DAILY as needed (Patient not taking: Reported on 12/21/2022) 30 Tab 3     Calcium Carbonate-Vit D-Min (CALCIUM 600+D PLUS MINERALS) 600-400 MG-UNIT TABS HOLD (Patient not taking: Reported on 12/22/2022)       fluconazole (DIFLUCAN) 100 MG tablet Take 1 tablet (100 mg) by mouth daily (Patient not taking: Reported on 12/31/2022) 30 tablet 0     fluticasone (FLONASE) 50 MCG/ACT nasal spray 1 spray (Patient not taking: Reported on 12/21/2022)       glucosamine-chondroitin 500-400 MG CAPS per capsule Take 2 capsules by mouth daily       lactobacillus rhamnosus, GG, (CULTURELL) capsule Take 1 capsule by mouth daily (Patient not taking: Reported on 12/30/2022)       levofloxacin (LEVAQUIN) 250 MG tablet Take 1 tablet (250 mg) by mouth daily (Patient not taking: Reported on 12/31/2022) 30 tablet 0     levofloxacin (LEVAQUIN)  250 MG tablet Take 1 tablet (250 mg) by mouth daily (Patient not taking: Reported on 2022) 30 tablet 0     LORazepam (ATIVAN) 0.5 MG tablet Take 1 tablet (0.5 mg) by mouth every 4 hours as needed for anxiety, nausea or sleep (Patient not taking: Reported on 2022) 30 tablet 3     melatonin 5 MG tablet Take 5 mg by mouth nightly as needed for sleep (Patient not taking: Reported on 2022)       MIRENA 20 MCG/24HR IU IUD use for up to 5 years, then remove (Patient not taking: Reported on 1/3/2023)       prochlorperazine (COMPAZINE) 10 MG tablet Take 10 mg by mouth every 6 hours as needed for nausea or vomiting (Patient not taking: Reported on 2022)       Zoledronic Acid (ZOMETA IV)  (Patient not taking: Reported on 2022)         Physical Exam:     Vital Signs: /72   Pulse 71   Temp 98.5  F (36.9  C) (Oral)   Resp 16   LMP  (LMP Unknown)   SpO2 100%     ECO  General Appearance: alert, and no distress  Eyes: PERRL, conjunctiva and lids normal, sclera nonicteric  Resp Effort And Auscultation: Breathing comfortably on room air  Edema: none  Skin: Skin color, texture, turgor normal. No rashes or lesions.  Neurologic: Sensation grossly WNL.  Psych/Affect: Mood and affect are appropriate.    ------------------------------------------------------------------------------------------------------------------------------------------------    Patient Care Team       Relationship Specialty Notifications Start End    Rena Sheffield MD PCP - General Family Medicine  10/13/21     Phone: [phone] Fax: [phone]         [facility] [address] [facility] [address]    Edu Philip MD Assigned Musculoskeletal Provider   21     Phone: [phone] Fax: [phone]         [address] [facility] [address] [facility] [address]    Griselda Valdez, RN Specialty Care Coordinator Hematology & Oncology Admissions 21     Phone: [phone]         Payton Reinoso MD MD  Hematology All results, Admissions 9/16/21     Phone: [phone] Fax: [phone]         [address] [place] [address]    Alem Thomas MD  Cardiovascular Disease  1/17/22     Phone: [phone] Fax: [phone]         [address] [place] [address]    Alem Thomas MD Assigned Heart and Vascular Provider   2/6/22     Phone: [phone] Fax: [phone]         [address] [place] [address]    Shabana Bauman MD MD Ophthalmology  4/6/22     REFERRED BY PAYTON REINOSO    Phone: [phone] Fax: [phone]         [address] [place] [address]    Payton Reinoso MD Assigned Cancer Care Provider   5/28/22     Phone: [phone] Fax: [phone]         [address] [place] [address]    Fanny Ponce MD Assigned Surgical Provider   6/4/22      [address] [place] [address]    Nika Laboy, RN Specialty Care Coordinator Cardiology Admissions 6/27/22     Phone: [phone] Fax: [phone]         [address] [place] [address]    [facility]ales, [facility]  [facility] - Adult Admissions 7/7/22     Phone: [phone]         Carissa Maciel, PALeftyC Assigned PCP   9/17/22     Phone: [phone] Fax: [phone] [address] [facility] [address]    Payton Reinoso MD BMT Physician Transplant  11/14/22     Phone: [phone] Fax: [phone]         [address] [place] [address]    Julianne Peoples BMT Nurse Coordinator   12/19/22     CART/ BMT Coordinator

## 2023-01-04 ENCOUNTER — HOSPITAL ENCOUNTER (INPATIENT)
Facility: CLINIC | Age: 53
LOS: 7 days | Discharge: HOME | DRG: 018 | End: 2023-01-11
Attending: INTERNAL MEDICINE | Admitting: INTERNAL MEDICINE
Payer: COMMERCIAL

## 2023-01-04 DIAGNOSIS — E87.6 HYPOKALEMIA: ICD-10-CM

## 2023-01-04 DIAGNOSIS — C90.00 MULTIPLE MYELOMA NOT HAVING ACHIEVED REMISSION (H): ICD-10-CM

## 2023-01-04 DIAGNOSIS — C90.30 PLASMACYTOMA OF BONE (H): ICD-10-CM

## 2023-01-04 DIAGNOSIS — C90.00 LIGHT CHAIN MYELOMA (H): Primary | ICD-10-CM

## 2023-01-04 LAB
ABO/RH(D): ABNORMAL
ALBUMIN SERPL BCG-MCNC: 4.6 G/DL (ref 3.5–5.2)
ALP SERPL-CCNC: 43 U/L (ref 35–104)
ALT SERPL W P-5'-P-CCNC: 26 U/L (ref 10–35)
ANION GAP SERPL CALCULATED.3IONS-SCNC: 15 MMOL/L (ref 7–15)
ANTIBODY SCREEN, TUBE: NORMAL
ANTIBODY SCREEN: POSITIVE
APTT PPP: 25 SECONDS (ref 22–38)
AST SERPL W P-5'-P-CCNC: 21 U/L (ref 10–35)
BASOPHILS # BLD AUTO: 0 10E3/UL (ref 0–0.2)
BASOPHILS NFR BLD AUTO: 0 %
BILIRUB SERPL-MCNC: 0.5 MG/DL
BILL ONLY STEM CELL INFUSION: NORMAL
BUN SERPL-MCNC: 22.3 MG/DL (ref 6–20)
C DIFF TOX B STL QL: NEGATIVE
CALCIUM SERPL-MCNC: 10.1 MG/DL (ref 8.6–10)
CHLORIDE SERPL-SCNC: 105 MMOL/L (ref 98–107)
CMV DNA SPEC NAA+PROBE-ACNC: NOT DETECTED IU/ML
CREAT SERPL-MCNC: 0.73 MG/DL (ref 0.51–0.95)
CRP SERPL-MCNC: <3 MG/L
D DIMER PPP FEU-MCNC: 0.9 UG/ML FEU (ref 0–0.5)
DEPRECATED HCO3 PLAS-SCNC: 19 MMOL/L (ref 22–29)
EOSINOPHIL # BLD AUTO: 0 10E3/UL (ref 0–0.7)
EOSINOPHIL NFR BLD AUTO: 1 %
ERYTHROCYTE [DISTWIDTH] IN BLOOD BY AUTOMATED COUNT: 13.6 % (ref 10–15)
FERRITIN SERPL-MCNC: 127 NG/ML (ref 11–328)
FIBRINOGEN PPP-MCNC: 367 MG/DL (ref 170–490)
GFR SERPL CREATININE-BSD FRML MDRD: >90 ML/MIN/1.73M2
GLUCOSE SERPL-MCNC: 164 MG/DL (ref 70–99)
HCT VFR BLD AUTO: 36.2 % (ref 35–47)
HGB BLD-MCNC: 12.1 G/DL (ref 11.7–15.7)
IMM GRANULOCYTES # BLD: 0 10E3/UL
IMM GRANULOCYTES NFR BLD: 1 %
INR PPP: 1.04 (ref 0.85–1.15)
LDH SERPL L TO P-CCNC: 207 U/L (ref 0–250)
LYMPHOCYTES # BLD AUTO: 0.1 10E3/UL (ref 0.8–5.3)
LYMPHOCYTES NFR BLD AUTO: 2 %
MAGNESIUM SERPL-MCNC: 2 MG/DL (ref 1.7–2.3)
MCH RBC QN AUTO: 31.2 PG (ref 26.5–33)
MCHC RBC AUTO-ENTMCNC: 33.4 G/DL (ref 31.5–36.5)
MCV RBC AUTO: 93 FL (ref 78–100)
MONOCYTES # BLD AUTO: 0.1 10E3/UL (ref 0–1.3)
MONOCYTES NFR BLD AUTO: 3 %
NEUTROPHILS # BLD AUTO: 4.4 10E3/UL (ref 1.6–8.3)
NEUTROPHILS NFR BLD AUTO: 93 %
NRBC # BLD AUTO: 0 10E3/UL
NRBC BLD AUTO-RTO: 0 /100
PHOSPHATE SERPL-MCNC: 2.9 MG/DL (ref 2.5–4.5)
PLATELET # BLD AUTO: 239 10E3/UL (ref 150–450)
POTASSIUM SERPL-SCNC: 4.1 MMOL/L (ref 3.4–5.3)
POTASSIUM SERPL-SCNC: 4.1 MMOL/L (ref 3.4–5.3)
PROT SERPL-MCNC: 6.7 G/DL (ref 6.4–8.3)
RBC # BLD AUTO: 3.88 10E6/UL (ref 3.8–5.2)
SODIUM SERPL-SCNC: 139 MMOL/L (ref 136–145)
SPECIMEN EXPIRATION DATE: ABNORMAL
SPECIMEN EXPIRATION DATE: NORMAL
URATE SERPL-MCNC: 1.4 MG/DL (ref 2.4–5.7)
WBC # BLD AUTO: 4.7 10E3/UL (ref 4–11)

## 2023-01-04 PROCEDURE — 891N000001 HC RX 891 FDA APPROVED CELL THERAPY: Performed by: INTERNAL MEDICINE

## 2023-01-04 PROCEDURE — 86360 T CELL ABSOLUTE COUNT/RATIO: CPT | Performed by: INTERNAL MEDICINE

## 2023-01-04 PROCEDURE — 85610 PROTHROMBIN TIME: CPT

## 2023-01-04 PROCEDURE — 82728 ASSAY OF FERRITIN: CPT | Performed by: INTERNAL MEDICINE

## 2023-01-04 PROCEDURE — 85730 THROMBOPLASTIN TIME PARTIAL: CPT

## 2023-01-04 PROCEDURE — 84100 ASSAY OF PHOSPHORUS: CPT | Performed by: INTERNAL MEDICINE

## 2023-01-04 PROCEDURE — 87493 C DIFF AMPLIFIED PROBE: CPT

## 2023-01-04 PROCEDURE — 250N000013 HC RX MED GY IP 250 OP 250 PS 637

## 2023-01-04 PROCEDURE — 206N000001 HC R&B BMT UMMC

## 2023-01-04 PROCEDURE — 80053 COMPREHEN METABOLIC PANEL: CPT

## 2023-01-04 PROCEDURE — 82784 ASSAY IGA/IGD/IGG/IGM EACH: CPT | Performed by: INTERNAL MEDICINE

## 2023-01-04 PROCEDURE — 86140 C-REACTIVE PROTEIN: CPT | Performed by: INTERNAL MEDICINE

## 2023-01-04 PROCEDURE — 99207 PR SC NO CHARGE VISIT: CPT | Performed by: INTERNAL MEDICINE

## 2023-01-04 PROCEDURE — 85025 COMPLETE CBC W/AUTO DIFF WBC: CPT

## 2023-01-04 PROCEDURE — 83735 ASSAY OF MAGNESIUM: CPT

## 2023-01-04 PROCEDURE — 85379 FIBRIN DEGRADATION QUANT: CPT | Performed by: INTERNAL MEDICINE

## 2023-01-04 PROCEDURE — 258N000001 HC RX 258: Performed by: INTERNAL MEDICINE

## 2023-01-04 PROCEDURE — 83615 LACTATE (LD) (LDH) ENZYME: CPT | Performed by: INTERNAL MEDICINE

## 2023-01-04 PROCEDURE — 84550 ASSAY OF BLOOD/URIC ACID: CPT

## 2023-01-04 PROCEDURE — 38208 THAW PRESERVED STEM CELLS: CPT | Performed by: INTERNAL MEDICINE

## 2023-01-04 PROCEDURE — 0540T PR CAR-T THERAPY AUTOLOGOUS CELL ADMINISTRATION: CPT | Mod: KX | Performed by: INTERNAL MEDICINE

## 2023-01-04 PROCEDURE — 87081 CULTURE SCREEN ONLY: CPT

## 2023-01-04 PROCEDURE — 250N000013 HC RX MED GY IP 250 OP 250 PS 637: Performed by: INTERNAL MEDICINE

## 2023-01-04 PROCEDURE — 99223 1ST HOSP IP/OBS HIGH 75: CPT | Mod: 25

## 2023-01-04 PROCEDURE — XW043K7 INTRODUCTION OF IDECABTAGENE VICLEUCEL IMMUNOTHERAPY INTO CENTRAL VEIN, PERCUTANEOUS APPROACH, NEW TECHNOLOGY GROUP 7: ICD-10-PCS | Performed by: INTERNAL MEDICINE

## 2023-01-04 PROCEDURE — 86901 BLOOD TYPING SEROLOGIC RH(D): CPT

## 2023-01-04 PROCEDURE — 250N000011 HC RX IP 250 OP 636: Performed by: INTERNAL MEDICINE

## 2023-01-04 PROCEDURE — 85384 FIBRINOGEN ACTIVITY: CPT | Performed by: INTERNAL MEDICINE

## 2023-01-04 RX ORDER — MAGNESIUM OXIDE 400 MG/1
400 TABLET ORAL EVERY 4 HOURS
Status: COMPLETED | OUTPATIENT
Start: 2023-01-04 | End: 2023-01-04

## 2023-01-04 RX ORDER — LEVOFLOXACIN 250 MG/1
250 TABLET, FILM COATED ORAL DAILY
Status: DISCONTINUED | OUTPATIENT
Start: 2023-01-04 | End: 2023-01-11 | Stop reason: HOSPADM

## 2023-01-04 RX ORDER — ACYCLOVIR 800 MG/1
800 TABLET ORAL 2 TIMES DAILY
Status: DISCONTINUED | OUTPATIENT
Start: 2023-01-04 | End: 2023-01-11 | Stop reason: HOSPADM

## 2023-01-04 RX ORDER — SIMVASTATIN 20 MG
40 TABLET ORAL DAILY
Status: DISCONTINUED | OUTPATIENT
Start: 2023-01-04 | End: 2023-01-11 | Stop reason: HOSPADM

## 2023-01-04 RX ORDER — PROCHLORPERAZINE MALEATE 5 MG
5 TABLET ORAL EVERY 6 HOURS PRN
Status: DISCONTINUED | OUTPATIENT
Start: 2023-01-04 | End: 2023-01-11 | Stop reason: HOSPADM

## 2023-01-04 RX ORDER — ACETAMINOPHEN 325 MG/1
325-650 TABLET ORAL EVERY 4 HOURS PRN
Status: DISCONTINUED | OUTPATIENT
Start: 2023-01-04 | End: 2023-01-11 | Stop reason: HOSPADM

## 2023-01-04 RX ORDER — DIPHENHYDRAMINE HCL 25 MG
50 CAPSULE ORAL ONCE
Status: COMPLETED | OUTPATIENT
Start: 2023-01-04 | End: 2023-01-04

## 2023-01-04 RX ORDER — PANTOPRAZOLE SODIUM 40 MG/1
40 TABLET, DELAYED RELEASE ORAL DAILY
Status: DISCONTINUED | OUTPATIENT
Start: 2023-01-04 | End: 2023-01-11 | Stop reason: HOSPADM

## 2023-01-04 RX ORDER — HEPARIN SODIUM,PORCINE 10 UNIT/ML
5-20 VIAL (ML) INTRAVENOUS
Status: DISCONTINUED | OUTPATIENT
Start: 2023-01-04 | End: 2023-01-11 | Stop reason: HOSPADM

## 2023-01-04 RX ORDER — ALLOPURINOL 300 MG/1
300 TABLET ORAL DAILY
Status: DISCONTINUED | OUTPATIENT
Start: 2023-01-04 | End: 2023-01-11 | Stop reason: HOSPADM

## 2023-01-04 RX ORDER — FLUCONAZOLE 100 MG/1
100 TABLET ORAL DAILY
Status: DISCONTINUED | OUTPATIENT
Start: 2023-01-04 | End: 2023-01-06

## 2023-01-04 RX ORDER — LORAZEPAM 0.5 MG/1
.5-1 TABLET ORAL EVERY 4 HOURS PRN
Status: DISCONTINUED | OUTPATIENT
Start: 2023-01-04 | End: 2023-01-11 | Stop reason: HOSPADM

## 2023-01-04 RX ORDER — ACETAMINOPHEN 325 MG/1
650 TABLET ORAL ONCE
Status: COMPLETED | OUTPATIENT
Start: 2023-01-04 | End: 2023-01-04

## 2023-01-04 RX ORDER — PROCHLORPERAZINE MALEATE 5 MG
5-10 TABLET ORAL EVERY 6 HOURS PRN
Status: DISCONTINUED | OUTPATIENT
Start: 2023-01-04 | End: 2023-01-04

## 2023-01-04 RX ORDER — BUSPIRONE HYDROCHLORIDE 10 MG/1
10 TABLET ORAL 3 TIMES DAILY PRN
Status: DISCONTINUED | OUTPATIENT
Start: 2023-01-04 | End: 2023-01-04

## 2023-01-04 RX ORDER — HEPARIN SODIUM,PORCINE 10 UNIT/ML
5-20 VIAL (ML) INTRAVENOUS EVERY 24 HOURS
Status: DISCONTINUED | OUTPATIENT
Start: 2023-01-05 | End: 2023-01-11 | Stop reason: HOSPADM

## 2023-01-04 RX ORDER — LORAZEPAM 2 MG/ML
.5-1 INJECTION INTRAMUSCULAR EVERY 4 HOURS PRN
Status: DISCONTINUED | OUTPATIENT
Start: 2023-01-04 | End: 2023-01-11 | Stop reason: HOSPADM

## 2023-01-04 RX ADMIN — PANTOPRAZOLE SODIUM 40 MG: 40 TABLET, DELAYED RELEASE ORAL at 11:52

## 2023-01-04 RX ADMIN — DEXTROSE AND SODIUM CHLORIDE: 5; 450 INJECTION, SOLUTION INTRAVENOUS at 10:20

## 2023-01-04 RX ADMIN — DIPHENHYDRAMINE HYDROCHLORIDE 50 MG: 25 CAPSULE ORAL at 11:52

## 2023-01-04 RX ADMIN — ACYCLOVIR 800 MG: 800 TABLET ORAL at 19:53

## 2023-01-04 RX ADMIN — IDECABTAGENE VICLEUCEL 1 EACH: 300000000 SUSPENSION INTRAVENOUS at 12:29

## 2023-01-04 RX ADMIN — ACETAMINOPHEN 650 MG: 325 TABLET, FILM COATED ORAL at 11:52

## 2023-01-04 RX ADMIN — LEVOFLOXACIN 250 MG: 250 TABLET, FILM COATED ORAL at 16:27

## 2023-01-04 RX ADMIN — Medication 400 MG: at 16:22

## 2023-01-04 RX ADMIN — Medication 400 MG: at 13:49

## 2023-01-04 RX ADMIN — Medication 5 ML: at 13:50

## 2023-01-04 ASSESSMENT — ACTIVITIES OF DAILY LIVING (ADL)
ADLS_ACUITY_SCORE: 23

## 2023-01-04 NOTE — PLAN OF CARE
Temp: 98  F (36.7  C) Temp src: Axillary BP: 121/76 Pulse: 88   Resp: 16 SpO2: 100 % O2 Device: None (Room air)      Pt admitted today.  A&Ox4, but anxious for CART [facility] cells today.  Received 3 bags of cells 1205-0152.  Tolerated well, but tired from Benadryl.  Appetite good.  Denies pain, SOB, constipation, or diarrhea.  Pt has intermittent nausea, but denied need for antiemetic.  Pt stated she had a rash reaction to Hibaclenz in the past.  Trialed CHG wipes on Left Arm only before PICC line dressing change.  Will monitor for reaction/allergic reaction.  No rash noted so far.  Mg 2.0; PO replacement given and recheck tomorrow.  Up ad angelic and has already walked 2 miles in the [place].        Problem: Stem Cell/Bone Marrow Transplant  Goal: Optimal Coping with Transplant  Outcome: Progressing  Goal: Symptom-Free Urinary Elimination  Outcome: Progressing  Goal: Diarrhea Symptom Control  Outcome: Progressing  Goal: Improved Activity Tolerance  Outcome: Progressing  Intervention: Promote Improved Energy  Recent Flowsheet Documentation  Taken 1/4/2023 1700 by Genet Nayak RN  Activity Management:   up ad angelic   ambulated in room  Taken 1/4/2023 1000 by Genet Nayak RN  Activity Management:   up ad angelic   ambulated in room  Goal: Blood Counts Within Acceptable Range  Outcome: Progressing  Intervention: Monitor and Manage Hematologic Symptoms  Recent Flowsheet Documentation  Taken 1/4/2023 1700 by Genet Nayak RN  Medication Review/Management: medications reviewed  Taken 1/4/2023 1000 by Genet Nayak RN  Medication Review/Management: medications reviewed  Goal: Absence of Hypersensitivity Reaction  Outcome: Progressing  Goal: Absence of Infection  Outcome: Progressing  Intervention: Prevent and Manage Infection  Recent Flowsheet Documentation  Taken 1/4/2023 1700 by Genet Nayak RN  Isolation Precautions:   enteric precautions initiated   protective environment initiated  Taken 1/4/2023 1000 by Genet Nayak  "RN  Isolation Precautions:   enteric precautions initiated   protective environment initiated  Goal: Improved Oral Mucous Membrane Health and Integrity  Outcome: Progressing  Intervention: Promote Oral Comfort and Health  Recent Flowsheet Documentation  Taken 1/4/2023 1700 by Genet Nayak RN  Oral Care: oral rinse provided  Taken 1/4/2023 1000 by Genet Nayak RN  Oral Care: oral rinse provided  Goal: Nausea and Vomiting Symptom Relief  Outcome: Progressing  Intervention: Prevent and Manage Nausea and Vomiting  Recent Flowsheet Documentation  Taken 1/4/2023 1700 by Genet Nayak RN  Nausea/Vomiting Interventions:   stimuli minimized   slow deep breathing encouraged   sips of clear liquids given  Taken 1/4/2023 1000 by Genet Nayak RN  Nausea/Vomiting Interventions:   stimuli minimized   slow deep breathing encouraged   sips of clear liquids given  Goal: Optimal Nutrition Intake  Outcome: Progressing     Problem: Plan of Care - These are the overarching goals to be used throughout the patient stay.    Goal: Plan of Care Review  Description: The Plan of Care Review/Shift note should be completed every shift.  The Outcome Evaluation is a brief statement about your assessment that the patient is improving, declining, or no change.  This information will be displayed automatically on your shift note.  Outcome: Progressing  Goal: Patient-Specific Goal (Individualized)  Description: You can add care plan individualizations to a care plan. Examples of Individualization might be:  \"Parent requests to be called daily at 9am for status\", \"I have a hard time hearing out of my right ear\", or \"Do not touch me to wake me up as it startles me\".  Outcome: Progressing  Goal: Absence of Hospital-Acquired Illness or Injury  Outcome: Progressing  Intervention: Identify and Manage Fall Risk  Recent Flowsheet Documentation  Taken 1/4/2023 1700 by Genet Nayak RN  Safety Promotion/Fall Prevention:   clutter free environment " maintained   lighting adjusted   nonskid shoes/slippers when out of bed   safety round/check completed  Taken 1/4/2023 1000 by Genet Nayak, RN  Safety Promotion/Fall Prevention:   clutter free environment maintained   lighting adjusted   nonskid shoes/slippers when out of bed   safety round/check completed  Intervention: Prevent Skin Injury  Recent Flowsheet Documentation  Taken 1/4/2023 1700 by Genet Nayak, RN  Body Position: sitting up in bed  Taken 1/4/2023 1000 by Genet Nayak, RN  Body Position: sitting up in bed  Goal: Optimal Comfort and Wellbeing  Outcome: Progressing  Goal: Readiness for Transition of Care  Outcome: Progressing  Intervention: Mutually Develop Transition Plan  Recent Flowsheet Documentation  Taken 1/4/2023 0900 by Genet Nayak, RN  Equipment Currently Used at Home: none

## 2023-01-04 NOTE — PROGRESS NOTES
"BMT Daily CARTOX Note (complete days 0-14 and with any subsequent CRS/neurotoxicity)    Date: January 4, 2023    Kristie Cornejo ([id number]) is a 52 year old year old female who received infusion on 1/4, currently day 0 of CAR-T cell therapy [facility]     Vital Signs: /69 (BP Location: Right arm)   Pulse 80   Temp 98.4  F (36.9  C) (Axillary)   Resp 16   Ht 1.69 m (5' 6.54\")   Wt 69.4 kg (153 lb)   LMP  (LMP Unknown)   SpO2 99%   BMI 24.30 kg/m        Overall CRS grade: Grade 0     CRS Parameter Grade 1 Grade 2 Grade 3 Grade 4   Fever* Tm >= 100.4 degrees F Tm >= 100.4 degrees F Tm >= 100.4 degrees F Tm >= to 100.4 degrees F       With Either:  Hypotension None Responsive to Fluids Requiring 1 vasopressor (w/ or w/o vasopressin) Requiring multiple vasopressors (excluding vasopressin)     And/or  Hypoxia None Low-flow nasal cannula or blow-by High-flow nasal cannula, face mask, non-rebreather mask, or Venturi mask Requiring positive pressure (CPAP, BiPAP intubation and mechanical ventilation)       * Definition of High Dose Pressors for Grade 4  Vasopressor Duration >= 3 hours   Norepinephrine monotherapy >= 20 mcg/kg/minute   Dopamine monotherapy >= 10 mcg/kg/minute   Phenylephrine monotherapy >= 200 mcg/minute   Epinephrine monotherapy >= 10 mcg/minute   If on vasopressin + another agent High dose of vasopressin + norepi equivalent (using VASST formula below) is >= 10 mcg/minute   If on combination vasopressors, not including vasopressin Norepi equivalent of > 20 mcg/minute (using VASST formula below)     VASST formula: Norepi equivalent dose = [NE (mcg/min)] + [dopamine (mcg/min) / 2] + [epinephrine (mcg/min)] + [phenylephrine (mcg/min) / 10]    ICE Assessment  Orientation to year, month, city, hospital: 4 points  Name 3 objects (e.g., point to clock, pen, button): 3 points  Following commands: (e.g., Show me 2 fingers): 1 point   Write a standard sentence (e.g., The silver jumped over the log): 1 " point   Count backwards from 100 by ten: 1 point  Total points: 10: No impairment    CRS Summary  Does patient have CRS? No  Current CRS stgstrstastdstest:st st1st What is the maximum severity to date: 0  What therapy was given: None   Has CRS grade changed? No  Has CRS resolved? No    Neurotoxicity Summary  Does patient have neurotoxicity? No  Current Neurotoxicity score (0-10): ICE 10/10   What is the maximum severity to date:  0  What therapy was given: NA  Has neurotoxicity resolved? NA

## 2023-01-04 NOTE — PROGRESS NOTES
Type of transplant: Donor: Autologous  Product:   BMT INFUSION DOCUMENTATION (last 48 hours)     BMT/Cellular Product Infusion     Row Name 01/04/23 0800                [REMOVED] Product 01/04/23 0837 T Cells, Apheresis    Product Details Product Release Date: 01/04/23 -NB Product Release Time: 0837  -KZ Product Type: T Cells, Apheresis  -NB DIN: [id number]  -NB Product Description Code: [id number]  -NB Volume Dispensed (mL): 21 mL  -NB Completion Date (RN to complete): 01/04/23  -RR Completion Time (RN to complete): 1241  -RR, cells + flush     Checked by (Patient RN) Genet Nayak RN  -RR       Checked by (Witness) Tanisha Vasquez RN  -LR       Product Volume Infused (mL) 21 mL  -RR       Flush Volume (mL) 50 mL  -RR       Volume Dispensed (mL) --          [REMOVED] Product 01/04/23 0837 T Cells, Apheresis    Product Details Product Release Date: 01/04/23 -NB Product Release Time: 0837 -KZ Product Type: T Cells, Apheresis  -NB DIN: [id number]  -NB Product Description Code: [id number]  -NB Volume Dispensed (mL): 21 mL  -NB Completion Date (RN to complete): 01/04/23  -RR Completion Time (RN to complete): 1314  -RR, Cells + flush     Checked by (Patient RN) Genet Nayak RN  -RR       Checked by (Witness) Tanisha Vasquez RN  -LR       Product Volume Infused (mL) 21 mL  -RR       Flush Volume (mL) 50 mL  -RR       Volume Dispensed (mL) --          [REMOVED] Product 01/04/23 0837 T Cells, Apheresis    Product Details Product Release Date: 01/04/23 -NB Product Release Time: 0837 -KZ Product Type: T Cells, Apheresis  -NB DIN: [id number]  -NB Product Description Code: [id number]  -NB Volume Dispensed (mL): 21 mL  -NB Completion Date (RN to complete): 01/04/23  -RR Completion Time (RN to complete): 1336  -RR    Checked by (Patient RN) Genet Nayak RN  -RR       Checked by (Witness) Tanisha Vasquez RN  -LR       Product Volume Infused (mL) 21 mL  -RR       Flush Volume (mL) 50 mL  -RR       Volume Dispensed (mL) --              User Key  (r) = Recorded By, (t) = Taken By, (c) = Cosigned By    Initials Name Effective Dates    VANESSA DegrootWhita 07/11/21 -     LR Tanisha Vasquez RN 04/29/14 -     Toshia Li 07/11/21 -     Genet Herron RN 02/06/18 -               Preparation: RN Documentation  Patient was premedicated as ordered: yes  Line Type: central line, left  Patient Stable Prior to Infusion: yes  Time Infusion Started: 1229  Teaching: side effects/monitoring  Tolerated/Reaction: Patient tolerance of product infusion  Immediate suspected transfusion reaction to the product: none  Did patient have prior history of similar signs/symptoms during this hospitalization?: no  Did the patient tolerate the infusion well: yes  Medications and treatment for symptoms: NA  Flush until: 1615  Plan: Per [facility] policy.  Assess for any CRS or neurotoxicity.

## 2023-01-04 NOTE — PROGRESS NOTES
Patient admitted to:  5432  Admitted from: Home  Arrived by: Private vehicle  Reason for admission: Scheduled admission for CART  Patient accompanied by: , Jesse  Belongings: Kept with pt.  Meds sent home with  except for study drug  Teaching: Oriented to room and unit routine  Skin double check completed by: Genet Nayak RN and Ward Francis RN

## 2023-01-04 NOTE — H&P
"  BMT History & Physical       Patient Demographics   Patient ID:  Kristie Cornejo   Age:  52 year old   Sex:  female  Reason for Admission/CC: Multiple Myeloma   Date:  1/4/2023  Service: BMT   Informant:  Patient and Chart  Resuscitation Status: Full Code    Patient ID:  Kristie Cornejo is a 52 year old female with a history of multiple myeloma conditioned with Cy/Flu, currently day 0 of her CAR T-cell therapy with Abecma.    Transplant Essential Data:   Diagnosis Multiple Myeloma     CAR-T Type  Abecma     Prep Regimen Cy/Flu              Primary BMT MD Dr. Reinoso     Clinical Trials   MT 2017-45 with [id number]            HPI: Kristie \"Margo\" Clemente is a 52 year old female with a history of multiple myeloma presented for CAR T-cell therapy with Abecma. She was conditioned with Cy/Flu on days -5, -4, -3 with intrathecal dexamethasone on day -1. On day of admission she is day 0 and will be receiving her CAR T cell therapy. Back in April of 2021 she presented with left groin pain an MRI showed large, expansile, permeative mass with indistinct borders involving the left pelvis. In September of that year it was biopsied with the following results, Flow: kappa monotypic plasma cell, polytypic B-cell, FISH: showed a gain of 11q, IGH-CCND1 fusion but no losses of 1q or Tp53 & no gain of 1q. Bone marrow biopsy with marrow cellularity 50% with 25% interstitial infiltration with kappa-monotypic moderately atypical plasma cells. A PET CT showed hypermetabolic pathologic fracture of the left pubic ramus with aggressive appearing osteolysis. She completed a month of radiation & VRd x3 cycle in Nov. 2021 with PD, D-VRd x5 cycles in Jan. 2022 with MA, KCd x1 cycle in June 2022 that was discontinued due to PVC's,  And Shruthi-Pd x4 cycles in July 2022 with PD.     Today she is feeling well and excited to pursue the cell therapy. She was experiencing some mild nausea this morning that resolved on its own and hot flashes " which are not new to her.       Blood Counts       Recent Labs   Lab Test 01/03/23  1124 01/01/23  0900 12/31/22  0753   HGB 12.7 12.1 12.3   HCT 38.0 35.8 37.2   WBC 2.9* 3.9* 3.4*   ANEUTAUTO 2.6 3.4 2.5   ALYMPAUTO 0.1* 0.1* 0.5*   AMONOAUTO 0.1 0.2 0.3   AEOSAUTO 0.1 0.2 0.2   ABSBASO 0.0 0.0 0.0   NRBCMAN 0.0 0.0 0.0    301 315         Recent Labs   Lab Test 12/21/22  0940   ABORH O POS         Recent Labs   Lab Test 12/21/22  0940   HGBS Negative         Chemistries     Basic Panel  Recent Labs   Lab Test 01/03/23  1124 01/01/23  0900 12/31/22  0753    140 142   POTASSIUM 4.2 4.3 4.2   CHLORIDE 105 108* 110*   CO2 19* 21* 22   BUN 18.6 13.4 14.6   CR 0.78 0.72 0.75   * 112* 100*        Calcium, Magnesium, Phosphorus  Recent Labs   Lab Test 01/03/23  1124 01/01/23  0900 12/31/22  0753 12/30/22  0744   MEKA 9.4 9.6 9.3 9.4   MAG  --  2.0 2.1 2.0   PHOS  --  3.8 3.3 4.0        LFTs  Recent Labs   Lab Test 01/03/23  1124 12/31/22  0753 12/30/22  0744   BILITOTAL 0.4 0.4 0.4   ALKPHOS 47 47 49   AST 23 18 21   ALT 24 23 28   ALBUMIN 4.6 4.4 4.6       LDH  Recent Labs   Lab Test 12/30/22  0744 12/21/22  0940 11/14/22  0945    195 192       B2-Microglobulin  Recent Labs   Lab Test 12/21/22  0940 11/14/22  0945 09/15/21  1534   [id number] 1.6 2.0 1.8       Vitamin D  No lab results found.      Urine Studies       Recent Labs   Lab Test 12/21/22  0940   COLOR Yellow   APPEARANCE Clear   URINEGLC 50*   URINEBILI Negative   URINEKETONE Negative   SG 1.024   UBLD Negative   URINEPH 5.5   PROTEIN 20*   UUROI Normal   NITRITE Negative   LEUKEST Trace*   RBCU 1   WBCU 5   USQEI 1       Creatinine Clearance    Recent Labs   Lab Test 03/22/22  0930      DUR 24.0   UCRR 161   UCR24 1.08         Infectious Disease Markers     [facility] IDM    Recent Labs   Lab Test 12/21/22  0940   DCMIG Positive*   DHBSAG Non-Reactive   DHBCAB Non-Reactive   DHIVAB Non-Reactive   DHCVAB Non-Reactive    DHTLVA Non-Reactive   TCRUZI Non-Reactive   DTRPAB Non-Reactive       CMV  No lab results found.      EBV    Recent Labs   Lab Test 12/21/22  0940   EBVCAG Positive*       HSV 1/2    Recent Labs   Lab Test 12/21/22  0940   T2YUUJC 8.11*   H1IGG Positive.  IgG antibody to HSV-1 detected.*   Q8PMLWD 0.89   H2IGG No HSV-2 IgG antibodies detected.         VZV    No lab results found.      HTLV    Recent Labs   Lab Test 12/21/22  0940   DHTLVA Non-Reactive         Toxoplasma  (not routinely checked)      COVID    Recent Labs   Lab Test 01/01/23  1108   [id number] Negative         Immunoglobulins     Recent Labs   Lab Test 12/30/22  0744 12/21/22  0940 12/08/22  1441 11/14/22  0945 11/02/22  1345 10/31/22  0929   * 661 856 899 243* 260*       Recent Labs   Lab Test 12/30/22  0744 12/08/22  1441 11/02/22  1345 10/31/22  0929 10/05/22  1436 06/21/22  1519   IGA 12* 12* 9* 11* 9* 5*       Recent Labs   Lab Test 12/30/22  0744 12/08/22  1441 11/02/22  1345 10/31/22  0929 10/05/22  1436 06/21/22  1519   IGM <10* 13* <10* <10* 11* 10*         Monocloncal Protein Studies     M spike    Recent Labs   Lab Test 12/30/22  0744 12/21/22  0940 12/08/22  1441 11/14/22  0945 11/02/22  1345 10/31/22  0929   ELPM 0.0 0.0 0.1* 0.1* 0.1* 0.1*       Kappa FLC    Recent Labs   Lab Test 12/30/22  0744 12/21/22  0940 12/08/22  1441 11/14/22  0945 11/02/22  1345 10/31/22  0929   KFLCA 54.74* 46.42* 36.33* 39.86* 33.74* 30.57*       Lambda FLC    Recent Labs   Lab Test 12/30/22  0744 12/21/22  0940 12/08/22  1441 11/14/22  0945 11/02/22  1345 10/31/22  0929   LFLCA 0.17* 0.19* 0.23* 0.28* 0.20* 0.24*       FLC Ratio    Recent Labs   Lab Test 12/30/22  0744 12/21/22  0940 12/08/22  1441 11/14/22  0945 11/02/22  1345 10/31/22  0929   KLRA 322.00* 244.32* 157.96* 142.36* 168.70* 127.38*           Bone Marrow Biopsy     Morphology           Results for orders placed or performed in visit on 12/23/22 (from the past 8760 hour(s))   Bone marrow  biopsy   Result Value     Final Diagnosis         Bone marrow, posterior iliac crest, left decalcified trephine biopsy and touch imprint; left direct aspirate smear, and concentrated aspirate smear; and peripheral smear:     Persistent multiple myeloma characterized by:  - Slightly hypocellular bone marrow with trilineage hematopoiesis and 5% kappa monotypic plasma cells  - Peripheral blood showing normal hemogram and differential        Comment         Concurrent flow cytometry was performed ([id number]) and shows kappa monotypic plasma cells. Plasma cells are present at a higher percentage in the core biopsy (5%) than in the aspirates or touch imprints.            Clinical Information         From Epic electronic medical record; 52-year-old female is undergoing evaluation prior to hematopoietic cell transplant or immune effector cell therapy for refractory kappa light chain multiple myeloma.        Peripheral Hematologic Data         CBC WITH DIFFERENTIAL(12/23/2022 09:02 AM CST):                                                     RESULT VALUE REF. RANGE UNITS   WBC Count  Hemoglobin  Hematocrit  Platelet Count  RBC Count  MCV  MCH  MCHC  RDW 4.1                 (NORMAL)    12.6                 (NORMAL)     38.3                 (NORMAL)  310                 (NORMAL)  4.01                 (NORMAL)       96                 (NORMAL)     31.4                 (NORMAL)     32.9                 (NORMAL)     13.9                 (NORMAL) 4.0-11.0  11.7-15.7  35.0-47.0  150-450  3.80-5.20    26.5-33.0  31.5-36.5  10.0-15.0 10e3/uL  g/dL  %  10e3/uL  10e6/uL  fL  pg  g/dL  %   % Neutrophils  % Lymphocytes  % Monocytes  % Eosinophils  % Basophils  % Immature Granulocytes  Absolute Neutrophils  Absolute Lymphocytes  Absolute Monocytes  Absolute Eosinophils  Absolute Basophils  Absolute Immature Granulocytes  NRBCs per 100 WBC  Absolute NRBCs 60  24  12  3  1  0  2.4                 (NORMAL)  1.0                  (NORMAL)  0.5                 (NORMAL)  0.1                 (NORMAL)  0.0                 (NORMAL)  0.0                 (NORMAL)  0               (NORMAL)  0.0                 () N/A  N/A  N/A  N/A  N/A  N/A  1.6-8.3  0.8-5.3  0.0-1.3  0.0-0.7  0.0-0.2  <=0.4  <1  <=0.0 %  %  %  %  %  %  10e3/uL  10e3/uL  10e3/uL  10e3/uL  10e3/uL  10e3/uL  /100  10e3/uL            Microscopic Description         PERIPHERAL BLOOD SMEAR MORPHOLOGY:  The red blood cells appear normochromic.  Poikilocytosis includes rare to occasional echinocytes and rare elliptocytes.  Polychromasia is not increased.  Rouleaux formation may be slightly increased. The morphology of the platelets is normal.  Many platelet clumps are seen on the feather edge.     Bone marrow aspirates and trephine core biopsy touch imprints are reviewed.     BONE MARROW DIFFERENTIAL (500 cells on touch imprints)  Percent (%) Cell Population Reference Range (%)   0.0 Blasts  (0 - 1)   1.2 Neutrophil promyelocytes (2 - 4)   60.8 Neutrophils and precursors (54 - 63)   26.8 Erythroid precursors (18 - 24)   2.4 Monocytes (1 - 1.5)   2.0 Eosinophils (1 - 3))   0.0 Basophils (0 - 1))   6.0 Lymphocytes (8 - 12)   0.8 Plasma cells (0 - 1.5)      The aspirate smears appear hemodilute.     Neutrophil maturation is complete. Erythroid maturation is complete. Megakaryocytes are present.     TREPHINE SECTIONS:  Hematoxylin and eosin stains are reviewed. The quality of the trephine core biopsy is adequate , with moderate fragmentation artifact. The marrow cellularity is estimated at 20-30%. The cellular composition reflects the touch imprints differential. The number of megakaryocytes appears consistent with the degree of marrow cellularity.      IMMUNOHISTOCHEMISTRY:  Immunohistochemical stains are performed on the paraffin-embedded trephine core with appropriate controls.     Stains for cytoplasmic kappa and lambda immunoglobulin light chains show scattered single and small clusters  of kappa-monotypic plasma cells that by  account for about 5% of marrow cellularity.      Note: These immunohistochemical stains are deemed medically necessary. Some of the antigens may also be evaluated by flow cytometry. Concurrent evaluation by immunohistochemistry on clot and/or trephine sections is indicated in this case in order to correlate immunophenotype with cell morphology and determine extent of involvement, spatial pattern, and focality of potential disease distribution.            Gross Description         Procedure/Gross Description   Aspirate(s) and trephine(s) procured by ERICK Roberto     Specimen sent for Special Studies:         Flow Cytometry: left aspirate        Cytogenetics: left aspirate        Molecular Diagnostics: left aspirate          Biopsy aspiration site: left posterior iliac crest                                                      (Reference Range)           Amount of aspirate           3.8   mL        Fat and P.V. cell layer        1    %               (1 - 3)        Particles                             trace   %        Myeloid-erythroid layer    1    %               (5 - 8)           Clot Section: no     Trephine biopsy site: left posterior iliac crest     Designated left posterior iliac crest is 1 cylinder of gritty tissue, labeled with the patient's name and hospital number, obtained with 11 gauge needle and a length of 20 mm; entirely submitted in 1 cassette; acetic zinc formalin fixed, decalcified, processed, and stained for hematoxylin and eosin per laboratory protocol.          MCRS Yes (A)     Performing Labs         The technical component of this testing was completed at [facility]           EKG       ECG results from 12/23/22   EKG 12-lead Adult     Value    Systolic Blood Pressure     Diastolic Blood Pressure     Ventricular Rate 63    Atrial Rate 63    GA Interval 166    QRS Duration 82         QTc 421    P Axis 35    R AXIS 11    T Axis -24    Interpretation ECG      Sinus rhythm  Nonspecific T wave abnormality  Abnormal ECG  When compared with ECG of 2022 12:53,  Previous ECG has undetermined rhythm, needs review  ST no longer depressed in Anterior leads  Nonspecific T wave abnormality now evident in Inferior leads  Nonspecific T wave abnormality, worse in Anterior leads  Confirmed by MD CHIQUIS, SUNDAY () on 2022 10:06:15 AM           ECHOCARDIOGRAM       Results for orders placed during the hospital encounter of 11/15/22    ECHOCARDIOGRAM COMPLETE    Status: Normal 11/15/2022    Narrative  [id number]  [id number]  [id number]  [id number]^RONALD^MARCELLUS    [facility],[place]  Echocardiography Laboratory  [address] [place] [address]    Name: SANTO LUIS  MRN: [id number]  : 1970  Study Date: 11/15/2022 12:51 PM  Age: 52 yrs  Gender: Female  Patient Location: [facility]  Reason For Study: Examination prior to chemotherapy, Myeloma (H)  Ordering Physician: MARCELLUS CARDENAS  Referring Physician: MARCELLUS CARDENAS  Performed By: Ly Christine    BSA: 1.8 m2  Height: 68 in  Weight: 158 lb  HR: 55  BP: 126/74 mmHg  ______________________________________________________________________________  Procedure  Echocardiogram with two-dimensional, color and spectral Doppler performed.  ______________________________________________________________________________  Interpretation Summary  Global and regional left ventricular function is normal with an EF of 55-60%.  Global right ventricular function is normal.  Pulmonary artery systolic pressure is normal.  The inferior vena cava is normal.  No pericardial effusion is present.  ______________________________________________________________________________  Left Ventricle  Left ventricular size is normal. Left ventricular wall thickness is normal.  Global and regional left ventricular  function is normal with an EF of 55-60%.  Left ventricular diastolic function is normal. Global peak LV longitudinal  strain is averaged at -22%. This is within reported normal limits (normal <-  18%). No regional wall motion abnormalities are seen.    Right Ventricle  The right ventricle is normal size. Global right ventricular function is  normal.    Atria  Both atria appear normal.    Mitral Valve  The mitral valve is normal.    Aortic Valve  Aortic valve is normal in structure and function.    Tricuspid Valve  The tricuspid valve is normal. Trace to mild tricuspid insufficiency is  present. The right ventricular systolic pressure is approximated at 17.0 mmHg  plus the right atrial pressure. Pulmonary artery systolic pressure is normal.    Pulmonic Valve  The pulmonic valve is normal.    Vessels  The aorta root is normal. The inferior vena cava is normal.    Pericardium  No pericardial effusion is present.    Compared to Previous Study  This study was compared with the study from 22 . LV function is normal.  ______________________________________________________________________________  MMode/2D Measurements & Calculations  IVSd: 1.3 cm    LVIDd: 4.6 cm  LVIDs: 3.1 cm  LVPWd: 0.99 cm  FS: 32.2 %  LV mass(C)d: 192.9 grams  LV mass(C)dI: 104.3 grams/m2  Ao root diam: 3.1 cm  asc Aorta Diam: 2.8 cm  LVOT diam: 2.0 cm  LVOT area: 3.1 cm2  LA Volume (BP): 54.5 ml  LA Volume Index (BP): 29.5 ml/m2  RWT: 0.44    Doppler Measurements & Calculations  MV E max antonia: 49.3 cm/sec  MV A max antonia: 86.5 cm/sec  MV E/A: 0.57  MV dec slope: 176.0 cm/sec2  MV dec time: 0.28 sec  PA acc time: 0.13 sec  TR max antonia: 206.0 cm/sec  TR max P.0 mmHg  E/E' av.8  Lateral E/e': 6.9  Medial E/e': 8.7    ______________________________________________________________________________  Report approved by: Rae Galvez 11/15/2022 02:50 PM        PET Scan       Results for orders placed during the hospital encounter of  11/16/22    PET ONCOLOGY WHOLE BODY    Status: Normal 11/16/2022    Narrative  EXAM: Combined report of PET and CT on 11/16/2022 4:07 PM:    1. PET of the neck, chest, abdomen, and pelvis.  2. PET CT fusion for attenuation correction and anatomical  localization.  3. Nondiagnostic CT of the head, neck, chest, abdomen, pelvis, lower  extremities without contrast was obtained for interpretation.  4. 3D MIP and PET-CT fused images were processed on an independent  workstation and archived to PACS and reviewed by a radiologist.    TECHNIQUE:    1. PET: The patient received 10.07 mCi of F-18-FDG. The serum glucose  was 87 mg/dl prior to administration. Body weight was 71.9 kg. Images  were evaluated in the axial, sagittal, and coronal planes as well as  the rotational whole body MIP. Images were acquired from the cranial  vertex to the feet.    UPTAKE WAS MEASURED AT 78 MINUTES.    BACKGROUND: Liver SUV max = 3.5, Aorta Blood SUV Max = 2.0.    2. CT: Volumetric acquisition for clinical interpretation of the head,  neck, chest, abdomen, pelvis, and lower extremities acquired. The  images were reviewed in bone, soft tissue, and lung windows.    3. 3D MIP and PET-CT fused images were processed on an independent  workstation and archived to PACS and reviewed by a radiologist.    HISTORY: MM pre Abecma apheresis restaging; Examination prior to  chemotherapy; Myeloma; Personal history of diseases of blood and  blood-forming organs.    ADDITIONAL INFORMATION OBTAINED FROM EMR: 52-year-old female with  refractory multiple myeloma. Now on fifth line of therapy with  isatuximab/pom/dex reaching stable disease as of 11/2. Also on CAR-T  Abecma. Apheresis planned 11/21.    COMPARISON: PET CT 7/12/2022.    FINDINGS:    HEAD/NECK:  There is no suspicious FDG uptake in the neck. Mildly avid,  non-enlarged left cervical level 2B node is likely reactive.    The paranasal sinuses are clear. The mastoid air cells are  clear.    THORAX:  There is no suspicious FDG uptake in the chest. Few mildly avid,  non-enlarged left axillary nodes are likely reactive/related to  vaccination.    The central tracheobronchial tree is clear. No pleural effusion or  pneumothorax. No acute consolidation. Right middle lobe calcified  granuloma. Mild scattered coronary calcifications.    ABDOMEN/PELVIS:  There is no suspicious FDG uptake in the abdomen or pelvis or  hypermetabolic adenopathy.    There is new focal cutaneous uptake in right lower ventral wall  (3/272), likely infective/inflammatory.    Intrauterine contraceptive device in place. Rectus diastasis.    LOWER EXTREMITIES:  There is no suspicious FDG uptake in the visualized lower extremities.    MUSCULOSKELETAL:  There is interval decreased intensity of uptake within osteolytic left  pubic ramus lesion with associated pathologic fracture, SUV max 6.8,  previously 11.6. No new hypermetabolic osseous lesions identified.  Focus of increased uptake about the left anterior superior iliac spine  and right greater trochanter likely represent enthesopathy (3/246).    Impression  IMPRESSION:    1. Interval partial treatment response with decreased metabolic  activity in the left pubic ramus osteolytic lesion and associated  pathologic fracture.    2. No new metabolically-active myelomatous lesions identified.    I have personally reviewed the examination and initial interpretation  and I agree with the findings.    HILDA DYSON MD      SYSTEM ID:  [id number]       I have assessed all abnormal lab values for their clinical significance and any values considered clinically significant have been addressed in the assessment and plan    Family History:   Family History   Problem Relation Age of Onset     Neurologic Disorder Mother         toxo     Lipids Mother         low cholestrol     Lipids Father         high cholestrol     Glaucoma Maternal Grandmother      Diabetes Maternal Grandmother          breast cancer,stroke ischemic     Breast Cancer Maternal Grandmother         81yo/and autoimmune skin condition     C.A.D. Maternal Grandfather         strokes and mi, chronic respiratory from 2 hand smoke     Hypertension Paternal Grandmother         rheumatoid arthritis     Respiratory Paternal Grandfather         emphasema     Cancer - colorectal No family hx of      Prostate Cancer No family hx of      Macular Degeneration No family hx of        Social History:   Social History     Socioeconomic History     Marital status:      Spouse name: mary     Number of children: 1     Years of education: Not on file     Highest education level: Not on file   Occupational History     Employer: UNEMPLOYED   Tobacco Use     Smoking status: Never     Smokeless tobacco: Never   Vaping Use     Vaping Use: Never used   Substance and Sexual Activity     Alcohol use: Yes     Comment: none to a couple. occasional     Drug use: No     Sexual activity: Yes     Partners: Male     Birth control/protection: Condom, I.U.D.   Other Topics Concern     Parent/sibling w/ CABG, MI or angioplasty before 65F 55M? No     Comment: Grandparent. But not parent or sibling.   Social History Narrative     Not on file     Social Determinants of Health     Financial Resource Strain: Not on file   Food Insecurity: Not on file   Transportation Needs: Not on file   Physical Activity: Not on file   Stress: Not on file   Social Connections: Not on file   Intimate Partner Violence: Not At Risk     Fear of Current or Ex-Partner: No     Emotionally Abused: No     Physically Abused: No     Sexually Abused: No   Housing Stability: Not on file       Past Medical History:   Past Medical History:   Diagnosis Date     Allergic rhinitis, cause unspecified      Anxiety state, unspecified 12/01/2003    Started postpartum . On Paxil x 1+years. Off meds- 10/04     Irritable bowel syndrome 01/01/2004     Multiple myeloma not having achieved remission (H)       PLANTAR WARTS     resolved     SINUS DYSRHYTHMIA 10/01/2002    wnl     Unspecified hemorrhoids without mention of complication 04/01/2004    colonoscopy 4/04 spastic colon, int roids; bx neg        Past Surgical History:   Past Surgical History:   Procedure Laterality Date     BIOPSY BONE PELVIS Left 09/07/2021    Procedure: Biopsy left pubic ramis;  Surgeon: Edu Philip MD;  Location: [facility] OR     COLONOSCOPY N/A 04/13/2022    Procedure: COLONOSCOPY, WITH BIOPSY;  Surgeon: Berto Montgomrey MD;  Location: [facility]     EP ABLATION PVC N/A 03/23/2022    Procedure: Ablation Premature Ventricular Contractions;  Surgeon: Alem Thomas MD;  Location:  [facility]     INSERT CATHETER VASCULAR ACCESS N/A 11/21/2022    Procedure: CENTRAL VENOUS CATHETER NON TUNNELED INSERTION, VASCULAR ACCESS CATHETER;  Surgeon: Erich Bauman MD;  Location: [facility] OR     IR CVC NON TUNNEL LINE REMOVAL  11/22/2022     IR CVC NON TUNNEL PLACEMENT > 5 YRS  11/21/2022     LASIK       PICC TRIPLE LUMEN PLACEMENT Left 01/03/2023    Basilic 44 cm ext. 0     ZZC NONSPECIFIC PROCEDURE  01/01/1987    [place] teeth removed     ZZC NONSPECIFIC PROCEDURE  01/01/1993    (R) knee surgery     ZZC NONSPECIFIC PROCEDURE  01/01/2003    (R) breast bx-negative 6/03; bx neg 10/02     ZZC NONSPECIFIC PROCEDURE      10/02 cardiac echo normal     ZZC NONSPECIFIC PROCEDURE  01/01/2004 4/04 colonosc int roids, spastic colon     ZZC NONSPECIFIC PROCEDURE  08/01/2005    D & C for missed AB     ZZHC COLONOSCOPY THRU STOMA, DIAGNOSTIC  01/01/2004       Allergies:   Allergies   Allergen Reactions     Amoxicillin      Other reaction(s): upset stomach  Other reaction(s): Gastrointestinal, GI intolerance, Other (see comments)  Other reaction(s): upset stomach  Other reaction(s): upset stomach       Clarithromycin      Other reaction(s): GI intolerance     Erythromycin      upsets stomach     Erythromycin      Other reaction(s): GI  intolerance  upsets stomach  nausea     Fluconazole Rash     Itching,   Unsure if really allergy, was taking allergy shots at the time.  She is willing to rechallenge.          Home Medications      Prior to Admission medications    Medication Sig Start Date End Date Taking? Authorizing Provider   acyclovir (ZOVIRAX) 800 MG tablet Take 1 tablet (800 mg) by mouth every 12 hours 12/30/22  Yes Payton Reinoso MD   allopurinol (ZYLOPRIM) 300 MG tablet Take 1 tablet (300 mg) by mouth daily for 14 days 12/30/22 1/13/23 Yes Payton Reinoso MD   BUSPIRONE HCL 10 MG PO TABS 1 TABLET 3 TIMES DAILY as needed 6/16/10  Yes Laura Cruz MD   Calcium Carbonate-Vit D-Min (CALCIUM 600+D PLUS MINERALS) 600-400 MG-UNIT TABS HOLD 12/21/22  Yes Tabitha Bill PA-C   cetirizine HCl 10 MG CAPS Take 10 mg by mouth daily   Yes Reported, Patient   Flaxseed (Linseed) (FLAX SEED OIL) 1000 MG capsule Take 1 capsule by mouth daily   Yes Reported, Patient   fluconazole (DIFLUCAN) 200 MG tablet Take 1 tablet (200 mg) by mouth daily 12/30/22  Yes Diana Villa PA-C   fluticasone (FLONASE) 50 MCG/ACT nasal spray 1 spray   Yes Reported, Patient   glucosamine-chondroitin 500-400 MG CAPS per capsule Take 2 capsules by mouth daily   Yes Reported, Patient   lactobacillus rhamnosus, GG, (CULTURELL) capsule Take 1 capsule by mouth daily   Yes Reported, Patient   LORazepam (ATIVAN) 0.5 MG tablet Take 1 tablet (0.5 mg) by mouth every 4 hours as needed for anxiety, nausea or sleep 7/13/22  Yes Jackie Dunn APRN CNP   melatonin 5 MG tablet Take 5 mg by mouth nightly as needed for sleep   Yes Reported, Patient   Multiple Vitamin (MULTI-VITAMIN DAILY PO) Take 1 tablet by mouth daily   Yes Reported, Patient   omeprazole (PRILOSEC OTC) 20 MG EC tablet Take 1 tablet (20 mg) by mouth daily 12/21/22  Yes Tabitha Bill PA-C   polyethylene glycol (MIRALAX) 17 g packet Take 1 packet by mouth daily   Yes Reported, Patient  "  potassium chloride ER (KLOR-CON M) 10 MEQ CR tablet Take 2 tablets (20 mEq) by mouth 2 times daily 7/27/22  Yes Jackie Dunn APRN CNP   prochlorperazine (COMPAZINE) 10 MG tablet Take 10 mg by mouth every 6 hours as needed for nausea or vomiting   Yes Reported, Patient   study - simvastatin, IDS# 5641, 40 MG tablet Take 1 tablet (40 mg) by mouth daily Take at least 5 daily doses prior to apheresis and continue until Day +30 after CAR-T infusion. 11/17/22  Yes Payton Reinoso MD   levofloxacin (LEVAQUIN) 250 MG tablet Take 1 tablet (250 mg) by mouth daily  Patient not taking: Reported on 12/31/2022 12/30/22   Payton Reinoso MD   MIRENA 20 MCG/24HR IU IUD use for up to 5 years, then remove  Patient not taking: Reported on 1/3/2023 6/26/07   Tarah Godoy CNM   Zoledronic Acid (ZOMETA IV)  9/27/21   Reported, Patient       Review of Systems    Review of Systems:  10 point ROS is negative unless stated in the HPI.     PHYSICAL EXAM      Weight     Wt Readings from Last 3 Encounters:   01/04/23 69.4 kg (153 lb)   01/01/23 72.2 kg (159 lb 1.6 oz)   12/30/22 73 kg (160 lb 14.4 oz)        KPS: 90    /76 (BP Location: Right arm)   Pulse 85   Temp 98.8  F (37.1  C) (Axillary)   Resp 16   Ht 1.69 m (5' 6.54\")   Wt 69.4 kg (153 lb)   LMP  (LMP Unknown)   SpO2 100%   BMI 24.30 kg/m       General: NAD   Eyes: BI, sclera anicteric   Nose/Mouth/Throat: OP clear, buccal mucosa moist, no ulcerations   Lungs: CTA bilaterally  Cardiovascular: RRR, no M/R/G   Abdominal/Rectal: +BS, soft, NT, ND  Lymphatics: No edema  Skin: bruising right distal forearm   Neuro: A&O   Additional Findings: PICC line- no signs of infection.    LABS AND IMAGING: I have assessed all abnormal lab values for their clinical significance and any values considered clinically significant have been addressed in the assessment and plan.        Lab Results   Component Value Date    WBC 2.9 (L) 01/03/2023    ANEUTAUTO 2.6 " 2023    HGB 12.7 2023    HCT 38.0 2023     2023     2023    POTASSIUM 4.2 2023    CHLORIDE 105 2023    CO2 19 (L) 2023     (H) 2023    BUN 18.6 2023    CR 0.78 2023    MAG 2.0 2023    INR 0.91 2022           SYSTEMS-BASED ASSESSMENT AND PLAN     Kristie Cornejo is a 52 year old female with a history of multiple myeloma conditioned with Cy/Flu, currently day 0 of her CAR T-cell therapy with Abecma.      CAR T Protocol   - Day -5 through -3: Cy/Flu   - Day -2: Rest day   - Day -1: IT Dex   - Day 0: CAR T infusion (Abecma)   - Day +6: IT Dex   - Day +13: possible IT Dex (determined by PI and primary MD)   - Simvastatin daily 40mg until day day +30   - GM-CSF should NOT be given through out study   - Re-staging plan: Unable to sign re-staging labs of treatment plan without specific tests for future bone marrow biopsies     NEUROTOXICITY   - ICE score: 10/10     CRS  - stGstrstastdstest:st st1st HEME/COAG  - Risk of cytopenias due to chemotherapy  - Transfusion parameters: hemoglobin <7, platelets <10    IMMUNOCOMPROMISED  - Prophylaxis plan: ACV, Fluconazole, and Levaquin   - Pentamidine INH + albuterol one time   - Active infections: None    CARDIOVASCULAR  - Risk of cardiomyopathy:  Baseline LVEF: 55-60%   - Risk of arrhythmia: Baseline EKG showed sinus rhythm nonspecific T wave abnormality  - Hx of PVC ablation 2022     RESPIRATORY  - Risk of respiratory complications: Frequent ambulation and incentive spirometer.    GI/NUTRITION  - Ulcer prophylaxis: Protonix   - Risk of nausea/vomiting due to chemo/radiation: compazine and ativan     RENAL/ELECTROLYTES/  - Risk of renal injury: IV hydration   - Electrolyte management: replace per sliding scale    DIABETES/ENDOCRINE  - Risk of steroid-induced hyperglyemia: Monitor BG, sliding scale if needed    MUSCULOSKELETAL/FRAILTY  - ECO   - Patient with substantial risk of  sarcopenia  - Daily PT/OT as needed while inpatient  - Cancer Rehab as needed outpatient    SYMPTOM MANAGEMENT  - Nausea from chemo/radiation: Prochlorperazine, ondansetron, lorazepam.    - # Pain Assessment:  Current Pain Score 1/4/2023   Patient currently in pain? no   Pain score (0-10) -   Kristie hill pain level was assessed and she currently denies pain.        SOCIAL DETERMINANTS  - Caregiver: Jesse Cornejo (Spouse) & Misti Preciado (sister)     DISPOSITION  - Discharge on day +7, unless there are complications from neurotoxicity or CRS       Known issues that I take into account for medical decisions, with salient changes to the plan considering these complexities noted above.    Patient Active Problem List   Diagnosis     Allergic rhinitis     Anxiety state     Irritable bowel syndrome     Hemorrhoids     Sciatica     Encounter for insertion or removal of intrauterine contraceptive device     CARDIOVASCULAR SCREENING; LDL GOAL LESS THAN 160     Pelvic mass     Plasmacytoma of bone (H)     Light chain myeloma (H)     Dehydration     Hypogammaglobulinemia (H)     Multiple myeloma not having achieved remission (H)     Clinically Significant Risk Factors Present on Admission                                  Today's summary:     I spent 60 minutes in the care of this patient today, which included time necessary for preparation for the visit, obtaining history, ordering medications/tests/procedures as medically indicated, review of pertinent medical literature, counseling of the patient, communication of recommendations to the care team, and documentation time.    ERICK Baker-C     Physician Attestation     I, Lacho Walker MD, saw and evaluated Kristie Cornejo as part of a shared APRN/PA visit. I personally performed the substantive portion of the medical decision making for this visit - please see the SULEIMAN s documentation for full details.    On the date of service, 01/04/2023, I spent 70 minutes personally  reviewing medical records and medications, reviewing vital signs, labs, and imaging results as summarized above, discussing the patient's case on rounds with SULEIMAN, obtaining a history from the patient, performing a physical exam, counseling and educating the patient on the diagnosis and treatment, evaluating a potentially life or organ threatening problem, intensively monitoring treatments with high risk of toxicity, coordinating care and documenting in the electronic medical record.    Key management decisions made by me and carried out under my direction include:   53 yo F with IgG kappa MM, high risk with +1q. Here for Abecma CAR-T (idecabtagene vicleucel) after receiving LD chemo as outpt. Doing well today. Monitor closely for CRS and ICANS. Also on the ICANS prophylaxis study.    I personally determined the cell dose from the correct donor (autologous CAR-T) and correct for the indication of multiple myeloma.  I was personally present at the start of infusion to monitor for immediate toxicity.  I was immediately available on the bone marrow transplant unit (5C) to manage and report toxicities or complications from the infusion for the entire duration of the infusion.      Thank you for allowing me to participate in the care of this patient. Please do not hesitate to contact me if there are any concerns or questions.     Lacho Walker MD   of Medicine  Classical Hematology and Blood and Marrow Transplantation  Division of Hematology, Oncology, and Transplantation  [facility]

## 2023-01-04 NOTE — CONSULTS
"Assessment completed on 12/21/22 in BMT clinic, please see information below for inpatient review.    Blood and Marrow Transplant   Psychosocial Assessment with   Clinical      Assessment completed on 12/21/22 of Pt's living situation, support system, financial status, functional status, coping, stressors, need for resources and social work intervention provided as needed.  Information for this assessment was provided by Pt and spouse's report in addition to medical chart review and consultation with medical team.      Present at Assessment:   Patient: Kristie \"Margo\" Clemente  Spouse: Erwin \"Jesse\" Clemente  : SARAH Cabral, [facility]     Diagnosis: Multiple Myeloma      Date of Diagnosis: 10/2021     Cellular therapy type: ABECMA CAR-T     Physician: Payton Reinoso MD     Nurse Coordinator: Julianne Peoples RN     Social Workers: SARAH Cabral, [facility]      Permanent Address:   [address] [place] [id number]     Living Situation: Pt and spouse live in [place], [place] w/ their 15 y/o son Saul.     Local Address: Pt lives in [place], [place] (approximately [place] minutes from [facility]) which is within the required distance of the hospital. Pt does not need to relocate.      Contact Information:  Cell Phone: [phone]  Pt's email: [email or web address]  Pt's spouse's Phone: [phone]     Presenting Information:  Margo Cornejo is a 52 year old female diagnosed with Multiple Myeloma who presents for evaluation for an ABECMA CAR-T at the [facility] ([facility] Pt was accompanied to today's visit by her  Jesse.      Decision Making: Self     Health Care Directive: Yes. Pt has a health care directive already on file.      Relationship Status: . Pt and pt's spouse have been  for 29 years. Pt described relationship as stable/supportive.      Special Needs: None identified at this time.      Family/Support System: Pt endorsed a strong support " "system including family and close friends who will be available to support pt throughout transplant process.      Family Information:  Spouse: Erwin \"Jesse\" Cornejo  Parents: Ernesto & Janet  Siblings: 1 sister - Misti  Children: 2 - 1 daughter Giles (20) student at [employer] ; 1 son Saul (16) [facility]  Friends:  Pt endorsed a good friend support system.     Caregiver: SW discussed with pt and pt's spouse the caregiver role and expectation at length. Pt is agreeable to having a full time caregiver for a minimum of 30 days until cleared by the BMT physician. Pt and pt's spouse confirmed understanding of the caregiver requirement. Pt's primary caregiver will be her spouse Jesse with her sister Misti, friend Bhavna, and friend Stephanie each assisting for a week as well as a secondary or back-up to assist as needed. Pt reviewed and signed the caregiver contract which will be scanned into the EMR. Caregiver education and resources provided. No caregiver concerns identified.      Caregiver Contact Information:  Jesse Cornejo (spouse) - cell phone: [phone]  Misti Preciado (sister) - cell phone: [phone]     Transportation Mode: Personal Vehicle. Pt is aware of driving restrictions post-BMT and the need for the caregiver is to drive until cleared to drive by the BMT physician. SW provided information on parking info and monthly parking pass options.     Insurance: Pt has [employer] health insurance. Pt denied specific insurance concerns at this time. SW reiterated information about the BMT Financial  should specific insurance questions arise as pt moves through transplant process.      Sources of Income: Pt's source of income is spouse's employment income. No financial concerns identified at this time. Pt shared she may be interested in receiving general information re: SSDI. SW provided Cancer Legal Care resource during visit today. SW discussed milly options and asked pt to let SW know if they " "would like to apply in the future.      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 Erwin works for [employer] and is a Software professional.     Mental Health: Pt reported a hx of Anxiety that she first noticed approximately 20+ years ago. Pt is currently taking medication and pt feels the medication is working well. Pt shared that she feels she has learned about her own triggers and various coping mechanisms that has greatly improved her mental health. Pt denies increase in mental health concerns since MM diagnosis in 2021. Pt shared she initially experienced panic attacks 20+ years ago, but has not experienced this in \"quite some time.\"     We discussed how many patients may see an increase in feelings of anxiety or depression while hospitalized for extended periods of time and pursue  isolation precautions post-BMT. Encouraged Pt and spouse to let us know if they are noticing an increase in symptoms. Pt believes she would be able to identify symptoms of depression/anxiety throughout the transplant process. We talked about the variety of modalities available to use as coping mechanisms (including but not limited to guided imagery, relaxation techniques, progressive muscle relaxation, counseling/talk therapy and medication). Pt has a friend that is a music therapist who will likely visit with her in the hospital.     PHQ-9:  Pt scored a 2 which indicates \"none\" on the depression severity scale. Pt endorses this is an accurate reflection of her emotional state.     GAD7:  Pt scored a 0 which indicates no sign of anxiety on the Generalized Anxiety Disorder Questionnaire. Pt endorses this is an accurate reflection of her emotional state.     Chemical Use:   Tobacco: No hx  Alcohol: No hx  Marijuana: No hx  Other Drugs: No hx  Based on the information provided, there " "appear to be no specific risks or concerns identified at this time.      Trauma/Loss/Abuse History: Multiple losses associated with cancer diagnosis and treatment, including health, employment, changes to physical appearance, etc.      Spirituality: Pt identified as non-spiritual. SW explained that there are Chaplains on the unit and pt can request to meet with a  at anytime.     Coping: Pt noted that she is currently feeling \"positive, hopeful, prepared, nervous a little, and ready to begin\". Pt shared that her main coping mechanisms are talking w/ friends/family, reading books, taking naps sometimes, gathering education information, and working on my hobbies. Pt noted that she enjoys sewing, swimming, figure skating, walking, skiing, and music. SW and pt discussed additional positive coping mechanisms that pt can utilize while in the hospital. While hospitalized, pt plans to take naps, read, talk on her phone, and work on crossword puzzles.      Caregiver Coping: Pt's spouse noted that he is feeling \"depressed, worried, and ready to begin\" at this time. Pt's spouse noted that he solitario by reading books, exercise, and working on his hobbies. Pt's spouse shared that he goes on walks daily outside no matter the weather and that he enjoys this. Pt's spouse presented quiet and reserved during SW visit. Caregiver resources offered/reviewed to Pt's spouse.     Education Provided: Transplant process expectations, Caregiver requirements, Caregiver self-care, Financial issues related to transplant, Financial resources/grants available, Common psychosocial stressors pre/post transplant, Support group(s) available, Hospital resources available, Web site information, Social Work role and Resources for children/siblings     Interventions Provided: Psychosocial Support and Education      Assessment and Recommendations for Team:  Pt is a 52 year old female diagnosed with MM who is here undergoing preparation for a planned " ABECMA CAR-T.      Pt is pleasant, calm and able to articulate concerns/coping mechanisms in an appropriate manner. During our meeting pt was alert, she was interactive, affect was full, she displayed appropriate eye contact, memory and thought processes. Pt feels comfortable communicating with the medical team. Pt has a strong supportive network of family and friends who are involved. Pt has developed strong coping mechanisms.      Pt will benefit from ongoing psychosocial support in regards to coping with the adjustment to the BMT process. CSW has discussed  psychosocial support options in regards to coping with the adjustment to the BMT process and support groups opportunities.       Pt has a strong support system and a confirmed caregiver plan. Pt verbalizes understanding of the transplant process and wanting to proceed. SW provided contact information and encouraged pt to contact SW with questions, concerns, resources and for support.     Per this assessment, SW did not identify any barriers to this patient moving forward with transplant.     Important Information:   -would like a treadmill while IP.      Follow up Planned:   Psychosocial support  Financial resources - SW will provide SSDI general information.      SARAH Cabral, [facility]  Adult Blood & Marrow Transplant   Phone: [phone]  Pager: [phone]

## 2023-01-04 NOTE — PHARMACY-ADMISSION MEDICATION HISTORY
Admission Medication History Completed by Pharmacy    See [facility] Admission Navigator for allergy information, preferred outpatient pharmacy, prior to admission medications and immunization status.     Medication History Sources:     BMT Clinic Note from 12/22    Nurse Complete Medication history     Changes made to PTA medication list (reason):    Added: None    Deleted: None    Changed: None    Additional Information:    None    Prior to Admission medications    Medication Sig Last Dose Taking? Auth Provider Long Term End Date   acyclovir (ZOVIRAX) 800 MG tablet Take 1 tablet (800 mg) by mouth every 12 hours 1/4/2023 Yes Payton Reinoso MD Yes    allopurinol (ZYLOPRIM) 300 MG tablet Take 1 tablet (300 mg) by mouth daily for 14 days 1/4/2023 Yes Payton Reinoso MD  1/13/23   BUSPIRONE HCL 10 MG PO TABS 1 TABLET 3 TIMES DAILY as needed More than a month Yes Laura Cruz MD     Calcium Carbonate-Vit D-Min (CALCIUM 600+D PLUS MINERALS) 600-400 MG-UNIT TABS HOLD Past Week Yes Tabitha Bill PA-C     cetirizine HCl 10 MG CAPS Take 10 mg by mouth daily 1/3/2023 Yes Reported, Patient     Flaxseed (Linseed) (FLAX SEED OIL) 1000 MG capsule Take 1 capsule by mouth daily 1/3/2023 Yes Reported, Patient     fluconazole (DIFLUCAN) 200 MG tablet Take 1 tablet (200 mg) by mouth daily 1/4/2023 Yes Diana Villa PA-C     fluticasone (FLONASE) 50 MCG/ACT nasal spray 1 spray More than a month Yes Reported, Patient     glucosamine-chondroitin 500-400 MG CAPS per capsule Take 2 capsules by mouth daily 1/3/2023 Yes Reported, Patient     lactobacillus rhamnosus, GG, (CULTURELL) capsule Take 1 capsule by mouth daily Past Week Yes Reported, Patient     LORazepam (ATIVAN) 0.5 MG tablet Take 1 tablet (0.5 mg) by mouth every 4 hours as needed for anxiety, nausea or sleep 1/3/2023 Yes Jackie Dunn APRN CNP     melatonin 5 MG tablet Take 5 mg by mouth nightly as needed for sleep More than a month Yes Reported,  Patient     Multiple Vitamin (MULTI-VITAMIN DAILY PO) Take 1 tablet by mouth daily Past Week Yes Reported, Patient     omeprazole (PRILOSEC OTC) 20 MG EC tablet Take 1 tablet (20 mg) by mouth daily Past Week Yes Tabitha Bill PA-C     polyethylene glycol (MIRALAX) 17 g packet Take 1 packet by mouth daily Past Week Yes Reported, Patient     potassium chloride ER (KLOR-CON M) 10 MEQ CR tablet Take 2 tablets (20 mEq) by mouth 2 times daily 1/4/2023 Yes Jackie Dunn APRN CNP     prochlorperazine (COMPAZINE) 10 MG tablet Take 10 mg by mouth every 6 hours as needed for nausea or vomiting Past Month Yes Reported, Patient     study - simvastatin, IDS# 5641, 40 MG tablet Take 1 tablet (40 mg) by mouth daily Take at least 5 daily doses prior to apheresis and continue until Day +30 after CAR-T infusion. 1/4/2023 Yes Payton Reinoso MD     levofloxacin (LEVAQUIN) 250 MG tablet Take 1 tablet (250 mg) by mouth daily  Patient not taking: Reported on 12/31/2022   Payton Reinoso MD     MIRENA 20 MCG/24HR IU IUD use for up to 5 years, then remove  Patient not taking: Reported on 1/3/2023   Tarah Godoy CNM     Zoledronic Acid (ZOMETA IV)  11/18/2022  Reported, Patient         Date completed: 01/04/23    Medication history completed by: CHALINO SWAN [facility]

## 2023-01-05 ENCOUNTER — APPOINTMENT (OUTPATIENT)
Dept: OCCUPATIONAL THERAPY | Facility: CLINIC | Age: 53
DRG: 018 | End: 2023-01-05
Payer: COMMERCIAL

## 2023-01-05 ENCOUNTER — APPOINTMENT (OUTPATIENT)
Dept: GENERAL RADIOLOGY | Facility: CLINIC | Age: 53
DRG: 018 | End: 2023-01-05
Payer: COMMERCIAL

## 2023-01-05 LAB
ALBUMIN UR-MCNC: 30 MG/DL
ANION GAP SERPL CALCULATED.3IONS-SCNC: 14 MMOL/L (ref 7–15)
APPEARANCE UR: CLEAR
BASOPHILS # BLD AUTO: 0 10E3/UL (ref 0–0.2)
BASOPHILS NFR BLD AUTO: 0 %
BILIRUB UR QL STRIP: NEGATIVE
BUN SERPL-MCNC: 19.6 MG/DL (ref 6–20)
CALCIUM SERPL-MCNC: 8.6 MG/DL (ref 8.6–10)
CD19 CELLS # BLD: <1 CELLS/UL (ref 107–698)
CD19 CELLS NFR BLD: <1 % (ref 6–27)
CD3 CELLS # BLD: 61 CELLS/UL (ref 603–2990)
CD3 CELLS NFR BLD: 99 % (ref 49–84)
CD3+CD4+ CELLS # BLD: 55 CELLS/UL (ref 441–2156)
CD3+CD4+ CELLS NFR BLD: 88 % (ref 28–63)
CD3+CD4+ CELLS/CD3+CD8+ CLL BLD: 7.76 % (ref 1.4–2.6)
CD3+CD8+ CELLS # BLD: 7 CELLS/UL (ref 125–1312)
CD3+CD8+ CELLS NFR BLD: 11 % (ref 10–40)
CD3-CD16+CD56+ CELLS # BLD: <1 CELLS/UL (ref 95–640)
CD3-CD16+CD56+ CELLS NFR BLD: <1 % (ref 4–25)
CHLORIDE SERPL-SCNC: 104 MMOL/L (ref 98–107)
COLOR UR AUTO: ABNORMAL
CREAT SERPL-MCNC: 0.84 MG/DL (ref 0.51–0.95)
CRP SERPL-MCNC: 94.1 MG/L
CULTURE HARVEST COMPLETE DATE: NORMAL
DEPRECATED HCO3 PLAS-SCNC: 17 MMOL/L (ref 22–29)
EOSINOPHIL # BLD AUTO: 0 10E3/UL (ref 0–0.7)
EOSINOPHIL NFR BLD AUTO: 2 %
ERYTHROCYTE [DISTWIDTH] IN BLOOD BY AUTOMATED COUNT: 13.3 % (ref 10–15)
FERRITIN SERPL-MCNC: 162 NG/ML (ref 11–328)
GFR SERPL CREATININE-BSD FRML MDRD: 83 ML/MIN/1.73M2
GLUCOSE SERPL-MCNC: 134 MG/DL (ref 70–99)
GLUCOSE UR STRIP-MCNC: >=1000 MG/DL
HCT VFR BLD AUTO: 35.4 % (ref 35–47)
HGB BLD-MCNC: 11.5 G/DL (ref 11.7–15.7)
HGB UR QL STRIP: NEGATIVE
IGG SERPL-MCNC: 518 MG/DL (ref 610–1616)
IMM GRANULOCYTES # BLD: 0 10E3/UL
IMM GRANULOCYTES NFR BLD: 1 %
KETONES UR STRIP-MCNC: NEGATIVE MG/DL
LACTATE SERPL-SCNC: 1.2 MMOL/L (ref 0.7–2)
LEUKOCYTE ESTERASE UR QL STRIP: ABNORMAL
LYMPHOCYTES # BLD AUTO: 0 10E3/UL (ref 0.8–5.3)
LYMPHOCYTES NFR BLD AUTO: 0 %
MAGNESIUM SERPL-MCNC: 1.8 MG/DL (ref 1.7–2.3)
MCH RBC QN AUTO: 30.9 PG (ref 26.5–33)
MCHC RBC AUTO-ENTMCNC: 32.5 G/DL (ref 31.5–36.5)
MCV RBC AUTO: 95 FL (ref 78–100)
MONOCYTES # BLD AUTO: 0.1 10E3/UL (ref 0–1.3)
MONOCYTES NFR BLD AUTO: 2 %
NEUTROPHILS # BLD AUTO: 2.6 10E3/UL (ref 1.6–8.3)
NEUTROPHILS NFR BLD AUTO: 95 %
NITRATE UR QL: NEGATIVE
NRBC # BLD AUTO: 0 10E3/UL
NRBC BLD AUTO-RTO: 0 /100
PH UR STRIP: 7 [PH] (ref 5–7)
PHOSPHATE SERPL-MCNC: 2.6 MG/DL (ref 2.5–4.5)
PLATELET # BLD AUTO: 145 10E3/UL (ref 150–450)
POTASSIUM SERPL-SCNC: 3.7 MMOL/L (ref 3.4–5.3)
RBC # BLD AUTO: 3.72 10E6/UL (ref 3.8–5.2)
RBC URINE: 1 /HPF
SODIUM SERPL-SCNC: 135 MMOL/L (ref 136–145)
SP GR UR STRIP: 1.02 (ref 1–1.03)
SQUAMOUS EPITHELIAL: 1 /HPF
T CELL EXTENDED COMMENT: ABNORMAL
TRANSITIONAL EPI: 1 /HPF
UROBILINOGEN UR STRIP-MCNC: NORMAL MG/DL
WBC # BLD AUTO: 2.8 10E3/UL (ref 4–11)
WBC URINE: 4 /HPF

## 2023-01-05 PROCEDURE — 71046 X-RAY EXAM CHEST 2 VIEWS: CPT

## 2023-01-05 PROCEDURE — 85025 COMPLETE CBC W/AUTO DIFF WBC: CPT

## 2023-01-05 PROCEDURE — 99233 SBSQ HOSP IP/OBS HIGH 50: CPT

## 2023-01-05 PROCEDURE — 87103 BLOOD FUNGUS CULTURE: CPT

## 2023-01-05 PROCEDURE — 84100 ASSAY OF PHOSPHORUS: CPT | Performed by: INTERNAL MEDICINE

## 2023-01-05 PROCEDURE — 250N000011 HC RX IP 250 OP 636: Performed by: INTERNAL MEDICINE

## 2023-01-05 PROCEDURE — 97165 OT EVAL LOW COMPLEX 30 MIN: CPT | Mod: GO

## 2023-01-05 PROCEDURE — 87086 URINE CULTURE/COLONY COUNT: CPT

## 2023-01-05 PROCEDURE — 83605 ASSAY OF LACTIC ACID: CPT | Performed by: INTERNAL MEDICINE

## 2023-01-05 PROCEDURE — 86140 C-REACTIVE PROTEIN: CPT

## 2023-01-05 PROCEDURE — 99418 PROLNG IP/OBS E/M EA 15 MIN: CPT

## 2023-01-05 PROCEDURE — 82728 ASSAY OF FERRITIN: CPT

## 2023-01-05 PROCEDURE — 250N000013 HC RX MED GY IP 250 OP 250 PS 637

## 2023-01-05 PROCEDURE — 71046 X-RAY EXAM CHEST 2 VIEWS: CPT | Mod: 26 | Performed by: RADIOLOGY

## 2023-01-05 PROCEDURE — 83735 ASSAY OF MAGNESIUM: CPT | Performed by: INTERNAL MEDICINE

## 2023-01-05 PROCEDURE — 206N000001 HC R&B BMT UMMC

## 2023-01-05 PROCEDURE — 250N000013 HC RX MED GY IP 250 OP 250 PS 637: Performed by: STUDENT IN AN ORGANIZED HEALTH CARE EDUCATION/TRAINING PROGRAM

## 2023-01-05 PROCEDURE — 80048 BASIC METABOLIC PNL TOTAL CA: CPT

## 2023-01-05 PROCEDURE — 258N000003 HC RX IP 258 OP 636

## 2023-01-05 PROCEDURE — 250N000011 HC RX IP 250 OP 636

## 2023-01-05 PROCEDURE — 97535 SELF CARE MNGMENT TRAINING: CPT | Mod: GO

## 2023-01-05 PROCEDURE — 81001 URINALYSIS AUTO W/SCOPE: CPT

## 2023-01-05 RX ORDER — MAGNESIUM SULFATE HEPTAHYDRATE 40 MG/ML
2 INJECTION, SOLUTION INTRAVENOUS ONCE
Status: COMPLETED | OUTPATIENT
Start: 2023-01-05 | End: 2023-01-05

## 2023-01-05 RX ORDER — POLYETHYLENE GLYCOL 3350 17 G/17G
17 POWDER, FOR SOLUTION ORAL DAILY PRN
Status: DISCONTINUED | OUTPATIENT
Start: 2023-01-05 | End: 2023-01-11 | Stop reason: HOSPADM

## 2023-01-05 RX ORDER — ACETAMINOPHEN 325 MG/1
650 TABLET ORAL ONCE
Status: DISCONTINUED | OUTPATIENT
Start: 2023-01-05 | End: 2023-01-09

## 2023-01-05 RX ORDER — LANOLIN ALCOHOL/MO/W.PET/CERES
6 CREAM (GRAM) TOPICAL
Status: DISCONTINUED | OUTPATIENT
Start: 2023-01-05 | End: 2023-01-11 | Stop reason: HOSPADM

## 2023-01-05 RX ADMIN — Medication 6 MG: at 20:18

## 2023-01-05 RX ADMIN — ACYCLOVIR 800 MG: 800 TABLET ORAL at 08:38

## 2023-01-05 RX ADMIN — FLUCONAZOLE 100 MG: 100 TABLET ORAL at 08:36

## 2023-01-05 RX ADMIN — ALLOPURINOL 300 MG: 300 TABLET ORAL at 08:39

## 2023-01-05 RX ADMIN — TOCILIZUMAB 600 MG: 20 INJECTION, SOLUTION, CONCENTRATE INTRAVENOUS at 16:45

## 2023-01-05 RX ADMIN — LEVOFLOXACIN 250 MG: 250 TABLET, FILM COATED ORAL at 08:38

## 2023-01-05 RX ADMIN — ACYCLOVIR 800 MG: 800 TABLET ORAL at 20:18

## 2023-01-05 RX ADMIN — ACETAMINOPHEN 650 MG: 325 TABLET, FILM COATED ORAL at 15:32

## 2023-01-05 RX ADMIN — Medication 10 ML: at 15:42

## 2023-01-05 RX ADMIN — LORAZEPAM 1 MG: 0.5 TABLET ORAL at 23:17

## 2023-01-05 RX ADMIN — Medication 5 ML: at 04:54

## 2023-01-05 RX ADMIN — CEFEPIME HYDROCHLORIDE 2 G: 2 INJECTION, POWDER, FOR SOLUTION INTRAVENOUS at 23:21

## 2023-01-05 RX ADMIN — Medication 5 ML: at 08:43

## 2023-01-05 RX ADMIN — MAGNESIUM SULFATE IN WATER 2 G: 40 INJECTION, SOLUTION INTRAVENOUS at 06:42

## 2023-01-05 RX ADMIN — Medication 40 MG: at 08:41

## 2023-01-05 RX ADMIN — ACETAMINOPHEN 650 MG: 325 TABLET, FILM COATED ORAL at 08:40

## 2023-01-05 RX ADMIN — PANTOPRAZOLE SODIUM 40 MG: 40 TABLET, DELAYED RELEASE ORAL at 08:38

## 2023-01-05 RX ADMIN — PROCHLORPERAZINE MALEATE 5 MG: 5 TABLET ORAL at 05:07

## 2023-01-05 RX ADMIN — CEFEPIME HYDROCHLORIDE 2 G: 2 INJECTION, POWDER, FOR SOLUTION INTRAVENOUS at 15:34

## 2023-01-05 RX ADMIN — Medication 5 ML: at 18:21

## 2023-01-05 ASSESSMENT — ACTIVITIES OF DAILY LIVING (ADL)
ADLS_ACUITY_SCORE: 23

## 2023-01-05 NOTE — PROGRESS NOTES
BMT/Cellular Autologous Product Infusion         Patient Vitals for the past 24 hrs:   Temp Temp src Pulse Resp BP   01/04/23 1613 98  F (36.7  C) Axillary 88 16 121/76   01/04/23 1955 97.4  F (36.3  C) Axillary 84 17 110/76   01/04/23 2302 98.4  F (36.9  C) Axillary 92 18 120/70   01/05/23 0436 98.8  F (37.1  C) Axillary 104 17 119/72   01/05/23 0936 98.1  F (36.7  C) Axillary 87 16 118/72   01/05/23 1302 98.3  F (36.8  C) Oral 89 16 127/74      BMT INFUSION DOCUMENTATION (last 48 hours)     BMT/Cellular Product Infusion     Row Name 01/04/23 0800                [REMOVED] Product 01/04/23 0837 T Cells, Apheresis    Product Details Product Release Date: 01/04/23  -NB Product Release Time: 0837  -KZ Product Type: T Cells, Apheresis  -NB DIN: [id number]  -NB Product Description Code: [id number]  -NB Volume Dispensed (mL): 21 mL  -NB Completion Date (RN to complete): 01/04/23  -RR Completion Time (RN to complete): 1241  -RR, cells + flush     Checked by (Patient RN) Genet Nayak RN  -RR       Checked by (Witness) Tanisha Vasquez RN  -LR       Product Volume Infused (mL) 21 mL  -RR       Flush Volume (mL) 50 mL  -RR       Volume Dispensed (mL) --          [REMOVED] Product 01/04/23 0837 T Cells, Apheresis    Product Details Product Release Date: 01/04/23  -NB Product Release Time: 0837  -KZ Product Type: T Cells, Apheresis  -NB DIN: [id number]  -NB Product Description Code: [id number]  -NB Volume Dispensed (mL): 21 mL  -NB Completion Date (RN to complete): 01/04/23  -RR Completion Time (RN to complete): 1314  -RR, Cells + flush     Checked by (Patient RN) Genet Nayak RN  -RR       Checked by (Witness) Tanishala Vasquez RN  -LR       Product Volume Infused (mL) 21 mL  -RR       Flush Volume (mL) 50 mL  -RR       Volume Dispensed (mL) --          [REMOVED] Product 01/04/23 0837 T Cells, Apheresis    Product Details Product Release Date: 01/04/23  -NB Product Release Time: 0837  -KZ Product Type: T Cells, Apheresis  -NB DIN:  [id number]  -NB Product Description Code: [id number]  -NB Volume Dispensed (mL): 21 mL  -NB Completion Date (RN to complete): 01/04/23  -RR Completion Time (RN to complete): 1336  -RR    Checked by (Patient RN) Genet Nayak, RN  -RR       Checked by (Witness) Tanisha Vasquez RN  -LR       Product Volume Infused (mL) 21 mL  -RR       Flush Volume (mL) 50 mL  -RR       Volume Dispensed (mL) --             User Key  (r) = Recorded By, (t) = Taken By, (c) = Cosigned By    Initials Name Effective Dates    Isabel Beard 07/11/21 -     LR Tanisha Vasquez RN 04/29/14 -     NB Toshia Casas 07/11/21 -     Genet Herron RN 02/06/18 -               Allogeneic Donor Eligibility Determination and Summary of Records: N/A - Autologous        Type of Infusion: Autologous      Baseline Pre-Infusion Evaluation (to be completed by Provider):   Dyspnea: Grade 0 - none  Hypoxia: Grade 0 - not present  Fever: Grade 0 - afebrile  Chills: Grade 0 - none  Febrile Neutropenia: Grade 0 - not present  Sinus Bradycardia: Grade 0 - none  Hypertension: Grade 0 - none  Hypotension: Grade 0 - none  Chest Pain: Grade 0 - none  Bronchospasm: Grade 0 - none  Pain: Grade 0 - none  Rash: Grade 0 - None  Neurologic Specify: none    If adverse reactions, events or complications occur (fever greater than 2 degrees fahrenheit increase, and severe reactions of the following types: chills, dyspnea, bronchospasm, hyper/hypotension, hypoxia, bradycardia, chest pain, back/flank pain, hypoxia, and any other reaction deemed severe or life threatening; any instance of product bag breakage or unusual product appearance)    Any other events that are >= grade 3, then immediately contact the BMT Attending physician, the Cell Therapy Laboratory Medical Director (pager [phone]) and the Cell Therapy Laboratory ([phone]).  After midnight, holidays & weekends contact the [facility] on the appropriate campus ([facility]  [facility]: [phone]; [facility]: [phone]).    Duy Christine PA-C

## 2023-01-05 NOTE — PROGRESS NOTES
"BMT Daily CARTOX Note (complete days 0-14 and with any subsequent CRS/neurotoxicity)    Date: January 5, 2023    Kristie Cornejo ([id number]) is a 52 year old year old female who received infusion on 1/4/23, currently day +1 of CAR-T cell therapy Abecma     Vital Signs: /74 (BP Location: Right arm)   Pulse 89   Temp 98.3  F (36.8  C) (Oral)   Resp 16   Ht 1.69 m (5' 6.54\")   Wt 70.2 kg (154 lb 12.8 oz)   LMP  (LMP Unknown)   SpO2 98%   BMI 24.59 kg/m      Organ CAR-T toxicity:    Neurologic: Oriented x 3 and Headaches      Overall CRS grade 0    CRS Parameter Grade 1 Grade 2 Grade 3 Grade 4   Fever* Tm >= 100.4 degrees F Tm >= 100.4 degrees F Tm >= 100.4 degrees F Tm >= to 100.4 degrees F       With Either:  Hypotension None Responsive to Fluids Requiring 1 vasopressor (w/ or w/o vasopressin) Requiring multiple vasopressors (excluding vasopressin)     And/or  Hypoxia None Low-flow nasal cannula or blow-by High-flow nasal cannula, face mask, non-rebreather mask, or Venturi mask Requiring positive pressure (CPAP, BiPAP intubation and mechanical ventilation)       * Definition of High Dose Pressors for Grade 4  Vasopressor Duration >= 3 hours   Norepinephrine monotherapy >= 20 mcg/kg/minute   Dopamine monotherapy >= 10 mcg/kg/minute   Phenylephrine monotherapy >= 200 mcg/minute   Epinephrine monotherapy >= 10 mcg/minute   If on vasopressin + another agent High dose of vasopressin + norepi equivalent (using VASST formula below) is >= 10 mcg/minute   If on combination vasopressors, not including vasopressin Norepi equivalent of > 20 mcg/minute (using VASST formula below)     VASST formula: Norepi equivalent dose = [NE (mcg/min)] + [dopamine (mcg/min) / 2] + [epinephrine (mcg/min)] + [phenylephrine (mcg/min) / 10]    ICE Assessment  Orientation to year, month, city, hospital: 4 points  Name 3 objects (e.g., point to clock, pen, button): 3 points  Following commands: (e.g., Show me 2 fingers): 1 point "   Write a standard sentence (e.g., The silver jumped over the log): 1 point   Count backwards from 100 by ten: 1 point  Total points: 10: No impairment    CRS Summary  Does patient have CRS? No  Current CRS stgstrstastdstest:st st1st What is the maximum severity to date: 0  What therapy was given: None  Has CRS grade changed? No  Has CRS resolved? NA    Neurotoxicity Summary  Does patient have neurotoxicity? No  Current Neurotoxicity score (0-10): 0  What is the maximum severity to date:  0  What therapy was given: None  Has neurotoxicity resolved? NA

## 2023-01-05 NOTE — PROGRESS NOTES
01/05/23 1400   Appointment Info   Signing Clinician's Name / Credentials (OT) Genet Justin OTR/L   Living Environment   People in Home child(rosette), dependent;spouse   Current Living Arrangements house   Home Accessibility stairs to enter home;stairs within home   Number of Stairs, Main Entrance 2;3   Number of Stairs, Within Home, Primary greater than 10 stairs   Transportation Anticipated family or friend will provide   Living Environment Comments Pt lives with  and 16 year old son in 2 level home with 2-3 CHLOE from garage or front door. Bedroom and bath on upper level. Bathroom has walk in shower.   Self-Care   Usual Activity Tolerance excellent   Current Activity Tolerance good   Regular Exercise Yes   Activity/Exercise Type swimming;walking   Exercise Amount/Frequency 30 mins   Equipment Currently Used at Home none   Fall history within last six months no   Activity/Exercise/Self-Care Comment Reports IND with ADLs/IADLs, walks without AD. 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   General Information   Onset of Illness/Injury or Date of Surgery 01/04/23   Referring Physician Duy Christine PA-C   Additional Occupational Profile Info/Pertinent History of Current Problem Kristie Cornejo is a 52 year old female with a history of multiple myeloma conditioned with Cy/Flu, currently day 1 of her CAR T-cell therapy with Abecma.   Existing Precautions/Restrictions immunosuppressed   Left Upper Extremity (Weight-bearing Status) full weight-bearing (FWB)   Right Upper Extremity (Weight-bearing Status) full weight-bearing (FWB)   Left Lower Extremity (Weight-bearing Status) full weight-bearing (FWB)   Right Lower Extremity (Weight-bearing Status) full weight-bearing (FWB)   Cognitive Status Examination   Orientation Status orientation to person, place and time   Cognitive Status Comments Appears WFL, engaged in appropriate conversation t/o    Visual Perception   Visual Impairment/Limitations corrective lenses full-time   Pain Assessment   Patient Currently in Pain No   Range of Motion Comprehensive   General Range of Motion no range of motion deficits identified   Strength Comprehensive (MMT)   General Manual Muscle Testing (MMT) Assessment no strength deficits identified   Bed Mobility   Comment (Bed Mobility) Pt reports IND   Transfers   Transfer Comments Pt reports IND   Activities of Daily Living   BADL Assessment/Intervention no deficits identified   Clinical Impression   Criteria for Skilled Therapeutic Interventions Met (OT) Yes, treatment indicated   OT Diagnosis Increased risk for deconditioning d/t prolonged hospital stay   Influenced by the following impairments Increased risk for deconditioning d/t prolonged hospital stay   OT Problem List-Impairments impacting ADL problems related to;activity tolerance impaired   Assessment of Occupational Performance 1-3 Performance Deficits   Identified Performance Deficits Household management, aerobic exercise   Planned Therapy Interventions (OT) IADL retraining;strengthening;ROM;stretching;transfer training;home program guidelines;progressive activity/exercise;risk factor education   Clinical Decision Making Complexity (OT) low complexity   Risk & Benefits of therapy have been explained evaluation/treatment results reviewed;care plan/treatment goals reviewed;risks/benefits reviewed;current/potential barriers reviewed;participants voiced agreement with care plan;participants included;patient   OT Total Evaluation Time   OT Eval, Low Complexity Minutes (68017) 5   OT Goals   Therapy Frequency (OT) 3 times/wk   OT Predicted Duration/Target Date for Goal Attainment 01/12/23   OT Goals Aerobic Activity;OT Goal 1;OT Goal 2   OT: Perform aerobic activity with stable cardiovascular response continuous activity;20 minutes;ambulation;NuStep;treadmill   OT: Goal 1 Pt will IND verbalize understanding of relevant  "lab values and implications on ADL/IADL routines and functional mobility.   OT: Goal 2 Pt will be IND with BUE/BLE strengthening HEP to increase strength for ADL/IADL routines and prevent deconditioning.   Self-Care/Home Management   Self-Care/Home Mgmt/ADL, Compensatory, Meal Prep Minutes ([id number]) 10   Symptoms Noted During/After Treatment (Meal Preparation/Planning Training) none   Treatment Detail/Skilled Intervention OT: Pt declined OOB mobility d/t feeling cold. Pt reports INDly walking ~1 mile in hallway this AM. Provided education and handouts on relevant lab values and implications for ADL/IADL routines, with pt verbalizing understanding. Educated on cancer related fatigue and OMNI fatigue scale, with pt verbalizing understanding. Pt declined equipment in room with pt stating \"I'd prefer to walk in the halls.\" Pt remained supine in bed at end of session with warm blanket and all needs within reach.   OT Discharge Planning   OT Plan Review lab values, BUE HEP (handout in room), hallway ambulation   OT Discharge Recommendation (DC Rec) home with assist   OT Rationale for DC Rec Pt performing near functional baseline but would benefit from IP OT to prevent deconditioning while admitted d/t prolonged hospital stay. Anticipate pt to d/c home with assist from family with heavy ADLs/IADLs as needed. Will monitor for OP cancer rehab needs.   OT Brief overview of current status IND   Total Session Time   Timed Code Treatment Minutes 10   Total Session Time (sum of timed and untimed services) 15     "

## 2023-01-05 NOTE — PROGRESS NOTES
"CLINICAL NUTRITION SERVICES - ASSESSMENT NOTE     Nutrition Prescription    RECOMMENDATIONS FOR MDs/PROVIDERS TO ORDER:  Encourage oral intake    Malnutrition Status:    Moderate malnutrition in the context of acute on chronic illness     Recommendations already ordered by Registered Dietitian (RD):  Continue snacks supplements PRN     Future/Additional Recommendations:  Encouraged oral intake and use of supplements      REASON FOR ASSESSMENT  Kristie Cornejo is a/an 52 year old female assessed by the dietitian for Admission Nutrition Risk Screen for positive (weight loss 2-13 lbs and poor appetite)     CLINICAL HISTORY   multiple myeloma conditioned with Cy/Flu, currently day +2 of her CAR T-cell therapy with Abecma planning to stay through day +7     NUTRITION HISTORY  Margo reported her appetite is very fair- she doesn't really feel like eating much but is forcing herself to eat at least two meals.   She reported last summer she lost about 25 lbs due to chemotherapy, then started an alternate chemotherapy and she gained it back. Since starting cytoxan, she has had some taste changes and foods just don't sound as good. Cheerios and muffin or fruit tend to go ok in the morning, trying to have a protein such as eggs for lunch. She likes boost and is willing to try ensure.      CURRENT NUTRITION ORDERS  Diet: High Kcal/High Protein + snacks/supplements PRN   Intake/Tolerance: 100%     LABS  Labs reviewed    MEDICATIONS  Medications reviewed    ANTHROPOMETRICS  Height: 169 cm (5' 6.535\")  Most Recent Weight: 69.4 kg (153 lb)    IBW: 59.1 kg  BMI: Normal BMI  Weight History:   Wt Readings from Last 25 Encounters:   01/06/23 69.3 kg (152 lb 12.8 oz)   01/05/23 70.2 kg (154 lb 12.8 oz)   01/01/23 72.2 kg (159 lb 1.6 oz)   12/30/22 73 kg (160 lb 14.4 oz)   12/29/22 73.9 kg (163 lb)   12/23/22 72.5 kg (159 lb 14.4 oz)   12/22/22 72.1 kg (159 lb)   12/21/22 71.8 kg (158 lb 4.8 oz)   12/12/22 71.7 kg (158 lb 1.6 oz) "   12/08/22 72.6 kg (160 lb)   11/23/22 71.5 kg (157 lb 9.6 oz)   11/22/22 71.5 kg (157 lb 10.1 oz)   11/21/22 71.7 kg (158 lb)   11/21/22 71.6 kg (157 lb 14.4 oz)   11/18/22 71.8 kg (158 lb 6.4 oz)   11/17/22 72 kg (158 lb 12.8 oz)   11/14/22 71.9 kg (158 lb 8.2 oz)   11/09/22 71.1 kg (156 lb 11.2 oz)   11/02/22 71.3 kg (157 lb 3.2 oz)   10/19/22 71.5 kg (157 lb 9.6 oz)   10/05/22 69 kg (152 lb 1.6 oz)   09/21/22 68 kg (150 lb)   08/31/22 68.3 kg (150 lb 9.6 oz)   08/30/22 67.6 kg (149 lb)   08/17/22 67.3 kg (148 lb 6.4 oz)   08/10/22 68.2 kg (150 lb 6.4 oz)   5% weight change in one week     Dosing Weight: 69 kg    ASSESSED NUTRITION NEEDS  Estimated Energy Needs:6712-1199 kcals/day (25 - 30 kcals/kg)  Justification: Maintenance  Estimated Protein Needs:  grams protein/day (1.2 - 1.5 grams of pro/kg)  Justification: Increased needs  Estimated Fluid Needs: 1106-8211 mL/day (25 - 30 mL/kg)   Justification: Maintenance    PHYSICAL FINDINGS  See malnutrition section below.    MALNUTRITION  % Intake: < 75% for > 7 days (moderate)  % Weight Loss: 5% in 1 month (moderate)  Subcutaneous Fat Loss: None observed, limited with street clothing  Muscle Loss: None observed, limited with street clothing  Fluid Accumulation/Edema: None noted  Malnutrition Diagnosis: Moderate malnutrition in the context of acute on chronic illness     NUTRITION DIAGNOSIS  Inadequate oral intake related to decreased appetite and taste changes as evidenced by 5% weight loss in one week and report of declined appetite     INTERVENTIONS  Implementation  Nutrition Education: RD role in care, 5c menu hacks, supplement list    Collaboration with other providers- 5c rounds     Goals  Patient to consume % of nutritionally adequate meal trays TID, or the equivalent with supplements/snacks.    Maintain weight >/= 66 kg     Monitoring/Evaluation  Progress toward goals will be monitored and evaluated per protocol.      Rachelle Lawson RD, LD  5C/BMT  pager: [phone]

## 2023-01-05 NOTE — PLAN OF CARE
"Pt is AOx4 and independent. She complained of a headache this AM and took tylenol. She did not have relief after tylenol but declined further intervention. She is eating and drinking and her appetite is fair. Her CVC is heparin locked and she showered this AM. VSS and will continue to monitor for CRS.        Problem: Plan of Care - These are the overarching goals to be used throughout the patient stay.    Goal: Plan of Care Review  Description: The Plan of Care Review/Shift note should be completed every shift.  The Outcome Evaluation is a brief statement about your assessment that the patient is improving, declining, or no change.  This information will be displayed automatically on your shift note.  Outcome: Progressing  Goal: Patient-Specific Goal (Individualized)  Description: You can add care plan individualizations to a care plan. Examples of Individualization might be:  \"Parent requests to be called daily at 9am for status\", \"I have a hard time hearing out of my right ear\", or \"Do not touch me to wake me up as it startles me\".  Outcome: Progressing  Goal: Absence of Hospital-Acquired Illness or Injury  Outcome: Progressing  Intervention: Identify and Manage Fall Risk  Recent Flowsheet Documentation  Taken 1/5/2023 0847 by Catalina Ventura RN  Safety Promotion/Fall Prevention: assistive device/personal items within reach  Intervention: Prevent Skin Injury  Recent Flowsheet Documentation  Taken 1/5/2023 0847 by Catalina Ventura RN  Body Position: sitting up in bed  Intervention: Prevent and Manage VTE (Venous Thromboembolism) Risk  Recent Flowsheet Documentation  Taken 1/5/2023 0847 by Catalina Ventura RN  VTE Prevention/Management: SCDs (sequential compression devices) off  Goal: Optimal Comfort and Wellbeing  Outcome: Progressing  Intervention: Monitor Pain and Promote Comfort  Recent Flowsheet Documentation  Taken 1/5/2023 0930 by Catalina Ventura RN  Pain Management Interventions: declines  Taken 1/5/2023 " 0840 by Catalina Ventura, RN  Pain Management Interventions: medication (see MAR)  Goal: Readiness for Transition of Care  Outcome: Progressing   Goal Outcome Evaluation:

## 2023-01-05 NOTE — PROGRESS NOTES
"  BMT Progress note  BMT Physician: Dr. Reinoso   Transplant Type: CAR-T   Preparative Regimen: Cy/Flu   Transplant Date: 1/4/2023  Chief complaint: Kristie Cornejo is a 52 year old female with a history of multiple myeloma conditioned with Cy/Flu, currently day +1 of her CAR T-cell therapy with Abecma.    INTERIM HISTORY: Mild headache today that was similar to the headache she was experiencing prior to receiving her CAR-T cells yesterday.  Spiked a fever around 1500 today, no associated hypoxia or hypotension. A&O x3.     REVIEW OF SYSTEMS: Otherwise unremarkable other than what is noted in the Interim History.   PHYSICAL EXAM:   Vitals:  /74 (BP Location: Right arm)   Pulse 89   Temp 98.3  F (36.8  C) (Oral)   Resp 16   Ht 1.69 m (5' 6.54\")   Wt 70.2 kg (154 lb 12.8 oz)   LMP  (LMP Unknown)   SpO2 98%   BMI 24.59 kg/m      General Appearance: NAD  HEENT: sclera anicteric. Moist mucus membranes, no ulcerations.  CV: RRR. no murmur or rub.   RESP: CTA bilaterally; no rales or wheezes.   GI: +BS, soft, nontender, nondistended.   EXT: No edema. No cyanosis/clubbing.   SKIN: no lesions or rash  NEURO: A&O x3   PSYCH: Appropriate affect   VASCULAR ACCES: dressing CDI.     ROUTINE LABS:    Lab Results   Component Value Date    WBC 2.8 (L) 01/05/2023    ANEU 1.4 (L) 11/23/2022    HGB 11.5 (L) 01/05/2023    HCT 35.4 01/05/2023     (L) 01/05/2023     (L) 01/05/2023    POTASSIUM 3.7 01/05/2023    CHLORIDE 104 01/05/2023    CO2 17 (L) 01/05/2023     (H) 01/05/2023    BUN 19.6 01/05/2023    CR 0.84 01/05/2023    MAG 1.8 01/05/2023    INR 1.04 01/04/2023          ASSESSMENT AND PLAN:     Kristie Cornejo is a 52 year old female with a history of multiple myeloma conditioned with Cy/Flu, currently day +1 of her CAR T-cell therapy with Abecma.        CAR T Protocol   - Day -5 through -3: Cy/Flu   - Day -2: Rest day   - Day -1: IT Dex   - Day 0: CAR T infusion (Abecma)   - Day +6: IT Dex "   - Day +13: possible IT Dex (determined by PI and primary MD)   - Simvastatin daily 40mg until day day +30   - GM-CSF should NOT be given through out study   - Re-staging plan: Unable to sign re-staging labs of treatment plan without specific tests for future bone marrow biopsies      NEUROTOXICITY   - ICE score: 10/10   - Mild headaches today that is not different in pattern or intensity from headaches experienced before receiving CAR-T      CRS  - Grade: 1, fever- 102F   - Blood cx- pending, urine cx- pending, CXR- pending, ferritin- pending, CRP- pending. Cefepime started and Tocilizumab ordered.      HEME/COAG  - Pancytopenic due to chemotherapy  - Transfusion parameters: hemoglobin <7, platelets <10     IMMUNOCOMPROMISED  - Prophylaxis plan: ACV, Fluconazole, and Levaquin   - Pentamidine INH + albuterol one time   - Active infections: None     CARDIOVASCULAR  - Risk of cardiomyopathy:  Baseline LVEF: 55-60%   - Risk of arrhythmia: Baseline EKG showed sinus rhythm nonspecific T wave abnormality  - Hx of PVC ablation 2022      RESPIRATORY  - Risk of respiratory complications: Frequent ambulation and incentive spirometer.     GI/NUTRITION  - Ulcer prophylaxis: Protonix   - Risk of nausea/vomiting due to chemo: compazine and ativan   - History of IBS: PEG ordered daily PRN      RENAL/ELECTROLYTES/  - Risk of renal injury: IV hydration   - Electrolyte management: replace per sliding scale     DIABETES/ENDOCRINE  - Risk of steroid-induced hyperglyemia: Monitor BG, sliding scale if needed     MUSCULOSKELETAL/FRAILTY  - ECO   - Patient with substantial risk of sarcopenia  - Daily PT/OT as needed while inpatient  - Cancer Rehab as needed outpatient     SYMPTOM MANAGEMENT  - Nausea from chemo/radiation: Prochlorperazine, ondansetron, lorazepam.     - # Pain Assessment:  Current Pain Score 2023   Patient currently in pain? no   Pain score (0-10) -   Kristie hill pain level was assessed and she currently  denies pain.          SOCIAL DETERMINANTS  - Caregiver: Jesse Cornejo (Spouse) & Misti Preciado (sister)      DISPOSITION  - Discharge on day +7, unless there are complications from neurotoxicity or CRS         Known issues that I take into account for medical decisions, with salient changes to the plan considering these complexities noted above.         Patient Active Problem List   Diagnosis     Allergic rhinitis     Anxiety state     Irritable bowel syndrome     Hemorrhoids     Sciatica     Encounter for insertion or removal of intrauterine contraceptive device     CARDIOVASCULAR SCREENING; LDL GOAL LESS THAN 160     Pelvic mass     Plasmacytoma of bone (H)     Light chain myeloma (H)     Dehydration     Hypogammaglobulinemia (H)     Multiple myeloma not having achieved remission (H)         Clinically Significant Risk Factors Present on Admission                                         Today's summary:      I spent 30 minutes in the care of this patient today, which included time necessary for preparation for the visit, obtaining history, ordering medications/tests/procedures as medically indicated, review of pertinent medical literature, counseling of the patient, communication of recommendations to the care team, and documentation time.     Duy Christine PA-C   Pager: [phone]    Physician Attestation     I, Lacho Walker MD, saw and evaluated Kristie Cornejo as part of a shared APRN/PA visit. I personally performed the substantive portion of the medical decision making for this visit - please see the SULEIMAN s documentation for full details.    On the date of service, 01/05/2023, I spent 45 minutes on the patient unit personally reviewing medical records and medications, reviewing vital signs, labs, and imaging results as summarized above, discussing the patient's case on rounds with the SULEIMAN, obtaining a history from the patient, performing a physical exam, counseling and educating the patient on the diagnosis  and treatment, evaluating a potentially life or organ threatening problem, intensively monitoring treatments with high risk of toxicity, coordinating care and documenting in the electronic medical record.    Key management decisions made by me and carried out under my direction include:   53 yo F with IgG kappa MM, high risk with +1q. Now D+1 s/p Abecma CAR-T (idecabtagene vicleucel) after receiving LD chemo as outpt. More anxious today, having chills and hot flashes. Later on developed a fever. Workup fever, start cefepime. Will also give a dose of tocilizumab for CRS grade 1 within 72 hours of Abecma. Monitor CRP, ferritin. Monitor closely for worsening CRS and onset of ICANS. Also on the ICANS prophylaxis study.    Thank you for allowing me to participate in the care of this patient. Please do not hesitate to contact me if there are any concerns or questions.     Lacho Walker MD   of Medicine  Classical Hematology and Blood and Marrow Transplantation  Division of Hematology, Oncology, and Transplantation  [facility]

## 2023-01-05 NOTE — PLAN OF CARE
"Goal Outcome Evaluation:  Afebrile. Intermittently tachycardia in the low 100's. OVSS on RA. Up independent. Voiding well. Pt endorses mild headache; resolved as the evening went on. Pt c/o intermittent nausea; compazine x1 given with relief. Pt offers no other complaints. Magnesium replacement infusing; no other replacements needed.      Problem: Plan of Care - These are the overarching goals to be used throughout the patient stay.    Goal: Plan of Care Review  Description: The Plan of Care Review/Shift note should be completed every shift.  The Outcome Evaluation is a brief statement about your assessment that the patient is improving, declining, or no change.  This information will be displayed automatically on your shift note.  Outcome: Progressing  Goal: Patient-Specific Goal (Individualized)  Description: You can add care plan individualizations to a care plan. Examples of Individualization might be:  \"Parent requests to be called daily at 9am for status\", \"I have a hard time hearing out of my right ear\", or \"Do not touch me to wake me up as it startles me\".  Outcome: Progressing  Goal: Absence of Hospital-Acquired Illness or Injury  Outcome: Progressing  Intervention: Identify and Manage Fall Risk  Recent Flowsheet Documentation  Taken 1/4/2023 1955 by Yusra Beach, RN  Safety Promotion/Fall Prevention:    assistive device/personal items within reach    clutter free environment maintained    fall prevention program maintained    nonskid shoes/slippers when out of bed    patient and family education    safety round/check completed  Intervention: Prevent Skin Injury  Recent Flowsheet Documentation  Taken 1/4/2023 1955 by Yusra Beach, RN  Body Position: sitting up in bed  Intervention: Prevent and Manage VTE (Venous Thromboembolism) Risk  Recent Flowsheet Documentation  Taken 1/4/2023 1955 by Yusra Beach, RN  VTE Prevention/Management: (Ambulation encouraged.) SCDs (sequential compression " devices) off  Goal: Optimal Comfort and Wellbeing  Outcome: Progressing  Goal: Readiness for Transition of Care  Outcome: Progressing     Problem: Stem Cell/Bone Marrow Transplant  Goal: Optimal Coping with Transplant  Outcome: Progressing  Goal: Symptom-Free Urinary Elimination  Outcome: Progressing  Goal: Diarrhea Symptom Control  Outcome: Progressing  Goal: Improved Activity Tolerance  Outcome: Progressing  Intervention: Promote Improved Energy  Recent Flowsheet Documentation  Taken 1/4/2023 1955 by Yusra Beach, RN  Activity Management:    activity encouraged    ambulated to bathroom    up ad angelic  Goal: Blood Counts Within Acceptable Range  Outcome: Progressing  Intervention: Monitor and Manage Hematologic Symptoms  Recent Flowsheet Documentation  Taken 1/4/2023 1955 by Yusra Beach RN  Medication Review/Management:    medications reviewed    high-risk medications identified  Goal: Absence of Hypersensitivity Reaction  Outcome: Progressing  Goal: Absence of Infection  Outcome: Progressing  Intervention: Prevent and Manage Infection  Recent Flowsheet Documentation  Taken 1/4/2023 1955 by Yusra Beach RN  Isolation Precautions:    cytotoxic precautions maintained    protective environment maintained  Goal: Improved Oral Mucous Membrane Health and Integrity  Outcome: Progressing  Intervention: Promote Oral Comfort and Health  Recent Flowsheet Documentation  Taken 1/4/2023 1955 by Yusra Beach RN  Oral Care:    oral rinse provided    teeth brushed    tongue brushed  Goal: Nausea and Vomiting Symptom Relief  Outcome: Progressing  Intervention: Prevent and Manage Nausea and Vomiting  Recent Flowsheet Documentation  Taken 1/4/2023 1955 by Yusra Beach RN  Nausea/Vomiting Interventions: (Pt declined antiemetic at this time.)    stimuli minimized    sips of clear liquids given  Goal: Optimal Nutrition Intake  Outcome: Progressing

## 2023-01-06 ENCOUNTER — APPOINTMENT (OUTPATIENT)
Dept: OCCUPATIONAL THERAPY | Facility: CLINIC | Age: 53
DRG: 018 | End: 2023-01-06
Attending: INTERNAL MEDICINE
Payer: COMMERCIAL

## 2023-01-06 LAB
ANION GAP SERPL CALCULATED.3IONS-SCNC: 14 MMOL/L (ref 7–15)
BACTERIA SPEC CULT: NORMAL
BASOPHILS # BLD MANUAL: 0 10E3/UL (ref 0–0.2)
BASOPHILS NFR BLD MANUAL: 0 %
BUN SERPL-MCNC: 14.3 MG/DL (ref 6–20)
BURR CELLS BLD QL SMEAR: SLIGHT
CALCIUM SERPL-MCNC: 8.6 MG/DL (ref 8.6–10)
CHLORIDE SERPL-SCNC: 106 MMOL/L (ref 98–107)
CREAT SERPL-MCNC: 0.73 MG/DL (ref 0.51–0.95)
CRP SERPL-MCNC: 141 MG/L
DEPRECATED HCO3 PLAS-SCNC: 15 MMOL/L (ref 22–29)
ELLIPTOCYTES BLD QL SMEAR: SLIGHT
EOSINOPHIL # BLD MANUAL: 0.1 10E3/UL (ref 0–0.7)
EOSINOPHIL NFR BLD MANUAL: 6 %
ERYTHROCYTE [DISTWIDTH] IN BLOOD BY AUTOMATED COUNT: 13.3 % (ref 10–15)
FERRITIN SERPL-MCNC: 201 NG/ML (ref 11–328)
GFR SERPL CREATININE-BSD FRML MDRD: >90 ML/MIN/1.73M2
GLUCOSE SERPL-MCNC: 123 MG/DL (ref 70–99)
HCT VFR BLD AUTO: 33.2 % (ref 35–47)
HGB BLD-MCNC: 10.9 G/DL (ref 11.7–15.7)
INTERPRETATION: NORMAL
LACTATE SERPL-SCNC: 0.4 MMOL/L (ref 0.7–2)
LYMPHOCYTES # BLD MANUAL: 0 10E3/UL (ref 0.8–5.3)
LYMPHOCYTES NFR BLD MANUAL: 2 %
MAGNESIUM SERPL-MCNC: 2 MG/DL (ref 1.7–2.3)
MCH RBC QN AUTO: 30.9 PG (ref 26.5–33)
MCHC RBC AUTO-ENTMCNC: 32.8 G/DL (ref 31.5–36.5)
MCV RBC AUTO: 94 FL (ref 78–100)
MONOCYTES # BLD MANUAL: 0 10E3/UL (ref 0–1.3)
MONOCYTES NFR BLD MANUAL: 1 %
NEUTROPHILS # BLD MANUAL: 1.6 10E3/UL (ref 1.6–8.3)
NEUTROPHILS NFR BLD MANUAL: 91 %
PHOSPHATE SERPL-MCNC: 2.6 MG/DL (ref 2.5–4.5)
PLAT MORPH BLD: ABNORMAL
PLATELET # BLD AUTO: 103 10E3/UL (ref 150–450)
POTASSIUM SERPL-SCNC: 3.8 MMOL/L (ref 3.4–5.3)
RBC # BLD AUTO: 3.53 10E6/UL (ref 3.8–5.2)
RBC MORPH BLD: ABNORMAL
SODIUM SERPL-SCNC: 135 MMOL/L (ref 136–145)
TOXIC GRANULES BLD QL SMEAR: PRESENT
WBC # BLD AUTO: 1.8 10E3/UL (ref 4–11)

## 2023-01-06 PROCEDURE — 88369 M/PHMTRC ALYSISHQUANT/SEMIQ: CPT | Mod: 26 | Performed by: MEDICAL GENETICS

## 2023-01-06 PROCEDURE — 83605 ASSAY OF LACTIC ACID: CPT | Performed by: INTERNAL MEDICINE

## 2023-01-06 PROCEDURE — 80048 BASIC METABOLIC PNL TOTAL CA: CPT

## 2023-01-06 PROCEDURE — 85027 COMPLETE CBC AUTOMATED: CPT

## 2023-01-06 PROCEDURE — 88368 INSITU HYBRIDIZATION MANUAL: CPT | Mod: 26 | Performed by: MEDICAL GENETICS

## 2023-01-06 PROCEDURE — 83735 ASSAY OF MAGNESIUM: CPT

## 2023-01-06 PROCEDURE — 85007 BL SMEAR W/DIFF WBC COUNT: CPT

## 2023-01-06 PROCEDURE — 99418 PROLNG IP/OBS E/M EA 15 MIN: CPT

## 2023-01-06 PROCEDURE — 250N000011 HC RX IP 250 OP 636

## 2023-01-06 PROCEDURE — 97110 THERAPEUTIC EXERCISES: CPT | Mod: GO

## 2023-01-06 PROCEDURE — 250N000011 HC RX IP 250 OP 636: Performed by: INTERNAL MEDICINE

## 2023-01-06 PROCEDURE — 99233 SBSQ HOSP IP/OBS HIGH 50: CPT

## 2023-01-06 PROCEDURE — 82728 ASSAY OF FERRITIN: CPT | Performed by: INTERNAL MEDICINE

## 2023-01-06 PROCEDURE — 250N000013 HC RX MED GY IP 250 OP 250 PS 637

## 2023-01-06 PROCEDURE — 206N000001 HC R&B BMT UMMC

## 2023-01-06 PROCEDURE — 86140 C-REACTIVE PROTEIN: CPT | Performed by: INTERNAL MEDICINE

## 2023-01-06 PROCEDURE — 84100 ASSAY OF PHOSPHORUS: CPT | Performed by: INTERNAL MEDICINE

## 2023-01-06 PROCEDURE — 250N000013 HC RX MED GY IP 250 OP 250 PS 637: Performed by: STUDENT IN AN ORGANIZED HEALTH CARE EDUCATION/TRAINING PROGRAM

## 2023-01-06 RX ORDER — NALOXONE HYDROCHLORIDE 0.4 MG/ML
0.4 INJECTION, SOLUTION INTRAMUSCULAR; INTRAVENOUS; SUBCUTANEOUS
Status: DISCONTINUED | OUTPATIENT
Start: 2023-01-06 | End: 2023-01-11 | Stop reason: HOSPADM

## 2023-01-06 RX ORDER — NALOXONE HYDROCHLORIDE 0.4 MG/ML
0.2 INJECTION, SOLUTION INTRAMUSCULAR; INTRAVENOUS; SUBCUTANEOUS
Status: DISCONTINUED | OUTPATIENT
Start: 2023-01-06 | End: 2023-01-11 | Stop reason: HOSPADM

## 2023-01-06 RX ORDER — MAGNESIUM SULFATE HEPTAHYDRATE 40 MG/ML
2 INJECTION, SOLUTION INTRAVENOUS ONCE
Status: COMPLETED | OUTPATIENT
Start: 2023-01-06 | End: 2023-01-06

## 2023-01-06 RX ORDER — FLUCONAZOLE 200 MG/1
200 TABLET ORAL DAILY
Status: DISCONTINUED | OUTPATIENT
Start: 2023-01-07 | End: 2023-01-11 | Stop reason: HOSPADM

## 2023-01-06 RX ADMIN — MAGNESIUM SULFATE IN WATER 2 G: 40 INJECTION, SOLUTION INTRAVENOUS at 06:34

## 2023-01-06 RX ADMIN — Medication 5 ML: at 19:18

## 2023-01-06 RX ADMIN — Medication 5 ML: at 04:59

## 2023-01-06 RX ADMIN — PANTOPRAZOLE SODIUM 40 MG: 40 TABLET, DELAYED RELEASE ORAL at 08:00

## 2023-01-06 RX ADMIN — Medication 6 MG: at 21:30

## 2023-01-06 RX ADMIN — FLUCONAZOLE 100 MG: 100 TABLET ORAL at 08:00

## 2023-01-06 RX ADMIN — Medication 5 ML: at 08:09

## 2023-01-06 RX ADMIN — ACYCLOVIR 800 MG: 800 TABLET ORAL at 08:00

## 2023-01-06 RX ADMIN — ACYCLOVIR 800 MG: 800 TABLET ORAL at 19:18

## 2023-01-06 RX ADMIN — Medication 5 ML: at 11:21

## 2023-01-06 RX ADMIN — TRAMADOL HYDROCHLORIDE 25 MG: 50 TABLET ORAL at 17:09

## 2023-01-06 RX ADMIN — Medication 40 MG: at 08:02

## 2023-01-06 RX ADMIN — CEFEPIME HYDROCHLORIDE 2 G: 2 INJECTION, POWDER, FOR SOLUTION INTRAVENOUS at 08:00

## 2023-01-06 RX ADMIN — Medication 5 ML: at 08:08

## 2023-01-06 RX ADMIN — ALLOPURINOL 300 MG: 300 TABLET ORAL at 08:00

## 2023-01-06 RX ADMIN — CEFEPIME HYDROCHLORIDE 2 G: 2 INJECTION, POWDER, FOR SOLUTION INTRAVENOUS at 16:16

## 2023-01-06 ASSESSMENT — ACTIVITIES OF DAILY LIVING (ADL)
ADLS_ACUITY_SCORE: 23
ADLS_ACUITY_SCORE: 25
ADLS_ACUITY_SCORE: 23
ADLS_ACUITY_SCORE: 25
ADLS_ACUITY_SCORE: 23
ADLS_ACUITY_SCORE: 25
ADLS_ACUITY_SCORE: 23
ADLS_ACUITY_SCORE: 25
ADLS_ACUITY_SCORE: 23
ADLS_ACUITY_SCORE: 23

## 2023-01-06 NOTE — PLAN OF CARE
"Goal Outcome Evaluation:  Tmax 99.3. OVSS on RA. Up independent. Voiding well; no BM overnight. Pt offers no complaints at this time. CHG wipes completed before bed. Ativan x1 and Melatonin x1 given for sleep with relief. Magnesium replacement infusing; recheck tomorrow. No other replacements needed.    Problem: Plan of Care - These are the overarching goals to be used throughout the patient stay.    Goal: Plan of Care Review  Description: The Plan of Care Review/Shift note should be completed every shift.  The Outcome Evaluation is a brief statement about your assessment that the patient is improving, declining, or no change.  This information will be displayed automatically on your shift note.  Outcome: Progressing  Goal: Patient-Specific Goal (Individualized)  Description: You can add care plan individualizations to a care plan. Examples of Individualization might be:  \"Parent requests to be called daily at 9am for status\", \"I have a hard time hearing out of my right ear\", or \"Do not touch me to wake me up as it startles me\".  Outcome: Progressing  Goal: Absence of Hospital-Acquired Illness or Injury  Outcome: Progressing  Intervention: Identify and Manage Fall Risk  Recent Flowsheet Documentation  Taken 1/5/2023 2006 by Yusra Beach, RN  Safety Promotion/Fall Prevention:    assistive device/personal items within reach    chemotherapeutic precautions    clutter free environment maintained    fall prevention program maintained    nonskid shoes/slippers when out of bed    patient and family education  Intervention: Prevent Skin Injury  Recent Flowsheet Documentation  Taken 1/5/2023 2006 by Yusra Beach, RN  Body Position: sitting up in bed  Intervention: Prevent and Manage VTE (Venous Thromboembolism) Risk  Recent Flowsheet Documentation  Taken 1/5/2023 2006 by Yusra Beach, RN  VTE Prevention/Management: (Ambulates in room.) SCDs (sequential compression devices) off  Goal: Optimal Comfort and " Wellbeing  Outcome: Progressing  Goal: Readiness for Transition of Care  Outcome: Progressing     Problem: Stem Cell/Bone Marrow Transplant  Goal: Optimal Coping with Transplant  Outcome: Progressing  Goal: Symptom-Free Urinary Elimination  Outcome: Progressing  Goal: Diarrhea Symptom Control  Outcome: Progressing  Goal: Improved Activity Tolerance  Outcome: Progressing  Intervention: Promote Improved Energy  Recent Flowsheet Documentation  Taken 1/5/2023 2006 by Yusra Beach RN  Activity Management:    activity adjusted per tolerance    activity encouraged    ambulated to bathroom  Goal: Blood Counts Within Acceptable Range  Outcome: Progressing  Intervention: Monitor and Manage Hematologic Symptoms  Recent Flowsheet Documentation  Taken 1/5/2023 2006 by Yusra Beach RN  Bleeding Precautions:    blood pressure closely monitored    gentle oral care promoted    monitored for signs of bleeding  Medication Review/Management:    medications reviewed    high-risk medications identified  Goal: Absence of Hypersensitivity Reaction  Outcome: Progressing  Goal: Absence of Infection  Outcome: Progressing  Intervention: Prevent and Manage Infection  Recent Flowsheet Documentation  Taken 1/5/2023 2006 by Yusra Beach RN  Isolation Precautions:    cytotoxic precautions maintained    protective environment maintained  Goal: Improved Oral Mucous Membrane Health and Integrity  Outcome: Progressing  Intervention: Promote Oral Comfort and Health  Recent Flowsheet Documentation  Taken 1/5/2023 2006 by Yusra Beach RN  Oral Care:    oral rinse provided    teeth brushed    tongue brushed  Goal: Nausea and Vomiting Symptom Relief  Outcome: Progressing  Intervention: Prevent and Manage Nausea and Vomiting  Recent Flowsheet Documentation  Taken 1/5/2023 2006 by Yusra Beach RN  Nausea/Vomiting Interventions: (Pt declined antiemetic at this time.)    stimuli minimized    sips of clear liquids given  Goal:  Optimal Nutrition Intake  Outcome: Progressing

## 2023-01-06 NOTE — PROGRESS NOTES
BMT CLINICAL SOCIAL WORK NOTE:    Focus: Supportive Counseling/Resources/Discharge Planning    Data: Kristie Cornejo ([id number]) is a 52 year old year old female who received infusion on 1/4, currently day +2 of CAR-T cell therapy Abecma.    Interventions: Clinical  (CSW) spoke with Pt via phone to assess coping, provide supportive counseling and assist with resources as needed. Pt shared that yesterday was a more difficult day for her as she did not feel well. She shared she had a headache, fever, and all of the workup with that. She states she is feeling improved today which is encouraging and has engaged visits from family members. Pt expressed frustration w/ the shower head being loose and falling down. SW sent a facilities work order request in on 1/6/23. Pt indicates that she is feeling hungry, which is good. CSW provided empathic listening, validation of concerns, and encouragement. CSW encouraged Pt to contact CSW for support, questions and/or resources.     Assessment: Pt presented as pleasant, calm, and engaged.  Pt appears to be coping appropriately at this time. Pt continues to be supported by her family.     Plan: CSW will continue to provide supportive counseling and assistance with resources as needed. CSW will continue to collaborate with multidisciplinary team regarding Pt's plan of care.     SARAH Cabral, [facility]  Adult Blood & Marrow Transplant   Phone: [phone]  Pager: [phone]

## 2023-01-06 NOTE — PROGRESS NOTES
"BMT Daily CARTOX Note (complete days 0-14 and with any subsequent CRS/neurotoxicity)    Date: January 6, 2023    Kristie Cornejo ([id number]) is a 52 year old year old female who received infusion on 1/4, currently day +2 of CAR-T cell therapy Abecma    Vital Signs: /69 (BP Location: Right arm)   Pulse 89   Temp 97  F (36.1  C) (Axillary)   Resp 17   Ht 1.69 m (5' 6.54\")   Wt 69.3 kg (152 lb 12.8 oz)   LMP  (LMP Unknown)   SpO2 99%   BMI 24.27 kg/m      Organ CAR-T toxicity:    Neurologic: Oriented x 3 and Headaches     Overall CRS grade 0    CRS Parameter Grade 1 Grade 2 Grade 3 Grade 4   Fever* Tm >= 100.4 degrees F Tm >= 100.4 degrees F Tm >= 100.4 degrees F Tm >= to 100.4 degrees F       With Either:  Hypotension None Responsive to Fluids Requiring 1 vasopressor (w/ or w/o vasopressin) Requiring multiple vasopressors (excluding vasopressin)     And/or  Hypoxia None Low-flow nasal cannula or blow-by High-flow nasal cannula, face mask, non-rebreather mask, or Venturi mask Requiring positive pressure (CPAP, BiPAP intubation and mechanical ventilation)       * Definition of High Dose Pressors for Grade 4  Vasopressor Duration >= 3 hours   Norepinephrine monotherapy >= 20 mcg/kg/minute   Dopamine monotherapy >= 10 mcg/kg/minute   Phenylephrine monotherapy >= 200 mcg/minute   Epinephrine monotherapy >= 10 mcg/minute   If on vasopressin + another agent High dose of vasopressin + norepi equivalent (using VASST formula below) is >= 10 mcg/minute   If on combination vasopressors, not including vasopressin Norepi equivalent of > 20 mcg/minute (using VASST formula below)     VASST formula: Norepi equivalent dose = [NE (mcg/min)] + [dopamine (mcg/min) / 2] + [epinephrine (mcg/min)] + [phenylephrine (mcg/min) / 10]    ICE Assessment  Orientation to year, month, city, hospital: 4 points  Name 3 objects (e.g., point to clock, pen, button): 3 points  Following commands: (e.g., Show me 2 fingers): 1 point   Write " a standard sentence (e.g., The silver jumped over the log): 1 point   Count backwards from 100 by ten: 1 point  Total points: 10: No impairment    CRS Summary  Does patient have CRS? No  Current CRS stgstrstastdstest:st st1st What is the maximum severity to date: 1  What therapy was given: Cefepime & Tocilizumab   Has CRS grade changed? Yes, new grade and date of change: 0 on 1/5   Has CRS resolved? Yes, date of resolution: 1/5    Neurotoxicity Summary  Does patient have neurotoxicity? No  Current Neurotoxicity score (0-10): 0  What is the maximum severity to date:  0  What therapy was given: NA  Has neurotoxicity resolved? NA     Jeffrey Christine PA-C   Pager: [phone]

## 2023-01-06 NOTE — PROGRESS NOTES
BMT Post Infusion Documentation    Data   Patient Vitals for the past 72 hrs:   Temp Temp src Pulse Resp BP   01/04/23 1000 98.8  F (37.1  C) Axillary 85 16 104/76   01/04/23 1158 97.1  F (36.2  C) Axillary 62 17 122/79   01/04/23 1226 98.5  F (36.9  C) Axillary 72 16 130/76   01/04/23 1245 98.4  F (36.9  C) Axillary 77 16 122/70   01/04/23 1313 98.6  F (37  C) Axillary 78 16 120/66   01/04/23 1336 98.4  F (36.9  C) Axillary 80 16 108/69   01/04/23 1613 98  F (36.7  C) Axillary 88 16 121/76   01/04/23 1955 97.4  F (36.3  C) Axillary 84 17 110/76   01/04/23 2302 98.4  F (36.9  C) Axillary 92 18 120/70   01/05/23 0436 98.8  F (37.1  C) Axillary 104 17 119/72   01/05/23 0936 98.1  F (36.7  C) Axillary 87 16 118/72   01/05/23 1302 98.3  F (36.8  C) Oral 89 16 127/74   01/05/23 1507 (!) 102  F (38.9  C) Axillary -- -- --   01/05/23 1544 (!) 102.6  F (39.2  C) Axillary 99 16 125/77   01/05/23 1630 (!) 100.9  F (38.3  C) Axillary 89 16 127/76   01/05/23 1702 99.2  F (37.3  C) Axillary 86 16 121/77   01/05/23 2006 97.8  F (36.6  C) Axillary 88 18 119/77   01/05/23 2322 97.1  F (36.2  C) Axillary 76 17 111/71   01/06/23 0445 99.3  F (37.4  C) Axillary 85 17 111/69   01/06/23 0728 98.3  F (36.8  C) Axillary 99 17 98/74   01/06/23 0822 99.4  F (37.4  C) Axillary 84 16 110/65   01/06/23 0857 98.4  F (36.9  C) Axillary 86 16 106/69     BMT INFUSION DOCUMENTATION (last 24 hours)     BMT/Cellular Product Infusion    No documentation.                   Post-Infusion Evaluation:   Infusion Related Reaction: Grade 0 - none  Dyspnea: Grade 0 - none  Hypoxia: Grade 0 - not present  Fever: Grade 0 - afebrile  Chills: Grade 0 - none  Febrile Neutropenia: Grade 0 - not present  Sinus Bradycardia: Grade 0 - none  Hypertension: Grade 0 - none  Hypotension: Grade 0 - none  Chest Pain: Grade 0 - none  Bronchospasm: Grade 0 - none  Pain: Grade 0 - none  Rash: Grade 0 - None  Neurologic Specify: none    If this was a cord blood transplant, was  more than one cord blood unit infused? no    Duy Christine PA-C

## 2023-01-06 NOTE — PLAN OF CARE
"  Problem: Plan of Care - These are the overarching goals to be used throughout the patient stay.    Goal: Patient-Specific Goal (Individualized)  Description: You can add care plan individualizations to a care plan. Examples of Individualization might be:  \"Parent requests to be called daily at 9am for status\", \"I have a hard time hearing out of my right ear\", or \"Do not touch me to wake me up as it startles me\".  Outcome: Progressing     Problem: Plan of Care - These are the overarching goals to be used throughout the patient stay.    Goal: Optimal Comfort and Wellbeing  Outcome: Progressing   Goal Outcome Evaluation:  Temp max 99.4. Some soft blood pressures 98/74 and 99/67. Sentence is at baseline. Neuro's are intact and no word finding difficulty noted. 4/10 headache and declined pain meds. Received the study drug simvastatin IDS #[id number]. Lactic 0.4. Heparin locked lines. Worked with therapy. Walked in the halls. No stool. Independent.                       "

## 2023-01-06 NOTE — PROGRESS NOTES
"  BMT Progress note  BMT Physician: Dr. Reinoso   Transplant Type: CAR-T   Preparative Regimen: Cy/Flu   Transplant Date: 1/4/2023  Chief complaint: Kristie Cornejo is a 52 year old female with a history of multiple myeloma conditioned with Cy/Flu, currently day +2 of her CAR T-cell therapy with Abecma.    INTERIM HISTORY: Has been afebrile since 16:30 on 1/5. Describes a positional headache that is better when she leans back, worse when she bends forward that she rates a 4/10 and feels like a nagging pressure. Received tylenol for similar headache yesterday. Will do tramadol so that we do not mask any new fevers.     REVIEW OF SYSTEMS: Otherwise unremarkable other than what is noted in the Interim History.   PHYSICAL EXAM:   Vitals:  /69 (BP Location: Right arm)   Pulse 89   Temp 97  F (36.1  C) (Axillary)   Resp 17   Ht 1.69 m (5' 6.54\")   Wt 69.3 kg (152 lb 12.8 oz)   LMP  (LMP Unknown)   SpO2 99%   BMI 24.27 kg/m      General Appearance: NAD  HEENT: sclera anicteric. Moist mucus membranes, no ulcerations. No sinus tenderness.   CV: RRR. no murmur or rub.   RESP: CTA bilaterally; no rales or wheezes.   GI: +BS, soft, nontender, nondistended.   EXT: No edema. No cyanosis/clubbing.   SKIN: no lesions or rash  NEURO: A&O x3   PSYCH: Appropriate affect   VASCULAR ACCES: dressing CDI.     ROUTINE LABS:    Lab Results   Component Value Date    WBC 1.8 (L) 01/06/2023    ANEU 1.6 01/06/2023    HGB 10.9 (L) 01/06/2023    HCT 33.2 (L) 01/06/2023     (L) 01/06/2023     (L) 01/06/2023    POTASSIUM 3.8 01/06/2023    CHLORIDE 106 01/06/2023    CO2 15 (L) 01/06/2023     (H) 01/06/2023    BUN 14.3 01/06/2023    CR 0.73 01/06/2023    MAG 2.0 01/06/2023    INR 1.04 01/04/2023          ASSESSMENT AND PLAN:     Kristie Cornejo is a 52 year old female with a history of multiple myeloma conditioned with Cy/Flu, currently day +2 of her CAR T-cell therapy with Abecma.        CAR T Protocol   - " Day -5 through -3: Cy/Flu   - Day -2: Rest day   - Day -1: IT Dex   - Day 0: CAR T infusion (Abecma)   - Day +6: IT Dex   - Day +13: possible IT Dex (determined by PI and primary MD)   - Simvastatin daily 40mg until day day +30   - GM-CSF should NOT be given through out study   - Re-staging plan: Unable to sign re-staging labs of treatment plan without specific tests for future bone marrow biopsies      NEUROTOXICITY   - ICE score: 10/10   - Mild headaches today worse when leaning forward, relieved by leaning back, 4/10, describes as a pressure. Tramadol PRN started for pain relief.     CRS  - Grade: 0  - Afebrile since 16:30 on 1/5   - Blood cx- NG x12-hrs, urine cx- NG x1 day, CXR- no acute airspace disease, ferritin- 201, CRP- 141, lactic acid: 0.4. Tocilizumab given x1 dose on 1/5.  Continue cefepime until afebrile x72-hours  - Continue to monitor daily CRP & Ferritin      HEME/COAG  - Pancytopenic due to chemotherapy  - Transfusion parameters: hemoglobin <7, platelets <10     IMMUNOCOMPROMISED  - Prophylaxis plan: ACV, Fluconazole, and Levaquin   - Pentamidine INH + albuterol one time   - Active infections: None     CARDIOVASCULAR  - Risk of cardiomyopathy:  Baseline LVEF: 55-60%   - Risk of arrhythmia: Baseline EKG showed sinus rhythm nonspecific T wave abnormality  - Hx of PVC ablation March 2022      RESPIRATORY  - Risk of respiratory complications: Frequent ambulation and incentive spirometer.     GI/NUTRITION  - Ulcer prophylaxis: Protonix   - Risk of nausea/vomiting due to chemo: compazine and ativan   - History of IBS: PEG ordered daily PRN      RENAL/ELECTROLYTES/  - Risk of renal injury: IV hydration   - Electrolyte management: replace per sliding scale     DIABETES/ENDOCRINE  - Risk of steroid-induced hyperglyemia: Monitor BG, sliding scale if needed     MUSCULOSKELETAL/FRAILTY  - Baseline frailty score: 0   - Patient with substantial risk of sarcopenia  - Daily PT/OT as needed while inpatient  -  Cancer Rehab as needed outpatient     SYMPTOM MANAGEMENT  - Nausea from chemo/radiation: Prochlorperazine, ondansetron, lorazepam.     - # Pain Assessment:  Current Pain Score 1/4/2023   Patient currently in pain? no   Pain score (0-10) -   Kristie hill pain level was assessed and she currently denies pain.          SOCIAL DETERMINANTS  - Caregiver: Jesse Cornejo (Spouse) & Misti Preciado (sister)      DISPOSITION  - Discharge on day +7, unless there are complications from neurotoxicity or CRS         Known issues that I take into account for medical decisions, with salient changes to the plan considering these complexities noted above.         Patient Active Problem List   Diagnosis     Allergic rhinitis     Anxiety state     Irritable bowel syndrome     Hemorrhoids     Sciatica     Encounter for insertion or removal of intrauterine contraceptive device     CARDIOVASCULAR SCREENING; LDL GOAL LESS THAN 160     Pelvic mass     Plasmacytoma of bone (H)     Light chain myeloma (H)     Dehydration     Hypogammaglobulinemia (H)     Multiple myeloma not having achieved remission (H)         Clinically Significant Risk Factors Present on Admission                                         Today's summary:      I spent 30 minutes in the care of this patient today, which included time necessary for preparation for the visit, obtaining history, ordering medications/tests/procedures as medically indicated, review of pertinent medical literature, counseling of the patient, communication of recommendations to the care team, and documentation time.     Duy Christine PA-C   Pager: [phone]    Physician Attestation     I saw and evaluated Kristie Cornejo as part of a shared APRN/PA visit. I personally performed the substantive portion of the medical decision making for this visit - please see the SULEIMAN s documentation for full details.    On the date of service, 01/06/2023, I spent 45 minutes on the patient unit personally  "reviewing medical records and medications, reviewing vital signs, labs, and imaging results as summarized above, discussing the patient's case on rounds with the SULEIMAN, obtaining a history from the patient, performing a physical exam, counseling and educating the patient on the diagnosis and treatment, evaluating a potentially life or organ threatening problem, intensively monitoring treatments with high risk of toxicity, coordinating care and documenting in the electronic medical record.    Key management decisions made by me and carried out under my direction include:   53 yo F with IgG kappa MM, high risk with +1q. Now D+2 s/p Abecma CAR-T (idecabtagene vicleucel) after receiving LD chemo as outpt. CRS 1 with fevers, anxiety on D+1, got tocilizumab x 1. Doing much better today but still with some persistent headache, no more word finding issues (she found the word - \"apprehensive\"). Continue cefepime for now, negative fever work up so far. Monitor CRP, ferritin which are 141 and 201 respectively. Monitor closely for recrudescent CRS and onset of ICANS. Also on the ICANS prophylaxis study. If fever recurs, call Dr. Walker and redose tocilizumab, will consider whether to continue q8 x 4 doses.    Thank you for allowing me to participate in the care of this patient. Please do not hesitate to contact me if there are any concerns or questions.     Lacho Walker MD   of Medicine  Classical Hematology and Blood and Marrow Transplantation  Division of Hematology, Oncology, and Transplantation  [facility]  Pager [phone]    "

## 2023-01-06 NOTE — PLAN OF CARE
Goal Outcome Evaluation:      Plan of Care Reviewed With: patient    Overall Patient Progress: no changeOverall Patient Progress: no change    Outcome Evaluation: trying to eat, low appetite, weight loss prior to admission, encouraged supplements and high protein foods

## 2023-01-07 LAB
ABO/RH(D): ABNORMAL
ANION GAP SERPL CALCULATED.3IONS-SCNC: 13 MMOL/L (ref 7–15)
ANTIBODY SCREEN, TUBE: NORMAL
ANTIBODY SCREEN: POSITIVE
BACTERIA UR CULT: NO GROWTH
BASOPHILS # BLD MANUAL: 0 10E3/UL (ref 0–0.2)
BASOPHILS NFR BLD MANUAL: 0 %
BUN SERPL-MCNC: 11.7 MG/DL (ref 6–20)
BURR CELLS BLD QL SMEAR: ABNORMAL
CALCIUM SERPL-MCNC: 8 MG/DL (ref 8.6–10)
CHLORIDE SERPL-SCNC: 107 MMOL/L (ref 98–107)
CREAT SERPL-MCNC: 0.63 MG/DL (ref 0.51–0.95)
CRP SERPL-MCNC: 61.8 MG/L
DACRYOCYTES BLD QL SMEAR: SLIGHT
DEPRECATED HCO3 PLAS-SCNC: 15 MMOL/L (ref 22–29)
ELLIPTOCYTES BLD QL SMEAR: ABNORMAL
EOSINOPHIL # BLD MANUAL: 0.1 10E3/UL (ref 0–0.7)
EOSINOPHIL NFR BLD MANUAL: 14 %
ERYTHROCYTE [DISTWIDTH] IN BLOOD BY AUTOMATED COUNT: 13.2 % (ref 10–15)
FERRITIN SERPL-MCNC: 275 NG/ML (ref 11–328)
GFR SERPL CREATININE-BSD FRML MDRD: >90 ML/MIN/1.73M2
GLUCOSE SERPL-MCNC: 111 MG/DL (ref 70–99)
HCT VFR BLD AUTO: 32.2 % (ref 35–47)
HGB BLD-MCNC: 10.6 G/DL (ref 11.7–15.7)
LYMPHOCYTES # BLD MANUAL: 0 10E3/UL (ref 0.8–5.3)
LYMPHOCYTES NFR BLD MANUAL: 3 %
MAGNESIUM SERPL-MCNC: 2 MG/DL (ref 1.7–2.3)
MCH RBC QN AUTO: 30.6 PG (ref 26.5–33)
MCHC RBC AUTO-ENTMCNC: 32.9 G/DL (ref 31.5–36.5)
MCV RBC AUTO: 93 FL (ref 78–100)
MONOCYTES # BLD MANUAL: 0 10E3/UL (ref 0–1.3)
MONOCYTES NFR BLD MANUAL: 2 %
NEUTROPHILS # BLD MANUAL: 0.6 10E3/UL (ref 1.6–8.3)
NEUTROPHILS NFR BLD MANUAL: 81 %
PHOSPHATE SERPL-MCNC: 2.5 MG/DL (ref 2.5–4.5)
PLAT MORPH BLD: ABNORMAL
PLATELET # BLD AUTO: 82 10E3/UL (ref 150–450)
POTASSIUM SERPL-SCNC: 3.4 MMOL/L (ref 3.4–5.3)
POTASSIUM SERPL-SCNC: 4.4 MMOL/L (ref 3.4–5.3)
RBC # BLD AUTO: 3.46 10E6/UL (ref 3.8–5.2)
RBC MORPH BLD: ABNORMAL
SODIUM SERPL-SCNC: 135 MMOL/L (ref 136–145)
SPECIMEN EXPIRATION DATE: ABNORMAL
SPECIMEN EXPIRATION DATE: NORMAL
WBC # BLD AUTO: 0.7 10E3/UL (ref 4–11)

## 2023-01-07 PROCEDURE — 99233 SBSQ HOSP IP/OBS HIGH 50: CPT | Performed by: PHYSICIAN ASSISTANT

## 2023-01-07 PROCEDURE — 86140 C-REACTIVE PROTEIN: CPT | Performed by: INTERNAL MEDICINE

## 2023-01-07 PROCEDURE — 86901 BLOOD TYPING SEROLOGIC RH(D): CPT

## 2023-01-07 PROCEDURE — 84132 ASSAY OF SERUM POTASSIUM: CPT | Performed by: INTERNAL MEDICINE

## 2023-01-07 PROCEDURE — 83735 ASSAY OF MAGNESIUM: CPT | Performed by: INTERNAL MEDICINE

## 2023-01-07 PROCEDURE — 80048 BASIC METABOLIC PNL TOTAL CA: CPT

## 2023-01-07 PROCEDURE — 250N000013 HC RX MED GY IP 250 OP 250 PS 637

## 2023-01-07 PROCEDURE — 85027 COMPLETE CBC AUTOMATED: CPT

## 2023-01-07 PROCEDURE — 99418 PROLNG IP/OBS E/M EA 15 MIN: CPT | Performed by: PHYSICIAN ASSISTANT

## 2023-01-07 PROCEDURE — 84100 ASSAY OF PHOSPHORUS: CPT | Performed by: INTERNAL MEDICINE

## 2023-01-07 PROCEDURE — 206N000001 HC R&B BMT UMMC

## 2023-01-07 PROCEDURE — 82728 ASSAY OF FERRITIN: CPT | Performed by: INTERNAL MEDICINE

## 2023-01-07 PROCEDURE — 250N000011 HC RX IP 250 OP 636: Performed by: INTERNAL MEDICINE

## 2023-01-07 PROCEDURE — 250N000013 HC RX MED GY IP 250 OP 250 PS 637: Performed by: STUDENT IN AN ORGANIZED HEALTH CARE EDUCATION/TRAINING PROGRAM

## 2023-01-07 PROCEDURE — 250N000011 HC RX IP 250 OP 636

## 2023-01-07 PROCEDURE — 85007 BL SMEAR W/DIFF WBC COUNT: CPT

## 2023-01-07 RX ORDER — MAGNESIUM SULFATE HEPTAHYDRATE 40 MG/ML
2 INJECTION, SOLUTION INTRAVENOUS ONCE
Status: COMPLETED | OUTPATIENT
Start: 2023-01-07 | End: 2023-01-07

## 2023-01-07 RX ORDER — POTASSIUM CHLORIDE 29.8 MG/ML
20 INJECTION INTRAVENOUS
Status: COMPLETED | OUTPATIENT
Start: 2023-01-07 | End: 2023-01-07

## 2023-01-07 RX ADMIN — Medication 5 ML: at 17:06

## 2023-01-07 RX ADMIN — FLUCONAZOLE 200 MG: 200 TABLET ORAL at 08:33

## 2023-01-07 RX ADMIN — Medication 5 ML: at 03:54

## 2023-01-07 RX ADMIN — ALLOPURINOL 300 MG: 300 TABLET ORAL at 08:33

## 2023-01-07 RX ADMIN — CEFEPIME HYDROCHLORIDE 2 G: 2 INJECTION, POWDER, FOR SOLUTION INTRAVENOUS at 00:19

## 2023-01-07 RX ADMIN — MAGNESIUM SULFATE IN WATER 2 G: 40 INJECTION, SOLUTION INTRAVENOUS at 08:34

## 2023-01-07 RX ADMIN — Medication 40 MG: at 08:34

## 2023-01-07 RX ADMIN — ACYCLOVIR 800 MG: 800 TABLET ORAL at 19:13

## 2023-01-07 RX ADMIN — POTASSIUM CHLORIDE 20 MEQ: 29.8 INJECTION, SOLUTION INTRAVENOUS at 08:34

## 2023-01-07 RX ADMIN — Medication 10 ML: at 11:37

## 2023-01-07 RX ADMIN — TRAMADOL HYDROCHLORIDE 25 MG: 50 TABLET ORAL at 08:47

## 2023-01-07 RX ADMIN — CEFEPIME HYDROCHLORIDE 2 G: 2 INJECTION, POWDER, FOR SOLUTION INTRAVENOUS at 16:07

## 2023-01-07 RX ADMIN — POTASSIUM CHLORIDE 20 MEQ: 29.8 INJECTION, SOLUTION INTRAVENOUS at 06:49

## 2023-01-07 RX ADMIN — ACYCLOVIR 800 MG: 800 TABLET ORAL at 08:33

## 2023-01-07 RX ADMIN — LORAZEPAM 1 MG: 0.5 TABLET ORAL at 00:19

## 2023-01-07 RX ADMIN — CEFEPIME HYDROCHLORIDE 2 G: 2 INJECTION, POWDER, FOR SOLUTION INTRAVENOUS at 23:57

## 2023-01-07 RX ADMIN — Medication 6 MG: at 21:05

## 2023-01-07 RX ADMIN — CEFEPIME HYDROCHLORIDE 2 G: 2 INJECTION, POWDER, FOR SOLUTION INTRAVENOUS at 06:45

## 2023-01-07 RX ADMIN — PANTOPRAZOLE SODIUM 40 MG: 40 TABLET, DELAYED RELEASE ORAL at 08:33

## 2023-01-07 ASSESSMENT — ACTIVITIES OF DAILY LIVING (ADL)
ADLS_ACUITY_SCORE: 25

## 2023-01-07 NOTE — PLAN OF CARE
"Pt is AOx4 and independent and She walks the halls frequently. She complained of lower left groin pain and thinks it may be from straining a muscle,will continue to assess if pain persists. She complained of a headache and received tramadol with relief. She took melatonin at bedtime for sleep. Will continue to intermittently assess.    Problem: Plan of Care - These are the overarching goals to be used throughout the patient stay.    Goal: Plan of Care Review  Description: The Plan of Care Review/Shift note should be completed every shift.  The Outcome Evaluation is a brief statement about your assessment that the patient is improving, declining, or no change.  This information will be displayed automatically on your shift note.  Outcome: Progressing  Flowsheets (Taken 1/6/2023 2230)  Plan of Care Reviewed With: patient  Goal: Patient-Specific Goal (Individualized)  Description: You can add care plan individualizations to a care plan. Examples of Individualization might be:  \"Parent requests to be called daily at 9am for status\", \"I have a hard time hearing out of my right ear\", or \"Do not touch me to wake me up as it startles me\".  Outcome: Progressing  Goal: Absence of Hospital-Acquired Illness or Injury  Outcome: Progressing  Intervention: Prevent Skin Injury  Recent Flowsheet Documentation  Taken 1/6/2023 1700 by Catalina Ventura RN  Body Position: sitting up in bed  Goal: Optimal Comfort and Wellbeing  Outcome: Progressing  Goal: Readiness for Transition of Care  Outcome: Progressing   Goal Outcome Evaluation:      Plan of Care Reviewed With: patient                 "

## 2023-01-07 NOTE — PROGRESS NOTES
"BMT/Cell Therapy Daily Progress Note   01/07/2023    Patient ID:  Kristie Cornejo is a 52 year old female with a history of multiple myeloma conditioned with Cy/Flu, currently day +3 of her CAR T-cell therapy with Abecma.    Admission date: 1/4/2023    INTERVAL  HISTORY     Doing well this morning. She reports a headache rated at 2/10 in severity, described as \"nagging\". Took tramadol just prior to my visit. Appetite good this morning, ordered omelet and sausage. Hot flash this morning but no fever. No nausea or vomiting. No neuro changes.    Review of Systems: 10 point ROS negative except as noted above.      PHYSICAL EXAM     Weight In/Out     Wt Readings from Last 3 Encounters:   01/07/23 69.4 kg (152 lb 14.4 oz)   01/01/23 72.2 kg (159 lb 1.6 oz)   12/30/22 73 kg (160 lb 14.4 oz)      I/O last 3 completed shifts:  In: 2990 [P.O.:2450; I.V.:540]  Out: 1625 [Urine:1625]       KPS:  90    /63 (BP Location: Right arm)   Pulse 83   Temp 97.8  F (36.6  C) (Axillary)   Resp 16   Ht 1.69 m (5' 6.54\")   Wt 69.4 kg (152 lb 14.4 oz)   LMP  (LMP Unknown)   SpO2 98%   BMI 24.28 kg/m       General: NAD   Eyes: : BI, sclera anicteric   Lungs: CTA bilaterally, normal respiratory effort  Cardiovascular: RRR, no M/R/G   Abdominal/Rectal: +BS, soft, NT, ND, No HSM   Lymphatics: no edema  Skin: no rashes or petechiae  Neuro: A&O x 3, normal mood and affect  Musculoskeletal: muscle mass adequate  Additional Findings: dressing site NT, no drainage.    LABS AND IMAGING: I have assessed all abnormal lab values for their clinical significance and any values considered clinically significant have been addressed in the assessment and plan.        Lab Results   Component Value Date    WBC 0.7 (LL) 01/07/2023    ANEU 0.6 (L) 01/07/2023    HGB 10.6 (L) 01/07/2023    HCT 32.2 (L) 01/07/2023    PLT 82 (L) 01/07/2023     (L) 01/07/2023    POTASSIUM 3.4 01/07/2023    CHLORIDE 107 01/07/2023    CO2 15 (L) 01/07/2023    "  (H) 01/07/2023    BUN 11.7 01/07/2023    CR 0.63 01/07/2023    MAG 2.0 01/07/2023    INR 1.04 01/04/2023           SYSTEMS-BASED ASSESSMENT AND PLAN     Kristie Cornejo is a 52 year old female with a history of multiple myeloma conditioned with Cy/Flu, currently day +3 of her CAR T-cell therapy with Abecma.     CAR T Protocol   - Day -5 through -3: Cy/Flu   - Day -2: Rest day   - Day -1: IT Dex   - Day 0: CAR T infusion (Abecma)   - Day +6: IT Dex   - Day +13: possible IT Dex (determined by PI and primary MD)   - Simvastatin daily 40mg until day day +30   - GM-CSF should NOT be given through out study   - Re-staging plan: Unable to sign re-staging labs of treatment plan without specific tests for future bone marrow biopsies      NEUROTOXICITY   - ICE score: 10/10   - Mild headache, Tramadol PRN started for pain relief (to avoid masking fever)     CRS  - Grade: 0  - Afebrile since 16:30 on 1/5   - Blood cx- NG x 1 day, urine cx- NG x1 day, CXR- no acute airspace disease, ferritin- pending, CRP- 61, lactic acid: 0.4. Tocilizumab given x1 dose on 1/5.  Continue cefepime until afebrile x72-hours  - Continue to monitor daily CRP & Ferritin      HEME/COAG  - Pancytopenic due to chemotherapy  - Transfusion parameters: hemoglobin <7, platelets <10     IMMUNOCOMPROMISED  - Prophylaxis plan: ACV, Fluconazole, and Levaquin   - Pentamidine INH + albuterol one time   - Active infections: None     CARDIOVASCULAR  - Risk of cardiomyopathy:  Baseline LVEF: 55-60%   - Risk of arrhythmia: Baseline EKG showed sinus rhythm nonspecific T wave abnormality  - Hx of PVC ablation March 2022      RESPIRATORY  - Risk of respiratory complications: Frequent ambulation and incentive spirometer.     GI/NUTRITION  - Ulcer prophylaxis: Protonix   - Risk of nausea/vomiting due to chemo: compazine and ativan   - History of IBS: PEG ordered daily PRN      RENAL/ELECTROLYTES/  - Risk of renal injury: IV hydration   - Electrolyte  management: replace per sliding scale     DIABETES/ENDOCRINE  - Risk of steroid-induced hyperglyemia: Monitor BG, sliding scale if needed     MUSCULOSKELETAL/FRAILTY  - Baseline frailty score: 0   - Patient with substantial risk of sarcopenia  - Daily PT/OT as needed while inpatient  - Cancer Rehab as needed outpatient    SYMPTOM MANAGEMENT    Nausea from chemo/radiation: Prochlorperazine, ondansetron, lorazepam.  # Pain Assessment:  Current Pain Score 1/7/2023   Patient currently in pain? denies   Pain score (0-10) -   Kristie hill pain level was assessed and she currently denies pain.      SOCIAL DETERMINANTS    Caregiver: Jesse Cornejo (Spouse) & Misti Preciado (sister)    Known issues that I take into account for medical decisions, with salient changes to the plan considering these complexities noted above.    Patient Active Problem List   Diagnosis     Allergic rhinitis     Anxiety state     Irritable bowel syndrome     Hemorrhoids     Sciatica     Encounter for insertion or removal of intrauterine contraceptive device     CARDIOVASCULAR SCREENING; LDL GOAL LESS THAN 160     Pelvic mass     Plasmacytoma of bone (H)     Light chain myeloma (H)     Dehydration     Hypogammaglobulinemia (H)     Multiple myeloma not having achieved remission (H)     Dispo: Anticipate discharge on day +7 unless there are complications from neurotoxicity or CRS.    I spent 30 minutes in the care of this patient today, which included time necessary for preparation for the visit, obtaining history, ordering medications/tests/procedures as medically indicated, review of pertinent medical literature, counseling of the patient, communication of recommendations to the care team, and documentation time.    YULISSA MarcanoC  [phone]      Physician Attestation        I saw and evaluated Kristie Cornejo as part of a shared APRN/PA visit. I personally performed the substantive portion of the medical decision making for this visit - please  see the SULEIMAN s documentation for full details.     On the date of service, 01/06/2023, I spent 35 minutes on the patient unit personally reviewing medical records and medications, reviewing vital signs, labs, and imaging results as summarized above, discussing the patient's case on rounds with the SULEIMAN, obtaining a history from the patient, performing a physical exam, counseling and educating the patient on the diagnosis and treatment, evaluating a potentially life or organ threatening problem, intensively monitoring treatments with high risk of toxicity, coordinating care and documenting in the electronic medical record.     Key management decisions made by me and carried out under my direction include:   51 yo F with IgG kappa MM, high risk with +1q. Now D+3 s/p Abecma CAR-T (idecabtagene vicleucel) after receiving LD chemo as outpt. CRS 1 with fevers, anxiety on D+1, got tocilizumab x 1. Starting to develop some recurrence of the symptoms she was having prior to getting toci. Still with persistent headache for which the tramadol is helping. Continue cefepime for now, negative fever work up so far, possible discontinue tomorrow depending on how she is doing. Monitor CRP which is down to 61.8 and ferritin pending. Monitor closely for recrudescent CRS and onset of ICANS. Also on the ICANS prophylaxis study. If fever recurs, call Dr. Walker and redose tocilizumab, will consider whether to continue q8 x 4 doses.     Thank you for allowing me to participate in the care of this patient. Please do not hesitate to contact me if there are any concerns or questions.      Lacho Walker MD   of Medicine  Classical Hematology and Blood and Marrow Transplantation  Division of Hematology, Oncology, and Transplantation  [facility]  Pager [phone]

## 2023-01-07 NOTE — PLAN OF CARE
"  Problem: Plan of Care - These are the overarching goals to be used throughout the patient stay.    Goal: Patient-Specific Goal (Individualized)  Description: You can add care plan individualizations to a care plan. Examples of Individualization might be:  \"Parent requests to be called daily at 9am for status\", \"I have a hard time hearing out of my right ear\", or \"Do not touch me to wake me up as it startles me\".  Outcome: Progressing     Problem: Stem Cell/Bone Marrow Transplant  Goal: Optimal Coping with Transplant  Outcome: Progressing   Goal Outcome Evaluation:       Day +3 from CAR-T cells. Afebrile. Sentence is baseline and ice score 10. Neuro's are intact. No wording finding difficulty noted. 2/10 dull nagging headache and received tramadol. Some groin pain and using ice packs. Having hot flashes and using a fan. No nausea. Ate 1/2 omelet, sausage julio césar, peaches, 12 oz oj and 4 oz coffee for breakfast. Received simvastatin study drug this am. K+recheck 4.4. Received 2 grams of Mg. CRP 61.80 and trending down. Showered. No stool. Independent. Walked in the halls.                "

## 2023-01-07 NOTE — PLAN OF CARE
VSS. Afebrile. A&O x4. Up independently. Reports some hip pain with activity, ice pack given with some relief. Denies nausea. PICC heparin locked between medications to allow ability to walk and move easier per pt request. Replace potassium, magnesium this am. Will need potassium rechecked.     Problem: Stem Cell/Bone Marrow Transplant  Goal: Improved Activity Tolerance  Outcome: Not Progressing  Intervention: Promote Improved Energy  Recent Flowsheet Documentation  Taken 1/7/2023 0015 by Meche Prabhakar RN  Activity Management:   activity adjusted per tolerance   ambulated to bathroom   ambulated in room   ambulated outside room     Problem: Plan of Care - These are the overarching goals to be used throughout the patient stay.    Goal: Plan of Care Review  Description: The Plan of Care Review/Shift note should be completed every shift.  The Outcome Evaluation is a brief statement about your assessment that the patient is improving, declining, or no change.  This information will be displayed automatically on your shift note.  Outcome: Progressing  Goal: Absence of Hospital-Acquired Illness or Injury  Outcome: Progressing  Intervention: Identify and Manage Fall Risk  Recent Flowsheet Documentation  Taken 1/7/2023 0015 by Meche Prabhakar, RN  Safety Promotion/Fall Prevention:   assistive device/personal items within reach   clutter free environment maintained   fall prevention program maintained   lighting adjusted   nonskid shoes/slippers when out of bed  Intervention: Prevent Skin Injury  Recent Flowsheet Documentation  Taken 1/7/2023 0015 by Meche Prabhakar, RN  Body Position: position changed independently  Intervention: Prevent and Manage VTE (Venous Thromboembolism) Risk  Recent Flowsheet Documentation  Taken 1/7/2023 0015 by Meche Prabhakar RN  VTE Prevention/Management: SCDs (sequential compression devices) off  Goal: Optimal Comfort and Wellbeing  Outcome: Progressing  Intervention: Monitor Pain and Promote Comfort  Recent  Flowsheet Documentation  Taken 1/7/2023 0014 by Meche Prabhakar RN  Pain Management Interventions: cold applied  Goal: Readiness for Transition of Care  Outcome: Progressing     Problem: Stem Cell/Bone Marrow Transplant  Goal: Optimal Coping with Transplant  Outcome: Progressing  Goal: Symptom-Free Urinary Elimination  Outcome: Progressing  Intervention: Prevent or Manage Bladder Irritation  Recent Flowsheet Documentation  Taken 1/7/2023 0014 by Meche Prabhakar RN  Pain Management Interventions: cold applied  Goal: Diarrhea Symptom Control  Outcome: Progressing  Goal: Blood Counts Within Acceptable Range  Outcome: Progressing  Intervention: Monitor and Manage Hematologic Symptoms  Recent Flowsheet Documentation  Taken 1/7/2023 0015 by Meche Prabhakar RN  Medication Review/Management: medications reviewed  Goal: Absence of Hypersensitivity Reaction  Outcome: Progressing  Goal: Absence of Infection  Outcome: Progressing  Intervention: Prevent and Manage Infection  Recent Flowsheet Documentation  Taken 1/7/2023 0015 by Meche Prabhakar RN  Isolation Precautions: protective environment maintained  Goal: Improved Oral Mucous Membrane Health and Integrity  Outcome: Progressing  Goal: Nausea and Vomiting Symptom Relief  Outcome: Progressing  Goal: Optimal Nutrition Intake  Outcome: Progressing   Goal Outcome Evaluation:

## 2023-01-07 NOTE — PROGRESS NOTES
"BMT Daily CARTOX Note (complete days 0-14 and with any subsequent CRS/neurotoxicity)    Date: January 6, 2023    Kristie Cornejo ([id number]) is a 52 year old year old female who received infusion on 1/4, currently day +3 of CAR-T cell therapy [facility]    Vital Signs: /63 (BP Location: Right arm)   Pulse 83   Temp 97.8  F (36.6  C) (Axillary)   Resp 16   Ht 1.69 m (5' 6.54\")   Wt 69.4 kg (152 lb 14.4 oz)   LMP  (LMP Unknown)   SpO2 98%   BMI 24.28 kg/m      Organ CAR-T toxicity:    Neurologic: Oriented x 3 and Headaches     Overall CRS grade 0    CRS Parameter Grade 1 Grade 2 Grade 3 Grade 4   Fever* Tm >= 100.4 degrees F Tm >= 100.4 degrees F Tm >= 100.4 degrees F Tm >= to 100.4 degrees F       With Either:  Hypotension None Responsive to Fluids Requiring 1 vasopressor (w/ or w/o vasopressin) Requiring multiple vasopressors (excluding vasopressin)     And/or  Hypoxia None Low-flow nasal cannula or blow-by High-flow nasal cannula, face mask, non-rebreather mask, or Venturi mask Requiring positive pressure (CPAP, BiPAP intubation and mechanical ventilation)       * Definition of High Dose Pressors for Grade 4  Vasopressor Duration >= 3 hours   Norepinephrine monotherapy >= 20 mcg/kg/minute   Dopamine monotherapy >= 10 mcg/kg/minute   Phenylephrine monotherapy >= 200 mcg/minute   Epinephrine monotherapy >= 10 mcg/minute   If on vasopressin + another agent High dose of vasopressin + norepi equivalent (using VASST formula below) is >= 10 mcg/minute   If on combination vasopressors, not including vasopressin Norepi equivalent of > 20 mcg/minute (using VASST formula below)     VASST formula: Norepi equivalent dose = [NE (mcg/min)] + [dopamine (mcg/min) / 2] + [epinephrine (mcg/min)] + [phenylephrine (mcg/min) / 10]    ICE Assessment  Orientation to year, month, city, hospital: 4 points  Name 3 objects (e.g., point to clock, pen, button): 3 points  Following commands: (e.g., Show me 2 fingers): 1 point "   Write a standard sentence (e.g., The silver jumped over the log): 1 point   Count backwards from 100 by ten: 1 point  Total points: 10: No impairment    CRS Summary  Does patient have CRS? No  Current CRS stgstrstastdstest:st st1st What is the maximum severity to date: 1  What therapy was given: Cefepime & Tocilizumab   Has CRS grade changed? Yes, new grade and date of change: 0 on 1/5   Has CRS resolved? Yes, date of resolution: 1/5    Neurotoxicity Summary  Does patient have neurotoxicity? No  Current Neurotoxicity score (0-10): 0  What is the maximum severity to date:  0  What therapy was given: NA  Has neurotoxicity resolved? NA     Karlene Sanders PA-C  [phone]

## 2023-01-08 LAB
ACANTHOCYTES BLD QL SMEAR: SLIGHT
ANION GAP SERPL CALCULATED.3IONS-SCNC: 13 MMOL/L (ref 7–15)
BASOPHILS # BLD MANUAL: 0 10E3/UL (ref 0–0.2)
BASOPHILS NFR BLD MANUAL: 0 %
BUN SERPL-MCNC: 13.4 MG/DL (ref 6–20)
CALCIUM SERPL-MCNC: 8.7 MG/DL (ref 8.6–10)
CHLORIDE SERPL-SCNC: 108 MMOL/L (ref 98–107)
CREAT SERPL-MCNC: 0.6 MG/DL (ref 0.51–0.95)
CRP SERPL-MCNC: 27 MG/L
DEPRECATED HCO3 PLAS-SCNC: 19 MMOL/L (ref 22–29)
EOSINOPHIL # BLD MANUAL: 0.2 10E3/UL (ref 0–0.7)
EOSINOPHIL NFR BLD MANUAL: 39 %
ERYTHROCYTE [DISTWIDTH] IN BLOOD BY AUTOMATED COUNT: 13.2 % (ref 10–15)
FERRITIN SERPL-MCNC: 288 NG/ML (ref 11–328)
GFR SERPL CREATININE-BSD FRML MDRD: >90 ML/MIN/1.73M2
GLUCOSE SERPL-MCNC: 105 MG/DL (ref 70–99)
HCT VFR BLD AUTO: 31.8 % (ref 35–47)
HGB BLD-MCNC: 11 G/DL (ref 11.7–15.7)
LYMPHOCYTES # BLD MANUAL: 0 10E3/UL (ref 0.8–5.3)
LYMPHOCYTES NFR BLD MANUAL: 6 %
MAGNESIUM SERPL-MCNC: 2 MG/DL (ref 1.7–2.3)
MCH RBC QN AUTO: 31.4 PG (ref 26.5–33)
MCHC RBC AUTO-ENTMCNC: 34.6 G/DL (ref 31.5–36.5)
MCV RBC AUTO: 91 FL (ref 78–100)
MONOCYTES # BLD MANUAL: 0.1 10E3/UL (ref 0–1.3)
MONOCYTES NFR BLD MANUAL: 10 %
NEUTROPHILS # BLD MANUAL: 0.3 10E3/UL (ref 1.6–8.3)
NEUTROPHILS NFR BLD MANUAL: 45 %
PHOSPHATE SERPL-MCNC: 3.7 MG/DL (ref 2.5–4.5)
PLAT MORPH BLD: ABNORMAL
PLATELET # BLD AUTO: 94 10E3/UL (ref 150–450)
POTASSIUM SERPL-SCNC: 3.7 MMOL/L (ref 3.4–5.3)
RBC # BLD AUTO: 3.5 10E6/UL (ref 3.8–5.2)
RBC MORPH BLD: ABNORMAL
SARS-COV-2 RNA RESP QL NAA+PROBE: NEGATIVE
SODIUM SERPL-SCNC: 140 MMOL/L (ref 136–145)
WBC # BLD AUTO: 0.6 10E3/UL (ref 4–11)

## 2023-01-08 PROCEDURE — U0003 INFECTIOUS AGENT DETECTION BY NUCLEIC ACID (DNA OR RNA); SEVERE ACUTE RESPIRATORY SYNDROME CORONAVIRUS 2 (SARS-COV-2) (CORONAVIRUS DISEASE [COVID-19]), AMPLIFIED PROBE TECHNIQUE, MAKING USE OF HIGH THROUGHPUT TECHNOLOGIES AS DESCRIBED BY CMS-2020-01-R: HCPCS | Performed by: INTERNAL MEDICINE

## 2023-01-08 PROCEDURE — 80048 BASIC METABOLIC PNL TOTAL CA: CPT

## 2023-01-08 PROCEDURE — 250N000011 HC RX IP 250 OP 636: Performed by: PEDIATRICS

## 2023-01-08 PROCEDURE — 250N000013 HC RX MED GY IP 250 OP 250 PS 637: Performed by: STUDENT IN AN ORGANIZED HEALTH CARE EDUCATION/TRAINING PROGRAM

## 2023-01-08 PROCEDURE — 250N000011 HC RX IP 250 OP 636: Performed by: INTERNAL MEDICINE

## 2023-01-08 PROCEDURE — 250N000011 HC RX IP 250 OP 636

## 2023-01-08 PROCEDURE — 82728 ASSAY OF FERRITIN: CPT | Performed by: INTERNAL MEDICINE

## 2023-01-08 PROCEDURE — 99233 SBSQ HOSP IP/OBS HIGH 50: CPT | Performed by: PHYSICIAN ASSISTANT

## 2023-01-08 PROCEDURE — 99418 PROLNG IP/OBS E/M EA 15 MIN: CPT | Performed by: PHYSICIAN ASSISTANT

## 2023-01-08 PROCEDURE — 85027 COMPLETE CBC AUTOMATED: CPT

## 2023-01-08 PROCEDURE — 83735 ASSAY OF MAGNESIUM: CPT | Performed by: INTERNAL MEDICINE

## 2023-01-08 PROCEDURE — 250N000013 HC RX MED GY IP 250 OP 250 PS 637

## 2023-01-08 PROCEDURE — 86140 C-REACTIVE PROTEIN: CPT | Performed by: INTERNAL MEDICINE

## 2023-01-08 PROCEDURE — 84100 ASSAY OF PHOSPHORUS: CPT | Performed by: INTERNAL MEDICINE

## 2023-01-08 PROCEDURE — 85007 BL SMEAR W/DIFF WBC COUNT: CPT

## 2023-01-08 PROCEDURE — 250N000013 HC RX MED GY IP 250 OP 250 PS 637: Performed by: INTERNAL MEDICINE

## 2023-01-08 PROCEDURE — 206N000001 HC R&B BMT UMMC

## 2023-01-08 RX ORDER — MAGNESIUM OXIDE 400 MG/1
400 TABLET ORAL EVERY 4 HOURS
Status: COMPLETED | OUTPATIENT
Start: 2023-01-08 | End: 2023-01-08

## 2023-01-08 RX ADMIN — Medication 5 ML: at 02:29

## 2023-01-08 RX ADMIN — LORAZEPAM 1 MG: 0.5 TABLET ORAL at 02:29

## 2023-01-08 RX ADMIN — MAGNESIUM OXIDE TAB 400 MG (241.3 MG ELEMENTAL MG) 400 MG: 400 (241.3 MG) TAB at 08:58

## 2023-01-08 RX ADMIN — ALLOPURINOL 300 MG: 300 TABLET ORAL at 08:55

## 2023-01-08 RX ADMIN — Medication 5 ML: at 04:10

## 2023-01-08 RX ADMIN — Medication 5 ML: at 10:14

## 2023-01-08 RX ADMIN — ACYCLOVIR 800 MG: 800 TABLET ORAL at 08:54

## 2023-01-08 RX ADMIN — Medication 10 ML: at 20:35

## 2023-01-08 RX ADMIN — Medication 6 MG: at 21:01

## 2023-01-08 RX ADMIN — Medication 40 MG: at 08:55

## 2023-01-08 RX ADMIN — ALTEPLASE 2 MG: 2.2 INJECTION, POWDER, LYOPHILIZED, FOR SOLUTION INTRAVENOUS at 18:07

## 2023-01-08 RX ADMIN — ALTEPLASE 2 MG: 2.2 INJECTION, POWDER, LYOPHILIZED, FOR SOLUTION INTRAVENOUS at 18:11

## 2023-01-08 RX ADMIN — MAGNESIUM OXIDE TAB 400 MG (241.3 MG ELEMENTAL MG) 400 MG: 400 (241.3 MG) TAB at 11:49

## 2023-01-08 RX ADMIN — LEVOFLOXACIN 250 MG: 250 TABLET, FILM COATED ORAL at 12:00

## 2023-01-08 RX ADMIN — CEFEPIME HYDROCHLORIDE 2 G: 2 INJECTION, POWDER, FOR SOLUTION INTRAVENOUS at 08:55

## 2023-01-08 RX ADMIN — ACYCLOVIR 800 MG: 800 TABLET ORAL at 20:35

## 2023-01-08 RX ADMIN — PANTOPRAZOLE SODIUM 40 MG: 40 TABLET, DELAYED RELEASE ORAL at 08:55

## 2023-01-08 RX ADMIN — FLUCONAZOLE 200 MG: 200 TABLET ORAL at 08:54

## 2023-01-08 RX ADMIN — TRAMADOL HYDROCHLORIDE 25 MG: 50 TABLET ORAL at 09:14

## 2023-01-08 ASSESSMENT — ACTIVITIES OF DAILY LIVING (ADL)
ADLS_ACUITY_SCORE: 25

## 2023-01-08 NOTE — PROGRESS NOTES
"BMT Daily CARTOX Note (complete days 0-14 and with any subsequent CRS/neurotoxicity)    Date: January 6, 2023    Kristie Cornejo ([id number]) is a 52 year old year old female who received infusion on 1/4, currently day +4 of CAR-T cell therapy [facility]    Vital Signs: /76 (BP Location: Right arm)   Pulse 78   Temp 97.8  F (36.6  C) (Axillary)   Resp 16   Ht 1.69 m (5' 6.54\")   Wt 69.3 kg (152 lb 12.8 oz)   LMP  (LMP Unknown)   SpO2 100%   BMI 24.27 kg/m      Organ CAR-T toxicity:    Neurologic: Oriented x 3 and Headaches (HA improved from yesterday)     Overall CRS grade 0    CRS Parameter Grade 1 Grade 2 Grade 3 Grade 4   Fever* Tm >= 100.4 degrees F Tm >= 100.4 degrees F Tm >= 100.4 degrees F Tm >= to 100.4 degrees F       With Either:  Hypotension None Responsive to Fluids Requiring 1 vasopressor (w/ or w/o vasopressin) Requiring multiple vasopressors (excluding vasopressin)     And/or  Hypoxia None Low-flow nasal cannula or blow-by High-flow nasal cannula, face mask, non-rebreather mask, or Venturi mask Requiring positive pressure (CPAP, BiPAP intubation and mechanical ventilation)       * Definition of High Dose Pressors for Grade 4  Vasopressor Duration >= 3 hours   Norepinephrine monotherapy >= 20 mcg/kg/minute   Dopamine monotherapy >= 10 mcg/kg/minute   Phenylephrine monotherapy >= 200 mcg/minute   Epinephrine monotherapy >= 10 mcg/minute   If on vasopressin + another agent High dose of vasopressin + norepi equivalent (using VASST formula below) is >= 10 mcg/minute   If on combination vasopressors, not including vasopressin Norepi equivalent of > 20 mcg/minute (using VASST formula below)     VASST formula: Norepi equivalent dose = [NE (mcg/min)] + [dopamine (mcg/min) / 2] + [epinephrine (mcg/min)] + [phenylephrine (mcg/min) / 10]    ICE Assessment  Orientation to year, month, city, hospital: 4 points  Name 3 objects (e.g., point to clock, pen, button): 3 points  Following commands: (e.g., " Show me 2 fingers): 1 point   Write a standard sentence (e.g., The silver jumped over the log): 1 point   Count backwards from 100 by ten: 1 point  Total points: 10: No impairment    CRS Summary  Does patient have CRS? No  Current CRS stgstrstastdstest:st st1st What is the maximum severity to date: 1  What therapy was given: Cefepime & Tocilizumab   Has CRS grade changed? Yes, new grade and date of change: 0 on 1/5   Has CRS resolved? Yes, date of resolution: 1/5    Neurotoxicity Summary  Does patient have neurotoxicity? No  Current Neurotoxicity score (0-10): 0  What is the maximum severity to date:  0  What therapy was given: NA  Has neurotoxicity resolved? NA     YULISSA MarcanoC  [phone]

## 2023-01-08 NOTE — PLAN OF CARE
"  Problem: Plan of Care - These are the overarching goals to be used throughout the patient stay.    Goal: Patient-Specific Goal (Individualized)  Description: You can add care plan individualizations to a care plan. Examples of Individualization might be:  \"Parent requests to be called daily at 9am for status\", \"I have a hard time hearing out of my right ear\", or \"Do not touch me to wake me up as it startles me\".  Outcome: Progressing     Problem: Plan of Care - These are the overarching goals to be used throughout the patient stay.    Goal: Optimal Comfort and Wellbeing  Outcome: Progressing   Goal Outcome Evaluation:  Afebrile. Temp 96.7 and recheck 97.8. Day +4 from CAR-T cells. Ice assessment was a 10. Sentence was at baseline. Headache and received tramadol. Received simvastatin study drug. Groin pain is improving. Small loose stool. Showered. Covid swab done. Walked the halls. Independent. Plan is to discharge Monday or Tuesday.                       "

## 2023-01-08 NOTE — PLAN OF CARE
Problem: Plan of Care - These are the overarching goals to be used throughout the patient stay.    Goal: Plan of Care Review  Description: The Plan of Care Review/Shift note should be completed every shift.  The Outcome Evaluation is a brief statement about your assessment that the patient is improving, declining, or no change.  This information will be displayed automatically on your shift note.  Outcome: Progressing   Goal Outcome Evaluation:  Pt denied nausea and ate well.  Complained of left groin pain (from previous day's chills) that decreased with walking halls and cold packs. Continues to have significant hot flashes. Melatonin given at HS.

## 2023-01-08 NOTE — PLAN OF CARE
VSS. Afebrile. A&O x4. Denies nausea. Reports pain to groin improving. PICC heparin locked between uses. Ativan given for sleep. Will need potassium replaced this am, recheck tomorrow.  Awaiting potassium level result.      Problem: Plan of Care - These are the overarching goals to be used throughout the patient stay.    Goal: Optimal Comfort and Wellbeing  Outcome: Not Progressing     Problem: Plan of Care - These are the overarching goals to be used throughout the patient stay.    Goal: Plan of Care Review  Description: The Plan of Care Review/Shift note should be completed every shift.  The Outcome Evaluation is a brief statement about your assessment that the patient is improving, declining, or no change.  This information will be displayed automatically on your shift note.  Outcome: Progressing  Goal: Absence of Hospital-Acquired Illness or Injury  Outcome: Progressing  Intervention: Identify and Manage Fall Risk  Recent Flowsheet Documentation  Taken 1/8/2023 0000 by Meche Prabhakar RN  Safety Promotion/Fall Prevention:    assistive device/personal items within reach    clutter free environment maintained    lighting adjusted    nonskid shoes/slippers when out of bed    patient and family education  Intervention: Prevent Skin Injury  Recent Flowsheet Documentation  Taken 1/8/2023 0000 by Meche Prabhakar RN  Body Position: position changed independently  Intervention: Prevent and Manage VTE (Venous Thromboembolism) Risk  Recent Flowsheet Documentation  Taken 1/8/2023 0000 by Meche Prabhakar RN  VTE Prevention/Management: SCDs (sequential compression devices) off  Goal: Readiness for Transition of Care  Outcome: Progressing     Problem: Stem Cell/Bone Marrow Transplant  Goal: Optimal Coping with Transplant  Outcome: Progressing  Goal: Symptom-Free Urinary Elimination  Outcome: Progressing  Goal: Diarrhea Symptom Control  Outcome: Progressing  Goal: Improved Activity Tolerance  Outcome: Progressing  Intervention: Promote  Improved Energy  Recent Flowsheet Documentation  Taken 1/8/2023 0000 by Meche Prabhakar RN  Activity Management:    activity adjusted per tolerance    activity encouraged    ambulated outside room  Goal: Blood Counts Within Acceptable Range  Outcome: Progressing  Intervention: Monitor and Manage Hematologic Symptoms  Recent Flowsheet Documentation  Taken 1/8/2023 0000 by Meche Prabhakar RN  Bleeding Precautions:    gentle oral care promoted    monitored for signs of bleeding  Medication Review/Management:    medications reviewed    high-risk medications identified  Goal: Absence of Hypersensitivity Reaction  Outcome: Progressing  Goal: Absence of Infection  Outcome: Progressing  Intervention: Prevent and Manage Infection  Recent Flowsheet Documentation  Taken 1/8/2023 0000 by Meche Prabhakar RN  Isolation Precautions: protective environment maintained  Goal: Improved Oral Mucous Membrane Health and Integrity  Outcome: Progressing  Goal: Nausea and Vomiting Symptom Relief  Outcome: Progressing  Goal: Optimal Nutrition Intake  Outcome: Progressing   Goal Outcome Evaluation:

## 2023-01-08 NOTE — PROGRESS NOTES
"BMT/Cell Therapy Daily Progress Note   01/08/2023    Patient ID:  Kristie Cornejo is a 52 year old female with a history of multiple myeloma conditioned with Cy/Flu, currently day +4 of her CAR T-cell therapy with [facility].    Admission date: 1/4/2023    INTERVAL  HISTORY     Doing well this morning. Headache yesterday resolved after Tramadol. None yet so far today. Had a hard time getting back to sleep after midnight vitals, ended up taking Ativan and this helped. No fevers overnight. No nausea or vomiting. No neuro changes. Normal stools.    Review of Systems: 10 point ROS negative except as noted above.      PHYSICAL EXAM     Weight In/Out     Wt Readings from Last 3 Encounters:   01/08/23 69.3 kg (152 lb 12.8 oz)   01/01/23 72.2 kg (159 lb 1.6 oz)   12/30/22 73 kg (160 lb 14.4 oz)      I/O last 3 completed shifts:  In: 2870 [P.O.:2360; I.V.:510]  Out: 1750 [Urine:1750]       KPS:  90    /76 (BP Location: Right arm)   Pulse 78   Temp 97.8  F (36.6  C) (Axillary)   Resp 16   Ht 1.69 m (5' 6.54\")   Wt 69.3 kg (152 lb 12.8 oz)   LMP  (LMP Unknown)   SpO2 100%   BMI 24.27 kg/m       General: NAD   Eyes: BI, sclera anicteric, oral mucosa pink and moist without lesions or erythema or thrush  Lungs: CTA bilaterally, normal respiratory effort  Cardiovascular: RRR, no M/R/G   Abdominal/Rectal: +BS, soft, NT, ND, No HSM   Lymphatics: no edema  Skin: no rashes or petechiae  Neuro: A&O x 3, normal mood and affect  Musculoskeletal: muscle mass adequate  Additional Findings: dressing site NT, no drainage.    LABS AND IMAGING: I have assessed all abnormal lab values for their clinical significance and any values considered clinically significant have been addressed in the assessment and plan.        Lab Results   Component Value Date    WBC 0.6 (LL) 01/08/2023    ANEU 0.3 (LL) 01/08/2023    HGB 11.0 (L) 01/08/2023    HCT 31.8 (L) 01/08/2023    PLT 94 (L) 01/08/2023     01/08/2023    POTASSIUM 3.7 " 01/08/2023    CHLORIDE 108 (H) 01/08/2023    CO2 19 (L) 01/08/2023     (H) 01/08/2023    BUN 13.4 01/08/2023    CR 0.60 01/08/2023    MAG 2.0 01/08/2023    INR 1.04 01/04/2023           SYSTEMS-BASED ASSESSMENT AND PLAN     Kristie Cornejo is a 52 year old female with a history of multiple myeloma conditioned with Cy/Flu, currently day +4 of her CAR T-cell therapy with [facility].     CAR T Protocol   - Day -5 through -3: Cy/Flu   - Day -2: Rest day   - Day -1: IT Dex   - Day 0: CAR T infusion ([facility]ecma)   - Day +6: IT Dex   - Day +13: possible IT Dex (determined by PI and primary MD)   - Simvastatin daily 40mg until day day +30   - GM-CSF should NOT be given through out study   - Re-staging plan: Unable to sign re-staging labs of treatment plan without specific tests for future bone marrow biopsies      NEUROTOXICITY   - ICE score: 10/10   - Mild headache, Tramadol PRN started for pain relief (to avoid masking fever)     CRS  - Current grade: 0  - Afebrile since 16:30 on 1/5   - Tocilizumab given x1 dose on 1/5.   - Blood and urine cultures NGTD, CXR negative. Ferritin 288, CRP trending down at 27. Stop cefepime today 1/8.  - Continue to monitor daily CRP & Ferritin      HEME/COAG  - Pancytopenic due to chemotherapy  - Transfusion parameters: hemoglobin <7, platelets <10     IMMUNOCOMPROMISED  - Prophylaxis plan: ACV, Fluconazole, and Levaquin   - Pentamidine INH + albuterol one time   - Active infections: None     CARDIOVASCULAR  - Risk of cardiomyopathy:  Baseline LVEF: 55-60%   - Risk of arrhythmia: Baseline EKG showed sinus rhythm nonspecific T wave abnormality  - Hx of PVC ablation March 2022      RESPIRATORY  - Risk of respiratory complications: Frequent ambulation and incentive spirometer.     GI/NUTRITION  - Ulcer prophylaxis: Protonix   - Risk of nausea/vomiting due to chemo: compazine and ativan   - History of IBS: PEG ordered daily PRN      RENAL/ELECTROLYTES/  - Risk of renal injury: IV hydration  PRN  - Electrolyte management: replace per sliding scale     DIABETES/ENDOCRINE  - Risk of steroid-induced hyperglyemia: Monitor BG, sliding scale if needed     MUSCULOSKELETAL/FRAILTY  - Baseline frailty score: 0   - Patient with substantial risk of sarcopenia  - Daily PT/OT as needed while inpatient  - Cancer Rehab as needed outpatient    SYMPTOM MANAGEMENT    Nausea from chemo/radiation: Prochlorperazine, ondansetron, lorazepam.  # Pain Assessment:  Current Pain Score 1/7/2023   Patient currently in pain? denies   Pain score (0-10) -   Kristie hill pain level was assessed and she currently denies pain.      SOCIAL DETERMINANTS    Caregiver: Jesse Cornejo (Spouse) & Misti Preciado (sister)    Known issues that I take into account for medical decisions, with salient changes to the plan considering these complexities noted above.    Patient Active Problem List   Diagnosis     Allergic rhinitis     Anxiety state     Irritable bowel syndrome     Hemorrhoids     Sciatica     Encounter for insertion or removal of intrauterine contraceptive device     CARDIOVASCULAR SCREENING; LDL GOAL LESS THAN 160     Pelvic mass     Plasmacytoma of bone (H)     Light chain myeloma (H)     Dehydration     Hypogammaglobulinemia (H)     Multiple myeloma not having achieved remission (H)     Dispo: Anticipate discharge on day +7 unless there are complications from neurotoxicity or CRS.    I spent 30 minutes in the care of this patient today, which included time necessary for preparation for the visit, obtaining history, ordering medications/tests/procedures as medically indicated, review of pertinent medical literature, counseling of the patient, communication of recommendations to the care team, and documentation time.    YULISSA MarcanoC  [phone]      Physician Attestation        I saw and evaluated Kristie Cornejo as part of a shared APRN/PA visit. I personally performed the substantive portion of the medical decision making for  this visit - please see the SULEIMAN s documentation for full details.     On the date of service, 01/08/2023, I spent 35 minutes on the patient unit personally reviewing medical records and medications, reviewing vital signs, labs, and imaging results as summarized above, discussing the patient's case on rounds with the SULEIMAN, obtaining a history from the patient, performing a physical exam, counseling and educating the patient on the diagnosis and treatment, evaluating a potentially life or organ threatening problem, intensively monitoring treatments with high risk of toxicity, coordinating care and documenting in the electronic medical record.     Key management decisions made by me and carried out under my direction include:   53 yo F with IgG kappa MM, high risk with +1q. Now D+4 s/p Abecma CAR-T (idecabtagene vicleucel) after receiving LD chemo as outpt. CRS 1 with fevers, anxiety on D+1, got tocilizumab x 1. The hot flashes/chills, anxiety, and headache seem to be improving again. Stop cefepime as negative infectious fever work up. Monitor CRP which is down to 27 and ferritin may be peaking, 288. Monitor closely for recrudescent CRS and onset of ICANS. Also on the ICANS prophylaxis study. If fever recurs, call Dr. Walker and redose tocilizumab, will consider whether to continue q8 x 4 doses.     Thank you for allowing me to participate in the care of this patient. Please do not hesitate to contact me if there are any concerns or questions.      Lacho Walker MD   of Medicine  Classical Hematology and Blood and Marrow Transplantation  Division of Hematology, Oncology, and Transplantation  [facility]  Pager [phone]

## 2023-01-09 ENCOUNTER — APPOINTMENT (OUTPATIENT)
Dept: OCCUPATIONAL THERAPY | Facility: CLINIC | Age: 53
DRG: 018 | End: 2023-01-09
Attending: INTERNAL MEDICINE
Payer: COMMERCIAL

## 2023-01-09 LAB
ALBUMIN SERPL BCG-MCNC: 3.8 G/DL (ref 3.5–5.2)
ALP SERPL-CCNC: 54 U/L (ref 35–104)
ALT SERPL W P-5'-P-CCNC: 22 U/L (ref 10–35)
ANION GAP SERPL CALCULATED.3IONS-SCNC: 12 MMOL/L (ref 7–15)
APTT PPP: 25 SECONDS (ref 22–38)
AST SERPL W P-5'-P-CCNC: 16 U/L (ref 10–35)
BASOPHILS # BLD AUTO: 0 10E3/UL (ref 0–0.2)
BASOPHILS NFR BLD AUTO: 0 %
BILIRUB DIRECT SERPL-MCNC: <0.2 MG/DL (ref 0–0.3)
BILIRUB SERPL-MCNC: 0.4 MG/DL
BUN SERPL-MCNC: 24.6 MG/DL (ref 6–20)
BURR CELLS BLD QL SMEAR: ABNORMAL
CALCIUM SERPL-MCNC: 8.7 MG/DL (ref 8.6–10)
CHLORIDE SERPL-SCNC: 107 MMOL/L (ref 98–107)
CREAT SERPL-MCNC: 0.69 MG/DL (ref 0.51–0.95)
CRP SERPL-MCNC: 13.5 MG/L
D DIMER PPP FEU-MCNC: 3.29 UG/ML FEU (ref 0–0.5)
DEPRECATED HCO3 PLAS-SCNC: 21 MMOL/L (ref 22–29)
ELLIPTOCYTES BLD QL SMEAR: SLIGHT
EOSINOPHIL # BLD AUTO: 0.2 10E3/UL (ref 0–0.7)
EOSINOPHIL NFR BLD AUTO: 27 %
ERYTHROCYTE [DISTWIDTH] IN BLOOD BY AUTOMATED COUNT: 13 % (ref 10–15)
FERRITIN SERPL-MCNC: 286 NG/ML (ref 11–328)
FIBRINOGEN PPP-MCNC: 256 MG/DL (ref 170–490)
GFR SERPL CREATININE-BSD FRML MDRD: >90 ML/MIN/1.73M2
GLUCOSE SERPL-MCNC: 101 MG/DL (ref 70–99)
HCT VFR BLD AUTO: 31.5 % (ref 35–47)
HGB BLD-MCNC: 10.5 G/DL (ref 11.7–15.7)
IMM GRANULOCYTES # BLD: 0 10E3/UL
IMM GRANULOCYTES NFR BLD: 0 %
INR PPP: 1.06 (ref 0.85–1.15)
LDH SERPL L TO P-CCNC: 216 U/L (ref 0–250)
LYMPHOCYTES # BLD AUTO: 0.1 10E3/UL (ref 0.8–5.3)
LYMPHOCYTES NFR BLD AUTO: 11 %
MAGNESIUM SERPL-MCNC: 2 MG/DL (ref 1.7–2.3)
MCH RBC QN AUTO: 31.1 PG (ref 26.5–33)
MCHC RBC AUTO-ENTMCNC: 33.3 G/DL (ref 31.5–36.5)
MCV RBC AUTO: 93 FL (ref 78–100)
MONOCYTES # BLD AUTO: 0.2 10E3/UL (ref 0–1.3)
MONOCYTES NFR BLD AUTO: 27 %
NEUTROPHILS # BLD AUTO: 0.3 10E3/UL (ref 1.6–8.3)
NEUTROPHILS NFR BLD AUTO: 35 %
NRBC # BLD AUTO: 0 10E3/UL
NRBC BLD AUTO-RTO: 0 /100
PHOSPHATE SERPL-MCNC: 4.7 MG/DL (ref 2.5–4.5)
PLAT MORPH BLD: ABNORMAL
PLATELET # BLD AUTO: 92 10E3/UL (ref 150–450)
POTASSIUM SERPL-SCNC: 3.6 MMOL/L (ref 3.4–5.3)
PROT SERPL-MCNC: 5.6 G/DL (ref 6.4–8.3)
RBC # BLD AUTO: 3.38 10E6/UL (ref 3.8–5.2)
RBC MORPH BLD: ABNORMAL
SODIUM SERPL-SCNC: 140 MMOL/L (ref 136–145)
URATE SERPL-MCNC: 0.7 MG/DL (ref 2.4–5.7)
WBC # BLD AUTO: 0.7 10E3/UL (ref 4–11)

## 2023-01-09 PROCEDURE — 250N000013 HC RX MED GY IP 250 OP 250 PS 637: Performed by: STUDENT IN AN ORGANIZED HEALTH CARE EDUCATION/TRAINING PROGRAM

## 2023-01-09 PROCEDURE — 82248 BILIRUBIN DIRECT: CPT

## 2023-01-09 PROCEDURE — 97110 THERAPEUTIC EXERCISES: CPT | Mod: GO

## 2023-01-09 PROCEDURE — 80053 COMPREHEN METABOLIC PANEL: CPT | Performed by: INTERNAL MEDICINE

## 2023-01-09 PROCEDURE — 250N000013 HC RX MED GY IP 250 OP 250 PS 637

## 2023-01-09 PROCEDURE — 84550 ASSAY OF BLOOD/URIC ACID: CPT | Performed by: INTERNAL MEDICINE

## 2023-01-09 PROCEDURE — 82728 ASSAY OF FERRITIN: CPT | Performed by: INTERNAL MEDICINE

## 2023-01-09 PROCEDURE — 99233 SBSQ HOSP IP/OBS HIGH 50: CPT

## 2023-01-09 PROCEDURE — 999N000147 HC STATISTIC PT IP EVAL DEFER: Performed by: PHYSICAL THERAPIST

## 2023-01-09 PROCEDURE — 206N000001 HC R&B BMT UMMC

## 2023-01-09 PROCEDURE — 85610 PROTHROMBIN TIME: CPT

## 2023-01-09 PROCEDURE — 86140 C-REACTIVE PROTEIN: CPT | Performed by: INTERNAL MEDICINE

## 2023-01-09 PROCEDURE — 250N000013 HC RX MED GY IP 250 OP 250 PS 637: Performed by: INTERNAL MEDICINE

## 2023-01-09 PROCEDURE — 85730 THROMBOPLASTIN TIME PARTIAL: CPT | Performed by: INTERNAL MEDICINE

## 2023-01-09 PROCEDURE — 85379 FIBRIN DEGRADATION QUANT: CPT | Performed by: INTERNAL MEDICINE

## 2023-01-09 PROCEDURE — 83615 LACTATE (LD) (LDH) ENZYME: CPT | Performed by: INTERNAL MEDICINE

## 2023-01-09 PROCEDURE — 85384 FIBRINOGEN ACTIVITY: CPT | Performed by: INTERNAL MEDICINE

## 2023-01-09 PROCEDURE — 84100 ASSAY OF PHOSPHORUS: CPT

## 2023-01-09 PROCEDURE — 250N000011 HC RX IP 250 OP 636: Performed by: INTERNAL MEDICINE

## 2023-01-09 PROCEDURE — 85025 COMPLETE CBC W/AUTO DIFF WBC: CPT

## 2023-01-09 PROCEDURE — 83735 ASSAY OF MAGNESIUM: CPT

## 2023-01-09 RX ORDER — MAGNESIUM OXIDE 400 MG/1
400 TABLET ORAL EVERY 4 HOURS
Status: COMPLETED | OUTPATIENT
Start: 2023-01-09 | End: 2023-01-09

## 2023-01-09 RX ADMIN — Medication 6 MG: at 20:18

## 2023-01-09 RX ADMIN — LORAZEPAM 1 MG: 0.5 TABLET ORAL at 00:07

## 2023-01-09 RX ADMIN — Medication 40 MG: at 08:33

## 2023-01-09 RX ADMIN — PANTOPRAZOLE SODIUM 40 MG: 40 TABLET, DELAYED RELEASE ORAL at 08:33

## 2023-01-09 RX ADMIN — LEVOFLOXACIN 250 MG: 250 TABLET, FILM COATED ORAL at 11:25

## 2023-01-09 RX ADMIN — Medication 5 ML: at 04:07

## 2023-01-09 RX ADMIN — MAGNESIUM OXIDE TAB 400 MG (241.3 MG ELEMENTAL MG) 400 MG: 400 (241.3 MG) TAB at 08:34

## 2023-01-09 RX ADMIN — LORAZEPAM 1 MG: 0.5 TABLET ORAL at 20:18

## 2023-01-09 RX ADMIN — ACYCLOVIR 800 MG: 800 TABLET ORAL at 19:47

## 2023-01-09 RX ADMIN — MAGNESIUM OXIDE TAB 400 MG (241.3 MG ELEMENTAL MG) 400 MG: 400 (241.3 MG) TAB at 11:25

## 2023-01-09 RX ADMIN — ACYCLOVIR 800 MG: 800 TABLET ORAL at 08:33

## 2023-01-09 RX ADMIN — ALLOPURINOL 300 MG: 300 TABLET ORAL at 08:33

## 2023-01-09 RX ADMIN — FLUCONAZOLE 200 MG: 200 TABLET ORAL at 08:33

## 2023-01-09 ASSESSMENT — ACTIVITIES OF DAILY LIVING (ADL)
ADLS_ACUITY_SCORE: 25

## 2023-01-09 NOTE — PLAN OF CARE
Physical Therapy: Orders received. Chart reviewed and discussed with care team.? Physical Therapy not indicated due to discussion with OT. Pt continues to be up independently, potential discharge home in the next few days. No IP PT needs at this time.? Defer discharge recommendations to OT.? Will complete orders.

## 2023-01-09 NOTE — PROGRESS NOTES
"BMT Daily CARTOX Note (complete days 0-14 and with any subsequent CRS/neurotoxicity)    Date: January 9, 2023    Kristie Cornejo ([id number]) is a 52 year old year old female who received infusion on 1/4, currently day +5 of CAR-T cell therapy [facility]     Vital Signs: /80   Pulse 78   Temp 97.9  F (36.6  C) (Axillary)   Resp 18   Ht 1.69 m (5' 6.54\")   Wt 69.4 kg (152 lb 14.4 oz)   LMP  (LMP Unknown)   SpO2 98%   BMI 24.28 kg/m      Organ CAR-T toxicity:    None     Overall CRS grade 0    CRS Parameter Grade 1 Grade 2 Grade 3 Grade 4   Fever* Tm >= 100.4 degrees F Tm >= 100.4 degrees F Tm >= 100.4 degrees F Tm >= to 100.4 degrees F       With Either:  Hypotension None Responsive to Fluids Requiring 1 vasopressor (w/ or w/o vasopressin) Requiring multiple vasopressors (excluding vasopressin)     And/or  Hypoxia None Low-flow nasal cannula or blow-by High-flow nasal cannula, face mask, non-rebreather mask, or Venturi mask Requiring positive pressure (CPAP, BiPAP intubation and mechanical ventilation)       * Definition of High Dose Pressors for Grade 4  Vasopressor Duration >= 3 hours   Norepinephrine monotherapy >= 20 mcg/kg/minute   Dopamine monotherapy >= 10 mcg/kg/minute   Phenylephrine monotherapy >= 200 mcg/minute   Epinephrine monotherapy >= 10 mcg/minute   If on vasopressin + another agent High dose of vasopressin + norepi equivalent (using VASST formula below) is >= 10 mcg/minute   If on combination vasopressors, not including vasopressin Norepi equivalent of > 20 mcg/minute (using VASST formula below)     VASST formula: Norepi equivalent dose = [NE (mcg/min)] + [dopamine (mcg/min) / 2] + [epinephrine (mcg/min)] + [phenylephrine (mcg/min) / 10]    ICE Assessment  Orientation to year, month, city, hospital: 4 points  Name 3 objects (e.g., point to clock, pen, button): 3 points  Following commands: (e.g., Show me 2 fingers): 1 point   Write a standard sentence (e.g., The silver jumped over the " log): 1 point   Count backwards from 100 by ten: 1 point  Total points: 10: No impairment    CRS Summary  Does patient have CRS? No  Current CRS stgstrstastdstest:st st1st What is the maximum severity to date: 1  What therapy was given: Cefepime and Tocilizumab   Has CRS grade changed? Yes, new grade and date of change: 0 on 1/5  Has CRS resolved? Yes, date of resolution: 1/5    Neurotoxicity Summary  Does patient have neurotoxicity? No  Current Neurotoxicity score (0-10): 0  What is the maximum severity to date:  0  What therapy was given: NA  Has neurotoxicity resolved? NA    Jeffrey Christine PA-C   [phone]

## 2023-01-09 NOTE — PROGRESS NOTES
"  BMT Progress note  BMT Physician: Dr. Reinoso   Transplant Type: CAR-T   Preparative Regimen: Cy/Flu   Transplant Date: 1/4/2023  Chief complaint: Kristie Cornejo is a 52 year old female with a history of multiple myeloma conditioned with Cy/Flu, currently day +5 of her CAR T-cell therapy with Abecma.    INTERIM HISTORY: Has been afebrile since 16:30 on 1/5. No headaches today when I saw her on morning rounds. She has been using tramadol for mild headaches the last 2 days. Has been using ativan to sleep at night. She rarely uses ativan at home.     REVIEW OF SYSTEMS: Otherwise unremarkable other than what is noted in the Interim History.   PHYSICAL EXAM:   Vitals:  /80   Pulse 78   Temp 97.9  F (36.6  C) (Axillary)   Resp 18   Ht 1.69 m (5' 6.54\")   Wt 69.4 kg (152 lb 14.4 oz)   LMP  (LMP Unknown)   SpO2 98%   BMI 24.28 kg/m      General Appearance: NAD  HEENT: sclera anicteric. Moist mucus membranes, no ulcerations.  CV: RRR. no murmur or rub.   RESP: CTA bilaterally; no rales or wheezes.   GI: +BS, soft, nontender, nondistended.   EXT: No edema. No cyanosis/clubbing.   SKIN: no lesions or rash  NEURO: A&O x3   PSYCH: Appropriate affect   VASCULAR ACCES: dressing CDI.     ROUTINE LABS:    Lab Results   Component Value Date    WBC 0.7 (LL) 01/09/2023    ANEU 0.3 (LL) 01/08/2023    HGB 10.5 (L) 01/09/2023    HCT 31.5 (L) 01/09/2023    PLT 92 (L) 01/09/2023     01/09/2023    POTASSIUM 3.6 01/09/2023    CHLORIDE 107 01/09/2023    CO2 21 (L) 01/09/2023     (H) 01/09/2023    BUN 24.6 (H) 01/09/2023    CR 0.69 01/09/2023    MAG 2.0 01/09/2023    INR 1.06 01/09/2023          ASSESSMENT AND PLAN:     Kristie Cornejo is a 52 year old female with a history of multiple myeloma conditioned with Cy/Flu, currently day +5 of her CAR T-cell therapy with Abecma.        CAR T Protocol   - Day -5 through -3: Cy/Flu   - Day -2: Rest day   - Day -1: IT Dex   - Day 0: CAR T infusion (Abecma)   - Day " +6: IT Dex   - Day +13: possible IT Dex (determined by PI and primary MD)   - Simvastatin daily 40mg until day day +30   - GM-CSF should NOT be given through out study   - Re-staging plan: Unable to sign re-staging labs of treatment plan without specific tests for future bone marrow biopsies      NEUROTOXICITY   - ICE score: 10/10   - Mild headaches today worse when leaning forward, relieved by leaning back, 4/10, describes as a pressure. Tramadol PRN started for pain relief.     CRS  - Grade: 0  - Afebrile since 16:30 on 1/5   - Blood cx- NGTD, urine cx- NGTD, CXR- no acute airspace disease, ferritin- 286, CRP- 13.5, LDH: 216. Tocilizumab given x1 dose on 1/5. Cefepime discontinued on 1/8.   - Continue to monitor daily CRP & Ferritin      HEME/COAG  - Pancytopenic due to chemotherapy  - Transfusion parameters: hemoglobin <7, platelets <10     IMMUNOCOMPROMISED  - Prophylaxis plan: ACV, Fluconazole, and Levaquin   - Pentamidine INH + albuterol one time   - Active infections: None     CARDIOVASCULAR  - Risk of cardiomyopathy:  Baseline LVEF: 55-60%   - Risk of arrhythmia: Baseline EKG showed sinus rhythm nonspecific T wave abnormality  - Hx of PVC ablation March 2022      RESPIRATORY  - Risk of respiratory complications: Frequent ambulation and incentive spirometer.     GI/NUTRITION  - Ulcer prophylaxis: Protonix   - Risk of nausea/vomiting due to chemo: compazine and ativan   - History of IBS: PEG ordered daily PRN      RENAL/ELECTROLYTES/  - Risk of renal injury: IV hydration   - Electrolyte management: replace per sliding scale     DIABETES/ENDOCRINE  - Risk of steroid-induced hyperglyemia: Monitor BG, sliding scale if needed     MUSCULOSKELETAL/FRAILTY  - Baseline frailty score: 0   - Patient with substantial risk of sarcopenia  - Daily PT/OT as needed while inpatient  - Cancer Rehab as needed outpatient     SYMPTOM MANAGEMENT  - Nausea from chemo: compazine and ativan.     - # Pain Assessment:  Current Pain  Score 1/4/2023   Patient currently in pain? no   Pain score (0-10) -   Kristie hill pain level was assessed and she currently denies pain.          SOCIAL DETERMINANTS  - Caregiver: Jesse Cornejo (Spouse) & Misti Preciado (sister)      DISPOSITION  - Discharge on day +7, unless there are complications from neurotoxicity or CRS         Known issues that I take into account for medical decisions, with salient changes to the plan considering these complexities noted above.         Patient Active Problem List   Diagnosis     Allergic rhinitis     Anxiety state     Irritable bowel syndrome     Hemorrhoids     Sciatica     Encounter for insertion or removal of intrauterine contraceptive device     CARDIOVASCULAR SCREENING; LDL GOAL LESS THAN 160     Pelvic mass     Plasmacytoma of bone (H)     Light chain myeloma (H)     Dehydration     Hypogammaglobulinemia (H)     Multiple myeloma not having achieved remission (H)         Clinically Significant Risk Factors Present on Admission                                         Today's summary:      I spent 30 minutes in the care of this patient today, which included time necessary for preparation for the visit, obtaining history, ordering medications/tests/procedures as medically indicated, review of pertinent medical literature, counseling of the patient, communication of recommendations to the care team, and documentation time.     Duy Christine PA-C   Pager: [phone]    Physician Attestation     I saw and evaluated Kristie Cornejo as part of a shared APRN/PA visit. I personally performed the substantive portion of the medical decision making for this visit - please see the SULEIMAN s documentation for full details.    On the date of service, 01/09/2023, I spent 30 minutes on the patient unit personally reviewing medical records and medications, reviewing vital signs, labs, and imaging results as summarized above, discussing the patient's case on rounds with the SULEIMAN, obtaining a  history from the patient, performing a physical exam, counseling and educating the patient on the diagnosis and treatment, evaluating a potentially life or organ threatening problem, intensively monitoring treatments with high risk of toxicity, coordinating care and documenting in the electronic medical record.    Key management decisions made by me and carried out under my direction include:   51 yo F with IgG kappa MM, high risk with +1q. Now D+5 s/p Abecma CAR-T (idecabtagene vicleucel) after receiving LD chemo as outpt. CRS 1 with fevers, anxiety on D+1, got tocilizumab x 1. Doing much better today with only mild symptoms. CRP is down to 13 and ferritin is stabilized. Continue to monitor for recrudescent CRS and onset of ICANS. Also on the ICANS prophylaxis study. IT dex tomorrow. If fever recurs, call Dr. Walker and redose tocilizumab, will consider whether to continue q8 x 4 doses.    Thank you for allowing me to participate in the care of this patient. Please do not hesitate to contact me if there are any concerns or questions.     Lacho Walker MD   of Medicine  Classical Hematology and Blood and Marrow Transplantation  Division of Hematology, Oncology, and Transplantation  [facility]  Pager [phone]

## 2023-01-09 NOTE — PLAN OF CARE
Problem: Plan of Care - These are the overarching goals to be used throughout the patient stay.    Goal: Plan of Care Review  Description: The Plan of Care Review/Shift note should be completed every shift.  The Outcome Evaluation is a brief statement about your assessment that the patient is improving, declining, or no change.  This information will be displayed automatically on your shift note.  Outcome: Progressing   Goal Outcome Evaluation:  Denied nausea, denied pain.  Ate well. TPA instilled to gray and white ports; both still a little sluggish, but have good blood return.  Melatonin at HS.  Will discharge Monday or Tuesday (scheduled for LP that day).

## 2023-01-09 NOTE — PLAN OF CARE
VSS. Afebrile. A&Ox4. Reported not sleeping well, requested ativan and then sleeping improved. Denies pain. Denies nausea. Up independently. One stool overnight. Replace magnesium this am, recheck tomorrow.     Problem: Plan of Care - These are the overarching goals to be used throughout the patient stay.    Goal: Plan of Care Review  Description: The Plan of Care Review/Shift note should be completed every shift.  The Outcome Evaluation is a brief statement about your assessment that the patient is improving, declining, or no change.  This information will be displayed automatically on your shift note.  Outcome: Progressing  Goal: Absence of Hospital-Acquired Illness or Injury  Outcome: Progressing  Intervention: Identify and Manage Fall Risk  Recent Flowsheet Documentation  Taken 1/9/2023 0000 by Meche Prabhakar RN  Safety Promotion/Fall Prevention:   assistive device/personal items within reach   clutter free environment maintained   lighting adjusted   nonskid shoes/slippers when out of bed  Intervention: Prevent Skin Injury  Recent Flowsheet Documentation  Taken 1/9/2023 0000 by Meche Prabhakar RN  Body Position: position changed independently  Intervention: Prevent and Manage VTE (Venous Thromboembolism) Risk  Recent Flowsheet Documentation  Taken 1/9/2023 0000 by Meche Prabhakar RN  VTE Prevention/Management: SCDs (sequential compression devices) off  Goal: Optimal Comfort and Wellbeing  Outcome: Progressing  Goal: Readiness for Transition of Care  Outcome: Progressing     Problem: Stem Cell/Bone Marrow Transplant  Goal: Optimal Coping with Transplant  Outcome: Progressing  Goal: Symptom-Free Urinary Elimination  Outcome: Progressing  Goal: Diarrhea Symptom Control  Outcome: Progressing  Goal: Improved Activity Tolerance  Outcome: Progressing  Intervention: Promote Improved Energy  Recent Flowsheet Documentation  Taken 1/9/2023 0000 by Meche Prabhakar RN  Activity Management:   activity adjusted per tolerance   activity  encouraged   ambulated outside room  Goal: Blood Counts Within Acceptable Range  Outcome: Progressing  Intervention: Monitor and Manage Hematologic Symptoms  Recent Flowsheet Documentation  Taken 1/9/2023 0000 by Meche Prabhakar RN  Bleeding Precautions:   blood pressure closely monitored   foot protection facilitated   gentle oral care promoted   monitored for signs of bleeding   coagulation study results reviewed  Medication Review/Management:   medications reviewed   high-risk medications identified  Goal: Absence of Hypersensitivity Reaction  Outcome: Progressing  Goal: Absence of Infection  Outcome: Progressing  Intervention: Prevent and Manage Infection  Recent Flowsheet Documentation  Taken 1/9/2023 0000 by Meche Prabhakar RN  Isolation Precautions: protective environment maintained  Goal: Improved Oral Mucous Membrane Health and Integrity  Outcome: Progressing  Goal: Nausea and Vomiting Symptom Relief  Outcome: Progressing  Goal: Optimal Nutrition Intake  Outcome: Progressing   Goal Outcome Evaluation:

## 2023-01-10 ENCOUNTER — APPOINTMENT (OUTPATIENT)
Dept: GENERAL RADIOLOGY | Facility: CLINIC | Age: 53
DRG: 018 | End: 2023-01-10
Payer: COMMERCIAL

## 2023-01-10 DIAGNOSIS — C90.00 MULTIPLE MYELOMA, REMISSION STATUS UNSPECIFIED (H): Primary | ICD-10-CM

## 2023-01-10 DIAGNOSIS — Z92.89 HISTORY OF ENGINEERED CELL THERAPY INFUSION: ICD-10-CM

## 2023-01-10 DIAGNOSIS — R53.81 PHYSICAL DECONDITIONING: ICD-10-CM

## 2023-01-10 LAB
ABO/RH(D): ABNORMAL
ALBUMIN SERPL BCG-MCNC: 3.9 G/DL (ref 3.5–5.2)
ALP SERPL-CCNC: 43 U/L (ref 35–104)
ALT SERPL W P-5'-P-CCNC: 31 U/L (ref 10–35)
ANION GAP SERPL CALCULATED.3IONS-SCNC: 12 MMOL/L (ref 7–15)
ANTIBODY SCREEN, TUBE: NORMAL
ANTIBODY SCREEN: POSITIVE
AST SERPL W P-5'-P-CCNC: 26 U/L (ref 10–35)
BASOPHILS # BLD MANUAL: 0 10E3/UL (ref 0–0.2)
BASOPHILS NFR BLD MANUAL: 1 %
BILIRUB DIRECT SERPL-MCNC: <0.2 MG/DL (ref 0–0.3)
BILIRUB SERPL-MCNC: 0.3 MG/DL
BUN SERPL-MCNC: 22.1 MG/DL (ref 6–20)
BURR CELLS BLD QL SMEAR: SLIGHT
CALCIUM SERPL-MCNC: 8.6 MG/DL (ref 8.6–10)
CHLORIDE SERPL-SCNC: 108 MMOL/L (ref 98–107)
CK SERPL-CCNC: 23 U/L (ref 26–192)
CREAT SERPL-MCNC: 0.7 MG/DL (ref 0.51–0.95)
CRP SERPL-MCNC: 7.96 MG/L
DEPRECATED HCO3 PLAS-SCNC: 20 MMOL/L (ref 22–29)
ELLIPTOCYTES BLD QL SMEAR: SLIGHT
EOSINOPHIL # BLD MANUAL: 0.1 10E3/UL (ref 0–0.7)
EOSINOPHIL NFR BLD MANUAL: 12 %
ERYTHROCYTE [DISTWIDTH] IN BLOOD BY AUTOMATED COUNT: 13 % (ref 10–15)
FERRITIN SERPL-MCNC: 270 NG/ML (ref 11–328)
GFR SERPL CREATININE-BSD FRML MDRD: >90 ML/MIN/1.73M2
GLUCOSE SERPL-MCNC: 104 MG/DL (ref 70–99)
HCT VFR BLD AUTO: 30.6 % (ref 35–47)
HGB BLD-MCNC: 10.2 G/DL (ref 11.7–15.7)
LYMPHOCYTES # BLD MANUAL: 0.2 10E3/UL (ref 0.8–5.3)
LYMPHOCYTES NFR BLD MANUAL: 18 %
MAGNESIUM SERPL-MCNC: 2.1 MG/DL (ref 1.7–2.3)
MCH RBC QN AUTO: 30.8 PG (ref 26.5–33)
MCHC RBC AUTO-ENTMCNC: 33.3 G/DL (ref 31.5–36.5)
MCV RBC AUTO: 92 FL (ref 78–100)
MONOCYTES # BLD MANUAL: 0.3 10E3/UL (ref 0–1.3)
MONOCYTES NFR BLD MANUAL: 31 %
NEUTROPHILS # BLD MANUAL: 0.3 10E3/UL (ref 1.6–8.3)
NEUTROPHILS NFR BLD MANUAL: 38 %
PHOSPHATE SERPL-MCNC: 3.9 MG/DL (ref 2.5–4.5)
PLAT MORPH BLD: ABNORMAL
PLATELET # BLD AUTO: 91 10E3/UL (ref 150–450)
POTASSIUM SERPL-SCNC: 3.7 MMOL/L (ref 3.4–5.3)
PROT SERPL-MCNC: 5.5 G/DL (ref 6.4–8.3)
RBC # BLD AUTO: 3.31 10E6/UL (ref 3.8–5.2)
RBC MORPH BLD: ABNORMAL
SODIUM SERPL-SCNC: 140 MMOL/L (ref 136–145)
SPECIMEN EXPIRATION DATE: ABNORMAL
SPECIMEN EXPIRATION DATE: NORMAL
WBC # BLD AUTO: 0.9 10E3/UL (ref 4–11)

## 2023-01-10 PROCEDURE — 96450 CHEMOTHERAPY INTO CNS: CPT

## 2023-01-10 PROCEDURE — 009U3ZX DRAINAGE OF SPINAL CANAL, PERCUTANEOUS APPROACH, DIAGNOSTIC: ICD-10-PCS | Performed by: RADIOLOGY

## 2023-01-10 PROCEDURE — 82550 ASSAY OF CK (CPK): CPT

## 2023-01-10 PROCEDURE — 84100 ASSAY OF PHOSPHORUS: CPT | Performed by: INTERNAL MEDICINE

## 2023-01-10 PROCEDURE — 82248 BILIRUBIN DIRECT: CPT

## 2023-01-10 PROCEDURE — 83735 ASSAY OF MAGNESIUM: CPT | Performed by: INTERNAL MEDICINE

## 2023-01-10 PROCEDURE — 99233 SBSQ HOSP IP/OBS HIGH 50: CPT | Mod: 25

## 2023-01-10 PROCEDURE — 206N000001 HC R&B BMT UMMC

## 2023-01-10 PROCEDURE — 3E0R305 INTRODUCTION OF OTHER ANTINEOPLASTIC INTO SPINAL CANAL, PERCUTANEOUS APPROACH: ICD-10-PCS | Performed by: INTERNAL MEDICINE

## 2023-01-10 PROCEDURE — 82310 ASSAY OF CALCIUM: CPT

## 2023-01-10 PROCEDURE — 250N000009 HC RX 250: Performed by: RADIOLOGY

## 2023-01-10 PROCEDURE — 99207 PR SC NO CHARGE VISIT: CPT | Performed by: INTERNAL MEDICINE

## 2023-01-10 PROCEDURE — 85027 COMPLETE CBC AUTOMATED: CPT

## 2023-01-10 PROCEDURE — 250N000013 HC RX MED GY IP 250 OP 250 PS 637: Performed by: STUDENT IN AN ORGANIZED HEALTH CARE EDUCATION/TRAINING PROGRAM

## 2023-01-10 PROCEDURE — 82728 ASSAY OF FERRITIN: CPT | Performed by: INTERNAL MEDICINE

## 2023-01-10 PROCEDURE — 86140 C-REACTIVE PROTEIN: CPT | Performed by: INTERNAL MEDICINE

## 2023-01-10 PROCEDURE — 85007 BL SMEAR W/DIFF WBC COUNT: CPT

## 2023-01-10 PROCEDURE — 96450 CHEMOTHERAPY INTO CNS: CPT | Performed by: INTERNAL MEDICINE

## 2023-01-10 PROCEDURE — 86901 BLOOD TYPING SEROLOGIC RH(D): CPT

## 2023-01-10 PROCEDURE — 77003 FLUOROGUIDE FOR SPINE INJECT: CPT | Mod: 26 | Performed by: RADIOLOGY

## 2023-01-10 PROCEDURE — 96450 CHEMOTHERAPY INTO CNS: CPT | Mod: GC | Performed by: RADIOLOGY

## 2023-01-10 PROCEDURE — 250N000013 HC RX MED GY IP 250 OP 250 PS 637

## 2023-01-10 PROCEDURE — 250N000011 HC RX IP 250 OP 636: Performed by: INTERNAL MEDICINE

## 2023-01-10 RX ORDER — LYSINE HCL 500 MG
TABLET ORAL
COMMUNITY
Start: 2023-01-10 | End: 2023-01-23

## 2023-01-10 RX ORDER — PROCHLORPERAZINE MALEATE 10 MG
5 TABLET ORAL EVERY 6 HOURS PRN
COMMUNITY
Start: 2023-01-10

## 2023-01-10 RX ORDER — LIDOCAINE HYDROCHLORIDE 10 MG/ML
10 INJECTION, SOLUTION EPIDURAL; INFILTRATION; INTRACAUDAL; PERINEURAL ONCE
Status: COMPLETED | OUTPATIENT
Start: 2023-01-10 | End: 2023-01-10

## 2023-01-10 RX ORDER — MULTIVITAMIN
TABLET ORAL DAILY
COMMUNITY
Start: 2023-01-10

## 2023-01-10 RX ORDER — ACETAMINOPHEN 325 MG/1
325-650 TABLET ORAL EVERY 4 HOURS PRN
COMMUNITY
Start: 2023-01-10

## 2023-01-10 RX ORDER — FLUTICASONE PROPIONATE 50 MCG
1 SPRAY, SUSPENSION (ML) NASAL DAILY PRN
COMMUNITY
Start: 2023-01-10 | End: 2023-01-23

## 2023-01-10 RX ORDER — POTASSIUM CHLORIDE 750 MG/1
TABLET, EXTENDED RELEASE ORAL
Qty: 360 TABLET | Refills: 3 | COMMUNITY
Start: 2023-01-10 | End: 2023-01-18

## 2023-01-10 RX ORDER — LACTOBACILLUS RHAMNOSUS GG 10B CELL
CAPSULE ORAL
COMMUNITY
Start: 2023-01-10 | End: 2023-01-18

## 2023-01-10 RX ADMIN — LIDOCAINE HYDROCHLORIDE 30 ML: 10 INJECTION, SOLUTION EPIDURAL; INFILTRATION; INTRACAUDAL; PERINEURAL at 12:58

## 2023-01-10 RX ADMIN — TRAMADOL HYDROCHLORIDE 25 MG: 50 TABLET ORAL at 21:04

## 2023-01-10 RX ADMIN — LEVOFLOXACIN 250 MG: 250 TABLET, FILM COATED ORAL at 08:00

## 2023-01-10 RX ADMIN — ALLOPURINOL 300 MG: 300 TABLET ORAL at 07:57

## 2023-01-10 RX ADMIN — ACYCLOVIR 800 MG: 800 TABLET ORAL at 07:57

## 2023-01-10 RX ADMIN — Medication 15 ML: at 04:04

## 2023-01-10 RX ADMIN — PANTOPRAZOLE SODIUM 40 MG: 40 TABLET, DELAYED RELEASE ORAL at 07:57

## 2023-01-10 RX ADMIN — Medication 40 MG: at 07:59

## 2023-01-10 RX ADMIN — Medication 5 ML: at 15:19

## 2023-01-10 RX ADMIN — FLUCONAZOLE 200 MG: 200 TABLET ORAL at 07:56

## 2023-01-10 RX ADMIN — ACYCLOVIR 800 MG: 800 TABLET ORAL at 20:47

## 2023-01-10 RX ADMIN — Medication 6 MG: at 21:04

## 2023-01-10 ASSESSMENT — ACTIVITIES OF DAILY LIVING (ADL)
ADLS_ACUITY_SCORE: 25

## 2023-01-10 NOTE — PROGRESS NOTES
"  BMT Progress note  BMT Physician: Dr. Renioso   Transplant Type: CAR-T   Preparative Regimen: Cy/Flu   Transplant Date: 1/4/2023  Chief complaint: Kristie Cornejo is a 52 year old female with a history of multiple myeloma conditioned with Cy/Flu, currently day +6 of her CAR T-cell therapy with Abecma.    INTERIM HISTORY: Has been afebrile since 16:30 on 1/5. No headaches today and she hasn't needed the tramadol since 1/8. Has been using ativan to sleep at night. She rarely uses ativan at home. Otherwise she is feeling pretty much back to normal. Had a rash on her back she attributes to CHG, so she is avoiding it.    REVIEW OF SYSTEMS: Otherwise unremarkable other than what is noted in the Interim History.   PHYSICAL EXAM:   Vitals:  /88 (BP Location: Right arm)   Pulse 92   Temp 98.9  F (37.2  C) (Oral)   Resp 18   Ht 1.69 m (5' 6.54\")   Wt 68.8 kg (151 lb 11.2 oz)   LMP  (LMP Unknown)   SpO2 99%   BMI 24.09 kg/m      General Appearance: NAD  HEENT: sclera anicteric. Moist mucus membranes, no ulcerations.  CV: RRR. no murmur or rub.   RESP: CTA bilaterally; no rales or wheezes.   GI: +BS, soft, nontender, nondistended.   EXT: No edema. No cyanosis/clubbing.   SKIN: mild faint diffuse rash on back  NEURO: A&O x3   PSYCH: Appropriate affect   VASCULAR ACCES: dressing CDI.     ROUTINE LABS:    Lab Results   Component Value Date    WBC 0.9 (LL) 01/10/2023    ANEU 0.3 (LL) 01/10/2023    HGB 10.2 (L) 01/10/2023    HCT 30.6 (L) 01/10/2023    PLT 91 (L) 01/10/2023     01/10/2023    POTASSIUM 3.7 01/10/2023    CHLORIDE 108 (H) 01/10/2023    CO2 20 (L) 01/10/2023     (H) 01/10/2023    BUN 22.1 (H) 01/10/2023    CR 0.70 01/10/2023    MAG 2.1 01/10/2023    INR 1.06 01/09/2023          ASSESSMENT AND PLAN:     Kristie Cornejo is a 52 year old female with a history of multiple myeloma conditioned with Cy/Flu, currently day +6 of her CAR T-cell therapy with Abecma.        CAR T Protocol   - Day " -5 through -3: Cy/Flu   - Day -2: Rest day   - Day -1: IT Dex   - Day 0: CAR T infusion (Abecma)   - Day +6: IT Dex   - Day +13: possible IT Dex (determined by PI and primary MD)   - Simvastatin daily 40mg until day day +30   - GM-CSF should NOT be given through out study   - Re-staging plan: Unable to sign re-staging labs of treatment plan without specific tests for future bone marrow biopsies      NEUROTOXICITY   - ICE score: 10/10   - Mild headaches improved. Tramadol PRN.     CRS  - Grade: 0  - Afebrile since 16:30 on 1/5   - Blood cx- NGTD, urine cx- NGTD, CXR- no acute airspace disease, ferritin- 286, CRP- 13.5, LDH: 216. Tocilizumab given x1 dose on 1/5. Cefepime discontinued on 1/8.   - Continue to monitor daily CRP & Ferritin, both trending down now.     HEME/COAG  - Pancytopenic due to chemotherapy  - Transfusion parameters: hemoglobin <7, platelets <10     IMMUNOCOMPROMISED  - Prophylaxis plan: ACV, Fluconazole, and Levaquin   - Pentamidine INH + albuterol one time   - Active infections: None     CARDIOVASCULAR  - Risk of cardiomyopathy:  Baseline LVEF: 55-60%   - Risk of arrhythmia: Baseline EKG showed sinus rhythm nonspecific T wave abnormality  - Hx of PVC ablation March 2022      RESPIRATORY  - Risk of respiratory complications: Frequent ambulation and incentive spirometer.     GI/NUTRITION  - Ulcer prophylaxis: Protonix   - Risk of nausea/vomiting due to chemo: compazine and ativan   - History of IBS: PEG ordered daily PRN      RENAL/ELECTROLYTES/  - Risk of renal injury: IV hydration   - Electrolyte management: replace per sliding scale     DIABETES/ENDOCRINE  - Risk of steroid-induced hyperglyemia: Monitor BG, sliding scale if needed     MUSCULOSKELETAL/FRAILTY  - Baseline frailty score: 0   - Patient with substantial risk of sarcopenia  - Daily PT/OT as needed while inpatient  - Cancer Rehab as needed outpatient     SYMPTOM MANAGEMENT  - Nausea from chemo: compazine and ativan.     - # Pain  Assessment:  Current Pain Score 1/4/2023   Patient currently in pain? no   Pain score (0-10) -   Kristie hill pain level was assessed and she currently denies pain.          SOCIAL DETERMINANTS  - Caregiver: Jesse Cornejo (Spouse) & Misti Preciado (sister)      DISPOSITION  - Discharge on day +7, unless there are complications from neurotoxicity or CRS         Known issues that I take into account for medical decisions, with salient changes to the plan considering these complexities noted above.         Patient Active Problem List   Diagnosis     Allergic rhinitis     Anxiety state     Irritable bowel syndrome     Hemorrhoids     Sciatica     Encounter for insertion or removal of intrauterine contraceptive device     CARDIOVASCULAR SCREENING; LDL GOAL LESS THAN 160     Pelvic mass     Plasmacytoma of bone (H)     Light chain myeloma (H)     Dehydration     Hypogammaglobulinemia (H)     Multiple myeloma not having achieved remission (H)         Clinically Significant Risk Factors Present on Admission        Today's summary:      I spent 30 minutes in the care of this patient today, which included time necessary for preparation for the visit, obtaining history, ordering medications/tests/procedures as medically indicated, review of pertinent medical literature, counseling of the patient, communication of recommendations to the care team, and documentation time.     Duy Christine PA-C   Pager: [phone]    Physician Attestation     I saw and evaluated Kristie Cornejo as part of a shared APRN/PA visit. I personally performed the substantive portion of the medical decision making for this visit - please see the SULEIMAN s documentation for full details.    On the date of service, 01/10/2023, I spent 30 minutes on the patient unit personally reviewing medical records and medications, reviewing vital signs, labs, and imaging results as summarized above, discussing the patient's case on rounds with the SULEIMAN, obtaining a  history from the patient, performing a physical exam, counseling and educating the patient on the diagnosis and treatment, evaluating a potentially life or organ threatening problem, intensively monitoring treatments with high risk of toxicity, coordinating care and documenting in the electronic medical record.    Key management decisions made by me and carried out under my direction include:   53 yo F with IgG kappa MM, high risk with +1q. Now D+6 s/p Abecma CAR-T (idecabtagene vicleucel) after receiving LD chemo as outpt. CRS 1 with fevers, anxiety on D+1, got tocilizumab x 1. Doing much better today with only mild symptoms. CRP is down to 7 and ferritin is decreasing now. Continue to monitor for recrudescent CRS and onset of ICANS. Also on the ICANS prophylaxis study. IT dex today. If fever recurs, call Dr. Walker and redose tocilizumab. Otherwise she is ready for discharge tomorrow.    Thank you for allowing me to participate in the care of this patient. Please do not hesitate to contact me if there are any concerns or questions.     Lacho Walker MD   of Medicine  Classical Hematology and Blood and Marrow Transplantation  Division of Hematology, Oncology, and Transplantation  [facility]  Pager [phone]

## 2023-01-10 NOTE — PLAN OF CARE
"Pt is AOx4 and independent. VSS and no complaints of pain. She had an LP today with dex intrathecally.  Plan to discharge tomorrow.      Problem: Plan of Care - These are the overarching goals to be used throughout the patient stay.    Goal: Plan of Care Review  Description: The Plan of Care Review/Shift note should be completed every shift.  The Outcome Evaluation is a brief statement about your assessment that the patient is improving, declining, or no change.  This information will be displayed automatically on your shift note.  Outcome: Progressing  Flowsheets (Taken 1/10/2023 1512)  Plan of Care Reviewed With: patient  Goal: Patient-Specific Goal (Individualized)  Description: You can add care plan individualizations to a care plan. Examples of Individualization might be:  \"Parent requests to be called daily at 9am for status\", \"I have a hard time hearing out of my right ear\", or \"Do not touch me to wake me up as it startles me\".  Outcome: Progressing  Goal: Absence of Hospital-Acquired Illness or Injury  Outcome: Progressing  Intervention: Identify and Manage Fall Risk  Recent Flowsheet Documentation  Taken 1/10/2023 0804 by Catalina Ventura, RN  Safety Promotion/Fall Prevention:   assistive device/personal items within reach   clutter free environment maintained   lighting adjusted   nonskid shoes/slippers when out of bed  Intervention: Prevent Skin Injury  Recent Flowsheet Documentation  Taken 1/10/2023 0804 by Catalina Ventura, RN  Body Position: position changed independently  Goal: Optimal Comfort and Wellbeing  Outcome: Progressing  Goal: Readiness for Transition of Care  Outcome: Progressing   Goal Outcome Evaluation:      Plan of Care Reviewed With: patient                 "

## 2023-01-10 NOTE — PLAN OF CARE
"Alert and oriented x 4. Denies  pain and nausea. Rash in back. Up independently to the BR and voiding. PICC lumens hep locked. No replacements needed this morning.  Problem: Plan of Care - These are the overarching goals to be used throughout the patient stay.    Goal: Plan of Care Review  Description: The Plan of Care Review/Shift note should be completed every shift.  The Outcome Evaluation is a brief statement about your assessment that the patient is improving, declining, or no change.  This information will be displayed automatically on your shift note.  Outcome: Progressing  Goal: Patient-Specific Goal (Individualized)  Description: You can add care plan individualizations to a care plan. Examples of Individualization might be:  \"Parent requests to be called daily at 9am for status\", \"I have a hard time hearing out of my right ear\", or \"Do not touch me to wake me up as it startles me\".  Outcome: Progressing  Goal: Absence of Hospital-Acquired Illness or Injury  Outcome: Progressing  Intervention: Identify and Manage Fall Risk  Recent Flowsheet Documentation  Taken 1/10/2023 0000 by Taylor Ramesh RN  Safety Promotion/Fall Prevention: assistive device/personal items within reach  Intervention: Prevent Skin Injury  Recent Flowsheet Documentation  Taken 1/10/2023 0000 by Taylor Ramesh RN  Body Position: position changed independently  Intervention: Prevent and Manage VTE (Venous Thromboembolism) Risk  Recent Flowsheet Documentation  Taken 1/10/2023 0000 by Taylor Ramesh RN  VTE Prevention/Management: SCDs (sequential compression devices) off  Goal: Optimal Comfort and Wellbeing  Outcome: Progressing  Goal: Readiness for Transition of Care  Outcome: Progressing   Goal Outcome Evaluation:                        "

## 2023-01-10 NOTE — PLAN OF CARE
Problem: Plan of Care - These are the overarching goals to be used throughout the patient stay.    Goal: Readiness for Transition of Care  Outcome: Progressing   Goal Outcome Evaluation:  S-pt up in room with visitor and conversive on handheld with family. Drinking fluids well. Up in fluids by 800cc at this time. In and out documentation at 10am to 1300 pt stated was tabbed on her board but not transferrd to chart thus inaccurate iv fluid info. Wt unchange since admission. Reports needing an LP on tures and possible dicharge afterwards. Reports skin itchin since yeaterday and associates it with chg wipes. Wants to do one arm tonight to see it itchiness returns. Lungs clear vss reports biting inside of upper lip that aches-skin intact and not inflamed at this time.   B-post cell therapy day 5  A-pt self care with stable vitals and comfort  R-intermitant assessment on going with focus on neuro / temp changes.    1-09-pt developed reddened puritic skin on area that 1 chg wipe was applied -pt  had to wash it off to control itchiness  No CHG wipes

## 2023-01-10 NOTE — PROGRESS NOTES
"Care Management Discharge Note    Discharge Date: 01/11/2023     Discharge Disposition: Home  [address]   [facility] [address]    Discharge Services/DME: See RNCC notes for community discharge details.    Discharge Transportation: family or friend will provide    Private pay costs discussed: See RNCC notes for community discharge details.    PAS Confirmation Code:  N/A  Patient/family educated on Medicare website which has current facility and service quality ratings:See RNCC    Education Provided on the Discharge Plan:  Yes  Persons Notified of Discharge Plans: Pt, Pt's spouse, IDT  Patient/Family in Agreement with the Plan: yes    Handoff Referral Completed: TBD    Additional Information:  BMT SOCIAL WORK DISCHARGE NOTE:    Focus: Discharge Plan    Data: Kristie \"Margo\" MARLO Cornejo ([id number]) is a 52 year old year old female who received infusion on 1/4, currently day +6 of CAR-T cell therapy Abecma     Interventions: Per Med Team, pt is anticipated to be medically stable for discharge tomorrow 1/11/23. SW met with pt at bedside to assess coping, provide psychosocial support and provide discharge packet with post-transplant resources for patient and caregiver. Pt shared that she is feeling well and is surprised to have had 1 main day of adverse symptoms. Pt shared that she is grateful to be feeling well at this point. Pt processed her hopes for the future and decisions to be made regarding her employment in the future. Pt discussed that it was difficult to send her daughter off to college after the holiday break. Pt discussed her caregiver plan and how she and her  are working together to ensure he's taking care of his own needs as well. Pt identified 3 friends that will be assisting her and her  as well. SW provided empathetic listening, validation of concerns, and encouragement. SW encouraged pt to contact SW for support, questions and/or resources.     Education Provided: Discharge Resource " Packet, Caregiver Self-Care Education, and Expected Emotional Responses to Transition Home.    Assessment: Pt presented as pleasant and engaged. Pt appears to be coping appropriately. Pt continues to be supported by her  and friends.    Plan: Pt to discharge Home ([place]) with her  and friends as primary caregiver. SW will continue to provide psychosocial support and assistance with resources as needed. SW will continue to collaborate with multidisciplinary team regarding pt's plan of care.     SARAH Cabral, [facility]  Adult Blood & Marrow Transplant   Phone: [phone]  Pager: [phone]

## 2023-01-10 NOTE — PROCEDURES
Hem/Onc Procedure Note          Intrathecal Chemotherapy Administration      Time: 2:33 PM  Performed by: Lacho Walker MD  Authorized by: Lacho Walker MD    Indications: multiple myeloma s/p Abecma CAR-T, on neurotoxicity prophylaxis study    Consent given by: Patient who states understanding of the procedure being performed after discussing the risks, benefits and alternatives.    Prior to the start of the procedure and with procedural staff participation, I verbally confirmed the patient s identity using two indicators, relevant allergies, that the procedure was appropriate and matched the consent or emergent situation, and that the correct equipment was available. Chemotherapy was double-checked with RN in the radiology suite. Immediately prior to starting the procedure I conducted the Time Out with the procedural staff and re-confirmed the patient s name, procedure, and site/side. (The Joint Commission universal protocol was followed.) Yes    Lumbar puncture and CSF samples obtained by IR staff. Intrathecal chemotherapy with 8 mg dexamethasone in 6 ml PFNS was slowly infused through the spinal needle without apparent complication.    Complications:  None    Patient tolerance: Patient tolerated the procedure well with no immediate complications.    Lacho Walker MD  Hematology and BMT  [phone]

## 2023-01-10 NOTE — PROGRESS NOTES
"BMT Daily CARTOX Note (complete days 0-14 and with any subsequent CRS/neurotoxicity)    Date: January 10, 2023    Kristie Cornejo ([id number]) is a 52 year old year old female who received infusion on 1/4, currently day +6 of CAR-T cell therapy Abecma     Vital Signs: /88 (BP Location: Right arm)   Pulse 92   Temp 98.9  F (37.2  C) (Oral)   Resp 18   Ht 1.69 m (5' 6.54\")   Wt 68.8 kg (151 lb 11.2 oz)   LMP  (LMP Unknown)   SpO2 99%   BMI 24.09 kg/m      Organ CAR-T toxicity:    None    Overall CRS grade 0     CRS Parameter Grade 1 Grade 2 Grade 3 Grade 4   Fever* Tm >= 100.4 degrees F Tm >= 100.4 degrees F Tm >= 100.4 degrees F Tm >= to 100.4 degrees F       With Either:  Hypotension None Responsive to Fluids Requiring 1 vasopressor (w/ or w/o vasopressin) Requiring multiple vasopressors (excluding vasopressin)     And/or  Hypoxia None Low-flow nasal cannula or blow-by High-flow nasal cannula, face mask, non-rebreather mask, or Venturi mask Requiring positive pressure (CPAP, BiPAP intubation and mechanical ventilation)       * Definition of High Dose Pressors for Grade 4  Vasopressor Duration >= 3 hours   Norepinephrine monotherapy >= 20 mcg/kg/minute   Dopamine monotherapy >= 10 mcg/kg/minute   Phenylephrine monotherapy >= 200 mcg/minute   Epinephrine monotherapy >= 10 mcg/minute   If on vasopressin + another agent High dose of vasopressin + norepi equivalent (using VASST formula below) is >= 10 mcg/minute   If on combination vasopressors, not including vasopressin Norepi equivalent of > 20 mcg/minute (using VASST formula below)     VASST formula: Norepi equivalent dose = [NE (mcg/min)] + [dopamine (mcg/min) / 2] + [epinephrine (mcg/min)] + [phenylephrine (mcg/min) / 10]    ICE Assessment  Orientation to year, month, city, hospital: 4 points  Name 3 objects (e.g., point to clock, pen, button): 3 points  Following commands: (e.g., Show me 2 fingers): 1 point   Write a standard sentence (e.g., The " silver jumped over the log): 1 point   Count backwards from 100 by ten: 1 point  Total points: 10: No impairment    CRS Summary  Does patient have CRS? No  Current CRS stgstrstastdstest:st st1st What is the maximum severity to date: 1  What therapy was given: Tocilizumab   Has CRS grade changed? Yes, new grade and date of change: 1 on 1/5  Has CRS resolved? Yes, date of resolution: 1/5    Neurotoxicity Summary  Does patient have neurotoxicity? No  Current Neurotoxicity score (0-10): 0  What is the maximum severity to date:  0  What therapy was given: NA  Has neurotoxicity resolved? NA    Jeffrey Christine PA-C   Pager: [phone]

## 2023-01-10 NOTE — PROGRESS NOTES
"  BMT Progress note  BMT Physician: Dr. Reinoso   Transplant Type: CAR-T   Preparative Regimen: Cy/Flu   Transplant Date: 1/4/2023  Chief complaint: Kristie Cornejo is a 52 year old female with a history of multiple myeloma conditioned with Cy/Flu, currently day +6 of her CAR T-cell therapy with Abecma.    INTERIM HISTORY: Has been afebrile since 16:30 on 1/5. Developed a mild rash on her upper back that she attributes to her CHG wipes. She developed a similar rash on her chest from using them in the past. She said its not very bothersome and does not want any intervention for it. No headaches today when I saw her on morning rounds.     REVIEW OF SYSTEMS: Otherwise unremarkable other than what is noted in the Interim History.   PHYSICAL EXAM:   Vitals:  /73 (BP Location: Right arm)   Pulse 81   Temp 97.9  F (36.6  C) (Axillary)   Resp 18   Ht 1.69 m (5' 6.54\")   Wt 68.8 kg (151 lb 11.2 oz)   LMP  (LMP Unknown)   SpO2 99%   BMI 24.09 kg/m      General Appearance: NAD  HEENT: sclera anicteric. Moist mucus membranes, no ulcerations.  CV: RRR. no murmur or rub.   RESP: CTA bilaterally; no rales or wheezes.   GI: +BS, soft, nontender, nondistended.   EXT: No edema. No cyanosis/clubbing.   SKIN: light erythematous rash on her upper back, no lesions  NEURO: A&O x3   PSYCH: Appropriate affect   VASCULAR ACCES: dressing CDI.     ROUTINE LABS:    Lab Results   Component Value Date    WBC 0.9 (LL) 01/10/2023    ANEU 0.3 (LL) 01/10/2023    HGB 10.2 (L) 01/10/2023    HCT 30.6 (L) 01/10/2023    PLT 91 (L) 01/10/2023     01/10/2023    POTASSIUM 3.7 01/10/2023    CHLORIDE 108 (H) 01/10/2023    CO2 20 (L) 01/10/2023     (H) 01/10/2023    BUN 22.1 (H) 01/10/2023    CR 0.70 01/10/2023    MAG 2.1 01/10/2023    INR 1.06 01/09/2023          ASSESSMENT AND PLAN:     Kristie Cornejo is a 52 year old female with a history of multiple myeloma conditioned with Cy/Flu, currently day +6 of her CAR T-cell " therapy with Abecma.        CAR T Protocol   - Day -5 through -3: Cy/Flu   - Day -2: Rest day   - Day -1: IT Dex   - Day 0: CAR T infusion (Abecma)   - Day +6: IT Dex   - Day +13: IT Dex NOT indicated (determined by PI and primary MD)   - Simvastatin daily 40mg until day day +30   - GM-CSF should NOT be given through out study      NEUROTOXICITY   - ICE score: 10/10   - Mild headaches today worse when leaning forward, relieved by leaning back, 4/10, describes as a pressure. Tramadol PRN started for pain relief.     CRS  - Grade: 0  - Afebrile since 16:30 on 1/5   - Blood cx- NGTD, urine cx- NGTD, CXR- no acute airspace disease, ferritin- 270, CRP- 7.96. Tocilizumab given x1 dose on 1/5. Cefepime discontinued on 1/8.   - Continue to monitor daily CRP & Ferritin      HEME/COAG  - Pancytopenic due to chemotherapy  - Transfusion parameters: hemoglobin <7, platelets <10     IMMUNOCOMPROMISED  - Prophylaxis plan: ACV, Fluconazole, and Levaquin   - Pentamidine INH + albuterol one time   - Active infections: None     CARDIOVASCULAR  - Risk of cardiomyopathy:  Baseline LVEF: 55-60%   - Risk of arrhythmia: Baseline EKG showed sinus rhythm nonspecific T wave abnormality  - Hx of PVC ablation March 2022      RESPIRATORY  - Risk of respiratory complications: Frequent ambulation and incentive spirometer.     GI/NUTRITION  - Ulcer prophylaxis: Protonix   - Risk of nausea/vomiting due to chemo: compazine and ativan   - History of IBS: PEG ordered daily PRN      RENAL/ELECTROLYTES/  - Risk of renal injury: IV hydration   - Electrolyte management: replace per sliding scale     DIABETES/ENDOCRINE  - Risk of steroid-induced hyperglyemia: Monitor BG, sliding scale if needed     MUSCULOSKELETAL/FRAILTY  - Baseline frailty score: 0   - Patient with substantial risk of sarcopenia  - Daily PT/OT as needed while inpatient  - Cancer Rehab as needed outpatient     SYMPTOM MANAGEMENT  - Nausea from chemo: compazine and ativan.     - # Pain  Assessment:  Current Pain Score 1/4/2023   Patient currently in pain? no   Pain score (0-10) -   Kristie hill pain level was assessed and she currently denies pain.          SOCIAL DETERMINANTS  - Caregiver: Jesse Cornejo (Spouse) & Misti Preciado (sister)      Clinically Significant Risk Factors                # Thrombocytopenia: Lowest platelets = 91 in last 2 days, will monitor for bleeding          # Moderate Malnutrition: based on nutrition assessment        DISPOSITION  - Discharge on day +7, unless there are complications from neurotoxicity or CRS         Known issues that I take into account for medical decisions, with salient changes to the plan considering these complexities noted above.         Patient Active Problem List   Diagnosis     Allergic rhinitis     Anxiety state     Irritable bowel syndrome     Hemorrhoids     Sciatica     Encounter for insertion or removal of intrauterine contraceptive device     CARDIOVASCULAR SCREENING; LDL GOAL LESS THAN 160     Pelvic mass     Plasmacytoma of bone (H)     Light chain myeloma (H)     Dehydration     Hypogammaglobulinemia (H)     Multiple myeloma not having achieved remission (H)         Clinically Significant Risk Factors Present on Admission                                         Today's summary:      I spent 30 minutes in the care of this patient today, which included time necessary for preparation for the visit, obtaining history, ordering medications/tests/procedures as medically indicated, review of pertinent medical literature, counseling of the patient, communication of recommendations to the care team, and documentation time.     Duy Christine PA-C   Pager: [phone]    Physician Attestation     I saw and evaluated Kristie Cornejo as part of a shared APRN/PA visit. I personally performed the substantive portion of the medical decision making for this visit - please see the SULEIMAN s documentation for full details.    On the date of service,  01/10/2023, I spent 30 minutes on the patient unit personally reviewing medical records and medications, reviewing vital signs, labs, and imaging results as summarized above, discussing the patient's case on rounds with the SULEIMAN, obtaining a history from the patient, performing a physical exam, counseling and educating the patient on the diagnosis and treatment, evaluating a potentially life or organ threatening problem, intensively monitoring treatments with high risk of toxicity, coordinating care and documenting in the electronic medical record.    Key management decisions made by me and carried out under my direction include:   53 yo F with IgG kappa MM, high risk with +1q. Now D+5 s/p Abecma CAR-T (idecabtagene vicleucel) after receiving LD chemo as outpt. CRS 1 with fevers, anxiety on D+1, got tocilizumab x 1. Doing much better today with only mild symptoms. CRP is down to 13 and ferritin is stabilized. Continue to monitor for recrudescent CRS and onset of ICANS. Also on the ICANS prophylaxis study. IT dex tomorrow. If fever recurs, call Dr. Walker and redose tocilizumab, will consider whether to continue q8 x 4 doses.    Thank you for allowing me to participate in the care of this patient. Please do not hesitate to contact me if there are any concerns or questions.     Lacho Walker MD   of Medicine  Classical Hematology and Blood and Marrow Transplantation  Division of Hematology, Oncology, and Transplantation  [facility]  Pager [phone]

## 2023-01-10 NOTE — PROGRESS NOTES
BMT Teaching Flowsheet    Kristie Cornejo is a 52 year old female  The primary encounter diagnosis was Light chain myeloma (H). A diagnosis of Plasmacytoma of bone (H) was also pertinent to this visit.    Teaching Topic: Abecma CAR-T infusion discharge teaching    Person(s) involved in teaching: Patient  Motivation Level  Asks Questions: Yes  Eager to Learn: Yes  Cooperative: Yes  Receptive (willing/able to accept information): Yes  Any cultural factors/Jehovah's witness beliefs that may influence understanding or compliance? No    Patient demonstrates understanding of the following:  - Reason for the appointment, diagnosis and treatment plan: Yes  - Knowledge of proper use of medications and conditions for which they are ordered (with special attention to potential side effects or drug interactions): No, explain: Bedside RN to complete medication teaching with patient and caregiver prior to discharge.   - Which situations necessitate calling provider and whom to contact: Yes    Teaching concerns addressed: None identified    Proper use and care of (medical equipment, care aids, etc.) Yes  Pain management techniques: Yes  Patient instructed on hand hygiene: Yes  How and/when to access community resources: Yes    Infection Control:  Patient demonstrates understanding of the following:  Surgical procedure site care taught: NA  Signs and symptoms of infection taught: Yes  Wound care taught: NA  Central venous catheter care taught: No, explain: Patient and caregiver previously received teaching, and decline further need     Instructional Materials Used/Given:   Discharge teaching completed with patient and family. Written guidelines provided including clinic follow up, signs and symptoms to report, infection prevention, and contact list. Discussed activities to avoid to prevent infections and mask guidelines. Pt and family verbalize understanding of material via teach back and all questions have been answered.    For Abecma  CAR-T patient - new wallet card given per pt request. Patient instructed to remain within close proximity (at least two hours) for at least four weeks post infusion. CAR-T patient is instructed not to operate motorized vehicle or heavy machinery for a period of 8 weeks.    Time spent with patient: 40 minutes.    Specific Concerns: LUCIA Flannery, RN, BSN  RN Care Coordinator - BMT   [phone]   [phone]

## 2023-01-11 ENCOUNTER — CARE COORDINATION (OUTPATIENT)
Dept: ONCOLOGY | Facility: CLINIC | Age: 53
End: 2023-01-11

## 2023-01-11 ENCOUNTER — RESEARCH ENCOUNTER (OUTPATIENT)
Dept: TRANSPLANT | Facility: CLINIC | Age: 53
End: 2023-01-11

## 2023-01-11 VITALS
WEIGHT: 153.3 LBS | BODY MASS INDEX: 24.06 KG/M2 | TEMPERATURE: 97.9 F | HEART RATE: 88 BPM | HEIGHT: 67 IN | RESPIRATION RATE: 16 BRPM | SYSTOLIC BLOOD PRESSURE: 120 MMHG | OXYGEN SATURATION: 97 % | DIASTOLIC BLOOD PRESSURE: 80 MMHG

## 2023-01-11 LAB
ALBUMIN SERPL BCG-MCNC: 4.2 G/DL (ref 3.5–5.2)
ALP SERPL-CCNC: 47 U/L (ref 35–104)
ALT SERPL W P-5'-P-CCNC: 65 U/L (ref 10–35)
ANION GAP SERPL CALCULATED.3IONS-SCNC: 15 MMOL/L (ref 7–15)
APTT PPP: 22 SECONDS (ref 22–38)
AST SERPL W P-5'-P-CCNC: 41 U/L (ref 10–35)
BASOPHILS # BLD AUTO: 0 10E3/UL (ref 0–0.2)
BASOPHILS NFR BLD AUTO: 1 %
BILIRUB SERPL-MCNC: 0.4 MG/DL
BUN SERPL-MCNC: 26.1 MG/DL (ref 6–20)
BURR CELLS BLD QL SMEAR: ABNORMAL
CALCIUM SERPL-MCNC: 9.5 MG/DL (ref 8.6–10)
CHLORIDE SERPL-SCNC: 106 MMOL/L (ref 98–107)
CREAT SERPL-MCNC: 0.59 MG/DL (ref 0.51–0.95)
CRP SERPL-MCNC: 4.78 MG/L
D DIMER PPP FEU-MCNC: 1.17 UG/ML FEU (ref 0–0.5)
DEPRECATED HCO3 PLAS-SCNC: 18 MMOL/L (ref 22–29)
ELLIPTOCYTES BLD QL SMEAR: ABNORMAL
EOSINOPHIL # BLD AUTO: 0 10E3/UL (ref 0–0.7)
EOSINOPHIL NFR BLD AUTO: 1 %
ERYTHROCYTE [DISTWIDTH] IN BLOOD BY AUTOMATED COUNT: 12.7 % (ref 10–15)
FERRITIN SERPL-MCNC: 292 NG/ML (ref 11–328)
FIBRINOGEN PPP-MCNC: 223 MG/DL (ref 170–490)
GFR SERPL CREATININE-BSD FRML MDRD: >90 ML/MIN/1.73M2
GLUCOSE SERPL-MCNC: 179 MG/DL (ref 70–99)
HCT VFR BLD AUTO: 33.3 % (ref 35–47)
HGB BLD-MCNC: 11 G/DL (ref 11.7–15.7)
IMM GRANULOCYTES # BLD: 0 10E3/UL
IMM GRANULOCYTES NFR BLD: 1 %
INR PPP: 1.3 (ref 0.85–1.15)
LDH SERPL L TO P-CCNC: 251 U/L (ref 0–250)
LYMPHOCYTES # BLD AUTO: 0.3 10E3/UL (ref 0.8–5.3)
LYMPHOCYTES NFR BLD AUTO: 15 %
MAGNESIUM SERPL-MCNC: 2.1 MG/DL (ref 1.7–2.3)
MCH RBC QN AUTO: 30.4 PG (ref 26.5–33)
MCHC RBC AUTO-ENTMCNC: 33 G/DL (ref 31.5–36.5)
MCV RBC AUTO: 92 FL (ref 78–100)
MONOCYTES # BLD AUTO: 0.3 10E3/UL (ref 0–1.3)
MONOCYTES NFR BLD AUTO: 14 %
NEUTROPHILS # BLD AUTO: 1.3 10E3/UL (ref 1.6–8.3)
NEUTROPHILS NFR BLD AUTO: 68 %
NRBC # BLD AUTO: 0 10E3/UL
NRBC BLD AUTO-RTO: 0 /100
PHOSPHATE SERPL-MCNC: 3.3 MG/DL (ref 2.5–4.5)
PLAT MORPH BLD: ABNORMAL
PLATELET # BLD AUTO: 130 10E3/UL (ref 150–450)
POLYCHROMASIA BLD QL SMEAR: SLIGHT
POTASSIUM SERPL-SCNC: 4 MMOL/L (ref 3.4–5.3)
PROT SERPL-MCNC: 6.2 G/DL (ref 6.4–8.3)
RBC # BLD AUTO: 3.62 10E6/UL (ref 3.8–5.2)
RBC MORPH BLD: ABNORMAL
SODIUM SERPL-SCNC: 139 MMOL/L (ref 136–145)
URATE SERPL-MCNC: 0.9 MG/DL (ref 2.4–5.7)
WBC # BLD AUTO: 1.9 10E3/UL (ref 4–11)

## 2023-01-11 PROCEDURE — 250N000011 HC RX IP 250 OP 636: Performed by: INTERNAL MEDICINE

## 2023-01-11 PROCEDURE — 85025 COMPLETE CBC W/AUTO DIFF WBC: CPT

## 2023-01-11 PROCEDURE — 85384 FIBRINOGEN ACTIVITY: CPT | Performed by: INTERNAL MEDICINE

## 2023-01-11 PROCEDURE — 83735 ASSAY OF MAGNESIUM: CPT

## 2023-01-11 PROCEDURE — 85610 PROTHROMBIN TIME: CPT | Performed by: INTERNAL MEDICINE

## 2023-01-11 PROCEDURE — 250N000009 HC RX 250

## 2023-01-11 PROCEDURE — 82728 ASSAY OF FERRITIN: CPT | Performed by: INTERNAL MEDICINE

## 2023-01-11 PROCEDURE — 84550 ASSAY OF BLOOD/URIC ACID: CPT | Performed by: INTERNAL MEDICINE

## 2023-01-11 PROCEDURE — 85730 THROMBOPLASTIN TIME PARTIAL: CPT | Performed by: INTERNAL MEDICINE

## 2023-01-11 PROCEDURE — 99207 PR SC NO CHARGE VISIT: CPT | Performed by: INTERNAL MEDICINE

## 2023-01-11 PROCEDURE — 250N000011 HC RX IP 250 OP 636

## 2023-01-11 PROCEDURE — 99239 HOSP IP/OBS DSCHRG MGMT >30: CPT

## 2023-01-11 PROCEDURE — 86140 C-REACTIVE PROTEIN: CPT | Performed by: INTERNAL MEDICINE

## 2023-01-11 PROCEDURE — 80053 COMPREHEN METABOLIC PANEL: CPT | Performed by: INTERNAL MEDICINE

## 2023-01-11 PROCEDURE — 83615 LACTATE (LD) (LDH) ENZYME: CPT | Performed by: INTERNAL MEDICINE

## 2023-01-11 PROCEDURE — 84100 ASSAY OF PHOSPHORUS: CPT | Performed by: HOSPITALIST

## 2023-01-11 PROCEDURE — 85379 FIBRIN DEGRADATION QUANT: CPT | Performed by: INTERNAL MEDICINE

## 2023-01-11 PROCEDURE — 250N000013 HC RX MED GY IP 250 OP 250 PS 637

## 2023-01-11 RX ORDER — POLYETHYLENE GLYCOL 3350 17 G/17G
1 POWDER, FOR SOLUTION ORAL DAILY PRN
COMMUNITY
Start: 2023-01-11

## 2023-01-11 RX ORDER — HEPARIN SODIUM (PORCINE) LOCK FLUSH IV SOLN 100 UNIT/ML 100 UNIT/ML
5 SOLUTION INTRAVENOUS
OUTPATIENT
Start: 2023-01-11

## 2023-01-11 RX ORDER — HEPARIN SODIUM,PORCINE 10 UNIT/ML
5 VIAL (ML) INTRAVENOUS
OUTPATIENT
Start: 2023-01-11

## 2023-01-11 RX ADMIN — Medication 5 ML: at 08:15

## 2023-01-11 RX ADMIN — FLUCONAZOLE 200 MG: 200 TABLET ORAL at 08:01

## 2023-01-11 RX ADMIN — PANTOPRAZOLE SODIUM 40 MG: 40 TABLET, DELAYED RELEASE ORAL at 08:01

## 2023-01-11 RX ADMIN — Medication 5 ML: at 04:23

## 2023-01-11 RX ADMIN — PHYTONADIONE 10 MG: 10 INJECTION, EMULSION INTRAMUSCULAR; INTRAVENOUS; SUBCUTANEOUS at 10:23

## 2023-01-11 RX ADMIN — Medication 5 ML: at 08:14

## 2023-01-11 RX ADMIN — LEVOFLOXACIN 250 MG: 250 TABLET, FILM COATED ORAL at 10:23

## 2023-01-11 RX ADMIN — ALLOPURINOL 300 MG: 300 TABLET ORAL at 08:01

## 2023-01-11 RX ADMIN — Medication 40 MG: at 08:01

## 2023-01-11 RX ADMIN — LORAZEPAM 1 MG: 0.5 TABLET ORAL at 00:11

## 2023-01-11 RX ADMIN — ACYCLOVIR 800 MG: 800 TABLET ORAL at 08:01

## 2023-01-11 ASSESSMENT — ACTIVITIES OF DAILY LIVING (ADL)
ADLS_ACUITY_SCORE: 25

## 2023-01-11 NOTE — DISCHARGE SUMMARY
"[facility] Discharge Summary   Kristie Cornejo MRN# [id number]   Age: 52 year old  YOB: 1970   Date of Admission: 1/4/2023  Date of Discharge:  1/11/2023  Admitting Physician: Lacho Walker MD  Discharge Physician: Ever Gardiner MD   BMT Physician: Dr. Reinoso   Primary MD: Rena Sheffield    Discharge Diagnoses:    DISCHARGE DIAGNOSES:  1. Abecma CAR-T for Multiple Myeloma   2. Headache (mild)   3. Pancytopenia   4. Moderate malnutrition   5. Hyperphosphatemia     Discharge Medications:         Medication List      Started    acetaminophen 325 MG tablet  Commonly known as: TYLENOL     levofloxacin 250 MG tablet  Commonly known as: LEVAQUIN  250 mg, Oral, DAILY        Modified    Calcium 600+D Plus Minerals 600-400 MG-UNIT Tabs  What changed: additional instructions     cetirizine HCl 10 MG Caps  What changed:     when to take this    reasons to take this     fluticasone 50 MCG/ACT nasal spray  Commonly known as: FLONASE  What changed:     how to take this    when to take this    reasons to take this     lactobacillus rhamnosus (GG) capsule  What changed:     how much to take    how to take this    when to take this    additional instructions     Multi-Vitamin Daily Tabs  What changed:     how much to take    additional instructions     polyethylene glycol 17 g packet  Commonly known as: MIRALAX  What changed:     when to take this    reasons to take this     potassium chloride ER 10 MEQ CR tablet  Commonly known as: KLOR-CON M  What changed:     how much to take    how to take this    when to take this    additional instructions     prochlorperazine 10 MG tablet  Commonly known as: COMPAZINE  What changed: how much to take        ASK your doctor about these medications    Mirena (52 MG) 20 MCG/DAY IUD  Generic drug: levonorgestrel            Brief History of Illness:    HISTORY OF PRESENT ILLNESS:   Kristie \"Margo\" Clemente is a 52 year old female with a history of multiple myeloma " presented for CAR T-cell therapy with Abecma. She was conditioned with Cy/Flu on days -5, -4, -3 with intrathecal dexamethasone on day -1 and day +6. She developed grade 1 CRS on day +1 (1/5/23) and was given 1 dose of Tocilizumab. Blood and urine cultures are NGTD. Cefepime was administered for 3 days, discontinued on 1/8. She had mild positional headaches throughout her hospital stay. These headaches were similar to ones she had prior to receiving her CAR-T therapy. Tylenol is usually sufficient for pain control but to avoid masking any fevers we elected to use Tramadol. On day of discharge she did developed some mild elevations in her AST, ALT and INR. She had some bleeding at her PICC line site that required an additional dressing change. She was administered one dose of Vitamin K prior to discharge.     She is feeling well today without complaints. Ready for discharge.     For the patient's family history, social history, past medical and surgical history, bone marrow transplant workup, admission medications, hospital admission review of systems and physical exam, please refer to hospital admission H&P dated 01/04/2022.     Hospital Course:    HOSPITAL COURSE:   CAR T Protocol   - Day -5 through -3: Cy/Flu   - Day -2: Rest day   - Day -1: IT Dex   - Day 0: CAR T infusion (Abecma)   - Day +6: IT Dex   - Day +13: IT Dex NOT indicated (determined by PI and primary MD)   - Simvastatin daily 40mg until day day +30   - GM-CSF should NOT be given through out study      NEUROTOXICITY   - ICE score: 10/10   - Mild headaches today worse when leaning forward, relieved by leaning back, 4/10, describes as a pressure. Similar to headaches experienced in the past. Had been using tramadol for pain control instead of Tylenol to not mask fevers.      CRS  - Grade: 0  - Grade 1 with a Tmax 102.6F of  on 1/5. Given 1 dose of Tocilizumab.   - Afebrile since 16:30 on 1/5   - Blood cx- NGTD, urine cx- NGTD, CXR- no acute airspace  disease, ferritin- 270, CRP- 7.96. Tocilizumab given x1 dose on 1/5. Cefepime discontinued on 1/8.     HEME/COAG  - Pancytopenic due to chemotherapy  - Transfusion parameters: hemoglobin <7, platelets <10  - Allopurinol for TLS prophylaxis. Continue outpatient until day +14.      IMMUNOCOMPROMISED  - Prophylaxis plan: ACV x1 year, Fluconazole until count recovery, and Levaquin until count recovery. Continued in the outpatient setting.   - Pentamidine INH + albuterol one time on 1/3   - PCP prophylaxis: consider Bactrim, she does not have a sulfa allergy. Starting on day 28 for 6-months.   - Active infections: None     CARDIOVASCULAR  - Risk of cardiomyopathy:  Baseline LVEF: 55-60%   - Risk of arrhythmia: Baseline EKG showed sinus rhythm nonspecific T wave abnormality  - Hx of PVC ablation March 2022     ANXIETY   - On buspirone      RESPIRATORY  - Risk of respiratory complications: Frequent ambulation and incentive spirometer.     GI/NUTRITION  - Ulcer prophylaxis: Protonix. Continued outpatient.   - Risk of nausea/vomiting due to chemo: compazine and ativan   - History of IBS: PEG ordered daily PRN   - AST, ALT, and INR elevated on 1/11. Re-check hepatic panel & INR on 1/12 during first clinic visit.      RENAL/ELECTROLYTES/  - Risk of renal injury: IV hydration   - Electrolyte management: replace per sliding scale     DIABETES/ENDOCRINE  - Risk of steroid-induced hyperglyemia: Monitor BG, sliding scale if needed     MUSCULOSKELETAL/FRAILTY  - Baseline frailty score: 0   - Patient with substantial risk of sarcopenia  - Daily PT/OT as needed while inpatient  - Cancer Rehab as needed outpatient     SYMPTOM MANAGEMENT  - Nausea from chemo: compazine and ativan.     - # Pain Assessment:  Current Pain Score 1/4/2023   Patient currently in pain? no   Pain score (0-10) -   Kristie hill pain level was assessed and she currently denies pain.          SOCIAL DETERMINANTS  - Caregiver: Jesse Barrosot (Spouse) & Misti Preciado  (sister)         Clinically Significant Risk Factors            # Thrombocytopenia: Lowest platelets = 91 in last 2 days, will monitor for bleeding          # Moderate Malnutrition: based on nutrition assessment      DISPOSITION  - Discharge on day +7, unless there are complications from neurotoxicity or CRS         Known issues that I take into account for medical decisions, with salient changes to the plan considering these complexities noted above.           Patient Active Problem List   Diagnosis     Allergic rhinitis     Anxiety state     Irritable bowel syndrome     Hemorrhoids     Sciatica     Encounter for insertion or removal of intrauterine contraceptive device     CARDIOVASCULAR SCREENING; LDL GOAL LESS THAN 160     Pelvic mass     Plasmacytoma of bone (H)     Light chain myeloma (H)     Dehydration     Hypogammaglobulinemia (H)     Multiple myeloma not having achieved remission (H)     Clinically Significant Risk Factors Present on Admission            Today's summary:      I spent 60 minutes in the care of this patient today, which included time necessary for preparation for the visit, obtaining history, ordering medications/tests/procedures as medically indicated, review of pertinent medical literature, counseling of the patient, communication of recommendations to the care team, and documentation time.     Duy Christine PA-C   Pager: [phone]    Discharge Instructions and Follow-Up:    Discharge diet: Regular diet as tolerated  Discharge activity: Activity as tolerated   DISPOSITION: Kristie Cornejo will be discharged home today 1/11/23.  Kristie Cornejo has been scheduled to follow up in the BMT Clinic on 1/12/23 at 11:00am for lab, provider and pharmacy visit.   Discharge Disposition:    Discharged to home in chronic stable condition.

## 2023-01-11 NOTE — PROGRESS NOTES
"BMT Daily CARTOX Note (complete days 0-14 and with any subsequent CRS/neurotoxicity)    Date: January 11, 2023    Kristie Cornejo ([id number]) is a 52 year old year old female who received infusion on 1/4, currently day +7 of CAR-T cell therapy [facility]ecma    Vital Signs: /80 (BP Location: Right arm)   Pulse 88   Temp 97.9  F (36.6  C) (Oral)   Resp 16   Ht 1.69 m (5' 6.54\")   Wt 69.5 kg (153 lb 4.8 oz)   LMP  (LMP Unknown)   SpO2 97%   BMI 24.35 kg/m      Organ CAR-T toxicity:    None    Overall CRS grade 0    CRS Parameter Grade 1 Grade 2 Grade 3 Grade 4   Fever* Tm >= 100.4 degrees F Tm >= 100.4 degrees F Tm >= 100.4 degrees F Tm >= to 100.4 degrees F       With Either:  Hypotension None Responsive to Fluids Requiring 1 vasopressor (w/ or w/o vasopressin) Requiring multiple vasopressors (excluding vasopressin)     And/or  Hypoxia None Low-flow nasal cannula or blow-by High-flow nasal cannula, face mask, non-rebreather mask, or Venturi mask Requiring positive pressure (CPAP, BiPAP intubation and mechanical ventilation)       * Definition of High Dose Pressors for Grade 4  Vasopressor Duration >= 3 hours   Norepinephrine monotherapy >= 20 mcg/kg/minute   Dopamine monotherapy >= 10 mcg/kg/minute   Phenylephrine monotherapy >= 200 mcg/minute   Epinephrine monotherapy >= 10 mcg/minute   If on vasopressin + another agent High dose of vasopressin + norepi equivalent (using VASST formula below) is >= 10 mcg/minute   If on combination vasopressors, not including vasopressin Norepi equivalent of > 20 mcg/minute (using VASST formula below)     VASST formula: Norepi equivalent dose = [NE (mcg/min)] + [dopamine (mcg/min) / 2] + [epinephrine (mcg/min)] + [phenylephrine (mcg/min) / 10]    ICE Assessment  Orientation to year, month, city, hospital: 4 points  Name 3 objects (e.g., point to clock, pen, button): 3 points  Following commands: (e.g., Show me 2 fingers): 1 point   Write a standard sentence (e.g., The silver " jumped over the log): 1 point   Count backwards from 100 by ten: 1 point  Total points: 10: No impairment    CRS Summary  Does patient have CRS? No  Current CRS stgstrstastdstest:st st1st What is the maximum severity to date: 1  What therapy was given: Tocilizumab  Has CRS grade changed? Yes, new grade and date of change: 1 on 1/5  Has CRS resolved? Yes, date of resolution: 1/5    Neurotoxicity Summary  Does patient have neurotoxicity? No  Current Neurotoxicity score (0-10): 0  What is the maximum severity to date:  0  What therapy was given: NA  Has neurotoxicity resolved? NA    Jeffrey Christine PA-C  Pager: [phone]

## 2023-01-11 NOTE — PLAN OF CARE
"  Problem: Plan of Care - These are the overarching goals to be used throughout the patient stay.    Goal: Plan of Care Review  Description: The Plan of Care Review/Shift note should be completed every shift.  The Outcome Evaluation is a brief statement about your assessment that the patient is improving, declining, or no change.  This information will be displayed automatically on your shift note.  Outcome: Met  Goal: Patient-Specific Goal (Individualized)  Description: You can add care plan individualizations to a care plan. Examples of Individualization might be:  \"Parent requests to be called daily at 9am for status\", \"I have a hard time hearing out of my right ear\", or \"Do not touch me to wake me up as it startles me\".  Outcome: Met  Goal: Absence of Hospital-Acquired Illness or Injury  Outcome: Met  Intervention: Identify and Manage Fall Risk  Recent Flowsheet Documentation  Taken 1/11/2023 0800 by Verenice Otoole RN  Safety Promotion/Fall Prevention:    assistive device/personal items within reach    clutter free environment maintained    fall prevention program maintained    lighting adjusted  Intervention: Prevent Skin Injury  Recent Flowsheet Documentation  Taken 1/11/2023 0800 by Verenice Otoole RN  Body Position: position changed independently  Intervention: Prevent and Manage VTE (Venous Thromboembolism) Risk  Recent Flowsheet Documentation  Taken 1/11/2023 0800 by Verenice Otoole RN  VTE Prevention/Management: SCDs (sequential compression devices) off  Goal: Optimal Comfort and Wellbeing  Outcome: Met  Goal: Readiness for Transition of Care  Outcome: Met   Goal Outcome Evaluation:  Day +7 from abecma CAR-T cell infusion. Neuro's are intact. Ice assessment score 10. Vss. Sentence is baseline. No word finding difficulty noted. No pain. No nausea. Ate sausage, scrambled eggs, milk and juice for breakfast. Dressing changed and had a small amount of bleeding at site. Provider notified " and received vitamin K. INR 1.30. No stool. Completed the 1/2 marathon and received a black hat. Independent. Patient was sent home with the sentence log and one was placed in the chart. Received Abecma card prior to discharge. Received and sent home with the simvastatin study drug. Discharge teaching was done and patient was walked to the main entrance. Follow up appointment in the clinic tomorrow.

## 2023-01-11 NOTE — PLAN OF CARE
"Patient remains hospitalized following CAR-T infusion, currently day +6. No signs of neurotox or CRS. Received IT dex via LP this afternoon. Brief headache post LP, but relieved by laying down again. Continues on ppx antimicrobials. Appetite good. Denies nausea. 1 loose BM this AM. Ambulating independently. VSS. Plan for discharge tomorrow.    Problem: Plan of Care - These are the overarching goals to be used throughout the patient stay.    Goal: Plan of Care Review  Description: The Plan of Care Review/Shift note should be completed every shift.  The Outcome Evaluation is a brief statement about your assessment that the patient is improving, declining, or no change.  This information will be displayed automatically on your shift note.  Outcome: Progressing  Goal: Patient-Specific Goal (Individualized)  Description: You can add care plan individualizations to a care plan. Examples of Individualization might be:  \"Parent requests to be called daily at 9am for status\", \"I have a hard time hearing out of my right ear\", or \"Do not touch me to wake me up as it startles me\".  Outcome: Progressing  Goal: Absence of Hospital-Acquired Illness or Injury  Outcome: Progressing  Goal: Optimal Comfort and Wellbeing  Outcome: Progressing  Goal: Readiness for Transition of Care  Outcome: Progressing     Problem: Stem Cell/Bone Marrow Transplant  Goal: Optimal Coping with Transplant  Outcome: Progressing  Goal: Symptom-Free Urinary Elimination  Outcome: Progressing  Goal: Diarrhea Symptom Control  Outcome: Progressing  Goal: Improved Activity Tolerance  Outcome: Progressing  Goal: Blood Counts Within Acceptable Range  Outcome: Progressing  Goal: Absence of Hypersensitivity Reaction  Outcome: Progressing  Goal: Absence of Infection  Outcome: Progressing  Goal: Improved Oral Mucous Membrane Health and Integrity  Outcome: Progressing  Goal: Nausea and Vomiting Symptom Relief  Outcome: Progressing  Goal: Optimal Nutrition " Intake  Outcome: Progressing   Goal Outcome Evaluation:

## 2023-01-11 NOTE — PLAN OF CARE
VSS. Afebrile. Up independently. Steady on feet. Reported headache in evening, tramadol given. Headache gone this am. Ativan given once for sleep. Awaiting final lab results. Pt hopeful to discharge.     Problem: Plan of Care - These are the overarching goals to be used throughout the patient stay.    Goal: Plan of Care Review  Description: The Plan of Care Review/Shift note should be completed every shift.  The Outcome Evaluation is a brief statement about your assessment that the patient is improving, declining, or no change.  This information will be displayed automatically on your shift note.  Outcome: Progressing  Goal: Absence of Hospital-Acquired Illness or Injury  Outcome: Progressing  Intervention: Identify and Manage Fall Risk  Recent Flowsheet Documentation  Taken 1/10/2023 2050 by Meche Prabhakar RN  Safety Promotion/Fall Prevention:   assistive device/personal items within reach   clutter free environment maintained   fall prevention program maintained   lighting adjusted   nonskid shoes/slippers when out of bed   patient and family education  Intervention: Prevent Skin Injury  Recent Flowsheet Documentation  Taken 1/10/2023 2050 by Meche Prabhakar RN  Body Position: position changed independently  Intervention: Prevent and Manage VTE (Venous Thromboembolism) Risk  Recent Flowsheet Documentation  Taken 1/10/2023 2050 by Meche Prabhakar RN  VTE Prevention/Management: SCDs (sequential compression devices) off  Goal: Optimal Comfort and Wellbeing  Outcome: Progressing  Intervention: Monitor Pain and Promote Comfort  Recent Flowsheet Documentation  Taken 1/10/2023 2145 by Meche Prabhakar RN  Pain Management Interventions: rest  Taken 1/10/2023 2104 by Meche Prabhakar RN  Pain Management Interventions: medication (see MAR)  Goal: Readiness for Transition of Care  Outcome: Progressing     Problem: Stem Cell/Bone Marrow Transplant  Goal: Optimal Coping with Transplant  Outcome: Progressing  Goal: Symptom-Free Urinary  Elimination  Outcome: Progressing  Intervention: Prevent or Manage Bladder Irritation  Recent Flowsheet Documentation  Taken 1/10/2023 2145 by Meche Prabhakar RN  Pain Management Interventions: rest  Taken 1/10/2023 2104 by Meche Prabhakar RN  Pain Management Interventions: medication (see MAR)  Goal: Diarrhea Symptom Control  Outcome: Progressing  Goal: Improved Activity Tolerance  Outcome: Progressing  Intervention: Promote Improved Energy  Recent Flowsheet Documentation  Taken 1/10/2023 2050 by Meche Prabhakar RN  Activity Management:   up ad angelic   activity adjusted per tolerance  Goal: Blood Counts Within Acceptable Range  Outcome: Progressing  Intervention: Monitor and Manage Hematologic Symptoms  Recent Flowsheet Documentation  Taken 1/10/2023 2050 by Meche Prabhakar RN  Medication Review/Management:   medications reviewed   high-risk medications identified  Goal: Absence of Hypersensitivity Reaction  Outcome: Progressing  Goal: Absence of Infection  Outcome: Progressing  Intervention: Prevent and Manage Infection  Recent Flowsheet Documentation  Taken 1/10/2023 2050 by Meche Prabhakar RN  Isolation Precautions: protective environment maintained  Goal: Improved Oral Mucous Membrane Health and Integrity  Outcome: Progressing  Goal: Nausea and Vomiting Symptom Relief  Outcome: Progressing  Goal: Optimal Nutrition Intake  Outcome: Progressing   Goal Outcome Evaluation:

## 2023-01-11 NOTE — DISCHARGE INSTRUCTIONS
BMT Contact Information  For issues including fevers 100.5 or more:  Please call during the week: Monday through Friday between hours of 8:00 am and 4:30 pm- Call BMT office- [phone]  After hours/Weekends: Please call [facility]  and ask for BMT physician on call and the  will have the BMT Fellow Physician call you back: [phone]       Advice for Patients on COVID19:  a. Avoid contact with individuals:   i. Who are sick or have recently been sick  ii. Have traveled to high risk areas (per CDC guidelines) or have been on a cruise in the last 14 days  iii. Who were or could have been exposed to COVID-19   b. If experiencing symptoms such as: Fever, cough or shortness of breath contact BMT at [phone] Mon-Fri 8am-4:30pm or After Hours at [phone] (ask to speak to a BMT Fellow) for guidance on need for clinical assessment  c. Avoid all non- essential travel at this time; if traveling is necessary use mask (N-95)   d. Wear a mask when in public areas  d. Avoid crowded places, if possible  f. Follow CDC advice https://www.cdc.gov/coronavirus/2019-ncov/index.html and travel guidelines https://www.cdc.gov/coronavirus/2019-ncov/travelers/index.html

## 2023-01-11 NOTE — PLAN OF CARE
Occupational Therapy Discharge Summary    Reason for therapy discharge:    Discharged to home.    Progress towards therapy goal(s). See goals on Care Plan in [facility] electronic health record for goal details.  Goals partially met.  Barriers to achieving goals:   discharge from facility.    Therapy recommendation(s):    Continue home exercise program.

## 2023-01-11 NOTE — PROGRESS NOTES
Care Coordination - Discharge Note      Line Company:  NA - will get daily line flushes in clinic through [facility], then could have PICC line removed  Referral made for line care:  No     IV medications needed at discharge:  None   Pharmacy concerns:  None. (Due to change in pharmacy benefits on 1/1/23, pt can now fill meds through [facility])     PT/OT/therapies recommended:  No  Referral made for PT/OT/therapies:  Order for OP PT (through  [facility] Re[facility]) was placed 1/10 per pt request     D/c location:  [facility]  Has placement need been communicated to Social Work?  NA     NC Teaching time arranged with patient/caregiver:  Completed 1/10 with pt  Notify nurse to schedule line care class/ DM teaching, prior to d/c:  NA - see above     Caregiver:  Jesse Cornejo (spouse) - cell phone: [phone]  Misti Preciado (sister) - cell phone: [phone]        Long-term BMT MD:  Kemar  Long-term BMT NC:  Julianne VARNER :  Yudelka       Outpatient Nurse Coordinator notified of patient discharge.       Lashawn Flannery, RN, BSN  RN Care Coordinator - Inpatient BMT, Unit [address]  Ph [phone]  Pg [phone]

## 2023-01-11 NOTE — PROGRESS NOTES
[id number]: Study Visit Note   Subject name: Kristie Cornejo     Visit: Day 6 (interview with patient was completed on day 7, study procedures were completed on day 6)    Did the study visit occur within the appropriate window allowed by the protocol? yes    Since the last study visit, She has been doing very well. She states she had a mild headache after the LP, but was able to sleep it off and it was gone in the morning. She states she has her bottle of simvastatin and her drug log and has only missed the one dose prior to apheresis.     Discussed with Dr. Reinoso who stated that Margo will not do the optional day +13 LP. This was relayed to the clinical team.     Margo will continue to fill out drug diary until day +30 when this writer will collect it at her clinical restaging visit. All questions were answered.      I have personally interviewed Kristie Cornejo and reviewed her medical record for adverse events and concomitant medications and these have been recorded on the corresponding logs in Kristie Cornejo's research file.     Kristie Cornejo was given the opportunity to ask any trial related questions.  Please see provider progress note for physical exam and other clinical information. Labs were reviewed - any significant lab values were addressed and reviewed.    Shoshana Caballero RN

## 2023-01-11 NOTE — PROGRESS NOTES
"  BMT Progress note  BMT Physician: Dr. Reinoso   Transplant Type: CAR-T   Preparative Regimen: Cy/Flu   Transplant Date: 1/4/2023  Chief complaint: Kristie Cornejo is a 52 year old female with a history of multiple myeloma conditioned with Cy/Flu, currently day +7 of her CAR T-cell therapy with Abecma.    INTERIM HISTORY: Kristie \"Margo\" Clemente is a 52 year old female with a history of multiple myeloma presented for CAR T-cell therapy with Abecma. She was conditioned with Cy/Flu on days -5, -4, -3 with intrathecal dexamethasone on day -1 and day +6. She developed grade 1 CRS on day +1 (1/5/23) and was given 1 dose of Tocilizumab. Blood and urine cultures are NGTD. Cefepime was administered for 3 days, discontinued on 1/8. She had mild positional headaches throughout her hospital stay. These headaches were similar to ones she had prior to receiving her CAR-T therapy. Tylenol is usually sufficient for pain control but to avoid masking any fevers we elected to use Tramadol. On day of discharge she did developed some mild elevations in her AST, ALT and INR. She had some bleeding at her PICC line site that required an additional dressing change. She was administered one dose of Vitamin K prior to discharge.      She is feeling well today without complaints. Ready for discharge.    - Follow up on hepatic panel & INR on 1/12     REVIEW OF SYSTEMS: Otherwise unremarkable other than what is noted in the Interim History.   PHYSICAL EXAM:   Vitals:  /80 (BP Location: Right arm)   Pulse 88   Temp 97.9  F (36.6  C) (Oral)   Resp 16   Ht 1.69 m (5' 6.54\")   Wt 69.5 kg (153 lb 4.8 oz)   LMP  (LMP Unknown)   SpO2 97%   BMI 24.35 kg/m      General Appearance: NAD  HEENT: sclera anicteric. Moist mucus membranes, no ulcerations.  CV: RRR. no murmur or rub.   RESP: CTA bilaterally; no rales or wheezes.   GI: +BS, soft, nontender, nondistended.   EXT: No edema. No cyanosis/clubbing.   SKIN: light erythematous rash " on her upper back, no lesions  NEURO: A&O x3   PSYCH: Appropriate affect   VASCULAR ACCES: dressing CDI.     ROUTINE LABS:    Lab Results   Component Value Date    WBC 1.9 (L) 01/11/2023    ANEU 0.3 (LL) 01/10/2023    HGB 11.0 (L) 01/11/2023    HCT 33.3 (L) 01/11/2023     (L) 01/11/2023     01/11/2023    POTASSIUM 4.0 01/11/2023    CHLORIDE 106 01/11/2023    CO2 18 (L) 01/11/2023     (H) 01/11/2023    BUN 26.1 (H) 01/11/2023    CR 0.59 01/11/2023    MAG 2.1 01/11/2023    INR 1.30 (H) 01/11/2023          ASSESSMENT AND PLAN:     Kristie Cornejo is a 52 year old female with a history of multiple myeloma conditioned with Cy/Flu, currently day +7 of her CAR T-cell therapy with Abecma.        CAR T Protocol   - Day -5 through -3: Cy/Flu   - Day -2: Rest day   - Day -1: IT Dex   - Day 0: CAR T infusion (Abecma)   - Day +6: IT Dex   - Day +13: IT Dex NOT indicated (determined by PI and primary MD)   - Simvastatin daily 40mg until day day +30   - GM-CSF should NOT be given through out study      NEUROTOXICITY   - ICE score: 10/10   - Mild headaches today worse when leaning forward, relieved by leaning back, 4/10, describes as a pressure. Similar to headaches experienced in the past. Had been using tramadol for pain control instead of Tylenol to not mask fevers.      CRS  - Grade: 0  - Grade 1 with a Tmax 102.6F of  on 1/5. Given 1 dose of Tocilizumab.   - Afebrile since 16:30 on 1/5   - Blood cx- NGTD, urine cx- NGTD, CXR- no acute airspace disease, ferritin- 270, CRP- 7.96. Tocilizumab given x1 dose on 1/5. Cefepime discontinued on 1/8.      HEME/COAG  - Pancytopenic due to chemotherapy  - Transfusion parameters: hemoglobin <7, platelets <10  - Allopurinol for TLS prophylaxis. Continue outpatient until day +14.   - Nurse changed PICC dressing twice due to bleeding. Check PICC line for bleeding. Administered 1 dose of vitamin K prior to discharge.      IMMUNOCOMPROMISED  - Prophylaxis plan: ACV x1  year, Fluconazole until count recovery, and Levaquin until count recovery. Continued in the outpatient setting.   - Pentamidine INH + albuterol one time on 1/3   - PCP prophylaxis: consider Bactrim, she does not have a sulfa allergy. Starting on day 28 for 6-months.   - Active infections: None     CARDIOVASCULAR  - Risk of cardiomyopathy:  Baseline LVEF: 55-60%   - Risk of arrhythmia: Baseline EKG showed sinus rhythm nonspecific T wave abnormality  - Hx of PVC ablation March 2022      ANXIETY   - On buspirone      RESPIRATORY  - Risk of respiratory complications: Frequent ambulation and incentive spirometer.     GI/NUTRITION  - Ulcer prophylaxis: Protonix. Continued outpatient.   - Risk of nausea/vomiting due to chemo: compazine and ativan   - History of IBS: PEG ordered daily PRN   - AST, ALT, and INR elevated on 1/11. Re-check hepatic panel & INR on 1/12 during first clinic visit.      RENAL/ELECTROLYTES/  - Risk of renal injury: IV hydration   - Electrolyte management: replace per sliding scale     DIABETES/ENDOCRINE  - Risk of steroid-induced hyperglyemia: Monitor BG, sliding scale if needed     MUSCULOSKELETAL/FRAILTY  - Baseline frailty score: 0   - Patient with substantial risk of sarcopenia  - Daily PT/OT as needed while inpatient  - [facility] Re[facility] as needed outpatient     SYMPTOM MANAGEMENT  - Nausea from chemo: compazine and ativan.     - # Pain Assessment:  Current Pain Score 1/4/2023   Patient currently in pain? no   Pain score (0-10) -   [place] pain level was assessed and she currently denies pain.          SOCIAL DETERMINANTS  - Caregiver: Jesse Cornejo (Spouse) & Misti Preciado (sister)         Clinically Significant Risk Factors             # Thrombocytopenia: Lowest platelets = 91 in last 2 days, will monitor for bleeding          # Moderate Malnutrition: based on nutrition assessment      DISPOSITION  - Discharge on day +7, unless there are complications from neurotoxicity or CRS         Known  issues that I take into account for medical decisions, with salient changes to the plan considering these complexities noted above.           Patient Active Problem List   Diagnosis     Allergic rhinitis     Anxiety state     Irritable bowel syndrome     Hemorrhoids     Sciatica     Encounter for insertion or removal of intrauterine contraceptive device     CARDIOVASCULAR SCREENING; LDL GOAL LESS THAN 160     Pelvic mass     Plasmacytoma of bone (H)     Light chain myeloma (H)     Dehydration     Hypogammaglobulinemia (H)     Multiple myeloma not having achieved remission (H)      Clinically Significant Risk Factors Present on Admission             Today's summary:      I spent 60 minutes in the care of this patient today, which included time necessary for preparation for the visit, obtaining history, ordering medications/tests/procedures as medically indicated, review of pertinent medical literature, counseling of the patient, communication of recommendations to the care team, and documentation time.     Duy Christine PA-C   Pager: [phone]

## 2023-01-12 ENCOUNTER — PATIENT OUTREACH (OUTPATIENT)
Dept: CARE COORDINATION | Facility: CLINIC | Age: 53
End: 2023-01-12

## 2023-01-12 ENCOUNTER — ALLIED HEALTH/NURSE VISIT (OUTPATIENT)
Dept: TRANSPLANT | Facility: CLINIC | Age: 53
End: 2023-01-12
Payer: COMMERCIAL

## 2023-01-12 ENCOUNTER — ONCOLOGY VISIT (OUTPATIENT)
Dept: TRANSPLANT | Facility: CLINIC | Age: 53
End: 2023-01-12
Payer: COMMERCIAL

## 2023-01-12 ENCOUNTER — APPOINTMENT (OUTPATIENT)
Dept: LAB | Facility: CLINIC | Age: 53
End: 2023-01-12
Payer: COMMERCIAL

## 2023-01-12 VITALS
SYSTOLIC BLOOD PRESSURE: 127 MMHG | DIASTOLIC BLOOD PRESSURE: 72 MMHG | WEIGHT: 154.7 LBS | HEART RATE: 69 BPM | OXYGEN SATURATION: 100 % | TEMPERATURE: 98.6 F | RESPIRATION RATE: 16 BRPM | BODY MASS INDEX: 24.57 KG/M2

## 2023-01-12 DIAGNOSIS — C90.00 MULTIPLE MYELOMA NOT HAVING ACHIEVED REMISSION (H): ICD-10-CM

## 2023-01-12 DIAGNOSIS — Z94.9 CELL TRANSPLANT: Primary | ICD-10-CM

## 2023-01-12 DIAGNOSIS — C90.00 LIGHT CHAIN MYELOMA (H): Primary | ICD-10-CM

## 2023-01-12 LAB
ALBUMIN SERPL BCG-MCNC: 4.4 G/DL (ref 3.5–5.2)
ALP SERPL-CCNC: 41 U/L (ref 35–104)
ALT SERPL W P-5'-P-CCNC: 70 U/L (ref 10–35)
ANION GAP SERPL CALCULATED.3IONS-SCNC: 12 MMOL/L (ref 7–15)
AST SERPL W P-5'-P-CCNC: 36 U/L (ref 10–35)
BASOPHILS # BLD MANUAL: 0.1 10E3/UL (ref 0–0.2)
BASOPHILS NFR BLD MANUAL: 2 %
BILIRUB DIRECT SERPL-MCNC: <0.2 MG/DL (ref 0–0.3)
BILIRUB SERPL-MCNC: 0.4 MG/DL
BUN SERPL-MCNC: 23.3 MG/DL (ref 6–20)
BURR CELLS BLD QL SMEAR: ABNORMAL
CALCIUM SERPL-MCNC: 9.3 MG/DL (ref 8.6–10)
CHLORIDE SERPL-SCNC: 107 MMOL/L (ref 98–107)
CMV DNA SPEC NAA+PROBE-ACNC: NOT DETECTED IU/ML
CREAT SERPL-MCNC: 0.64 MG/DL (ref 0.51–0.95)
DACRYOCYTES BLD QL SMEAR: SLIGHT
DEPRECATED HCO3 PLAS-SCNC: 22 MMOL/L (ref 22–29)
ELLIPTOCYTES BLD QL SMEAR: SLIGHT
EOSINOPHIL # BLD MANUAL: 0 10E3/UL (ref 0–0.7)
EOSINOPHIL NFR BLD MANUAL: 0 %
ERYTHROCYTE [DISTWIDTH] IN BLOOD BY AUTOMATED COUNT: 13.2 % (ref 10–15)
GFR SERPL CREATININE-BSD FRML MDRD: >90 ML/MIN/1.73M2
GLUCOSE SERPL-MCNC: 88 MG/DL (ref 70–99)
HCT VFR BLD AUTO: 32.7 % (ref 35–47)
HGB BLD-MCNC: 11.2 G/DL (ref 11.7–15.7)
INR PPP: 1.05 (ref 0.85–1.15)
LYMPHOCYTES # BLD MANUAL: 0.7 10E3/UL (ref 0.8–5.3)
LYMPHOCYTES NFR BLD MANUAL: 24 %
MCH RBC QN AUTO: 30.9 PG (ref 26.5–33)
MCHC RBC AUTO-ENTMCNC: 34.3 G/DL (ref 31.5–36.5)
MCV RBC AUTO: 90 FL (ref 78–100)
MONOCYTES # BLD MANUAL: 0.6 10E3/UL (ref 0–1.3)
MONOCYTES NFR BLD MANUAL: 22 %
NEUTROPHILS # BLD MANUAL: 1.5 10E3/UL (ref 1.6–8.3)
NEUTROPHILS NFR BLD MANUAL: 52 %
PLAT MORPH BLD: ABNORMAL
PLATELET # BLD AUTO: 162 10E3/UL (ref 150–450)
POTASSIUM SERPL-SCNC: 3.6 MMOL/L (ref 3.4–5.3)
PROT SERPL-MCNC: 6.2 G/DL (ref 6.4–8.3)
RBC # BLD AUTO: 3.63 10E6/UL (ref 3.8–5.2)
RBC MORPH BLD: ABNORMAL
SODIUM SERPL-SCNC: 141 MMOL/L (ref 136–145)
WBC # BLD AUTO: 2.8 10E3/UL (ref 4–11)

## 2023-01-12 PROCEDURE — 250N000011 HC RX IP 250 OP 636

## 2023-01-12 PROCEDURE — 82248 BILIRUBIN DIRECT: CPT

## 2023-01-12 PROCEDURE — 36592 COLLECT BLOOD FROM PICC: CPT

## 2023-01-12 PROCEDURE — 36415 COLL VENOUS BLD VENIPUNCTURE: CPT

## 2023-01-12 PROCEDURE — 85027 COMPLETE CBC AUTOMATED: CPT

## 2023-01-12 PROCEDURE — G0463 HOSPITAL OUTPT CLINIC VISIT: HCPCS

## 2023-01-12 PROCEDURE — 85610 PROTHROMBIN TIME: CPT

## 2023-01-12 PROCEDURE — 85007 BL SMEAR W/DIFF WBC COUNT: CPT

## 2023-01-12 PROCEDURE — 99214 OFFICE O/P EST MOD 30 MIN: CPT

## 2023-01-12 RX ORDER — HEPARIN SODIUM,PORCINE 10 UNIT/ML
3 VIAL (ML) INTRAVENOUS ONCE
Status: COMPLETED | OUTPATIENT
Start: 2023-01-12 | End: 2023-01-12

## 2023-01-12 RX ORDER — HEPARIN SODIUM,PORCINE 10 UNIT/ML
5 VIAL (ML) INTRAVENOUS
Status: DISCONTINUED | OUTPATIENT
Start: 2023-01-12 | End: 2023-01-12 | Stop reason: HOSPADM

## 2023-01-12 RX ADMIN — Medication 3 ML: at 12:22

## 2023-01-12 RX ADMIN — Medication 5 ML: at 11:34

## 2023-01-12 RX ADMIN — Medication 5 ML: at 11:33

## 2023-01-12 ASSESSMENT — PAIN SCALES - GENERAL: PAINLEVEL: MODERATE PAIN (4)

## 2023-01-12 NOTE — LETTER
"    1/12/2023         RE: Kristie Cornejo  [address]  [facility] [address]        Dear Colleague,    Thank you for referring your patient, Kristie Cornejo, to the [facility]LOOD AND MARROW TRANSPLANT PROGRAM [place]. Please see a copy of my visit note below.    BMT/CELL THERAPY CLINIC NOTE.  01/12/23    Hx:  S/p ABECMA day +9 today   Discharged 1/11/2023  DISCHARGE DIAGNOSES:  1. Abecma CAR-T for Multiple Myeloma   2. Headache (mild)   3. Pancytopenia   4. Moderate malnutrition   5. Hyperphosphatemia     Doing well, grade 1 CRS, no ICANS  On Neurotox prophy trial - received 2 doses of it. DXM    Only mild headache, some rigors and one fever, but otherwise has done extrmely well.  Today tired, \"fuzzy\" headache 4/10. No new sy's no URI or infections.  ROS: neg, eating well.      /72 (BP Location: Right arm, Patient Position: Sitting, Cuff Size: Adult Large)   Pulse 69   Temp 98.6  F (37  C) (Oral)   Resp 16   Wt 70.2 kg (154 lb 11.2 oz)   LMP  (LMP Unknown)   SpO2 100%   BMI 24.57 kg/m    Physical Exam:    Patient is ambulating , AOx3, NAD, well appearing patient.   HEENT: No mucositis, MM moist, no thrush or ulcers, buccal mucosa intact  Neck supple, no peripheral adenopathy Thyroid gland midline, not enlarged   Lungs: good air exchange, CTA, no crackles,no wheezing.   Heart RRR S1 S2, no murmur or gallop   Abd soft, NT, ND. Without hepato-splenomegaly, no ascites,    LE symmetrical, no edema   Skin without lesion or rashes. No petechiae or ecchymosis.  Neuro  Intact, AOx3      Lab Results   Component Value Date    WBC 2.8 (L) 01/12/2023    ANEU 0.3 (LL) 01/10/2023    HGB 11.2 (L) 01/12/2023    HCT 32.7 (L) 01/12/2023     01/12/2023     01/11/2023    POTASSIUM 4.0 01/11/2023    CHLORIDE 106 01/11/2023    CO2 18 (L) 01/11/2023     (H) 01/11/2023    BUN 26.1 (H) 01/11/2023    CR 0.59 01/11/2023    MAG 2.1 01/11/2023    INR 1.30 (H) 01/11/2023    AST 41 (H) " 01/11/2023    ALT 65 (H) 01/11/2023     A/P.    HOSPITAL COURSE:   CAR T Protocol   - Day -5 through -3: Cy/Flu   - Day -2: Rest day   - Day -1: IT Dex   - Day 0: CAR T infusion (Abecma)   - Day +6: IT Dex   - Day +13: IT Dex NOT indicated (determined by PI and primary MD)   - Simvastatin daily 40mg until day day +30   - GM-CSF should NOT be given through out study      NEUROTOXICITY   - ICE score: 10/10   - Mild headaches. Ok to useTylenol. Monitor/     CRS  - Grade: 0  - Grade 1 with a Tmax 102.6F of  on 1/5. Given 1 dose of Tocilizumab.   - Afebrile since 16:30 on 1/5   - Blood cx- NGTD, urine cx- NGTD, CXR- no acute airspace disease, ferritin- 270, CRP- 7.96. Tocilizumab given x1 dose on 1/5. Cefepime discontinued on 1/8.      HEME/COAG  - Pancytopenic due to chemotherapy  - Transfusion parameters: hemoglobin <7, platelets <10  - Allopurinol for TLS prophylaxis. Continue outpatient until day +14.      IMMUNOCOMPROMISED  - Prophylaxis plan: ACV x1 year, Fluconazole until count recovery, and Levaquin until count recovery. Continued in the outpatient setting.   - Pentamidine INH + albuterol one time on 1/3   - PCP prophylaxis: consider Bactrim, she does not have a sulfa allergy. Starting on day 28 for 6-months.   - Active infections: None     CARDIOVASCULAR  - Risk of cardiomyopathy:  Baseline LVEF: 55-60%   - Risk of arrhythmia: Baseline EKG showed sinus rhythm nonspecific T wave abnormality  - Hx of PVC ablation March 2022      ANXIETY   - On buspirone      RESPIRATORY  - Risk of respiratory complications: Frequent ambulation and incentive spirometer.     GI/NUTRITION  - Ulcer prophylaxis: Protonix. Continued outpatient.   - Risk of nausea/vomiting due to chemo: compazine and ativan   - History of IBS: PEG ordered daily PRN   - AST, ALT, and INR elevated on 1/11. Re-check hepatic panel & INR on 1/12 during first clinic visit.      RENAL/ELECTROLYTES/  - Risk of renal injury: IV hydration   - Electrolyte  management: replace per sliding scale     DIABETES/ENDOCRINE  - Risk of steroid-induced hyperglyemia: Monitor BG, sliding scale if needed     MUSCULOSKELETAL/FRAILTY  - Baseline frailty score: 0   - Patient with substantial risk of sarcopenia  - Daily PT/OT as needed while inpatient  - Cancer Rehab as needed outpatient     SYMPTOM MANAGEMENT  - Nausea from chemo: compazine and ativan.    Plan:  RTC tomorrow  Doing well  Next restaging day 28       Payton Reinoso MD  Professor of Medicine  [phone]

## 2023-01-12 NOTE — NURSING NOTE
"Oncology Rooming Note    January 12, 2023 11:48 AM   Kristie Cornejo is a 52 year old female who presents for:    Chief Complaint   Patient presents with     Blood Draw     Vitals taken, PICC line labs drawn, heparin locked, checked into next appt     Oncology Clinic Visit     Multiple myeloma not having achieved remission     Initial Vitals: /72 (BP Location: Right arm, Patient Position: Sitting, Cuff Size: Adult Large)   Pulse 69   Temp 98.6  F (37  C) (Oral)   Resp 16   Wt 70.2 kg (154 lb 11.2 oz)   LMP  (LMP Unknown)   SpO2 100%   BMI 24.57 kg/m   Estimated body mass index is 24.57 kg/m  as calculated from the following:    Height as of 1/4/23: 1.69 m (5' 6.54\").    Weight as of this encounter: 70.2 kg (154 lb 11.2 oz). Body surface area is 1.82 meters squared.  Moderate Pain (4) Comment: headache   No LMP recorded (lmp unknown). (Menstrual status: IUD).  Allergies reviewed: Yes  Medications reviewed: Yes    Medications: Medication refills not needed today.  Pharmacy name entered into [facility]:    [facility] [place] - [place], [place] - [address]  [id number] - [place], [place] - [address] AT [facility] [address]  [facility] - [place], [place] - [address]  [facility] [place], [place] - [address] [place] [address] PHARMACY [facility] - [place], [place] - [address] [facility] [address]  [facility] - [facility], [place] - [address] [place] [address] PHARMACY [facility] - [place], [place] - [address]    Clinical concerns: none       Allison Wilkins"

## 2023-01-12 NOTE — PROGRESS NOTES
I reviewed the discharge medications with Margo and her care giver. The list was complete per protocol as were the prescriptions. I answer all there questions.     Current Outpatient Medications   Medication Sig Dispense Refill     acetaminophen (TYLENOL) 325 MG tablet Take 1-2 tablets (325-650 mg) by mouth every 4 hours as needed for mild pain or fever       acyclovir (ZOVIRAX) 800 MG tablet Take 1 tablet (800 mg) by mouth every 12 hours 60 tablet 0     allopurinol (ZYLOPRIM) 300 MG tablet Take 1 tablet (300 mg) by mouth daily for 14 days 14 tablet 0     BUSPIRONE HCL 10 MG PO TABS 1 TABLET 3 TIMES DAILY as needed 30 Tab 3     Calcium Carbonate-Vit D-Min (CALCIUM 600+D PLUS MINERALS) 600-400 MG-UNIT TABS On HOLD       cetirizine HCl 10 MG CAPS Take 1 capsule (10 mg) by mouth daily as needed       Flaxseed (Linseed) (FLAX SEED OIL) 1000 MG capsule Take 1 capsule by mouth daily       fluconazole (DIFLUCAN) 200 MG tablet Take 1 tablet (200 mg) by mouth daily 30 tablet 1     fluticasone (FLONASE) 50 MCG/ACT nasal spray Spray 1 spray into both nostrils daily as needed for rhinitis or allergies       glucosamine-chondroitin 500-400 MG CAPS per capsule Take 2 capsules by mouth daily       lactobacillus rhamnosus, GG, (CULTURELL) capsule On hold       levofloxacin (LEVAQUIN) 250 MG tablet Take 1 tablet (250 mg) by mouth daily 30 tablet 0     LORazepam (ATIVAN) 0.5 MG tablet Take 1 tablet (0.5 mg) by mouth every 4 hours as needed for anxiety, nausea or sleep 30 tablet 3     melatonin 5 MG tablet Take 5 mg by mouth nightly as needed for sleep       MIRENA 20 MCG/24HR IU IUD use for up to 5 years, then remove (Patient not taking: Reported on 1/3/2023)       Multiple Vitamin (MULTI-VITAMIN DAILY) TABS Take by mouth daily On hold       omeprazole (PRILOSEC OTC) 20 MG EC tablet Take 1 tablet (20 mg) by mouth daily       polyethylene glycol (MIRALAX) 17 g packet Take 17 g by mouth daily as needed for constipation       potassium  chloride ER (KLOR-CON M) 10 MEQ CR tablet On hold 360 tablet 3     prochlorperazine (COMPAZINE) 10 MG tablet Take 0.5 tablets (5 mg) by mouth every 6 hours as needed for nausea or vomiting       study - simvastatin, IDS# [id number], 40 MG tablet Take 1 tablet (40 mg) by mouth daily Take at least 5 daily doses prior to apheresis and continue until Day +30 after CAR-T infusion. 65 tablet 0     Zoledronic Acid (ZOMETA IV)  (Patient not taking: Reported on 12/30/2022)          Pertinent labs considered today:  Lab Results   Component Value Date     01/12/2023                 normal sodium 136-148  Lab Results   Component Value Date    POTASSIUM 3.6 01/12/2023    POTASSIUM 4.3 08/10/2022       normal potassium 3.5-5.2   Lab Results   Component Value Date    CHLORIDE 107 01/12/2023    CHLORIDE 112 08/10/2022           normal chloride    Lab Results   Component Value Date    CO2 22 01/12/2023    CO2 24 08/10/2022                normal CO2 20-32  Lab Results   Component Value Date    BUN 23.3 01/12/2023    BUN 21 08/10/2022                 normal BUN 5-24  Lab Results   Component Value Date    GLC 88 01/12/2023     08/10/2022    GLC 81 09/24/2021                 normal glucose   Lab Results   Component Value Date    CR 0.64 01/12/2023                 normal cr 0.8-1.5  Lab Results   Component Value Date    MEKA 9.3 01/12/2023               normal calcium  8.5-10.4  Lab Results   Component Value Date    BILITOTAL 0.4 01/12/2023          normal bilirubin 0.2-1.3  Lab Results   Component Value Date    PROTTOTAL 6.2 01/12/2023          normal total protein 6.0-8.2  Lab Results   Component Value Date    ALBUMIN 4.4 01/12/2023    ALBUMIN 3.7 08/10/2022             normal albumin 3.2-4.5  Lab Results   Component Value Date    ALKPHOS 41 01/12/2023            normal alkphos   Lab Results   Component Value Date    ALT 70 01/12/2023         normal ALT 0-65  Lab Results   Component Value Date    AST 36  01/12/2023         normal AST 0-37    Lab Results   Component Value Date    HGB 11.2 01/12/2023    HGB 12.2 08/29/2006     Lab Results   Component Value Date    WBC 2.8 01/12/2023    WBC 7.3 02/13/2006      Lab Results   Component Value Date     01/12/2023     02/13/2006       Estimated Creatinine Clearance: 114 mL/min (based on SCr of 0.64 mg/dL).     Time spent in this activity: 10 minutes        [facility], PharmD

## 2023-01-12 NOTE — PROGRESS NOTES
"BMT/CELL THERAPY CLINIC NOTE.  01/12/23    Hx:  S/p ABECMA day +9 today   Discharged 1/11/2023  DISCHARGE DIAGNOSES:  1. Abecma CAR-T for Multiple Myeloma   2. Headache (mild)   3. Pancytopenia   4. Moderate malnutrition   5. Hyperphosphatemia     Doing well, grade 1 CRS, no ICANS  On Neurotox prophy trial - received 2 doses of it. DXM    Only mild headache, some rigors and one fever, but otherwise has done extrmely well.  Today tired, \"fuzzy\" headache 4/10. No new sy's no URI or infections.  ROS: neg, eating well.      /72 (BP Location: Right arm, Patient Position: Sitting, Cuff Size: Adult Large)   Pulse 69   Temp 98.6  F (37  C) (Oral)   Resp 16   Wt 70.2 kg (154 lb 11.2 oz)   LMP  (LMP Unknown)   SpO2 100%   BMI 24.57 kg/m    Physical Exam:    Patient is ambulating , AOx3, NAD, well appearing patient.   HEENT: No mucositis, MM moist, no thrush or ulcers, buccal mucosa intact  Neck supple, no peripheral adenopathy Thyroid gland midline, not enlarged   Lungs: good air exchange, CTA, no crackles,no wheezing.   Heart RRR S1 S2, no murmur or gallop   Abd soft, NT, ND. Without hepato-splenomegaly, no ascites,    LE symmetrical, no edema   Skin without lesion or rashes. No petechiae or ecchymosis.  Neuro  Intact, AOx3      Lab Results   Component Value Date    WBC 2.8 (L) 01/12/2023    ANEU 0.3 (LL) 01/10/2023    HGB 11.2 (L) 01/12/2023    HCT 32.7 (L) 01/12/2023     01/12/2023     01/11/2023    POTASSIUM 4.0 01/11/2023    CHLORIDE 106 01/11/2023    CO2 18 (L) 01/11/2023     (H) 01/11/2023    BUN 26.1 (H) 01/11/2023    CR 0.59 01/11/2023    MAG 2.1 01/11/2023    INR 1.30 (H) 01/11/2023    AST 41 (H) 01/11/2023    ALT 65 (H) 01/11/2023     A/P.    HOSPITAL COURSE:   CAR T Protocol   - Day -5 through -3: Cy/Flu   - Day -2: Rest day   - Day -1: IT Dex   - Day 0: CAR T infusion (Abecma)   - Day +6: IT Dex   - Day +13: IT Dex NOT indicated (determined by PI and primary MD)   - Simvastatin " daily 40mg until day day +30   - GM-CSF should NOT be given through out study      NEUROTOXICITY   - ICE score: 10/10   - Mild headaches. Ok to useTylenol. Monitor/     CRS  - Grade: 0  - Grade 1 with a Tmax 102.6F of  on 1/5. Given 1 dose of Tocilizumab.   - Afebrile since 16:30 on 1/5   - Blood cx- NGTD, urine cx- NGTD, CXR- no acute airspace disease, ferritin- 270, CRP- 7.96. Tocilizumab given x1 dose on 1/5. Cefepime discontinued on 1/8.      HEME/COAG  - Pancytopenic due to chemotherapy  - Transfusion parameters: hemoglobin <7, platelets <10  - Allopurinol for TLS prophylaxis. Continue outpatient until day +14.      IMMUNOCOMPROMISED  - Prophylaxis plan: ACV x1 year, Fluconazole until count recovery, and Levaquin until count recovery. Continued in the outpatient setting.   - Pentamidine INH + albuterol one time on 1/3   - PCP prophylaxis: consider Bactrim, she does not have a sulfa allergy. Starting on day 28 for 6-months.   - Active infections: None     CARDIOVASCULAR  - Risk of cardiomyopathy:  Baseline LVEF: 55-60%   - Risk of arrhythmia: Baseline EKG showed sinus rhythm nonspecific T wave abnormality  - Hx of PVC ablation March 2022      ANXIETY   - On buspirone      RESPIRATORY  - Risk of respiratory complications: Frequent ambulation and incentive spirometer.     GI/NUTRITION  - Ulcer prophylaxis: Protonix. Continued outpatient.   - Risk of nausea/vomiting due to chemo: compazine and ativan   - History of IBS: PEG ordered daily PRN   - AST, ALT, and INR elevated on 1/11. Re-check hepatic panel & INR on 1/12 during first clinic visit.      RENAL/ELECTROLYTES/  - Risk of renal injury: IV hydration   - Electrolyte management: replace per sliding scale     DIABETES/ENDOCRINE  - Risk of steroid-induced hyperglyemia: Monitor BG, sliding scale if needed     MUSCULOSKELETAL/FRAILTY  - Baseline frailty score: 0   - Patient with substantial risk of sarcopenia  - Daily PT/OT as needed while inpatient  - Cancer  Rehab as needed outpatient     SYMPTOM MANAGEMENT  - Nausea from chemo: compazine and ativan.    Plan:  RTC tomorrow  Doing well  Next restaging day 28       Payton Reinoso MD  Professor of Medicine  [phone]

## 2023-01-12 NOTE — NURSING NOTE
Chief Complaint   Patient presents with     Blood Draw     Vitals taken, PICC line labs drawn, heparin locked, checked into next appt     /72 (BP Location: Right arm, Patient Position: Sitting, Cuff Size: Adult Large)   Pulse 69   Temp 98.6  F (37  C) (Oral)   Resp 16   Wt 70.2 kg (154 lb 11.2 oz)   LMP  (LMP Unknown)   SpO2 100%   BMI 24.57 kg/m    Raza Mccarty RN on 1/12/2023 at 11:42 AM

## 2023-01-12 NOTE — PROGRESS NOTES
[facility]    Background: Transitional Care Management program identified per system criteria and reviewed by [facility] Resource [place] team for possible outreach.    Assessment: Upon chart review, [facility] Team member will not proceed with patient outreach related to this episode of Transitional Care Management program due to reason below:    Patient has a follow up appointment with an appropriate provider today for hospital discharge.    Plan: Transitional Care Management episode addressed appropriately per reason noted above.      JADA Scott  [facility]    *Connected Care Resource Team does NOT follow patient ongoing. Referrals are identified based on internal discharge reports and the outreach is to ensure patient has an understanding of their discharge instructions.

## 2023-01-13 ENCOUNTER — APPOINTMENT (OUTPATIENT)
Dept: LAB | Facility: CLINIC | Age: 53
End: 2023-01-13
Attending: STUDENT IN AN ORGANIZED HEALTH CARE EDUCATION/TRAINING PROGRAM
Payer: COMMERCIAL

## 2023-01-13 ENCOUNTER — INFUSION THERAPY VISIT (OUTPATIENT)
Dept: TRANSPLANT | Facility: CLINIC | Age: 53
End: 2023-01-13
Attending: STUDENT IN AN ORGANIZED HEALTH CARE EDUCATION/TRAINING PROGRAM
Payer: COMMERCIAL

## 2023-01-13 VITALS
SYSTOLIC BLOOD PRESSURE: 124 MMHG | TEMPERATURE: 98 F | RESPIRATION RATE: 16 BRPM | WEIGHT: 151.6 LBS | DIASTOLIC BLOOD PRESSURE: 78 MMHG | BODY MASS INDEX: 24.08 KG/M2 | OXYGEN SATURATION: 100 % | HEART RATE: 71 BPM

## 2023-01-13 DIAGNOSIS — C90.00 MULTIPLE MYELOMA NOT HAVING ACHIEVED REMISSION (H): ICD-10-CM

## 2023-01-13 DIAGNOSIS — C90.00 MULTIPLE MYELOMA NOT HAVING ACHIEVED REMISSION (H): Primary | ICD-10-CM

## 2023-01-13 DIAGNOSIS — C90.00 LIGHT CHAIN MYELOMA (H): ICD-10-CM

## 2023-01-13 LAB
ANION GAP SERPL CALCULATED.3IONS-SCNC: 13 MMOL/L (ref 7–15)
BASOPHILS # BLD MANUAL: 0 10E3/UL (ref 0–0.2)
BASOPHILS NFR BLD MANUAL: 0 %
BUN SERPL-MCNC: 20.3 MG/DL (ref 6–20)
BURR CELLS BLD QL SMEAR: SLIGHT
CALCIUM SERPL-MCNC: 9.7 MG/DL (ref 8.6–10)
CHLORIDE SERPL-SCNC: 107 MMOL/L (ref 98–107)
CREAT SERPL-MCNC: 0.73 MG/DL (ref 0.51–0.95)
DEPRECATED HCO3 PLAS-SCNC: 20 MMOL/L (ref 22–29)
EOSINOPHIL # BLD MANUAL: 0 10E3/UL (ref 0–0.7)
EOSINOPHIL NFR BLD MANUAL: 0 %
ERYTHROCYTE [DISTWIDTH] IN BLOOD BY AUTOMATED COUNT: 13.7 % (ref 10–15)
FRAGMENTS BLD QL SMEAR: SLIGHT
GFR SERPL CREATININE-BSD FRML MDRD: >90 ML/MIN/1.73M2
GLUCOSE SERPL-MCNC: 115 MG/DL (ref 70–99)
HCT VFR BLD AUTO: 35 % (ref 35–47)
HGB BLD-MCNC: 11.7 G/DL (ref 11.7–15.7)
LYMPHOCYTES # BLD MANUAL: 0.7 10E3/UL (ref 0.8–5.3)
LYMPHOCYTES NFR BLD MANUAL: 25 %
MCH RBC QN AUTO: 31 PG (ref 26.5–33)
MCHC RBC AUTO-ENTMCNC: 33.4 G/DL (ref 31.5–36.5)
MCV RBC AUTO: 93 FL (ref 78–100)
MONOCYTES # BLD MANUAL: 0.7 10E3/UL (ref 0–1.3)
MONOCYTES NFR BLD MANUAL: 26 %
NEUTROPHILS # BLD MANUAL: 1.4 10E3/UL (ref 1.6–8.3)
NEUTROPHILS NFR BLD MANUAL: 49 %
PLAT MORPH BLD: ABNORMAL
PLATELET # BLD AUTO: 203 10E3/UL (ref 150–450)
POTASSIUM SERPL-SCNC: 3.7 MMOL/L (ref 3.4–5.3)
RBC # BLD AUTO: 3.77 10E6/UL (ref 3.8–5.2)
RBC MORPH BLD: ABNORMAL
SODIUM SERPL-SCNC: 140 MMOL/L (ref 136–145)
WBC # BLD AUTO: 2.8 10E3/UL (ref 4–11)

## 2023-01-13 PROCEDURE — 85027 COMPLETE CBC AUTOMATED: CPT

## 2023-01-13 PROCEDURE — 82310 ASSAY OF CALCIUM: CPT

## 2023-01-13 PROCEDURE — 250N000009 HC RX 250: Performed by: PHYSICIAN ASSISTANT

## 2023-01-13 PROCEDURE — 250N000011 HC RX IP 250 OP 636: Performed by: STUDENT IN AN ORGANIZED HEALTH CARE EDUCATION/TRAINING PROGRAM

## 2023-01-13 PROCEDURE — G0463 HOSPITAL OUTPT CLINIC VISIT: HCPCS

## 2023-01-13 PROCEDURE — 36415 COLL VENOUS BLD VENIPUNCTURE: CPT

## 2023-01-13 PROCEDURE — 258N000003 HC RX IP 258 OP 636: Performed by: PHYSICIAN ASSISTANT

## 2023-01-13 PROCEDURE — 96365 THER/PROPH/DIAG IV INF INIT: CPT

## 2023-01-13 PROCEDURE — 99214 OFFICE O/P EST MOD 30 MIN: CPT

## 2023-01-13 PROCEDURE — 85007 BL SMEAR W/DIFF WBC COUNT: CPT

## 2023-01-13 RX ORDER — HEPARIN SODIUM,PORCINE 10 UNIT/ML
5 VIAL (ML) INTRAVENOUS
Status: DISCONTINUED | OUTPATIENT
Start: 2023-01-13 | End: 2023-01-13 | Stop reason: HOSPADM

## 2023-01-13 RX ADMIN — Medication 5 ML: at 10:03

## 2023-01-13 RX ADMIN — Medication 5 ML: at 10:05

## 2023-01-13 RX ADMIN — CAFFEINE AND SODIUM BENZOATE 500 MG: 125 INJECTION, SOLUTION INTRAMUSCULAR; INTRAVENOUS at 11:36

## 2023-01-13 RX ADMIN — Medication 5 ML: at 10:06

## 2023-01-13 ASSESSMENT — PAIN SCALES - GENERAL: PAINLEVEL: MODERATE PAIN (4)

## 2023-01-13 NOTE — LETTER
1/13/2023         RE: Kristie Cornejo  [address]  [facility] [address]        Dear Colleague,    Thank you for referring your patient, Kristie Cornejo, to the [facility] BLOOD AND MARROW TRANSPLANT PROGRAM [place]. Please see a copy of my visit note below.    Infusion Nursing Note:  Kristie Cornejo presents today for IV Caffeine infusion.    Patient seen by provider today: Yes: Bebo Valetnine, SULEIMAN   present during visit today: Not Applicable.    Note: Lab results monitored; Given IV caffeine infusion over 1 hour for ongoing positional headache. Infusion completed without complication.     Intravenous Access:  PICC.  +BR noted pre and post infusion.    Treatment Conditions:  Results reviewed, labs MET treatment parameters, ok to proceed with treatment.    Post Infusion Assessment:  Patient tolerated infusion without incident.     Discharge Plan:   Patient discharged in stable condition accompanied by: friend.      Serina Jones RN                          Again, thank you for allowing me to participate in the care of your patient.        Sincerely,        BMT Infusion Nurse

## 2023-01-13 NOTE — NURSING NOTE
"Oncology Rooming Note    January 13, 2023 10:24 AM   Kristie Cornejo is a 52 year old female who presents for:    Chief Complaint   Patient presents with     Labs Only     Picc, heparin locked, vitals checked     Oncology Clinic Visit     Plasmacytoma     Initial Vitals: /78   Pulse 71   Temp 98  F (36.7  C)   Resp 16   Wt 68.8 kg (151 lb 9.6 oz)   LMP  (LMP Unknown)   SpO2 100%   BMI 24.08 kg/m   Estimated body mass index is 24.08 kg/m  as calculated from the following:    Height as of 1/4/23: 1.69 m (5' 6.54\").    Weight as of this encounter: 68.8 kg (151 lb 9.6 oz). Body surface area is 1.8 meters squared.  Moderate Pain (4) Comment: Data Unavailable   No LMP recorded (lmp unknown). (Menstrual status: IUD).  Allergies reviewed: Yes  Medications reviewed: Yes    Medications: Medication refills not needed today.  Pharmacy name entered into [employer]:    [facility] [place] - [place], [place] - [address]  [facility] #[id number] - [place], [place] - [address] AT [address]  [facility] - [place], [place] - [address]  [facility] - [place], [place] - [address] [place] [address] [facility]ARMACY [facility] - [place], [place] - [address] [facility] [address]  [facility] - [facility] [place] - [address] [facility] - [place], [place] - [address]    Clinical concerns: Pt presents today for f/u.       Toshia Ramírez LPN  1/13/2023              "

## 2023-01-13 NOTE — NURSING NOTE
Chief Complaint   Patient presents with     Labs Only     Picc, heparin locked, vitals checked     Ruby Michele RN on 1/13/2023 at 10:08 AM

## 2023-01-13 NOTE — LETTER
Date:January 13, 2023      Provider requested that no letter be sent. Do not send.       [facility]

## 2023-01-13 NOTE — LETTER
1/13/2023         RE: Kristie Cornejo  [address]  [facility] [address]        Dear Colleague,    Thank you for referring your patient, Kristie Cornejo, to the [facility] [place]. Please see a copy of my visit note below.    I cBMT Daily CARTOX Note (complete days 0-14 and with any subsequent CRS/neurotoxicity)    Date: 01/13/2023    Kristie Cornejo ([id number]) is a 52 year old year old female who received infusion on 1/4/23, currently day 9 of CAR-T cell therapy.    Vital Signs: /78   Pulse 71   Temp 98  F (36.7  C)   Resp 16   Wt 68.8 kg (151 lb 9.6 oz)   LMP  (LMP Unknown)   SpO2 100%   BMI 24.08 kg/m      1) CRS Grading (based ONLY on parameters in box below)    Fever:   </= 100.4    Blood pressure:   SBP >/= 90 (not hypotensive)    Oxygen saturation:    >/= 90% on room air (not hypoxic)    CRS Parameter Grade 1  Grade 2 Grade 3 Grade 4   Fever* Tm >= 100.4 degrees F Tm >= 100.4 degrees F Tm >= 100.4 degrees F Tm >= to 100.4  degrees F      With Either:  Hypotension (SBP <90) None Responsive to Fluids  Requiring 1  vasopressor (w/ or w/o vasopressin) Requiring multiple  vasopressors  (excluding  vasopressin)     And/or  Hypoxia (O2 sats <90% on room air) None Low-flow nasal  cannula or blow-by High-flow nasal  cannula, face mask,  non-rebreather mask,or Venturi mask  Requiring positive  pressure (CPAP,  BiPAP intubation and  mechanical  ventilation)     CRS Summary  Current CRS stgstrstastdstest:st st1st Intervention(s):       2) ICANS ASTCT Consensus Grading for Adults    ICE Score  Orientation to year, month, city, hospital: 4 points, Name 3 objects (e.g., point to clock, pen, button): 3 point, Following commands: (e.g., Show me 2 fingers): 1 point, Write a standard sentence (e.g., The silver jumped over the log): 1 point and Count backwards from 100 by ten: 1 point  Total points: 10: No impairment    Seizure   No seizures    Motor Findings   No  motor findings    Elevated ICP/Cerebral Edema   None    ICANS grade (Grading to be based on the most severe symptom/event):     Intervention(s):    Neurotoxicity  Domain Grade 1 Grade 2 Grade 3 Grade 4   ICE score 7-9 3-6 1-2 0   Depressed LOC      Awakens  spontaneously Awakens to  voice  Awakens only to  tactile stimulation Unarousable or  requires vigorous  or repetitive  tactile stimuli to  arouse. Stupor  or coma.   Seizure n/a n/a Any clinical  seizure focal or  generalized that  resolves rapidly  or nonconvulsive  seizures on EEG  that resolve with  intervention  Life-threatening  prolonged  seizure (>5 min);  or Repetitive  clinical or  electrical  seizures without  return to baseline  in between    Motor Findings      n/a n/a n/a Deep focal motor  weakness such  as hemiparesis  or paraparesis   Elevated  ICP/cerebral  edema  n/a n/a Focal/local  edema on  neuroimaging  Diffuse cerebral  edema on  neuroimaging;  decerebrate or  decorticate  posturing; or  cranial nerve VI  palsy; or  papilledema; or  Cushing's triad     ICANS grade is determined by the most severe event (ICE score, level of consciousness, seizure, motor findings, raised ICP/cerebral edema) not attributable to any other cause; for example, a patient with an ICE score of 3 who has a generalized seizure is classified as grade 3 ICANS.    A patient with an ICE score of 0 may be classified as grade 3 ICANS if awake with global aphasia, but a patient with an ICE score of 0 may be classified as grade 4 ICANS if unarousable.     Depressed level of consciousness should be attributable to no other cause (eg, no sedating medication)    Tremors and myoclonus associated with immune effector cell therapies may be graded according to CTCAE v5.0,but they do not influence ICANS grading.     Intracranial hemorrhage with or without associated edema is not considered a neurotoxicity feature and is  excluded from ICANS grading. It may be graded according to  CTCAE v5.0.        3) Organ CAR-T toxicity:    Cardiac   none    Respiratory   none    Gastrointestinal   none    Hepatic    none    Skin   none     Coagulopathy    none     Other: Positional headache: completely resolved when lying flat. IV Caffeine today.      4) HLH   Ferritin > 10,000 plus any TWO of the following:        * CTCAE grading can be found at   https://ctep.cancer.gov/protocoldevelopment/electronic_applications/docs/CTCAE_v5_Quick Reference_8.5x11.pdf      BMT Progress note  BMT Physician: Dr. Reinoso   Transplant Type: CAR-T   Preparative Regimen: Cy/Flu   Transplant Date: 1/4/2023  Chief complaint: Kristie Cornejo is a 52 year old female with a history of multiple myeloma conditioned with Cy/Flu, currently day +9 of her CAR T-cell therapy with [facility]ec[facility].     INTERIM HISTORY:   She continues to be afebrile. She still has a 2/10 headache which is positional - it completely resolves when lying down. She checked her temp (normal) this morning and took some tylenol without any resolution. She also had a little nausea this morning which has improved with compazine. She otherwise denies any vomiting or diarrhea.     REVIEW OF SYSTEMS: Otherwise unremarkable other than what is noted in the Interim History.     PHYSICAL EXAM:   Vitals:  /78   Pulse 71   Temp 98  F (36.7  C)   Resp 16   Wt 68.8 kg (151 lb 9.6 oz)   LMP  (LMP Unknown)   SpO2 100%   BMI 24.08 kg/m       General Appearance: NAD  HEENT: sclera anicteric. masked  CV: RRR. no murmur or rub.   RESP: CTA bilaterally; no rales or wheezes.   GI: +BS, soft, nontender, nondistended.   EXT: No edema.   SKIN: no rash on exposed skin  NEURO: A&O x3   PSYCH: Appropriate affect   VASCULAR ACCES: dressing CDI, PICC line in place     ROUTINE LABS:     Lab Results   Component Value Date    WBC 2.8 (L) 01/13/2023    ANEU 1.4 (L) 01/13/2023    HGB 11.7 01/13/2023    HCT 35.0 01/13/2023     01/13/2023     01/13/2023    POTASSIUM 3.7  01/13/2023    CHLORIDE 107 01/13/2023    CO2 20 (L) 01/13/2023     (H) 01/13/2023    BUN 20.3 (H) 01/13/2023    CR 0.73 01/13/2023    MAG 2.1 01/11/2023    INR 1.05 01/12/2023    BILITOTAL 0.4 01/12/2023    AST 36 (H) 01/12/2023    ALT 70 (H) 01/12/2023    ALKPHOS 41 01/12/2023    PROTTOTAL 6.2 (L) 01/12/2023    ALBUMIN 4.4 01/12/2023        ASSESSMENT AND PLAN:      Kristie Cornejo is a 52 year old female with a history of multiple myeloma conditioned with Cy/Flu, currently day +9 of her CAR T-cell therapy with [facility]ec[facility].        CAR T Protocol   - Day -5 through -3: Cy/Flu   - Day -2: Rest day   - Day -1: IT Dex   - Day 0: CAR T infusion ([facility]ecma)   - Day +6: IT Dex   - Day +13: IT Dex NOT indicated (determined by PI and primary MD)   - Simvastatin daily 40mg until day day +30; patient will be requesting refill from [facility]   - GM-CSF should NOT be given through out study   - Re-staging plan: Unable to sign re-staging labs of treatment plan without specific tests for future bone marrow biopsies       NEUROTOXICITY   - ICE score: 10/10   - positional headaches: monitor temp, tylenol okay. (1/13) IV Caffeine today. Will request blood patch appt in case this patient needs this come Monday.  *she is a hard stick for lab draws and does not have a port, plan to keep PICC line in during daily visits.    CRS  - Grade: 0  - Grade 1 with a Tmax 102.6F of  on 1/5. Given 1 dose of Tocilizumab.   - Afebrile since 16:30 on 1/5   - Blood cx- NGTD, urine cx- NGTD, CXR- no acute airspace disease, ferritin- 270, CRP- 7.96. Tocilizumab given x1 dose on 1/5. Cefepime discontinued on 1/8.      HEME/COAG  - Pancytopenic due to chemotherapy  - Transfusion parameters: hemoglobin <7, platelets <10  - Allopurinol for TLS prophylaxis. Continue outpatient until day +14.      IMMUNOCOMPROMISED  - Prophylaxis plan: ACV x1 year, Fluconazole until count recovery, and Levaquin until count recovery. Continued in the outpatient  setting.   - Pentamidine INH + albuterol one time on 1/3   - PCP prophylaxis: consider Bactrim, she does not have a sulfa allergy. Starting on day 28 for 6-months.   - Active infections: None     CARDIOVASCULAR  - Risk of cardiomyopathy:  Baseline LVEF: 55-60%   - Risk of arrhythmia: Baseline EKG showed sinus rhythm nonspecific T wave abnormality  - Hx of PVC ablation March 2022      RESPIRATORY  - Risk of respiratory complications: Frequent ambulation and incentive spirometer.     GI/NUTRITION  - Ulcer prophylaxis: Protonix   - Risk of nausea/vomiting due to chemo: compazine and ativan   - History of IBS: PEG ordered daily PRN   - AST, ALT, and INR elevated on 1/11. Stable AST/ALT on 1/12. INR improved on 1/12. Will recheck CMP on 1/14.     RENAL/ELECTROLYTES/  - Risk of renal injury: IV hydration   - Electrolyte management: replace per sliding scale     DIABETES/ENDOCRINE  - Risk of steroid-induced hyperglyemia: Monitor BG, sliding scale if needed     MUSCULOSKELETAL/FRAILTY  - Baseline frailty score: 0   - Patient with substantial risk of sarcopenia  - Daily PT/OT as needed while inpatient  - Cancer Rehab as needed outpatient     SYMPTOM MANAGEMENT  - Nausea from chemo: compazine and ativan.    SOCIAL DETERMINANTS  - Caregiver: Jesse Cornejo (Spouse) & Misti Preciado (sister)     Today's Summary:  IV caffeine today, monitor headache  Requested xr procedure for blood patch be placed on 1/16, cancel if not needed  Daily RTC with CARTOX note    I spent 30 minutes in the care of this patient today, which included time necessary for preparation for the visit, obtaining history, ordering medications/tests/procedures as medically indicated, review of pertinent medical literature, counseling of the patient, communication of recommendations to the care team, and documentation time.    Bebo Valentine PA-C   *[phone]

## 2023-01-13 NOTE — PROGRESS NOTES
BMT Progress note  BMT Physician: Dr. Reinoso   Transplant Type: CAR-T   Preparative Regimen: Cy/Flu   Transplant Date: 1/4/2023  Chief complaint: Kristie Cornejo is a 52 year old female with a history of multiple myeloma conditioned with Cy/Flu, currently day +9 of her CAR T-cell therapy with Abecma.     INTERIM HISTORY:   She continues to be afebrile. She still has a 2/10 headache which is positional - it completely resolves when lying down. She checked her temp (normal) this morning and took some tylenol without any resolution. She also had a little nausea this morning which has improved with compazine. She otherwise denies any vomiting or diarrhea.     REVIEW OF SYSTEMS: Otherwise unremarkable other than what is noted in the Interim History.     PHYSICAL EXAM:   Vitals:  /78   Pulse 71   Temp 98  F (36.7  C)   Resp 16   Wt 68.8 kg (151 lb 9.6 oz)   LMP  (LMP Unknown)   SpO2 100%   BMI 24.08 kg/m       General Appearance: NAD  HEENT: sclera anicteric. masked  CV: RRR. no murmur or rub.   RESP: CTA bilaterally; no rales or wheezes.   GI: +BS, soft, nontender, nondistended.   EXT: No edema.   SKIN: no rash on exposed skin  NEURO: A&O x3   PSYCH: Appropriate affect   VASCULAR ACCES: dressing CDI, PICC line in place     ROUTINE LABS:     Lab Results   Component Value Date    WBC 2.8 (L) 01/13/2023    ANEU 1.4 (L) 01/13/2023    HGB 11.7 01/13/2023    HCT 35.0 01/13/2023     01/13/2023     01/13/2023    POTASSIUM 3.7 01/13/2023    CHLORIDE 107 01/13/2023    CO2 20 (L) 01/13/2023     (H) 01/13/2023    BUN 20.3 (H) 01/13/2023    CR 0.73 01/13/2023    MAG 2.1 01/11/2023    INR 1.05 01/12/2023    BILITOTAL 0.4 01/12/2023    AST 36 (H) 01/12/2023    ALT 70 (H) 01/12/2023    ALKPHOS 41 01/12/2023    PROTTOTAL 6.2 (L) 01/12/2023    ALBUMIN 4.4 01/12/2023        ASSESSMENT AND PLAN:      Kristie MARLO Cornejo is a 52 year old female with a history of multiple myeloma conditioned with Cy/Flu,  currently day +9 of her CAR T-cell therapy with Abecma.        CAR T Protocol   - Day -5 through -3: Cy/Flu   - Day -2: Rest day   - Day -1: IT Dex   - Day 0: CAR T infusion (Abecma)   - Day +6: IT Dex   - Day +13: IT Dex NOT indicated (determined by PI and primary MD)   - Simvastatin daily 40mg until day day +30; patient will be requesting refill [facility]   - GM-CSF should NOT be given through out study   - Re-staging plan: Unable to sign re-staging labs of treatment plan without specific tests for future bone marrow biopsies       NEUROTOXICITY   - ICE score: 10/10   - positional headaches: monitor temp, tylenol okay. (1/13) IV Caffeine today. Will request blood patch appt in case this patient needs this come Monday.  *she is a hard stick for lab draws and does not have a port, plan to keep PICC line in during daily visits.    CRS  - Grade: 0  - Grade 1 with a Tmax 102.6F of  on 1/5. Given 1 dose of Tocilizumab.   - Afebrile since 16:30 on 1/5   - Blood cx- NGTD, urine cx- NGTD, CXR- no acute airspace disease, ferritin- 270, CRP- 7.96. Tocilizumab given x1 dose on 1/5. Cefepime discontinued on 1/8.      HEME/COAG  - Pancytopenic due to chemotherapy  - Transfusion parameters: hemoglobin <7, platelets <10  - Allopurinol for TLS prophylaxis. Continue outpatient until day +14.      IMMUNOCOMPROMISED  - Prophylaxis plan: ACV x1 year, Fluconazole until count recovery, and Levaquin until count recovery. Continued in the outpatient setting.   - Pentamidine INH + albuterol one time on 1/3   - PCP prophylaxis: consider Bactrim, she does not have a sulfa allergy. Starting on day 28 for 6-months.   - Active infections: None     CARDIOVASCULAR  - Risk of cardiomyopathy:  Baseline LVEF: 55-60%   - Risk of arrhythmia: Baseline EKG showed sinus rhythm nonspecific T wave abnormality  - Hx of PVC ablation March 2022      RESPIRATORY  - Risk of respiratory complications: Frequent ambulation and incentive  spirometer.     GI/NUTRITION  - Ulcer prophylaxis: Protonix   - Risk of nausea/vomiting due to chemo: compazine and ativan   - History of IBS: PEG ordered daily PRN   - AST, ALT, and INR elevated on 1/11. Stable AST/ALT on 1/12. INR improved on 1/12. Will recheck CMP on 1/14.     RENAL/ELECTROLYTES/  - Risk of renal injury: IV hydration   - Electrolyte management: replace per sliding scale     DIABETES/ENDOCRINE  - Risk of steroid-induced hyperglyemia: Monitor BG, sliding scale if needed     MUSCULOSKELETAL/FRAILTY  - Baseline frailty score: 0   - Patient with substantial risk of sarcopenia  - Daily PT/OT as needed while inpatient  - Cancer Rehab as needed outpatient     SYMPTOM MANAGEMENT  - Nausea from chemo: compazine and ativan.    SOCIAL DETERMINANTS  - Caregiver: Jesse Barrosot (Spouse) & Misti Preciado (sister)     Today's Summary:  IV caffeine today, monitor headache  Requested xr procedure for blood patch be placed on 1/16, cancel if not needed  Daily RTC with CARTOX note    I spent 30 minutes in the care of this patient today, which included time necessary for preparation for the visit, obtaining history, ordering medications/tests/procedures as medically indicated, review of pertinent medical literature, counseling of the patient, communication of recommendations to the care team, and documentation time.    Bebo Valentine PA-C   *[phone]

## 2023-01-13 NOTE — PROGRESS NOTES
Infusion Nursing Note:  Kristie Cornejo presents today for IV Caffeine infusion.    Patient seen by provider today: Yes: Bebo Valentine, SULEIMAN   present during visit today: Not Applicable.    Note: Lab results monitored; Given IV caffeine infusion over 1 hour for ongoing positional headache. Infusion completed without complication.     Intravenous Access:  PICC.  +BR noted pre and post infusion.    Treatment Conditions:  Results reviewed, labs MET treatment parameters, ok to proceed with treatment.    Post Infusion Assessment:  Patient tolerated infusion without incident.     Discharge Plan:   Patient discharged in stable condition accompanied by: friend.      Serina Jones RN

## 2023-01-13 NOTE — PROGRESS NOTES
I cBMT Daily CARTOX Note (complete days 0-14 and with any subsequent CRS/neurotoxicity)    Date: 01/13/2023    Kristie Cornejo ([id number]) is a 52 year old year old female who received infusion on 1/4/23, currently day 9 of CAR-T cell therapy.    Vital Signs: /78   Pulse 71   Temp 98  F (36.7  C)   Resp 16   Wt 68.8 kg (151 lb 9.6 oz)   LMP  (LMP Unknown)   SpO2 100%   BMI 24.08 kg/m      1) CRS Grading (based ONLY on parameters in box below)    Fever:   </= 100.4    Blood pressure:   SBP >/= 90 (not hypotensive)    Oxygen saturation:    >/= 90% on room air (not hypoxic)    CRS Parameter Grade 1  Grade 2 Grade 3 Grade 4   Fever* Tm >= 100.4 degrees F Tm >= 100.4 degrees F Tm >= 100.4 degrees F Tm >= to 100.4  degrees F      With Either:  Hypotension (SBP <90) None Responsive to Fluids  Requiring 1  vasopressor (w/ or w/o vasopressin) Requiring multiple  vasopressors  (excluding  vasopressin)     And/or  Hypoxia (O2 sats <90% on room air) None Low-flow nasal  cannula or blow-by High-flow nasal  cannula, face mask,  non-rebreather mask,or Venturi mask  Requiring positive  pressure (CPAP,  BiPAP intubation and  mechanical  ventilation)     CRS Summary  Current CRS stgstrstastdstest:st st1st Intervention(s):       2) ICANS ASTCT Consensus Grading for Adults    ICE Score  Orientation to year, month, city, hospital: 4 points, Name 3 objects (e.g., point to clock, pen, button): 3 point, Following commands: (e.g., Show me 2 fingers): 1 point, Write a standard sentence (e.g., The silver jumped over the log): 1 point and Count backwards from 100 by ten: 1 point  Total points: 10: No impairment    Seizure   No seizures    Motor Findings   No motor findings    Elevated ICP/Cerebral Edema   None    ICANS grade (Grading to be based on the most severe symptom/event):     Intervention(s):    Neurotoxicity  Domain Grade 1 Grade 2 Grade 3 Grade 4   ICE score 7-9 3-6 1-2 0   Depressed LOC      Awakens  spontaneously Awakens to  voice   Awakens only to  tactile stimulation Unarousable or  requires vigorous  or repetitive  tactile stimuli to  arouse. Stupor  or coma.   Seizure n/a n/a Any clinical  seizure focal or  generalized that  resolves rapidly  or nonconvulsive  seizures on EEG  that resolve with  intervention  Life-threatening  prolonged  seizure (>5 min);  or Repetitive  clinical or  electrical  seizures without  return to baseline  in between    Motor Findings      n/a n/a n/a Deep focal motor  weakness such  as hemiparesis  or paraparesis   Elevated  ICP/cerebral  edema  n/a n/a Focal/local  edema on  neuroimaging  Diffuse cerebral  edema on  neuroimaging;  decerebrate or  decorticate  posturing; or  cranial nerve VI  palsy; or  papilledema; or  Cushing's triad     ICANS grade is determined by the most severe event (ICE score, level of consciousness, seizure, motor findings, raised ICP/cerebral edema) not attributable to any other cause; for example, a patient with an ICE score of 3 who has a generalized seizure is classified as grade 3 ICANS.    A patient with an ICE score of 0 may be classified as grade 3 ICANS if awake with global aphasia, but a patient with an ICE score of 0 may be classified as grade 4 ICANS if unarousable.     Depressed level of consciousness should be attributable to no other cause (eg, no sedating medication)    Tremors and myoclonus associated with immune effector cell therapies may be graded according to CTCAE v5.0,but they do not influence ICANS grading.     Intracranial hemorrhage with or without associated edema is not considered a neurotoxicity feature and is  excluded from ICANS grading. It may be graded according to CTCAE v5.0.        3) Organ CAR-T toxicity:    Cardiac   none    Respiratory   none    Gastrointestinal   none    Hepatic    none    Skin   none     Coagulopathy    none     Other: Positional headache: completely resolved when lying flat. IV Caffeine today.      4) HLH   Ferritin > 10,000 plus  any TWO of the following:        * CTCAE grading can be found at   https://ctep.cancer.gov/protocoldevelopment/electronic_applications/docs/CTCAE_v5_Quick Reference_8.5x11.pdf

## 2023-01-14 ENCOUNTER — HOSPITAL ENCOUNTER (INPATIENT)
Facility: CLINIC | Age: 53
LOS: 3 days | Discharge: HOME | DRG: 841 | End: 2023-01-17
Attending: EMERGENCY MEDICINE | Admitting: INTERNAL MEDICINE
Payer: COMMERCIAL

## 2023-01-14 ENCOUNTER — APPOINTMENT (OUTPATIENT)
Dept: CT IMAGING | Facility: CLINIC | Age: 53
DRG: 841 | End: 2023-01-14
Attending: EMERGENCY MEDICINE
Payer: COMMERCIAL

## 2023-01-14 ENCOUNTER — APPOINTMENT (OUTPATIENT)
Dept: GENERAL RADIOLOGY | Facility: CLINIC | Age: 53
DRG: 841 | End: 2023-01-14
Attending: EMERGENCY MEDICINE
Payer: COMMERCIAL

## 2023-01-14 ENCOUNTER — INFUSION THERAPY VISIT (OUTPATIENT)
Dept: TRANSPLANT | Facility: CLINIC | Age: 53
DRG: 841 | End: 2023-01-14
Payer: COMMERCIAL

## 2023-01-14 ENCOUNTER — APPOINTMENT (OUTPATIENT)
Dept: LAB | Facility: CLINIC | Age: 53
DRG: 841 | End: 2023-01-14
Payer: COMMERCIAL

## 2023-01-14 ENCOUNTER — ONCOLOGY VISIT (OUTPATIENT)
Dept: TRANSPLANT | Facility: CLINIC | Age: 53
DRG: 841 | End: 2023-01-14
Payer: COMMERCIAL

## 2023-01-14 ENCOUNTER — TELEPHONE (OUTPATIENT)
Dept: ONCOLOGY | Facility: CLINIC | Age: 53
End: 2023-01-14

## 2023-01-14 VITALS
DIASTOLIC BLOOD PRESSURE: 84 MMHG | RESPIRATION RATE: 16 BRPM | BODY MASS INDEX: 24.12 KG/M2 | HEART RATE: 77 BPM | WEIGHT: 151.9 LBS | TEMPERATURE: 98.1 F | OXYGEN SATURATION: 100 % | SYSTOLIC BLOOD PRESSURE: 144 MMHG

## 2023-01-14 DIAGNOSIS — C90.30 PLASMACYTOMA OF BONE (H): ICD-10-CM

## 2023-01-14 DIAGNOSIS — R51.9 INTRACTABLE EPISODIC HEADACHE, UNSPECIFIED HEADACHE TYPE: Primary | ICD-10-CM

## 2023-01-14 DIAGNOSIS — C90.00 LIGHT CHAIN MYELOMA (H): ICD-10-CM

## 2023-01-14 DIAGNOSIS — Z11.52 ENCOUNTER FOR SCREENING LABORATORY TESTING FOR SEVERE ACUTE RESPIRATORY SYNDROME CORONAVIRUS 2 (SARS-COV-2): ICD-10-CM

## 2023-01-14 DIAGNOSIS — C90.00 MULTIPLE MYELOMA NOT HAVING ACHIEVED REMISSION (H): ICD-10-CM

## 2023-01-14 DIAGNOSIS — C90.00 MULTIPLE MYELOMA NOT HAVING ACHIEVED REMISSION (H): Primary | ICD-10-CM

## 2023-01-14 LAB
ALBUMIN SERPL BCG-MCNC: 4.3 G/DL (ref 3.5–5.2)
ALBUMIN SERPL BCG-MCNC: 4.6 G/DL (ref 3.5–5.2)
ALP SERPL-CCNC: 41 U/L (ref 35–104)
ALP SERPL-CCNC: 45 U/L (ref 35–104)
ALT SERPL W P-5'-P-CCNC: 55 U/L (ref 10–35)
ALT SERPL W P-5'-P-CCNC: 58 U/L (ref 10–35)
ANION GAP SERPL CALCULATED.3IONS-SCNC: 12 MMOL/L (ref 7–15)
ANION GAP SERPL CALCULATED.3IONS-SCNC: 17 MMOL/L (ref 7–15)
AST SERPL W P-5'-P-CCNC: 23 U/L (ref 10–35)
AST SERPL W P-5'-P-CCNC: 26 U/L (ref 10–35)
BASOPHILS # BLD AUTO: 0 10E3/UL (ref 0–0.2)
BASOPHILS # BLD MANUAL: 0.1 10E3/UL (ref 0–0.2)
BASOPHILS NFR BLD AUTO: 1 %
BASOPHILS NFR BLD MANUAL: 4 %
BILIRUB SERPL-MCNC: 0.4 MG/DL
BILIRUB SERPL-MCNC: 0.5 MG/DL
BUN SERPL-MCNC: 14.8 MG/DL (ref 6–20)
BUN SERPL-MCNC: 18.6 MG/DL (ref 6–20)
BURR CELLS BLD QL SMEAR: SLIGHT
CALCIUM SERPL-MCNC: 8.9 MG/DL (ref 8.6–10)
CALCIUM SERPL-MCNC: 9.3 MG/DL (ref 8.6–10)
CHLORIDE SERPL-SCNC: 107 MMOL/L (ref 98–107)
CHLORIDE SERPL-SCNC: 110 MMOL/L (ref 98–107)
CREAT SERPL-MCNC: 0.74 MG/DL (ref 0.51–0.95)
CREAT SERPL-MCNC: 0.79 MG/DL (ref 0.51–0.95)
CRP SERPL-MCNC: <3 MG/L
DACRYOCYTES BLD QL SMEAR: SLIGHT
DEPRECATED HCO3 PLAS-SCNC: 16 MMOL/L (ref 22–29)
DEPRECATED HCO3 PLAS-SCNC: 21 MMOL/L (ref 22–29)
ELLIPTOCYTES BLD QL SMEAR: SLIGHT
EOSINOPHIL # BLD AUTO: 0.1 10E3/UL (ref 0–0.7)
EOSINOPHIL # BLD MANUAL: 0 10E3/UL (ref 0–0.7)
EOSINOPHIL NFR BLD AUTO: 5 %
EOSINOPHIL NFR BLD MANUAL: 2 %
ERYTHROCYTE [DISTWIDTH] IN BLOOD BY AUTOMATED COUNT: 13.7 % (ref 10–15)
ERYTHROCYTE [DISTWIDTH] IN BLOOD BY AUTOMATED COUNT: 14.1 % (ref 10–15)
FERRITIN SERPL-MCNC: 222 NG/ML (ref 11–328)
FLUAV RNA SPEC QL NAA+PROBE: NEGATIVE
FLUBV RNA RESP QL NAA+PROBE: NEGATIVE
GFR SERPL CREATININE-BSD FRML MDRD: 90 ML/MIN/1.73M2
GFR SERPL CREATININE-BSD FRML MDRD: >90 ML/MIN/1.73M2
GLUCOSE SERPL-MCNC: 123 MG/DL (ref 70–99)
GLUCOSE SERPL-MCNC: 133 MG/DL (ref 70–99)
HCT VFR BLD AUTO: 34.6 % (ref 35–47)
HCT VFR BLD AUTO: 35.5 % (ref 35–47)
HGB BLD-MCNC: 11.6 G/DL (ref 11.7–15.7)
HGB BLD-MCNC: 11.9 G/DL (ref 11.7–15.7)
IMM GRANULOCYTES # BLD: 0 10E3/UL
IMM GRANULOCYTES NFR BLD: 0 %
LYMPHOCYTES # BLD AUTO: 0.7 10E3/UL (ref 0.8–5.3)
LYMPHOCYTES # BLD MANUAL: 0.5 10E3/UL (ref 0.8–5.3)
LYMPHOCYTES NFR BLD AUTO: 34 %
LYMPHOCYTES NFR BLD MANUAL: 20 %
MCH RBC QN AUTO: 31 PG (ref 26.5–33)
MCH RBC QN AUTO: 31.6 PG (ref 26.5–33)
MCHC RBC AUTO-ENTMCNC: 32.7 G/DL (ref 31.5–36.5)
MCHC RBC AUTO-ENTMCNC: 34.4 G/DL (ref 31.5–36.5)
MCV RBC AUTO: 92 FL (ref 78–100)
MCV RBC AUTO: 95 FL (ref 78–100)
MONOCYTES # BLD AUTO: 0.6 10E3/UL (ref 0–1.3)
MONOCYTES # BLD MANUAL: 0.7 10E3/UL (ref 0–1.3)
MONOCYTES NFR BLD AUTO: 27 %
MONOCYTES NFR BLD MANUAL: 32 %
NEUTROPHILS # BLD AUTO: 0.7 10E3/UL (ref 1.6–8.3)
NEUTROPHILS # BLD MANUAL: 1 10E3/UL (ref 1.6–8.3)
NEUTROPHILS NFR BLD AUTO: 33 %
NEUTROPHILS NFR BLD MANUAL: 42 %
NRBC # BLD AUTO: 0 10E3/UL
NRBC BLD AUTO-RTO: 0 /100
PLAT MORPH BLD: ABNORMAL
PLAT MORPH BLD: ABNORMAL
PLATELET # BLD AUTO: 218 10E3/UL (ref 150–450)
PLATELET # BLD AUTO: 223 10E3/UL (ref 150–450)
POTASSIUM SERPL-SCNC: 3.4 MMOL/L (ref 3.4–5.3)
POTASSIUM SERPL-SCNC: 3.6 MMOL/L (ref 3.4–5.3)
PROT SERPL-MCNC: 6.4 G/DL (ref 6.4–8.3)
PROT SERPL-MCNC: 6.5 G/DL (ref 6.4–8.3)
RBC # BLD AUTO: 3.74 10E6/UL (ref 3.8–5.2)
RBC # BLD AUTO: 3.76 10E6/UL (ref 3.8–5.2)
RBC MORPH BLD: ABNORMAL
RBC MORPH BLD: ABNORMAL
RSV RNA SPEC NAA+PROBE: NEGATIVE
SARS-COV-2 RNA RESP QL NAA+PROBE: NEGATIVE
SODIUM SERPL-SCNC: 140 MMOL/L (ref 136–145)
SODIUM SERPL-SCNC: 143 MMOL/L (ref 136–145)
WBC # BLD AUTO: 2.1 10E3/UL (ref 4–11)
WBC # BLD AUTO: 2.3 10E3/UL (ref 4–11)

## 2023-01-14 PROCEDURE — 70450 CT HEAD/BRAIN W/O DYE: CPT | Mod: 26 | Performed by: RADIOLOGY

## 2023-01-14 PROCEDURE — 250N000009 HC RX 250

## 2023-01-14 PROCEDURE — 85007 BL SMEAR W/DIFF WBC COUNT: CPT

## 2023-01-14 PROCEDURE — 87040 BLOOD CULTURE FOR BACTERIA: CPT | Performed by: EMERGENCY MEDICINE

## 2023-01-14 PROCEDURE — 87637 SARSCOV2&INF A&B&RSV AMP PRB: CPT | Performed by: EMERGENCY MEDICINE

## 2023-01-14 PROCEDURE — 70450 CT HEAD/BRAIN W/O DYE: CPT

## 2023-01-14 PROCEDURE — 99284 EMERGENCY DEPT VISIT MOD MDM: CPT | Mod: CS | Performed by: EMERGENCY MEDICINE

## 2023-01-14 PROCEDURE — C9803 HOPD COVID-19 SPEC COLLECT: HCPCS | Performed by: EMERGENCY MEDICINE

## 2023-01-14 PROCEDURE — 258N000003 HC RX IP 258 OP 636

## 2023-01-14 PROCEDURE — 84550 ASSAY OF BLOOD/URIC ACID: CPT | Performed by: INTERNAL MEDICINE

## 2023-01-14 PROCEDURE — 86140 C-REACTIVE PROTEIN: CPT | Performed by: EMERGENCY MEDICINE

## 2023-01-14 PROCEDURE — 80053 COMPREHEN METABOLIC PANEL: CPT

## 2023-01-14 PROCEDURE — 85025 COMPLETE CBC W/AUTO DIFF WBC: CPT

## 2023-01-14 PROCEDURE — G0463 HOSPITAL OUTPT CLINIC VISIT: HCPCS | Mod: 25

## 2023-01-14 PROCEDURE — 83605 ASSAY OF LACTIC ACID: CPT | Performed by: INTERNAL MEDICINE

## 2023-01-14 PROCEDURE — 82728 ASSAY OF FERRITIN: CPT | Performed by: EMERGENCY MEDICINE

## 2023-01-14 PROCEDURE — 85027 COMPLETE CBC AUTOMATED: CPT | Performed by: EMERGENCY MEDICINE

## 2023-01-14 PROCEDURE — 83735 ASSAY OF MAGNESIUM: CPT | Performed by: INTERNAL MEDICINE

## 2023-01-14 PROCEDURE — 84100 ASSAY OF PHOSPHORUS: CPT | Performed by: INTERNAL MEDICINE

## 2023-01-14 PROCEDURE — 99222 1ST HOSP IP/OBS MODERATE 55: CPT | Mod: AI | Performed by: INTERNAL MEDICINE

## 2023-01-14 PROCEDURE — 36415 COLL VENOUS BLD VENIPUNCTURE: CPT

## 2023-01-14 PROCEDURE — 258N000003 HC RX IP 258 OP 636: Performed by: EMERGENCY MEDICINE

## 2023-01-14 PROCEDURE — 250N000011 HC RX IP 250 OP 636

## 2023-01-14 PROCEDURE — 36415 COLL VENOUS BLD VENIPUNCTURE: CPT | Performed by: EMERGENCY MEDICINE

## 2023-01-14 PROCEDURE — 96365 THER/PROPH/DIAG IV INF INIT: CPT

## 2023-01-14 PROCEDURE — 84155 ASSAY OF PROTEIN SERUM: CPT | Performed by: EMERGENCY MEDICINE

## 2023-01-14 PROCEDURE — 99285 EMERGENCY DEPT VISIT HI MDM: CPT | Mod: CS,25 | Performed by: EMERGENCY MEDICINE

## 2023-01-14 PROCEDURE — 84155 ASSAY OF PROTEIN SERUM: CPT | Performed by: INTERNAL MEDICINE

## 2023-01-14 PROCEDURE — 99214 OFFICE O/P EST MOD 30 MIN: CPT

## 2023-01-14 PROCEDURE — 206N000001 HC R&B BMT UMMC

## 2023-01-14 PROCEDURE — 250N000011 HC RX IP 250 OP 636: Performed by: EMERGENCY MEDICINE

## 2023-01-14 PROCEDURE — 71045 X-RAY EXAM CHEST 1 VIEW: CPT

## 2023-01-14 PROCEDURE — 71045 X-RAY EXAM CHEST 1 VIEW: CPT | Mod: 26 | Performed by: RADIOLOGY

## 2023-01-14 RX ORDER — CETIRIZINE HYDROCHLORIDE 10 MG/1
10 TABLET ORAL DAILY PRN
Status: DISCONTINUED | OUTPATIENT
Start: 2023-01-14 | End: 2023-01-17 | Stop reason: HOSPADM

## 2023-01-14 RX ORDER — HEPARIN SODIUM,PORCINE 10 UNIT/ML
5 VIAL (ML) INTRAVENOUS
Status: DISCONTINUED | OUTPATIENT
Start: 2023-01-14 | End: 2023-01-15 | Stop reason: HOSPADM

## 2023-01-14 RX ORDER — ALBUTEROL SULFATE 0.83 MG/ML
2.5 SOLUTION RESPIRATORY (INHALATION)
Status: CANCELLED
Start: 2023-02-03

## 2023-01-14 RX ORDER — METOCLOPRAMIDE HYDROCHLORIDE 5 MG/ML
10 INJECTION INTRAMUSCULAR; INTRAVENOUS ONCE
Status: COMPLETED | OUTPATIENT
Start: 2023-01-14 | End: 2023-01-14

## 2023-01-14 RX ORDER — POLYETHYLENE GLYCOL 3350 17 G/17G
17 POWDER, FOR SOLUTION ORAL DAILY PRN
Status: DISCONTINUED | OUTPATIENT
Start: 2023-01-14 | End: 2023-01-17 | Stop reason: HOSPADM

## 2023-01-14 RX ORDER — LEVOFLOXACIN 250 MG/1
250 TABLET, FILM COATED ORAL DAILY
Status: DISCONTINUED | OUTPATIENT
Start: 2023-01-15 | End: 2023-01-16

## 2023-01-14 RX ORDER — LORAZEPAM 0.5 MG/1
.5-1 TABLET ORAL EVERY 4 HOURS PRN
Status: DISCONTINUED | OUTPATIENT
Start: 2023-01-14 | End: 2023-01-17 | Stop reason: HOSPADM

## 2023-01-14 RX ORDER — HEPARIN SODIUM,PORCINE 10 UNIT/ML
5 VIAL (ML) INTRAVENOUS
Status: CANCELLED | OUTPATIENT
Start: 2023-02-03

## 2023-01-14 RX ORDER — DIPHENHYDRAMINE HYDROCHLORIDE 50 MG/ML
25 INJECTION INTRAMUSCULAR; INTRAVENOUS ONCE
Status: COMPLETED | OUTPATIENT
Start: 2023-01-14 | End: 2023-01-14

## 2023-01-14 RX ORDER — PROCHLORPERAZINE MALEATE 5 MG
5 TABLET ORAL EVERY 6 HOURS PRN
Status: DISCONTINUED | OUTPATIENT
Start: 2023-01-14 | End: 2023-01-15

## 2023-01-14 RX ORDER — CAFFEINE CITRATE 20 MG/ML
10 SOLUTION INTRAVENOUS DAILY
Status: CANCELLED
Start: 2023-02-03

## 2023-01-14 RX ORDER — CAFFEINE CITRATE 20 MG/ML
10 SOLUTION INTRAVENOUS DAILY
Status: CANCELLED
Start: 2023-01-14

## 2023-01-14 RX ORDER — HEPARIN SODIUM (PORCINE) LOCK FLUSH IV SOLN 100 UNIT/ML 100 UNIT/ML
5 SOLUTION INTRAVENOUS
Status: CANCELLED | OUTPATIENT
Start: 2023-02-03

## 2023-01-14 RX ORDER — LORAZEPAM 2 MG/ML
.5-1 INJECTION INTRAMUSCULAR EVERY 4 HOURS PRN
Status: DISCONTINUED | OUTPATIENT
Start: 2023-01-14 | End: 2023-01-17 | Stop reason: HOSPADM

## 2023-01-14 RX ORDER — PANTOPRAZOLE SODIUM 40 MG/1
40 TABLET, DELAYED RELEASE ORAL DAILY
Status: DISCONTINUED | OUTPATIENT
Start: 2023-01-15 | End: 2023-01-17 | Stop reason: HOSPADM

## 2023-01-14 RX ORDER — FLUCONAZOLE 200 MG/1
200 TABLET ORAL DAILY
Status: DISCONTINUED | OUTPATIENT
Start: 2023-01-15 | End: 2023-01-17 | Stop reason: HOSPADM

## 2023-01-14 RX ORDER — SIMVASTATIN 20 MG
40 TABLET ORAL DAILY
Status: DISCONTINUED | OUTPATIENT
Start: 2023-01-15 | End: 2023-01-17 | Stop reason: HOSPADM

## 2023-01-14 RX ORDER — MULTIVITAMIN,THERAPEUTIC
1 TABLET ORAL DAILY
Status: DISCONTINUED | OUTPATIENT
Start: 2023-01-15 | End: 2023-01-17 | Stop reason: HOSPADM

## 2023-01-14 RX ORDER — ACETAMINOPHEN 325 MG/1
325-650 TABLET ORAL EVERY 4 HOURS PRN
Status: DISCONTINUED | OUTPATIENT
Start: 2023-01-14 | End: 2023-01-17 | Stop reason: HOSPADM

## 2023-01-14 RX ORDER — ALLOPURINOL 300 MG/1
300 TABLET ORAL DAILY
Status: DISCONTINUED | OUTPATIENT
Start: 2023-01-15 | End: 2023-01-16

## 2023-01-14 RX ORDER — SODIUM CHLORIDE, SODIUM LACTATE, POTASSIUM CHLORIDE, CALCIUM CHLORIDE 600; 310; 30; 20 MG/100ML; MG/100ML; MG/100ML; MG/100ML
INJECTION, SOLUTION INTRAVENOUS CONTINUOUS
Status: DISCONTINUED | OUTPATIENT
Start: 2023-01-14 | End: 2023-01-15

## 2023-01-14 RX ORDER — ACYCLOVIR 800 MG/1
800 TABLET ORAL EVERY 12 HOURS
Status: DISCONTINUED | OUTPATIENT
Start: 2023-01-14 | End: 2023-01-17 | Stop reason: HOSPADM

## 2023-01-14 RX ORDER — BUSPIRONE HYDROCHLORIDE 10 MG/1
10 TABLET ORAL 3 TIMES DAILY PRN
Status: DISCONTINUED | OUTPATIENT
Start: 2023-01-14 | End: 2023-01-17 | Stop reason: HOSPADM

## 2023-01-14 RX ORDER — PROCHLORPERAZINE MALEATE 5 MG
5-10 TABLET ORAL EVERY 6 HOURS PRN
Status: DISCONTINUED | OUTPATIENT
Start: 2023-01-14 | End: 2023-01-17 | Stop reason: HOSPADM

## 2023-01-14 RX ORDER — PENTAMIDINE ISETHIONATE 300 MG/300MG
300 INHALANT RESPIRATORY (INHALATION)
Status: CANCELLED
Start: 2023-02-03

## 2023-01-14 RX ADMIN — Medication 5 ML: at 09:52

## 2023-01-14 RX ADMIN — Medication 5 ML: at 09:53

## 2023-01-14 RX ADMIN — Medication 5 ML: at 10:00

## 2023-01-14 RX ADMIN — Medication 5 ML: at 12:35

## 2023-01-14 RX ADMIN — CAFFEINE AND SODIUM BENZOATE: 125 INJECTION, SOLUTION INTRAMUSCULAR; INTRAVENOUS at 11:30

## 2023-01-14 RX ADMIN — DIPHENHYDRAMINE HYDROCHLORIDE 25 MG: 50 INJECTION, SOLUTION INTRAMUSCULAR; INTRAVENOUS at 21:16

## 2023-01-14 RX ADMIN — SODIUM CHLORIDE 1000 ML: 9 INJECTION, SOLUTION INTRAVENOUS at 21:15

## 2023-01-14 RX ADMIN — METOCLOPRAMIDE 10 MG: 5 INJECTION, SOLUTION INTRAMUSCULAR; INTRAVENOUS at 21:18

## 2023-01-14 ASSESSMENT — ACTIVITIES OF DAILY LIVING (ADL)
ADLS_ACUITY_SCORE: 37
ADLS_ACUITY_SCORE: 37

## 2023-01-14 ASSESSMENT — PAIN SCALES - GENERAL: PAINLEVEL: MODERATE PAIN (5)

## 2023-01-14 NOTE — NURSING NOTE
Chief Complaint   Patient presents with     Blood Draw     Labs drawn from PICC by rn.  VS taken.     Labs drawn from PICC by rn.  Good blood return noted in all three lumens.  All lumens flushed with NS and heparin.  Pt tolerated well.  VS taken.  Pt checked in for next appt.    Jess Ferrera RN

## 2023-01-14 NOTE — LETTER
1/14/2023         RE: Kristie Cornejo  [address]  [facility] [address]        Dear Colleague,    Thank you for referring your patient, Kristie Cornejo, to the [facility] [place]. Please see a copy of my visit note below.    BMT Progress note  BMT Physician: Dr. Reinoso   Transplant Type: CAR-T   Preparative Regimen: Cy/Flu   Transplant Date: 1/4/2023  Chief complaint: Kristie Cornejo is a 52 year old female with a history of multiple myeloma conditioned with Cy/Flu, currently day +10 of her CAR T-cell therapy with Abecma.     INTERIM HISTORY:   Margo had post LP headache, it is postural, no N/V. Was much better with caffeine.  No other symptoms.   REVIEW OF SYSTEMS: Otherwise unremarkable other than what is noted in the Interim History.     PHYSICAL EXAM:   Vitals:  BP (!) 144/84 (BP Location: Right arm, Patient Position: Sitting, Cuff Size: Adult Regular)   Pulse 77   Temp 98.1  F (36.7  C) (Oral)   Resp 16   Wt 68.9 kg (151 lb 14.4 oz)   LMP  (LMP Unknown)   SpO2 100%   BMI 24.12 kg/m       General Appearance: NAD  HEENT: sclera anicteric. masked  CV: RRR. no murmur or rub.   RESP: CTA bilaterally; no rales or wheezes.   GI: +BS, soft, nontender, nondistended.   EXT: No edema.   SKIN: no rash on exposed skin  NEURO: A&O x3   PSYCH: Appropriate affect   VASCULAR ACCES: dressing CDI, PICC line in place     ROUTINE LABS:     Lab Results   Component Value Date    WBC 2.3 (L) 01/14/2023    ANEU 1.4 (L) 01/13/2023    HGB 11.9 01/14/2023    HCT 34.6 (L) 01/14/2023     01/14/2023     01/14/2023    POTASSIUM 3.6 01/14/2023    CHLORIDE 107 01/14/2023    CO2 21 (L) 01/14/2023     (H) 01/14/2023    BUN 18.6 01/14/2023    CR 0.74 01/14/2023    MAG 2.1 01/11/2023    INR 1.05 01/12/2023    BILITOTAL 0.5 01/14/2023    AST 23 01/14/2023    ALT 58 (H) 01/14/2023    ALKPHOS 41 01/14/2023    PROTTOTAL 6.4 01/14/2023    ALBUMIN 4.6 01/14/2023         ASSESSMENT AND PLAN:      Kristie Cornejo is a 52 year old female with a history of multiple myeloma conditioned with Cy/Flu, currently day +10 of her CAR T-cell therapy with Abecma.        CAR T Protocol   - Day -5 through -3: Cy/Flu   - Day -2: Rest day   - Day -1: IT Dex   - Day 0: CAR T infusion (Abecma)   - Day +6: IT Dex   - Day +13: IT Dex NOT indicated (determined by PI and primary MD)   - Simvastatin daily 40mg until day day +30; patient will be requesting refill from [facility]   - GM-CSF should NOT be given through out study   - Re-staging plan:done      NEUROTOXICITY   - ICE score: 10/10   - positional headaches: monitor temp, tylenol okay. (1/13) IV Caffeine today. Will request blood patch appt in case this patient needs this come Monday.  *she is a hard stick for lab draws and does not have a port, plan to keep PICC line in during daily visits.    CRS  - Grade: 0  - Grade 1 with a Tmax 102.6F of  on 1/5. Given 1 dose of Tocilizumab.   - Afebrile since 16:30 on 1/5   - Blood cx- NGTD, urine cx- NGTD, CXR- no acute airspace disease, ferritin- 270, CRP- 7.96. Tocilizumab given x1 dose on 1/5. Cefepime discontinued on 1/8.      HEME/COAG  - Pancytopenic due to chemotherapy  - Transfusion parameters: hemoglobin <7, platelets <10  - Allopurinol for TLS prophylaxis. Continue outpatient until day +14.      IMMUNOCOMPROMISED  - Prophylaxis plan: ACV x1 year, Fluconazole until count recovery, ok to stop Levaquin and flucanzole.   - Pentamidine INH + albuterol one time on 1/3 - PCP prophylaxis: consider Bactrim, she does not have a sulfa allergy. based on CD4 absolute    - Active infections: None     CARDIOVASCULAR  - Risk of cardiomyopathy:  Baseline LVEF: 55-60%   - Risk of arrhythmia: Baseline EKG showed sinus rhythm nonspecific T wave abnormality  - Hx of PVC ablation March 2022      RESPIRATORY  - Risk of respiratory complications: Frequent ambulation and incentive  spirometer.     GI/NUTRITION  - Ulcer prophylaxis: Protonix   - Risk of nausea/vomiting due to chemo: compazine and ativan   - History of IBS: PEG ordered daily PRN   - AST, ALT, and INR elevated on 1/11. Improved now.     RENAL/ELECTROLYTES/  - Risk of renal injury: IV hydration   - Electrolyte management: replace per sliding scale     DIABETES/ENDOCRINE  - Risk of steroid-induced hyperglyemia: Monitor BG, sliding scale if needed     MUSCULOSKELETAL/FRAILTY  - Baseline frailty score: 0   - Patient with substantial risk of sarcopenia  - Daily PT/OT as needed while inpatient  - Cancer Rehab as needed outpatient     SYMPTOM MANAGEMENT  - Nausea from chemo: compazine and ativan.    SOCIAL DETERMINANTS  - Caregiver: Jesse Cornejo (Spouse) & Misti Preciado (sister)     Today's Summary:  IV caffeine today, monitor headache  Requested xr procedure for blood patch be placed on 1/16, cancel if not needed  Daily RTC with CARTOX note    I spent 30 minutes in the care of this patient today, which included time necessary for preparation for the visit, obtaining history, ordering medications/tests/procedures as medically indicated, review of pertinent medical literature, counseling of the patient, communication of recommendations to the care team, and documentation time.    Payton Reinoso MD         I cBMT Daily CARTOX Note (complete days 0-14 and with any subsequent CRS/neurotoxicity)    Date: 01/14/2023    Kristie Cornejo ([id number]) is a 52 year old year old female who received infusion on 1/4/23, currently day 10 of CAR-T cell therapy.    Vital Signs: BP (!) 144/84 (BP Location: Right arm, Patient Position: Sitting, Cuff Size: Adult Regular)   Pulse 77   Temp 98.1  F (36.7  C) (Oral)   Resp 16   Wt 68.9 kg (151 lb 14.4 oz)   LMP  (LMP Unknown)   SpO2 100%   BMI 24.12 kg/m      1) CRS Grading (based ONLY on parameters in box below)    Fever:   </= 100.4    Blood pressure:   SBP >/= 90 (not hypotensive)    Oxygen  saturation:    >/= 90% on room air (not hypoxic)    CRS Parameter Grade 1  Grade 2 Grade 3 Grade 4   Fever* Tm >= 100.4 degrees F Tm >= 100.4 degrees F Tm >= 100.4 degrees F Tm >= to 100.4  degrees F      With Either:  Hypotension (SBP <90) None Responsive to Fluids  Requiring 1  vasopressor (w/ or w/o vasopressin) Requiring multiple  vasopressors  (excluding  vasopressin)     And/or  Hypoxia (O2 sats <90% on room air) None Low-flow nasal  cannula or blow-by High-flow nasal  cannula, face mask,  non-rebreather mask,or Venturi mask  Requiring positive  pressure (CPAP,  BiPAP intubation and  mechanical  ventilation)     CRS Summary  Current CRS stgstrstastdstest:st st1st Intervention(s): 0      2) ICANS ASTCT Consensus Grading for Adults    ICE Score  Orientation to year, month, city, hospital: 4 points, Name 3 objects (e.g., point to clock, pen, button): 3 point, Following commands: (e.g., Show me 2 fingers): 1 point, Write a standard sentence (e.g., The silver jumped over the log): 1 point and Count backwards from 100 by ten: 1 point  Total points: 10: No impairment    Seizure   No seizures    Motor Findings   No motor findings    Elevated ICP/Cerebral Edema   None    ICANS grade (Grading to be based on the most severe symptom/event): 0    Intervention(s):    Neurotoxicity  Domain Grade 1 Grade 2 Grade 3 Grade 4   ICE score 7-9 3-6 1-2 0   Depressed LOC      Awakens  spontaneously Awakens to  voice  Awakens only to  tactile stimulation Unarousable or  requires vigorous  or repetitive  tactile stimuli to  arouse. Stupor  or coma.   Seizure n/a n/a Any clinical  seizure focal or  generalized that  resolves rapidly  or nonconvulsive  seizures on EEG  that resolve with  intervention  Life-threatening  prolonged  seizure (>5 min);  or Repetitive  clinical or  electrical  seizures without  return to baseline  in between    Motor Findings      n/a n/a n/a Deep focal motor  weakness such  as hemiparesis  or paraparesis    Elevated  ICP/cerebral  edema  n/a n/a Focal/local  edema on  neuroimaging  Diffuse cerebral  edema on  neuroimaging;  decerebrate or  decorticate  posturing; or  cranial nerve VI  palsy; or  papilledema; or  Cushing's triad     ICANS grade is determined by the most severe event (ICE score, level of consciousness, seizure, motor findings, raised ICP/cerebral edema) not attributable to any other cause; for example, a patient with an ICE score of 3 who has a generalized seizure is classified as grade 3 ICANS.    A patient with an ICE score of 0 may be classified as grade 3 ICANS if awake with global aphasia, but a patient with an ICE score of 0 may be classified as grade 4 ICANS if unarousable.     Depressed level of consciousness should be attributable to no other cause (eg, no sedating medication)    Tremors and myoclonus associated with immune effector cell therapies may be graded according to CTCAE v5.0,but they do not influence ICANS grading.     Intracranial hemorrhage with or without associated edema is not considered a neurotoxicity feature and is  excluded from ICANS grading. It may be graded according to CTCAE v5.0.        3) Organ CAR-T toxicity:    Cardiac   none    Respiratory   none    Gastrointestinal   none    Hepatic    none    Skin   none     Coagulopathy    none     Other: Positional headache: completely resolved when lying flat. IV Caffeine today.      4) HLH   Ferritin > 10,000 plus any TWO of the following:        * CTCAE grading can be found at   https://ctep.cancer.gov/protocoldevelopment/electronic_applications/docs/CTCAE_v5_Quick Reference_8.5x11.pdf    Payton Reinoso MD

## 2023-01-14 NOTE — PROGRESS NOTES
BMT Progress note  BMT Physician: Dr. Reinoso   Transplant Type: CAR-T   Preparative Regimen: Cy/Flu   Transplant Date: 1/4/2023  Chief complaint: Kristie Cornejo is a 52 year old female with a history of multiple myeloma conditioned with Cy/Flu, currently day +10 of her CAR T-cell therapy with Abecma.     INTERIM HISTORY:   Margo had post LP headache, it is postural, no N/V. Was much better with caffeine.  No other symptoms.   REVIEW OF SYSTEMS: Otherwise unremarkable other than what is noted in the Interim History.     PHYSICAL EXAM:   Vitals:  BP (!) 144/84 (BP Location: Right arm, Patient Position: Sitting, Cuff Size: Adult Regular)   Pulse 77   Temp 98.1  F (36.7  C) (Oral)   Resp 16   Wt 68.9 kg (151 lb 14.4 oz)   LMP  (LMP Unknown)   SpO2 100%   BMI 24.12 kg/m       General Appearance: NAD  HEENT: sclera anicteric. masked  CV: RRR. no murmur or rub.   RESP: CTA bilaterally; no rales or wheezes.   GI: +BS, soft, nontender, nondistended.   EXT: No edema.   SKIN: no rash on exposed skin  NEURO: A&O x3   PSYCH: Appropriate affect   VASCULAR ACCES: dressing CDI, PICC line in place     ROUTINE LABS:     Lab Results   Component Value Date    WBC 2.3 (L) 01/14/2023    ANEU 1.4 (L) 01/13/2023    HGB 11.9 01/14/2023    HCT 34.6 (L) 01/14/2023     01/14/2023     01/14/2023    POTASSIUM 3.6 01/14/2023    CHLORIDE 107 01/14/2023    CO2 21 (L) 01/14/2023     (H) 01/14/2023    BUN 18.6 01/14/2023    CR 0.74 01/14/2023    MAG 2.1 01/11/2023    INR 1.05 01/12/2023    BILITOTAL 0.5 01/14/2023    AST 23 01/14/2023    ALT 58 (H) 01/14/2023    ALKPHOS 41 01/14/2023    PROTTOTAL 6.4 01/14/2023    ALBUMIN 4.6 01/14/2023        ASSESSMENT AND PLAN:      Kristie Cornejo is a 52 year old female with a history of multiple myeloma conditioned with Cy/Flu, currently day +10 of her CAR T-cell therapy with Abecma.        CAR T Protocol   - Day -5 through -3: Cy/Flu   - Day -2: Rest day   - Day -1: IT Dex    - Day 0: CAR T infusion (Abecma)   - Day +6: IT Dex   - Day +13: IT Dex NOT indicated (determined by PI and primary MD)   - Simvastatin daily 40mg until day day +30; patient will be requesting refill from [facility]   - GM-CSF should NOT be given through out study   - Re-staging plan:done      NEUROTOXICITY   - ICE score: 10/10   - positional headaches: monitor temp, tylenol okay. (1/13) IV Caffeine today. Will request blood patch appt in case this patient needs this come Monday.  *she is a hard stick for lab draws and does not have a port, plan to keep PICC line in during daily visits.    CRS  - Grade: 0  - Grade 1 with a Tmax 102.6F of  on 1/5. Given 1 dose of Tocilizumab.   - Afebrile since 16:30 on 1/5   - Blood cx- NGTD, urine cx- NGTD, CXR- no acute airspace disease, ferritin- 270, CRP- 7.96. Tocilizumab given x1 dose on 1/5. Cefepime discontinued on 1/8.      HEME/COAG  - Pancytopenic due to chemotherapy  - Transfusion parameters: hemoglobin <7, platelets <10  - Allopurinol for TLS prophylaxis. Continue outpatient until day +14.      IMMUNOCOMPROMISED  - Prophylaxis plan: ACV x1 year, Fluconazole until count recovery, ok to stop Levaquin and flucanzole.   - Pentamidine INH + albuterol one time on 1/3 - PCP prophylaxis: consider Bactrim, she does not have a sulfa allergy. based on CD4 absolute    - Active infections: None     CARDIOVASCULAR  - Risk of cardiomyopathy:  Baseline LVEF: 55-60%   - Risk of arrhythmia: Baseline EKG showed sinus rhythm nonspecific T wave abnormality  - Hx of PVC ablation March 2022      RESPIRATORY  - Risk of respiratory complications: Frequent ambulation and incentive spirometer.     GI/NUTRITION  - Ulcer prophylaxis: Protonix   - Risk of nausea/vomiting due to chemo: compazine and ativan   - History of IBS: PEG ordered daily PRN   - AST, ALT, and INR elevated on 1/11. Improved now.     RENAL/ELECTROLYTES/  - Risk of renal injury: IV hydration   - Electrolyte  management: replace per sliding scale     DIABETES/ENDOCRINE  - Risk of steroid-induced hyperglyemia: Monitor BG, sliding scale if needed     MUSCULOSKELETAL/FRAILTY  - Baseline frailty score: 0   - Patient with substantial risk of sarcopenia  - Daily PT/OT as needed while inpatient  - Cancer Rehab as needed outpatient     SYMPTOM MANAGEMENT  - Nausea from chemo: compazine and ativan.    SOCIAL DETERMINANTS  - Caregiver: Jesse Cornejo (Spouse) & Misti Preciado (sister)     Today's Summary:  IV caffeine today, monitor headache  Requested xr procedure for blood patch be placed on 1/16, cancel if not needed  Daily RTC with CARTOX note    I spent 30 minutes in the care of this patient today, which included time necessary for preparation for the visit, obtaining history, ordering medications/tests/procedures as medically indicated, review of pertinent medical literature, counseling of the patient, communication of recommendations to the care team, and documentation time.    Payton Reinoso MD

## 2023-01-14 NOTE — PROGRESS NOTES
I cBMT Daily CARTOX Note (complete days 0-14 and with any subsequent CRS/neurotoxicity)    Date: 01/14/2023    Kristie Cornejo ([id number]) is a 52 year old year old female who received infusion on 1/4/23, currently day 10 of CAR-T cell therapy.    Vital Signs: BP (!) 144/84 (BP Location: Right arm, Patient Position: Sitting, Cuff Size: Adult Regular)   Pulse 77   Temp 98.1  F (36.7  C) (Oral)   Resp 16   Wt 68.9 kg (151 lb 14.4 oz)   LMP  (LMP Unknown)   SpO2 100%   BMI 24.12 kg/m      1) CRS Grading (based ONLY on parameters in box below)    Fever:   </= 100.4    Blood pressure:   SBP >/= 90 (not hypotensive)    Oxygen saturation:    >/= 90% on room air (not hypoxic)    CRS Parameter Grade 1  Grade 2 Grade 3 Grade 4   Fever* Tm >= 100.4 degrees F Tm >= 100.4 degrees F Tm >= 100.4 degrees F Tm >= to 100.4  degrees F      With Either:  Hypotension (SBP <90) None Responsive to Fluids  Requiring 1  vasopressor (w/ or w/o vasopressin) Requiring multiple  vasopressors  (excluding  vasopressin)     And/or  Hypoxia (O2 sats <90% on room air) None Low-flow nasal  cannula or blow-by High-flow nasal  cannula, face mask,  non-rebreather mask,or Venturi mask  Requiring positive  pressure (CPAP,  BiPAP intubation and  mechanical  ventilation)     CRS Summary  Current CRS stgstrstastdstest:st st1st Intervention(s): 0      2) ICANS ASTCT Consensus Grading for Adults    ICE Score  Orientation to year, month, city, hospital: 4 points, Name 3 objects (e.g., point to clock, pen, button): 3 point, Following commands: (e.g., Show me 2 fingers): 1 point, Write a standard sentence (e.g., The silver jumped over the log): 1 point and Count backwards from 100 by ten: 1 point  Total points: 10: No impairment    Seizure   No seizures    Motor Findings   No motor findings    Elevated ICP/Cerebral Edema   None    ICANS grade (Grading to be based on the most severe symptom/event): 0    Intervention(s):    Neurotoxicity  Domain Grade 1 Grade 2 Grade 3  Grade 4   ICE score 7-9 3-6 1-2 0   Depressed LOC      Awakens  spontaneously Awakens to  voice  Awakens only to  tactile stimulation Unarousable or  requires vigorous  or repetitive  tactile stimuli to  arouse. Stupor  or coma.   Seizure n/a n/a Any clinical  seizure focal or  generalized that  resolves rapidly  or nonconvulsive  seizures on EEG  that resolve with  intervention  Life-threatening  prolonged  seizure (>5 min);  or Repetitive  clinical or  electrical  seizures without  return to baseline  in between    Motor Findings      n/a n/a n/a Deep focal motor  weakness such  as hemiparesis  or paraparesis   Elevated  ICP/cerebral  edema  n/a n/a Focal/local  edema on  neuroimaging  Diffuse cerebral  edema on  neuroimaging;  decerebrate or  decorticate  posturing; or  cranial nerve VI  palsy; or  papilledema; or  Cushing's triad     ICANS grade is determined by the most severe event (ICE score, level of consciousness, seizure, motor findings, raised ICP/cerebral edema) not attributable to any other cause; for example, a patient with an ICE score of 3 who has a generalized seizure is classified as grade 3 ICANS.    A patient with an ICE score of 0 may be classified as grade 3 ICANS if awake with global aphasia, but a patient with an ICE score of 0 may be classified as grade 4 ICANS if unarousable.     Depressed level of consciousness should be attributable to no other cause (eg, no sedating medication)    Tremors and myoclonus associated with immune effector cell therapies may be graded according to CTCAE v5.0,but they do not influence ICANS grading.     Intracranial hemorrhage with or without associated edema is not considered a neurotoxicity feature and is  excluded from ICANS grading. It may be graded according to CTCAE v5.0.        3) Organ CAR-T toxicity:    Cardiac   none    Respiratory   none    Gastrointestinal   none    Hepatic    none    Skin   none     Coagulopathy    none     Other: Positional  headache: completely resolved when lying flat. IV Caffeine today.      4) HLH   Ferritin > 10,000 plus any TWO of the following:        * CTCAE grading can be found at   https://ctep.cancer.gov/protocoldevelopment/electronic_applications/docs/CTCAE_v5_Quick Reference_8.5x11.pdf    Payton Reinoso MD

## 2023-01-14 NOTE — PROGRESS NOTES
Infusion Nursing Note:  Kristie Cornejo presents today for IV caffeine.    Patient seen by provider today: Yes: Dr Reinoso   present during visit today: Not Applicable.    Note:   Pt received caffeine-sodium benzoate 500 mg over an hour.    Intravenous Access:  PICC.    Treatment Conditions:  Labs were monitored.    Post Infusion Assessment:  Patient tolerated infusion without incident.  Blood return noted pre and post infusion.  No evidence of extravasations.  Access discontinued per protocol.     Discharge Plan:   Discharge instructions reviewed with: Patient.  Patient discharged in stable condition accompanied by: self, friend.  Departure Mode: Ambulatory.      Nydia Overton RN

## 2023-01-14 NOTE — NURSING NOTE
Chief Complaint   Patient presents with     Infusion     Add on infusion for IV caffeine.  History of multiple myeloma.     Betsy Overton RN

## 2023-01-15 ENCOUNTER — APPOINTMENT (OUTPATIENT)
Dept: OCCUPATIONAL THERAPY | Facility: CLINIC | Age: 53
DRG: 841 | End: 2023-01-15
Attending: INTERNAL MEDICINE
Payer: COMMERCIAL

## 2023-01-15 ENCOUNTER — APPOINTMENT (OUTPATIENT)
Dept: MRI IMAGING | Facility: CLINIC | Age: 53
DRG: 841 | End: 2023-01-15
Attending: PHYSICIAN ASSISTANT
Payer: COMMERCIAL

## 2023-01-15 LAB
ABO/RH(D): ABNORMAL
ALBUMIN SERPL BCG-MCNC: 3.7 G/DL (ref 3.5–5.2)
ALBUMIN SERPL BCG-MCNC: 4.5 G/DL (ref 3.5–5.2)
ALBUMIN UR-MCNC: NEGATIVE MG/DL
ALP SERPL-CCNC: 32 U/L (ref 35–104)
ALP SERPL-CCNC: 47 U/L (ref 35–104)
ALT SERPL W P-5'-P-CCNC: 42 U/L (ref 10–35)
ALT SERPL W P-5'-P-CCNC: 56 U/L (ref 10–35)
ANION GAP SERPL CALCULATED.3IONS-SCNC: 12 MMOL/L (ref 7–15)
ANTIBODY SCREEN, TUBE: NORMAL
ANTIBODY SCREEN: POSITIVE
APPEARANCE UR: CLEAR
APTT PPP: 24 SECONDS (ref 22–38)
AST SERPL W P-5'-P-CCNC: 20 U/L (ref 10–35)
AST SERPL W P-5'-P-CCNC: 39 U/L (ref 10–35)
BASE EXCESS BLDV CALC-SCNC: -3 MMOL/L (ref -7.7–1.9)
BASOPHILS # BLD AUTO: 0 10E3/UL (ref 0–0.2)
BASOPHILS NFR BLD AUTO: 1 %
BILIRUB DIRECT SERPL-MCNC: <0.2 MG/DL (ref 0–0.3)
BILIRUB SERPL-MCNC: 0.3 MG/DL
BILIRUB SERPL-MCNC: 0.4 MG/DL
BILIRUB UR QL STRIP: NEGATIVE
BUN SERPL-MCNC: 14.3 MG/DL (ref 6–20)
BURR CELLS BLD QL SMEAR: ABNORMAL
CALCIUM SERPL-MCNC: 7.9 MG/DL (ref 8.6–10)
CHLORIDE SERPL-SCNC: 114 MMOL/L (ref 98–107)
COLOR UR AUTO: NORMAL
CREAT SERPL-MCNC: 0.69 MG/DL (ref 0.51–0.95)
DEPRECATED HCO3 PLAS-SCNC: 18 MMOL/L (ref 22–29)
ELLIPTOCYTES BLD QL SMEAR: ABNORMAL
EOSINOPHIL # BLD AUTO: 0.1 10E3/UL (ref 0–0.7)
EOSINOPHIL NFR BLD AUTO: 7 %
ERYTHROCYTE [DISTWIDTH] IN BLOOD BY AUTOMATED COUNT: 14 % (ref 10–15)
GFR SERPL CREATININE-BSD FRML MDRD: >90 ML/MIN/1.73M2
GLUCOSE SERPL-MCNC: 100 MG/DL (ref 70–99)
GLUCOSE UR STRIP-MCNC: NEGATIVE MG/DL
HCO3 BLDV-SCNC: 21 MMOL/L (ref 21–28)
HCT VFR BLD AUTO: 29.6 % (ref 35–47)
HGB BLD-MCNC: 9.9 G/DL (ref 11.7–15.7)
HGB UR QL STRIP: NEGATIVE
IMM GRANULOCYTES # BLD: 0 10E3/UL
IMM GRANULOCYTES NFR BLD: 0 %
INR PPP: 1.1 (ref 0.85–1.15)
KETONES UR STRIP-MCNC: NEGATIVE MG/DL
LACTATE SERPL-SCNC: 1.4 MMOL/L (ref 0.7–2)
LEUKOCYTE ESTERASE UR QL STRIP: NEGATIVE
LYMPHOCYTES # BLD AUTO: 0.5 10E3/UL (ref 0.8–5.3)
LYMPHOCYTES NFR BLD AUTO: 28 %
MAGNESIUM SERPL-MCNC: 2.1 MG/DL (ref 1.7–2.3)
MAGNESIUM SERPL-MCNC: 2.3 MG/DL (ref 1.7–2.3)
MCH RBC QN AUTO: 31.3 PG (ref 26.5–33)
MCHC RBC AUTO-ENTMCNC: 33.4 G/DL (ref 31.5–36.5)
MCV RBC AUTO: 94 FL (ref 78–100)
MONOCYTES # BLD AUTO: 0.5 10E3/UL (ref 0–1.3)
MONOCYTES NFR BLD AUTO: 28 %
NEUTROPHILS # BLD AUTO: 0.6 10E3/UL (ref 1.6–8.3)
NEUTROPHILS NFR BLD AUTO: 36 %
NITRATE UR QL: NEGATIVE
NRBC # BLD AUTO: 0 10E3/UL
NRBC BLD AUTO-RTO: 0 /100
O2/TOTAL GAS SETTING VFR VENT: 0 %
PCO2 BLDV: 33 MM HG (ref 40–50)
PH BLDV: 7.41 [PH] (ref 7.32–7.43)
PH UR STRIP: 6.5 [PH] (ref 5–7)
PHOSPHATE SERPL-MCNC: 3.4 MG/DL (ref 2.5–4.5)
PLAT MORPH BLD: ABNORMAL
PLATELET # BLD AUTO: 173 10E3/UL (ref 150–450)
PO2 BLDV: 39 MM HG (ref 25–47)
POTASSIUM SERPL-SCNC: 3.6 MMOL/L (ref 3.4–5.3)
PROT SERPL-MCNC: 5.1 G/DL (ref 6.4–8.3)
PROT SERPL-MCNC: 6.4 G/DL (ref 6.4–8.3)
RBC # BLD AUTO: 3.16 10E6/UL (ref 3.8–5.2)
RBC MORPH BLD: ABNORMAL
SODIUM SERPL-SCNC: 144 MMOL/L (ref 136–145)
SP GR UR STRIP: 1.01 (ref 1–1.03)
SPECIMEN EXPIRATION DATE: ABNORMAL
SPECIMEN EXPIRATION DATE: NORMAL
URATE SERPL-MCNC: 1.6 MG/DL (ref 2.4–5.7)
UROBILINOGEN UR STRIP-MCNC: NORMAL MG/DL
WBC # BLD AUTO: 1.7 10E3/UL (ref 4–11)

## 2023-01-15 PROCEDURE — 250N000011 HC RX IP 250 OP 636: Performed by: INTERNAL MEDICINE

## 2023-01-15 PROCEDURE — 97165 OT EVAL LOW COMPLEX 30 MIN: CPT | Mod: GO

## 2023-01-15 PROCEDURE — 70553 MRI BRAIN STEM W/O & W/DYE: CPT | Mod: 26 | Performed by: STUDENT IN AN ORGANIZED HEALTH CARE EDUCATION/TRAINING PROGRAM

## 2023-01-15 PROCEDURE — 82803 BLOOD GASES ANY COMBINATION: CPT | Performed by: INTERNAL MEDICINE

## 2023-01-15 PROCEDURE — 206N000001 HC R&B BMT UMMC

## 2023-01-15 PROCEDURE — 81003 URINALYSIS AUTO W/O SCOPE: CPT | Performed by: PHYSICIAN ASSISTANT

## 2023-01-15 PROCEDURE — 97530 THERAPEUTIC ACTIVITIES: CPT | Mod: GO

## 2023-01-15 PROCEDURE — 255N000002 HC RX 255 OP 636: Performed by: INTERNAL MEDICINE

## 2023-01-15 PROCEDURE — 86901 BLOOD TYPING SEROLOGIC RH(D): CPT | Performed by: INTERNAL MEDICINE

## 2023-01-15 PROCEDURE — 99418 PROLNG IP/OBS E/M EA 15 MIN: CPT | Mod: FS | Performed by: PHYSICIAN ASSISTANT

## 2023-01-15 PROCEDURE — 70553 MRI BRAIN STEM W/O & W/DYE: CPT

## 2023-01-15 PROCEDURE — 83735 ASSAY OF MAGNESIUM: CPT | Performed by: INTERNAL MEDICINE

## 2023-01-15 PROCEDURE — 250N000011 HC RX IP 250 OP 636: Performed by: PHYSICIAN ASSISTANT

## 2023-01-15 PROCEDURE — 258N000003 HC RX IP 258 OP 636: Performed by: INTERNAL MEDICINE

## 2023-01-15 PROCEDURE — 250N000013 HC RX MED GY IP 250 OP 250 PS 637: Performed by: PHYSICIAN ASSISTANT

## 2023-01-15 PROCEDURE — 80053 COMPREHEN METABOLIC PANEL: CPT | Performed by: INTERNAL MEDICINE

## 2023-01-15 PROCEDURE — 250N000013 HC RX MED GY IP 250 OP 250 PS 637: Performed by: INTERNAL MEDICINE

## 2023-01-15 PROCEDURE — A9585 GADOBUTROL INJECTION: HCPCS | Performed by: INTERNAL MEDICINE

## 2023-01-15 PROCEDURE — 85610 PROTHROMBIN TIME: CPT | Performed by: INTERNAL MEDICINE

## 2023-01-15 PROCEDURE — 99233 SBSQ HOSP IP/OBS HIGH 50: CPT | Mod: FS | Performed by: PHYSICIAN ASSISTANT

## 2023-01-15 PROCEDURE — 86850 RBC ANTIBODY SCREEN: CPT | Performed by: INTERNAL MEDICINE

## 2023-01-15 PROCEDURE — 85025 COMPLETE CBC W/AUTO DIFF WBC: CPT | Performed by: INTERNAL MEDICINE

## 2023-01-15 PROCEDURE — 85730 THROMBOPLASTIN TIME PARTIAL: CPT | Performed by: INTERNAL MEDICINE

## 2023-01-15 RX ORDER — GADOBUTROL 604.72 MG/ML
0.1 INJECTION INTRAVENOUS ONCE
Status: COMPLETED | OUTPATIENT
Start: 2023-01-15 | End: 2023-01-15

## 2023-01-15 RX ORDER — NALOXONE HYDROCHLORIDE 0.4 MG/ML
0.2 INJECTION, SOLUTION INTRAMUSCULAR; INTRAVENOUS; SUBCUTANEOUS
Status: DISCONTINUED | OUTPATIENT
Start: 2023-01-15 | End: 2023-01-17 | Stop reason: HOSPADM

## 2023-01-15 RX ORDER — HYDROMORPHONE HYDROCHLORIDE 2 MG/1
2 TABLET ORAL
Status: DISCONTINUED | OUTPATIENT
Start: 2023-01-15 | End: 2023-01-17 | Stop reason: HOSPADM

## 2023-01-15 RX ORDER — NALOXONE HYDROCHLORIDE 0.4 MG/ML
0.4 INJECTION, SOLUTION INTRAMUSCULAR; INTRAVENOUS; SUBCUTANEOUS
Status: DISCONTINUED | OUTPATIENT
Start: 2023-01-15 | End: 2023-01-17 | Stop reason: HOSPADM

## 2023-01-15 RX ORDER — HEPARIN SODIUM,PORCINE 10 UNIT/ML
3 VIAL (ML) INTRAVENOUS
Status: DISCONTINUED | OUTPATIENT
Start: 2023-01-15 | End: 2023-01-17 | Stop reason: HOSPADM

## 2023-01-15 RX ORDER — HYDROMORPHONE HCL IN WATER/PF 6 MG/30 ML
.2-.4 PATIENT CONTROLLED ANALGESIA SYRINGE INTRAVENOUS
Status: DISCONTINUED | OUTPATIENT
Start: 2023-01-15 | End: 2023-01-17 | Stop reason: HOSPADM

## 2023-01-15 RX ADMIN — FLUCONAZOLE 200 MG: 200 TABLET ORAL at 08:06

## 2023-01-15 RX ADMIN — ACYCLOVIR 800 MG: 800 TABLET ORAL at 00:09

## 2023-01-15 RX ADMIN — HYDROMORPHONE HYDROCHLORIDE 0.2 MG: 0.2 INJECTION, SOLUTION INTRAMUSCULAR; INTRAVENOUS; SUBCUTANEOUS at 08:45

## 2023-01-15 RX ADMIN — ACYCLOVIR 800 MG: 800 TABLET ORAL at 23:43

## 2023-01-15 RX ADMIN — PROCHLORPERAZINE EDISYLATE 5 MG: 5 INJECTION INTRAMUSCULAR; INTRAVENOUS at 20:56

## 2023-01-15 RX ADMIN — HYDROMORPHONE HYDROCHLORIDE 2 MG: 2 TABLET ORAL at 11:39

## 2023-01-15 RX ADMIN — LORAZEPAM 0.5 MG: 2 INJECTION INTRAMUSCULAR; INTRAVENOUS at 19:44

## 2023-01-15 RX ADMIN — Medication 3 ML: at 10:23

## 2023-01-15 RX ADMIN — GADOBUTROL 6.8 ML: 604.72 INJECTION INTRAVENOUS at 13:50

## 2023-01-15 RX ADMIN — SODIUM CHLORIDE, POTASSIUM CHLORIDE, SODIUM LACTATE AND CALCIUM CHLORIDE: 600; 310; 30; 20 INJECTION, SOLUTION INTRAVENOUS at 00:03

## 2023-01-15 RX ADMIN — HYDROMORPHONE HYDROCHLORIDE 2 MG: 2 TABLET ORAL at 16:18

## 2023-01-15 RX ADMIN — ALLOPURINOL 300 MG: 300 TABLET ORAL at 08:06

## 2023-01-15 RX ADMIN — ACYCLOVIR 800 MG: 800 TABLET ORAL at 11:36

## 2023-01-15 RX ADMIN — LEVOFLOXACIN 250 MG: 250 TABLET, FILM COATED ORAL at 08:06

## 2023-01-15 RX ADMIN — PANTOPRAZOLE SODIUM 40 MG: 40 TABLET, DELAYED RELEASE ORAL at 08:06

## 2023-01-15 RX ADMIN — THERA TABS 1 TABLET: TAB at 08:06

## 2023-01-15 RX ADMIN — Medication 40 MG: at 08:18

## 2023-01-15 ASSESSMENT — ACTIVITIES OF DAILY LIVING (ADL)
ADLS_ACUITY_SCORE: 23
ADLS_ACUITY_SCORE: 27
ADLS_ACUITY_SCORE: 23
ADLS_ACUITY_SCORE: 27
ADLS_ACUITY_SCORE: 27
ADLS_ACUITY_SCORE: 23

## 2023-01-15 NOTE — PROGRESS NOTES
"BMT Daily Progress Note  BMT Physician: Dr. Reinoso   Transplant Type: CAR-T   Preparative Regimen: Cy/Flu     Chief complaint: Kristie Cornejo is a 52 year old female with a history of multiple myeloma conditioned with Cy/Flu, currently day +11 of her CAR T-cell therapy with [facility].  - readmitted 1/14 with transient fever, ongoing headache     INTERIM HISTORY:   Readmitted overnight. Able to sleep about 4 hours. Has had post LP headache the past several days. Had been postural but also now also noticing when lying down. Rates 3-4/10 this morning. Some sharper pain when up to bathroom then a bit better walking back to bed. Seems worse if she looks towards the left. Finds it 'hard to concentrate' due to the headache but denies fogginess or difficulty word finding. ICANS 10/10. IV caffeine helped the HA on Friday but not as much relief yesterday. Has not taken Tylenol recently. No n/v. Some loose stools about once daily, not new. No bleeding. No URI sx. No sick contacts.    REVIEW OF SYSTEMS: Otherwise unremarkable other than what is noted in the Interim History.     PHYSICAL EXAM:   Vitals:  /81 (BP Location: Left arm)   Pulse 76   Temp 98.2  F (36.8  C) (Axillary)   Resp 18   Ht 1.702 m (5' 7\")   Wt 68.5 kg (151 lb 1.6 oz)   LMP  (LMP Unknown)   SpO2 99%   BMI 23.67 kg/m       General Appearance: NAD  HEENT: sclera anicteric. OP moist without lesions  CV: RRR. no murmur or rub.   RESP: CTAB  GI: +BS, soft, nontender, nondistended.   EXT: No edema.   SKIN: no rash on exposed skin  NEURO: A&O x3   PSYCH: Appropriate affect    ACCESS: L arm PICC NT, dressing cdi     Labs:   Lab Results   Component Value Date    WBC 1.7 (L) 01/15/2023    ANEU 1.0 (L) 01/14/2023    HGB 9.9 (L) 01/15/2023    HCT 29.6 (L) 01/15/2023     01/15/2023     01/15/2023    POTASSIUM 3.6 01/15/2023    CHLORIDE 114 (H) 01/15/2023    CO2 18 (L) 01/15/2023     (H) 01/15/2023    BUN 14.3 01/15/2023    CR 0.69 " 01/15/2023    MAG 2.1 01/15/2023    INR 1.10 01/15/2023    BILITOTAL 0.4 01/15/2023    AST 20 01/15/2023    ALT 42 (H) 01/15/2023    ALKPHOS 32 (L) 01/15/2023    PROTTOTAL 5.1 (L) 01/15/2023    ALBUMIN 3.7 01/15/2023      Imaging:  Brain MRI w & w/o contrast (1/15): pending    Head CT non-contrast (1/14): Hypodensity in the subcortical white matter of the right frontal operculum. Although this is a nonspecific finding cannot exclude underlying lesion. Recommend MRI of the brain without and with contrast.    CXR (1/14): no aso    ASSESSMENT AND PLAN:   Kristie Cornejo is a 52 year old female with a history of multiple myeloma conditioned with Cy/Flu, currently day +11 of her CAR T-cell therapy with [facility].      CAR T Protocol   - Day -5 through -3: Cy/Flu   - Day -2: Rest day   - Day -1: IT Dex   - Day 0: CAR T infusion ([facility])   - Day +6: IT Dex   - Day +13: IT Dex NOT indicated (determined by PI and primary MD)   - Simvastatin daily 40mg until day day +30  - GM-CSF should NOT be given through out study       NEURO   - ICE score: remains 10/10   - positional headaches: s/p IV caffeine 1/13 (some improvement), 1/14 (minimal improvement). Outpt requested blood patch but not yet scheduled; consult IR if this is needed this week. Now having mild HA when lying down also.  - head CT as above. Brain MRI pending (1/14)  - Dilaudid prn    CRS  - stGstrstastdstest:st st1st. Admission CRP <3, ferritin 222.  - hx Grade 1 with a Tmax 102.6F of  on 1/5. Given 1 dose of Tocilizumab.      HEME/COAG  - Pancytopenic due to chemotherapy  - Transfusion parameters: hemoglobin <7, platelets <10  - Allopurinol for TLS prophylaxis. Continue until day +14.      IMMUNOCOMPROMISED  Transient fever to 100.8 1/14pm. Afebrile in ED and since admission. Now neutropenic. Will start Cefepime if spikes.  - blood cx pending (1/14)  CXR neg  UA neg  Covid neg 1/14  C. Diff pending  CMV neg 1/11  - Proph: Levaquin, fluc, ACV, Pentam (1/3).   PCP prophylaxis thru 6  months post CAR-T; consider Bactrim pending counts as she does not have a sulfa allergy.      CARDIOVASCULAR  - Risk of cardiomyopathy:  Baseline LVEF: 55-60%   - Risk of arrhythmia: Baseline EKG showed sinus rhythm nonspecific T wave abnormality  - Hx of PVC ablation March 2022      RESPIRATORY  - Risk of respiratory complications: Frequent ambulation and incentive spirometer.     GI  - Ulcer prophylaxis: Protonix   - Risk of nausea/vomiting due to chemo: compazine and ativan prn   - History of IBS: Miralax prn. Currently having some loose stools about once daily.  Admission C.diff pending  - mild transaminitis noted; trend     RENAL/FEN  - Risk of renal injury: s/p 2L LR; maintenance IF stopped 1/15.   - note of lower bicarb (18 from 16), likely 2/2 chronic diarrhea.    - Electrolyte management: replace per sliding scale     DIABETES/ENDOCRINE  - Risk of steroid-induced hyperglyemia: Monitor BG, sliding scale if needed     MUSCULOSKELETAL/FRAILTY  - Baseline frailty score: 0   - Patient with substantial risk of sarcopenia  - Daily PT/OT as needed while inpatient  - Cancer Rehab as needed outpatient     SYMPTOM MANAGEMENT  - Nausea from chemo: compazine and ativan.    SOCIAL DETERMINANTS  - Caregiver: Jesse Cornejo (Spouse) & Misti Preciado (sister)   Clinically Significant Risk Factors Present on Admission          # Hypocalcemia: Lowest Ca = 7.9 mg/dL in last 2 days, will monitor and replace as appropriate                      I spent 50 minutes in the care of this patient today, which included time necessary for preparation for the visit, obtaining history, ordering medications/tests/procedures as medically indicated, review of pertinent medical literature, counseling of the patient, communication of recommendations to the care team, and documentation time.    Dispo: remain admitted pending work-up of HA, improvement in symptoms.      Tabitha Bill PA-C

## 2023-01-15 NOTE — ED PROVIDER NOTES
ED Provider Note  [facility]      History     Chief Complaint   Patient presents with     Fever     HPI  Kristie Cornejo is a 52 year old female with PMH of multiple myeloma s/p 10 days s/p CAR T-cell therapy with Abecma, receiving intrathecal dexamethasone (most recent LP 10 days ago) c/b post LP headaches which have been treated with IV caffeine infusions who p/w worsening headache that was unresponsive to caffeine infusion earlier today and new low grade fever. Fever started around 6PM this evening. Patient felt chills and measured transtemporal temp of 100.8 at home. Developed nausea this evening but no vomiting. Has had stable diarrhea that is not worse. No cough, dysuria, neck stiffness, or other localizing signs of infection. Patient states her headache is not improving as much when lying down today. It is severe with sharp stabbing pains occasionally in the bitemporal region. She is scheduled for a blood patch on Monday. Also reports her sxs may be similar to when she had CRS in the hospital immediately after the CAR-T. She was treated with Tocilizumab on 1/5/23 and the headaches went away.       Past Medical History  Past Medical History:   Diagnosis Date     Allergic rhinitis, cause unspecified      Anxiety state, unspecified 12/01/2003    Started postpartum . On Paxil x 1+years. Off meds- 10/04     Irritable bowel syndrome 01/01/2004     Multiple myeloma not having achieved remission (H)      PLANTAR WARTS     resolved     SINUS DYSRHYTHMIA 10/01/2002    wnl     Unspecified hemorrhoids without mention of complication 04/01/2004    colonoscopy 4/04 spastic colon, int roids; bx neg     Past Surgical History:   Procedure Laterality Date     BIOPSY BONE PELVIS Left 09/07/2021    Procedure: Biopsy left pubic ramis;  Surgeon: Edu Philip MD;  Location: [facility] OR     COLONOSCOPY N/A 04/13/2022    Procedure: COLONOSCOPY, WITH BIOPSY;  Surgeon: Berto Montgomery MD;  Location:  [facility]     EP ABLATION PVC N/A 03/23/2022    Procedure: Ablation Premature Ventricular Contractions;  Surgeon: Alem Thomas MD;  Location:  [facility]     INSERT CATHETER VASCULAR ACCESS N/A 11/21/2022    Procedure: CENTRAL VENOUS CATHETER NON TUNNELED INSERTION, VASCULAR ACCESS CATHETER;  Surgeon: Erich Bauman MD;  Location: [facility] OR     IR CVC NON TUNNEL LINE REMOVAL  11/22/2022     IR CVC NON TUNNEL PLACEMENT > 5 YRS  11/21/2022     LASIK       PICC TRIPLE LUMEN PLACEMENT Left 01/03/2023    Basilic 44 cm ext. 0     [facility] NONSPECIFIC PROCEDURE  01/01/1987    [place] teeth removed     [facility] NONSPECIFIC PROCEDURE  01/01/1993    (R) knee surgery     [facility] NONSPECIFIC PROCEDURE  01/01/2003    (R) breast bx-negative 6/03; bx neg 10/02     [facility] NONSPECIFIC PROCEDURE      10/02 cardiac echo normal     [facility] NONSPECIFIC PROCEDURE  01/01/2004 4/04 colonosc int roids, spastic colon     [facility] NONSPECIFIC PROCEDURE  08/01/2005    D & C for missed AB     [facility] COLONOSCOPY THRU STOMA, DIAGNOSTIC  01/01/2004     No current outpatient medications on file.    Allergies   Allergen Reactions     Amoxicillin      Other reaction(s): upset stomach  Other reaction(s): Gastrointestinal, GI intolerance, Other (see comments)  Other reaction(s): upset stomach  Other reaction(s): upset stomach       Chlorhexidine Gluconate Cloth Rash     Clarithromycin      Other reaction(s): GI intolerance     Erythromycin      upsets stomach     Erythromycin      Other reaction(s): GI intolerance  upsets stomach  nausea     Fluconazole Rash     Itching,   Unsure if really allergy, was taking allergy shots at the time.  She is willing to rechallenge.        Family History  Family History   Problem Relation Age of Onset     Neurologic Disorder Mother         toxo     Lipids Mother         low cholestrol     Lipids Father         high cholestrol     Glaucoma Maternal Grandmother      Diabetes Maternal Grandmother         breast cancer,stroke  "ischemic     Breast Cancer Maternal Grandmother         79yo/and autoimmune skin condition     C.A.D. Maternal Grandfather         strokes and mi, chronic respiratory from 2 hand smoke     Hypertension Paternal Grandmother         rheumatoid arthritis     Respiratory Paternal Grandfather         emphasema     Cancer - colorectal No family hx of      Prostate Cancer No family hx of      Macular Degeneration No family hx of      Social History   Social History     Tobacco Use     Smoking status: Never     Smokeless tobacco: Never   Vaping Use     Vaping Use: Never used   Substance Use Topics     Alcohol use: Yes     Comment: none to a couple. occasional     Drug use: No         A medically appropriate review of systems was performed with pertinent positives and negatives noted in the HPI, and all other systems negative.    Physical Exam   BP: (!) 126/93  Pulse: 86  Temp: 98.5  F (36.9  C)  Resp: 22  Height: 170.2 cm (5' 7\")  Weight: 68.5 kg (151 lb)  SpO2: 100 %  Physical Exam  Vitals and nursing note reviewed.   Constitutional:       General: She is in acute distress.      Appearance: Normal appearance. She is well-developed. She is not ill-appearing or diaphoretic.   HENT:      Head: Normocephalic and atraumatic.      Nose: Nose normal.      Mouth/Throat:      Mouth: Mucous membranes are moist.   Eyes:      General: No scleral icterus.     Conjunctiva/sclera: Conjunctivae normal.   Cardiovascular:      Rate and Rhythm: Normal rate.   Pulmonary:      Effort: Pulmonary effort is normal. No respiratory distress.      Breath sounds: No stridor.   Abdominal:      General: There is no distension.   Musculoskeletal:         General: No deformity or signs of injury. Normal range of motion.      Cervical back: Normal range of motion and neck supple. No rigidity. Normal range of motion.   Skin:     General: Skin is warm and dry.      Coloration: Skin is not jaundiced or pale.   Neurological:      General: No focal deficit " present.      Mental Status: She is alert and oriented to person, place, and time.      GCS: GCS eye subscore is 4. GCS verbal subscore is 5. GCS motor subscore is 6.      Cranial Nerves: No dysarthria or facial asymmetry.      Motor: Motor function is intact.      Gait: Gait normal.   Psychiatric:         Attention and Perception: Attention normal.         Mood and Affect: Mood normal.         Speech: Speech normal.         Behavior: Behavior normal. Behavior is cooperative.             ED Course, Procedures, & Data      Procedures                        Results for orders placed or performed during the hospital encounter of 01/14/23   CT Head w/o Contrast     Status: None    Narrative    CT HEAD W/O CONTRAST 1/14/2023 8:57 PM    History: Headache; Acute HA (< 3 months), no complicating features   ICD-10:    Comparison: None available    Technique: Using multidetector thin collimation helical acquisition  technique, axial, coronal and sagittal CT images from the skull base  to the vertex were obtained without intravenous contrast.   (topogram) image(s) also obtained and reviewed.    Findings: Hypodensity in the subcortical white matter of the right  frontal operculum seen on image 14 of series 3. There is no  intracranial hemorrhage, mass effect, or midline shift. Gray/white  matter differentiation in both cerebral hemispheres is preserved.  Ventricles are proportionate to the cerebral sulci. The basal cisterns  are clear.    The bony calvaria and the bones of the skull base are normal. The  visualized portions of the paranasal sinuses and mastoid air cells are  clear.      Impression    Impression:  Hypodensity in the subcortical white matter of the right frontal  operculum. Although this is a nonspecific finding cannot exclude  underlying lesion. Recommend MRI of the brain without and with  contrast..     I have personally reviewed the examination and initial interpretation  and I agree with the  findings.    PRASAD COLLAZO MD         SYSTEM ID:  [id number]   XR Chest Port 1 View     Status: None (Preliminary result)    Impression    RESIDENT PRELIMINARY INTERPRETATION  IMPRESSION:  No acute airspace opacity.   Comprehensive metabolic panel     Status: Abnormal   Result Value Ref Range    Sodium 143 136 - 145 mmol/L    Potassium 3.4 3.4 - 5.3 mmol/L    Chloride 110 (H) 98 - 107 mmol/L    Carbon Dioxide (CO2) 16 (L) 22 - 29 mmol/L    Anion Gap 17 (H) 7 - 15 mmol/L    Urea Nitrogen 14.8 6.0 - 20.0 mg/dL    Creatinine 0.79 0.51 - 0.95 mg/dL    Calcium 8.9 8.6 - 10.0 mg/dL    Glucose 133 (H) 70 - 99 mg/dL    Alkaline Phosphatase 45 35 - 104 U/L    AST 26 10 - 35 U/L    ALT 55 (H) 10 - 35 U/L    Protein Total 6.5 6.4 - 8.3 g/dL    Albumin 4.3 3.5 - 5.2 g/dL    Bilirubin Total 0.4 <=1.2 mg/dL    GFR Estimate 90 >60 mL/min/1.73m2   Symptomatic Influenza A/B & SARS-CoV2 (COVID-19) Virus PCR Multiplex Nose     Status: Normal    Specimen: Nose; Swab   Result Value Ref Range    Influenza A PCR Negative Negative    Influenza B PCR Negative Negative    RSV PCR Negative Negative    SARS CoV2 PCR Negative Negative    Narrative    Testing was performed using the Xpert Xpress CoV2/Flu/RSV Assay on the [employer] GeneXpert Instrument. This test should be ordered for the detection of SARS-CoV-2 and influenza viruses in individuals who meet clinical and/or epidemiological criteria. Test performance is unknown in asymptomatic patients. This test is for in vitro diagnostic use under the FDA EUA for laboratories certified under CLIA to perform high or moderate complexity testing. This test has not been FDA cleared or approved. A negative result does not rule out the presence of PCR inhibitors in the specimen or target RNA in concentration below the limit of detection for the assay. If only one viral target is positive but coinfection with multiple targets is suspected, the sample should be re-tested with another FDA cleared, approved,  or authorized test, if coinfection would change clinical management. This test was validated by the [facility]. These laboratories are certified under the Clinical Laboratory Improvement Amendments of 1988 (CLIA-88) as qualified to perform high complexity laboratory testing.   CRP inflammation     Status: Normal   Result Value Ref Range    CRP Inflammation <3.00 <5.00 mg/L   Ferritin     Status: Normal   Result Value Ref Range    Ferritin 222 11 - 328 ng/mL   CBC with platelets and differential     Status: Abnormal   Result Value Ref Range    WBC Count 2.1 (L) 4.0 - 11.0 10e3/uL    RBC Count 3.74 (L) 3.80 - 5.20 10e6/uL    Hemoglobin 11.6 (L) 11.7 - 15.7 g/dL    Hematocrit 35.5 35.0 - 47.0 %    MCV 95 78 - 100 fL    MCH 31.0 26.5 - 33.0 pg    MCHC 32.7 31.5 - 36.5 g/dL    RDW 14.1 10.0 - 15.0 %    Platelet Count 223 150 - 450 10e3/uL    % Neutrophils 33 %    % Lymphocytes 34 %    % Monocytes 27 %    % Eosinophils 5 %    % Basophils 1 %    % Immature Granulocytes 0 %    NRBCs per 100 WBC 0 <1 /100    Absolute Neutrophils 0.7 (L) 1.6 - 8.3 10e3/uL    Absolute Lymphocytes 0.7 (L) 0.8 - 5.3 10e3/uL    Absolute Monocytes 0.6 0.0 - 1.3 10e3/uL    Absolute Eosinophils 0.1 0.0 - 0.7 10e3/uL    Absolute Basophils 0.0 0.0 - 0.2 10e3/uL    Absolute Immature Granulocytes 0.0 <=0.4 10e3/uL    Absolute NRBCs 0.0 10e3/uL   RBC and Platelet Morphology     Status: Abnormal   Result Value Ref Range    Platelet Assessment  Automated Count Confirmed. Platelet morphology is normal.     Automated Count Confirmed. Platelet morphology is normal.    Elliptocytes Slight (A) None Seen    RBC Morphology Confirmed RBC Indices    Blood gas venous     Status: Abnormal   Result Value Ref Range    pH Venous 7.41 7.32 - 7.43    pCO2 Venous 33 (L) 40 - 50 mm Hg    pO2 Venous 39 25 - 47 mm Hg    Bicarbonate Venous 21 21 - 28 mmol/L    Base Excess/Deficit (+/-) -3.0 -7.7 - 1.9 mmol/L    FIO2 0    Lactic acid whole blood     Status:  Normal   Result Value Ref Range    Lactic Acid 1.4 0.7 - 2.0 mmol/L   CBC with platelets differential     Status: Abnormal    Narrative    The following orders were created for panel order CBC with platelets differential.  Procedure                               Abnormality         Status                     ---------                               -----------         ------                     CBC with platelets and d...[[id number]]  Abnormal            Final result               RBC and Platelet Morphology[[id number]]  Abnormal            Final result                 Please view results for these tests on the individual orders.   ABO/RH Type and Screen      Status: None (In process)    Narrative    The following orders were created for panel order ABO/RH Type and Screen .  Procedure                               Abnormality         Status                     ---------                               -----------         ------                     Adult Type and Screen[[id number]]                            In process                   Please view results for these tests on the individual orders.     Medications   acyclovir (ZOVIRAX) tablet 800 mg (800 mg Oral Given 1/15/23 0009)   allopurinol (ZYLOPRIM) tablet 300 mg (has no administration in time range)   busPIRone (BUSPAR) tablet 10 mg (has no administration in time range)   cetirizine (zyrTEC) tablet 10 mg (has no administration in time range)   fluconazole (DIFLUCAN) tablet 200 mg (has no administration in time range)   levofloxacin (LEVAQUIN) tablet 250 mg (has no administration in time range)   multivitamin, therapeutic (THERA-VIT) tablet 1 tablet (has no administration in time range)   pantoprazole (PROTONIX) EC tablet 40 mg (has no administration in time range)   polyethylene glycol (MIRALAX) Packet 17 g (has no administration in time range)   prochlorperazine (COMPAZINE) tablet 5 mg (has no administration in time range)   study - simvastatin (IDS# 5641) tablet  40 mg (has no administration in time range)   melatonin tablet 5 mg (has no administration in time range)   acetaminophen (TYLENOL) tablet 325-650 mg (has no administration in time range)   prochlorperazine (COMPAZINE) injection 5-10 mg (has no administration in time range)     Or   prochlorperazine (COMPAZINE) tablet 5-10 mg (has no administration in time range)   LORazepam (ATIVAN) injection 0.5-1 mg (has no administration in time range)     Or   LORazepam (ATIVAN) tablet 0.5-1 mg (has no administration in time range)   lactated ringers infusion ( Intravenous New Bag 1/15/23 0003)   0.9% sodium chloride BOLUS (1,000 mLs Intravenous New Bag 1/14/23 2115)   metoclopramide (REGLAN) injection 10 mg (10 mg Intravenous Given 1/14/23 2118)   diphenhydrAMINE (BENADRYL) injection 25 mg (25 mg Intravenous Given 1/14/23 2116)     Labs Ordered and Resulted from Time of ED Arrival to Time of ED Departure   COMPREHENSIVE METABOLIC PANEL - Abnormal       Result Value    Sodium 143      Potassium 3.4      Chloride 110 (*)     Carbon Dioxide (CO2) 16 (*)     Anion Gap 17 (*)     Urea Nitrogen 14.8      Creatinine 0.79      Calcium 8.9      Glucose 133 (*)     Alkaline Phosphatase 45      AST 26      ALT 55 (*)     Protein Total 6.5      Albumin 4.3      Bilirubin Total 0.4      GFR Estimate 90     CBC WITH PLATELETS AND DIFFERENTIAL - Abnormal    WBC Count 2.1 (*)     RBC Count 3.74 (*)     Hemoglobin 11.6 (*)     Hematocrit 35.5      MCV 95      MCH 31.0      MCHC 32.7      RDW 14.1      Platelet Count 223      % Neutrophils 33      % Lymphocytes 34      % Monocytes 27      % Eosinophils 5      % Basophils 1      % Immature Granulocytes 0      NRBCs per 100 WBC 0      Absolute Neutrophils 0.7 (*)     Absolute Lymphocytes 0.7 (*)     Absolute Monocytes 0.6      Absolute Eosinophils 0.1      Absolute Basophils 0.0      Absolute Immature Granulocytes 0.0      Absolute NRBCs 0.0     RBC AND PLATELET MORPHOLOGY - Abnormal    Platelet  Assessment        Value: Automated Count Confirmed. Platelet morphology is normal.    Elliptocytes Slight (*)     RBC Morphology Confirmed RBC Indices     INFLUENZA A/B & SARS-COV2 PCR MULTIPLEX - Normal    Influenza A PCR Negative      Influenza B PCR Negative      RSV PCR Negative      SARS CoV2 PCR Negative     CRP INFLAMMATION - Normal    CRP Inflammation <3.00     FERRITIN - Normal    Ferritin 222     ROUTINE UA WITH MICROSCOPIC REFLEX TO CULTURE   HCG QUALITATIVE URINE   BLOOD CULTURE   BLOOD CULTURE     XR Chest Port 1 View   Preliminary Result   RESIDENT PRELIMINARY INTERPRETATION   IMPRESSION:   No acute airspace opacity.      CT Head w/o Contrast   Final Result   Impression:   Hypodensity in the subcortical white matter of the right frontal   operculum. Although this is a nonspecific finding cannot exclude   underlying lesion. Recommend MRI of the brain without and with   contrast..       I have personally reviewed the examination and initial interpretation   and I agree with the findings.      PRASAD COLLAZO MD            SYSTEM ID:  [id number]      MR Brain w/o & w Contrast    (Results Pending)          Medical Decision Making  The patient presented with a problem that is a chronic illness mild to moderate exacerbation, progression, or side effect of treatment.    The patient's evaluation involved:  an assessment requiring an independent historian (see separate area of note for details)  review of 3+ prior external note(s) (see separate area of note for details)  ordering and review of 3+ test(s) (see separate area of note for details)  review of 2 test result(s) ordered prior to this encounter (see separate area of note for details)  discussion of management or test interpretation with another health professional (see separate area of note for details)    The patient's management involved a decision regarding hospitalization.      Assessment & Plan    Kristie Cornejo is a 52 year old female with PMH of  multiple myeloma s/p 10 days s/p CAR T-cell therapy with Abecma, receiving intrathecal dexamethasone (most recent LP 10 days ago) c/b post LP headaches which have been treated with IV caffeine infusions who p/w worsening headache that was unresponsive to caffeine infusion earlier today and new low grade fever.    Ddx: post LP headache, fever/neutropenic fever, sepsis, bacteremia, Viral infection, ICH, CRS    Patient afebrile on arrival. No focal neurologic deficits. No meningismus. Nontoxic appearing but distressed from severe HA. Discussed with BMT fellow who recommended admission to BMT service. Will obtain non con Head CT, cultures, basic labs, CRP, and ferritin. Criteria for Tocilizumab would be 24 hrs of fever so patient currently does not qualify. No specific recs regarding pain control but would avoid steroids. Patient given IVF, Reglan, Benadryl to start.     Infectious work up neg for COVID/Influenza. CRP neg. CMP wnl, WBC and hgb stable. cxr clear. CT head with hypodensity in subcortical white matter of right frontal operculum. Rads recommended MRI w/ and w/o for further characterization of possible malignant lesion. I ordered the MRI but patient had already been moved to her inpatient bed when I went to discuss it with her. Further care per medicine team and BMT.     I have reviewed the nursing notes. I have reviewed the findings, diagnosis, plan and need for follow up with the patient.    Current Discharge Medication List          Final diagnoses:   Intractable episodic headache, unspecified headache type       Berta Perkins  [facility] EMERGENCY DEPARTMENT  1/14/2023     Berta Perkins MD  01/15/23 0059

## 2023-01-15 NOTE — H&P
[facility]    History and Physical - Hospitalist Service       Date of Admission:  1/14/2023    Assessment & Plan      Kristie Cornejo is a 53-year-old woman with multiple myeloma status post CAR-T therapy with Abecma (day 0 was 1/4/2023) who developed grade 1 CRS on day +1, who presented to the ER with fever as well as worsening headache since her LP on 1/10.     Fever with neutropenia s/p CAR-T therapy  Concern for Cytokine Release Syndrome (CRS)  Temp to 100.8 at home at 1800 on day of admission. . Afebrile on arrival to the ER. CXR clear. UA pending. Previously developed grade 1 CRS on Day 1 after CAR-T. Currently no hypoxia or hypotension. CRP and Ferritin normal, which are reassuring against both CRS and infection.   - Blood cultures drawn in ER. Monitor closely  - Hold off on antibiotics for now  - If develops fever in hospital, would start cefepime for neutropenic fevers  - If fever > 24 hours, would meet criteria for CRS. Monitor closely for hypoxia/hypotension.     Headache  Initially thought to be post-LP headache--previously relieved somewhat by caffeine and has been positional--but per patient also similar headache she got when she developed grade 1 CRS after CAR-T--responded to tocilizumab. Head CT negative. Low suspicion for meningitis/encephilitis at this point with minimal fever, no photophobia or nuchal rigidity, response to caffeine, etc.   - Given compazine and benadryl in the ER with modest improvement  - Will continue hydration. 2L LR ordered over 20 hours.   - MRI brain ordered by ER. Follow up   - Scheduled for a blood patch on Monday.     Mixed NAGMA/AGMA  Bicarb 16. AG 17. Delta gap = 5. Indicates mixed AGMA/NAGMA, primarly NAGMA. Does have chronic diarrhea.   - Check VBG and lactate  - Check urine for ketones    Diarrhea  Present since CAR-T. Only 1 stool per day  - C diff ordered as part of 5C protocol     Multiple myeloma s/p CAR-T  therapy  Per recent discharge summary:   - Transfusion parameters: hemoglobin <7, platelets <10  - Allopurinol for TLS prophylaxis. Continue outpatient until day +14.   - Prophylaxis plan: ACV x1 year, Fluconazole until count recovery, and Levaquin until count recovery. Continued in the outpatient setting.   - PCP prophylaxis: Pentamidine INH + albuterol one time on ?. consider Bactrim, she does not have a sulfa allergy. Starting on day 28 for 6-months.        Diet:   regular  DVT Prophylaxis: Low Risk/Ambulatory with no VTE prophylaxis indicated  Mcdermott Catheter: Not present  Lines: PRESENT             Cardiac Monitoring: None  Code Status:   Full    Clinically Significant Risk Factors Present on Admission                               Disposition Plan      Expected Discharge Date: 01/16/2023                  Fermin Kat MD  Hospitalist Service  [facility]  Securely message with Poll Everywhere (more info)  Text page via [facility] Paging/Directory     ______________________________________________________________________    Chief Complaint   Fever, headache    History is obtained from the patient    History of Present Illness   Kristie Cornejo is a 53-year-old woman with multiple myeloma status post CAR-T therapy with Abecma (day 0 was 1/4/2023) and was discharged on 1/11/23 who presented to the ER with fever as well as worsening headache.     She first developed headache and fever on day 1+ after CAR-T. She was diagnosed with  grade 1 CRS and received tocilizumab with resolution of symptoms. She got an LP on 1/10 and on 1/12 developed a headache which was positional. She had some relief  when she received caffeine infusion at the infusion center on 1/13 with resolution of the headache until the morning of 1/14. She had more caffeine 1/14 without any noticeable improvement. She denies photophobia or nuchal rigidity.     Later in the day, she noted chills. She took her  temperature ~ 1800 which was 100.6 , so her  called the on call heme/onc fellow. Temp was 100.8 on recheck. Also having some nausea. She was instructed to come to the ER.     In the ER she was not febrile. She was given IV fluids and benadryl and reglan with some improvement in her headache--bringing pain down from 7/10 to 4/10.         Past Medical History    Past Medical History:   Diagnosis Date     Allergic rhinitis, cause unspecified      Anxiety state, unspecified 12/01/2003    Started postpartum . On Paxil x 1+years. Off meds- 10/04     Irritable bowel syndrome 01/01/2004     Multiple myeloma not having achieved remission (H)      PLANTAR WARTS     resolved     SINUS DYSRHYTHMIA 10/01/2002    wnl     Unspecified hemorrhoids without mention of complication 04/01/2004    colonoscopy 4/04 spastic colon, int roids; bx neg       Past Surgical History   Past Surgical History:   Procedure Laterality Date     BIOPSY BONE PELVIS Left 09/07/2021    Procedure: Biopsy left pubic ramis;  Surgeon: Edu Philip MD;  Location: [facility] OR     COLONOSCOPY N/A 04/13/2022    Procedure: COLONOSCOPY, WITH BIOPSY;  Surgeon: Berto Montgomery MD;  Location:  GI     EP ABLATION PVC N/A 03/23/2022    Procedure: Ablation Premature Ventricular Contractions;  Surgeon: Alem Thomas MD;  Location:  HEART CARDIAC CATH LAB     INSERT CATHETER VASCULAR ACCESS N/A 11/21/2022    Procedure: CENTRAL VENOUS CATHETER NON TUNNELED INSERTION, VASCULAR ACCESS CATHETER;  Surgeon: Erich Bauman MD;  Location: [facility] OR     IR CVC NON TUNNEL LINE REMOVAL  11/22/2022     IR CVC NON TUNNEL PLACEMENT > 5 YRS  11/21/2022     LASIK       PICC TRIPLE LUMEN PLACEMENT Left 01/03/2023    Basilic 44 cm ext. 0     ZZC NONSPECIFIC PROCEDURE  01/01/1987    [place] teeth removed     ZZC NONSPECIFIC PROCEDURE  01/01/1993    (R) knee surgery     ZZC NONSPECIFIC PROCEDURE  01/01/2003    (R) breast bx-negative 6/03; bx neg 10/02     ZZC NONSPECIFIC  PROCEDURE      10/02 cardiac echo normal     [facility] NONSPECIFIC PROCEDURE  2004 colonosc int roids, spastic colon     [facility] NONSPECIFIC PROCEDURE  2005    D & C for missed AB     [facility] COLONOSCOPY THRU STOMA, DIAGNOSTIC  2004       Prior to Admission Medications   Prior to Admission Medications   Prescriptions Last Dose Informant Patient Reported? Taking?   BUSPIRONE HCL 10 MG PO TABS   No No   Si TABLET 3 TIMES DAILY as needed   Calcium Carbonate-Vit D-Min (CALCIUM 600+D PLUS MINERALS) 600-400 MG-UNIT TABS   Yes No   Sig: On HOLD   Flaxseed (Linseed) (FLAX SEED OIL) 1000 MG capsule   Yes No   Sig: Take 1 capsule by mouth daily   LORazepam (ATIVAN) 0.5 MG tablet   No No   Sig: Take 1 tablet (0.5 mg) by mouth every 4 hours as needed for anxiety, nausea or sleep   MIRENA 20 MCG/24HR IU IUD  Self Yes No   Sig: use for up to 5 years, then remove   Patient not taking: Reported on 1/3/2023   Multiple Vitamin (MULTI-VITAMIN DAILY) TABS   Yes No   Sig: Take by mouth daily On hold   Zoledronic Acid (ZOMETA IV)  Self Yes No   Patient not taking: Reported on 2022   acetaminophen (TYLENOL) 325 MG tablet   Yes No   Sig: Take 1-2 tablets (325-650 mg) by mouth every 4 hours as needed for mild pain or fever   acyclovir (ZOVIRAX) 800 MG tablet   No No   Sig: Take 1 tablet (800 mg) by mouth every 12 hours   allopurinol (ZYLOPRIM) 300 MG tablet   No No   Sig: Take 1 tablet (300 mg) by mouth daily for 14 days   cetirizine HCl 10 MG CAPS   Yes No   Sig: Take 1 capsule (10 mg) by mouth daily as needed   fluconazole (DIFLUCAN) 200 MG tablet   No No   Sig: Take 1 tablet (200 mg) by mouth daily   fluticasone (FLONASE) 50 MCG/ACT nasal spray   Yes No   Sig: Spray 1 spray into both nostrils daily as needed for rhinitis or allergies   glucosamine-chondroitin 500-400 MG CAPS per capsule  Self Yes No   Sig: Take 2 capsules by mouth daily   lactobacillus rhamnosus, GG, (CULTURELL) capsule   Yes No   Sig: On hold    levofloxacin (LEVAQUIN) 250 MG tablet   No No   Sig: Take 1 tablet (250 mg) by mouth daily   melatonin 5 MG tablet   Yes No   Sig: Take 5 mg by mouth nightly as needed for sleep   omeprazole (PRILOSEC OTC) 20 MG EC tablet   Yes No   Sig: Take 1 tablet (20 mg) by mouth daily   polyethylene glycol (MIRALAX) 17 g packet   Yes No   Sig: Take 17 g by mouth daily as needed for constipation   potassium chloride ER (KLOR-CON M) 10 MEQ CR tablet   Yes No   Sig: On hold   prochlorperazine (COMPAZINE) 10 MG tablet   Yes No   Sig: Take 0.5 tablets (5 mg) by mouth every 6 hours as needed for nausea or vomiting   study - simvastatin, IDS# [id number], 40 MG tablet   No No   Sig: Take 1 tablet (40 mg) by mouth daily Take at least 5 daily doses prior to apheresis and continue until Day +30 after CAR-T infusion.      Facility-Administered Medications: None        Physical Exam   Vital Signs: Temp: 98.5  F (36.9  C) Temp src: Oral BP: (!) 126/93 Pulse: 86   Resp: 22 SpO2: 100 % O2 Device: None (Room air)    Weight: 151 lbs 0 oz    General: Alert and oriented, well nourished, uncomfortable appearing.   Head: normocephalic, atraumatic,   Eyes: PERRL, EOMI, corneas and conjunctivae clear, no scleral icterus  ENT: mucus membranes moist, no exudates, no erythema.  Neck: no tenderness, supple, full AROM, no adenopathy  Chest/Pulmonary: CTAB, moving air well, no rales or wheezes, no tachypnea   Cardiovascular: S1, S2 normal, regular rate and rhythm and no murmur, no JVD. Distal pulses 2+. No edema  Abdomen: NABS, soft, non-tender, non-distended, no guarding, no rebound, no masses palpable and no hepatomegaly  Musculoskel/Extremities: no erythema or warmth of major joints  Skin: no diaphoresis, skin color normal  Neuro: speech clear, cranial nerves II-XII intact, intact sensory/light touch sensation, strength 5/5 and symmetric in UE and LE  Psychiatric: affect/mood normal, cooperative, memory intact      Medical Decision Making       65 MINUTES  SPENT BY ME on the date of service doing chart review, history, exam, documentation & further activities per the note.      Data     I have personally reviewed the following data over the past 24 hrs:    2.1 (L)  \   11.6 (L)   / 223     143 110 (H) 14.8 /  133 (H)   3.4 16 (L) 0.79 \       ALT: 55 (H) AST: 26 AP: 45 TBILI: 0.4   ALB: 4.3 TOT PROTEIN: 6.5 LIPASE: N/A       Procal: N/A CRP: <3.00 Lactic Acid: N/A       Ferritin:  222 % Retic:  N/A LDH:  N/A       Imaging results reviewed over the past 24 hrs:   Recent Results (from the past 24 hour(s))   CT Head w/o Contrast    Narrative    CT HEAD W/O CONTRAST 1/14/2023 8:57 PM    History: Headache; Acute HA (< 3 months), no complicating features   ICD-10:    Comparison: None available    Technique: Using multidetector thin collimation helical acquisition  technique, axial, coronal and sagittal CT images from the skull base  to the vertex were obtained without intravenous contrast.   (topogram) image(s) also obtained and reviewed.    Findings: Hypodensity in the subcortical white matter of the right  frontal operculum seen on image 14 of series 3. There is no  intracranial hemorrhage, mass effect, or midline shift. Gray/white  matter differentiation in both cerebral hemispheres is preserved.  Ventricles are proportionate to the cerebral sulci. The basal cisterns  are clear.    The bony calvaria and the bones of the skull base are normal. The  visualized portions of the paranasal sinuses and mastoid air cells are  clear.      Impression    Impression:  Hypodensity in the subcortical white matter of the right frontal  operculum. Although this is a nonspecific finding cannot exclude  underlying lesion. Recommend MRI of the brain without and with  contrast..     I have personally reviewed the examination and initial interpretation  and I agree with the findings.    PRASAD COLLAZO MD         SYSTEM ID:  [id number]   XR Chest Port 1 View    Impression    RESIDENT  PRELIMINARY INTERPRETATION  IMPRESSION:  No acute airspace opacity.

## 2023-01-15 NOTE — ED TRIAGE NOTES
Had recent lumbar puncture at [facility], woke up today and felt unwell.  Experiencing headaches, On Car-T.  Been having chills, weakness, hot flashes, some dizziness, and nausea

## 2023-01-15 NOTE — PLAN OF CARE
"/69   Pulse 57   Temp 97.4  F (36.3  C)   Resp 18   Ht 1.702 m (5' 7\")   Wt 68.9 kg (151 lb 14.4 oz)   LMP  (LMP Unknown)   SpO2 97%   BMI 23.79 kg/m        Patient was admitted for fever and headache pain level of 7. Upon arrival on the floor patient was afebrile, vital signs stable, Patient reported a headache with a pain level of 2. No nausea reported. Patient is a CAR-T day +11. Patient is steady, alert and oriented x4. No replacements needed.    Goal Outcome Evaluation:  Problem: Plan of Care - These are the overarching goals to be used throughout the patient stay.    Goal: Optimal Comfort and Wellbeing  Outcome: Progressing     Problem: Fever  Goal: Fever: Plan of Care  Outcome: Progressing     Problem: Plan of Care - These are the overarching goals to be used throughout the patient stay.    Goal: Absence of Hospital-Acquired Illness or Injury  Intervention: Identify and Manage Fall Risk  Recent Flowsheet Documentation  Taken 1/14/2023 2300 by Navi Jones RN  Safety Promotion/Fall Prevention:    assistive device/personal items within reach    clutter free environment maintained    fall prevention program maintained    lighting adjusted  Intervention: Prevent Skin Injury  Recent Flowsheet Documentation  Taken 1/14/2023 2300 by Navi Jones RN  Body Position: position changed independently  Intervention: Prevent and Manage VTE (Venous Thromboembolism) Risk  Recent Flowsheet Documentation  Taken 1/14/2023 2300 by Navi Jones RN  VTE Prevention/Management: SCDs (sequential compression devices) off  Goal: Readiness for Transition of Care  Intervention: Mutually Develop Transition Plan  Recent Flowsheet Documentation  Taken 1/15/2023 0000 by Navi Jones RN  Equipment Currently Used at Home: none                           "

## 2023-01-15 NOTE — PROGRESS NOTES
"BMT Daily CARTOX Note (complete days 0-14 and with any subsequent CRS/neurotoxicity)    Date: 01/15/2023    Kristie Cornejo ([id number]) is a 52 year old year old female who received infusion on 1/4/23, currently day 11 of CAR-T cell therapy (Abecma).    Vital Signs: /81 (BP Location: Left arm)   Pulse 76   Temp 98.2  F (36.8  C) (Axillary)   Resp 18   Ht 1.702 m (5' 7\")   Wt 68.5 kg (151 lb 1.6 oz)   LMP  (LMP Unknown)   SpO2 99%   BMI 23.67 kg/m      1) CRS Grading (based ONLY on parameters in box below)    Fever:   </= 100.4 *reported temp at home of 100.1 to 100.8 1/14 ajith (forehead monitor); afebrile in ED and since admission without intervention    Blood pressure:   SBP >/= 90 (not hypotensive)    Oxygen saturation:    >/= 90% on room air (not hypoxic)    CRS Parameter Grade 1  Grade 2 Grade 3 Grade 4   Fever* Tm >= 100.4 degrees F Tm >= 100.4 degrees F Tm >= 100.4 degrees F Tm >= to 100.4  degrees F      With Either:  Hypotension (SBP <90) None Responsive to Fluids  Requiring 1  vasopressor (w/ or w/o vasopressin) Requiring multiple  vasopressors  (excluding  vasopressin)     And/or  Hypoxia (O2 sats <90% on room air) None Low-flow nasal  cannula or blow-by High-flow nasal  cannula, face mask,  non-rebreather mask,or Venturi mask  Requiring positive  pressure (CPAP,  BiPAP intubation and  mechanical  ventilation)     CRS Summary  Current CRS stgstrstastdstest:st st1st Intervention(s): 0      2) ICANS ASTCT Consensus Grading for Adults    ICE Score  Orientation to year, month, city, hospital: 4 points, Name 3 objects (e.g., point to clock, pen, button): 3 point, Following commands: (e.g., Show me 2 fingers): 1 point, Write a standard sentence (e.g., The silver jumped over the log): 1 point and Count backwards from 100 by ten: 1 point  Total points: 10: No impairment    Seizure   No seizures    Motor Findings   No motor findings    Elevated ICP/Cerebral Edema   None    ICANS grade (Grading to be based on the " most severe symptom/event): 0    Intervention(s):    Neurotoxicity  Domain Grade 1 Grade 2 Grade 3 Grade 4   ICE score 7-9 3-6 1-2 0   Depressed LOC      Awakens  spontaneously Awakens to  voice  Awakens only to  tactile stimulation Unarousable or  requires vigorous  or repetitive  tactile stimuli to  arouse. Stupor  or coma.   Seizure n/a n/a Any clinical  seizure focal or  generalized that  resolves rapidly  or nonconvulsive  seizures on EEG  that resolve with  intervention  Life-threatening  prolonged  seizure (>5 min);  or Repetitive  clinical or  electrical  seizures without  return to baseline  in between    Motor Findings      n/a n/a n/a Deep focal motor  weakness such  as hemiparesis  or paraparesis   Elevated  ICP/cerebral  edema  n/a n/a Focal/local  edema on  neuroimaging  Diffuse cerebral  edema on  neuroimaging;  decerebrate or  decorticate  posturing; or  cranial nerve VI  palsy; or  papilledema; or  Cushing's triad     ICANS grade is determined by the most severe event (ICE score, level of consciousness, seizure, motor findings, raised ICP/cerebral edema) not attributable to any other cause; for example, a patient with an ICE score of 3 who has a generalized seizure is classified as grade 3 ICANS.    A patient with an ICE score of 0 may be classified as grade 3 ICANS if awake with global aphasia, but a patient with an ICE score of 0 may be classified as grade 4 ICANS if unarousable.     Depressed level of consciousness should be attributable to no other cause (eg, no sedating medication)    Tremors and myoclonus associated with immune effector cell therapies may be graded according to CTCAE v5.0,but they do not influence ICANS grading.     Intracranial hemorrhage with or without associated edema is not considered a neurotoxicity feature and is  excluded from ICANS grading. It may be graded according to CTCAE v5.0.        3) Organ CAR-T  toxicity:    Cardiac   none    Respiratory   none    Gastrointestinal   none    Hepatic    none    Skin   none     Coagulopathy    none     Other: Positional headache: completely resolved when lying flat. IV Caffeine today.      4) HLH   no        * CTCAE grading can be found at   https://ctep.cancer.gov/protocoldevelopment/electronic_applications/docs/CTCAE_v5_Quick Reference_8.5x11.pdf    Tabitha Bill PA-C

## 2023-01-15 NOTE — PROGRESS NOTES
01/15/23 0900   Appointment Info   Signing Clinician's Name / Credentials (OT) Genet Arreguin OTR/L   Living Environment   People in Home child(rosette), dependent;spouse   Current Living Arrangements house   Home Accessibility stairs to enter home;stairs within home   Number of Stairs, Main Entrance 2;3   Number of Stairs, Within Home, Primary greater than 10 stairs   Transportation Anticipated family or friend will provide   Living Environment Comments Pt lives w/ and 16 yr old son in 2 level home. 2/3 CHLOE from garage, full flight to bedroom upstairs. Uses walk-in shower.   Self-Care   Usual Activity Tolerance good   Current Activity Tolerance good   Regular Exercise Yes   Activity/Exercise Type swimming;walking  (enjoys winter sports (skiing, ice skating))   Equipment Currently Used at Home none   Fall history within last six months no   Activity/Exercise/Self-Care Comment Since discharge home, pt has been mod-I, receiving assistance from family/friends.   Instrumental Activities of Daily Living (IADL)   IADL Comments Family and friends have been assisting since discharging home from previous IP stay.   General Information   Onset of Illness/Injury or Date of Surgery 01/14/23   Referring Physician Fermin Kat MD   Patient/Family Therapy Goal Statement (OT) To get better and go home   Additional Occupational Profile Info/Pertinent History of Current Problem Kristie Cornejo is a 53-year-old woman with multiple myeloma status post CAR-T therapy with Abecma (day 0 was 1/4/2023) who developed grade 1 CRS on day +1, who presented to the ER with fever as well as worsening headache since her LP on 1/10   Existing Precautions/Restrictions immunosuppressed   Left Upper Extremity (Weight-bearing Status) full weight-bearing (FWB)   Right Upper Extremity (Weight-bearing Status) full weight-bearing (FWB)   Left Lower Extremity (Weight-bearing Status) full weight-bearing (FWB)   Right Lower Extremity  (Weight-bearing Status) full weight-bearing (FWB)   Cognitive Status Examination   Orientation Status orientation to person, place and time   Affect/Mental Status (Cognitive) WNL   Visual Perception   Visual Impairment/Limitations corrective lenses full-time   Pain Assessment   Patient Currently in Pain No   Range of Motion Comprehensive   General Range of Motion no range of motion deficits identified   Strength Comprehensive (MMT)   General Manual Muscle Testing (MMT) Assessment no strength deficits identified   Bed Mobility   Comment (Bed Mobility) Pt reported IND   Transfers   Transfer Comments Pt reported IND   Clinical Impression   Criteria for Skilled Therapeutic Interventions Met (OT) Yes, treatment indicated   OT Diagnosis increased risk for deconditioning, which may impact ADL/IADL performance   OT Problem List-Impairments impacting ADL problems related to;activity tolerance impaired;strength   Assessment of Occupational Performance 1-3 Performance Deficits   Identified Performance Deficits Household management, aerobic exercise   Planned Therapy Interventions (OT) IADL retraining;strengthening;ROM;stretching;transfer training;home program guidelines;progressive activity/exercise;risk factor education   Clinical Decision Making Complexity (OT) low complexity   Risk & Benefits of therapy have been explained evaluation/treatment results reviewed;care plan/treatment goals reviewed;risks/benefits reviewed;patient   Clinical Impression Comments Pt to benefit from skilled OT during IP stay to address above deficits to maximize IND in ADLs/IADLs   OT Total Evaluation Time   OT Eval, Low Complexity Minutes (49687) 5   OT Goals   Therapy Frequency (OT) 3 times/wk   OT Predicted Duration/Target Date for Goal Attainment 01/21/23   OT Goals OT Goal 1;Aerobic Activity   OT: Perform aerobic activity with stable cardiovascular response continuous activity;20 minutes;ambulation   OT: Goal 1 Pt will be IND with BUE/BLE  strengthening HEP to increase strength for ADL/IADL routines and prevent deconditioning.   Therapeutic Activities   Therapeutic Activity Minutes (62781) 10   Treatment Detail/Skilled Intervention OT: OT eval completed, treatment indicated. Edu pt on OT role, POC, and d/c recs. Pt verbalized understanding as pt was just discharged four days from today. Pt verbalized understanding of lab values, role of OT, and stated pt has been completing UE HEP however endorsed decreased strength from BLE. Th issued standing exercise HEP to progress functional endurance and strength, pt declined to complete 2/2 not having proper clothing to complete. Encouraged pt to complete 3x/daily, pt agreeable. Pt denied further questions/concerns. Pt left long sitting in bed w/call light within reach and all needs met.   OT Discharge Planning   OT Plan standing exercise HEP (assess PT needs), aerobic ex   OT Discharge Recommendation (DC Rec) home with assist   OT Rationale for DC Rec Pt poerforming near baseline for ADLs but would benefit from continued IP OT to prevent deconditioning. Anticipate will be able to d/c to home with assist from family with ADLs/IADLs as needed.   OT Brief overview of current status not OOB, per chart IND   Total Session Time   Timed Code Treatment Minutes 10   Total Session Time (sum of timed and untimed services) 15

## 2023-01-15 NOTE — TELEPHONE ENCOUNTER
Jeny Hernandez is a 53-year-old female with multiple myeloma status post CAR-T 10 days ago and was recently discharged about 3 days ago.    Received a page from patient's  Jesse noting that patient has worsening headache, and no fever.  Called and spoke with patient who states that she was at the infusion center today and got an IV infusion of caffeine for headaches following LP 10 days ago. She states that her yesterday her headache improved with caffeine IV however today there has been no improvement after caffeine and IV even with laying down flat.  States her headache is a 5-6 on 10 and is evening she noticed chills and took her temperature which was noted to be 100.6.  While on the phone I had patient take her temperature again and was noted to be 100.8.  Patient also states that she is starting to have nausea.  But no vomiting.  She denies having any other symptoms.  Informed patient and her  Jesse that he needs to go to the [facility] ED right away.  They voiced understanding and agreed to the plan.    Discussed with Dr. Gardiner BMT attending on call.    Recommendations for the ED:  -Labs including but not limited to CBC, CMP, CRP, ferritin level, blood culture.  Further work-up could be done per ED attending discretion.  -Antibiotic could be started empirically per ED attending discretion.  -Patient can be admitted on the BMT attending Dr. Gardiner with on-call hospitalist notified and signout given.    Please don't hesitate to call Matthiasc Fellow on call if any questions.    Rach Roca  Hematology/Oncolgy Fellow  Pager: [phone]

## 2023-01-16 ENCOUNTER — APPOINTMENT (OUTPATIENT)
Dept: OCCUPATIONAL THERAPY | Facility: CLINIC | Age: 53
DRG: 841 | End: 2023-01-16
Payer: COMMERCIAL

## 2023-01-16 LAB
ALBUMIN UR-MCNC: NEGATIVE MG/DL
ANION GAP SERPL CALCULATED.3IONS-SCNC: 13 MMOL/L (ref 7–15)
APPEARANCE UR: CLEAR
BASOPHILS # BLD AUTO: 0 10E3/UL (ref 0–0.2)
BASOPHILS NFR BLD AUTO: 1 %
BILIRUB UR QL STRIP: NEGATIVE
BUN SERPL-MCNC: 17.4 MG/DL (ref 6–20)
CALCIUM SERPL-MCNC: 8.9 MG/DL (ref 8.6–10)
CHLORIDE SERPL-SCNC: 110 MMOL/L (ref 98–107)
COLOR UR AUTO: ABNORMAL
CREAT SERPL-MCNC: 0.64 MG/DL (ref 0.51–0.95)
DEPRECATED HCO3 PLAS-SCNC: 18 MMOL/L (ref 22–29)
EOSINOPHIL # BLD AUTO: 0.1 10E3/UL (ref 0–0.7)
EOSINOPHIL NFR BLD AUTO: 5 %
ERYTHROCYTE [DISTWIDTH] IN BLOOD BY AUTOMATED COUNT: 14 % (ref 10–15)
GFR SERPL CREATININE-BSD FRML MDRD: >90 ML/MIN/1.73M2
GLUCOSE SERPL-MCNC: 99 MG/DL (ref 70–99)
GLUCOSE UR STRIP-MCNC: NEGATIVE MG/DL
HCG UR QL: NEGATIVE
HCT VFR BLD AUTO: 33 % (ref 35–47)
HGB BLD-MCNC: 11 G/DL (ref 11.7–15.7)
HGB UR QL STRIP: NEGATIVE
IMM GRANULOCYTES # BLD: 0 10E3/UL
IMM GRANULOCYTES NFR BLD: 0 %
INR PPP: 1.04 (ref 0.85–1.15)
KETONES UR STRIP-MCNC: NEGATIVE MG/DL
LEUKOCYTE ESTERASE UR QL STRIP: NEGATIVE
LYMPHOCYTES # BLD AUTO: 0.5 10E3/UL (ref 0.8–5.3)
LYMPHOCYTES NFR BLD AUTO: 20 %
MCH RBC QN AUTO: 31.4 PG (ref 26.5–33)
MCHC RBC AUTO-ENTMCNC: 33.3 G/DL (ref 31.5–36.5)
MCV RBC AUTO: 94 FL (ref 78–100)
MONOCYTES # BLD AUTO: 0.6 10E3/UL (ref 0–1.3)
MONOCYTES NFR BLD AUTO: 23 %
MUCOUS THREADS #/AREA URNS LPF: PRESENT /LPF
NEUTROPHILS # BLD AUTO: 1.3 10E3/UL (ref 1.6–8.3)
NEUTROPHILS NFR BLD AUTO: 51 %
NITRATE UR QL: NEGATIVE
NRBC # BLD AUTO: 0 10E3/UL
NRBC BLD AUTO-RTO: 0 /100
PH UR STRIP: 6.5 [PH] (ref 5–7)
PLATELET # BLD AUTO: 191 10E3/UL (ref 150–450)
POTASSIUM SERPL-SCNC: 3.8 MMOL/L (ref 3.4–5.3)
RBC # BLD AUTO: 3.5 10E6/UL (ref 3.8–5.2)
RBC URINE: 1 /HPF
SODIUM SERPL-SCNC: 141 MMOL/L (ref 136–145)
SP GR UR STRIP: 1.02 (ref 1–1.03)
TRANSITIONAL EPI: <1 /HPF
UROBILINOGEN UR STRIP-MCNC: NORMAL MG/DL
WBC # BLD AUTO: 2.4 10E3/UL (ref 4–11)
WBC URINE: 0 /HPF

## 2023-01-16 PROCEDURE — 97110 THERAPEUTIC EXERCISES: CPT | Mod: GO

## 2023-01-16 PROCEDURE — 250N000011 HC RX IP 250 OP 636: Performed by: INTERNAL MEDICINE

## 2023-01-16 PROCEDURE — 85610 PROTHROMBIN TIME: CPT | Performed by: INTERNAL MEDICINE

## 2023-01-16 PROCEDURE — 999N000147 HC STATISTIC PT IP EVAL DEFER

## 2023-01-16 PROCEDURE — 206N000001 HC R&B BMT UMMC

## 2023-01-16 PROCEDURE — 99233 SBSQ HOSP IP/OBS HIGH 50: CPT | Mod: FS | Performed by: PHYSICIAN ASSISTANT

## 2023-01-16 PROCEDURE — 250N000013 HC RX MED GY IP 250 OP 250 PS 637: Performed by: INTERNAL MEDICINE

## 2023-01-16 PROCEDURE — 81001 URINALYSIS AUTO W/SCOPE: CPT | Performed by: EMERGENCY MEDICINE

## 2023-01-16 PROCEDURE — 80048 BASIC METABOLIC PNL TOTAL CA: CPT | Performed by: INTERNAL MEDICINE

## 2023-01-16 PROCEDURE — 85025 COMPLETE CBC W/AUTO DIFF WBC: CPT | Performed by: INTERNAL MEDICINE

## 2023-01-16 PROCEDURE — 99418 PROLNG IP/OBS E/M EA 15 MIN: CPT | Mod: FS | Performed by: PHYSICIAN ASSISTANT

## 2023-01-16 PROCEDURE — 81025 URINE PREGNANCY TEST: CPT | Performed by: EMERGENCY MEDICINE

## 2023-01-16 RX ORDER — BUTALBITAL, ACETAMINOPHEN AND CAFFEINE 50; 325; 40 MG/1; MG/1; MG/1
1 TABLET ORAL EVERY 4 HOURS PRN
Status: DISCONTINUED | OUTPATIENT
Start: 2023-01-16 | End: 2023-01-17 | Stop reason: HOSPADM

## 2023-01-16 RX ADMIN — PANTOPRAZOLE SODIUM 40 MG: 40 TABLET, DELAYED RELEASE ORAL at 08:00

## 2023-01-16 RX ADMIN — ACYCLOVIR 800 MG: 800 TABLET ORAL at 23:35

## 2023-01-16 RX ADMIN — ACYCLOVIR 800 MG: 800 TABLET ORAL at 11:43

## 2023-01-16 RX ADMIN — Medication 3 ML: at 08:56

## 2023-01-16 RX ADMIN — ALLOPURINOL 300 MG: 300 TABLET ORAL at 08:00

## 2023-01-16 RX ADMIN — LEVOFLOXACIN 250 MG: 250 TABLET, FILM COATED ORAL at 08:00

## 2023-01-16 RX ADMIN — FLUCONAZOLE 200 MG: 200 TABLET ORAL at 08:00

## 2023-01-16 RX ADMIN — Medication 3 ML: at 08:47

## 2023-01-16 RX ADMIN — Medication 40 MG: at 08:00

## 2023-01-16 RX ADMIN — Medication 3 ML: at 03:53

## 2023-01-16 RX ADMIN — PROCHLORPERAZINE MALEATE 10 MG: 5 TABLET ORAL at 08:00

## 2023-01-16 RX ADMIN — THERA TABS 1 TABLET: TAB at 08:00

## 2023-01-16 ASSESSMENT — ACTIVITIES OF DAILY LIVING (ADL)
ADLS_ACUITY_SCORE: 23
ADLS_ACUITY_SCORE: 23
ADLS_ACUITY_SCORE: 27
ADLS_ACUITY_SCORE: 27
ADLS_ACUITY_SCORE: 23
ADLS_ACUITY_SCORE: 27
ADLS_ACUITY_SCORE: 23
ADLS_ACUITY_SCORE: 27
ADLS_ACUITY_SCORE: 27
ADLS_ACUITY_SCORE: 23

## 2023-01-16 NOTE — PROGRESS NOTES
"BMT Daily CARTOX Note (complete days 0-14 and with any subsequent CRS/neurotoxicity)    Date: 01/16/2023    Kristie Cornejo ([id number]) is a 52 year old year old female who received infusion on 1/4/23, currently day 12 of CAR-T cell therapy ([facility]ecma).    Vital Signs: /80 (BP Location: Right arm)   Pulse 75   Temp 98.1  F (36.7  C) (Axillary)   Resp 16   Ht 1.702 m (5' 7\")   Wt 67.4 kg (148 lb 9.6 oz)   LMP  (LMP Unknown)   SpO2 99%   BMI 23.27 kg/m      1) CRS Grading (based ONLY on parameters in box below)    Fever:   </= 100.4 *reported temp at home of 100.1 to 100.8 1/14 ajith (forehead monitor); afebrile in ED and since admission without intervention    Blood pressure:   SBP >/= 90 (not hypotensive)    Oxygen saturation:    >/= 90% on room air (not hypoxic)    CRS Parameter Grade 1  Grade 2 Grade 3 Grade 4   Fever* Tm >= 100.4 degrees F Tm >= 100.4 degrees F Tm >= 100.4 degrees F Tm >= to 100.4  degrees F      With Either:  Hypotension (SBP <90) None Responsive to Fluids  Requiring 1  vasopressor (w/ or w/o vasopressin) Requiring multiple  vasopressors  (excluding  vasopressin)     And/or  Hypoxia (O2 sats <90% on room air) None Low-flow nasal  cannula or blow-by High-flow nasal  cannula, face mask,  non-rebreather mask,or Venturi mask  Requiring positive  pressure (CPAP,  BiPAP intubation and  mechanical  ventilation)     CRS Summary  Current CRS stgstrstastdstest:st st1st Intervention(s): 0      2) ICANS ASTCT Consensus Grading for Adults    ICE Score  Orientation to year, month, city, hospital: 4 points, Name 3 objects (e.g., point to clock, pen, button): 3 point, Following commands: (e.g., Show me 2 fingers): 1 point, Write a standard sentence (e.g., The silver jumped over the log): 1 point and Count backwards from 100 by ten: 1 point  Total points: 10: No impairment    Seizure   No seizures    Motor Findings   No motor findings    Elevated ICP/Cerebral Edema   None    ICANS grade (Grading to be based on the " most severe symptom/event): 0    Intervention(s):    Neurotoxicity  Domain Grade 1 Grade 2 Grade 3 Grade 4   ICE score 7-9 3-6 1-2 0   Depressed LOC      Awakens  spontaneously Awakens to  voice  Awakens only to  tactile stimulation Unarousable or  requires vigorous  or repetitive  tactile stimuli to  arouse. Stupor  or coma.   Seizure n/a n/a Any clinical  seizure focal or  generalized that  resolves rapidly  or nonconvulsive  seizures on EEG  that resolve with  intervention  Life-threatening  prolonged  seizure (>5 min);  or Repetitive  clinical or  electrical  seizures without  return to baseline  in between    Motor Findings      n/a n/a n/a Deep focal motor  weakness such  as hemiparesis  or paraparesis   Elevated  ICP/cerebral  edema  n/a n/a Focal/local  edema on  neuroimaging  Diffuse cerebral  edema on  neuroimaging;  decerebrate or  decorticate  posturing; or  cranial nerve VI  palsy; or  papilledema; or  Cushing's triad     ICANS grade is determined by the most severe event (ICE score, level of consciousness, seizure, motor findings, raised ICP/cerebral edema) not attributable to any other cause; for example, a patient with an ICE score of 3 who has a generalized seizure is classified as grade 3 ICANS.    A patient with an ICE score of 0 may be classified as grade 3 ICANS if awake with global aphasia, but a patient with an ICE score of 0 may be classified as grade 4 ICANS if unarousable.     Depressed level of consciousness should be attributable to no other cause (eg, no sedating medication)    Tremors and myoclonus associated with immune effector cell therapies may be graded according to CTCAE v5.0,but they do not influence ICANS grading.     Intracranial hemorrhage with or without associated edema is not considered a neurotoxicity feature and is  excluded from ICANS grading. It may be graded according to CTCAE v5.0.        3) Organ CAR-T  toxicity:    Cardiac   none    Respiratory   none    Gastrointestinal   none    Hepatic    none    Skin   none     Coagulopathy    none     Other: Positional headache: completely resolved when lying flat. IV Caffeine today.      4) HLH   no        * CTCAE grading can be found at   https://ctep.cancer.gov/protocoldevelopment/electronic_applications/docs/CTCAE_v5_Quick Reference_8.5x11.pdf    Nathalie Hernandes pa-c  [phone]

## 2023-01-16 NOTE — PLAN OF CARE
"AVSS.  Dilaudid IV given x1, dilaudid given po x2 for headache with some relief. MRI done today. Pt eating well, showered and up ad angelic in room.  here this afternoon. Continue POC.  Problem: Plan of Care - These are the overarching goals to be used throughout the patient stay.    Goal: Plan of Care Review  Description: The Plan of Care Review/Shift note should be completed every shift.  The Outcome Evaluation is a brief statement about your assessment that the patient is improving, declining, or no change.  This information will be displayed automatically on your shift note.  Outcome: Progressing  Goal: Patient-Specific Goal (Individualized)  Description: You can add care plan individualizations to a care plan. Examples of Individualization might be:  \"Parent requests to be called daily at 9am for status\", \"I have a hard time hearing out of my right ear\", or \"Do not touch me to wake me up as it startles me\".  Outcome: Progressing  Goal: Absence of Hospital-Acquired Illness or Injury  Outcome: Progressing  Intervention: Identify and Manage Fall Risk  Recent Flowsheet Documentation  Taken 1/15/2023 0900 by Rhina Christine, RN  Safety Promotion/Fall Prevention: assistive device/personal items within reach  Goal: Optimal Comfort and Wellbeing  Outcome: Progressing  Goal: Readiness for Transition of Care  Outcome: Progressing     Problem: Fever  Goal: Fever: Plan of Care  Outcome: Progressing   Goal Outcome Evaluation:                        "

## 2023-01-16 NOTE — PHARMACY-ADMISSION MEDICATION HISTORY
Admission Medication History Completed by Pharmacy    See [facility] Admission Navigator for allergy information, preferred outpatient pharmacy, prior to admission medications and immunization status.     Medication History Sources:     EPIC, discharge note 1/11    BMT clinic pharmacist notes 12/22, 1/12/23    Changes made to PTA medication list (reason):    Added: None    Deleted: None    Changed: None    Additional Information:    None    Prior to Admission medications    Medication Sig Last Dose Taking? Auth Provider Long Term End Date   acetaminophen (TYLENOL) 325 MG tablet Take 1-2 tablets (325-650 mg) by mouth every 4 hours as needed for mild pain or fever   Nathalie Hernandes PA-C     acyclovir (ZOVIRAX) 800 MG tablet Take 1 tablet (800 mg) by mouth every 12 hours   Payton Reinoso MD Yes    BUSPIRONE HCL 10 MG PO TABS 1 TABLET 3 TIMES DAILY as needed   Laura Cruz MD     Calcium Carbonate-Vit D-Min (CALCIUM 600+D PLUS MINERALS) 600-400 MG-UNIT TABS On HOLD   Nathalie Hernandes PA-C     cetirizine HCl 10 MG CAPS Take 1 capsule (10 mg) by mouth daily as needed   Nathalie Hernandes PA-C     Flaxseed (Linseed) (FLAX SEED OIL) 1000 MG capsule Take 1 capsule by mouth daily   Reported, Patient     fluconazole (DIFLUCAN) 200 MG tablet Take 1 tablet (200 mg) by mouth daily   Diana Villa PA-C     fluticasone (FLONASE) 50 MCG/ACT nasal spray Spray 1 spray into both nostrils daily as needed for rhinitis or allergies   Nathalie Hernandes PA-C     glucosamine-chondroitin 500-400 MG CAPS per capsule Take 2 capsules by mouth daily   Reported, Patient     lactobacillus rhamnosus, GG, (CULTURELL) capsule On hold   Nathalie Hernandes PA-C     levofloxacin (LEVAQUIN) 250 MG tablet Take 1 tablet (250 mg) by mouth daily   Payton Reinoso MD     LORazepam (ATIVAN) 0.5 MG tablet Take 1 tablet (0.5 mg) by mouth every 4 hours as needed for anxiety, nausea or sleep   Jackie Dunn, DORON CNP      melatonin 5 MG tablet Take 5 mg by mouth nightly as needed for sleep   Reported, Patient     MIRENA 20 MCG/24HR IU IUD use for up to 5 years, then remove  Patient not taking: Reported on 1/3/2023   Tarah Godoy CNM     Multiple Vitamin (MULTI-VITAMIN DAILY) TABS Take by mouth daily On hold   Nathalie Hernandes PA-C     omeprazole (PRILOSEC OTC) 20 MG EC tablet Take 1 tablet (20 mg) by mouth daily   Tabitha Bill PA-C     polyethylene glycol (MIRALAX) 17 g packet Take 17 g by mouth daily as needed for constipation   Duy Christine PA-C     potassium chloride ER (KLOR-CON M) 10 MEQ CR tablet On hold   Nathalie Hernandes PA-C     prochlorperazine (COMPAZINE) 10 MG tablet Take 0.5 tablets (5 mg) by mouth every 6 hours as needed for nausea or vomiting   Nathalie Hernandes PA-C     study - simvastatin, IDS# [id number], 40 MG tablet Take 1 tablet (40 mg) by mouth daily Take at least 5 daily doses prior to apheresis and continue until Day +30 after CAR-T infusion.   Payton Reinoso MD     Zoledronic Acid (ZOMETA IV)    Reported, Patient         Date completed: 01/16/23    Medication history completed by: Berto Schroeder [facility]

## 2023-01-16 NOTE — PLAN OF CARE
Orders received and acknowledged. Per chart review, discussion with OT, and reason for admission, no acute inpatient PT needs identified. Pt is ambulating independently >300' and continues to remain active in room. Defer discharge recommendations to OT. Will complete orders at this time. Should mobility status change, please reconsult as appropriate. Thank you for this referral.     Sarah Wallace, PT, DPT

## 2023-01-16 NOTE — PLAN OF CARE
"/78 (BP Location: Right arm)   Pulse 79   Temp 97.9  F (36.6  C) (Axillary)   Resp 16   Ht 1.702 m (5' 7\")   Wt 67.4 kg (148 lb 9.6 oz)   LMP  (LMP Unknown)   SpO2 100%   BMI 23.27 kg/m    PICC is C/D/I and HL. ICE Assessment is 10/10 and neuro intact. Patient is A & O x 4. Patient continue to c/o mild headache, dull and declined for pain medications. Patient c/o mild nausea without emesis and received prn compazine 10 mg po x 1 with relief. Patient worked with PT and shower early this shift. Starting 11:00 am today to tomorrow at 11 am patient needs to be laying flat in bed for 24 hours. Patient still needs stool culture for C-diff. Patient's  and son are with her in the room and no new events were reported.   Continue with plan of care and notify MD for status changes.     Problem: Plan of Care - These are the overarching goals to be used throughout the patient stay.    Goal: Plan of Care Review  Description: The Plan of Care Review/Shift note should be completed every shift.  The Outcome Evaluation is a brief statement about your assessment that the patient is improving, declining, or no change.  This information will be displayed automatically on your shift note.  Outcome: Progressing  Flowsheets (Taken 1/16/2023 1312)  Plan of Care Reviewed With:    patient    spouse    caregiver    child  Overall Patient Progress: no change     Problem: Plan of Care - These are the overarching goals to be used throughout the patient stay.    Goal: Absence of Hospital-Acquired Illness or Injury  Intervention: Identify and Manage Fall Risk  Recent Flowsheet Documentation  Taken 1/16/2023 0758 by Mary Adhikari RN  Safety Promotion/Fall Prevention:    fall prevention program maintained    increased rounding and observation    increase visualization of patient    lighting adjusted    mobility aid in reach    nonskid shoes/slippers when out of bed    clutter free environment maintained    patient and " family education     Problem: Plan of Care - These are the overarching goals to be used throughout the patient stay.    Goal: Absence of Hospital-Acquired Illness or Injury  Intervention: Prevent Skin Injury  Recent Flowsheet Documentation  Taken 1/16/2023 0758 by Mary Adhikari RN  Body Position: position changed independently     Problem: Plan of Care - These are the overarching goals to be used throughout the patient stay.    Goal: Absence of Hospital-Acquired Illness or Injury  Intervention: Prevent and Manage VTE (Venous Thromboembolism) Risk  Recent Flowsheet Documentation  Taken 1/16/2023 0758 by Mary Adhikari RN  VTE Prevention/Management: SCDs (sequential compression devices) off     Problem: Plan of Care - These are the overarching goals to be used throughout the patient stay.    Goal: Absence of Hospital-Acquired Illness or Injury  Intervention: Prevent Infection  Recent Flowsheet Documentation  Taken 1/16/2023 0758 by Mary Adhikari RN  Infection Prevention:    cohorting utilized    environmental surveillance performed    equipment surfaces disinfected    hand hygiene promoted     Problem: Plan of Care - These are the overarching goals to be used throughout the patient stay.    Goal: Optimal Comfort and Wellbeing  Intervention: Monitor Pain and Promote Comfort  Recent Flowsheet Documentation  Taken 1/16/2023 0758 by Mary Adhikari RN  Pain Management Interventions: medication (see MAR)   Goal Outcome Evaluation:      Plan of Care Reviewed With: patient, spouse, caregiver, child    Overall Patient Progress: no changeOverall Patient Progress: no change

## 2023-01-16 NOTE — PLAN OF CARE
"Assumed care: 1500 - 2330    /84 (BP Location: Right arm)   Pulse 76   Temp 97.5  F (36.4  C) (Axillary)   Resp 18   Ht 1.702 m (5' 7\")   Wt 67.4 kg (148 lb 9.6 oz)   LMP  (LMP Unknown)   SpO2 99%   BMI 23.27 kg/m      BMT Date: 1/4/2023   Day post-transplant: 12     Shift summary:  Kristie Cornejo is alert and oriented.  Vital signs stable, on room air, and afebrile.  Patient denies pain.  Headache minimal while laying down.  No nausea, vomiting, diarrhea.  Strict bed rest, laying flat until tomorrow 11AM.  Plan for LP with blood patch.        Recent Labs   Lab 01/16/23  0351 01/15/23  0413   GLC 99 100*      Hemoglobin   Date Value Ref Range Status   01/16/2023 11.0 (L) 11.7 - 15.7 g/dL Final   08/29/2006 12.2 11.7 - 15.7 g/dL Final     Platelet Count   Date Value Ref Range Status   01/16/2023 191 150 - 450 10e3/uL Final   02/13/2006 326 150 - 450 10e9/L Final     WBC   Date Value Ref Range Status   02/13/2006 7.3 4.0 - 11.0 10e9/L Final     WBC Count   Date Value Ref Range Status   01/16/2023 2.4 (L) 4.0 - 11.0 10e3/uL Final     Potassium   Date Value Ref Range Status   01/16/2023 3.8 3.4 - 5.3 mmol/L Final   08/10/2022 4.3 3.4 - 5.3 mmol/L Final      Magnesium   Date Value Ref Range Status   01/15/2023 2.1 1.7 - 2.3 mg/dL Final      Phosphorus   Date Value Ref Range Status   01/14/2023 3.4 2.5 - 4.5 mg/dL Final     Goal Outcome Evaluation:    Problem: Plan of Care - These are the overarching goals to be used throughout the patient stay.    Goal: Plan of Care Review  Description: The Plan of Care Review/Shift note should be completed every shift.  The Outcome Evaluation is a brief statement about your assessment that the patient is improving, declining, or no change.  This information will be displayed automatically on your shift note.  Outcome: Progressing  Goal: Patient-Specific Goal (Individualized)  Description: You can add care plan individualizations to a care plan. Examples of " "Individualization might be:  \"Parent requests to be called daily at 9am for status\", \"I have a hard time hearing out of my right ear\", or \"Do not touch me to wake me up as it startles me\".  Outcome: Progressing  Goal: Absence of Hospital-Acquired Illness or Injury  Outcome: Progressing  Intervention: Identify and Manage Fall Risk  Recent Flowsheet Documentation  Taken 1/16/2023 1600 by Jason Stack, RN  Safety Promotion/Fall Prevention:    assistive device/personal items within reach    clutter free environment maintained    fall prevention program maintained    nonskid shoes/slippers when out of bed  Intervention: Prevent Skin Injury  Recent Flowsheet Documentation  Taken 1/16/2023 1600 by Jason Stack, RN  Body Position: position changed independently  Goal: Optimal Comfort and Wellbeing  Outcome: Progressing  Goal: Readiness for Transition of Care  Outcome: Progressing     Problem: Fever  Goal: Fever: Plan of Care  Outcome: Progressing       "

## 2023-01-16 NOTE — PROGRESS NOTES
"BMT Daily Progress Note  BMT Physician: Dr. Reinoso   Transplant Type: CAR-T   Preparative Regimen: Cy/Flu     Chief complaint: Kristie Cornejo is a 52 year old female with a history of multiple myeloma conditioned with Cy/Flu, currently day +12 of her CAR T-cell therapy with [facility]ecma.  - readmitted 1/14 with transient fever, ongoing headache     INTERIM HISTORY:   Had vomiting post dilaudid yesterday which made her HA worse.  This morning HA is mild (1.5) and dull.  No new complaints.  Awaiting blood patch which will take place tomorrow.  No fever.  No confusion.     REVIEW OF SYSTEMS: Otherwise unremarkable other than what is noted in the Interim History.     PHYSICAL EXAM:   Vitals:  /72 (BP Location: Right arm)   Pulse 70   Temp 98.3  F (36.8  C)   Resp 18   Ht 1.702 m (5' 7\")   Wt 68.5 kg (151 lb 1.6 oz)   LMP  (LMP Unknown)   SpO2 96%   BMI 23.67 kg/m       General Appearance: NAD  HEENT: sclera anicteric. OP moist without lesions  CV: RRR. no murmur or rub.   RESP: CTAB  GI: +BS, soft, nontender, nondistended.   EXT: No edema.   SKIN: no rash on exposed skin  NEURO: A&O x3   PSYCH: Appropriate affect    ACCESS: L arm PICC NT, dressing cdi     ICE: 10/10 (1/16)    Labs:   Lab Results   Component Value Date    WBC 2.4 (L) 01/16/2023    ANEU 1.0 (L) 01/14/2023    HGB 11.0 (L) 01/16/2023    HCT 33.0 (L) 01/16/2023     01/16/2023     01/16/2023    POTASSIUM 3.8 01/16/2023    CHLORIDE 110 (H) 01/16/2023    CO2 18 (L) 01/16/2023    GLC 99 01/16/2023    BUN 17.4 01/16/2023    CR 0.64 01/16/2023    MAG 2.1 01/15/2023    INR 1.04 01/16/2023    BILITOTAL 0.4 01/15/2023    AST 20 01/15/2023    ALT 42 (H) 01/15/2023    ALKPHOS 32 (L) 01/15/2023    PROTTOTAL 5.1 (L) 01/15/2023    ALBUMIN 3.7 01/15/2023      Imaging:  Brain MRI w & w/o contrast (1/15):   1. Mild pachymeningeal enhancement. Given the history of recent lumbar  puncture on 1/10/2023, this finding is favored secondary to " recent  lumbar puncture and could be associated with intracranial hypotension.  2. No intracranial space-occupying lesion.  3. Corresponding to hypodensity identified on recent CT, there are  nonspecific few scattered T2 hyperintense foci, likely nonspecific  gliosis from prior treatment, infection/inflammation or vasculopathy.    Head CT non-contrast (1/14): Hypodensity in the subcortical white matter of the right frontal operculum. Although this is a nonspecific finding cannot exclude underlying lesion. Recommend MRI of the brain without and with contrast.    ASSESSMENT AND PLAN:   Kristie Cornejo is a 52 year old female with a history of multiple myeloma conditioned with Cy/Flu, currently day +12 of her CAR T-cell therapy with Abecma.      CAR T Protocol   - Day -5 through -3: Cy/Flu   - Day -2: Rest day   - Day -1: IT Dex   - Day 0: CAR T infusion (Abecma)   - Day +6: IT Dex   - Day +13: IT Dex NOT indicated (determined by PI and primary MD)   - Simvastatin daily 40mg until day day +30  - GM-CSF should NOT be given through out study       NEURO   - ICE score: remains 10/10   - positional headaches: s/p IV caffeine 1/13 (some improvement), 1/14 (minimal improvement). XR blood patch 1/17 at 11 am.  - head CT/MRI as above.   - Dilaudid prn; add fiorcet today    CRS  - stGstrstastdstest:st st1st. Admission CRP <3, ferritin 222.  - hx Grade 1 with a Tmax 102.6F of  on 1/5. Given 1 dose of Tocilizumab.      HEME/COAG  - Pancytopenic due to chemotherapy  - Transfusion parameters: hemoglobin <7g/dL, platelets <10k  - Allopurinol for TLS prophylaxis. Ok to stop today.     IMMUNOCOMPROMISED  Transient fever to 100.8 1/14pm. Afebrile in ED and since admission. Not neutropenic.   - blood cx NGTD (1/14)  CXR neg  UA neg  Covid neg 1/14  Admit C. Diff pending  CMV neg 1/11  - Proph: fluc, ACV, Pentam (1/3).   PCP prophylaxis thru 6 months post CAR-T; consider Bactrim pending counts as she does not have a sulfa allergy.       CARDIOVASCULAR  - Risk of cardiomyopathy:  Baseline LVEF: 55-60%   - Risk of arrhythmia: Baseline EKG showed sinus rhythm nonspecific T wave abnormality  - Hx of PVC ablation March 2022      RESPIRATORY  - Risk of respiratory complications: Frequent ambulation and incentive spirometer.     GI  - Ulcer prophylaxis: Protonix   - Risk of nausea/vomiting due to chemo: compazine and ativan prn   - History of IBS: Miralax prn. Currently having some loose stools about once daily.  Admission C.diff pending  - mild transaminitis noted; trend     RENAL/FEN  - Risk of renal injury: s/p 2L LR; maintenance IF stopped 1/15.   - note of lower bicarb (18 from 16), likely 2/2 chronic diarrhea.    - Electrolyte management: replace per sliding scale     DIABETES/ENDOCRINE  - Risk of steroid-induced hyperglyemia: Monitor BG, sliding scale if needed     MUSCULOSKELETAL/FRAILTY  - Baseline frailty score: 0   - Patient with substantial risk of sarcopenia  - Daily PT/OT as needed while inpatient  - Cancer Rehab as needed outpatient     SYMPTOM MANAGEMENT  - Nausea from chemo: compazine and ativan.    SOCIAL DETERMINANTS  - Caregiver: Jesse Barrosot (Spouse) & Msiti Preciado (sister)   Clinically Significant Risk Factors          # Hypocalcemia: Lowest Ca = 7.9 mg/dL in last 2 days, will monitor and replace as appropriate                        I spent 40 minutes in the care of this patient today, which included time necessary for preparation for the visit, obtaining history, ordering medications/tests/procedures as medically indicated, review of pertinent medical literature, counseling of the patient, communication of recommendations to the care team, and documentation time.    Dispo: remain admitted pending resolution of HA, blood patch placement  - likely discharge afternoon 1/17      Nathalie Hernandes PA-C

## 2023-01-16 NOTE — PLAN OF CARE
Patient afebrile, blood pressure was higher in the beginning of the shift (during nausea/vomiting episode) but within parameters. Patient experienced nausea and vomiting early in night and Ativan x1 and compazine x1 was administered, nausea resolved after compazine dose. Patient stated that their pain level for their headache was 4 during nausea/vomiting episode but after it resolved, patient stated they had little to no pain. Patient is alert and oriented. Patient assist level is independent.     Goal Outcome Evaluation:  Problem: Plan of Care - These are the overarching goals to be used throughout the patient stay.    Goal: Optimal Comfort and Wellbeing  Outcome: Progressing  Intervention: Monitor Pain and Promote Comfort  Recent Flowsheet Documentation  Taken 1/15/2023 1959 by Navi Jones RN  Pain Management Interventions: rest     Problem: Fever  Goal: Fever: Plan of Care  Outcome: Progressing

## 2023-01-16 NOTE — CONSULTS
"    Neuro Radiology Consult Service Note  01/16/23   Patient is on IR schedule 1/17/2023 for a IR blood patch.   Labs WNL for procedure.   Orders entered for procedure, NPO have been entered.   Medications to be held include: None   Consent will be done prior to procedure.     Please contact the IR charge RN at [phone] for estimated time of procedure.     Case discussed between Neuro Radiology fellow Santo and Dr. Cannon.     This is a Kristiehoda Cornejo is a 52 year old female with a history of multiple myeloma conditioned with Cy/Flu, currently day +11 of her CAR T-cell therapy with Abecma readmitted 1/14 with transient fever, ongoing headache.    S/p 1/10/2023 IR LP at L2-L3 interlaminar space with return of clear fluid.      Team requested blood patch and spoke with Santo.  Will plan on procedure tomorrow.  If patient's headache improves by tomorrow morning, team is in agreement to defer procedure.      Expected date of discharge: TBD    Vitals:   /80 (BP Location: Right arm)   Pulse 75   Temp 98.1  F (36.7  C) (Axillary)   Resp 16   Ht 1.702 m (5' 7\")   Wt 67.4 kg (148 lb 9.6 oz)   LMP  (LMP Unknown)   SpO2 99%   BMI 23.27 kg/m      Pertinent Labs:     Lab Results   Component Value Date    WBC 2.4 (L) 01/16/2023    WBC 1.7 (L) 01/15/2023    WBC 2.1 (L) 01/14/2023    WBC 7.3 02/13/2006    WBC 7.0 07/25/2005     Lab Results   Component Value Date    HGB 11.0 01/16/2023    HGB 9.9 01/15/2023    HGB 11.6 01/14/2023    HGB 12.2 08/29/2006    HGB 13.0 08/07/2006    HGB 12.4 06/19/2006     Lab Results   Component Value Date     01/16/2023     01/15/2023     01/14/2023     02/13/2006     07/25/2005     Lab Results   Component Value Date    INR 1.04 01/16/2023    PTT 24 01/15/2023     Lab Results   Component Value Date    POTASSIUM 3.8 01/16/2023    POTASSIUM 4.3 08/10/2022        hCG Quantitative   Date Value Ref Range Status   05/24/2022 <1 0 - 5 IU/L Final     Comment: "     Adult:0-5 IU/L for healthy non-pregnant person  Neonates:Should be within normal ranges by 2 days after birth       COVID-19 Antibody Results, Testing for Immunity    COVID-19 Antibody Results, Testing for Immunity   No data to display.         COVID-19 PCR Results    COVID-19 PCR Results 2/9/21 9/3/21 2/4/22 3/21/22 4/9/22 11/14/22 12/21/22 1/1/23 1/8/23 1/14/23   COVID-19 Virus by PCR (External Result) Not Detected            SARS CoV2 PCR  Negative Negative Negative Negative Negative Negative Negative Negative Negative      Comments are available for some flowsheets but are not being displayed.             Shikha Benoit PALeftyC  Interventional Radiology  Pager: [phone]

## 2023-01-17 VITALS
RESPIRATION RATE: 18 BRPM | HEART RATE: 87 BPM | WEIGHT: 148.6 LBS | SYSTOLIC BLOOD PRESSURE: 139 MMHG | OXYGEN SATURATION: 98 % | HEIGHT: 67 IN | BODY MASS INDEX: 23.32 KG/M2 | DIASTOLIC BLOOD PRESSURE: 87 MMHG | TEMPERATURE: 97.5 F

## 2023-01-17 DIAGNOSIS — Z94.81 STATUS POST BONE MARROW TRANSPLANT (H): ICD-10-CM

## 2023-01-17 DIAGNOSIS — R53.81 PHYSICAL DECONDITIONING: ICD-10-CM

## 2023-01-17 DIAGNOSIS — C90.00 MULTIPLE MYELOMA, REMISSION STATUS UNSPECIFIED (H): Primary | ICD-10-CM

## 2023-01-17 LAB
ABO/RH(D): ABNORMAL
ALBUMIN SERPL BCG-MCNC: 4.2 G/DL (ref 3.5–5.2)
ALP SERPL-CCNC: 51 U/L (ref 35–104)
ALT SERPL W P-5'-P-CCNC: 42 U/L (ref 10–35)
ANION GAP SERPL CALCULATED.3IONS-SCNC: 12 MMOL/L (ref 7–15)
ANTIBODY SCREEN: POSITIVE
AST SERPL W P-5'-P-CCNC: 27 U/L (ref 10–35)
BASOPHILS # BLD AUTO: 0 10E3/UL (ref 0–0.2)
BASOPHILS NFR BLD AUTO: 1 %
BILIRUB DIRECT SERPL-MCNC: <0.2 MG/DL (ref 0–0.3)
BILIRUB SERPL-MCNC: 0.4 MG/DL
BUN SERPL-MCNC: 21.8 MG/DL (ref 6–20)
BURR CELLS BLD QL SMEAR: SLIGHT
CALCIUM SERPL-MCNC: 9 MG/DL (ref 8.6–10)
CHLORIDE SERPL-SCNC: 110 MMOL/L (ref 98–107)
CK SERPL-CCNC: 30 U/L (ref 26–192)
CREAT SERPL-MCNC: 0.74 MG/DL (ref 0.51–0.95)
DEPRECATED HCO3 PLAS-SCNC: 19 MMOL/L (ref 22–29)
ELLIPTOCYTES BLD QL SMEAR: ABNORMAL
EOSINOPHIL # BLD AUTO: 0.2 10E3/UL (ref 0–0.7)
EOSINOPHIL NFR BLD AUTO: 8 %
ERYTHROCYTE [DISTWIDTH] IN BLOOD BY AUTOMATED COUNT: 14.3 % (ref 10–15)
GFR SERPL CREATININE-BSD FRML MDRD: >90 ML/MIN/1.73M2
GLUCOSE SERPL-MCNC: 96 MG/DL (ref 70–99)
HCT VFR BLD AUTO: 34.2 % (ref 35–47)
HGB BLD-MCNC: 11.6 G/DL (ref 11.7–15.7)
IMM GRANULOCYTES # BLD: 0 10E3/UL
IMM GRANULOCYTES NFR BLD: 0 %
LYMPHOCYTES # BLD AUTO: 0.5 10E3/UL (ref 0.8–5.3)
LYMPHOCYTES NFR BLD AUTO: 24 %
MCH RBC QN AUTO: 31.7 PG (ref 26.5–33)
MCHC RBC AUTO-ENTMCNC: 33.9 G/DL (ref 31.5–36.5)
MCV RBC AUTO: 93 FL (ref 78–100)
MONOCYTES # BLD AUTO: 0.6 10E3/UL (ref 0–1.3)
MONOCYTES NFR BLD AUTO: 28 %
NEUTROPHILS # BLD AUTO: 0.8 10E3/UL (ref 1.6–8.3)
NEUTROPHILS NFR BLD AUTO: 39 %
NRBC # BLD AUTO: 0 10E3/UL
NRBC BLD AUTO-RTO: 0 /100
PLAT MORPH BLD: ABNORMAL
PLATELET # BLD AUTO: 184 10E3/UL (ref 150–450)
POTASSIUM SERPL-SCNC: 3.7 MMOL/L (ref 3.4–5.3)
PROT SERPL-MCNC: 5.9 G/DL (ref 6.4–8.3)
RBC # BLD AUTO: 3.66 10E6/UL (ref 3.8–5.2)
RBC MORPH BLD: ABNORMAL
SODIUM SERPL-SCNC: 141 MMOL/L (ref 136–145)
SPECIMEN EXPIRATION DATE: ABNORMAL
WBC # BLD AUTO: 2.1 10E3/UL (ref 4–11)

## 2023-01-17 PROCEDURE — 250N000011 HC RX IP 250 OP 636: Performed by: INTERNAL MEDICINE

## 2023-01-17 PROCEDURE — 99239 HOSP IP/OBS DSCHRG MGMT >30: CPT | Mod: FS | Performed by: PHYSICIAN ASSISTANT

## 2023-01-17 PROCEDURE — 86901 BLOOD TYPING SEROLOGIC RH(D): CPT | Performed by: INTERNAL MEDICINE

## 2023-01-17 PROCEDURE — 82248 BILIRUBIN DIRECT: CPT

## 2023-01-17 PROCEDURE — 250N000009 HC RX 250: Performed by: RADIOLOGY

## 2023-01-17 PROCEDURE — 250N000013 HC RX MED GY IP 250 OP 250 PS 637: Performed by: INTERNAL MEDICINE

## 2023-01-17 PROCEDURE — 250N000013 HC RX MED GY IP 250 OP 250 PS 637: Performed by: PHYSICIAN ASSISTANT

## 2023-01-17 PROCEDURE — 82550 ASSAY OF CK (CPK): CPT

## 2023-01-17 PROCEDURE — 85025 COMPLETE CBC W/AUTO DIFF WBC: CPT | Performed by: INTERNAL MEDICINE

## 2023-01-17 PROCEDURE — 80053 COMPREHEN METABOLIC PANEL: CPT

## 2023-01-17 RX ORDER — LIDOCAINE HYDROCHLORIDE 10 MG/ML
10 INJECTION, SOLUTION EPIDURAL; INFILTRATION; INTRACAUDAL; PERINEURAL ONCE
Status: COMPLETED | OUTPATIENT
Start: 2023-01-17 | End: 2023-01-17

## 2023-01-17 RX ORDER — LEVOFLOXACIN 250 MG/1
250 TABLET, FILM COATED ORAL DAILY
Status: DISCONTINUED | OUTPATIENT
Start: 2023-01-17 | End: 2023-01-17 | Stop reason: HOSPADM

## 2023-01-17 RX ADMIN — LIDOCAINE HYDROCHLORIDE 10 ML: 10 INJECTION, SOLUTION EPIDURAL; INFILTRATION; INTRACAUDAL; PERINEURAL at 10:42

## 2023-01-17 RX ADMIN — PANTOPRAZOLE SODIUM 40 MG: 40 TABLET, DELAYED RELEASE ORAL at 08:09

## 2023-01-17 RX ADMIN — Medication 3 ML: at 04:54

## 2023-01-17 RX ADMIN — THERA TABS 1 TABLET: TAB at 08:12

## 2023-01-17 RX ADMIN — LEVOFLOXACIN 250 MG: 250 TABLET, FILM COATED ORAL at 12:44

## 2023-01-17 RX ADMIN — FLUCONAZOLE 200 MG: 200 TABLET ORAL at 08:09

## 2023-01-17 RX ADMIN — Medication 40 MG: at 08:09

## 2023-01-17 RX ADMIN — ACYCLOVIR 800 MG: 800 TABLET ORAL at 12:44

## 2023-01-17 ASSESSMENT — ACTIVITIES OF DAILY LIVING (ADL)
ADLS_ACUITY_SCORE: 23

## 2023-01-17 NOTE — PROGRESS NOTES
Patient arrived to the procedure room for blood patch procedure. According to the patient she has headache free for 24 hours. She ambulated without difficulty around the radiology department. Her headache was reportedly somewhat present, but markedly improved compared to day of admission. Given her improvement, and the risks of performing a blood patch (I.e bleeding, infection, inadvertent intrathecal puncture, etc.) a shared decision was made to proceed with supportive therapy and forego the blood patch procedure for now. Patient will be given neuroradiology reading room phone number ([phone]) and was instructed to call in the event supportive therapy is ineffective. All questions answered to patient's satisfaction.

## 2023-01-17 NOTE — PROGRESS NOTES
"BMT Daily Progress Note  BMT Physician: Dr. Reinoso   Transplant Type: CAR-T   Preparative Regimen: Cy/Flu     Chief complaint: Kristie Cornejo is a 52 year old female with a history of multiple myeloma conditioned with Cy/Flu, currently day +13 of her CAR T-cell therapy with Abecma.  - readmitted 1/14 with transient fever, ongoing headache     INTERIM HISTORY:   No further vomiting.  This n/v was related to dilaudid which she is no longer taking.  No fever or confusion.  Has been on strict bed rest per radiology requirements pre-blood patch.  Awaiting blood patch which will take place later this morning.  She is looking forward to being dishcarged later today.    REVIEW OF SYSTEMS: Otherwise unremarkable other than what is noted in the Interim History.     PHYSICAL EXAM:   Vitals:  /77 (BP Location: Right arm)   Pulse 93   Temp 98  F (36.7  C) (Axillary)   Resp 17   Ht 1.702 m (5' 7\")   Wt 67.4 kg (148 lb 9.6 oz)   LMP  (LMP Unknown)   SpO2 98%   BMI 23.27 kg/m       General Appearance: NAD  HEENT: sclera anicteric. OP moist without lesions  CV: RRR. no murmur or rub.   RESP: CTAB  GI: +BS, soft, nontender, nondistended.   EXT: No edema.   SKIN: no rash on exposed skin  NEURO: A&O x3   PSYCH: Appropriate affect    ACCESS: L arm PICC NT, dressing cdi     ICE: 10/10 (1/17)    Labs:   Lab Results   Component Value Date    WBC 2.1 (L) 01/17/2023    ANEU 1.0 (L) 01/14/2023    HGB 11.6 (L) 01/17/2023    HCT 34.2 (L) 01/17/2023     01/17/2023     01/17/2023    POTASSIUM 3.7 01/17/2023    CHLORIDE 110 (H) 01/17/2023    CO2 19 (L) 01/17/2023    GLC 96 01/17/2023    BUN 21.8 (H) 01/17/2023    CR 0.74 01/17/2023    MAG 2.1 01/15/2023    INR 1.04 01/16/2023    BILITOTAL 0.4 01/17/2023    AST 27 01/17/2023    ALT 42 (H) 01/17/2023    ALKPHOS 51 01/17/2023    PROTTOTAL 5.9 (L) 01/17/2023    ALBUMIN 4.2 01/17/2023      Imaging:  Brain MRI w & w/o contrast (1/15):   1. Mild pachymeningeal " enhancement. Given the history of recent lumbar puncture on 1/10/2023, this finding is favored secondary to recent lumbar puncture and could be associated with intracranial hypotension.  2. No intracranial space-occupying lesion.  3. Corresponding to hypodensity identified on recent CT, there are nonspecific few scattered T2 hyperintense foci, likely nonspecific gliosis from prior treatment, infection/inflammation or vasculopathy.    Head CT non-contrast (1/14): Hypodensity in the subcortical white matter of the right frontal operculum. Although this is a nonspecific finding cannot exclude underlying lesion. Recommend MRI of the brain without and with contrast.    ASSESSMENT AND PLAN:   Kristie Cornejo is a 52 year old female with a history of multiple myeloma conditioned with Cy/Flu, currently day +13 of her CAR T-cell therapy with Abecma.      CAR T Protocol   - Day -5 through -3: Cy/Flu   - Day -2: Rest day   - Day -1: IT Dex   - Day 0: CAR T infusion (Abecma)   - Day +6: IT Dex   - Day +13: IT Dex NOT indicated (determined by PI and primary MD)   - Simvastatin daily 40mg until day day +30  - GM-CSF should NOT be given through out study       NEURO   - ICE score: remains 10/10   - positional headaches: s/p IV caffeine 1/13 (some improvement), 1/14 (minimal improvement). XR blood patch not done as pt no longer w HA after 24 hours of bedrest.  - head CT/MRI as above.   - Dilaudid prn--caused emesis; added fiorcet 1/6    CRS  - stGstrstastdstest:st st1st. Admission CRP <3, ferritin 222.  - hx Grade 1 with a Tmax 102.6F of  on 1/5. Given 1 dose of Tocilizumab.      HEME/COAG  - leukopenia/anemia due to chemotherapy  - Transfusion parameters: hemoglobin <7g/dL, platelets <10k  - Allopurinol for TLS prophylaxis. Ok to stop today.     IMMUNOCOMPROMISED  Transient fever to 100.8 1/14pm. Afebrile in ED and since admission. Not neutropenic.   - blood cx NGTD (1/14)  CXR neg  UA neg  Covid neg 1/14  CMV neg 1/11  - Proph: fluc, ACV,  Pentam (1/3).   PCP prophylaxis thru 6 months post CAR-T; consider Bactrim pending counts as she does not have a sulfa allergy.      CARDIOVASCULAR  - Risk of cardiomyopathy:  Baseline LVEF: 55-60%   - Risk of arrhythmia: Baseline EKG showed sinus rhythm nonspecific T wave abnormality  - Hx of PVC ablation March 2022      RESPIRATORY  - Risk of respiratory complications: Frequent ambulation and incentive spirometer.     GI  - Ulcer prophylaxis: Protonix   - Risk of nausea/vomiting due to chemo: compazine and ativan prn   - History of IBS: Miralax prn. Currently having some loose stools about once daily.  Admission C.diff pending  - mild transaminitis noted; trend     RENAL/FEN  - Risk of renal injury: s/p 2L LR; maintenance IF stopped 1/15.   - note of lower bicarb (18 from 16), likely 2/2 chronic diarrhea.    - Electrolyte management: replace per sliding scale     DIABETES/ENDOCRINE  - Risk of steroid-induced hyperglyemia: Monitor BG, sliding scale if needed     MUSCULOSKELETAL/FRAILTY  - Baseline frailty score: 0   - Patient with substantial risk of sarcopenia  - Daily PT/OT as needed while inpatient  - Cancer Rehab as needed outpatient     SYMPTOM MANAGEMENT  - Nausea from chemo: compazine and ativan.    SOCIAL DETERMINANTS  - Caregiver: Jesse Cornejo (Spouse) & Misti Preciado (sister)     I spent 40 minutes in the care of this patient today, which included time necessary for preparation for the visit, obtaining history, ordering medications/tests/procedures as medically indicated, review of pertinent medical literature, counseling of the patient, communication of recommendations to the care team, and documentation time.    Dispo: blood patch placement later today then likely discharge this afternoon  - f/u in BMT clinic as planned 1/18: 11:45 am lab, 12:30 provider      Nathalie Hernandes PA-C

## 2023-01-17 NOTE — PLAN OF CARE
"/87 (BP Location: Right arm)   Pulse 87   Temp 97.5  F (36.4  C) (Axillary)   Resp 18   Ht 1.702 m (5' 7\")   Wt 67.4 kg (148 lb 9.6 oz)   LMP  (LMP Unknown)   SpO2 98%   BMI 23.27 kg/m    Pt discharged to: Home  Via: Private Car  Time: 1400  Reason not before 11am:  Procedure expected at 11am.  Accompanied by:   Belongings: With patient  Teaching: Discharge paperwork reviewed, no new medications ordered  Clinic appointment: 1/18  Report called/faxed: NA  Local housing: Yes  AVSS.  A & O x4.  Denies pain/nausea/discomfort/HA.  Pt up independently in room, ate lunch.  Pt reports ready for discharge.  Problem: Plan of Care - These are the overarching goals to be used throughout the patient stay.    Goal: Plan of Care Review  Description: The Plan of Care Review/Shift note should be completed every shift.  The Outcome Evaluation is a brief statement about your assessment that the patient is improving, declining, or no change.  This information will be displayed automatically on your shift note.  Outcome: Met     Problem: Plan of Care - These are the overarching goals to be used throughout the patient stay.    Goal: Patient-Specific Goal (Individualized)  Description: You can add care plan individualizations to a care plan. Examples of Individualization might be:  \"Parent requests to be called daily at 9am for status\", \"I have a hard time hearing out of my right ear\", or \"Do not touch me to wake me up as it startles me\".  Outcome: Met     Problem: Plan of Care - These are the overarching goals to be used throughout the patient stay.    Goal: Optimal Comfort and Wellbeing  Outcome: Met     Problem: Fever  Goal: Fever: Plan of Care  Outcome: Met   Goal Outcome Evaluation:                        "

## 2023-01-17 NOTE — PROGRESS NOTES
BMT CLINICAL SOCIAL WORK NOTE:    Focus: Supportive Counseling/Resources/Discharge Planning    Data: Kristie Cornejo is a 52 year old female with a history of multiple myeloma conditioned with Cy/Flu, currently day +13 of her CAR T-cell therapy with Abecma - readmitted 1/14 with transient fever, ongoing headache    Interventions: Clinical  (CSW) met with Pt at bedside to assess coping, provide supportive counseling and assist with resources as needed. Pt presented friendly and open to SW visit. Pt processed that she enjoys being in the hospital due to being extroverted and enjoying staff visits/interactions. Pt expresses appreciation for care. Pt indicates that she anticipates discharging today 1/17/23 to return home and is in agreement with that. Pt confirms she continues to have an active caregiver plan. Her , sister, and childhood friend are still highly involved and supportive.  CSW provided empathic listening, validation of concerns, and encouragement. CSW encouraged Pt to contact CSW for support, questions and/or resources.     Assessment: Pt presented as pleasant and engaged.  Pt appears to be coping appropriately at this time. Pt continues to be supported by her , sister, and friend.     Plan: CSW will continue to provide supportive counseling and assistance with resources as needed. CSW will continue to collaborate with multidisciplinary team regarding Pt's plan of care.     SARAH Cabral, [facility]  Adult Blood & Marrow Transplant   Phone: [phone]  Pager: [phone]

## 2023-01-17 NOTE — DISCHARGE INSTRUCTIONS
BMT Contact Information  For issues including fevers 100.5 or more:  Please call during the week: Monday through Friday between hours of 8:00 am and 4:30 pm- Call BMT office- [phone]  After hours/Weekends: Please call [facility]  and ask for BMT physician on call and the  will have the BMT Fellow Physician call you back: [phone]    ***(delete if not needed) [facility] phone #[phone]     Advice for Patients on COVID19:  a. Avoid contact with individuals:   i. Who are sick or have recently been sick  ii. Have traveled to high risk areas (per CDC guidelines) or have been on a cruise in the last 14 days  iii. Who were or could have been exposed to COVID-19   b. If experiencing symptoms such as: Fever, cough or shortness of breath contact BMT at [phone] Mon-Fri 8am-4:30pm or After Hours at [phone] (ask to speak to a BMT Fellow) for guidance on need for clinical assessment  c. Avoid all non- essential travel at this time; if traveling is necessary use mask (N-95)   d. Wear a mask when in public areas  d. Avoid crowded places, if possible  f. Follow CDC advice https://www.cdc.gov/coronavirus/2019-ncov/index.html and travel guidelines https://www.cdc.gov/coronavirus/2019-ncov/travelers/index.html

## 2023-01-17 NOTE — PLAN OF CARE
"Goal Outcome Evaluation:  Afebrile. VSS on RA. Bedrest until 11am today. No BM overnight; stool sample for C Diff r/o still needed. Pt denies headache, nausea and SOB. Pt is resting comfortably in bed; able to make needs known. Plan for blood patch today at 11am.    Problem: Plan of Care - These are the overarching goals to be used throughout the patient stay.    Goal: Plan of Care Review  Description: The Plan of Care Review/Shift note should be completed every shift.  The Outcome Evaluation is a brief statement about your assessment that the patient is improving, declining, or no change.  This information will be displayed automatically on your shift note.  Outcome: Progressing  Goal: Patient-Specific Goal (Individualized)  Description: You can add care plan individualizations to a care plan. Examples of Individualization might be:  \"Parent requests to be called daily at 9am for status\", \"I have a hard time hearing out of my right ear\", or \"Do not touch me to wake me up as it startles me\".  Outcome: Progressing  Goal: Absence of Hospital-Acquired Illness or Injury  Outcome: Progressing  Intervention: Identify and Manage Fall Risk  Recent Flowsheet Documentation  Taken 1/16/2023 2337 by Yusra Beach, RN  Safety Promotion/Fall Prevention:    assistive device/personal items within reach    clutter free environment maintained    fall prevention program maintained    nonskid shoes/slippers when out of bed    patient and family education  Intervention: Prevent Skin Injury  Recent Flowsheet Documentation  Taken 1/16/2023 2337 by Yusra Beach, RN  Body Position:    position changed independently    supine  Intervention: Prevent and Manage VTE (Venous Thromboembolism) Risk  Recent Flowsheet Documentation  Taken 1/16/2023 2337 by Yusra Beach, RN  VTE Prevention/Management: SCDs (sequential compression devices) off  Intervention: Prevent Infection  Recent Flowsheet Documentation  Taken 1/16/2023 2337 by " Yusra Beach, RN  Infection Prevention:    cohorting utilized    hand hygiene promoted    personal protective equipment utilized    rest/sleep promoted    single patient room provided    visitors restricted/screened  Goal: Optimal Comfort and Wellbeing  Outcome: Progressing  Goal: Readiness for Transition of Care  Outcome: Progressing     Problem: Fever  Goal: Fever: Plan of Care  Outcome: Progressing

## 2023-01-17 NOTE — DISCHARGE SUMMARY
[facility] Discharge Summary   Kristie Cornejo MRN# [id number]   Age: 52 year old  YOB: 1970   Date of Admission: 1/14/2023  Date of Discharge:  1/17/2023  Admitting Physician: Lacho Walker MD  Discharge Physician: Ever Gardiner MD   BMT Physician: Dr. Reinoso   Primary MD: Rena Sheffield    Discharge Diagnoses:      1. S/p Abecma CAR-T for Multiple Myeloma   2. Headache, postural, post LP  3. Anemia, leukopenia--from current therapy    Discharge Medications:         Review of your medicines      UNREVIEWED medicines. Ask your doctor about these medicines      Dose / Directions   Mirena (52 MG) 20 MCG/DAY IUD  Generic drug: levonorgestrel      use for up to 5 years, then remove  Refills: 0        CONTINUE these medicines which have NOT CHANGED      Dose / Directions   acetaminophen 325 MG tablet  Commonly known as: TYLENOL      Dose: 325-650 mg  Take 1-2 tablets (325-650 mg) by mouth every 4 hours as needed for mild pain or fever  Refills: 0     acyclovir 800 MG tablet  Commonly known as: ZOVIRAX  Used for: Light chain myeloma (H)      Dose: 800 mg  Take 1 tablet (800 mg) by mouth every 12 hours  Quantity: 60 tablet  Refills: 0     busPIRone 10 MG tablet  Commonly known as: BUSPAR  Used for: Anxiety      1 TABLET 3 TIMES DAILY as needed  Quantity: 30 Tab  Refills: 3     Calcium 600+D Plus Minerals 600-400 MG-UNIT Tabs      On HOLD  Refills: 0     cetirizine HCl 10 MG Caps      Dose: 10 mg  Take 1 capsule (10 mg) by mouth daily as needed  Refills: 0     flax seed oil 1000 MG capsule  Indication: per pt, recommended by eye doctor      Dose: 1 capsule  Take 1 capsule by mouth daily  Refills: 0     fluconazole 200 MG tablet  Commonly known as: DIFLUCAN  Used for: Multiple myeloma not having achieved remission (H), Light chain myeloma (H)      Dose: 200 mg  Take 1 tablet (200 mg) by mouth daily  Quantity: 30 tablet  Refills: 1     fluticasone 50 MCG/ACT nasal spray  Commonly known as:  FLONASE  Indication: prn      Dose: 1 spray  Spray 1 spray into both nostrils daily as needed for rhinitis or allergies  Refills: 0     glucosamine-chondroitin 500-400 MG Caps per capsule      Dose: 2 capsule  Take 2 capsules by mouth daily  Refills: 0     lactobacillus rhamnosus (GG) capsule      On hold  Refills: 0     levofloxacin 250 MG tablet  Commonly known as: LEVAQUIN  Used for: Light chain myeloma (H)      Dose: 250 mg  Take 1 tablet (250 mg) by mouth daily  Quantity: 30 tablet  Refills: 0     LORazepam 0.5 MG tablet  Commonly known as: ATIVAN  Used for: Plasmacytoma of bone (H), Light chain myeloma (H)      Dose: 0.5 mg  Take 1 tablet (0.5 mg) by mouth every 4 hours as needed for anxiety, nausea or sleep  Quantity: 30 tablet  Refills: 3     melatonin 5 MG tablet      Dose: 5 mg  Take 5 mg by mouth nightly as needed for sleep  Refills: 0     Multi-Vitamin Daily Tabs      Take by mouth daily On hold  Refills: 0     omeprazole 20 MG EC tablet  Commonly known as: priLOSEC OTC      Dose: 20 mg  Take 1 tablet (20 mg) by mouth daily  Refills: 0     polyethylene glycol 17 g packet  Commonly known as: MIRALAX      Dose: 1 packet  Take 17 g by mouth daily as needed for constipation  Refills: 0     potassium chloride ER 10 MEQ CR tablet  Commonly known as: KLOR-CON M  Used for: Hypokalemia      On hold  Quantity: 360 tablet  Refills: 3     prochlorperazine 10 MG tablet  Commonly known as: COMPAZINE      Dose: 5 mg  Take 0.5 tablets (5 mg) by mouth every 6 hours as needed for nausea or vomiting  Refills: 0     study - simvastatin (IDS# 5641) 40 MG tablet  Used for: Light chain myeloma (H), Plasmacytoma of bone (H)      Dose: 40 mg  Take 1 tablet (40 mg) by mouth daily Take at least 5 daily doses prior to apheresis and continue until Day +30 after CAR-T infusion.  Quantity: 65 tablet  Refills: 0     ZOMETA IV      Refills: 0        STOP taking    allopurinol 300 MG tablet  Commonly known as: ZYLOPRIM             Brief  History of Illness:    Kristie Cornejo is a 53-year-old woman with multiple myeloma status post CAR-T therapy with Abecma (day 0 was 1/4/2023) who developed grade 1 CRS on day +1, who presented to the ER with fever as well as worsening headache since her LP on 1/10.     For the patient's family history, social history, past medical and surgical history, bone marrow transplant workup, admission medications, hospital admission review of systems and physical exam, please refer to hospital admission H&P dated 01/14/2022.     Hospital Course:    CAR T Protocol   - Day -5 through -3: Cy/Flu   - Day -2: Rest day   - Day -1: IT Dex   - Day 0: CAR T infusion (Abecma)   - Day +6: IT Dex   - Day +13: IT Dex NOT indicated (determined by PI and primary MD)   - Simvastatin daily 40mg until day day +30  - GM-CSF should NOT be given through out study       NEURO   - ICE score: remains 10/10   - positional headaches: s/p IV caffeine 1/13 (some improvement), 1/14 (minimal improvement). XR blood patch not done as pt no longer w HA after 24 hours of bedrest.  - head CT/MRI as above.   - Dilaudid prn--caused emesis; added fiorcet 1/6    CRS  - stGstrstastdstest:st st1st. Admission CRP <3, ferritin 222.  - hx Grade 1 with a Tmax 102.6F of  on 1/5. Given 1 dose of Tocilizumab.      HEME/COAG  - leukopenia/anemia due to chemotherapy  - Transfusion parameters: hemoglobin <7g/dL, platelets <10k  - Allopurinol for TLS prophylaxis. Ok to stop today.     IMMUNOCOMPROMISED  Transient fever to 100.8 1/14pm. Afebrile in ED and since admission. Not neutropenic.   - blood cx NGTD (1/14)  CXR neg  UA neg  Covid neg 1/14  CMV neg 1/11  - Proph: fluc, ACV, Pentam (1/3).   PCP prophylaxis thru 6 months post CAR-T; consider Bactrim pending counts as she does not have a sulfa allergy.      CARDIOVASCULAR  - Risk of cardiomyopathy:  Baseline LVEF: 55-60%   - Risk of arrhythmia: Baseline EKG showed sinus rhythm nonspecific T wave abnormality  - Hx of PVC ablation March  2022      RESPIRATORY  - Risk of respiratory complications: Frequent ambulation and incentive spirometer.     GI  - Ulcer prophylaxis: Protonix   - Risk of nausea/vomiting due to chemo: compazine and ativan prn   - History of IBS: Miralax prn. Currently having some loose stools about once daily.  Admission C.diff pending  - mild transaminitis noted; trend     RENAL/FEN  - Risk of renal injury: s/p 2L LR; maintenance IF stopped 1/15.   - note of lower bicarb (18 from 16), likely 2/2 chronic diarrhea.    - Electrolyte management: replace per sliding scale     DIABETES/ENDOCRINE  - Risk of steroid-induced hyperglyemia: Monitor BG, sliding scale if needed     MUSCULOSKELETAL/FRAILTY  - Baseline frailty score: 0   - Patient with substantial risk of sarcopenia  - Daily PT/OT as needed while inpatient  - Cancer Rehab as needed outpatient     SYMPTOM MANAGEMENT  - Nausea from chemo: compazine and ativan.    SOCIAL DETERMINANTS  - Caregiver: Jesse Cornejo (Spouse)     Discharge Instructions and Follow-Up:    Discharge diet: Regular diet as tolerated  Discharge activity: Activity as tolerated   DISPOSITION: Kristie Cornejo will be discharged home today.  F/u BMT clinic as planned 1/18: 11:45 am lab, 12:30 provider    Discharge Disposition:    Discharged to home in chronic stable condition.      I spent 60 minutes in the care of this patient today, which included time necessary for preparation for the visit, obtaining history, ordering medications/tests/procedures as medically indicated, review of pertinent medical literature, counseling of the patient, communication of recommendations to the care team, and documentation time.

## 2023-01-18 ENCOUNTER — APPOINTMENT (OUTPATIENT)
Dept: LAB | Facility: CLINIC | Age: 53
End: 2023-01-18
Payer: COMMERCIAL

## 2023-01-18 ENCOUNTER — ONCOLOGY VISIT (OUTPATIENT)
Dept: TRANSPLANT | Facility: CLINIC | Age: 53
End: 2023-01-18
Payer: COMMERCIAL

## 2023-01-18 ENCOUNTER — PATIENT OUTREACH (OUTPATIENT)
Dept: CARE COORDINATION | Facility: CLINIC | Age: 53
End: 2023-01-18
Payer: COMMERCIAL

## 2023-01-18 VITALS
OXYGEN SATURATION: 100 % | RESPIRATION RATE: 16 BRPM | WEIGHT: 152.9 LBS | BODY MASS INDEX: 23.95 KG/M2 | DIASTOLIC BLOOD PRESSURE: 69 MMHG | HEART RATE: 80 BPM | SYSTOLIC BLOOD PRESSURE: 136 MMHG | TEMPERATURE: 98.5 F

## 2023-01-18 DIAGNOSIS — C90.00 LIGHT CHAIN MYELOMA (H): ICD-10-CM

## 2023-01-18 DIAGNOSIS — C90.00 MULTIPLE MYELOMA NOT HAVING ACHIEVED REMISSION (H): ICD-10-CM

## 2023-01-18 DIAGNOSIS — C90.30 PLASMACYTOMA OF BONE (H): ICD-10-CM

## 2023-01-18 PROCEDURE — G0463 HOSPITAL OUTPT CLINIC VISIT: HCPCS

## 2023-01-18 PROCEDURE — 250N000011 HC RX IP 250 OP 636

## 2023-01-18 PROCEDURE — 99215 OFFICE O/P EST HI 40 MIN: CPT

## 2023-01-18 RX ORDER — SIMVASTATIN 20 MG
40 TABLET ORAL DAILY
Qty: 14 TABLET | Refills: 0 | Status: SHIPPED | OUTPATIENT
Start: 2023-01-18 | End: 2023-02-09

## 2023-01-18 RX ORDER — HEPARIN SODIUM,PORCINE 10 UNIT/ML
5 VIAL (ML) INTRAVENOUS
Status: DISCONTINUED | OUTPATIENT
Start: 2023-01-18 | End: 2023-01-18 | Stop reason: HOSPADM

## 2023-01-18 RX ADMIN — Medication 5 ML: at 11:53

## 2023-01-18 RX ADMIN — Medication 5 ML: at 11:54

## 2023-01-18 ASSESSMENT — PAIN SCALES - GENERAL: PAINLEVEL: NO PAIN (1)

## 2023-01-18 NOTE — PROGRESS NOTES
BMT Progress Note  BMT Physician: Dr. Reinoso   Transplant Type: CAR-T   Preparative Regimen: Cy/Flu  Chief complaint: Kristie Cornejo is a 52 year old female with a history of multiple myeloma conditioned with Cy/Flu, currently day +14 of her CAR T-cell therapy with Abecma.  - readmitted 1/14 with transient fever, ongoing headache now hospital discharge follow-up.     INTERIM HISTORY:   Doing well.  Here with friend today.  No fevers.  Mild headache--patient and IR decided yesterday not to perform blood patch.        REVIEW OF SYSTEMS: Otherwise unremarkable other than what is noted in the Interim History.      PHYSICAL EXAM:   /69   Pulse 80   Temp 98.5  F (36.9  C) (Oral)   Resp 16   Wt 69.4 kg (152 lb 14.4 oz)   LMP  (LMP Unknown)   SpO2 100%   BMI 23.95 kg/m    General Appearance: NAD  HEENT: sclera anicteric. OP moist without lesions  CV: RRR. no murmur or rub.   RESP: CTAB  GI: +BS, soft, nontender, nondistended.   EXT: No edema.   SKIN: no rash on exposed skin  NEURO: A&O x3   PSYCH: Appropriate affect    ACCESS: L arm PICC NT, dressing cdi    ICE 10/10 1/18/23      Imaging:  Brain MRI w & w/o contrast (1/15):   1. Mild pachymeningeal enhancement. Given the history of recent lumbar puncture on 1/10/2023, this finding is favored secondary to recent lumbar puncture and could be associated with intracranial hypotension.  2. No intracranial space-occupying lesion.  3. Corresponding to hypodensity identified on recent CT, there are nonspecific few scattered T2 hyperintense foci, likely nonspecific gliosis from prior treatment, infection/inflammation or vasculopathy.     Head CT non-contrast (1/14): Hypodensity in the subcortical white matter of the right frontal operculum. Although this is a nonspecific finding cannot exclude underlying lesion. Recommend MRI of the brain without and with contrast.     ASSESSMENT AND PLAN:   Kristie Cornejo is a 52 year old female with a history of multiple  myeloma conditioned with Cy/Flu, currently day +14 of her CAR T-cell therapy with Abecma.      CAR T Protocol   - Day -5 through -3: Cy/Flu   - Day -2: Rest day   - Day -1: IT Dex   - Day 0: CAR T infusion (Abecma)   - Day +6: IT Dex   - Day +13: IT Dex NOT indicated (determined by PI and primary MD)   - Simvastatin daily 40mg until day day +30  - GM-CSF should NOT be given through out study       NEURO   - ICE score: remains 10/10-final daily ICE score done today. Margo has the sentence log still just in case we need to perform additional ICE testing.  - positional headaches: s/p IV caffeine 1/13 (some improvement), 1/14 (minimal improvement). XR blood patch not done as pt HA much improved after bedrest.  - head CT/MRI as above.      CRS  - stGstrstastdstest:st st1st. Admission CRP <3, ferritin 222.  - hx Grade 1 with a Tmax 102.6F of  on 1/5. Given 1 dose of Tocilizumab.      HEME/COAG  - Counts now trending back up. ANC 1100 today.   - Transfusion parameters: hemoglobin <7g/dL, platelets <10k.     IMMUNOCOMPROMISED  Transient fever to 100.8 1/14pm. Afebrile in ED and since admission. Workup neg and remains afebrile.   - Proph: fluc, ACV, levaquin until ANC better, Pentam (1/3).   PCP prophylaxis thru 6 months post CAR-T; consider Bactrim pending counts as she does not have a sulfa allergy.      CARDIOVASCULAR  - Risk of cardiomyopathy:  Baseline LVEF: 55-60%   - Risk of arrhythmia: Baseline EKG showed sinus rhythm nonspecific T wave abnormality  - Hx of PVC ablation March 2022      GI  - Ulcer prophylaxis: Protonix   - Risk of nausea/vomiting due to chemo: compazine and ativan prn   - History of IBS: Miralax prn. Currently having some loose stools about once daily.  - mild transaminitis noted; trend     RENAL/FEN  - Electrolyte management: replace per sliding scale      I spent 40 minutes in the care of this patient today, which included time necessary for preparation for the visit, obtaining history, ordering  medications/tests/procedures as medically indicated, review of pertinent medical literature, counseling of the patient, communication of recommendations to the care team, and documentation time.     RTC: per protocol on Monday for day 17 follow-up. Labs are ordered in tx plan.    Mary Anne Soni PA-C  [phone]

## 2023-01-18 NOTE — PROGRESS NOTES
[facility]    Background: Transitional Care Management program identified per system criteria and reviewed by [facility] team for possible outreach.    Assessment: Upon chart review, [facility] Team member will not proceed with patient outreach related to this episode of Transitional Care Management program due to reason below:    Patient has a follow up appointment with an appropriate provider today for hospital discharge    Plan: Transitional Care Management episode addressed appropriately per reason noted above.      Sarah Dominguez MA  [facility] [place]    *Connected Care Resource Team does NOT follow patient ongoing. Referrals are identified based on internal discharge reports and the outreach is to ensure patient has an understanding of their discharge instructions.

## 2023-01-18 NOTE — PLAN OF CARE
Occupational Therapy Discharge Summary    Reason for therapy discharge:    Discharged to home.    Progress towards therapy goal(s). See goals on Care Plan in [facility] electronic health record for goal details.  Goals partially met.  Barriers to achieving goals:   discharge from facility.    Therapy recommendation(s):    Continue home exercise program.

## 2023-01-18 NOTE — LETTER
1/18/2023         RE: Kristie Cornejo  [address]  [facility] [address]        Dear Colleague,    Thank you for referring your patient, Kristie Cornejo, to the [facility] BLOOD AND MARROW TRANSPLANT PROGRAM [place]. Please see a copy of my visit note below.    BMT Progress Note  BMT Physician: Dr. Reinoso   Transplant Type: CAR-T   Preparative Regimen: Cy/Flu  Chief complaint: Kristie Cornejo is a 52 year old female with a history of multiple myeloma conditioned with Cy/Flu, currently day +14 of her CAR T-cell therapy with Abecma.  - readmitted 1/14 with transient fever, ongoing headache now hospital discharge follow-up.     INTERIM HISTORY:   Doing well.  Here with friend today.  No fevers.  Mild headache--patient and IR decided yesterday not to perform blood patch.        REVIEW OF SYSTEMS: Otherwise unremarkable other than what is noted in the Interim History.      PHYSICAL EXAM:   /69   Pulse 80   Temp 98.5  F (36.9  C) (Oral)   Resp 16   Wt 69.4 kg (152 lb 14.4 oz)   LMP  (LMP Unknown)   SpO2 100%   BMI 23.95 kg/m    General Appearance: NAD  HEENT: sclera anicteric. OP moist without lesions  CV: RRR. no murmur or rub.   RESP: CTAB  GI: +BS, soft, nontender, nondistended.   EXT: No edema.   SKIN: no rash on exposed skin  NEURO: A&O x3   PSYCH: Appropriate affect    ACCESS: L arm PICC NT, dressing cdi    ICE 10/10 1/18/23      Imaging:  Brain MRI w & w/o contrast (1/15):   1. Mild pachymeningeal enhancement. Given the history of recent lumbar puncture on 1/10/2023, this finding is favored secondary to recent lumbar puncture and could be associated with intracranial hypotension.  2. No intracranial space-occupying lesion.  3. Corresponding to hypodensity identified on recent CT, there are nonspecific few scattered T2 hyperintense foci, likely nonspecific gliosis from prior treatment, infection/inflammation or vasculopathy.     Head CT non-contrast (1/14): Hypodensity in  the subcortical white matter of the right frontal operculum. Although this is a nonspecific finding cannot exclude underlying lesion. Recommend MRI of the brain without and with contrast.     ASSESSMENT AND PLAN:   Kristie Cornejo is a 52 year old female with a history of multiple myeloma conditioned with Cy/Flu, currently day +14 of her CAR T-cell therapy with Abecma.      CAR T Protocol   - Day -5 through -3: Cy/Flu   - Day -2: Rest day   - Day -1: IT Dex   - Day 0: CAR T infusion (Abecma)   - Day +6: IT Dex   - Day +13: IT Dex NOT indicated (determined by PI and primary MD)   - Simvastatin daily 40mg until day day +30  - GM-CSF should NOT be given through out study       NEURO   - ICE score: remains 10/10-final daily ICE score done today. Margo has the sentence log still just in case we need to perform additional ICE testing.  - positional headaches: s/p IV caffeine 1/13 (some improvement), 1/14 (minimal improvement). XR blood patch not done as pt HA much improved after bedrest.  - head CT/MRI as above.      CRS  - stGstrstastdstest:st st1st. Admission CRP <3, ferritin 222.  - hx Grade 1 with a Tmax 102.6F of  on 1/5. Given 1 dose of Tocilizumab.      HEME/COAG  - Counts now trending back up. ANC 1100 today.   - Transfusion parameters: hemoglobin <7g/dL, platelets <10k.     IMMUNOCOMPROMISED  Transient fever to 100.8 1/14pm. Afebrile in ED and since admission. Workup neg and remains afebrile.   - Proph: fluc, ACV, levaquin until ANC better, Pentam (1/3).   PCP prophylaxis thru 6 months post CAR-T; consider Bactrim pending counts as she does not have a sulfa allergy.      CARDIOVASCULAR  - Risk of cardiomyopathy:  Baseline LVEF: 55-60%   - Risk of arrhythmia: Baseline EKG showed sinus rhythm nonspecific T wave abnormality  - Hx of PVC ablation March 2022      GI  - Ulcer prophylaxis: Protonix   - Risk of nausea/vomiting due to chemo: compazine and ativan prn   - History of IBS: Miralax prn. Currently having some loose stools  about once daily.  - mild transaminitis noted; trend     RENAL/FEN  - Electrolyte management: replace per sliding scale      I spent 40 minutes in the care of this patient today, which included time necessary for preparation for the visit, obtaining history, ordering medications/tests/procedures as medically indicated, review of pertinent medical literature, counseling of the patient, communication of recommendations to the care team, and documentation time.     RTC: per protocol on Monday for day 17 follow-up. Labs are ordered in tx plan.    Mary Anne Soni PA-C  [phone]

## 2023-01-18 NOTE — NURSING NOTE
Dressing change was completed. Sterile technique was used. Site WDL. Patient tolerated dressing change well and had no questions. See Dock Flowsheet.     Jose Huffman on 1/18/2023 at 2:49 PM

## 2023-01-18 NOTE — NURSING NOTE
Chief Complaint   Patient presents with     Blood Draw     Labs drawn via PICC by RN in lab. VS taken.      Labs drawn via PICC. Caps changed. Line flushed and Heparin locked. Vital signs taken. Checked into next appointment.   Nora Avina RN

## 2023-01-18 NOTE — NURSING NOTE
"Oncology Rooming Note    January 18, 2023 12:18 PM   Kristie Cornejo is a 52 year old female who presents for:    Chief Complaint   Patient presents with     Blood Draw     Labs drawn via PICC by RN in lab. VS taken.      Initial Vitals: /69   Pulse 80   Temp 98.5  F (36.9  C) (Oral)   Resp 16   Wt 69.4 kg (152 lb 14.4 oz)   LMP  (LMP Unknown)   SpO2 100%   BMI 23.95 kg/m   Estimated body mass index is 23.95 kg/m  as calculated from the following:    Height as of 1/14/23: 1.702 m (5' 7\").    Weight as of this encounter: 69.4 kg (152 lb 14.4 oz). Body surface area is 1.81 meters squared.  No Pain (1) Comment: Data Unavailable   No LMP recorded (lmp unknown). (Menstrual status: IUD).  Allergies reviewed: Yes  Medications reviewed: Yes    Medications: MEDICATION REFILLS NEEDED TODAY. Provider was notified.  Pharmacy name entered into [employer]:    [facility] - [place], [place] - [address]  [id number] - [place], [place] - [address] AT [facility] [address]  [facility] - [place] - [address]  [facility] [place] [address] [place] [address] PHARMAC[facility] - [place], [place] - [phone]  [facility] - [facility] [place] - [address] [id number] - [place], [place] - [address]    Clinical concerns: needs refill for simvastatin        Jose Nathanael            "

## 2023-01-19 LAB
BACTERIA BLD CULT: NO GROWTH

## 2023-01-23 ENCOUNTER — OFFICE VISIT (OUTPATIENT)
Dept: TRANSPLANT | Facility: CLINIC | Age: 53
End: 2023-01-23
Attending: PHYSICIAN ASSISTANT
Payer: COMMERCIAL

## 2023-01-23 ENCOUNTER — LAB (OUTPATIENT)
Dept: LAB | Facility: CLINIC | Age: 53
End: 2023-01-23
Attending: PHYSICIAN ASSISTANT
Payer: COMMERCIAL

## 2023-01-23 VITALS
HEART RATE: 73 BPM | DIASTOLIC BLOOD PRESSURE: 73 MMHG | SYSTOLIC BLOOD PRESSURE: 139 MMHG | WEIGHT: 156.8 LBS | OXYGEN SATURATION: 100 % | RESPIRATION RATE: 18 BRPM | BODY MASS INDEX: 24.56 KG/M2 | TEMPERATURE: 98.8 F

## 2023-01-23 DIAGNOSIS — C90.00 LIGHT CHAIN MYELOMA (H): Primary | ICD-10-CM

## 2023-01-23 DIAGNOSIS — C90.00 MULTIPLE MYELOMA NOT HAVING ACHIEVED REMISSION (H): Primary | ICD-10-CM

## 2023-01-23 LAB
ALBUMIN SERPL BCG-MCNC: 4.4 G/DL (ref 3.5–5.2)
ALP SERPL-CCNC: 46 U/L (ref 35–104)
ALT SERPL W P-5'-P-CCNC: 46 U/L (ref 10–35)
ANION GAP SERPL CALCULATED.3IONS-SCNC: 11 MMOL/L (ref 7–15)
APTT PPP: 23 SECONDS (ref 22–38)
AST SERPL W P-5'-P-CCNC: 30 U/L (ref 10–35)
BASOPHILS # BLD AUTO: 0 10E3/UL (ref 0–0.2)
BASOPHILS NFR BLD AUTO: 2 %
BILIRUB SERPL-MCNC: 0.3 MG/DL
BUN SERPL-MCNC: 21.4 MG/DL (ref 6–20)
CALCIUM SERPL-MCNC: 9.5 MG/DL (ref 8.6–10)
CHLORIDE SERPL-SCNC: 108 MMOL/L (ref 98–107)
CREAT SERPL-MCNC: 0.9 MG/DL (ref 0.51–0.95)
CRP SERPL-MCNC: <3 MG/L
D DIMER PPP FEU-MCNC: 0.48 UG/ML FEU (ref 0–0.5)
DEPRECATED HCO3 PLAS-SCNC: 24 MMOL/L (ref 22–29)
EOSINOPHIL # BLD AUTO: 0.1 10E3/UL (ref 0–0.7)
EOSINOPHIL NFR BLD AUTO: 3 %
ERYTHROCYTE [DISTWIDTH] IN BLOOD BY AUTOMATED COUNT: 14.9 % (ref 10–15)
FERRITIN SERPL-MCNC: 93 NG/ML (ref 11–328)
FIBRINOGEN PPP-MCNC: 176 MG/DL (ref 170–490)
GFR SERPL CREATININE-BSD FRML MDRD: 77 ML/MIN/1.73M2
GLUCOSE SERPL-MCNC: 143 MG/DL (ref 70–99)
HCT VFR BLD AUTO: 32.7 % (ref 35–47)
HGB BLD-MCNC: 11.1 G/DL (ref 11.7–15.7)
IMM GRANULOCYTES # BLD: 0 10E3/UL
IMM GRANULOCYTES NFR BLD: 1 %
INR PPP: 1.01 (ref 0.85–1.15)
LDH SERPL L TO P-CCNC: 230 U/L (ref 0–250)
LYMPHOCYTES # BLD AUTO: 0.5 10E3/UL (ref 0.8–5.3)
LYMPHOCYTES NFR BLD AUTO: 22 %
MAGNESIUM SERPL-MCNC: 2 MG/DL (ref 1.7–2.3)
MCH RBC QN AUTO: 32.2 PG (ref 26.5–33)
MCHC RBC AUTO-ENTMCNC: 33.9 G/DL (ref 31.5–36.5)
MCV RBC AUTO: 95 FL (ref 78–100)
MONOCYTES # BLD AUTO: 0.4 10E3/UL (ref 0–1.3)
MONOCYTES NFR BLD AUTO: 18 %
NEUTROPHILS # BLD AUTO: 1.4 10E3/UL (ref 1.6–8.3)
NEUTROPHILS NFR BLD AUTO: 54 %
NRBC # BLD AUTO: 0 10E3/UL
NRBC BLD AUTO-RTO: 0 /100
PHOSPHATE SERPL-MCNC: 4.2 MG/DL (ref 2.5–4.5)
PLATELET # BLD AUTO: 139 10E3/UL (ref 150–450)
POTASSIUM SERPL-SCNC: 3.4 MMOL/L (ref 3.4–5.3)
PROT SERPL-MCNC: 6.1 G/DL (ref 6.4–8.3)
RBC # BLD AUTO: 3.45 10E6/UL (ref 3.8–5.2)
SODIUM SERPL-SCNC: 143 MMOL/L (ref 136–145)
URATE SERPL-MCNC: 2.3 MG/DL (ref 2.4–5.7)
WBC # BLD AUTO: 2.5 10E3/UL (ref 4–11)

## 2023-01-23 PROCEDURE — G0463 HOSPITAL OUTPT CLINIC VISIT: HCPCS

## 2023-01-23 PROCEDURE — 84550 ASSAY OF BLOOD/URIC ACID: CPT | Performed by: PHYSICIAN ASSISTANT

## 2023-01-23 PROCEDURE — 83615 LACTATE (LD) (LDH) ENZYME: CPT | Performed by: PHYSICIAN ASSISTANT

## 2023-01-23 PROCEDURE — 85004 AUTOMATED DIFF WBC COUNT: CPT | Performed by: PHYSICIAN ASSISTANT

## 2023-01-23 PROCEDURE — 83735 ASSAY OF MAGNESIUM: CPT | Performed by: PHYSICIAN ASSISTANT

## 2023-01-23 PROCEDURE — 84100 ASSAY OF PHOSPHORUS: CPT | Performed by: PHYSICIAN ASSISTANT

## 2023-01-23 PROCEDURE — 85379 FIBRIN DEGRADATION QUANT: CPT | Performed by: PHYSICIAN ASSISTANT

## 2023-01-23 PROCEDURE — 36592 COLLECT BLOOD FROM PICC: CPT | Performed by: PHYSICIAN ASSISTANT

## 2023-01-23 PROCEDURE — 85384 FIBRINOGEN ACTIVITY: CPT | Performed by: PHYSICIAN ASSISTANT

## 2023-01-23 PROCEDURE — 86140 C-REACTIVE PROTEIN: CPT | Performed by: PHYSICIAN ASSISTANT

## 2023-01-23 PROCEDURE — 80053 COMPREHEN METABOLIC PANEL: CPT | Performed by: PHYSICIAN ASSISTANT

## 2023-01-23 PROCEDURE — 250N000011 HC RX IP 250 OP 636: Performed by: PHYSICIAN ASSISTANT

## 2023-01-23 PROCEDURE — 82728 ASSAY OF FERRITIN: CPT | Performed by: PHYSICIAN ASSISTANT

## 2023-01-23 PROCEDURE — 85730 THROMBOPLASTIN TIME PARTIAL: CPT | Performed by: PHYSICIAN ASSISTANT

## 2023-01-23 PROCEDURE — 85610 PROTHROMBIN TIME: CPT | Performed by: PHYSICIAN ASSISTANT

## 2023-01-23 PROCEDURE — 99214 OFFICE O/P EST MOD 30 MIN: CPT

## 2023-01-23 RX ORDER — HEPARIN SODIUM,PORCINE 10 UNIT/ML
5 VIAL (ML) INTRAVENOUS ONCE
Status: COMPLETED | OUTPATIENT
Start: 2023-01-23 | End: 2023-01-23

## 2023-01-23 RX ADMIN — Medication 5 ML: at 12:09

## 2023-01-23 ASSESSMENT — PAIN SCALES - GENERAL: PAINLEVEL: NO PAIN (0)

## 2023-01-23 NOTE — LETTER
1/23/2023         RE: Kristie Cornejo  [address]  [place] [address]        Dear Colleague,    Thank you for referring your patient, Kristie Cornejo, to the [facility] BLOOD AND MARROW TRANSPLANT PROGRAM [place]. Please see a copy of my visit note below.    BMT Progress Note  BMT Physician: Dr. Reinoso   Transplant Type: CAR-T   Preparative Regimen: Cy/Flu  Chief complaint: Kristie Cornejo is a 52 year old female with a history of multiple myeloma conditioned with Cy/Flu, currently day +19 of her CAR T-cell therapy with Abecma.  - readmitted briefly 1/14 with transient fever, ongoing headache        INTERIM HISTORY:   Doing well.  Here with sister today.  No fevers.  Headache in general improving but still noticing at times when bending down.        REVIEW OF SYSTEMS: Otherwise unremarkable other than what is noted in the Interim History.      PHYSICAL EXAM:   /73 (BP Location: Right arm, Patient Position: Sitting, Cuff Size: Adult Regular)   Pulse 73   Temp 98.8  F (37.1  C) (Oral)   Resp 18   Wt 71.1 kg (156 lb 12.8 oz)   SpO2 100%   BMI 24.56 kg/m    General Appearance: NAD  HEENT: sclera anicteric. EOM's intact.  CV: RRR. no murmur or rub.   RESP: CTAB  EXT: No edema.   SKIN: no rash on exposed skin  NEURO: A&O  PSYCH: Appropriate affect    ACCESS: L arm PICC NT, dressing cdi     Imaging:  Brain MRI w & w/o contrast (1/15):   1. Mild pachymeningeal enhancement. Given the history of recent lumbar puncture on 1/10/2023, this finding is favored secondary to recent lumbar puncture and could be associated with intracranial hypotension.  2. No intracranial space-occupying lesion.  3. Corresponding to hypodensity identified on recent CT, there are nonspecific few scattered T2 hyperintense foci, likely nonspecific gliosis from prior treatment, infection/inflammation or vasculopathy.     Head CT non-contrast (1/14): Hypodensity in the subcortical white matter of the right  frontal operculum. Although this is a nonspecific finding cannot exclude underlying lesion. Recommend MRI of the brain without and with contrast.     ASSESSMENT AND PLAN:   Kristie Cornejo is a 52 year old female with a history of multiple myeloma conditioned with Cy/Flu, currently day +19 of her CAR T-cell therapy with Abecma.      CAR T Protocol   - Day -5 through -3: Cy/Flu   - Day -2: Rest day   - Day -1: IT Dex   - Day 0: CAR T infusion (Abecma)   - Day +6: IT Dex   - Day +13: IT Dex NOT indicated (determined by PI and primary MD)   - Simvastatin daily 40mg until day day +30  - GM-CSF should NOT be given through out study   - after lab draw on Wed if clinically stable pull PICC.      NEURO   - Sentence log in research drawer. No longer performing ICE assessments.   - positional headaches: head CT/MRI as above.  Slowly improving. Never got a blood patch.      CRS  - hx Grade 1 with a Tmax 102.6F of  on 1/5. Given 1 dose of Tocilizumab.      HEME/COAG  - Transfusion parameters: hemoglobin <7g/dL, platelets <10k.     IMMUNOCOMPROMISED  - Proph: fluc until ANC better, ACV, levaquin until ANC better, Pentam (1/3)--will request when here 2/2 as counts still low. PCP prophylaxis thru 6 months post CAR-T; consider Bactrim when able pending counts.      CARDIOVASCULAR  - Risk of cardiomyopathy:  Baseline LVEF: 55-60%   - Risk of arrhythmia: Baseline EKG showed sinus rhythm nonspecific T wave abnormality  - Hx of PVC ablation March 2022      GI  - Ulcer prophylaxis: Protonix   - Risk of nausea/vomiting due to chemo: compazine and ativan prn   - History of IBS: Miralax prn. Currently having some loose stools about once daily.  - mild transaminitis noted; trend     RENAL/FEN  - Electrolyte management: replace per sliding scale      I spent 30 minutes in the care of this patient today, which included time necessary for preparation for the visit, obtaining history, ordering medications/tests/procedures as medically  indicated, review of pertinent medical literature, counseling of the patient, communication of recommendations to the care team, and documentation time.     RTC: As scheduled on Wed for day +21 visit. Labs ordered in tx plan. Pull picc after lab draw.    Mary Anne Soni PA-C  [phone]      Again, thank you for allowing me to participate in the care of your patient.      Sincerely,    BMT Advanced Practice Provider

## 2023-01-23 NOTE — NURSING NOTE
"Oncology Rooming Note    January 23, 2023 12:26 PM   Kristie Cornejo is a 52 year old female who presents for:    Chief Complaint   Patient presents with     Blood Draw     Labs drawn via picc by RN in lab. VS taken.     Oncology Clinic Visit     Light chain myeloma     Initial Vitals: /73 (BP Location: Right arm, Patient Position: Sitting, Cuff Size: Adult Regular)   Pulse 73   Temp 98.8  F (37.1  C) (Oral)   Resp 18   Wt 71.1 kg (156 lb 12.8 oz)   SpO2 100%   BMI 24.56 kg/m   Estimated body mass index is 24.56 kg/m  as calculated from the following:    Height as of 1/14/23: 1.702 m (5' 7\").    Weight as of this encounter: 71.1 kg (156 lb 12.8 oz). Body surface area is 1.83 meters squared.  No Pain (0) Comment: Data Unavailable   No LMP recorded. (Menstrual status: IUD).  Allergies reviewed: Yes  Medications reviewed: Yes    Medications: Medication refills not needed today.  Pharmacy name entered into [facility]:    [facility] - [place], [place] - [address]  [id number] - [place], [place] - [address]  [facility] - [place], [place] - [address]  [facility] [place] [address] [place] [address] PHARMACY [facility] - [place] [address] [facility] [phone]  [facility] - [facility], [place] - [address] [place] [address] PHARMACY 5[facility] - [place], [place] - [address]    Clinical concerns: none       Yudelka Roque CMA            "

## 2023-01-23 NOTE — PROGRESS NOTES
BMT Progress Note  BMT Physician: Dr. Reinoso   Transplant Type: CAR-T   Preparative Regimen: Cy/Flu  Chief complaint: Kristie Cornejo is a 52 year old female with a history of multiple myeloma conditioned with Cy/Flu, currently day +19 of her CAR T-cell therapy with Abecma.  - readmitted briefly 1/14 with transient fever, ongoing headache        INTERIM HISTORY:   Doing well.  Here with sister today.  No fevers.  Headache in general improving but still noticing at times when bending down.        REVIEW OF SYSTEMS: Otherwise unremarkable other than what is noted in the Interim History.      PHYSICAL EXAM:   /73 (BP Location: Right arm, Patient Position: Sitting, Cuff Size: Adult Regular)   Pulse 73   Temp 98.8  F (37.1  C) (Oral)   Resp 18   Wt 71.1 kg (156 lb 12.8 oz)   SpO2 100%   BMI 24.56 kg/m    General Appearance: NAD  HEENT: sclera anicteric. EOM's intact.  CV: RRR. no murmur or rub.   RESP: CTAB  EXT: No edema.   SKIN: no rash on exposed skin  NEURO: A&O  PSYCH: Appropriate affect    ACCESS: L arm PICC NT, dressing cdi     Imaging:  Brain MRI w & w/o contrast (1/15):   1. Mild pachymeningeal enhancement. Given the history of recent lumbar puncture on 1/10/2023, this finding is favored secondary to recent lumbar puncture and could be associated with intracranial hypotension.  2. No intracranial space-occupying lesion.  3. Corresponding to hypodensity identified on recent CT, there are nonspecific few scattered T2 hyperintense foci, likely nonspecific gliosis from prior treatment, infection/inflammation or vasculopathy.     Head CT non-contrast (1/14): Hypodensity in the subcortical white matter of the right frontal operculum. Although this is a nonspecific finding cannot exclude underlying lesion. Recommend MRI of the brain without and with contrast.     ASSESSMENT AND PLAN:   Kristie Cornejo is a 52 year old female with a history of multiple myeloma conditioned with Cy/Flu, currently  day +19 of her CAR T-cell therapy with Abecma.      CAR T Protocol   - Day -5 through -3: Cy/Flu   - Day -2: Rest day   - Day -1: IT Dex   - Day 0: CAR T infusion (Abecma)   - Day +6: IT Dex   - Day +13: IT Dex NOT indicated (determined by PI and primary MD)   - Simvastatin daily 40mg until day day +30  - GM-CSF should NOT be given through out study   - after lab draw on Wed if clinically stable pull PICC.      NEURO   - Sentence log in research drawer. No longer performing ICE assessments.   - positional headaches: head CT/MRI as above.  Slowly improving. Never got a blood patch.      CRS  - hx Grade 1 with a Tmax 102.6F of  on 1/5. Given 1 dose of Tocilizumab.      HEME/COAG  - Transfusion parameters: hemoglobin <7g/dL, platelets <10k.     IMMUNOCOMPROMISED  - Proph: fluc until ANC better, ACV, levaquin until ANC better, Pentam (1/3)--will request when here 2/2 as counts still low. PCP prophylaxis thru 6 months post CAR-T; consider Bactrim when able pending counts.      CARDIOVASCULAR  - Risk of cardiomyopathy:  Baseline LVEF: 55-60%   - Risk of arrhythmia: Baseline EKG showed sinus rhythm nonspecific T wave abnormality  - Hx of PVC ablation March 2022      GI  - Ulcer prophylaxis: Protonix   - Risk of nausea/vomiting due to chemo: compazine and ativan prn   - History of IBS: Miralax prn. Currently having some loose stools about once daily.  - mild transaminitis noted; trend     RENAL/FEN  - Electrolyte management: replace per sliding scale      I spent 30 minutes in the care of this patient today, which included time necessary for preparation for the visit, obtaining history, ordering medications/tests/procedures as medically indicated, review of pertinent medical literature, counseling of the patient, communication of recommendations to the care team, and documentation time.     RTC: As scheduled on Wed for day +21 visit. Labs ordered in tx plan. Pull picc after lab draw.    Mary Anne Soni PA-C  [phone]

## 2023-01-23 NOTE — NURSING NOTE
Chief Complaint   Patient presents with     Blood Draw     Labs drawn via picc by RN in lab. VS taken.     Labs collected from PICC.  Line flushed with saline and heparin locked.  Vitals taken and pt checked in for appt.    Carissa Hernandez RN

## 2023-01-24 LAB
CULTURE HARVEST COMPLETE DATE: NORMAL
INTERPRETATION: NORMAL
ISCN: NORMAL
METHODS: NORMAL

## 2023-01-25 ENCOUNTER — OFFICE VISIT (OUTPATIENT)
Dept: TRANSPLANT | Facility: CLINIC | Age: 53
End: 2023-01-25
Attending: PHYSICIAN ASSISTANT
Payer: COMMERCIAL

## 2023-01-25 ENCOUNTER — LAB (OUTPATIENT)
Dept: LAB | Facility: CLINIC | Age: 53
End: 2023-01-25
Attending: PHYSICIAN ASSISTANT
Payer: COMMERCIAL

## 2023-01-25 VITALS
DIASTOLIC BLOOD PRESSURE: 88 MMHG | TEMPERATURE: 98.2 F | SYSTOLIC BLOOD PRESSURE: 127 MMHG | OXYGEN SATURATION: 100 % | WEIGHT: 157 LBS | BODY MASS INDEX: 24.59 KG/M2 | HEART RATE: 76 BPM | RESPIRATION RATE: 16 BRPM

## 2023-01-25 DIAGNOSIS — C90.00 MULTIPLE MYELOMA NOT HAVING ACHIEVED REMISSION (H): Primary | ICD-10-CM

## 2023-01-25 DIAGNOSIS — C90.00 LIGHT CHAIN MYELOMA (H): Primary | ICD-10-CM

## 2023-01-25 LAB
ALBUMIN SERPL BCG-MCNC: 4.4 G/DL (ref 3.5–5.2)
ALP SERPL-CCNC: 46 U/L (ref 35–104)
ALT SERPL W P-5'-P-CCNC: 59 U/L (ref 10–35)
ANION GAP SERPL CALCULATED.3IONS-SCNC: 10 MMOL/L (ref 7–15)
APTT PPP: 25 SECONDS (ref 22–38)
AST SERPL W P-5'-P-CCNC: 36 U/L (ref 10–35)
BASOPHILS # BLD AUTO: 0 10E3/UL (ref 0–0.2)
BASOPHILS NFR BLD AUTO: 1 %
BILIRUB SERPL-MCNC: 0.3 MG/DL
BUN SERPL-MCNC: 21.6 MG/DL (ref 6–20)
CALCIUM SERPL-MCNC: 9.3 MG/DL (ref 8.6–10)
CHLORIDE SERPL-SCNC: 109 MMOL/L (ref 98–107)
CREAT SERPL-MCNC: 0.95 MG/DL (ref 0.51–0.95)
CRP SERPL-MCNC: <3 MG/L
D DIMER PPP FEU-MCNC: 0.43 UG/ML FEU (ref 0–0.5)
DEPRECATED HCO3 PLAS-SCNC: 23 MMOL/L (ref 22–29)
EOSINOPHIL # BLD AUTO: 0.1 10E3/UL (ref 0–0.7)
EOSINOPHIL NFR BLD AUTO: 3 %
ERYTHROCYTE [DISTWIDTH] IN BLOOD BY AUTOMATED COUNT: 14.9 % (ref 10–15)
FERRITIN SERPL-MCNC: 83 NG/ML (ref 11–328)
FIBRINOGEN PPP-MCNC: 185 MG/DL (ref 170–490)
GFR SERPL CREATININE-BSD FRML MDRD: 72 ML/MIN/1.73M2
GLUCOSE SERPL-MCNC: 116 MG/DL (ref 70–99)
HCT VFR BLD AUTO: 32.5 % (ref 35–47)
HGB BLD-MCNC: 10.9 G/DL (ref 11.7–15.7)
IMM GRANULOCYTES # BLD: 0 10E3/UL
IMM GRANULOCYTES NFR BLD: 0 %
INR PPP: 0.96 (ref 0.85–1.15)
LDH SERPL L TO P-CCNC: NORMAL U/L
LYMPHOCYTES # BLD AUTO: 0.5 10E3/UL (ref 0.8–5.3)
LYMPHOCYTES NFR BLD AUTO: 18 %
MAGNESIUM SERPL-MCNC: 2.1 MG/DL (ref 1.7–2.3)
MCH RBC QN AUTO: 32.2 PG (ref 26.5–33)
MCHC RBC AUTO-ENTMCNC: 33.5 G/DL (ref 31.5–36.5)
MCV RBC AUTO: 96 FL (ref 78–100)
MONOCYTES # BLD AUTO: 0.5 10E3/UL (ref 0–1.3)
MONOCYTES NFR BLD AUTO: 19 %
NEUTROPHILS # BLD AUTO: 1.6 10E3/UL (ref 1.6–8.3)
NEUTROPHILS NFR BLD AUTO: 59 %
NRBC # BLD AUTO: 0 10E3/UL
NRBC BLD AUTO-RTO: 0 /100
PHOSPHATE SERPL-MCNC: 4.3 MG/DL (ref 2.5–4.5)
PLATELET # BLD AUTO: 131 10E3/UL (ref 150–450)
POTASSIUM SERPL-SCNC: 3.6 MMOL/L (ref 3.4–5.3)
PROT SERPL-MCNC: 6 G/DL (ref 6.4–8.3)
RBC # BLD AUTO: 3.39 10E6/UL (ref 3.8–5.2)
SODIUM SERPL-SCNC: 142 MMOL/L (ref 136–145)
URATE SERPL-MCNC: 2.6 MG/DL (ref 2.4–5.7)
WBC # BLD AUTO: 2.7 10E3/UL (ref 4–11)

## 2023-01-25 PROCEDURE — G0463 HOSPITAL OUTPT CLINIC VISIT: HCPCS

## 2023-01-25 PROCEDURE — 82728 ASSAY OF FERRITIN: CPT | Performed by: PHYSICIAN ASSISTANT

## 2023-01-25 PROCEDURE — 86140 C-REACTIVE PROTEIN: CPT | Performed by: PHYSICIAN ASSISTANT

## 2023-01-25 PROCEDURE — 84550 ASSAY OF BLOOD/URIC ACID: CPT | Performed by: PHYSICIAN ASSISTANT

## 2023-01-25 PROCEDURE — 80053 COMPREHEN METABOLIC PANEL: CPT | Performed by: PHYSICIAN ASSISTANT

## 2023-01-25 PROCEDURE — 85379 FIBRIN DEGRADATION QUANT: CPT | Performed by: PHYSICIAN ASSISTANT

## 2023-01-25 PROCEDURE — 83735 ASSAY OF MAGNESIUM: CPT | Performed by: PHYSICIAN ASSISTANT

## 2023-01-25 PROCEDURE — 99214 OFFICE O/P EST MOD 30 MIN: CPT

## 2023-01-25 PROCEDURE — 85384 FIBRINOGEN ACTIVITY: CPT | Performed by: PHYSICIAN ASSISTANT

## 2023-01-25 PROCEDURE — 84100 ASSAY OF PHOSPHORUS: CPT | Performed by: PHYSICIAN ASSISTANT

## 2023-01-25 PROCEDURE — 36592 COLLECT BLOOD FROM PICC: CPT | Performed by: PHYSICIAN ASSISTANT

## 2023-01-25 PROCEDURE — 85730 THROMBOPLASTIN TIME PARTIAL: CPT | Performed by: PHYSICIAN ASSISTANT

## 2023-01-25 PROCEDURE — 83615 LACTATE (LD) (LDH) ENZYME: CPT | Performed by: PHYSICIAN ASSISTANT

## 2023-01-25 PROCEDURE — 85610 PROTHROMBIN TIME: CPT | Performed by: PHYSICIAN ASSISTANT

## 2023-01-25 PROCEDURE — 85014 HEMATOCRIT: CPT | Performed by: PHYSICIAN ASSISTANT

## 2023-01-25 ASSESSMENT — PAIN SCALES - GENERAL: PAINLEVEL: NO PAIN (0)

## 2023-01-25 NOTE — LETTER
2023         RE: Kristie Cornejo  [address]  [facility] [address]        Dear Colleague,    Thank you for referring your patient, Kristie Cornejo, to the [facility]PL[facility] PRO[facility] [place]. Please see a copy of my visit note below.    BMT Progress Note  BMT Physician: Dr. Reinoso   Transplant Type: CAR-T   Preparative Regimen: Cy/Flu  Chief complaint: Kristie Cornejo is a 52 year old female with a history of multiple myeloma conditioned with Cy/Flu, currently day +21 of her CAR T-cell therapy with Abecma.  - readmitted briefly  with transient fever, ongoing headache        INTERIM HISTORY:     Her headache continues to improve - it is now only present with coughing or sneezing just across her forehead. She otherwise has no new complaints - no n/v/d, fevers, or leg swelling.       REVIEW OF SYSTEMS: Otherwise unremarkable other than what is noted in the Interim History.      PHYSICAL EXAM:   Wt Readings from Last 4 Encounters:   23 71.2 kg (157 lb)   23 71.1 kg (156 lb 12.8 oz)   23 69.4 kg (152 lb 14.4 oz)   23 67.4 kg (148 lb 9.6 oz)     ECO    /88   Pulse 76   Temp 98.2  F (36.8  C) (Oral)   Resp 16   Wt 71.2 kg (157 lb)   SpO2 100%   BMI 24.59 kg/m       General Appearance: NAD  HEENT: sclera anicteric. EOM's intact.  CV: RRR. no murmur or rub.   RESP: CTAB  EXT: No edema.   SKIN: no rash on exposed skin  NEURO: A&O  PSYCH: Appropriate affect    ACCESS: L arm PICC NT, dressing cdi     Imaging:  Brain MRI w & w/o contrast (1/15):   1. Mild pachymeningeal enhancement. Given the history of recent lumbar puncture on 1/10/2023, this finding is favored secondary to recent lumbar puncture and could be associated with intracranial hypotension.  2. No intracranial space-occupying lesion.  3. Corresponding to hypodensity identified on recent CT, there are nonspecific few scattered T2 hyperintense foci, likely nonspecific  gliosis from prior treatment, infection/inflammation or vasculopathy.     Head CT non-contrast (1/14): Hypodensity in the subcortical white matter of the right frontal operculum. Although this is a nonspecific finding cannot exclude underlying lesion. Recommend MRI of the brain without and with contrast.     ASSESSMENT AND PLAN:   Kristie Cornejo is a 52 year old female with a history of multiple myeloma conditioned with Cy/Flu, currently day +21 of her CAR T-cell therapy with Abecma.      CAR T Protocol   - Day -5 through -3: Cy/Flu   - Day -2: Rest day   - Day -1: IT Dex   - Day 0: CAR T infusion (Abecma)   - Day +6: IT Dex   - Day +13: IT Dex NOT indicated (determined by PI and primary MD)   - Simvastatin daily 40mg until day day +30  - GM-CSF should NOT be given through out study   - Removed PICC line today.      NEURO   - Sentence log in research drawer. No longer performing ICE assessments.   - positional headaches: head CT/MRI as above.  Slowly improving. Never got a blood patch.      CRS  - hx Grade 1 with a Tmax 102.6F of  on 1/5. Given 1 dose of Tocilizumab.      HEME/COAG  - Transfusion parameters: hemoglobin <7g/dL, platelets <10k.     IMMUNOCOMPROMISED  - Proph: fluc until ANC better, ACV, levaquin until ANC better, Pentam (1/3)--will request when here 2/2 as counts still low. PCP prophylaxis thru 6 months post CAR-T; consider Bactrim when able pending counts.      CARDIOVASCULAR  - Risk of cardiomyopathy:  Baseline LVEF: 55-60%   - Risk of arrhythmia: Baseline EKG showed sinus rhythm nonspecific T wave abnormality  - Hx of PVC ablation March 2022      GI  - Ulcer prophylaxis: Protonix   - Risk of nausea/vomiting due to chemo: compazine and ativan prn   - History of IBS: Miralax prn. Currently having some loose stools about once daily.  - mild transaminitis noted; trend     RENAL/FEN  - Electrolyte management: replace per sliding scale    Today's Summary:  Plan to stop levo Day 28, 1st day of normal  ANC  PICC line removed today    RTC: Day +28 visit with Dr. Reinoso      I spent 30 minutes in the care of this patient today, which included time necessary for preparation for the visit, obtaining history, ordering medications/tests/procedures as medically indicated, review of pertinent medical literature, counseling of the patient, communication of recommendations to the care team, and documentation time.         Bebo Valentine PA-C   [phone]

## 2023-01-25 NOTE — PROGRESS NOTES
BMT Progress Note  BMT Physician: Dr. Reinoso   Transplant Type: CAR-T   Preparative Regimen: Cy/Flu  Chief complaint: Kristie Cornejo is a 52 year old female with a history of multiple myeloma conditioned with Cy/Flu, currently day +21 of her CAR T-cell therapy with Abecma.  - readmitted briefly  with transient fever, ongoing headache        INTERIM HISTORY:     Her headache continues to improve - it is now only present with coughing or sneezing just across her forehead. She otherwise has no new complaints - no n/v/d, fevers, or leg swelling.       REVIEW OF SYSTEMS: Otherwise unremarkable other than what is noted in the Interim History.      PHYSICAL EXAM:   Wt Readings from Last 4 Encounters:   23 71.2 kg (157 lb)   23 71.1 kg (156 lb 12.8 oz)   23 69.4 kg (152 lb 14.4 oz)   23 67.4 kg (148 lb 9.6 oz)     ECO    /88   Pulse 76   Temp 98.2  F (36.8  C) (Oral)   Resp 16   Wt 71.2 kg (157 lb)   SpO2 100%   BMI 24.59 kg/m       General Appearance: NAD  HEENT: sclera anicteric. EOM's intact.  CV: RRR. no murmur or rub.   RESP: CTAB  EXT: No edema.   SKIN: no rash on exposed skin  NEURO: A&O  PSYCH: Appropriate affect    ACCESS: L arm PICC NT, dressing cdi     Imaging:  Brain MRI w & w/o contrast (1/15):   1. Mild pachymeningeal enhancement. Given the history of recent lumbar puncture on 1/10/2023, this finding is favored secondary to recent lumbar puncture and could be associated with intracranial hypotension.  2. No intracranial space-occupying lesion.  3. Corresponding to hypodensity identified on recent CT, there are nonspecific few scattered T2 hyperintense foci, likely nonspecific gliosis from prior treatment, infection/inflammation or vasculopathy.     Head CT non-contrast (): Hypodensity in the subcortical white matter of the right frontal operculum. Although this is a nonspecific finding cannot exclude underlying lesion. Recommend MRI of the brain without  and with contrast.     ASSESSMENT AND PLAN:   Kristie Cornejo is a 52 year old female with a history of multiple myeloma conditioned with Cy/Flu, currently day +21 of her CAR T-cell therapy with Abecma.      CAR T Protocol   - Day -5 through -3: Cy/Flu   - Day -2: Rest day   - Day -1: IT Dex   - Day 0: CAR T infusion (Abecma)   - Day +6: IT Dex   - Day +13: IT Dex NOT indicated (determined by PI and primary MD)   - Simvastatin daily 40mg until day day +30  - GM-CSF should NOT be given through out study   - Removed PICC line today.      NEURO   - Sentence log in research drawer. No longer performing ICE assessments.   - positional headaches: head CT/MRI as above.  Slowly improving. Never got a blood patch.      CRS  - hx Grade 1 with a Tmax 102.6F of  on 1/5. Given 1 dose of Tocilizumab.      HEME/COAG  - Transfusion parameters: hemoglobin <7g/dL, platelets <10k.     IMMUNOCOMPROMISED  - Proph: fluc until ANC better, ACV, levaquin until ANC better, Pentam (1/3)--will request when here 2/2 as counts still low. PCP prophylaxis thru 6 months post CAR-T; consider Bactrim when able pending counts.      CARDIOVASCULAR  - Risk of cardiomyopathy:  Baseline LVEF: 55-60%   - Risk of arrhythmia: Baseline EKG showed sinus rhythm nonspecific T wave abnormality  - Hx of PVC ablation March 2022      GI  - Ulcer prophylaxis: Protonix   - Risk of nausea/vomiting due to chemo: compazine and ativan prn   - History of IBS: Miralax prn. Currently having some loose stools about once daily.  - mild transaminitis noted; trend     RENAL/FEN  - Electrolyte management: replace per sliding scale    Today's Summary:  Plan to stop levo Day 28, 1st day of normal ANC  PICC line removed today    RTC: Day +28 visit with Dr. Reinoso      I spent 30 minutes in the care of this patient today, which included time necessary for preparation for the visit, obtaining history, ordering medications/tests/procedures as medically indicated, review of  pertinent medical literature, counseling of the patient, communication of recommendations to the care team, and documentation time.         Bebo Valentine PA-C   *[phone]

## 2023-01-25 NOTE — NURSING NOTE
Chief Complaint   Patient presents with     Blood Draw     Labs drawn via PICC by RN. VS taken.     Oncology Clinic Visit     Light chain myeloma     Labs drawn from PICCby rn. Caps changed. Good blood return noted in both lumens.  Both lumens flushed with NS and heparin.  Pt tolerated well.  VS taken.  Pt checked in for next appt.    Quincy Gibbons RN

## 2023-01-25 NOTE — NURSING NOTE
PICC line removed by RN. Order confirmed in chart. Line removed using clean/sterile technique and pressure dressing applied. No complications. Pt educated on proper post removal care.     Kiley Westfall RN

## 2023-02-02 ENCOUNTER — LAB (OUTPATIENT)
Dept: LAB | Facility: CLINIC | Age: 53
End: 2023-02-02
Payer: COMMERCIAL

## 2023-02-02 ENCOUNTER — OFFICE VISIT (OUTPATIENT)
Dept: TRANSPLANT | Facility: CLINIC | Age: 53
End: 2023-02-02
Payer: COMMERCIAL

## 2023-02-02 VITALS
OXYGEN SATURATION: 99 % | BODY MASS INDEX: 24.86 KG/M2 | SYSTOLIC BLOOD PRESSURE: 119 MMHG | WEIGHT: 158.7 LBS | RESPIRATION RATE: 18 BRPM | TEMPERATURE: 97.2 F | DIASTOLIC BLOOD PRESSURE: 70 MMHG | HEART RATE: 75 BPM

## 2023-02-02 DIAGNOSIS — C90.00 MULTIPLE MYELOMA NOT HAVING ACHIEVED REMISSION (H): Primary | ICD-10-CM

## 2023-02-02 DIAGNOSIS — C90.00 LIGHT CHAIN MYELOMA (H): Primary | ICD-10-CM

## 2023-02-02 LAB
ALBUMIN SERPL BCG-MCNC: 4.7 G/DL (ref 3.5–5.2)
ALP SERPL-CCNC: 44 U/L (ref 35–104)
ALT SERPL W P-5'-P-CCNC: 48 U/L (ref 10–35)
ANION GAP SERPL CALCULATED.3IONS-SCNC: 11 MMOL/L (ref 7–15)
APTT PPP: 25 SECONDS (ref 22–38)
AST SERPL W P-5'-P-CCNC: 28 U/L (ref 10–35)
B2 MICROGLOB TUMOR MARKER SER-MCNC: 1.3 MG/L
BASOPHILS # BLD AUTO: 0 10E3/UL (ref 0–0.2)
BASOPHILS NFR BLD AUTO: 1 %
BILIRUB SERPL-MCNC: 0.4 MG/DL
BUN SERPL-MCNC: 17.2 MG/DL (ref 6–20)
CALCIUM SERPL-MCNC: 9.6 MG/DL (ref 8.6–10)
CD19 CELLS # BLD: <1 CELLS/UL (ref 107–698)
CD19 CELLS NFR BLD: <1 % (ref 6–27)
CD3 CELLS # BLD: 345 CELLS/UL (ref 603–2990)
CD3 CELLS NFR BLD: 94 % (ref 49–84)
CD3+CD4+ CELLS # BLD: 140 CELLS/UL (ref 441–2156)
CD3+CD4+ CELLS NFR BLD: 38 % (ref 28–63)
CD3+CD4+ CELLS/CD3+CD8+ CLL BLD: 0.67 % (ref 1.4–2.6)
CD3+CD8+ CELLS # BLD: 209 CELLS/UL (ref 125–1312)
CD3+CD8+ CELLS NFR BLD: 57 % (ref 10–40)
CD3-CD16+CD56+ CELLS # BLD: 20 CELLS/UL (ref 95–640)
CD3-CD16+CD56+ CELLS NFR BLD: 5 % (ref 4–25)
CHLORIDE SERPL-SCNC: 106 MMOL/L (ref 98–107)
CREAT SERPL-MCNC: 0.93 MG/DL (ref 0.51–0.95)
CRP SERPL-MCNC: <3 MG/L
D DIMER PPP FEU-MCNC: 0.31 UG/ML FEU (ref 0–0.5)
DEPRECATED HCO3 PLAS-SCNC: 24 MMOL/L (ref 22–29)
EOSINOPHIL # BLD AUTO: 0.1 10E3/UL (ref 0–0.7)
EOSINOPHIL NFR BLD AUTO: 4 %
ERYTHROCYTE [DISTWIDTH] IN BLOOD BY AUTOMATED COUNT: 16 % (ref 10–15)
FERRITIN SERPL-MCNC: 53 NG/ML (ref 11–328)
FIBRINOGEN PPP-MCNC: 206 MG/DL (ref 170–490)
GFR SERPL CREATININE-BSD FRML MDRD: 74 ML/MIN/1.73M2
GLUCOSE SERPL-MCNC: 106 MG/DL (ref 70–99)
HCT VFR BLD AUTO: 35.8 % (ref 35–47)
HGB BLD-MCNC: 12.4 G/DL (ref 11.7–15.7)
IGG SERPL-MCNC: 375 MG/DL (ref 610–1616)
IMM GRANULOCYTES # BLD: 0 10E3/UL
IMM GRANULOCYTES NFR BLD: 0 %
INR PPP: 1 (ref 0.85–1.15)
KAPPA LC FREE SER-MCNC: 0.08 MG/DL (ref 0.33–1.94)
KAPPA LC FREE/LAMBDA FREE SER NEPH: ABNORMAL {RATIO}
LAMBDA LC FREE SERPL-MCNC: <0.14 MG/DL (ref 0.57–2.63)
LDH SERPL L TO P-CCNC: 208 U/L (ref 0–250)
LYMPHOCYTES # BLD AUTO: 0.4 10E3/UL (ref 0.8–5.3)
LYMPHOCYTES NFR BLD AUTO: 15 %
MAGNESIUM SERPL-MCNC: 2.2 MG/DL (ref 1.7–2.3)
MCH RBC QN AUTO: 32.8 PG (ref 26.5–33)
MCHC RBC AUTO-ENTMCNC: 34.6 G/DL (ref 31.5–36.5)
MCV RBC AUTO: 95 FL (ref 78–100)
MONOCYTES # BLD AUTO: 0.4 10E3/UL (ref 0–1.3)
MONOCYTES NFR BLD AUTO: 17 %
NEUTROPHILS # BLD AUTO: 1.6 10E3/UL (ref 1.6–8.3)
NEUTROPHILS NFR BLD AUTO: 63 %
NRBC # BLD AUTO: 0 10E3/UL
NRBC BLD AUTO-RTO: 0 /100
PHOSPHATE SERPL-MCNC: 3.3 MG/DL (ref 2.5–4.5)
PLATELET # BLD AUTO: 177 10E3/UL (ref 150–450)
POTASSIUM SERPL-SCNC: 3.7 MMOL/L (ref 3.4–5.3)
PROT SERPL-MCNC: 6.4 G/DL (ref 6.4–8.3)
RBC # BLD AUTO: 3.78 10E6/UL (ref 3.8–5.2)
SODIUM SERPL-SCNC: 141 MMOL/L (ref 136–145)
T CELL EXTENDED COMMENT: ABNORMAL
TOTAL PROTEIN SERUM FOR ELP: 6.1 G/DL (ref 6.4–8.3)
URATE SERPL-MCNC: 2.9 MG/DL (ref 2.4–5.7)
WBC # BLD AUTO: 2.5 10E3/UL (ref 4–11)

## 2023-02-02 PROCEDURE — 94640 AIRWAY INHALATION TREATMENT: CPT | Performed by: INTERNAL MEDICINE

## 2023-02-02 PROCEDURE — 85025 COMPLETE CBC W/AUTO DIFF WBC: CPT | Performed by: PHYSICIAN ASSISTANT

## 2023-02-02 PROCEDURE — 82232 ASSAY OF BETA-2 PROTEIN: CPT | Performed by: PHYSICIAN ASSISTANT

## 2023-02-02 PROCEDURE — 84165 PROTEIN E-PHORESIS SERUM: CPT | Mod: 26

## 2023-02-02 PROCEDURE — 80053 COMPREHEN METABOLIC PANEL: CPT | Performed by: PHYSICIAN ASSISTANT

## 2023-02-02 PROCEDURE — 85379 FIBRIN DEGRADATION QUANT: CPT | Performed by: PHYSICIAN ASSISTANT

## 2023-02-02 PROCEDURE — 36415 COLL VENOUS BLD VENIPUNCTURE: CPT | Performed by: PHYSICIAN ASSISTANT

## 2023-02-02 PROCEDURE — 84155 ASSAY OF PROTEIN SERUM: CPT | Performed by: PHYSICIAN ASSISTANT

## 2023-02-02 PROCEDURE — 82784 ASSAY IGA/IGD/IGG/IGM EACH: CPT | Performed by: PHYSICIAN ASSISTANT

## 2023-02-02 PROCEDURE — 83521 IG LIGHT CHAINS FREE EACH: CPT | Performed by: PHYSICIAN ASSISTANT

## 2023-02-02 PROCEDURE — 84165 PROTEIN E-PHORESIS SERUM: CPT | Mod: TC | Performed by: PATHOLOGY

## 2023-02-02 PROCEDURE — 86140 C-REACTIVE PROTEIN: CPT | Performed by: PHYSICIAN ASSISTANT

## 2023-02-02 PROCEDURE — 83615 LACTATE (LD) (LDH) ENZYME: CPT | Performed by: PHYSICIAN ASSISTANT

## 2023-02-02 PROCEDURE — 86355 B CELLS TOTAL COUNT: CPT | Performed by: PHYSICIAN ASSISTANT

## 2023-02-02 PROCEDURE — G0463 HOSPITAL OUTPT CLINIC VISIT: HCPCS

## 2023-02-02 PROCEDURE — 84550 ASSAY OF BLOOD/URIC ACID: CPT | Performed by: PHYSICIAN ASSISTANT

## 2023-02-02 PROCEDURE — 82728 ASSAY OF FERRITIN: CPT | Performed by: PHYSICIAN ASSISTANT

## 2023-02-02 PROCEDURE — 86334 IMMUNOFIX E-PHORESIS SERUM: CPT | Mod: 26

## 2023-02-02 PROCEDURE — 85730 THROMBOPLASTIN TIME PARTIAL: CPT | Performed by: PHYSICIAN ASSISTANT

## 2023-02-02 PROCEDURE — 83735 ASSAY OF MAGNESIUM: CPT | Performed by: PHYSICIAN ASSISTANT

## 2023-02-02 PROCEDURE — 86334 IMMUNOFIX E-PHORESIS SERUM: CPT | Performed by: PATHOLOGY

## 2023-02-02 PROCEDURE — 84100 ASSAY OF PHOSPHORUS: CPT | Performed by: PHYSICIAN ASSISTANT

## 2023-02-02 PROCEDURE — 85610 PROTHROMBIN TIME: CPT | Performed by: PHYSICIAN ASSISTANT

## 2023-02-02 PROCEDURE — 85384 FIBRINOGEN ACTIVITY: CPT | Performed by: PHYSICIAN ASSISTANT

## 2023-02-02 PROCEDURE — 99214 OFFICE O/P EST MOD 30 MIN: CPT

## 2023-02-02 PROCEDURE — 94642 AEROSOL INHALATION TREATMENT: CPT | Performed by: INTERNAL MEDICINE

## 2023-02-02 RX ORDER — PENTAMIDINE ISETHIONATE 300 MG/300MG
300 INHALANT RESPIRATORY (INHALATION)
Status: CANCELLED
Start: 2023-02-03

## 2023-02-02 RX ORDER — PENTAMIDINE ISETHIONATE 300 MG/300MG
300 INHALANT RESPIRATORY (INHALATION)
Status: DISCONTINUED | OUTPATIENT
Start: 2023-02-02 | End: 2023-02-02 | Stop reason: HOSPADM

## 2023-02-02 RX ORDER — ALBUTEROL SULFATE 0.83 MG/ML
2.5 SOLUTION RESPIRATORY (INHALATION)
Status: CANCELLED
Start: 2023-02-03

## 2023-02-02 RX ORDER — ALBUTEROL SULFATE 0.83 MG/ML
2.5 SOLUTION RESPIRATORY (INHALATION)
Status: COMPLETED | OUTPATIENT
Start: 2023-02-02 | End: 2023-02-02

## 2023-02-02 RX ORDER — HEPARIN SODIUM,PORCINE 10 UNIT/ML
5 VIAL (ML) INTRAVENOUS
Status: CANCELLED | OUTPATIENT
Start: 2023-02-03

## 2023-02-02 RX ORDER — HEPARIN SODIUM (PORCINE) LOCK FLUSH IV SOLN 100 UNIT/ML 100 UNIT/ML
5 SOLUTION INTRAVENOUS
Status: CANCELLED | OUTPATIENT
Start: 2023-02-03

## 2023-02-02 RX ADMIN — ALBUTEROL SULFATE 2.5 MG: 0.83 SOLUTION RESPIRATORY (INHALATION) at 10:31

## 2023-02-02 RX ADMIN — PENTAMIDINE ISETHIONATE 300 MG: 300 INHALANT RESPIRATORY (INHALATION) at 10:30

## 2023-02-02 ASSESSMENT — PAIN SCALES - GENERAL: PAINLEVEL: NO PAIN (0)

## 2023-02-02 NOTE — PROGRESS NOTES
Kristie Cornejo was seen today for a Pentamidine nebulizer tx ordered by Diana Villa PA-C.     Patient was first given 2.5 mg of albuterol nebulizer, after which Pentamidine 300 mg (Lot # [id number]) mixed with 6cc Sterile H20 was administered through a filtered nebulizer.     Pre-treatment: SpO2 = 98%   HR = 78   BBS = clear   Post-treatment: SpO2 = 99%  HR = 83   BBS = clear     No adverse side effects noted by the patient.     This service today was provided under the  supervision of Dr.Hyun Meyer, who was available if needed.      Procedure was completed by RIKKI Kamara.

## 2023-02-02 NOTE — LETTER
2023         RE: Kristie Cornejo  [address]  [facility] [address]        Dear Colleague,    Thank you for referring your patient, Kristie Cornejo, to the [facility] BLOOD AND MARROW TRANSPLANT PROGRAM [place]. Please see a copy of my visit note below.    BMT Progress Note  BMT Physician: Dr. Reinoso   Transplant Type: CAR-T Abecma  Preparative Regimen: Cy/Flu  Chief complaint: Kristie Cornejo is a 52 year old female with a history of multiple myeloma conditioned with Cy/Flu, currently day +29 of her CAR T-cell therapy with Abecma.  - readmitted briefly  with transient fever, ongoing headache        INTERIM HISTORY:   Margo is feeling great, no more headaches, energy is better.   no new complaints - no n/v/d, fevers, or leg swelling.       REVIEW OF SYSTEMS: Otherwise unremarkable other than what is noted in the Interim History.      PHYSICAL EXAM:   Wt Readings from Last 4 Encounters:   23 72 kg (158 lb 11.2 oz)   23 71.2 kg (157 lb)   23 71.1 kg (156 lb 12.8 oz)   23 69.4 kg (152 lb 14.4 oz)     ECO    /70 (BP Location: Right arm, Patient Position: Chair, Cuff Size: Adult Regular)   Pulse 75   Temp 97.2  F (36.2  C) (Oral)   Resp 18   Wt 72 kg (158 lb 11.2 oz)   SpO2 99%   BMI 24.86 kg/m       General Appearance: NAD  HEENT: sclera anicteric. EOM's intact.  CV: RRR. no murmur or rub.   RESP: CTAB  EXT: No edema.   SKIN: no rash on exposed skin  NEURO: A&O  PSYCH: Appropriate affect    ACCESS: L arm PICC NT, dressing cdi        ASSESSMENT AND PLAN:   Kristie Cornejo is a 52 year old female with a history of multiple myeloma conditioned with Cy/Flu, currently day +29 of her CAR T-cell therapy with Abecma.      Light chain MM, standard risk with t(11;14)  No p53 or 17del  S/p Abecam now     Day 29 - re-staging studies drawn today and pending. Need discussion in about 1 week.    On Neurotox prophylaxis study.    it dex x 2 doses (-1, +6)    -- Simvastatin daily 40mg until day day +30    HEME  Counts are trending down now - monitor.  No transfusions.  G-CSF for ANC below 1000.        NEURO - neg. No ICANS       CRS  - hx Grade 1 with a Tmax 102.6F of  on 1/5. Given 1 dose of Tocilizumab.      HEME/COAG  - Transfusion parameters: hemoglobin <7g/dL, platelets <10k.     IMMUNOCOMPROMISED  - ok to stop fluc and Levaquin.  -cont Pentamidine monthly - PCP prophylaxis thru 6 months post CAR-T; consider Bactrim when able pending counts.      CARDIOVASCULAR  - Risk of cardiomyopathy:  Baseline LVEF: 55-60%   - Risk of arrhythmia: Baseline EKG showed sinus rhythm nonspecific T wave abnormality  - Hx of PVC ablation March 2022      GI  - Ulcer prophylaxis: Protonix   - Risk of nausea/vomiting due to chemo: compazine and ativan prn   - History of IBS: Miralax prn. Currently having some loose stools about once daily.  - mild transaminitis noted; trend     RENAL/FEN  - Electrolyte management: replace per sliding scale    Today's Summary:  Plan to stop levo Day 28, 1st day of normal ANC  PICC line removed today    RTC:   Transition back to [facility]  SULEIMAN visit in 2 weeks and then CBC x 2 week if still trending down.   F/u with me for day 100 anniversary        I spent 30 minutes in the care of this patient today, which included time necessary for preparation for the visit, obtaining history, ordering medications/tests/procedures as medically indicated, review of pertinent medical literature, counseling of the patient, communication of recommendations to the care team, and documentation time.    Addendum:  Kappa FLC is now undetectable ! This time Margo achieved a CR!  Await BMBx at day 100.    Payton Reinoso MD

## 2023-02-02 NOTE — PROGRESS NOTES
BMT Progress Note  BMT Physician: Dr. Reinoso   Transplant Type: CAR-T Abecma  Preparative Regimen: Cy/Flu  Chief complaint: Kristie Cornejo is a 52 year old female with a history of multiple myeloma conditioned with Cy/Flu, currently day +29 of her CAR T-cell therapy with Abecma.  - readmitted briefly  with transient fever, ongoing headache        INTERIM HISTORY:   Margo is feeling great, no more headaches, energy is better.   no new complaints - no n/v/d, fevers, or leg swelling.       REVIEW OF SYSTEMS: Otherwise unremarkable other than what is noted in the Interim History.      PHYSICAL EXAM:   Wt Readings from Last 4 Encounters:   23 72 kg (158 lb 11.2 oz)   23 71.2 kg (157 lb)   23 71.1 kg (156 lb 12.8 oz)   23 69.4 kg (152 lb 14.4 oz)     ECO    /70 (BP Location: Right arm, Patient Position: Chair, Cuff Size: Adult Regular)   Pulse 75   Temp 97.2  F (36.2  C) (Oral)   Resp 18   Wt 72 kg (158 lb 11.2 oz)   SpO2 99%   BMI 24.86 kg/m       General Appearance: NAD  HEENT: sclera anicteric. EOM's intact.  CV: RRR. no murmur or rub.   RESP: CTAB  EXT: No edema.   SKIN: no rash on exposed skin  NEURO: A&O  PSYCH: Appropriate affect    ACCESS: L arm PICC NT, dressing cdi        ASSESSMENT AND PLAN:   Kristie Cornejo is a 52 year old female with a history of multiple myeloma conditioned with Cy/Flu, currently day +29 of her CAR T-cell therapy with Abecma.      Light chain MM, standard risk with t(11;14)  No p53 or 17del  S/p Abecam now     Day 29 - re-staging studies drawn today and pending. Need discussion in about 1 week.    On Neurotox prophylaxis study.    it dex x 2 doses (-1, +6)   -- Simvastatin daily 40mg until day day +30    HEME  Counts are trending down now - monitor.  No transfusions.  G-CSF for ANC below 1000.        NEURO - neg. No ICANS       CRS  - hx Grade 1 with a Tmax 102.6F of  on . Given 1 dose of Tocilizumab.      HEME/COAG  - Transfusion  parameters: hemoglobin <7g/dL, platelets <10k.     IMMUNOCOMPROMISED  - ok to stop fluc and Levaquin.  -cont Pentamidine monthly - PCP prophylaxis thru 6 months post CAR-T; consider Bactrim when able pending counts.      CARDIOVASCULAR  - Risk of cardiomyopathy:  Baseline LVEF: 55-60%   - Risk of arrhythmia: Baseline EKG showed sinus rhythm nonspecific T wave abnormality  - Hx of PVC ablation March 2022      GI  - Ulcer prophylaxis: Protonix   - Risk of nausea/vomiting due to chemo: compazine and ativan prn   - History of IBS: Miralax prn. Currently having some loose stools about once daily.  - mild transaminitis noted; trend     RENAL/FEN  - Electrolyte management: replace per sliding scale    Today's Summary:  Plan to stop levo Day 28, 1st day of normal ANC  PICC line removed today    RTC:   Transition back to [facility]  SULEIMAN visit in 2 weeks and then CBC x 2 week if still trending down.   F/u with me for day 100 anniversary        I spent 30 minutes in the care of this patient today, which included time necessary for preparation for the visit, obtaining history, ordering medications/tests/procedures as medically indicated, review of pertinent medical literature, counseling of the patient, communication of recommendations to the care team, and documentation time.    Addendum:  Kappa FLC is now undetectable ! This time Margo achieved a CR!  Await BMBx at day 100.    MD Payton Leyva MD

## 2023-02-02 NOTE — NURSING NOTE
Chief Complaint   Patient presents with     RECHECK     Provider visit s/p CAR-T for MM     Blood Draw     Labs drawn via  by RN. VS taken.     Labs collected from venipuncture by RN. Vitals taken. Checked in for appointment(s).    Elkin Arenas RN

## 2023-02-02 NOTE — NURSING NOTE
"Oncology Rooming Note    February 2, 2023 9:39 AM   Kristie Cornejo is a 52 year old female who presents for:    Chief Complaint   Patient presents with     RECHECK     Provider visit s/p CAR-T for MM     Blood Draw     Labs drawn via  by RN. VS taken.     Initial Vitals: /70 (BP Location: Right arm, Patient Position: Chair, Cuff Size: Adult Regular)   Pulse 75   Temp 97.2  F (36.2  C) (Oral)   Resp 18   Wt 72 kg (158 lb 11.2 oz)   SpO2 99%   BMI 24.86 kg/m   Estimated body mass index is 24.86 kg/m  as calculated from the following:    Height as of 1/14/23: 1.702 m (5' 7\").    Weight as of this encounter: 72 kg (158 lb 11.2 oz). Body surface area is 1.84 meters squared.  No Pain (0) Comment: Data Unavailable   No LMP recorded. (Menstrual status: IUD).  Allergies reviewed: Yes  Medications reviewed: Yes    Medications: Medication refills not needed today.  Pharmacy name entered into [facility]:    [facility] - [place], [place] - [address]  [id number] - [place], [place] - [address]  [facility] - [place], [place] - [address]  [facility] [place] [address] [place] [address] PHARMACY [facility] - [place], [place] - [id number]  [facility] - [facility], [place] - [address] [place] [address] PHARMACY [facility] - [place], [place] - [address]    Clinical concerns: Patient denies fevers/N/V/D/respiratory symptoms.  Patient denies any pain, but reports generalized \"stiffness.\"      ANTON ACKERMAN RN              "

## 2023-02-03 LAB
ALBUMIN SERPL ELPH-MCNC: 4.4 G/DL (ref 3.7–5.1)
ALPHA1 GLOB SERPL ELPH-MCNC: 0.2 G/DL (ref 0.2–0.4)
ALPHA2 GLOB SERPL ELPH-MCNC: 0.5 G/DL (ref 0.5–0.9)
B-GLOBULIN SERPL ELPH-MCNC: 0.6 G/DL (ref 0.6–1)
CMV DNA SPEC NAA+PROBE-ACNC: NOT DETECTED IU/ML
GAMMA GLOB SERPL ELPH-MCNC: 0.3 G/DL (ref 0.7–1.6)
M PROTEIN SERPL ELPH-MCNC: 0 G/DL
PROT PATTERN SERPL ELPH-IMP: ABNORMAL
PROT PATTERN SERPL IFE-IMP: NORMAL

## 2023-02-06 ENCOUNTER — PATIENT OUTREACH (OUTPATIENT)
Dept: ONCOLOGY | Facility: CLINIC | Age: 53
End: 2023-02-06
Payer: COMMERCIAL

## 2023-02-06 NOTE — PROGRESS NOTES
[facility]: Cancer Care Short Note                                    Discussion with Patient:                                                      INBOUND CALL: VM received from patient. She is transferring back from BMT. Dr. Reinoso called her with lab results which show that kappa light chain is no longer detectable. She needs urine tests as well. Calling to coordinate getting this done.    OUTBOUND CALL: RNCC called patient. Pt can go to any [facility] lab to  24hr urine collection and drop it off at that lab. She will go today to pick this up. Will call RNCC with any other questions or concerns.       Griselda Valdez, RN, MSN, OCN   RN Care Coordinator   [facility]   [address] [place] [id number] [phone]

## 2023-02-07 ENCOUNTER — LAB (OUTPATIENT)
Dept: LAB | Facility: CLINIC | Age: 53
End: 2023-02-07
Payer: COMMERCIAL

## 2023-02-07 DIAGNOSIS — C90.00 MULTIPLE MYELOMA NOT HAVING ACHIEVED REMISSION (H): ICD-10-CM

## 2023-02-07 PROCEDURE — 86335 IMMUNFIX E-PHORSIS/URINE/CSF: CPT

## 2023-02-07 PROCEDURE — 84166 PROTEIN E-PHORESIS/URINE/CSF: CPT

## 2023-02-07 PROCEDURE — 81050 URINALYSIS VOLUME MEASURE: CPT

## 2023-02-08 LAB
PROT ELPH PNL UR ELPH: NORMAL
PROT PATTERN UR ELPH-IMP: NORMAL

## 2023-02-08 NOTE — PROGRESS NOTES
[facility]  Date of Visit: Feb 9, 2023   Oncologist: Dr. Payton Reinoso    Reason for visit: myeloma follow-up           Oncology History   52 year old female with past medical history significant for Irritable bowel disease, allergies, large plasmacytoma of the L pelvis, and newly diagnosed multiple myeloma.     Early in April 2021 , she noted to have left sided groin pain, constant and was affecting her daily activities  MRI showed large, expansile, permeative mass with indistinct borders involving the left superior and inferior pubic rami, pubic symphysis and with possible extension to the left acetabulum.     We completed the following work-up:   - Biopsy 9/7/2021: plasmacytoma.  Flow cytometry showed kappa monotypic plasma cells, polytypic B cells. FISH results from plasmacytoma showed a gain of 11q, IGH-CCND1 fusion but no losses of 1q or TP53 and no gain of 1q.  - Bone marrow biopsy 09/22/21: Marrow cellularity 50% with 25% interstitial infiltration w/  Kappa-monotypic, moderately atypical plasma cells. Flow with 0.7% kappa-monotypica plasma cells. FISH and cytogenetics pending.   - PET CT 09/24/21 w/ hypermetabolic pathologic fracture of the L pubic ramus w/ aggressive appearing osteolysis but no additional suspicious lytic or blastic osseous lesions.     Treatment to date:   - Zometa q4 weeks started 9/27/21  - Radiation to left pubic ramus x18 fractions, completed 11/11/21.   - RVD C1D1 11/15/21  - RVD C2D1 12/6/21  - RVD C3D1  12/27/21  - RVD C4 D1 1/17/22, elizabeth added on D8 due to inadequate response of urinary M-spike and FLCs remaining over 100 mg/dL after 4 cycles of RVD    - Elizabeth-RVD C1D8 1/24/22  - Leizabeth-RVD C2D1 2/7/22  - Elizabeth-RVD C3D1 2/28/22  - Elizabeth-RVD C4D1 3/29/22  - Elizabeth-RVD C5D1 4/19/22    Disease response: ME     - 5/19/22: Continued monotherapy with Daratumumab; held Revlimid and Velcade.    - 6/21/22: Transitioned to KCD, received C1 days 1 and 2- had  significant PVCs at cardiac follow-uyp after 1st week of Kyprolis, held further doses.    - Transitioned to Sarclisa/Pom/Dex 7/6/22. Started Pom on 7/11.     -9/8/22 - COVID, delayed C3D15 treatment x 1 week    - Apheresis for CAR-T on 11/22/22; bridging with Sarclisa given 11/23/22   - Abecma 1/4/23          INTERIM HISTORY:   Doing well overall.  Feels a little fatigued today, thinks it is from overdoing her activity a little bit yesterday.  Feels like her shortness of breath and occasional sensation of chest tightness are unchanged from this fall.  Still having occasional episodes of diarrhea, but again, feels like this is unchanged and manageable. Denies signs of acute illness - no fevers, chills, headaches, sinus congestion, sore throat, or cough. Appetite is good.  Denies n/v. No new bony pains.        ROS: 10 point ROS neg other than the symptoms noted above in the HPI.           Physical Exam:     /60 (BP Location: Right arm, Patient Position: Sitting, Cuff Size: Adult Regular)   Pulse 68   Temp 98.2  F (36.8  C) (Oral)   Resp 16   Wt 72.8 kg (160 lb 9.6 oz)   SpO2 99%   BMI 25.15 kg/m     Gen: alert, pleasant and conversational, NAD  HEENT: NC/AT,EOMI w/ PERRL, anicteric sclera. OP clear. MMM.   Neck: Supple, no LAD  CV: normal S1,S2 with RRR no m/r/g  Resp: lungs CTA bilaterally with adequate air movement to bases. No wheezes or crackles  Abd: soft NTND no organomegaly or masses. BS normoactive.   Ext: warm and well perfused, no edema or cyanosis  Skin: no concerning lesions or rashes  Neuro: A&Ox4, no lateralizing sx. Grossly nonfocal.  Psych: appropriate, reactive             Laboratory Data:      I personally reviewed the following labs:    Most Recent 3 CBC's:  Recent Labs   Lab Test 02/09/23  0929 02/02/23  0932 01/25/23  1215   WBC 2.6* 2.5* 2.7*   HGB 11.7 12.4 10.9*   MCV 98 95 96    177 131*     Most Recent 3 BMP's:  Recent Labs   Lab Test 02/09/23  0929 02/02/23  0932  01/25/23  1215    141 142   POTASSIUM 3.9 3.7 3.6   CHLORIDE 107 106 109*   CO2 24 24 23   BUN 21.3* 17.2 21.6*   CR 0.85 0.93 0.95   ANIONGAP 10 11 10   MEKA 9.4 9.6 9.3   * 106* 116*     Most Recent 2 LFT's:  Recent Labs   Lab Test 02/09/23  0929 02/02/23  0932   AST 30 28   ALT 49* 48*   ALKPHOS 40 44   BILITOTAL 0.4 0.4       Imaging:  No new imaging to review.         Assessment and Recommendations   Kristie Cornejo is a 52 year old female who was diagnosed with Plasmacytoma after she presented with left sided groin pain now found to have multiple myeloma after further workup with bone marrow biopsy.     # Multiple myeloma with associated large plasmacytoma of the L pelvis. Kappa light chain disease.     BMBx with 25% plasma cell infiltration; standard risk with gain of 11q, IGH-CCND1 fusion but no losses of 1q or TP53 and no gain of 1q.   SPEP w/out a monoclonal peak  UPEP positive. 70% paraprotein. Large monoclonal free immunoglobulin light chain of kappa chain type.     No anemia, normal creat, Ca, no other skeletal lesions. Alb normal, B2M normal.  Stage I Light chain disease with left pelvic bone pathologic fracture she is classified as having symptomatic multiple myeloma.   -s/p michelle-RVD, michelle alone and KCD with either little benefit or significant symptoms  -transitioned to Sarclisa/Pom/Dex to avoid the cardiotoxicity of Kyprolis.  Started C1D1 7/6/22.  Her diarrhea has returned with being back on treatment but manageable  - Received Sarclisa 11/23/22, Pomalyst through ~ 12/2/22  - Underwent CAR-T therapy with Abecma 1/4/23  - 2/2/23 labs show CR with undetectable kappa light chains  -No plans for maintenance at this time. She will follow-up with Dr. Reinoso at Day 60 and Day 100.       Continue Pentamidine monthly - PCP prophylaxis thru 6 months post CAR-T; consider Bactrim when able pending counts.     HEME  Counts are stable  No transfusions.  G-CSF for ANC below 1000.  Transfusion  "parameters: hemoglobin <7g/dL, platelets <10k.  Recheck labs when she returns for Day 60 follow-up    #Bone Health  Was on monthly Zometa while on treatment.  Will hold further zometa infusions for now. She will continue Calcium/Vitamin D supplements.       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 Currently having no pain and able to walk 1 mile without any assistive devices.  She is interested in pursuing other activities like gentle cross-country skiing.  Had follow-up with Dr. Philip on 1/6/22 and was cleared to resume regular activity as tolerated. She should still refrain from high-impact activities.    PET on 7/12/22 shows left pubic ramus lesion is smaller in size but brighter FDG. No new lesions.      # PVCs  She had an ECHO 12/1 and had follow-up with cardiology 12/7 (notes in [facility]).  She had follow-up with cardiology here at the  and they recommend an ablation. Stress test was completed   -started on Toprol XL however was discontinued due to significant side effects of orthostatic hypotension, pre-syncopal episodes, shakiness, and weakness.   - She had an ablation 3/23/22 but has not experienced relief from the feeling of being nauseated and short of breath after climbing the stairs.  She also reports episodes of palpitations that \"radiate into her neck.\" Denies chest pain or shortness of breath.   - EKG done 4/4 showed sinus rhythm and nonspecific ST and T wave abnormality. Troponin negative.  - Cardiac MRI ruled out amyloid.  LFEF 50%, no evidence of infiltrative cardiomyopathy.  - She followed up with cardiology and they felt that the cause may be musculoskeletal or pericarditis. They suggested taking ibuprofen for pain. Her pain has subsided and she actually has not needed to take ibuprofen.   - Post ablation Zio patch was performed " showing a significant reduction in PVCs; however, by the time she had her cardiology follow-up, she had restarted chemotherapy and the PVCs had returned.  Dr. Thomas is comfortable proceeding with treatment as long as symptoms are tolerable and there are no major EKG changes. Would need to repeat if she were to do BMT  - EKG 7/13 with no major changes, troponin WNL  -she continues to have PVCs, though much improved from when she was on cytoxan. Wants to avoid ablation again if possible  - Continue to follow-up with Dr. Thomas as recommended    #Diarrhea  She was having intermittent diarrhea with associated abdominal cramping and nausea, but developed much more significant diarrhea with 10-12 liquid episodes and was seen as an add-on on 4/4/22 for this.   - C.diff PCR negative 2/8/22, repeat on 4/5 negative  - Enteric stool panel 4/5/22 negative  - Underwent colonoscopy on 4/13/22 to pursue biopsy for amyloid (discussed below). Congo red stain is negative for amyloid deposits. Pathology showed colonic mucosa with focal active colitis, negative for signs of chronicity and lymphocytic colitis. Per patient report, GI reviewed results with her and did not feel she needed treatment for these findings at this time. They advised her to monitor her symptoms and to call them if they do not improve.Dr. Montgomery agreed with following conservatively for now since she reports some improvement in her symptoms.  If diarrhea becomes bothersome again, then they will arrange for expeditious evaluation in the IBD clinic.   - CMV PCR (4/4/22) not detected  -has intermittent diarrhea and constipation.  Manages with metamucil and Miralax.      #COVID prophylaxis  - She received EVUSHELD 1/20/22 and 3/8/22, no longer giving Evusheld  - Received 3 COVID vaccines to date  - Influenza vaccine 10/5/22      45 minutes spent on the date of the encounter doing chart review, review of test results, patient visit and documentation       Jackie  DORON Dunn CNP  [facility]  [address] [place], [place] [address] [phone]

## 2023-02-09 ENCOUNTER — ONCOLOGY VISIT (OUTPATIENT)
Dept: ONCOLOGY | Facility: CLINIC | Age: 53
End: 2023-02-09
Payer: COMMERCIAL

## 2023-02-09 ENCOUNTER — LAB (OUTPATIENT)
Dept: LAB | Facility: CLINIC | Age: 53
End: 2023-02-09
Payer: COMMERCIAL

## 2023-02-09 VITALS
TEMPERATURE: 98.2 F | OXYGEN SATURATION: 99 % | WEIGHT: 160.6 LBS | DIASTOLIC BLOOD PRESSURE: 60 MMHG | HEART RATE: 68 BPM | SYSTOLIC BLOOD PRESSURE: 126 MMHG | BODY MASS INDEX: 25.15 KG/M2 | RESPIRATION RATE: 16 BRPM

## 2023-02-09 DIAGNOSIS — R19.7 DIARRHEA, UNSPECIFIED TYPE: ICD-10-CM

## 2023-02-09 DIAGNOSIS — I49.3 PVC'S (PREMATURE VENTRICULAR CONTRACTIONS): ICD-10-CM

## 2023-02-09 DIAGNOSIS — C90.00 LIGHT CHAIN MYELOMA (H): ICD-10-CM

## 2023-02-09 DIAGNOSIS — C90.00 MULTIPLE MYELOMA, REMISSION STATUS UNSPECIFIED (H): Primary | ICD-10-CM

## 2023-02-09 LAB
ACANTHOCYTES BLD QL SMEAR: SLIGHT
ALBUMIN SERPL BCG-MCNC: 4.5 G/DL (ref 3.5–5.2)
ALP SERPL-CCNC: 40 U/L (ref 35–104)
ALT SERPL W P-5'-P-CCNC: 49 U/L (ref 10–35)
ANION GAP SERPL CALCULATED.3IONS-SCNC: 10 MMOL/L (ref 7–15)
AST SERPL W P-5'-P-CCNC: 30 U/L (ref 10–35)
BASOPHILS # BLD AUTO: 0 10E3/UL (ref 0–0.2)
BASOPHILS NFR BLD AUTO: 2 %
BILIRUB SERPL-MCNC: 0.4 MG/DL
BUN SERPL-MCNC: 21.3 MG/DL (ref 6–20)
BURR CELLS BLD QL SMEAR: SLIGHT
CALCIUM SERPL-MCNC: 9.4 MG/DL (ref 8.6–10)
CHLORIDE SERPL-SCNC: 107 MMOL/L (ref 98–107)
CREAT SERPL-MCNC: 0.85 MG/DL (ref 0.51–0.95)
DEPRECATED HCO3 PLAS-SCNC: 24 MMOL/L (ref 22–29)
ELLIPTOCYTES BLD QL SMEAR: SLIGHT
EOSINOPHIL # BLD AUTO: 0 10E3/UL (ref 0–0.7)
EOSINOPHIL NFR BLD AUTO: 2 %
ERYTHROCYTE [DISTWIDTH] IN BLOOD BY AUTOMATED COUNT: 15.5 % (ref 10–15)
GFR SERPL CREATININE-BSD FRML MDRD: 82 ML/MIN/1.73M2
GLUCOSE SERPL-MCNC: 133 MG/DL (ref 70–99)
HCT VFR BLD AUTO: 35 % (ref 35–47)
HGB BLD-MCNC: 11.7 G/DL (ref 11.7–15.7)
IGA SERPL-MCNC: <2 MG/DL (ref 84–499)
IGG SERPL-MCNC: 355 MG/DL (ref 610–1616)
IGM SERPL-MCNC: <10 MG/DL (ref 35–242)
IMM GRANULOCYTES # BLD: 0 10E3/UL
IMM GRANULOCYTES NFR BLD: 0 %
KAPPA LC FREE SER-MCNC: <0.06 MG/DL (ref 0.33–1.94)
KAPPA LC FREE/LAMBDA FREE SER NEPH: ABNORMAL {RATIO}
LAMBDA LC FREE SERPL-MCNC: <0.14 MG/DL (ref 0.57–2.63)
LYMPHOCYTES # BLD AUTO: 0.4 10E3/UL (ref 0.8–5.3)
LYMPHOCYTES NFR BLD AUTO: 14 %
MCH RBC QN AUTO: 32.7 PG (ref 26.5–33)
MCHC RBC AUTO-ENTMCNC: 33.4 G/DL (ref 31.5–36.5)
MCV RBC AUTO: 98 FL (ref 78–100)
MONOCYTES # BLD AUTO: 0.6 10E3/UL (ref 0–1.3)
MONOCYTES NFR BLD AUTO: 21 %
NEUTROPHILS # BLD AUTO: 1.6 10E3/UL (ref 1.6–8.3)
NEUTROPHILS NFR BLD AUTO: 61 %
NRBC # BLD AUTO: 0 10E3/UL
NRBC BLD AUTO-RTO: 0 /100
PLAT MORPH BLD: ABNORMAL
PLATELET # BLD AUTO: 229 10E3/UL (ref 150–450)
POTASSIUM SERPL-SCNC: 3.9 MMOL/L (ref 3.4–5.3)
PROT SERPL-MCNC: 6.1 G/DL (ref 6.4–8.3)
RBC # BLD AUTO: 3.58 10E6/UL (ref 3.8–5.2)
RBC MORPH BLD: ABNORMAL
SODIUM SERPL-SCNC: 141 MMOL/L (ref 136–145)
TOTAL PROTEIN SERUM FOR ELP: 5.7 G/DL (ref 6.4–8.3)
WBC # BLD AUTO: 2.6 10E3/UL (ref 4–11)

## 2023-02-09 PROCEDURE — 84155 ASSAY OF PROTEIN SERUM: CPT

## 2023-02-09 PROCEDURE — 84165 PROTEIN E-PHORESIS SERUM: CPT | Mod: 26

## 2023-02-09 PROCEDURE — 84165 PROTEIN E-PHORESIS SERUM: CPT | Mod: TC | Performed by: PATHOLOGY

## 2023-02-09 PROCEDURE — G0463 HOSPITAL OUTPT CLINIC VISIT: HCPCS | Performed by: REGISTERED NURSE

## 2023-02-09 PROCEDURE — 86334 IMMUNOFIX E-PHORESIS SERUM: CPT | Mod: 26

## 2023-02-09 PROCEDURE — 86334 IMMUNOFIX E-PHORESIS SERUM: CPT | Performed by: PATHOLOGY

## 2023-02-09 PROCEDURE — 82784 ASSAY IGA/IGD/IGG/IGM EACH: CPT

## 2023-02-09 PROCEDURE — 83521 IG LIGHT CHAINS FREE EACH: CPT

## 2023-02-09 PROCEDURE — 36415 COLL VENOUS BLD VENIPUNCTURE: CPT

## 2023-02-09 PROCEDURE — 85004 AUTOMATED DIFF WBC COUNT: CPT | Performed by: REGISTERED NURSE

## 2023-02-09 PROCEDURE — 99214 OFFICE O/P EST MOD 30 MIN: CPT | Performed by: REGISTERED NURSE

## 2023-02-09 PROCEDURE — G0463 HOSPITAL OUTPT CLINIC VISIT: HCPCS

## 2023-02-09 PROCEDURE — 80053 COMPREHEN METABOLIC PANEL: CPT | Performed by: REGISTERED NURSE

## 2023-02-09 RX ORDER — HEPARIN SODIUM,PORCINE 10 UNIT/ML
5 VIAL (ML) INTRAVENOUS
Status: DISCONTINUED | OUTPATIENT
Start: 2023-02-09 | End: 2023-02-09 | Stop reason: HOSPADM

## 2023-02-09 ASSESSMENT — PAIN SCALES - GENERAL: PAINLEVEL: NO PAIN (0)

## 2023-02-09 NOTE — NURSING NOTE
Chief Complaint   Patient presents with     Blood Draw     Labs drawn via  by RN. Vitals taken.     Labs drawn with  by RN. Vitals taken. Patient checked into next appointment.    Griselda Davenport RN

## 2023-02-09 NOTE — NURSING NOTE
After I verified name and date of birth. I drew labs via venipuncture on this pt while in clinic. They tolerated this well.   Labs sent down to the first floor labs.    Tammi Freeman, A

## 2023-02-09 NOTE — LETTER
2/9/2023         RE: Kristie Cornejo  [address]  [facility] [address]        Dear Colleague,    Thank you for referring your patient, Kristie Cornejo, to the [facility]. Please see a copy of my visit note below.    [facility]  Date of Visit: Feb 9, 2023   Oncologist: Dr. Payton Reinoso    Reason for visit: myeloma follow-up           Oncology History   52 year old female with past medical history significant for Irritable bowel disease, allergies, large plasmacytoma of the L pelvis, and newly diagnosed multiple myeloma.     Early in April 2021 , she noted to have left sided groin pain, constant and was affecting her daily activities  MRI showed large, expansile, permeative mass with indistinct borders involving the left superior and inferior pubic rami, pubic symphysis and with possible extension to the left acetabulum.     We completed the following work-up:   - Biopsy 9/7/2021: plasmacytoma.  Flow cytometry showed kappa monotypic plasma cells, polytypic B cells. FISH results from plasmacytoma showed a gain of 11q, IGH-CCND1 fusion but no losses of 1q or TP53 and no gain of 1q.  - Bone marrow biopsy 09/22/21: Marrow cellularity 50% with 25% interstitial infiltration w/  Kappa-monotypic, moderately atypical plasma cells. Flow with 0.7% kappa-monotypica plasma cells. FISH and cytogenetics pending.   - PET CT 09/24/21 w/ hypermetabolic pathologic fracture of the L pubic ramus w/ aggressive appearing osteolysis but no additional suspicious lytic or blastic osseous lesions.     Treatment to date:   - Zometa q4 weeks started 9/27/21  - Radiation to left pubic ramus x18 fractions, completed 11/11/21.   - RVD C1D1 11/15/21  - RVD C2D1 12/6/21  - RVD C3D1  12/27/21  - RVD C4 D1 1/17/22, elizabeth added on D8 due to inadequate response of urinary M-spike and FLCs remaining over 100 mg/dL after 4 cycles of RVD    - Elizabeth-RVD C1D8 1/24/22  -  Elizabeth-RVD C2D1 2/7/22  - Elizabeth-RVD C3D1 2/28/22  - Elizabeth-RVD C4D1 3/29/22  - Elizabeth-RVD C5D1 4/19/22    Disease response: MS     - 5/19/22: Continued monotherapy with Daratumumab; held Revlimid and Velcade.    - 6/21/22: Transitioned to KCD, received C1 days 1 and 2- had significant PVCs at cardiac follow-uyp after 1st week of Kyprolis, held further doses.    - Transitioned to Sarclisa/Pom/Dex 7/6/22. Started Pom on 7/11.     -9/8/22 - COVID, delayed C3D15 treatment x 1 week    - Apheresis for CAR-T on 11/22/22; bridging with Sarclisa given 11/23/22   - Abecma 1/4/23          INTERIM HISTORY:   Doing well overall.  Feels a little fatigued today, thinks it is from overdoing her activity a little bit yesterday.  Feels like her shortness of breath and occasional sensation of chest tightness are unchanged from this fall.  Still having occasional episodes of diarrhea, but again, feels like this is unchanged and manageable. Denies signs of acute illness - no fevers, chills, headaches, sinus congestion, sore throat, or cough. Appetite is good.  Denies n/v. No new bony pains.        ROS: 10 point ROS neg other than the symptoms noted above in the HPI.           Physical Exam:     /60 (BP Location: Right arm, Patient Position: Sitting, Cuff Size: Adult Regular)   Pulse 68   Temp 98.2  F (36.8  C) (Oral)   Resp 16   Wt 72.8 kg (160 lb 9.6 oz)   SpO2 99%   BMI 25.15 kg/m     Gen: alert, pleasant and conversational, NAD  HEENT: NC/AT,EOMI w/ PERRL, anicteric sclera. OP clear. MMM.   Neck: Supple, no LAD  CV: normal S1,S2 with RRR no m/r/g  Resp: lungs CTA bilaterally with adequate air movement to bases. No wheezes or crackles  Abd: soft NTND no organomegaly or masses. BS normoactive.   Ext: warm and well perfused, no edema or cyanosis  Skin: no concerning lesions or rashes  Neuro: A&Ox4, no lateralizing sx. Grossly nonfocal.  Psych: appropriate, reactive             Laboratory Data:      I personally reviewed the  following labs:    Most Recent 3 CBC's:  Recent Labs   Lab Test 02/09/23  0929 02/02/23  0932 01/25/23  1215   WBC 2.6* 2.5* 2.7*   HGB 11.7 12.4 10.9*   MCV 98 95 96    177 131*     Most Recent 3 BMP's:  Recent Labs   Lab Test 02/09/23  0929 02/02/23  0932 01/25/23  1215    141 142   POTASSIUM 3.9 3.7 3.6   CHLORIDE 107 106 109*   CO2 24 24 23   BUN 21.3* 17.2 21.6*   CR 0.85 0.93 0.95   ANIONGAP 10 11 10   MEKA 9.4 9.6 9.3   * 106* 116*     Most Recent 2 LFT's:  Recent Labs   Lab Test 02/09/23  0929 02/02/23  0932   AST 30 28   ALT 49* 48*   ALKPHOS 40 44   BILITOTAL 0.4 0.4       Imaging:  No new imaging to review.         Assessment and Recommendations   Kristie Cornejo is a 52 year old female who was diagnosed with Plasmacytoma after she presented with left sided groin pain now found to have multiple myeloma after further workup with bone marrow biopsy.     # Multiple myeloma with associated large plasmacytoma of the L pelvis. Kappa light chain disease.     BMBx with 25% plasma cell infiltration; standard risk with gain of 11q, IGH-CCND1 fusion but no losses of 1q or TP53 and no gain of 1q.   SPEP w/out a monoclonal peak  UPEP positive. 70% paraprotein. Large monoclonal free immunoglobulin light chain of kappa chain type.     No anemia, normal creat, Ca, no other skeletal lesions. Alb normal, B2M normal.  Stage I Light chain disease with left pelvic bone pathologic fracture she is classified as having symptomatic multiple myeloma.   -s/p michelle-RVD, michelle alone and KCD with either little benefit or significant symptoms  -transitioned to Sarclisa/Pom/Dex to avoid the cardiotoxicity of Kyprolis.  Started C1D1 7/6/22.  Her diarrhea has returned with being back on treatment but manageable  - Received Sarclisa 11/23/22, Pomalyst through ~ 12/2/22  - Underwent CAR-T therapy with Abecma 1/4/23  - 2/2/23 labs show CR with undetectable kappa light chains  -No plans for maintenance at this time. She  "will follow-up with Dr. Reinoso at Day 60 and Day 100.       Continue Pentamidine monthly - PCP prophylaxis thru 6 months post CAR-T; consider Bactrim when able pending counts.     HEME  Counts are stable  No transfusions.  G-CSF for ANC below 1000.  Transfusion parameters: hemoglobin <7g/dL, platelets <10k.  Recheck labs when she returns for Day 60 follow-up    #Bone Health  Was on monthly Zometa while on treatment.  Will hold further zometa infusions for now. She will continue Calcium/Vitamin D supplements.       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 Currently having no pain and able to walk 1 mile without any assistive devices.  She is interested in pursuing other activities like gentle cross-country skiing.  Had follow-up with Dr. Philip on 1/6/22 and was cleared to resume regular activity as tolerated. She should still refrain from high-impact activities.    PET on 7/12/22 shows left pubic ramus lesion is smaller in size but brighter FDG. No new lesions.      # PVCs  She had an ECHO 12/1 and had follow-up with cardiology 12/7 (notes in [facility]).  She had follow-up with cardiology here at the  and they recommend an ablation. Stress test was completed   -started on Toprol XL however was discontinued due to significant side effects of orthostatic hypotension, pre-syncopal episodes, shakiness, and weakness.   - She had an ablation 3/23/22 but has not experienced relief from the feeling of being nauseated and short of breath after climbing the stairs.  She also reports episodes of palpitations that \"radiate into her neck.\" Denies chest pain or shortness of breath.   - EKG done 4/4 showed sinus rhythm and nonspecific ST and T wave abnormality. Troponin negative.  - Cardiac MRI ruled out amyloid.  LFEF 50%, no evidence of infiltrative " cardiomyopathy.  - She followed up with cardiology and they felt that the cause may be musculoskeletal or pericarditis. They suggested taking ibuprofen for pain. Her pain has subsided and she actually has not needed to take ibuprofen.   - Post ablation Zio patch was performed showing a significant reduction in PVCs; however, by the time she had her cardiology follow-up, she had restarted chemotherapy and the PVCs had returned.  Dr. Thomas is comfortable proceeding with treatment as long as symptoms are tolerable and there are no major EKG changes. Would need to repeat if she were to do BMT  - EKG 7/13 with no major changes, troponin WNL  -she continues to have PVCs, though much improved from when she was on cytoxan. Wants to avoid ablation again if possible  - Continue to follow-up with Dr. Thomas as recommended    #Diarrhea  She was having intermittent diarrhea with associated abdominal cramping and nausea, but developed much more significant diarrhea with 10-12 liquid episodes and was seen as an add-on on 4/4/22 for this.   - C.diff PCR negative 2/8/22, repeat on 4/5 negative  - Enteric stool panel 4/5/22 negative  - Underwent colonoscopy on 4/13/22 to pursue biopsy for amyloid (discussed below). Congo red stain is negative for amyloid deposits. Pathology showed colonic mucosa with focal active colitis, negative for signs of chronicity and lymphocytic colitis. Per patient report, GI reviewed results with her and did not feel she needed treatment for these findings at this time. They advised her to monitor her symptoms and to call them if they do not improve.Dr. Montgomery agreed with following conservatively for now since she reports some improvement in her symptoms.  If diarrhea becomes bothersome again, then they will arrange for expeditious evaluation in the IBD clinic.   - CMV PCR (4/4/22) not detected  -has intermittent diarrhea and constipation.  Manages with metamucil and Miralax.      #COVID prophylaxis  -  She received EVUSHELD 1/20/22 and 3/8/22, no longer giving Evusheld  - Received 3 COVID vaccines to date  - Influenza vaccine 10/5/22      45 minutes spent on the date of the encounter doing chart review, review of test results, patient visit and documentation       DORON Magaña CNP  [facility]  [address] [place], [place] [address] [phone]

## 2023-02-09 NOTE — NURSING NOTE
"Oncology Rooming Note    February 9, 2023 8:40 AM   Kristie Cornejo is a 52 year old female who presents for:    Chief Complaint   Patient presents with     Blood Draw     Labs drawn via  by RN. Vitals taken.     Oncology Clinic Visit     Multiple Myeloma  Plasmacytoma of Bone     Initial Vitals: /60 (BP Location: Right arm, Patient Position: Sitting, Cuff Size: Adult Regular)   Pulse 68   Temp 98.2  F (36.8  C) (Oral)   Resp 16   Wt 72.8 kg (160 lb 9.6 oz)   SpO2 99%   BMI 25.15 kg/m   Estimated body mass index is 25.15 kg/m  as calculated from the following:    Height as of 1/14/23: 1.702 m (5' 7\").    Weight as of this encounter: 72.8 kg (160 lb 9.6 oz). Body surface area is 1.86 meters squared.  No Pain (0) Comment: Data Unavailable   No LMP recorded. (Menstrual status: IUD).  Allergies reviewed: Yes  Medications reviewed: Yes    Medications: Medication refills not needed today.  Pharmacy name entered into [email or web address]:    [facility] [place] - [place], [place] - [address]  [id number] - [place], [place] - [address]  [facility] - [place], [place] - [address]  [facility] - [place], [place] - [address] [place] [address] PHARMACY [facility] - [place], [place] [phone]  [facility] - [facility], [place] - [address] [place] [address] PHARMACY [facility] - [place], [place] - [address]    Clinical concerns: Pt presents today for f/u.       Toshia Ramírez LPN  2/9/2023              "

## 2023-02-10 LAB
ALBUMIN SERPL ELPH-MCNC: 4.2 G/DL (ref 3.7–5.1)
ALPHA1 GLOB SERPL ELPH-MCNC: 0.2 G/DL (ref 0.2–0.4)
ALPHA2 GLOB SERPL ELPH-MCNC: 0.4 G/DL (ref 0.5–0.9)
B-GLOBULIN SERPL ELPH-MCNC: 0.6 G/DL (ref 0.6–1)
GAMMA GLOB SERPL ELPH-MCNC: 0.3 G/DL (ref 0.7–1.6)
M PROTEIN SERPL ELPH-MCNC: 0 G/DL
PROT PATTERN SERPL ELPH-IMP: ABNORMAL
PROT PATTERN SERPL IFE-IMP: NORMAL

## 2023-02-14 ENCOUNTER — PATIENT OUTREACH (OUTPATIENT)
Dept: ONCOLOGY | Facility: CLINIC | Age: 53
End: 2023-02-14
Payer: COMMERCIAL

## 2023-02-14 NOTE — PROGRESS NOTES
[facility]: Cancer Care Short Note                                    Discussion with Patient:                                                      INBOUND CALL: Inbound call received from patient. Requesting PT referral to [facility]--- [phone]. RNCC will fax referral to above PT clinic.  Pt also states that she started have a productive cough on Friday 2/10. She also has post nasal drip.   Denies fever, SOB, chills, fatigue.   Pt states she has not taken a covid test yet. RNCC suggested that pt take a covid test. RNCC will discuss with SULEIMAN if any further testing is needed.     OUTBOUND CALL: RNCC called patient. Pt took covid test which was negative. Per SULEIMAN, suggest to continue with supportive measures (push PO fluid, rest, OTC meds for cough, etc.)  Pt knows to call triage if she develops fever, SOB, or worsening symptoms.        Intervention/Education provided during outreach:                                                         Patient to follow up as scheduled at next appt  Patient to call/[employer] message with updates  Confirmed patient has clinic and triage numbers    Patient verbalizes understanding of POC and has no other questions or concerns at this time.     Griselda Valdez, RN, MSN, OCN   RN Care Coordinator   [facility]   [address] [place], [place] [id number] [phone]

## 2023-02-15 ENCOUNTER — ALLIED HEALTH/NURSE VISIT (OUTPATIENT)
Dept: TRANSPLANT | Facility: CLINIC | Age: 53
End: 2023-02-15
Payer: COMMERCIAL

## 2023-02-15 DIAGNOSIS — C90.00 MYELOMA (H): Primary | ICD-10-CM

## 2023-02-15 NOTE — PROGRESS NOTES
[id number]: Study Visit Note   Subject name: Kristie Cornejo     *This is a late entry note* Spoke to patient on 2/6/2023 and day 30 visit was conducted by Dr. Reinoso 2/2/23    Visit: Day 30    Did the study visit occur within the appropriate window allowed by the protocol? yes    Since the last study visit, She has been doing well. She denies any issues. States she was grateful that she did not experience any neurotoxicity on study.     Spoke to patient who stated that Dr. Reinoso requested she continue to take drug until 2/6/23.  She has no pills left over. Drug diary was returned and is in patient research file.     I have personally interviewed Kristie Cornejo and reviewed her medical record for adverse events and concomitant medications and these have been recorded on the corresponding logs in Kristie Cornejo's research file.     Kristie Cornejo was given the opportunity to ask any trial related questions.  Please see provider progress note for physical exam and other clinical information. Labs were reviewed - any significant lab values were addressed and reviewed.    Shoshana Caballero RN

## 2023-03-08 DIAGNOSIS — C90.00 LIGHT CHAIN MYELOMA (H): Primary | ICD-10-CM

## 2023-03-09 ENCOUNTER — OFFICE VISIT (OUTPATIENT)
Dept: TRANSPLANT | Facility: CLINIC | Age: 53
End: 2023-03-09
Payer: COMMERCIAL

## 2023-03-09 ENCOUNTER — LAB (OUTPATIENT)
Dept: LAB | Facility: CLINIC | Age: 53
End: 2023-03-09
Payer: COMMERCIAL

## 2023-03-09 VITALS
SYSTOLIC BLOOD PRESSURE: 123 MMHG | HEART RATE: 98 BPM | OXYGEN SATURATION: 99 % | WEIGHT: 160 LBS | DIASTOLIC BLOOD PRESSURE: 72 MMHG | BODY MASS INDEX: 25.06 KG/M2 | TEMPERATURE: 98.7 F | RESPIRATION RATE: 16 BRPM

## 2023-03-09 DIAGNOSIS — C90.00 MULTIPLE MYELOMA NOT HAVING ACHIEVED REMISSION (H): Primary | ICD-10-CM

## 2023-03-09 DIAGNOSIS — C90.00 LIGHT CHAIN MYELOMA (H): Primary | ICD-10-CM

## 2023-03-09 DIAGNOSIS — C90.00 LIGHT CHAIN MYELOMA (H): ICD-10-CM

## 2023-03-09 DIAGNOSIS — Z71.9 VISIT FOR COUNSELING: Primary | ICD-10-CM

## 2023-03-09 LAB
ALBUMIN SERPL BCG-MCNC: 4.8 G/DL (ref 3.5–5.2)
ALP SERPL-CCNC: 51 U/L (ref 35–104)
ALT SERPL W P-5'-P-CCNC: 18 U/L (ref 10–35)
ANION GAP SERPL CALCULATED.3IONS-SCNC: 12 MMOL/L (ref 7–15)
AST SERPL W P-5'-P-CCNC: 18 U/L (ref 10–35)
B2 MICROGLOB TUMOR MARKER SER-MCNC: 1.9 MG/L
BASOPHILS # BLD AUTO: 0 10E3/UL (ref 0–0.2)
BASOPHILS NFR BLD AUTO: 1 %
BILIRUB SERPL-MCNC: 0.5 MG/DL
BUN SERPL-MCNC: 20.2 MG/DL (ref 6–20)
CALCIUM SERPL-MCNC: 9.6 MG/DL (ref 8.6–10)
CD19 CELLS # BLD: 3 CELLS/UL (ref 107–698)
CD19 CELLS NFR BLD: 1 % (ref 6–27)
CD3 CELLS # BLD: 246 CELLS/UL (ref 603–2990)
CD3 CELLS NFR BLD: 88 % (ref 49–84)
CD3+CD4+ CELLS # BLD: 162 CELLS/UL (ref 441–2156)
CD3+CD4+ CELLS NFR BLD: 58 % (ref 28–63)
CD3+CD4+ CELLS/CD3+CD8+ CLL BLD: 1.89 % (ref 1.4–2.6)
CD3+CD8+ CELLS # BLD: 86 CELLS/UL (ref 125–1312)
CD3+CD8+ CELLS NFR BLD: 31 % (ref 10–40)
CD3-CD16+CD56+ CELLS # BLD: 28 CELLS/UL (ref 95–640)
CD3-CD16+CD56+ CELLS NFR BLD: 10 % (ref 4–25)
CHLORIDE SERPL-SCNC: 108 MMOL/L (ref 98–107)
CREAT SERPL-MCNC: 0.88 MG/DL (ref 0.51–0.95)
DEPRECATED HCO3 PLAS-SCNC: 24 MMOL/L (ref 22–29)
EOSINOPHIL # BLD AUTO: 0.1 10E3/UL (ref 0–0.7)
EOSINOPHIL NFR BLD AUTO: 3 %
ERYTHROCYTE [DISTWIDTH] IN BLOOD BY AUTOMATED COUNT: 13.5 % (ref 10–15)
GFR SERPL CREATININE-BSD FRML MDRD: 79 ML/MIN/1.73M2
GLUCOSE SERPL-MCNC: 102 MG/DL (ref 70–99)
HCT VFR BLD AUTO: 38.4 % (ref 35–47)
HGB BLD-MCNC: 13 G/DL (ref 11.7–15.7)
IGG SERPL-MCNC: 259 MG/DL (ref 610–1616)
IMM GRANULOCYTES # BLD: 0 10E3/UL
IMM GRANULOCYTES NFR BLD: 0 %
KAPPA LC FREE SER-MCNC: 0.06 MG/DL (ref 0.33–1.94)
KAPPA LC FREE/LAMBDA FREE SER NEPH: ABNORMAL {RATIO}
LAMBDA LC FREE SERPL-MCNC: <0.14 MG/DL (ref 0.57–2.63)
LDH SERPL L TO P-CCNC: 191 U/L (ref 0–250)
LYMPHOCYTES # BLD AUTO: 0.3 10E3/UL (ref 0.8–5.3)
LYMPHOCYTES NFR BLD AUTO: 8 %
MAGNESIUM SERPL-MCNC: 2.6 MG/DL (ref 1.7–2.3)
MCH RBC QN AUTO: 31.8 PG (ref 26.5–33)
MCHC RBC AUTO-ENTMCNC: 33.9 G/DL (ref 31.5–36.5)
MCV RBC AUTO: 94 FL (ref 78–100)
MONOCYTES # BLD AUTO: 0.4 10E3/UL (ref 0–1.3)
MONOCYTES NFR BLD AUTO: 12 %
NEUTROPHILS # BLD AUTO: 2.4 10E3/UL (ref 1.6–8.3)
NEUTROPHILS NFR BLD AUTO: 76 %
NRBC # BLD AUTO: 0 10E3/UL
NRBC BLD AUTO-RTO: 0 /100
PHOSPHATE SERPL-MCNC: 3.2 MG/DL (ref 2.5–4.5)
PLATELET # BLD AUTO: 273 10E3/UL (ref 150–450)
POTASSIUM SERPL-SCNC: 3.9 MMOL/L (ref 3.4–5.3)
PROT SERPL-MCNC: 6.8 G/DL (ref 6.4–8.3)
RBC # BLD AUTO: 4.09 10E6/UL (ref 3.8–5.2)
SODIUM SERPL-SCNC: 144 MMOL/L (ref 136–145)
T CELL EXTENDED COMMENT: ABNORMAL
TOTAL PROTEIN SERUM FOR ELP: 6.3 G/DL (ref 6.4–8.3)
WBC # BLD AUTO: 3.2 10E3/UL (ref 4–11)

## 2023-03-09 PROCEDURE — 86334 IMMUNOFIX E-PHORESIS SERUM: CPT | Mod: 26

## 2023-03-09 PROCEDURE — 85025 COMPLETE CBC W/AUTO DIFF WBC: CPT | Performed by: PHYSICIAN ASSISTANT

## 2023-03-09 PROCEDURE — 86334 IMMUNOFIX E-PHORESIS SERUM: CPT | Performed by: PATHOLOGY

## 2023-03-09 PROCEDURE — 82784 ASSAY IGA/IGD/IGG/IGM EACH: CPT | Performed by: PHYSICIAN ASSISTANT

## 2023-03-09 PROCEDURE — 94642 AEROSOL INHALATION TREATMENT: CPT | Performed by: PHYSICIAN ASSISTANT

## 2023-03-09 PROCEDURE — 80053 COMPREHEN METABOLIC PANEL: CPT | Performed by: PHYSICIAN ASSISTANT

## 2023-03-09 PROCEDURE — 83735 ASSAY OF MAGNESIUM: CPT | Performed by: PHYSICIAN ASSISTANT

## 2023-03-09 PROCEDURE — G0463 HOSPITAL OUTPT CLINIC VISIT: HCPCS

## 2023-03-09 PROCEDURE — 86360 T CELL ABSOLUTE COUNT/RATIO: CPT | Performed by: PHYSICIAN ASSISTANT

## 2023-03-09 PROCEDURE — 84100 ASSAY OF PHOSPHORUS: CPT | Performed by: PHYSICIAN ASSISTANT

## 2023-03-09 PROCEDURE — 83615 LACTATE (LD) (LDH) ENZYME: CPT | Performed by: PHYSICIAN ASSISTANT

## 2023-03-09 PROCEDURE — 82232 ASSAY OF BETA-2 PROTEIN: CPT | Performed by: PHYSICIAN ASSISTANT

## 2023-03-09 PROCEDURE — 36415 COLL VENOUS BLD VENIPUNCTURE: CPT | Performed by: PHYSICIAN ASSISTANT

## 2023-03-09 PROCEDURE — 84155 ASSAY OF PROTEIN SERUM: CPT | Performed by: PHYSICIAN ASSISTANT

## 2023-03-09 PROCEDURE — 83521 IG LIGHT CHAINS FREE EACH: CPT | Performed by: PHYSICIAN ASSISTANT

## 2023-03-09 PROCEDURE — 86355 B CELLS TOTAL COUNT: CPT | Performed by: PHYSICIAN ASSISTANT

## 2023-03-09 PROCEDURE — 84165 PROTEIN E-PHORESIS SERUM: CPT | Mod: 26

## 2023-03-09 PROCEDURE — 99214 OFFICE O/P EST MOD 30 MIN: CPT

## 2023-03-09 PROCEDURE — 84165 PROTEIN E-PHORESIS SERUM: CPT | Mod: TC | Performed by: PATHOLOGY

## 2023-03-09 PROCEDURE — 94640 AIRWAY INHALATION TREATMENT: CPT | Performed by: PHYSICIAN ASSISTANT

## 2023-03-09 RX ORDER — ACYCLOVIR 800 MG/1
800 TABLET ORAL EVERY 12 HOURS
Qty: 60 TABLET | Refills: 1 | Status: SHIPPED | OUTPATIENT
Start: 2023-03-09 | End: 2023-05-01

## 2023-03-09 RX ORDER — HEPARIN SODIUM (PORCINE) LOCK FLUSH IV SOLN 100 UNIT/ML 100 UNIT/ML
5 SOLUTION INTRAVENOUS
OUTPATIENT
Start: 2023-04-03

## 2023-03-09 RX ORDER — HEPARIN SODIUM,PORCINE 10 UNIT/ML
5 VIAL (ML) INTRAVENOUS
OUTPATIENT
Start: 2023-04-03

## 2023-03-09 RX ORDER — PENTAMIDINE ISETHIONATE 300 MG/300MG
300 INHALANT RESPIRATORY (INHALATION)
Status: DISCONTINUED | OUTPATIENT
Start: 2023-03-09 | End: 2023-03-09 | Stop reason: HOSPADM

## 2023-03-09 RX ORDER — ALBUTEROL SULFATE 0.83 MG/ML
2.5 SOLUTION RESPIRATORY (INHALATION)
Start: 2023-04-03

## 2023-03-09 RX ORDER — PENTAMIDINE ISETHIONATE 300 MG/300MG
300 INHALANT RESPIRATORY (INHALATION)
Status: CANCELLED
Start: 2023-04-03

## 2023-03-09 RX ADMIN — PENTAMIDINE ISETHIONATE 300 MG: 300 INHALANT RESPIRATORY (INHALATION) at 10:54

## 2023-03-09 ASSESSMENT — PAIN SCALES - GENERAL: PAINLEVEL: MILD PAIN (2)

## 2023-03-09 NOTE — NURSING NOTE
"Oncology Rooming Note    March 9, 2023 10:07 AM   Kristie Cornejo is a 52 year old female who presents for:    Chief Complaint   Patient presents with     Blood Draw     Vitals taken, venipuncture labs drawn, checked into next appt     Oncology Clinic Visit     RTN for S/P BMT for MM     Initial Vitals: Blood Pressure 123/72 (BP Location: Left arm, Patient Position: Sitting, Cuff Size: Adult Large)   Pulse 98   Temperature 98.7  F (37.1  C) (Oral)   Respiration 16   Weight 72.6 kg (160 lb)   Oxygen Saturation 99%   Body Mass Index 25.06 kg/m   Estimated body mass index is 25.06 kg/m  as calculated from the following:    Height as of 1/14/23: 1.702 m (5' 7\").    Weight as of this encounter: 72.6 kg (160 lb). Body surface area is 1.85 meters squared.  Mild Pain (2) Comment: abs from chopping ice   No LMP recorded. (Menstrual status: IUD).  Allergies reviewed: Yes  Medications reviewed: Yes    Medications: Medication refills not needed today.  Pharmacy name entered into [facility]:    [facility] [place] - [place], [place] - [address]  [facility] #[id number] - [place], [place] - [address]  [facility] - [place], [place] - [address]  [facility] - [place], [place] - [address] [place] [address] PHARMACY [facility] - [place], [place] - [phone]  [facility] - [place], [place] - [address] [place] [address] PHARMACY [facility] - [place], [place] - [address]    Clinical concerns: none       Tammi Freeman MA            "

## 2023-03-09 NOTE — LETTER
3/9/2023         RE: Kristie Cornejo  [address]  [facility] [address]        Dear Colleague,    Thank you for referring your patient, Kristie Cornejo, to the [facility] BLOOD AND MARROW TRANSPLANT PROGRAM [place]. Please see a copy of my visit note below.    BMT Progress Note  23    BMT Physician: Dr. Reinoso   Transplant Type: CAR-T Abecma  Preparative Regimen: Cy/Flu  Chief complaint: Kristie Cornejo is a 52 year old female with a history of multiple myeloma conditioned with Cy/Flu, currently day +64 of her CAR T-cell therapy with Abecma.  - readmitted briefly  with transient fever, ongoing headache (post LP)  - all resolved.        INTERIM HISTORY:   Margo is feeling great, active and busy, no new complains. 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   - no n/v/d, fevers, or leg swelling.       REVIEW OF SYSTEMS: Otherwise unremarkable other than what is noted in the Interim History.      PHYSICAL EXAM:   Wt Readings from Last 4 Encounters:   23 72.6 kg (160 lb)   23 72.8 kg (160 lb 9.6 oz)   23 72 kg (158 lb 11.2 oz)   23 71.2 kg (157 lb)     ECO    /72 (BP Location: Left arm, Patient Position: Sitting, Cuff Size: Adult Large)   Pulse 98   Temp 98.7  F (37.1  C) (Oral)   Resp 16   Wt 72.6 kg (160 lb)   SpO2 99%   BMI 25.06 kg/m       General Appearance: NAD, happy, well,   HEENT: sclera anicteric. EOM's intact.  CV: RRR. no murmur or rub.   RESP: CTAB  EXT: No edema.   SKIN: no rash on exposed skin  NEURO: A&O  PSYCH: Appropriate affect       Lab Results   Component Value Date    WBC 3.2 (L) 2023    ANEU 1.0 (L) 2023    HGB 13.0 2023    HCT 38.4 2023     2023     2023    POTASSIUM 3.9 2023    CHLORIDE 108 (H) 2023    CO2 24 2023     (H) 2023    BUN 20.2 (H) 2023    CR 0.88 2023    MAG 2.6 (H) 2023    INR 1.00 2023    AST 18  03/09/2023    ALT 18 03/09/2023     [facility] pending      ASSESSMENT AND PLAN:   Kristie Cornejo is a 52 year old female with a history of multiple myeloma conditioned with Cy/Flu, currently day +64 of her CAR T-cell therapy with Abecma.      Light chain MM, standard risk with t(11;14)  No p53 or 17del  S/p Abecma now (median PFS for patients in CR is about 18 months)    Day 29 - re-staging studies drawn today, disease response: CR.  Marrow not assessed yet, will do at 3 months     On Neurotox prophylaxis study. No ICANS.   it dex x 2 doses (-1, +6)   --completed Simvastatin daily 40mg until day day +30    HEME  Counts are recovered now.  No transfusions.  G-CSF for ANC below 1000.        NEURO - neg. No ICANS       CRS  - hx Grade 1 with a Tmax 102.6F of  on 1/5. Given 1 dose of Tocilizumab.        IMMUNOCOMPROMISED  - ok to stop fluc and Levaquin.  -cont Pentamidine monthly - PCP prophylaxis thru 6 months post CAR-T; switch to Bactrim at day 100 visit      CARDIOVASCULAR  - Risk of cardiomyopathy:  Baseline LVEF: 55-60%   - Risk of arrhythmia: Baseline EKG showed sinus rhythm nonspecific T wave abnormality  - Hx of PVC ablation March 2022      GI  - Ulcer prophylaxis: Protonix     Today's Summary:  F/u with me for day 100 anniversary        I spent 30 minutes in the care of this patient today, which included time necessary for preparation for the visit, obtaining history, ordering medications/tests/procedures as medically indicated, review of pertinent medical literature, counseling of the patient, communication of recommendations to the care team, and documentation time.      Payton Reinoso MD

## 2023-03-09 NOTE — PROGRESS NOTES
BMT Progress Note  23    BMT Physician: Dr. Reinoso   Transplant Type: CAR-T Abecma  Preparative Regimen: Cy/Flu  Chief complaint: Kristie Cornejo is a 52 year old female with a history of multiple myeloma conditioned with Cy/Flu, currently day +64 of her CAR T-cell therapy with Abecma.  - readmitted briefly  with transient fever, ongoing headache (post LP)  - all resolved.        INTERIM HISTORY:   Margo is feeling great, active and busy, no new complains. 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   - no n/v/d, fevers, or leg swelling.       REVIEW OF SYSTEMS: Otherwise unremarkable other than what is noted in the Interim History.      PHYSICAL EXAM:   Wt Readings from Last 4 Encounters:   23 72.6 kg (160 lb)   23 72.8 kg (160 lb 9.6 oz)   23 72 kg (158 lb 11.2 oz)   23 71.2 kg (157 lb)     ECO    /72 (BP Location: Left arm, Patient Position: Sitting, Cuff Size: Adult Large)   Pulse 98   Temp 98.7  F (37.1  C) (Oral)   Resp 16   Wt 72.6 kg (160 lb)   SpO2 99%   BMI 25.06 kg/m       General Appearance: NAD, happy, well,   HEENT: sclera anicteric. EOM's intact.  CV: RRR. no murmur or rub.   RESP: CTAB  EXT: No edema.   SKIN: no rash on exposed skin  NEURO: A&O  PSYCH: Appropriate affect       Lab Results   Component Value Date    WBC 3.2 (L) 2023    ANEU 1.0 (L) 2023    HGB 13.0 2023    HCT 38.4 2023     2023     2023    POTASSIUM 3.9 2023    CHLORIDE 108 (H) 2023    CO2 24 2023     (H) 2023    BUN 20.2 (H) 2023    CR 0.88 2023    MAG 2.6 (H) 2023    INR 1.00 2023    AST 18 2023    ALT 18 2023     [facility] pending      ASSESSMENT AND PLAN:   Kristie Cornejo is a 52 year old female with a history of multiple myeloma conditioned with Cy/Flu, currently day +64 of her CAR T-cell therapy with Abecma.      Light chain MM, standard risk with  t(11;14)  No p53 or 17del  S/p Abecma now (median PFS for patients in CR is about 18 months)    Day 29 - re-staging studies drawn today, disease response: CR.  Marrow not assessed yet, will do at 3 months     On Neurotox prophylaxis study. No ICANS.   it dex x 2 doses (-1, +6)   --completed Simvastatin daily 40mg until day day +30    HEME  Counts are recovered now.  No transfusions.  G-CSF for ANC below 1000.        NEURO - neg. No ICANS       CRS  - hx Grade 1 with a Tmax 102.6F of  on 1/5. Given 1 dose of Tocilizumab.        IMMUNOCOMPROMISED  - ok to stop fluc and Levaquin.  -cont Pentamidine monthly - PCP prophylaxis thru 6 months post CAR-T; switch to Bactrim at day 100 visit      CARDIOVASCULAR  - Risk of cardiomyopathy:  Baseline LVEF: 55-60%   - Risk of arrhythmia: Baseline EKG showed sinus rhythm nonspecific T wave abnormality  - Hx of PVC ablation March 2022      GI  - Ulcer prophylaxis: Protonix     Today's Summary:  F/u with me for day 100 anniversary        I spent 30 minutes in the care of this patient today, which included time necessary for preparation for the visit, obtaining history, ordering medications/tests/procedures as medically indicated, review of pertinent medical literature, counseling of the patient, communication of recommendations to the care team, and documentation time.      Payton Reinoso MD

## 2023-03-09 NOTE — NURSING NOTE
Chief Complaint   Patient presents with     Blood Draw     Vitals taken, venipuncture labs drawn, checked into next appt     /72 (BP Location: Left arm, Patient Position: Sitting, Cuff Size: Adult Large)   Pulse 98   Temp 98.7  F (37.1  C) (Oral)   Resp 16   Wt 72.6 kg (160 lb)   SpO2 99%   BMI 25.06 kg/m    Raza Mccarty RN on 3/9/2023 at 9:59 AM

## 2023-03-09 NOTE — PROGRESS NOTES
Patient was seen today for a Pentamidine nebulizer tx ordered by Diana Villa PA-C.    Patient was first given 4 puff of Albuterol via spacer , after which Pentamidine 300 mg (Lot # [id number]) mixed with 6cc Sterile H20 was administered through a filtered nebulizer.    Pre-treatment: SpO2 = 98%   HR = 103  BBS = Clear  Post-treatment: SpO2 =96 %  HR =96  BBS = Clear    No adverse side effects noted by the patient. A little dry cough noted.      This service today was provided under the direct supervision of Sally Quintanilla PA-C, who was available if needed.     Procedure was completed by Demi Finn. DEVEN

## 2023-03-10 LAB
ALBUMIN SERPL ELPH-MCNC: 4.5 G/DL (ref 3.7–5.1)
ALPHA1 GLOB SERPL ELPH-MCNC: 0.2 G/DL (ref 0.2–0.4)
ALPHA2 GLOB SERPL ELPH-MCNC: 0.7 G/DL (ref 0.5–0.9)
B-GLOBULIN SERPL ELPH-MCNC: 0.7 G/DL (ref 0.6–1)
CMV DNA SPEC NAA+PROBE-ACNC: NOT DETECTED IU/ML
GAMMA GLOB SERPL ELPH-MCNC: 0.3 G/DL (ref 0.7–1.6)
M PROTEIN SERPL ELPH-MCNC: 0 G/DL
PROT PATTERN SERPL ELPH-IMP: ABNORMAL
PROT PATTERN SERPL IFE-IMP: NORMAL

## 2023-03-29 ENCOUNTER — HOSPITAL ENCOUNTER (OUTPATIENT)
Dept: PET IMAGING | Facility: CLINIC | Age: 53
Setting detail: NUCLEAR MEDICINE
Discharge: HOME | End: 2023-03-29
Attending: PHYSICIAN ASSISTANT
Payer: COMMERCIAL

## 2023-03-29 ENCOUNTER — HOSPITAL ENCOUNTER (OUTPATIENT)
Dept: PET IMAGING | Facility: CLINIC | Age: 53
Discharge: HOME | End: 2023-03-29
Payer: COMMERCIAL

## 2023-03-29 ENCOUNTER — OFFICE VISIT (OUTPATIENT)
Dept: TRANSPLANT | Facility: CLINIC | Age: 53
End: 2023-03-29
Attending: STUDENT IN AN ORGANIZED HEALTH CARE EDUCATION/TRAINING PROGRAM
Payer: COMMERCIAL

## 2023-03-29 ENCOUNTER — LAB (OUTPATIENT)
Dept: LAB | Facility: CLINIC | Age: 53
End: 2023-03-29
Attending: STUDENT IN AN ORGANIZED HEALTH CARE EDUCATION/TRAINING PROGRAM
Payer: COMMERCIAL

## 2023-03-29 VITALS
SYSTOLIC BLOOD PRESSURE: 124 MMHG | TEMPERATURE: 97.6 F | DIASTOLIC BLOOD PRESSURE: 75 MMHG | HEART RATE: 63 BPM | RESPIRATION RATE: 16 BRPM | BODY MASS INDEX: 25.37 KG/M2 | WEIGHT: 162 LBS | OXYGEN SATURATION: 100 %

## 2023-03-29 DIAGNOSIS — C90.00 LIGHT CHAIN MYELOMA (H): ICD-10-CM

## 2023-03-29 DIAGNOSIS — Z86.2 PERSONAL HISTORY OF DISEASES OF BLOOD AND BLOOD-FORMING ORGANS: ICD-10-CM

## 2023-03-29 DIAGNOSIS — C90.00 LIGHT CHAIN MYELOMA (H): Primary | ICD-10-CM

## 2023-03-29 DIAGNOSIS — Z01.818 EXAMINATION PRIOR TO CHEMOTHERAPY: ICD-10-CM

## 2023-03-29 LAB
ALBUMIN SERPL BCG-MCNC: 4.5 G/DL (ref 3.5–5.2)
ALP SERPL-CCNC: 47 U/L (ref 35–104)
ALT SERPL W P-5'-P-CCNC: 22 U/L (ref 10–35)
ANION GAP SERPL CALCULATED.3IONS-SCNC: 11 MMOL/L (ref 7–15)
AST SERPL W P-5'-P-CCNC: 17 U/L (ref 10–35)
B2 MICROGLOB TUMOR MARKER SER-MCNC: 1.5 MG/L
BASOPHILS # BLD AUTO: 0 10E3/UL (ref 0–0.2)
BASOPHILS NFR BLD AUTO: 1 %
BILIRUB SERPL-MCNC: 0.5 MG/DL
BUN SERPL-MCNC: 20.6 MG/DL (ref 6–20)
CALCIUM SERPL-MCNC: 9.2 MG/DL (ref 8.6–10)
CD19 CELLS # BLD: 18 CELLS/UL (ref 107–698)
CD19 CELLS NFR BLD: 7 % (ref 6–27)
CD3 CELLS # BLD: 226 CELLS/UL (ref 603–2990)
CD3 CELLS NFR BLD: 85 % (ref 49–84)
CD3+CD4+ CELLS # BLD: 150 CELLS/UL (ref 441–2156)
CD3+CD4+ CELLS NFR BLD: 56 % (ref 28–63)
CD3+CD4+ CELLS/CD3+CD8+ CLL BLD: 1.95 % (ref 1.4–2.6)
CD3+CD8+ CELLS # BLD: 77 CELLS/UL (ref 125–1312)
CD3+CD8+ CELLS NFR BLD: 29 % (ref 10–40)
CD3-CD16+CD56+ CELLS # BLD: 18 CELLS/UL (ref 95–640)
CD3-CD16+CD56+ CELLS NFR BLD: 7 % (ref 4–25)
CHLORIDE SERPL-SCNC: 105 MMOL/L (ref 98–107)
CREAT SERPL-MCNC: 0.82 MG/DL (ref 0.51–0.95)
DEPRECATED HCO3 PLAS-SCNC: 23 MMOL/L (ref 22–29)
EOSINOPHIL # BLD AUTO: 0.1 10E3/UL (ref 0–0.7)
EOSINOPHIL NFR BLD AUTO: 3 %
ERYTHROCYTE [DISTWIDTH] IN BLOOD BY AUTOMATED COUNT: 12.3 % (ref 10–15)
GFR SERPL CREATININE-BSD FRML MDRD: 86 ML/MIN/1.73M2
GLUCOSE SERPL-MCNC: 106 MG/DL (ref 70–99)
HCT VFR BLD AUTO: 36.6 % (ref 35–47)
HGB BLD-MCNC: 12.5 G/DL (ref 11.7–15.7)
IGA SERPL-MCNC: <2 MG/DL (ref 84–499)
IGG SERPL-MCNC: 217 MG/DL (ref 610–1616)
IGM SERPL-MCNC: <10 MG/DL (ref 35–242)
IMM GRANULOCYTES # BLD: 0 10E3/UL
IMM GRANULOCYTES NFR BLD: 0 %
INTERPRETATION: NORMAL
KAPPA LC FREE SER-MCNC: 0.06 MG/DL (ref 0.33–1.94)
KAPPA LC FREE/LAMBDA FREE SER NEPH: 0.09 {RATIO} (ref 0.26–1.65)
LAMBDA LC FREE SERPL-MCNC: 0.66 MG/DL (ref 0.57–2.63)
LDH SERPL L TO P-CCNC: 168 U/L (ref 0–250)
LYMPHOCYTES # BLD AUTO: 0.3 10E3/UL (ref 0.8–5.3)
LYMPHOCYTES NFR BLD AUTO: 9 %
MAGNESIUM SERPL-MCNC: 2.2 MG/DL (ref 1.7–2.3)
MCH RBC QN AUTO: 31.8 PG (ref 26.5–33)
MCHC RBC AUTO-ENTMCNC: 34.2 G/DL (ref 31.5–36.5)
MCV RBC AUTO: 93 FL (ref 78–100)
MONOCYTES # BLD AUTO: 0.5 10E3/UL (ref 0–1.3)
MONOCYTES NFR BLD AUTO: 15 %
NEUTROPHILS # BLD AUTO: 2.3 10E3/UL (ref 1.6–8.3)
NEUTROPHILS NFR BLD AUTO: 72 %
NRBC # BLD AUTO: 0 10E3/UL
NRBC BLD AUTO-RTO: 0 /100
PHOSPHATE SERPL-MCNC: 3.9 MG/DL (ref 2.5–4.5)
PLATELET # BLD AUTO: 228 10E3/UL (ref 150–450)
POTASSIUM SERPL-SCNC: 4 MMOL/L (ref 3.4–5.3)
PROT SERPL-MCNC: 6.2 G/DL (ref 6.4–8.3)
RBC # BLD AUTO: 3.93 10E6/UL (ref 3.8–5.2)
SODIUM SERPL-SCNC: 139 MMOL/L (ref 136–145)
T CELL EXTENDED COMMENT: ABNORMAL
TOTAL PROTEIN SERUM FOR ELP: 5.9 G/DL (ref 6.4–8.3)
WBC # BLD AUTO: 3.3 10E3/UL (ref 4–11)

## 2023-03-29 PROCEDURE — 83735 ASSAY OF MAGNESIUM: CPT | Performed by: PHYSICIAN ASSISTANT

## 2023-03-29 PROCEDURE — 84165 PROTEIN E-PHORESIS SERUM: CPT | Mod: TC | Performed by: PATHOLOGY

## 2023-03-29 PROCEDURE — 70491 CT SOFT TISSUE NECK W/DYE: CPT | Mod: 26

## 2023-03-29 PROCEDURE — 250N000011 HC RX IP 250 OP 636: Performed by: STUDENT IN AN ORGANIZED HEALTH CARE EDUCATION/TRAINING PROGRAM

## 2023-03-29 PROCEDURE — 70491 CT SOFT TISSUE NECK W/DYE: CPT

## 2023-03-29 PROCEDURE — A9552 F18 FDG: HCPCS | Performed by: PHYSICIAN ASSISTANT

## 2023-03-29 PROCEDURE — 38222 DX BONE MARROW BX & ASPIR: CPT | Mod: LT | Performed by: STUDENT IN AN ORGANIZED HEALTH CARE EDUCATION/TRAINING PROGRAM

## 2023-03-29 PROCEDURE — 38221 DX BONE MARROW BIOPSIES: CPT | Performed by: STUDENT IN AN ORGANIZED HEALTH CARE EDUCATION/TRAINING PROGRAM

## 2023-03-29 PROCEDURE — 38222 DX BONE MARROW BX & ASPIR: CPT | Performed by: STUDENT IN AN ORGANIZED HEALTH CARE EDUCATION/TRAINING PROGRAM

## 2023-03-29 PROCEDURE — 84155 ASSAY OF PROTEIN SERUM: CPT

## 2023-03-29 PROCEDURE — 86335 IMMUNFIX E-PHORSIS/URINE/CSF: CPT | Mod: 26

## 2023-03-29 PROCEDURE — 88188 FLOWCYTOMETRY/READ 9-15: CPT | Performed by: PATHOLOGY

## 2023-03-29 PROCEDURE — G0463 HOSPITAL OUTPT CLINIC VISIT: HCPCS

## 2023-03-29 PROCEDURE — 88342 IMHCHEM/IMCYTCHM 1ST ANTB: CPT | Mod: 26 | Performed by: PATHOLOGY

## 2023-03-29 PROCEDURE — 88305 TISSUE EXAM BY PATHOLOGIST: CPT | Mod: 26 | Performed by: PATHOLOGY

## 2023-03-29 PROCEDURE — 343N000001 HC RX 343: Performed by: PHYSICIAN ASSISTANT

## 2023-03-29 PROCEDURE — 36415 COLL VENOUS BLD VENIPUNCTURE: CPT

## 2023-03-29 PROCEDURE — 71260 CT THORAX DX C+: CPT | Mod: 26

## 2023-03-29 PROCEDURE — 78816 PET IMAGE W/CT FULL BODY: CPT | Mod: 26

## 2023-03-29 PROCEDURE — 250N000011 HC RX IP 250 OP 636: Performed by: PHYSICIAN ASSISTANT

## 2023-03-29 PROCEDURE — 74177 CT ABD & PELVIS W/CONTRAST: CPT | Mod: 26

## 2023-03-29 PROCEDURE — 86355 B CELLS TOTAL COUNT: CPT | Performed by: PHYSICIAN ASSISTANT

## 2023-03-29 PROCEDURE — 82232 ASSAY OF BETA-2 PROTEIN: CPT | Performed by: PHYSICIAN ASSISTANT

## 2023-03-29 PROCEDURE — 85025 COMPLETE CBC W/AUTO DIFF WBC: CPT | Performed by: PHYSICIAN ASSISTANT

## 2023-03-29 PROCEDURE — 84165 PROTEIN E-PHORESIS SERUM: CPT | Mod: 26

## 2023-03-29 PROCEDURE — 88311 DECALCIFY TISSUE: CPT | Mod: 26 | Performed by: PATHOLOGY

## 2023-03-29 PROCEDURE — 82784 ASSAY IGA/IGD/IGG/IGM EACH: CPT

## 2023-03-29 PROCEDURE — 84166 PROTEIN E-PHORESIS/URINE/CSF: CPT | Performed by: PATHOLOGY

## 2023-03-29 PROCEDURE — 88341 IMHCHEM/IMCYTCHM EA ADD ANTB: CPT | Mod: 26 | Performed by: PATHOLOGY

## 2023-03-29 PROCEDURE — 88368 INSITU HYBRIDIZATION MANUAL: CPT | Mod: 26 | Performed by: MEDICAL GENETICS

## 2023-03-29 PROCEDURE — 78816 PET IMAGE W/CT FULL BODY: CPT | Mod: PS

## 2023-03-29 PROCEDURE — 88184 FLOWCYTOMETRY/ TC 1 MARKER: CPT

## 2023-03-29 PROCEDURE — 88305 TISSUE EXAM BY PATHOLOGIST: CPT | Mod: TC

## 2023-03-29 PROCEDURE — 80053 COMPREHEN METABOLIC PANEL: CPT | Performed by: PHYSICIAN ASSISTANT

## 2023-03-29 PROCEDURE — 83615 LACTATE (LD) (LDH) ENZYME: CPT | Performed by: PHYSICIAN ASSISTANT

## 2023-03-29 PROCEDURE — 85060 BLOOD SMEAR INTERPRETATION: CPT | Performed by: PATHOLOGY

## 2023-03-29 PROCEDURE — 74177 CT ABD & PELVIS W/CONTRAST: CPT

## 2023-03-29 PROCEDURE — 86334 IMMUNOFIX E-PHORESIS SERUM: CPT | Performed by: PATHOLOGY

## 2023-03-29 PROCEDURE — 86334 IMMUNOFIX E-PHORESIS SERUM: CPT | Mod: 26

## 2023-03-29 PROCEDURE — 83521 IG LIGHT CHAINS FREE EACH: CPT

## 2023-03-29 PROCEDURE — 84100 ASSAY OF PHOSPHORUS: CPT | Performed by: PHYSICIAN ASSISTANT

## 2023-03-29 PROCEDURE — 86335 IMMUNFIX E-PHORSIS/URINE/CSF: CPT | Performed by: PATHOLOGY

## 2023-03-29 PROCEDURE — 85097 BONE MARROW INTERPRETATION: CPT | Performed by: PATHOLOGY

## 2023-03-29 PROCEDURE — 86360 T CELL ABSOLUTE COUNT/RATIO: CPT | Performed by: PHYSICIAN ASSISTANT

## 2023-03-29 PROCEDURE — 88264 CHROMOSOME ANALYSIS 20-25: CPT

## 2023-03-29 PROCEDURE — 88271 CYTOGENETICS DNA PROBE: CPT

## 2023-03-29 PROCEDURE — 84166 PROTEIN E-PHORESIS/URINE/CSF: CPT | Mod: 26

## 2023-03-29 RX ORDER — IOPAMIDOL 755 MG/ML
10-135 INJECTION, SOLUTION INTRAVASCULAR ONCE
Status: COMPLETED | OUTPATIENT
Start: 2023-03-29 | End: 2023-03-29

## 2023-03-29 RX ADMIN — FLUDEOXYGLUCOSE F-18 10.01 MILLICURIE: 500 INJECTION, SOLUTION INTRAVENOUS at 08:06

## 2023-03-29 RX ADMIN — IOPAMIDOL 89 ML: 755 INJECTION, SOLUTION INTRAVENOUS at 08:53

## 2023-03-29 RX ADMIN — MIDAZOLAM HYDROCHLORIDE 1 MG: 1 INJECTION, SOLUTION INTRAMUSCULAR; INTRAVENOUS at 10:45

## 2023-03-29 ASSESSMENT — PAIN SCALES - GENERAL
PAINLEVEL: MILD PAIN (2)
PAINLEVEL: NO PAIN (0)

## 2023-03-29 NOTE — PROGRESS NOTES
BMT ONC Adult Bone Marrow Biopsy Procedure Note  March 29, 2023  /75   Pulse 63   Temp 97.6  F (36.4  C) (Oral)   Resp 16   Wt 73.5 kg (162 lb)   SpO2 100%   BMI 25.37 kg/m       Learning needs assessment complete within 12 months? YES    DIAGNOSIS: multiple myeloma     PROCEDURE: Unilateral Bone Marrow Biopsy and Unilateral Aspirate    [place] [facility] [place]    Patient s identification was positively verified by verbal identification and invasive procedure safety checklist was completed. Informed consent was obtained. Following the administration of 1-1.5mg Midazolam as pre-medication (would suggest only 1mg in future, per pt's feeling, patient was placed in the prone position and prepped and draped in a sterile manner. Approximately 10 cc of 1% Lidocaine was used over the left posterior iliac spine. Following this a 3 mm incision was made. Trephine bone marrow core(s) was (were) obtained from the [facility]. Bone marrow aspirates were obtained from the [facility]. Aspirates were sent for morphology, immunophenotyping, cytogenetics, molecular diagnostics RFLP and TTL research studies. A total of approximately 30 ml of marrow was aspirated. Following this procedure a sterile dressing was applied to the bone marrow biopsy site(s). The patient was placed in the supine position to maintain pressure on the biopsy site. Post-procedure wound care instructions were given.     Complications: No procedural complications. Would recommend only 1mg IV versed in future due to rapid onset effects of dizziness and mild nausea    Pre-procedural pain: 0 out of 10 on the numeric pain rating scale.     Procedural pain: 3 out of 10 on the numeric pain rating scale.     Post-procedural pain assessment: 0 out of 10 on the numeric pain rating scale.     Interventions: NO    Length of procedure:20 minutes or less      Procedure performed by: Diana Villa PAC  [phone]

## 2023-03-29 NOTE — LETTER
3/29/2023         RE: Kristie Cornejo  [address]  [facility] [address]        Dear Colleague,    Thank you for referring your patient, Kristie Cornejo, to the [facility] BLOOD AND MARROW TRANSPLANT PROGRAM [place]. Please see a copy of my visit note below.    BMT ONC Adult Bone Marrow Biopsy Procedure Note  March 29, 2023  /75   Pulse 63   Temp 97.6  F (36.4  C) (Oral)   Resp 16   Wt 73.5 kg (162 lb)   SpO2 100%   BMI 25.37 kg/m       Learning needs assessment complete within 12 months? YES    DIAGNOSIS: multiple myeloma     PROCEDURE: Unilateral Bone Marrow Biopsy and Unilateral Aspirate    [place] [facility] [place]    Patient s identification was positively verified by verbal identification and invasive procedure safety checklist was completed. Informed consent was obtained. Following the administration of 1-1.5mg Midazolam as pre-medication (would suggest only 1mg in future, per pt's feeling, patient was placed in the prone position and prepped and draped in a sterile manner. Approximately 10 cc of 1% Lidocaine was used over the left posterior iliac spine. Following this a 3 mm incision was made. Trephine bone marrow core(s) was (were) obtained from the [facility]. Bone marrow aspirates were obtained from the IC. Aspirates were sent for morphology, immunophenotyping, cytogenetics, molecular diagnostics RFLP and TTL research studies. A total of approximately 30 ml of marrow was aspirated. Following this procedure a sterile dressing was applied to the bone marrow biopsy site(s). The patient was placed in the supine position to maintain pressure on the biopsy site. Post-procedure wound care instructions were given.     Complications: No procedural complications. Would recommend only 1mg IV versed in future due to rapid onset effects of dizziness and mild nausea    Pre-procedural pain: 0 out of 10 on the numeric pain rating scale.     Procedural pain: 3 out of 10 on the numeric pain  rating scale.     Post-procedural pain assessment: 0 out of 10 on the numeric pain rating scale.     Interventions: NO    Length of procedure:20 minutes or less      Procedure performed by: Diana Villa PAC  [phone]

## 2023-03-29 NOTE — NURSING NOTE
"Oncology Rooming Note    March 29, 2023 10:52 AM   Kristie Cornejo is a 52 year old female who presents for:    Chief Complaint   Patient presents with     Bone Marrow Biopsy     BMBx, 90 Day BAN     Initial Vitals: /76   Pulse 63   Temp 97.6  F (36.4  C) (Oral)   Resp 16   Wt 73.5 kg (162 lb)   SpO2 100%   BMI 25.37 kg/m   Estimated body mass index is 25.37 kg/m  as calculated from the following:    Height as of 1/14/23: 1.702 m (5' 7\").    Weight as of this encounter: 73.5 kg (162 lb). Body surface area is 1.86 meters squared.  No Pain (0) Comment: Data Unavailable   No LMP recorded. (Menstrual status: IUD).  Allergies reviewed: Yes  Medications reviewed: Yes    Medications: Medication refills not needed today.  Pharmacy name entered into [employer]:    [facility] [place], [place] - [address]  [facility][id number] - [place], [place] - [address] AT [facility] [address]  [facility] - [place], [place] - [address]  [facility] - [place], [place] - [address] [place] [address] PHARMACY [place], [place] - [address]  [facility] - [place], [place] - [address] [place] [address] PHARMACY [facility] - [place], [place] - [address]    Clinical concerns: None.     Pt arrived with PIV in right arm from a PET scan earlier this morning. Labs were drawn from the PIV.    BMBX Teaching and Assessment       Teaching concerns addressed: Bone marrow biopsy and infection prevention.     Person(s) involved in teaching: Patient  Motivation Level  Asks Questions: Yes  Eager to Learn: Yes  Cooperative: Yes  Receptive (willing/able to accept information): Yes    Patient demonstrates understanding of the following:     Reason for the appointment, diagnosis and treatment plan: Yes  Knowledge of proper use of medications and conditions for which they are ordered (with special attention to potential side " effects or drug interactions): Yes  Which situations necessitate calling provider and whom to contact: Yes    Teaching concerns addressed:   Reviewed activity restrictions if received premeds, potential for bleeding and actions to take if develops any of the issues below    Pain management techniques: Yes  Patient instructed on hand hygiene: Yes  How and/when to access community resources: Yes    Infection Control:  Patient demonstrates understanding of the following:   Bone marrow procedure site care taught: Yes  Signs and symptoms of infection taught: Yes       Instructional Materials Used/Given: Pt instructed to keep bmbx site clean and dry for 24hrs. Pt educated to monitor site for signs of infection such as redness, rash, oozing, puss, bleeding, pain, and elevated temp. Pt instructed to go to call the [facility] triage line or go to the ER if any signs of infection should occur. Pt educated to not operate machinery if receiving versed. Pt and  verbalize understanding.     Post procedure: Patient vital signs stable, ambulating, site is clean, dry and intact prior to discharge. PIV remove. Pt discharged with  as .     Provider order received to administer Versed 2mg IVP as premed for BMBX. Pt was given only 1 mg of Versed IVP due to patient's tolerance of the medication. Procedural consent discussed and pt's signature obtained.  Allergies reviewed.  PT currently alert and oriented to plan of care.  Pt lying prone in stretcher.  Call light w/in reach.  Provider and  at bedside.          Quincy Gibbons RN

## 2023-03-29 NOTE — NURSING NOTE
Chief Complaint   Patient presents with     Bone Marrow Biopsy     BMBx, 90 Day BAN     PIV was removed. Pt tolerated well.    Quincy Gibbons RN

## 2023-03-30 LAB
ALBUMIN SERPL ELPH-MCNC: 4.1 G/DL (ref 3.7–5.1)
ALPHA1 GLOB SERPL ELPH-MCNC: 0.2 G/DL (ref 0.2–0.4)
ALPHA2 GLOB SERPL ELPH-MCNC: 0.8 G/DL (ref 0.5–0.9)
B-GLOBULIN SERPL ELPH-MCNC: 0.6 G/DL (ref 0.6–1)
CMV DNA SPEC NAA+PROBE-ACNC: NOT DETECTED IU/ML
GAMMA GLOB SERPL ELPH-MCNC: 0.2 G/DL (ref 0.7–1.6)
M PROTEIN SERPL ELPH-MCNC: 0 G/DL
PATH REPORT.COMMENTS IMP SPEC: NORMAL
PATH REPORT.FINAL DX SPEC: NORMAL
PATH REPORT.FINAL DX SPEC: NORMAL
PATH REPORT.GROSS SPEC: NORMAL
PATH REPORT.MICROSCOPIC SPEC OTHER STN: NORMAL
PATH REPORT.RELEVANT HX SPEC: NORMAL
PATH REPORT.RELEVANT HX SPEC: NORMAL
PROT ELPH PNL UR ELPH: NORMAL
PROT PATTERN SERPL ELPH-IMP: ABNORMAL
PROT PATTERN SERPL IFE-IMP: NORMAL
PROT PATTERN UR ELPH-IMP: NORMAL

## 2023-04-02 ENCOUNTER — HEALTH MAINTENANCE LETTER (OUTPATIENT)
Age: 53
End: 2023-04-02

## 2023-04-04 ENCOUNTER — PATIENT OUTREACH (OUTPATIENT)
Dept: ONCOLOGY | Facility: CLINIC | Age: 53
End: 2023-04-04

## 2023-04-04 ENCOUNTER — OFFICE VISIT (OUTPATIENT)
Dept: TRANSPLANT | Facility: CLINIC | Age: 53
End: 2023-04-04
Payer: COMMERCIAL

## 2023-04-04 VITALS
BODY MASS INDEX: 25.29 KG/M2 | SYSTOLIC BLOOD PRESSURE: 118 MMHG | WEIGHT: 161.5 LBS | OXYGEN SATURATION: 99 % | TEMPERATURE: 97.8 F | HEART RATE: 70 BPM | RESPIRATION RATE: 16 BRPM | DIASTOLIC BLOOD PRESSURE: 74 MMHG

## 2023-04-04 DIAGNOSIS — C90.00 MULTIPLE MYELOMA NOT HAVING ACHIEVED REMISSION (H): Primary | ICD-10-CM

## 2023-04-04 DIAGNOSIS — C90.00 MULTIPLE MYELOMA, REMISSION STATUS UNSPECIFIED (H): Primary | ICD-10-CM

## 2023-04-04 DIAGNOSIS — D80.1 HYPOGAMMAGLOBULINEMIA (H): ICD-10-CM

## 2023-04-04 DIAGNOSIS — R19.7 DIARRHEA OF PRESUMED INFECTIOUS ORIGIN: ICD-10-CM

## 2023-04-04 DIAGNOSIS — C90.00 LIGHT CHAIN MYELOMA (H): Primary | ICD-10-CM

## 2023-04-04 PROCEDURE — G0463 HOSPITAL OUTPT CLINIC VISIT: HCPCS

## 2023-04-04 PROCEDURE — 94640 AIRWAY INHALATION TREATMENT: CPT | Performed by: PHYSICIAN ASSISTANT

## 2023-04-04 PROCEDURE — 94642 AEROSOL INHALATION TREATMENT: CPT | Performed by: PHYSICIAN ASSISTANT

## 2023-04-04 PROCEDURE — 99215 OFFICE O/P EST HI 40 MIN: CPT

## 2023-04-04 RX ORDER — SULFAMETHOXAZOLE/TRIMETHOPRIM 800-160 MG
1 TABLET ORAL 2 TIMES DAILY
Qty: 16 TABLET | Refills: 6 | Status: SHIPPED | OUTPATIENT
Start: 2023-04-04 | End: 2023-06-07

## 2023-04-04 RX ORDER — HEPARIN SODIUM (PORCINE) LOCK FLUSH IV SOLN 100 UNIT/ML 100 UNIT/ML
5 SOLUTION INTRAVENOUS
OUTPATIENT
Start: 2023-05-03

## 2023-04-04 RX ORDER — HEPARIN SODIUM,PORCINE 10 UNIT/ML
5 VIAL (ML) INTRAVENOUS
Status: CANCELLED | OUTPATIENT
Start: 2023-04-15

## 2023-04-04 RX ORDER — MEPERIDINE HYDROCHLORIDE 25 MG/ML
25 INJECTION INTRAMUSCULAR; INTRAVENOUS; SUBCUTANEOUS EVERY 30 MIN PRN
Status: CANCELLED | OUTPATIENT
Start: 2023-04-15

## 2023-04-04 RX ORDER — ALBUTEROL SULFATE 0.83 MG/ML
2.5 SOLUTION RESPIRATORY (INHALATION)
Start: 2023-05-03

## 2023-04-04 RX ORDER — PENTAMIDINE ISETHIONATE 300 MG/300MG
300 INHALANT RESPIRATORY (INHALATION)
Start: 2023-05-03

## 2023-04-04 RX ORDER — METHYLPREDNISOLONE SODIUM SUCCINATE 125 MG/2ML
125 INJECTION, POWDER, LYOPHILIZED, FOR SOLUTION INTRAMUSCULAR; INTRAVENOUS
Status: CANCELLED
Start: 2023-04-15

## 2023-04-04 RX ORDER — DIPHENHYDRAMINE HCL 25 MG
50 CAPSULE ORAL ONCE
Status: CANCELLED | OUTPATIENT
Start: 2023-04-15

## 2023-04-04 RX ORDER — DIPHENHYDRAMINE HYDROCHLORIDE 50 MG/ML
50 INJECTION INTRAMUSCULAR; INTRAVENOUS
Status: CANCELLED
Start: 2023-04-15

## 2023-04-04 RX ORDER — PENTAMIDINE ISETHIONATE 300 MG/300MG
300 INHALANT RESPIRATORY (INHALATION)
Status: DISCONTINUED | OUTPATIENT
Start: 2023-04-04 | End: 2023-04-04 | Stop reason: HOSPADM

## 2023-04-04 RX ORDER — ACETAMINOPHEN 325 MG/1
650 TABLET ORAL ONCE
Status: CANCELLED | OUTPATIENT
Start: 2023-04-15

## 2023-04-04 RX ORDER — HEPARIN SODIUM,PORCINE 10 UNIT/ML
5 VIAL (ML) INTRAVENOUS
OUTPATIENT
Start: 2023-05-03

## 2023-04-04 RX ORDER — EPINEPHRINE 1 MG/ML
0.3 INJECTION, SOLUTION INTRAMUSCULAR; SUBCUTANEOUS EVERY 5 MIN PRN
Status: CANCELLED | OUTPATIENT
Start: 2023-04-15

## 2023-04-04 RX ORDER — HEPARIN SODIUM (PORCINE) LOCK FLUSH IV SOLN 100 UNIT/ML 100 UNIT/ML
5 SOLUTION INTRAVENOUS
Status: CANCELLED | OUTPATIENT
Start: 2023-04-15

## 2023-04-04 RX ORDER — ALBUTEROL SULFATE 90 UG/1
1-2 AEROSOL, METERED RESPIRATORY (INHALATION)
Status: CANCELLED
Start: 2023-04-15

## 2023-04-04 RX ORDER — ALBUTEROL SULFATE 0.83 MG/ML
2.5 SOLUTION RESPIRATORY (INHALATION)
Status: CANCELLED | OUTPATIENT
Start: 2023-04-15

## 2023-04-04 RX ADMIN — PENTAMIDINE ISETHIONATE 300 MG: 300 INHALANT RESPIRATORY (INHALATION) at 15:50

## 2023-04-04 ASSESSMENT — PAIN SCALES - GENERAL: PAINLEVEL: NO PAIN (0)

## 2023-04-04 NOTE — PROGRESS NOTES
Kristie Cornejo was seen today for a Pentamidine nebulizer tx ordered by Diana SHAH.    Patient was first given 4 puffs of albuterol MDI (due to severe shortage of albuterol nebulizer), after which Pentamidine 300 mg (Lot # [id number]) mixed with 6cc Sterile H20 was administered through a filtered nebulizer.    Pre-treatment: SpO2 = 95%   HR = 75   BBS = clear   Post-treatment: SpO2 = 99%  HR = 87  BBS = clear    No adverse side effects noted by the patient.    This service today was provided under the supervision of Dr. Sally Quintanilla, who was available if needed.     Procedure was completed by Eddi Guerra, JERALD.

## 2023-04-04 NOTE — PROGRESS NOTES
BMT Progress Note  2023      BMT Physician: Dr. Reinoso   Transplant Type: CAR-T Abecma  Preparative Regimen: Cy/Flu  Chief complaint: Kristie Cornejo is a 52 year old female with a history of multiple myeloma conditioned with Cy/Flu, currently day +90 of her CAR T-cell therapy with Abecma.  - readmitted briefly  with transient fever, ongoing headache (post LP)  - all resolved.        INTERIM HISTORY:   Margo is feeling great, active and busy, no new complains. She is going to [place] later this week,   Still have hips aching, no new body aches. Had bout of diarrhea couple of times but not all the time, she had colonoscopy in 2022 which was neg.    - no n/v/d, fevers, or leg swelling.       REVIEW OF SYSTEMS: Otherwise unremarkable other than what is noted in the Interim History.      PHYSICAL EXAM:   Wt Readings from Last 4 Encounters:   23 73.3 kg (161 lb 8 oz)   23 73.5 kg (162 lb)   23 72.6 kg (160 lb)   23 72.8 kg (160 lb 9.6 oz)     ECO    /74   Pulse 70   Temp 97.8  F (36.6  C) (Oral)   Resp 16   Wt 73.3 kg (161 lb 8 oz)   SpO2 99%   BMI 25.29 kg/m       General Appearance: NAD, happy, well,   HEENT: sclera anicteric. EOM's intact.  CV: RRR. no murmur or rub.   RESP: CTAB  EXT: No edema.   SKIN: no rash on exposed skin  NEURO: A&O  PSYCH: Appropriate affect       Lab Results   Component Value Date    WBC 3.3 (L) 2023    ANEU 1.0 (L) 2023    HGB 12.5 2023    HCT 36.6 2023     2023     2023    POTASSIUM 4.0 2023    CHLORIDE 105 2023    CO2 23 2023     (H) 2023    BUN 20.6 (H) 2023    CR 0.82 2023    MAG 2.2 2023    INR 1.00 2023    AST 17 2023    ALT 22 2023     LDH 168mg/dl  CMV neg   Latest Reference Range & Units 23 10:25   ELP Interpretation:  Marked hypogammaglobulinemia. No monoclonal protein seen, recommend quantitative  immunoglobulins if clinically indicated. Recommend a urine for immunofixation to rule out a light chain secreting myeloma. Pathologic significance requires clinical correlation. Robby Mari M.D., Ph.D., Pathologist.   Gamma Fraction 0.7 - 1.6 g/dL 0.2 (L)   IGA 84 - 499 mg/dL <2 (L)    - 1,616 mg/dL 217 (L)   IGM 35 - 242 mg/dL <10 (L)   Immunofixation ELP  No monoclonal protein seen on immunofixation. Pathologic significance requires clinical correlation. Robby Mari M.D., Ph.D., Pathologist   Kappa Free Lt Chain 0.33 - 1.94 mg/dL 0.06 (L)   Kappa Lambda Ratio 0.26 - 1.65  0.09 (L)   Lambda Free Lt Chain 0.57 - 2.63 mg/dL 0.66   Monoclonal Peak <=0.0 g/dL 0.0   Total Protein Serum for ELP 6.4 - 8.3 g/dL 5.9 (L)   (L): Data is abnormally low    BMBx:3/29/2023  Bone marrow, posterior iliac crest, left decalcified trephine biopsy and touch imprint; left direct aspirate smear, and concentrated aspirate smear; and peripheral smear:     -Normocellular bone marrow [40 to 50% cellular] with maturing trilineage hematopoiesis and no morphological immunophenotypic evidence for involvement by plasma cell myeloma.     - Peripheral blood showing slight leukopenia; lymphocytopenia, otherwise unremarkable peripheral blood smear.    PET 3/24/2023                                                               IMPRESSION: In this patient with multiple myeloma status post CAR  T-cell therapy:     1. Decreased metabolism of the osteolytic left pubic ramus lesion,  compared to 11/16/2022 PET/CT. No new osseous FDG uptake.     2. FDG uptake of the heterogeneous right hepatic flexure of an  additional multiple areas of FDG uptake, new compared to prior PET/CT.  These findings are concerning for malignancy. Colonoscopy is  recommended.      3. See dedicated report for the results of the high resolution PET CT  of the neck.      I have personally reviewed the examination and initial interpretation  and I agree with the  findings.ET 3/29/2023        ASSESSMENT AND PLAN:   Kristie Cornejo is a 52 year old female with a history of multiple myeloma conditioned with Cy/Flu, currently day +90 of her CAR T-cell therapy with Abecma.   NEURO - neg. No ICANS   CRS- hx Grade 1 with a Tmax 102.6F of  on 1/5. Given 1 dose of Tocilizumab.        Light chain MM, standard risk with t(11;14)  No p53 or 17del  S/p Abecma 100 days ago (median PFS for patients in CR is about 18 months)    Day 29 - re-staging studies drawn today, disease response: CR.  Marrow not assessed yet, will do at 3 months   Day 100 - restaging PET and BMBx with CR without MRD !! This is a great outcome.      On Neurotox prophylaxis study. No ICANS.   it dex x 2 doses (-1, +6)   --completed Simvastatin daily 40mg until day day +30    HEME  Counts are recovered now.  No transfusions.  G-CSF for ANC below 1000.       IMMUNOCOMPROMISED  - ok to stop fluc and Levaquin.  -on Pentamidine monthly - PCP prophylaxis thru 6 months post CAR-T;  -switch to Bactrim at day 100 visit for 3 more months. Rx sent.   -Hypogammaglobulinemia - IgG 217g/dl. Plan IVIG next week and recheck in 2 months. No infection sy's.  Plan COVID vaccine in 2 mnhts.       CARDIOVASCULAR  - Risk of cardiomyopathy:  Baseline LVEF: 55-60%   - Risk of arrhythmia: Baseline EKG showed sinus rhythm nonspecific T wave abnormality  - Hx of PVC ablation March 2022      GI  - Ulcer prophylaxis: Protonix   -refer for colonoscopy given PET findings. Doubt malignancy, but given bouts of diarrhea she may have CMV colitis or infections. I suggest to obtain biopsy     Today's Summary:  F/u with me for day 6 months  Anniversary  Colonoscopy referral  COvid vaccine in 2 months  SULEIMAN visit in 2 months   IVIG plan for next week             I spent 60 minutes in the care of this patient today, which included time necessary for preparation for the visit, obtaining history, ordering medications/tests/procedures as medically indicated,  review of pertinent medical literature, counseling of the patient, communication of recommendations to the care team, and documentation time.      Payton Reinoso MD

## 2023-04-04 NOTE — PROGRESS NOTES
[facility]: Cancer Care Short Note                                    Discussion with Patient:                                                        OUTBOUND CALL: RNCC called patient to discuss new prescription. Dr. Reinoso would like her to start on Bactrim in vesna of pentamidine. Review prescription, dose, frequency, etc. She will have pentamidine today and start bactrim next week.   Will also send her information on IVIG and discuss with her next week. She is out of town through 4/10.     Pt verbalizes understanding and has no other questions, comments, or concerns at this time.     Griselda Valdez, RN, MSN, OCN   RN Care Coordinator   [facility]   [address] [place], [place] [address] [phone]

## 2023-04-04 NOTE — NURSING NOTE
"Oncology Rooming Note    April 4, 2023 11:26 AM   Kristie Cornejo is a 52 year old female who presents for:    Chief Complaint   Patient presents with     Oncology Clinic Visit     Light chain myeloma     Initial Vitals: /74   Pulse 70   Temp 97.8  F (36.6  C) (Oral)   Resp 16   Wt 73.3 kg (161 lb 8 oz)   SpO2 99%   BMI 25.29 kg/m   Estimated body mass index is 25.29 kg/m  as calculated from the following:    Height as of 1/14/23: 1.702 m (5' 7\").    Weight as of this encounter: 73.3 kg (161 lb 8 oz). Body surface area is 1.86 meters squared.  No Pain (0) Comment: Data Unavailable   No LMP recorded. (Menstrual status: IUD).  Allergies reviewed: Yes  Medications reviewed: Yes    Medications: Refill Acyclovir   Pharmacy name entered into EPIC:    [facility] [place] [facility] - [place], [place] - [address]  [id number] - [place], [place] - [address]  [facility] - [place] - [address]  [facility] [place] - [address] [place] [address] PHARMACY [facility] - [place], [place] [id number]  [facility] - [facility], [place] - [address] [facility] - [place], [place] - [address]    Clinical concerns: None      Kristie Vera"

## 2023-04-04 NOTE — LETTER
2023         RE: Kristie Cornejo  [address]  [facility] [address]        Dear Colleague,    Thank you for referring your patient, Kristie Cornejo, to the [facility] [place]. Please see a copy of my visit note below.    BMT Progress Note  2023      BMT Physician: Dr. Reinoso   Transplant Type: CAR-T Abecma  Preparative Regimen: Cy/Flu  Chief complaint: Kristie Cornejo is a 52 year old female with a history of multiple myeloma conditioned with Cy/Flu, currently day +90 of her CAR T-cell therapy with Abecma.  - readmitted briefly  with transient fever, ongoing headache (post LP)  - all resolved.        INTERIM HISTORY:   Margo is feeling great, active and busy, no new complains. She is going to [place] later this week,   Still have hips aching, no new body aches. Had bout of diarrhea couple of times but not all the time, she had colonoscopy in 2022 which was neg.    - no n/v/d, fevers, or leg swelling.       REVIEW OF SYSTEMS: Otherwise unremarkable other than what is noted in the Interim History.      PHYSICAL EXAM:   Wt Readings from Last 4 Encounters:   23 73.3 kg (161 lb 8 oz)   23 73.5 kg (162 lb)   23 72.6 kg (160 lb)   23 72.8 kg (160 lb 9.6 oz)     ECO    /74   Pulse 70   Temp 97.8  F (36.6  C) (Oral)   Resp 16   Wt 73.3 kg (161 lb 8 oz)   SpO2 99%   BMI 25.29 kg/m       General Appearance: NAD, happy, well,   HEENT: sclera anicteric. EOM's intact.  CV: RRR. no murmur or rub.   RESP: CTAB  EXT: No edema.   SKIN: no rash on exposed skin  NEURO: A&O  PSYCH: Appropriate affect       Lab Results   Component Value Date    WBC 3.3 (L) 2023    ANEU 1.0 (L) 2023    HGB 12.5 2023    HCT 36.6 2023     2023     2023    POTASSIUM 4.0 2023    CHLORIDE 105 2023    CO2 23 2023     (H) 2023    BUN 20.6 (H) 2023    CR 0.82  03/29/2023    MAG 2.2 03/29/2023    INR 1.00 02/02/2023    AST 17 03/29/2023    ALT 22 03/29/2023     LDH 168mg/dl  CMV neg   Latest Reference Range & Units 03/29/23 10:25   ELP Interpretation:  Marked hypogammaglobulinemia. No monoclonal protein seen, recommend quantitative immunoglobulins if clinically indicated. Recommend a urine for immunofixation to rule out a light chain secreting myeloma. Pathologic significance requires clinical correlation. Robby Mari M.D., Ph.D., Pathologist.   Gamma Fraction 0.7 - 1.6 g/dL 0.2 (L)   IGA 84 - 499 mg/dL <2 (L)    - 1,616 mg/dL 217 (L)   IGM 35 - 242 mg/dL <10 (L)   Immunofixation ELP  No monoclonal protein seen on immunofixation. Pathologic significance requires clinical correlation. Robby Mari M.D., Ph.D., Pathologist   Kappa Free Lt Chain 0.33 - 1.94 mg/dL 0.06 (L)   Kappa Lambda Ratio 0.26 - 1.65  0.09 (L)   Lambda Free Lt Chain 0.57 - 2.63 mg/dL 0.66   Monoclonal Peak <=0.0 g/dL 0.0   Total Protein Serum for ELP 6.4 - 8.3 g/dL 5.9 (L)   (L): Data is abnormally low    BMBx:3/29/2023  Bone marrow, posterior iliac crest, left decalcified trephine biopsy and touch imprint; left direct aspirate smear, and concentrated aspirate smear; and peripheral smear:     -Normocellular bone marrow [40 to 50% cellular] with maturing trilineage hematopoiesis and no morphological immunophenotypic evidence for involvement by plasma cell myeloma.     - Peripheral blood showing slight leukopenia; lymphocytopenia, otherwise unremarkable peripheral blood smear.    PET 3/24/2023                                                               IMPRESSION: In this patient with multiple myeloma status post CAR  T-cell therapy:     1. Decreased metabolism of the osteolytic left pubic ramus lesion,  compared to 11/16/2022 PET/CT. No new osseous FDG uptake.     2. FDG uptake of the heterogeneous right hepatic flexure of an  additional multiple areas of FDG uptake, new compared to  prior PET/CT.  These findings are concerning for malignancy. Colonoscopy is  recommended.      3. See dedicated report for the results of the high resolution PET CT  of the neck.      I have personally reviewed the examination and initial interpretation  and I agree with the findings.ET 3/29/2023        ASSESSMENT AND PLAN:   Kristie Cornejo is a 52 year old female with a history of multiple myeloma conditioned with Cy/Flu, currently day +90 of her CAR T-cell therapy with Abecma.   NEURO - neg. No ICANS   CRS- hx Grade 1 with a Tmax 102.6F of  on 1/5. Given 1 dose of Tocilizumab.        Light chain MM, standard risk with t(11;14)  No p53 or 17del  S/p Abecma 100 days ago (median PFS for patients in CR is about 18 months)    Day 29 - re-staging studies drawn today, disease response: CR.  Marrow not assessed yet, will do at 3 months   Day 100 - restaging PET and BMBx with CR without MRD !! This is a great outcome.      On Neurotox prophylaxis study. No ICANS.   it dex x 2 doses (-1, +6)   --completed Simvastatin daily 40mg until day day +30    HEME  Counts are recovered now.  No transfusions.  G-CSF for ANC below 1000.       IMMUNOCOMPROMISED  - ok to stop fluc and Levaquin.  -on Pentamidine monthly - PCP prophylaxis thru 6 months post CAR-T;  -switch to Bactrim at day 100 visit for 3 more months. Rx sent.   -Hypogammaglobulinemia - IgG 217g/dl. Plan IVIG next week and recheck in 2 months. No infection sy's.  Plan COVID vaccine in 2 mnhts.       CARDIOVASCULAR  - Risk of cardiomyopathy:  Baseline LVEF: 55-60%   - Risk of arrhythmia: Baseline EKG showed sinus rhythm nonspecific T wave abnormality  - Hx of PVC ablation March 2022      GI  - Ulcer prophylaxis: Protonix   -refer for colonoscopy given PET findings. Doubt malignancy, but given bouts of diarrhea she may have CMV colitis or infections. I suggest to obtain biopsy     Today's Summary:  F/u with me for day 6 months  Anniversary  Colonoscopy referral  COvid  vaccine in 2 months  SULEIMAN visit in 2 months   IVIG plan for next week             I spent 60 minutes in the care of this patient today, which included time necessary for preparation for the visit, obtaining history, ordering medications/tests/procedures as medically indicated, review of pertinent medical literature, counseling of the patient, communication of recommendations to the care team, and documentation time.      Payton Reinoso MD

## 2023-04-06 LAB
CULTURE HARVEST COMPLETE DATE: NORMAL
CULTURE HARVEST COMPLETE DATE: NORMAL

## 2023-04-07 LAB
INTERPRETATION: NORMAL
INTERPRETATION: NORMAL

## 2023-04-10 ENCOUNTER — TELEPHONE (OUTPATIENT)
Dept: GASTROENTEROLOGY | Facility: CLINIC | Age: 53
End: 2023-04-10
Payer: COMMERCIAL

## 2023-04-10 NOTE — TELEPHONE ENCOUNTER
----- Message from Kristie Min sent at 4/6/2023  9:06 AM CDT -----  Regarding: FW: Lower Endoscopy needed  Spoke with the patient, she is out of town currently and requested a callback on 4/10. Deferred.  ----- Message -----  From: Rosangela Patiño RN  Sent: 4/5/2023   4:10 PM CDT  To: Endoscopy Scheduling Pool  Subject: RE: Lower Endoscopy needed                       I don't see any RN hard stops based on chart review unless something is indicated during scheduling questionnaire.     Rosangela Patiño RN  Endoscopy Procedure Pre Assessment RN    ----- Message -----  From: Carolyn Vance  Sent: 4/5/2023   2:36 PM CDT  To: Endoscopy Pre Assessment Nurse Pool  Subject: FW: Lower Endoscopy needed                       Please review.      ----- Message -----  From: Disha Morales  Sent: 4/5/2023   2:24 PM CDT  To: Endoscopy Scheduling Pool  Subject: Lower Endoscopy needed                           Hi     Please reach out to patient she needs a colonoscopy, urgent a referral is in her chart.     Thank you   Disha Morales   ealth  BMT/Oncology Clinic Coordinator   [phone] opt 5 opt 2

## 2023-04-10 NOTE — TELEPHONE ENCOUNTER
Screening Questions  BLUE  KIND OF PREP RED  LOCATION [review exclusion criteria] GREEN  SEDATION TYPE        y Are you active on mychart?       MARCELLUS CARDENAS Ordering/Referring Provider?        R[place]T What type of coverage do you have?      N Have you had a positive covid test in the last 14 days?     25.2 1. BMI  [BMI 40+ - review exclusion criteria]    Y  2. Are you able to give consent for your medical care? [IF NO,RN REVIEW]          N  3. Are you taking any prescription pain medications on a routine schedule   (ex narcotics: oxycodone, roxicodone, oxycontin,  and percocet)? [RN Review]          3a. EXTENDED PREP What kind of prescription?     N 4. Do you have any chemical dependencies such as alcohol, street drugs, or methadone?        **If yes 3- 5 , please schedule with MAC sedation.**          IF YES TO ANY 6 - 10 - HOSPITAL SETTING ONLY.     N 6.   Do you need assistance transferring?     N 7.   Have you had a heart or lung transplant?    N 8.   Are you currently on dialysis?   N 9.   Do you use daily home oxygen?   N 10. Do you take nitroglycerin?   10a.  If yes, how often?     11. [FEMALES]   Are you currently pregnant?    11a.  If yes, how many weeks? [ Greater than 12 weeks, OR NEEDED]    N 12. Do you have Pulmonary Hypertension? *NEED PAC APPT AT [facility] w/ MAC*     N 13. [review exclusion criteria]  Do you have any implantable devices in your body (pacemaker, defib, LVAD)?    N 14. In the past 6 months, have you had any heart related issues including cardiomyopathy or heart attack?     14a.  If yes, did it require cardiac stenting if so when?     N 15. Have you had a stroke or Transient ischemic attack (TIA - aka  mini stroke ) within 6 months?      N 16. Do you have mod to severe Obstructive Sleep Apnea?  [Hospital only]    N 17. Do you have SEVERE AND UNCONTROLLED asthma? *NEED PAC APPT AT [facility] w/MAC*     18. Are you currently taking any blood thinners?     18a. No. Continue to 19.   18b.    N  "19. Do you take the medication Phentermine?    19a. If yes, \"Hold for 7 days before procedure.  Please consult your prescribing provider if you have questions about holding this medication.\"     N  20. Do you have chronic kidney disease?      N  21. Do you have a diagnosis of diabetes?     N  22. On a regular basis do you go 3-5 days between bowel movements?      23. Preferred LOCAL Pharmacy for Pre Prescription    [ LIST ONLY ONE PHARMACY]        [id number] [address]        - CLOSING REMINDERS -    Informed patient they will need an adult    Cannot take any type of public or medical transportation alone    Conscious Sedation- Needs  for 6 hours after the procedure       MAC/General-Needs  for 24 hours after procedure    Pre-Procedure Covid test to be completed [[facility] PCR Testing Required]    Confirmed Nurse will call to complete assessment       - SCHEDULING DETAILS -  N Hospital Setting Required? If yes, what is the exclusion?:    SUMAN  Surgeon    5/24  Date of Procedure  Lower Endoscopy [Colonoscopy]  Type of Procedure Scheduled  [facility] Location   MIRALAX GATORADE WITHOUT MAGNEISUM CITRATE Which Colonoscopy Prep was Sent?     NONE Sedation Type     N PAC / Pre-op Required                 "

## 2023-04-10 NOTE — TELEPHONE ENCOUNTER
04/10/2023    2nd attempt per patient request to call on 04/10. LVM to call back. Awaiting patient to call back to schedule.    Urgent priority order. Defer for 24 hours.

## 2023-04-11 ENCOUNTER — INFUSION THERAPY VISIT (OUTPATIENT)
Dept: INFUSION THERAPY | Facility: CLINIC | Age: 53
End: 2023-04-11
Payer: COMMERCIAL

## 2023-04-11 VITALS
SYSTOLIC BLOOD PRESSURE: 121 MMHG | TEMPERATURE: 98 F | HEART RATE: 75 BPM | OXYGEN SATURATION: 98 % | RESPIRATION RATE: 16 BRPM | DIASTOLIC BLOOD PRESSURE: 74 MMHG

## 2023-04-11 DIAGNOSIS — C90.00 LIGHT CHAIN MYELOMA (H): ICD-10-CM

## 2023-04-11 DIAGNOSIS — D80.1 HYPOGAMMAGLOBULINEMIA (H): Primary | ICD-10-CM

## 2023-04-11 PROCEDURE — 250N000011 HC RX IP 250 OP 636

## 2023-04-11 PROCEDURE — 258N000003 HC RX IP 258 OP 636

## 2023-04-11 PROCEDURE — 96366 THER/PROPH/DIAG IV INF ADDON: CPT

## 2023-04-11 PROCEDURE — 96375 TX/PRO/DX INJ NEW DRUG ADDON: CPT

## 2023-04-11 PROCEDURE — 96365 THER/PROPH/DIAG IV INF INIT: CPT

## 2023-04-11 PROCEDURE — 250N000013 HC RX MED GY IP 250 OP 250 PS 637

## 2023-04-11 RX ORDER — METHYLPREDNISOLONE SODIUM SUCCINATE 40 MG/ML
20 INJECTION, POWDER, LYOPHILIZED, FOR SOLUTION INTRAMUSCULAR; INTRAVENOUS ONCE
Status: COMPLETED | OUTPATIENT
Start: 2023-04-11 | End: 2023-04-11

## 2023-04-11 RX ORDER — DIPHENHYDRAMINE HCL 25 MG
50 CAPSULE ORAL ONCE
Status: COMPLETED | OUTPATIENT
Start: 2023-04-11 | End: 2023-04-11

## 2023-04-11 RX ORDER — ACETAMINOPHEN 325 MG/1
650 TABLET ORAL ONCE
Status: COMPLETED | OUTPATIENT
Start: 2023-04-11 | End: 2023-04-11

## 2023-04-11 RX ADMIN — HUMAN IMMUNOGLOBULIN G 30 G: 20 LIQUID INTRAVENOUS at 09:21

## 2023-04-11 RX ADMIN — DIPHENHYDRAMINE HYDROCHLORIDE 50 MG: 25 CAPSULE ORAL at 08:42

## 2023-04-11 RX ADMIN — SODIUM CHLORIDE 250 ML: 9 INJECTION, SOLUTION INTRAVENOUS at 08:57

## 2023-04-11 RX ADMIN — ACETAMINOPHEN 650 MG: 325 TABLET, FILM COATED ORAL at 08:43

## 2023-04-11 RX ADMIN — METHYLPREDNISOLONE SODIUM SUCCINATE 20 MG: 40 INJECTION, POWDER, FOR SOLUTION INTRAMUSCULAR; INTRAVENOUS at 08:44

## 2023-04-11 NOTE — PROGRESS NOTES
Infusion Nursing Note:  Kristie Cornejo presents today for IVIG.    Patient seen by provider today: No   present during visit today: Not Applicable.    Note: Patient states she has been doing well lately.    Intravenous Access:  Peripheral IV placed.    Treatment Conditions:  Not Applicable.    Post Infusion Assessment:  Patient tolerated infusion without incident.  Blood return noted pre and post infusion.  Site patent and intact, free from redness, edema or discomfort.  No evidence of extravasations.  Access discontinued per protocol.     Discharge Plan:   Discharge instructions reviewed with: Patient.  Patient and/or family verbalized understanding of discharge instructions and all questions answered.  Patient discharged in stable condition accompanied by: self.  Departure Mode: Ambulatory.      Sara Gaffney RN

## 2023-05-01 ENCOUNTER — MYC MEDICAL ADVICE (OUTPATIENT)
Dept: ONCOLOGY | Facility: CLINIC | Age: 53
End: 2023-05-01
Payer: COMMERCIAL

## 2023-05-01 DIAGNOSIS — C90.00 LIGHT CHAIN MYELOMA (H): ICD-10-CM

## 2023-05-01 RX ORDER — ACYCLOVIR 800 MG/1
800 TABLET ORAL EVERY 12 HOURS
Qty: 180 TABLET | Refills: 3 | Status: SHIPPED | OUTPATIENT
Start: 2023-05-01 | End: 2024-06-05

## 2023-05-01 RX ORDER — ACYCLOVIR 800 MG/1
800 TABLET ORAL EVERY 12 HOURS
Qty: 60 TABLET | Refills: 1 | Status: CANCELLED | OUTPATIENT
Start: 2023-05-01

## 2023-05-01 NOTE — TELEPHONE ENCOUNTER
Acyclovir 800mg tab  Last prescribing provider: Dr. Reinoso on 3/9/22    Last clinic visit date: 4/4/23     Recommendations for requested medication (if none, N/A): NA    Any other pertinent information (if none, N/A): NA    Refilled: Y/N, if NO, why?    Routed to Dr. Payton Reinoso

## 2023-05-09 ENCOUNTER — TELEPHONE (OUTPATIENT)
Dept: GASTROENTEROLOGY | Facility: CLINIC | Age: 53
End: 2023-05-09

## 2023-05-09 NOTE — TELEPHONE ENCOUNTER
Attempted to contact patient regarding upcoming Colonoscopy  procedure on 5/24/23 for pre assessment questions. No answer.     Left message to return call to [phone] #4    Discuss Covid policy and designated  policy.    Pre op exam? N/A    Arrival time: 1330. Procedure time: 1430    [facility]; [address] [place] [address], [address] Floor, [place] [address]    Sedation type: Conscious sedation     NSAIDs? No NSAID medications per patient's medication list.  RN will verify with pre-assessment call.    Anticoagulants: No    Electronic implanted devices? No    Diabetic? No    Indication for procedure: Light chain myeloma    Bowel prep recommendation: Miralax prep without magnesium citrate     Prep instructions sent via [employer].     Rosangela Kc RN  Endoscopy Procedure Pre Assessment RN

## 2023-05-09 NOTE — TELEPHONE ENCOUNTER
Pre assessment questions completed for upcoming Colonoscopy  procedure scheduled on 5/24/23    COVID policy reviewed.     Pre-op exam? N/A    Reviewed procedural arrival time 1330, procedure time 1430 and facility location [facility]; [address] [address], [address], [place] [address]    Designated  policy reviewed. Instructed to have someone stay 6 hours post procedure.     NSAIDs? No    Anticoagulation/blood thinners? No    Electronic implanted devices? No    Diabetic? No    Procedure indication: light chain myeloma    Bowel prep recommendation: Miralax prep without magnesium citrate    Reviewed procedure prep instructions.     Prep instructions sent via [facility].    Patient verbalized understanding and had no questions or concerns at this time.    Mary Anne Day, NILAM  Endoscopy Procedure Pre Assessment RN

## 2023-05-23 ENCOUNTER — PATIENT OUTREACH (OUTPATIENT)
Dept: ONCOLOGY | Facility: CLINIC | Age: 53
End: 2023-05-23
Payer: COMMERCIAL

## 2023-05-23 NOTE — PROGRESS NOTES
[facility]: Cancer Care                                                                                          RNCC received message voicemail from patient requesting a call back about Jury duty and labs prior to her visit with Jackie.     RNCC called patient back. Patient stated she heard from the  and received a medical exemption. Patient did not need anything else from RNCC.     Patient also wanted to be sure her labs had enough time to results before her appointment with Jackie. RNCC stated that yes her labs should be back prior to her visit.     Patient stated an understanding and had no additional questions. RNCC encouraged her to call if anything comes up.     Signature:  Shannon Waters RN

## 2023-05-24 ENCOUNTER — HOSPITAL ENCOUNTER (OUTPATIENT)
Facility: CLINIC | Age: 53
Discharge: HOME | End: 2023-05-24
Attending: INTERNAL MEDICINE | Admitting: INTERNAL MEDICINE
Payer: COMMERCIAL

## 2023-05-24 VITALS
HEART RATE: 57 BPM | RESPIRATION RATE: 16 BRPM | SYSTOLIC BLOOD PRESSURE: 119 MMHG | OXYGEN SATURATION: 96 % | DIASTOLIC BLOOD PRESSURE: 74 MMHG

## 2023-05-24 LAB — COLONOSCOPY: NORMAL

## 2023-05-24 PROCEDURE — 99153 MOD SED SAME PHYS/QHP EA: CPT | Performed by: INTERNAL MEDICINE

## 2023-05-24 PROCEDURE — 88305 TISSUE EXAM BY PATHOLOGIST: CPT | Mod: TC | Performed by: INTERNAL MEDICINE

## 2023-05-24 PROCEDURE — G0500 MOD SEDAT ENDO SERVICE >5YRS: HCPCS | Performed by: INTERNAL MEDICINE

## 2023-05-24 PROCEDURE — 45380 COLONOSCOPY AND BIOPSY: CPT | Performed by: INTERNAL MEDICINE

## 2023-05-24 PROCEDURE — 250N000011 HC RX IP 250 OP 636: Performed by: INTERNAL MEDICINE

## 2023-05-24 PROCEDURE — 88305 TISSUE EXAM BY PATHOLOGIST: CPT | Mod: 26 | Performed by: PATHOLOGY

## 2023-05-24 PROCEDURE — 45385 COLONOSCOPY W/LESION REMOVAL: CPT

## 2023-05-24 RX ORDER — NALOXONE HYDROCHLORIDE 0.4 MG/ML
0.2 INJECTION, SOLUTION INTRAMUSCULAR; INTRAVENOUS; SUBCUTANEOUS
Status: DISCONTINUED | OUTPATIENT
Start: 2023-05-24 | End: 2023-05-24 | Stop reason: HOSPADM

## 2023-05-24 RX ORDER — NALOXONE HYDROCHLORIDE 0.4 MG/ML
0.4 INJECTION, SOLUTION INTRAMUSCULAR; INTRAVENOUS; SUBCUTANEOUS
Status: DISCONTINUED | OUTPATIENT
Start: 2023-05-24 | End: 2023-05-24 | Stop reason: HOSPADM

## 2023-05-24 RX ORDER — FENTANYL CITRATE 50 UG/ML
INJECTION, SOLUTION INTRAMUSCULAR; INTRAVENOUS PRN
Status: DISCONTINUED | OUTPATIENT
Start: 2023-05-24 | End: 2023-05-24 | Stop reason: HOSPADM

## 2023-05-24 RX ORDER — ONDANSETRON 2 MG/ML
4 INJECTION INTRAMUSCULAR; INTRAVENOUS EVERY 6 HOURS PRN
Status: DISCONTINUED | OUTPATIENT
Start: 2023-05-24 | End: 2023-05-24 | Stop reason: HOSPADM

## 2023-05-24 RX ORDER — FLUMAZENIL 0.1 MG/ML
0.2 INJECTION, SOLUTION INTRAVENOUS
Status: DISCONTINUED | OUTPATIENT
Start: 2023-05-24 | End: 2023-05-24 | Stop reason: HOSPADM

## 2023-05-24 RX ORDER — LIDOCAINE 40 MG/G
CREAM TOPICAL
Status: DISCONTINUED | OUTPATIENT
Start: 2023-05-24 | End: 2023-05-24 | Stop reason: HOSPADM

## 2023-05-24 RX ORDER — ONDANSETRON 4 MG/1
4 TABLET, ORALLY DISINTEGRATING ORAL EVERY 6 HOURS PRN
Status: DISCONTINUED | OUTPATIENT
Start: 2023-05-24 | End: 2023-05-24 | Stop reason: HOSPADM

## 2023-05-24 RX ORDER — PROCHLORPERAZINE MALEATE 10 MG
10 TABLET ORAL EVERY 6 HOURS PRN
Status: DISCONTINUED | OUTPATIENT
Start: 2023-05-24 | End: 2023-05-24 | Stop reason: HOSPADM

## 2023-05-24 RX ORDER — ONDANSETRON 2 MG/ML
4 INJECTION INTRAMUSCULAR; INTRAVENOUS
Status: DISCONTINUED | OUTPATIENT
Start: 2023-05-24 | End: 2023-05-24 | Stop reason: HOSPADM

## 2023-05-24 ASSESSMENT — ACTIVITIES OF DAILY LIVING (ADL): ADLS_ACUITY_SCORE: 37

## 2023-05-24 NOTE — OR NURSING
Patient underwent colonoscopy under conscious sedation. Tolerated procedure. Specimens sent to lab. Report given to endo recovery RN.

## 2023-05-24 NOTE — H&P
Gastroenterology Pre-op History and Physical    Kristie Cornejo MRN# [id number]   Age: 53 year old YOB: 1970      Date of Surgery: 05/24/23  [facility]      Date of Exam 5/24/2023 Facility Same Day       Primary care provider: Payton Reinoso         Chief Complaint and/or Reason for Procedure:   54 yo female with history of myeloma now s/p CAR-T therapy.  Prior colonoscopy with non-specific colitis which improved with change in medications. Now with recurrent diarrhea and PET/CT showing new activity in the hepatic flexure of the colon (plus other scattered areas).         Past Medical and Surgical History:     Past Medical History:   Diagnosis Date     Allergic rhinitis, cause unspecified      Anxiety state, unspecified 12/01/2003    Started postpartum . On Paxil x 1+years. Off meds- 10/04     Irritable bowel syndrome 01/01/2004     Multiple myeloma not having achieved remission (H)      PLANTAR WARTS     resolved     SINUS DYSRHYTHMIA 10/01/2002    wnl     Unspecified hemorrhoids without mention of complication 04/01/2004    colonoscopy 4/04 spastic colon, int roids; bx neg     Past Surgical History:   Procedure Laterality Date     BIOPSY BONE PELVIS Left 09/07/2021    Procedure: Biopsy left pubic ramis;  Surgeon: Edu Philip MD;  Location: [facility] OR     COLONOSCOPY N/A 04/13/2022    Procedure: COLONOSCOPY, WITH BIOPSY;  Surgeon: Berto Montgomery MD;  Location:  GI     EP ABLATION PVC N/A 03/23/2022    Procedure: Ablation Premature Ventricular Contractions;  Surgeon: Alem Thomas MD;  Location:  HEART CARDIAC CATH LAB     INSERT CATHETER VASCULAR ACCESS N/A 11/21/2022    Procedure: CENTRAL VENOUS CATHETER NON TUNNELED INSERTION, VASCULAR ACCESS CATHETER;  Surgeon: Erich Bauman MD;  Location: [facility] OR     IR CVC NON TUNNEL LINE REMOVAL  11/22/2022     IR CVC NON TUNNEL PLACEMENT > 5 YRS  11/21/2022     LASIK       PICC TRIPLE LUMEN  PLACEMENT Left 01/03/2023    Basilic 44 cm ext. 0     [facility] NONSPECIFIC PROCEDURE  01/01/1987    Badger teeth removed     [facility] NONSPECIFIC PROCEDURE  01/01/1993    (R) knee surgery     [facility] NONSPECIFIC PROCEDURE  01/01/2003    (R) breast bx-negative 6/03; bx neg 10/02     [facility] NONSPECIFIC PROCEDURE      10/02 cardiac echo normal     [facility] NONSPECIFIC PROCEDURE  01/01/2004 4/04 colonosc int roids, spastic colon     [facility] NONSPECIFIC PROCEDURE  08/01/2005    D & C for missed AB     [facility] COLONOSCOPY THRU STOMA, DIAGNOSTIC  01/01/2004            Medications (include herbals and vitamins):        Medications Prior to Admission   Medication Sig Dispense Refill Last Dose     cetirizine HCl 10 MG CAPS Take 1 capsule (10 mg) by mouth daily as needed   5/24/2023     Flaxseed (Linseed) (FLAX SEED OIL) 1000 MG capsule Take 1 capsule by mouth daily   5/24/2023     sulfamethoxazole-trimethoprim (BACTRIM DS) 800-160 MG tablet Take 1 tablet by mouth 2 times daily 16 tablet 6 Past Week     acetaminophen (TYLENOL) 325 MG tablet Take 1-2 tablets (325-650 mg) by mouth every 4 hours as needed for mild pain or fever (Patient not taking: Reported on 1/25/2023)        acyclovir (ZOVIRAX) 800 MG tablet Take 1 tablet (800 mg) by mouth every 12 hours 180 tablet 3      BUSPIRONE HCL 10 MG PO TABS 1 TABLET 3 TIMES DAILY as needed (Patient not taking: Reported on 4/4/2023) 30 Tab 3      LORazepam (ATIVAN) 0.5 MG tablet Take 1 tablet (0.5 mg) by mouth every 4 hours as needed for anxiety, nausea or sleep (Patient not taking: Reported on 2/2/2023) 30 tablet 3      melatonin 5 MG tablet Take 5 mg by mouth nightly as needed for sleep        MIRENA 20 MCG/24HR IU IUD         Multiple Vitamin (MULTI-VITAMIN DAILY) TABS Take by mouth daily On hold        omeprazole (PRILOSEC OTC) 20 MG EC tablet Take 1 tablet (20 mg) by mouth daily (Patient not taking: Reported on 2/2/2023)        polyethylene glycol (MIRALAX) 17 g packet Take 1 packet by mouth daily as  needed for constipation        prochlorperazine (COMPAZINE) 10 MG tablet Take 0.5 tablets (5 mg) by mouth every 6 hours as needed for nausea or vomiting (Patient not taking: Reported on 1/25/2023)                Allergies:      Allergies   Allergen Reactions     Amoxicillin      Other reaction(s): upset stomach  Other reaction(s): Gastrointestinal, GI intolerance, Other (see comments)  Other reaction(s): upset stomach  Other reaction(s): upset stomach       Chlorhexidine Gluconate Rash     Clarithromycin      Other reaction(s): GI intolerance     Erythromycin      upsets stomach     Erythromycin      Other reaction(s): GI intolerance  upsets stomach  nausea               Physical Exam:   All vitals have been reviewed  Patient Vitals for the past 8 hrs:   BP Pulse Resp SpO2   05/24/23 1337 108/81 83 16 97 %     No intake/output data recorded.  Airway assessment:   Patient is able to open mouth wide  Patient is able to stick out tongue  Mallampatti classification: Class I (visualization of the soft palate, fauces, uvula, anterior and posterior pillars)}      Lungs:   No increased work of breathing, good air exchange, clear to auscultation bilaterally, no crackles or wheezing     Cardiovascular:   regular rate and rhythm and normal S1 and S2                 Anesthetic risk and/or ASA classification:   ASA 3    Berto Montgomery MD

## 2023-05-25 LAB
PATH REPORT.COMMENTS IMP SPEC: NORMAL
PATH REPORT.COMMENTS IMP SPEC: NORMAL
PATH REPORT.FINAL DX SPEC: NORMAL
PATH REPORT.GROSS SPEC: NORMAL
PATH REPORT.MICROSCOPIC SPEC OTHER STN: NORMAL
PATH REPORT.RELEVANT HX SPEC: NORMAL
PHOTO IMAGE: NORMAL

## 2023-06-01 ENCOUNTER — LAB (OUTPATIENT)
Dept: LAB | Facility: CLINIC | Age: 53
End: 2023-06-01
Payer: COMMERCIAL

## 2023-06-01 ENCOUNTER — HEALTH MAINTENANCE LETTER (OUTPATIENT)
Age: 53
End: 2023-06-01

## 2023-06-01 DIAGNOSIS — C90.00 LIGHT CHAIN MYELOMA (H): ICD-10-CM

## 2023-06-01 DIAGNOSIS — C90.00 MULTIPLE MYELOMA, REMISSION STATUS UNSPECIFIED (H): ICD-10-CM

## 2023-06-01 LAB
ALBUMIN SERPL BCG-MCNC: 4.5 G/DL (ref 3.5–5.2)
ALP SERPL-CCNC: 54 U/L (ref 35–104)
ALT SERPL W P-5'-P-CCNC: 19 U/L (ref 10–35)
ANION GAP SERPL CALCULATED.3IONS-SCNC: 13 MMOL/L (ref 7–15)
AST SERPL W P-5'-P-CCNC: 22 U/L (ref 10–35)
BASOPHILS # BLD AUTO: 0 10E3/UL (ref 0–0.2)
BASOPHILS NFR BLD AUTO: 1 %
BILIRUB SERPL-MCNC: 0.3 MG/DL
BUN SERPL-MCNC: 20.6 MG/DL (ref 6–20)
CALCIUM SERPL-MCNC: 9.4 MG/DL (ref 8.6–10)
CHLORIDE SERPL-SCNC: 107 MMOL/L (ref 98–107)
CREAT SERPL-MCNC: 0.94 MG/DL (ref 0.51–0.95)
DEPRECATED HCO3 PLAS-SCNC: 22 MMOL/L (ref 22–29)
EOSINOPHIL # BLD AUTO: 0.1 10E3/UL (ref 0–0.7)
EOSINOPHIL NFR BLD AUTO: 3 %
ERYTHROCYTE [DISTWIDTH] IN BLOOD BY AUTOMATED COUNT: 12.4 % (ref 10–15)
GFR SERPL CREATININE-BSD FRML MDRD: 72 ML/MIN/1.73M2
GLUCOSE SERPL-MCNC: 109 MG/DL (ref 70–99)
HCT VFR BLD AUTO: 40.8 % (ref 35–47)
HGB BLD-MCNC: 13.7 G/DL (ref 11.7–15.7)
IMM GRANULOCYTES # BLD: 0 10E3/UL
IMM GRANULOCYTES NFR BLD: 0 %
LYMPHOCYTES # BLD AUTO: 0.4 10E3/UL (ref 0.8–5.3)
LYMPHOCYTES NFR BLD AUTO: 15 %
MCH RBC QN AUTO: 30.4 PG (ref 26.5–33)
MCHC RBC AUTO-ENTMCNC: 33.6 G/DL (ref 31.5–36.5)
MCV RBC AUTO: 91 FL (ref 78–100)
MONOCYTES # BLD AUTO: 0.5 10E3/UL (ref 0–1.3)
MONOCYTES NFR BLD AUTO: 19 %
NEUTROPHILS # BLD AUTO: 1.7 10E3/UL (ref 1.6–8.3)
NEUTROPHILS NFR BLD AUTO: 62 %
PLATELET # BLD AUTO: 209 10E3/UL (ref 150–450)
POTASSIUM SERPL-SCNC: 4.3 MMOL/L (ref 3.4–5.3)
PROT SERPL-MCNC: 6.7 G/DL (ref 6.4–8.3)
RBC # BLD AUTO: 4.5 10E6/UL (ref 3.8–5.2)
SODIUM SERPL-SCNC: 142 MMOL/L (ref 136–145)
TOTAL PROTEIN SERUM FOR ELP: 6.5 G/DL (ref 6.4–8.3)
WBC # BLD AUTO: 2.7 10E3/UL (ref 4–11)

## 2023-06-01 PROCEDURE — 86334 IMMUNOFIX E-PHORESIS SERUM: CPT

## 2023-06-01 PROCEDURE — 36415 COLL VENOUS BLD VENIPUNCTURE: CPT

## 2023-06-01 PROCEDURE — 84165 PROTEIN E-PHORESIS SERUM: CPT

## 2023-06-01 PROCEDURE — 82784 ASSAY IGA/IGD/IGG/IGM EACH: CPT

## 2023-06-01 PROCEDURE — 84155 ASSAY OF PROTEIN SERUM: CPT

## 2023-06-01 PROCEDURE — 85025 COMPLETE CBC W/AUTO DIFF WBC: CPT

## 2023-06-01 PROCEDURE — 80053 COMPREHEN METABOLIC PANEL: CPT

## 2023-06-01 PROCEDURE — 83521 IG LIGHT CHAINS FREE EACH: CPT

## 2023-06-02 LAB
ALBUMIN SERPL ELPH-MCNC: 4.4 G/DL (ref 3.7–5.1)
ALPHA1 GLOB SERPL ELPH-MCNC: 0.3 G/DL (ref 0.2–0.4)
ALPHA2 GLOB SERPL ELPH-MCNC: 0.7 G/DL (ref 0.5–0.9)
B-GLOBULIN SERPL ELPH-MCNC: 0.7 G/DL (ref 0.6–1)
GAMMA GLOB SERPL ELPH-MCNC: 0.4 G/DL (ref 0.7–1.6)
IGA SERPL-MCNC: <2 MG/DL (ref 84–499)
IGG SERPL-MCNC: 435 MG/DL (ref 610–1616)
IGM SERPL-MCNC: <10 MG/DL (ref 35–242)
KAPPA LC FREE SER-MCNC: 0.1 MG/DL (ref 0.33–1.94)
KAPPA LC FREE/LAMBDA FREE SER NEPH: ABNORMAL {RATIO}
LAMBDA LC FREE SERPL-MCNC: <0.14 MG/DL (ref 0.57–2.63)
M PROTEIN SERPL ELPH-MCNC: 0 G/DL
PROT PATTERN SERPL ELPH-IMP: ABNORMAL
PROT PATTERN SERPL IFE-IMP: NORMAL

## 2023-06-06 NOTE — PROGRESS NOTES
[facility]  Date of Visit: Jun 7, 2023   Oncologist: Dr. Payton Reinoso    Reason for visit: myeloma follow-up           Oncology History   53 year old female with past medical history significant for Irritable bowel disease, allergies, large plasmacytoma of the L pelvis, and newly diagnosed multiple myeloma.     Early in April 2021 , she noted to have left sided groin pain, constant and was affecting her daily activities  MRI showed large, expansile, permeative mass with indistinct borders involving the left superior and inferior pubic rami, pubic symphysis and with possible extension to the left acetabulum.     We completed the following work-up:   - Biopsy 9/7/2021: plasmacytoma.  Flow cytometry showed kappa monotypic plasma cells, polytypic B cells. FISH results from plasmacytoma showed a gain of 11q, IGH-CCND1 fusion but no losses of 1q or TP53 and no gain of 1q.  - Bone marrow biopsy 09/22/21: Marrow cellularity 50% with 25% interstitial infiltration w/  Kappa-monotypic, moderately atypical plasma cells. Flow with 0.7% kappa-monotypica plasma cells. FISH and cytogenetics pending.   - PET CT 09/24/21 w/ hypermetabolic pathologic fracture of the L pubic ramus w/ aggressive appearing osteolysis but no additional suspicious lytic or blastic osseous lesions.     Treatment to date:   - Zometa q4 weeks started 9/27/21  - Radiation to left pubic ramus x18 fractions, completed 11/11/21.   - RVD C1D1 11/15/21  - RVD C2D1 12/6/21  - RVD C3D1  12/27/21  - RVD C4 D1 1/17/22, elizabeth added on D8 due to inadequate response of urinary M-spike and FLCs remaining over 100 mg/dL after 4 cycles of RVD    - Elizabeth-RVD C1D8 1/24/22  - Elizabeth-RVD C2D1 2/7/22  - Elizabeth-RVD C3D1 2/28/22  - Elizabeth-RVD C4D1 3/29/22  - Elizabeth-RVD C5D1 4/19/22    Disease response: IL     - 5/19/22: Continued monotherapy with Daratumumab; held Revlimid and Velcade.    - 6/21/22: Transitioned to KCD, received C1 days 1 and 2- had  significant PVCs at cardiac follow-uyp after 1st week of Kyprolis, held further doses.    - Transitioned to Sarclisa/Pom/Dex 7/6/22. Started Pom on 7/11.     -9/8/22 - COVID, delayed C3D15 treatment x 1 week    - Apheresis for CAR-T on 11/22/22; bridging with Sarclisa given 11/23/22   - Abecma 1/4/23          INTERIM HISTORY:   Bath presents to clinic for follow up.      Doing well overall.  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 Continues to have loose stools on a daily basis, maybe once or twice daily, but also occasionally small incontinence while passing gas, continues to impact quality of life for her.  Hips are achy when she gets up in the morning, she continues to work with PT.  Denies signs of acute illness - no fevers, chills, headaches, sinus congestion, sore throat, or cough. Appetite is good.  Denies n/v. No new bony pains.        ROS: 10 point ROS neg other than the symptoms noted above in the HPI.           Physical Exam:     /77   Pulse 74   Temp 98.6  F (37  C) (Tympanic)   Resp 16   Wt 74 kg (163 lb 3.2 oz)   SpO2 100%   BMI 25.56 kg/m     Gen: alert, pleasant and conversational, NAD  HEENT: NC/AT,EOMI w/ PERRL, anicteric sclera. OP clear. MMM.   Neck: Supple, no LAD  CV: normal S1,S2 with RRR no m/r/g  Resp: lungs CTA bilaterally with adequate air movement to bases. No wheezes or crackles  Abd: soft NTND no organomegaly or masses. BS normoactive.   Ext: warm and well perfused, no edema or cyanosis  Skin: no concerning lesions or rashes  Neuro: A&Ox4, no lateralizing sx. Grossly nonfocal.  Psych: appropriate, reactive             Laboratory Data:      I personally reviewed the following labs:    Most Recent 3 CBC's:  Recent Labs   Lab Test 06/01/23  0853 03/29/23  1025 03/09/23  0955   WBC 2.7* 3.3* 3.2*   HGB 13.7 12.5 13.0   MCV 91 93 94    228 273     Most Recent 3 BMP's:  Recent Labs   Lab Test 06/01/23  0853 03/29/23  1025 03/09/23  0955    139 144    POTASSIUM 4.3 4.0 3.9   CHLORIDE 107 105 108*   CO2 22 23 24   BUN 20.6* 20.6* 20.2*   CR 0.94 0.82 0.88   ANIONGAP 13 11 12   MEKA 9.4 9.2 9.6   * 106* 102*     Most Recent 2 LFT's:  Recent Labs   Lab Test 06/01/23  0853 03/29/23  1025   AST 22 17   ALT 19 22   ALKPHOS 54 47   BILITOTAL 0.3 0.5       Imaging:  No new imaging to review.         Assessment and Recommendations   Kristie Cornejo is a 53 year old female who was diagnosed with Plasmacytoma after she presented with left sided groin pain now found to have multiple myeloma after further workup with bone marrow biopsy.     # Multiple myeloma with associated large plasmacytoma of the L pelvis. Kappa light chain disease.     BMBx with 25% plasma cell infiltration; standard risk with gain of 11q, IGH-CCND1 fusion but no losses of 1q or TP53 and no gain of 1q.   SPEP w/out a monoclonal peak  UPEP positive. 70% paraprotein. Large monoclonal free immunoglobulin light chain of kappa chain type.     No anemia, normal creat, Ca, no other skeletal lesions. Alb normal, B2M normal.  Stage I Light chain disease with left pelvic bone pathologic fracture she is classified as having symptomatic multiple myeloma.   -s/p michelle-RVD, michelle alone and KCD with either little benefit or significant symptoms  -transitioned to Sarclisa/Pom/Dex to avoid the cardiotoxicity of Kyprolis.  Started C1D1 7/6/22.  Her diarrhea has returned with being back on treatment but manageable  - Received Sarclisa 11/23/22, Pomalyst through ~ 12/2/22  - Underwent CAR-T therapy with Abecma 1/4/23  - 2/2/23 labs show CR with undetectable kappa light chains  - Labs continue to show excellent disease control.  -No plans for maintenance at this time. She will follow-up with Dr. Reinoso in October   - Continue Bactrim until discontinued by cellular therapy team.    HEME  Counts are stable  Transfusion parameters: hemoglobin <7g/dL, platelets <10k.    #Bone Health  Was on monthly Zometa while on  "treatment.  Will hold further zometa infusions for now. She will continue Calcium/Vitamin D supplements.       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 Currently having no pain and able to walk 1 mile without any assistive devices.  She is interested in pursuing other activities like gentle cross-country skiing.  Had follow-up with Dr. Philip on 1/6/22 and was cleared to resume regular activity as tolerated. She should still refrain from high-impact activities.    PET on 7/12/22 shows left pubic ramus lesion is smaller in size but brighter FDG. No new lesions.      # PVCs  She had an ECHO 12/1 and had follow-up with cardiology 12/7 (notes in [facility]).  She had follow-up with cardiology here at the  and they recommend an ablation. Stress test was completed   -started on Toprol XL however was discontinued due to significant side effects of orthostatic hypotension, pre-syncopal episodes, shakiness, and weakness.   - She had an ablation 3/23/22 but has not experienced relief from the feeling of being nauseated and short of breath after climbing the stairs.  She also reports episodes of palpitations that \"radiate into her neck.\" Denies chest pain or shortness of breath.   - EKG done 4/4 showed sinus rhythm and nonspecific ST and T wave abnormality. Troponin negative.  - Cardiac MRI ruled out amyloid.  LFEF 50%, no evidence of infiltrative cardiomyopathy.  - She followed up with cardiology and they felt that the cause may be musculoskeletal or pericarditis. They suggested taking ibuprofen for pain. Her pain has subsided and she actually has not needed to take ibuprofen.   - Post ablation Zio patch was performed showing a significant reduction in PVCs; however, by the time she had her cardiology follow-up, she had restarted chemotherapy and the PVCs had " returned.  Dr. Thomas is comfortable proceeding with treatment as long as symptoms are tolerable and there are no major EKG changes. Would need to repeat if she were to do BMT  - EKG 7/13 with no major changes, troponin WNL  -she continues to have PVCs, though much improved from when she was on cytoxan. Wants to avoid ablation again if possible  - Continue to follow-up with Dr. Thomas as recommended    #Diarrhea  She was having intermittent diarrhea with associated abdominal cramping and nausea, but developed much more significant diarrhea with 10-12 liquid episodes and was seen as an add-on on 4/4/22 for this.   - C.diff PCR negative 2/8/22, repeat on 4/5 negative  - Enteric stool panel 4/5/22 negative  - Underwent colonoscopy on 4/13/22 to pursue biopsy for amyloid (discussed below). [place] red stain is negative for amyloid deposits. Pathology showed colonic mucosa with focal active colitis, negative for signs of chronicity and lymphocytic colitis. Per patient report, GI reviewed results with her and did not feel she needed treatment for these findings at this time. They advised her to monitor her symptoms and to call them if they do not improve.Dr. Montgomery agreed with following conservatively for now since she reports some improvement in her symptoms.  If diarrhea becomes bothersome again, then they will arrange for expeditious evaluation in the IBD clinic.   - CMV PCR (4/4/22) not detected  -Continues with intermittent diarrhea and constipation, impacts QOL enough that she would like to pursue the further follow-up with Dr. Montgomery.  I will send Dr. Montgomery a message requesting follow-up be scheduled.      #COVID prophylaxis  - She received EVUSHELD 1/20/22 and 3/8/22, no longer giving Evusheld  - Received 3 COVID vaccines to date  - Influenza vaccine 10/5/22      42 minutes spent on the date of the encounter doing chart review, review of test results, patient visit and documentation       DORON Magaña  CNP  [facility]  [address], [place] [id number] [phone]

## 2023-06-07 ENCOUNTER — ONCOLOGY VISIT (OUTPATIENT)
Dept: ONCOLOGY | Facility: CLINIC | Age: 53
End: 2023-06-07
Payer: COMMERCIAL

## 2023-06-07 VITALS
WEIGHT: 163.2 LBS | DIASTOLIC BLOOD PRESSURE: 77 MMHG | SYSTOLIC BLOOD PRESSURE: 121 MMHG | HEART RATE: 74 BPM | TEMPERATURE: 98.6 F | RESPIRATION RATE: 16 BRPM | BODY MASS INDEX: 25.56 KG/M2 | OXYGEN SATURATION: 100 %

## 2023-06-07 DIAGNOSIS — C90.00 MULTIPLE MYELOMA, REMISSION STATUS UNSPECIFIED (H): Primary | ICD-10-CM

## 2023-06-07 DIAGNOSIS — R19.7 DIARRHEA, UNSPECIFIED TYPE: ICD-10-CM

## 2023-06-07 PROCEDURE — 99215 OFFICE O/P EST HI 40 MIN: CPT | Performed by: REGISTERED NURSE

## 2023-06-07 PROCEDURE — G0463 HOSPITAL OUTPT CLINIC VISIT: HCPCS | Performed by: REGISTERED NURSE

## 2023-06-07 RX ORDER — FLUTICASONE PROPIONATE 50 MCG
1 SPRAY, SUSPENSION (ML) NASAL DAILY
COMMUNITY

## 2023-06-07 RX ORDER — SULFAMETHOXAZOLE/TRIMETHOPRIM 800-160 MG
1 TABLET ORAL 2 TIMES DAILY
Qty: 16 TABLET | Refills: 6 | Status: SHIPPED | OUTPATIENT
Start: 2023-06-07 | End: 2023-12-19

## 2023-06-07 ASSESSMENT — PAIN SCALES - GENERAL: PAINLEVEL: NO PAIN (0)

## 2023-06-07 NOTE — LETTER
6/7/2023         RE: Kristie Cornejo  [address]  [facility] [address]        Dear Colleague,    Thank you for referring your patient, Kristie Cornejo, to the [facility]. Please see a copy of my visit note below.    [facility]  Date of Visit: Jun 7, 2023   Oncologist: Dr. Payton Reinoso    Reason for visit: myeloma follow-up           Oncology History   53 year old female with past medical history significant for Irritable bowel disease, allergies, large plasmacytoma of the L pelvis, and newly diagnosed multiple myeloma.     Early in April 2021 , she noted to have left sided groin pain, constant and was affecting her daily activities  MRI showed large, expansile, permeative mass with indistinct borders involving the left superior and inferior pubic rami, pubic symphysis and with possible extension to the left acetabulum.     We completed the following work-up:   - Biopsy 9/7/2021: plasmacytoma.  Flow cytometry showed kappa monotypic plasma cells, polytypic B cells. FISH results from plasmacytoma showed a gain of 11q, IGH-CCND1 fusion but no losses of 1q or TP53 and no gain of 1q.  - Bone marrow biopsy 09/22/21: Marrow cellularity 50% with 25% interstitial infiltration w/  Kappa-monotypic, moderately atypical plasma cells. Flow with 0.7% kappa-monotypica plasma cells. FISH and cytogenetics pending.   - PET CT 09/24/21 w/ hypermetabolic pathologic fracture of the L pubic ramus w/ aggressive appearing osteolysis but no additional suspicious lytic or blastic osseous lesions.     Treatment to date:   - Zometa q4 weeks started 9/27/21  - Radiation to left pubic ramus x18 fractions, completed 11/11/21.   - RVD C1D1 11/15/21  - RVD C2D1 12/6/21  - RVD C3D1  12/27/21  - RVD C4 D1 1/17/22, elizabeth added on D8 due to inadequate response of urinary M-spike and FLCs remaining over 100 mg/dL after 4 cycles of RVD    - Elizabeth-RVD C1D8 1/24/22  -  Elizabeth-RVD C2D1 2/7/22  - Elizabeth-RVD C3D1 2/28/22  - Elizabeth-RVD C4D1 3/29/22  - Elizabeth-RVD C5D1 4/19/22    Disease response: IA     - 5/19/22: Continued monotherapy with Daratumumab; held Revlimid and Velcade.    - 6/21/22: Transitioned to KCD, received C1 days 1 and 2- had significant PVCs at cardiac follow-uyp after 1st week of Kyprolis, held further doses.    - Transitioned to Sarclisa/Pom/Dex 7/6/22. Started Pom on 7/11.     -9/8/22 - COVID, delayed C3D15 treatment x 1 week    - Apheresis for CAR-T on 11/22/22; bridging with Sarclisa given 11/23/22   - Abecma 1/4/23          INTERIM HISTORY:   Bath presents to clinic for follow up.      Doing well overall.  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 Continues to have loose stools on a daily basis, maybe once or twice daily, but also occasionally small incontinence while passing gas, continues to impact quality of life for her.  Hips are achy when she gets up in the morning, she continues to work with PT.  Denies signs of acute illness - no fevers, chills, headaches, sinus congestion, sore throat, or cough. Appetite is good.  Denies n/v. No new bony pains.        ROS: 10 point ROS neg other than the symptoms noted above in the HPI.           Physical Exam:     /77   Pulse 74   Temp 98.6  F (37  C) (Tympanic)   Resp 16   Wt 74 kg (163 lb 3.2 oz)   SpO2 100%   BMI 25.56 kg/m     Gen: alert, pleasant and conversational, NAD  HEENT: NC/AT,EOMI w/ PERRL, anicteric sclera. OP clear. MMM.   Neck: Supple, no LAD  CV: normal S1,S2 with RRR no m/r/g  Resp: lungs CTA bilaterally with adequate air movement to bases. No wheezes or crackles  Abd: soft NTND no organomegaly or masses. BS normoactive.   Ext: warm and well perfused, no edema or cyanosis  Skin: no concerning lesions or rashes  Neuro: A&Ox4, no lateralizing sx. Grossly nonfocal.  Psych: appropriate, reactive             Laboratory Data:      I personally reviewed the following labs:    Most  Recent 3 CBC's:  Recent Labs   Lab Test 06/01/23  0853 03/29/23  1025 03/09/23  0955   WBC 2.7* 3.3* 3.2*   HGB 13.7 12.5 13.0   MCV 91 93 94    228 273     Most Recent 3 BMP's:  Recent Labs   Lab Test 06/01/23  0853 03/29/23  1025 03/09/23  0955    139 144   POTASSIUM 4.3 4.0 3.9   CHLORIDE 107 105 108*   CO2 22 23 24   BUN 20.6* 20.6* 20.2*   CR 0.94 0.82 0.88   ANIONGAP 13 11 12   MEKA 9.4 9.2 9.6   * 106* 102*     Most Recent 2 LFT's:  Recent Labs   Lab Test 06/01/23  0853 03/29/23  1025   AST 22 17   ALT 19 22   ALKPHOS 54 47   BILITOTAL 0.3 0.5       Imaging:  No new imaging to review.         Assessment and Recommendations   Kristie Cornejo is a 53 year old female who was diagnosed with Plasmacytoma after she presented with left sided groin pain now found to have multiple myeloma after further workup with bone marrow biopsy.     # Multiple myeloma with associated large plasmacytoma of the L pelvis. Kappa light chain disease.     BMBx with 25% plasma cell infiltration; standard risk with gain of 11q, IGH-CCND1 fusion but no losses of 1q or TP53 and no gain of 1q.   SPEP w/out a monoclonal peak  UPEP positive. 70% paraprotein. Large monoclonal free immunoglobulin light chain of kappa chain type.     No anemia, normal creat, Ca, no other skeletal lesions. Alb normal, B2M normal.  Stage I Light chain disease with left pelvic bone pathologic fracture she is classified as having symptomatic multiple myeloma.   -s/p michelle-RVD, michelle alone and KCD with either little benefit or significant symptoms  -transitioned to Sarclisa/Pom/Dex to avoid the cardiotoxicity of Kyprolis.  Started C1D1 7/6/22.  Her diarrhea has returned with being back on treatment but manageable  - Received Sarclisa 11/23/22, Pomalyst through ~ 12/2/22  - Underwent CAR-T therapy with Abecma 1/4/23  - 2/2/23 labs show CR with undetectable kappa light chains  - Labs continue to show excellent disease control.  -No plans for  "maintenance at this time. She will follow-up with Dr. Reinoso in October   - Continue Bactrim until discontinued by cellular therapy team.    HEME  Counts are stable  Transfusion parameters: hemoglobin <7g/dL, platelets <10k.    #Bone Health  Was on monthly Zometa while on treatment.  Will hold further zometa infusions for now. She will continue Calcium/Vitamin D supplements.       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 Currently having no pain and able to walk 1 mile without any assistive devices.  She is interested in pursuing other activities like gentle cross-country skiing.  Had follow-up with Dr. Philip on 1/6/22 and was cleared to resume regular activity as tolerated. She should still refrain from high-impact activities.    PET on 7/12/22 shows left pubic ramus lesion is smaller in size but brighter FDG. No new lesions.      # PVCs  She had an ECHO 12/1 and had follow-up with cardiology 12/7 (notes in CareEverywhere).  She had follow-up with cardiology here at the  and they recommend an ablation. Stress test was completed   -started on Toprol XL however was discontinued due to significant side effects of orthostatic hypotension, pre-syncopal episodes, shakiness, and weakness.   - She had an ablation 3/23/22 but has not experienced relief from the feeling of being nauseated and short of breath after climbing the stairs.  She also reports episodes of palpitations that \"radiate into her neck.\" Denies chest pain or shortness of breath.   - EKG done 4/4 showed sinus rhythm and nonspecific ST and T wave abnormality. Troponin negative.  - Cardiac MRI ruled out amyloid.  LFEF 50%, no evidence of infiltrative cardiomyopathy.  - She followed up with cardiology and they felt that the cause may be musculoskeletal or pericarditis. They suggested taking " ibuprofen for pain. Her pain has subsided and she actually has not needed to take ibuprofen.   - Post ablation Zio patch was performed showing a significant reduction in PVCs; however, by the time she had her cardiology follow-up, she had restarted chemotherapy and the PVCs had returned.  Dr. Thomas is comfortable proceeding with treatment as long as symptoms are tolerable and there are no major EKG changes. Would need to repeat if she were to do BMT  - EKG 7/13 with no major changes, troponin WNL  -she continues to have PVCs, though much improved from when she was on cytoxan. Wants to avoid ablation again if possible  - Continue to follow-up with Dr. Thomas as recommended    #Diarrhea  She was having intermittent diarrhea with associated abdominal cramping and nausea, but developed much more significant diarrhea with 10-12 liquid episodes and was seen as an add-on on 4/4/22 for this.   - C.diff PCR negative 2/8/22, repeat on 4/5 negative  - Enteric stool panel 4/5/22 negative  - Underwent colonoscopy on 4/13/22 to pursue biopsy for amyloid (discussed below). [place] red stain is negative for amyloid deposits. Pathology showed colonic mucosa with focal active colitis, negative for signs of chronicity and lymphocytic colitis. Per patient report, GI reviewed results with her and did not feel she needed treatment for these findings at this time. They advised her to monitor her symptoms and to call them if they do not improve.Dr. Montgomery agreed with following conservatively for now since she reports some improvement in her symptoms.  If diarrhea becomes bothersome again, then they will arrange for expeditious evaluation in the IBD clinic.   - CMV PCR (4/4/22) not detected  -Continues with intermittent diarrhea and constipation, impacts QOL enough that she would like to pursue the further follow-up with Dr. Montgomery.  I will send Dr. Montgomery a message requesting follow-up be scheduled.      #COVID prophylaxis  - She  received EVUSHELD 1/20/22 and 3/8/22, no longer giving Evusheld  - Received 3 COVID vaccines to date  - Influenza vaccine 10/5/22      42 minutes spent on the date of the encounter doing chart review, review of test results, patient visit and documentation       DORON Magaña CNP  [facility]  [address], [place] [address] [phone]

## 2023-06-12 DIAGNOSIS — K52.9 NON-SPECIFIC COLITIS: ICD-10-CM

## 2023-06-12 DIAGNOSIS — K52.9 CHRONIC DIARRHEA: Primary | ICD-10-CM

## 2023-06-14 NOTE — PROGRESS NOTES
[facility], Orthopaedic Oncology Surgery Consultation  by Edu Philip M.D.    Kristie Cornejo MRN# [id number]   Age: 51 year old YOB: 1970     Requesting physician: MD SHASHI Arboleda Sarah C            Assessment and Plan:   Assessment:  50 yo F with Crohn's disease presenting for evaluation of large, expansile, permeative mass with indistinct borders involving the left superior and inferior pubic rami, pubic symphysis and with possible extension to the left acetabulum.     Plan:  We discussed with the patient that her imaging findings are concerning for possible malignancy.  We discussed the need for further work-up to rule out benign versus malignant lesion.  This includes CT chest abdomen pelvis as well as following labs: SPEP, CBC, CMP, ESR/CRP.  Given the patient is having minimal pain at this time and no pain currently with ambulation we counseled her that she should refrain from stressful or strenuous activities but may remain WBAT in her bilateral  lower extremities.  After completion of her labs we will plan to follow-up with her on 8/30/21 for review of these results.  If the imaging and laboratory results lack diagnostic specificity would then discuss possible need for biopsy.  Patient is in agreement this plan, all questions answered.    Patient seen, evaluated and discussed with Dr. Philip who is in agreement with the above assessment and plan.    Too Siegel MD  Orthopaedic Surgery PGY4  Pager: [phone]    Edu Philip MD [facility] Professor  Oncology and Adult Reconstructive Surgery  Dept Orthopaedic Surgery, [facility]  [phone] office, [phone] pager  [email or web address]             History of Present Illness:   51 year old female with history of Crohn's disease who presents for evaluation of pelvic mass with associated left groin pain.  Patient teaches figure skating, first noted new left groin pain in April  2021.  Initially this was thought to be related to her physical activity with skating, however she then reports she began altering gait and had progressive pain over the next 2-3 months.  Her last month she reports her groin pain has now been improving, likely related to modifications in her activity.  She reports her pain is primarily exacerbated by active hip flexion or with increased physical activity at which she describes fatigue-like symptoms and associated groin pain.  Due to this she has been unable to skate or go for long walks with her  (greater than 2 miles).  She describes the pain as aching tightness that she localizes to her left groin and perineal region.  She does endorse pain at night in the groin which makes it difficult to fall asleep but does not wake her from sleep. Rates pain as severe as 8/10 at its worst, currently describes it as 3/10.  She has been taking Tylenol PM and naproxen naproxen, both of which have been helpful.  She has not participated in any formal physical therapy.  She denies any prior interventions including no prior injections, biopsies or surgeries to her left hip or groin.    No weight loss, changes in appetite, fevers/chills, changes in bowel or bladder. No history of frequent UTIs. No cancer history but has had prior breast biopsy.    Background history:  DX:  1. Crohn's disease (on Omalizumab, Xolair)  2. Expansile destructive mass involving the left superior and inferior pubic rami, pubic symphysis and acetabulum    TREATMENTS/INTERVENTIONS:  1. None           Physical Exam:     EXAMINATION pertinent findings:   PSYCH: Pleasant, healthy-appearing, alert, oriented x3, cooperative. Normal mood and affect.  VITAL SIGNS: There were no vitals taken for this visit..  Reviewed nursing intake notes.   There is no height or weight on file to calculate BMI.  RESP: non labored breathing   ABD: benign, soft, non-tender, no acute peritoneal findings  SKIN: grossly normal    LYMPHATIC: grossly normal, no adenopathy, no extremity edema  NEURO: grossly normal , no motor deficits  VASCULAR: satisfactory perfusion of all extremities   MUSCULOSKELETAL:   Gait: Normal    Left hip:  Skin intact without skin changes.  No visual masses or lumps soft tissues.  No lower extremity wounds or lymphedema.  Mild TTP left pelvic pubic rami. Nontender over the greater trochanter.  Hip ROM is not painful passively, but endorses pain with active hip flexion.   Flexion: 120 degrees   IR: 30 degrees   ER: 45 degrees  Hip strength: 4/5 hip flexion strength, 4+/5 adduction strength which exacerbates groin pain, 5/5 knee flex/ knee ext/TA/GSC.  Pain with logroll: no  Stinchfield test: Yes  Trendelenburg sign: No    PatientSILT sp/dp/tibial/saph/sural nerves.  Motor intact distally TA/GSC/EHL/FHL with 5/5 strength.    DP/PT pulses palpable, 2+, toes warm and well perfused.              Data:   All laboratory data reviewed  All imaging studies reviewed by me    XR pelvis 7/22/21 demonstrates large expansile, irregular permeative lytic mass involving the left parasymphyseal region including the superior and inferior pubic rami with indistinct borders. No lytic lesions involving the femoral neck or proximal femur.    MRI pelvis 7/29/21 demonstrates expansile destructive lesion involving the left pubic symphysis and superior pubic ramus with extension to the inferior pubic ramus and possibly anterior acetabulum. No obvious soft tissue component.      DATA for DOCUMENTATION:         Past Medical History:     Patient Active Problem List   Diagnosis     Allergic rhinitis     Anxiety state     Irritable bowel syndrome     Hemorrhoids     Sciatica     Encounter for insertion or removal of intrauterine contraceptive device     CARDIOVASCULAR SCREENING; LDL GOAL LESS THAN 160     Past Medical History:   Diagnosis Date     Allergic rhinitis, cause unspecified      Anxiety state, unspecified 12/03    Started postpartum .  On Paxil x 1+years. Off meds- 10/04     Irritable bowel syndrome 2004     PLANTAR WARTS     resolved     SINUS DYSRHYTHMIA 10/02    wnl     Unspecified hemorrhoids without mention of complication 4/04    colonoscopy 4/04 spastic colon, int roids; bx neg       Also see scanned health assessment forms.       Past Surgical History:     Past Surgical History:   Procedure Laterality Date     HC COLONOSCOPY THRU STOMA, DIAGNOSTIC  2004     Z NONSPECIFIC PROCEDURE  1987    [place] teeth removed     ZZ NONSPECIFIC PROCEDURE  1993    (R) knee surgery     ZZC NONSPECIFIC PROCEDURE  2003    (R) breast bx-negative 6/03; bx neg 10/02     ZZC NONSPECIFIC PROCEDURE      10/02 cardiac echo normal     ZZC NONSPECIFIC PROCEDURE  2004 4/04 colonosc int roids, spastic colon     ZZC NONSPECIFIC PROCEDURE  8/05    D & C for missed AB            Social History:     Social History     Socioeconomic History     Marital status:      Spouse name: mary     Number of children: 1     Years of education: Not on file     Highest education level: Not on file   Occupational History     Employer: UNEMPLOYED   Tobacco Use     Smoking status: Never Smoker   Substance and Sexual Activity     Alcohol use: Yes     Comment: none to a couple. occasional     Drug use: No     Sexual activity: Yes     Partners: Male     Birth control/protection: Condom   Other Topics Concern     Parent/sibling w/ CABG, MI or angioplasty before 65F 55M? No     Comment: Grandparent. But not parent or sibling.   Social History Narrative     Not on file     Social Determinants of Health     Financial Resource Strain:      Difficulty of Paying Living Expenses:    Food Insecurity:      Worried About Running Out of Food in the Last Year:      Ran Out of Food in the Last Year:    Transportation Needs:      Lack of Transportation (Medical):      Lack of Transportation (Non-Medical):    Physical Activity:      Days of Exercise per Week:      Minutes of Exercise per  Session:    Stress:      Feeling of Stress :    Social Connections:      Frequency of Communication with Friends and Family:      Frequency of Social Gatherings with Friends and Family:      Attends Shinto Services:      Active Member of Clubs or Organizations:      Attends Club or Organization Meetings:      Marital Status:    Intimate Partner Violence:      Fear of Current or Ex-Partner:      Emotionally Abused:      Physically Abused:      Sexually Abused:    Nonsmoker, occasional EtOH.  Figure skating teacher, music therapist, [employer] business.  Lives in [place] in house with  and son.         Family History:       Family History   Problem Relation Age of Onset     Neurologic Disorder Mother         toxo     C.A.D. Maternal Grandfather         strokes and mi, chronic respiratory from 2 hand smoke     Hypertension Paternal Grandmother         rheumatoid arthritis     Diabetes Maternal Grandmother         breast cancer,stroke ischemic     Cancer - colorectal No family hx of      Prostate Cancer No family hx of      Respiratory Paternal Grandfather         emphasema     Breast Cancer Maternal Grandmother         79yo/and autoimmune skin condition     Lipids Mother         low cholestrol     Lipids Father         high cholestrol   Father with history of pancreatic cancer (diagnosed age 78).  Mother with cervical dysplasia.  Maternal grandmother breast cancer.         Medications:     Current Outpatient Medications   Medication Sig     BUSPIRONE HCL 10 MG PO TABS 1 TABLET 3 TIMES DAILY as needed     GLYCOLAX OR POWD 17 GM DAILY as needed     MIRENA 20 MCG/24HR IU IUD use for up to 5 years, then remove     No current facility-administered medications for this visit.              Review of Systems:   A comprehensive 10 point review of systems (constitutional, ENT, cardiac, peripheral vascular, lymphatic, respiratory, GI, , Musculoskeletal, skin, Neurological) was performed and found to be negative except  as described in this note.     See intake form completed by patient     36.7

## 2023-06-15 ENCOUNTER — TELEPHONE (OUTPATIENT)
Dept: GASTROENTEROLOGY | Facility: CLINIC | Age: 53
End: 2023-06-15
Payer: COMMERCIAL

## 2023-06-15 NOTE — TELEPHONE ENCOUNTER
Writer received a message from NILAM CORTEZ to help Pt get scheduled for an appointment with a Gen GI MD, per referral. Writer called and left message, along with call number, for the Pt.

## 2023-06-30 ENCOUNTER — OFFICE VISIT (OUTPATIENT)
Dept: TRANSPLANT | Facility: CLINIC | Age: 53
End: 2023-06-30
Payer: COMMERCIAL

## 2023-06-30 ENCOUNTER — APPOINTMENT (OUTPATIENT)
Dept: LAB | Facility: CLINIC | Age: 53
End: 2023-06-30
Payer: COMMERCIAL

## 2023-06-30 VITALS
WEIGHT: 159.83 LBS | DIASTOLIC BLOOD PRESSURE: 74 MMHG | BODY MASS INDEX: 25.03 KG/M2 | SYSTOLIC BLOOD PRESSURE: 117 MMHG | TEMPERATURE: 98.2 F | HEART RATE: 68 BPM | OXYGEN SATURATION: 98 % | RESPIRATION RATE: 16 BRPM

## 2023-06-30 DIAGNOSIS — C90.00 LIGHT CHAIN MYELOMA (H): Primary | ICD-10-CM

## 2023-06-30 LAB
ALBUMIN SERPL BCG-MCNC: 4.8 G/DL (ref 3.5–5.2)
ALP SERPL-CCNC: 59 U/L (ref 35–104)
ALT SERPL W P-5'-P-CCNC: 21 U/L (ref 0–50)
ANION GAP SERPL CALCULATED.3IONS-SCNC: 12 MMOL/L (ref 7–15)
AST SERPL W P-5'-P-CCNC: 20 U/L (ref 0–45)
BASOPHILS # BLD AUTO: 0 10E3/UL (ref 0–0.2)
BASOPHILS NFR BLD AUTO: 0 %
BILIRUB SERPL-MCNC: 0.5 MG/DL
BUN SERPL-MCNC: 22.6 MG/DL (ref 6–20)
CALCIUM SERPL-MCNC: 9.6 MG/DL (ref 8.6–10)
CD19 CELLS # BLD: 161 CELLS/UL (ref 107–698)
CD19 CELLS NFR BLD: 20 % (ref 6–27)
CD3 CELLS # BLD: 430 CELLS/UL (ref 603–2990)
CD3 CELLS NFR BLD: 54 % (ref 49–84)
CD3+CD4+ CELLS # BLD: 216 CELLS/UL (ref 441–2156)
CD3+CD4+ CELLS NFR BLD: 27 % (ref 28–63)
CD3+CD4+ CELLS/CD3+CD8+ CLL BLD: 1 % (ref 1.4–2.6)
CD3+CD8+ CELLS # BLD: 216 CELLS/UL (ref 125–1312)
CD3+CD8+ CELLS NFR BLD: 27 % (ref 10–40)
CD3-CD16+CD56+ CELLS # BLD: 191 CELLS/UL (ref 95–640)
CD3-CD16+CD56+ CELLS NFR BLD: 24 % (ref 4–25)
CHLORIDE SERPL-SCNC: 107 MMOL/L (ref 98–107)
CREAT SERPL-MCNC: 0.85 MG/DL (ref 0.51–0.95)
DEPRECATED HCO3 PLAS-SCNC: 23 MMOL/L (ref 22–29)
EOSINOPHIL # BLD AUTO: 0.1 10E3/UL (ref 0–0.7)
EOSINOPHIL NFR BLD AUTO: 2 %
ERYTHROCYTE [DISTWIDTH] IN BLOOD BY AUTOMATED COUNT: 13.3 % (ref 10–15)
GFR SERPL CREATININE-BSD FRML MDRD: 81 ML/MIN/1.73M2
GLUCOSE SERPL-MCNC: 96 MG/DL (ref 70–99)
HCT VFR BLD AUTO: 41.7 % (ref 35–47)
HGB BLD-MCNC: 13.9 G/DL (ref 11.7–15.7)
IGA SERPL-MCNC: <2 MG/DL (ref 84–499)
IGG SERPL-MCNC: 348 MG/DL (ref 610–1616)
IGG SERPL-MCNC: 356 MG/DL (ref 610–1616)
IGM SERPL-MCNC: 13 MG/DL (ref 35–242)
IMM GRANULOCYTES # BLD: 0 10E3/UL
IMM GRANULOCYTES NFR BLD: 0 %
KAPPA LC FREE SER-MCNC: 0.39 MG/DL (ref 0.33–1.94)
KAPPA LC FREE/LAMBDA FREE SER NEPH: 2.05 {RATIO} (ref 0.26–1.65)
LAMBDA LC FREE SERPL-MCNC: 0.19 MG/DL (ref 0.57–2.63)
LDH SERPL L TO P-CCNC: 205 U/L (ref 0–250)
LYMPHOCYTES # BLD AUTO: 0.7 10E3/UL (ref 0.8–5.3)
LYMPHOCYTES NFR BLD AUTO: 21 %
MAGNESIUM SERPL-MCNC: 2.3 MG/DL (ref 1.7–2.3)
MCH RBC QN AUTO: 30.2 PG (ref 26.5–33)
MCHC RBC AUTO-ENTMCNC: 33.3 G/DL (ref 31.5–36.5)
MCV RBC AUTO: 91 FL (ref 78–100)
MONOCYTES # BLD AUTO: 0.4 10E3/UL (ref 0–1.3)
MONOCYTES NFR BLD AUTO: 12 %
NEUTROPHILS # BLD AUTO: 2.1 10E3/UL (ref 1.6–8.3)
NEUTROPHILS NFR BLD AUTO: 65 %
NRBC # BLD AUTO: 0 10E3/UL
NRBC BLD AUTO-RTO: 0 /100
PHOSPHATE SERPL-MCNC: 3.4 MG/DL (ref 2.5–4.5)
PLATELET # BLD AUTO: 258 10E3/UL (ref 150–450)
POTASSIUM SERPL-SCNC: 3.8 MMOL/L (ref 3.4–5.3)
PROT SERPL-MCNC: 6.8 G/DL (ref 6.4–8.3)
RBC # BLD AUTO: 4.6 10E6/UL (ref 3.8–5.2)
SODIUM SERPL-SCNC: 142 MMOL/L (ref 136–145)
T CELL EXTENDED COMMENT: ABNORMAL
TOTAL PROTEIN SERUM FOR ELP: 6.5 G/DL (ref 6.4–8.3)
WBC # BLD AUTO: 3.3 10E3/UL (ref 4–11)

## 2023-06-30 PROCEDURE — 88275 CYTOGENETICS 100-300: CPT | Performed by: PHYSICIAN ASSISTANT

## 2023-06-30 PROCEDURE — 86334 IMMUNOFIX E-PHORESIS SERUM: CPT | Performed by: PATHOLOGY

## 2023-06-30 PROCEDURE — 88311 DECALCIFY TISSUE: CPT | Mod: TC | Performed by: PHYSICIAN ASSISTANT

## 2023-06-30 PROCEDURE — 88237 TISSUE CULTURE BONE MARROW: CPT | Performed by: PHYSICIAN ASSISTANT

## 2023-06-30 PROCEDURE — 82784 ASSAY IGA/IGD/IGG/IGM EACH: CPT

## 2023-06-30 PROCEDURE — 88342 IMHCHEM/IMCYTCHM 1ST ANTB: CPT | Mod: 26 | Performed by: STUDENT IN AN ORGANIZED HEALTH CARE EDUCATION/TRAINING PROGRAM

## 2023-06-30 PROCEDURE — 38222 DX BONE MARROW BX & ASPIR: CPT | Performed by: PHYSICIAN ASSISTANT

## 2023-06-30 PROCEDURE — 86355 B CELLS TOTAL COUNT: CPT | Performed by: PHYSICIAN ASSISTANT

## 2023-06-30 PROCEDURE — 83521 IG LIGHT CHAINS FREE EACH: CPT | Performed by: PHYSICIAN ASSISTANT

## 2023-06-30 PROCEDURE — 38222 DX BONE MARROW BX & ASPIR: CPT | Mod: LT | Performed by: PHYSICIAN ASSISTANT

## 2023-06-30 PROCEDURE — 84100 ASSAY OF PHOSPHORUS: CPT | Performed by: PHYSICIAN ASSISTANT

## 2023-06-30 PROCEDURE — 86334 IMMUNOFIX E-PHORESIS SERUM: CPT | Mod: 26

## 2023-06-30 PROCEDURE — 36415 COLL VENOUS BLD VENIPUNCTURE: CPT | Performed by: PHYSICIAN ASSISTANT

## 2023-06-30 PROCEDURE — 88305 TISSUE EXAM BY PATHOLOGIST: CPT | Mod: 26 | Performed by: STUDENT IN AN ORGANIZED HEALTH CARE EDUCATION/TRAINING PROGRAM

## 2023-06-30 PROCEDURE — 80053 COMPREHEN METABOLIC PANEL: CPT | Performed by: PHYSICIAN ASSISTANT

## 2023-06-30 PROCEDURE — 88184 FLOWCYTOMETRY/ TC 1 MARKER: CPT | Performed by: PHYSICIAN ASSISTANT

## 2023-06-30 PROCEDURE — 85025 COMPLETE CBC W/AUTO DIFF WBC: CPT | Performed by: PHYSICIAN ASSISTANT

## 2023-06-30 PROCEDURE — 250N000011 HC RX IP 250 OP 636: Performed by: PHYSICIAN ASSISTANT

## 2023-06-30 PROCEDURE — 85097 BONE MARROW INTERPRETATION: CPT | Performed by: STUDENT IN AN ORGANIZED HEALTH CARE EDUCATION/TRAINING PROGRAM

## 2023-06-30 PROCEDURE — 88264 CHROMOSOME ANALYSIS 20-25: CPT | Performed by: PHYSICIAN ASSISTANT

## 2023-06-30 PROCEDURE — 82784 ASSAY IGA/IGD/IGG/IGM EACH: CPT | Performed by: PHYSICIAN ASSISTANT

## 2023-06-30 PROCEDURE — 84165 PROTEIN E-PHORESIS SERUM: CPT | Mod: 26

## 2023-06-30 PROCEDURE — 88341 IMHCHEM/IMCYTCHM EA ADD ANTB: CPT | Mod: 26 | Performed by: STUDENT IN AN ORGANIZED HEALTH CARE EDUCATION/TRAINING PROGRAM

## 2023-06-30 PROCEDURE — 88311 DECALCIFY TISSUE: CPT | Mod: 26 | Performed by: STUDENT IN AN ORGANIZED HEALTH CARE EDUCATION/TRAINING PROGRAM

## 2023-06-30 PROCEDURE — 83615 LACTATE (LD) (LDH) ENZYME: CPT | Performed by: PHYSICIAN ASSISTANT

## 2023-06-30 PROCEDURE — 84155 ASSAY OF PROTEIN SERUM: CPT | Performed by: PHYSICIAN ASSISTANT

## 2023-06-30 PROCEDURE — 88188 FLOWCYTOMETRY/READ 9-15: CPT | Mod: GC | Performed by: STUDENT IN AN ORGANIZED HEALTH CARE EDUCATION/TRAINING PROGRAM

## 2023-06-30 PROCEDURE — 82232 ASSAY OF BETA-2 PROTEIN: CPT | Performed by: PHYSICIAN ASSISTANT

## 2023-06-30 PROCEDURE — 84165 PROTEIN E-PHORESIS SERUM: CPT | Mod: TC | Performed by: PATHOLOGY

## 2023-06-30 PROCEDURE — 85060 BLOOD SMEAR INTERPRETATION: CPT | Performed by: STUDENT IN AN ORGANIZED HEALTH CARE EDUCATION/TRAINING PROGRAM

## 2023-06-30 PROCEDURE — 88368 INSITU HYBRIDIZATION MANUAL: CPT | Mod: 26 | Performed by: MEDICAL GENETICS

## 2023-06-30 PROCEDURE — 83735 ASSAY OF MAGNESIUM: CPT | Performed by: PHYSICIAN ASSISTANT

## 2023-06-30 PROCEDURE — 88185 FLOWCYTOMETRY/TC ADD-ON: CPT | Performed by: PHYSICIAN ASSISTANT

## 2023-06-30 RX ADMIN — MIDAZOLAM 2 MG: 1 INJECTION INTRAMUSCULAR; INTRAVENOUS at 10:01

## 2023-06-30 ASSESSMENT — PAIN SCALES - GENERAL
PAINLEVEL: NO PAIN (0)
PAINLEVEL: NO PAIN (0)

## 2023-06-30 NOTE — PROGRESS NOTES
BMT ONC Adult Bone Marrow Biopsy Procedure Note  June 30, 2023  /70   Pulse 89   Temp 98.2  F (36.8  C) (Oral)   Resp 16   Wt 72.5 kg (159 lb 13.3 oz)   SpO2 99%   BMI 25.03 kg/m       Learning needs assessment complete within 12 months? YES    DIAGNOSIS: multiple myeloma     PROCEDURE: Unilateral Bone Marrow Biopsy and Unilateral Aspirate    LOCATION: [facility] [place] 2nd Floor    Patient s identification was positively verified by verbal identification and invasive procedure safety checklist was completed. Informed consent was obtained. Following the administration of Midazolam 2 mg IV as pre-medication, patient was placed in the prone position and prepped and draped in a sterile manner. Approximately 20 cc of 1% Lidocaine was used over the left posterior iliac spine. Following this a 3 mm incision was made. Trephine bone marrow core(s) was (were) obtained from the [facility]. Bone marrow aspirates were obtained from the IC. Aspirates were sent for morphology, immunophenotyping, cytogenetics and molecular diagnostics (process and hold). A total of approximately 20 ml of marrow was aspirated. Following this procedure a sterile dressing was applied to the bone marrow biopsy site(s). The patient was placed in the supine position to maintain pressure on the biopsy site. Post-procedure wound care instructions were given.     Complications: NO    Pre-procedural pain: 0 out of 10 on the numeric pain rating scale.     Procedural pain: brief moments of significant pain, which resolved quickly    Post-procedural pain assessment: 0 out of 10 on the numeric pain rating scale.     Interventions: NO    Length of procedure:20 minutes or less      Procedure performed by: Rajani Thao PA-C

## 2023-06-30 NOTE — NURSING NOTE
BMBX Teaching and Assessment       Teaching concerns addressed: Bone marrow biopsy and infection prevention.     Person(s) involved in teaching: Patient  Motivation Level  Asks Questions: Yes  Eager to Learn: Yes  Cooperative: Yes  Receptive (willing/able to accept information): Yes    Patient demonstrates understanding of the following:     Reason for the appointment, diagnosis and treatment plan: Yes  Knowledge of proper use of medications and conditions for which they are ordered (with special attention to potential side effects or drug interactions): Yes  Which situations necessitate calling provider and whom to contact: Yes    Teaching concerns addressed:   Reviewed activity restrictions if received premeds, potential for bleeding and actions to take if develops any of the issues below    Pain management techniques: Yes  Patient instructed on hand hygiene: Yes  How and/when to access community resources: Yes    Infection Control:  Patient demonstrates understanding of the following:   Bone marrow procedure site care taught: Yes  Signs and symptoms of infection taught: Yes       Instructional Materials Used/Given: Pt instructed to keep bmbx site clean and dry for 24hrs. Pt educated to monitor site for signs of infection such as redness, rash, oozing, puss, bleeding, pain, and elevated temp. Pt instructed to go to call the [facility] [place]age [place] or go to the ER if any signs of infection should occur. Pt educated to not operate machinery if receiving versed. Pt and  verbalize understanding.     Pre-procedure labs drawn via PIV by Clinic RN.     Post procedure: Patient vital signs stable, ambulating, site is clean, dry and intact prior to discharge. PIV removed. Pt discharged with her  as .      Time spent with patient: 5 minutes.

## 2023-06-30 NOTE — NURSING NOTE
"Oncology Rooming Note    June 30, 2023 9:47 AM   Kristie Cornejo is a 53 year old female who presents for:    Chief Complaint   Patient presents with     RECHECK     Pt here for Day 180 BMBX and Labs. Pt is s/p BMT for Multiple Myeloma.      Initial Vitals: /70   Pulse 89   Temp 98.2  F (36.8  C) (Oral)   Resp 16   Wt 72.5 kg (159 lb 13.3 oz)   SpO2 99%   BMI 25.03 kg/m   Estimated body mass index is 25.03 kg/m  as calculated from the following:    Height as of 1/14/23: 1.702 m (5' 7\").    Weight as of this encounter: 72.5 kg (159 lb 13.3 oz). Body surface area is 1.85 meters squared.  No Pain (0) Comment: Data Unavailable   No LMP recorded. (Menstrual status: IUD).  Allergies reviewed: Yes  Medications reviewed: Yes    Medications: Medication refills not needed today.  Pharmacy name entered into Clicker:    [facility] - [place], [place] - [address]  [id number] - [place], [place] - [address]  [facility] [place] - [address]  [facility] [place] - [address] [place] [address] PHARMACY [facility] - [place], [place] - [phone]  [facility] - [facility], [place] - [address] [facility] - [place], [place] - [address]    Clinical concerns: Pt here for BMBX and Labs today. Pt here with her  as her . Pt would like Versed with today's procedure. Pt not taking any blood thinners. Feels well- with no complaints.  ERICK Hernandez was notified.      Rosangela Chambers RN              "

## 2023-06-30 NOTE — LETTER
6/30/2023         RE: Kristie Cornejo  [address]  [place] [place] [address]        Dear Colleague,    Thank you for referring your patient, Kristie Cornejo, to the [facility]ARROW [facility] [place]. Please see a copy of my visit note below.    BMT ONC Adult Bone Marrow Biopsy Procedure Note  June 30, 2023  /70   Pulse 89   Temp 98.2  F (36.8  C) (Oral)   Resp 16   Wt 72.5 kg (159 lb 13.3 oz)   SpO2 99%   BMI 25.03 kg/m       Learning needs assessment complete within 12 months? YES    DIAGNOSIS: multiple myeloma     PROCEDURE: Unilateral Bone Marrow Biopsy and Unilateral Aspirate    LOCATION: [facility] 2nd Floor    Patient s identification was positively verified by verbal identification and invasive procedure safety checklist was completed. Informed consent was obtained. Following the administration of Midazolam 2 mg IV as pre-medication, patient was placed in the prone position and prepped and draped in a sterile manner. Approximately 20 cc of 1% Lidocaine was used over the left posterior iliac spine. Following this a 3 mm incision was made. Trephine bone marrow core(s) was (were) obtained from the IC. Bone marrow aspirates were obtained from the [facility]. Aspirates were sent for morphology, immunophenotyping, cytogenetics and molecular diagnostics (process and hold). A total of approximately 20 ml of marrow was aspirated. Following this procedure a sterile dressing was applied to the bone marrow biopsy site(s). The patient was placed in the supine position to maintain pressure on the biopsy site. Post-procedure wound care instructions were given.     Complications: NO    Pre-procedural pain: 0 out of 10 on the numeric pain rating scale.     Procedural pain: brief moments of significant pain, which resolved quickly    Post-procedural pain assessment: 0 out of 10 on the numeric pain rating scale.     Interventions: NO    Length of procedure:20 minutes or less       Procedure performed by: Rajani Thao PA-C

## 2023-07-03 LAB
ALBUMIN SERPL ELPH-MCNC: 4.6 G/DL (ref 3.7–5.1)
ALPHA1 GLOB SERPL ELPH-MCNC: 0.3 G/DL (ref 0.2–0.4)
ALPHA2 GLOB SERPL ELPH-MCNC: 0.7 G/DL (ref 0.5–0.9)
B-GLOBULIN SERPL ELPH-MCNC: 0.7 G/DL (ref 0.6–1)
B2 MICROGLOB TUMOR MARKER SER-MCNC: 1.5 MG/L
GAMMA GLOB SERPL ELPH-MCNC: 0.3 G/DL (ref 0.7–1.6)
INTERPRETATION: NORMAL
M PROTEIN SERPL ELPH-MCNC: 0 G/DL
PATH REPORT.COMMENTS IMP SPEC: NORMAL
PATH REPORT.FINAL DX SPEC: NORMAL
PATH REPORT.FINAL DX SPEC: NORMAL
PATH REPORT.GROSS SPEC: NORMAL
PATH REPORT.MICROSCOPIC SPEC OTHER STN: NORMAL
PATH REPORT.RELEVANT HX SPEC: NORMAL
PATH REPORT.RELEVANT HX SPEC: NORMAL
PROT PATTERN SERPL ELPH-IMP: ABNORMAL
PROT PATTERN SERPL IFE-IMP: NORMAL

## 2023-07-03 NOTE — TELEPHONE ENCOUNTER
REFERRAL INFORMATION:    Referring Provider:  Dr. Payton Reinoso     Referring Clinic:  [facility]    Reason for Visit/Diagnosis: Chronic diarrhea     FUTURE VISIT INFORMATION:    Appointment Date: 07-11-23    Appointment Time: @ 1pm      NOTES STATUS DETAILS   OFFICE NOTE from Referring Provider Internal 04-04-23 Dr. Payton Reinoso    OFFICE NOTE from Other Specialist Internal 06-07-23 Jackie Dunn, DORON CNP  05-24-23 Dr. Berto Montgomery   12-30-22 Diana Villa PA-C  12-21-22 Tabitha Bill PA-C  07-20-21 Ca Coppola, APRN CNP  *additional in [facility]*    HOSPITAL DISCHARGE SUMMARY/  ED VISITS Internal 01-14-23 Dr. Fermin Kat      MEDICATION LIST Internal         COLONOSCOPY Internal/Care everywhere  05-24-23, 04-13-22, 05-09-19   STOOL TESTING Internal 04-05-22   PERTINENT LABS Internal CMP:   06-30-23, 06-01-23, 03-29-23, 03-09-23, 02-09-23  CBC/diff:   06-30-23, 06-01-23, 03-29-23, 03-09-23, 02-09-23  BMP:   01-01-23, 01-05-23, 01-06-23, 01-08-23, 01-10-23, 01--12-23  *additional in [facility]*    PATHOLOGY REPORTS (RELATED) Internal 05-24-23   IMAGING (CT, MRI, EGD, MRCP, Small Bowel Follow Through/SBT, MR/CT Enterography) Internal/Care everywhere  CT chest/abd: 03-29-23, 07-12-22, 08-26-21  CT abd/pelvis: 02-09-21

## 2023-07-10 ENCOUNTER — OFFICE VISIT (OUTPATIENT)
Dept: CARDIOLOGY | Facility: CLINIC | Age: 53
End: 2023-07-10
Attending: INTERNAL MEDICINE
Payer: COMMERCIAL

## 2023-07-10 VITALS
BODY MASS INDEX: 25.37 KG/M2 | WEIGHT: 162 LBS | DIASTOLIC BLOOD PRESSURE: 82 MMHG | OXYGEN SATURATION: 99 % | HEART RATE: 94 BPM | SYSTOLIC BLOOD PRESSURE: 121 MMHG

## 2023-07-10 DIAGNOSIS — I49.3 PVC'S (PREMATURE VENTRICULAR CONTRACTIONS): Primary | ICD-10-CM

## 2023-07-10 PROCEDURE — G0463 HOSPITAL OUTPT CLINIC VISIT: HCPCS | Performed by: INTERNAL MEDICINE

## 2023-07-10 PROCEDURE — 99213 OFFICE O/P EST LOW 20 MIN: CPT | Performed by: INTERNAL MEDICINE

## 2023-07-10 PROCEDURE — 93005 ELECTROCARDIOGRAM TRACING: CPT

## 2023-07-10 PROCEDURE — 93010 ELECTROCARDIOGRAM REPORT: CPT | Performed by: INTERNAL MEDICINE

## 2023-07-10 ASSESSMENT — PAIN SCALES - GENERAL: PAINLEVEL: NO PAIN (0)

## 2023-07-10 NOTE — PROGRESS NOTES
HPI:   Kristie Cornejo is a 53 year old female with a past medical history significant for Irritable bowel disease, allergies, large plasmacytoma of the L pelvis, newly diagnosed multiple myeloma, and PVCs.  She was referred for a cardiology evaluation.    She started having PVCs in 2001 and they are getting worse.  She stated that physical Ex might be a trigger of her PVCs.  Her PVCs are syymptomatic but she is having more PVCs than she feels.  She underwent catheter ablation of PVCs originating from the RVOT and anterolateral papillary muscle on 3/23/2022.  She underwent CAR-T and since then she has been doing well.  She is now seen for a follow up.    PAST MEDICAL HISTORY:  Past Medical History:   Diagnosis Date     Allergic rhinitis, cause unspecified      Anxiety state, unspecified 12/01/2003    Started postpartum . On Paxil x 1+years. Off meds- 10/04     Irritable bowel syndrome 01/01/2004     Multiple myeloma not having achieved remission (H)      PLANTAR WARTS     resolved     SINUS DYSRHYTHMIA 10/01/2002    wnl     Unspecified hemorrhoids without mention of complication 04/01/2004    colonoscopy 4/04 spastic colon, int roids; bx neg       CURRENT MEDICATIONS:  Current Outpatient Medications   Medication Sig Dispense Refill     acetaminophen (TYLENOL) 325 MG tablet Take 325-650 mg by mouth every 4 hours as needed for mild pain or fever       acyclovir (ZOVIRAX) 800 MG tablet Take 1 tablet (800 mg) by mouth every 12 hours 180 tablet 3     BUSPIRONE HCL 10 MG PO TABS 1 TABLET 3 TIMES DAILY as needed (Patient not taking: Reported on 6/30/2023) 30 Tab 3     cetirizine HCl 10 MG CAPS Take 1 capsule (10 mg) by mouth daily as needed       Flaxseed (Linseed) (FLAX SEED OIL) 1000 MG capsule Take 1 capsule by mouth daily       fluticasone (FLONASE) 50 MCG/ACT nasal spray Spray 1 spray into both nostrils daily       Glucos-MSM-C-Yx-Kajllq-Mruhri (GLUCOSAMINE MSM COMPLEX) TABS tablet Take by mouth daily        LORazepam (ATIVAN) 0.5 MG tablet Take 1 tablet (0.5 mg) by mouth every 4 hours as needed for anxiety, nausea or sleep (Patient not taking: Reported on 6/30/2023) 30 tablet 3     melatonin 5 MG tablet Take 5 mg by mouth nightly as needed for sleep       MIRENA 20 MCG/24HR IU IUD        Multiple Vitamin (MULTI-VITAMIN DAILY) TABS Take by mouth daily On hold       omeprazole (PRILOSEC OTC) 20 MG EC tablet Take 1 tablet (20 mg) by mouth daily (Patient not taking: Reported on 2/2/2023)       polyethylene glycol (MIRALAX) 17 g packet Take 1 packet by mouth daily as needed for constipation (Patient not taking: Reported on 6/7/2023)       prochlorperazine (COMPAZINE) 10 MG tablet Take 0.5 tablets (5 mg) by mouth every 6 hours as needed for nausea or vomiting (Patient not taking: Reported on 1/25/2023)       sulfamethoxazole-trimethoprim (BACTRIM DS) 800-160 MG tablet Take 1 tablet by mouth 2 times daily (Patient taking differently: Take 1 tablet by mouth 2 times daily MONDAYS AND TUESDAYS) 16 tablet 6       PAST SURGICAL HISTORY:  Past Surgical History:   Procedure Laterality Date     BIOPSY BONE PELVIS Left 09/07/2021    Procedure: Biopsy left pubic ramis;  Surgeon: Edu Philip MD;  Location: [facility] OR     COLONOSCOPY N/A 04/13/2022    Procedure: COLONOSCOPY, WITH BIOPSY;  Surgeon: Berto Montgomery MD;  Location:  GI     COLONOSCOPY N/A 5/24/2023    Procedure: COLONOSCOPY, WITH POLYPECTOMY AND BIOPSY;  Surgeon: Berto Montgomery MD;  Location:  GI     EP ABLATION PVC N/A 03/23/2022    Procedure: Ablation Premature Ventricular Contractions;  Surgeon: Alem Thomas MD;  Location:  HEART CARDIAC CATH LAB     INSERT CATHETER VASCULAR ACCESS N/A 11/21/2022    Procedure: CENTRAL VENOUS CATHETER NON TUNNELED INSERTION, VASCULAR ACCESS CATHETER;  Surgeon: Erich Bauman MD;  Location: [facility] OR     IR CVC NON TUNNEL LINE REMOVAL  11/22/2022     IR CVC NON TUNNEL PLACEMENT > 5 YRS  11/21/2022     LASIK        PICC TRIPLE LUMEN PLACEMENT Left 01/03/2023    Basilic 44 cm ext. 0     [facility] NONSPECIFIC PROCEDURE  01/01/1987    Pacoima teeth removed     [facility] NONSPECIFIC PROCEDURE  01/01/1993    (R) knee surgery     [facility] NONSPECIFIC PROCEDURE  01/01/2003    (R) breast bx-negative 6/03; bx neg 10/02     [facility] NONSPECIFIC PROCEDURE      10/02 cardiac echo normal     [facility] NONSPECIFIC PROCEDURE  01/01/2004 4/04 colonosc int roids, spastic colon     [facility] NONSPECIFIC PROCEDURE  08/01/2005    D & C for missed AB     [facility]  01/01/2004       ALLERGIES:     Allergies   Allergen Reactions     Amoxicillin      Other reaction(s): upset stomach  Other reaction(s): Gastrointestinal, GI intolerance, Other (see comments)  Other reaction(s): upset stomach  Other reaction(s): upset stomach       Chlorhexidine Gluconate Rash     Clarithromycin      Other reaction(s): GI intolerance     Erythromycin      upsets stomach     Erythromycin      Other reaction(s): GI intolerance  upsets stomach  nausea       FAMILY HISTORY:  + Premature coronary artery disease  - Atrial fibrillation  - Sudden cardiac death     SOCIAL HISTORY:  Social History     Tobacco Use     Smoking status: Never     Smokeless tobacco: Never   Vaping Use     Vaping Use: Never used   Substance Use Topics     Alcohol use: Yes     Comment: none to a couple. occasional     Drug use: No       ROS:   Answers for HPI/ROS submitted by the patient on 1/31/2022 were reviewed.  Constitutional: No fever, chills, or sweats. Weight stable.   ENT: No visual disturbance, ear ache, epistaxis, sore throat.   Cardiovascular: As per HPI.   Respiratory: No cough, hemoptysis.    GI: No nausea, vomiting, hematemesis, melena, or hematochezia.   : No hematuria.   Integument: Negative.   Psychiatric: Negative.   Hematologic:  Easy bruising, no easy bleeding.  Neuro: Negative.   Endocrinology: No significant heat or cold intolerance   Musculoskeletal: No myalgia.    Exam:  There  were no vitals taken for this visit.  GENERAL APPEARANCE: healthy, alert and no distress  HEENT: no icterus, no xanthelasmas, normal pupil size and reaction, normal palate, mucosa moist, no central cyanosis  NECK: no adenopathy, no asymmetry, masses, or scars, thyroid normal to palpation and no bruits, JVP not elevated  RESPIRATORY: lungs clear to auscultation - no rales, rhonchi or wheezes, no use of accessory muscles, no retractions, respirations are unlabored, normal respiratory rate  CARDIOVASCULAR: regular rhythm, normal S1 with physiologic split S2, no S3 or S4 and no murmur, click or rub, precordium quiet with normal PMI.  ABDOMEN: soft, non tender, without hepatosplenomegaly, no masses palpable, bowel sounds normal, aorta not enlarged by palpation, no abdominal bruits  EXTREMITIES: peripheral pulses normal, no edema, no bruits  NEURO: alert and oriented to person/place/time, normal speech, gait and affect  VASC: Radial, femoral, dorsalis pedis and posterior tibialis pulses are normal in volumes and symmetric bilaterally. No bruits are heard.  SKIN: no ecchymoses, no rashes    Labs:  CBC RESULTS:   Lab Results   Component Value Date    WBC 3.3 (L) 06/30/2023    WBC 7.3 02/13/2006    RBC 4.60 06/30/2023    RBC 4.25 02/13/2006    HGB 13.9 06/30/2023    HGB 12.2 08/29/2006    HCT 41.7 06/30/2023    HCT 38.0 02/13/2006    MCV 91 06/30/2023    MCV 89 02/13/2006    MCH 30.2 06/30/2023    MCH 30.6 02/13/2006    MCHC 33.3 06/30/2023    MCHC 34.2 02/13/2006    RDW 13.3 06/30/2023    RDW 12.3 02/13/2006     06/30/2023     02/13/2006       BMP RESULTS:  Lab Results   Component Value Date     06/30/2023    POTASSIUM 3.8 06/30/2023    POTASSIUM 4.3 08/10/2022    CHLORIDE 107 06/30/2023    CHLORIDE 112 (H) 08/10/2022    CO2 23 06/30/2023    CO2 24 08/10/2022    ANIONGAP 12 06/30/2023    ANIONGAP 7 08/10/2022    GLC 96 06/30/2023     (H) 08/10/2022    GLC 81 09/24/2021    BUN 22.6 (H) 06/30/2023     BUN 21 08/10/2022    CR 0.85 06/30/2023    GFRESTIMATED 81 06/30/2023    MEKA 9.6 06/30/2023        INR RESULTS:  Lab Results   Component Value Date    INR 1.00 02/02/2023    INR 0.96 01/25/2023    INR 1.01 01/23/2023    INR 1.05 01/18/2023       Procedures:  Holter ECG in 9/2021: Reviewed.      ECG on 10/8/2021: Reviewed.      ECG on 1/17/2022: Reviewed.      ECG on 1/31/2022: Reviewed.      Zio patch in 5-6/2022: Reviewed.            ECG on 6/27/2022: Reviewed.      TTE on 11/15/2022: Reviewed.  Interpretation Summary  Global and regional left ventricular function is normal with an EF of 55-60%.  Global right ventricular function is normal.  Pulmonary artery systolic pressure is normal.  The inferior vena cava is normal.  No pericardial effusion is present.    ECG on 11/14/2022: Reviewed.      Zio patch in 11/2022: Reviewed.          TTE on 11/15/2022: Reviewed.  Interpretation Summary  Global and regional left ventricular function is normal with an EF of 55-60%.  Global right ventricular function is normal.  Pulmonary artery systolic pressure is normal.  The inferior vena cava is normal.  No pericardial effusion is present.    ECG on 7/10/2023: Reviewed.      Assessment and Plan:  # Irritable bowel disease  # Allergies  # Large plasmacytoma of the L pelvis  # Multiple myeloma, newly diagnosed.  She has been doing well since CAR-T.  # Frequent PVCs Symptomatic. [place] = 15% per Holter ECG in 9/2021. Likely originating from the LCC and posteromedial papillary muscle.   -> s/p catheter ablation of PVCs originating from the RVOT and anterolateral papillary muscle on 3/23/2022.  -> PVC burden = 2.7% per Zio patch in 5-6/2022.  The PVC burden was reduced by the catheter ablation.  But, the patient is now having frequent PVCs again that are different from what were treated by the catheter ablation. It appears that chemo Thx induced new PVCs.  -> PVC burden = 1.4% per Zio patch in 11/2022.  TTE was normal on  11/15/2022.    She has been doing well and I will see her back in a year.    I spent a total of 20 min today to review the records, see the patient, and complete the documents.    CC  Patient Care Team:  Payton Reinoso MD as PCP - General (Hematology)  Edu Philip MD as Assigned Musculoskeletal Provider  Griselda Valdez, RN as Specialty Care Coordinator (Hematology & Oncology)  Pyaton Reinoso MD as MD (Hematology)  Alem Thomas MD (Cardiovascular Disease)  Alem Thomas MD as Assigned Heart and Vascular Provider  Shabana Bauman MD as MD (Ophthalmology)  Payton Reinoso MD as Assigned Cancer Care Provider  Fanny Ponce MD as Assigned Surgical Provider  Nika Laboy, RN as Specialty Care Coordinator (Cardiology)  Carissa Maciel PA-C as Assigned PCP  Payton Reinoso MD as BMT Physician (Transplant)  Julianne Peoples as BMT Nurse Coordinator  Yudelka Aden LICSW as   Trini Quiroz MD as MD (Gastroenterology)  GODWIN LEAHY

## 2023-07-10 NOTE — NURSING NOTE
Chief Complaint   Patient presents with     Follow Up     July 10, 2023 - Dr. Thomas: Return for 6 month follow up d/t PVCs     Vitals were taken, medications reconciled, and EKG was performed.    Dakotah Acuña, SEJAL  9:01 AM

## 2023-07-10 NOTE — LETTER
7/10/2023      RE: Kristie Cornejo  [address]  [facility] [address]       Dear Colleague,    Thank you for the opportunity to participate in the care of your patient, Kristie Cornejo, at the [facility] [place] [facility]. Please see a copy of my visit note below.    HPI:   Kristie Cornejo is a 53 year old female with a past medical history significant for Irritable bowel disease, allergies, large plasmacytoma of the L pelvis, newly diagnosed multiple myeloma, and PVCs.  She was referred for a cardiology evaluation.    She started having PVCs in 2001 and they are getting worse.  She stated that physical Ex might be a trigger of her PVCs.  Her PVCs are syymptomatic but she is having more PVCs than she feels.  She underwent catheter ablation of PVCs originating from the RVOT and anterolateral papillary muscle on 3/23/2022.  She underwent CAR-T and since then she has been doing well.  She is now seen for a follow up.    PAST MEDICAL HISTORY:  Past Medical History:   Diagnosis Date    Allergic rhinitis, cause unspecified     Anxiety state, unspecified 12/01/2003    Started postpartum . On Paxil x 1+years. Off meds- 10/04    Irritable bowel syndrome 01/01/2004    Multiple myeloma not having achieved remission (H)     PLANTAR WARTS     resolved    SINUS DYSRHYTHMIA 10/01/2002    wnl    Unspecified hemorrhoids without mention of complication 04/01/2004    colonoscopy 4/04 spastic colon, int roids; bx neg       CURRENT MEDICATIONS:  Current Outpatient Medications   Medication Sig Dispense Refill    acetaminophen (TYLENOL) 325 MG tablet Take 325-650 mg by mouth every 4 hours as needed for mild pain or fever      acyclovir (ZOVIRAX) 800 MG tablet Take 1 tablet (800 mg) by mouth every 12 hours 180 tablet 3    BUSPIRONE HCL 10 MG PO TABS 1 TABLET 3 TIMES DAILY as needed (Patient not taking: Reported on 6/30/2023) 30 Tab 3    cetirizine HCl 10 MG  CAPS Take 1 capsule (10 mg) by mouth daily as needed      Flaxseed (Linseed) (FLAX SEED OIL) 1000 MG capsule Take 1 capsule by mouth daily      fluticasone (FLONASE) 50 MCG/ACT nasal spray Spray 1 spray into both nostrils daily      Glucos-MSM-C-Ol-Swivbd-Euwxhc (GLUCOSAMINE MSM COMPLEX) TABS tablet Take by mouth daily      LORazepam (ATIVAN) 0.5 MG tablet Take 1 tablet (0.5 mg) by mouth every 4 hours as needed for anxiety, nausea or sleep (Patient not taking: Reported on 6/30/2023) 30 tablet 3    melatonin 5 MG tablet Take 5 mg by mouth nightly as needed for sleep      MIRENA 20 MCG/24HR IU IUD       Multiple Vitamin (MULTI-VITAMIN DAILY) TABS Take by mouth daily On hold      omeprazole (PRILOSEC OTC) 20 MG EC tablet Take 1 tablet (20 mg) by mouth daily (Patient not taking: Reported on 2/2/2023)      polyethylene glycol (MIRALAX) 17 g packet Take 1 packet by mouth daily as needed for constipation (Patient not taking: Reported on 6/7/2023)      prochlorperazine (COMPAZINE) 10 MG tablet Take 0.5 tablets (5 mg) by mouth every 6 hours as needed for nausea or vomiting (Patient not taking: Reported on 1/25/2023)      sulfamethoxazole-trimethoprim (BACTRIM DS) 800-160 MG tablet Take 1 tablet by mouth 2 times daily (Patient taking differently: Take 1 tablet by mouth 2 times daily MONDAYS AND TUESDAYS) 16 tablet 6       PAST SURGICAL HISTORY:  Past Surgical History:   Procedure Laterality Date    BIOPSY BONE PELVIS Left 09/07/2021    Procedure: Biopsy left pubic ramis;  Surgeon: Edu Philip MD;  Location: [facility] OR    COLONOSCOPY N/A 04/13/2022    Procedure: COLONOSCOPY, WITH BIOPSY;  Surgeon: Berto Montgomery MD;  Location:  GI    COLONOSCOPY N/A 5/24/2023    Procedure: COLONOSCOPY, WITH POLYPECTOMY AND BIOPSY;  Surgeon: Berto Montgomery MD;  Location:  GI    EP ABLATION PVC N/A 03/23/2022    Procedure: Ablation Premature Ventricular Contractions;  Surgeon: Alem Thomas MD;  Location:  HEART CARDIAC  CATH LAB    INSERT CATHETER VASCULAR ACCESS N/A 11/21/2022    Procedure: CENTRAL VENOUS CATHETER NON TUNNELED INSERTION, VASCULAR ACCESS CATHETER;  Surgeon: Erich Bauman MD;  Location: [facility] OR    IR CVC NON TUNNEL LINE REMOVAL  11/22/2022    IR CVC NON TUNNEL PLACEMENT > 5 YRS  11/21/2022    LASIK      PICC TRIPLE LUMEN PLACEMENT Left 01/03/2023    Basilic 44 cm ext. 0    [facility] NONSPECIFIC PROCEDURE  01/01/1987    Bath teeth removed    [facility] NONSPECIFIC PROCEDURE  01/01/1993    (R) knee surgery    Z NONSPECIFIC PROCEDURE  01/01/2003    (R) breast bx-negative 6/03; bx neg 10/02    Z NONSPECIFIC PROCEDURE      10/02 cardiac echo normal    [facility] NONSPECIFIC PROCEDURE  01/01/2004 4/04 colonosc int roids, spastic colon    [facility] NONSPECIFIC PROCEDURE  08/01/2005    D & C for missed AB    [facility] COLONOSCOPY THRU STOMA, DIAGNOSTIC  01/01/2004       ALLERGIES:     Allergies   Allergen Reactions    Amoxicillin      Other reaction(s): upset stomach  Other reaction(s): Gastrointestinal, GI intolerance, Other (see comments)  Other reaction(s): upset stomach  Other reaction(s): upset stomach      Chlorhexidine Gluconate Rash    Clarithromycin      Other reaction(s): GI intolerance    Erythromycin      upsets stomach    Erythromycin      Other reaction(s): GI intolerance  upsets stomach  nausea       FAMILY HISTORY:  + Premature coronary artery disease  - Atrial fibrillation  - Sudden cardiac death     SOCIAL HISTORY:  Social History     Tobacco Use    Smoking status: Never    Smokeless tobacco: Never   Vaping Use    Vaping Use: Never used   Substance Use Topics    Alcohol use: Yes     Comment: none to a couple. occasional    Drug use: No       ROS:   Answers for HPI/ROS submitted by the patient on 1/31/2022 were reviewed.  Constitutional: No fever, chills, or sweats. Weight stable.   ENT: No visual disturbance, ear ache, epistaxis, sore throat.   Cardiovascular: As per HPI.   Respiratory: No cough, hemoptysis.    GI: No  nausea, vomiting, hematemesis, melena, or hematochezia.   : No hematuria.   Integument: Negative.   Psychiatric: Negative.   Hematologic:  Easy bruising, no easy bleeding.  Neuro: Negative.   Endocrinology: No significant heat or cold intolerance   Musculoskeletal: No myalgia.    Exam:  There were no vitals taken for this visit.  GENERAL APPEARANCE: healthy, alert and no distress  HEENT: no icterus, no xanthelasmas, normal pupil size and reaction, normal palate, mucosa moist, no central cyanosis  NECK: no adenopathy, no asymmetry, masses, or scars, thyroid normal to palpation and no bruits, JVP not elevated  RESPIRATORY: lungs clear to auscultation - no rales, rhonchi or wheezes, no use of accessory muscles, no retractions, respirations are unlabored, normal respiratory rate  CARDIOVASCULAR: regular rhythm, normal S1 with physiologic split S2, no S3 or S4 and no murmur, click or rub, precordium quiet with normal PMI.  ABDOMEN: soft, non tender, without hepatosplenomegaly, no masses palpable, bowel sounds normal, aorta not enlarged by palpation, no abdominal bruits  EXTREMITIES: peripheral pulses normal, no edema, no bruits  NEURO: alert and oriented to person/place/time, normal speech, gait and affect  VASC: Radial, femoral, dorsalis pedis and posterior tibialis pulses are normal in volumes and symmetric bilaterally. No bruits are heard.  SKIN: no ecchymoses, no rashes    Labs:  CBC RESULTS:   Lab Results   Component Value Date    WBC 3.3 (L) 06/30/2023    WBC 7.3 02/13/2006    RBC 4.60 06/30/2023    RBC 4.25 02/13/2006    HGB 13.9 06/30/2023    HGB 12.2 08/29/2006    HCT 41.7 06/30/2023    HCT 38.0 02/13/2006    MCV 91 06/30/2023    MCV 89 02/13/2006    MCH 30.2 06/30/2023    MCH 30.6 02/13/2006    MCHC 33.3 06/30/2023    MCHC 34.2 02/13/2006    RDW 13.3 06/30/2023    RDW 12.3 02/13/2006     06/30/2023     02/13/2006       BMP RESULTS:  Lab Results   Component Value Date     06/30/2023     POTASSIUM 3.8 06/30/2023    POTASSIUM 4.3 08/10/2022    CHLORIDE 107 06/30/2023    CHLORIDE 112 (H) 08/10/2022    CO2 23 06/30/2023    CO2 24 08/10/2022    ANIONGAP 12 06/30/2023    ANIONGAP 7 08/10/2022    GLC 96 06/30/2023     (H) 08/10/2022    GLC 81 09/24/2021    BUN 22.6 (H) 06/30/2023    BUN 21 08/10/2022    CR 0.85 06/30/2023    GFRESTIMATED 81 06/30/2023    MEKA 9.6 06/30/2023        INR RESULTS:  Lab Results   Component Value Date    INR 1.00 02/02/2023    INR 0.96 01/25/2023    INR 1.01 01/23/2023    INR 1.05 01/18/2023       Procedures:  Holter ECG in 9/2021: Reviewed.      ECG on 10/8/2021: Reviewed.      ECG on 1/17/2022: Reviewed.      ECG on 1/31/2022: Reviewed.      Zio patch in 5-6/2022: Reviewed.            ECG on 6/27/2022: Reviewed.      TTE on 11/15/2022: Reviewed.  Interpretation Summary  Global and regional left ventricular function is normal with an EF of 55-60%.  Global right ventricular function is normal.  Pulmonary artery systolic pressure is normal.  The inferior vena cava is normal.  No pericardial effusion is present.    ECG on 11/14/2022: Reviewed.      Zio patch in 11/2022: Reviewed.          TTE on 11/15/2022: Reviewed.  Interpretation Summary  Global and regional left ventricular function is normal with an EF of 55-60%.  Global right ventricular function is normal.  Pulmonary artery systolic pressure is normal.  The inferior vena cava is normal.  No pericardial effusion is present.    ECG on 7/10/2023: Reviewed.      Assessment and Plan:  # Irritable bowel disease  # Allergies  # Large plasmacytoma of the L pelvis  # Multiple myeloma, newly diagnosed.  She has been doing well since CAR-T.  # Frequent PVCs Symptomatic. Caro = 15% per Holter ECG in 9/2021. Likely originating from the LCC and posteromedial papillary muscle.   -> s/p catheter ablation of PVCs originating from the RVOT and anterolateral papillary muscle on 3/23/2022.  -> PVC burden = 2.7% per Zio patch in  5-6/2022.  The PVC burden was reduced by the catheter ablation.  But, the patient is now having frequent PVCs again that are different from what were treated by the catheter ablation. It appears that chemo Thx induced new PVCs.  -> PVC burden = 1.4% per Zio patch in 11/2022.  TTE was normal on 11/15/2022.    She has been doing well and I will see her back in a year.    I spent a total of 20 min today to review the records, see the patient, and complete the documents.        Please do not hesitate to contact me if you have any questions/concerns.     Sincerely,     Alem Thomas MD        CC  Patient Care Team:  Payton Reinoso MD as PCP - General (Hematology)

## 2023-07-10 NOTE — PATIENT INSTRUCTIONS
You were seen in the Electrophysiology Clinic today by: Dr. Thomas    Plan:   Medication Changes: None.     Follow up Visit: with Dr. Thomas in 1 year     If you have further questions, please utilize [employer] to contact us.     Your Care Team:    EP Cardiology   Telephone Number     Nurse Line  Aquiles Kruger RN    [phone]     For scheduling appointments:   Liliya   [phone]   For procedure scheduling:    Harini Couch     [phone]   For the Device Clinic (Pacemakers, ICDs, Loop Recorders)    During business hours: [phone]  After business hours:   [phone]- select option 4 and ask for job code [id number].       On-call cardiologist for after hours or on weekends:   [phone], option #4, and ask to speak to the on-call cardiologist.     Cardiovascular Clinic:   [address] [place] [address]. [place] [address]      As always, Thank you for trusting us with your health care needs!

## 2023-07-11 ENCOUNTER — PRE VISIT (OUTPATIENT)
Dept: GASTROENTEROLOGY | Facility: CLINIC | Age: 53
End: 2023-07-11

## 2023-07-11 ENCOUNTER — OFFICE VISIT (OUTPATIENT)
Dept: GASTROENTEROLOGY | Facility: CLINIC | Age: 53
End: 2023-07-11
Attending: INTERNAL MEDICINE
Payer: COMMERCIAL

## 2023-07-11 VITALS
HEIGHT: 67 IN | DIASTOLIC BLOOD PRESSURE: 84 MMHG | SYSTOLIC BLOOD PRESSURE: 143 MMHG | HEART RATE: 99 BPM | WEIGHT: 160.1 LBS | BODY MASS INDEX: 25.13 KG/M2

## 2023-07-11 DIAGNOSIS — K52.9 NON-SPECIFIC COLITIS: ICD-10-CM

## 2023-07-11 DIAGNOSIS — K52.9 CHRONIC DIARRHEA: ICD-10-CM

## 2023-07-11 LAB
ATRIAL RATE - MUSE: 90 BPM
DIASTOLIC BLOOD PRESSURE - MUSE: NORMAL MMHG
INTERPRETATION ECG - MUSE: NORMAL
P AXIS - MUSE: 36 DEGREES
PR INTERVAL - MUSE: 150 MS
QRS DURATION - MUSE: 80 MS
QT - MUSE: 364 MS
QTC - MUSE: 445 MS
R AXIS - MUSE: 36 DEGREES
SYSTOLIC BLOOD PRESSURE - MUSE: NORMAL MMHG
T AXIS - MUSE: 106 DEGREES
VENTRICULAR RATE- MUSE: 90 BPM

## 2023-07-11 PROCEDURE — 99204 OFFICE O/P NEW MOD 45 MIN: CPT | Mod: GC | Performed by: STUDENT IN AN ORGANIZED HEALTH CARE EDUCATION/TRAINING PROGRAM

## 2023-07-11 ASSESSMENT — PAIN SCALES - GENERAL: PAINLEVEL: NO PAIN (0)

## 2023-07-11 NOTE — PATIENT INSTRUCTIONS
- Start Citrucel  - We will refer you to a nutritionist to discuss a FODMAP diet  - Labs to test for Celiac disease   - Stool infectious studies

## 2023-07-11 NOTE — LETTER
7/11/2023         RE: Kristie Cornejo  [address]  [facility] [address]        Dear Colleague,    Thank you for referring your patient, Kristie Cornejo, to the [facility] [place]. Please see a copy of my visit note below.    GI CLINIC VISIT    CC/REFERRING MD:  Berto Montgomery  REASON FOR CONSULTATION:   Berto Montgomery for No chief complaint on file.      ASSESSMENT/PLAN:  54yo F w/ PMH of MM s/p car t-cell therapy in January who is presenting with chronic diarrhea.     #Chronic diarrhea  Present since November 2021; no red flag sxs; had a recent colonoscopy w/ bx negative for microscopic colitis. Cdiff was negative in January but we will repeat this. Given that certain foods make it worse, this is likely due to a food intolerance vs IBS- D. Can also consider infection or Celiac disease. She has not tried Imodium as this has caused constipation in the past.   - Start Citrucel  - Nutrition referral to discuss a FODMAP diet  - Labs to test for Celiac disease   - Stool infectious studies     RTC: 4mo       60 minutes spent on the date of the encounter performing chart review, history and exam, documentation and further activities as noted above.  .    Patient was seen and d/w Dr. Bonilla Quiroz MD  GI Fellow        HPI  54yo F w/ PMH of MM s/p car t-cell therapy in January who is presenting with diarrhea.     The diarrhea started in November 2021 and has waxed and waned (noted improvement after she switched chemotherapy but has now returned). She will eat and then 20min-1hr later she will have liquid stool. Worse with certain foods (greasy foods and onions). She has associated abdominal bloating and gas which improved after the BM. It will wake her up from sleep once every couple of weeks. No incontinence, no sense of incomplete evacuation. No blood in the stool, no weight loss.     ROS:    Negative except per HPI.     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 Provider to review      Anxiety state 05/19/2004     Priority: Medium     Postpartum anxiety and depression after 1st baby. On Paxil >1yr. Off 12/04  Problem list name updated by automated process. Provider to review      Irritable bowel syndrome 05/19/2004     Priority: Medium    Hemorrhoids 05/19/2004     Priority: Medium     colonoscopy 4/04 spastic colon, int roids  Problem list name updated by automated process. Provider to review         PERTINENT PAST MEDICAL HISTORY:  Past Medical History:   Diagnosis Date    Allergic rhinitis, cause unspecified     Anxiety state, unspecified 12/01/2003    Started postpartum . On Paxil x 1+years. Off meds- 10/04    Irritable bowel syndrome 01/01/2004    Multiple myeloma not having achieved remission (H)     PLANTAR WARTS     resolved    SINUS DYSRHYTHMIA 10/01/2002    wnl    Unspecified hemorrhoids without mention of complication 04/01/2004    colonoscopy 4/04 spastic colon, int roids; bx neg       PREVIOUS SURGERIES:  Past Surgical History:   Procedure Laterality Date    BIOPSY BONE PELVIS Left 09/07/2021    Procedure:  Biopsy left pubic ramis;  Surgeon: Edu Philip MD;  Location: [facility] OR    COLONOSCOPY N/A 04/13/2022    Procedure: COLONOSCOPY, WITH BIOPSY;  Surgeon: Khloe Will MD;  Location: [facility] GI    COLONOSCOPY N/A 5/24/2023    Procedure: COLONOSCOPY, WITH POLYPECTOMY AND BIOPSY;  Surgeon: Khloe Will MD;  Location: [facility] GI    EP ABLATION PVC N/A 03/23/2022    Procedure: Ablation Premature Ventricular Contractions;  Surgeon: Alem Thomas MD;  Location:  [facility]    INSERT CATHETER VASCULAR ACCESS N/A 11/21/2022    Procedure: CENTRAL VENOUS CATHETER NON TUNNELED INSERTION, VASCULAR ACCESS CATHETER;  Surgeon: Erich Bauman MD;  Location: [facility] OR    IR CVC NON TUNNEL LINE REMOVAL  11/22/2022    IR CVC NON TUNNEL PLACEMENT > 5 YRS  11/21/2022    LASIK      PICC TRIPLE LUMEN PLACEMENT Left 01/03/2023    Basilic 44 cm ext. 0    ZZC NONSPECIFIC PROCEDURE  01/01/1987    [place] teeth removed    ZZC NONSPECIFIC PROCEDURE  01/01/1993    (R) knee surgery    ZZC NONSPECIFIC PROCEDURE  01/01/2003    (R) breast bx-negative 6/03; bx neg 10/02    ZZC NONSPECIFIC PROCEDURE      10/02 cardiac echo normal    ZZC NONSPECIFIC PROCEDURE  01/01/2004 4/04 colonosc int roids, spastic colon    ZZC NONSPECIFIC PROCEDURE  08/01/2005    D & C for missed AB    ZZHC COLONOSCOPY THRU STOMA, DIAGNOSTIC  01/01/2004       PREVIOUS ENDOSCOPY:  Procedure:             Colonoscopy 5/24/23  Indications:           Clinically significant diarrhea of unexplained origin,                          Abnormal PET scan of the GI tract with activity at he                          hepatic flexure. History of multiple myeloma s/p CAR-T                          therapy. Prior colonoscopy 4/2022 with biopsies                          showing non-specific colitis, with that diarrhea                          reportedly resolving with change in medications.   Providers:             KHLOE WILL MD, Shannon Chiu, RN   Referring  MD:          MARCELLUS CARDENAS MD   Medicines:             Midazolam 2 mg IV, Fentanyl 150 micrograms IV, Total                          sedation time: 35 minutes of continuous bedside 1:1                          monitoring.   Complications:         No immediate complications.    Findings:        The perianal and digital rectal examinations were normal.        The terminal ileum appeared normal.        Retroflexion in the right colon was performed.        A 7 mm polyp was found in the mid ascending colon. The polyp was        sessile. The polyp was removed with a cold snare. Resection and        retrieval were complete.        Normal mucosa was found in the entire colon. Biopsies were taken with a        cold forceps for histology.        The retroflexed view of the distal rectum and anal verge was normal and        showed no anal or rectal abnormalities.                                                                                     Impression:            - The examined portion of the ileum was normal.                          - One 7 mm polyp in the mid ascending colon, removed                          with a cold snare. Resected and retrieved.                          - Normal mucosa in the entire examined colon. Biopsied                          with separate jars from the hepatic flexure and then                          the remainder of the colon (random).                          - The distal rectum and anal verge are normal on                          retroflexion view.                          It is possible that the polyp accounts for some of the                          PET activity however this seems unlikely both due to                          the location and small size.   Recommendation:        - Discharge patient to home (ambulatory).                          - Await pathology results.                          - Return to referring physician as previously                          scheduled.                           If biopsies do not identify a source of diarrhea I                          will arrange for consultation in our IBD clinic.     Final Diagnosis   A.  COLON, RANDOM, BIOPSY:  - Unremarkable colonic mucosa  - No significant acute cryptitis, chronic changes, or microscopic colitis identified  - Single fragment of sessile serrated adenoma  - No cytologic dysplasia identified    B.  COLON, HEPATIC FLEXURE, BIOPSY:  - Unremarkable colonic mucosa  - No significant acute cryptitis, chronic changes, or microscopic colitis identified    C.  COLON, ASCENDING, POLYP:  - Sessile serrated adenoma  - No cytologic dysplasia identified                                                                                         ALLERGIES:     Allergies   Allergen Reactions    Amoxicillin      Other reaction(s): upset stomach  Other reaction(s): Gastrointestinal, GI intolerance, Other (see comments)  Other reaction(s): upset stomach  Other reaction(s): upset stomach      Chlorhexidine Gluconate Rash    Clarithromycin      Other reaction(s): GI intolerance    Erythromycin      upsets stomach    Erythromycin      Other reaction(s): GI intolerance  upsets stomach  nausea       PERTINENT MEDICATIONS:    Current Outpatient Medications:     acetaminophen (TYLENOL) 325 MG tablet, Take 325-650 mg by mouth every 4 hours as needed for mild pain or fever (Patient not taking: Reported on 7/25/2023), Disp: , Rfl:     acyclovir (ZOVIRAX) 800 MG tablet, Take 1 tablet (800 mg) by mouth every 12 hours, Disp: 180 tablet, Rfl: 3    BUSPIRONE HCL 10 MG PO TABS, 1 TABLET 3 TIMES DAILY as needed (Patient not taking: Reported on 7/25/2023), Disp: 30 Tab, Rfl: 3    cetirizine HCl 10 MG CAPS, Take 1 capsule (10 mg) by mouth daily as needed, Disp: , Rfl:     Flaxseed (Linseed) (FLAX SEED OIL) 1000 MG capsule, Take 1 capsule by mouth daily, Disp: , Rfl:     fluticasone (FLONASE) 50 MCG/ACT nasal spray, Spray 1 spray into both nostrils  daily, Disp: , Rfl:     Glucos-MSM-C-Dn-Pibcmn-Usqlka (GLUCOSAMINE MSM COMPLEX) TABS tablet, Take by mouth daily, Disp: , Rfl:     LORazepam (ATIVAN) 0.5 MG tablet, Take 1 tablet (0.5 mg) by mouth every 4 hours as needed for anxiety, nausea or sleep, Disp: 30 tablet, Rfl: 3    melatonin 5 MG tablet, Take 5 mg by mouth nightly as needed for sleep, Disp: , Rfl:     methylcellulose (CITRUCEL) powder, Take 0.3 g (1.05 teaspoonful) by mouth daily, Disp: 100 g, Rfl: 3    MIRENA 20 MCG/24HR IU IUD, , Disp: , Rfl:     Multiple Vitamin (MULTI-VITAMIN DAILY) TABS, Take by mouth daily On hold, Disp: , Rfl:     sulfamethoxazole-trimethoprim (BACTRIM DS) 800-160 MG tablet, Take 1 tablet by mouth 2 times daily (Patient taking differently: Take 1 tablet by mouth 2 times daily MONDAYS AND TUESDAYS), Disp: 16 tablet, Rfl: 6    omeprazole (PRILOSEC OTC) 20 MG EC tablet, Take 20 mg by mouth daily (Patient not taking: Reported on 7/11/2023), Disp: , Rfl:     polyethylene glycol (MIRALAX) 17 g packet, Take 1 packet by mouth daily as needed for constipation (Patient not taking: Reported on 7/11/2023), Disp: , Rfl:     prochlorperazine (COMPAZINE) 10 MG tablet, Take 5 mg by mouth every 6 hours as needed for nausea or vomiting (Patient not taking: Reported on 7/11/2023), Disp: , Rfl:   No current facility-administered medications for this visit.    Facility-Administered Medications Ordered in Other Visits:     CT Flush, 75 mL, Intravenous, Once, Charan Luis,     SOCIAL HISTORY:  Social History     Socioeconomic History    Marital status:      Spouse name: mary    Number of children: 1    Years of education: Not on file    Highest education level: Not on file   Occupational History     Employer: UNEMPLOYED   Tobacco Use    Smoking status: Never    Smokeless tobacco: Never   Vaping Use    Vaping Use: Never used   Substance and Sexual Activity    Alcohol use: Yes     Comment: none to a couple. occasional    Drug use: No     "Sexual activity: Yes     Partners: Male     Birth control/protection: Condom, I.U.D.   Other Topics Concern    Parent/sibling w/ CABG, MI or angioplasty before 65F 55M? No     Comment: Grandparent. But not parent or sibling.   Social History Narrative    Not on file     Social Determinants of Health     Financial Resource Strain: Not on file   Food Insecurity: Not on file   Transportation Needs: Not on file   Physical Activity: Not on file   Stress: Not on file   Social Connections: Not on file   Intimate Partner Violence: Not At Risk (4/4/2023)    Humiliation, Afraid, Rape, and Kick questionnaire     Fear of Current or Ex-Partner: No     Emotionally Abused: No     Physically Abused: No     Sexually Abused: No   Housing Stability: Not on file       FAMILY HISTORY:  Family History   Problem Relation Age of Onset    Neurologic Disorder Mother         toxo    Lipids Mother         low cholestrol    Lipids Father         high cholestrol    Glaucoma Maternal Grandmother     Diabetes Maternal Grandmother         breast cancer,stroke ischemic    Breast Cancer Maternal Grandmother         79yo/and autoimmune skin condition    C.A.D. Maternal Grandfather         strokes and mi, chronic respiratory from 2 hand smoke    Hypertension Paternal Grandmother         rheumatoid arthritis    Respiratory Paternal Grandfather         emphasema    Cancer - colorectal No family hx of     Prostate Cancer No family hx of     Macular Degeneration No family hx of        Past/family/social history reviewed and no changes    PHYSICAL EXAMINATION:  Constitutional: aaox3, cooperative, pleasant, not dyspneic/diaphoretic, no acute distress  Vitals reviewed: BP (!) 143/84   Pulse 99   Ht 1.702 m (5' 7\")   Wt 72.6 kg (160 lb 1.6 oz)   BMI 25.08 kg/m    Wt:   Wt Readings from Last 2 Encounters:   07/25/23 72.2 kg (159 lb 3.2 oz)   07/11/23 72.6 kg (160 lb 1.6 oz)      Eyes: Sclera anicteric/injected   Ears/nose/mouth/throat:  hearing intact  CV: " No edema  Respiratory: Unlabored breathing  Abd:  Nondistended, nontender, no peritoneal signs  Skin: warm, perfused, no jaundice  Psych: Normal affect  MSK: Normal gait      PERTINENT STUDIES:    Office Visit on 07/10/2023   Component Date Value Ref Range Status    Ventricular Rate 07/10/2023 90  BPM Final    Atrial Rate 07/10/2023 90  BPM Final    HI Interval 07/10/2023 150  ms Final    QRS Duration 07/10/2023 80  ms Final    QT 07/10/2023 364  ms Final    QTc 07/10/2023 445  ms Final    P Axis 07/10/2023 36  degrees Final    R AXIS 07/10/2023 36  degrees Final    T Axis 07/10/2023 106  degrees Final    Interpretation ECG 07/10/2023    Final                    Value:Sinus rhythm  Abnormal QRS-T angle, consider primary T wave abnormality  Abnormal ECG  When compared with ECG of 23-DEC-2022 10:52,  Nonspecific T wave abnormality no longer evident in Inferior leads  Nonspecific T wave abnormality, improved in Anterolateral leads  Confirmed by MD JENNY, YOMAIRA ([id number]) on 7/11/2023 7:45:43 AM             Again, thank you for allowing me to participate in the care of your patient.      Sincerely,    Trini Quiroz MD

## 2023-07-11 NOTE — NURSING NOTE
"No chief complaint on file.      Vitals:    07/11/23 1310   BP: (!) 143/84   Pulse: 99   Weight: 72.6 kg (160 lb 1.6 oz)   Height: 1.702 m (5' 7\")       Body mass index is 25.08 kg/m .    PHQ-2 Score:         7/11/2023     1:10 PM 10/11/2022     1:24 PM   PHQ-2 ( 1999 [employer])   Q1: Little interest or pleasure in doing things 0 0   Q2: Feeling down, depressed or hopeless 0 0   PHQ-2 Score 0 0   PHQ-2 Total Score (12-17 Years)- Positive if 3 or more points; Administer PHQ-A if positive  0       Cortney Persaud MA    "

## 2023-07-13 LAB
CULTURE HARVEST COMPLETE DATE: NORMAL
CULTURE HARVEST COMPLETE DATE: NORMAL

## 2023-07-14 LAB — INTERPRETATION: NORMAL

## 2023-07-18 LAB — INTERPRETATION: NORMAL

## 2023-07-24 ENCOUNTER — LAB (OUTPATIENT)
Dept: LAB | Facility: CLINIC | Age: 53
End: 2023-07-24
Payer: COMMERCIAL

## 2023-07-24 DIAGNOSIS — Z01.818 EXAMINATION PRIOR TO CHEMOTHERAPY: ICD-10-CM

## 2023-07-24 DIAGNOSIS — Z86.2 PERSONAL HISTORY OF DISEASES OF BLOOD AND BLOOD-FORMING ORGANS: ICD-10-CM

## 2023-07-24 DIAGNOSIS — C90.00 MYELOMA (H): ICD-10-CM

## 2023-07-24 DIAGNOSIS — C90.00 LIGHT CHAIN MYELOMA (H): ICD-10-CM

## 2023-07-24 DIAGNOSIS — K52.9 CHRONIC DIARRHEA: ICD-10-CM

## 2023-07-24 DIAGNOSIS — C90.00 MULTIPLE MYELOMA, REMISSION STATUS UNSPECIFIED (H): ICD-10-CM

## 2023-07-24 LAB
BASOPHILS # BLD AUTO: 0 10E3/UL (ref 0–0.2)
BASOPHILS NFR BLD AUTO: 1 %
EOSINOPHIL # BLD AUTO: 0.1 10E3/UL (ref 0–0.7)
EOSINOPHIL NFR BLD AUTO: 3 %
ERYTHROCYTE [DISTWIDTH] IN BLOOD BY AUTOMATED COUNT: 13.1 % (ref 10–15)
HCT VFR BLD AUTO: 40.1 % (ref 35–47)
HGB BLD-MCNC: 13.4 G/DL (ref 11.7–15.7)
IMM GRANULOCYTES # BLD: 0 10E3/UL
IMM GRANULOCYTES NFR BLD: 0 %
LYMPHOCYTES # BLD AUTO: 0.7 10E3/UL (ref 0.8–5.3)
LYMPHOCYTES NFR BLD AUTO: 19 %
MCH RBC QN AUTO: 30.5 PG (ref 26.5–33)
MCHC RBC AUTO-ENTMCNC: 33.4 G/DL (ref 31.5–36.5)
MCV RBC AUTO: 91 FL (ref 78–100)
MONOCYTES # BLD AUTO: 0.3 10E3/UL (ref 0–1.3)
MONOCYTES NFR BLD AUTO: 9 %
NEUTROPHILS # BLD AUTO: 2.4 10E3/UL (ref 1.6–8.3)
NEUTROPHILS NFR BLD AUTO: 67 %
PLATELET # BLD AUTO: 228 10E3/UL (ref 150–450)
RBC # BLD AUTO: 4.39 10E6/UL (ref 3.8–5.2)
WBC # BLD AUTO: 3.5 10E3/UL (ref 4–11)

## 2023-07-24 PROCEDURE — 86334 IMMUNOFIX E-PHORESIS SERUM: CPT

## 2023-07-24 PROCEDURE — 83521 IG LIGHT CHAINS FREE EACH: CPT

## 2023-07-24 PROCEDURE — 87177 OVA AND PARASITES SMEARS: CPT

## 2023-07-24 PROCEDURE — 87329 GIARDIA AG IA: CPT

## 2023-07-24 PROCEDURE — 84155 ASSAY OF PROTEIN SERUM: CPT

## 2023-07-24 PROCEDURE — 84165 PROTEIN E-PHORESIS SERUM: CPT

## 2023-07-24 PROCEDURE — 82784 ASSAY IGA/IGD/IGG/IGM EACH: CPT

## 2023-07-24 PROCEDURE — 87507 IADNA-DNA/RNA PROBE TQ 12-25: CPT

## 2023-07-24 PROCEDURE — 80053 COMPREHEN METABOLIC PANEL: CPT

## 2023-07-24 PROCEDURE — 87209 SMEAR COMPLEX STAIN: CPT

## 2023-07-24 PROCEDURE — 87328 CRYPTOSPORIDIUM AG IA: CPT

## 2023-07-24 PROCEDURE — 87493 C DIFF AMPLIFIED PROBE: CPT

## 2023-07-24 PROCEDURE — 86364 TISS TRNSGLTMNASE EA IG CLAS: CPT

## 2023-07-24 PROCEDURE — 85025 COMPLETE CBC W/AUTO DIFF WBC: CPT

## 2023-07-24 PROCEDURE — 36415 COLL VENOUS BLD VENIPUNCTURE: CPT

## 2023-07-25 ENCOUNTER — ONCOLOGY VISIT (OUTPATIENT)
Dept: TRANSPLANT | Facility: CLINIC | Age: 53
End: 2023-07-25
Payer: COMMERCIAL

## 2023-07-25 VITALS
SYSTOLIC BLOOD PRESSURE: 109 MMHG | TEMPERATURE: 98.5 F | RESPIRATION RATE: 16 BRPM | BODY MASS INDEX: 24.93 KG/M2 | WEIGHT: 159.2 LBS | DIASTOLIC BLOOD PRESSURE: 70 MMHG | HEART RATE: 81 BPM | OXYGEN SATURATION: 99 %

## 2023-07-25 DIAGNOSIS — C90.00 LIGHT CHAIN MYELOMA (H): Primary | ICD-10-CM

## 2023-07-25 DIAGNOSIS — D80.1 HYPOGAMMAGLOBULINEMIA (H): ICD-10-CM

## 2023-07-25 LAB
ADV 40+41 DNA STL QL NAA+NON-PROBE: NEGATIVE
ALBUMIN SERPL BCG-MCNC: 4.7 G/DL (ref 3.5–5.2)
ALBUMIN SERPL ELPH-MCNC: 4.4 G/DL (ref 3.7–5.1)
ALP SERPL-CCNC: 59 U/L (ref 35–104)
ALPHA1 GLOB SERPL ELPH-MCNC: 0.3 G/DL (ref 0.2–0.4)
ALPHA2 GLOB SERPL ELPH-MCNC: 0.6 G/DL (ref 0.5–0.9)
ALT SERPL W P-5'-P-CCNC: 21 U/L (ref 0–50)
ANION GAP SERPL CALCULATED.3IONS-SCNC: 11 MMOL/L (ref 7–15)
AST SERPL W P-5'-P-CCNC: 23 U/L (ref 0–45)
ASTRO TYP 1-8 RNA STL QL NAA+NON-PROBE: NEGATIVE
B-GLOBULIN SERPL ELPH-MCNC: 0.6 G/DL (ref 0.6–1)
BILIRUB SERPL-MCNC: 0.4 MG/DL
BUN SERPL-MCNC: 17.2 MG/DL (ref 6–20)
C CAYETANENSIS DNA STL QL NAA+NON-PROBE: NEGATIVE
C DIFF TOX B STL QL: NEGATIVE
CALCIUM SERPL-MCNC: 9.8 MG/DL (ref 8.6–10)
CAMPYLOBACTER DNA SPEC NAA+PROBE: NEGATIVE
CHLORIDE SERPL-SCNC: 108 MMOL/L (ref 98–107)
CREAT SERPL-MCNC: 0.83 MG/DL (ref 0.51–0.95)
CRYPTOSP DNA STL QL NAA+NON-PROBE: NEGATIVE
DEPRECATED HCO3 PLAS-SCNC: 24 MMOL/L (ref 22–29)
E COLI O157 DNA STL QL NAA+NON-PROBE: NORMAL
E HISTOLYT DNA STL QL NAA+NON-PROBE: NEGATIVE
EAEC ASTA GENE ISLT QL NAA+PROBE: NEGATIVE
EC STX1+STX2 GENES STL QL NAA+NON-PROBE: NEGATIVE
EPEC EAE GENE STL QL NAA+NON-PROBE: NEGATIVE
ETEC LTA+ST1A+ST1B TOX ST NAA+NON-PROBE: NEGATIVE
G LAMBLIA DNA STL QL NAA+NON-PROBE: NEGATIVE
GAMMA GLOB SERPL ELPH-MCNC: 0.3 G/DL (ref 0.7–1.6)
GFR SERPL CREATININE-BSD FRML MDRD: 84 ML/MIN/1.73M2
GLUCOSE SERPL-MCNC: 108 MG/DL (ref 70–99)
IGA SERPL-MCNC: <2 MG/DL (ref 84–499)
IGG SERPL-MCNC: 304 MG/DL (ref 610–1616)
IGM SERPL-MCNC: 12 MG/DL (ref 35–242)
KAPPA LC FREE SER-MCNC: 0.18 MG/DL (ref 0.33–1.94)
KAPPA LC FREE/LAMBDA FREE SER NEPH: 1 {RATIO} (ref 0.26–1.65)
LAMBDA LC FREE SERPL-MCNC: 0.18 MG/DL (ref 0.57–2.63)
M PROTEIN SERPL ELPH-MCNC: 0 G/DL
NOROVIRUS GI+II RNA STL QL NAA+NON-PROBE: NEGATIVE
P SHIGELLOIDES DNA STL QL NAA+NON-PROBE: NEGATIVE
POTASSIUM SERPL-SCNC: 4.5 MMOL/L (ref 3.4–5.3)
PROT PATTERN SERPL ELPH-IMP: ABNORMAL
PROT PATTERN SERPL IFE-IMP: NORMAL
PROT SERPL-MCNC: 6.5 G/DL (ref 6.4–8.3)
RVA RNA STL QL NAA+NON-PROBE: NEGATIVE
SALMONELLA SP RPOD STL QL NAA+PROBE: NEGATIVE
SAPO I+II+IV+V RNA STL QL NAA+NON-PROBE: NEGATIVE
SHIGELLA SP+EIEC IPAH ST NAA+NON-PROBE: NEGATIVE
SODIUM SERPL-SCNC: 143 MMOL/L (ref 136–145)
TOTAL PROTEIN SERUM FOR ELP: 6.2 G/DL (ref 6.4–8.3)
V CHOLERAE DNA SPEC QL NAA+PROBE: NEGATIVE
VIBRIO DNA SPEC NAA+PROBE: NEGATIVE
Y ENTEROCOL DNA STL QL NAA+PROBE: NEGATIVE

## 2023-07-25 PROCEDURE — 99215 OFFICE O/P EST HI 40 MIN: CPT

## 2023-07-25 PROCEDURE — 250N000011 HC RX IP 250 OP 636

## 2023-07-25 PROCEDURE — 0121A HC ADMIN COVID VAC PFIZER BIVAL, SINGLE DOSE: CPT

## 2023-07-25 PROCEDURE — G0463 HOSPITAL OUTPT CLINIC VISIT: HCPCS | Mod: 25

## 2023-07-25 PROCEDURE — 81050 URINALYSIS VOLUME MEASURE: CPT

## 2023-07-25 PROCEDURE — 84166 PROTEIN E-PHORESIS/URINE/CSF: CPT

## 2023-07-25 PROCEDURE — 91312 HC RX IP 250 OP 636: CPT

## 2023-07-25 RX ADMIN — BNT162B2 ORIGINAL AND OMICRON BA.4/BA.5 30 MCG: .024; .024 INJECTION, SUSPENSION INTRAMUSCULAR at 12:22

## 2023-07-25 ASSESSMENT — PAIN SCALES - GENERAL: PAINLEVEL: NO PAIN (0)

## 2023-07-25 NOTE — NURSING NOTE
Covid vaccine administered per order from Dr. Reinoso. Covid vaccination consent form completed and signed prior to administration. Pt tolerated injection without incident. Pt instructed to stay for 15 minutes post injection. Pt verbalized understanding.     Administrations This Visit       COVID-19 mRNA BIVALENT vaccine (PFIZER BOOSTER) 30 MCG/0.3ML injection 30 mcg       Admin Date  07/25/2023 Action  $Given Dose  30 mcg Route  Intramuscular Administered By  Sweta Muro, RN                    Sweta Muro RN

## 2023-07-25 NOTE — LETTER
2023         RE: Kristie Cornejo  [address]  [facility] [address]        Dear Colleague,    Thank you for referring your patient, Kristie Cornejo, to the [facility] BLOOD AND MARROW TRANSPLANT PRO[facility] [place]. Please see a copy of my visit note below.    BMT Progress Note  2023      BMT Physician: Dr. Reinoso   Transplant Type: CAR-T Abecma  Preparative Regimen: Cy/Flu  Chief complaint: Kristie Cornejo is a 53 year old female with a history of multiple myeloma conditioned with Cy/Flu, currently day day 200 of her CAR T-cell therapy with Abecma.  - readmitted briefly  with transient fever, ongoing headache (post LP)  - all resolved.        INTERIM HISTORY:   Margo is feeling great, active and busy, no new complains. She is going to [place] later this week,   Still have hips aching, no new body aches. Had bout of diarrhea couple of times but not all the time, she had colonoscopy in 2022 which was neg.    - no n/v/d, fevers, or leg swelling.       REVIEW OF SYSTEMS: Otherwise unremarkable other than what is noted in the Interim History.      PHYSICAL EXAM:   Wt Readings from Last 4 Encounters:   23 72.2 kg (159 lb 3.2 oz)   23 72.6 kg (160 lb 1.6 oz)   07/10/23 73.5 kg (162 lb)   23 72.5 kg (159 lb 13.3 oz)     ECO    /70   Pulse 81   Temp 98.5  F (36.9  C) (Oral)   Resp 16   Wt 72.2 kg (159 lb 3.2 oz)   SpO2 99%   BMI 24.93 kg/m       General Appearance: NAD, happy, well,   HEENT: sclera anicteric. EOM's intact.  CV: RRR. no murmur or rub.   RESP: CTAB  EXT: No edema.   SKIN: no rash on exposed skin  NEURO: A&O  PSYCH: Appropriate affect       Lab Results   Component Value Date    WBC 3.5 (L) 2023    ANEU 1.0 (L) 2023    HGB 13.4 2023    HCT 40.1 2023     2023     2023    POTASSIUM 4.5 2023    CHLORIDE 108 (H) 2023    CO2 24 2023     (H) 2023    BUN 17.2  07/24/2023    CR 0.83 07/24/2023    MAG 2.3 06/30/2023    INR 1.00 02/02/2023    AST 23 07/24/2023    ALT 21 07/24/2023     LDH 168mg/dl   Latest Reference Range & Units 07/24/23 11:53   ELP Interpretation:  Marked hypogammaglobulinemia. No monoclonal protein seen, recommend quantitative immunoglobulins if clinically indicated. Recommend a urine for immunofixation to rule out a light chain secreting myeloma. Pathologic significance requires clinical correlation. Robby Mari M.D., Ph.D., Pathologist.   Gamma Fraction 0.7 - 1.6 g/dL 0.3 (L)   IGA 84 - 499 mg/dL <2 (L)    - 1,616 mg/dL 304 (L)   IGM 35 - 242 mg/dL 12 (L)   Immunofixation ELP  No monoclonal protein seen on immunofixation. Pathologic significance requires clinical correlation. Robby Mari M.D., Ph.D., Pathologist   Monoclonal Peak <=0.0 g/dL 0.0   Total Protein Serum for ELP 6.4 - 8.3 g/dL 6.2 (L)   (L): Data is abnormally low    CD19 20  CD4 216     BMBx:6/29/2023  Bone marrow, posterior iliac crest, left decalcified trephine biopsy and touch imprint; left direct aspirate smear, and concentrated aspirate smear; and peripheral smear:     -Normocellular bone marrow [40 to 50% cellular] with maturing trilineage hematopoiesis and no morphological immunophenotypic evidence for involvement by plasma cell myeloma.     - Peripheral blood showing slight leukopenia; lymphocytopenia, otherwise unremarkable peripheral blood smear.    PET 3/24/2023                                                               IMPRESSION: In this patient with multiple myeloma status post CAR  T-cell therapy:     1. Decreased metabolism of the osteolytic left pubic ramus lesion,  compared to 11/16/2022 PET/CT. No new osseous FDG uptake.     2. FDG uptake of the heterogeneous right hepatic flexure of an  additional multiple areas of FDG uptake, new compared to prior PET/CT.  These findings are concerning for malignancy. Colonoscopy is  recommended.      3. See  dedicated report for the results of the high resolution PET CT  of the neck.      I have personally reviewed the examination and initial interpretation  and I agree with the findings.ET 3/29/2023        ASSESSMENT AND PLAN:     Kristie Cornejo is a 53 year old female with a history of multiple myeloma conditioned with Cy/Flu, currently day +200 of her CAR T-cell therapy with Abecma.     NEURO - neg. No ICANS   CRS- hx Grade 1 with a Tmax 102.6F of  on 1/5. Given 1 dose of Tocilizumab.        Light chain MM, standard risk with t(11;14). Has never achieved remission with standard therapy.   No p53 or 17del  S/p Abecma 100 days ago (median PFS for patients in CR is about 18 months)    Day 29 - re-staging studies drawn today, disease response: CR.  Marrow not assessed yet, will do at 3 months   Day 100 - restaging PET and BMBx with CR without MRD !! This is a great outcome.  Day 200 - ongoing remission based on serum testing.         On Neurotox prophylaxis study. No ICANS.   it dex x 2 doses (-1, +6)   --completed Simvastatin daily 40mg until day day +30    HEME  Counts are recovered now.  No transfusions.  G-CSF for ANC below 1000.       IMMUNOCOMPROMISED  - ok to stop fluc and Levaquin.  -PJP prophylaxis: on Bactrim. Ok to stop now given CD4 is above 200.   -Hypogammaglobulinemia - s/p  IVIG in May.  Igg now 304mg/dl.  No infection sy's.B cell are ok.    Recheck in 2 months.      COVID vaccine today. (July 2023)    CARDIOVASCULAR  - Risk of cardiomyopathy:  Baseline LVEF: 55-60%   - Risk of arrhythmia: Baseline EKG showed sinus rhythm nonspecific T wave abnormality  - Hx of PVC ablation March 2022      GI  - Ulcer prophylaxis: Protonix   -refer for colonoscopy given PET findings. Doubt malignancy, but given bouts of diarrhea she may have CMV colitis or infections. I suggest to obtain biopsy     Today's Summary:  F/u with me for 9 months  Anniversary  SULEIMAN visit in 6 weeks           I spent 60 minutes in the care of  this patient today, which included time necessary for preparation for the visit, obtaining history, ordering medications/tests/procedures as medically indicated, review of pertinent medical literature, counseling of the patient, communication of recommendations to the care team, and documentation time.      Payton Reinoso MD

## 2023-07-25 NOTE — NURSING NOTE
"Oncology Rooming Note    July 25, 2023 11:30 AM   Kristie Cornejo is a 53 year old female who presents for:    Chief Complaint   Patient presents with    Oncology Clinic Visit     Multiple myeloma not having achieved remission (H)         Initial Vitals: /70   Pulse 81   Temp 98.5  F (36.9  C) (Oral)   Resp 16   Wt 72.2 kg (159 lb 3.2 oz)   SpO2 99%   BMI 24.93 kg/m   Estimated body mass index is 24.93 kg/m  as calculated from the following:    Height as of 7/11/23: 1.702 m (5' 7\").    Weight as of this encounter: 72.2 kg (159 lb 3.2 oz). Body surface area is 1.85 meters squared.  No Pain (0) Comment: Data Unavailable   No LMP recorded. (Menstrual status: IUD).  Allergies reviewed: Yes  Medications reviewed: Yes    Medications: Refill acyclovir  Pharmacy name entered into [facility]:    [facility] [place], [place] - [id number]  [facility] - [place], [place] - [address]    Clinical concerns:  none      Kristie Vera            "

## 2023-07-26 LAB
C PARVUM AG STL QL IA: NEGATIVE
G LAMBLIA AG STL QL IA: NEGATIVE
O+P STL MICRO: NEGATIVE

## 2023-07-27 LAB — PROT PATTERN UR ELPH-IMP: NORMAL

## 2023-07-28 LAB
TTG IGA SER-ACNC: <0.1 U/ML
TTG IGG SER-ACNC: <0.6 U/ML

## 2023-08-02 NOTE — PROGRESS NOTES
GI CLINIC VISIT    CC/REFERRING MD:  Berto Montgomery  REASON FOR CONSULTATION:   Berto Montgomery for No chief complaint on file.      ASSESSMENT/PLAN:  54yo F w/ PMH of MM s/p car t-cell therapy in January who is presenting with chronic diarrhea.     #Chronic diarrhea  Present since November 2021; no red flag sxs; had a recent colonoscopy w/ bx negative for microscopic colitis. Cdiff was negative in January but we will repeat this. Given that certain foods make it worse, this is likely due to a food intolerance vs IBS- D. Can also consider infection or Celiac disease. She has not tried Imodium as this has caused constipation in the past.   - Start Citrucel  - Nutrition referral to discuss a FODMAP diet  - Labs to test for Celiac disease   - Stool infectious studies     RTC: 4mo       60 minutes spent on the date of the encounter performing chart review, history and exam, documentation and further activities as noted above.  .    Patient was seen and d/w Dr. Bonilla Quiroz MD  GI Fellow        HPI  54yo F w/ PMH of MM s/p car t-cell therapy in January who is presenting with diarrhea.     The diarrhea started in November 2021 and has waxed and waned (noted improvement after she switched chemotherapy but has now returned). She will eat and then 20min-1hr later she will have liquid stool. Worse with certain foods (greasy foods and onions). She has associated abdominal bloating and gas which improved after the BM. It will wake her up from sleep once every couple of weeks. No incontinence, no sense of incomplete evacuation. No blood in the stool, no weight loss.     ROS:    Negative except per HPI.     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 Provider to review       Anxiety state 05/19/2004     Priority: Medium     Postpartum anxiety and depression after 1st baby. On Paxil >1yr. Off 12/04  Problem list name updated by automated process. Provider to review       Irritable bowel syndrome 05/19/2004     Priority: Medium     Hemorrhoids 05/19/2004     Priority: Medium     colonoscopy 4/04 spastic colon, int roids  Problem list name updated by automated process. Provider to review         PERTINENT PAST MEDICAL HISTORY:  Past Medical History:   Diagnosis Date     Allergic rhinitis, cause unspecified      Anxiety state, unspecified 12/01/2003    Started postpartum . On Paxil x 1+years. Off meds- 10/04     Irritable bowel syndrome 01/01/2004     Multiple myeloma not having achieved remission (H)      PLANTAR WARTS     resolved     SINUS DYSRHYTHMIA 10/01/2002    wnl     Unspecified hemorrhoids without mention of complication 04/01/2004    colonoscopy 4/04 spastic colon, int roids; bx neg       PREVIOUS SURGERIES:  Past Surgical History:   Procedure Laterality Date     BIOPSY BONE PELVIS Left 09/07/2021    Procedure: Biopsy left pubic ramis;  Surgeon: Edu Philip MD;  Location: [facility]     COLONOSCOPY N/A 04/13/2022    Procedure: COLONOSCOPY, WITH BIOPSY;  Surgeon: Berto Montgomery MD;  Location: [facility] GI     COLONOSCOPY N/A 5/24/2023    Procedure: COLONOSCOPY, WITH  POLYPECTOMY AND BIOPSY;  Surgeon: Khloe Will MD;  Location:  GI     EP ABLATION PVC N/A 03/23/2022    Procedure: Ablation Premature Ventricular Contractions;  Surgeon: Alem Thomas MD;  Location:  [facility]     INSERT CATHETER VASCULAR ACCESS N/A 11/21/2022    Procedure: CENTRAL VENOUS CATHETER NON TUNNELED INSERTION, VASCULAR ACCESS CATHETER;  Surgeon: Erich Bauman MD;  Location: [facility] OR     IR CVC NON TUNNEL LINE REMOVAL  11/22/2022     IR CVC NON TUNNEL PLACEMENT > 5 YRS  11/21/2022     LASIK       PICC TRIPLE LUMEN PLACEMENT Left 01/03/2023    Basilic 44 cm ext. 0     ZZC NONSPECIFIC PROCEDURE  01/01/1987    [place] teeth removed     ZZC NONSPECIFIC PROCEDURE  01/01/1993    (R) knee surgery     ZZC NONSPECIFIC PROCEDURE  01/01/2003    (R) breast bx-negative 6/03; bx neg 10/02     ZZC NONSPECIFIC PROCEDURE      10/02 cardiac echo normal     ZZC NONSPECIFIC PROCEDURE  01/01/2004 4/04 colonosc int roids, spastic colon     ZZC NONSPECIFIC PROCEDURE  08/01/2005    D & C for missed AB     ZZHC COLONOSCOPY THRU STOMA, DIAGNOSTIC  01/01/2004       PREVIOUS ENDOSCOPY:  Procedure:             Colonoscopy 5/24/23  Indications:           Clinically significant diarrhea of unexplained origin,                          Abnormal PET scan of the GI tract with activity at he                          hepatic flexure. History of multiple myeloma s/p CAR-T                          therapy. Prior colonoscopy 4/2022 with biopsies                          showing non-specific colitis, with that diarrhea                          reportedly resolving with change in medications.   Providers:             KHLOE WILL MD, Shannon Chiu RN   Referring MD:          MARCELLUS CARDENAS MD   Medicines:             Midazolam 2 mg IV, Fentanyl 150 micrograms IV, Total                          sedation time: 35 minutes of continuous bedside 1:1                          monitoring.   Complications:          No immediate complications.    Findings:        The perianal and digital rectal examinations were normal.        The terminal ileum appeared normal.        Retroflexion in the right colon was performed.        A 7 mm polyp was found in the mid ascending colon. The polyp was        sessile. The polyp was removed with a cold snare. Resection and        retrieval were complete.        Normal mucosa was found in the entire colon. Biopsies were taken with a        cold forceps for histology.        The retroflexed view of the distal rectum and anal verge was normal and        showed no anal or rectal abnormalities.                                                                                     Impression:            - The examined portion of the ileum was normal.                          - One 7 mm polyp in the mid ascending colon, removed                          with a cold snare. Resected and retrieved.                          - Normal mucosa in the entire examined colon. Biopsied                          with separate jars from the hepatic flexure and then                          the remainder of the colon (random).                          - The distal rectum and anal verge are normal on                          retroflexion view.                          It is possible that the polyp accounts for some of the                          PET activity however this seems unlikely both due to                          the location and small size.   Recommendation:        - Discharge patient to home (ambulatory).                          - Await pathology results.                          - Return to referring physician as previously                          scheduled.                          If biopsies do not identify a source of diarrhea I                          will arrange for consultation in our IBD clinic.     Final Diagnosis   A.  COLON, RANDOM, BIOPSY:  - Unremarkable colonic mucosa  - No  significant acute cryptitis, chronic changes, or microscopic colitis identified  - Single fragment of sessile serrated adenoma  - No cytologic dysplasia identified    B.  COLON, HEPATIC FLEXURE, BIOPSY:  - Unremarkable colonic mucosa  - No significant acute cryptitis, chronic changes, or microscopic colitis identified    C.  COLON, ASCENDING, POLYP:  - Sessile serrated adenoma  - No cytologic dysplasia identified                                                                                         ALLERGIES:     Allergies   Allergen Reactions     Amoxicillin      Other reaction(s): upset stomach  Other reaction(s): Gastrointestinal, GI intolerance, Other (see comments)  Other reaction(s): upset stomach  Other reaction(s): upset stomach       Chlorhexidine Gluconate Rash     Clarithromycin      Other reaction(s): GI intolerance     Erythromycin      upsets stomach     Erythromycin      Other reaction(s): GI intolerance  upsets stomach  nausea       PERTINENT MEDICATIONS:    Current Outpatient Medications:      acetaminophen (TYLENOL) 325 MG tablet, Take 325-650 mg by mouth every 4 hours as needed for mild pain or fever (Patient not taking: Reported on 7/25/2023), Disp: , Rfl:      acyclovir (ZOVIRAX) 800 MG tablet, Take 1 tablet (800 mg) by mouth every 12 hours, Disp: 180 tablet, Rfl: 3     BUSPIRONE HCL 10 MG PO TABS, 1 TABLET 3 TIMES DAILY as needed (Patient not taking: Reported on 7/25/2023), Disp: 30 Tab, Rfl: 3     cetirizine HCl 10 MG CAPS, Take 1 capsule (10 mg) by mouth daily as needed, Disp: , Rfl:      Flaxseed (Linseed) (FLAX SEED OIL) 1000 MG capsule, Take 1 capsule by mouth daily, Disp: , Rfl:      fluticasone (FLONASE) 50 MCG/ACT nasal spray, Spray 1 spray into both nostrils daily, Disp: , Rfl:      Glucos-MSM-C-Em-Itvoph-Rzbyxy (GLUCOSAMINE MSM COMPLEX) TABS tablet, Take by mouth daily, Disp: , Rfl:      LORazepam (ATIVAN) 0.5 MG tablet, Take 1 tablet (0.5 mg) by mouth every 4 hours as needed for  anxiety, nausea or sleep, Disp: 30 tablet, Rfl: 3     melatonin 5 MG tablet, Take 5 mg by mouth nightly as needed for sleep, Disp: , Rfl:      methylcellulose (CITRUCEL) powder, Take 0.3 g (1.05 teaspoonful) by mouth daily, Disp: 100 g, Rfl: 3     MIRENA 20 MCG/24HR IU IUD, , Disp: , Rfl:      Multiple Vitamin (MULTI-VITAMIN DAILY) TABS, Take by mouth daily On hold, Disp: , Rfl:      sulfamethoxazole-trimethoprim (BACTRIM DS) 800-160 MG tablet, Take 1 tablet by mouth 2 times daily (Patient taking differently: Take 1 tablet by mouth 2 times daily MONDAYS AND TUESDAYS), Disp: 16 tablet, Rfl: 6     omeprazole (PRILOSEC OTC) 20 MG EC tablet, Take 20 mg by mouth daily (Patient not taking: Reported on 7/11/2023), Disp: , Rfl:      polyethylene glycol (MIRALAX) 17 g packet, Take 1 packet by mouth daily as needed for constipation (Patient not taking: Reported on 7/11/2023), Disp: , Rfl:      prochlorperazine (COMPAZINE) 10 MG tablet, Take 5 mg by mouth every 6 hours as needed for nausea or vomiting (Patient not taking: Reported on 7/11/2023), Disp: , Rfl:   No current facility-administered medications for this visit.    Facility-Administered Medications Ordered in Other Visits:      CT Flush, 75 mL, Intravenous, Once, Charan Luis,     SOCIAL HISTORY:  Social History     Socioeconomic History     Marital status:      Spouse name: mary     Number of children: 1     Years of education: Not on file     Highest education level: Not on file   Occupational History     Employer: UNEMPLOYED   Tobacco Use     Smoking status: Never     Smokeless tobacco: Never   Vaping Use     Vaping Use: Never used   Substance and Sexual Activity     Alcohol use: Yes     Comment: none to a couple. occasional     Drug use: No     Sexual activity: Yes     Partners: Male     Birth control/protection: Condom, I.U.D.   Other Topics Concern     Parent/sibling w/ CABG, MI or angioplasty before 65F 55M? No     Comment: Grandparent. But not  "parent or sibling.   Social History Narrative     Not on file     Social Determinants of Health     Financial Resource Strain: Not on file   Food Insecurity: Not on file   Transportation Needs: Not on file   Physical Activity: Not on file   Stress: Not on file   Social Connections: Not on file   Intimate Partner Violence: Not At Risk (4/4/2023)    Humiliation, Afraid, Rape, and Kick questionnaire      Fear of Current or Ex-Partner: No      Emotionally Abused: No      Physically Abused: No      Sexually Abused: No   Housing Stability: Not on file       FAMILY HISTORY:  Family History   Problem Relation Age of Onset     Neurologic Disorder Mother         toxo     Lipids Mother         low cholestrol     Lipids Father         high cholestrol     Glaucoma Maternal Grandmother      Diabetes Maternal Grandmother         breast cancer,stroke ischemic     Breast Cancer Maternal Grandmother         79yo/and autoimmune skin condition     C.A.D. Maternal Grandfather         strokes and mi, chronic respiratory from 2 hand smoke     Hypertension Paternal Grandmother         rheumatoid arthritis     Respiratory Paternal Grandfather         emphasema     Cancer - colorectal No family hx of      Prostate Cancer No family hx of      Macular Degeneration No family hx of        Past/family/social history reviewed and no changes    PHYSICAL EXAMINATION:  Constitutional: aaox3, cooperative, pleasant, not dyspneic/diaphoretic, no acute distress  Vitals reviewed: BP (!) 143/84   Pulse 99   Ht 1.702 m (5' 7\")   Wt 72.6 kg (160 lb 1.6 oz)   BMI 25.08 kg/m    Wt:   Wt Readings from Last 2 Encounters:   07/25/23 72.2 kg (159 lb 3.2 oz)   07/11/23 72.6 kg (160 lb 1.6 oz)      Eyes: Sclera anicteric/injected   Ears/nose/mouth/throat:  hearing intact  CV: No edema  Respiratory: Unlabored breathing  Abd:  Nondistended, nontender, no peritoneal signs  Skin: warm, perfused, no jaundice  Psych: Normal affect  MSK: Normal gait      PERTINENT " STUDIES:    Office Visit on 07/10/2023   Component Date Value Ref Range Status     Ventricular Rate 07/10/2023 90  BPM Final     Atrial Rate 07/10/2023 90  BPM Final     FL Interval 07/10/2023 150  ms Final     QRS Duration 07/10/2023 80  ms Final     QT 07/10/2023 364  ms Final     QTc 07/10/2023 445  ms Final     P Axis 07/10/2023 36  degrees Final     R AXIS 07/10/2023 36  degrees Final     T Axis 07/10/2023 106  degrees Final     Interpretation ECG 07/10/2023    Final                    Value:Sinus rhythm  Abnormal QRS-T angle, consider primary T wave abnormality  Abnormal ECG  When compared with ECG of 23-DEC-2022 10:52,  Nonspecific T wave abnormality no longer evident in Inferior leads  Nonspecific T wave abnormality, improved in Anterolateral leads  Confirmed by MD JENNY, YOMAIRA ([id number]) on 7/11/2023 7:45:43 AM

## 2023-08-06 NOTE — PROGRESS NOTES
BMT Progress Note  2023      BMT Physician: Dr. Reinoso   Transplant Type: CAR-T Abecma  Preparative Regimen: Cy/Flu  Chief complaint: Kristie Cornejo is a 53 year old female with a history of multiple myeloma conditioned with Cy/Flu, currently day day 200 of her CAR T-cell therapy with Abecma.  - readmitted briefly  with transient fever, ongoing headache (post LP)  - all resolved.        INTERIM HISTORY:   Margo is feeling great, active and busy, no new complains. She is going to [place] later this week,   Still have hips aching, no new body aches. Had bout of diarrhea couple of times but not all the time, she had colonoscopy in 2022 which was neg.    - no n/v/d, fevers, or leg swelling.       REVIEW OF SYSTEMS: Otherwise unremarkable other than what is noted in the Interim History.      PHYSICAL EXAM:   Wt Readings from Last 4 Encounters:   23 72.2 kg (159 lb 3.2 oz)   23 72.6 kg (160 lb 1.6 oz)   07/10/23 73.5 kg (162 lb)   23 72.5 kg (159 lb 13.3 oz)     ECO    /70   Pulse 81   Temp 98.5  F (36.9  C) (Oral)   Resp 16   Wt 72.2 kg (159 lb 3.2 oz)   SpO2 99%   BMI 24.93 kg/m       General Appearance: NAD, happy, well,   HEENT: sclera anicteric. EOM's intact.  CV: RRR. no murmur or rub.   RESP: CTAB  EXT: No edema.   SKIN: no rash on exposed skin  NEURO: A&O  PSYCH: Appropriate affect       Lab Results   Component Value Date    WBC 3.5 (L) 2023    ANEU 1.0 (L) 2023    HGB 13.4 2023    HCT 40.1 2023     2023     2023    POTASSIUM 4.5 2023    CHLORIDE 108 (H) 2023    CO2 24 2023     (H) 2023    BUN 17.2 2023    CR 0.83 2023    MAG 2.3 2023    INR 1.00 2023    AST 23 2023    ALT 21 2023     LDH 168mg/dl   Latest Reference Range & Units 23 11:53   ELP Interpretation:  Marked hypogammaglobulinemia. No monoclonal protein seen, recommend quantitative  immunoglobulins if clinically indicated. Recommend a urine for immunofixation to rule out a light chain secreting myeloma. Pathologic significance requires clinical correlation. Robby Mari M.D., Ph.D., Pathologist.   Gamma Fraction 0.7 - 1.6 g/dL 0.3 (L)   IGA 84 - 499 mg/dL <2 (L)    - 1,616 mg/dL 304 (L)   IGM 35 - 242 mg/dL 12 (L)   Immunofixation ELP  No monoclonal protein seen on immunofixation. Pathologic significance requires clinical correlation. Robby Mari M.D., Ph.D., Pathologist   Monoclonal Peak <=0.0 g/dL 0.0   Total Protein Serum for ELP 6.4 - 8.3 g/dL 6.2 (L)   (L): Data is abnormally low    CD19 20  CD4 216     BMBx:6/29/2023  Bone marrow, posterior iliac crest, left decalcified trephine biopsy and touch imprint; left direct aspirate smear, and concentrated aspirate smear; and peripheral smear:     -Normocellular bone marrow [40 to 50% cellular] with maturing trilineage hematopoiesis and no morphological immunophenotypic evidence for involvement by plasma cell myeloma.     - Peripheral blood showing slight leukopenia; lymphocytopenia, otherwise unremarkable peripheral blood smear.    PET 3/24/2023                                                               IMPRESSION: In this patient with multiple myeloma status post CAR  T-cell therapy:     1. Decreased metabolism of the osteolytic left pubic ramus lesion,  compared to 11/16/2022 PET/CT. No new osseous FDG uptake.     2. FDG uptake of the heterogeneous right hepatic flexure of an  additional multiple areas of FDG uptake, new compared to prior PET/CT.  These findings are concerning for malignancy. Colonoscopy is  recommended.      3. See dedicated report for the results of the high resolution PET CT  of the neck.      I have personally reviewed the examination and initial interpretation  and I agree with the findings.ET 3/29/2023        ASSESSMENT AND PLAN:     Kristie Cornejo is a 53 year old female with a history of  multiple myeloma conditioned with Cy/Flu, currently day +200 of her CAR T-cell therapy with Abecma.     NEURO - neg. No ICANS   CRS- hx Grade 1 with a Tmax 102.6F of  on 1/5. Given 1 dose of Tocilizumab.        Light chain MM, standard risk with t(11;14). Has never achieved remission with standard therapy.   No p53 or 17del  S/p Abecma 100 days ago (median PFS for patients in CR is about 18 months)    Day 29 - re-staging studies drawn today, disease response: CR.  Marrow not assessed yet, will do at 3 months   Day 100 - restaging PET and BMBx with CR without MRD !! This is a great outcome.  Day 200 - ongoing remission based on serum testing.         On Neurotox prophylaxis study. No ICANS.   it dex x 2 doses (-1, +6)   --completed Simvastatin daily 40mg until day day +30    HEME  Counts are recovered now.  No transfusions.  G-CSF for ANC below 1000.       IMMUNOCOMPROMISED  - ok to stop fluc and Levaquin.  -PJP prophylaxis: on Bactrim. Ok to stop now given CD4 is above 200.   -Hypogammaglobulinemia - s/p  IVIG in May.  Igg now 304mg/dl.  No infection sy's.B cell are ok.    Recheck in 2 months.      COVID vaccine today. (July 2023)    CARDIOVASCULAR  - Risk of cardiomyopathy:  Baseline LVEF: 55-60%   - Risk of arrhythmia: Baseline EKG showed sinus rhythm nonspecific T wave abnormality  - Hx of PVC ablation March 2022      GI  - Ulcer prophylaxis: Protonix   -refer for colonoscopy given PET findings. Doubt malignancy, but given bouts of diarrhea she may have CMV colitis or infections. I suggest to obtain biopsy     Today's Summary:  F/u with me for 9 months  Anniversary  SULEIMAN visit in 6 weeks           I spent 60 minutes in the care of this patient today, which included time necessary for preparation for the visit, obtaining history, ordering medications/tests/procedures as medically indicated, review of pertinent medical literature, counseling of the patient, communication of recommendations to the care team, and  documentation time.      Payton Reinoso MD

## 2023-09-11 ENCOUNTER — PATIENT OUTREACH (OUTPATIENT)
Dept: ONCOLOGY | Facility: CLINIC | Age: 53
End: 2023-09-11
Payer: COMMERCIAL

## 2023-09-11 NOTE — PROGRESS NOTES
[facility]: Cancer Care Short Note                                    Discussion with Patient:                                                      INBOUND CALL: VM received from patient. She ran out of buspar-- wondering if PCP should refill or oncologist. Also has quetsions on vaccines. [place] requested.     OUTBOUND CALL: RNCC called patient. Pt has appt with new PCP on Thursday-- [facility] recommends having PCP fill buspar for anxiety.   Pt is wondering if they should do her flu/covid vaccine. RNCC explained new covid vaccine is not available yet. She can get flu vaccine there or at [facility] on 10/4 during visit with MD. Will get the rest of BMT vaccines at that time.   She is substitute teaching, skating again, feeling well. States she has stiffness in the am but other than that doing will.   Reviewed appts below.     Future Appointments   Date Time Provider Department Center   10/2/2023  9:45 AM [facility]   10/4/2023  3:30 PM Payton Reinoso MD [facility]   10/11/2023  9:30 AM Berta Hoyos RD [facility]   12/1/2023  9:00 AM Trini Quiroz MD [facility]         Intervention/Education provided during outreach:                                                         Patient to follow up as scheduled at next appt  Patient to call/MyChart message with updates  Confirmed patient has clinic and triage numbers    Patient verbalizes understanding of POC and has no other questions or concerns at this time.     Griselda Valdez, RN, MSN, OCN   RN Care Coordinator   [facility]   [address] [place] [id number]   [phone]         Siobhan [facility] refills buspirone   Should she get shots there.

## 2023-09-22 ENCOUNTER — PATIENT OUTREACH (OUTPATIENT)
Dept: ONCOLOGY | Facility: CLINIC | Age: 53
End: 2023-09-22
Payer: COMMERCIAL

## 2023-09-22 NOTE — PROGRESS NOTES
[facility]: Cancer Care Short Note                                    Discussion with Patient:                                                      INBOUND CALL: VM received from patient. She is wondering if she can get the flu/covid vaccine sooner than when she is in clinic in 2 weeks. Callback requested.      OUTBOUND CALL: RNCC called patient and LVM. Let her know she is welcome to get both flu and covid booster as soon as she is able to-- does not need to wait until when she is seen next in clinic. She can get both vaccines at the same time.     Encouraged Margo to call RNCC back with any further questions or concerns.     Griselda Valdez, RN, MSN, OCN   RN Care Coordinator   [facility]   [address] [place] [id number] [phone]

## 2023-10-02 ENCOUNTER — LAB (OUTPATIENT)
Dept: LAB | Facility: CLINIC | Age: 53
End: 2023-10-02
Payer: COMMERCIAL

## 2023-10-02 DIAGNOSIS — C90.00 MULTIPLE MYELOMA, REMISSION STATUS UNSPECIFIED (H): ICD-10-CM

## 2023-10-02 DIAGNOSIS — C90.00 LIGHT CHAIN MYELOMA (H): ICD-10-CM

## 2023-10-02 LAB
ALBUMIN SERPL BCG-MCNC: 4.7 G/DL (ref 3.5–5.2)
ALP SERPL-CCNC: 61 U/L (ref 35–104)
ALT SERPL W P-5'-P-CCNC: 20 U/L (ref 0–50)
ANION GAP SERPL CALCULATED.3IONS-SCNC: 12 MMOL/L (ref 7–15)
AST SERPL W P-5'-P-CCNC: 20 U/L (ref 0–45)
BASOPHILS # BLD AUTO: 0 10E3/UL (ref 0–0.2)
BASOPHILS NFR BLD AUTO: 1 %
BILIRUB SERPL-MCNC: 0.4 MG/DL
BUN SERPL-MCNC: 19.1 MG/DL (ref 6–20)
CALCIUM SERPL-MCNC: 9.7 MG/DL (ref 8.6–10)
CHLORIDE SERPL-SCNC: 106 MMOL/L (ref 98–107)
CREAT SERPL-MCNC: 0.76 MG/DL (ref 0.51–0.95)
DEPRECATED HCO3 PLAS-SCNC: 25 MMOL/L (ref 22–29)
EGFRCR SERPLBLD CKD-EPI 2021: >90 ML/MIN/1.73M2
EOSINOPHIL # BLD AUTO: 0.1 10E3/UL (ref 0–0.7)
EOSINOPHIL NFR BLD AUTO: 3 %
ERYTHROCYTE [DISTWIDTH] IN BLOOD BY AUTOMATED COUNT: 12.1 % (ref 10–15)
GLUCOSE SERPL-MCNC: 76 MG/DL (ref 70–99)
HCT VFR BLD AUTO: 41 % (ref 35–47)
HGB BLD-MCNC: 14 G/DL (ref 11.7–15.7)
IMM GRANULOCYTES # BLD: 0 10E3/UL
IMM GRANULOCYTES NFR BLD: 0 %
LYMPHOCYTES # BLD AUTO: 0.8 10E3/UL (ref 0.8–5.3)
LYMPHOCYTES NFR BLD AUTO: 24 %
MCH RBC QN AUTO: 31.7 PG (ref 26.5–33)
MCHC RBC AUTO-ENTMCNC: 34.1 G/DL (ref 31.5–36.5)
MCV RBC AUTO: 93 FL (ref 78–100)
MONOCYTES # BLD AUTO: 0.4 10E3/UL (ref 0–1.3)
MONOCYTES NFR BLD AUTO: 11 %
NEUTROPHILS # BLD AUTO: 2 10E3/UL (ref 1.6–8.3)
NEUTROPHILS NFR BLD AUTO: 60 %
PLATELET # BLD AUTO: 215 10E3/UL (ref 150–450)
POTASSIUM SERPL-SCNC: 4 MMOL/L (ref 3.4–5.3)
PROT SERPL-MCNC: 6.7 G/DL (ref 6.4–8.3)
RBC # BLD AUTO: 4.42 10E6/UL (ref 3.8–5.2)
SODIUM SERPL-SCNC: 143 MMOL/L (ref 135–145)
TOTAL PROTEIN SERUM FOR ELP: 6.2 G/DL (ref 6.4–8.3)
WBC # BLD AUTO: 3.3 10E3/UL (ref 4–11)

## 2023-10-02 PROCEDURE — 84165 PROTEIN E-PHORESIS SERUM: CPT

## 2023-10-02 PROCEDURE — 86334 IMMUNOFIX E-PHORESIS SERUM: CPT

## 2023-10-02 PROCEDURE — 85025 COMPLETE CBC W/AUTO DIFF WBC: CPT

## 2023-10-02 PROCEDURE — 80053 COMPREHEN METABOLIC PANEL: CPT

## 2023-10-02 PROCEDURE — 84155 ASSAY OF PROTEIN SERUM: CPT

## 2023-10-02 PROCEDURE — 36415 COLL VENOUS BLD VENIPUNCTURE: CPT

## 2023-10-02 PROCEDURE — 82784 ASSAY IGA/IGD/IGG/IGM EACH: CPT

## 2023-10-03 LAB
ALBUMIN SERPL ELPH-MCNC: 4.4 G/DL (ref 3.7–5.1)
ALPHA1 GLOB SERPL ELPH-MCNC: 0.3 G/DL (ref 0.2–0.4)
ALPHA2 GLOB SERPL ELPH-MCNC: 0.6 G/DL (ref 0.5–0.9)
B-GLOBULIN SERPL ELPH-MCNC: 0.6 G/DL (ref 0.6–1)
GAMMA GLOB SERPL ELPH-MCNC: 0.3 G/DL (ref 0.7–1.6)
IGA SERPL-MCNC: <2 MG/DL (ref 84–499)
IGG SERPL-MCNC: 301 MG/DL (ref 610–1616)
IGM SERPL-MCNC: 33 MG/DL (ref 35–242)
M PROTEIN SERPL ELPH-MCNC: 0 G/DL
PROT PATTERN SERPL ELPH-IMP: ABNORMAL
PROT PATTERN SERPL IFE-IMP: NORMAL

## 2023-10-04 ENCOUNTER — ONCOLOGY VISIT (OUTPATIENT)
Dept: TRANSPLANT | Facility: CLINIC | Age: 53
End: 2023-10-04
Payer: COMMERCIAL

## 2023-10-04 VITALS
WEIGHT: 163 LBS | SYSTOLIC BLOOD PRESSURE: 121 MMHG | TEMPERATURE: 98.6 F | BODY MASS INDEX: 25.53 KG/M2 | HEART RATE: 85 BPM | OXYGEN SATURATION: 100 % | DIASTOLIC BLOOD PRESSURE: 83 MMHG

## 2023-10-04 DIAGNOSIS — C90.00 LIGHT CHAIN MYELOMA (H): Primary | ICD-10-CM

## 2023-10-04 PROCEDURE — 99215 OFFICE O/P EST HI 40 MIN: CPT

## 2023-10-04 PROCEDURE — 99213 OFFICE O/P EST LOW 20 MIN: CPT

## 2023-10-04 ASSESSMENT — PAIN SCALES - GENERAL: PAINLEVEL: NO PAIN (0)

## 2023-10-04 NOTE — PROGRESS NOTES
BMT Progress Note  10/04/2023      BMT Physician: Dr. Reinoso   Transplant Type: CAR-T Abecma  Preparative Regimen: Cy/Flu  Chief complaint: Kristie Cornejo is a 53 year old female with a history of multiple myeloma conditioned with Cy/Flu, currently day day 200 of her CAR T-cell therapy with Abecma.  - readmitted briefly  with transient fever, ongoing headache (post LP)  - all resolved.        INTERIM HISTORY:   Margo is feeling great, had small cold earlier but mostly well, active and busy, no new complains. 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      no new body aches. BM are stable now, she had colonoscopy in 2022 which was neg.    - no n/v/d, fevers, or leg swelling.   -       REVIEW OF SYSTEMS: Otherwise unremarkable other than what is noted in the Interim History.      PHYSICAL EXAM:   Wt Readings from Last 4 Encounters:   10/04/23 73.9 kg (163 lb)   23 72.2 kg (159 lb 3.2 oz)   23 72.6 kg (160 lb 1.6 oz)   07/10/23 73.5 kg (162 lb)     ECO    /83   Pulse 85   Temp 98.6  F (37  C) (Oral)   Wt 73.9 kg (163 lb)   SpO2 100%   BMI 25.53 kg/m       General Appearance: NAD, happy, well,   HEENT: sclera anicteric. EOM's intact.  CV: RRR. no murmur or rub.   RESP: CTAB  EXT: No edema.   SKIN: no rash on exposed skin  NEURO: A&O  PSYCH: Appropriate affect       Lab Results   Component Value Date    WBC 3.3 (L) 10/02/2023    ANEU 1.0 (L) 2023    HGB 14.0 10/02/2023    HCT 41.0 10/02/2023     10/02/2023     10/02/2023    POTASSIUM 4.0 10/02/2023    CHLORIDE 106 10/02/2023    CO2 25 10/02/2023    GLC 76 10/02/2023    BUN 19.1 10/02/2023    CR 0.76 10/02/2023    MAG 2.3 2023    INR 1.00 2023    AST 20 10/02/2023    ALT 20 10/02/2023      Latest Reference Range & Units 10/02/23 09:54   IGA 84 - 499 mg/dL <2 (L)    - 1,616 mg/dL 301 (L)   IGM 35 - 242 mg/dL 33 (L)   Immunofixation ELP  No monoclonal protein seen on  immunofixation. Pathologic significance requires clinical correlation.  JACKY Schwartz M.D., Ph.D., Pathologist ()   Monoclonal Peak <=0.0 g/dL 0.0   Total Protein Serum for ELP 6.4 - 8.3 g/dL 6.2 (L)   (L): Data is abnormally low    CD19 161  CD4 216     BMBx:6/29/2023  Bone marrow, posterior iliac crest, left decalcified trephine biopsy and touch imprint; left direct aspirate smear, and concentrated aspirate smear; and peripheral smear:     -Normocellular bone marrow [40 to 50% cellular] with maturing trilineage hematopoiesis and no morphological immunophenotypic evidence for involvement by plasma cell myeloma.     - Peripheral blood showing slight leukopenia; lymphocytopenia, otherwise unremarkable peripheral blood smear.    PET 3/24/2023                                                               IMPRESSION: In this patient with multiple myeloma status post CAR  T-cell therapy:     1. Decreased metabolism of the osteolytic left pubic ramus lesion,  compared to 11/16/2022 PET/CT. No new osseous FDG uptake.     2. FDG uptake of the heterogeneous right hepatic flexure of an  additional multiple areas of FDG uptake, new compared to prior PET/CT.  These findings are concerning for malignancy. Colonoscopy is  recommended.           ASSESSMENT AND PLAN:     Kristie Cornejo is a 53 year old female with a history of multiple myeloma conditioned with Cy/Flu, currently day +272 of her CAR T-cell therapy with Abecma.         Light chain MM, standard risk with t(11;14). Has never achieved remission with standard therapy.   No p53 or 17del  S/p Abecma 273 days ago (median PFS for patients in CR is about 18 months)    Day 29 - re-staging studies drawn today, disease response: CR.  Marrow not assessed yet, will do at 3 months   Day 100 - restaging PET and BMBx with CR without MRD !! This is a great outcome.  Day 200 - ongoing remission based on serum testing. No MRD.   Cont observation.       On Neurotox  prophylaxis study. No ICANS.   it dex x 2 doses (-1, +6)   --completed Simvastatin daily 40mg until day day +30    HEME  Counts are recovered now.  No transfusions.  G-CSF for ANC below 1000.       IMMUNOCOMPROMISED  - ok to stop fluc and Levaquin.  -PJP prophylaxis: on Bactrim. Ok to stop now given CD4 is above 200.   -Hypogammaglobulinemia - s/p  IVIG in May 23  IgG 301 (10/23)  - plan to give IVIG now .     Recheck in 2 months.      COVID vaccine booster, influnza today. (Sept 23)    CARDIOVASCULAR  - Risk of cardiomyopathy:  Baseline LVEF: 55-60%   - Risk of arrhythmia: Baseline EKG showed sinus rhythm nonspecific T wave abnormality  - Hx of PVC ablation March 2022      GI  - Ulcer prophylaxis: Protonix   -refer for colonoscopy given PET findings. Doubt malignancy, but given bouts of diarrhea she may have CMV colitis or infections. I suggest to obtain biopsy     Today's Summary:  F/u with me for 9 months  Anniversary  SULEIMAN visit in 6 weeks           I spent 60 minutes in the care of this patient today, which included time necessary for preparation for the visit, obtaining history, ordering medications/tests/procedures as medically indicated, review of pertinent medical literature, counseling of the patient, communication of recommendations to the care team, and documentation time.      Payton Reinoso MD

## 2023-10-04 NOTE — NURSING NOTE
"Oncology Rooming Note    October 4, 2023 3:39 PM   Kristie Cornejo is a 53 year old female who presents for:    Chief Complaint   Patient presents with    Oncology Clinic Visit     BMT     Initial Vitals: /83   Pulse 85   Temp 98.6  F (37  C) (Oral)   Wt 73.9 kg (163 lb)   SpO2 100%   BMI 25.53 kg/m   Estimated body mass index is 25.53 kg/m  as calculated from the following:    Height as of 7/11/23: 1.702 m (5' 7\").    Weight as of this encounter: 73.9 kg (163 lb). Body surface area is 1.87 meters squared.  No Pain (0) Comment: Data Unavailable   No LMP recorded. (Menstrual status: IUD).  Allergies reviewed: Yes  Medications reviewed: Yes    Medications: Medication refills not needed today.  Pharmacy name entered into [facility]:    [facility] [place], [place] - [id number]  [facility] [place], [place] - [address]    Clinical concerns:        Nan Escalante              "

## 2023-10-04 NOTE — LETTER
10/4/2023         RE: Kristie Cornejo  [address]  [facility] [address]        Dear Colleague,    Thank you for referring your patient, Kristie Cornejo, to the [facility]OD [facility] MARROW [facility]ANSPLANT [facility] [place]. Please see a copy of my visit note below.    BMT Progress Note  10/04/2023      BMT Physician: Dr. Reinoso   Transplant Type: CAR-T Abecma  Preparative Regimen: Cy/Flu  Chief complaint: Kristie Cornejo is a 53 year old female with a history of multiple myeloma conditioned with Cy/Flu, currently day day 200 of her CAR T-cell therapy with Abecma.  - readmitted briefly  with transient fever, ongoing headache (post LP)  - all resolved.        INTERIM HISTORY:   Margo is feeling great, had small cold earlier but mostly well, active and busy, no new complains. 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      no new body aches. BM are stable now, she had colonoscopy in 2022 which was neg.    - no n/v/d, fevers, or leg swelling.   -       REVIEW OF SYSTEMS: Otherwise unremarkable other than what is noted in the Interim History.      PHYSICAL EXAM:   Wt Readings from Last 4 Encounters:   10/04/23 73.9 kg (163 lb)   23 72.2 kg (159 lb 3.2 oz)   23 72.6 kg (160 lb 1.6 oz)   07/10/23 73.5 kg (162 lb)     ECO    /83   Pulse 85   Temp 98.6  F (37  C) (Oral)   Wt 73.9 kg (163 lb)   SpO2 100%   BMI 25.53 kg/m       General Appearance: NAD, happy, well,   HEENT: sclera anicteric. EOM's intact.  CV: RRR. no murmur or rub.   RESP: CTAB  EXT: No edema.   SKIN: no rash on exposed skin  NEURO: A&O  PSYCH: Appropriate affect       Lab Results   Component Value Date    WBC 3.3 (L) 10/02/2023    ANEU 1.0 (L) 2023    HGB 14.0 10/02/2023    HCT 41.0 10/02/2023     10/02/2023     10/02/2023    POTASSIUM 4.0 10/02/2023    CHLORIDE 106 10/02/2023    CO2 25 10/02/2023    GLC 76 10/02/2023    BUN 19.1 10/02/2023    CR 0.76  10/02/2023    MAG 2.3 06/30/2023    INR 1.00 02/02/2023    AST 20 10/02/2023    ALT 20 10/02/2023      Latest Reference Range & Units 10/02/23 09:54   IGA 84 - 499 mg/dL <2 (L)    - 1,616 mg/dL 301 (L)   IGM 35 - 242 mg/dL 33 (L)   Immunofixation ELP  No monoclonal protein seen on immunofixation. Pathologic significance requires clinical correlation.  JACKY Schwartz M.D., Ph.D., Pathologist ()   Monoclonal Peak <=0.0 g/dL 0.0   Total Protein Serum for ELP 6.4 - 8.3 g/dL 6.2 (L)   (L): Data is abnormally low    CD19 161  CD4 216     BMBx:6/29/2023  Bone marrow, posterior iliac crest, left decalcified trephine biopsy and touch imprint; left direct aspirate smear, and concentrated aspirate smear; and peripheral smear:     -Normocellular bone marrow [40 to 50% cellular] with maturing trilineage hematopoiesis and no morphological immunophenotypic evidence for involvement by plasma cell myeloma.     - Peripheral blood showing slight leukopenia; lymphocytopenia, otherwise unremarkable peripheral blood smear.    PET 3/24/2023                                                               IMPRESSION: In this patient with multiple myeloma status post CAR  T-cell therapy:     1. Decreased metabolism of the osteolytic left pubic ramus lesion,  compared to 11/16/2022 PET/CT. No new osseous FDG uptake.     2. FDG uptake of the heterogeneous right hepatic flexure of an  additional multiple areas of FDG uptake, new compared to prior PET/CT.  These findings are concerning for malignancy. Colonoscopy is  recommended.           ASSESSMENT AND PLAN:     Kristie Cornejo is a 53 year old female with a history of multiple myeloma conditioned with Cy/Flu, currently day +272 of her CAR T-cell therapy with Abecma.         Light chain MM, standard risk with t(11;14). Has never achieved remission with standard therapy.   No p53 or 17del  S/p Abecma 273 days ago (median PFS for patients in CR is about 18 months)    Day 29 -  re-staging studies drawn today, disease response: CR.  Marrow not assessed yet, will do at 3 months   Day 100 - restaging PET and BMBx with CR without MRD !! This is a great outcome.  Day 200 - ongoing remission based on serum testing. No MRD.   Cont observation.       On Neurotox prophylaxis study. No ICANS.   it dex x 2 doses (-1, +6)   --completed Simvastatin daily 40mg until day day +30    HEME  Counts are recovered now.  No transfusions.  G-CSF for ANC below 1000.       IMMUNOCOMPROMISED  - ok to stop fluc and Levaquin.  -PJP prophylaxis: on Bactrim. Ok to stop now given CD4 is above 200.   -Hypogammaglobulinemia - s/p  IVIG in May 23  IgG 301 (10/23)  - plan to give IVIG now .     Recheck in 2 months.      COVID vaccine booster, influnza today. (Sept 23)    CARDIOVASCULAR  - Risk of cardiomyopathy:  Baseline LVEF: 55-60%   - Risk of arrhythmia: Baseline EKG showed sinus rhythm nonspecific T wave abnormality  - Hx of PVC ablation March 2022      GI  - Ulcer prophylaxis: Protonix   -refer for colonoscopy given PET findings. Doubt malignancy, but given bouts of diarrhea she may have CMV colitis or infections. I suggest to obtain biopsy     Today's Summary:  F/u with me for 9 months  Anniversary  SULEIMAN visit in 6 weeks     I spent 60 minutes in the care of this patient today, which included time necessary for preparation for the visit, obtaining history, ordering medications/tests/procedures as medically indicated, review of pertinent medical literature, counseling of the patient, communication of recommendations to the care team, and documentation time.      Payton Reinoso MD

## 2023-10-05 DIAGNOSIS — D80.1 HYPOGAMMAGLOBULINEMIA (H): ICD-10-CM

## 2023-10-05 DIAGNOSIS — C90.00 LIGHT CHAIN MYELOMA (H): Primary | ICD-10-CM

## 2023-10-05 RX ORDER — DIPHENHYDRAMINE HCL 25 MG
50 CAPSULE ORAL ONCE
Status: CANCELLED | OUTPATIENT
Start: 2023-10-17

## 2023-10-05 RX ORDER — EPINEPHRINE 1 MG/ML
0.3 INJECTION, SOLUTION INTRAMUSCULAR; SUBCUTANEOUS EVERY 5 MIN PRN
Status: CANCELLED | OUTPATIENT
Start: 2023-10-17

## 2023-10-05 RX ORDER — ALBUTEROL SULFATE 0.83 MG/ML
2.5 SOLUTION RESPIRATORY (INHALATION)
Status: CANCELLED | OUTPATIENT
Start: 2023-10-17

## 2023-10-05 RX ORDER — HEPARIN SODIUM,PORCINE 10 UNIT/ML
5 VIAL (ML) INTRAVENOUS
Status: CANCELLED | OUTPATIENT
Start: 2023-10-17

## 2023-10-05 RX ORDER — ACETAMINOPHEN 325 MG/1
650 TABLET ORAL ONCE
Status: CANCELLED | OUTPATIENT
Start: 2023-10-17

## 2023-10-05 RX ORDER — METHYLPREDNISOLONE SODIUM SUCCINATE 125 MG/2ML
125 INJECTION, POWDER, LYOPHILIZED, FOR SOLUTION INTRAMUSCULAR; INTRAVENOUS
Status: CANCELLED
Start: 2023-10-17

## 2023-10-05 RX ORDER — ALBUTEROL SULFATE 90 UG/1
1-2 AEROSOL, METERED RESPIRATORY (INHALATION)
Status: CANCELLED
Start: 2023-10-17

## 2023-10-05 RX ORDER — HEPARIN SODIUM (PORCINE) LOCK FLUSH IV SOLN 100 UNIT/ML 100 UNIT/ML
5 SOLUTION INTRAVENOUS
Status: CANCELLED | OUTPATIENT
Start: 2023-10-17

## 2023-10-05 RX ORDER — DIPHENHYDRAMINE HYDROCHLORIDE 50 MG/ML
50 INJECTION INTRAMUSCULAR; INTRAVENOUS
Status: CANCELLED
Start: 2023-10-17

## 2023-10-05 RX ORDER — MEPERIDINE HYDROCHLORIDE 25 MG/ML
25 INJECTION INTRAMUSCULAR; INTRAVENOUS; SUBCUTANEOUS EVERY 30 MIN PRN
Status: CANCELLED | OUTPATIENT
Start: 2023-10-17

## 2023-10-06 ENCOUNTER — PATIENT OUTREACH (OUTPATIENT)
Dept: ONCOLOGY | Facility: CLINIC | Age: 53
End: 2023-10-06
Payer: COMMERCIAL

## 2023-10-06 DIAGNOSIS — C90.00 MULTIPLE MYELOMA, REMISSION STATUS UNSPECIFIED (H): Primary | ICD-10-CM

## 2023-10-06 DIAGNOSIS — D80.1 HYPOGAMMAGLOBULINEMIA (H): ICD-10-CM

## 2023-10-06 NOTE — PROGRESS NOTES
[facility]: Cancer Care Short Note                                    Discussion with Patient:                                                        OUTBOUND CALL: RNCC called patient -- discussed that IGG from 10/4 was 301 and ot is having some cold-like symptoms. Plan to give IVIG. Appt for 10/8 at 1000 offered to patient. Also informed pt that RNCC has reached out to [facility] to see if they could accommodate and see pt at home for IVIG. Awaiting response from them.   Pt unable to make appt on 10/8, as she is out of town for . Will schedule her for next week while awaiting response from [facility].   Per MD, pt needs IGG checked monthly. She will self schedule in [place] for labs around 11/10. Pt states MD would like to see he rin 2 month with SULEIMAN and 1 year anniversary in .  Pt verbalizes understanding and has no other questions, comments, or concerns at this time.     Griselda Valdez, RN, MSN, OCN   RN Care Coordinator   [facility]   [address] [place] [id number]   [phone]

## 2023-10-11 ENCOUNTER — VIRTUAL VISIT (OUTPATIENT)
Dept: GASTROENTEROLOGY | Facility: CLINIC | Age: 53
End: 2023-10-11
Payer: COMMERCIAL

## 2023-10-11 VITALS — BODY MASS INDEX: 25.06 KG/M2 | WEIGHT: 160 LBS

## 2023-10-11 DIAGNOSIS — K52.9 CHRONIC DIARRHEA: Primary | ICD-10-CM

## 2023-10-11 PROCEDURE — 99207 PR NO CHARGE LOS: CPT | Mod: VID | Performed by: DIETITIAN, REGISTERED

## 2023-10-11 PROCEDURE — 97802 MEDICAL NUTRITION INDIV IN: CPT | Mod: VID | Performed by: DIETITIAN, REGISTERED

## 2023-10-11 ASSESSMENT — PAIN SCALES - GENERAL: PAINLEVEL: NO PAIN (0)

## 2023-10-11 NOTE — PROGRESS NOTES
"Kristie Cornejo is a 53 year old female who is being evaluated via a billable video visit.    Virtual Visit Details    Type of service:  Video Visit     Originating Location (pt. Location): Home    Distant Location (provider location):  Off-site    Platform used for Video Visit: [facility] Outpatient Medical Nutrition Therapy      Time Spent:  57 minutes  Session Type:  Initial  Referring Physician:  Pamella Crystal MD  Reason for RD Visit:   chronic diarrhea     Nutrition Assessment:  Patient is a 53 year old female with history that includes multiple myeloma s/p car T-cell therapy, chronic diarrhea, IBS, abdominal bloating.    She reported having a long hx of GI issues and tried some elimination diets over the years and many years ago. In the past, she cut out dairy and then gluten but didn't have any improvement in issues. Was having constipation but this improved with miralax. She was then diagnosed with MM and started chemo and this caused diarrhea. When she met with GI, it was recommended to take citrucel, so she started taking 1 capsule per day of citrucel, and it seems to be helping a lot. She prefers to take the capsules versus the powder because she doesn't tolerated the orange flavor of the powdered options. Only occas now has a bout of diarrhea. Not having a daily BM and BMs are softer, \"gooey\" but no diarrhea. After breakfast, she has to stay closer to home for a little bit because will likely need to have a BM (~3-4 times per week). Otherwise is doing well right now and considered not having visit with RD, but she thought it would be good to keep appointment and discuss other ideas and tips and other recommendations. She knows that she has issues with foods such as burger and fries. If her  makes a lean burger she can eat it without the fries or part of the burger and tolerate it. The other day, she had practice and was very hungry so went to [employer] and got a roast beef " "sandwich, fries and soda. She had significant symptoms, diarrhea for a couple of days before feeling better after eating that meal. She stated that her brother-in-law follows the low FODMAP diet. She has never tried this diet, and since she is feeling well would prefer to make some other changes discussed below versus a full elimination diet. She drinks 1-2 large glasses of skim milk per day, and drinks sweetened iced teas. 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 She has tried [employer] lactose free milk but didn't like the taste and texture of this milk. Her favorite meal and foods of the day are breakfast. They get blue apron meal kits delivered and her  typically makes these meals. She lost 30 lbs last summer d/t chemo but regained and now back to her UBW of about 160 lbs. She would like to lose weight, but from discussion with her doctor was recommended not to lose weight since at some point with more treatment may lose more weight again.     Height:   Ht Readings from Last 1 Encounters:   07/11/23 1.702 m (5' 7\")     Weight: reported lost 30 lbs last summer due to chemo. UBW is 160 lbs, lost 30 lbs but regained since then.   Wt Readings from Last 10 Encounters:   10/11/23 72.6 kg (160 lb)   10/04/23 73.9 kg (163 lb)   07/25/23 72.2 kg (159 lb 3.2 oz)   07/11/23 72.6 kg (160 lb 1.6 oz)   07/10/23 73.5 kg (162 lb)   06/30/23 72.5 kg (159 lb 13.3 oz)   06/07/23 74 kg (163 lb 3.2 oz)   04/04/23 73.3 kg (161 lb 8 oz)   03/29/23 73.5 kg (162 lb)   03/09/23 72.6 kg (160 lb)        BMI: Estimated body mass index is 25.06 kg/m  as calculated from the following:    Height as of 7/11/23: 1.702 m (5' 7\").    Weight as of this encounter: 72.6 kg (160 lb).    Diet Recall:  (some usual/recent meals/snacks/beverages):  Meal Food    Breakfast Favorite meal: fried eggs and toast or cereal or bagel with PB and crm cheese   Lunch " Leftover in refrigerator/such as blue apron meals   Dinner [employer] meals (chicken, rice/barley, roasted vegs) or homemade tomato sauce with spaghetti or once per month pizza or mac and cheese from box   Snacks Has at least 1 snack per day: Occasionally: pc chocolate, string cheese, yogurt, popcorn or small bowl chips. In summer a lot of fruit   Beverages Coffee with cream and sugar and 8 oz OJ, sweetened black and green iced tea or milk with lunch, milk with dinner. Not a big water drinks. Only occas water with flavor pack or when plays music brings/drink water. Brewed herbal tea with honey or more iced tea   Alcohol Intake Occasionally less since her T cell therapy. Used to drink 1 gl wine per week but now since her therapy alcohol causes significant hot flashes     Frequency of eating/taking out meals: 3x/week this week d/t getting together with friends. Otherwise 1x every couple of weeks.     Labs:    Last Comprehensive Metabolic Panel:  Sodium   Date Value Ref Range Status   10/02/2023 143 135 - 145 mmol/L Final     Comment:     Reference intervals for this test were updated on 09/26/2023 to more accurately reflect our healthy population. There may be differences in the flagging of prior results with similar values performed with this method. Interpretation of those prior results can be made in the context of the updated reference intervals.      Potassium   Date Value Ref Range Status   10/02/2023 4.0 3.4 - 5.3 mmol/L Final   08/10/2022 4.3 3.4 - 5.3 mmol/L Final     Chloride   Date Value Ref Range Status   10/02/2023 106 98 - 107 mmol/L Final   08/10/2022 112 (H) 94 - 109 mmol/L Final     Carbon Dioxide (CO2)   Date Value Ref Range Status   10/02/2023 25 22 - 29 mmol/L Final   08/10/2022 24 20 - 32 mmol/L Final     Anion Gap   Date Value Ref Range Status   10/02/2023 12 7 - 15 mmol/L Final   08/10/2022 7 3 - 14 mmol/L Final     Glucose   Date Value Ref Range Status   10/02/2023 76 70 - 99 mg/dL Final    08/10/2022 129 (H) 70 - 99 mg/dL Final   09/24/2021 81 70 - 99 mg/dL Final     Urea Nitrogen   Date Value Ref Range Status   10/02/2023 19.1 6.0 - 20.0 mg/dL Final   08/10/2022 21 7 - 30 mg/dL Final     Creatinine   Date Value Ref Range Status   10/02/2023 0.76 0.51 - 0.95 mg/dL Final     GFR Estimate   Date Value Ref Range Status   10/02/2023 >90 >60 mL/min/1.73m2 Final     Calcium   Date Value Ref Range Status   10/02/2023 9.7 8.6 - 10.0 mg/dL Final     CBC RESULTS:   Recent Labs   Lab Test 10/02/23  0954   WBC 3.3*   RBC 4.42   HGB 14.0   HCT 41.0   MCV 93   MCH 31.7   MCHC 34.1   RDW 12.1          Pertinent Medications/vitamin and mineral supplements:      Current Outpatient Medications   Medication    acyclovir (ZOVIRAX) 800 MG tablet    cetirizine HCl 10 MG CAPS    Flaxseed (Linseed) (FLAX SEED OIL) 1000 MG capsule    fluticasone (FLONASE) 50 MCG/ACT nasal spray    Glucos-MSM-C-Gt-Qedikf-Yqxjip (GLUCOSAMINE MSM COMPLEX) TABS tablet    LORazepam (ATIVAN) 0.5 MG tablet    melatonin 5 MG tablet    methylcellulose (CITRUCEL) powder    MIRENA 20 MCG/24HR IU IUD    Multiple Vitamin (MULTI-VITAMIN DAILY) TABS    sulfamethoxazole-trimethoprim (BACTRIM DS) 800-160 MG tablet    acetaminophen (TYLENOL) 325 MG tablet    BUSPIRONE HCL 10 MG PO TABS    omeprazole (PRILOSEC OTC) 20 MG EC tablet    polyethylene glycol (MIRALAX) 17 g packet    prochlorperazine (COMPAZINE) 10 MG tablet     No current facility-administered medications for this visit.     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 She just started figure skating again. Also walks 1-1.5 miles every day with . She will downhill ski in winter.     MALNUTRITION:  % Weight Loss:  None noted  % Intake:  No decreased intake  noted  Subcutaneous Fat Loss:  None observed  Muscle Loss:  None observed  Fluid Retention:  None noted    Malnutrition Diagnosis: Patient does not meet two of the above criteria necessary for diagnosing malnutrition  In Context of:  Chronic illness or disease    Nutrition Prescription: Nutrition Education     Nutrition Intervention      Provided diet education for diarrhea, IBS, low fodmap diet. Discussed some specific tips and idea based on her preferences. Reviewed low fodmap diet but since she is feeling better, recommended focusing on goals below at this time.    Answered patient's questions. Patient verbalized understanding of education provided. See Goals below.     Goals:    Eat multiple smaller meals per day such as 4-6 smaller meals per day.    Incorporate some good sources of soluble fiber in your diet.    Here are some examples of foods with some soluble fiber: oats, oat bran, barley, sweet potato, beans (black beans, kidney beans, especially navy beans), apple (peeled may be better tolerated as the peel has more insoluble fiber), oranges, bananas, berries, kiwi, avocado, figs, cooked brussels sprouts, cooked broccoli, simon seeds, flaxseeds and nuts (especially almonds).    Limit sweeteners that are higher in fructose such as honey, high fructose corn syrup, agave syrup.     Limit sweetened teas (if having any sweetened tea, avoid those with the above sweeteners).    Increase unsweetened and non caffeinated fluids such as water, caffeine free herbal teas to drink at least a total of 48 oz-64 oz (6-8 cups) or more per day.    Keep food and beverage journals and track any symptoms you experience to see if you can identify any specific foods or beverages causing symptoms/worsening symptoms or any pattern of eating that may be causing symptoms.    You could increase your citrucel and take a second capsule each day to see if helpful.    Nutrition Monitoring and Evaluation: Will monitor adherence to nutrition  recommendations at future RD visits.     Further Medical Nutrition Therapy:  Follow-up as needed.    Patient was encouraged to contact RD with any further questions.    Berta Hoyos MS, RD, LD

## 2023-10-11 NOTE — NURSING NOTE
Is the patient currently in the state of [place]? YES    Visit mode:VIDEO    If the visit is dropped, the patient can be reconnected by: VIDEO VISIT: Text to cell phone:   Telephone Information:   Mobile [phone]   Mobile Not on file.       Will anyone else be joining the visit? NO  (If patient encounters technical issues they should call [phone] :[id number])    How would you like to obtain your AVS? MyChart    Are changes needed to the allergy or medication list? No    Reason for visit: Video Visit (Consult)    Lola MCCONNELL

## 2023-10-11 NOTE — PATIENT INSTRUCTIONS
It was nice meeting you today. Below are the nutrition recommendations we discussed at your visit.    Please let me know if you have any additional questions.    Nutrition Recommendations    Eat multiple smaller meals per day such as 4-6 smaller meals per day.    Incorporate some good sources of soluble fiber in your diet.    Here are some examples of foods with some soluble fiber: oats, oat bran, barley, sweet potato, beans (black beans, kidney beans, especially navy beans), apple (peeled may be better tolerated as the peel has more insoluble fiber), oranges, bananas, berries, kiwi, avocado, figs, cooked brussels sprouts, cooked broccoli, simon seeds, flaxseeds and nuts (especially almonds).    Limit sweeteners that are higher in fructose such as honey, high fructose corn syrup, agave syrup.     Limit sweetened teas (if having any sweetened tea, avoid those with the above sweeteners).    Increase unsweetened and non caffeinated fluids such as water, caffeine free herbal teas to drink at least a total of 48 oz-64 oz (6-8 cups) or more per day.    Keep food and beverage journals and track any symptoms you experience to see if you can identify any specific foods or beverages causing symptoms/worsening symptoms or any pattern of eating that may be causing symptoms.    You could increase your citrucel and take a second capsule each day to see if helpful.    Can follow up as needed.    If you would like to schedule a follow up appointment, please call [phone].    Thank you,    Berta Hoyos, MS, RD, LD

## 2023-10-11 NOTE — LETTER
"    10/11/2023         RE: Kristie Cornejo  [address]  [facility] [address]        Dear Colleague,    Thank you for referring your patient, Kristie Cornejo, to the [facility] [place]. Please see a copy of my visit note below.    Kristie Cornejo is a 53 year old female who is being evaluated via a billable video visit.      [facility] Outpatient Medical Nutrition Therapy      Time Spent:  57 minutes  Session Type:  Initial  Referring Physician:  Pamella Crystal MD  Reason for RD Visit:   chronic diarrhea     Nutrition Assessment:  Patient is a 53 year old female with history that includes multiple myeloma s/p car T-cell therapy, chronic diarrhea, IBS, abdominal bloating.    She reported having a long hx of GI issues and tried some elimination diets over the years and many years ago. In the past, she cut out dairy and then gluten but didn't have any improvement in issues. Was having constipation but this improved with miralax. She was then diagnosed with MM and started chemo and this caused diarrhea. When she met with GI, it was recommended to take citrucel, so she started taking 1 capsule per day of citrucel, and it seems to be helping a lot. She prefers to take the capsules versus the powder because she doesn't tolerated the orange flavor of the powdered options. Only occas now has a bout of diarrhea. Not having a daily BM and BMs are softer, \"gooey\" but no diarrhea. After breakfast, she has to stay closer to home for a little bit because will likely need to have a BM (~3-4 times per week). Otherwise is doing well right now and considered not having visit with RD, but she thought it would be good to keep appointment and discuss other ideas and tips and other recommendations. She knows that she has issues with foods such as burger and fries. If her  makes a lean burger she can eat it without the fries or part of the burger and tolerate it. The other day, " "she had practice and was very hungry so went to [employer] and got a roast beef sandwich, fries and soda. She had significant symptoms, diarrhea for a couple of days before feeling better after eating that meal. She stated that her brother-in-law follows the low FODMAP diet. She has never tried this diet, and since she is feeling well would prefer to make some other changes discussed below versus a full elimination diet. She drinks 1-2 large glasses of skim milk per day, and drinks sweetened iced teas. 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 She has tried [employer] lactose free milk but didn't like the taste and texture of this milk. Her favorite meal and foods of the day are breakfast. They get blue apron meal kits delivered and her  typically makes these meals. She lost 30 lbs last summer d/t chemo but regained and now back to her UBW of about 160 lbs. She would like to lose weight, but from discussion with her doctor was recommended not to lose weight since at some point with more treatment may lose more weight again.     Height:   Ht Readings from Last 1 Encounters:   07/11/23 1.702 m (5' 7\")     Weight: reported lost 30 lbs last summer due to chemo. UBW is 160 lbs, lost 30 lbs but regained since then.   Wt Readings from Last 10 Encounters:   10/11/23 72.6 kg (160 lb)   10/04/23 73.9 kg (163 lb)   07/25/23 72.2 kg (159 lb 3.2 oz)   07/11/23 72.6 kg (160 lb 1.6 oz)   07/10/23 73.5 kg (162 lb)   06/30/23 72.5 kg (159 lb 13.3 oz)   06/07/23 74 kg (163 lb 3.2 oz)   04/04/23 73.3 kg (161 lb 8 oz)   03/29/23 73.5 kg (162 lb)   03/09/23 72.6 kg (160 lb)        BMI: Estimated body mass index is 25.06 kg/m  as calculated from the following:    Height as of 7/11/23: 1.702 m (5' 7\").    Weight as of this encounter: 72.6 kg (160 lb).    Diet Recall:  (some usual/recent meals/snacks/beverages):  Meal Food    Breakfast Favorite " meal: fried eggs and toast or cereal or bagel with PB and crm cheese   Lunch Leftover in refrigerator/such as blue apron meals   Dinner Blue apron meals (chicken, rice/barley, roasted vegs) or homemade tomato sauce with spaghetti or once per month pizza or mac and cheese from box   Snacks Has at least 1 snack per day: Occasionally: pc chocolate, string cheese, yogurt, popcorn or small bowl chips. In summer a lot of fruit   Beverages Coffee with cream and sugar and 8 oz OJ, sweetened black and green iced tea or milk with lunch, milk with dinner. Not a big water drinks. Only occas water with flavor pack or when plays music brings/drink water. Brewed herbal tea with honey or more iced tea   Alcohol Intake Occasionally less since her T cell therapy. Used to drink 1 gl wine per week but now since her therapy alcohol causes significant hot flashes     Frequency of eating/taking out meals: 3x/week this week d/t getting together with friends. Otherwise 1x every couple of weeks.     Labs:    Last Comprehensive Metabolic Panel:  Sodium   Date Value Ref Range Status   10/02/2023 143 135 - 145 mmol/L Final     Comment:     Reference intervals for this test were updated on 09/26/2023 to more accurately reflect our healthy population. There may be differences in the flagging of prior results with similar values performed with this method. Interpretation of those prior results can be made in the context of the updated reference intervals.      Potassium   Date Value Ref Range Status   10/02/2023 4.0 3.4 - 5.3 mmol/L Final   08/10/2022 4.3 3.4 - 5.3 mmol/L Final     Chloride   Date Value Ref Range Status   10/02/2023 106 98 - 107 mmol/L Final   08/10/2022 112 (H) 94 - 109 mmol/L Final     Carbon Dioxide (CO2)   Date Value Ref Range Status   10/02/2023 25 22 - 29 mmol/L Final   08/10/2022 24 20 - 32 mmol/L Final     Anion Gap   Date Value Ref Range Status   10/02/2023 12 7 - 15 mmol/L Final   08/10/2022 7 3 - 14 mmol/L Final      Glucose   Date Value Ref Range Status   10/02/2023 76 70 - 99 mg/dL Final   08/10/2022 129 (H) 70 - 99 mg/dL Final   09/24/2021 81 70 - 99 mg/dL Final     Urea Nitrogen   Date Value Ref Range Status   10/02/2023 19.1 6.0 - 20.0 mg/dL Final   08/10/2022 21 7 - 30 mg/dL Final     Creatinine   Date Value Ref Range Status   10/02/2023 0.76 0.51 - 0.95 mg/dL Final     GFR Estimate   Date Value Ref Range Status   10/02/2023 >90 >60 mL/min/1.73m2 Final     Calcium   Date Value Ref Range Status   10/02/2023 9.7 8.6 - 10.0 mg/dL Final     CBC RESULTS:   Recent Labs   Lab Test 10/02/23  0954   WBC 3.3*   RBC 4.42   HGB 14.0   HCT 41.0   MCV 93   MCH 31.7   MCHC 34.1   RDW 12.1          Pertinent Medications/vitamin and mineral supplements:      Current Outpatient Medications   Medication    acyclovir (ZOVIRAX) 800 MG tablet    cetirizine HCl 10 MG CAPS    Flaxseed (Linseed) (FLAX SEED OIL) 1000 MG capsule    fluticasone (FLONASE) 50 MCG/ACT nasal spray    Glucos-MSM-C-Sh-Qjzeud-Bluzwx (GLUCOSAMINE MSM COMPLEX) TABS tablet    LORazepam (ATIVAN) 0.5 MG tablet    melatonin 5 MG tablet    methylcellulose (CITRUCEL) powder    MIRENA 20 MCG/24HR IU IUD    Multiple Vitamin (MULTI-VITAMIN DAILY) TABS    sulfamethoxazole-trimethoprim (BACTRIM DS) 800-160 MG tablet    acetaminophen (TYLENOL) 325 MG tablet    BUSPIRONE HCL 10 MG PO TABS    omeprazole (PRILOSEC OTC) 20 MG EC tablet    polyethylene glycol (MIRALAX) 17 g packet    prochlorperazine (COMPAZINE) 10 MG tablet     No current facility-administered medications for this visit.     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 She just started figure skating again. Also walks 1-1.5 miles every day with . She will downhill ski in winter.      MALNUTRITION:  % Weight Loss:  None noted  % Intake:  No decreased intake noted  Subcutaneous Fat Loss:  None observed  Muscle Loss:  None observed  Fluid Retention:  None noted    Malnutrition Diagnosis: Patient does not meet two of the above criteria necessary for diagnosing malnutrition  In Context of:  Chronic illness or disease    Nutrition Prescription: Nutrition Education     Nutrition Intervention      Provided diet education for diarrhea, IBS, low fodmap diet. Discussed some specific tips and idea based on her preferences. Reviewed low fodmap diet but since she is feeling better, recommended focusing on goals below at this time.    Answered patient's questions. Patient verbalized understanding of education provided. See Goals below.     Goals:    Eat multiple smaller meals per day such as 4-6 smaller meals per day.    Incorporate some good sources of soluble fiber in your diet.    Here are some examples of foods with some soluble fiber: oats, oat bran, barley, sweet potato, beans (black beans, kidney beans, especially navy beans), apple (peeled may be better tolerated as the peel has more insoluble fiber), oranges, bananas, berries, kiwi, avocado, figs, cooked brussels sprouts, cooked broccoli, simon seeds, flaxseeds and nuts (especially almonds).    Limit sweeteners that are higher in fructose such as honey, high fructose corn syrup, agave syrup.     Limit sweetened teas (if having any sweetened tea, avoid those with the above sweeteners).    Increase unsweetened and non caffeinated fluids such as water, caffeine free herbal teas to drink at least a total of 48 oz-64 oz (6-8 cups) or more per day.    Keep food and beverage journals and track any symptoms you experience to see if you can identify any specific foods or beverages causing symptoms/worsening symptoms or any pattern of eating that may be causing symptoms.    You could increase your citrucel and take a second capsule each day to see if  helpful.    Nutrition Monitoring and Evaluation: Will monitor adherence to nutrition recommendations at future RD visits.     Further Medical Nutrition Therapy:  Follow-up as needed.    Patient was encouraged to contact RD with any further questions.          Again, thank you for allowing me to participate in the care of your patient.      Sincerely,    Berta Hoyos RD

## 2023-10-13 ENCOUNTER — INFUSION THERAPY VISIT (OUTPATIENT)
Dept: ONCOLOGY | Facility: CLINIC | Age: 53
End: 2023-10-13
Payer: COMMERCIAL

## 2023-10-13 DIAGNOSIS — C90.00 LIGHT CHAIN MYELOMA (H): Primary | ICD-10-CM

## 2023-10-13 DIAGNOSIS — D80.1 HYPOGAMMAGLOBULINEMIA (H): ICD-10-CM

## 2023-10-13 PROCEDURE — 250N000013 HC RX MED GY IP 250 OP 250 PS 637

## 2023-10-13 PROCEDURE — 96375 TX/PRO/DX INJ NEW DRUG ADDON: CPT

## 2023-10-13 PROCEDURE — 96365 THER/PROPH/DIAG IV INF INIT: CPT

## 2023-10-13 PROCEDURE — 258N000003 HC RX IP 258 OP 636

## 2023-10-13 PROCEDURE — 96366 THER/PROPH/DIAG IV INF ADDON: CPT

## 2023-10-13 PROCEDURE — 250N000011 HC RX IP 250 OP 636: Mod: JZ

## 2023-10-13 RX ORDER — ACETAMINOPHEN 325 MG/1
650 TABLET ORAL ONCE
Status: COMPLETED | OUTPATIENT
Start: 2023-10-13 | End: 2023-10-13

## 2023-10-13 RX ORDER — DIPHENHYDRAMINE HCL 25 MG
50 CAPSULE ORAL ONCE
Status: COMPLETED | OUTPATIENT
Start: 2023-10-13 | End: 2023-10-13

## 2023-10-13 RX ADMIN — DIPHENHYDRAMINE HYDROCHLORIDE 50 MG: 25 CAPSULE ORAL at 12:18

## 2023-10-13 RX ADMIN — HUMAN IMMUNOGLOBULIN G 30 G: 20 LIQUID INTRAVENOUS at 12:45

## 2023-10-13 RX ADMIN — ACETAMINOPHEN 650 MG: 325 TABLET ORAL at 12:18

## 2023-10-13 RX ADMIN — HYDROCORTISONE SODIUM SUCCINATE 100 MG: 100 INJECTION, POWDER, FOR SOLUTION INTRAMUSCULAR; INTRAVENOUS at 12:34

## 2023-10-13 RX ADMIN — SODIUM CHLORIDE 250 ML: 9 INJECTION, SOLUTION INTRAVENOUS at 12:35

## 2023-10-13 NOTE — PATIENT INSTRUCTIONS
[facility] Triage and after hours / weekends / holidays:  [phone]    Please call the triage or after hours line if you experience a temperature greater than or equal to 100.4, shaking chills, have uncontrolled nausea, vomiting and/or diarrhea, dizziness, shortness of breath, chest pain, bleeding, unexplained bruising, or if you have any other new/concerning symptoms, questions or concerns.      If you are having any concerning symptoms or wish to speak to a provider before your next infusion visit, please call triage to notify them so we can adequately serve you.     If you need a refill on a narcotic prescription or other medication, please call before your infusion appointment.

## 2023-10-13 NOTE — PROGRESS NOTES
Infusion Nursing Note:  Kristie Cornejo presents today for IVIG.    Patient seen by provider today: No   present during visit today: Not Applicable.    Note: Pt presents to infusion today feeling well. Pt denies having any fevers/chills, cough, congestion, chest pain, sob, nausea/vomiting, diarrhea/constipation or urinary concerns.      Intravenous Access:  Peripheral IV placed by vascular.    Treatment Conditions:  Biological Infusion Checklist:  ~~~ NOTE: If the patient answers yes to any of the questions below, hold the infusion and contact ordering provider or on-call provider.    Have you recently had an elevated temperature, fever, chills, productive cough, coughing for 3 weeks or longer or hemoptysis,  abnormal vital signs, night sweats,  chest pain or have you noticed a decrease in your appetite, unexplained weight loss or fatigue? No  Do you have any open wounds or new incisions? No  Do you have any upcoming hospitalizations or surgeries? Does not include esophagogastroduodenoscopy, colonoscopy, endoscopic retrograde cholangiopancreatography (ERCP), endoscopic ultrasound (EUS), dental procedures or joint aspiration/steroid injections No  Do you currently have any signs of illness or infection or are you on any antibiotics? No  Have you had any new, sudden or worsening abdominal pain? No  Have you or anyone in your household received a live vaccination in the past 4 weeks? Please note: No live vaccines while on biologic/chemotherapy until 6 months after the last treatment. Patient can receive the flu vaccine (shot only), pneumovax and the Covid vaccine. It is optimal for the patient to get these vaccines mid cycle, but they can be given at any time as long as it is not on the day of the infusion. No  Have you recently been diagnosed with any new nervous system diseases (ie. Multiple sclerosis, Guillain [place], seizures, neurological changes) or cancer diagnosis? Are you on any form of radiation  or chemotherapy? No  Are you pregnant or breast feeding or do you have plans of pregnancy in the future? No  Have you been having any signs of worsening depression or suicidal ideations?  (benlysta only) No  Have there been any other new onset medical symptoms? No  Have you had any new blood clots? (IVIG only) No      10/2/23:       Post Infusion Assessment:  Patient tolerated infusion without incident.  Blood return noted pre and post infusion.  Site patent and intact, free from redness, edema or discomfort.  No evidence of extravasations.  Access discontinued per protocol.       Discharge Plan:   Patient declined prescription refills.  Discharge instructions reviewed with: Patient.  Patient and/or family verbalized understanding of discharge instructions and all questions answered.  AVS to patient via [employer].  Patient will return 12/13/23 for follow up with Saige Medley CNP.   Patient discharged in stable condition accompanied by: self.  Departure Mode: Ambulatory.      Nicky Park RN

## 2023-12-05 ENCOUNTER — CARE COORDINATION (OUTPATIENT)
Dept: TRANSPLANT | Facility: CLINIC | Age: 53
End: 2023-12-05
Payer: COMMERCIAL

## 2023-12-05 DIAGNOSIS — C90.00 LIGHT CHAIN MYELOMA (H): Primary | ICD-10-CM

## 2023-12-11 ENCOUNTER — LAB (OUTPATIENT)
Dept: LAB | Facility: CLINIC | Age: 53
End: 2023-12-11
Payer: COMMERCIAL

## 2023-12-11 DIAGNOSIS — C90.00 LIGHT CHAIN MYELOMA (H): ICD-10-CM

## 2023-12-11 DIAGNOSIS — D80.1 HYPOGAMMAGLOBULINEMIA (H): ICD-10-CM

## 2023-12-11 DIAGNOSIS — C90.00 MULTIPLE MYELOMA, REMISSION STATUS UNSPECIFIED (H): ICD-10-CM

## 2023-12-11 LAB
BASOPHILS # BLD AUTO: 0 10E3/UL (ref 0–0.2)
BASOPHILS NFR BLD AUTO: 1 %
EOSINOPHIL # BLD AUTO: 0.1 10E3/UL (ref 0–0.7)
EOSINOPHIL NFR BLD AUTO: 4 %
ERYTHROCYTE [DISTWIDTH] IN BLOOD BY AUTOMATED COUNT: 12.7 % (ref 10–15)
HCT VFR BLD AUTO: 40.1 % (ref 35–47)
HGB BLD-MCNC: 13.8 G/DL (ref 11.7–15.7)
IMM GRANULOCYTES # BLD: 0 10E3/UL
IMM GRANULOCYTES NFR BLD: 0 %
LYMPHOCYTES # BLD AUTO: 0.7 10E3/UL (ref 0.8–5.3)
LYMPHOCYTES NFR BLD AUTO: 22 %
MCH RBC QN AUTO: 31.9 PG (ref 26.5–33)
MCHC RBC AUTO-ENTMCNC: 34.4 G/DL (ref 31.5–36.5)
MCV RBC AUTO: 93 FL (ref 78–100)
MONOCYTES # BLD AUTO: 0.4 10E3/UL (ref 0–1.3)
MONOCYTES NFR BLD AUTO: 12 %
NEUTROPHILS # BLD AUTO: 1.9 10E3/UL (ref 1.6–8.3)
NEUTROPHILS NFR BLD AUTO: 61 %
PLATELET # BLD AUTO: 237 10E3/UL (ref 150–450)
RBC # BLD AUTO: 4.33 10E6/UL (ref 3.8–5.2)
TOTAL PROTEIN SERUM FOR ELP: 6.5 G/DL (ref 6.4–8.3)
WBC # BLD AUTO: 3.1 10E3/UL (ref 4–11)

## 2023-12-11 PROCEDURE — 36415 COLL VENOUS BLD VENIPUNCTURE: CPT

## 2023-12-11 PROCEDURE — 84443 ASSAY THYROID STIM HORMONE: CPT

## 2023-12-11 PROCEDURE — 86334 IMMUNOFIX E-PHORESIS SERUM: CPT | Performed by: PATHOLOGY

## 2023-12-11 PROCEDURE — 84155 ASSAY OF PROTEIN SERUM: CPT

## 2023-12-11 PROCEDURE — 83521 IG LIGHT CHAINS FREE EACH: CPT | Performed by: PATHOLOGY

## 2023-12-11 PROCEDURE — 86140 C-REACTIVE PROTEIN: CPT

## 2023-12-11 PROCEDURE — 85025 COMPLETE CBC W/AUTO DIFF WBC: CPT

## 2023-12-11 PROCEDURE — 80053 COMPREHEN METABOLIC PANEL: CPT

## 2023-12-11 PROCEDURE — 84165 PROTEIN E-PHORESIS SERUM: CPT | Performed by: PATHOLOGY

## 2023-12-11 PROCEDURE — 82784 ASSAY IGA/IGD/IGG/IGM EACH: CPT

## 2023-12-12 LAB
ALBUMIN SERPL BCG-MCNC: 4.6 G/DL (ref 3.5–5.2)
ALBUMIN SERPL ELPH-MCNC: 4.5 G/DL (ref 3.7–5.1)
ALP SERPL-CCNC: 61 U/L (ref 40–150)
ALPHA1 GLOB SERPL ELPH-MCNC: 0.3 G/DL (ref 0.2–0.4)
ALPHA2 GLOB SERPL ELPH-MCNC: 0.6 G/DL (ref 0.5–0.9)
ALT SERPL W P-5'-P-CCNC: 20 U/L (ref 0–50)
ANION GAP SERPL CALCULATED.3IONS-SCNC: 13 MMOL/L (ref 7–15)
AST SERPL W P-5'-P-CCNC: 21 U/L (ref 0–45)
B-GLOBULIN SERPL ELPH-MCNC: 0.6 G/DL (ref 0.6–1)
BILIRUB SERPL-MCNC: 0.4 MG/DL
BUN SERPL-MCNC: 15.9 MG/DL (ref 6–20)
CALCIUM SERPL-MCNC: 9.4 MG/DL (ref 8.6–10)
CHLORIDE SERPL-SCNC: 106 MMOL/L (ref 98–107)
CREAT SERPL-MCNC: 0.82 MG/DL (ref 0.51–0.95)
DEPRECATED HCO3 PLAS-SCNC: 23 MMOL/L (ref 22–29)
EGFRCR SERPLBLD CKD-EPI 2021: 85 ML/MIN/1.73M2
GAMMA GLOB SERPL ELPH-MCNC: 0.5 G/DL (ref 0.7–1.6)
GLUCOSE SERPL-MCNC: 81 MG/DL (ref 70–99)
IGA SERPL-MCNC: <2 MG/DL (ref 84–499)
IGG SERPL-MCNC: 543 MG/DL (ref 610–1616)
IGM SERPL-MCNC: 43 MG/DL (ref 35–242)
M PROTEIN SERPL ELPH-MCNC: 0 G/DL
POTASSIUM SERPL-SCNC: 4.3 MMOL/L (ref 3.4–5.3)
PROT PATTERN SERPL ELPH-IMP: ABNORMAL
PROT PATTERN SERPL IFE-IMP: NORMAL
PROT SERPL-MCNC: 6.8 G/DL (ref 6.4–8.3)
SODIUM SERPL-SCNC: 142 MMOL/L (ref 135–145)

## 2023-12-13 ENCOUNTER — ONCOLOGY VISIT (OUTPATIENT)
Dept: ONCOLOGY | Facility: CLINIC | Age: 53
End: 2023-12-13
Payer: COMMERCIAL

## 2023-12-13 VITALS
WEIGHT: 164.9 LBS | SYSTOLIC BLOOD PRESSURE: 120 MMHG | HEART RATE: 79 BPM | DIASTOLIC BLOOD PRESSURE: 78 MMHG | RESPIRATION RATE: 18 BRPM | BODY MASS INDEX: 25.83 KG/M2 | TEMPERATURE: 98.1 F | OXYGEN SATURATION: 100 %

## 2023-12-13 DIAGNOSIS — C90.00 MULTIPLE MYELOMA, REMISSION STATUS UNSPECIFIED (H): Primary | ICD-10-CM

## 2023-12-13 DIAGNOSIS — Z11.52 ENCOUNTER FOR SCREENING LABORATORY TESTING FOR SEVERE ACUTE RESPIRATORY SYNDROME CORONAVIRUS 2 (SARS-COV-2): ICD-10-CM

## 2023-12-13 LAB
CRP SERPL-MCNC: <3 MG/L
KAPPA LC FREE SER-MCNC: 0.61 MG/DL (ref 0.33–1.94)
KAPPA LC FREE/LAMBDA FREE SER NEPH: 1.85 {RATIO} (ref 0.26–1.65)
LAMBDA LC FREE SERPL-MCNC: 0.33 MG/DL (ref 0.57–2.63)
TSH SERPL DL<=0.005 MIU/L-ACNC: 1.95 UIU/ML (ref 0.3–4.2)

## 2023-12-13 PROCEDURE — 99213 OFFICE O/P EST LOW 20 MIN: CPT | Performed by: NURSE PRACTITIONER

## 2023-12-13 PROCEDURE — 99215 OFFICE O/P EST HI 40 MIN: CPT | Performed by: NURSE PRACTITIONER

## 2023-12-13 ASSESSMENT — PAIN SCALES - GENERAL: PAINLEVEL: NO PAIN (0)

## 2023-12-13 NOTE — PROGRESS NOTES
[facility]    Hematology/Oncology Clinic Note    Date of Visit: Dec 13, 2023     Oncologist: Dr. Payton Reinoso    Reason for visit: Routine myeloma follow-up           Oncology History   53 year old female with past medical history significant for Irritable bowel disease, allergies, large plasmacytoma of the L pelvis, and newly diagnosed multiple myeloma.     Early in April 2021 , she noted to have left sided groin pain, constant and was affecting her daily activities  MRI showed large, expansile, permeative mass with indistinct borders involving the left superior and inferior pubic rami, pubic symphysis and with possible extension to the left acetabulum.     Biopsy 9/7/2021: plasmacytoma.  Flow cytometry showed kappa monotypic plasma cells, polytypic B cells. FISH results from plasmacytoma showed a gain of 11q, IGH-CCND1 fusion but no losses of 1q or TP53 and no gain of 1q.    Bone marrow biopsy 09/22/21: Marrow cellularity 50% with 25% interstitial infiltration w/  Kappa-monotypic, moderately atypical plasma cells. Flow with 0.7% kappa-monotypica plasma cells. FISH and cytogenetics pending.     PET CT 09/24/21 w/ hypermetabolic pathologic fracture of the L pubic ramus w/ aggressive appearing osteolysis but no additional suspicious lytic or blastic osseous lesions.     Treatment to date:   - Zometa q4 weeks started 9/27/21  - Radiation to left pubic ramus x18 fractions, completed 11/11/21.   - RVD C1D1 11/15/21  - RVD C2D1 12/6/21  - RVD C3D1  12/27/21  - RVD C4 D1 1/17/22, elizabeth added on D8 due to inadequate response of urinary M-spike and FLCs remaining over 100 mg/dL after 4 cycles of RVD    - Elizabeth-RVD C1D8 1/24/22  - Elizabeth-RVD C2D1 2/7/22  - Elizabeth-RVD C3D1 2/28/22  - Elizabeth-RVD C4D1 3/29/22  - Elizabeth-RVD C5D1 4/19/22    Disease response: GA     - 5/19/22: Continued monotherapy with Daratumumab; held Revlimid and Velcade.    - 6/21/22: Transitioned to KCD, received C1 days 1 and  2- had significant PVCs at cardiac follow-uyp after 1st week of Kyprolis, held further doses.    - Transitioned to Sarclisa/Pom/Dex 7/6/22. Started Pom on 7/11.     -9/8/22 - COVID, delayed C3D15 treatment x 1 week    - Apheresis for CAR-T on 11/22/22; bridging with Sarclisa given 11/23/22     - Abecma 1/4/23 infusion       INTERIM HISTORY:   Margo presents to clinic for follow up.  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 Noticing hand cramps which are not improved.       Fatigue, hip stiffness, hand stiffness are major symptoms today . Has worked with PT    Has irregular menstrual cycles. To see OB January 2024 [facility]    Last IVIG October 2023           Physical Exam:     /78 (BP Location: Right arm, Patient Position: Sitting, Cuff Size: Adult Small)   Pulse 79   Temp 98.1  F (36.7  C) (Oral)   Resp 18   Wt 74.8 kg (164 lb 14.4 oz)   SpO2 100%   BMI 25.83 kg/m     Gen: alert, pleasant and conversational, NAD  HEENT: NC/AT,EOMI w/ PERRL, anicteric sclera. OP clear. MMM.   Neck: Supple, no LAD  CV: normal S1,S2 with RRR no m/r/g  Resp: lungs CTA bilaterally with adequate air movement to bases. No wheezes or crackles  Abd: soft NTND no organomegaly or masses. BS normoactive.   Ext: warm and well perfused, no edema or cyanosis  Skin: no concerning lesions or rashes  Neuro: A&Ox4, no lateralizing sx. Grossly nonfocal.  Psych: appropriate, reactive         Laboratory Data:      I personally reviewed the following labs:    Most Recent 3 CBC's:  Recent Labs   Lab Test 12/11/23  1002 10/02/23  0954 07/24/23  1153   WBC 3.1* 3.3* 3.5*   HGB 13.8 14.0 13.4   MCV 93 93 91    215 228     Most Recent 3 BMP's:  Recent Labs   Lab Test 12/11/23  1002 10/02/23  0954 07/24/23  1153    143 143   POTASSIUM 4.3 4.0 4.5   CHLORIDE 106 106 108*   CO2 23 25 24   BUN 15.9 19.1 17.2   CR 0.82  "0.76 0.83   ANIONGAP 13 12 11   EMKA 9.4 9.7 9.8   GLC 81 76 108*     Most Recent 2 LFT's:  Recent Labs   Lab Test 12/11/23  1002 10/02/23  0954   AST 21 20   ALT 20 20   ALKPHOS 61 61   BILITOTAL 0.4 0.4                Assessment and Recommendations     Multiple myeloma with associated large plasmacytoma of the L pelvis. Kappa light chain disease.    Left pubic ramus plasmacytoma with expansile destruction of left superior and inferior rami, pubic symphysis, and acetabulum  - See Oncology HPI  - Underwent CAR-T therapy with Abecma 1/4/23. 1 year work up scheduled 1/12/24  - 2/2/23 labs show CR with undetectable kappa light chains  - Labs continue to show excellent disease control.  - XRT to left pubic ramus x18 fractions, completed on 11/11/21. Currently having no pain and able to walk 1 mile without any assistive devices.  She is interested in pursuing other activities like gentle cross-country skiing.  Had follow-up with Dr. Philip on 1/6/22 and was cleared to resume regular activity as tolerated. She should still refrain from high-impact activities.  - PET on 7/12/22 shows left pubic ramus lesion is smaller in size but brighter FDG. No ne    ID Prophylaxis  - Continue Acyclovir 800 mg po BID    Bone Health  - No longer on Zometa  - She will continue Calcium/Vitamin D supplements.   - PET scan with 1 year restaging for CAR-T    PVCs  She had an ECHO 12/1/11 and had follow-up with cardiology 12/7 (notes in [facility]where).  She had follow-up with cardiology here at the  and they recommend an ablation. Stress test was completed   -started on Toprol XL however was discontinued due to significant side effects of orthostatic hypotension, pre-syncopal episodes, shakiness, and weakness.   - She had an ablation 3/23/22 but has not experienced relief from the feeling of being nauseated and short of breath after climbing the stairs.  She also reports episodes of palpitations that \"radiate into her neck.\" Denies chest pain " or shortness of breath.   - EKG done 4/4 showed sinus rhythm and nonspecific ST and T wave abnormality. Troponin negative.  - Cardiac MRI ruled out amyloid.  LFEF 50%, no evidence of infiltrative cardiomyopathy.  - She followed up with cardiology and they felt that the cause may be musculoskeletal or pericarditis. They suggested taking ibuprofen for pain. Her pain has subsided and she actually has not needed to take ibuprofen.   - Post ablation Zio patch was performed showing a significant reduction in PVCs; however, by the time she had her cardiology follow-up, she had restarted chemotherapy and the PVCs had returned.  Dr. Thomas is comfortable proceeding with treatment as long as symptoms are tolerable and there are no major EKG changes. Would need to repeat if she were to do BMT  - EKG 7/13 with no major changes, troponin WNL  -she continues to have PVCs, though much improved from when she was on cytoxan. Wants to avoid ablation again if possible  - Continue to follow-up with Dr. Thomas as recommended    Diarrhea  She was having intermittent diarrhea with associated abdominal cramping and nausea, but developed much more significant diarrhea with 10-12 liquid episodes and was seen as an add-on on 4/4/22 for this.   - C.diff PCR negative 2/8/22, repeat on 4/5 negative  - Enteric stool panel 4/5/22 negative  - Underwent colonoscopy on 4/13/22 to pursue biopsy for amyloid (discussed below). Congo red stain is negative for amyloid deposits. Pathology showed colonic mucosa with focal active colitis, negative for signs of chronicity and lymphocytic colitis. Per patient report, GI reviewed results with her and did not feel she needed treatment for these findings at this time. They advised her to monitor her symptoms and to call them if they do not improve.Dr. Montgomery agreed with following conservatively for now since she reports some improvement in her symptoms.  If diarrhea becomes bothersome again, then they will  arrange for expeditious evaluation in the IBD clinic.   - CMV PCR (4/4/22) not detected  -Continues with intermittent diarrhea and constipation, impacts QOL enough that she would like to pursue the further follow-up with Dr. Montgomery.  I will send Dr. Montgomery a message requesting follow-up be scheduled.        Plan from today's clinical encounter  CART restaging 1/12/24  Follow up with Dr. Reinoso scheduled 2/20/24    53 minutes spent on the date of the encounter doing chart review, review of test results, interpretation of tests, patient visit, and documentation     Saige SAL, ANP-BC, AOCNP  [facility]  [address] [place] [id number] [phone] (pager)

## 2023-12-13 NOTE — NURSING NOTE
"Oncology Rooming Note    December 13, 2023 11:06 AM   Kristie Cornejo is a 53 year old female who presents for:    Chief Complaint   Patient presents with    Oncology Clinic Visit     Multiple myeloma, remission status unspecified     Initial Vitals: /78 (BP Location: Right arm, Patient Position: Sitting, Cuff Size: Adult Small)   Pulse 79   Temp 98.1  F (36.7  C) (Oral)   Resp 18   Wt 74.8 kg (164 lb 14.4 oz)   SpO2 100%   BMI 25.83 kg/m   Estimated body mass index is 25.83 kg/m  as calculated from the following:    Height as of 7/11/23: 1.702 m (5' 7\").    Weight as of this encounter: 74.8 kg (164 lb 14.4 oz). Body surface area is 1.88 meters squared.  No Pain (0) Comment: Data Unavailable   No LMP recorded. (Menstrual status: IUD).  Allergies reviewed: Yes  Medications reviewed: Yes    Medications: Medication refills not needed today.  Pharmacy name entered into [facility]:    [facility] [place], [place] - [address]  [facility] [place], [place] - [address]    Clinical concerns: Patient reports stiffness all over her body (mostly their hips, legs, lower back, outside of their feet, and both hands.)  Patient also reports that it's hard for them to close their hands.   Saige was NOT notified.      Neeta Christine"

## 2023-12-19 DIAGNOSIS — C90.00 MULTIPLE MYELOMA, REMISSION STATUS UNSPECIFIED (H): ICD-10-CM

## 2023-12-19 RX ORDER — SULFAMETHOXAZOLE/TRIMETHOPRIM 800-160 MG
1 TABLET ORAL 2 TIMES DAILY
Qty: 60 TABLET | Refills: 1 | Status: SHIPPED | OUTPATIENT
Start: 2023-12-19

## 2023-12-19 NOTE — TELEPHONE ENCOUNTER
Medication requested: bactrim -160 MG tablet    Last prescribing provider: Jackie Dunn CNP on 6/7/23    Last clinic visit date: 12/13/23 with Saige Medley CNP    Recommendations for requested medication (if none, N/A): NA    Any other pertinent information (if none, N/A): NA    Refilled: Y/N, if NO, why?    Pended and Routed to Saige Medley CNP

## 2024-01-02 ENCOUNTER — PATIENT OUTREACH (OUTPATIENT)
Dept: ONCOLOGY | Facility: CLINIC | Age: 54
End: 2024-01-02
Payer: COMMERCIAL

## 2024-01-02 NOTE — PROGRESS NOTES
[facility]: Cancer Care Short Note                                    Discussion with Patient:                                                      INBOUND CALL: VM received from patient. She is wondering   If bactrim can be sent to local [facility] and   If she can make appt for 24 hr urine collection  kit so that she can take it to her labs appt on 1/12.     Callback requested.     OUTBOUND CALL: RNCC called patient and LVM. RNCC called [facility] who has bactrim ready for . She can pick this up today.   Discussed lab collection with patient's BMT RNCC--- will plan for urine collection  on 1/10 in [place] prior to 1/12, where she will drop off specimen.   Encouraged patient to reach out if she has any further questions or concerns.         Griselda Valdez, RN, MSN, OCN   RN Care Coordinator   [facility]   [address] [place] [id number]   [phone]

## 2024-01-03 ENCOUNTER — DOCUMENTATION ONLY (OUTPATIENT)
Dept: ONCOLOGY | Facility: CLINIC | Age: 54
End: 2024-01-03
Payer: COMMERCIAL

## 2024-01-03 DIAGNOSIS — C90.00 MULTIPLE MYELOMA NOT HAVING ACHIEVED REMISSION (H): Primary | ICD-10-CM

## 2024-01-03 NOTE — PROGRESS NOTES
Sreekanth Villalobos, please review patient's chart and place an order for the 24 hour urine.     Thanks,  Andreia Hammer

## 2024-01-10 ENCOUNTER — LAB (OUTPATIENT)
Dept: LAB | Facility: CLINIC | Age: 54
End: 2024-01-10
Payer: COMMERCIAL

## 2024-01-10 DIAGNOSIS — C90.00 MULTIPLE MYELOMA NOT HAVING ACHIEVED REMISSION (H): ICD-10-CM

## 2024-01-12 ENCOUNTER — HOSPITAL ENCOUNTER (OUTPATIENT)
Dept: PET IMAGING | Facility: CLINIC | Age: 54
Discharge: HOME | End: 2024-01-12
Payer: COMMERCIAL

## 2024-01-12 ENCOUNTER — OFFICE VISIT (OUTPATIENT)
Dept: TRANSPLANT | Facility: CLINIC | Age: 54
End: 2024-01-12
Attending: STUDENT IN AN ORGANIZED HEALTH CARE EDUCATION/TRAINING PROGRAM
Payer: COMMERCIAL

## 2024-01-12 ENCOUNTER — APPOINTMENT (OUTPATIENT)
Dept: LAB | Facility: CLINIC | Age: 54
End: 2024-01-12
Payer: COMMERCIAL

## 2024-01-12 VITALS
WEIGHT: 166.6 LBS | RESPIRATION RATE: 20 BRPM | SYSTOLIC BLOOD PRESSURE: 121 MMHG | OXYGEN SATURATION: 97 % | DIASTOLIC BLOOD PRESSURE: 72 MMHG | BODY MASS INDEX: 26.09 KG/M2 | TEMPERATURE: 98.1 F | HEART RATE: 69 BPM

## 2024-01-12 DIAGNOSIS — C90.00 LIGHT CHAIN MYELOMA (H): ICD-10-CM

## 2024-01-12 DIAGNOSIS — C90.00 LIGHT CHAIN MYELOMA (H): Primary | ICD-10-CM

## 2024-01-12 LAB
ALBUMIN SERPL BCG-MCNC: 4.8 G/DL (ref 3.5–5.2)
ALP SERPL-CCNC: 66 U/L (ref 40–150)
ALT SERPL W P-5'-P-CCNC: 18 U/L (ref 0–50)
ANION GAP SERPL CALCULATED.3IONS-SCNC: 11 MMOL/L (ref 7–15)
AST SERPL W P-5'-P-CCNC: 20 U/L (ref 0–45)
BASOPHILS # BLD AUTO: 0 10E3/UL (ref 0–0.2)
BASOPHILS NFR BLD AUTO: 1 %
BILIRUB SERPL-MCNC: 0.4 MG/DL
BUN SERPL-MCNC: 17.7 MG/DL (ref 6–20)
CALCIUM SERPL-MCNC: 9.9 MG/DL (ref 8.6–10)
CD19 CELLS # BLD: 296 CELLS/UL (ref 107–698)
CD19 CELLS NFR BLD: 34 % (ref 6–27)
CD3 CELLS # BLD: 367 CELLS/UL (ref 603–2990)
CD3 CELLS NFR BLD: 42 % (ref 49–84)
CD3+CD4+ CELLS # BLD: 256 CELLS/UL (ref 441–2156)
CD3+CD4+ CELLS NFR BLD: 29 % (ref 28–63)
CD3+CD4+ CELLS/CD3+CD8+ CLL BLD: 2.36 % (ref 1.4–2.6)
CD3+CD8+ CELLS # BLD: 108 CELLS/UL (ref 125–1312)
CD3+CD8+ CELLS NFR BLD: 12 % (ref 10–40)
CD3-CD16+CD56+ CELLS # BLD: 198 CELLS/UL (ref 95–640)
CD3-CD16+CD56+ CELLS NFR BLD: 23 % (ref 4–25)
CHLORIDE SERPL-SCNC: 103 MMOL/L (ref 98–107)
CREAT SERPL-MCNC: 0.82 MG/DL (ref 0.51–0.95)
DEPRECATED HCO3 PLAS-SCNC: 26 MMOL/L (ref 22–29)
EGFRCR SERPLBLD CKD-EPI 2021: 85 ML/MIN/1.73M2
EOSINOPHIL # BLD AUTO: 0.2 10E3/UL (ref 0–0.7)
EOSINOPHIL NFR BLD AUTO: 5 %
ERYTHROCYTE [DISTWIDTH] IN BLOOD BY AUTOMATED COUNT: 12.5 % (ref 10–15)
GLUCOSE SERPL-MCNC: 105 MG/DL (ref 70–99)
HCT VFR BLD AUTO: 42.7 % (ref 35–47)
HGB BLD-MCNC: 14.3 G/DL (ref 11.7–15.7)
IMM GRANULOCYTES # BLD: 0 10E3/UL
IMM GRANULOCYTES NFR BLD: 0 %
LDH SERPL L TO P-CCNC: 196 U/L (ref 0–250)
LYMPHOCYTES # BLD AUTO: 0.7 10E3/UL (ref 0.8–5.3)
LYMPHOCYTES NFR BLD AUTO: 22 %
MAGNESIUM SERPL-MCNC: 2.3 MG/DL (ref 1.7–2.3)
MCH RBC QN AUTO: 31.4 PG (ref 26.5–33)
MCHC RBC AUTO-ENTMCNC: 33.5 G/DL (ref 31.5–36.5)
MCV RBC AUTO: 94 FL (ref 78–100)
MONOCYTES # BLD AUTO: 0.4 10E3/UL (ref 0–1.3)
MONOCYTES NFR BLD AUTO: 11 %
NEUTROPHILS # BLD AUTO: 2.1 10E3/UL (ref 1.6–8.3)
NEUTROPHILS NFR BLD AUTO: 61 %
NRBC # BLD AUTO: 0 10E3/UL
NRBC BLD AUTO-RTO: 0 /100
PHOSPHATE SERPL-MCNC: 3.4 MG/DL (ref 2.5–4.5)
PLATELET # BLD AUTO: 242 10E3/UL (ref 150–450)
POTASSIUM SERPL-SCNC: 3.9 MMOL/L (ref 3.4–5.3)
PROT SERPL-MCNC: 7.1 G/DL (ref 6.4–8.3)
RBC # BLD AUTO: 4.56 10E6/UL (ref 3.8–5.2)
SODIUM SERPL-SCNC: 140 MMOL/L (ref 135–145)
T CELL EXTENDED COMMENT: ABNORMAL
WBC # BLD AUTO: 3.4 10E3/UL (ref 4–11)

## 2024-01-12 PROCEDURE — 88185 FLOWCYTOMETRY/TC ADD-ON: CPT

## 2024-01-12 PROCEDURE — 88264 CHROMOSOME ANALYSIS 20-25: CPT

## 2024-01-12 PROCEDURE — 250N000011 HC RX IP 250 OP 636: Performed by: STUDENT IN AN ORGANIZED HEALTH CARE EDUCATION/TRAINING PROGRAM

## 2024-01-12 PROCEDURE — 84999 UNLISTED CHEMISTRY PROCEDURE: CPT

## 2024-01-12 PROCEDURE — 86334 IMMUNOFIX E-PHORESIS SERUM: CPT | Mod: 26 | Performed by: PATHOLOGY

## 2024-01-12 PROCEDURE — 250N000011 HC RX IP 250 OP 636

## 2024-01-12 PROCEDURE — 88275 CYTOGENETICS 100-300: CPT

## 2024-01-12 PROCEDURE — 88305 TISSUE EXAM BY PATHOLOGIST: CPT | Mod: 26 | Performed by: STUDENT IN AN ORGANIZED HEALTH CARE EDUCATION/TRAINING PROGRAM

## 2024-01-12 PROCEDURE — 84166 PROTEIN E-PHORESIS/URINE/CSF: CPT | Performed by: PATHOLOGY

## 2024-01-12 PROCEDURE — 84155 ASSAY OF PROTEIN SERUM: CPT

## 2024-01-12 PROCEDURE — 71260 CT THORAX DX C+: CPT | Mod: 26 | Performed by: RADIOLOGY

## 2024-01-12 PROCEDURE — 88237 TISSUE CULTURE BONE MARROW: CPT

## 2024-01-12 PROCEDURE — 88188 FLOWCYTOMETRY/READ 9-15: CPT | Mod: GC | Performed by: STUDENT IN AN ORGANIZED HEALTH CARE EDUCATION/TRAINING PROGRAM

## 2024-01-12 PROCEDURE — 83521 IG LIGHT CHAINS FREE EACH: CPT

## 2024-01-12 PROCEDURE — 88184 FLOWCYTOMETRY/ TC 1 MARKER: CPT | Performed by: STUDENT IN AN ORGANIZED HEALTH CARE EDUCATION/TRAINING PROGRAM

## 2024-01-12 PROCEDURE — 38222 DX BONE MARROW BX & ASPIR: CPT | Mod: RT | Performed by: STUDENT IN AN ORGANIZED HEALTH CARE EDUCATION/TRAINING PROGRAM

## 2024-01-12 PROCEDURE — 99207 CHROMOSOME ANALYSIS, BONE MARROW, DIAGNOSIS/RELAPSE: CPT

## 2024-01-12 PROCEDURE — 83735 ASSAY OF MAGNESIUM: CPT

## 2024-01-12 PROCEDURE — 88184 FLOWCYTOMETRY/ TC 1 MARKER: CPT

## 2024-01-12 PROCEDURE — 82374 ASSAY BLOOD CARBON DIOXIDE: CPT

## 2024-01-12 PROCEDURE — 71260 CT THORAX DX C+: CPT

## 2024-01-12 PROCEDURE — 343N000001 HC RX 343

## 2024-01-12 PROCEDURE — 82784 ASSAY IGA/IGD/IGG/IGM EACH: CPT

## 2024-01-12 PROCEDURE — 38221 DX BONE MARROW BIOPSIES: CPT | Performed by: STUDENT IN AN ORGANIZED HEALTH CARE EDUCATION/TRAINING PROGRAM

## 2024-01-12 PROCEDURE — 83615 LACTATE (LD) (LDH) ENZYME: CPT

## 2024-01-12 PROCEDURE — 85060 BLOOD SMEAR INTERPRETATION: CPT | Mod: GC | Performed by: STUDENT IN AN ORGANIZED HEALTH CARE EDUCATION/TRAINING PROGRAM

## 2024-01-12 PROCEDURE — 78816 PET IMAGE W/CT FULL BODY: CPT | Mod: PS

## 2024-01-12 PROCEDURE — 86335 IMMUNFIX E-PHORSIS/URINE/CSF: CPT | Performed by: PATHOLOGY

## 2024-01-12 PROCEDURE — 88291 CYTO/MOLECULAR REPORT: CPT | Performed by: MEDICAL GENETICS

## 2024-01-12 PROCEDURE — 84075 ASSAY ALKALINE PHOSPHATASE: CPT

## 2024-01-12 PROCEDURE — 84100 ASSAY OF PHOSPHORUS: CPT

## 2024-01-12 PROCEDURE — 36415 COLL VENOUS BLD VENIPUNCTURE: CPT

## 2024-01-12 PROCEDURE — A9552 F18 FDG: HCPCS

## 2024-01-12 PROCEDURE — 84165 PROTEIN E-PHORESIS SERUM: CPT | Mod: TC | Performed by: PATHOLOGY

## 2024-01-12 PROCEDURE — 88280 CHROMOSOME KARYOTYPE STUDY: CPT

## 2024-01-12 PROCEDURE — 81050 URINALYSIS VOLUME MEASURE: CPT | Performed by: PATHOLOGY

## 2024-01-12 PROCEDURE — 88311 DECALCIFY TISSUE: CPT | Mod: 26 | Performed by: STUDENT IN AN ORGANIZED HEALTH CARE EDUCATION/TRAINING PROGRAM

## 2024-01-12 PROCEDURE — 74177 CT ABD & PELVIS W/CONTRAST: CPT | Mod: 26 | Performed by: RADIOLOGY

## 2024-01-12 PROCEDURE — 38222 DX BONE MARROW BX & ASPIR: CPT

## 2024-01-12 PROCEDURE — 78816 PET IMAGE W/CT FULL BODY: CPT | Mod: 26 | Performed by: RADIOLOGY

## 2024-01-12 PROCEDURE — 88342 IMHCHEM/IMCYTCHM 1ST ANTB: CPT | Mod: 26 | Performed by: STUDENT IN AN ORGANIZED HEALTH CARE EDUCATION/TRAINING PROGRAM

## 2024-01-12 PROCEDURE — 82232 ASSAY OF BETA-2 PROTEIN: CPT

## 2024-01-12 PROCEDURE — 86359 T CELLS TOTAL COUNT: CPT

## 2024-01-12 PROCEDURE — 88311 DECALCIFY TISSUE: CPT | Mod: TC

## 2024-01-12 PROCEDURE — 86334 IMMUNOFIX E-PHORESIS SERUM: CPT | Performed by: PATHOLOGY

## 2024-01-12 PROCEDURE — 85097 BONE MARROW INTERPRETATION: CPT | Mod: GC | Performed by: STUDENT IN AN ORGANIZED HEALTH CARE EDUCATION/TRAINING PROGRAM

## 2024-01-12 PROCEDURE — 85025 COMPLETE CBC W/AUTO DIFF WBC: CPT

## 2024-01-12 PROCEDURE — 84165 PROTEIN E-PHORESIS SERUM: CPT | Mod: 26 | Performed by: PATHOLOGY

## 2024-01-12 PROCEDURE — 88341 IMHCHEM/IMCYTCHM EA ADD ANTB: CPT | Mod: 26 | Performed by: STUDENT IN AN ORGANIZED HEALTH CARE EDUCATION/TRAINING PROGRAM

## 2024-01-12 RX ORDER — ESTRADIOL 0.5 MG/1
0.5 TABLET ORAL DAILY
COMMUNITY

## 2024-01-12 RX ORDER — IOPAMIDOL 755 MG/ML
10-135 INJECTION, SOLUTION INTRAVASCULAR ONCE
Status: COMPLETED | OUTPATIENT
Start: 2024-01-12 | End: 2024-01-12

## 2024-01-12 RX ADMIN — FLUDEOXYGLUCOSE F-18 10.12 MILLICURIE: 500 INJECTION, SOLUTION INTRAVENOUS at 08:23

## 2024-01-12 RX ADMIN — IOPAMIDOL 100 ML: 755 INJECTION, SOLUTION INTRAVENOUS at 09:18

## 2024-01-12 RX ADMIN — MIDAZOLAM HYDROCHLORIDE 2 MG: 1 INJECTION, SOLUTION INTRAMUSCULAR; INTRAVENOUS at 13:05

## 2024-01-12 ASSESSMENT — PAIN SCALES - GENERAL: PAINLEVEL: NO PAIN (0)

## 2024-01-12 NOTE — LETTER
1/12/2024         RE: Kristie Cornejo  [address]  [facility] [address]        Dear Colleague,    Thank you for referring your patient, Kristie Cornejo, to the [facility] BLOOD AND MARROW TRANSPLANT PRO[facility] [place]. Please see a copy of my visit note below.    BMT ONC Adult Bone Marrow Biopsy Procedure Note  January 12, 2024  /72   Pulse 69   Temp 98.1  F (36.7  C) (Oral)   Resp 20   Wt 75.6 kg (166 lb 9.6 oz)   SpO2 97%   BMI 26.09 kg/m         DIAGNOSIS: multiple myeloma, s/p BMT     PROCEDURE: Unilateral Bone Marrow Biopsy and Unilateral Aspirate    LOCATION: [facility] 2nd Floor    Patient s identification was positively verified by verbal identification and invasive procedure safety checklist was completed. Informed consent was obtained. Following the administration of 2mg Midazolam as pre-medication, patient was placed in the prone position and prepped and draped in a sterile manner. Approximately 10 cc of 1% Lidocaine was used over the right posterior iliac spine. Following this a 3 mm incision was made. Trephine bone marrow core(s) was (were) obtained from the [facility]. Bone marrow aspirates were obtained from the [facility]. Aspirates were sent for morphology, immunophenotyping, cytogenetics, molecular diagnostics RFLP, and research studies TTL. A total of approximately 50 ml of marrow was aspirated. Following this procedure a sterile dressing was applied to the bone marrow biopsy site(s). The patient was placed in the supine position to maintain pressure on the biopsy site. Post-procedure wound care instructions were given.     Complications: NO    Pre-procedural pain: 0 out of 10 on the numeric pain rating scale.     Procedural pain: 3 out of 10 on the numeric pain rating scale.     Post-procedural pain assessment: 0 out of 10 on the numeric pain rating scale.     Interventions: NO    Length of procedure:20 minutes or less      Procedure performed by: Diana FISHER

## 2024-01-12 NOTE — NURSING NOTE
BMBX Teaching and Assessment       Teaching concerns addressed: Bone marrow biopsy and infection prevention.     Person(s) involved in teaching: Patient  Motivation Level  Asks Questions: Yes  Eager to Learn: Yes  Cooperative: Yes  Receptive (willing/able to accept information): Yes    Patient demonstrates understanding of the following:     Reason for the appointment, diagnosis and treatment plan: Yes  Knowledge of proper use of medications and conditions for which they are ordered (with special attention to potential side effects or drug interactions): Yes  Which situations necessitate calling provider and whom to contact: Yes    Teaching concerns addressed:   Reviewed activity restrictions if received premeds, potential for bleeding and actions to take if develops any of the issues below    Pain management techniques: Yes  Patient instructed on hand hygiene: Yes  How and/when to access community resources: Yes    Infection Control:  Patient demonstrates understanding of the following:   Bone marrow procedure site care taught: Yes  Signs and symptoms of infection taught: Yes       Instructional Materials Used/Given: Pt instructed to keep bmbx site clean and dry for 24hrs. Pt educated to monitor site for signs of infection such as redness, rash, oozing, puss, bleeding, pain, and elevated temp. Pt instructed to go to call the [facility] triage line or go to the ER if any signs of infection should occur. Pt educated to not operate machinery if receiving versed. Pt and  verbalize understanding.     Pre-procedure labs drawn via PIV. Post procedure: Patient vital signs stable, ambulating, site is clean, dry and intact prior to discharge and line removed. Pt discharged with  as .

## 2024-01-12 NOTE — PROGRESS NOTES
BMT ONC Adult Bone Marrow Biopsy Procedure Note  January 12, 2024  /72   Pulse 69   Temp 98.1  F (36.7  C) (Oral)   Resp 20   Wt 75.6 kg (166 lb 9.6 oz)   SpO2 97%   BMI 26.09 kg/m         DIAGNOSIS: multiple myeloma, s/p BMT     PROCEDURE: Unilateral Bone Marrow Biopsy and Unilateral Aspirate    LOCATION: [facility] 2nd Floor    Patient s identification was positively verified by verbal identification and invasive procedure safety checklist was completed. Informed consent was obtained. Following the administration of 2mg Midazolam as pre-medication, patient was placed in the prone position and prepped and draped in a sterile manner. Approximately 10 cc of 1% Lidocaine was used over the right posterior iliac spine. Following this a 3 mm incision was made. Trephine bone marrow core(s) was (were) obtained from the [facility]. Bone marrow aspirates were obtained from the [facility]. Aspirates were sent for morphology, immunophenotyping, cytogenetics, molecular diagnostics RFLP, and research studies TTL. A total of approximately 50 ml of marrow was aspirated. Following this procedure a sterile dressing was applied to the bone marrow biopsy site(s). The patient was placed in the supine position to maintain pressure on the biopsy site. Post-procedure wound care instructions were given.     Complications: NO    Pre-procedural pain: 0 out of 10 on the numeric pain rating scale.     Procedural pain: 3 out of 10 on the numeric pain rating scale.     Post-procedural pain assessment: 0 out of 10 on the numeric pain rating scale.     Interventions: NO    Length of procedure:20 minutes or less      Procedure performed by: Diana FISHER

## 2024-01-12 NOTE — NURSING NOTE
"Oncology Rooming Note    January 12, 2024 12:37 PM   Kristie Cornejo is a 53 year old female who presents for:    Chief Complaint   Patient presents with    RECHECK     MM here for bmbx     Initial Vitals: /85   Pulse 80   Temp 98.4  F (36.9  C) (Oral)   Resp 20   Wt 75.6 kg (166 lb 9.6 oz)   SpO2 99%   BMI 26.09 kg/m   Estimated body mass index is 26.09 kg/m  as calculated from the following:    Height as of 7/11/23: 1.702 m (5' 7\").    Weight as of this encounter: 75.6 kg (166 lb 9.6 oz). Body surface area is 1.89 meters squared.  No Pain (0) Comment: Data Unavailable   No LMP recorded. (Menstrual status: IUD).  Allergies reviewed: Yes  Medications reviewed: Yes    Medications: Medication refills not needed today.  Pharmacy name entered into [employer]:    [facility] [place], [place] - [address]  [facility] [place] [place] - [address]    Frailty Screening:   Is the patient here for a new oncology consult visit in cancer care? 2. No      Clinical concerns: None      Osei Simmons RN              "

## 2024-01-13 LAB — TOTAL PROTEIN SERUM FOR ELP: 6.8 G/DL (ref 6.4–8.3)

## 2024-01-15 LAB
ALBUMIN SERPL ELPH-MCNC: 4.6 G/DL (ref 3.7–5.1)
ALPHA1 GLOB SERPL ELPH-MCNC: 0.3 G/DL (ref 0.2–0.4)
ALPHA2 GLOB SERPL ELPH-MCNC: 0.8 G/DL (ref 0.5–0.9)
B-GLOBULIN SERPL ELPH-MCNC: 0.6 G/DL (ref 0.6–1)
B2 MICROGLOB TUMOR MARKER SER-MCNC: 1.7 MG/L
GAMMA GLOB SERPL ELPH-MCNC: 0.5 G/DL (ref 0.7–1.6)
IGG SERPL-MCNC: 573 MG/DL (ref 610–1616)
KAPPA LC FREE SER-MCNC: 0.78 MG/DL (ref 0.33–1.94)
KAPPA LC FREE/LAMBDA FREE SER NEPH: 1.77 {RATIO} (ref 0.26–1.65)
LAMBDA LC FREE SERPL-MCNC: 0.44 MG/DL (ref 0.57–2.63)
LOCATION OF TASK: ABNORMAL
LOCATION OF TASK: NORMAL
M PROTEIN SERPL ELPH-MCNC: 0 G/DL
PATH REPORT.COMMENTS IMP SPEC: NORMAL
PATH REPORT.FINAL DX SPEC: NORMAL
PATH REPORT.FINAL DX SPEC: NORMAL
PATH REPORT.GROSS SPEC: NORMAL
PATH REPORT.MICROSCOPIC SPEC OTHER STN: NORMAL
PATH REPORT.RELEVANT HX SPEC: NORMAL
PATH REPORT.RELEVANT HX SPEC: NORMAL
PROT PATTERN SERPL ELPH-IMP: ABNORMAL
PROT PATTERN SERPL IFE-IMP: NORMAL
PROT PATTERN UR ELPH-IMP: NORMAL

## 2024-01-16 LAB — PROT ELPH PNL UR ELPH: NORMAL

## 2024-01-16 NOTE — PROGRESS NOTES
Being evaluated/managed by Dr. Raymond/EFRA Rivera . No acute findings today meriting change in management plan.     Infusion Nursing Note:  Kristie Cornejo presents today for scheduled injection.    Patient seen by provider today: No   present during visit today: Not Applicable.    Note: Patient received velcade injection per treatment plan.      Intravenous Access:  No Intravenous access/labs at this visit.    Post Infusion Assessment:  Patient tolerated injection without incident.       Discharge Plan:   Patient and/or family verbalized understanding of discharge instructions and all questions answered.      Misti Winters RN

## 2024-01-18 ENCOUNTER — OFFICE VISIT (OUTPATIENT)
Dept: TRANSPLANT | Facility: CLINIC | Age: 54
End: 2024-01-18
Payer: COMMERCIAL

## 2024-01-18 VITALS
OXYGEN SATURATION: 98 % | WEIGHT: 166.6 LBS | DIASTOLIC BLOOD PRESSURE: 72 MMHG | HEART RATE: 85 BPM | SYSTOLIC BLOOD PRESSURE: 114 MMHG | BODY MASS INDEX: 26.09 KG/M2 | RESPIRATION RATE: 16 BRPM

## 2024-01-18 DIAGNOSIS — C90.00 MULTIPLE MYELOMA NOT HAVING ACHIEVED REMISSION (H): Primary | ICD-10-CM

## 2024-01-18 LAB — INTERPRETATION: NORMAL

## 2024-01-18 PROCEDURE — 99215 OFFICE O/P EST HI 40 MIN: CPT

## 2024-01-18 PROCEDURE — 99213 OFFICE O/P EST LOW 20 MIN: CPT

## 2024-01-18 ASSESSMENT — PAIN SCALES - GENERAL: PAINLEVEL: NO PAIN (0)

## 2024-01-18 NOTE — NURSING NOTE
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    Weight as of this encounter: 75.6 kg (166 lb 9.6 oz). Body surface area is 1.89 meters squared.  No Pain (0) Comment: Data Unavailable   No LMP recorded. (Menstrual status: IUD).  Allergies reviewed: Yes  Medications reviewed: Yes    Medications: Medication refills not needed today.  Pharmacy name entered into [employer]:    [facility] [place], [place] - [address]  [facility] [place] [place] - [address]    Frailty Screening:   Is the patient here for a new oncology consult visit in cancer care? 2. No      Clinical concerns: none       Tammi Freeman MA             "

## 2024-01-18 NOTE — PROGRESS NOTES
BMT Progress Note  2024      BMT Physician: Dr. Reinoso   Transplant Type: CAR-T Abecma  Preparative Regimen: Cy/Flu  Chief complaint: Kristie Cornejo is a 53 year old female with a history of multiple myeloma conditioned with Cy/Flu, currently day day 200 of her CAR T-cell therapy with Abecma.  - readmitted briefly  with transient fever, ongoing headache (post LP)  - all resolved.    ! Year anniversary       INTERIM HISTORY:   Margo is feeling great, energy is good, no new complains  active and busy, some hands joint pains. 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      no new body aches. BM are stable now, she had colonoscopy in 2022 which was neg.    - no n/v/d, fevers, or leg swelling.   -       REVIEW OF SYSTEMS: Otherwise unremarkable other than what is noted in the Interim History.      PHYSICAL EXAM:   Wt Readings from Last 4 Encounters:   24 75.6 kg (166 lb 9.6 oz)   24 75.6 kg (166 lb 9.6 oz)   23 74.8 kg (164 lb 14.4 oz)   10/11/23 72.6 kg (160 lb)     ECO    /72   Pulse 85   Resp 16   Wt 75.6 kg (166 lb 9.6 oz)   SpO2 98%   BMI 26.09 kg/m       General Appearance: NAD, happy, well,   HEENT: sclera anicteric. EOM's intact.  CV: RRR. no murmur or rub.   RESP: CTAB, RRR<  EXT: No edema.   SKIN: no rash on exposed skin  NEURO: A&O  PSYCH: Appropriate affect       Lab Results   Component Value Date    WBC 3.4 (L) 2024    ANEU 1.0 (L) 2023    HGB 14.3 2024    HCT 42.7 2024     2024     2024    POTASSIUM 3.9 2024    CHLORIDE 103 2024    CO2 26 2024     (H) 2024    BUN 17.7 2024    CR 0.82 2024    MAG 2.3 2024    INR 1.00 2023    AST 20 2024    ALT 18 2024        Latest Reference Range & Units 24 12:39    - 1,616 mg/dL 573 (L)   Immunofixation ELP  No monoclonal protein seen on immunofixation. Pathologic  significance requires clinical correlation. Robby Mari M.D., Ph.D., Pathologist   Kappa Free Lt Chain 0.33 - 1.94 mg/dL 0.78   Kappa Lambda Ratio 0.26 - 1.65  1.77 (H)   Lambda Free Lt Chain 0.57 - 2.63 mg/dL 0.44 (L)   Monoclonal Peak <=0.0 g/dL 0.0   Total Protein Serum for ELP 6.4 - 8.3 g/dL 6.8   (L): Data is abnormally low  (H): Data is abnormally high     Latest Reference Range & Units 01/12/24 12:39   Absolute CD16+56 95 - 640 cells/uL 198   Absolute CD19 107 - 698 cells/uL 296   Absolute CD3 603 - 2,990 cells/uL 367 (L)   Absolute CD4 441 - 2,156 cells/uL 256 (L)   Absolute CD8 125 - 1,312 cells/uL 108 (L)   Albumin Fraction 3.7 - 5.1 g/dL 4.6   Alpha 1 Fraction 0.2 - 0.4 g/dL 0.3   Alpha 2 Fraction 0.5 - 0.9 g/dL 0.8   Beta Fraction 0.6 - 1.0 g/dL 0.6   CD16 + 56 Natural Killer Cells 4 - 25 % 23   CD19 B Cells 6 - 27 % 34 (H)   CD3 Mature T 49 - 84 % 42 (L)   CD4:CD8 Ratio 1.40 - 2.60  2.36   CD4 [place] T 28 - 63 % 29   CD8 Suppressor T 10 - 40 % 12   (L): Data is abnormally low  (H): Data is abnormally high    BMBx 1/12/2024  Final Diagnosis   Bone marrow, posterior iliac crest, right decalcified trephine biopsy and touch imprint; particle crush, direct aspirate smear, and concentrated aspirate smear; and peripheral smear:  - No morphologic and immunophenotypic evidence of recurrent/persistent plasma cell myeloma  - Hypocellular marrow (cellularity is estimated at 30-40%) with trilineage hematopoesis, slight left-shifted maturation no overt dysplasia and 1% blasts  - Peripheral blood showing slight leukopenia with lymphopenia and no circulating plasma cells  -  See comment        ASSESSMENT AND PLAN:     Kristie Cornejo is a 53 year old female with a history of multiple myeloma conditioned with Cy/Flu, currently 1 year after Abecma.         Light chain MM, standard risk with t(11;14). Has never achieved remission with standard therapy.   No p53 or 17del  S/p Abecma 1 year (median PFS for  patients in CR is about 18 months)    Day 29 - re-staging studies drawn today, disease response: CR.  Marrow not assessed yet, will do at 3 months   Day 100 - restaging PET and BMBx with CR without MRD !! This is a great outcome.  6 months  - ongoing remission based on serum testing. No MRD.   6 months  - ongoing remission based on serum testing. No MRD.  1 year  - ongoing stringent remission, no MRD       On Neurotox prophylaxis study. No ICANS.   it dex x 2 doses (-1, +6)   --completed Simvastatin daily 40mg until day day +30, now off     HEME  Counts are recovered now.  No transfusions.  G-CSF for ANC below 1000.       IMMUNOCOMPROMISED  - ok to stop fluc and Levaquin.  -PJP prophylaxis: off Bactrim.  CD4 is above 200.   -Hypogammaglobulinemia - s/p  IVIG in May and Sept 23  IgG 576mg/dl - no IVIG replacement needed now.      Recheck in 2 months.      COVID vaccine booster, influnza Sept 23)    CARDIOVASCULAR  - Risk of cardiomyopathy:  Baseline LVEF: 55-60%   - Risk of arrhythmia: Baseline EKG showed sinus rhythm nonspecific T wave abnormality  - Hx of PVC ablation March 2022      GI  - Ulcer prophylaxis: Protonix   -refer for colonoscopy given PET findings. Doubt malignancy, but given bouts of diarrhea she may have CMV colitis or infections. I suggest to obtain biopsy     Today's Summary:  F/u with me for 18 months  Anniversary  SULEIMAN visit in 12 weeks           I spent 40 minutes in the care of this patient today, which included time necessary for preparation for the visit, obtaining history, ordering medications/tests/procedures as medically indicated, review of pertinent medical literature, counseling of the patient, communication of recommendations to the care team, and documentation time.      Payton Reinoso MD

## 2024-01-18 NOTE — LETTER
2024         RE: Kristie Cornejo  [address]  [facility] [address]        Dear Colleague,    Thank you for referring your patient, Kristie Cornejo, to the [facility] [place]. Please see a copy of my visit note below.    BMT Progress Note  2024      BMT Physician: Dr. Reinoso   Transplant Type: CAR-T Abecma  Preparative Regimen: Cy/Flu  Chief complaint: Kristie Cornejo is a 53 year old female with a history of multiple myeloma conditioned with Cy/Flu, currently day day 200 of her CAR T-cell therapy with Abecma.  - readmitted briefly  with transient fever, ongoing headache (post LP)  - all resolved.    ! Year anniversary       INTERIM HISTORY:   Margo is feeling great, energy is good, no new complains  active and busy, some hands joint pains. 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      no new body aches. BM are stable now, she had colonoscopy in 2022 which was neg.    - no n/v/d, fevers, or leg swelling.   -       REVIEW OF SYSTEMS: Otherwise unremarkable other than what is noted in the Interim History.      PHYSICAL EXAM:   Wt Readings from Last 4 Encounters:   24 75.6 kg (166 lb 9.6 oz)   24 75.6 kg (166 lb 9.6 oz)   23 74.8 kg (164 lb 14.4 oz)   10/11/23 72.6 kg (160 lb)     ECO    /72   Pulse 85   Resp 16   Wt 75.6 kg (166 lb 9.6 oz)   SpO2 98%   BMI 26.09 kg/m       General Appearance: NAD, happy, well,   HEENT: sclera anicteric. EOM's intact.  CV: RRR. no murmur or rub.   RESP: CTAB, RRR<  EXT: No edema.   SKIN: no rash on exposed skin  NEURO: A&O  PSYCH: Appropriate affect       Lab Results   Component Value Date    WBC 3.4 (L) 2024    ANEU 1.0 (L) 2023    HGB 14.3 2024    HCT 42.7 2024     2024     2024    POTASSIUM 3.9 2024    CHLORIDE 103 2024    CO2 26 2024     (H) 2024    BUN 17.7  01/12/2024    CR 0.82 01/12/2024    MAG 2.3 01/12/2024    INR 1.00 02/02/2023    AST 20 01/12/2024    ALT 18 01/12/2024        Latest Reference Range & Units 01/12/24 12:39    - 1,616 mg/dL 573 (L)   Immunofixation ELP  No monoclonal protein seen on immunofixation. Pathologic significance requires clinical correlation. Robby Mari M.D., Ph.D., Pathologist   Kappa Free Lt Chain 0.33 - 1.94 mg/dL 0.78   Kappa Lambda Ratio 0.26 - 1.65  1.77 (H)   Lambda Free Lt Chain 0.57 - 2.63 mg/dL 0.44 (L)   Monoclonal Peak <=0.0 g/dL 0.0   Total Protein Serum for ELP 6.4 - 8.3 g/dL 6.8   (L): Data is abnormally low  (H): Data is abnormally high     Latest Reference Range & Units 01/12/24 12:39   Absolute CD16+56 95 - 640 cells/uL 198   Absolute CD19 107 - 698 cells/uL 296   Absolute CD3 603 - 2,990 cells/uL 367 (L)   Absolute CD4 441 - 2,156 cells/uL 256 (L)   Absolute CD8 125 - 1,312 cells/uL 108 (L)   Albumin Fraction 3.7 - 5.1 g/dL 4.6   Alpha 1 Fraction 0.2 - 0.4 g/dL 0.3   Alpha 2 Fraction 0.5 - 0.9 g/dL 0.8   Beta Fraction 0.6 - 1.0 g/dL 0.6   CD16 + 56 Natural Killer Cells 4 - 25 % 23   CD19 B Cells 6 - 27 % 34 (H)   CD3 Mature T 49 - 84 % 42 (L)   CD4:CD8 Ratio 1.40 - 2.60  2.36   CD4 [place] T 28 - 63 % 29   CD8 Suppressor T 10 - 40 % 12   (L): Data is abnormally low  (H): Data is abnormally high    BMBx 1/12/2024  Final Diagnosis   Bone marrow, posterior iliac crest, right decalcified trephine biopsy and touch imprint; particle crush, direct aspirate smear, and concentrated aspirate smear; and peripheral smear:  - No morphologic and immunophenotypic evidence of recurrent/persistent plasma cell myeloma  - Hypocellular marrow (cellularity is estimated at 30-40%) with trilineage hematopoesis, slight left-shifted maturation no overt dysplasia and 1% blasts  - Peripheral blood showing slight leukopenia with lymphopenia and no circulating plasma cells  -  See comment        ASSESSMENT AND PLAN:     Kristie SELLERS  Clemente is a 53 year old female with a history of multiple myeloma conditioned with Cy/Flu, currently 1 year after Abecma.         Light chain MM, standard risk with t(11;14). Has never achieved remission with standard therapy.   No p53 or 17del  S/p Abecma 1 year (median PFS for patients in CR is about 18 months)    Day 29 - re-staging studies drawn today, disease response: CR.  Marrow not assessed yet, will do at 3 months   Day 100 - restaging PET and BMBx with CR without MRD !! This is a great outcome.  6 months  - ongoing remission based on serum testing. No MRD.   6 months  - ongoing remission based on serum testing. No MRD.  1 year  - ongoing stringent remission, no MRD       On Neurotox prophylaxis study. No ICANS.   it dex x 2 doses (-1, +6)   --completed Simvastatin daily 40mg until day day +30, now off     HEME  Counts are recovered now.  No transfusions.  G-CSF for ANC below 1000.       IMMUNOCOMPROMISED  - ok to stop fluc and Levaquin.  -PJP prophylaxis: off Bactrim.  CD4 is above 200.   -Hypogammaglobulinemia - s/p  IVIG in May and Sept 23  IgG 576mg/dl - no IVIG replacement needed now.      Recheck in 2 months.      COVID vaccine booster, influnza Sept 23)    CARDIOVASCULAR  - Risk of cardiomyopathy:  Baseline LVEF: 55-60%   - Risk of arrhythmia: Baseline EKG showed sinus rhythm nonspecific T wave abnormality  - Hx of PVC ablation March 2022      GI  - Ulcer prophylaxis: Protonix   -refer for colonoscopy given PET findings. Doubt malignancy, but given bouts of diarrhea she may have CMV colitis or infections. I suggest to obtain biopsy     Today's Summary:  F/u with me for 18 months  Anniversary  SULEIMAN visit in 12 weeks           I spent 40 minutes in the care of this patient today, which included time necessary for preparation for the visit, obtaining history, ordering medications/tests/procedures as medically indicated, review of pertinent medical literature, counseling of the patient, communication of  recommendations to the care team, and documentation time.      Payton Reinoso MD

## 2024-01-22 LAB — INTERPRETATION: NORMAL

## 2024-01-23 LAB
Lab: NORMAL
PERFORMING LABORATORY: NORMAL
SPECIMEN STATUS: NORMAL
TEST NAME: NORMAL

## 2024-01-29 LAB — MISCELLANEOUS TEST 1 (ARUP): NORMAL

## 2024-01-30 LAB — CULTURE HARVEST COMPLETE DATE: NORMAL

## 2024-01-31 LAB — INTERPRETATION: NORMAL

## 2024-02-01 ENCOUNTER — PATIENT OUTREACH (OUTPATIENT)
Dept: ONCOLOGY | Facility: CLINIC | Age: 54
End: 2024-02-01
Payer: COMMERCIAL

## 2024-02-01 NOTE — PROGRESS NOTES
[facility]: Cancer Care Short Note                                    Discussion with Patient:                                                      INBOUND CALL: VM received from patient. Calling with questions on MD's most recent note-- in the note it states that colonoscopy is recommended. Wondering if she needs this. Also wondering if she cna get RSV, shingles, and PNA vaccines. Callback requested.     OUTBOUND CALL: RNCC called patient-- reviewed recent note from MD-- colonoscopy rec was a previous recommendation. No current need for scope at this time.   She is wondering about getting PNA, shingles, and RSV vaccines. She will get them locally but wanted to make sure it is ok with MD-- RNCC will reach out to Md and find out, especially in light of CAR T 1 year ago.   Pt has another cold-- saw PCP yesterday who started her on amoxicillin and z-pack. She just picked these up and will start tonight. Chest XR clear. PCP did not swab for covid or viral panel. She will call tirage if cold continues ot linger.   She states in the last couple of weeks she feels like her heart is skipping a beat periodically when she overexerts herself. Does not happen frequently, but wondering if she can establish with cards locally--- ok form onc perspective for this.   Antibiotics started tonight. Z pack and amoxicillin and she will start      Intervention/Education provided during outreach:                                                       Will send pt [employer] with answer on vaccines.     Patient to follow up as scheduled at next appt  Patient to call/Pazient message with updates  Confirmed patient has clinic and triage numbers    Patient verbalizes understanding of POC and has no other questions or concerns at this time.     Griselda Valdez, RN, MSN, OCN   RN Care Coordinator   [facility]   [address] [place] [id number]   [phone]

## 2024-02-06 LAB — CULTURE HARVEST COMPLETE DATE: NORMAL

## 2024-03-18 ENCOUNTER — PATIENT OUTREACH (OUTPATIENT)
Dept: ONCOLOGY | Facility: CLINIC | Age: 54
End: 2024-03-18

## 2024-03-18 ENCOUNTER — LAB (OUTPATIENT)
Dept: LAB | Facility: CLINIC | Age: 54
End: 2024-03-18
Payer: COMMERCIAL

## 2024-03-18 DIAGNOSIS — C90.00 MULTIPLE MYELOMA, REMISSION STATUS UNSPECIFIED (H): ICD-10-CM

## 2024-03-18 DIAGNOSIS — C90.00 LIGHT CHAIN MYELOMA (H): ICD-10-CM

## 2024-03-18 DIAGNOSIS — D80.1 HYPOGAMMAGLOBULINEMIA (H): ICD-10-CM

## 2024-03-18 LAB
ALBUMIN SERPL BCG-MCNC: 4.9 G/DL (ref 3.5–5.2)
ALP SERPL-CCNC: 73 U/L (ref 40–150)
ALT SERPL W P-5'-P-CCNC: 26 U/L (ref 0–50)
ANION GAP SERPL CALCULATED.3IONS-SCNC: 10 MMOL/L (ref 7–15)
AST SERPL W P-5'-P-CCNC: 25 U/L (ref 0–45)
BASOPHILS # BLD AUTO: 0 10E3/UL (ref 0–0.2)
BASOPHILS NFR BLD AUTO: 1 %
BILIRUB SERPL-MCNC: 0.3 MG/DL
BUN SERPL-MCNC: 15.7 MG/DL (ref 6–20)
CALCIUM SERPL-MCNC: 9 MG/DL (ref 8.6–10)
CHLORIDE SERPL-SCNC: 106 MMOL/L (ref 98–107)
CREAT SERPL-MCNC: 0.71 MG/DL (ref 0.51–0.95)
DEPRECATED HCO3 PLAS-SCNC: 25 MMOL/L (ref 22–29)
EGFRCR SERPLBLD CKD-EPI 2021: >90 ML/MIN/1.73M2
EOSINOPHIL # BLD AUTO: 0.2 10E3/UL (ref 0–0.7)
EOSINOPHIL NFR BLD AUTO: 5 %
ERYTHROCYTE [DISTWIDTH] IN BLOOD BY AUTOMATED COUNT: 12.9 % (ref 10–15)
GLUCOSE SERPL-MCNC: 80 MG/DL (ref 70–99)
HCT VFR BLD AUTO: 43.6 % (ref 35–47)
HGB BLD-MCNC: 14.5 G/DL (ref 11.7–15.7)
IMM GRANULOCYTES # BLD: 0 10E3/UL
IMM GRANULOCYTES NFR BLD: 0 %
LYMPHOCYTES # BLD AUTO: 0.6 10E3/UL (ref 0.8–5.3)
LYMPHOCYTES NFR BLD AUTO: 21 %
MCH RBC QN AUTO: 31.7 PG (ref 26.5–33)
MCHC RBC AUTO-ENTMCNC: 33.3 G/DL (ref 31.5–36.5)
MCV RBC AUTO: 95 FL (ref 78–100)
MONOCYTES # BLD AUTO: 0.7 10E3/UL (ref 0–1.3)
MONOCYTES NFR BLD AUTO: 22 %
NEUTROPHILS # BLD AUTO: 1.6 10E3/UL (ref 1.6–8.3)
NEUTROPHILS NFR BLD AUTO: 51 %
NRBC # BLD AUTO: 0 10E3/UL
NRBC BLD AUTO-RTO: 0 /100
PLATELET # BLD AUTO: 214 10E3/UL (ref 150–450)
POTASSIUM SERPL-SCNC: 3.7 MMOL/L (ref 3.4–5.3)
PROT SERPL-MCNC: 7 G/DL (ref 6.4–8.3)
RBC # BLD AUTO: 4.57 10E6/UL (ref 3.8–5.2)
SODIUM SERPL-SCNC: 141 MMOL/L (ref 135–145)
WBC # BLD AUTO: 3 10E3/UL (ref 4–11)

## 2024-03-18 PROCEDURE — 85025 COMPLETE CBC W/AUTO DIFF WBC: CPT

## 2024-03-18 PROCEDURE — 83521 IG LIGHT CHAINS FREE EACH: CPT

## 2024-03-18 PROCEDURE — 84155 ASSAY OF PROTEIN SERUM: CPT

## 2024-03-18 PROCEDURE — 86334 IMMUNOFIX E-PHORESIS SERUM: CPT | Performed by: PATHOLOGY

## 2024-03-18 PROCEDURE — 82784 ASSAY IGA/IGD/IGG/IGM EACH: CPT

## 2024-03-18 PROCEDURE — 36415 COLL VENOUS BLD VENIPUNCTURE: CPT

## 2024-03-18 PROCEDURE — 80053 COMPREHEN METABOLIC PANEL: CPT

## 2024-03-18 PROCEDURE — 84165 PROTEIN E-PHORESIS SERUM: CPT | Performed by: PATHOLOGY

## 2024-03-18 NOTE — PROGRESS NOTES
[facility]: Cancer Care Short Note                                    Discussion with Patient:                                                        OUTBOUND CALL: RNCC called patient after reeiving VM form her. She is wondering if she can adjust appt with Saige on 3/27, as she has a college visit for her son. RNCC and patient agreed on rescheduling appt for this week with SULEIMAN. Will send a message to scheduling. Seh will have labs today. Pt verbalizes understanding and has no other questions, comments, or concerns at this time.     Griselda Valdez, RN, MSN, OCN   RN Care Coordinator   [facility]   [address] [place] [id number] [phone]

## 2024-03-19 LAB
IGA SERPL-MCNC: 5 MG/DL (ref 84–499)
IGG SERPL-MCNC: 546 MG/DL (ref 610–1616)
IGM SERPL-MCNC: 77 MG/DL (ref 35–242)
KAPPA LC FREE SER-MCNC: 0.97 MG/DL (ref 0.33–1.94)
KAPPA LC FREE/LAMBDA FREE SER NEPH: 1.45 {RATIO} (ref 0.26–1.65)
LAMBDA LC FREE SERPL-MCNC: 0.67 MG/DL (ref 0.57–2.63)
TOTAL PROTEIN SERUM FOR ELP: 6.5 G/DL (ref 6.4–8.3)

## 2024-03-20 LAB
ALBUMIN SERPL ELPH-MCNC: 4.4 G/DL (ref 3.7–5.1)
ALPHA1 GLOB SERPL ELPH-MCNC: 0.3 G/DL (ref 0.2–0.4)
ALPHA2 GLOB SERPL ELPH-MCNC: 0.7 G/DL (ref 0.5–0.9)
B-GLOBULIN SERPL ELPH-MCNC: 0.6 G/DL (ref 0.6–1)
GAMMA GLOB SERPL ELPH-MCNC: 0.5 G/DL (ref 0.7–1.6)
M PROTEIN SERPL ELPH-MCNC: 0 G/DL
PROT PATTERN SERPL ELPH-IMP: ABNORMAL

## 2024-03-21 ENCOUNTER — VIRTUAL VISIT (OUTPATIENT)
Dept: ONCOLOGY | Facility: CLINIC | Age: 54
End: 2024-03-21
Payer: COMMERCIAL

## 2024-03-21 VITALS — WEIGHT: 162 LBS | HEIGHT: 67 IN | BODY MASS INDEX: 25.43 KG/M2

## 2024-03-21 DIAGNOSIS — J06.9 UPPER RESPIRATORY TRACT INFECTION, UNSPECIFIED TYPE: Primary | ICD-10-CM

## 2024-03-21 DIAGNOSIS — C90.00 MULTIPLE MYELOMA, REMISSION STATUS UNSPECIFIED (H): ICD-10-CM

## 2024-03-21 DIAGNOSIS — T78.40XD ALLERGIC REACTION, SUBSEQUENT ENCOUNTER: ICD-10-CM

## 2024-03-21 LAB — PROT PATTERN SERPL IFE-IMP: NORMAL

## 2024-03-21 PROCEDURE — 99215 OFFICE O/P EST HI 40 MIN: CPT | Mod: 95 | Performed by: NURSE PRACTITIONER

## 2024-03-21 PROCEDURE — 99417 PROLNG OP E/M EACH 15 MIN: CPT | Mod: 95 | Performed by: NURSE PRACTITIONER

## 2024-03-21 RX ORDER — TRIAMCINOLONE ACETONIDE 1 MG/G
CREAM TOPICAL 2 TIMES DAILY
Qty: 453.6 G | Refills: 1 | Status: SHIPPED | OUTPATIENT
Start: 2024-03-21

## 2024-03-21 ASSESSMENT — PAIN SCALES - GENERAL: PAINLEVEL: NO PAIN (0)

## 2024-03-21 NOTE — PROGRESS NOTES
Virtual Visit Details    Type of service:  Video Visit   Start time:  1:32 PM  End time:    2:06 PM  Originating Location (pt. Location): Home    Distant Location (provider location):  On-site  Platform used for Video Visit: Sariah

## 2024-03-21 NOTE — NURSING NOTE
Patient denies any changes since check-in regarding medication and allergies and states all information entered during check-in remains accurate.    Is the patient currently in the state of [place]? YES    Visit mode:VIDEO    If the visit is dropped, the patient can be reconnected by: VIDEO VISIT: Text to cell phone:   Telephone Information:   Mobile [phone]   Mobile Not on file.       Will anyone else be joining the visit? NO  (If patient encounters technical issues they should call [phone])    How would you like to obtain your AVS? MyChart    Are changes needed to the allergy or medication list? No, Pt stated no changes to allergies, and Pt stated no med changes    Reason for visit: Follow Up (3 month follow up, pt states she's been unable to get rid of cough, and sent Ketto message about a rash, pt would like to discuss. Medications/allergies reviewed with pt, no changes per pt. )    Galilea Chaudhary, Visit Facilitator/MA.

## 2024-03-21 NOTE — LETTER
3/21/2024         RE: Kristie Cornejo  [address]  [facility] [address]        Dear Colleague,    Thank you for referring your patient, Kristie Cornejo, to the [facility]. Please see a copy of my visit note below.      [facility]    Hematology/Oncology Clinic Note    Date of Visit: Mar 21, 2024     Oncologist: Dr. Payton Reinoso    Reason for visit: Routine myeloma follow-up           Oncology History   53 year old female with past medical history significant for Irritable bowel disease, allergies, large plasmacytoma of the L pelvis, and newly diagnosed multiple myeloma.     Early in April 2021 , she noted to have left sided groin pain, constant and was affecting her daily activities  MRI showed large, expansile, permeative mass with indistinct borders involving the left superior and inferior pubic rami, pubic symphysis and with possible extension to the left acetabulum.     Biopsy 9/7/2021: plasmacytoma.  Flow cytometry showed kappa monotypic plasma cells, polytypic B cells. FISH results from plasmacytoma showed a gain of 11q, IGH-CCND1 fusion but no losses of 1q or TP53 and no gain of 1q.    Bone marrow biopsy 09/22/21: Marrow cellularity 50% with 25% interstitial infiltration w/  Kappa-monotypic, moderately atypical plasma cells. Flow with 0.7% kappa-monotypica plasma cells. FISH and cytogenetics pending.     PET CT 09/24/21 w/ hypermetabolic pathologic fracture of the L pubic ramus w/ aggressive appearing osteolysis but no additional suspicious lytic or blastic osseous lesions.     Treatment to date:   - Zometa q4 weeks started 9/27/21  - Radiation to left pubic ramus x18 fractions, completed 11/11/21.   - RVD C1D1 11/15/21  - RVD C2D1 12/6/21  - RVD C3D1  12/27/21  - RVD C4 D1 1/17/22, elizabeth added on D8 due to inadequate response of urinary M-spike and FLCs remaining over 100 mg/dL after 4 cycles of RVD    - Elizabeth-RVD C1D8  1/24/22  - Elizabeth-RVD C2D1 2/7/22  - Elizabeth-RVD C3D1 2/28/22  - Elizabeth-RVD C4D1 3/29/22  - Elizabeth-RVD C5D1 4/19/22    Disease response: WA     - 5/19/22: Continued monotherapy with Daratumumab; held Revlimid and Velcade.    - 6/21/22: Transitioned to KCD, received C1 days 1 and 2- had significant PVCs at cardiac follow-uyp after 1st week of Kyprolis, held further doses.    - Transitioned to Sarclisa/Pom/Dex 7/6/22. Started Pom on 7/11.     -9/8/22 - COVID, delayed C3D15 treatment x 1 week    - Apheresis for CAR-T on 11/22/22; bridging with Sarclisa given 11/23/22     - Abecma 1/4/23 infusion       INTERIM HISTORY:   Margo presents virtually    New rash pruritic rash between hips and breast.  Had this in the past.  Was on Xolair in the past with allergist. Her allergist past away yet she was provided her medical records. Using TMC and Sarna cream.  Needs more topicals.  Also with an ongoing productive cough and constant PND.  Takes Zyrtec and flonase daily. PCP provided patient with abx due to MM hx and concern for infection.  CXR negative    PVCs.  Follows with cardiology.      OB GYN follow up- on estrogen    No diarrhea         Physical Exam:     There were no vitals taken for this visit.     ECOG 0  Objective:  General: patient appears well in no acute distress, alert and oriented, speech clear and fluid  Skin: no visualized rash or lesions on visualized skin. See [employer] message regarding new rash with pictures  Resp: Appears to be breathing comfortably without accessory muscle usage, speaking in full sentences, no audible wheezes or cough.  Psych: Coherent speech, normal rate and volume, able to articulate logical thoughts, able to abstract reason, no tangential thoughts, no hallucinations or delusions  Patient's affect is appropriate.           Laboratory Data:      I personally reviewed the following labs:    Most Recent 3 CBC's:  Recent Labs   Lab Test 03/18/24  1059 01/12/24  1239 12/11/23  1002   WBC 3.0* 3.4* 3.1*    HGB 14.5 14.3 13.8   MCV 95 94 93    242 237     Most Recent 3 BMP's:  Recent Labs   Lab Test 03/18/24  1059 01/12/24  1239 12/11/23  1002    140 142   POTASSIUM 3.7 3.9 4.3   CHLORIDE 106 103 106   CO2 25 26 23   BUN 15.7 17.7 15.9   CR 0.71 0.82 0.82   ANIONGAP 10 11 13   MEKA 9.0 9.9 9.4   GLC 80 105* 81     Most Recent 2 LFT's:  Recent Labs   Lab Test 03/18/24  1059 01/12/24  1239   AST 25 20   ALT 26 18   ALKPHOS 73 66   BILITOTAL 0.3 0.4            Assessment and Recommendations     Multiple myeloma  Kappa light chain MM, standard risk with t(11;14).   Left pubic ramus plasmacytoma with expansile destruction of left superior and inferior rami, pubic symphysis, and acetabulum  See Oncology HPI  Has never achieved remission with standard therapy.   Underwent CAR-T therapy with Abecma 1/4/23.   S/p Abecma 1 year (median PFS for patients in CR is about 18 months)  Day 29 - re-staging studies disease response: CR.  Day 100 - restaging PET and BMBx with CR without MRD.  6 months  - ongoing remission based on serum testing. No MRD.   6 months  - ongoing remission based on serum testing. No MRD.  1 year  - ongoing stringent remission, no MRD     URI Symptoms  Rash  ? Allergies  Frequent per patient ROS with minimal response to po abx  Takes antihistamines/flonase daily.  IgG levels stable  [facility] recommended   [facility] will look into establishing with an immunologist to evaluate this as well as the rashes she has  [facility] prescribed in the interim    ID Prophylaxis  Continue Acyclovir 800 mg po BID    Hypogammaglobulinemia  s/p  IVIG in April (3/29/23 IgG 217)  and October 2023 (10/2/23 IgG 301)  1/12/24 IgG 573 mg/dl - no IVIG replacement needed now.   3/18/24 IgG 546      Bone Health  No longer on Zometa  She will continue Calcium/Vitamin D supplements.     PVCs  ECHO 12/1/11 and had follow-up with cardiology 12/7 (notes in CareEverywhere).  She had follow-up with cardiology here at the  and they recommend an  "ablation. Stress test was completed   Toprol XL however was discontinued due to significant side effects of orthostatic hypotension, pre-syncopal episodes, shakiness, and weakness.   Ablation 3/23/22 but has not experienced relief from the feeling of being nauseated and short of breath after climbing the stairs.  She also reports episodes of palpitations that \"radiate into her neck.\" Denies chest pain or shortness of breath.   EKG done 4/4 showed sinus rhythm and nonspecific ST and T wave abnormality. Troponin negative.  Cardiac MRI ruled out amyloid.  LFEF 50%, no evidence of infiltrative cardiomyopathy.  Followed up with cardiology and they felt that the cause may be musculoskeletal or pericarditis. They suggested taking ibuprofen for pain. Her pain has subsided and she actually has not needed to take ibuprofen.   Post ablation Zio patch was performed showing a significant reduction in PVCs; however, by the time she had her cardiology follow-up, she had restarted chemotherapy and the PVCs had returned.  Dr. Thomas is comfortable proceeding with treatment as long as symptoms are tolerable and there are no major EKG changes. Would need to repeat if she were to do BMT  EKG 7/13 with no major changes, troponin WNL  Continues to have PVCs, though much improved from when she was on cytoxan. Wants to avoid ablation again if possible  Continue to follow-up with Dr. Thomas    Diarrhea  She was having intermittent diarrhea with associated abdominal cramping and nausea, but developed much more significant diarrhea with 10-12 liquid episodes and was seen as an add-on on 4/4/22 for this.   C.diff PCR negative 2/8/22, repeat on 4/5 negative  Enteric stool panel 4/5/22 negative. CMV PCR (4/4/22) not detected  Underwent colonoscopy on 4/13/22 to pursue biopsy for amyloid . Congo red stain is negative for amyloid deposits. Pathology showed colonic mucosa with focal active colitis, negative for signs of chronicity and lymphocytic " colitis. Per patient report, GI reviewed results with her and did not feel she needed treatment for these findings at this time. They advised her to monitor her symptoms and to call them if they do not improve.Dr. Montgomery agreed with following conservatively for now since she reports some improvement in her symptoms.  If diarrhea becomes bothersome again, then they will arrange for expeditious evaluation in the IBD clinic.   Continues with intermittent diarrhea and constipation, impacts QOL enough that she would like to pursue the further follow-up with Dr. Montgomery.  I will send Dr. Montgomery a message requesting follow-up be scheduled.  Had GI appt in December 2023 that she missed.    Health care maintenance  Engaging in health maintenance and follow up as outlined below:  Eye exams: Recommend exams at least every 2 years.   Dental exams: Recommend exams and cleanings every 6 months.   Primary care: Recommend follow up at least annually.  Seeing GYN and participating in cancer screenings (I.e. mammogram)  Skin care: Recommend the use of sunscreen with SPF of at least 30, reapplying every 2 hours with prolonged sun exposure.  Tobacco use: Recommend abstaining.  Alcohol use: Recommend no more than 1/day for women and 2/day for men.  Physical activity: Recommend regular activity, ideally 150 minutes/week of moderate intensity activity.      Plan from today's clinical encounter  [facility] will look into establishing with an immunologist to evaluate URI symptoms as well as the pruritic rashes  [facility] prescribed   Labs and follow up with Dr. Reinoso scheduled with 6/12/24       56 minutes spent on the date of the encounter doing chart review, review of test results, interpretation of tests, patient visit, and documentation     The longitudinal plan of care for the diagnosis(es)/condition(s) as documented were addressed during this visit. Due to the added complexity in care, I will continue to  maribel Aragon in the subsequent management and with ongoing continuity of care.    Saige SAL, ANP-BC, AOCNP  [facility] [address] [id number] [phone] (pager)      Virtual Visit Details    Type of service:  Video Visit   Start time:  1:32 PM  End time:    2:06 PM  Originating Location (pt. Location): Home    Distant Location (provider location):  On-site  Platform used for Video Visit: Sariah

## 2024-03-21 NOTE — PROGRESS NOTES
[facility]    Hematology/Oncology Clinic Note    Date of Visit: Mar 21, 2024     Oncologist: Dr. Payton Reinoso    Reason for visit: Routine myeloma follow-up           Oncology History   53 year old female with past medical history significant for Irritable bowel disease, allergies, large plasmacytoma of the L pelvis, and newly diagnosed multiple myeloma.     Early in April 2021 , she noted to have left sided groin pain, constant and was affecting her daily activities  MRI showed large, expansile, permeative mass with indistinct borders involving the left superior and inferior pubic rami, pubic symphysis and with possible extension to the left acetabulum.     Biopsy 9/7/2021: plasmacytoma.  Flow cytometry showed kappa monotypic plasma cells, polytypic B cells. FISH results from plasmacytoma showed a gain of 11q, IGH-CCND1 fusion but no losses of 1q or TP53 and no gain of 1q.    Bone marrow biopsy 09/22/21: Marrow cellularity 50% with 25% interstitial infiltration w/  Kappa-monotypic, moderately atypical plasma cells. Flow with 0.7% kappa-monotypica plasma cells. FISH and cytogenetics pending.     PET CT 09/24/21 w/ hypermetabolic pathologic fracture of the L pubic ramus w/ aggressive appearing osteolysis but no additional suspicious lytic or blastic osseous lesions.     Treatment to date:   - Zometa q4 weeks started 9/27/21  - Radiation to left pubic ramus x18 fractions, completed 11/11/21.   - RVD C1D1 11/15/21  - RVD C2D1 12/6/21  - RVD C3D1  12/27/21  - RVD C4 D1 1/17/22, elizabeth added on D8 due to inadequate response of urinary M-spike and FLCs remaining over 100 mg/dL after 4 cycles of RVD    - Elizabeth-RVD C1D8 1/24/22  - Elizabeth-RVD C2D1 2/7/22  - Elizabeth-RVD C3D1 2/28/22  - Elizabeth-RVD C4D1 3/29/22  - Elizabeth-RVD C5D1 4/19/22    Disease response: ND     - 5/19/22: Continued monotherapy with Daratumumab; held Revlimid and Velcade.    - 6/21/22: Transitioned to KCD, received C1 days 1  and 2- had significant PVCs at cardiac follow-uyp after 1st week of Kyprolis, held further doses.    - Transitioned to Sarclisa/Pom/Dex 7/6/22. Started Pom on 7/11.     -9/8/22 - COVID, delayed C3D15 treatment x 1 week    - Apheresis for CAR-T on 11/22/22; bridging with Sarclisa given 11/23/22     - Abecma 1/4/23 infusion       INTERIM HISTORY:   Margo presents virtually    New rash pruritic rash between hips and breast.  Had this in the past.  Was on Xolair in the past with allergist. Her allergist past away yet she was provided her medical records. Using TMC and Sarna cream.  Needs more topicals.  Also with an ongoing productive cough and constant PND.  Takes Zyrtec and flonase daily. PCP provided patient with abx due to MM hx and concern for infection.  CXR negative    PVCs.  Follows with cardiology.      OB GYN follow up- on estrogen    No diarrhea         Physical Exam:     There were no vitals taken for this visit.     ECOG 0  Objective:  General: patient appears well in no acute distress, alert and oriented, speech clear and fluid  Skin: no visualized rash or lesions on visualized skin. See [employer] message regarding new rash with pictures  Resp: Appears to be breathing comfortably without accessory muscle usage, speaking in full sentences, no audible wheezes or cough.  Psych: Coherent speech, normal rate and volume, able to articulate logical thoughts, able to abstract reason, no tangential thoughts, no hallucinations or delusions  Patient's affect is appropriate.           Laboratory Data:      I personally reviewed the following labs:    Most Recent 3 CBC's:  Recent Labs   Lab Test 03/18/24  1059 01/12/24  1239 12/11/23  1002   WBC 3.0* 3.4* 3.1*   HGB 14.5 14.3 13.8   MCV 95 94 93    242 237     Most Recent 3 BMP's:  Recent Labs   Lab Test 03/18/24  1059 01/12/24  1239 12/11/23  1002    140 142   POTASSIUM 3.7 3.9 4.3   CHLORIDE 106 103 106   CO2 25 26 23   BUN 15.7 17.7 15.9   CR 0.71 0.82  0.82   ANIONGAP 10 11 13   MEKA 9.0 9.9 9.4   GLC 80 105* 81     Most Recent 2 LFT's:  Recent Labs   Lab Test 03/18/24  1059 01/12/24  1239   AST 25 20   ALT 26 18   ALKPHOS 73 66   BILITOTAL 0.3 0.4            Assessment and Recommendations     Multiple myeloma  Kappa light chain MM, standard risk with t(11;14).   Left pubic ramus plasmacytoma with expansile destruction of left superior and inferior rami, pubic symphysis, and acetabulum  See Oncology HPI  Has never achieved remission with standard therapy.   Underwent CAR-T therapy with Abecma 1/4/23.   S/p Abecma 1 year (median PFS for patients in CR is about 18 months)  Day 29 - re-staging studies disease response: CR.  Day 100 - restaging PET and BMBx with CR without MRD.  6 months  - ongoing remission based on serum testing. No MRD.   6 months  - ongoing remission based on serum testing. No MRD.  1 year  - ongoing stringent remission, no MRD     URI Symptoms  Rash  ? Allergies  Frequent per patient ROS with minimal response to po abx  Takes antihistamines/flonase daily.  IgG levels stable  RVP recommended   [facility] will look into establishing with an immunologist to evaluate this as well as the rashes she has  [facility] prescribed in the interim    ID Prophylaxis  Continue Acyclovir 800 mg po BID    Hypogammaglobulinemia  s/p  IVIG in April (3/29/23 IgG 217)  and October 2023 (10/2/23 IgG 301)  1/12/24 IgG 573 mg/dl - no IVIG replacement needed now.   3/18/24 IgG 546      Bone Health  No longer on Zometa  She will continue Calcium/Vitamin D supplements.     PVCs  ECHO 12/1/11 and had follow-up with cardiology 12/7 (notes in CareEverywhere).  She had follow-up with cardiology here at the  and they recommend an ablation. Stress test was completed   Toprol XL however was discontinued due to significant side effects of orthostatic hypotension, pre-syncopal episodes, shakiness, and weakness.   Ablation 3/23/22 but has not experienced relief from the feeling of being  "nauseated and short of breath after climbing the stairs.  She also reports episodes of palpitations that \"radiate into her neck.\" Denies chest pain or shortness of breath.   EKG done 4/4 showed sinus rhythm and nonspecific ST and T wave abnormality. Troponin negative.  Cardiac MRI ruled out amyloid.  LFEF 50%, no evidence of infiltrative cardiomyopathy.  Followed up with cardiology and they felt that the cause may be musculoskeletal or pericarditis. They suggested taking ibuprofen for pain. Her pain has subsided and she actually has not needed to take ibuprofen.   Post ablation Zio patch was performed showing a significant reduction in PVCs; however, by the time she had her cardiology follow-up, she had restarted chemotherapy and the PVCs had returned.  Dr. Thomas is comfortable proceeding with treatment as long as symptoms are tolerable and there are no major EKG changes. Would need to repeat if she were to do BMT  EKG 7/13 with no major changes, troponin WNL  Continues to have PVCs, though much improved from when she was on cytoxan. Wants to avoid ablation again if possible  Continue to follow-up with Dr. Thomas    Diarrhea  She was having intermittent diarrhea with associated abdominal cramping and nausea, but developed much more significant diarrhea with 10-12 liquid episodes and was seen as an add-on on 4/4/22 for this.   C.diff PCR negative 2/8/22, repeat on 4/5 negative  Enteric stool panel 4/5/22 negative. CMV PCR (4/4/22) not detected  Underwent colonoscopy on 4/13/22 to pursue biopsy for amyloid . Congo red stain is negative for amyloid deposits. Pathology showed colonic mucosa with focal active colitis, negative for signs of chronicity and lymphocytic colitis. Per patient report, GI reviewed results with her and did not feel she needed treatment for these findings at this time. They advised her to monitor her symptoms and to call them if they do not improve.Dr. Montgomery agreed with following conservatively " for now since she reports some improvement in her symptoms.  If diarrhea becomes bothersome again, then they will arrange for expeditious evaluation in the IBD clinic.   Continues with intermittent diarrhea and constipation, impacts QOL enough that she would like to pursue the further follow-up with Dr. Montgomery.  I will send Dr. Montgomery a message requesting follow-up be scheduled.  Had GI appt in December 2023 that she missed.    Health care maintenance  Engaging in health maintenance and follow up as outlined below:  Eye exams: Recommend exams at least every 2 years.   Dental exams: Recommend exams and cleanings every 6 months.   Primary care: Recommend follow up at least annually.  Seeing GYN and participating in cancer screenings (I.e. mammogram)  Skin care: Recommend the use of sunscreen with SPF of at least 30, reapplying every 2 hours with prolonged sun exposure.  Tobacco use: Recommend abstaining.  Alcohol use: Recommend no more than 1/day for women and 2/day for men.  Physical activity: Recommend regular activity, ideally 150 minutes/week of moderate intensity activity.      Plan from today's clinical encounter  [facility] will look into establishing with an immunologist to evaluate URI symptoms as well as the pruritic rashes  [facility] prescribed   Labs and follow up with Dr. Reinoso scheduled with 6/12/24       56 minutes spent on the date of the encounter doing chart review, review of test results, interpretation of tests, patient visit, and documentation     The longitudinal plan of care for the diagnosis(es)/condition(s) as documented were addressed during this visit. Due to the added complexity in care, I will continue to support [facility] in the subsequent management and with ongoing continuity of care.    Saige SAL, ANP-BC, AOCNP  [facility]  [address] [place] [phone] (pager)

## 2024-03-22 ENCOUNTER — LAB (OUTPATIENT)
Dept: LAB | Facility: CLINIC | Age: 54
End: 2024-03-22
Attending: NURSE PRACTITIONER
Payer: COMMERCIAL

## 2024-03-22 DIAGNOSIS — J06.9 UPPER RESPIRATORY TRACT INFECTION, UNSPECIFIED TYPE: ICD-10-CM

## 2024-03-22 PROCEDURE — 87633 RESP VIRUS 12-25 TARGETS: CPT

## 2024-03-22 PROCEDURE — 87486 CHLMYD PNEUM DNA AMP PROBE: CPT

## 2024-03-22 PROCEDURE — 87581 M.PNEUMON DNA AMP PROBE: CPT

## 2024-03-23 LAB

## 2024-03-24 ENCOUNTER — RESULT FOLLOW UP (OUTPATIENT)
Dept: ONCOLOGY | Facility: CLINIC | Age: 54
End: 2024-03-24
Payer: COMMERCIAL

## 2024-03-24 DIAGNOSIS — U07.1 INFECTION DUE TO 2019 NOVEL CORONAVIRUS: Primary | ICD-10-CM

## 2024-03-24 PROBLEM — R51.9 HEADACHE: Status: RESOLVED | Noted: 2023-01-14 | Resolved: 2024-03-24

## 2024-03-24 PROBLEM — E86.0 DEHYDRATION: Status: RESOLVED | Noted: 2022-02-17 | Resolved: 2024-03-24

## 2024-03-24 NOTE — PROGRESS NOTES
"[facility]    Hematology/Oncology Clinic Note Update from 3/21/24 Virtual Visit    March 24, 2024      3/21/24 RVP positive for COVID. Communicated with patient via My Chart         11/21/2022     9:24 AM 11/22/2022     3:00 PM 5/24/2023     1:41 PM 3/24/2024    10:49 AM   MASSBP Score   Age Greater than or equal to 65 years    0   BMI greater than or equal to 35 kg/m2    0   Has Diabetes Mellitus    0   Has Chronic Kidney Disease    0   Has Cardiovascular Disease and 55 years or older    0   Has Chronic Respiratory Disease and 55 years or older    0   Has Hypertension and 55 years or older    0   Is Immunocompromised    4   Is Pregnant    0   Member of BIPOC community (Black/, /, ,  or , or  or [place] Native)     0   MASSBP Score    4   Has the patient had a positive COVID test outside our system?  No No No No   What day did symptoms start?     3/21/2024       Estimated body mass index is 25.37 kg/m  as calculated from the following:    Height as of 3/21/24: 1.702 m (5' 7\").    Weight as of 3/21/24: 73.5 kg (162 lb).     GFR Estimate   Date Value Ref Range Status   03/18/2024 >90 >60 mL/min/1.73m2 Final        Current Outpatient Medications   Medication    acetaminophen (TYLENOL) 325 MG tablet    acyclovir (ZOVIRAX) 800 MG tablet    BUSPIRONE HCL 10 MG PO TABS    cetirizine HCl 10 MG CAPS    estradiol (ESTRACE) 0.5 MG tablet    Flaxseed (Linseed) (FLAX SEED OIL) 1000 MG capsule    fluticasone (FLONASE) 50 MCG/ACT nasal spray    Glucos-MSM-C-Jm-Uvvcdo-Cdhiir (GLUCOSAMINE MSM COMPLEX) TABS tablet    LORazepam (ATIVAN) 0.5 MG tablet    melatonin 5 MG tablet    methylcellulose (CITRUCEL) powder    MIRENA 20 MCG/24HR IU IUD    Multiple Vitamin (MULTI-VITAMIN DAILY) TABS    omeprazole (PRILOSEC OTC) 20 MG EC tablet    polyethylene glycol (MIRALAX) 17 g packet    prochlorperazine (COMPAZINE) 10 MG tablet "    sulfamethoxazole-trimethoprim (BACTRIM DS) 800-160 MG tablet    triamcinolone (KENALOG) 0.1 % external cream        Reviewed the following instructions with the patient:    Paxlovid (nimatrelvir and ritonavir)    PAXLOVID Dose 300 mg nirmatrelvir (two 150 mg tablets) with 100 mg ritonavir one tablet, all three tablets taken together twice daily for 5 days    Side effects: bad taste, diarrhea, hypertension, and muscle aches    How it works  Two medicines (nirmatrelvir and ritonavir) are taken together. They stop the virus from growing. Less amount of virus is easier for your body to fight.    How to take  Medicine comes in a daily container with both medicine tablets. Take by mouth twice daily (once in the morning, once at night) for 5 days.  The number of tablets to take varies by patient.  Don't chew or break capsules. Swallow whole.    When to take  Take as soon as possible after positive COVID-19 test result, and within 5 days of your first symptoms.    Possible side effects  Can cause altered sense of taste, diarrhea (loose, watery stools), high blood pressure, muscle aches.      Medication Reconciliation:   Interaction with Buspirone. Dose adjustments of buspirone recommended  Risk Rating D: Consider therapy modification  Summary CY Inhibitors (Strong) may increase the serum concentration of BusPIRone.   Severity Moderate   Reliability Rating Good  Patient Management   Limit the buspirone dose to 2.5 mg daily and monitor patients for increased buspirone effects/toxicities.    All medication adjustments (holds, etc) were discussed with the patient and patient was asked to repeat back (teachback) their med adjustment.  Did patient understand med adjustment? Yes. Via AireumtMargo acknowledged she takes buspirone PRN and will be sure not to take during Paxlovid use      Footnotes  1. Geraldine KT, Scotty TS, Jenny PJ. Interactions of buspirone with itraconazole and rifampicin: effects on the  pharmacokinetics of the active 1-(2-pyrimidinyl)-piperazine metabolite of buspirone. Pharmacol Toxicol. 1999;84(2):94-97. [PubMed [id number]]  2. Geraldine KT, Scotty TS, Yoselyn T, Jenny PJ. Plasma buspirone concentrations are greatly increased by erythromycin and itraconazole. Clin Pharmacol Ther. 1997;62(3):348-354. [PubMed [id number]]  3. Buspirone [prescribing information]. [place], [place]: [employer]; May 2020.  4. Jamel GALLEGOS, Uzair MM. 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 Jessica Pharmacother. 2003;37(2):202-205. [PubMed [id number]]      2024 UpToDate, Inc. and its affiliates and/or licensors. All Rights Reserved.     Plan from today's clinical encounter  Paxlovid 300 mg nirmatrelvir (two 150 mg tablets) with 100 mg ritonavir one tablet, all three tablets taken together twice daily for 5 days sent to local pharmacy.      Saige SAL, ANP-BC, AOCNP  [facility] [address] [place] [address]

## 2024-04-19 ENCOUNTER — TELEPHONE (OUTPATIENT)
Dept: CARDIOLOGY | Facility: CLINIC | Age: 54
End: 2024-04-19
Payer: COMMERCIAL

## 2024-04-19 NOTE — TELEPHONE ENCOUNTER
Left Voicemail (1st Attempt) for the patient to call back and schedule the following:    Appointment type: RETURN EP   Provider: THO  Return date: 07/10/24  Specialty phone number: [phone] OPT 1   Additional appointment(s) needed: N/A   Additonal Notes: N/A

## 2024-04-19 NOTE — TELEPHONE ENCOUNTER
Patient went to different clinic. Patient is was confused about scheduling. Dr. Thomas left in Sept 2023

## 2024-04-30 ENCOUNTER — TELEPHONE (OUTPATIENT)
Dept: CARDIOLOGY | Facility: CLINIC | Age: 54
End: 2024-04-30
Payer: COMMERCIAL

## 2024-04-30 NOTE — TELEPHONE ENCOUNTER
Left Voicemail (2nd Attempt) for the patient to call back and schedule the following:    Appointment type: return ep   Provider: n/a   Return date: 07/11/24  Specialty phone number: [phone] opt 1   Additional appointment(s) needed: n/a   Additonal Notes: n/a

## 2024-06-03 ENCOUNTER — LAB (OUTPATIENT)
Dept: LAB | Facility: CLINIC | Age: 54
End: 2024-06-03
Payer: COMMERCIAL

## 2024-06-03 DIAGNOSIS — D80.1 HYPOGAMMAGLOBULINEMIA (H): ICD-10-CM

## 2024-06-03 DIAGNOSIS — C90.00 LIGHT CHAIN MYELOMA (H): ICD-10-CM

## 2024-06-03 DIAGNOSIS — C90.00 MULTIPLE MYELOMA, REMISSION STATUS UNSPECIFIED (H): ICD-10-CM

## 2024-06-03 LAB
BASOPHILS # BLD AUTO: 0 10E3/UL (ref 0–0.2)
BASOPHILS NFR BLD AUTO: 1 %
EOSINOPHIL # BLD AUTO: 0.2 10E3/UL (ref 0–0.7)
EOSINOPHIL NFR BLD AUTO: 5 %
ERYTHROCYTE [DISTWIDTH] IN BLOOD BY AUTOMATED COUNT: 12.1 % (ref 10–15)
HCT VFR BLD AUTO: 40.8 % (ref 35–47)
HGB BLD-MCNC: 13.8 G/DL (ref 11.7–15.7)
IMM GRANULOCYTES # BLD: 0 10E3/UL
IMM GRANULOCYTES NFR BLD: 0 %
LYMPHOCYTES # BLD AUTO: 0.7 10E3/UL (ref 0.8–5.3)
LYMPHOCYTES NFR BLD AUTO: 21 %
MCH RBC QN AUTO: 31.7 PG (ref 26.5–33)
MCHC RBC AUTO-ENTMCNC: 33.8 G/DL (ref 31.5–36.5)
MCV RBC AUTO: 94 FL (ref 78–100)
MONOCYTES # BLD AUTO: 0.3 10E3/UL (ref 0–1.3)
MONOCYTES NFR BLD AUTO: 8 %
NEUTROPHILS # BLD AUTO: 2.3 10E3/UL (ref 1.6–8.3)
NEUTROPHILS NFR BLD AUTO: 65 %
PLATELET # BLD AUTO: 198 10E3/UL (ref 150–450)
RBC # BLD AUTO: 4.35 10E6/UL (ref 3.8–5.2)
WBC # BLD AUTO: 3.5 10E3/UL (ref 4–11)

## 2024-06-03 PROCEDURE — 84165 PROTEIN E-PHORESIS SERUM: CPT | Performed by: PATHOLOGY

## 2024-06-03 PROCEDURE — 85025 COMPLETE CBC W/AUTO DIFF WBC: CPT

## 2024-06-03 PROCEDURE — 80053 COMPREHEN METABOLIC PANEL: CPT

## 2024-06-03 PROCEDURE — 86334 IMMUNOFIX E-PHORESIS SERUM: CPT | Performed by: PATHOLOGY

## 2024-06-03 PROCEDURE — 83521 IG LIGHT CHAINS FREE EACH: CPT

## 2024-06-03 PROCEDURE — 82784 ASSAY IGA/IGD/IGG/IGM EACH: CPT

## 2024-06-03 PROCEDURE — 36415 COLL VENOUS BLD VENIPUNCTURE: CPT

## 2024-06-03 PROCEDURE — 84155 ASSAY OF PROTEIN SERUM: CPT

## 2024-06-04 LAB
ALBUMIN SERPL BCG-MCNC: 4.4 G/DL (ref 3.5–5.2)
ALP SERPL-CCNC: 63 U/L (ref 40–150)
ALT SERPL W P-5'-P-CCNC: 16 U/L (ref 0–50)
ANION GAP SERPL CALCULATED.3IONS-SCNC: 12 MMOL/L (ref 7–15)
AST SERPL W P-5'-P-CCNC: 17 U/L (ref 0–45)
BILIRUB SERPL-MCNC: 0.5 MG/DL
BUN SERPL-MCNC: 15.3 MG/DL (ref 6–20)
CALCIUM SERPL-MCNC: 8.7 MG/DL (ref 8.6–10)
CHLORIDE SERPL-SCNC: 108 MMOL/L (ref 98–107)
CREAT SERPL-MCNC: 0.82 MG/DL (ref 0.51–0.95)
DEPRECATED HCO3 PLAS-SCNC: 21 MMOL/L (ref 22–29)
EGFRCR SERPLBLD CKD-EPI 2021: 85 ML/MIN/1.73M2
GLUCOSE SERPL-MCNC: 97 MG/DL (ref 70–99)
IGA SERPL-MCNC: 3 MG/DL (ref 84–499)
IGG SERPL-MCNC: 521 MG/DL (ref 610–1616)
IGM SERPL-MCNC: 86 MG/DL (ref 35–242)
KAPPA LC FREE SER-MCNC: 0.75 MG/DL (ref 0.33–1.94)
KAPPA LC FREE/LAMBDA FREE SER NEPH: 1.23 {RATIO} (ref 0.26–1.65)
LAMBDA LC FREE SERPL-MCNC: 0.61 MG/DL (ref 0.57–2.63)
POTASSIUM SERPL-SCNC: 4.8 MMOL/L (ref 3.4–5.3)
PROT SERPL-MCNC: 6.6 G/DL (ref 6.4–8.3)
SODIUM SERPL-SCNC: 141 MMOL/L (ref 135–145)
TOTAL PROTEIN SERUM FOR ELP: 6.1 G/DL (ref 6.4–8.3)

## 2024-06-05 ENCOUNTER — PATIENT OUTREACH (OUTPATIENT)
Dept: ONCOLOGY | Facility: CLINIC | Age: 54
End: 2024-06-05
Payer: COMMERCIAL

## 2024-06-05 DIAGNOSIS — C90.00 LIGHT CHAIN MYELOMA (H): ICD-10-CM

## 2024-06-05 LAB
ALBUMIN SERPL ELPH-MCNC: 4.1 G/DL (ref 3.7–5.1)
ALPHA1 GLOB SERPL ELPH-MCNC: 0.2 G/DL (ref 0.2–0.4)
ALPHA2 GLOB SERPL ELPH-MCNC: 0.6 G/DL (ref 0.5–0.9)
B-GLOBULIN SERPL ELPH-MCNC: 0.6 G/DL (ref 0.6–1)
GAMMA GLOB SERPL ELPH-MCNC: 0.5 G/DL (ref 0.7–1.6)
M PROTEIN SERPL ELPH-MCNC: 0 G/DL
PROT PATTERN SERPL ELPH-IMP: ABNORMAL
PROT PATTERN SERPL IFE-IMP: NORMAL

## 2024-06-05 RX ORDER — ACYCLOVIR 800 MG/1
800 TABLET ORAL EVERY 12 HOURS
Qty: 60 TABLET | Refills: 0 | Status: SHIPPED | OUTPATIENT
Start: 2024-06-05 | End: 2024-07-16

## 2024-06-05 NOTE — PROGRESS NOTES
[facility]: Cancer Care Short Note                                    Discussion with Patient:                                                      INBOUND CALL: VM received from patient. He has a couple of questions:  -Would like refill on acyclovir   -She is wondering if lab orders can be extended, as they  on 6/3  -She had labs done but 24 hr urine was not ordered. Wondering if she needs this.     Callback requested,     OUTBOUND CALL: RNCC called patient. Labs have been extended. Jackie also refilled her acyclovir. RNCC discussed with MD and and no need for 24 hr urine prior to appt.       Intervention/Education provided during outreach:                                                         Patient to follow up as scheduled at next appt  Patient to call/Photodigmhart message with updates    Patient verbalizes understanding of POC and has no other questions or concerns at this time.     Griselda Valdez, RN, MSN, OCN   RN Care Coordinator   [facility]   [address] [place] [address]   [phone]

## 2024-06-08 ENCOUNTER — HEALTH MAINTENANCE LETTER (OUTPATIENT)
Age: 54
End: 2024-06-08

## 2024-06-12 ENCOUNTER — PATIENT OUTREACH (OUTPATIENT)
Dept: ONCOLOGY | Facility: CLINIC | Age: 54
End: 2024-06-12
Payer: COMMERCIAL

## 2024-06-12 ENCOUNTER — VIRTUAL VISIT (OUTPATIENT)
Dept: TRANSPLANT | Facility: CLINIC | Age: 54
End: 2024-06-12
Payer: COMMERCIAL

## 2024-06-12 DIAGNOSIS — C90.00 MULTIPLE MYELOMA NOT HAVING ACHIEVED REMISSION (H): Primary | ICD-10-CM

## 2024-06-12 PROCEDURE — 99214 OFFICE O/P EST MOD 30 MIN: CPT | Mod: 95

## 2024-06-12 NOTE — LETTER
2024      Kristie Cornejo  [address]  [facility] [address]      Dear Colleague,    Thank you for referring your patient, Kristie Cornejo, to the [facility]ARROW [facility]ANSPL[facility] [place]. Please see a copy of my visit note below.    Virtual Visit Details    Type of service:  Video Visit     Originating Location (pt. Location): Home    Distant Location (provider location):  On-site  Platform used for Video Visit: [facility]    BMT Progress Note  2024      BMT Physician: Dr. Reinoso   Transplant Type: CAR-T Abecma  Preparative Regimen: Cy/Flu  Chief complaint: Kristie Conrejo is a 54 year old female with a history of multiple myeloma conditioned with Cy/Flu, currently day day 200 of her CAR T-cell therapy with Abecma.  - readmitted briefly  with transient fever, ongoing headache (post LP)  - all resolved.    1.5 years Year anniversary       INTERIM HISTORY:   Margo is feeling great, energy is good, no new complains,   She has COVID now, stuffy nose, no fevers, no SOB.     active and busy, some hands joint pains, but busy playing cello and active with performances.        no new body aches. BM are stable now, she had colonoscopy in 2022 which was neg.    - no n/v/d, fevers, or leg swelling.   -       REVIEW OF SYSTEMS: Otherwise unremarkable other than what is noted in the Interim History.      PHYSICAL EXAM:   Wt Readings from Last 4 Encounters:   24 73.5 kg (162 lb)   24 75.6 kg (166 lb 9.6 oz)   24 75.6 kg (166 lb 9.6 oz)   23 74.8 kg (164 lb 14.4 oz)     ECO    There were no vitals taken for this visit.     General Appearance: NAD, happy, well,   On video   Lab Results   Component Value Date    WBC 3.5 (L) 2024    ANEU 1.0 (L) 2023    HGB 13.8 2024    HCT 40.8 2024     2024     2024    POTASSIUM 4.8 2024    CHLORIDE 108 (H) 2024    CO2 21 (L) 2024    GLC 97 2024     BUN 15.3 06/03/2024    CR 0.82 06/03/2024    MAG 2.3 01/12/2024    INR 1.00 02/02/2023    AST 17 06/03/2024    ALT 16 06/03/2024       BMBx 1/12/2024  Final Diagnosis   Bone marrow, posterior iliac crest, right decalcified trephine biopsy and touch imprint; particle crush, direct aspirate smear, and concentrated aspirate smear; and peripheral smear:  - No morphologic and immunophenotypic evidence of recurrent/persistent plasma cell myeloma  - Hypocellular marrow (cellularity is estimated at 30-40%) with trilineage hematopoesis, slight left-shifted maturation no overt dysplasia and 1% blasts  - Peripheral blood showing slight leukopenia with lymphopenia and no circulating plasma cells  -  See comment        ASSESSMENT AND PLAN:    Latest Reference Range & Units 06/03/24 09:43   ELP Interpretation:  Hypogammaglobulinemia. No obvious monoclonal proteins seen. Pathologic significance requires clinical correlation. Linda Pereira M.D.   Gamma Fraction 0.7 - 1.6 g/dL 0.5 (L)   IGA 84 - 499 mg/dL 3 (L)    - 1,616 mg/dL 521 (L)   IGM 35 - 242 mg/dL 86   Immunofixation ELP  No monoclonal protein seen on immunofixation. Pathologic significance requires clinical correlation. Linda Pereira MD   Kappa Free Lt Chain 0.33 - 1.94 mg/dL 0.75   Kappa Lambda Ratio 0.26 - 1.65  1.23   Lambda Free Lt Chain 0.57 - 2.63 mg/dL 0.61   Monoclonal Peak <=0.0 g/dL 0.0   Total Protein Serum for ELP 6.4 - 8.3 g/dL 6.1 (L)   (L): Data is abnormally low  Kristie Cornejo is a 54 year old female with a history of multiple myeloma conditioned with Cy/Flu, currently 1.5 year after Abecma.         Light chain MM, standard risk with t(11;14). Has never achieved remission with standard therapy.   No p53 or 17del  S/p Abecma 1.5 year (median PFS for patients in CR is about 18 months)    Day 29 - re-staging studies drawn today, disease response: CR.  Marrow not assessed yet, will do at 3 months   Day 100 - restaging PET and BMBx with CR  without MRD !! This is a great outcome.  6 months  - ongoing remission based on serum testing. No MRD.   6 months  - ongoing remission based on serum testing. No MRD.  1 year  - ongoing stringent remission, no MRD   1.5 year - ongoing srtingent remission, no MD      On Neurotox prophylaxis study. No ICANS.   it dex x 2 doses (-1, +6)   --completed Simvastatin daily 40mg until day day +30, now off     HEME  Counts are recovered now.  No transfusions.  G-CSF for ANC below 1000.       IMMUNOCOMPROMISED  - ok to stop fluc and Levaquin.  -PJP prophylaxis: off Bactrim.  CD4 is above 200.   -Hypogammaglobulinemia - s/p  IVIG in May and Sept 23  IgG 521mg/dl - no IVIG replacement needed now.   -cont Acyclovir 400mg po BID for now.        COVID vaccine booster, influnza Sept 23)    CARDIOVASCULAR  - Risk of cardiomyopathy:  Baseline LVEF: 55-60%   - Risk of arrhythmia: Baseline EKG showed sinus rhythm nonspecific T wave abnormality  - Hx of PVC ablation March 2022      GI  - Ulcer prophylaxis: Protonix   -refer for colonoscopy given PET findings. Doubt malignancy, but given bouts of diarrhea she may have CMV colitis or infections. I suggest to obtain biopsy     Today's Summary:  F/u with me for 24 months  Anniversary  SULEIMAN visit in 12 weeks     Cellular Therapy Anniversary Visit    Kristie Cornejo is a(n) 54 year old patient with a history of Multiple Myeloma, who is here for a Day +180 anniversary visit post cellular therapy with Abecma as fourth line therapy.    The patient's restaging included SPEP and Free Light Chains    The patient's disease status is currently stringent CR    Patients are at risk post-CarT therapy for ongoing cytopenias, hypogammaglobulinemia, and infections.    Pertinent labs:  Lab Results   Component Value Date    WBC 3.5 06/03/2024    WBC 7.3 02/13/2006     Lab Results   Component Value Date    RBC 4.35 06/03/2024    RBC 4.25 02/13/2006     Lab Results   Component Value Date    HGB 13.8  "06/03/2024    HGB 12.2 08/29/2006     Lab Results   Component Value Date    HCT 40.8 06/03/2024    HCT 38.0 02/13/2006     No components found for: \"MCT\"  Lab Results   Component Value Date    MCV 94 06/03/2024    MCV 89 02/13/2006     Lab Results   Component Value Date    MCH 31.7 06/03/2024    MCH 30.6 02/13/2006     Lab Results   Component Value Date    MCHC 33.8 06/03/2024    MCHC 34.2 02/13/2006     Lab Results   Component Value Date    RDW 12.1 06/03/2024    RDW 12.3 02/13/2006     Lab Results   Component Value Date     06/03/2024     02/13/2006     Immunoglobulin G   Date Value Ref Range Status   06/03/2024 521 (L) 610 - 1,616 mg/dL Final     Absolute CD4, Benezett T Cells   Date Value Ref Range Status   01/12/2024 256 (L) 441 - 2,156 cells/uL Final       Management recommendations:  Cont observation.  Off Bactrim, no IVIG.                I spent 40 minutes in the care of this patient today, which included time necessary for preparation for the visit, obtaining history, ordering medications/tests/procedures as medically indicated, review of pertinent medical literature, counseling of the patient, communication of recommendations to the care team, and documentation time.      Payton Reinoso MD     "

## 2024-06-12 NOTE — PROGRESS NOTES
Virtual Visit Details    Type of service:  Video Visit     Originating Location (pt. Location): Home    Distant Location (provider location):  On-site  Platform used for Video Visit: Well    BMT Progress Note  2024      BMT Physician: Dr. Reinoso   Transplant Type: CAR-T Abecma  Preparative Regimen: Cy/Flu  Chief complaint: Kristie Cornejo is a 54 year old female with a history of multiple myeloma conditioned with Cy/Flu, currently day day 200 of her CAR T-cell therapy with Abecma.  - readmitted briefly  with transient fever, ongoing headache (post LP)  - all resolved.    1.5 years Year anniversary       INTERIM HISTORY:   Margo is feeling great, energy is good, no new complains,   She has COVID now, stuffy nose, no fevers, no SOB.     active and busy, some hands joint pains, but busy playing cello and active with performances.        no new body aches. BM are stable now, she had colonoscopy in 2022 which was neg.    - no n/v/d, fevers, or leg swelling.   -       REVIEW OF SYSTEMS: Otherwise unremarkable other than what is noted in the Interim History.      PHYSICAL EXAM:   Wt Readings from Last 4 Encounters:   24 73.5 kg (162 lb)   24 75.6 kg (166 lb 9.6 oz)   24 75.6 kg (166 lb 9.6 oz)   23 74.8 kg (164 lb 14.4 oz)     ECO    There were no vitals taken for this visit.     General Appearance: NAD, happy, well,   On video   Lab Results   Component Value Date    WBC 3.5 (L) 2024    ANEU 1.0 (L) 2023    HGB 13.8 2024    HCT 40.8 2024     2024     2024    POTASSIUM 4.8 2024    CHLORIDE 108 (H) 2024    CO2 21 (L) 2024    GLC 97 2024    BUN 15.3 2024    CR 0.82 2024    MAG 2.3 2024    INR 1.00 2023    AST 17 2024    ALT 16 2024       BMBx 2024  Final Diagnosis   Bone marrow, posterior iliac crest, right decalcified trephine biopsy and touch imprint; particle  crush, direct aspirate smear, and concentrated aspirate smear; and peripheral smear:  - No morphologic and immunophenotypic evidence of recurrent/persistent plasma cell myeloma  - Hypocellular marrow (cellularity is estimated at 30-40%) with trilineage hematopoesis, slight left-shifted maturation no overt dysplasia and 1% blasts  - Peripheral blood showing slight leukopenia with lymphopenia and no circulating plasma cells  -  See comment        ASSESSMENT AND PLAN:    Latest Reference Range & Units 06/03/24 09:43   ELP Interpretation:  Hypogammaglobulinemia. No obvious monoclonal proteins seen. Pathologic significance requires clinical correlation. Linda Pereira M.D.   Gamma Fraction 0.7 - 1.6 g/dL 0.5 (L)   IGA 84 - 499 mg/dL 3 (L)    - 1,616 mg/dL 521 (L)   IGM 35 - 242 mg/dL 86   Immunofixation ELP  No monoclonal protein seen on immunofixation. Pathologic significance requires clinical correlation. Linda Pereira MD   Kappa Free Lt Chain 0.33 - 1.94 mg/dL 0.75   Kappa Lambda Ratio 0.26 - 1.65  1.23   Lambda Free Lt Chain 0.57 - 2.63 mg/dL 0.61   Monoclonal Peak <=0.0 g/dL 0.0   Total Protein Serum for ELP 6.4 - 8.3 g/dL 6.1 (L)   (L): Data is abnormally low  Kristie Cornejo is a 54 year old female with a history of multiple myeloma conditioned with Cy/Flu, currently 1.5 year after Abecma.         Light chain MM, standard risk with t(11;14). Has never achieved remission with standard therapy.   No p53 or 17del  S/p Abecma 1.5 year (median PFS for patients in CR is about 18 months)    Day 29 - re-staging studies drawn today, disease response: CR.  Marrow not assessed yet, will do at 3 months   Day 100 - restaging PET and BMBx with CR without MRD !! This is a great outcome.  6 months  - ongoing remission based on serum testing. No MRD.   6 months  - ongoing remission based on serum testing. No MRD.  1 year  - ongoing stringent remission, no MRD   1.5 year - ongoing srtingent remission, no MD      On  "Neurotox prophylaxis study. No ICANS.   it dex x 2 doses (-1, +6)   --completed Simvastatin daily 40mg until day day +30, now off     HEME  Counts are recovered now.  No transfusions.  G-CSF for ANC below 1000.       IMMUNOCOMPROMISED  - ok to stop fluc and Levaquin.  -PJP prophylaxis: off Bactrim.  CD4 is above 200.   -Hypogammaglobulinemia - s/p  IVIG in May and Sept 23  IgG 521mg/dl - no IVIG replacement needed now.   -cont Acyclovir 400mg po BID for now.        COVID vaccine booster, influnza Sept 23)    CARDIOVASCULAR  - Risk of cardiomyopathy:  Baseline LVEF: 55-60%   - Risk of arrhythmia: Baseline EKG showed sinus rhythm nonspecific T wave abnormality  - Hx of PVC ablation March 2022      GI  - Ulcer prophylaxis: Protonix   -refer for colonoscopy given PET findings. Doubt malignancy, but given bouts of diarrhea she may have CMV colitis or infections. I suggest to obtain biopsy     Today's Summary:  F/u with me for 24 months  Anniversary  SULEIMAN visit in 12 weeks     Cellular Therapy Anniversary Visit    Kristie Cornejo is a(n) 54 year old patient with a history of Multiple Myeloma, who is here for a Day +180 anniversary visit post cellular therapy with Abecma as fourth line therapy.    The patient's restaging included SPEP and Free Light Chains    The patient's disease status is currently stringent CR    Patients are at risk post-CarT therapy for ongoing cytopenias, hypogammaglobulinemia, and infections.    Pertinent labs:  Lab Results   Component Value Date    WBC 3.5 06/03/2024    WBC 7.3 02/13/2006     Lab Results   Component Value Date    RBC 4.35 06/03/2024    RBC 4.25 02/13/2006     Lab Results   Component Value Date    HGB 13.8 06/03/2024    HGB 12.2 08/29/2006     Lab Results   Component Value Date    HCT 40.8 06/03/2024    HCT 38.0 02/13/2006     No components found for: \"MCT\"  Lab Results   Component Value Date    MCV 94 06/03/2024    MCV 89 02/13/2006     Lab Results   Component Value Date    MCH " 31.7 06/03/2024    MCH 30.6 02/13/2006     Lab Results   Component Value Date    MCHC 33.8 06/03/2024    MCHC 34.2 02/13/2006     Lab Results   Component Value Date    RDW 12.1 06/03/2024    RDW 12.3 02/13/2006     Lab Results   Component Value Date     06/03/2024     02/13/2006     Immunoglobulin G   Date Value Ref Range Status   06/03/2024 521 (L) 610 - 1,616 mg/dL Final     Absolute CD4, Pierron T Cells   Date Value Ref Range Status   01/12/2024 256 (L) 441 - 2,156 cells/uL Final       Management recommendations:  Cont observation.  Off Bactrim, no IVIG.                I spent 40 minutes in the care of this patient today, which included time necessary for preparation for the visit, obtaining history, ordering medications/tests/procedures as medically indicated, review of pertinent medical literature, counseling of the patient, communication of recommendations to the care team, and documentation time.      Payton Reinoso MD

## 2024-06-12 NOTE — NURSING NOTE
12 hour chart check     Is the patient currently in the state of [place]? YES    Visit mode:VIDEO    If the visit is dropped, the patient can be reconnected by: VIDEO VISIT: Text to cell phone:   Telephone Information:   Mobile [phone]   Mobile Not on file.       Will anyone else be joining the visit? NO  (If patient encounters technical issues they should call [phone] :[phone])    How would you like to obtain your AVS? MyChart    Are changes needed to the allergy or medication list? Pt stated no changes to allergies and Pt stated no med changes    Are refills needed on medications prescribed by this physician? NO **But pt states she needs a refill on her Bactrim medication. Pt was told by [facility] to discuss with provider.**    Reason for visit: RECHECK    Paulina MCCONNELL

## 2024-06-12 NOTE — PROGRESS NOTES
[facility]: Cancer Care Short Note                                    Discussion with Patient:                                                        INBOUND CALL: RNCC receive IB call from patient. She tested positive for covid on Monday evening.   She started developing symptoms on Sunday night (she states she was exposed over the weekend).   Denies SOB, fever, chills, or cough. She states she has a runny nose, but other than that she has no other symptoms.   Will change MD visit today to virtual-- she and MD can discuss prescribing paxlovid at that time.   Pt verbalizes understanding and has no other questions, comments, or concerns at this time.     Griselda Valdez, RN, MSN, OCN   RN Care Coordinator   [facility]   [address] [place] [id number] [phone]

## 2024-07-15 DIAGNOSIS — C90.00 LIGHT CHAIN MYELOMA (H): ICD-10-CM

## 2024-07-15 NOTE — TELEPHONE ENCOUNTER
Medication requested: acyclovir 800 mg tablet    Last prescribing provider: Jackie Lyons CNP on6/5/24    Last clinic visit date: Dr. Reinoso on 6/12/24    Recommendations for requested medication (if none, N/A): NA    Any other pertinent information (if none, N/A): NA    Refilled: Y/N, if NO, why?    Pended and Routed to Payton Reinoso MD

## 2024-07-16 RX ORDER — ACYCLOVIR 800 MG/1
800 TABLET ORAL EVERY 12 HOURS
Qty: 60 TABLET | Refills: 0 | Status: SHIPPED | OUTPATIENT
Start: 2024-07-16

## 2024-07-17 ENCOUNTER — PATIENT OUTREACH (OUTPATIENT)
Dept: ONCOLOGY | Facility: CLINIC | Age: 54
End: 2024-07-17
Payer: COMMERCIAL

## 2024-07-17 NOTE — PROGRESS NOTES
[facility]: Transitions of Care Note  Chief Complaint   Patient presents with    Clinic Care Coordination - Post Hospital       Most Recent Admission Date: 7/10/24  Most Recent Admission Diagnosis: abd pain    Most Recent Discharge Date: 7/11/2024  Most Recent Discharge Diagnosis: delfino-mesh fluid collection in ventral hernia     Transitions of Care Assessment    Discharge Assessment  How are you doing now that you are home?: She is overall feeling better. She is having a minimal amount of pain in her abd. She rates it a 2/10 with no interventions. She is resuming her normal activities with some restriction based on if pain increases-- this was suggested by discharge MD. Pain is positional. No fevers, chills, increased pain, changes with bowels, etc.  How are your symptoms? (Red Flag symptoms escalate to triage hotline per guidelines): Improved  Do you know how to contact your clinic care team if you have future questions or changes to your health status? : Yes  Does the patient have their discharge instructions? : Yes  Does the patient have questions regarding their discharge instructions? : No  Were you started on any new medications or were there changes to any of your previous medications? : Yes (amoxicillin until 7/19-- no GI upset with this. she is taking probiootics while on this)  Does the patient have all of their medications?: Yes  Do you have questions regarding any of your medications? : No  Do you have all of your needed medical supplies or equipment (DME)?  (i.e. oxygen tank, CPAP, cane, etc.):  (n/a)         Kristie Cornejo is a 54 y.o. old female with a history of ventral hernia repair x2, MM in remission who presents with a recurrent peritoneal fluid collection with inflammation just deep to ventral hernia mesh with associated pain. Similar to her admission 3 years ago, at which time, her symptoms resolved non-operatively. We discussed managing in a similar fashion at this juncture since  there are no definitive reasons to go straight back to the OR, which would entail possibly explantation of mesh and primary repair of a recurrent hernia. After discussion, we elected to proceed with a conservative approach. Will admit for observation, pain control, and IV antibiotics with attempt at aspiration of fluid collection.     Follow up Plan     Discharge Follow-Up  Discharge follow up appointment scheduled in alignment with recommended follow up timeframe or Transitions of Risk Category? (Low = within 30 days; Moderate= within 14 days; High= within 7 days): No  Discharge Follow Up Appointment Date:  (discharge MD stated she does not need PCP follow up. she was given discharge MD's number if any issues arise)    Future Appointments   Date Time Provider Department Center   9/6/2024  9:30 AM [facility]   9/13/2024  1:30 PM Jackie Dunn, DORON CNP [facility]       Non-oncologic admission-- will continue with POC for follow up in Sept.     For any urgent concerns, please contact our 24 hour clinic line:   [place]: [phone]     Griselda Valdez, MSN, RN, OCN   RN Care Coordinator   [facility]   [address] [place] [address] [id number] [phone]

## 2024-08-28 ENCOUNTER — PATIENT OUTREACH (OUTPATIENT)
Dept: ONCOLOGY | Facility: CLINIC | Age: 54
End: 2024-08-28
Payer: COMMERCIAL

## 2024-08-28 DIAGNOSIS — C90.00 LIGHT CHAIN MYELOMA (H): Primary | ICD-10-CM

## 2024-08-28 RX ORDER — ACYCLOVIR 400 MG/1
400 TABLET ORAL 2 TIMES DAILY
Qty: 60 TABLET | Refills: 3 | Status: SHIPPED | OUTPATIENT
Start: 2024-08-28

## 2024-08-28 NOTE — PROGRESS NOTES
[facility]: Cancer Care Short Note                                    Discussion with Patient:                                                        OUTBOUND CALL: RNCC received VM from patient. She is requesting a refill on acyclovir. RNCC called pt back-- will refill acyclovir at 400mg BID instead of 800mg BID. Pt is 1 year and 7 months post CAR-T.  Pt verbalizes understanding and has no other questions, comments, or concerns at this time.     Griselda Valdez, RN, MSN, OCN   RN Care Coordinator   [facility]   [address] [place] [address] [id number] [phone]

## 2024-09-03 ENCOUNTER — LAB (OUTPATIENT)
Dept: LAB | Facility: CLINIC | Age: 54
End: 2024-09-03
Payer: COMMERCIAL

## 2024-09-03 DIAGNOSIS — C90.00 MULTIPLE MYELOMA, REMISSION STATUS UNSPECIFIED (H): ICD-10-CM

## 2024-09-03 DIAGNOSIS — C90.00 LIGHT CHAIN MYELOMA (H): ICD-10-CM

## 2024-09-03 DIAGNOSIS — D80.1 HYPOGAMMAGLOBULINEMIA (H): ICD-10-CM

## 2024-09-03 LAB
ALBUMIN SERPL BCG-MCNC: 4.6 G/DL (ref 3.5–5.2)
ALP SERPL-CCNC: 78 U/L (ref 40–150)
ALT SERPL W P-5'-P-CCNC: 18 U/L (ref 0–50)
ANION GAP SERPL CALCULATED.3IONS-SCNC: 14 MMOL/L (ref 7–15)
AST SERPL W P-5'-P-CCNC: 22 U/L (ref 0–45)
BASOPHILS # BLD AUTO: 0.1 10E3/UL (ref 0–0.2)
BASOPHILS NFR BLD AUTO: 1 %
BILIRUB SERPL-MCNC: 0.5 MG/DL
BUN SERPL-MCNC: 15.4 MG/DL (ref 6–20)
CALCIUM SERPL-MCNC: 9.8 MG/DL (ref 8.8–10.4)
CHLORIDE SERPL-SCNC: 105 MMOL/L (ref 98–107)
CREAT SERPL-MCNC: 0.84 MG/DL (ref 0.51–0.95)
EGFRCR SERPLBLD CKD-EPI 2021: 82 ML/MIN/1.73M2
EOSINOPHIL # BLD AUTO: 0.2 10E3/UL (ref 0–0.7)
EOSINOPHIL NFR BLD AUTO: 5 %
ERYTHROCYTE [DISTWIDTH] IN BLOOD BY AUTOMATED COUNT: 12.1 % (ref 10–15)
GLUCOSE SERPL-MCNC: 164 MG/DL (ref 70–99)
HCO3 SERPL-SCNC: 22 MMOL/L (ref 22–29)
HCT VFR BLD AUTO: 40.4 % (ref 35–47)
HGB BLD-MCNC: 13.7 G/DL (ref 11.7–15.7)
IMM GRANULOCYTES # BLD: 0 10E3/UL
IMM GRANULOCYTES NFR BLD: 0 %
LYMPHOCYTES # BLD AUTO: 0.6 10E3/UL (ref 0.8–5.3)
LYMPHOCYTES NFR BLD AUTO: 12 %
MCH RBC QN AUTO: 31.2 PG (ref 26.5–33)
MCHC RBC AUTO-ENTMCNC: 33.9 G/DL (ref 31.5–36.5)
MCV RBC AUTO: 92 FL (ref 78–100)
MONOCYTES # BLD AUTO: 0.5 10E3/UL (ref 0–1.3)
MONOCYTES NFR BLD AUTO: 10 %
NEUTROPHILS # BLD AUTO: 3.7 10E3/UL (ref 1.6–8.3)
NEUTROPHILS NFR BLD AUTO: 72 %
PLATELET # BLD AUTO: 198 10E3/UL (ref 150–450)
POTASSIUM SERPL-SCNC: 4.1 MMOL/L (ref 3.4–5.3)
PROT SERPL-MCNC: 7.3 G/DL (ref 6.4–8.3)
RBC # BLD AUTO: 4.39 10E6/UL (ref 3.8–5.2)
SODIUM SERPL-SCNC: 141 MMOL/L (ref 135–145)
WBC # BLD AUTO: 5.1 10E3/UL (ref 4–11)

## 2024-09-03 PROCEDURE — 83521 IG LIGHT CHAINS FREE EACH: CPT

## 2024-09-03 PROCEDURE — 86334 IMMUNOFIX E-PHORESIS SERUM: CPT | Performed by: PATHOLOGY

## 2024-09-03 PROCEDURE — 85025 COMPLETE CBC W/AUTO DIFF WBC: CPT

## 2024-09-03 PROCEDURE — 82784 ASSAY IGA/IGD/IGG/IGM EACH: CPT

## 2024-09-03 PROCEDURE — 84165 PROTEIN E-PHORESIS SERUM: CPT | Performed by: PATHOLOGY

## 2024-09-03 PROCEDURE — 84155 ASSAY OF PROTEIN SERUM: CPT

## 2024-09-03 PROCEDURE — 36415 COLL VENOUS BLD VENIPUNCTURE: CPT

## 2024-09-03 PROCEDURE — 80053 COMPREHEN METABOLIC PANEL: CPT

## 2024-09-04 LAB
ALBUMIN SERPL ELPH-MCNC: 4.3 G/DL (ref 3.7–5.1)
ALPHA1 GLOB SERPL ELPH-MCNC: 0.3 G/DL (ref 0.2–0.4)
ALPHA2 GLOB SERPL ELPH-MCNC: 0.8 G/DL (ref 0.5–0.9)
B-GLOBULIN SERPL ELPH-MCNC: 0.7 G/DL (ref 0.6–1)
GAMMA GLOB SERPL ELPH-MCNC: 0.6 G/DL (ref 0.7–1.6)
IGA SERPL-MCNC: 15 MG/DL (ref 84–499)
IGG SERPL-MCNC: 605 MG/DL (ref 610–1616)
IGM SERPL-MCNC: 162 MG/DL (ref 35–242)
KAPPA LC FREE SER-MCNC: 1.41 MG/DL (ref 0.33–1.94)
KAPPA LC FREE/LAMBDA FREE SER NEPH: 0.99 {RATIO} (ref 0.26–1.65)
LAMBDA LC FREE SERPL-MCNC: 1.42 MG/DL (ref 0.57–2.63)
M PROTEIN SERPL ELPH-MCNC: 0 G/DL
PROT PATTERN SERPL ELPH-IMP: ABNORMAL
PROT PATTERN SERPL IFE-IMP: NORMAL
TOTAL PROTEIN SERUM FOR ELP: 6.7 G/DL (ref 6.4–8.3)

## 2024-10-04 DIAGNOSIS — C90.00 LIGHT CHAIN MYELOMA (H): Primary | ICD-10-CM

## 2024-10-04 NOTE — PROGRESS NOTES
Orders signed for 2 year BAN visit.    Katy Gardiner RN, MSN  BMT & Cellular Therapy Nurse Coordinator  [facility]  Phone: [phone]  Fax: [phone]

## 2024-10-15 ENCOUNTER — ONCOLOGY VISIT (OUTPATIENT)
Dept: ONCOLOGY | Facility: CLINIC | Age: 54
End: 2024-10-15
Attending: REGISTERED NURSE
Payer: COMMERCIAL

## 2024-10-15 VITALS
HEART RATE: 73 BPM | OXYGEN SATURATION: 98 % | DIASTOLIC BLOOD PRESSURE: 82 MMHG | WEIGHT: 162.7 LBS | TEMPERATURE: 98.3 F | RESPIRATION RATE: 16 BRPM | SYSTOLIC BLOOD PRESSURE: 130 MMHG | BODY MASS INDEX: 25.48 KG/M2

## 2024-10-15 DIAGNOSIS — Z23 NEED FOR PROPHYLACTIC VACCINATION AND INOCULATION AGAINST INFLUENZA: ICD-10-CM

## 2024-10-15 DIAGNOSIS — C90.00 LIGHT CHAIN MYELOMA (H): Primary | ICD-10-CM

## 2024-10-15 PROCEDURE — 90480 ADMN SARSCOV2 VAC 1/ONLY CMP: CPT | Performed by: REGISTERED NURSE

## 2024-10-15 PROCEDURE — G2211 COMPLEX E/M VISIT ADD ON: HCPCS | Performed by: REGISTERED NURSE

## 2024-10-15 PROCEDURE — 90673 RIV3 VACCINE NO PRESERV IM: CPT | Performed by: REGISTERED NURSE

## 2024-10-15 PROCEDURE — 99214 OFFICE O/P EST MOD 30 MIN: CPT | Performed by: REGISTERED NURSE

## 2024-10-15 PROCEDURE — 91320 SARSCV2 VAC 30MCG TRS-SUC IM: CPT | Performed by: REGISTERED NURSE

## 2024-10-15 PROCEDURE — 250N000011 HC RX IP 250 OP 636: Performed by: REGISTERED NURSE

## 2024-10-15 PROCEDURE — G0008 ADMIN INFLUENZA VIRUS VAC: HCPCS | Performed by: REGISTERED NURSE

## 2024-10-15 PROCEDURE — 99213 OFFICE O/P EST LOW 20 MIN: CPT | Performed by: REGISTERED NURSE

## 2024-10-15 RX ADMIN — COVID-19 VACCINE, MRNA 30 MCG: 0.04 INJECTION, SUSPENSION INTRAMUSCULAR at 14:22

## 2024-10-15 RX ADMIN — COVID-19 VACCINE, MRNA 30 MCG: 0.04 INJECTION, SUSPENSION INTRAMUSCULAR at 14:01

## 2024-10-15 RX ADMIN — INFLUENZA A VIRUS A/WEST VIRGINIA/30/2022 (H1N1) RECOMBINANT HEMAGGLUTININ ANTIGEN, INFLUENZA A VIRUS A/MASSACHUSETTS/18/2022 (H3N2) RECOMBINANT HEMAGGLUTININ ANTIGEN, AND INFLUENZA B VIRUS B/AUSTRIA/1359417/2021 RECOMBINANT HEMAGGLUTININ ANTIGEN 0.5 ML: 45; 45; 45 INJECTION INTRAMUSCULAR at 14:02

## 2024-10-15 ASSESSMENT — PAIN SCALES - GENERAL: PAINLEVEL: NO PAIN (0)

## 2024-10-15 NOTE — LETTER
10/15/2024      Kristie Cornejo  [address]  [facility] [address]      Dear Colleague,    Thank you for referring your patient, Kristie Cornejo, to the [facility]. Please see a copy of my visit note below.    [facility]  Date of Visit: Oct 15, 2024   Oncologist: Dr. Payton Reinoso    Reason for visit: myeloma follow-up           Oncology History   54 year old female with past medical history significant for Irritable bowel disease, allergies, large plasmacytoma of the L pelvis, and newly diagnosed multiple myeloma.     Early in April 2021 , she noted to have left sided groin pain, constant and was affecting her daily activities  MRI showed large, expansile, permeative mass with indistinct borders involving the left superior and inferior pubic rami, pubic symphysis and with possible extension to the left acetabulum.     We completed the following work-up:   - Biopsy 9/7/2021: plasmacytoma.  Flow cytometry showed kappa monotypic plasma cells, polytypic B cells. FISH results from plasmacytoma showed a gain of 11q, IGH-CCND1 fusion but no losses of 1q or TP53 and no gain of 1q.  - Bone marrow biopsy 09/22/21: Marrow cellularity 50% with 25% interstitial infiltration w/  Kappa-monotypic, moderately atypical plasma cells. Flow with 0.7% kappa-monotypica plasma cells. FISH and cytogenetics pending.   - PET CT 09/24/21 w/ hypermetabolic pathologic fracture of the L pubic ramus w/ aggressive appearing osteolysis but no additional suspicious lytic or blastic osseous lesions.     Treatment to date:   - Zometa q4 weeks started 9/27/21  - Radiation to left pubic ramus x18 fractions, completed 11/11/21.   - RVD C1D1 11/15/21  - RVD C2D1 12/6/21  - RVD C3D1  12/27/21  - RVD C4 D1 1/17/22, elizabeth added on D8 due to inadequate response of urinary M-spike and FLCs remaining over 100 mg/dL after 4 cycles of RVD    - Elizabeth-RVD C1D8 1/24/22  - Elizabeth-RVD C2D1  "22  - Elizabeth-RVD C3D1 22  - Elizabeth-RVD C4D1 3/29/22  - Elizabeth-RVD C5D1 22    Disease response: NV     - 22: Continued monotherapy with Daratumumab; held Revlimid and Velcade.    - 22: Transitioned to KCD, received C1 days 1 and 2- had significant PVCs at cardiac follow-uyp after 1st week of Kyprolis, held further doses.    - Transitioned to Sarclisa/Pom/Dex 22. Started Pom on .     -22 - COVID, delayed C3D15 treatment x 1 week    - Apheresis for CAR-T on 22; bridging with Sarclisa given 22   - Abecma 23          INTERIM HISTORY:   Margo presents to clinic for follow up.  Feels well overall.  The past couple of months have felt \"rough\" - their standard poodle , likely due to stomach cancer, Margo's dad is enrolled in hospice and expected to die within the week, and one of her friends with myeloma also recently decided to stop pursuing treatment.  Continues to have some intermittent GI disturbance, thinks it is related to something she eats that she hasn't quite identified yet.  She had COVID in 2024.  Hospitalized briefly in July due to an intraabdominal fluid collection around her hernia mesh which has occurred once before in .  No specific intervention recommended. No new bony pains.        ROS: 10 point ROS neg other than the symptoms noted above in the HPI.           Physical Exam:     /82 (BP Location: Right arm, Patient Position: Right side, Cuff Size: Adult Regular)   Pulse 73   Temp 98.3  F (36.8  C) (Oral)   Resp 16   Wt 73.8 kg (162 lb 11.2 oz)   SpO2 98%   BMI 25.48 kg/m     Gen: alert, pleasant and conversational, NAD  HEENT: NC/AT,EOMI w/ PERRL, anicteric sclera.  CV: normal S1,S2 with RRR no m/r/g  Resp: lungs CTA bilaterally with adequate air movement to bases. No wheezes or crackles  Ext: warm and well perfused, no edema or cyanosis  Skin: no concerning lesions or rashes  Neuro: A&Ox4, no lateralizing sx. Grossly nonfocal.  Psych: " appropriate, reactive             Laboratory Data:      I personally reviewed the following labs:    Most Recent 3 CBC's:  Recent Labs   Lab Test 09/03/24  1304 06/03/24  0943 03/18/24  1059   WBC 5.1 3.5* 3.0*   HGB 13.7 13.8 14.5   MCV 92 94 95    198 214     Most Recent 3 BMP's:  Recent Labs   Lab Test 09/03/24  1304 06/03/24  0943 03/18/24  1059    141 141   POTASSIUM 4.1 4.8 3.7   CHLORIDE 105 108* 106   CO2 22 21* 25   BUN 15.4 15.3 15.7   CR 0.84 0.82 0.71   ANIONGAP 14 12 10   MEKA 9.8 8.7 9.0   * 97 80     Most Recent 2 LFT's:  Recent Labs   Lab Test 09/03/24  1304 06/03/24  0943   AST 22 17   ALT 18 16   ALKPHOS 78 63   BILITOTAL 0.5 0.5       Imaging:  No new imaging to review.         Assessment and Recommendations   Kristie Cornejo is a 54 year old female who was diagnosed with Plasmacytoma after she presented with left sided groin pain now found to have multiple myeloma after further workup with bone marrow biopsy.     # Multiple myeloma with associated large plasmacytoma of the L pelvis. Kappa light chain disease.     BMBx with 25% plasma cell infiltration; standard risk with gain of 11q, IGH-CCND1 fusion but no losses of 1q or TP53 and no gain of 1q.   SPEP w/out a monoclonal peak  UPEP positive. 70% paraprotein. Large monoclonal free immunoglobulin light chain of kappa chain type.     No anemia, normal creat, Ca, no other skeletal lesions. Alb normal, B2M normal.  Stage I Light chain disease with left pelvic bone pathologic fracture she is classified as having symptomatic multiple myeloma.   -s/p michelle-RVD, michelle alone and KCD with either little benefit or significant symptoms  -transitioned to Sarclisa/Pom/Dex to avoid the cardiotoxicity of Kyprolis.  Started C1D1 7/6/22.  Her diarrhea has returned with being back on treatment but manageable  - Received Sarclisa 11/23/22, Pomalyst through ~ 12/2/22  - Underwent CAR-T therapy with Abecma 1/4/23  - Labs continue to show  excellent disease control - check every 3 months  - RTC in January for 2 year CAR-T anniversary visits      # Immunocompromised  Acyclovir twice daily  IgG > 600, no replacement needed  She had COVID in June 2024 - would like her influenza and COVID vaccines today 10/15/24         34 minutes spent on the date of the encounter doing chart review, review of test results, patient visit and documentation       DORON Magaña CNP  [facility] [place] [id number] [place], [place] [address] [phone]          Again, thank you for allowing me to participate in the care of your patient.        Sincerely,        DORON Magaña CNP   No

## 2024-10-15 NOTE — PROGRESS NOTES
[facility]  Date of Visit: Oct 15, 2024   Oncologist: Dr. Payton Reinoso    Reason for visit: myeloma follow-up           Oncology History   54 year old female with past medical history significant for Irritable bowel disease, allergies, large plasmacytoma of the L pelvis, and newly diagnosed multiple myeloma.     Early in April 2021 , she noted to have left sided groin pain, constant and was affecting her daily activities  MRI showed large, expansile, permeative mass with indistinct borders involving the left superior and inferior pubic rami, pubic symphysis and with possible extension to the left acetabulum.     We completed the following work-up:   - Biopsy 9/7/2021: plasmacytoma.  Flow cytometry showed kappa monotypic plasma cells, polytypic B cells. FISH results from plasmacytoma showed a gain of 11q, IGH-CCND1 fusion but no losses of 1q or TP53 and no gain of 1q.  - Bone marrow biopsy 09/22/21: Marrow cellularity 50% with 25% interstitial infiltration w/  Kappa-monotypic, moderately atypical plasma cells. Flow with 0.7% kappa-monotypica plasma cells. FISH and cytogenetics pending.   - PET CT 09/24/21 w/ hypermetabolic pathologic fracture of the L pubic ramus w/ aggressive appearing osteolysis but no additional suspicious lytic or blastic osseous lesions.     Treatment to date:   - Zometa q4 weeks started 9/27/21  - Radiation to left pubic ramus x18 fractions, completed 11/11/21.   - RVD C1D1 11/15/21  - RVD C2D1 12/6/21  - RVD C3D1  12/27/21  - RVD C4 D1 1/17/22, elizabeth added on D8 due to inadequate response of urinary M-spike and FLCs remaining over 100 mg/dL after 4 cycles of RVD    - Elizabeth-RVD C1D8 1/24/22  - Elizabeth-RVD C2D1 2/7/22  - Elizabeth-RVD C3D1 2/28/22  - Elizabeth-RVD C4D1 3/29/22  - Elizabeth-RVD C5D1 4/19/22    Disease response: MO     - 5/19/22: Continued monotherapy with Daratumumab; held Revlimid and Velcade.    - 6/21/22: Transitioned to KCD, received C1 days 1 and 2- had  "significant PVCs at cardiac follow-uyp after 1st week of Kyprolis, held further doses.    - Transitioned to Sarclisa/Pom/Dex 22. Started Pom on .     -22 - COVID, delayed C3D15 treatment x 1 week    - Apheresis for CAR-T on 22; bridging with Sarclisa given 22   - Abecma 23          INTERIM HISTORY:   Margo presents to clinic for follow up.  Feels well overall.  The past couple of months have felt \"rough\" - their standard poodle , likely due to stomach cancer, Margo's dad is enrolled in hospice and expected to die within the week, and one of her friends with myeloma also recently decided to stop pursuing treatment.  Continues to have some intermittent GI disturbance, thinks it is related to something she eats that she hasn't quite identified yet.  She had COVID in 2024.  Hospitalized briefly in July due to an intraabdominal fluid collection around her hernia mesh which has occurred once before in .  No specific intervention recommended. No new bony pains.        ROS: 10 point ROS neg other than the symptoms noted above in the HPI.           Physical Exam:     /82 (BP Location: Right arm, Patient Position: Right side, Cuff Size: Adult Regular)   Pulse 73   Temp 98.3  F (36.8  C) (Oral)   Resp 16   Wt 73.8 kg (162 lb 11.2 oz)   SpO2 98%   BMI 25.48 kg/m     Gen: alert, pleasant and conversational, NAD  HEENT: NC/AT,EOMI w/ PERRL, anicteric sclera.  CV: normal S1,S2 with RRR no m/r/g  Resp: lungs CTA bilaterally with adequate air movement to bases. No wheezes or crackles  Ext: warm and well perfused, no edema or cyanosis  Skin: no concerning lesions or rashes  Neuro: A&Ox4, no lateralizing sx. Grossly nonfocal.  Psych: appropriate, reactive             Laboratory Data:      I personally reviewed the following labs:    Most Recent 3 CBC's:  Recent Labs   Lab Test 24  1304 24  0943 24  1059   WBC 5.1 3.5* 3.0*   HGB 13.7 13.8 14.5   MCV 92 94 95   PLT " 198 198 214     Most Recent 3 BMP's:  Recent Labs   Lab Test 09/03/24  1304 06/03/24  0943 03/18/24  1059    141 141   POTASSIUM 4.1 4.8 3.7   CHLORIDE 105 108* 106   CO2 22 21* 25   BUN 15.4 15.3 15.7   CR 0.84 0.82 0.71   ANIONGAP 14 12 10   MEKA 9.8 8.7 9.0   * 97 80     Most Recent 2 LFT's:  Recent Labs   Lab Test 09/03/24  1304 06/03/24  0943   AST 22 17   ALT 18 16   ALKPHOS 78 63   BILITOTAL 0.5 0.5       Imaging:  No new imaging to review.         Assessment and Recommendations   Kristie Cornejo is a 54 year old female who was diagnosed with Plasmacytoma after she presented with left sided groin pain now found to have multiple myeloma after further workup with bone marrow biopsy.     # Multiple myeloma with associated large plasmacytoma of the L pelvis. Kappa light chain disease.     BMBx with 25% plasma cell infiltration; standard risk with gain of 11q, IGH-CCND1 fusion but no losses of 1q or TP53 and no gain of 1q.   SPEP w/out a monoclonal peak  UPEP positive. 70% paraprotein. Large monoclonal free immunoglobulin light chain of kappa chain type.     No anemia, normal creat, Ca, no other skeletal lesions. Alb normal, B2M normal.  Stage I Light chain disease with left pelvic bone pathologic fracture she is classified as having symptomatic multiple myeloma.   -s/p michelle-RVD, michelle alone and KCD with either little benefit or significant symptoms  -transitioned to Sarclisa/Pom/Dex to avoid the cardiotoxicity of Kyprolis.  Started C1D1 7/6/22.  Her diarrhea has returned with being back on treatment but manageable  - Received Sarclisa 11/23/22, Pomalyst through ~ 12/2/22  - Underwent CAR-T therapy with Abecma 1/4/23  - Labs continue to show excellent disease control - check every 3 months  - RTC in January for 2 year CAR-T anniversary visits      # Immunocompromised  Acyclovir twice daily  IgG > 600, no replacement needed  She had COVID in June 2024 - would like her influenza and COVID vaccines  today 10/15/24         34 minutes spent on the date of the encounter doing chart review, review of test results, patient visit and documentation       DORON Magaña [facility]  [address] [place] [address] [phone]

## 2024-10-15 NOTE — NURSING NOTE
Immunizations Administered       Name Date Dose VIS Date Route    COVID-19 12+ (Pfizer) 10/15/24  2:01 PM 30 mcg EUI,10/19/2023,Given today Intramuscular    COVID-19 12+ (Pfizer) 10/15/24  2:22 PM 30 mcg EUI,10/19/2023,Given today Intramuscular    Influenza Vaccine IM 18-49 Yrs, RIV3 10/15/24  2:02 PM 0.5 mL 08/06/2021, Given Today Intramuscular          Nan Escobar RN

## 2024-10-15 NOTE — NURSING NOTE
"Oncology Rooming Note    October 15, 2024 1:24 PM   Kristie Cornejo is a 54 year old female who presents for:    Chief Complaint   Patient presents with    Oncology Clinic Visit     RTN for MM     Initial Vitals: /82 (BP Location: Right arm, Patient Position: Right side, Cuff Size: Adult Regular)   Pulse 73   Temp 98.3  F (36.8  C) (Oral)   Resp 16   Wt 73.8 kg (162 lb 11.2 oz)   SpO2 98%   BMI 25.48 kg/m   Estimated body mass index is 25.48 kg/m  as calculated from the following:    Height as of 3/21/24: 1.702 m (5' 7\").    Weight as of this encounter: 73.8 kg (162 lb 11.2 oz). Body surface area is 1.87 meters squared.  No Pain (0) Comment: Data Unavailable   No LMP recorded. (Menstrual status: IUD).  Allergies reviewed: Yes  Medications reviewed: Yes    Medications: Medication refills not needed today.  Pharmacy name entered into [employer]:    [facility] [place], [place] - [address]  [facility] - [place], [place] - [address]    Frailty Screening:   Is the patient here for a new oncology consult visit in cancer care? 2. No      Clinical concerns: NONE       Tammi Freeman MA             "

## 2025-01-03 ENCOUNTER — HOSPITAL ENCOUNTER (OUTPATIENT)
Dept: PET IMAGING | Facility: CLINIC | Age: 55
Discharge: HOME | End: 2025-01-03
Payer: COMMERCIAL

## 2025-01-03 DIAGNOSIS — C90.00 LIGHT CHAIN MYELOMA (H): ICD-10-CM

## 2025-01-03 PROCEDURE — 78816 PET IMAGE W/CT FULL BODY: CPT | Mod: 26 | Performed by: RADIOLOGY

## 2025-01-03 PROCEDURE — 343N000001 HC RX 343 MED OP 636

## 2025-01-03 PROCEDURE — A9552 F18 FDG: HCPCS

## 2025-01-03 PROCEDURE — 78816 PET IMAGE W/CT FULL BODY: CPT | Mod: PS

## 2025-01-03 RX ORDER — FLUDEOXYGLUCOSE F 18 200 MCI/ML
9.6-15 INJECTION, SOLUTION INTRAVENOUS ONCE
Status: COMPLETED | OUTPATIENT
Start: 2025-01-03 | End: 2025-01-03

## 2025-01-03 RX ADMIN — FLUDEOXYGLUCOSE F 18 10.1 MILLICURIE: 200 INJECTION, SOLUTION INTRAVENOUS at 07:14

## 2025-01-10 ENCOUNTER — APPOINTMENT (OUTPATIENT)
Dept: LAB | Facility: CLINIC | Age: 55
End: 2025-01-10
Payer: COMMERCIAL

## 2025-01-24 NOTE — PROGRESS NOTES
RN Care Coordination Note:     RNCC went and saw patient in infusion while she was getting velcade.  Pt states she is feeling ok today. Having a littkle nausea- has compazine at home and will take it if needed. RNCC educated on ways to reduce nausea with diet (i.e. small frequent meals, antiemetic prior to eating, avoid warm/pungent foods, etc.).   Pt needs letter from MD to get refund for ski pass. RNCC will construct with Dr. Reinoso with signature and give it to her today.  On phone call yesterday, pt had questions about allergy shots and getting second shingle vaccine. Per Dr. Reinoso, hold allergy shots for now given new chemo. Discuss alterative option with allergist such as Flonase or PO antihistamine. Per Dr. Reinoso, she does not need to get second shingle vaccine at this time. She will eventually have auto BMT and will need to get revaccinated after. She is currently on acyclovir.     *RNCC and Dr. Reinoso constructed letter for patient. Signed and given to patient in clinic on 11/18 at 1010.     Patient verbalizes understanding and has no questions or concerns at this time. Has contact information for [facility] [place] if she has any further needs.    Griselda Valdez, RN, MSN, OCN   RN Care Coordinator   [facility] [address] [address]   [phone]         
07-Feb-2025

## 2025-01-28 ENCOUNTER — MYC REFILL (OUTPATIENT)
Dept: ONCOLOGY | Facility: CLINIC | Age: 55
End: 2025-01-28
Payer: COMMERCIAL

## 2025-01-28 DIAGNOSIS — C90.00 LIGHT CHAIN MYELOMA (H): ICD-10-CM

## 2025-01-29 RX ORDER — ACYCLOVIR 400 MG/1
400 TABLET ORAL 2 TIMES DAILY
Qty: 60 TABLET | Refills: 3 | Status: SHIPPED | OUTPATIENT
Start: 2025-01-29

## 2025-01-29 NOTE — TELEPHONE ENCOUNTER
Medication:Acyclovir  Last prescribing provider:Dr. Reinoso  Last clinic visit date: 10/15/2024 Jackie SAL  Recommendations for requested medication:ppx  Any other pertinent information:routed to last provider Jackie

## 2025-02-06 ENCOUNTER — OFFICE VISIT (OUTPATIENT)
Dept: TRANSPLANT | Facility: CLINIC | Age: 55
End: 2025-02-06
Payer: COMMERCIAL

## 2025-02-06 VITALS
WEIGHT: 165.5 LBS | SYSTOLIC BLOOD PRESSURE: 125 MMHG | RESPIRATION RATE: 18 BRPM | DIASTOLIC BLOOD PRESSURE: 86 MMHG | TEMPERATURE: 98 F | BODY MASS INDEX: 25.92 KG/M2 | OXYGEN SATURATION: 99 % | HEART RATE: 78 BPM

## 2025-02-06 DIAGNOSIS — C90.00 MULTIPLE MYELOMA NOT HAVING ACHIEVED REMISSION (H): Primary | ICD-10-CM

## 2025-02-06 PROCEDURE — 99214 OFFICE O/P EST MOD 30 MIN: CPT

## 2025-02-06 PROCEDURE — 99213 OFFICE O/P EST LOW 20 MIN: CPT

## 2025-02-06 ASSESSMENT — PAIN SCALES - GENERAL: PAINLEVEL_OUTOF10: NO PAIN (0)

## 2025-02-06 NOTE — LETTER
2025      Kristie Cornejo  [address]  [facility] [address]      Dear Colleague,    Thank you for referring your patient, Kristie Cornejo, to the [facility] [place]. Please see a copy of my visit note below.      BMT Progress Note  2025      BMT Physician: Dr. Reinoso   Transplant Type: CAR-T Abecma  Preparative Regimen: Cy/Flu  Chief complaint: Kristie Cornejo is a 54 year old female with a history of multiple myeloma conditioned with Cy/Flu, currently day day 200 of her CAR T-cell therapy with Abecma.  - readmitted briefly  with transient fever, ongoing headache (post LP)  - all resolved.    2 years Year anniversary       INTERIM HISTORY:   Margo is feeling great, energy is good, no new complains,   Had a cold couple of times but nothing serious.    no fevers, no SOB.     active and busy, picked up some skiing.      no new body aches. BM are stable now, she had colonoscopy in 2022 which was neg.    - no n/v/d, fevers, or leg swelling.   -       REVIEW OF SYSTEMS: Otherwise unremarkable other than what is noted in the Interim History.      PHYSICAL EXAM:   Wt Readings from Last 4 Encounters:   25 75.1 kg (165 lb 8 oz)   10/15/24 73.8 kg (162 lb 11.2 oz)   24 73.5 kg (162 lb)   24 75.6 kg (166 lb 9.6 oz)     ECO    /86 (BP Location: Right arm, Patient Position: Sitting, Cuff Size: Adult Regular)   Pulse 78   Temp 98  F (36.7  C) (Oral)   Resp 18   Wt 75.1 kg (165 lb 8 oz)   SpO2 99%   BMI 25.92 kg/m       General Appearance: NAD, happy, well,   On video   Lab Results   Component Value Date    WBC 3.9 (L) 01/10/2025    ANEU 1.0 (L) 2023    HGB 14.0 01/10/2025    HCT 41.0 01/10/2025     01/10/2025     01/10/2025    POTASSIUM 4.1 01/10/2025    CHLORIDE 107 01/10/2025    CO2 25 01/10/2025    GLC 88 01/10/2025    BUN 18.9 01/10/2025    CR 0.80 01/10/2025    MAG 2.2 01/10/2025    INR 1.00  02/02/2023    AST 21 01/10/2025    ALT 17 01/10/2025       BMBx 1/10/2025  Final Diagnosis   Bone marrow, posterior iliac crest, right decalcified trephine biopsy and touch imprint; particle crush, direct aspirate smear, and concentrated aspirate smear; and peripheral smear:  - No morphologic and immunophenotypic evidence of recurrent/persistent plasma cell myeloma  - Hypocellular marrow (cellularity is estimated at 30-40%) with trilineage hematopoesis, slight left-shifted maturation no overt dysplasia and 1% blasts  - Peripheral blood showing slight leukopenia with lymphopenia and no circulating plasma cells  -  See comment      PET   1/10/2025  No evidence of disease      Lab Results   Component Value Date    WBC 3.9 (L) 01/10/2025    HGB 14.0 01/10/2025    HCT 41.0 01/10/2025     01/10/2025     01/10/2025    POTASSIUM 4.1 01/10/2025    CHLORIDE 107 01/10/2025    CO2 25 01/10/2025    GLC 88 01/10/2025    BUN 18.9 01/10/2025    CR 0.80 01/10/2025    MAG 2.2 01/10/2025    INR 1.00 02/02/2023    AST 21 01/10/2025    ALT 17 01/10/2025     01/10/2025         Light chain MM, standard risk with t(11;14). Has never achieved remission with standard therapy.   No p53 or 17del  S/p Abecma 1.5 year (median PFS for patients in CR is about 18 months)    Day 29 - re-staging studies drawn today, disease response: CR.  Marrow not assessed yet, will do at 3 months   Day 100 - restaging PET and BMBx with CR without MRD !! This is a great outcome.  6 months  - ongoing remission based on serum testing. No MRD.   6 months  - ongoing remission based on serum testing. No MRD.  1 year  - ongoing stringent remission, no MRD   1.5 year - ongoing srtingent remission, no MD  2 years - ongoing stringent remission, no MRD    On Neurotox prophylaxis study. No ICANS.   it dex x 2 doses (-1, +6)   --completed Simvastatin daily 40mg until day day +30, now off   Patient will transition to [facility]  No need for future BMBx,  "we will monitor serologic markers MM and repeat PET in 1 year     HEME  Counts are recovered now.  No transfusions.         IMMUNOCOMPROMISED  - ok to stop fluc and Levaquin.  -PJP prophylaxis: off Bactrim.  CD4 is above 200.   -Hypogammaglobulinemia - s/p  IVIG in May and Sept 23  IgG 521mg/dl - no IVIG replacement needed now.   - Shingrix vaccine today and booster in 3 months         COVID vaccine booster, influnza Sept 23)    CARDIOVASCULAR  - Risk of cardiomyopathy:  Baseline LVEF: 55-60%   - Risk of arrhythmia: Baseline EKG showed sinus rhythm nonspecific T wave abnormality  - Hx of PVC ablation March 2022      GI  - Ulcer prophylaxis: Protonix   -refer for colonoscopy given PET findings. Doubt malignancy, but given bouts of diarrhea she may have CMV colitis or infections. I suggest to obtain biopsy     Today's Summary:  Transition to [facility]  SULEIMAN visit in 3months, me in 6 months  MM lab before each visit     Cellular Therapy Anniversary Visit    Kristie Cornejo is a(n) 54 year old patient with a history of Multiple Myeloma, who is here for a 2 year anniversary visit post cellular therapy with Abecma as fifth line therapy.    The patient's restaging included Whole body PET, Bone marrow biopsy, UPEP, SPEP, and Free Light Chains    The patient's disease status is currently stringent CR    Patients are at risk post-CarT therapy for ongoing cytopenias, hypogammaglobulinemia, and infections.    Pertinent labs:  Lab Results   Component Value Date    WBC 3.9 01/10/2025    WBC 7.3 02/13/2006     Lab Results   Component Value Date    RBC 4.57 01/10/2025    RBC 4.25 02/13/2006     Lab Results   Component Value Date    HGB 14.0 01/10/2025    HGB 12.2 08/29/2006     Lab Results   Component Value Date    HCT 41.0 01/10/2025    HCT 38.0 02/13/2006     No components found for: \"MCT\"  Lab Results   Component Value Date    MCV 90 01/10/2025    MCV 89 02/13/2006     Lab Results   Component Value Date    MCH 30.6 " 01/10/2025    MCH 30.6 02/13/2006     Lab Results   Component Value Date    MCHC 34.1 01/10/2025    MCHC 34.2 02/13/2006     Lab Results   Component Value Date    RDW 12.3 01/10/2025    RDW 12.3 02/13/2006     Lab Results   Component Value Date     01/10/2025     02/13/2006     Immunoglobulin G   Date Value Ref Range Status   01/10/2025 601 (L) 610 - 1,616 mg/dL Final     Absolute CD4, [place] T Cells   Date Value Ref Range Status   01/12/2024 256 (L) 441 - 2,156 cells/uL Final       Management recommendations:          I spent 40 minutes in the care of this patient today, which included time necessary for preparation for the visit, obtaining history, ordering medications/tests/procedures as medically indicated, review of pertinent medical literature, counseling of the patient, communication of recommendations to the care team, and documentation time.      Payton Reinoso MD     Again, thank you for allowing me to participate in the care of your patient.        Sincerely,        Payton Reinoso MD    Electronically signed

## 2025-02-06 NOTE — PROGRESS NOTES
BMT Progress Note  2025      BMT Physician: Dr. Reinoso   Transplant Type: CAR-T Abecma  Preparative Regimen: Cy/Flu  Chief complaint: Kristie Cornejo is a 54 year old female with a history of multiple myeloma conditioned with Cy/Flu, currently day day 200 of her CAR T-cell therapy with Abecma.  - readmitted briefly  with transient fever, ongoing headache (post LP)  - all resolved.    2 years Year anniversary       INTERIM HISTORY:   Margo is feeling great, energy is good, no new complains,   Had a cold couple of times but nothing serious.    no fevers, no SOB.     active and busy, picked up some skiing.      no new body aches. BM are stable now, she had colonoscopy in 2022 which was neg.    - no n/v/d, fevers, or leg swelling.   -       REVIEW OF SYSTEMS: Otherwise unremarkable other than what is noted in the Interim History.      PHYSICAL EXAM:   Wt Readings from Last 4 Encounters:   25 75.1 kg (165 lb 8 oz)   10/15/24 73.8 kg (162 lb 11.2 oz)   24 73.5 kg (162 lb)   24 75.6 kg (166 lb 9.6 oz)     ECO    /86 (BP Location: Right arm, Patient Position: Sitting, Cuff Size: Adult Regular)   Pulse 78   Temp 98  F (36.7  C) (Oral)   Resp 18   Wt 75.1 kg (165 lb 8 oz)   SpO2 99%   BMI 25.92 kg/m       General Appearance: NAD, happy, well,   On video   Lab Results   Component Value Date    WBC 3.9 (L) 01/10/2025    ANEU 1.0 (L) 2023    HGB 14.0 01/10/2025    HCT 41.0 01/10/2025     01/10/2025     01/10/2025    POTASSIUM 4.1 01/10/2025    CHLORIDE 107 01/10/2025    CO2 25 01/10/2025    GLC 88 01/10/2025    BUN 18.9 01/10/2025    CR 0.80 01/10/2025    MAG 2.2 01/10/2025    INR 1.00 2023    AST 21 01/10/2025    ALT 17 01/10/2025       BMBx 1/10/2025  Final Diagnosis   Bone marrow, posterior iliac crest, right decalcified trephine biopsy and touch imprint; particle crush, direct aspirate smear, and concentrated aspirate smear; and peripheral  smear:  - No morphologic and immunophenotypic evidence of recurrent/persistent plasma cell myeloma  - Hypocellular marrow (cellularity is estimated at 30-40%) with trilineage hematopoesis, slight left-shifted maturation no overt dysplasia and 1% blasts  - Peripheral blood showing slight leukopenia with lymphopenia and no circulating plasma cells  -  See comment      PET   1/10/2025  No evidence of disease      Lab Results   Component Value Date    WBC 3.9 (L) 01/10/2025    HGB 14.0 01/10/2025    HCT 41.0 01/10/2025     01/10/2025     01/10/2025    POTASSIUM 4.1 01/10/2025    CHLORIDE 107 01/10/2025    CO2 25 01/10/2025    GLC 88 01/10/2025    BUN 18.9 01/10/2025    CR 0.80 01/10/2025    MAG 2.2 01/10/2025    INR 1.00 02/02/2023    AST 21 01/10/2025    ALT 17 01/10/2025     01/10/2025         Light chain MM, standard risk with t(11;14). Has never achieved remission with standard therapy.   No p53 or 17del  S/p Abecma 1.5 year (median PFS for patients in CR is about 18 months)    Day 29 - re-staging studies drawn today, disease response: CR.  Marrow not assessed yet, will do at 3 months   Day 100 - restaging PET and BMBx with CR without MRD !! This is a great outcome.  6 months  - ongoing remission based on serum testing. No MRD.   6 months  - ongoing remission based on serum testing. No MRD.  1 year  - ongoing stringent remission, no MRD   1.5 year - ongoing srtingent remission, no MD  2 years - ongoing stringent remission, no MRD    On Neurotox prophylaxis study. No ICANS.   it dex x 2 doses (-1, +6)   --completed Simvastatin daily 40mg until day day +30, now off   Patient will transition to [facility]  No need for future BMBx, we will monitor serologic markers MM and repeat PET in 1 year     HEME  Counts are recovered now.  No transfusions.         IMMUNOCOMPROMISED  - ok to stop fluc and Levaquin.  -PJP prophylaxis: off Bactrim.  CD4 is above 200.   -Hypogammaglobulinemia - s/p  IVIG in May  "and Sept 23  IgG 521mg/dl - no IVIG replacement needed now.   - Shingrix vaccine today and booster in 3 months         COVID vaccine booster, influnza Sept 23)    CARDIOVASCULAR  - Risk of cardiomyopathy:  Baseline LVEF: 55-60%   - Risk of arrhythmia: Baseline EKG showed sinus rhythm nonspecific T wave abnormality  - Hx of PVC ablation March 2022      GI  - Ulcer prophylaxis: Protonix   -refer for colonoscopy given PET findings. Doubt malignancy, but given bouts of diarrhea she may have CMV colitis or infections. I suggest to obtain biopsy     Today's Summary:  Transition to [facility]  SULEIMAN visit in 3months, me in 6 months  MM lab before each visit     Cellular Therapy Anniversary Visit    Kristie Cornejo is a(n) 54 year old patient with a history of Multiple Myeloma, who is here for a 2 year anniversary visit post cellular therapy with Abecma as fifth line therapy.    The patient's restaging included Whole body PET, Bone marrow biopsy, UPEP, SPEP, and Free Light Chains    The patient's disease status is currently stringent CR    Patients are at risk post-CarT therapy for ongoing cytopenias, hypogammaglobulinemia, and infections.    Pertinent labs:  Lab Results   Component Value Date    WBC 3.9 01/10/2025    WBC 7.3 02/13/2006     Lab Results   Component Value Date    RBC 4.57 01/10/2025    RBC 4.25 02/13/2006     Lab Results   Component Value Date    HGB 14.0 01/10/2025    HGB 12.2 08/29/2006     Lab Results   Component Value Date    HCT 41.0 01/10/2025    HCT 38.0 02/13/2006     No components found for: \"MCT\"  Lab Results   Component Value Date    MCV 90 01/10/2025    MCV 89 02/13/2006     Lab Results   Component Value Date    MCH 30.6 01/10/2025    MCH 30.6 02/13/2006     Lab Results   Component Value Date    MCHC 34.1 01/10/2025    MCHC 34.2 02/13/2006     Lab Results   Component Value Date    RDW 12.3 01/10/2025    RDW 12.3 02/13/2006     Lab Results   Component Value Date     01/10/2025    PLT " 326 02/13/2006     Immunoglobulin G   Date Value Ref Range Status   01/10/2025 601 (L) 610 - 1,616 mg/dL Final     Absolute CD4, [place] T Cells   Date Value Ref Range Status   01/12/2024 256 (L) 441 - 2,156 cells/uL Final       Management recommendations:          I spent 40 minutes in the care of this patient today, which included time necessary for preparation for the visit, obtaining history, ordering medications/tests/procedures as medically indicated, review of pertinent medical literature, counseling of the patient, communication of recommendations to the care team, and documentation time.      Payton Reinoso MD

## 2025-02-06 NOTE — NURSING NOTE
"Oncology Rooming Note    February 6, 2025 9:07 AM   Kristie Cornejo is a 54 year old female who presents for:    Chief Complaint   Patient presents with    Oncology Clinic Visit     Multiple myeloma      Initial Vitals: /86 (BP Location: Right arm, Patient Position: Sitting, Cuff Size: Adult Regular)   Pulse 78   Temp 98  F (36.7  C) (Oral)   Resp 18   Wt 75.1 kg (165 lb 8 oz)   SpO2 99%   BMI 25.92 kg/m   Estimated body mass index is 25.92 kg/m  as calculated from the following:    Height as of 3/21/24: 1.702 m (5' 7\").    Weight as of this encounter: 75.1 kg (165 lb 8 oz). Body surface area is 1.88 meters squared.  No Pain (0) Comment: Data Unavailable   No LMP recorded. (Menstrual status: IUD).  Allergies reviewed: Yes  Medications reviewed: Yes    Medications: Medication refills not needed today.  Pharmacy name entered into [employer]:    [facility] [place], [place] - [address] [facility] [place] [place] [facility] [address]    Frailty Screening:   Is the patient here for a new oncology consult visit in cancer care? 2. No      Clinical concerns:  none.      Duy Walters"

## 2025-04-13 ENCOUNTER — HEALTH MAINTENANCE LETTER (OUTPATIENT)
Age: 55
End: 2025-04-13

## 2025-05-06 ENCOUNTER — ONCOLOGY VISIT (OUTPATIENT)
Dept: ONCOLOGY | Facility: CLINIC | Age: 55
End: 2025-05-06
Attending: REGISTERED NURSE
Payer: COMMERCIAL

## 2025-05-06 ENCOUNTER — LAB (OUTPATIENT)
Dept: LAB | Facility: CLINIC | Age: 55
End: 2025-05-06
Payer: COMMERCIAL

## 2025-05-06 VITALS
HEART RATE: 74 BPM | OXYGEN SATURATION: 99 % | RESPIRATION RATE: 16 BRPM | SYSTOLIC BLOOD PRESSURE: 119 MMHG | WEIGHT: 164.4 LBS | DIASTOLIC BLOOD PRESSURE: 77 MMHG | BODY MASS INDEX: 25.75 KG/M2 | TEMPERATURE: 97.9 F

## 2025-05-06 DIAGNOSIS — C90.00 LIGHT CHAIN MYELOMA (H): ICD-10-CM

## 2025-05-06 DIAGNOSIS — C90.00 LIGHT CHAIN MYELOMA (H): Primary | ICD-10-CM

## 2025-05-06 PROCEDURE — 99214 OFFICE O/P EST MOD 30 MIN: CPT | Performed by: REGISTERED NURSE

## 2025-05-06 PROCEDURE — 83521 IG LIGHT CHAINS FREE EACH: CPT | Performed by: REGISTERED NURSE

## 2025-05-06 PROCEDURE — 99213 OFFICE O/P EST LOW 20 MIN: CPT | Performed by: REGISTERED NURSE

## 2025-05-06 PROCEDURE — 99000 SPECIMEN HANDLING OFFICE-LAB: CPT | Performed by: PATHOLOGY

## 2025-05-06 PROCEDURE — 36415 COLL VENOUS BLD VENIPUNCTURE: CPT | Performed by: PATHOLOGY

## 2025-05-06 PROCEDURE — G2211 COMPLEX E/M VISIT ADD ON: HCPCS | Performed by: REGISTERED NURSE

## 2025-05-06 NOTE — NURSING NOTE
"Oncology Rooming Note    May 6, 2025 8:19 AM   Kristie Cornejo is a 55 year old female who presents for:    Chief Complaint   Patient presents with    Oncology Clinic Visit     Multiple Myeloma     Initial Vitals: /77 (BP Location: Right arm, Patient Position: Sitting, Cuff Size: Adult Regular)   Pulse 74   Temp 97.9  F (36.6  C) (Oral)   Resp 16   Wt 74.6 kg (164 lb 6.4 oz)   SpO2 99%   BMI 25.75 kg/m   Estimated body mass index is 25.75 kg/m  as calculated from the following:    Height as of 3/21/24: 1.702 m (5' 7\").    Weight as of this encounter: 74.6 kg (164 lb 6.4 oz). Body surface area is 1.88 meters squared.  Data Unavailable Comment: Data Unavailable   No LMP recorded. (Menstrual status: IUD).  Allergies reviewed: Yes  Medications reviewed: Yes    Medications: medication refills needed   Pharmacy name entered into [employer]:    [facility] [place], [place] - [address] [place], [place] [address] [place], [place] [address]    Frailty Screening:   Is the patient here for a new oncology consult visit in cancer care? 2. No    PHQ9:  Did this patient require a PHQ9?: No      Clinical concerns:  needs acyclovir      Asya Cross              "

## 2025-05-06 NOTE — PROGRESS NOTES
"  [facility] Progress Note  05/06/2025      BMT Physician: Dr. Reinoso   Transplant Type: CAR-T Abecma  Preparative Regimen: Cy/Flu  Chief complaint: Kristie Cornejo is a 55 year old female with a history of multiple myeloma conditioned with Cy/Flu, currently 2+ years s/p CAR T-cell therapy with Abecma.    INTERIM HISTORY:   Margo is feeling great, energy is good, no new complaints, did 6 hours of gardening.   \"Picks up everything\" so somewhat frequent illness, but no hopsitalizations.  Had COVID and influenza B last fall, influenza B was a fairly severe illness for her.   Having PVCs again but is manageable, symptoms resolve with rest.        REVIEW OF SYSTEMS: Otherwise unremarkable other than what is noted in the Interim History.      PHYSICAL EXAM:   Wt Readings from Last 4 Encounters:   05/06/25 74.6 kg (164 lb 6.4 oz)   02/06/25 75.1 kg (165 lb 8 oz)   10/15/24 73.8 kg (162 lb 11.2 oz)   03/21/24 73.5 kg (162 lb)     Gen: alert, pleasant and conversational, NAD  HEENT: NC/AT,EOMI w/ PERRL, anicteric sclera. OP clear. MMM.   CV: normal S1,S2 with RRR no m/r/g  Resp: lungs CTA bilaterally with adequate air movement to bases. No wheezes or crackles  Abd: soft NTND no organomegaly or masses. BS normoactive.   Ext: warm and well perfused, no edema or cyanosis  Lymphatics: no palpable cervical or axillary LAD. No HSM  Skin: no concerning lesions or rashes  Neuro: A&Ox4, no lateralizing sx. Grossly nonfocal.  Psych: appropriate, reactive      Labs:  I personally reviewed the following labs:    Most Recent 3 CBC's:  Recent Labs   Lab Test 01/10/25  0953 09/03/24  1304 06/03/24  0943   WBC 3.9* 5.1 3.5*   HGB 14.0 13.7 13.8   MCV 90 92 94    198 198     Most Recent 3 BMP's:  Recent Labs   Lab Test 01/10/25  0953 09/03/24  1304 06/03/24  0943    141 141   POTASSIUM 4.1 4.1 4.8   CHLORIDE 107 105 108*   CO2 25 22 21*   BUN 18.9 15.4 15.3   CR 0.80 0.84 0.82   ANIONGAP 9 14 12   MEKA 9.1 9.8 8.7 "   GLC 88 164* 97     Most Recent 2 LFT's:  Recent Labs   Lab Test 01/10/25  0953 09/03/24  1304   AST 21 22   ALT 17 18   ALKPHOS 69 78   BILITOTAL 0.4 0.5           Assessment and Plan:    Light chain MM, standard risk with t(11;14). Never achieved remission with standard therapy.   No p53 or 17del  S/p Abecma 1/4/23 (median PFS for patients in CR is about 18 months)    Day 29 - re-staging studies drawn today, disease response: CR.  Marrow not assessed yet, will do at 3 months   Day 100 - restaging PET and BMBx with CR without MRD !! This is a great outcome.  6 months  - ongoing remission based on serum testing. No MRD.   6 months  - ongoing remission based on serum testing. No MRD.  1 year  - ongoing stringent remission, no MRD   1.5 year - ongoing srtingent remission, no MD  2 years - ongoing stringent remission, no MRD    On Neurotox prophylaxis study. No ICANS.   it dex x 2 doses (-1, +6)   --completed Simvastatin daily 40mg until day day +30, now off   No need for future BMBx, we will monitor serologic markers MM and repeat PET in 1 year     HEME  Counts are recovered now.  No transfusions.         IMMUNOCOMPROMISED  -PJP prophylaxis: off Bactrim.  CD4 is above 200.   -Hypogammaglobulinemia - s/p  IVIG in May and Sept 23  IgG 521mg/dl - no IVIG replacement needed now.       CARDIOVASCULAR  - Risk of cardiomyopathy:  Baseline LVEF: 55-60%   - Risk of arrhythmia: Baseline EKG showed sinus rhythm nonspecific T wave abnormality  - Hx of PVC ablation March 2022      GI  - Ulcer prophylaxis: Protonix   -refer for colonoscopy given PET findings. Doubt malignancy, but given bouts of diarrhea she may have CMV colitis or infections. I suggest to obtain biopsy     Today's Summary:  RTC with Dr. Reinoso in 3 months  MM lab before each visit     37 minutes spent on the date of the encounter doing chart review, review of test results, interpretation of tests, patient visit, and documentation     The longitudinal plan of  care for the diagnosis(es)/condition(s) as documented were addressed during this visit. Due to the added complexity in care, I will continue to support Margo in the subsequent management and with ongoing continuity of care.    DORON [facility] [address] [address]  [phone]

## 2025-05-06 NOTE — LETTER
"5/6/2025      Kristie Cornejo  [address]  [facility] [address]      Dear Colleague,    Thank you for referring your patient, Kristie Cornejo, to the [facility]. Please see a copy of my visit note below.      [facility] Progress Note  05/06/2025      BMT Physician: Dr. Reinoso   Transplant Type: CAR-T Abecma  Preparative Regimen: Cy/Flu  Chief complaint: Kristie Cornejo is a 55 year old female with a history of multiple myeloma conditioned with Cy/Flu, currently 2+ years s/p CAR T-cell therapy with Abecma.    INTERIM HISTORY:   Margo is feeling great, energy is good, no new complaints, did 6 hours of gardening.   \"Picks up everything\" so somewhat frequent illness, but no hopsitalizations.  Had COVID and influenza B last fall, influenza B was a fairly severe illness for her.   Having PVCs again but is manageable, symptoms resolve with rest.        REVIEW OF SYSTEMS: Otherwise unremarkable other than what is noted in the Interim History.      PHYSICAL EXAM:   Wt Readings from Last 4 Encounters:   05/06/25 74.6 kg (164 lb 6.4 oz)   02/06/25 75.1 kg (165 lb 8 oz)   10/15/24 73.8 kg (162 lb 11.2 oz)   03/21/24 73.5 kg (162 lb)     Gen: alert, pleasant and conversational, NAD  HEENT: NC/AT,EOMI w/ PERRL, anicteric sclera. OP clear. MMM.   CV: normal S1,S2 with RRR no m/r/g  Resp: lungs CTA bilaterally with adequate air movement to bases. No wheezes or crackles  Abd: soft NTND no organomegaly or masses. BS normoactive.   Ext: warm and well perfused, no edema or cyanosis  Lymphatics: no palpable cervical or axillary LAD. No HSM  Skin: no concerning lesions or rashes  Neuro: A&Ox4, no lateralizing sx. Grossly nonfocal.  Psych: appropriate, reactive      Labs:  I personally reviewed the following labs:    Most Recent 3 CBC's:  Recent Labs   Lab Test 01/10/25  0953 09/03/24  1304 06/03/24  0943   WBC 3.9* 5.1 3.5*   HGB 14.0 13.7 13.8   MCV 90 92 94    198 198 "     Most Recent 3 BMP's:  Recent Labs   Lab Test 01/10/25  0953 09/03/24  1304 06/03/24  0943    141 141   POTASSIUM 4.1 4.1 4.8   CHLORIDE 107 105 108*   CO2 25 22 21*   BUN 18.9 15.4 15.3   CR 0.80 0.84 0.82   ANIONGAP 9 14 12   MEKA 9.1 9.8 8.7   GLC 88 164* 97     Most Recent 2 LFT's:  Recent Labs   Lab Test 01/10/25  0953 09/03/24  1304   AST 21 22   ALT 17 18   ALKPHOS 69 78   BILITOTAL 0.4 0.5           Assessment and Plan:    Light chain MM, standard risk with t(11;14). Never achieved remission with standard therapy.   No p53 or 17del  S/p Abecma 1/4/23 (median PFS for patients in CR is about 18 months)    Day 29 - re-staging studies drawn today, disease response: CR.  Marrow not assessed yet, will do at 3 months   Day 100 - restaging PET and BMBx with CR without MRD !! This is a great outcome.  6 months  - ongoing remission based on serum testing. No MRD.   6 months  - ongoing remission based on serum testing. No MRD.  1 year  - ongoing stringent remission, no MRD   1.5 year - ongoing srtingent remission, no MD  2 years - ongoing stringent remission, no MRD    On Neurotox prophylaxis study. No ICANS.   it dex x 2 doses (-1, +6)   --completed Simvastatin daily 40mg until day day +30, now off   No need for future BMBx, we will monitor serologic markers MM and repeat PET in 1 year     HEME  Counts are recovered now.  No transfusions.         IMMUNOCOMPROMISED  -PJP prophylaxis: off Bactrim.  CD4 is above 200.   -Hypogammaglobulinemia - s/p  IVIG in May and Sept 23  IgG 521mg/dl - no IVIG replacement needed now.       CARDIOVASCULAR  - Risk of cardiomyopathy:  Baseline LVEF: 55-60%   - Risk of arrhythmia: Baseline EKG showed sinus rhythm nonspecific T wave abnormality  - Hx of PVC ablation March 2022      GI  - Ulcer prophylaxis: Protonix   -refer for colonoscopy given PET findings. Doubt malignancy, but given bouts of diarrhea she may have CMV colitis or infections. I suggest to obtain biopsy     Today's  Summary:  RTC with Dr. Reinoso in 3 months  MM lab before each visit     37 minutes spent on the date of the encounter doing chart review, review of test results, interpretation of tests, patient visit, and documentation     The longitudinal plan of care for the diagnosis(es)/condition(s) as documented were addressed during this visit. Due to the added complexity in care, I will continue to support Margo in the subsequent management and with ongoing continuity of care.    DORON Nicole CNP  [facility] [address] [id number] [phone]      Again, thank you for allowing me to participate in the care of your patient.        Sincerely,        DORON Magaña CNP    Electronically signed

## 2025-05-07 LAB
KAPPA LC FREE SER-MCNC: 1.43 MG/DL (ref 0.33–1.94)
KAPPA LC FREE/LAMBDA FREE SER NEPH: 1.12 {RATIO} (ref 0.26–1.65)
LAMBDA LC FREE SERPL-MCNC: 1.28 MG/DL (ref 0.57–2.63)

## 2025-06-15 ENCOUNTER — HEALTH MAINTENANCE LETTER (OUTPATIENT)
Age: 55
End: 2025-06-15
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
## 2025-07-04 DIAGNOSIS — C90.00 LIGHT CHAIN MYELOMA (H): ICD-10-CM

## 2025-07-08 RX ORDER — ACYCLOVIR 400 MG/1
400 TABLET ORAL 2 TIMES DAILY
Qty: 60 TABLET | Refills: 0 | Status: SHIPPED | OUTPATIENT
Start: 2025-07-08

## 2025-07-08 NOTE — TELEPHONE ENCOUNTER
Acyclovir 400mg tab  Last prescribing provider: Jackie Dunn    Last clinic visit date: 5/6/25    Recommendations for requested medication (if none, N/A): NA    Any other pertinent information (if none, N/A): NA    Refilled: Y/N, if NO, why?

## 2025-07-21 ENCOUNTER — DOCUMENTATION ONLY (OUTPATIENT)
Dept: LAB | Facility: CLINIC | Age: 55
End: 2025-07-21
Payer: COMMERCIAL

## 2025-07-21 NOTE — PROGRESS NOTES
"Kristie Cornejo has an upcoming lab appointment:    Future Appointments   Date Time Provider Department Center   8/4/2025  9:30 AM [facility] LAB [facility]   8/7/2025  9:00 AM Payton Reinoso MD [facility]     Patient is scheduled for the following lab(s): \"Labs/Dr. Reinoso\" per appointment notes    There is no order available. Please review and place either future orders or HMPO (Review of Health Maintenance Protocol Orders), as appropriate.    Health Maintenance Due   Topic    ANNUAL REVIEW OF HM ORDERS     LIPID      Thank you,  Aliya Valdovinos on 7/21/2025 at 8:53 AM    "

## 2025-08-04 ENCOUNTER — LAB (OUTPATIENT)
Dept: LAB | Facility: CLINIC | Age: 55
End: 2025-08-04
Payer: COMMERCIAL

## 2025-08-04 DIAGNOSIS — C90.00 MULTIPLE MYELOMA, REMISSION STATUS UNSPECIFIED (H): ICD-10-CM

## 2025-08-04 DIAGNOSIS — C90.00 LIGHT CHAIN MYELOMA (H): ICD-10-CM

## 2025-08-04 LAB
ALBUMIN SERPL BCG-MCNC: 4.4 G/DL (ref 3.5–5.2)
ALP SERPL-CCNC: 56 U/L (ref 40–150)
ALT SERPL W P-5'-P-CCNC: 16 U/L (ref 0–50)
ANION GAP SERPL CALCULATED.3IONS-SCNC: 8 MMOL/L (ref 7–15)
AST SERPL W P-5'-P-CCNC: 19 U/L (ref 0–45)
BASOPHILS # BLD AUTO: 0.1 10E3/UL (ref 0–0.2)
BASOPHILS NFR BLD AUTO: 1 %
BILIRUB SERPL-MCNC: 0.6 MG/DL
BUN SERPL-MCNC: 21.5 MG/DL (ref 6–20)
CALCIUM SERPL-MCNC: 9.2 MG/DL (ref 8.8–10.4)
CHLORIDE SERPL-SCNC: 108 MMOL/L (ref 98–107)
CREAT SERPL-MCNC: 0.79 MG/DL (ref 0.51–0.95)
EGFRCR SERPLBLD CKD-EPI 2021: 88 ML/MIN/1.73M2
EOSINOPHIL # BLD AUTO: 0.3 10E3/UL (ref 0–0.7)
EOSINOPHIL NFR BLD AUTO: 6 %
ERYTHROCYTE [DISTWIDTH] IN BLOOD BY AUTOMATED COUNT: 12.3 % (ref 10–15)
GLUCOSE SERPL-MCNC: 85 MG/DL (ref 70–99)
HCO3 SERPL-SCNC: 24 MMOL/L (ref 22–29)
HCT VFR BLD AUTO: 42.4 % (ref 35–47)
HGB BLD-MCNC: 14.4 G/DL (ref 11.7–15.7)
IMM GRANULOCYTES # BLD: 0 10E3/UL
IMM GRANULOCYTES NFR BLD: 0 %
LYMPHOCYTES # BLD AUTO: 1.3 10E3/UL (ref 0.8–5.3)
LYMPHOCYTES NFR BLD AUTO: 31 %
MCH RBC QN AUTO: 31 PG (ref 26.5–33)
MCHC RBC AUTO-ENTMCNC: 34 G/DL (ref 31.5–36.5)
MCV RBC AUTO: 91 FL (ref 78–100)
MONOCYTES # BLD AUTO: 0.4 10E3/UL (ref 0–1.3)
MONOCYTES NFR BLD AUTO: 10 %
NEUTROPHILS # BLD AUTO: 2.2 10E3/UL (ref 1.6–8.3)
NEUTROPHILS NFR BLD AUTO: 52 %
NRBC # BLD AUTO: 0 10E3/UL
NRBC BLD AUTO-RTO: 0 /100
PLATELET # BLD AUTO: 203 10E3/UL (ref 150–450)
POTASSIUM SERPL-SCNC: 4.2 MMOL/L (ref 3.4–5.3)
PROT SERPL-MCNC: 6.3 G/DL (ref 6.4–8.3)
PROT SERPL-MCNC: 6.6 G/DL (ref 6.4–8.3)
RBC # BLD AUTO: 4.64 10E6/UL (ref 3.8–5.2)
SODIUM SERPL-SCNC: 140 MMOL/L (ref 135–145)
WBC # BLD AUTO: 4.2 10E3/UL (ref 4–11)

## 2025-08-04 PROCEDURE — 80053 COMPREHEN METABOLIC PANEL: CPT | Mod: 59

## 2025-08-04 PROCEDURE — 82784 ASSAY IGA/IGD/IGG/IGM EACH: CPT

## 2025-08-04 PROCEDURE — 36415 COLL VENOUS BLD VENIPUNCTURE: CPT

## 2025-08-04 PROCEDURE — 86334 IMMUNOFIX E-PHORESIS SERUM: CPT | Performed by: STUDENT IN AN ORGANIZED HEALTH CARE EDUCATION/TRAINING PROGRAM

## 2025-08-04 PROCEDURE — 85025 COMPLETE CBC W/AUTO DIFF WBC: CPT

## 2025-08-04 PROCEDURE — 84155 ASSAY OF PROTEIN SERUM: CPT

## 2025-08-04 PROCEDURE — 84165 PROTEIN E-PHORESIS SERUM: CPT | Performed by: STUDENT IN AN ORGANIZED HEALTH CARE EDUCATION/TRAINING PROGRAM

## 2025-08-04 PROCEDURE — 83521 IG LIGHT CHAINS FREE EACH: CPT

## 2025-08-05 LAB
ALBUMIN SERPL ELPH-MCNC: 4.2 G/DL (ref 3.7–5.1)
ALPHA1 GLOB SERPL ELPH-MCNC: 0.2 G/DL (ref 0.2–0.4)
ALPHA2 GLOB SERPL ELPH-MCNC: 0.6 G/DL (ref 0.5–0.9)
B-GLOBULIN SERPL ELPH-MCNC: 0.6 G/DL (ref 0.6–1)
GAMMA GLOB SERPL ELPH-MCNC: 0.6 G/DL (ref 0.7–1.6)
IGA SERPL-MCNC: 59 MG/DL (ref 84–499)
IGG SERPL-MCNC: 593 MG/DL (ref 610–1616)
IGM SERPL-MCNC: 192 MG/DL (ref 35–242)
KAPPA LC FREE SER-MCNC: 1.24 MG/DL (ref 0.33–1.94)
KAPPA LC FREE/LAMBDA FREE SER NEPH: 1.02 {RATIO} (ref 0.26–1.65)
LAMBDA LC FREE SERPL-MCNC: 1.21 MG/DL (ref 0.57–2.63)
LOCATION OF TASK: ABNORMAL
LOCATION OF TASK: NORMAL
M PROTEIN SERPL ELPH-MCNC: 0 G/DL
PROT PATTERN SERPL ELPH-IMP: ABNORMAL
PROT PATTERN SERPL IFE-IMP: NORMAL

## 2025-08-06 RX ORDER — ACYCLOVIR 400 MG/1
400 TABLET ORAL 2 TIMES DAILY
Qty: 60 TABLET | Refills: 0 | Status: SHIPPED | OUTPATIENT
Start: 2025-08-06

## 2025-08-07 ENCOUNTER — OFFICE VISIT (OUTPATIENT)
Dept: TRANSPLANT | Facility: CLINIC | Age: 55
End: 2025-08-07
Payer: COMMERCIAL

## 2025-08-07 VITALS
HEART RATE: 64 BPM | TEMPERATURE: 97.8 F | RESPIRATION RATE: 16 BRPM | BODY MASS INDEX: 26.08 KG/M2 | SYSTOLIC BLOOD PRESSURE: 123 MMHG | OXYGEN SATURATION: 100 % | WEIGHT: 166.5 LBS | DIASTOLIC BLOOD PRESSURE: 80 MMHG

## 2025-08-07 DIAGNOSIS — C90.00 MULTIPLE MYELOMA NOT HAVING ACHIEVED REMISSION (H): Primary | ICD-10-CM

## 2025-08-07 PROCEDURE — 99213 OFFICE O/P EST LOW 20 MIN: CPT

## 2025-08-07 RX ORDER — MULTIVIT WITH MINERALS/LUTEIN
1000 TABLET ORAL
COMMUNITY

## 2025-08-07 RX ORDER — ESTRADIOL 0.1 MG/G
CREAM VAGINAL
COMMUNITY
Start: 2025-07-25

## 2025-08-07 ASSESSMENT — PAIN SCALES - GENERAL: PAINLEVEL_OUTOF10: NO PAIN (0)

## 2025-09-01 DIAGNOSIS — C90.00 LIGHT CHAIN MYELOMA (H): ICD-10-CM

## 2025-09-03 RX ORDER — ACYCLOVIR 400 MG/1
400 TABLET ORAL 2 TIMES DAILY
Qty: 60 TABLET | Refills: 0 | Status: SHIPPED | OUTPATIENT
Start: 2025-09-03

## (undated) DEVICE — DRSG PRIMAPORE 02X3" 7133

## (undated) DEVICE — GLOVE PROTEXIS W/NEU-THERA 7.5  2D73TE75

## (undated) DEVICE — LINEN TOWEL PACK X5 5464

## (undated) DEVICE — ADH SKIN CLOSURE PREMIERPRO EXOFIN 1.0ML 3470

## (undated) DEVICE — INTRODUCER SHEATH FAST-CATH CATH-LOCK 7FRX12CM 406702

## (undated) DEVICE — SU ETHILON 2-0 FS 18" 664H

## (undated) DEVICE — DRSG PRIMAPORE 03 1/8X6" 66000318

## (undated) DEVICE — PREP SKIN SCRUB TRAY 4461A

## (undated) DEVICE — COVER CAMERA IN-LIGHT DISP LT-C02

## (undated) DEVICE — Device

## (undated) DEVICE — PACK CENTRAL LINE INSERTION SAN32CLFCG

## (undated) DEVICE — COVER EASY EQUIP BAG W/BAND LATEX FREE EZ-28

## (undated) DEVICE — CATH ANGIO SUPERTORQUE PLUS JR4 6FRX100CM 533621

## (undated) DEVICE — INTRO SHEATH 4FRX10CM PINNACLE RSS402

## (undated) DEVICE — KIT INTRODUCER FLUENT MICRO 5FRX10CM ECHO TIP KIT-038-04

## (undated) DEVICE — ADH SKIN CLOSURE PREMIERPRO EXOFIN MICOR HV 0.5ML 3471

## (undated) DEVICE — DRSG TELFA 3X8" 1238

## (undated) DEVICE — GLOVE PROTEXIS POWDER FREE SMT 7.5  2D72PT75X

## (undated) DEVICE — LINEN GOWN XLG 5407

## (undated) DEVICE — SU SILK 2-0 TIE 12X30" A305H

## (undated) DEVICE — TUBING SUCTION MED-VAC 7MMX20' N720A

## (undated) DEVICE — LINEN GOWN X4 5410

## (undated) DEVICE — CATH EP REPRO DAIG SPRM FX CRV DX EP C

## (undated) DEVICE — PACK UNIVERSAL SPLIT 29131

## (undated) DEVICE — STRAP KNEE/BODY 31143004

## (undated) DEVICE — CATHETER EPT POLARIS X 6FR STEERABLE M0047003D0

## (undated) DEVICE — BLADE KNIFE SURG 10 371110

## (undated) DEVICE — LINEN GOWN OVERSIZE 5408

## (undated) DEVICE — SYR BULB IRRIG 50ML LATEX FREE 0035280

## (undated) DEVICE — PREP POVIDONE-IODINE 7.5% SCRUB 4OZ BOTTLE MDS093945

## (undated) DEVICE — CATH ANGIO SUPERTORQUE PLUS JL4 6FRX100CM 533620

## (undated) DEVICE — INTRO SHEATH MICRO PLATINUM TIP 4FRX40CM 7274

## (undated) DEVICE — CAP LUER LOCK MALE/FEMALE DUAL 2C6250

## (undated) DEVICE — SU VICRYL 2-0 SH 27" UND J417H

## (undated) DEVICE — SPONGE SURGIFOAM 100 1974

## (undated) DEVICE — BONE CEMENT KIT BOWL AND SPATULA STRK 6201-3-410

## (undated) DEVICE — SOL WATER IRRIG 1000ML BOTTLE 2F7114

## (undated) DEVICE — DECANTER BAG 2002S

## (undated) DEVICE — CATH THERMOCOOL SMARTTOUCH DF CURVE

## (undated) DEVICE — TUBE SET SMARKABLATE IRRIGATION

## (undated) DEVICE — SPONGE LAP 18X18" X8435

## (undated) DEVICE — GLOVE PROTEXIS W/NEU-THERA 7.0  2D73TE70

## (undated) DEVICE — BONE CEMENT MIXEVAC HI VAC W/CARTRIDGE 0306-563-000

## (undated) DEVICE — SUCTION MANIFOLD NEPTUNE 2 SYS 4 PORT 0702-020-000

## (undated) DEVICE — LIGHT HANDLE X2

## (undated) DEVICE — PACK HEART LEFT CUSTOM

## (undated) DEVICE — BNDG ELASTIC 4"X5YDS UNSTERILE 6611-40

## (undated) DEVICE — SOL NACL 0.9% IRRIG 1000ML BOTTLE 2F7124

## (undated) DEVICE — GLOVE PROTEXIS BLUE W/NEU-THERA 7.5  2D73EB75

## (undated) DEVICE — INTRODUCER SHEATH FAST-CATH CATH-LOCK 6FRX12CM 406701

## (undated) DEVICE — INTRO CATH 12CM 8.5FR FST-CATH

## (undated) DEVICE — FASTENER CATH STAYFIX 685ME

## (undated) DEVICE — DRAPE TIBURON TOP SHEET 100X60" 29352

## (undated) DEVICE — LINEN DRAPE 54X72" 5467

## (undated) DEVICE — SU PDS II 3-0 PS-1 18" Z683G

## (undated) DEVICE — PREP DURAPREP 26ML APL 8630

## (undated) DEVICE — SU VICRYL 2-0 CT-1 27" J339H

## (undated) DEVICE — ESU GROUND PAD UNIVERSAL W/O CORD

## (undated) DEVICE — PATCH CARTO 3 EXTERNAL REFERENCE 3D MAPPING CREFP6

## (undated) DEVICE — SU SILK 2-0 SH 30" K833H

## (undated) DEVICE — KIT VENOUS FLUSH 60210177

## (undated) DEVICE — COVER ULTRASOUND PROBE W/GEL FLEXI-FEEL 6"X58" LF  25-FF658

## (undated) DEVICE — PAD DEFIBRILLATOR ELECTRODE 4.25INX6IN ADULT 2001M-C

## (undated) DEVICE — GOWN XLG DISP 9545

## (undated) DEVICE — GLOVE PROTEXIS BLUE W/NEU-THERA 8.0  2D73EB80

## (undated) RX ORDER — DIPHENHYDRAMINE HYDROCHLORIDE 50 MG/ML
INJECTION INTRAMUSCULAR; INTRAVENOUS
Status: DISPENSED
Start: 2022-11-21

## (undated) RX ORDER — BUPIVACAINE HYDROCHLORIDE AND EPINEPHRINE 5; 5 MG/ML; UG/ML
INJECTION, SOLUTION PERINEURAL
Status: DISPENSED
Start: 2021-09-07

## (undated) RX ORDER — FENTANYL CITRATE 50 UG/ML
INJECTION, SOLUTION INTRAMUSCULAR; INTRAVENOUS
Status: DISPENSED
Start: 2022-03-23

## (undated) RX ORDER — ALBUTEROL SULFATE 0.83 MG/ML
SOLUTION RESPIRATORY (INHALATION)
Status: DISPENSED
Start: 2023-02-02

## (undated) RX ORDER — FENTANYL CITRATE 50 UG/ML
INJECTION, SOLUTION INTRAMUSCULAR; INTRAVENOUS
Status: DISPENSED
Start: 2022-04-13

## (undated) RX ORDER — ONDANSETRON 2 MG/ML
INJECTION INTRAMUSCULAR; INTRAVENOUS
Status: DISPENSED
Start: 2021-09-07

## (undated) RX ORDER — HEPARIN SODIUM 1000 [USP'U]/ML
INJECTION, SOLUTION INTRAVENOUS; SUBCUTANEOUS
Status: DISPENSED
Start: 2022-03-23

## (undated) RX ORDER — DEXAMETHASONE SODIUM PHOSPHATE 4 MG/ML
INJECTION, SOLUTION INTRA-ARTICULAR; INTRALESIONAL; INTRAMUSCULAR; INTRAVENOUS; SOFT TISSUE
Status: DISPENSED
Start: 2021-09-07

## (undated) RX ORDER — CALCIUM GLUCONATE 94 MG/ML
INJECTION, SOLUTION INTRAVENOUS
Status: DISPENSED
Start: 2022-11-22

## (undated) RX ORDER — LIDOCAINE HYDROCHLORIDE 10 MG/ML
INJECTION, SOLUTION EPIDURAL; INFILTRATION; INTRACAUDAL; PERINEURAL
Status: DISPENSED
Start: 2023-01-10

## (undated) RX ORDER — LIDOCAINE HYDROCHLORIDE 20 MG/ML
INJECTION, SOLUTION EPIDURAL; INFILTRATION; INTRACAUDAL; PERINEURAL
Status: DISPENSED
Start: 2021-09-07

## (undated) RX ORDER — CEFAZOLIN SODIUM 2 G/100ML
INJECTION, SOLUTION INTRAVENOUS
Status: DISPENSED
Start: 2021-09-07

## (undated) RX ORDER — FENTANYL CITRATE 50 UG/ML
INJECTION, SOLUTION INTRAMUSCULAR; INTRAVENOUS
Status: DISPENSED
Start: 2023-05-24

## (undated) RX ORDER — PENTAMIDINE ISETHIONATE 300 MG/300MG
INHALANT RESPIRATORY (INHALATION)
Status: DISPENSED
Start: 2023-02-02

## (undated) RX ORDER — ACETAMINOPHEN 325 MG/1
TABLET ORAL
Status: DISPENSED
Start: 2023-04-11

## (undated) RX ORDER — SODIUM CHLORIDE 9 MG/ML
INJECTION, SOLUTION INTRAVENOUS
Status: DISPENSED
Start: 2022-03-23

## (undated) RX ORDER — ZOLEDRONIC ACID 4 MG/5ML
INJECTION INTRAVENOUS
Status: DISPENSED
Start: 2021-09-07

## (undated) RX ORDER — PROPOFOL 10 MG/ML
INJECTION, EMULSION INTRAVENOUS
Status: DISPENSED
Start: 2021-09-07

## (undated) RX ORDER — LIDOCAINE HYDROCHLORIDE 10 MG/ML
INJECTION, SOLUTION EPIDURAL; INFILTRATION; INTRACAUDAL; PERINEURAL
Status: DISPENSED
Start: 2023-01-17

## (undated) RX ORDER — OXYCODONE HYDROCHLORIDE 5 MG/1
TABLET ORAL
Status: DISPENSED
Start: 2021-09-07

## (undated) RX ORDER — DIPHENHYDRAMINE HCL 25 MG
CAPSULE ORAL
Status: DISPENSED
Start: 2023-04-11

## (undated) RX ORDER — HYDROMORPHONE HYDROCHLORIDE 1 MG/ML
INJECTION, SOLUTION INTRAMUSCULAR; INTRAVENOUS; SUBCUTANEOUS
Status: DISPENSED
Start: 2021-09-07

## (undated) RX ORDER — ALBUTEROL SULFATE 0.83 MG/ML
SOLUTION RESPIRATORY (INHALATION)
Status: DISPENSED
Start: 2023-01-03

## (undated) RX ORDER — PENTAMIDINE ISETHIONATE 300 MG/300MG
INHALANT RESPIRATORY (INHALATION)
Status: DISPENSED
Start: 2023-03-09

## (undated) RX ORDER — PROTAMINE SULFATE 10 MG/ML
INJECTION, SOLUTION INTRAVENOUS
Status: DISPENSED
Start: 2022-03-23

## (undated) RX ORDER — PENTAMIDINE ISETHIONATE 300 MG/300MG
INHALANT RESPIRATORY (INHALATION)
Status: DISPENSED
Start: 2023-01-03

## (undated) RX ORDER — FENTANYL CITRATE 50 UG/ML
INJECTION, SOLUTION INTRAMUSCULAR; INTRAVENOUS
Status: DISPENSED
Start: 2021-09-07

## (undated) RX ORDER — LIDOCAINE HYDROCHLORIDE 10 MG/ML
INJECTION, SOLUTION EPIDURAL; INFILTRATION; INTRACAUDAL; PERINEURAL
Status: DISPENSED
Start: 2022-03-23

## (undated) RX ORDER — LIDOCAINE HYDROCHLORIDE 10 MG/ML
INJECTION, SOLUTION EPIDURAL; INFILTRATION; INTRACAUDAL; PERINEURAL
Status: DISPENSED
Start: 2022-11-21

## (undated) RX ORDER — GLYCOPYRROLATE 0.2 MG/ML
INJECTION INTRAMUSCULAR; INTRAVENOUS
Status: DISPENSED
Start: 2021-09-07

## (undated) RX ORDER — PENTAMIDINE ISETHIONATE 300 MG/300MG
INHALANT RESPIRATORY (INHALATION)
Status: DISPENSED
Start: 2023-04-04